# Patient Record
Sex: FEMALE | Race: WHITE | Employment: OTHER | URBAN - METROPOLITAN AREA
[De-identification: names, ages, dates, MRNs, and addresses within clinical notes are randomized per-mention and may not be internally consistent; named-entity substitution may affect disease eponyms.]

---

## 2017-01-18 ENCOUNTER — TELEPHONE (OUTPATIENT)
Dept: INTERNAL MEDICINE | Facility: CLINIC | Age: 82
End: 2017-01-18

## 2017-01-18 ENCOUNTER — OFFICE VISIT (OUTPATIENT)
Dept: INTERNAL MEDICINE | Facility: CLINIC | Age: 82
End: 2017-01-18
Payer: COMMERCIAL

## 2017-01-18 VITALS
RESPIRATION RATE: 16 BRPM | SYSTOLIC BLOOD PRESSURE: 162 MMHG | HEART RATE: 70 BPM | OXYGEN SATURATION: 95 % | TEMPERATURE: 98.8 F | HEIGHT: 63 IN | WEIGHT: 151.8 LBS | BODY MASS INDEX: 26.9 KG/M2 | DIASTOLIC BLOOD PRESSURE: 62 MMHG

## 2017-01-18 DIAGNOSIS — D50.8 OTHER IRON DEFICIENCY ANEMIA: ICD-10-CM

## 2017-01-18 DIAGNOSIS — I10 ESSENTIAL HYPERTENSION WITH GOAL BLOOD PRESSURE LESS THAN 140/90: Primary | ICD-10-CM

## 2017-01-18 DIAGNOSIS — R35.0 URINARY FREQUENCY: Primary | ICD-10-CM

## 2017-01-18 DIAGNOSIS — R53.83 OTHER FATIGUE: ICD-10-CM

## 2017-01-18 DIAGNOSIS — R82.90 NONSPECIFIC FINDING ON EXAMINATION OF URINE: ICD-10-CM

## 2017-01-18 LAB
ALBUMIN UR-MCNC: 30 MG/DL
APPEARANCE UR: ABNORMAL
BACTERIA #/AREA URNS HPF: ABNORMAL /HPF
BILIRUB UR QL STRIP: NEGATIVE
COLOR UR AUTO: YELLOW
ERYTHROCYTE [DISTWIDTH] IN BLOOD BY AUTOMATED COUNT: 15.2 % (ref 10–15)
GLUCOSE UR STRIP-MCNC: NEGATIVE MG/DL
HCT VFR BLD AUTO: 34.7 % (ref 35–47)
HGB BLD-MCNC: 10.7 G/DL (ref 11.7–15.7)
HGB UR QL STRIP: NEGATIVE
KETONES UR STRIP-MCNC: NEGATIVE MG/DL
LEUKOCYTE ESTERASE UR QL STRIP: ABNORMAL
MCH RBC QN AUTO: 29.5 PG (ref 26.5–33)
MCHC RBC AUTO-ENTMCNC: 30.8 G/DL (ref 31.5–36.5)
MCV RBC AUTO: 96 FL (ref 78–100)
NITRATE UR QL: POSITIVE
NON-SQ EPI CELLS #/AREA URNS LPF: ABNORMAL /LPF
PH UR STRIP: 5.5 PH (ref 5–7)
PLATELET # BLD AUTO: 147 10E9/L (ref 150–450)
RBC # BLD AUTO: 3.63 10E12/L (ref 3.8–5.2)
RBC #/AREA URNS AUTO: ABNORMAL /HPF (ref 0–2)
SP GR UR STRIP: 1.02 (ref 1–1.03)
URN SPEC COLLECT METH UR: ABNORMAL
UROBILINOGEN UR STRIP-ACNC: 0.2 EU/DL (ref 0.2–1)
WBC # BLD AUTO: 7.5 10E9/L (ref 4–11)
WBC #/AREA URNS AUTO: >100 /HPF (ref 0–2)

## 2017-01-18 PROCEDURE — 99214 OFFICE O/P EST MOD 30 MIN: CPT | Performed by: NURSE PRACTITIONER

## 2017-01-18 PROCEDURE — 87086 URINE CULTURE/COLONY COUNT: CPT | Performed by: NURSE PRACTITIONER

## 2017-01-18 PROCEDURE — 36415 COLL VENOUS BLD VENIPUNCTURE: CPT | Performed by: NURSE PRACTITIONER

## 2017-01-18 PROCEDURE — 87088 URINE BACTERIA CULTURE: CPT | Performed by: NURSE PRACTITIONER

## 2017-01-18 PROCEDURE — 85027 COMPLETE CBC AUTOMATED: CPT | Performed by: NURSE PRACTITIONER

## 2017-01-18 PROCEDURE — 84443 ASSAY THYROID STIM HORMONE: CPT | Performed by: NURSE PRACTITIONER

## 2017-01-18 PROCEDURE — 80048 BASIC METABOLIC PNL TOTAL CA: CPT | Performed by: NURSE PRACTITIONER

## 2017-01-18 PROCEDURE — 87186 SC STD MICRODIL/AGAR DIL: CPT | Performed by: NURSE PRACTITIONER

## 2017-01-18 PROCEDURE — 81001 URINALYSIS AUTO W/SCOPE: CPT | Performed by: NURSE PRACTITIONER

## 2017-01-18 RX ORDER — NITROFURANTOIN 25; 75 MG/1; MG/1
100 CAPSULE ORAL 2 TIMES DAILY
Qty: 14 CAPSULE | Refills: 0 | Status: ON HOLD | OUTPATIENT
Start: 2017-01-18 | End: 2017-10-18

## 2017-01-18 NOTE — Clinical Note
[facility]  [address], [address]  [place]  [address]      January 19, 2017        Keisha Godinez                                                                                                                               [address] [place] [id number]          Dear Keisha,    Your glucose is elevated at 106 (normal <100) but not high enough to make you a diabetic. But it does indicate you are at high risk for developing diabetes mellitus. Exercise, avoiding simple carbohydrates in your diet and wt loss will all help to lower this risk. Please work on these as you can and I recommend rechecking fasting blood sugar in 3 months.    Enclosed is a copy of the results.  It was a pleasure to see you at your last appointment.    If you have any questions or concerns, please contact our office at [phone].    Sincerely,      Mallika Hammonds C.N.P.

## 2017-01-18 NOTE — TELEPHONE ENCOUNTER
Please advise pt labs show lower hgb and UTI.  Recommend ferrous sulfate BID and recheck in 4 weeks.  eRx sent for macro bid x 7 days.  Other labs pending  Mallika Hammonds CNP

## 2017-01-18 NOTE — NURSING NOTE
"Chief Complaint   Patient presents with     Follow Up For     bp       Initial /62 mmHg  Pulse 70  Temp(Src) 98.8  F (37.1  C) (Oral)  Resp 16  Ht 5' 3\" (1.6 m)  Wt 151 lb 12.8 oz (68.856 kg)  BMI 26.90 kg/m2  SpO2 95% Estimated body mass index is 26.9 kg/(m^2) as calculated from the following:    Height as of this encounter: 5' 3\" (1.6 m).    Weight as of this encounter: 151 lb 12.8 oz (68.856 kg).  BP completed using cuff size: regular    "

## 2017-01-18 NOTE — PROGRESS NOTES
SUBJECTIVE:                                                    Keisha Godinez is a 84 year old female who presents to clinic today for the following health issues:      Hypertension Follow-up      Outpatient blood pressures are being checked at home.  Results are 149/-63.    Low Salt Diet: low salt    C/o increased fatigue, denies SOB/MOTT, pedal edema.  Daughter concerned about UTI       Amount of exercise or physical activity: None    Problems taking medications regularly: No    Medication side effects: none    Diet: regular (no restrictions)      Patient Active Problem List   Diagnosis     Iron deficiency anemia     Neuropathy of both feet     Myocardial infarction-type 2     GIB (gastrointestinal bleeding)     Wound of left leg     Cardiomyopathy (H)     Pulmonary hypertension (H)     Xerosis of skin: shins     Bilateral edema of lower extremity     Gastroesophageal reflux disease without esophagitis     Health Care Home     Chronic systolic congestive heart failure (H)     Major depressive disorder, recurrent episode, in partial remission (H)     Cellulitis of foot     Essential hypertension with goal blood pressure less than 140/90     Post-operative complication     Respiratory failure (H)     Acute respiratory failure with hypoxia (H)     C. difficile colitis     History of hiatal hernia     History of shingles     Chronic foot ulcer (H) (felt secondary to bony protuberance status post excision 7/6-16)     Acute on chronic diastolic congestive heart failure (H)     Pneumonia     Sepsis (H)     Chronic atrial fibrillation (H)     Anemia     Dysphagia     Midline thoracic back pain     Past Surgical History   Procedure Laterality Date     Esophagoscopy, gastroscopy, duodenoscopy (egd), combined N/A 3/22/2015     Upper GI- Normal, nothing significant found.      Colonoscopy  03/24/2015     Diverticulosis, polyps     Enteroscopy small bowel N/A 2/29/2016     Procedure: ENTEROSCOPY SMALL BOWEL;  Surgeon:  Ady Ponce MD;  Location:  GI     Excise mass foot Right 7/6/2016     Procedure: EXCISE MASS FOOT;  Surgeon: Lee Jang DPM;  Location:  OR       Social History   Substance Use Topics     Smoking status: Former Smoker     Quit date: 04/14/1956     Smokeless tobacco: Never Used     Alcohol Use: No     Family History   Problem Relation Age of Onset     Known Genetic Syndrome Father      Known Genetic Syndrome Mother      Other Cancer Daughter      pancreatic     DIABETES No family hx of      Breast Cancer No family hx of      Cancer - colorectal No family hx of      Ovarian Cancer No family hx of      Prostate Cancer No family hx of      Other Cancer Brother      type     Other Cancer Sister 60     lung         Current Outpatient Prescriptions   Medication Sig Dispense Refill     potassium chloride SA (POTASSIUM CHLORIDE) 20 MEQ CR tablet Take 1 tablet (20 mEq) by mouth daily 90 tablet 1     isosorbide mononitrate (IMDUR) 30 MG 24 hr tablet Take 1 tablet (30 mg) by mouth daily 90 tablet 1     gabapentin (NEURONTIN) 100 MG capsule Take 1 capsule (100 mg) by mouth 3 times daily 270 capsule 0     simvastatin (ZOCOR) 10 MG tablet Take 1 tablet (10 mg) by mouth At Bedtime 90 tablet 2     hydrALAZINE (APRESOLINE) 25 MG tablet Take 3 tablets (75 mg) by mouth 3 times daily 810 tablet 2     ferrous sulfate (IRON) 325 (65 FE) MG tablet Take 1 tablet (325 mg) by mouth 3 times daily (with meals) 90 tablet 1     venlafaxine (EFFEXOR-XR) 75 MG 24 hr capsule Take 1 capsule (75 mg) by mouth daily 90 capsule 1     omeprazole (PRILOSEC) 20 MG capsule Take 1 capsule (20 mg) by mouth daily 90 capsule 1     senna-docusate (SENOKOT-S;PERICOLACE) 8.6-50 MG per tablet Take 2 tablets daily as needed for constipation while taking prescription pain medications. 30 tablet 0     furosemide (LASIX) 40 MG tablet Take 1 tablet (40 mg) by mouth 2 times daily 180 tablet 0     order for DME Equipment being ordered: Motorized  scooter x 3 months 1 Device 0     OMEGA-3 FATTY ACIDS PO Take 1,200 mg by mouth daily        order for DME Equipment being ordered: DH2 shoe for offloading R arch ulcer 1 Device 0     VITAMIN D, CHOLECALCIFEROL, PO Take 1,000 Units by mouth daily        ACETAMINOPHEN PO Take 650 mg by mouth At Bedtime       acetaminophen (TYLENOL) 325 MG tablet Take 650 mg by mouth every 6 hours as needed for mild pain At HS and prn as aboe       psyllium (METAMUCIL) 58.6 % POWD Take 1 teaspoonful by mouth daily       Recent Labs   Lab Test  11/30/16   1337  08/01/16   1447   07/12/16   0710   07/09/16   0730   12/31/15   1359   09/14/15   1346   06/07/10   0600   LDL   --    --    --    --    --    --    --    --    --   23   --   102   HDL   --    --    --    --    --    --    --    --    --   46*   --   45*   TRIG   --    --    --    --    --    --    --    --    --   109   --   154*   ALT  27   --    --   15   --   19   < >   --    < >   --    < >   --    CR  0.86  0.82   < >  0.73   < >  0.80   < >  0.74   < >   --    < >  0.62   GFRESTIMATED  63  66   < >  76   < >  68   < >  75   < >   --    < >  >90   GFRESTBLACK  76  80   < >  >90   GFR Calc     < >  83   < >  >90   GFR Calc     < >   --    < >  >90   POTASSIUM  3.8  4.0   < >  3.7   < >  4.1   < >  4.0   < >   --    < >  3.6   TSH  1.56   --    --    --    --    --    --   1.78   --    --    < >  0.80    < > = values in this interval not displayed.      BP Readings from Last 3 Encounters:   01/18/17 162/62   12/08/16 150/70   11/30/16 138/66    Wt Readings from Last 3 Encounters:   01/18/17 151 lb 12.8 oz (68.856 kg)   12/08/16 155 lb (70.308 kg)   11/30/16 152 lb 9.6 oz (69.219 kg)                    ROS:  C: NEGATIVE for fever, chills, change in weight  E/M: NEGATIVE for ear, mouth and throat problems  R: NEGATIVE for significant cough or SOB  CV: NEGATIVE for chest pain, palpitations or peripheral edema    OBJECTIVE:                        "                             /62 mmHg  Pulse 70  Temp(Src) 98.8  F (37.1  C) (Oral)  Resp 16  Ht 5' 3\" (1.6 m)  Wt 151 lb 12.8 oz (68.856 kg)  BMI 26.90 kg/m2  SpO2 95%  Body mass index is 26.9 kg/(m^2).  GENERAL: frail, elderly female in WC  RESP: lungs clear to auscultation - no rales, rhonchi or wheezes  CV: regular rate and rhythm, normal S1 S2, no S3 or S4, no murmur, click or rub, no peripheral edema and peripheral pulses strong         ASSESSMENT/PLAN:                                                              ICD-10-CM    1. Essential hypertension with goal blood pressure less than 140/90 I10 Basic metabolic panel     CBC with platelets   2. Other fatigue R53.83 UA reflex to Microscopic and Culture     TSH with free T4 reflex       See cardiology/PCP if not improving    Mallika Hammonds NP  [facility]    "

## 2017-01-19 LAB
ANION GAP SERPL CALCULATED.3IONS-SCNC: 8 MMOL/L (ref 3–14)
BUN SERPL-MCNC: 17 MG/DL (ref 7–30)
CALCIUM SERPL-MCNC: 9 MG/DL (ref 8.5–10.1)
CHLORIDE SERPL-SCNC: 106 MMOL/L (ref 94–109)
CO2 SERPL-SCNC: 27 MMOL/L (ref 20–32)
CREAT SERPL-MCNC: 0.73 MG/DL (ref 0.52–1.04)
GFR SERPL CREATININE-BSD FRML MDRD: 76 ML/MIN/1.7M2
GLUCOSE SERPL-MCNC: 107 MG/DL (ref 70–99)
POTASSIUM SERPL-SCNC: 4.2 MMOL/L (ref 3.4–5.3)
SODIUM SERPL-SCNC: 141 MMOL/L (ref 133–144)
TSH SERPL DL<=0.005 MIU/L-ACNC: 1.56 MU/L (ref 0.4–4)

## 2017-01-20 ENCOUNTER — TELEPHONE (OUTPATIENT)
Dept: INTERNAL MEDICINE | Facility: CLINIC | Age: 82
End: 2017-01-20

## 2017-01-20 DIAGNOSIS — R30.0 DYSURIA: Primary | ICD-10-CM

## 2017-01-20 LAB
BACTERIA SPEC CULT: ABNORMAL
MICRO REPORT STATUS: ABNORMAL
MICROORGANISM SPEC CULT: ABNORMAL
SPECIMEN SOURCE: ABNORMAL

## 2017-01-20 RX ORDER — CEFUROXIME AXETIL 250 MG/1
250 TABLET ORAL 2 TIMES DAILY
Qty: 20 TABLET | Refills: 0 | Status: ON HOLD | OUTPATIENT
Start: 2017-01-20 | End: 2017-10-18

## 2017-01-20 NOTE — TELEPHONE ENCOUNTER
Pt is resistant to macrobid, which she received 1/18/17.     Please find out what happened with levaquin and bactrim.  Are these true allergies?

## 2017-01-20 NOTE — TELEPHONE ENCOUNTER
Pt  Advised but does not recall what reaction she had with the Bactrim or Levaquin. I did ask about throat closing and she did not think so. Please advise and wants the same pharmacy and will call her back .BRADEN YBARRA LPN

## 2017-02-22 DIAGNOSIS — M54.6 MIDLINE THORACIC BACK PAIN, UNSPECIFIED CHRONICITY: ICD-10-CM

## 2017-02-22 DIAGNOSIS — D64.9 ANEMIA, UNSPECIFIED TYPE: ICD-10-CM

## 2017-02-22 NOTE — TELEPHONE ENCOUNTER
Ferrous sulfate        Last Written Prescription Date: 8/23/16  Last Fill Quantity: 90,    # refills: 1  Last Office Visit with [facility], [facility] or [facility] prescribing provider:  1/18/17      Lab Results   Component Value Date    WBC 7.5 01/18/2017     Lab Results   Component Value Date    RBC 3.63 01/18/2017     Lab Results   Component Value Date    HGB 10.7 01/18/2017     Lab Results   Component Value Date    HCT 34.7 01/18/2017     No components found for: MCT  Lab Results   Component Value Date    MCV 96 01/18/2017     Lab Results   Component Value Date    MCH 29.5 01/18/2017     Lab Results   Component Value Date    MCHC 30.8 01/18/2017     Lab Results   Component Value Date    RDW 15.2 01/18/2017     Lab Results   Component Value Date     01/18/2017     04/02/2015     Lab Results   Component Value Date    AST 24 11/30/2016     Lab Results   Component Value Date    ALT 27 11/30/2016     Creatinine   Date Value Ref Range Status   01/18/2017 0.73 0.52 - 1.04 mg/dL Final       Labs showing if normal/abnormal  Lab Results   Component Value Date    WBC 7.5 01/18/2017    RBC 3.63 (L) 01/18/2017    HGB 10.7 (L) 01/18/2017    HCT 34.7 (L) 01/18/2017    MCV 96 01/18/2017    MCH 29.5 01/18/2017    MCHC 30.8 (L) 01/18/2017    RDW 15.2 (H) 01/18/2017     (L) 01/18/2017    DTYP Automated Method 07/12/2016    NEUTROPHIL 81.1 07/12/2016    LYMPH 8.4 07/12/2016    MONOCYTE 5.9 07/12/2016    EOSINOPHIL 3.0 07/12/2016    BASOPHIL 1.0 07/12/2016    IG 0.6 07/12/2016    ANEU 6.8 07/12/2016    ALYM 0.7 (L) 07/12/2016    JAQUI 0.5 07/12/2016    AEOS 0.3 07/12/2016    ABAS 0.1 07/12/2016    AIG 0.1 07/12/2016      Lab Results   Component Value Date    AST 24 11/30/2016    ALT 27 11/30/2016

## 2017-02-23 NOTE — TELEPHONE ENCOUNTER
gabapentin      Last Written Prescription Date:  11/22/16  Last Fill Quantity: 270,   # refills: 0  Last Office Visit with [facility] prescribing provider: 1/18/17  Future Office visit:       Routing refill request to provider for review/approval because:  Drug not on the [facility] refill protocol or controlled substance

## 2017-02-24 RX ORDER — FERROUS SULFATE 325(65) MG
325 TABLET ORAL
Qty: 90 TABLET | Refills: 2 | Status: ON HOLD | OUTPATIENT
Start: 2017-02-24 | End: 2017-10-18

## 2017-02-24 NOTE — TELEPHONE ENCOUNTER
Ferrous sulfate prescription approved per [facility] Refill Protocol.    Routing Gabapentin refill request to provider for review/approval because:  Drug not on the [facility] refill protocol     Please advise, thanks.

## 2017-02-27 RX ORDER — GABAPENTIN 100 MG/1
100 CAPSULE ORAL 3 TIMES DAILY
Qty: 270 CAPSULE | Refills: 0 | Status: SHIPPED | OUTPATIENT
Start: 2017-02-27 | End: 2017-05-31

## 2017-03-07 DIAGNOSIS — K21.9 GASTROESOPHAGEAL REFLUX DISEASE WITHOUT ESOPHAGITIS: ICD-10-CM

## 2017-03-07 NOTE — TELEPHONE ENCOUNTER
omeprazole      Last Written Prescription Date: 8/3/16  Last Fill Quantity: 90,  # refills: 1   Last Office Visit with [facility] or [facility] prescribing provider: 11/30/16

## 2017-04-03 DIAGNOSIS — F33.41 MAJOR DEPRESSIVE DISORDER, RECURRENT EPISODE, IN PARTIAL REMISSION (H): ICD-10-CM

## 2017-04-03 RX ORDER — VENLAFAXINE HYDROCHLORIDE 75 MG/1
75 CAPSULE, EXTENDED RELEASE ORAL DAILY
Qty: 90 CAPSULE | Refills: 1 | Status: SHIPPED | OUTPATIENT
Start: 2017-04-03 | End: 2017-10-02

## 2017-04-03 NOTE — TELEPHONE ENCOUNTER
venlafaxine    Last Written Prescription Date: 8/16/16  Last Fill Quantity: 90, # refills: 1  Last Office Visit with [facility], [facility] or [facility] prescribing provider: 1/18/17        BP Readings from Last 3 Encounters:   01/18/17 162/62   12/08/16 150/70   11/30/16 138/66     Pulse: (for Fetzima)  Creatinine   Date Value Ref Range Status   01/18/2017 0.73 0.52 - 1.04 mg/dL Final   ]    Last PHQ-9 score on record=   PHQ-9 SCORE 10/21/2015   Total Score -   Total Score 0         Labs showing if normal/abnormal  Data Unavailable  Lab Results   Component Value Date    AST 24 11/30/2016    ALT 27 11/30/2016      Lab Results   Component Value Date    CHOL 91 09/14/2015    TRIG 109 09/14/2015    HDL 46 (L) 09/14/2015    LDL 23 09/14/2015    VLDL 22 09/14/2015    CHOLHDLRATIO 2.0 09/14/2015

## 2017-05-04 DIAGNOSIS — I10 ESSENTIAL HYPERTENSION WITH GOAL BLOOD PRESSURE LESS THAN 140/90: ICD-10-CM

## 2017-05-04 NOTE — TELEPHONE ENCOUNTER
Furosemide      Last Written Prescription Date: 7/5/16  Last Fill Quantity: 180, # refills: 0  Last Office Visit with [facility], [facility] or [facility] prescribing provider: 11/30/16       Potassium   Date Value Ref Range Status   01/18/2017 4.2 3.4 - 5.3 mmol/L Final     Creatinine   Date Value Ref Range Status   01/18/2017 0.73 0.52 - 1.04 mg/dL Final     BP Readings from Last 3 Encounters:   01/18/17 162/62   12/08/16 150/70   11/30/16 138/66

## 2017-05-05 RX ORDER — FUROSEMIDE 40 MG
40 TABLET ORAL 2 TIMES DAILY
Qty: 180 TABLET | Refills: 1 | Status: SHIPPED | OUTPATIENT
Start: 2017-05-05 | End: 2017-11-02

## 2017-05-05 NOTE — TELEPHONE ENCOUNTER
Pt was seen on 1/18/2017 by Mallika Hammonds for her annual appt.    Prescription approved per [facility] Refill Protocol.

## 2017-05-31 ENCOUNTER — TELEPHONE (OUTPATIENT)
Dept: INTERNAL MEDICINE | Facility: CLINIC | Age: 82
End: 2017-05-31

## 2017-05-31 DIAGNOSIS — M54.6 MIDLINE THORACIC BACK PAIN, UNSPECIFIED CHRONICITY: ICD-10-CM

## 2017-05-31 NOTE — TELEPHONE ENCOUNTER
Reason for Call:  Medication or medication refill:    Do you use a [facility]?  Name of the pharmacy and phone number for the current request:  [facility] in [place]    Name of the medication requested: gabapentin    Other request: none    Can we leave a detailed message on this number? YES    Phone number patient can be reached at: Home number on file [phone] (home)    Best Time: any    Call taken on 5/31/2017 at 10:19 AM by Cecy Sandhu

## 2017-05-31 NOTE — TELEPHONE ENCOUNTER
Gabapentin      Last Written Prescription Date:  2/27/17  Last Fill Quantity: 270,   # refills: 0  Last Office Visit with [facility], [facility] or  Health prescribing provider: 1/18/17  Future Office visit:       Routing refill request to provider for review/approval because:  Drug not on the [facility], [facility] or  Health refill protocol or controlled substance.  No signed CSA form in chart.

## 2017-06-01 RX ORDER — GABAPENTIN 100 MG/1
100 CAPSULE ORAL 3 TIMES DAILY
Qty: 270 CAPSULE | Refills: 0 | Status: SHIPPED | OUTPATIENT
Start: 2017-06-01 | End: 2017-08-29

## 2017-06-05 DIAGNOSIS — R60.0 BILATERAL EDEMA OF LOWER EXTREMITY: ICD-10-CM

## 2017-06-05 NOTE — TELEPHONE ENCOUNTER
potassium      Last Written Prescription Date: 12/7/16  Last Fill Quantity: 90, # refills: 1  Last Office Visit with [facility] or [facility] prescribing provider: 1/18/17       Potassium   Date Value Ref Range Status   01/18/2017 4.2 3.4 - 5.3 mmol/L Final     Creatinine   Date Value Ref Range Status   01/18/2017 0.73 0.52 - 1.04 mg/dL Final     BP Readings from Last 3 Encounters:   01/18/17 162/62   12/08/16 150/70   11/30/16 138/66

## 2017-06-06 RX ORDER — POTASSIUM CHLORIDE 1500 MG/1
20 TABLET, EXTENDED RELEASE ORAL DAILY
Qty: 90 TABLET | Refills: 1 | Status: SHIPPED | OUTPATIENT
Start: 2017-06-06 | End: 2017-11-08

## 2017-06-13 DIAGNOSIS — I42.9 CARDIOMYOPATHY (H): ICD-10-CM

## 2017-06-13 DIAGNOSIS — I25.119 CORONARY ARTERY DISEASE INVOLVING NATIVE HEART WITH ANGINA PECTORIS, UNSPECIFIED VESSEL OR LESION TYPE (H): ICD-10-CM

## 2017-06-13 DIAGNOSIS — I10 ESSENTIAL HYPERTENSION WITH GOAL BLOOD PRESSURE LESS THAN 140/90: ICD-10-CM

## 2017-06-13 RX ORDER — ISOSORBIDE MONONITRATE 30 MG/1
30 TABLET, EXTENDED RELEASE ORAL DAILY
Qty: 90 TABLET | Refills: 1 | Status: SHIPPED | OUTPATIENT
Start: 2017-06-13 | End: 2017-12-11

## 2017-06-13 NOTE — TELEPHONE ENCOUNTER
Isosorbide mononitrate      Last Written Prescription Date: 12/7/16  Last Fill Quantity: 90,  # refills: 1   Last Office Visit with [facility], [facility] or [facility] prescribing provider: 1/18/17

## 2017-06-28 ENCOUNTER — TELEPHONE (OUTPATIENT)
Dept: INTERNAL MEDICINE | Facility: CLINIC | Age: 82
End: 2017-06-28

## 2017-06-28 DIAGNOSIS — F33.41 MAJOR DEPRESSIVE DISORDER, RECURRENT EPISODE, IN PARTIAL REMISSION (H): Primary | ICD-10-CM

## 2017-06-28 ASSESSMENT — ANXIETY QUESTIONNAIRES
GAD7 TOTAL SCORE: 0
7. FEELING AFRAID AS IF SOMETHING AWFUL MIGHT HAPPEN: NOT AT ALL
3. WORRYING TOO MUCH ABOUT DIFFERENT THINGS: NOT AT ALL
1. FEELING NERVOUS, ANXIOUS, OR ON EDGE: NOT AT ALL
IF YOU CHECKED OFF ANY PROBLEMS ON THIS QUESTIONNAIRE, HOW DIFFICULT HAVE THESE PROBLEMS MADE IT FOR YOU TO DO YOUR WORK, TAKE CARE OF THINGS AT HOME, OR GET ALONG WITH OTHER PEOPLE: NOT DIFFICULT AT ALL
5. BEING SO RESTLESS THAT IT IS HARD TO SIT STILL: NOT AT ALL
6. BECOMING EASILY ANNOYED OR IRRITABLE: NOT AT ALL
2. NOT BEING ABLE TO STOP OR CONTROL WORRYING: NOT AT ALL

## 2017-06-28 ASSESSMENT — PATIENT HEALTH QUESTIONNAIRE - PHQ9: 5. POOR APPETITE OR OVEREATING: NOT AT ALL

## 2017-06-28 NOTE — LETTER
My Depression Action Plan  Name: Keisha Godinez   Date of Birth 1/25/1932  Date: 6/28/2017    My doctor: Gerri Eubanks   My clinic: [place] [address]  [facility] [id number] [phone]          GREEN    ZONE   Good Control    What it looks like:     Things are going generally well. You have normal up s and down s. You may even feel depressed from time to time, but bad moods usually last less than a day.   What you need to do:  1. Continue to care for yourself (see self care plan)  2. Check your depression survival kit and update it as needed  3. Follow your physician s recommendations including any medication.  4. Do not stop taking medication unless you consult with your physician first.           YELLOW         ZONE Getting Worse    What it looks like:     Depression is starting to interfere with your life.     It may be hard to get out of bed; you may be starting to isolate yourself from others.    Symptoms of depression are starting to last most all day and this has happened for several days.     You may have suicidal thoughts but they are not constant.   What you need to do:     1. Call your care team, your response to treatment will improve if you keep your care team informed of your progress. Yellow periods are signs an adjustment may need to be made.     2. Continue your self-care, even if you have to fake it!    3. Talk to someone in your support network    4. Open up your depression survival kit           RED    ZONE Medical Alert - Get Help    What it looks like:     Depression is seriously interfering with your life.     You may experience these or other symptoms: You can t get out of bed most days, can t work or engage in other necessary activities, you have trouble taking care of basic hygiene, or basic responsibilities, thoughts of suicide or death that will not go away, self-injurious behavior.     What you need to do:  1. Call your care  team and request a same-day appointment. If they are not available (weekends or after hours) call your local crisis line, emergency room or 911.      Electronically signed by: Gerri Eubanks, June 28, 2017    Depression Self Care Plan / Survival Kit    Self-Care for Depression  Here s the deal. Your body and mind are really not as separate as most people think.  What you do and think affects how you feel and how you feel influences what you do and think. This means if you do things that people who feel good do, it will help you feel better.  Sometimes this is all it takes.  There is also a place for medication and therapy depending on how severe your depression is, so be sure to consult with your medical provider and/ or Behavioral Health Consultant if your symptoms are worsening or not improving.     In order to better manage my stress, I will:    Exercise  Get some form of exercise, every day. This will help reduce pain and release endorphins, the  feel good  chemicals in your brain. This is almost as good as taking antidepressants!  This is not the same as joining a gym and then never going! (they count on that by the way ) It can be as simple as just going for a walk or doing some gardening, anything that will get you moving.      Hygiene   Maintain good hygiene (Get out of bed in the morning, Make your bed, Brush your teeth, Take a shower, and Get dressed like you were going to work, even if you are unemployed).  If your clothes don't fit try to get ones that do.    Diet  I will strive to eat foods that are good for me, drink plenty of water, and avoid excessive sugar, caffeine, alcohol, and other mood-altering substances.  Some foods that are helpful in depression are: complex carbohydrates, B vitamins, flaxseed, fish or fish oil, fresh fruits and vegetables.    Psychotherapy  I agree to participate in Individual Therapy (if recommended).    Medication  If prescribed medications, I agree to take them.   Missing doses can result in serious side effects.  I understand that drinking alcohol, or other illicit drug use, may cause potential side effects.  I will not stop my medication abruptly without first discussing it with my provider.    Staying Connected With Others  I will stay in touch with my friends, family members, and my primary care provider/team.    Use your imagination  Be creative.  We all have a creative side; it doesn t matter if it s oil painting, sand castles, or mud pies! This will also kick up the endorphins.    Witness Beauty  (AKA stop and smell the roses) Take a look outside, even in mid-winter. Notice colors, textures. Watch the squirrels and birds.     Service to others  Be of service to others.  There is always someone else in need.  By helping others we can  get out of ourselves  and remember the really important things.  This also provides opportunities for practicing all the other parts of the program.    Humor  Laugh and be silly!  Adjust your TV habits for less news and crime-drama and more comedy.    Control your stress  Try breathing deep, massage therapy, biofeedback, and meditation. Find time to relax each day.     My support system    Clinic Contact:  Phone number:    Contact 1:  Phone number:    Contact 2:  Phone number:    Jewish/:  Phone number:    Therapist:  Phone number:    Local crisis center:    Phone number:    Other community support:  Phone number:

## 2017-06-28 NOTE — TELEPHONE ENCOUNTER
Panel Management Review      Patient has the following on her problem list:     Depression / Dysthymia review  PHQ-9 SCORE 4/14/2015 10/21/2015   Total Score 0 -   Total Score - 0      Patient is due for:  PHQ9 and DAP    Hypertension   Last three blood pressure readings:  BP Readings from Last 3 Encounters:   01/18/17 162/62   12/08/16 150/70   11/30/16 138/66     Blood pressure: Passed    HTN Guidelines:  Age 18-59 BP range:  Less than 140/90  Age 60-85 with Diabetes:  Less than 140/90  Age 60-85 without Diabetes:  less than 150/90      Composite cancer screening  Chart review shows that this patient is due/due soon for the following None  Summary:    Patient is due/failing the following:   BP CHECK, DAP and PHQ9    Action needed:   Patient needs office visit for as above.    Type of outreach:    Phone, spoke to patient.  Feeling well. BP at home 145/50-60. PHQ9/BURKE both = 0 today.  Plans to schedule [facility] with the next 4-6 months.    Questions for provider review:    Needs depression action plan.                                                                                     DENG Arciniega LPN                                                 Chart routed to Provider.

## 2017-06-29 ASSESSMENT — ANXIETY QUESTIONNAIRES: GAD7 TOTAL SCORE: 0

## 2017-06-29 ASSESSMENT — PATIENT HEALTH QUESTIONNAIRE - PHQ9: SUM OF ALL RESPONSES TO PHQ QUESTIONS 1-9: 0

## 2017-07-26 DIAGNOSIS — E78.5 HYPERLIPIDEMIA, UNSPECIFIED HYPERLIPIDEMIA TYPE: ICD-10-CM

## 2017-07-26 DIAGNOSIS — I42.9 CARDIOMYOPATHY, UNSPECIFIED TYPE (H): Primary | ICD-10-CM

## 2017-07-27 PROBLEM — I42.9 CARDIOMYOPATHY, UNSPECIFIED TYPE (H): Status: ACTIVE | Noted: 2017-07-27

## 2017-07-27 RX ORDER — HYDRALAZINE HYDROCHLORIDE 25 MG/1
75 TABLET, FILM COATED ORAL 3 TIMES DAILY
Qty: 810 TABLET | Refills: 2 | Status: SHIPPED | OUTPATIENT
Start: 2017-07-27 | End: 2018-04-30

## 2017-07-27 RX ORDER — SIMVASTATIN 10 MG
10 TABLET ORAL AT BEDTIME
Qty: 90 TABLET | Refills: 0 | Status: ON HOLD | OUTPATIENT
Start: 2017-07-27 | End: 2017-10-23

## 2017-07-27 NOTE — TELEPHONE ENCOUNTER
Simvastatin medication is being filled for 1 time refill only due to:  Patient needs labs lipid panel. Future labs ordered in chart.      Routing refill request to provider for review/approval because:  Most recent bp's outside SO parameters.    Please advise, thanks.

## 2017-08-02 DIAGNOSIS — E78.5 HYPERLIPIDEMIA, UNSPECIFIED HYPERLIPIDEMIA TYPE: ICD-10-CM

## 2017-08-02 LAB
CHOLEST SERPL-MCNC: 108 MG/DL
HDLC SERPL-MCNC: 61 MG/DL
LDLC SERPL CALC-MCNC: 34 MG/DL
NONHDLC SERPL-MCNC: 47 MG/DL
TRIGL SERPL-MCNC: 65 MG/DL

## 2017-08-02 PROCEDURE — 80061 LIPID PANEL: CPT | Performed by: INTERNAL MEDICINE

## 2017-08-02 PROCEDURE — 36415 COLL VENOUS BLD VENIPUNCTURE: CPT | Performed by: INTERNAL MEDICINE

## 2017-08-29 DIAGNOSIS — M54.6 MIDLINE THORACIC BACK PAIN, UNSPECIFIED CHRONICITY: ICD-10-CM

## 2017-08-29 RX ORDER — GABAPENTIN 100 MG/1
100 CAPSULE ORAL 3 TIMES DAILY
Qty: 270 CAPSULE | Refills: 0 | Status: ON HOLD | OUTPATIENT
Start: 2017-08-29 | End: 2017-10-28

## 2017-08-29 NOTE — TELEPHONE ENCOUNTER
gabapentin      Last Written Prescription Date:  6/1/17  Last Fill Quantity: 270,   # refills: 0  Last Office Visit with [facility] prescribing provider: 1/18/17  Future Office visit:       Routing refill request to provider for review/approval because:  Drug not on the [facility] refill protocol or controlled substance

## 2017-10-02 DIAGNOSIS — F33.41 MAJOR DEPRESSIVE DISORDER, RECURRENT EPISODE, IN PARTIAL REMISSION (H): ICD-10-CM

## 2017-10-02 NOTE — TELEPHONE ENCOUNTER
venlafaxine    Last Written Prescription Date: 4/3/17  Last Fill Quantity: 90, # refills: 1  Last Office Visit with [facility], [facility] or [facility] prescribing provider: 1/18/17        BP Readings from Last 3 Encounters:   01/18/17 162/62   12/08/16 150/70   11/30/16 138/66     Pulse: (for Fetzima)  Creatinine   Date Value Ref Range Status   01/18/2017 0.73 0.52 - 1.04 mg/dL Final   ]    Last PHQ-9 score on record=   PHQ-9 SCORE 6/28/2017   Total Score -   Total Score 0         Labs showing if normal/abnormal  Data Unavailable  Lab Results   Component Value Date    AST 24 11/30/2016    ALT 27 11/30/2016      Lab Results   Component Value Date    CHOL 108 08/02/2017    TRIG 65 08/02/2017    HDL 61 08/02/2017    LDL 34 08/02/2017    VLDL 22 09/14/2015    CHOLHDLRATIO 2.0 09/14/2015

## 2017-10-03 RX ORDER — VENLAFAXINE HYDROCHLORIDE 75 MG/1
75 CAPSULE, EXTENDED RELEASE ORAL DAILY
Qty: 90 CAPSULE | Refills: 0 | Status: SHIPPED | OUTPATIENT
Start: 2017-10-03 | End: 2018-01-03

## 2017-10-03 NOTE — TELEPHONE ENCOUNTER
Per 6/28 phone note-Phone, spoke to patient.  Feeling well. BP at home 145/50-60. PHQ9/BURKE both = 0 today.  Plans to schedule [facility] with the next 4-6 months.

## 2017-10-17 ENCOUNTER — HOSPITAL ENCOUNTER (INPATIENT)
Facility: CLINIC | Age: 82
LOS: 8 days | Discharge: NURSING, REHAB OR LONG-TERM CARE | DRG: 562 | End: 2017-10-26
Attending: EMERGENCY MEDICINE | Admitting: INTERNAL MEDICINE
Payer: MEDICARE

## 2017-10-17 ENCOUNTER — APPOINTMENT (OUTPATIENT)
Dept: GENERAL RADIOLOGY | Facility: CLINIC | Age: 82
DRG: 562 | End: 2017-10-17
Attending: EMERGENCY MEDICINE
Payer: MEDICARE

## 2017-10-17 DIAGNOSIS — S42.411A CLOSED SUPRACONDYLAR FRACTURE OF RIGHT HUMERUS, INITIAL ENCOUNTER: ICD-10-CM

## 2017-10-17 DIAGNOSIS — M10.9 ACUTE GOUTY ARTHRITIS: Primary | ICD-10-CM

## 2017-10-17 LAB
ANION GAP SERPL CALCULATED.3IONS-SCNC: 8 MMOL/L (ref 3–14)
BASOPHILS # BLD AUTO: 0.1 10E9/L (ref 0–0.2)
BASOPHILS NFR BLD AUTO: 0.4 %
BUN SERPL-MCNC: 14 MG/DL (ref 7–30)
CALCIUM SERPL-MCNC: 7.7 MG/DL (ref 8.5–10.1)
CHLORIDE SERPL-SCNC: 107 MMOL/L (ref 94–109)
CO2 SERPL-SCNC: 27 MMOL/L (ref 20–32)
CREAT SERPL-MCNC: 0.77 MG/DL (ref 0.52–1.04)
DIFFERENTIAL METHOD BLD: ABNORMAL
EOSINOPHIL # BLD AUTO: 0.1 10E9/L (ref 0–0.7)
EOSINOPHIL NFR BLD AUTO: 0.9 %
ERYTHROCYTE [DISTWIDTH] IN BLOOD BY AUTOMATED COUNT: 13.1 % (ref 10–15)
GFR SERPL CREATININE-BSD FRML MDRD: 71 ML/MIN/1.7M2
GLUCOSE SERPL-MCNC: 137 MG/DL (ref 70–99)
HCT VFR BLD AUTO: 34.4 % (ref 35–47)
HGB BLD-MCNC: 10.8 G/DL (ref 11.7–15.7)
IMM GRANULOCYTES # BLD: 0.1 10E9/L (ref 0–0.4)
IMM GRANULOCYTES NFR BLD: 0.4 %
LYMPHOCYTES # BLD AUTO: 0.5 10E9/L (ref 0.8–5.3)
LYMPHOCYTES NFR BLD AUTO: 4 %
MCH RBC QN AUTO: 30.4 PG (ref 26.5–33)
MCHC RBC AUTO-ENTMCNC: 31.4 G/DL (ref 31.5–36.5)
MCV RBC AUTO: 97 FL (ref 78–100)
MONOCYTES # BLD AUTO: 0.5 10E9/L (ref 0–1.3)
MONOCYTES NFR BLD AUTO: 4.2 %
NEUTROPHILS # BLD AUTO: 10.2 10E9/L (ref 1.6–8.3)
NEUTROPHILS NFR BLD AUTO: 90.1 %
NRBC # BLD AUTO: 0 10*3/UL
NRBC BLD AUTO-RTO: 0 /100
PLATELET # BLD AUTO: 135 10E9/L (ref 150–450)
POTASSIUM SERPL-SCNC: 3.2 MMOL/L (ref 3.4–5.3)
RBC # BLD AUTO: 3.55 10E12/L (ref 3.8–5.2)
SODIUM SERPL-SCNC: 142 MMOL/L (ref 133–144)
TROPONIN I SERPL-MCNC: <0.015 UG/L (ref 0–0.04)
WBC # BLD AUTO: 11.3 10E9/L (ref 4–11)

## 2017-10-17 PROCEDURE — 80048 BASIC METABOLIC PNL TOTAL CA: CPT | Performed by: EMERGENCY MEDICINE

## 2017-10-17 PROCEDURE — 73060 X-RAY EXAM OF HUMERUS: CPT | Mod: RT

## 2017-10-17 PROCEDURE — 99285 EMERGENCY DEPT VISIT HI MDM: CPT | Mod: 25

## 2017-10-17 PROCEDURE — 85025 COMPLETE CBC W/AUTO DIFF WBC: CPT | Performed by: EMERGENCY MEDICINE

## 2017-10-17 PROCEDURE — 96376 TX/PRO/DX INJ SAME DRUG ADON: CPT

## 2017-10-17 PROCEDURE — 84484 ASSAY OF TROPONIN QUANT: CPT | Performed by: EMERGENCY MEDICINE

## 2017-10-17 PROCEDURE — 96374 THER/PROPH/DIAG INJ IV PUSH: CPT

## 2017-10-17 PROCEDURE — 93005 ELECTROCARDIOGRAM TRACING: CPT | Mod: 76

## 2017-10-17 PROCEDURE — 73030 X-RAY EXAM OF SHOULDER: CPT | Mod: RT

## 2017-10-17 PROCEDURE — 83735 ASSAY OF MAGNESIUM: CPT | Performed by: EMERGENCY MEDICINE

## 2017-10-17 PROCEDURE — 25000128 H RX IP 250 OP 636: Performed by: EMERGENCY MEDICINE

## 2017-10-17 PROCEDURE — 93005 ELECTROCARDIOGRAM TRACING: CPT

## 2017-10-17 RX ORDER — HYDROMORPHONE HYDROCHLORIDE 1 MG/ML
0.5 INJECTION, SOLUTION INTRAMUSCULAR; INTRAVENOUS; SUBCUTANEOUS ONCE
Status: COMPLETED | OUTPATIENT
Start: 2017-10-17 | End: 2017-10-17

## 2017-10-17 RX ORDER — HYDROMORPHONE HYDROCHLORIDE 1 MG/ML
0.5 INJECTION, SOLUTION INTRAMUSCULAR; INTRAVENOUS; SUBCUTANEOUS
Status: COMPLETED | OUTPATIENT
Start: 2017-10-17 | End: 2017-10-17

## 2017-10-17 RX ADMIN — HYDROMORPHONE HYDROCHLORIDE 0.5 MG: 1 INJECTION, SOLUTION INTRAMUSCULAR; INTRAVENOUS; SUBCUTANEOUS at 22:18

## 2017-10-17 RX ADMIN — HYDROMORPHONE HYDROCHLORIDE 0.5 MG: 1 INJECTION, SOLUTION INTRAMUSCULAR; INTRAVENOUS; SUBCUTANEOUS at 23:04

## 2017-10-17 ASSESSMENT — ENCOUNTER SYMPTOMS
BACK PAIN: 0
NECK PAIN: 0
ABDOMINAL PAIN: 0

## 2017-10-17 NOTE — IP AVS SNAPSHOT
"    [facility]: [phone]                                              INTERAGENCY TRANSFER FORM - PHYSICIAN ORDERS   10/17/2017                    Hospital Admission Date: 10/17/2017  EALINA MAYFIELD   : 1932  Sex: Female        Attending Provider: Gabe Porter MD     Allergies:  Lisinopril, Calcium, Egg Yolk, Flu Virus Vaccine, Keflex [Cephalexin], Levaquin [Levofloxacin], Sulfa Drugs, Zinc    Infection:  None   Service:  GENERAL MEDI    Ht:  1.6 m (5' 3\")   Wt:  73.3 kg (161 lb 8 oz)   Admission Wt:  70.3 kg (155 lb)    BMI:  28.61 kg/m 2   BSA:  1.81 m 2            Patient PCP Information     Provider PCP Type    Gerri Eubanks MD General      ED Clinical Impression     Diagnosis Description Comment Added By Time Added    Closed supracondylar fracture of right humerus, initial encounter [S42.411A] Closed supracondylar fracture of right humerus, initial encounter [S42.411A]  Sarah Patel MD 10/17/2017 11:32 PM      Hospital Problems as of 10/26/2017              Priority Class Noted POA    Pulmonary hypertension Medium  Unknown Yes    Bilateral edema of lower extremity Medium  2015 Yes    Acute on chronic diastolic congestive heart failure (H) Medium  2016 Yes    Chronic atrial fibrillation (H) Medium  2016 Yes    Anemia Medium  2016 Yes    * (Principal)Fracture of humerus, proximal, right, closed Medium  10/18/2017 Yes    Rheumatic mitral regurgitation Medium  10/26/2017 Unknown      Non-Hospital Problems as of 10/26/2017              Priority Class Noted    Iron deficiency anemia Medium  3/19/2015    Neuropathy of both feet Medium  3/19/2015    Myocardial infarction-type 2 Medium  3/19/2015    GIB (gastrointestinal bleeding) Medium  3/20/2015    Wound of left leg Medium  3/30/2015    Cardiomyopathy (H) Medium  Unknown    Xerosis of skin: shins Medium  2015    Gastroesophageal reflux disease without esophagitis Medium  11/3/2015    Health Care Home " Medium  12/9/2015    Chronic systolic congestive heart failure (H) Medium  Unknown    Major depressive disorder, recurrent episode, in partial remission (H) Medium  2/8/2016    Cellulitis of foot Medium  5/12/2016    Essential hypertension with goal blood pressure less than 140/90 Medium  6/30/2016    Post-operative complication Medium  7/6/2016    Respiratory failure (H) Medium  7/6/2016    Acute respiratory failure with hypoxia (H) Medium  7/15/2016    C. difficile colitis Medium  7/15/2016    History of hiatal hernia Medium  7/15/2016    History of shingles Medium  7/15/2016    Chronic foot ulcer (H) (felt secondary to bony protuberance status post excision 7/6-16) Medium  7/15/2016    Pneumonia Medium  7/18/2016    Sepsis (H) Medium  7/18/2016    Dysphagia Medium  7/25/2016    Midline thoracic back pain Medium  11/22/2016    Cardiomyopathy, unspecified type (H) Medium  7/27/2017      Code Status History     Date Active Date Inactive Code Status Order ID Comments User Context    10/26/2017  1:59 PM  Full Code [id number]  Gabe Porter MD Outpatient    10/18/2017  2:03 AM 10/26/2017  1:59 PM Full Code [id number]  Gabe Porter MD Inpatient    7/14/2016 10:43 AM 10/18/2017  2:03 AM Full Code [id number]  Evette Garg MD Outpatient    7/6/2016  4:19 PM 7/14/2016 10:43 AM Full Code [id number]  Lee Jang DPM Inpatient    6/27/2016 12:32 PM 7/6/2016  4:19 PM Full Code [id number]  Chapin Castro MD Outpatient    6/21/2016  9:23 PM 6/27/2016 12:32 PM Full Code [id number]  Rahul Parsons MD Inpatient    5/15/2016 10:36 AM 6/21/2016  9:23 PM Full Code [id number]  Rahul Parsons MD Outpatient    5/12/2016  9:43 PM 5/15/2016 10:36 AM Full Code [id number]  Gabe Porter MD Inpatient    12/7/2015  7:48 AM 5/12/2016  9:43 PM Full Code [id number]  Rosa Jolley MD Outpatient    11/25/2015  5:23 PM 12/7/2015  7:48 AM Full Code [id number]  Gabe Porter MD Inpatient    10/7/2015   3:39 PM 11/25/2015  5:23 PM Full Code [id number]  Ej Lilly MD Outpatient    10/6/2015 11:47 PM 10/7/2015  3:39 PM Full Code [id number]  Rubén Haddad MD Inpatient    8/14/2015 10:58 AM 10/6/2015 11:47 PM Full Code [id number]  Yanira Frankel PA-C Outpatient    8/12/2015 10:28 PM 8/14/2015 10:58 AM Full Code [id number]  Maddy Bennett PA-C ED    4/4/2015 11:19 PM 4/8/2015  6:04 PM DNR [id number]  Jaz Swartz DO Inpatient    3/25/2015 11:56 AM 4/4/2015 11:19 PM DNR [id number]  Gini Dave MD Outpatient    3/20/2015  7:29 PM 3/25/2015 11:56 AM DNR [id number]  Evette Garg MD Inpatient    3/18/2015 10:09 AM 3/20/2015  7:29 PM DNR [id number]  Evette Garg MD Outpatient    3/10/2015  3:33 PM 3/18/2015 10:09 AM DNR [id number]  Isaac Daniels DO Inpatient         Medication Review      START taking        Dose / Directions Comments    colchicine 0.6 MG tablet   Used for:  Acute gouty arthritis        Dose:  0.6 mg   Take 1 tablet (0.6 mg) by mouth daily   Quantity:  14 tablet   Refills:  0        HYDROmorphone 2 MG tablet   Commonly known as:  DILAUDID   Used for:  Closed supracondylar fracture of right humerus, initial encounter        Dose:  2-4 mg   Take 1-2 tablets (2-4 mg) by mouth every 3 hours as needed for moderate to severe pain   Quantity:  90 tablet   Refills:  0        hydrOXYzine 25 MG tablet   Commonly known as:  ATARAX   Used for:  Closed supracondylar fracture of right humerus, initial encounter        Dose:  25 mg   Take 1 tablet (25 mg) by mouth 3 times daily as needed for other (pain adjunct)   Quantity:  60 tablet   Refills:  0          CONTINUE these medications which have NOT CHANGED        Dose / Directions Comments    acetaminophen 325 MG tablet   Commonly known as:  TYLENOL        Dose:  650 mg   Take 650 mg by mouth every 6 hours as needed for mild pain   Refills:  0        ferrous sulfate 325 (65 FE) MG tablet   Commonly known as:  IRON         Dose:  325 mg   Take 325 mg by mouth 2 times daily   Refills:  0        furosemide 40 MG tablet   Commonly known as:  LASIX   Used for:  Essential hypertension with goal blood pressure less than 140/90        Dose:  40 mg   Take 1 tablet (40 mg) by mouth 2 times daily   Quantity:  180 tablet   Refills:  1        gabapentin 100 MG capsule   Commonly known as:  NEURONTIN   Used for:  Midline thoracic back pain, unspecified chronicity        Dose:  100 mg   Take 1 capsule (100 mg) by mouth 3 times daily   Quantity:  270 capsule   Refills:  0        hydrALAZINE 25 MG tablet   Commonly known as:  APRESOLINE   Used for:  Hyperlipidemia, unspecified hyperlipidemia type, Cardiomyopathy, unspecified type (H)        Dose:  75 mg   Take 3 tablets (75 mg) by mouth 3 times daily   Quantity:  810 tablet   Refills:  2        isosorbide mononitrate 30 MG 24 hr tablet   Commonly known as:  IMDUR   Used for:  Essential hypertension with goal blood pressure less than 140/90, Cardiomyopathy (H), Coronary artery disease involving native heart with angina pectoris, unspecified vessel or lesion type (H)        Dose:  30 mg   Take 1 tablet (30 mg) by mouth daily   Quantity:  90 tablet   Refills:  1        OMEGA-3 FATTY ACIDS PO        Dose:  1200 mg   Take 1,200 mg by mouth daily   Refills:  0        omeprazole 20 MG CR capsule   Commonly known as:  priLOSEC   Used for:  Gastroesophageal reflux disease without esophagitis        Dose:  20 mg   Take 1 capsule (20 mg) by mouth daily   Quantity:  90 capsule   Refills:  2        * order for DME   Used for:  Ulcer of foot, right, with fat layer exposed (H)        Equipment being ordered: DH2 shoe for offloading R arch ulcer   Quantity:  1 Device   Refills:  0        * order for DME   Used for:  Ulcer of foot, right, limited to breakdown of skin (H)        Equipment being ordered: Motorized scooter x 3 months   Quantity:  1 Device   Refills:  0        potassium chloride SA 20 MEQ CR tablet    Commonly known as:  KLOR-CON   Used for:  Bilateral edema of lower extremity        Dose:  20 mEq   Take 1 tablet (20 mEq) by mouth daily   Quantity:  90 tablet   Refills:  1        psyllium 58.6 % Powd   Commonly known as:  METAMUCIL        Dose:  1 teaspoonful   Take 1 teaspoonful by mouth every evening   Refills:  0        senna-docusate 8.6-50 MG per tablet   Commonly known as:  SENOKOT-S;PERICOLACE   Used for:  S/P foot surgery, right        Take 2 tablets daily as needed for constipation while taking prescription pain medications.   Quantity:  30 tablet   Refills:  0        simvastatin 10 MG tablet   Commonly known as:  ZOCOR   Used for:  Hyperlipidemia, unspecified hyperlipidemia type        Dose:  10 mg   Take 1 tablet (10 mg) by mouth At Bedtime   Quantity:  90 tablet   Refills:  0        venlafaxine 75 MG 24 hr capsule   Commonly known as:  EFFEXOR-XR   Used for:  Major depressive disorder, recurrent episode, in partial remission (H)        Dose:  75 mg   Take 1 capsule (75 mg) by mouth daily   Quantity:  90 capsule   Refills:  0    Pt due for appt by Dec. Needs to call our office       VITAMIN D (CHOLECALCIFEROL) PO        Dose:  1000 Units   Take 1,000 Units by mouth daily   Refills:  0        * Notice:  This list has 2 medication(s) that are the same as other medications prescribed for you. Read the directions carefully, and ask your doctor or other care provider to review them with you.            Summary of Visit     Reason for your hospital stay       You had come in to the hospital after a fall. Dr. Urbano (Orthopedics) has indicated that you would not see accellerated improvement with surgery.             After Care     Activity - Up with nursing assistance           Additional Discharge Instructions           Advance Diet as Tolerated       Follow this diet upon discharge: Regular       General info for SNF       Length of Stay Estimate: Short Term Care: Estimated # of Days <30  Condition at  Discharge: Stable  Level of care:skilled   Rehabilitation Potential: Fair  Admission H&P remains valid and up-to-date: Yes  Recent Chemotherapy: N/A  Use Nursing Home Standing Orders: Yes       Mantoux instructions       Give two-step Mantoux (PPD) Per Facility Policy Yes       Weight bearing status       Non-weight-bearing right UE.             Referrals     Med Therapy Manage Referral       Your provider has referred you to: **[facility] Scheduling (numerous locations) [phone]   [email or web address]    Reason for Referral: 10+ prescription medications    The [facility] department will contact you to schedule an appointment.  You may also schedule the appointment by calling [phone].  For [facility] patients, please call [phone] to confirm/schedule your appointment on the next business day.    This service is designed to help you get the most from your medications.  A specially trained Pharmacist will work closely with you and your providers to solve any questions, concerns, issues or problems related to your medications.    Please bring all of your prescription and non-prescription medications (such as vitamins, over-the-counter medications, and herbals) or a detailed medication list to your appointment.    If you have a glucose meter or other home monitoring information, please also bring this to your appointment (i.e. blood glucose log, blood pressure log, pain log, etc.).       Occupational Therapy Adult Consult       Evaluate and treat as clinically indicated.    Reason:  Right humerus fracture       Physical Therapy Adult Consult       Evaluate and treat as clinically indicated.    Reason:  Right humerus fracture             Your next 10 appointments already scheduled     Oct 27, 2017  1:00 PM T   Nursing Home with JODIE Arcos CNP   Geriatrics Transitional  [facility] ([facility])    [address] [place] [address]   [phone]            Nov 02, 2017 11:00 AM CDT   SHORT with Gerri Eubanks MD   [facility] ([facility]    [address]  [facility] [address]   [phone]              Follow-Up Appointment Instructions     Future Labs/Procedures    Follow Up and recommended labs and tests     Comments:    Follow up with Dr. Urbano in about a week.      Follow-Up Appointment Instructions     Follow Up and recommended labs and tests       Follow up with Dr. Urbano in about a week.             Statement of Approval     Ordered          10/26/17 1403  I have reviewed and agree with all the recommendations and orders detailed in this document.  EFFECTIVE NOW     Approved and electronically signed by:  Gabe Porter MD

## 2017-10-17 NOTE — LETTER
Transition Communication Hand-off for Care Transitions to Next Level of Care Provider    Hand-off for Care Transitions to Next Level of Care Provider  Name: Keisha Godinez  : 1932  MRN #: [id number]  Reason for Hospitalization:  Closed supracondylar fracture of right humerus, initial encounter [S42.411A]  Admit Date/Time: 10/17/2017  9:11 PM  Discharge Date: 10/26/2017         Readmitted to Observation 10/27/17 to 10/28/17    Reason for Communication Hand-off Referral: Admission diagnoses: CHF    Discharge Plan:  Discharged to: TCU: [facility]                   Patient agreeable to post-hospital support suggestions:  Yes  Discharge Plan:             Patient is on new medications:   Yes   HYDROmorphone 2 MG tablet. Take 1-2 tablets (2-4 mg) by mouth every 3 hours as  needed for moderate to severe pain   hydrOXYzine 25 MG tablet. Take 1 tablet (25 mg) by mouth 3 times daily as needed  for other (pain adjunct)           MTM follow up recommended: Yes           Tel-Assurance program:  Declined           Patient will receive  TCU  at:  [facility]  Follow-up specialty is recommended: Yes. Follow up with Medication Therapy Management, Occupational & Physical Therapy while at TCU.     Follow-up plan:  Future Appointments  Date Time Provider Department Center   2017 11:00 AM Gerri Eubanks MD [facility]     Any outstanding tests or procedures:  No.       Referrals     Future Labs/Procedures    Occupational Therapy Adult Consult     Comments:    Evaluate and treat as clinically indicated.    Reason:  Right humerus fracture    Physical Therapy Adult Consult     Comments:    Evaluate and treat as clinically indicated.    Reason:  Right humerus fracture          Key Recommendations:  Assess patient & family understanding of Low Sodium diets & products. Also, patient is administering her own medications. Assess her level of understanding on dosages and frequency.     Communicated handoff via EPIC Comm Mgt to Dr. Talley  Raimundo's CC at [facility] Coord.      Ashely Vega, RN, BSN, CTS  [facility]  [phone]      AVS/Discharge Summary is the source of truth; this is a helpful guide for improved communication of patient story

## 2017-10-17 NOTE — IP AVS SNAPSHOT
[facility]    [address]    [facility] [address]    Phone:  [phone]    Fax:  [phone]                                       After Visit Summary   10/17/2017    Keisha Godinez    MRN: [id number]           After Visit Summary Signature Page     I have received my discharge instructions, and my questions have been answered. I have discussed any challenges I see with this plan with the nurse or doctor.    ..........................................................................................................................................  Patient/Patient Representative Signature      ..........................................................................................................................................  Patient Representative Print Name and Relationship to Patient    ..................................................               ................................................  Date                                            Time    ..........................................................................................................................................  Reviewed by Signature/Title    ...................................................              ..............................................  Date                                                            Time

## 2017-10-17 NOTE — IP AVS SNAPSHOT
MRN:[id number]                      After Visit Summary   10/17/2017    Keisha Godinez    MRN: [id number]           Thank you!     Thank you for choosing [facility] for your care. Our goal is always to provide you with excellent care. Hearing back from our patients is one way we can continue to improve our services. Please take a few minutes to complete the written survey that you may receive in the mail after you visit. If you would like to speak to someone directly about your visit please contact Patient Relations at [phone]. Thank you!          Patient Information     Date Of Birth          1/25/1932        Designated Caregiver       Most Recent Value    Caregiver    Will someone help with your care after discharge? no [DC to a TCU]      About your hospital stay     You were admitted on:  October 18, 2017 You last received care in the:  [facility]    You were discharged on:  October 26, 2017        Reason for your hospital stay       You had come in to the hospital after a fall. Dr. Urbano (Orthopedics) has indicated that you would not see accellerated improvement with surgery.                  Who to Call     For medical emergencies, please call 911.  For non-urgent questions about your medical care, please call your primary care provider or clinic, [phone]          Attending Provider     Provider Specialty    Sarah Patel MD Emergency Medicine    [facility] MD Internal Medicine       Primary Care Provider Office Phone # Fax #    Gerri Eubanks [phone]      After Care Instructions     Activity - Up with nursing assistance           Additional Discharge Instructions           Advance Diet as Tolerated       Follow this diet upon discharge: Regular            General info for SNF       Length of Stay Estimate: Short Term Care: Estimated # of Days <30  Condition at Discharge: Stable  Level of care:skilled   Rehabilitation  Potential: Fair  Admission H&P remains valid and up-to-date: Yes  Recent Chemotherapy: N/A  Use Nursing Home Standing Orders: Yes            Mantoux instructions       Give two-step Mantoux (PPD) Per Facility Policy Yes            Weight bearing status       Non-weight-bearing right UE.                  Follow-up Appointments     Follow Up and recommended labs and tests       Follow up with Dr. Urbano in about a week.                  Your next 10 appointments already scheduled     Oct 27, 2017  1:00 PM CDT   Nursing Home with JODIE Arcos CNP   [facility] ([facility])    [address] [id number] [phone]            Nov 02, 2017 11:00 AM CDT   [facility] Gerri Eubanks MD   [facility]    [address]  [facility] [address] [phone]              Additional Services     Med Therapy Manage Referral       Your provider has referred you to: **[facility] Scheduling (numerous locations) [phone]   [email or web address]    Reason for Referral: 10+ prescription medications    The [facility] department will contact you to schedule an appointment.  You may also schedule the appointment by calling [phone].  For [facility] [place] patients, please call [phone] to confirm/schedule your appointment on the next business day.    This service is designed to help you get the most from your medications.  A specially trained Pharmacist will work closely with you and your providers to solve any questions, concerns, issues or problems related to your medications.    Please bring all of your prescription and non-prescription medications (such as vitamins, over-the-counter medications, and herbals) or a detailed medication list to your appointment.    If you have a glucose meter  "or other home monitoring information, please also bring this to your appointment (i.e. blood glucose log, blood pressure log, pain log, etc.).            Occupational Therapy Adult Consult       Evaluate and treat as clinically indicated.    Reason:  Right humerus fracture            Physical Therapy Adult Consult       Evaluate and treat as clinically indicated.    Reason:  Right humerus fracture                  Pending Results     No orders found from 10/15/2017 to 10/18/2017.            Statement of Approval     Ordered          10/26/17 1403  I have reviewed and agree with all the recommendations and orders detailed in this document.  EFFECTIVE NOW     Approved and electronically signed by:  Gabe Porter MD             Admission Information     Date & Time Provider Department Dept. Phone    10/17/2017 Gabe Porter MD [facility] [phone]      Your Vitals Were     Blood Pressure Pulse Temperature Respirations Height Weight    136/42 57 97.5  F (36.4  C) (Oral) 16 1.6 m (5' 3\") 73.3 kg (161 lb 8 oz)    Pulse Oximetry BMI (Body Mass Index)                97% 28.61 kg/m2          [employer] Information     [facility] lets you send messages to your doctor, view your test results, renew your prescriptions, schedule appointments and more. To sign up, go to [email or web address] . Click on \"Log in\" on the left side of the screen, which will take you to the Welcome page. Then click on \"Sign up Now\" on the right side of the page.     You will be asked to enter the access code listed below, as well as some personal information. Please follow the directions to create your username and password.     Your access code is: [email or web address]  Expires: 2018  2:18 PM     Your access code will  in 90 days. If you need help or a new code, please call your [facility] or [phone].        Care EveryWhere ID     This is your Care EveryWhere ID. This could be used by other organizations to access " your [facility] medical records  [id number]        Equal Access to Services     ROBERTO KLEIN : Hadii thais Teague, wasonalida ivonne, qaybta kalashakartik west, sharlene wallacejennyferbalbir vick. So [facility] [phone].    ATENCIÓN: Si habla español, tiene a rosas disposición servicios gratuitos de asistencia lingüística. Llame al [phone].    We comply with applicable federal civil rights laws and [place] laws. We do not discriminate on the basis of race, color, national origin, age, disability, sex, sexual orientation, or gender identity.               Review of your medicines      START taking        Dose / Directions    colchicine 0.6 MG tablet   Used for:  Acute gouty arthritis        Dose:  0.6 mg   Take 1 tablet (0.6 mg) by mouth daily   Quantity:  14 tablet   Refills:  0       HYDROmorphone 2 MG tablet   Commonly known as:  DILAUDID   Used for:  Closed supracondylar fracture of right humerus, initial encounter        Dose:  2-4 mg   Take 1-2 tablets (2-4 mg) by mouth every 3 hours as needed for moderate to severe pain   Quantity:  90 tablet   Refills:  0       hydrOXYzine 25 MG tablet   Commonly known as:  ATARAX   Used for:  Closed supracondylar fracture of right humerus, initial encounter        Dose:  25 mg   Take 1 tablet (25 mg) by mouth 3 times daily as needed for other (pain adjunct)   Quantity:  60 tablet   Refills:  0         CONTINUE these medicines which have NOT CHANGED        Dose / Directions    acetaminophen 325 MG tablet   Commonly known as:  TYLENOL        Dose:  650 mg   Take 650 mg by mouth every 6 hours as needed for mild pain   Refills:  0       ferrous sulfate 325 (65 FE) MG tablet   Commonly known as:  IRON        Dose:  325 mg   Take 325 mg by mouth 2 times daily   Refills:  0       furosemide 40 MG tablet   Commonly known as:  LASIX   Used for:  Essential hypertension with goal blood pressure less than 140/90        Dose:  40 mg   Take 1 tablet (40 mg) by mouth 2  times daily   Quantity:  180 tablet   Refills:  1       gabapentin 100 MG capsule   Commonly known as:  NEURONTIN   Used for:  Midline thoracic back pain, unspecified chronicity        Dose:  100 mg   Take 1 capsule (100 mg) by mouth 3 times daily   Quantity:  270 capsule   Refills:  0       hydrALAZINE 25 MG tablet   Commonly known as:  APRESOLINE   Used for:  Hyperlipidemia, unspecified hyperlipidemia type, Cardiomyopathy, unspecified type (H)        Dose:  75 mg   Take 3 tablets (75 mg) by mouth 3 times daily   Quantity:  810 tablet   Refills:  2       isosorbide mononitrate 30 MG 24 hr tablet   Commonly known as:  IMDUR   Used for:  Essential hypertension with goal blood pressure less than 140/90, Cardiomyopathy (H), Coronary artery disease involving native heart with angina pectoris, unspecified vessel or lesion type (H)        Dose:  30 mg   Take 1 tablet (30 mg) by mouth daily   Quantity:  90 tablet   Refills:  1       OMEGA-3 FATTY ACIDS PO        Dose:  1200 mg   Take 1,200 mg by mouth daily   Refills:  0       omeprazole 20 MG CR capsule   Commonly known as:  priLOSEC   Used for:  Gastroesophageal reflux disease without esophagitis        Dose:  20 mg   Take 1 capsule (20 mg) by mouth daily   Quantity:  90 capsule   Refills:  2       * order for DME   Used for:  Ulcer of foot, right, with fat layer exposed (H)        Equipment being ordered: DH2 shoe for offloading R arch ulcer   Quantity:  1 Device   Refills:  0       * order for DME   Used for:  Ulcer of foot, right, limited to breakdown of skin (H)        Equipment being ordered: Motorized scooter x 3 months   Quantity:  1 Device   Refills:  0       potassium chloride SA 20 MEQ CR tablet   Commonly known as:  KLOR-CON   Used for:  Bilateral edema of lower extremity        Dose:  20 mEq   Take 1 tablet (20 mEq) by mouth daily   Quantity:  90 tablet   Refills:  1       psyllium 58.6 % Powd   Commonly known as:  METAMUCIL        Dose:  1 teaspoonful   Take  1 teaspoonful by mouth every evening   Refills:  0       senna-docusate 8.6-50 MG per tablet   Commonly known as:  SENOKOT-S;PERICOLACE   Used for:  S/P foot surgery, right        Take 2 tablets daily as needed for constipation while taking prescription pain medications.   Quantity:  30 tablet   Refills:  0       simvastatin 10 MG tablet   Commonly known as:  ZOCOR   Used for:  Hyperlipidemia, unspecified hyperlipidemia type        Dose:  10 mg   Take 1 tablet (10 mg) by mouth At Bedtime   Quantity:  90 tablet   Refills:  0       venlafaxine 75 MG 24 hr capsule   Commonly known as:  EFFEXOR-XR   Used for:  Major depressive disorder, recurrent episode, in partial remission (H)        Dose:  75 mg   Take 1 capsule (75 mg) by mouth daily   Quantity:  90 capsule   Refills:  0       VITAMIN D (CHOLECALCIFEROL) PO        Dose:  1000 Units   Take 1,000 Units by mouth daily   Refills:  0       * Notice:  This list has 2 medication(s) that are the same as other medications prescribed for you. Read the directions carefully, and ask your doctor or other care provider to review them with you.         Where to get your medicines      These medications were sent to [facility] [address] [place] [address] [id number] [place] [address] AT [address] [address] [facility] [address]     Phone:  [phone]     simvastatin 10 MG tablet         Some of these will need a paper prescription and others can be bought over the counter. Ask your nurse if you have questions.     You don't need a prescription for these medications     colchicine 0.6 MG tablet    hydrOXYzine 25 MG tablet         Information about where to get these medications is not yet available     ! Ask your nurse or doctor about these medications     HYDROmorphone 2 MG tablet                Protect others around you: Learn how to safely use, store and throw away your medicines at www.disposemymeds.org.             Medication List: This is a list of all  your medications and when to take them. Check marks below indicate your daily home schedule. Keep this list as a reference.      Medications           Morning Afternoon Evening Bedtime As Needed    acetaminophen 325 MG tablet   Commonly known as:  TYLENOL   Take 650 mg by mouth every 6 hours as needed for mild pain   Last time this was given:  650 mg on 10/26/2017  1:09 AM                                   colchicine 0.6 MG tablet   Take 1 tablet (0.6 mg) by mouth daily   Last time this was given:  0.6 mg on 10/26/2017  9:51 AM                                   ferrous sulfate 325 (65 FE) MG tablet   Commonly known as:  IRON   Take 325 mg by mouth 2 times daily   Last time this was given:  325 mg on 10/26/2017  9:52 AM                                      furosemide 40 MG tablet   Commonly known as:  LASIX   Take 1 tablet (40 mg) by mouth 2 times daily   Last time this was given:  80 mg on 10/26/2017  9:51 AM                                      gabapentin 100 MG capsule   Commonly known as:  NEURONTIN   Take 1 capsule (100 mg) by mouth 3 times daily   Last time this was given:  100 mg on 10/26/2017  1:43 PM                                         hydrALAZINE 25 MG tablet   Commonly known as:  APRESOLINE   Take 3 tablets (75 mg) by mouth 3 times daily   Last time this was given:  75 mg on 10/26/2017  1:43 PM                                         HYDROmorphone 2 MG tablet   Commonly known as:  DILAUDID   Take 1-2 tablets (2-4 mg) by mouth every 3 hours as needed for moderate to severe pain   Last time this was given:  2 mg on 10/26/2017  4:40 PM                                   hydrOXYzine 25 MG tablet   Commonly known as:  ATARAX   Take 1 tablet (25 mg) by mouth 3 times daily as needed for other (pain adjunct)   Last time this was given:  25 mg on 10/24/2017  8:37 PM                                   isosorbide mononitrate 30 MG 24 hr tablet   Commonly known as:  IMDUR   Take 1 tablet (30 mg) by mouth daily    Last time this was given:  30 mg on 10/26/2017  9:52 AM                                   OMEGA-3 FATTY ACIDS PO   Take 1,200 mg by mouth daily                                   omeprazole 20 MG CR capsule   Commonly known as:  priLOSEC   Take 1 capsule (20 mg) by mouth daily   Last time this was given:  20 mg on 10/26/2017  9:52 AM                                   * order for DME   Equipment being ordered: DH2 shoe for offloading R arch ulcer                                * order for DME   Equipment being ordered: Motorized scooter x 3 months                                potassium chloride SA 20 MEQ CR tablet   Commonly known as:  KLOR-CON   Take 1 tablet (20 mEq) by mouth daily   Last time this was given:  20 mEq on 10/26/2017  1:43 PM                                   psyllium 58.6 % Powd   Commonly known as:  METAMUCIL   Take 1 teaspoonful by mouth every evening                                   senna-docusate 8.6-50 MG per tablet   Commonly known as:  SENOKOT-S;PERICOLACE   Take 2 tablets daily as needed for constipation while taking prescription pain medications.   Last time this was given:  2 tablets on 10/20/2017  9:59 PM                                   simvastatin 10 MG tablet   Commonly known as:  ZOCOR   Take 1 tablet (10 mg) by mouth At Bedtime   Last time this was given:  10 mg on 10/22/2017  9:46 PM                                   venlafaxine 75 MG 24 hr capsule   Commonly known as:  EFFEXOR-XR   Take 1 capsule (75 mg) by mouth daily                                   VITAMIN D (CHOLECALCIFEROL) PO   Take 1,000 Units by mouth daily   Last time this was given:  1,000 Units on 10/26/2017  9:51 AM                                   * Notice:  This list has 2 medication(s) that are the same as other medications prescribed for you. Read the directions carefully, and ask your doctor or other care provider to review them with you.              More Information        Understanding a Humerus Fracture       When you have a humerus fracture, it means that your upper arm bone is broken.This type of fracture most often occurs along the middle of the bone or at the end of the bone near the shoulder. Less often, it occurs at the end of the bone near the elbow. This mainly happens in kids or young adults.  The bone may be cracked, or it may be broken into 2 or more pieces. The pieces of bone may be lined up or they may have moved out of place. Sometimes, the bone may break through the skin. Nearby nerves, tissues, and joints also may be damaged. Depending on the severity of the fracture, healing may take several months or longer.  What causes a humerus fracture?  A humerus fracture is most often the result of trauma. This may be from a fall, blow, accident, or sports injury.  Symptoms of a humerus fracture  Symptoms can include pain, swelling, and bruising. If the bone breaks through the skin,  bleeding can occur at the site. It may be hard to move and use the shoulder, arm, or elbow as you would normally.  Treating a humerus fracture  Treatment depends on where the bone is broken and how serious the break is. If needed, the bone is put back into place. This may be done with or without surgery. If surgery is needed, the surgeon may use devices such as pins, plates, or screws to hold the bone together. You will then wear a sling, splint, or cast to keep the bone in place and protect it from injury during healing. Other treatments may be also used to help reduce symptoms or regain function. These include:    Cold packs. Putting an ice pack on the injured area may help reduce swelling and pain.    Pain medicines. Taking prescription or over-the-counter pain medicines may help reduce pain and swelling.    Exercises. Doing certain exercises at home or with a physical therapist can help improve strength, flexibility, and range of motion in the shoulder, arm, or elbow.  Possible complications of a humerus fracture  These can  include:    Poor healing of the bone    Weakness, stiffness, or loss of range of motion in the shoulder, arm, or elbow    Osteoarthritis in the shoulder or elbow  When to call your healthcare provider  Call your healthcare provider right away if you have any of these:    Fever of 100.4 F (38 C) or higher, or as directed    Symptoms that don t get better with treatment, or get worse    Numbness, tingling, coldness, or swelling in your arm, hand, or fingers    Fingernails that turn blue or gray in color    A sling, splint, or cast that is damaged or feels too tight or loose    New symptoms   Date Last Reviewed: 3/10/2016    9200-5096 [employer] [address] [place] [address], [place] [address]. All rights reserved. This information is not intended as a substitute for professional medical care. Always follow your healthcare professional's instructions.                Shoulder Fracture  You have a break (fracture) of the shoulder. This may be a small crack in the bone. Or it may be a major break with the broken parts pushed out of position.     If you have only a crack in the bone and no bone fragments are out of place, you will probably be treated with a shoulder immobilizer. This is a special type of sling. Casts are usually not used for this type of fracture. Your bone should heal in 4 to 8 weeks. More serious injuries may need surgery to put the bones back into the correct position for healing.  Home care  Follow these tips to care for yourself at home:    Leave the shoulder immobilizer in place. This will support the injured arm at your side. This is the best position for the bone to heal.    The shoulder immobilizer is adjustable. If it becomes loose, adjust it so that your forearm is level with the ground (horizontal). Your hand should be level with your elbow.    Apply an ice pack to the injured area for 20 minutes every 1 to 2 hours the first day. You can make an ice pack by putting ice cubes in a  plastic bag. A bag of frozen peas or something similar works well too. Wrap the bag in a towel before putting it on your shoulder. Continue with ice packs 3 to 4 times a day for the next 2 to 3 days. Then use the ice as needed to relieve pain and swelling.    You may take acetaminophen or ibuprofen to relieve pain, unless another pain medicine was prescribed. If you have chronic liver or kidney disease or ever had a stomach ulcer or gastrointestinal bleeding, talk with your doctor before using these medicines.    Don t take the sling off before your next exam unless you were told to do so. Ask if you should move your elbow, wrist, and hand.  Follow-up care  Follow up with your healthcare provider, or as advised.  A shoulder joint will become stiff if left in a sling for too long. Ask your doctor when it is safe to begin range-of-motion exercises.  When to seek medical advice  Call your healthcare provider right away if any of these occur:    Your fingers become swollen, cold, blue, numb, or tingly    Your shoulder or upper arm swells a lot or looks very bruised    The pain in your shoulder gets worse    The splint or immobilizer breaks    You have a fever or chills  Date Last Reviewed: 8/1/2016 2000-2017 [employer]. [address] [place] [address], [place] [address]. All rights reserved. This information is not intended as a substitute for professional medical care. Always follow your healthcare professional's instructions.

## 2017-10-17 NOTE — IP AVS SNAPSHOT
` ` Patient Information     Patient Name Sex     Keisha Godinez ([id number]) Female 1932       Room Bed    [address] [place] [address]      Patient Demographics     Address Phone    [address] [facility] [address] [phone] (Home) *Preferred*  none (Work)  [phone] (Mobile)      Patient Ethnicity & Race     Ethnic Group Patient Race    American White      Emergency Contact(s)     Name Relation Home Work Mobile    Rebecca Malhotra Daughter [phone]    Jennifer eedn Daughter [phone] none [phone]    Jere Brandt Brother [phone] none [phone]      Documents on File        Status Date Received Description       Documents for the Patient    Face Sheet Received () 09     Face Sheet Received () 06/14/10     Privacy Notice - [place] Received 12     Insurance Card Received () 12 Medicare Part A and B    External Medication Information Consent Accepted 04/01/15     Patient ID Received () 10/16/12 [place] EX[place] LIFETIME    Consent for Services - Hospital/Clinic  ()      Consent for Services - Hospital/Clinic Received () 12     Insurance Card Received () 12     Insurance Card Received () 10/16/12     Consent for EHR Access Received 13 Copied from existing Consent for services - C/HOD collected on 2012    [facility] Specified Other       Consent for Services - Hospital/Clinic Received () 03/10/15     Advance Directives and Living Will Not Received  VALIDATION OF AD 01-    Advance Directives and Living Will Received 03/19/15 HEALTH CARE DIRECTIVE 01-    Insurance Card Received () 16 Stem    Insurance Card Received 16 Medicare Part A and B    Consent for Services - Geriatrics Received 04/01/15     Advance Directives and Living Will Received 04/09/15 POLST 03/19/15    Consent to Communicate Received  04/14/15     Immunization Record   Immunizations - [facility] YIN Authorization - File Only   Auth. to Obtain records from [facility] 4/14/15    [place] ID Received 16 [place] Expires - LIFETIME    HIM YIN Authorization  11/20/15 [facility]    Consent to Communicate Received 12/31/15     Consent for Services/Privacy Notice - Hospital/Clinic Received () 16     Insurance Card Received 17 BC 2017    Consent for Services/Privacy Notice - Hospital/Clinic Received 17     Care Everywhere Prospective Auth          Documents for the Encounter    CMS IM for Patient Signature Received 10/19/17 Reviewed with family    CMS IM for Patient Signature Received 10/24/17 2nd IMM Given     Discharge Attachment   HUMERUS FRACTURE, UNDERSTANDING (ENGLISH)    Discharge Attachment   FRACTURE, SHOULDER (ENGLISH)      Admission Information     Attending Provider Admitting Provider Admission Type Admission Date/Time    Gabe Porter MD Parens, Karl R, MD Emergency 10/17/17  2111    Discharge Date Hospital Service Auth/Cert Status Service Area     General Medicine [facility] SERVICES    Unit Room/Bed Admission Status        ORTHO SPINE  Admission (Confirmed)       Admission     Complaint    Fracture of humerus, proximal, right, closed      Hospital Account     Name Acct ID Class Status Primary Coverage    Keisha Godinez [id number] Inpatient Open MEDICARE - MEDICARE FOR HB SUPPLEMENT            Guarantor Account (for Hospital Account #[id number])     Name Relation to Pt Service Area Active? Acct Type    Keisha Godinez Self FCS Yes Personal/Family    Address Phone          [address] [place] [address] [phone](H)  none(O)              Coverage Information (for Hospital Account #[id number])     1. MEDICARE/MEDICARE FOR HB SUPPLEMENT     F/O Payor/Plan Precert #    MEDICARE/MEDICARE FOR HB SUPPLEMENT      Subscriber Subscriber #    Keisha Godinez [id number]    Address Phone    ATTN CLAIMS  [address]  [place] [address] [phone]          2. ECTOR/BCBS PLATINUM BLUE     F/O Payor/Plan Precert #    ECTOR/BCBS PLATINUM BLUE     Subscriber Subscriber #    Keisha Godinez [id number]    Address Phone    [address] [address]  [place] [address] [phone]

## 2017-10-17 NOTE — LETTER
"Transition Communication Hand-off for Care Transitions to Next Level of Care Provider    Name: Keisha Godinez  MRN #: [id number]  Primary Care Provider: Gerri Eubanks  Primary Care MD Name: Dr. Gerri Eubanks  [facility] [address]  [facility] [address]  Primary Care Clinic Name: [facility]  Reason for Hospitalization:  Closed supracondylar fracture of right humerus, initial encounter [S42.411A]  Admit Date/Time: 10/17/2017  9:11 PM  Discharge Date: ***  Payor Source: Payor: BCBS / Plan: BCBS PLATINUM BLUE / Product Type: PPO /     Readmission Assessment Measure (DALE) Risk Score/category: ***    Plan of Care Goals/Milestone Events:   Patient Concerns: ***   Patient Goals:   Short-term ***   Long-term ***   Medical Goals   Short-term ***   Long-term ***         Reason for Communication Hand-off Referral: {CCREASONCMCTNHANDOFFREFERRALCTS:31037}    Discharge Plan:  Discharge Plan:       Most Recent Value    Disposition Comments Prefers [facility] where she has been several times before.    Concerns To Be Addressed discharge planning concerns           Concern for non-adherence with plan of care:   Y/N ***  Discharge Needs Assessment:  Needs       Most Recent Value    Anticipated Changes Related to Illness none    Equipment Currently Used at Home walker, rolling, shower chair, grab bar    Transportation Available car, family or friend will provide    # of Referrals Placed by [facility] Care Coordination, Scheduled Follow-up appointments [CHF Action Plan, Low Sodium handouts discussed w/dtr]    PAS Number [id number]          Already enrolled in Tele-monitoring program and name of program:  ***  Follow-up specialty is recommended: {YES / NO:49903::\"Yes\"}    Follow-up plan:  Future Appointments  Date Time Provider Department Center   10/24/2017 9:00 AM Isaac Ruiz OT [facility]   11/2/2017 11:00 AM Gerri Eubanks MD [facility]       Any outstanding tests or procedures:      "   Referrals     Future Labs/Procedures    Med Therapy Manage Referral     Comments:    Your provider has referred you to: **[facility] Scheduling (numerous locations) [phone]   [email or web address]    Reason for Referral: 10+ prescription medications    The [facility] department will contact you to schedule an appointment.  You may also schedule the appointment by calling [phone].  For [place] patients, please call [phone] to confirm/schedule your appointment on the next business day.    This service is designed to help you get the most from your medications.  A specially trained Pharmacist will work closely with you and your providers to solve any questions, concerns, issues or problems related to your medications.    Please bring all of your prescription and non-prescription medications (such as vitamins, over-the-counter medications, and herbals) or a detailed medication list to your appointment.    If you have a glucose meter or other home monitoring information, please also bring this to your appointment (i.e. blood glucose log, blood pressure log, pain log, etc.).            Key Recommendations:      Renee Vega    AVS/Discharge Summary is the source of truth; this is a helpful guide for improved communication of patient story

## 2017-10-17 NOTE — IP AVS SNAPSHOT
"` `     [facility] [place] ORTHO SPINE: [phone]                                              INTERAGENCY TRANSFER FORM - NURSING   10/17/2017                    Hospital Admission Date: 10/17/2017  ELAINA MAYFIELD   : 1932  Sex: Female        Attending Provider: Gabe Porter MD     Allergies:  Lisinopril, Calcium, Egg Yolk, Flu Virus Vaccine, Keflex [Cephalexin], Levaquin [Levofloxacin], Sulfa Drugs, Zinc    Infection:  None   Service:  GENERAL MEDI    Ht:  1.6 m (5' 3\")   Wt:  73.3 kg (161 lb 8 oz)   Admission Wt:  70.3 kg (155 lb)    BMI:  28.61 kg/m 2   BSA:  1.81 m 2            Patient PCP Information     Provider PCP Type    Gerri Eubanks MD General      Current Code Status     Date Active Code Status Order ID Comments User Context       Prior      Code Status History     Date Active Date Inactive Code Status Order ID Comments User Context    10/26/2017  1:59 PM  Full Code [id number]  Gabe Porter MD Outpatient    10/18/2017  2:03 AM 10/26/2017  1:59 PM Full Code [id number]  Gabe Porter MD Inpatient    2016 10:43 AM 10/18/2017  2:03 AM Full Code [id number]  Evette Garg MD Outpatient    2016  4:19 PM 2016 10:43 AM Full Code [id number]  Lee Jang DPM Inpatient    2016 12:32 PM 2016  4:19 PM Full Code [id number]  Chapin Castro MD Outpatient    2016  9:23 PM 2016 12:32 PM Full Code [id number]  Rahul Parsons MD Inpatient    5/15/2016 10:36 AM 2016  9:23 PM Full Code [id number]  Rahul Parsons MD Outpatient    2016  9:43 PM 5/15/2016 10:36 AM Full Code [id number]  Gabe Porter MD Inpatient    2015  7:48 AM 2016  9:43 PM Full Code [id number]  Rosa Jolley MD Outpatient    2015  5:23 PM 2015  7:48 AM Full Code [id number]  Gabe Porter MD Inpatient    10/7/2015  3:39 PM 2015  5:23 PM Full Code [id number]  Ej Lilly MD Outpatient    10/6/2015 11:47 PM 10/7/2015  " 3:39 PM Full Code [id number]  Rubén Haddad MD Inpatient    8/14/2015 10:58 AM 10/6/2015 11:47 PM Full Code [id number]  Yanira Frankel PA-C Outpatient    8/12/2015 10:28 PM 8/14/2015 10:58 AM Full Code [id number]  Maddy Bennett PA-C ED    4/4/2015 11:19 PM 4/8/2015  6:04 PM DNR [id number]  Jaz Swartz DO Inpatient    3/25/2015 11:56 AM 4/4/2015 11:19 PM DNR [id number]  Gini Dave MD Outpatient    3/20/2015  7:29 PM 3/25/2015 11:56 AM DNR [id number]  Evette Garg MD Inpatient    3/18/2015 10:09 AM 3/20/2015  7:29 PM DNR [id number]  Evette Garg MD Outpatient    3/10/2015  3:33 PM 3/18/2015 10:09 AM DNR [id number]  Isaac Daniels,  Inpatient      Advance Directives        Does patient have a scanned Advance Directive/ACP document in EPIC?           No        Hospital Problems as of 10/26/2017              Priority Class Noted POA    Pulmonary hypertension Medium  Unknown Yes    Bilateral edema of lower extremity Medium  5/12/2015 Yes    Acute on chronic diastolic congestive heart failure (H) Medium  7/18/2016 Yes    Chronic atrial fibrillation (H) Medium  7/18/2016 Yes    Anemia Medium  7/24/2016 Yes    * (Principal)Fracture of humerus, proximal, right, closed Medium  10/18/2017 Yes    Rheumatic mitral regurgitation Medium  10/26/2017 Unknown      Non-Hospital Problems as of 10/26/2017              Priority Class Noted    Iron deficiency anemia Medium  3/19/2015    Neuropathy of both feet Medium  3/19/2015    Myocardial infarction-type 2 Medium  3/19/2015    GIB (gastrointestinal bleeding) Medium  3/20/2015    Wound of left leg Medium  3/30/2015    Cardiomyopathy (H) Medium  Unknown    Xerosis of skin: shins Medium  5/1/2015    Gastroesophageal reflux disease without esophagitis Medium  11/3/2015    Health Care Home Medium  12/9/2015    Chronic systolic congestive heart failure (H) Medium  Unknown    Major depressive disorder, recurrent episode, in partial remission (H) Medium   2/8/2016    Cellulitis of foot Medium  5/12/2016    Essential hypertension with goal blood pressure less than 140/90 Medium  6/30/2016    Post-operative complication Medium  7/6/2016    Respiratory failure (H) Medium  7/6/2016    Acute respiratory failure with hypoxia (H) Medium  7/15/2016    C. difficile colitis Medium  7/15/2016    History of hiatal hernia Medium  7/15/2016    History of shingles Medium  7/15/2016    Chronic foot ulcer (H) (felt secondary to bony protuberance status post excision 7/6-16) Medium  7/15/2016    Pneumonia Medium  7/18/2016    Sepsis (H) Medium  7/18/2016    Dysphagia Medium  7/25/2016    Midline thoracic back pain Medium  11/22/2016    Cardiomyopathy, unspecified type (H) Medium  7/27/2017      Immunizations     Name Date      Pneumococcal 23 valent 04/07/15     Tdap (Adacel,Boostrix) 04/08/13          END      ASSESSMENT     Discharge Profile Flowsheet     EXPECTED DISCHARGE     Patient's communication style  spoken language (English or Bilingual) 10/18/17 0115    Expected Discharge Date  10/25/17 (Home, plan for EBC TCU at d/c) 10/23/17 0956   FINAL RESOURCES      DISCHARGE NEEDS ASSESSMENT     Resources List  Skilled Nursing Facility 10/26/17 1409    Concerns To Be Addressed  discharge planning concerns 10/19/17 1453   Skilled Nursing Facility  [facility] [phone], Fax: [phone] 10/26/17 1409    Patient/family verbalizes understanding of discharge plan recommendations?  Yes 10/19/17 1453   PAS Number  [id number] 10/19/17 1531    Medical Team notified of plan?  yes 10/19/17 1453   Senior Linkage Line Referral Placed  07/08/16 08/25/16 1104    Anticipated Changes Related to Illness  none 10/18/17 0115   Existing Resources/Services  None 04/08/15 1402    Equipment Currently Used at Home  walker, rolling;shower chair;grab bar 10/20/17 0910   SKIN      Transportation Available  van, wheelchair accessible ([facility], 10/26 @ 1733) 10/26/17 1409    "Inspection of bony prominences  Full 10/26/17 1118    # of Referrals Placed by CTS  Transportation;Post Acute Facilities 10/26/17 1409   Skin WDL  ex 10/26/17 1118    Does Patient Need a Referral for Clinic CC  Yes 10/23/17 1543   Skin Color/Characteristics  bruised (ecchymotic) 10/26/17 1118    Coordination Referral Criteria  Admission DX CHF 10/23/17 1543   Skin Temperature  warm 10/26/17 1118    Equipment Used at Home  walker, rolling 03/17/16 0920   Skin Moisture  dry 10/26/17 1118    ASSESSMENT OF FUNCTIONAL STATUS     Skin Elasticity  quick return to original state 10/26/17 1118    Assesssment of Functional Status  Not at baseline with ADL Functioning;Not at baseline with mobility 10/19/17 1453   Skin Integrity  bruise(s) 10/26/17 1118    GASTROINTESTINAL (ADULT,PEDIATRIC,OB)     Full except areas not inspected   -- (rt arm; under sling) 10/24/17 2236    GI WDL  WDL 10/26/17 1118   Inspection under devices  Full 10/25/17 1651    Abdominal Appearance  nondistended 10/25/17 1646   SAFETY      All Quadrants Bowel Sounds  audible and active in all quadrants 10/26/17 1118   Safety WDL  WDL 10/26/17 1118    Last Bowel Movement  10/26/17 10/26/17 1118   All Alarms  alarm(s) activated and audible 10/26/17 1118    Passing flatus  yes 10/26/17 1118   Safety Factors  bed in position other than low;call light in reach 10/26/17 1118    COMMUNICATION ASSESSMENT                        Assessment WDL (Within Defined Limits) Definitions           Safety WDL     Effective: 09/28/15    Row Information: <b>WDL Definition:</b> Bed in low position, wheels locked; call light in reach; upper side rails up x 2; ID band on<br> <font color=\"gray\"><i>Item=AS safety wdl>>List=AS safety wdl>>Version=F14</i></font>      Skin WDL     Effective: 09/28/15    Row Information: <b>WDL Definition:</b> Warm; dry; intact; elastic; without discoloration; pressure points without redness<br> <font color=\"gray\"><i>Item=AS skin wdl>>List=AS skin " "wdl>>Version=F14</i></font>      Vitals     Vital Signs Flowsheet     VITAL SIGNS     Side Effects Monitoring: Sedation Level  1 10/26/17 1645    Temp  97.5  F (36.4  C) 10/26/17 0750   HEIGHT AND WEIGHT      Temp src  Oral 10/26/17 0750   Height  1.6 m (5' 3\") 10/17/17 2114    Resp  16 10/26/17 1645   Height Method  Stated 10/17/17 2114    Pulse  57 10/24/17 2011   Weight  73.3 kg (161 lb 8 oz) 10/26/17 0514    Heart Rate  40 10/26/17 0750   Weight Method  Bed scale 10/26/17 0514    Pulse/Heart Rate Source  Monitor 10/26/17 0750   Bed Scale  Standard (fitted sheet, draw sheet/ pad, cover/flat sheet, blanket, two pillows);Pillow (add comment for number);Fitted sheet;Draw sheet/ pad (add comment for number);Cover/flat sheet (add comment for number);[place] (add comment for number);Call light (2 pillows) 10/26/17 0514    BP  136/42 10/26/17 1343   BSA (Calculated - sq m)  1.77 10/17/17 2114    BP Location  Left arm 10/26/17 0750   BMI (Calculated)  27.51 10/17/17 2114    OXYGEN THERAPY     DAVE COMA SCALE      SpO2  97 % 10/26/17 0750   Best Eye Response  4-->(E4) spontaneous 10/20/17 1700    O2 Device  Nasal cannula 10/26/17 0750   Best Motor Response  6-->(M6) obeys commands 10/20/17 1700    FiO2 (%)  96 % 10/21/17 0534   Best Verbal Response  5-->(V5) oriented 10/20/17 1700    Oxygen Delivery  2 LPM 10/26/17 0750   Declo Coma Scale Score  15 10/20/17 1700    PAIN/COMFORT     POSITIONING      Patient Currently in Pain  yes 10/26/17 1645   Body Position  supine;weight shift assistance provided 10/26/17 1118    Preferred Pain Scale  number (Numeric Rating Pain Scale) 10/26/17 1645   Head of Bed (HOB)  HOB at 30-45 degrees 10/26/17 1118    Patient's Stated Pain Goal  5 10/25/17 1642   Chair  Upright in chair 10/26/17 1118    0-10 Pain Scale  10 10/26/17 1640   Positioning/Transfer Devices  pillows;in use 10/26/17 1118    Pain Location  Arm 10/26/17 1645   DAILY CARE      Pain Orientation  Right " 10/26/17 1645   Activity Management  activity encouraged;dorsiflexion, plantar flexion encouraged 10/26/17 1118    Pain Descriptors  Sharp 10/26/17 1645   Activity Assistance Provided  assistance, 2 people (jessenia steady) 10/26/17 1118    Pain Intervention(s)  Medication (See eMAR) 10/26/17 1645   Assistive Device Utilized  gait belt;mechanical lift (jessenia steady) 10/26/17 1118    Response to Interventions  Absence of nonverbal indicators of pain 10/26/17 0538   Symptoms Noted During/After Activity  increased pain 10/26/17 1118    ANALGESIA SIDE EFFECTS MONITORING     EKG MONITORING      Side Effects Monitoring: Respiratory Quality  R 10/26/17 1645   Cardiac Rhythm  Atrial fibrillation (Controlled per tele tech) 10/23/17 2249    Side Effects Monitoring: Respiratory Depth  N 10/26/17 1645                 Patient Lines/Drains/Airways Status    Active LINES/DRAINS/AIRWAYS     Name: Placement date: Placement time: Site: Days: Last dressing change:    External Catheter 10/18/17 1000 10/18/17   1000    8     Peripheral IV 06/23/16 Left Hand 06/23/16   0823   Hand   490     Peripheral IV 06/26/16 Left Lower forearm 06/26/16   1001   Lower forearm   487     Pressure Ulcer 04/05/15 Anterior;Left Leg PTA 04/05/15   0100    935     Wound 10/07/15 Bilateral Toe (Comment  which one) Other (comment) 10/07/15   0050   Toe (Comment  which one)   750     Wound 11/25/15 Inner Plantar Ulceration 11/25/15   1950   Plantar   700     Wound 05/12/16 Inner;Right Foot Other (comment) Foot Ulcer 05/12/16   2222   Foot   531     Incision/Surgical Site 07/06/16 Right Leg 07/06/16   0954    477             Patient Lines/Drains/Airways Status    Active PICC/CVC     None            Intake/Output Detail Report     Date Intake     Output Net    Shift P.O. I.V. IV Piggyback Total Urine Total       Day 10/25/17 0700 - 10/25/17 1459 730 -- -- 730 100 100 630    Princess 10/25/17 1500 - 10/25/17 2259 360 -- -- 360 200 200 160    Noc 10/25/17 2300 - 10/26/17  0659 -- -- -- -- 300 300 -300    Day 10/26/17 0700 - 10/26/17 1459 -- -- -- -- -- -- 0    Princess 10/26/17 1500 - 10/26/17 2259 -- -- -- -- -- -- 0      Last Void/BM       Most Recent Value    Urine Occurrence 1 at 10/25/2017 1848    Stool Occurrence 1 at 10/25/2017 1500      Case Management/Discharge Planning     Case Management/Discharge Planning Flowsheet     REFERRAL INFORMATION     Sources Of Support  sibling(s) 10/19/17 1453    Did the Initial Social Work Assessment result in a Social Work Case?  No 10/23/17 1543   Reaction To Health Status  accepting;adjusting 10/19/17 1453    Admission Type  inpatient 10/19/17 1419   Understanding Of Condition And Treatment  adequate understanding of medical condition;adequate understanding of treatment 10/19/17 1453    Arrived From  home or self-care 07/08/16 1416   Primary Caregiver/Support Person Comments  Rebecca Malhotra 10/19/17 1453    # of Referrals Placed by CTS  Transportation;Post Acute Facilities 10/26/17 1409   EXPECTED DISCHARGE      Reason For Consult  discharge planning 10/19/17 1419   Expected Discharge Date  10/25/17 (Home, plan for EBC TCU at d/c) 10/23/17 0956    Record Reviewed  clinical discipline documentation;history and physical;medical record;patient profile;plan of care 10/19/17 1419   ASSESSMENT/CONCERNS TO BE ADDRESSED      CTS Assigned to Case  Ashely RN/CTS 10/23/17 1543   Concerns To Be Addressed  discharge planning concerns 10/19/17 1453    Primary Care Clinic Name  [facility] 10/23/17 1543   DISCHARGE PLANNING      Primary Care MD Name  Dr. Gerri Eubanks 10/23/17 1543   Patient/family verbalizes understanding of discharge plan recommendations?  Yes 10/19/17 1453    LIVING ENVIRONMENT     Medical Team notified of plan?  yes 10/19/17 1453    Lives With  child(emre), adult 10/20/17 0910   Anticipated Changes Related to Illness  none 10/18/17 0115    Living Arrangements  house 10/20/17 0910   Transportation Available  van, wheelchair accessible  ([facility], 10/26 @ 1730) 10/26/17 1409    Provides Primary Care For  no one 10/18/17 0115   Does Patient Need a Referral for Clinic CC  Yes 10/23/17 1543    Primary Care Provided By  child(emre) (specify) (Rebecca Malhotra) 10/23/17 1543   Coordination Referral Criteria  Admission DX CHF 10/23/17 1543    Quality Of Family Relationships  supportive;helpful;involved 10/19/17 1453   Equipment Used at Home  walker, rolling 03/17/16 0920    Able to Return to Prior Living Arrangements  yes 10/19/17 1453   PATIENT PLACEMENT INFORMATION      HOME SAFETY     Did the patient choose [facility]?  Yes 10/26/17 1409    Patient Feels Safe Living in Home?  yes 10/19/17 1453   Placement Choice Reason  location 10/26/17 1409    ASSESSMENT OF FAMILY/SOCIAL SUPPORT     Did [facility] accept the patient?  Yes 10/26/17 1409    Marital Status   10/19/17 1419   FINAL NOTE      Who is your support system?  Children 10/19/17 1419   Final Note  DC to TCU 10/19/17 1513    Description of Support System  Supportive;Involved 10/19/17 1453   FINAL RESOURCES      Support Assessment  Adequate family and caregiver support;Adequate social supports 10/19/17 1453   Equipment Currently Used at Home  walker, rolling;shower chair;grab bar 10/20/17 0910    Unique family situation  Dtr Rebecca lives with patient 10/19/17 1453   Resources List  Skilled Nursing Facility 10/26/17 1409    Quality of Family Relationships  supportive;involved;evident 10/19/17 1453   Skilled Nursing Facility  [facility] [phone], Fax: [phone] 10/26/17 1409    ASSESSMENT OF FUNCTIONAL STATUS     PAS Number  [id number] 10/19/17 1531    Assesssment of Functional Status  Not at baseline with ADL Functioning;Not at baseline with mobility 10/19/17 1453   Senior Linkage Line Referral Placed  07/08/16 08/25/16 1104    EMPLOYMENT     Existing Resources/Services  None 04/08/15 1402    Do you work full or part-time?  no 10/23/17 1543   ABUSE RISK SCREEN       COPING/STRESS     QUESTION TO PATIENT:  Has a member of your family or a partner(now or in the past) intimidated, hurt, manipulated, or controlled you in any way?  no 10/18/17 0115    Major Change/Loss/Stressor  none 10/18/17 0115   QUESTION TO PATIENT: Do you feel safe going back to the place where you are living?  yes 10/18/17 0115    Patient Personal Strengths  able to adapt 10/18/17 0115   OBSERVATION: Is there reason to believe there has been maltreatment of a vulnerable adult (ie. Physical/Sexual/Emotional abuse, self neglect, lack of adequate food, shelter, medical care, or financial exploitation)?  no 10/18/17 0115    Sources Of Support  adult child(emre) 10/19/17 1453   (R) MENTAL HEALTH SUICIDE RISK      Reaction To Health Status  accepting;adjusting 10/19/17 1453   Are you depressed or being treated for depression?  No 10/18/17 0115    Understanding Of Condition And Treatment  adequate understanding of medical condition;adequate understanding of treatment 10/19/17 1453   HOMICIDE RISK      COPING/STRESS CAREGIVER     Feels Like Hurting Others  no 10/18/17 0115    Primary Caregiver Strengths  able to adapt;assertive;expressive of emotions;expressive of needs 10/19/17 1453

## 2017-10-17 NOTE — IP AVS SNAPSHOT
` `     [facility]: [phone]            Medication Administration Report for Keisha Godinez as of 10/26/17 1707   Legend:    Given Hold Not Given Due Canceled Entry Other Actions    Time Time (Time) Time  Time-Action       Inactive    Active    Linked        Medications 10/20/17 10/21/17 10/22/17 10/23/17 10/24/17 10/25/17 10/26/17    acetaminophen (TYLENOL) tablet 650 mg  Dose: 650 mg Freq: AT BEDTIME Route: PO  Start: 10/18/17 0215   Admin Instructions: Maximum acetaminophen dose from all sources = 75 mg/kg/day not to exceed 4 grams/day.      0154 (650 mg)-Given        0129 (650 mg)-Given        0545 (650 mg)-Given        0141 (650 mg)-Given        0111 (650 mg)-Given        0109 (650 mg)-Given           acetaminophen (TYLENOL) tablet 650 mg  Dose: 650 mg Freq: EVERY 6 HOURS PRN Route: PO  PRN Reason: mild pain  Start: 10/18/17 0202   Admin Instructions: Maximum acetaminophen dose from all sources = 75 mg/kg/day not to exceed 4 grams/day.     1948 (650 mg)-Given         2148 (650 mg)-Given        1239 (650 mg)-Given               1617 (650 mg)-Given             bisacodyl (DULCOLAX) Suppository 10 mg  Dose: 10 mg Freq: DAILY PRN Route: RE  PRN Reason: constipation  Start: 10/19/17 1409   Admin Instructions: Hold for loose stools.  This is the third step of a three step constipation treatment protocol.               calcium carbonate (OS-CASPER 500 mg Koi. Ca) tablet 1,250 mg  Dose: 1,250 mg Freq: 2 TIMES DAILY WITH MEALS Route: PO  Start: 10/22/17 0800   Admin Instructions: OS-CASPER 500 = 1250 mg calcium carbonate       0823 (1,250 mg)-Given       1833 (1,250 mg)-Given        0841 (1,250 mg)-Given       1719 (1,250 mg)-Given        0925 (1,250 mg)-Given       1753 (1,250 mg)-Given        0850 (1,250 mg)-Given       1812 (1,250 mg)-Given        0951 (1,250 mg)-Given       [ ] 1800           cholecalciferol (vitamin D) tablet 1,000 Units  Dose: 1,000 Units Freq: DAILY Route: PO  Start: 10/18/17  0800    0928 (1,000 Units)-Given        0927 (1,000 Units)-Given        0823 (1,000 Units)-Given        0841 (1,000 Units)-Given        0925 (1,000 Units)-Given        0849 (1,000 Units)-Given        0951 (1,000 Units)-Given           colchicine tablet 0.6 mg  Dose: 0.6 mg Freq: DAILY Route: PO  Start: 10/25/17 1600         1643 (0.6 mg)-Given        0951 (0.6 mg)-Given           docusate sodium (COLACE) capsule 100 mg  Dose: 100 mg Freq: 2 TIMES DAILY Route: PO  Start: 10/19/17 2000    0929 (100 mg)-Given       1949 (100 mg)-Given        0927 (100 mg)-Given       1954 (100 mg)-Given        0823 (100 mg)-Given       2003 (100 mg)-Given        0841 (100 mg)-Given       2041 (100 mg)-Given        0924 (100 mg)-Given       2012 (100 mg)-Given        0849 (100 mg)-Given       2053 (100 mg)-Given        0952 (100 mg)-Given       [ ] 2000           ferrous sulfate (IRON) tablet 325 mg  Dose: 325 mg Freq: 2 TIMES DAILY Route: PO  Start: 10/18/17 2015   Admin Instructions: Absorbed best on an empty stomach. If stomach upset occurs, can take with meals.     0929 (325 mg)-Given       1949 (325 mg)-Given        0927 (325 mg)-Given       1954 (325 mg)-Given        0822 (325 mg)-Given       2003 (325 mg)-Given        0841 (325 mg)-Given       2041 (325 mg)-Given        0924 (325 mg)-Given       2012 (325 mg)-Given        0849 (325 mg)-Given       2053 (325 mg)-Given        0952 (325 mg)-Given       [ ] 2000           furosemide (LASIX) tablet 80 mg  Dose: 80 mg Freq: DAILY Route: PO  Start: 10/25/17 0800         0850 (80 mg)-Given        0951 (80 mg)-Given           gabapentin (NEURONTIN) capsule 100 mg  Dose: 100 mg Freq: 3 TIMES DAILY Route: PO  Start: 10/18/17 0845    0928 (100 mg)-Given       1331 (100 mg)-Given       1949 (100 mg)-Given        0927 (100 mg)-Given       1350 (100 mg)-Given       1956 (100 mg)-Given        0822 (100 mg)-Given       1357 (100 mg)-Given       2003 (100 mg)-Given        0841 (100 mg)-Given        1446 (100 mg)-Given       2041 (100 mg)-Given        0923 (100 mg)-Given       1320 (100 mg)-Given       2011 (100 mg)-Given        0848 (100 mg)-Given       1459 (100 mg)-Given       2053 (100 mg)-Given        0951 (100 mg)-Given       1343 (100 mg)-Given       [ ] 2000           guaiFENesin-codeine (ROBITUSSIN AC) 100-10 MG/5ML solution 5 mL  Dose: 5 mL Freq: EVERY 4 HOURS PRN Route: PO  PRN Reason: cough  Start: 10/18/17 2005              hydrALAZINE (APRESOLINE) tablet 75 mg  Dose: 75 mg Freq: 3 TIMES DAILY Route: PO  Start: 10/18/17 0845   Admin Instructions: Hold sbp<120     0927 (75 mg)-Given       1331 (75 mg)-Given       1949 (75 mg)-Given        0927 (75 mg)-Given       1350 (75 mg)-Given       1954 (75 mg)-Given        0822 (75 mg)-Given       1357 (75 mg)-Given       2005 (75 mg)-Given        0840 (75 mg)-Given       1444 (75 mg)-Given       2041 (75 mg)-Given        0922 (75 mg)-Given       1320 (75 mg)-Given       2038 (75 mg)-Given        0850 (75 mg)-Given       1459 (75 mg)-Given       2052 (75 mg)-Given        0952 (75 mg)-Given       1343 (75 mg)-Given       [ ] 2000           HYDROmorphone (DILAUDID) tablet 2-4 mg  Dose: 2-4 mg Freq: EVERY 3 HOURS PRN Route: PO  PRN Reason: moderate to severe pain  PRN Comment: to be used if Morphine does not work.  Start: 10/24/17 1748        1913 (4 mg)-Given       2232 (4 mg)-Given        0556 (4 mg)-Given       1248 (2 mg)-Given       1502 (2 mg)-Given       1809 (4 mg)-Given       2159 (4 mg)-Given        0109 (4 mg)-Given       0453 (4 mg)-Given       1640 (2 mg)-Given           hydrOXYzine (ATARAX) tablet 25 mg  Dose: 25 mg Freq: EVERY 6 HOURS PRN Route: PO  PRN Reason: other  PRN Comment: adjuvant pain  Start: 10/24/17 1750        2037 (25 mg)-Given             isosorbide mononitrate (IMDUR) 24 hr tablet 30 mg  Dose: 30 mg Freq: DAILY Route: PO  Start: 10/18/17 0845   Admin Instructions: DO NOT CRUSH. Can split tablet in half along score jacky.     9505  (30 mg)-Given        0926 (30 mg)-Given        0823 (30 mg)-Given        0846 (30 mg)-Given        0920 (30 mg)-Given        0849 (30 mg)-Given        0952 (30 mg)-Given           lidocaine (LIDODERM) 5 % Patch 2 patch  Dose: 2 patch Freq: EVERY 24 HOURS 2000 Route: TD  Start: 10/19/17 2000   Admin Instructions: Apply patch(s) to shoulder To prevent lidocaine toxicity, patient should be patch free for 12 hrs daily. Patches may be cut to smaller size prior to removing release liner.  NEVER APPLY HEAT OVER PATCH which will increase absorption and may lead to risk of local anesthetic toxicity. Do not apply over area where liposomal bupivacaine was injected for 96 hours post injection.     1949 (2 patch)-Given        1957 (2 patch)-Given        2002 (2 patch)-Given        2041 (2 patch)-Given        2024 (2 patch)-Patch in Place [C]        2053 (2 patch)-Given [C]        [ ] 2000          And  lidocaine (LIDODERM) patch REMOVAL  Freq: EVERY 24 HOURS 0800 Route: TD  Start: 10/20/17 0800   Admin Instructions: Remove lidocaine Patch.     0933 ( )-Given        0928 ( )-Given        0824 ( )-Patch Removed        0846 ( )-Patch Removed        0800 ( )-Patch Removed        0858 ( )-Patch Removed        0830 ( )-Given          And  lidocaine (LIDODERM) patch in PLACE  Freq: EVERY 8 HOURS Route: TD  Start: 10/19/17 2000   Admin Instructions: Chart every shift, confirming that patch is still in place on patient (no barcode scan needed). See patch order for dose information.  NEVER APPLY HEAT OVER PATCH which will increase absorption and may lead to risk of local anesthetic toxicity. Do not apply over area where liposomal bupivacaine injected for 96 hours.     0452 ( )-Patch in Place       (1132)-Not Given [C]       2044 ( )-Patch in Place        0544 ( )-Given       (1146)-Not Given [C]       2000 ( )-Patch in Place        0532 ( )-Given       (1401)-Not Given [C]       2009 ( )-Patch in Place        0551 ( )-Given      "  (7304)-Not Given [C]       2045 ( )-Patch in Place        0446 ( )-Patch in Place              2013 ( )-Given        0406 ( )-Patch in Place              2201 ( )-Patch in Place        0344 ( )-Patch in Place       (1118)-Not Given [C]       [ ] 2000           lidocaine (LMX4) kit 1 g  Dose: 1 g Freq: EVERY 1 HOUR PRN Route: Top  PRN Reason: pain  PRN Comment: with VAD insertion or accessing implanted port.  Start: 10/18/17 0202   Admin Instructions: Do NOT give if patient has a history of allergy to any local anesthetic or any \"xander\" product.   Apply 30 minutes prior to VAD insertion or port access.  MAX Dose:  2.5 g (  of 5 g tube)               lidocaine 1 % 1 mL  Dose: 1 mL Freq: EVERY 1 HOUR PRN Route: OTHER  PRN Comment: mild pain with VAD insertion or accessing implanted port  Start: 10/18/17 0202   Admin Instructions: Do NOT give if patient has a history of allergy to any local anesthetic or any \"xander\" product. MAX dose 1 mL subcutaneous OR intradermal in divided doses.               magnesium hydroxide (MILK OF MAGNESIA) suspension 30 mL  Dose: 30 mL Freq: DAILY PRN Route: PO  PRN Reason: constipation  Start: 10/19/17 1409   Admin Instructions: Hold for loose stools.  This is the second step of a three step constipation treatment protocol.                 magnesium sulfate 4 g in 100 mL sterile water (premade)  Dose: 4 g Freq: EVERY 4 HOURS PRN Route: IV  PRN Reason: magnesium supplementation  Start: 10/20/17 0857   Admin Instructions: For serum Mg++ less than 1.6 mg/dL  Give 4 g and recheck magnesium level 2 hours after dose, and next AM.               naloxone (NARCAN) injection 0.1-0.4 mg  Dose: 0.1-0.4 mg Freq: EVERY 2 MIN PRN Route: IV  PRN Reason: opioid reversal  Start: 10/18/17 0202   Admin Instructions: For respiratory rate LESS than or EQUAL to 8.  Partial reversal dose:  0.1 mg titrated q 2 minutes for Analgesia Side Effects Monitoring Sedation Level of 3 (frequently drowsy, arousable, drifts " to sleep during conversation).Full reversal dose:  0.4 mg bolus for Analgesia Side Effects Monitoring Sedation Level of 4 (somnolent, minimal or no response to stimulation).               omeprazole (priLOSEC) CR capsule 20 mg  Dose: 20 mg Freq: DAILY Route: PO  Start: 10/18/17 0845    0928 (20 mg)-Given        0927 (20 mg)-Given        0823 (20 mg)-Given        0841 (20 mg)-Given        0918 (20 mg)-Given        0848 (20 mg)-Given        0952 (20 mg)-Given           potassium chloride (KLOR-CON) Packet 20-40 mEq  Dose: 20-40 mEq Freq: EVERY 2 HOURS PRN Route: ORAL OR FEED  PRN Reason: potassium supplementation  Start: 10/20/17 0857   Admin Instructions: Use if unable to tolerate tablets.  If Serum K+ 3.0-3.3, dose = 60 mEq po total dose (40 mEq x1 followed in 2 hours by 20 mEq x1). Recheck K+ level 4 hours after dose and the next AM.  If Serum K+ 2.5-2.9, dose = 80 mEq po total dose (40 mEq Q2H x2). Recheck K+ level 4 hours after dose and the next AM.  If Serum K+ less than 2.5, See IV order.  Dissolve packet contents in 4-8 ounces of cold water or juice.               potassium chloride 10 mEq in 100 mL intermittent infusion with 10 mg lidocaine  Dose: 10 mEq Freq: EVERY 1 HOUR PRN Route: IV  PRN Reason: potassium supplementation  Start: 10/20/17 0857   Admin Instructions: Infuse via PERIPHERAL LINE. Use potassium with lidocaine for pain with peripheral administration.  If Serum K+ 3.0-3.3, dose = 10 mEq/hr x4 doses (40 mEq IV total dose). Recheck K+ level 2 hours after dose and the next AM.  If Serum K+ less than 3.0, dose = 10 mEq/hr x6 doses (60 mEq IV total dose). Recheck K+ level 2 hours after dose and the next AM.               potassium chloride 10 mEq in 100 mL sterile water intermittent infusion (premix)  Dose: 10 mEq Freq: EVERY 1 HOUR PRN Route: IV  PRN Reason: potassium supplementation  Start: 10/20/17 0857   Admin Instructions: Infuse via PERIPHERAL LINE or CENTRAL LINE. Use for central line  replacement if patient weight less than 65 kg, if patient is on TPN with high potassium content or if unit does not stock 20 mEq bags.   If Serum K+ 3.0-3.3, dose = 10 mEq/hr x4 doses (40 mEq IV total dose). Recheck K+ level 2 hours after dose and the next AM.   If Serum K+ less than 3.0, dose = 10 mEq/hr x6 doses (60 mEq IV total dose). Recheck K+ level 2 hours after dose and the next AM.               potassium chloride SA (K-DUR/KLOR-CON M) CR tablet 20 mEq  Dose: 20 mEq Freq: 3 TIMES DAILY Route: PO  Start: 10/22/17 1400   Admin Instructions: DO NOT CRUSH       1454 (20 mEq)-Given       2002 (20 mEq)-Given        0842 (20 mEq)-Given       1446 (20 mEq)-Given       2041 (20 mEq)-Given        0927 (20 mEq)-Given       1320 (20 mEq)-Given       2013 (20 mEq)-Given [C]        0850 (20 mEq)-Given       1500 (20 mEq)-Given       2053 (20 mEq)-Given        0951 (20 mEq)-Given       1343 (20 mEq)-Given       [ ] 2000           potassium chloride SA (K-DUR/KLOR-CON M) CR tablet 20-40 mEq  Dose: 20-40 mEq Freq: EVERY 2 HOURS PRN Route: PO  PRN Reason: potassium supplementation  Start: 10/20/17 0857   Admin Instructions: Use if able to take PO.   If Serum K+ 3.0-3.3, dose = 60 mEq po total dose (40 mEq x1 followed in 2 hours by 20 mEq x1). Recheck K+ level 4 hours after dose and the next AM.  If Serum K+ 2.5-2.9, dose = 80 mEq po total dose (40 mEq Q2H x2). Recheck K+ level 4 hours after dose and the next AM.  If Serum K+ less than 2.5, See IV order.  DO NOT CRUSH     1331 (40 mEq)-Given       1504 (20 mEq)-Given                 senna-docusate (SENOKOT-S;PERICOLACE) 8.6-50 MG per tablet 1-2 tablet  Dose: 1-2 tablet Freq: 2 TIMES DAILY PRN Route: PO  PRN Comment: constipation   Start: 10/19/17 1409   Admin Instructions: If no bowel movement in 24 hours, increase to 2 tablets PO BID.  Hold for loose stools.   This is the first step of a three step constipation treatment protocol.               senna-docusate  (SENOKOT-S;PERICOLACE) 8.6-50 MG per tablet 2 tablet  Dose: 2 tablet Freq: AT BEDTIME Route: PO  Start: 10/18/17 0215    2159 (2 tablet)-Given        (2123)-Not Given [C]        (2145)-Not Given        (2153)-Not Given        (2212)-Not Given [C]        (2202)-Not Given        [ ] 2200           sodium chloride (PF) 0.9% PF flush 3 mL  Dose: 3 mL Freq: EVERY 8 HOURS Route: IK  Start: 10/18/17 0215   Admin Instructions: And Q1H PRN, to lock peripheral IV dormant line.     0200 (3 mL)-Given              (0933)-Not Given       1701 (3 mL)-Given        0153 (3 mL)-Given       0928 (3 mL)-Given       1547 (3 mL)-Given        0125 (10 mL)-Given       0424 (3 mL)-Given [C]       0824 (3 mL)-Given       1559 (3 mL)-Given        0553 (3 mL)-Given       0846 (3 mL)-Given       1609 (3 mL)-Given        0254 (3 mL)-Given       0930 (3 mL)-Given       1619 (3 mL)-Given        0117 (3 mL)-Given       0858 (3 mL)-Given       1600 (3 mL)-Given        0206 (3 mL)-Given       0958 (3 mL)-Given       (1646)-Not Given           sodium chloride (PF) 0.9% PF flush 3 mL  Dose: 3 mL Freq: EVERY 1 HOUR PRN Route: IK  PRN Reason: line flush  PRN Comment: for peripheral IV flush post IV meds  Start: 10/18/17 0202              venlafaxine (EFFEXOR-ER) 24 hr tablet 75 mg  Dose: 75 mg Freq: DAILY Route: PO  Start: 10/18/17 0845   Admin Instructions: DO NOT CRUSH.     0928 (75 mg)-Given        0927 (75 mg)-Given        0823 (75 mg)-Given        0841 (75 mg)-Given        0919 (75 mg)-Given        0849 (75 mg)-Given        0952 (75 mg)-Given          Completed Medications  Medications 10/20/17 10/21/17 10/22/17 10/23/17 10/24/17 10/25/17 10/26/17         Dose: 20 mg Freq: ONCE Route: PO  Start: 10/24/17 2230   End: 10/24/17 2250        2250 (20 mg)-Given            Discontinued Medications  Medications 10/20/17 10/21/17 10/22/17 10/23/17 10/24/17 10/25/17 10/26/17         Dose: 0.6 mg Freq: DAILY Route: PO  Start: 10/23/17 1400   End: 10/24/17 1219        1453 (0.6 mg)-Given        0929 (0.6 mg)-Given       1219-Med Discontinued           Dose: 80 mg Freq: 2 TIMES DAILY Route: PO  Start: 10/23/17 0800   End: 10/24/17 1220       0841 (80 mg)-Given       1148 (80 mg)-Given        0921 (80 mg)-Given       1220-Med Discontinued  (1325)-Not Given               Dose: 15 mg Freq: EVERY 4 HOURS PRN Route: PO  PRN Reason: moderate to severe pain  Start: 10/24/17 1218   End: 10/24/17 1826        1659 (15 mg)-Given       1826-Med Discontinued           Dose: 20 mg Freq: EVERY 12 HOURS Route: PO  Start: 10/23/17 1900   End: 10/24/17 1748   Admin Instructions: DO NOT CRUSH.        2041 (20 mg)-Given        0650 (20 mg)-Given       1748-Med Discontinued           Dose: 5-10 mg Freq: EVERY 4 HOURS PRN Route: PO  PRN Reason: moderate to severe pain  Start: 10/18/17 0202   End: 10/24/17 1218    0152 (5 mg)-Given       0937 (5 mg)-Given       1949 (5 mg)-Given        0044 (5 mg)-Given       1350 (5 mg)-Given       1855 (5 mg)-Given       1859 (5 mg)-Given        0116 (10 mg)-Given       0529 (10 mg)-Given       1603 (5 mg)-Given       2002 (10 mg)-Given        0049 (10 mg)-Given       0546 (10 mg)-Given       1148 (5 mg)-Given       1239 (5 mg)-Given       1719 (10 mg)-Given       2248 (10 mg)-Given        0140 (10 mg)-Given       0935 (10 mg)-Given       1218-Med Discontinued      Medications 10/20/17 10/21/17 10/22/17 10/23/17 10/24/17 10/25/17 10/26/17

## 2017-10-18 ENCOUNTER — APPOINTMENT (OUTPATIENT)
Dept: GENERAL RADIOLOGY | Facility: CLINIC | Age: 82
DRG: 562 | End: 2017-10-18
Attending: INTERNAL MEDICINE
Payer: MEDICARE

## 2017-10-18 ENCOUNTER — APPOINTMENT (OUTPATIENT)
Dept: CARDIOLOGY | Facility: CLINIC | Age: 82
DRG: 562 | End: 2017-10-18
Attending: INTERNAL MEDICINE
Payer: MEDICARE

## 2017-10-18 PROBLEM — S42.201A FRACTURE OF HUMERUS, PROXIMAL, RIGHT, CLOSED: Status: ACTIVE | Noted: 2017-10-18

## 2017-10-18 LAB
ANION GAP SERPL CALCULATED.3IONS-SCNC: 6 MMOL/L (ref 3–14)
BUN SERPL-MCNC: 14 MG/DL (ref 7–30)
CA-I SERPL ISE-MCNC: 4.5 MG/DL (ref 4.4–5.2)
CALCIUM SERPL-MCNC: 7.7 MG/DL (ref 8.5–10.1)
CHLORIDE SERPL-SCNC: 108 MMOL/L (ref 94–109)
CO2 SERPL-SCNC: 27 MMOL/L (ref 20–32)
CREAT SERPL-MCNC: 0.8 MG/DL (ref 0.52–1.04)
ERYTHROCYTE [DISTWIDTH] IN BLOOD BY AUTOMATED COUNT: 13.2 % (ref 10–15)
GFR SERPL CREATININE-BSD FRML MDRD: 68 ML/MIN/1.7M2
GLUCOSE SERPL-MCNC: 94 MG/DL (ref 70–99)
HCT VFR BLD AUTO: 32.9 % (ref 35–47)
HGB BLD-MCNC: 10.3 G/DL (ref 11.7–15.7)
INR PPP: 1.04 (ref 0.86–1.14)
INTERPRETATION ECG - MUSE: NORMAL
INTERPRETATION ECG - MUSE: NORMAL
MAGNESIUM SERPL-MCNC: 2 MG/DL (ref 1.6–2.3)
MAGNESIUM SERPL-MCNC: 2.1 MG/DL (ref 1.6–2.3)
MCH RBC QN AUTO: 30.9 PG (ref 26.5–33)
MCHC RBC AUTO-ENTMCNC: 31.3 G/DL (ref 31.5–36.5)
MCV RBC AUTO: 99 FL (ref 78–100)
NT-PROBNP SERPL-MCNC: 1594 PG/ML (ref 0–1800)
PLATELET # BLD AUTO: 134 10E9/L (ref 150–450)
POTASSIUM SERPL-SCNC: 3.8 MMOL/L (ref 3.4–5.3)
RBC # BLD AUTO: 3.33 10E12/L (ref 3.8–5.2)
SODIUM SERPL-SCNC: 141 MMOL/L (ref 133–144)
TROPONIN I SERPL-MCNC: 0.02 UG/L (ref 0–0.04)
TROPONIN I SERPL-MCNC: <0.015 UG/L (ref 0–0.04)
WBC # BLD AUTO: 10.9 10E9/L (ref 4–11)

## 2017-10-18 PROCEDURE — 99233 SBSQ HOSP IP/OBS HIGH 50: CPT | Performed by: INTERNAL MEDICINE

## 2017-10-18 PROCEDURE — 83735 ASSAY OF MAGNESIUM: CPT | Performed by: INTERNAL MEDICINE

## 2017-10-18 PROCEDURE — A9270 NON-COVERED ITEM OR SERVICE: HCPCS | Mod: GY | Performed by: INTERNAL MEDICINE

## 2017-10-18 PROCEDURE — 99221 1ST HOSP IP/OBS SF/LOW 40: CPT | Performed by: INTERNAL MEDICINE

## 2017-10-18 PROCEDURE — 25500064 ZZH RX 255 OP 636: Performed by: INTERNAL MEDICINE

## 2017-10-18 PROCEDURE — 80048 BASIC METABOLIC PNL TOTAL CA: CPT | Performed by: INTERNAL MEDICINE

## 2017-10-18 PROCEDURE — 85027 COMPLETE CBC AUTOMATED: CPT | Performed by: INTERNAL MEDICINE

## 2017-10-18 PROCEDURE — 25000128 H RX IP 250 OP 636: Performed by: INTERNAL MEDICINE

## 2017-10-18 PROCEDURE — 83880 ASSAY OF NATRIURETIC PEPTIDE: CPT | Performed by: INTERNAL MEDICINE

## 2017-10-18 PROCEDURE — 71010 XR CHEST PORT 1 VW: CPT

## 2017-10-18 PROCEDURE — 85610 PROTHROMBIN TIME: CPT | Performed by: INTERNAL MEDICINE

## 2017-10-18 PROCEDURE — 84484 ASSAY OF TROPONIN QUANT: CPT | Performed by: INTERNAL MEDICINE

## 2017-10-18 PROCEDURE — 99207 ZZC CDG-HISTORY COMP: MEETS EXP. PROBLEM FOCUSED-DOWN CODED LACK OF ROS: CPT | Performed by: INTERNAL MEDICINE

## 2017-10-18 PROCEDURE — 82330 ASSAY OF CALCIUM: CPT | Performed by: INTERNAL MEDICINE

## 2017-10-18 PROCEDURE — 93306 TTE W/DOPPLER COMPLETE: CPT | Mod: 26 | Performed by: INTERNAL MEDICINE

## 2017-10-18 PROCEDURE — 25000132 ZZH RX MED GY IP 250 OP 250 PS 637: Mod: GY | Performed by: INTERNAL MEDICINE

## 2017-10-18 PROCEDURE — 36415 COLL VENOUS BLD VENIPUNCTURE: CPT | Performed by: INTERNAL MEDICINE

## 2017-10-18 PROCEDURE — 12000007 ZZH R&B INTERMEDIATE

## 2017-10-18 PROCEDURE — 25800025 ZZH RX 258: Performed by: INTERNAL MEDICINE

## 2017-10-18 PROCEDURE — 40000264 ECHO COMPLETE WITH OPTISON

## 2017-10-18 RX ORDER — CODEINE PHOSPHATE AND GUAIFENESIN 10; 100 MG/5ML; MG/5ML
5 SOLUTION ORAL EVERY 4 HOURS PRN
Status: DISCONTINUED | OUTPATIENT
Start: 2017-10-18 | End: 2017-10-26 | Stop reason: HOSPADM

## 2017-10-18 RX ORDER — VENLAFAXINE HYDROCHLORIDE 75 MG/1
75 TABLET, EXTENDED RELEASE ORAL DAILY
Status: DISCONTINUED | OUTPATIENT
Start: 2017-10-18 | End: 2017-10-26 | Stop reason: HOSPADM

## 2017-10-18 RX ORDER — OXYCODONE HYDROCHLORIDE 5 MG/1
5-10 TABLET ORAL EVERY 4 HOURS PRN
Status: DISCONTINUED | OUTPATIENT
Start: 2017-10-18 | End: 2017-10-24

## 2017-10-18 RX ORDER — GABAPENTIN 100 MG/1
100 CAPSULE ORAL 3 TIMES DAILY
Status: DISCONTINUED | OUTPATIENT
Start: 2017-10-18 | End: 2017-10-26 | Stop reason: HOSPADM

## 2017-10-18 RX ORDER — NALOXONE HYDROCHLORIDE 0.4 MG/ML
.1-.4 INJECTION, SOLUTION INTRAMUSCULAR; INTRAVENOUS; SUBCUTANEOUS
Status: DISCONTINUED | OUTPATIENT
Start: 2017-10-18 | End: 2017-10-26 | Stop reason: HOSPADM

## 2017-10-18 RX ORDER — POTASSIUM CHLORIDE 1.5 G/1.58G
20-40 POWDER, FOR SOLUTION ORAL
Status: DISCONTINUED | OUTPATIENT
Start: 2017-10-18 | End: 2017-10-20

## 2017-10-18 RX ORDER — HYDROMORPHONE HYDROCHLORIDE 1 MG/ML
0.5 INJECTION, SOLUTION INTRAMUSCULAR; INTRAVENOUS; SUBCUTANEOUS ONCE
Status: DISCONTINUED | OUTPATIENT
Start: 2017-10-18 | End: 2017-10-18

## 2017-10-18 RX ORDER — HYDRALAZINE HYDROCHLORIDE 25 MG/1
75 TABLET, FILM COATED ORAL 3 TIMES DAILY
Status: DISCONTINUED | OUTPATIENT
Start: 2017-10-18 | End: 2017-10-26 | Stop reason: HOSPADM

## 2017-10-18 RX ORDER — POTASSIUM CHLORIDE 7.45 MG/ML
10 INJECTION INTRAVENOUS
Status: DISCONTINUED | OUTPATIENT
Start: 2017-10-18 | End: 2017-10-20

## 2017-10-18 RX ORDER — OXYCODONE HYDROCHLORIDE 5 MG/1
5 TABLET ORAL ONCE
Status: COMPLETED | OUTPATIENT
Start: 2017-10-18 | End: 2017-10-18

## 2017-10-18 RX ORDER — HYDROMORPHONE HYDROCHLORIDE 1 MG/ML
.3-.5 INJECTION, SOLUTION INTRAMUSCULAR; INTRAVENOUS; SUBCUTANEOUS
Status: DISCONTINUED | OUTPATIENT
Start: 2017-10-18 | End: 2017-10-23

## 2017-10-18 RX ORDER — MAGNESIUM SULFATE HEPTAHYDRATE 40 MG/ML
4 INJECTION, SOLUTION INTRAVENOUS EVERY 4 HOURS PRN
Status: DISCONTINUED | OUTPATIENT
Start: 2017-10-18 | End: 2017-10-20

## 2017-10-18 RX ORDER — LIDOCAINE 40 MG/G
1 CREAM TOPICAL
Status: DISCONTINUED | OUTPATIENT
Start: 2017-10-18 | End: 2017-10-26 | Stop reason: HOSPADM

## 2017-10-18 RX ORDER — POTASSIUM CL/LIDO/0.9 % NACL 10MEQ/0.1L
10 INTRAVENOUS SOLUTION, PIGGYBACK (ML) INTRAVENOUS
Status: DISCONTINUED | OUTPATIENT
Start: 2017-10-18 | End: 2017-10-20

## 2017-10-18 RX ORDER — ACETAMINOPHEN 325 MG/1
650 TABLET ORAL AT BEDTIME
Status: DISCONTINUED | OUTPATIENT
Start: 2017-10-18 | End: 2017-10-26 | Stop reason: HOSPADM

## 2017-10-18 RX ORDER — ACETAMINOPHEN 325 MG/1
650 TABLET ORAL EVERY 6 HOURS PRN
Status: DISCONTINUED | OUTPATIENT
Start: 2017-10-18 | End: 2017-10-26 | Stop reason: HOSPADM

## 2017-10-18 RX ORDER — DEXTROSE MONOHYDRATE, SODIUM CHLORIDE, AND POTASSIUM CHLORIDE 50; 1.49; 9 G/1000ML; G/1000ML; G/1000ML
INJECTION, SOLUTION INTRAVENOUS CONTINUOUS
Status: DISCONTINUED | OUTPATIENT
Start: 2017-10-18 | End: 2017-10-18 | Stop reason: CLARIF

## 2017-10-18 RX ORDER — POTASSIUM CHLORIDE 29.8 MG/ML
20 INJECTION INTRAVENOUS
Status: DISCONTINUED | OUTPATIENT
Start: 2017-10-18 | End: 2017-10-18 | Stop reason: CLARIF

## 2017-10-18 RX ORDER — AMOXICILLIN 250 MG
2 CAPSULE ORAL AT BEDTIME
Status: DISCONTINUED | OUTPATIENT
Start: 2017-10-18 | End: 2017-10-26 | Stop reason: HOSPADM

## 2017-10-18 RX ORDER — SIMVASTATIN 10 MG
10 TABLET ORAL AT BEDTIME
Status: DISCONTINUED | OUTPATIENT
Start: 2017-10-18 | End: 2017-10-23

## 2017-10-18 RX ORDER — FUROSEMIDE 40 MG
40 TABLET ORAL 2 TIMES DAILY
Status: DISCONTINUED | OUTPATIENT
Start: 2017-10-18 | End: 2017-10-22

## 2017-10-18 RX ORDER — ISOSORBIDE MONONITRATE 30 MG/1
30 TABLET, EXTENDED RELEASE ORAL DAILY
Status: DISCONTINUED | OUTPATIENT
Start: 2017-10-18 | End: 2017-10-26 | Stop reason: HOSPADM

## 2017-10-18 RX ORDER — POTASSIUM CHLORIDE 1500 MG/1
20-40 TABLET, EXTENDED RELEASE ORAL
Status: DISCONTINUED | OUTPATIENT
Start: 2017-10-18 | End: 2017-10-20

## 2017-10-18 RX ORDER — FERROUS SULFATE 325(65) MG
325 TABLET ORAL 2 TIMES DAILY
Status: DISCONTINUED | OUTPATIENT
Start: 2017-10-18 | End: 2017-10-26 | Stop reason: HOSPADM

## 2017-10-18 RX ORDER — FERROUS SULFATE 325(65) MG
325 TABLET ORAL 2 TIMES DAILY
COMMUNITY
End: 2018-01-11

## 2017-10-18 RX ADMIN — ACETAMINOPHEN 650 MG: 325 TABLET, FILM COATED ORAL at 12:01

## 2017-10-18 RX ADMIN — FUROSEMIDE 40 MG: 40 TABLET ORAL at 13:46

## 2017-10-18 RX ADMIN — SIMVASTATIN 10 MG: 10 TABLET, FILM COATED ORAL at 19:52

## 2017-10-18 RX ADMIN — GABAPENTIN 100 MG: 100 CAPSULE ORAL at 19:52

## 2017-10-18 RX ADMIN — HYDROMORPHONE HYDROCHLORIDE 0.5 MG: 1 INJECTION, SOLUTION INTRAMUSCULAR; INTRAVENOUS; SUBCUTANEOUS at 17:42

## 2017-10-18 RX ADMIN — HYDROMORPHONE HYDROCHLORIDE 0.5 MG: 1 INJECTION, SOLUTION INTRAMUSCULAR; INTRAVENOUS; SUBCUTANEOUS at 06:57

## 2017-10-18 RX ADMIN — OXYCODONE HYDROCHLORIDE 5 MG: 5 TABLET ORAL at 08:03

## 2017-10-18 RX ADMIN — POTASSIUM CHLORIDE, DEXTROSE MONOHYDRATE AND SODIUM CHLORIDE: 150; 5; 900 INJECTION, SOLUTION INTRAVENOUS at 02:33

## 2017-10-18 RX ADMIN — GABAPENTIN 100 MG: 100 CAPSULE ORAL at 09:54

## 2017-10-18 RX ADMIN — OXYCODONE HYDROCHLORIDE 5 MG: 5 TABLET ORAL at 02:18

## 2017-10-18 RX ADMIN — HYDRALAZINE HYDROCHLORIDE 75 MG: 25 TABLET ORAL at 13:47

## 2017-10-18 RX ADMIN — HYDROMORPHONE HYDROCHLORIDE 0.5 MG: 1 INJECTION, SOLUTION INTRAMUSCULAR; INTRAVENOUS; SUBCUTANEOUS at 09:51

## 2017-10-18 RX ADMIN — GABAPENTIN 100 MG: 100 CAPSULE ORAL at 13:47

## 2017-10-18 RX ADMIN — OXYCODONE HYDROCHLORIDE 5 MG: 5 TABLET ORAL at 01:19

## 2017-10-18 RX ADMIN — HYDRALAZINE HYDROCHLORIDE 75 MG: 25 TABLET ORAL at 09:55

## 2017-10-18 RX ADMIN — ACETAMINOPHEN 650 MG: 325 TABLET, FILM COATED ORAL at 02:18

## 2017-10-18 RX ADMIN — VITAMIN D, TAB 1000IU (100/BT) 1000 UNITS: 25 TAB at 09:55

## 2017-10-18 RX ADMIN — HUMAN ALBUMIN MICROSPHERES AND PERFLUTREN 3 ML: 10; .22 INJECTION, SOLUTION INTRAVENOUS at 09:30

## 2017-10-18 RX ADMIN — HYDROMORPHONE HYDROCHLORIDE 0.5 MG: 1 INJECTION, SOLUTION INTRAMUSCULAR; INTRAVENOUS; SUBCUTANEOUS at 02:58

## 2017-10-18 RX ADMIN — OMEPRAZOLE 20 MG: 20 CAPSULE, DELAYED RELEASE ORAL at 09:54

## 2017-10-18 RX ADMIN — VENLAFAXINE HYDROCHLORIDE 75 MG: 75 TABLET, EXTENDED RELEASE ORAL at 12:01

## 2017-10-18 RX ADMIN — ACETAMINOPHEN 650 MG: 325 TABLET, FILM COATED ORAL at 19:52

## 2017-10-18 RX ADMIN — SIMVASTATIN 10 MG: 10 TABLET, FILM COATED ORAL at 02:18

## 2017-10-18 RX ADMIN — HYDRALAZINE HYDROCHLORIDE 75 MG: 25 TABLET ORAL at 19:53

## 2017-10-18 RX ADMIN — OXYCODONE HYDROCHLORIDE 10 MG: 5 TABLET ORAL at 12:01

## 2017-10-18 RX ADMIN — FUROSEMIDE 40 MG: 40 TABLET ORAL at 09:55

## 2017-10-18 RX ADMIN — OXYCODONE HYDROCHLORIDE 10 MG: 5 TABLET ORAL at 19:53

## 2017-10-18 RX ADMIN — ISOSORBIDE MONONITRATE 30 MG: 30 TABLET, EXTENDED RELEASE ORAL at 09:54

## 2017-10-18 RX ADMIN — HYDROMORPHONE HYDROCHLORIDE 0.5 MG: 1 INJECTION, SOLUTION INTRAMUSCULAR; INTRAVENOUS; SUBCUTANEOUS at 13:47

## 2017-10-18 RX ADMIN — SENNOSIDES AND DOCUSATE SODIUM 2 TABLET: 8.6; 5 TABLET ORAL at 19:52

## 2017-10-18 NOTE — PLAN OF CARE
"Problem: Fracture Orthopaedic (Adult)  Goal: Signs and Symptoms of Listed Potential Problems Will be Absent, Minimized or Managed (Fracture Orthopaedic)  Signs and symptoms of listed potential problems will be absent, minimized or managed by discharge/transition of care (reference Fracture Orthopaedic (Adult) CPG).   Outcome: No Change  BP elevated while uncomfortable, other VSS on 2 LPM. Pt states pain 10/10 to RUE, shoulder; received oxycodone, ice packs and tylenol without improvement, required dilaudid IV x1 to improve discomfort. Limited ROM to RUE, good  strength and radial pulse. Began to report \"some\" numbness, tingling to extremity. Pt stayed in bed this shift, A2 to turn d/t pain. Pt felt urge to void, placed on bedpan with small dribble of urine. Bladder scanned this AM for 276 mL, no further urge to void, continue to monitor. PIV infusing IVF at 75 ml/hr overnight. K+ 3.2, pt refused replacement overnight. Tele in place, Afib. Tolerating clear liquids, PO meds, no other PO taken. Bed alarms in use, pt not attempting to exit bed. Alert and oriented, able to make needs known. Continue to monitor.      "

## 2017-10-18 NOTE — ED NOTES
"[facility]  ED Nurse Handoff Report    Keisha Godinez is a 85 year old female   ED Chief complaint: Fall and Arm Injury  . ED Diagnosis:   Final diagnoses:   Closed supracondylar fracture of right humerus, initial encounter     Allergies:   Allergies   Allergen Reactions     Lisinopril Other (See Comments)     Tongue swelling     Calcium Nausea and Vomiting     Egg Yolk      Flu Virus Vaccine      Allergic to eggs.     Keflex [Cephalexin] Nausea     Pt states she had upset stomach.  NOT tongue swelling.  She had tongue swelling with a combo bp med that she thinks included lisinopril.    Tolerates IV Cefazolin     Levaquin [Levofloxacin]      Sulfa Drugs      Zinc Nausea and Vomiting       Code Status: Full Code  Activity level - Baseline/Home:  Independent. Activity Level - Current:   Stand with Assist of 2. Lift room needed: No. Bariatric: No   Needed: No   Isolation: No. Infection: Not Applicable.     Vital Signs:   Vitals:    10/17/17 2300 10/17/17 2315 10/17/17 2330 10/17/17 2345   BP: (!) 183/93 169/67 164/63 166/67   Pulse:    56   Resp:    16   Temp:       TempSrc:       SpO2:  96% 96% 92%   Weight:       Height:           Cardiac Rhythm:  ,      Pain level: 0-10 Pain Scale: 10  Patient confused: No. Patient Falls Risk: Yes.   Elimination Status: Pt hasn't had to void yet during stay in ER.  Patient Report - Initial Complaint: Pt brought in by EMS for evaluation of right arm pain. She states she was going to hang up the phone and \"stumbled a bit\". Focused Assessment: Deformity to right arm, pain with any movement, strong radial pulse, good CMS.     Tests Performed: EKG, Labs, XR. Abnormal Results:   Labs Ordered and Resulted from Time of ED Arrival Up to the Time of Departure from the ED   CBC WITH PLATELETS DIFFERENTIAL - Abnormal; Notable for the following:        Result Value    WBC 11.3 (*)     RBC Count 3.55 (*)     Hemoglobin 10.8 (*)     Hematocrit 34.4 (*)     MCHC 31.4 (*)  "    Platelet Count 135 (*)     Absolute Neutrophil 10.2 (*)     Absolute Lymphocytes 0.5 (*)     All other components within normal limits   BASIC METABOLIC PANEL - Abnormal; Notable for the following:     Potassium 3.2 (*)     Glucose 137 (*)     Calcium 7.7 (*)     All other components within normal limits   TROPONIN I   CARDIAC CONTINUOUS MONITORING   Broken Proximal Humeral Head.   Treatments provided: Medications  Family Comments: Two daughters went home  OBS brochure/video discussed/provided to patient:  Yes  ED Medications:   Medications   HYDROmorphone (PF) (DILAUDID) injection 0.5 mg (0.5 mg Intravenous Given 10/17/17 8895)   HYDROmorphone (PF) (DILAUDID) injection 0.5 mg (0.5 mg Intravenous Given 10/17/17 2859)     Drips infusing:  No  For the majority of the shift, the patient's behavior Green. Interventions performed were monitoring and reassurance.     Severe Sepsis OR Septic Shock Diagnosis Present: No      ED Nurse Name/Phone Number: DENG Mahoney RN  11:57 PM    RECEIVING UNIT ED HANDOFF REVIEW    Above ED Nurse Handoff Report was reviewed: Yes  Reviewed by: Ying Diaz on October 18, 2017 at 12:25 AM

## 2017-10-18 NOTE — PLAN OF CARE
Problem: Patient Care Overview  Goal: Plan of Care/Patient Progress Review  Outcome: No Change  A/O. Pain uncontrolled. Unwilling to let staff apply sling. See mar for meds. Using external urinary catheter as pt unable to log roll to bedpan, some retention but likely because she needs to be reminded that its OK to void in the bed. Will continue to monitor output. Lungs sounds course w/ crackles, (baseline per daughter). Lower extremity edema and dry flaky skin. Bruising to Right shoulder, pulses/color/grasp good, c/o mild numbness to RUE. Awaiting ortho consult to develop plan.        2017 web page to dr coughlin:  per pt history can we saline lock? Drinking fine and Ortho will not be here until this evening.

## 2017-10-18 NOTE — ED PROVIDER NOTES
"  History     Chief Complaint:  Fall and Arm Injury    HPI   Keisha Godinez is a 85 year old female who presents following a fall and arm injury. The patient was going into her bedroom with her walker in order to get ready for bed. She went to hang up her phone and fell backwards onto her right shoulder.  She remembers all events leading up to this and denies any preceding symptoms.  She did not hit her head. No LOC. The patient states she felt well prior to falling down. She had no preceding chest pain, lightheadedness, palpitations, or dizziness. Patient is experiencing significant right shoulder pain. She is no longer on Warfarin, and does not take aspirin. Denies neck or back pain, rib pain, hip pain, chest pain, or abdominal pain.     Allergies:  Lisinopril  Flu virus vaccine  Keflex  Levaquin  Sulfa drugs     Medications:    Venlafaxine  Neurontin  Apresoline  Zocor  Imdur  Lasix  Potassium chloride  Senna-docusate  Psyllium     Past Medical History:    Atrial fibrillation  Cardiomyopathy  CHF  CVA  Depression  GERD  HLD  HTN  Pulmonary HTN  Shingles  MI    Past Surgical History:    Colonoscopy  Enteroscopy small bowel  EGD  Foot mass excision    Family History:    Pancreatic cancer  Lung cancer    Social History:  Relationship status:   Tobacco use: Former smoker (Quit 1956)  Alcohol use: Negative  The patient presents with family members.     Review of Systems   Cardiovascular: Negative for chest pain.   Gastrointestinal: Negative for abdominal pain.   Musculoskeletal: Negative for back pain and neck pain.        Positive for shoulder pain.  Negative for rib pain.   All other systems reviewed and are negative.      Physical Exam   First Vitals:  BP: (!) 192/94  Pulse: 79  Heart Rate: 79  Temp: 97.8  F (36.6  C)  Resp: 18  Height: 160 cm (5' 3\")  Weight: 70.3 kg (155 lb)  SpO2: 90 %    Physical Exam  Constitutional:  Frail, elderly female, GCS = 15   Eyes:  PERRL, conjunctiva normal, EOMI  HENT:  No " contusions, abrasions or other gross deformities noted over the scalp or face. Dry mucus membranes  Respiratory:  No respiratory distress, normal breath sounds, no wheezing.   Cardiovascular:  RRR, no murmur.    GI:  Abdomen is nondistended, soft, nontender to palpation  Musculoskeletal: Right shoulder tenderness over proximal humerus, unable to range shoulder due to pain. Right elbow, forearm, wrist, and hand unremarkable. Right radial pulse intact Otherwise, no gross deformities of bilateral UE or LE noted. Otherwise able to range bilateral UE and LE without difficulty. C-spine: Non-tender. No pain with range of motion. T-spine and L-spine are without midline ttp, stepoff, contusion or abrasion.  Integument:  No obvious wounds or contusions.   Neurologic: Alert & oriented x 3, CN 2-12 normal, normal motor function, normal sensory function, no focal deficits noted   Psychiatric:  Normal affect.       Emergency Department Course   ECG (21:18:57):  Indication: Fall.   Rate 59 bpm. ME interval *. QRS duration 110. QT/QTc 464/459. P-R-T axes 38.   Interpretation: Atrial fibrillation with slow ventricular response. Nonspecific ST and T wave abnormality. Poor quality, possible artifact in lateral leads.  Agree with computer interpretation.   Interpreted at 2120 by Dr. Patel.    ECG (0:11:35):  Indication: Fall   Rate 68 bpm. ME interval *. QRS duration 106. QT/QTc 458/487. P-R-T axes 38.   Interpretation: Atrial fibrillation. ST & T wave abnormality, consider anterior ischemia. Similar, but more pronounced than prior. Prolonged QT.   ST changes somewhat more pronounced compared to previous 7/7/16.   Interpreted at 00 11 by Dr. Patel.     Imaging:  Radiographic findings were communicated with the patient who voiced understanding of the findings.    Right Shoulder XR, per radiology:   Fractured proximal humerus.    Right Humerus XR, per radiology:  Fractured proximal humerus.    Laboratory:  CBC: WBC 11.3 (H), HGB 10.8 (L),   (L)  BMP: Potassium 3.2 (L), Glucose 137 (H), Calcium 7.7 (L), o/w WNL (Creatinine 0.77)  2222: Troponin I: <0.015    Interventions:  2218: Dilaudid, 0.5 mg, IV injection  2304: Dilaudid, 0.5 mg, IV injection    Emergency Department Course:  Nursing notes and vitals reviewed.  I performed an exam of the patient as documented above.  The above workup was undertaken.  2335: I rechecked the patient and discussed results.  2352: I discussed the patient with Dr. Porter of the hospitalist service.  0004: I discussed the patient with Dr. Porter of the hospitalist service.      Findings and plan explained to the Patient who consents to admission. Discussed the patient with Dr. Porter, who will admit the patient to a [facility] bed for further monitoring, evaluation, and treatment.      Impression & Plan      Medical Decision Making:  Keisha Godinez is a 85 year old female who presents for evaluation of right arm/shoulder pain after falling while getting ready for bed this evening. CMS is intact distally in the extremity.  Xrays reveal a proximal humerus fracture. No signs of shoulder dislocation, distal nerve compromise, sternal head dislocation or open fracture.They understand that this need for reduction and/or surgery may change with time and orthopedic consultation.  There is no indication for ortho consultation from the ED. this appeared to be a mechanical fall.  She states nonspecific ST changes but no symptoms of chest pain or dyspnea, troponin was normal, will be cardiac monitored in the hospital. I discussed the patient with Dr. Porter of the hospitalist service, who agrees to accept the patient. The patients head to toe trauma exam is otherwise normal at this time and no further trauma workup is needed as I believe there is no signs of serious head, neck, chest, spinal, extremity or abdominal injuries.     Diagnosis:    ICD-10-CM   1. Closed supracondylar fracture of right humerus, initial encounter S42.411A      Disposition:  Admit to a Med bed under the care of Dr. Porter.    I, Guilherme Vivar, am serving as a scribe on 10/17/2017 at 9:44 PM to personally document services performed by Sarah Patel MD, based on my observations and the provider's statements to me.    [facility] EMERGENCY DEPARTMENT       Sarah Patel MD  10/18/17 0145

## 2017-10-18 NOTE — PHARMACY-ADMISSION MEDICATION HISTORY
Admission medication history interview status for this patient is complete. See [facility] admission navigator for allergy information, prior to admission medications and immunization status.     Medication history interview source(s):Patient  Medication history resources (including written lists, pill bottles, clinic record):None    Changes made to PTA medication list:  Added: none  Deleted: cefuroxime, nitrofurantoin  Changed: ferrous sulfate TID --> BID    Actions taken by pharmacist (provider contacted, etc):None     Additional medication history information:None    Medication reconciliation/reorder completed by provider prior to medication history? Yes    Do you take OTC medications (eg tylenol, ibuprofen, fish oil, eye/ear drops, etc)? Y See list    For patients on insulin therapy: n (Y/N)        Prior to Admission medications    Medication Sig Last Dose Taking? Auth Provider   ferrous sulfate (IRON) 325 (65 FE) MG tablet Take 325 mg by mouth 2 times daily 10/17/2017 at Unknown time Yes Unknown, Entered By History   venlafaxine (EFFEXOR-XR) 75 MG 24 hr capsule Take 1 capsule (75 mg) by mouth daily 10/17/2017 at am Yes Gerri Eubanks MD   gabapentin (NEURONTIN) 100 MG capsule Take 1 capsule (100 mg) by mouth 3 times daily 10/17/2017 at pm Yes Gerri Eubanks MD   hydrALAZINE (APRESOLINE) 25 MG tablet Take 3 tablets (75 mg) by mouth 3 times daily 10/17/2017 at pm Yes Gerri Eubanks MD   simvastatin (ZOCOR) 10 MG tablet Take 1 tablet (10 mg) by mouth At Bedtime 10/17/2017 at pm Yes Gerri Eubanks MD   isosorbide mononitrate (IMDUR) 30 MG 24 hr tablet Take 1 tablet (30 mg) by mouth daily 10/17/2017 at am Yes Gerri Eubanks MD   potassium chloride SA (POTASSIUM CHLORIDE) 20 MEQ CR tablet Take 1 tablet (20 mEq) by mouth daily 10/17/2017 at am Yes Gerri Eubanks MD   furosemide (LASIX) 40 MG tablet Take 1 tablet (40 mg) by mouth 2 times daily 10/17/2017 at AM & Noon Yes Gerri Eubanks MD    omeprazole (PRILOSEC) 20 MG CR capsule Take 1 capsule (20 mg) by mouth daily 10/17/2017 at am Yes Gerri Eubanks MD   acetaminophen (TYLENOL) 325 MG tablet Take 650 mg by mouth every 6 hours as needed for mild pain  Past Week at Unknown time Yes Reported, Patient   senna-docusate (SENOKOT-S;PERICOLACE) 8.6-50 MG per tablet Take 2 tablets daily as needed for constipation while taking prescription pain medications. Past Week at Unknown time Yes Lee Jang DPM   OMEGA-3 FATTY ACIDS PO Take 1,200 mg by mouth daily  10/17/2017 at am Yes Reported, Patient   psyllium (METAMUCIL) 58.6 % POWD Take 1 teaspoonful by mouth every evening  10/17/2017 at pm Yes Reported, Patient   VITAMIN D, CHOLECALCIFEROL, PO Take 1,000 Units by mouth daily  10/17/2017 at am Yes Reported, Patient   order for DME Equipment being ordered: Motorized scooter x 3 months Unknown at Unknown time  Lee Jang DPM   order for DME Equipment being ordered: DH2 shoe for offloading R arch ulcer Unknown at Unknown time  Lee Jang DPM

## 2017-10-18 NOTE — H&P
IDENTIFICATION AND CHIEF COMPLAINT:  Ms. Keisha Godinez is an 85-year-old woman who has mild right lower extremity weakness associated with a remote stroke, who came in at this time with pain in her right shoulder.  She was found to have a minimally displaced angulated fracture of the proximal right humerus.      HISTORY OF PRESENT ILLNESS:  Ms. Godinez evidently was hanging up the phone this evening, and misstepped and fell back on her right shoulder.  She apparently had no loss of consciousness or other injury, and does not recall any precipitating or associated symptoms with that fall.  She has a daughter who lives with her.  She was brought to the Emergency Department by EMS.  Notably, she was appreciated to be mildly hypoxic in the rig after she got 200 mcg of fentanyl.      REVIEW OF SYSTEMS:  Ms. Godinez indicates she has not had fevers, sweats or chills, or upper respiratory tract symptoms.  She does endorse a mild cough.  She states that she does not have COPD, and quit smoking more than 60 years ago, and therefore had never smoked very much.  Her cough is apparently nonproductive, and she has not really had significant shortness of breath.  She denies chest pain, heart disease, nausea, vomiting, diarrhea or constipation.  She typically gets around using a walker.      PRIOR MEDICAL HISTORY:   1.  Stroke in 2010, involving the left MCA territory.   2.  Chronic atrial fibrillation.  The patient is not on anticoagulation.   3.  Pulmonary hypertension noted on old echocardiograms.  The patient does have some chronic lower extremity edema.   4.  Suspected chronic diastolic heart failure.   5.  Gastroesophageal reflux disease.   6.  Hypertension.   7.  Dyslipidemia.      PRIOR SURGICAL HISTORY:  Notably, the patient has not had significant complications related to general endotracheal anesthetics.   1.  Reported hysterectomy.   2.  Remote appendectomy.   3.  Cholecystectomy.   4.  Excision of mass from the right  foot in 2016.      FAMILY MEDICAL HISTORY:  Reviewed and considered noncontributory to this hospitalization.      SOCIAL HISTORY:  The patient smoked for a very brief time as a young adult, having quit in 1956.  She does not drink alcohol.  Does not use street drugs.  As noted, she lives independently, and a daughter lives with her.  Apparently, two daughters were present in the Emergency Department; however, they already had departed by the time I arrived.      ALLERGIES:   1.  Lisinopril (caused some tongue swelling).   2.  Egg allergy.   3.  Keflex (caused nausea).   4.  Levofloxacin (reaction not documented).   5.  Sulfa drugs (reaction not documented).      MEDICATIONS PRIOR TO THIS HOSPITALIZATION:   1.  Venlafaxine XR 75 mg p.o. daily.   2.  Gabapentin 100 mg p.o. t.i.d.   3.  Hydralazine 75 mg p.o. t.i.d.   4.  Simvastatin 10 mg p.o. at bedtime.   5.  Isosorbide mononitrate 30 mg p.o. daily.   6.  Potassium chloride supplement 20 mEq p.o. daily.   7.  Furosemide 40 mg p.o. b.i.d.   8.  Omeprazole 20 mg p.o. daily.   9.  Iron sulfate supplement 1 p.o. daily.   10.  Vitamin D 1000 units p.o. daily.      OBJECTIVE:   GENERAL:  Ms. Godinez is a very pleasant, coherent, although fairly uncomfortable and somewhat sleepy, elderly  female, resting on a gurney in the Emergency Department.  She does complain of pain involving her right shoulder that has not been adequately controlled with two injections of Dilaudid 0.5 mg.   VITAL SIGNS:  The patient is afebrile.  Heart rate 60s.  Blood pressure 171/61.  Respirations 15 and unlabored.  In room air, she drifted to the high 80s, but on 2 liters by nasal cannula, oxygen saturation is 93%.   HEENT:  Cranium is normocephalic and atraumatic.  Eyes are without remarkable scleral icterus or conjunctival injection.  Extraocular motions are conjugate.  Pupils are equal, round, and reactive.  Nares and oropharynx are free of obvious lesions.  The patient wears dentures,  upper and lower.   NECK:  Supple without remarkable cervical or supraclavicular lymphadenopathy or thyromegaly.  The patient has no discomfort with range of motion.   CHEST:  Significant for scattered expiratory wheeze bilaterally.   HEART:  Irregularly irregular, but relatively bradycardic, without appreciable rubs, murmurs or gallops.   ABDOMEN:  Soft, nontender, nondistended, without appreciable hepatosplenomegaly.  No bruits are appreciated.   GENITOURINARY:  Examination deferred by me.   BREASTS:  Examination deferred by me.   MUSCULOSKELETAL:  The right shoulder is not mobilized by me.  The patient has discomfort even when she moves her right hand.  Distal perfusion and sensation intact over the right upper extremity.  There is no evident bruising appreciated about the right shoulder.  Left upper extremity range of motion appears to be within normal limits.  Left radial pulse is easily palpated.  Lower extremities bilaterally are significant for rather dense edema.  No evidence for distal cellulitis.  The patient has deformity of the feet bilaterally with fallen arches and some prominent tarsometatarsal joints medially.   NEUROLOGIC:  The patient is fully coherent and able to give a reasonable history.  She is tired and has received some narcotics this evening.  Nevertheless, she is pleasant and appropriate.  Cranial nerves II-XII are grossly intact.  There is no lateralizing weakness appreciated, though the patient does describe to me that she has a right foot drop.   SKIN:  As noted, skin is without evident lesions.  The lower extremities are quite dry with some mild cracking, but no true skin breakdown, erythema, bleeding, etc.      DATA:  Basic metabolic panel:  Creatinine 0.77, potassium 3.2, calcium 7.7, magnesium 2.0.  Troponin less than measureable threshold.  Glucose 137.      CBC:  White cell count 11.3 with a left shift, hemoglobin 10.8, platelet count 135,000.      IMAGING:  X-rays of the shoulder  indicate fracture of proximal humerus with some mild medial displacement of a distal fragment.      Chest x-ray obtained by me shows significant cardiomegaly, but no infiltrates.  No remarkable change from previous x-ray that was obtained preoperatively in 07/2016.      EKG:  Atrial fibrillation with a relatively bradycardic rate.  There were some ST changes that were of concern to the Emergency Room physician.  These appear old to me.  Certainly, they are nonspecific.      ASSESSMENT:  Ms. Godinez is an 85-year-old woman who has atrial fibrillation and had a remote stroke, who comes in at this time following a mechanical fall.  She has a proximal right humerus fracture with mild angulation.  Unfortunately, I think she will need some type of surgical management.  Overall, she is fairly healthy, and I think she would do well with general endotracheal anesthetic, if that is required.  I note that the last echocardiogram was obtained in 2016, and it may be reasonable to repeat that if Orthopedics really wants to take the patient to the operating room.      DIAGNOSES:   1.  Right proximal humerus fracture with mild angulation.  This occurred as a result of a fall that is believed to have been mechanical.   2.  History of atrial fibrillation with controlled ventricular response.  I do not see that the patient has had problematic severe bradycardia.   3.  Remote stroke.  The patient is not anticoagulated for the atrial fibrillation or stroke.  She is, however, dependent on a walker for stability, and the fractured shoulder will cause her problems with being independent.   4.  Mild electrolyte disturbances, including hypokalemia, hypocalcemia.   5.  Mild EKG changes which are nonspecific.   6.  Initial troponin was less than measureable threshold.  The patient is not having any symptoms at this time to suggest myocardial infarction.      PLAN:   1.  Admit to inpatient on telemetry.  I am interested in monitoring her atrial  fibrillation, and would be concerned about bradycardia.   2.  We will recheck the troponin in the morning.   3.  Electrolyte correction per protocol.   4.  Will continue with oxygen as needed to maintain saturation.   5.  The patient should have aggressive pulmonary toilet, and be mobilized as soon as is safe and comfortable.   6.  Orthopedic consult, of course, is obtained.   7.  Consider echocardiogram if the patient is determined to require surgery.         PAULA TIRADO MD             D: 10/18/2017 01:22   T: 10/18/2017 02:24   MT: [id number]      Name:     ELAINA MAYFIELD   MRN:      [id number]        Account:      [id number]   :      1932           Admitted:     [id number]      Document: [id number]       cc: Gerri Eubanks MD

## 2017-10-18 NOTE — PROGRESS NOTES
[facility]    Hospitalist Progress Note  Name: Keisha Godinez    MRN: [id number]  Provider: Adelina Alanis MD  Date of Service: 10/18/2017    Assessment & Plan   Summary of Stay: Keisha Godinez is a 85 year old female who was admitted on 10/17/2017 with acute proximal humerus fracture with angulation s/p mechanical fall. PMH sig for CAD, CVA, Afib, Pulm HTN, ? Diastolic CHF, HTN, dyslipidemia      S/p proximal Rt humerus fracture  -- will likely need ORIF  -- Ortho consulted  -- prn pain meds     h/o A fib  -- stable rate controlled without any meds. She was bradycardic in 2016  -- on tele to watch for arrhythmia  -- not on anticoagulation ? Sec to bleeding per family discontinue by Dr. Pradhan    H/o CAD  ? EKG changes not significantly different from 2016  -- initial trop negative but with minimal leak in am  --recheck trop at 11am  -- cardiac echo to assess WMAs  --continue on nitro and statin.      Hypokalemia and Hypocalcemia  -- replaced per protocol    H/o Anemia  --  On Iron replacement  -- will follow hgb      DVT Prophylaxis: Pneumatic Compression Devices  Code Status: Full Code    Disposition: Expected discharge TBD based on surgical intervention    Interval History   C/o pain in left shoulder    -Data reviewed today: I reviewed all new labs and imaging reports over the last 24 hours. I personally reviewed the EKG tracing showing afib with HR of 68 t inverted avl, v2-v3 and nonspecific STT changes..    Physical Exam   Temp: 97.4  F (36.3  C) Temp src: Oral BP: 160/53 Pulse: 62 Heart Rate: 62 Resp: 14 SpO2: 97 % O2 Device: Nasal cannula Oxygen Delivery: 2 LPM  Vitals:    10/17/17 2113 10/18/17 0721   Weight: 70.3 kg (155 lb) 74.2 kg (163 lb 8 oz)     Vital Signs with Ranges  Temp:  [97.4  F (36.3  C)-98.1  F (36.7  C)] 97.4  F (36.3  C)  Pulse:  [56-79] 62  Heart Rate:  [56-79] 62  Resp:  [12-24] 14  BP: (160-192)/(52-94) 160/53  SpO2:  [90 %-98 %] 97 %  I/O last 3 completed shifts:  In:  237 [I.V.:237]  Out: 30 [Urine:30]      GEN:  Alert, oriented x 3, appears comfortable, NAD.  HEENT:  Normocephalic/atraumatic, no scleral icterus, no nasal discharge, mouth moist.  CV:  Irregularly irregular, no murmur or JVD.  S1 + S2 noted, no S3 or S4.  LUNGS:  Few bibasilar crackles and occasional wheezing.  Symmetric chest rise on inhalation noted.  ABD:  Active bowel sounds, soft, non-tender/non-distended.  No rebound/guarding/rigidity.  EXT:  No edema.  No cyanosis.  No joint synovitis noted.  SKIN:  Dry to touch, no exanthems noted in the visualized areas.    Medications     dextrose 5% and 0.9% NaCl with potassium chloride 20 mEq 75 mL/hr at 10/18/17 0751       acetaminophen (TYLENOL) tablet 650 mg  650 mg Oral At Bedtime     furosemide  40 mg Oral BID     gabapentin  100 mg Oral TID     hydrALAZINE  75 mg Oral TID     isosorbide mononitrate  30 mg Oral Daily     omeprazole  20 mg Oral Daily     senna-docusate  2 tablet Oral At Bedtime     simvastatin  10 mg Oral At Bedtime     venlafaxine  75 mg Oral Daily     VITAMIN D3 (CHOLECALCIFEROL) PO  1,000 Units Oral Daily     sodium chloride (PF)  3 mL Intracatheter Q8H     Data       Recent Labs  Lab 10/18/17  0646 10/17/17  2222   WBC 10.9 11.3*   HGB 10.3* 10.8*   HCT 32.9* 34.4*   MCV 99 97   * 135*       Recent Labs  Lab 10/18/17  0646 10/17/17  2222    142   POTASSIUM 3.8 3.2*   CHLORIDE 108 107   CO2 27 27   ANIONGAP 6 8   GLC 94 137*   BUN 14 14   CR 0.80 0.77   GFRESTIMATED 68 71   GFRESTBLACK 83 86   CASPER 7.7* 7.7*     No results for input(s): CULT in the last 168 hours.  No results for input(s): NTBNPI, NTBNP in the last 168 hours.    Recent Labs  Lab 10/18/17  0646   INR 1.04     No results for input(s): LIPASE in the last 168 hours.  No results for input(s): CHOL, HDL, LDL, TRIG, CHOLHDLRATIO in the last 168 hours.  No results for input(s): TSH in the last 168 hours.  No results for input(s): COLOR, APPEARANCE, URINEGLC, URINEBILI,  URINEKETONE, SG, UBLD, URINEPH, PROTEIN, UROBILINOGEN, NITRITE, LEUKEST, RBCU, WBCU in the last 168 hours.    Recent Results (from the past 24 hour(s))   Shoulder XR, G/E 3 views, right    Narrative    XR SHOULDER RT G/E 3 VW, XR HUMERUS RT G/E 2 VW  10/17/2017 10:47 PM     HISTORY:  Pain    COMPARISON: None.    FINDINGS:  There is a fracture through the neck of the proximal  humerus. There is mild medial displacement of the distal fragment. The  distal humerus appears intact.      Impression    IMPRESSION: Fractured proximal humerus.    MELISSA CASTANEDA MD   XR Humerus Right G/E 2 Views    Narrative    XR SHOULDER RT G/E 3 VW, XR HUMERUS RT G/E 2 VW  10/17/2017 10:47 PM     HISTORY:  Pain    COMPARISON: None.    FINDINGS:  There is a fracture through the neck of the proximal  humerus. There is mild medial displacement of the distal fragment. The  distal humerus appears intact.      Impression    IMPRESSION: Fractured proximal humerus.    MELISSA CASTANEDA MD   XR Chest Port 1 View    Narrative    CHEST ONE VIEW PORTABLE  10/18/2017 12:44 AM     HISTORY: Cough. Preoperative.    COMPARISON: 7/6/2016.      Impression    IMPRESSION: Acute fracture through the neck of the proximal right  humerus was better evaluated on dedicated right shoulder views  performed earlier today. Previously noted opacity in the retrocardiac  region has improved since the previous exam. No other significant  interval change. Stable cardiac enlargement. Mild pulmonary venous  congestion. Interstitial prominence throughout both lungs is  nonspecific but may be related to edema, fibrosis, or infection. No  pneumothorax.    JANA RIVAS MD

## 2017-10-18 NOTE — ED NOTES
Pt brought in by EMS for evaluation of right arm injury, given 200 mcg Fentanyl en route.  O2 sats on arrival 90% and pt is sleepy. CMS intact, increased pain in right humerus area.    ABCs intact. AOx4

## 2017-10-19 LAB
ANION GAP SERPL CALCULATED.3IONS-SCNC: 5 MMOL/L (ref 3–14)
BASOPHILS # BLD AUTO: 0.1 10E9/L (ref 0–0.2)
BASOPHILS NFR BLD AUTO: 0.5 %
BUN SERPL-MCNC: 27 MG/DL (ref 7–30)
CA-I BLD-MCNC: 4.4 MG/DL (ref 4.4–5.2)
CALCIUM SERPL-MCNC: 7.8 MG/DL (ref 8.5–10.1)
CHLORIDE SERPL-SCNC: 106 MMOL/L (ref 94–109)
CO2 SERPL-SCNC: 29 MMOL/L (ref 20–32)
CREAT SERPL-MCNC: 0.9 MG/DL (ref 0.52–1.04)
DIFFERENTIAL METHOD BLD: ABNORMAL
EOSINOPHIL # BLD AUTO: 0.3 10E9/L (ref 0–0.7)
EOSINOPHIL NFR BLD AUTO: 2.6 %
ERYTHROCYTE [DISTWIDTH] IN BLOOD BY AUTOMATED COUNT: 13.2 % (ref 10–15)
GFR SERPL CREATININE-BSD FRML MDRD: 59 ML/MIN/1.7M2
GLUCOSE SERPL-MCNC: 98 MG/DL (ref 70–99)
HCT VFR BLD AUTO: 30.5 % (ref 35–47)
HGB BLD-MCNC: 9.4 G/DL (ref 11.7–15.7)
IMM GRANULOCYTES # BLD: 0 10E9/L (ref 0–0.4)
IMM GRANULOCYTES NFR BLD: 0.4 %
LYMPHOCYTES # BLD AUTO: 0.7 10E9/L (ref 0.8–5.3)
LYMPHOCYTES NFR BLD AUTO: 6.3 %
MAGNESIUM SERPL-MCNC: 2.1 MG/DL (ref 1.6–2.3)
MCH RBC QN AUTO: 30.2 PG (ref 26.5–33)
MCHC RBC AUTO-ENTMCNC: 30.8 G/DL (ref 31.5–36.5)
MCV RBC AUTO: 98 FL (ref 78–100)
MONOCYTES # BLD AUTO: 0.8 10E9/L (ref 0–1.3)
MONOCYTES NFR BLD AUTO: 7.2 %
NEUTROPHILS # BLD AUTO: 9.3 10E9/L (ref 1.6–8.3)
NEUTROPHILS NFR BLD AUTO: 83 %
NRBC # BLD AUTO: 0 10*3/UL
NRBC BLD AUTO-RTO: 0 /100
PLATELET # BLD AUTO: 118 10E9/L (ref 150–450)
POTASSIUM SERPL-SCNC: 3.7 MMOL/L (ref 3.4–5.3)
RBC # BLD AUTO: 3.11 10E12/L (ref 3.8–5.2)
SODIUM SERPL-SCNC: 140 MMOL/L (ref 133–144)
WBC # BLD AUTO: 11.3 10E9/L (ref 4–11)

## 2017-10-19 PROCEDURE — 25000132 ZZH RX MED GY IP 250 OP 250 PS 637: Mod: GY | Performed by: INTERNAL MEDICINE

## 2017-10-19 PROCEDURE — A9270 NON-COVERED ITEM OR SERVICE: HCPCS | Mod: GY | Performed by: INTERNAL MEDICINE

## 2017-10-19 PROCEDURE — 83735 ASSAY OF MAGNESIUM: CPT | Performed by: INTERNAL MEDICINE

## 2017-10-19 PROCEDURE — 25000128 H RX IP 250 OP 636: Performed by: INTERNAL MEDICINE

## 2017-10-19 PROCEDURE — 82330 ASSAY OF CALCIUM: CPT | Performed by: INTERNAL MEDICINE

## 2017-10-19 PROCEDURE — 80048 BASIC METABOLIC PNL TOTAL CA: CPT | Performed by: INTERNAL MEDICINE

## 2017-10-19 PROCEDURE — 36415 COLL VENOUS BLD VENIPUNCTURE: CPT | Performed by: INTERNAL MEDICINE

## 2017-10-19 PROCEDURE — 99207 ZZC CDG-MDM COMPONENT: MEETS LOW - DOWN CODED: CPT | Performed by: INTERNAL MEDICINE

## 2017-10-19 PROCEDURE — 85025 COMPLETE CBC W/AUTO DIFF WBC: CPT | Performed by: INTERNAL MEDICINE

## 2017-10-19 PROCEDURE — 12000007 ZZH R&B INTERMEDIATE

## 2017-10-19 PROCEDURE — 99232 SBSQ HOSP IP/OBS MODERATE 35: CPT | Performed by: INTERNAL MEDICINE

## 2017-10-19 RX ORDER — DOCUSATE SODIUM 100 MG/1
100 CAPSULE, LIQUID FILLED ORAL 2 TIMES DAILY
Status: DISCONTINUED | OUTPATIENT
Start: 2017-10-19 | End: 2017-10-26 | Stop reason: HOSPADM

## 2017-10-19 RX ORDER — FUROSEMIDE 10 MG/ML
20 INJECTION INTRAMUSCULAR; INTRAVENOUS ONCE
Status: COMPLETED | OUTPATIENT
Start: 2017-10-19 | End: 2017-10-19

## 2017-10-19 RX ORDER — LIDOCAINE 50 MG/G
2 PATCH TOPICAL
Status: DISCONTINUED | OUTPATIENT
Start: 2017-10-19 | End: 2017-10-26 | Stop reason: HOSPADM

## 2017-10-19 RX ORDER — BISACODYL 10 MG
10 SUPPOSITORY, RECTAL RECTAL DAILY PRN
Status: DISCONTINUED | OUTPATIENT
Start: 2017-10-19 | End: 2017-10-26 | Stop reason: HOSPADM

## 2017-10-19 RX ORDER — AMOXICILLIN 250 MG
1-2 CAPSULE ORAL 2 TIMES DAILY PRN
Status: DISCONTINUED | OUTPATIENT
Start: 2017-10-19 | End: 2017-10-26 | Stop reason: HOSPADM

## 2017-10-19 RX ADMIN — GABAPENTIN 100 MG: 100 CAPSULE ORAL at 08:43

## 2017-10-19 RX ADMIN — HYDROMORPHONE HYDROCHLORIDE 0.5 MG: 1 INJECTION, SOLUTION INTRAMUSCULAR; INTRAVENOUS; SUBCUTANEOUS at 08:53

## 2017-10-19 RX ADMIN — ACETAMINOPHEN 650 MG: 325 TABLET, FILM COATED ORAL at 08:43

## 2017-10-19 RX ADMIN — OXYCODONE HYDROCHLORIDE 5 MG: 5 TABLET ORAL at 06:24

## 2017-10-19 RX ADMIN — FUROSEMIDE 20 MG: 10 INJECTION, SOLUTION INTRAVENOUS at 14:37

## 2017-10-19 RX ADMIN — OMEPRAZOLE 20 MG: 20 CAPSULE, DELAYED RELEASE ORAL at 08:43

## 2017-10-19 RX ADMIN — DOCUSATE SODIUM 100 MG: 100 CAPSULE, LIQUID FILLED ORAL at 21:29

## 2017-10-19 RX ADMIN — ISOSORBIDE MONONITRATE 30 MG: 30 TABLET, EXTENDED RELEASE ORAL at 08:43

## 2017-10-19 RX ADMIN — GABAPENTIN 100 MG: 100 CAPSULE ORAL at 14:34

## 2017-10-19 RX ADMIN — FERROUS SULFATE TAB 325 MG (65 MG ELEMENTAL FE) 325 MG: 325 (65 FE) TAB at 21:29

## 2017-10-19 RX ADMIN — HYDRALAZINE HYDROCHLORIDE 75 MG: 25 TABLET ORAL at 14:34

## 2017-10-19 RX ADMIN — HYDRALAZINE HYDROCHLORIDE 75 MG: 25 TABLET ORAL at 08:43

## 2017-10-19 RX ADMIN — OXYCODONE HYDROCHLORIDE 5 MG: 5 TABLET ORAL at 02:18

## 2017-10-19 RX ADMIN — VENLAFAXINE HYDROCHLORIDE 75 MG: 75 TABLET, EXTENDED RELEASE ORAL at 08:43

## 2017-10-19 RX ADMIN — HYDRALAZINE HYDROCHLORIDE 75 MG: 25 TABLET ORAL at 21:29

## 2017-10-19 RX ADMIN — ACETAMINOPHEN 650 MG: 325 TABLET, FILM COATED ORAL at 21:29

## 2017-10-19 RX ADMIN — FUROSEMIDE 40 MG: 40 TABLET ORAL at 12:28

## 2017-10-19 RX ADMIN — LIDOCAINE 2 PATCH: 50 PATCH CUTANEOUS at 21:30

## 2017-10-19 RX ADMIN — OXYCODONE HYDROCHLORIDE 5 MG: 5 TABLET ORAL at 21:43

## 2017-10-19 RX ADMIN — FERROUS SULFATE TAB 325 MG (65 MG ELEMENTAL FE) 325 MG: 325 (65 FE) TAB at 08:43

## 2017-10-19 RX ADMIN — OXYCODONE HYDROCHLORIDE 5 MG: 5 TABLET ORAL at 16:45

## 2017-10-19 RX ADMIN — OXYCODONE HYDROCHLORIDE 5 MG: 5 TABLET ORAL at 12:28

## 2017-10-19 RX ADMIN — GABAPENTIN 100 MG: 100 CAPSULE ORAL at 21:29

## 2017-10-19 RX ADMIN — VITAMIN D, TAB 1000IU (100/BT) 1000 UNITS: 25 TAB at 08:43

## 2017-10-19 RX ADMIN — FUROSEMIDE 40 MG: 40 TABLET ORAL at 08:43

## 2017-10-19 RX ADMIN — SENNOSIDES AND DOCUSATE SODIUM 2 TABLET: 8.6; 5 TABLET ORAL at 21:29

## 2017-10-19 RX ADMIN — SIMVASTATIN 10 MG: 10 TABLET, FILM COATED ORAL at 21:30

## 2017-10-19 NOTE — PROGRESS NOTES
Your information has been submitted on October 19th, 2017 at 03:30:36 PM CDT. The confirmation number is [id number]

## 2017-10-19 NOTE — PLAN OF CARE
Problem: Patient Care Overview  Goal: Plan of Care/Patient Progress Review  A/O, pain 10/10 with repositioning. Oxycodone given for pain control.  Pure wick catheter replaced.  Social work consult for discharge to tcu.

## 2017-10-19 NOTE — PLAN OF CARE
Problem: Patient Care Overview  Goal: Plan of Care/Patient Progress Review  Outcome: Improving  A/O. Pain steady but pt appears stronger and more alert today. Arm in sling. Tlerating reg diet. Pure wick for voiding. CMS intact ex generalized edema and R shoulder/elbow edema. Bruising R shoulder, left elbow. Using lift to reposition. Will attempt to sit at edge of bed this afternoon. Flatus +. Abdomen is rounded and distended, stool softners added to MAR. IV lasix today as weight is up. LIdocane patches to help assist with pain control. D/C to TCU tomorrow?     1530:   Pt was able to get to edge of bed with heavy assist of 2, but unable to support herself sitting there, needed staff to help support her torso.

## 2017-10-19 NOTE — PROGRESS NOTES
Care Transition Initial Assessment - YULISA  Reason For Consult: discharge planning  Met with: Patient and Family    Active Problems:    Fracture of humerus, proximal, right, closed         DATA  Lives With: child(emre), adult     Description of Support System: Supportive, Involved  Who is your support system?: Children  Support Assessment: Adequate family and caregiver support, Adequate social supports. Daughter Rebecca lives with patient.  Identified issues/concerns regarding health management: None            Quality Of Family Relationships: supportive, helpful, involved     Dtr Rebecca present during assessment and agreeable with plan.    ASSESSMENT  Cognitive Status:  awake, alert and oriented  Concerns to be addressed: Transportation arrangements via HE once DC time established.  Per MD order, YULISA met with patient and daughter Rebecca to discuss discharge planning needs to a TCU. Patient is an 85 yr old woman who was admitted to the hospital on 10/17/17 with a humerus fracture. Prior to hospitalization, patient was living in her own home with her daughter Rebecca Malhotra ([phone] where they were managing well and patient was independent at home. Patient is aware that she will need to go to a TCU upon discharge. Patient shared that she has had several TCU stays at [facility] and states it would be her preference to return there and her requirement is to have a private room. SW made referral to [facility] via DOD indicating a possible DC on Fri Oct 20. YULISA later spoke with admissions person Kendra ([phone]) who stated that patient is accepted that they they do have a private room for her. Patient states she prefers a medivan ride. She and family aware of and agreeable to private room and transportation costs.     PLAN  Financial costs for the patient includes Discussed coverage for TCU under her insurance.  Patient given options and choices for discharge TCU facility list offered.  Patient/family is agreeable to the plan?   Yes  Patient Goals and Preferences: TCU at discharge.  Patient anticipates discharging to: TCU at discharge.

## 2017-10-19 NOTE — PROGRESS NOTES
xcover  Notified about NSVT   Stable hemodynamics  No ongoing complaints.  Not on BB and Progress note stated earlier with hx of bradycardia.  Will defer that to daily rounder  Check serum potassium and Magnesium

## 2017-10-19 NOTE — PROVIDER NOTIFICATION
Web based paged hospitalist on call  tele tech called to report vpaced at 16 beats. order to  notify if greater the 10

## 2017-10-20 ENCOUNTER — APPOINTMENT (OUTPATIENT)
Dept: OCCUPATIONAL THERAPY | Facility: CLINIC | Age: 82
DRG: 562 | End: 2017-10-20
Attending: INTERNAL MEDICINE
Payer: MEDICARE

## 2017-10-20 LAB
ANION GAP SERPL CALCULATED.3IONS-SCNC: 6 MMOL/L (ref 3–14)
BASOPHILS # BLD AUTO: 0.1 10E9/L (ref 0–0.2)
BASOPHILS NFR BLD AUTO: 0.6 %
BUN SERPL-MCNC: 18 MG/DL (ref 7–30)
CALCIUM SERPL-MCNC: 7.9 MG/DL (ref 8.5–10.1)
CHLORIDE SERPL-SCNC: 104 MMOL/L (ref 94–109)
CO2 SERPL-SCNC: 30 MMOL/L (ref 20–32)
CREAT SERPL-MCNC: 0.74 MG/DL (ref 0.52–1.04)
DIFFERENTIAL METHOD BLD: ABNORMAL
EOSINOPHIL # BLD AUTO: 0.3 10E9/L (ref 0–0.7)
EOSINOPHIL NFR BLD AUTO: 3.8 %
ERYTHROCYTE [DISTWIDTH] IN BLOOD BY AUTOMATED COUNT: 13.1 % (ref 10–15)
GFR SERPL CREATININE-BSD FRML MDRD: 74 ML/MIN/1.7M2
GLUCOSE SERPL-MCNC: 86 MG/DL (ref 70–99)
HCT VFR BLD AUTO: 28.9 % (ref 35–47)
HGB BLD-MCNC: 8.9 G/DL (ref 11.7–15.7)
IMM GRANULOCYTES # BLD: 0 10E9/L (ref 0–0.4)
IMM GRANULOCYTES NFR BLD: 0.4 %
LYMPHOCYTES # BLD AUTO: 0.6 10E9/L (ref 0.8–5.3)
LYMPHOCYTES NFR BLD AUTO: 7.5 %
MAGNESIUM SERPL-MCNC: 2.1 MG/DL (ref 1.6–2.3)
MCH RBC QN AUTO: 29.7 PG (ref 26.5–33)
MCHC RBC AUTO-ENTMCNC: 30.8 G/DL (ref 31.5–36.5)
MCV RBC AUTO: 96 FL (ref 78–100)
MONOCYTES # BLD AUTO: 0.6 10E9/L (ref 0–1.3)
MONOCYTES NFR BLD AUTO: 7.1 %
NEUTROPHILS # BLD AUTO: 6.9 10E9/L (ref 1.6–8.3)
NEUTROPHILS NFR BLD AUTO: 80.6 %
NRBC # BLD AUTO: 0 10*3/UL
NRBC BLD AUTO-RTO: 0 /100
PLATELET # BLD AUTO: 109 10E9/L (ref 150–450)
POTASSIUM SERPL-SCNC: 3 MMOL/L (ref 3.4–5.3)
POTASSIUM SERPL-SCNC: 3.4 MMOL/L (ref 3.4–5.3)
RBC # BLD AUTO: 3 10E12/L (ref 3.8–5.2)
SODIUM SERPL-SCNC: 140 MMOL/L (ref 133–144)
WBC # BLD AUTO: 8.5 10E9/L (ref 4–11)

## 2017-10-20 PROCEDURE — 25000128 H RX IP 250 OP 636: Performed by: INTERNAL MEDICINE

## 2017-10-20 PROCEDURE — 99232 SBSQ HOSP IP/OBS MODERATE 35: CPT | Performed by: INTERNAL MEDICINE

## 2017-10-20 PROCEDURE — 97535 SELF CARE MNGMENT TRAINING: CPT | Mod: GO | Performed by: REHABILITATION PRACTITIONER

## 2017-10-20 PROCEDURE — 80048 BASIC METABOLIC PNL TOTAL CA: CPT | Performed by: INTERNAL MEDICINE

## 2017-10-20 PROCEDURE — 25000132 ZZH RX MED GY IP 250 OP 250 PS 637: Mod: GY | Performed by: INTERNAL MEDICINE

## 2017-10-20 PROCEDURE — 40000133 ZZH STATISTIC OT WARD VISIT: Performed by: REHABILITATION PRACTITIONER

## 2017-10-20 PROCEDURE — 83735 ASSAY OF MAGNESIUM: CPT | Performed by: INTERNAL MEDICINE

## 2017-10-20 PROCEDURE — 99207 ZZC CDG-MDM COMPONENT: MEETS LOW - DOWN CODED: CPT | Performed by: INTERNAL MEDICINE

## 2017-10-20 PROCEDURE — 36415 COLL VENOUS BLD VENIPUNCTURE: CPT | Performed by: INTERNAL MEDICINE

## 2017-10-20 PROCEDURE — A9270 NON-COVERED ITEM OR SERVICE: HCPCS | Mod: GY | Performed by: INTERNAL MEDICINE

## 2017-10-20 PROCEDURE — 84132 ASSAY OF SERUM POTASSIUM: CPT | Performed by: INTERNAL MEDICINE

## 2017-10-20 PROCEDURE — 85025 COMPLETE CBC W/AUTO DIFF WBC: CPT | Performed by: INTERNAL MEDICINE

## 2017-10-20 PROCEDURE — 12000007 ZZH R&B INTERMEDIATE

## 2017-10-20 PROCEDURE — 97165 OT EVAL LOW COMPLEX 30 MIN: CPT | Mod: GO | Performed by: REHABILITATION PRACTITIONER

## 2017-10-20 RX ORDER — POTASSIUM CHLORIDE 7.45 MG/ML
10 INJECTION INTRAVENOUS
Status: DISCONTINUED | OUTPATIENT
Start: 2017-10-20 | End: 2017-10-26 | Stop reason: HOSPADM

## 2017-10-20 RX ORDER — MAGNESIUM SULFATE HEPTAHYDRATE 40 MG/ML
4 INJECTION, SOLUTION INTRAVENOUS EVERY 4 HOURS PRN
Status: DISCONTINUED | OUTPATIENT
Start: 2017-10-20 | End: 2017-10-26 | Stop reason: HOSPADM

## 2017-10-20 RX ORDER — POTASSIUM CL/LIDO/0.9 % NACL 10MEQ/0.1L
10 INTRAVENOUS SOLUTION, PIGGYBACK (ML) INTRAVENOUS
Status: DISCONTINUED | OUTPATIENT
Start: 2017-10-20 | End: 2017-10-26 | Stop reason: HOSPADM

## 2017-10-20 RX ORDER — FUROSEMIDE 10 MG/ML
20 INJECTION INTRAMUSCULAR; INTRAVENOUS ONCE
Status: COMPLETED | OUTPATIENT
Start: 2017-10-20 | End: 2017-10-20

## 2017-10-20 RX ORDER — POTASSIUM CHLORIDE 1500 MG/1
20-40 TABLET, EXTENDED RELEASE ORAL
Status: DISCONTINUED | OUTPATIENT
Start: 2017-10-20 | End: 2017-10-26 | Stop reason: HOSPADM

## 2017-10-20 RX ORDER — POTASSIUM CHLORIDE 1.5 G/1.58G
20-40 POWDER, FOR SOLUTION ORAL
Status: DISCONTINUED | OUTPATIENT
Start: 2017-10-20 | End: 2017-10-26 | Stop reason: HOSPADM

## 2017-10-20 RX ORDER — POTASSIUM CHLORIDE 29.8 MG/ML
20 INJECTION INTRAVENOUS
Status: DISCONTINUED | OUTPATIENT
Start: 2017-10-20 | End: 2017-10-20 | Stop reason: DRUGHIGH

## 2017-10-20 RX ADMIN — DOCUSATE SODIUM 100 MG: 100 CAPSULE, LIQUID FILLED ORAL at 19:49

## 2017-10-20 RX ADMIN — FUROSEMIDE 20 MG: 10 INJECTION, SOLUTION INTRAVENOUS at 12:37

## 2017-10-20 RX ADMIN — ISOSORBIDE MONONITRATE 30 MG: 30 TABLET, EXTENDED RELEASE ORAL at 09:28

## 2017-10-20 RX ADMIN — LIDOCAINE 2 PATCH: 50 PATCH CUTANEOUS at 19:49

## 2017-10-20 RX ADMIN — SIMVASTATIN 10 MG: 10 TABLET, FILM COATED ORAL at 21:59

## 2017-10-20 RX ADMIN — VITAMIN D, TAB 1000IU (100/BT) 1000 UNITS: 25 TAB at 09:28

## 2017-10-20 RX ADMIN — FERROUS SULFATE TAB 325 MG (65 MG ELEMENTAL FE) 325 MG: 325 (65 FE) TAB at 19:49

## 2017-10-20 RX ADMIN — DOCUSATE SODIUM 100 MG: 100 CAPSULE, LIQUID FILLED ORAL at 09:29

## 2017-10-20 RX ADMIN — OXYCODONE HYDROCHLORIDE 5 MG: 5 TABLET ORAL at 09:37

## 2017-10-20 RX ADMIN — FUROSEMIDE 40 MG: 40 TABLET ORAL at 09:28

## 2017-10-20 RX ADMIN — GABAPENTIN 100 MG: 100 CAPSULE ORAL at 19:49

## 2017-10-20 RX ADMIN — POTASSIUM CHLORIDE 20 MEQ: 1500 TABLET, EXTENDED RELEASE ORAL at 15:04

## 2017-10-20 RX ADMIN — OMEPRAZOLE 20 MG: 20 CAPSULE, DELAYED RELEASE ORAL at 09:28

## 2017-10-20 RX ADMIN — HYDRALAZINE HYDROCHLORIDE 75 MG: 25 TABLET ORAL at 09:27

## 2017-10-20 RX ADMIN — OXYCODONE HYDROCHLORIDE 5 MG: 5 TABLET ORAL at 01:52

## 2017-10-20 RX ADMIN — VENLAFAXINE HYDROCHLORIDE 75 MG: 75 TABLET, EXTENDED RELEASE ORAL at 09:28

## 2017-10-20 RX ADMIN — ACETAMINOPHEN 650 MG: 325 TABLET, FILM COATED ORAL at 19:48

## 2017-10-20 RX ADMIN — GABAPENTIN 100 MG: 100 CAPSULE ORAL at 13:31

## 2017-10-20 RX ADMIN — POTASSIUM CHLORIDE 40 MEQ: 1500 TABLET, EXTENDED RELEASE ORAL at 13:31

## 2017-10-20 RX ADMIN — HYDRALAZINE HYDROCHLORIDE 75 MG: 25 TABLET ORAL at 19:49

## 2017-10-20 RX ADMIN — FERROUS SULFATE TAB 325 MG (65 MG ELEMENTAL FE) 325 MG: 325 (65 FE) TAB at 09:29

## 2017-10-20 RX ADMIN — HYDRALAZINE HYDROCHLORIDE 75 MG: 25 TABLET ORAL at 13:31

## 2017-10-20 RX ADMIN — SENNOSIDES AND DOCUSATE SODIUM 2 TABLET: 8.6; 5 TABLET ORAL at 21:59

## 2017-10-20 RX ADMIN — GABAPENTIN 100 MG: 100 CAPSULE ORAL at 09:28

## 2017-10-20 RX ADMIN — OXYCODONE HYDROCHLORIDE 5 MG: 5 TABLET ORAL at 19:49

## 2017-10-20 RX ADMIN — FUROSEMIDE 40 MG: 40 TABLET ORAL at 13:31

## 2017-10-20 NOTE — PLAN OF CARE
Problem: Patient Care Overview  Goal: Plan of Care/Patient Progress Review  Outcome: Improving  VSS: A&O x4. Tele shows Afib, CVR, PvCs.   LS: dim on 1L NC  GI: active, +flatus, last BM 10/17  : purewick  Skin: blanchable redness on back and coccyx, mepiplex in place  Activity: x2, gaitbelt, dangled at bedside this shift   Diet: reg, assistance in feeding   Pain: c/o 9/10 shoulder pain, taking oxycodone, tylenol  Plan: d/c tmrrw to TCU

## 2017-10-20 NOTE — PROGRESS NOTES
CTS following for DC needs.    Per hospitalist, DC will be held for today.  Updated [facility] TCU and spoke to Jaz.  Per Mann they should still have bed for her tomorrow.  SW to update Jaz in the morning.  HE WC transport has been canceled.    Adilene Danielson, RN,BSN, CTS  [facility]  Care Coordination  [phone]

## 2017-10-20 NOTE — PLAN OF CARE
Problem: Patient Care Overview  Goal: Plan of Care/Patient Progress Review  Pt. Alert/orientedx4, pleasant/cooperative.Assistx2 with mobility, O2sats 96% NC per 1L, : Pure wick cath draining, Mapilex DSG on the back, CMS intact,LS: diminished, Pain controlled with PO: Oxy&Tylanol.

## 2017-10-20 NOTE — PROGRESS NOTES
[facility]    Hospitalist Progress Note  Name: Keisha Godinez    MRN: [id number]  Provider: Adelina Alanis MD  Date of Service: 10/20/2017    Assessment & Plan   Summary of Stay: Keisha Godinez is a 85 year old female who was admitted on 10/17/2017 with acute proximal humerus fracture with angulation s/p mechanical fall. PMH sig for CAD, CVA, Afib, Pulm HTN, ? Diastolic CHF, HTN, dyslipidemia. Patient is treated conservatively.      S/p proximal Rt humerus fracture  -- Per ortho supportive care  -- Ortho consult appreciated  -- shoulder sling  -- prn pain meds   --PT/ OT/ SW    Hypoxic requiring supplemental O2 sec to acute on ch chf and poor inspiratory effort  -- pt's initial CXR was congested  -- additional diuresis weight is up  -- daily weight and I/Os  -- wean as able     h/o A fib  -- stable rate controlled without any meds. She was bradycardic in 2016  -- on tele to watch for arrhythmia  -- not on anticoagulation ? Sec to bleeding per family discontinue by Dr. Pradhan    Anemia  -- HGB trending down  -- no obvious bleeding  -- will reassess  -- on iron replacement    H/o CAD  ? EKG changes not significantly different from 2016  -- initial trop negative but with minimal leak in am  --recheck trop stable  -- cardiac echo no new change  --continue on nitro and statin.      Hypokalemia and Hypocalcemia  -- replaced per protocol        DVT Prophylaxis: Pneumatic Compression Devices  Code Status: Full Code    Disposition: Expected discharge 1-2 days to TCU once pain is control. SW consulted    Interval History   C/o pain in left shoulder, is constipated, minimally short of breath. Review of all other systems negative    -Data reviewed today: I reviewed all new labs and imaging reports over the last 24 hours. I personally reviewed the EKG tracing showing afib with HR of 68 t inverted avl, v2-v3 and nonspecific STT changes..    Physical Exam   Temp: 99.2  F (37.3  C) Temp src: Oral BP: 142/50  Pulse: 55 Heart Rate: 60 Resp: 16 SpO2: 93 % O2 Device: Nasal cannula Oxygen Delivery: 1 LPM  Vitals:    10/18/17 0721 10/19/17 0937 10/20/17 0624   Weight: 74.2 kg (163 lb 8 oz) 75.8 kg (167 lb) 77.8 kg (171 lb 8 oz)     Vital Signs with Ranges  Temp:  [98.6  F (37  C)-99.6  F (37.6  C)] 99.2  F (37.3  C)  Pulse:  [55-67] 55  Heart Rate:  [58-71] 60  Resp:  [16-20] 16  BP: (131-164)/(39-57) 142/50  SpO2:  [90 %-98 %] 93 %  I/O last 3 completed shifts:  In: 400 [P.O.:400]  Out: 1550 [Urine:1550]      GEN:  Alert, oriented x 3, appears comfortable,  Short of breath with minimal exertion.  HEENT:  Normocephalic/atraumatic, no scleral icterus, no nasal discharge, mouth moist.  CV:  Irregularly irregular,+murmur.  S1 + S2 noted, no S3 or S4.  LUNGS:  Few bibasilar crackles and occasional wheezing.  Poor inspiratory effort. Symmetric chest rise on inhalation noted.  ABD:  Active bowel sounds, soft, non-tender/non-distended.  No rebound/guarding/rigidity.  EXT:  No edema.  No cyanosis.  Rt shouler in sling  SKIN:  Dry to touch, no exanthems noted in the visualized areas.    Medications        docusate sodium  100 mg Oral BID     lidocaine  2 patch Transdermal Q24h    And     lidocaine   Transdermal Q24H    And     lidocaine   Transdermal Q8H     acetaminophen (TYLENOL) tablet 650 mg  650 mg Oral At Bedtime     furosemide  40 mg Oral BID     gabapentin  100 mg Oral TID     hydrALAZINE  75 mg Oral TID     isosorbide mononitrate  30 mg Oral Daily     omeprazole  20 mg Oral Daily     senna-docusate  2 tablet Oral At Bedtime     simvastatin  10 mg Oral At Bedtime     venlafaxine  75 mg Oral Daily     cholecalciferol  1,000 Units Oral Daily     sodium chloride (PF)  3 mL Intracatheter Q8H     ferrous sulfate  325 mg Oral BID     Data       Recent Labs  Lab 10/20/17  0715 10/19/17  0544 10/18/17  0646   WBC 8.5 11.3* 10.9   HGB 8.9* 9.4* 10.3*   HCT 28.9* 30.5* 32.9*   MCV 96 98 99   * 118* 134*       Recent Labs  Lab  10/20/17  0715 10/19/17  0544 10/18/17  0646    140 141   POTASSIUM 3.0* 3.7 3.8   CHLORIDE 104 106 108   CO2 30 29 27   ANIONGAP 6 5 6   GLC 86 98 94   BUN 18 27 14   CR 0.74 0.90 0.80   GFRESTIMATED 74 59* 68   GFRESTBLACK 90 72 83   CASPER 7.9* 7.8* 7.7*     No results for input(s): CULT in the last 168 hours.    Recent Labs  Lab 10/18/17  0646   NTBNPI 1594       Recent Labs  Lab 10/18/17  0646   INR 1.04     No results for input(s): LIPASE in the last 168 hours.  No results for input(s): CHOL, HDL, LDL, TRIG, CHOLHDLRATIO in the last 168 hours.  No results for input(s): TSH in the last 168 hours.  No results for input(s): COLOR, APPEARANCE, URINEGLC, URINEBILI, URINEKETONE, SG, UBLD, URINEPH, PROTEIN, UROBILINOGEN, NITRITE, LEUKEST, RBCU, WBCU in the last 168 hours.    No results found for this or any previous visit (from the past 24 hour(s)).

## 2017-10-20 NOTE — PLAN OF CARE
Problem: Patient Care Overview  Goal: Plan of Care/Patient Progress Review  PT: Orders received. Per chart review and discussion with OT, pt is planning for TCU at discharge. Will defer PT to TCU as appropriate. Please refer to OT notes for additional recommendations. Will complete orders.

## 2017-10-20 NOTE — PLAN OF CARE
Problem: Patient Care Overview  Goal: Plan of Care/Patient Progress Review  OT:  eval complete and treatment initiated.  Pt is an 85 year old female who was admitted on 10/17/2017 with acute proximal humerus fracture with angulation s/p mechanical fall. PMH sig for CAD, CVA, Afib, Pulm HTN, Diastolic CHF, HTN, dyslipidemia.  She reported to be independent in ADLs and mobility using a rolling walker while living in a single level home with ramp access.  Daughter lives with Pt and does all IADLs, but works and has Meals on Wheels delivered for Pt's lunch.     Discharge Planner OT   Patient plan for discharge: TCU  Current status: Pt alert, oriented to self and able to follow ~ 75% simple one step commands.  Supine>sit completed with max A.  Pt dependent for LE dressing to don slippers.  Max A for UE dressing including donning sling.  Sit<>stand and transfer bed>commode>chair completed with mod A 1-2.  Pt needed constant cueing for safety, hand placement and upright posture.  Pt dependent for toilet hygiene and clothing management.  Pt unable to tolerate ROM to R UE secondary to pain.  OT provided education in need to move R elbow/wrist/fingers to maintain mobility for ADLs.  Ortho MD has ordered Codman's shoulder exercises and shoulder immobilization.  Plan to error on side of caution and hold shoulder exercise until clarification is received with plan to have RN call today.  Pt up to chair and able to feed self with set-up and SBA.  Barriers to return to prior living situation: Pt limited by immobility and pain  Recommendations for discharge: TCU  Rationale for recommendations: Pt would benefit from continued OT to maximize safety and participation in self cares with improved strength and endurance.       Entered by: Mima Ames 10/20/2017 9:16 AM

## 2017-10-20 NOTE — PLAN OF CARE
Problem: Patient Care Overview  Goal: Plan of Care/Patient Progress Review  Outcome: Improving  RN:pt vss, on 1L NC, pt tried to be off o2 but dip to 88% encouragement of IS and TCDB  Lungs: are clear but diminished in bases, encouraged pt to use IS hourly  BS:+ pt is eating and drinking well, adequate output- using the wick cath   CMS:+, numbness in finger tips, and swollen hand also, elevated. Pillow support under arm and Ice  Pain: controlled with pain meds  Activity: Up with A2, doing well in pt, tolerated wheelchair.   Plan is to dc to tcu tomorrow afternoon  K was 3.0 gave oral replacement recheck this evening.  Will cont to monitor.

## 2017-10-20 NOTE — PROGRESS NOTES
10/20/17 0846   Quick Adds   Type of Visit Initial Occupational Therapy Evaluation   Living Environment   Lives With child(emre), adult   Living Arrangements house   Home Accessibility no concerns;ramps present at home   Number of Stairs to Enter Home 0   Number of Stairs Within Home 0   Transportation Available car;family or friend will provide   Living Environment Comment Pt reported to use a walker for mobility. She has Meals on Wheels for lunch while daughter is at work.   Self-Care   Dominant Hand right   Usual Activity Tolerance moderate   Current Activity Tolerance poor   Regular Exercise no   Equipment Currently Used at Home walker, rolling;shower chair;grab bar   Activity/Exercise/Self-Care Comment Pt reported to be independent in ADLs at baseline.  Daughter does cooking, homemaking and laundry.   Functional Level Prior   Ambulation 1-->assistive equipment   Transferring 1-->assistive equipment   Toileting 1-->assistive equipment   Bathing 1-->assistive equipment   Dressing 0-->independent   Eating 0-->independent   Communication 0-->understands/communicates without difficulty   Swallowing 0-->swallows foods/liquids without difficulty   Cognition 0 - no cognition issues reported   Fall history within last six months yes   Number of times patient has fallen within last six months 1   Which of the above functional risks had a recent onset or change? ambulation;transferring;toileting;bathing;fall history       Present no   General Information   Onset of Illness/Injury or Date of Surgery - Date 10/17/17   Referring Physician Dr. Adelina Alanis   Patient/Family Goals Statement Return to home.   Additional Occupational Profile Info/Pertinent History of Current Problem Pt is an 85 year old female who was admitted on 10/17/2017 with acute proximal humerus fracture with angulation s/p mechanical fall. PMH sig for CAD, CVA, Afib, Pulm HTN, Diastolic CHF, HTN, dyslipidemia    Precautions/Limitations fall precautions;oxygen therapy device and L/min   Weight-Bearing Status - RUE nonweight-bearing   General Info Comments activity:  up with assist   Cognitive Status Examination   Orientation person;place   Level of Consciousness alert   Able to Follow Commands mild impairment   Personal Safety (Cognitive) mild impairment   Memory impaired   Attention Distractible during evaluation;Sustained attention impaired   Organization/Problem Solving Sequencing impaired;Problem solving impaired   Executive Function Working memory impaired, decreased storage of information for performing tasks;Cognitive flexibility impaired;Planning ability impaired;Self awareness/monitoring impaired   Visual Perception   Visual Perception Wears glasses   Pain Assessment   Patient Currently in Pain Yes, see Vital Sign flowsheet   Posture   Posture forward head position;kyphosis   Range of Motion (ROM)   ROM Quick Adds Other (describe)   ROM Comment L UE ROM limited.  R UE NT.   Strength   Manual Muscle Testing Quick Adds Other   Strength Comments L UE strength grossly 3/5.  R UE NT.   Hand Strength   Hand Strength Comments L weak, R NT   Muscle Tone Assessment   Muscle Tone Quick Adds No deficits were identified   Coordination   Upper Extremity Coordination Right UE impaired   Mobility   Bed Mobility Comments max A   Transfer Skill: Bed to Chair/Chair to Bed   Level of [place]: Bed to Chair moderate assist (50% patients effort)   Physical Assist/Nonphysical Assist: Bed to Chair verbal cues   Transfer Skill: Sit to Stand   Level of [place]: Sit/Stand moderate assist (50% patients effort)   Physical Assist/Nonphysical Assist: Sit/Stand verbal cues   Transfer Skill: Toilet Transfer   Level of [place]: Toilet moderate assist (50% patients effort)   Physical Assist/Nonphysical Assist: Toilet verbal cues   Assistive Device other (see comments)  (bedside commode)   Upper Body Dressing   Level of [place]:  "Dress Upper Body maximum assist (25% patients effort)   Physical Assist/Nonphysical Assist: Dress Upper Body set-up required;verbal cues   Lower Body Dressing   Level of [place]: Dress Lower Body dependent (less than 25% patients effort)   Toileting   Level of [place]: Toilet dependent (less than 25% patients effort)   Eating/Self Feeding   Level of [place]: Eating stand-by assist   Physical Assist/Nonphysical Assist: Eating set-up required   Activities of Daily Living Analysis   Impairments Contributing to Impaired Activities of Daily Living balance impaired;pain;ROM decreased;strength decreased;cognition impaired;coordination impaired   General Therapy Interventions   Planned Therapy Interventions ADL retraining;transfer training   Clinical Impression   Criteria for Skilled Therapeutic Interventions Met yes, treatment indicated   OT Diagnosis decreased ADL performance   Influenced by the following impairments weakness, pain, cognition   Assessment of Occupational Performance 1-3 Performance Deficits   Identified Performance Deficits Pt with decreased ability to manage dressing, bathing, toileting   Clinical Decision Making (Complexity) Low complexity   Therapy Frequency daily   Predicted Duration of Therapy Intervention (days/wks) 3 days   Anticipated Discharge Disposition Transitional Care Facility   Risks and Benefits of Treatment have been explained. Yes   Patient, Family & other staff in agreement with plan of care Yes   [facility] TM \"6 Clicks\"   2016, Trustees of [facility], under license to [employer].  All rights reserved.   6 Clicks Short Forms Daily Activity Inpatient Short Form   [facility]  \"6 Clicks\" Daily Activity Inpatient Short Form   1. Putting on and taking off regular lower body clothing? 1 - Total   2. Bathing (including washing, rinsing, drying)? 2 - A Lot   3. Toileting, which includes using toilet, bedpan or urinal? 1 - Total   4. Putting " on and taking off regular upper body clothing? 2 - A Lot   5. Taking care of personal grooming such as brushing teeth? 3 - A Little   6. Eating meals? 4 - None   Daily Activity Raw Score (Score out of 24.Lower scores equate to lower levels of function) 13   Total Evaluation Time   Total Evaluation Time (Minutes) 8

## 2017-10-21 LAB
ANION GAP SERPL CALCULATED.3IONS-SCNC: 4 MMOL/L (ref 3–14)
BASOPHILS # BLD AUTO: 0.1 10E9/L (ref 0–0.2)
BASOPHILS NFR BLD AUTO: 0.7 %
BUN SERPL-MCNC: 17 MG/DL (ref 7–30)
CA-I BLD-MCNC: 3.2 MG/DL (ref 4.4–5.2)
CALCIUM SERPL-MCNC: 7.9 MG/DL (ref 8.5–10.1)
CHLORIDE SERPL-SCNC: 103 MMOL/L (ref 94–109)
CO2 SERPL-SCNC: 31 MMOL/L (ref 20–32)
CREAT SERPL-MCNC: 0.72 MG/DL (ref 0.52–1.04)
DIFFERENTIAL METHOD BLD: ABNORMAL
EOSINOPHIL # BLD AUTO: 0.3 10E9/L (ref 0–0.7)
EOSINOPHIL NFR BLD AUTO: 4.8 %
ERYTHROCYTE [DISTWIDTH] IN BLOOD BY AUTOMATED COUNT: 13.2 % (ref 10–15)
GFR SERPL CREATININE-BSD FRML MDRD: 77 ML/MIN/1.7M2
GLUCOSE SERPL-MCNC: 93 MG/DL (ref 70–99)
HCT VFR BLD AUTO: 28.4 % (ref 35–47)
HGB BLD-MCNC: 9 G/DL (ref 11.7–15.7)
IMM GRANULOCYTES # BLD: 0 10E9/L (ref 0–0.4)
IMM GRANULOCYTES NFR BLD: 0.4 %
LYMPHOCYTES # BLD AUTO: 0.6 10E9/L (ref 0.8–5.3)
LYMPHOCYTES NFR BLD AUTO: 8.5 %
MAGNESIUM SERPL-MCNC: 2 MG/DL (ref 1.6–2.3)
MCH RBC QN AUTO: 30.3 PG (ref 26.5–33)
MCHC RBC AUTO-ENTMCNC: 31.7 G/DL (ref 31.5–36.5)
MCV RBC AUTO: 96 FL (ref 78–100)
MONOCYTES # BLD AUTO: 0.5 10E9/L (ref 0–1.3)
MONOCYTES NFR BLD AUTO: 7.3 %
NEUTROPHILS # BLD AUTO: 5.3 10E9/L (ref 1.6–8.3)
NEUTROPHILS NFR BLD AUTO: 78.3 %
NRBC # BLD AUTO: 0 10*3/UL
NRBC BLD AUTO-RTO: 0 /100
PLATELET # BLD AUTO: 113 10E9/L (ref 150–450)
POTASSIUM SERPL-SCNC: 3.4 MMOL/L (ref 3.4–5.3)
RBC # BLD AUTO: 2.97 10E12/L (ref 3.8–5.2)
SODIUM SERPL-SCNC: 138 MMOL/L (ref 133–144)
WBC # BLD AUTO: 6.8 10E9/L (ref 4–11)

## 2017-10-21 PROCEDURE — 85025 COMPLETE CBC W/AUTO DIFF WBC: CPT | Performed by: INTERNAL MEDICINE

## 2017-10-21 PROCEDURE — 83735 ASSAY OF MAGNESIUM: CPT | Performed by: INTERNAL MEDICINE

## 2017-10-21 PROCEDURE — 82330 ASSAY OF CALCIUM: CPT | Performed by: INTERNAL MEDICINE

## 2017-10-21 PROCEDURE — 25000132 ZZH RX MED GY IP 250 OP 250 PS 637: Mod: GY | Performed by: INTERNAL MEDICINE

## 2017-10-21 PROCEDURE — 80048 BASIC METABOLIC PNL TOTAL CA: CPT | Performed by: INTERNAL MEDICINE

## 2017-10-21 PROCEDURE — A9270 NON-COVERED ITEM OR SERVICE: HCPCS | Mod: GY | Performed by: INTERNAL MEDICINE

## 2017-10-21 PROCEDURE — 36415 COLL VENOUS BLD VENIPUNCTURE: CPT | Performed by: INTERNAL MEDICINE

## 2017-10-21 PROCEDURE — 12000007 ZZH R&B INTERMEDIATE

## 2017-10-21 PROCEDURE — 25000128 H RX IP 250 OP 636: Performed by: INTERNAL MEDICINE

## 2017-10-21 RX ORDER — CARVEDILOL 3.12 MG/1
3.12 TABLET ORAL 2 TIMES DAILY WITH MEALS
Status: DISCONTINUED | OUTPATIENT
Start: 2017-10-21 | End: 2017-10-22

## 2017-10-21 RX ADMIN — LIDOCAINE 2 PATCH: 50 PATCH CUTANEOUS at 19:57

## 2017-10-21 RX ADMIN — FUROSEMIDE 40 MG: 40 TABLET ORAL at 12:31

## 2017-10-21 RX ADMIN — VENLAFAXINE HYDROCHLORIDE 75 MG: 75 TABLET, EXTENDED RELEASE ORAL at 09:27

## 2017-10-21 RX ADMIN — HYDRALAZINE HYDROCHLORIDE 75 MG: 25 TABLET ORAL at 13:50

## 2017-10-21 RX ADMIN — OMEPRAZOLE 20 MG: 20 CAPSULE, DELAYED RELEASE ORAL at 09:27

## 2017-10-21 RX ADMIN — DOCUSATE SODIUM 100 MG: 100 CAPSULE, LIQUID FILLED ORAL at 19:54

## 2017-10-21 RX ADMIN — FERROUS SULFATE TAB 325 MG (65 MG ELEMENTAL FE) 325 MG: 325 (65 FE) TAB at 09:27

## 2017-10-21 RX ADMIN — GABAPENTIN 100 MG: 100 CAPSULE ORAL at 09:27

## 2017-10-21 RX ADMIN — SIMVASTATIN 10 MG: 10 TABLET, FILM COATED ORAL at 21:24

## 2017-10-21 RX ADMIN — HYDRALAZINE HYDROCHLORIDE 75 MG: 25 TABLET ORAL at 09:27

## 2017-10-21 RX ADMIN — OXYCODONE HYDROCHLORIDE 5 MG: 5 TABLET ORAL at 18:55

## 2017-10-21 RX ADMIN — ISOSORBIDE MONONITRATE 30 MG: 30 TABLET, EXTENDED RELEASE ORAL at 09:26

## 2017-10-21 RX ADMIN — OXYCODONE HYDROCHLORIDE 5 MG: 5 TABLET ORAL at 13:50

## 2017-10-21 RX ADMIN — CALCIUM GLUCONATE 2 G: 94 INJECTION, SOLUTION INTRAVENOUS at 13:53

## 2017-10-21 RX ADMIN — HYDRALAZINE HYDROCHLORIDE 75 MG: 25 TABLET ORAL at 19:54

## 2017-10-21 RX ADMIN — FERROUS SULFATE TAB 325 MG (65 MG ELEMENTAL FE) 325 MG: 325 (65 FE) TAB at 19:54

## 2017-10-21 RX ADMIN — DOCUSATE SODIUM 100 MG: 100 CAPSULE, LIQUID FILLED ORAL at 09:27

## 2017-10-21 RX ADMIN — VITAMIN D, TAB 1000IU (100/BT) 1000 UNITS: 25 TAB at 09:27

## 2017-10-21 RX ADMIN — GABAPENTIN 100 MG: 100 CAPSULE ORAL at 13:50

## 2017-10-21 RX ADMIN — OXYCODONE HYDROCHLORIDE 5 MG: 5 TABLET ORAL at 00:44

## 2017-10-21 RX ADMIN — CARVEDILOL 3.12 MG: 3.12 TABLET, FILM COATED ORAL at 17:38

## 2017-10-21 RX ADMIN — OXYCODONE HYDROCHLORIDE 5 MG: 5 TABLET ORAL at 18:59

## 2017-10-21 RX ADMIN — FUROSEMIDE 40 MG: 40 TABLET ORAL at 09:27

## 2017-10-21 RX ADMIN — ACETAMINOPHEN 650 MG: 325 TABLET, FILM COATED ORAL at 01:54

## 2017-10-21 RX ADMIN — GABAPENTIN 100 MG: 100 CAPSULE ORAL at 19:56

## 2017-10-21 NOTE — PLAN OF CARE
Problem: Patient Care Overview  Goal: Plan of Care/Patient Progress Review     VSS: A&Ox4. CMS intact,Tele shows Afib  LS: dim, on 1L NC  GI: Hypoactive, +flatus, last BM 10/20/17  : incontinent, on purewick, draining.   Skin: blanchable redness in midback and coccyx, mepiplex intact   Activity: x2, repositioning.   Diet: reg   Pain: c/o 7/10 pain, scheduled  tylenol,PRN oxycodone.  Plan: d/c today to TCU

## 2017-10-21 NOTE — PROGRESS NOTES
Social Work Note:    SW spoke to admissions at [facility] ([phone]) this morning. Unclear as to when pt would be ready to d/c. SW left a message for admissions stating to call SW tomorrow (0/22/17) to follow-up if they would have a bed for pt .    Weekend SW: Masoud Osborne, MARSHA, [facility]

## 2017-10-21 NOTE — PROGRESS NOTES
[facility]    Hospitalist Progress Note  Name: Keisha Godinez    MRN: [id number]  Provider: Adelina Alanis MD  Date of Service: 10/21/2017    Assessment & Plan   Summary of Stay: Kesiha Godinez is a 85 year old female who was admitted on 10/17/2017 with acute proximal humerus fracture with angulation s/p mechanical fall. PMH sig for CAD, CVA, Afib, Pulm HTN, ? Diastolic CHF, HTN, dyslipidemia. Patient is treated conservatively.      S/p proximal Rt humerus fracture  -- Per ortho supportive care  -- Ortho consult appreciated  -- shoulder sling  -- prn pain meds   --PT/ OT/ SW    Hypoxic requiring supplemental O2 sec to acute on ch chf and poor inspiratory effort  -- pt's initial CXR was congested  -- additional diuresis weight is up  -- daily weight and I/Os  -- wean as able     h/o A fib  -- stable rate controlled without any meds. She was bradycardic in 2016  -- on tele to watch for arrhythmia  -- not on anticoagulation ? Sec to bleeding per family discontinue by Dr. Pradhan    Anemia  -- HGB trending down  -- no obvious bleeding  -- will reassess  -- on iron replacement    H/o CAD  ? EKG changes not significantly different from 2016  -- initial trop negative but with minimal leak in am  --recheck trop stable  -- cardiac echo no new change  --continue on nitro and statin.      Hypokalemia and Hypocalcemia  -- replaced per protocol        DVT Prophylaxis: Pneumatic Compression Devices  Code Status: Full Code    Disposition: Expected discharge 1-2 days to TCU once pain is control. SW consulted    Interval History   C/o pain in left shoulder, is constipated, minimally short of breath. Review of all other systems negative    -Data reviewed today: I reviewed all new labs and imaging reports over the last 24 hours. I personally reviewed the EKG tracing showing afib with HR of 68 t inverted avl, v2-v3 and nonspecific STT changes..    Physical Exam   Temp: 98.7  F (37.1  C) Temp src: Oral BP: 135/43    Heart Rate: 65 Resp: 16 SpO2: 95 % O2 Device: Nasal cannula Oxygen Delivery: 1 LPM  Vitals:    10/19/17 0937 10/20/17 0624 10/21/17 0500   Weight: 75.8 kg (167 lb) 77.8 kg (171 lb 8 oz) 75.9 kg (167 lb 6.4 oz)     Vital Signs with Ranges  Temp:  [98.6  F (37  C)-100.7  F (38.2  C)] 98.7  F (37.1  C)  Heart Rate:  [65-72] 65  Resp:  [16-20] 16  BP: (135-155)/(43-49) 135/43  FiO2 (%):  [96 %] 96 %  SpO2:  [91 %-96 %] 95 %  I/O last 3 completed shifts:  In: 483 [P.O.:480; I.V.:3]  Out: 850 [Urine:850]      GEN:  Alert, oriented x 3, appears comfortable,  Short of breath with minimal exertion.  HEENT:  Normocephalic/atraumatic, no scleral icterus, no nasal discharge, mouth moist.  CV:  Irregularly irregular,+murmur.  S1 + S2 noted, no S3 or S4.  LUNGS:  Few bibasilar crackles and occasional wheezing.  Poor inspiratory effort. Symmetric chest rise on inhalation noted.  ABD:  Active bowel sounds, soft, non-tender/non-distended.  No rebound/guarding/rigidity.  EXT:  No edema.  No cyanosis.  Rt shouler in sling  SKIN:  Dry to touch, no exanthems noted in the visualized areas.    Medications        docusate sodium  100 mg Oral BID     lidocaine  2 patch Transdermal Q24h    And     lidocaine   Transdermal Q24H    And     lidocaine   Transdermal Q8H     acetaminophen (TYLENOL) tablet 650 mg  650 mg Oral At Bedtime     furosemide  40 mg Oral BID     gabapentin  100 mg Oral TID     hydrALAZINE  75 mg Oral TID     isosorbide mononitrate  30 mg Oral Daily     omeprazole  20 mg Oral Daily     senna-docusate  2 tablet Oral At Bedtime     simvastatin  10 mg Oral At Bedtime     venlafaxine  75 mg Oral Daily     cholecalciferol  1,000 Units Oral Daily     sodium chloride (PF)  3 mL Intracatheter Q8H     ferrous sulfate  325 mg Oral BID     Data       Recent Labs  Lab 10/21/17  0747 10/20/17  0715 10/19/17  0544   WBC 6.8 8.5 11.3*   HGB 9.0* 8.9* 9.4*   HCT 28.4* 28.9* 30.5*   MCV 96 96 98   * 109* 118*       Recent Labs  Lab  10/21/17  0747 10/20/17  1730 10/20/17  0715 10/19/17  0544     --  140 140   POTASSIUM 3.4 3.4 3.0* 3.7   CHLORIDE 103  --  104 106   CO2 31  --  30 29   ANIONGAP 4  --  6 5   GLC 93  --  86 98   BUN 17  --  18 27   CR 0.72  --  0.74 0.90   GFRESTIMATED 77  --  74 59*   GFRESTBLACK >90  --  90 72   CASPER 7.9*  --  7.9* 7.8*     No results for input(s): CULT in the last 168 hours.    Recent Labs  Lab 10/18/17  0646   NTBNPI 1594       Recent Labs  Lab 10/18/17  0646   INR 1.04     No results for input(s): LIPASE in the last 168 hours.  No results for input(s): CHOL, HDL, LDL, TRIG, CHOLHDLRATIO in the last 168 hours.  No results for input(s): TSH in the last 168 hours.  No results for input(s): COLOR, APPEARANCE, URINEGLC, URINEBILI, URINEKETONE, SG, UBLD, URINEPH, PROTEIN, UROBILINOGEN, NITRITE, LEUKEST, RBCU, WBCU in the last 168 hours.    No results found for this or any previous visit (from the past 24 hour(s)).

## 2017-10-21 NOTE — PROGRESS NOTES
X-cover    Notified by nurse that pt had run of NSVT while positioning this AM.  Will check lytes.  TTE done this admit shows normal EF

## 2017-10-21 NOTE — PROVIDER NOTIFICATION
Writer recieved called from [facility], 21 beat v-tach, paged to admitting. Spoke with daughter and updated condition  Per noc shift. Will continue to monitor.

## 2017-10-21 NOTE — PLAN OF CARE
Problem: Patient Care Overview  Goal: Plan of Care/Patient Progress Review  Outcome: No Change  VSS: A&Ox4. CMS intact. T: 100.7, given tylenol. Tele shows Afib, CVR, ST depression, prolonged QT  LS: dim, on 1L NC  GI: Hypoactive, +flatus, last BM today  : incontinent, on purewick, purewick was replaced this shift at 2145.   Skin: bruised, blanchable redness in midback and coccyx, mepiplex placed.   Activity: x2, pivot. Pt up in chair for dinner.   Diet: reg   Pain: c/o 9/10 pain, taking tylenol, oxycodone.  Plan: d/c tmrrw to [facility]  Lab: K replaced on days, redraw 3.4

## 2017-10-21 NOTE — PLAN OF CARE
Problem: Fracture Orthopaedic (Adult)  Goal: Signs and Symptoms of Listed Potential Problems Will be Absent, Minimized or Managed (Fracture Orthopaedic)  Signs and symptoms of listed potential problems will be absent, minimized or managed by discharge/transition of care (reference Fracture Orthopaedic (Adult) CPG).   Outcome: Improving  RN:pt vss, on 1L NC, able to wear o2 while up in chair, needs o2 at 2L WHILE SLEEPING, encouragement of IS and TCDB  Lungs: are clear but diminished in bases, encouraged pt to use IS hourly  BS:+ pt is eating and drinking well, adequate output- using the wick cath   CMS:+, numbness in finger tips, and swollen hand also, elevated. Pillow support under arm and Ice  Pain: controlled with pain meds  Activity: Up with A2, doing well in pt, tolerated wheelchair.   Plan is to dc to tcu tomorrow afternoon if not changes from her tele overnight   Calcium given today due to overnight tele rhythm.   Will cont to monitor.

## 2017-10-22 ENCOUNTER — APPOINTMENT (OUTPATIENT)
Dept: OCCUPATIONAL THERAPY | Facility: CLINIC | Age: 82
DRG: 562 | End: 2017-10-22
Payer: MEDICARE

## 2017-10-22 PROCEDURE — 97530 THERAPEUTIC ACTIVITIES: CPT | Mod: GO | Performed by: REHABILITATION PRACTITIONER

## 2017-10-22 PROCEDURE — 25000132 ZZH RX MED GY IP 250 OP 250 PS 637: Mod: GY | Performed by: INTERNAL MEDICINE

## 2017-10-22 PROCEDURE — 99207 ZZC CDG-MDM COMPONENT: MEETS LOW - DOWN CODED: CPT | Performed by: INTERNAL MEDICINE

## 2017-10-22 PROCEDURE — A9270 NON-COVERED ITEM OR SERVICE: HCPCS | Mod: GY | Performed by: INTERNAL MEDICINE

## 2017-10-22 PROCEDURE — 99232 SBSQ HOSP IP/OBS MODERATE 35: CPT | Performed by: INTERNAL MEDICINE

## 2017-10-22 PROCEDURE — 97110 THERAPEUTIC EXERCISES: CPT | Mod: GO | Performed by: REHABILITATION PRACTITIONER

## 2017-10-22 PROCEDURE — 40000133 ZZH STATISTIC OT WARD VISIT: Performed by: REHABILITATION PRACTITIONER

## 2017-10-22 PROCEDURE — 12000007 ZZH R&B INTERMEDIATE

## 2017-10-22 RX ORDER — FUROSEMIDE 40 MG
80 TABLET ORAL 2 TIMES DAILY
Status: DISCONTINUED | OUTPATIENT
Start: 2017-10-23 | End: 2017-10-24

## 2017-10-22 RX ORDER — CALCIUM CARBONATE 500(1250)
1250 TABLET ORAL 2 TIMES DAILY WITH MEALS
Status: DISCONTINUED | OUTPATIENT
Start: 2017-10-22 | End: 2017-10-26 | Stop reason: HOSPADM

## 2017-10-22 RX ORDER — POTASSIUM CHLORIDE 1500 MG/1
20 TABLET, EXTENDED RELEASE ORAL 2 TIMES DAILY
Status: DISCONTINUED | OUTPATIENT
Start: 2017-10-22 | End: 2017-10-22

## 2017-10-22 RX ORDER — POTASSIUM CHLORIDE 1500 MG/1
20 TABLET, EXTENDED RELEASE ORAL 3 TIMES DAILY
Status: DISCONTINUED | OUTPATIENT
Start: 2017-10-22 | End: 2017-10-26 | Stop reason: HOSPADM

## 2017-10-22 RX ORDER — FUROSEMIDE 40 MG
80 TABLET ORAL DAILY
Status: COMPLETED | OUTPATIENT
Start: 2017-10-22 | End: 2017-10-22

## 2017-10-22 RX ADMIN — FERROUS SULFATE TAB 325 MG (65 MG ELEMENTAL FE) 325 MG: 325 (65 FE) TAB at 08:22

## 2017-10-22 RX ADMIN — HYDRALAZINE HYDROCHLORIDE 75 MG: 25 TABLET ORAL at 20:05

## 2017-10-22 RX ADMIN — CALCIUM 1250 MG: 500 TABLET ORAL at 18:33

## 2017-10-22 RX ADMIN — LIDOCAINE 2 PATCH: 50 PATCH CUTANEOUS at 20:02

## 2017-10-22 RX ADMIN — POTASSIUM CHLORIDE 20 MEQ: 1500 TABLET, EXTENDED RELEASE ORAL at 20:02

## 2017-10-22 RX ADMIN — HYDRALAZINE HYDROCHLORIDE 75 MG: 25 TABLET ORAL at 13:57

## 2017-10-22 RX ADMIN — CALCIUM 1250 MG: 500 TABLET ORAL at 08:23

## 2017-10-22 RX ADMIN — DOCUSATE SODIUM 100 MG: 100 CAPSULE, LIQUID FILLED ORAL at 20:03

## 2017-10-22 RX ADMIN — OMEPRAZOLE 20 MG: 20 CAPSULE, DELAYED RELEASE ORAL at 08:23

## 2017-10-22 RX ADMIN — POTASSIUM CHLORIDE 20 MEQ: 1500 TABLET, EXTENDED RELEASE ORAL at 08:23

## 2017-10-22 RX ADMIN — FUROSEMIDE 80 MG: 40 TABLET ORAL at 14:54

## 2017-10-22 RX ADMIN — SIMVASTATIN 10 MG: 10 TABLET, FILM COATED ORAL at 21:46

## 2017-10-22 RX ADMIN — OXYCODONE HYDROCHLORIDE 10 MG: 5 TABLET ORAL at 01:16

## 2017-10-22 RX ADMIN — ISOSORBIDE MONONITRATE 30 MG: 30 TABLET, EXTENDED RELEASE ORAL at 08:23

## 2017-10-22 RX ADMIN — OXYCODONE HYDROCHLORIDE 10 MG: 5 TABLET ORAL at 20:02

## 2017-10-22 RX ADMIN — ACETAMINOPHEN 650 MG: 325 TABLET, FILM COATED ORAL at 21:48

## 2017-10-22 RX ADMIN — VENLAFAXINE HYDROCHLORIDE 75 MG: 75 TABLET, EXTENDED RELEASE ORAL at 08:23

## 2017-10-22 RX ADMIN — GABAPENTIN 100 MG: 100 CAPSULE ORAL at 08:22

## 2017-10-22 RX ADMIN — FERROUS SULFATE TAB 325 MG (65 MG ELEMENTAL FE) 325 MG: 325 (65 FE) TAB at 20:03

## 2017-10-22 RX ADMIN — OXYCODONE HYDROCHLORIDE 10 MG: 5 TABLET ORAL at 05:29

## 2017-10-22 RX ADMIN — VITAMIN D, TAB 1000IU (100/BT) 1000 UNITS: 25 TAB at 08:23

## 2017-10-22 RX ADMIN — POTASSIUM CHLORIDE 20 MEQ: 1500 TABLET, EXTENDED RELEASE ORAL at 14:54

## 2017-10-22 RX ADMIN — FUROSEMIDE 40 MG: 40 TABLET ORAL at 08:22

## 2017-10-22 RX ADMIN — CARVEDILOL 3.12 MG: 3.12 TABLET, FILM COATED ORAL at 08:22

## 2017-10-22 RX ADMIN — OXYCODONE HYDROCHLORIDE 5 MG: 5 TABLET ORAL at 16:03

## 2017-10-22 RX ADMIN — HYDRALAZINE HYDROCHLORIDE 75 MG: 25 TABLET ORAL at 08:22

## 2017-10-22 RX ADMIN — GABAPENTIN 100 MG: 100 CAPSULE ORAL at 20:03

## 2017-10-22 RX ADMIN — ACETAMINOPHEN 650 MG: 325 TABLET, FILM COATED ORAL at 01:29

## 2017-10-22 RX ADMIN — DOCUSATE SODIUM 100 MG: 100 CAPSULE, LIQUID FILLED ORAL at 08:23

## 2017-10-22 RX ADMIN — GABAPENTIN 100 MG: 100 CAPSULE ORAL at 13:57

## 2017-10-22 NOTE — PLAN OF CARE
Problem: Patient Care Overview  Goal: Plan of Care/Patient Progress Review  A/Ox4, pleasant&cooperative.On tele,Afib this.O2 sats 92% per 2l nc.On regular diet. Sling to R arm, CDI. Assistx2 and gait belt, Pure Wick draining well, Pain controlled with Oxy/ Lidoderm patches.

## 2017-10-22 NOTE — PROVIDER NOTIFICATION
"Paged Dr Ma with the following message for GER Salgado: \"telemetry just called and stated pt is running loraine, 43-50. already gave Imdur and Coreg. please advise. Thank you!\"  "

## 2017-10-22 NOTE — PROGRESS NOTES
[facility]    Hospitalist Progress Note    Date of Service (when I saw the patient): 10/22/2017    Assessment & Plan   Keisha Godinez is a 85 year old female who was admitted on 10/17/2017. After a mechanical fall and a fractured right humerus, she was also found to be hypoxic and have diastolic heart failure    Summary:Fractured humerus, being treated with a sling  Diastolic heart failure, fluid over loaded today. Treated with Hydralazine and ISMN  Afib, chronic, Warfarin stopped by Dr. Miguel Pradhan after a GI bleed  Acute blood loss anemia due to the humerus fracture (most likely cause)  Hypokalemia due to diuresis  Hypocalcemia, not clear why        What I did today: reviewed the chart, increased her furosemide dose and stopped the Carvedilol as she has diastolic heart failure, bp is ok and she is bradycardic. Ordered a repeat Ca, K and the first time albumin for the AM.  Discussed this with Mrs. Godinez and her family  -    -    -      DVT Prophylaxis: Pneumatic Compression Devices  Code Status: Full Code    Disposition: Expected discharge in 2-3 days once heart failure is better, she is presently up 12 lbs.    Urban Ma MD    Interval History   Mrs. Godinez is having pain with her right arm. This was a mechanical fall. She has chronic edema, no sampson at home but is short of breath here. No chest pain. History of afib for years, used to be on Warfarin but stopped after a GI bleed by her Cardiologist, Dr. Miguel Pradhan. No orthopnea or pnd. No head or abdominal pains, appetite is not good, no nausea or vomiting or dysphagia.  Bowels and bladder ok.     -Data reviewed today: I reviewed all new labs and imaging results over the last 24 hours. I personally reviewed the EKG tracing showing afib with a slow ventricular response.    Physical Exam   Temp: 98  F (36.7  C) Temp src: Oral BP: 140/44 Pulse: 65 Heart Rate: 56 Resp: 18 SpO2: 95 % O2 Device: Nasal cannula Oxygen Delivery: 2 LPM  Vitals:     10/19/17 0937 10/20/17 0624 10/21/17 0500   Weight: 75.8 kg (167 lb) 77.8 kg (171 lb 8 oz) 75.9 kg (167 lb 6.4 oz)     Vital Signs with Ranges  Temp:  [98  F (36.7  C)-99.5  F (37.5  C)] 98  F (36.7  C)  Pulse:  [65] 65  Heart Rate:  [56-67] 56  Resp:  [16-20] 18  BP: (136-172)/(43-58) 140/44  SpO2:  [91 %-97 %] 95 %  I/O last 3 completed shifts:  In: 660 [P.O.:660]  Out: 460 [Urine:460]    Constitutional: Alert, appears to be short of breath  Respiratory: Lungs have rales about 1/4 of the way up  Cardiovascular: Irregularly irregular rhythm, variable S1 and normal S2, no murmurs, soft S3 Chronic, non-pitting edema with stasis dermatitis and tenderness  GI: normal bowel sounds, not tender,   Skin/Integumen: see above.  Other:      Medications        calcium carbonate  1,250 mg Oral BID w/meals     [START ON 10/23/2017] furosemide  80 mg Oral BID     potassium chloride  20 mEq Oral TID     docusate sodium  100 mg Oral BID     lidocaine  2 patch Transdermal Q24h    And     lidocaine   Transdermal Q24H    And     lidocaine   Transdermal Q8H     acetaminophen (TYLENOL) tablet 650 mg  650 mg Oral At Bedtime     gabapentin  100 mg Oral TID     hydrALAZINE  75 mg Oral TID     isosorbide mononitrate  30 mg Oral Daily     omeprazole  20 mg Oral Daily     senna-docusate  2 tablet Oral At Bedtime     simvastatin  10 mg Oral At Bedtime     venlafaxine  75 mg Oral Daily     cholecalciferol  1,000 Units Oral Daily     sodium chloride (PF)  3 mL Intracatheter Q8H     ferrous sulfate  325 mg Oral BID       Data     Recent Labs  Lab 10/21/17  0747 10/20/17  1730 10/20/17  0715 10/19/17  0544 10/18/17  1108 10/18/17  0646 10/17/17  2222   WBC 6.8  --  8.5 11.3*  --  10.9 11.3*   HGB 9.0*  --  8.9* 9.4*  --  10.3* 10.8*   MCV 96  --  96 98  --  99 97   *  --  109* 118*  --  134* 135*   INR  --   --   --   --   --  1.04  --      --  140 140  --  141 142   POTASSIUM 3.4 3.4 3.0* 3.7  --  3.8 3.2*   CHLORIDE 103  --  104 106   --  108 107   CO2 31  --  30 29  --  27 27   BUN 17  --  18 27  --  14 14   CR 0.72  --  0.74 0.90  --  0.80 0.77   ANIONGAP 4  --  6 5  --  6 8   CASPER 7.9*  --  7.9* 7.8*  --  7.7* 7.7*   GLC 93  --  86 98  --  94 137*   TROPI  --   --   --   --  <0.015 0.017 <0.015       Imaging:  No results found for this or any previous visit (from the past 24 hour(s)).

## 2017-10-22 NOTE — PLAN OF CARE
Problem: Patient Care Overview  Goal: Plan of Care/Patient Progress Review  Discharge Planner OT   Patient plan for discharge: TCU  Current status: Mod A of 1 and min A of 1 stand from chair and return to bed with SPT. Patient had increased difficulty advancing feet effectively throughout transfer and required increased time to complete safe transfer. Noted poor balance and patient reports she typically uses a walker at baseline. Once seated, patient stood with CGAx1 to attempt to reposition self further back on bed, however was unable to do so effectively. Max A of 2 to return to supine in bed. Completed gentle ROM/AAROM for hand, wrist, forearm and elbow exercises. Patient able to complete full ROM with wrist deviation exercises only, remaining motions are limited by increased pain. Did not attempt to perform Codman's exercises due to increased pain and unsteadiness with standing. Patient encouraged to complete hand pumps 10x every hour to help reduce edema in R hand. White board updated with instruction and patient verbalized understanding.   Barriers to return to prior living situation:  Pt limited by immobility and pain, decreased ability to manage ADL's I  Recommendations for discharge: TCU  Rationale for recommendations: Pt would benefit from continued OT to maximize safety and participation in self cares with improved strength and endurance       Entered by: Cecy Shaw 10/22/2017 2:27 PM

## 2017-10-22 NOTE — PLAN OF CARE
Problem: Fracture Orthopaedic (Adult)  Goal: Signs and Symptoms of Listed Potential Problems Will be Absent, Minimized or Managed (Fracture Orthopaedic)  Signs and symptoms of listed potential problems will be absent, minimized or managed by discharge/transition of care (reference Fracture Orthopaedic (Adult) CPG).   Outcome: Improving  Alert and oriented. On telemetry monitoring, controlled atrial fibrillation this shift per tele tech. On 2L O2 d/t sats decreasing while patient sleeping, O2 sats 93%. Temperature of 99.5, IS encouraged. Tolerating regular diet. Sling to R arm, ice applied. Moderate bruising R on underarm noted. Mepilex on bottom, CDI. Transfers with Ax2 and gait belt, stood at edge of bed this evening. PureWick external catheter in use, replaced this shift d/t leakage. Redness in genie area. Pain managed with Oxycodone and Lidoderm patches. Plans d/c to TCU tomorrow.

## 2017-10-22 NOTE — PROGRESS NOTES
Social Work Note:    D: SW following for d/c planning needs to [facility].     I/A: SW updated that pt is not ready to d/c today (10/22/17). Unclear when pt will be ready to d/c. Message left for admissions at [facility] that pt would not be ready to d/c today.     P: Will continue to update [facility] admissions on d/c plans. Pt stated that to unit staff that she will need transportation arranged.    Weekend SW: Masoud Osborne, MSW, LICSW

## 2017-10-23 ENCOUNTER — APPOINTMENT (OUTPATIENT)
Dept: OCCUPATIONAL THERAPY | Facility: CLINIC | Age: 82
DRG: 562 | End: 2017-10-23
Payer: MEDICARE

## 2017-10-23 ENCOUNTER — APPOINTMENT (OUTPATIENT)
Dept: CT IMAGING | Facility: CLINIC | Age: 82
DRG: 562 | End: 2017-10-23
Attending: ORTHOPAEDIC SURGERY
Payer: MEDICARE

## 2017-10-23 DIAGNOSIS — E78.5 HYPERLIPIDEMIA, UNSPECIFIED HYPERLIPIDEMIA TYPE: ICD-10-CM

## 2017-10-23 LAB
CA-I BLD-MCNC: 5 MG/DL (ref 4.4–5.2)
CALCIUM SERPL-MCNC: 8.3 MG/DL (ref 8.5–10.1)
POTASSIUM SERPL-SCNC: 3.9 MMOL/L (ref 3.4–5.3)
PROCALCITONIN SERPL-MCNC: 0.14 NG/ML
URATE SERPL-MCNC: 8.4 MG/DL (ref 2.6–6)

## 2017-10-23 PROCEDURE — 25000125 ZZHC RX 250: Performed by: INTERNAL MEDICINE

## 2017-10-23 PROCEDURE — 25000132 ZZH RX MED GY IP 250 OP 250 PS 637: Mod: GY | Performed by: INTERNAL MEDICINE

## 2017-10-23 PROCEDURE — A9270 NON-COVERED ITEM OR SERVICE: HCPCS | Mod: GY | Performed by: INTERNAL MEDICINE

## 2017-10-23 PROCEDURE — 97535 SELF CARE MNGMENT TRAINING: CPT | Mod: GO | Performed by: STUDENT IN AN ORGANIZED HEALTH CARE EDUCATION/TRAINING PROGRAM

## 2017-10-23 PROCEDURE — 36415 COLL VENOUS BLD VENIPUNCTURE: CPT | Performed by: INTERNAL MEDICINE

## 2017-10-23 PROCEDURE — 73200 CT UPPER EXTREMITY W/O DYE: CPT | Mod: RT

## 2017-10-23 PROCEDURE — 82330 ASSAY OF CALCIUM: CPT | Performed by: INTERNAL MEDICINE

## 2017-10-23 PROCEDURE — 99232 SBSQ HOSP IP/OBS MODERATE 35: CPT | Performed by: INTERNAL MEDICINE

## 2017-10-23 PROCEDURE — 84132 ASSAY OF SERUM POTASSIUM: CPT | Performed by: INTERNAL MEDICINE

## 2017-10-23 PROCEDURE — 84145 PROCALCITONIN (PCT): CPT | Performed by: INTERNAL MEDICINE

## 2017-10-23 PROCEDURE — 82310 ASSAY OF CALCIUM: CPT | Performed by: INTERNAL MEDICINE

## 2017-10-23 PROCEDURE — 84550 ASSAY OF BLOOD/URIC ACID: CPT | Performed by: INTERNAL MEDICINE

## 2017-10-23 PROCEDURE — 12000007 ZZH R&B INTERMEDIATE

## 2017-10-23 PROCEDURE — 40000133 ZZH STATISTIC OT WARD VISIT: Performed by: STUDENT IN AN ORGANIZED HEALTH CARE EDUCATION/TRAINING PROGRAM

## 2017-10-23 RX ORDER — OXYCODONE HCL 10 MG/1
20 TABLET, FILM COATED, EXTENDED RELEASE ORAL EVERY 12 HOURS
Status: DISCONTINUED | OUTPATIENT
Start: 2017-10-23 | End: 2017-10-24

## 2017-10-23 RX ORDER — PREDNISONE 20 MG/1
20 TABLET ORAL ONCE
Status: COMPLETED | OUTPATIENT
Start: 2017-10-23 | End: 2017-10-23

## 2017-10-23 RX ORDER — COLCHICINE 0.6 MG/1
0.6 TABLET ORAL DAILY
Status: DISCONTINUED | OUTPATIENT
Start: 2017-10-23 | End: 2017-10-24

## 2017-10-23 RX ADMIN — OXYCODONE HYDROCHLORIDE 5 MG: 5 TABLET ORAL at 12:39

## 2017-10-23 RX ADMIN — FUROSEMIDE 80 MG: 40 TABLET ORAL at 11:48

## 2017-10-23 RX ADMIN — HYDRALAZINE HYDROCHLORIDE 75 MG: 25 TABLET ORAL at 08:40

## 2017-10-23 RX ADMIN — LIDOCAINE 2 PATCH: 50 PATCH CUTANEOUS at 20:41

## 2017-10-23 RX ADMIN — POTASSIUM CHLORIDE 20 MEQ: 1500 TABLET, EXTENDED RELEASE ORAL at 20:41

## 2017-10-23 RX ADMIN — GABAPENTIN 100 MG: 100 CAPSULE ORAL at 20:41

## 2017-10-23 RX ADMIN — GABAPENTIN 100 MG: 100 CAPSULE ORAL at 08:41

## 2017-10-23 RX ADMIN — HYDRALAZINE HYDROCHLORIDE 75 MG: 25 TABLET ORAL at 14:44

## 2017-10-23 RX ADMIN — OXYCODONE HYDROCHLORIDE 10 MG: 5 TABLET ORAL at 22:48

## 2017-10-23 RX ADMIN — ISOSORBIDE MONONITRATE 30 MG: 30 TABLET, EXTENDED RELEASE ORAL at 08:46

## 2017-10-23 RX ADMIN — OXYCODONE HYDROCHLORIDE 20 MG: 10 TABLET, FILM COATED, EXTENDED RELEASE ORAL at 20:41

## 2017-10-23 RX ADMIN — OMEPRAZOLE 20 MG: 20 CAPSULE, DELAYED RELEASE ORAL at 08:41

## 2017-10-23 RX ADMIN — ACETAMINOPHEN 650 MG: 325 TABLET, FILM COATED ORAL at 05:45

## 2017-10-23 RX ADMIN — POTASSIUM CHLORIDE 20 MEQ: 1500 TABLET, EXTENDED RELEASE ORAL at 08:42

## 2017-10-23 RX ADMIN — PREDNISONE 20 MG: 20 TABLET ORAL at 14:46

## 2017-10-23 RX ADMIN — VITAMIN D, TAB 1000IU (100/BT) 1000 UNITS: 25 TAB at 08:41

## 2017-10-23 RX ADMIN — ACETAMINOPHEN 650 MG: 325 TABLET, FILM COATED ORAL at 12:39

## 2017-10-23 RX ADMIN — OXYCODONE HYDROCHLORIDE 10 MG: 5 TABLET ORAL at 05:46

## 2017-10-23 RX ADMIN — POTASSIUM CHLORIDE 20 MEQ: 1500 TABLET, EXTENDED RELEASE ORAL at 14:46

## 2017-10-23 RX ADMIN — DOCUSATE SODIUM 100 MG: 100 CAPSULE, LIQUID FILLED ORAL at 08:41

## 2017-10-23 RX ADMIN — GABAPENTIN 100 MG: 100 CAPSULE ORAL at 14:46

## 2017-10-23 RX ADMIN — OXYCODONE HYDROCHLORIDE 10 MG: 5 TABLET ORAL at 17:19

## 2017-10-23 RX ADMIN — FERROUS SULFATE TAB 325 MG (65 MG ELEMENTAL FE) 325 MG: 325 (65 FE) TAB at 08:41

## 2017-10-23 RX ADMIN — OXYCODONE HYDROCHLORIDE 10 MG: 5 TABLET ORAL at 00:49

## 2017-10-23 RX ADMIN — COLCHICINE 0.6 MG: 0.6 TABLET, FILM COATED ORAL at 14:53

## 2017-10-23 RX ADMIN — OXYCODONE HYDROCHLORIDE 5 MG: 5 TABLET ORAL at 11:48

## 2017-10-23 RX ADMIN — VENLAFAXINE HYDROCHLORIDE 75 MG: 75 TABLET, EXTENDED RELEASE ORAL at 08:41

## 2017-10-23 RX ADMIN — CALCIUM 1250 MG: 500 TABLET ORAL at 08:41

## 2017-10-23 RX ADMIN — FUROSEMIDE 80 MG: 40 TABLET ORAL at 08:41

## 2017-10-23 RX ADMIN — CALCIUM 1250 MG: 500 TABLET ORAL at 17:19

## 2017-10-23 RX ADMIN — HYDRALAZINE HYDROCHLORIDE 75 MG: 25 TABLET ORAL at 20:41

## 2017-10-23 RX ADMIN — FERROUS SULFATE TAB 325 MG (65 MG ELEMENTAL FE) 325 MG: 325 (65 FE) TAB at 20:41

## 2017-10-23 RX ADMIN — DOCUSATE SODIUM 100 MG: 100 CAPSULE, LIQUID FILLED ORAL at 20:41

## 2017-10-23 NOTE — PLAN OF CARE
Problem: Patient Care Overview  Goal: Plan of Care/Patient Progress Review  A&X4, on remote tele monitoring, had 2.05 pause on tele, paged admitting doc.slept between cares.Purewick external catheter in use and draining, Pain managed with Tylenol and Oxycodone.

## 2017-10-23 NOTE — CONSULTS
Care Transition Initial Assessment - RN    Reason For Consult: discharge planning   Met with: Patient, Caregiver - daughter Rebecca Malhotra.    DATA   Active Problems:    Fracture of humerus, proximal, right, closed     CHF Exacerbation - CHF Action Plan and Low Sodium Products List discussed w/dtr via phone. Copies left in room for dtr to .   Cognitive Status: awake, alert and confused.  Primary Care Clinic [facility]: [facility]  Primary Care MD Name: Dr. Gerri Eubanks  Contact information and PCP information verified: Yes    Lives With: child(emre), adult  Living Arrangements: house  Quality Of Family Relationships: supportive, helpful, involved  Description of Support System: Supportive, Involved   Who is your support system?: Children   Support Assessment: Adequate family and caregiver support, Adequate social supports     Insurance concerns: No Insurance issues identified    ASSESSMENT  Patient currently receives the following services:  Daughter provides transportation to/from MD appts; patient & dtr share meal preparations.         Identified issues/concerns regarding health management: Patient is forgetful. Daughter Rebecca is her primary caregiver. Discussed CHF, daily weights & low sodium diet. Pt weighs herself daily with BP & temperature checks. She observes a low sodium diet and does not add salt to her food. She self-administers her medications. She is able to perform her own daily hygiene activities.     PLAN  Financial costs for the patient were not discussed.  Patient given options and choices for discharge.  Patient/family is agreeable to the plan?  Yes  Patient anticipates discharging to TCU.      Patient anticipates needs for home equipment: No  Discharge Planner   Discharge Plans in progress: Yes  Barriers to discharge plan: Ongoing pain management  Plan/Disposition: TCU   Appointments: F/U with PCP on 11/2/17 at 11am. If pt discharges to TCU, may need to reschedule.     CM will continue to  follow patient until discharge for any additional needs.     Ashely Vega RN, BSN, CTS  [facility]  [phone]

## 2017-10-23 NOTE — PROGRESS NOTES
"SPIRITUAL HEALTH SERVICES  SPIRITUAL ASSESSMENT Progress Note  [facility]    PRIMARY FOCUS:     Symptom/pain management    Emotional/spiritual/Lutheran distress    Support for coping    ILLNESS CIRCUMSTANCES:   Reviewed documentation. Reflective conversation shared with Keisha which integrated elements of illness and family narratives.     Context of Serious Illness/Symptom(s) - Keisha shared with me that she had fallen in her bedroom and fractured her arm.  Keisha stated that she is in a lot of pain right now.    Resources for Support - Keisha lives with one of her daughters and the other two live in the area.  Keisha also stated that she has 13 grandchildren and 3 great-grandchildren.    DISTRESS:     Emotional/Existential/Relational Distress - Keisha shared with me that she had a stroke in  and now uses a walker.  Keisha also shared with me that her  had  from a stroke and she also had a daughter who  from pancreatic cancer.  She stated that she was with her when she , but that a child should not go before their parents.  Keisha also stated that she had 4 siblings that  from cancer related illnesses.      Spiritual/Spiritism Distress - None expressed    Social/Cultural/Economic Distress - None expressed    SPIRITUAL/Roman Catholic COPING:     Denominational/Suly - Roman Catholic    Spiritual Practice(s) - Prayer    Emotional/Existential/Relational Connections - Keisha expressed the care that she displayed for her mother when she was in a care facility and that her mother lived to be 102 years old.  Keisha also expressed that her own daughters come to see her, but feels her grandchildren have \"closed the door\" on her.  Keisha stated she understands they are busy but was hoping for a visit from them.    GOALS OF CARE:    Goals of Care - Keisha is planning on being discharged to [facility] in the next few days.    Meaning/Sense-Making - Keisha stated, \"Life is hard.  We have our up times " "and our down times, and this one is a down time. But is we can put our feet on the ground each day, we know we are alive and it's a good day.\"    PLAN: I prayed with Keisha at the end of our visit and will check in on Wednesday night to see if she has been discharged.   [facility] will continue to be available per Pt/Family/Staff request.      Mallika Caruso Intern  [phone]    "

## 2017-10-23 NOTE — PROGRESS NOTES
SWS     D: Discharge planning continuing.. per discussion with MD pt will not be ready for transfer today. SW has spoken today to Nubia, admissions coordinator at [facility] who informs that they will have bed available tomorrow, private room...bed availability beyond then will need to be determined.      I: Met with pt, daughter also present in room, discussed above... daughter notes of her hopefulness of bed availability at [facility] upon discharge for pt, noting that while private room is preferred that semi-private room would be accepted if needed.      A/P: YULISA will continue to follow for support and discharge planning based on MD determination of discharge readiness and facility availability.

## 2017-10-23 NOTE — PLAN OF CARE
Problem: Patient Care Overview  Goal: Plan of Care/Patient Progress Review    Discharge Planner OT   Patient plan for discharge: TCU  Current status: Pt in bed, able to agree to commode transfer, pt required set-up of environment including elevated head of bed prior to transfer to sitting edge of bed; pt able to follow verbal instructions for LE movement, eventually required Max A of 1 to be upright at edge of bed; pt reported feeling dizzy, BP obtained 150/43, after 2 minutes of sitting pt reported dizziness subsided; following set-up of commode next to bed, pt completed sit>stand with Max A of 1 and Mod A of additional 1; pt able to follow instructions for safety, slowly advancing feet for pivot transfer; pt dependent for undergarment management, pt required cues for safety with hand placement on commode; pt attempted scooting self back on commode however unable to complete and pt required sit<>stand Mod A of 2 for repositioning on commode  Barriers to return to prior living situation:  Pt limited by immobility and pain, decreased ability to manage ADL's I  Recommendations for discharge: TCU  Rationale for recommendations: Pt would benefit from continued OT to maximize safety and participation in self cares with improved strength and endurance       Entered by: Denise Warner 10/23/2017 10:45 AM

## 2017-10-23 NOTE — PLAN OF CARE
Problem: Fracture Orthopaedic (Adult)  Goal: Signs and Symptoms of Listed Potential Problems Will be Absent, Minimized or Managed (Fracture Orthopaedic)  Signs and symptoms of listed potential problems will be absent, minimized or managed by discharge/transition of care (reference Fracture Orthopaedic (Adult) CPG).   Outcome: Improving  Alert and oriented. BPs elevated at times, scheduled Hydralazine given. On remote tele monitoring, controlled atrial fibrillation this shift per tele tech. Tolerating regular diet. Pivot transfers up to chair with Ax2 and gait belt, used Vannessa Steady to get back into bed d/t weakness and fatigue. Arm in sling and ice applied, bruising noted in underarm. Mepilex reapplied to blanchable redness on back. Purewick external catheter in use, had one episode of incontinence while up in chair. Pain managed with Tylenol and Oxycodone.

## 2017-10-23 NOTE — PLAN OF CARE
Problem: Patient Care Overview  Goal: Plan of Care/Patient Progress Review  Outcome: No Change  Pt forgetful per shift. SBAx2. Pivots to commode. R humerus fx rated at a 9 for pain. Describes it as a burning pain. Receiving scheduled oxycontin now. Receiving teaching on CHF exacerbation. Urinary incontinence per shift.

## 2017-10-23 NOTE — PROGRESS NOTES
[facility]  Hospitalist Progress Note  Gabe Porter MD 10/23/2017    Reason for Stay (Diagnosis): right humerus fracture         Assessment and Plan:      Summary of Stay: Keisha Godinez is a 85 year old female admitted on 10/17/2017 after afall with complaint of severe right shoulder pain. She was found to have a minimally displaced proximal humerus fracture, and she was admitted for Orthopedic evaluation and disposition.     At this time, the Orthopedist has recommended non-operative management, but pt is in intense pain. Unclear if this can be improved upon with oral medications.     Problem List:   1. Right humerus fracture. Question radicular pain assoc with fracture. Pain is poorly controlled.  2. Diastolic heart failure, appears mildly volume up.  3. Chronic atrial fibrillation. Not on anticoagulation due to hx GI Bleed.  4. Right fifth digit swelling and pain consistent with gouty flair.    PLAN:  1. I called and briefly spoke with Dr. Urbano about this patient due to my concern for her inadequately controlled shoulder pain. He indicated that eh would come and see her later today.   2. Change from Oxycodone (which pt indicates does not help with her pain) to oral morphine to see if that is more effective.   3. Single dose of Prednisone. Start Colchicine also. Check Uric Acid.    DVT Prophylaxis: Pneumatic Compression Devices  Code Status: Full Code (though this should be re-considered)  Discharge Dispo: TCU  Estimated Disch Date / # of Days until Disch: TBD, likely 1-2 more days.        Interval History (Subjective):      Chart reviewed, pt interviewed.    Note that I admitted the patient on 10/17/17.    Pt is in extraordinary pain. Denies SOB and cough. Unable to sleep due to the pain. (Oxycodone does make her sleepy, but she is disrupted often in the night)  No constipation. Appetite poor.                   Physical Exam:      Last Vital Signs:  /40 (BP Location: Left arm)  Pulse 65  " Temp 97.1  F (36.2  C) (Oral)  Resp 16  Ht 1.6 m (5' 3\")  Wt 78.7 kg (173 lb 6.4 oz)  SpO2 94%  BMI 30.72 kg/m2    I/O last 3 completed shifts:  In: 660 [P.O.:660]  Out: 700 [Urine:700]    Constitutional: Awake, alert, cooperative, uncomfortable appearing. Complaining of dizziness after dose of Oxycodone.   Respiratory: Clear to auscultation bilaterally, no crackles or wheezing   Cardiovascular: IRRIR no murmur noted   Abdomen: Normal bowel sounds, soft, non-distended, non-tender   Skin: No rashes, no cyanosis, dry to touch   Neuro: Alert and oriented x3, no weakness, numbness, memory loss   Extremities: Dense bilat ankle edema. Intense pain over right shoulder. Right fifth digit also is hot, red and exquisitely tender to touch.   Other(s):        All other systems: Negative          Medications:      All current medications were reviewed with changes reflected in problem list.         Data:      All new lab and imaging data was reviewed.   Labs/Imaging:  Results for orders placed or performed during the hospital encounter of 10/17/17 (from the past 24 hour(s))   Potassium   Result Value Ref Range    Potassium 3.9 3.4 - 5.3 mmol/L   Calcium   Result Value Ref Range    Calcium 8.3 (L) 8.5 - 10.1 mg/dL   Uric acid   Result Value Ref Range    Uric Acid 8.4 (H) 2.6 - 6.0 mg/dL   Procalcitonin   Result Value Ref Range    Procalcitonin 0.14 ng/ml   Calcium ionized whole blood (Add on or recollect)   Result Value Ref Range    Calcium Ionized Whole Blood 5.0 4.4 - 5.2 mg/dL         "

## 2017-10-24 ENCOUNTER — APPOINTMENT (OUTPATIENT)
Dept: GENERAL RADIOLOGY | Facility: CLINIC | Age: 82
DRG: 562 | End: 2017-10-24
Attending: INTERNAL MEDICINE
Payer: MEDICARE

## 2017-10-24 LAB
ANION GAP SERPL CALCULATED.3IONS-SCNC: 2 MMOL/L (ref 3–14)
BUN SERPL-MCNC: 21 MG/DL (ref 7–30)
CALCIUM SERPL-MCNC: 8.5 MG/DL (ref 8.5–10.1)
CHLORIDE SERPL-SCNC: 100 MMOL/L (ref 94–109)
CO2 SERPL-SCNC: 34 MMOL/L (ref 20–32)
CREAT SERPL-MCNC: 0.77 MG/DL (ref 0.52–1.04)
ERYTHROCYTE [DISTWIDTH] IN BLOOD BY AUTOMATED COUNT: 12.7 % (ref 10–15)
GFR SERPL CREATININE-BSD FRML MDRD: 71 ML/MIN/1.7M2
GLUCOSE SERPL-MCNC: 97 MG/DL (ref 70–99)
HCT VFR BLD AUTO: 27.4 % (ref 35–47)
HGB BLD-MCNC: 8.7 G/DL (ref 11.7–15.7)
MAGNESIUM SERPL-MCNC: 2 MG/DL (ref 1.6–2.3)
MCH RBC QN AUTO: 30 PG (ref 26.5–33)
MCHC RBC AUTO-ENTMCNC: 31.8 G/DL (ref 31.5–36.5)
MCV RBC AUTO: 95 FL (ref 78–100)
PLATELET # BLD AUTO: 159 10E9/L (ref 150–450)
POTASSIUM SERPL-SCNC: 4.6 MMOL/L (ref 3.4–5.3)
RBC # BLD AUTO: 2.9 10E12/L (ref 3.8–5.2)
SODIUM SERPL-SCNC: 136 MMOL/L (ref 133–144)
WBC # BLD AUTO: 5.4 10E9/L (ref 4–11)

## 2017-10-24 PROCEDURE — 25000132 ZZH RX MED GY IP 250 OP 250 PS 637: Mod: GY | Performed by: INTERNAL MEDICINE

## 2017-10-24 PROCEDURE — 25000132 ZZH RX MED GY IP 250 OP 250 PS 637: Mod: GY | Performed by: HOSPITALIST

## 2017-10-24 PROCEDURE — 73140 X-RAY EXAM OF FINGER(S): CPT | Mod: RT

## 2017-10-24 PROCEDURE — A9270 NON-COVERED ITEM OR SERVICE: HCPCS | Mod: GY | Performed by: INTERNAL MEDICINE

## 2017-10-24 PROCEDURE — 12000000 ZZH R&B MED SURG/OB

## 2017-10-24 PROCEDURE — 73090 X-RAY EXAM OF FOREARM: CPT | Mod: RT

## 2017-10-24 PROCEDURE — 85027 COMPLETE CBC AUTOMATED: CPT | Performed by: INTERNAL MEDICINE

## 2017-10-24 PROCEDURE — 99233 SBSQ HOSP IP/OBS HIGH 50: CPT | Performed by: INTERNAL MEDICINE

## 2017-10-24 PROCEDURE — 36415 COLL VENOUS BLD VENIPUNCTURE: CPT | Performed by: INTERNAL MEDICINE

## 2017-10-24 PROCEDURE — 83735 ASSAY OF MAGNESIUM: CPT | Performed by: INTERNAL MEDICINE

## 2017-10-24 PROCEDURE — 80048 BASIC METABOLIC PNL TOTAL CA: CPT | Performed by: INTERNAL MEDICINE

## 2017-10-24 PROCEDURE — 25000125 ZZHC RX 250: Performed by: INTERNAL MEDICINE

## 2017-10-24 PROCEDURE — A9270 NON-COVERED ITEM OR SERVICE: HCPCS | Mod: GY | Performed by: HOSPITALIST

## 2017-10-24 PROCEDURE — 84132 ASSAY OF SERUM POTASSIUM: CPT | Performed by: INTERNAL MEDICINE

## 2017-10-24 RX ORDER — SIMVASTATIN 10 MG
10 TABLET ORAL AT BEDTIME
Qty: 90 TABLET | Refills: 0 | Status: SHIPPED | OUTPATIENT
Start: 2017-10-24 | End: 2018-01-30

## 2017-10-24 RX ORDER — HYDROXYZINE HYDROCHLORIDE 25 MG/1
25 TABLET, FILM COATED ORAL EVERY 6 HOURS PRN
Status: DISCONTINUED | OUTPATIENT
Start: 2017-10-24 | End: 2017-10-26 | Stop reason: HOSPADM

## 2017-10-24 RX ORDER — MORPHINE SULFATE 15 MG/1
15 TABLET ORAL EVERY 4 HOURS PRN
Status: DISCONTINUED | OUTPATIENT
Start: 2017-10-24 | End: 2017-10-24

## 2017-10-24 RX ORDER — HYDROMORPHONE HYDROCHLORIDE 2 MG/1
2-4 TABLET ORAL
Status: DISCONTINUED | OUTPATIENT
Start: 2017-10-24 | End: 2017-10-26 | Stop reason: HOSPADM

## 2017-10-24 RX ORDER — FUROSEMIDE 40 MG
80 TABLET ORAL DAILY
Status: DISCONTINUED | OUTPATIENT
Start: 2017-10-25 | End: 2017-10-26 | Stop reason: HOSPADM

## 2017-10-24 RX ORDER — PREDNISONE 20 MG/1
20 TABLET ORAL ONCE
Status: COMPLETED | OUTPATIENT
Start: 2017-10-24 | End: 2017-10-24

## 2017-10-24 RX ADMIN — CALCIUM 1250 MG: 500 TABLET ORAL at 09:25

## 2017-10-24 RX ADMIN — ACETAMINOPHEN 650 MG: 325 TABLET, FILM COATED ORAL at 16:17

## 2017-10-24 RX ADMIN — ACETAMINOPHEN 650 MG: 325 TABLET, FILM COATED ORAL at 01:41

## 2017-10-24 RX ADMIN — MORPHINE SULFATE 15 MG: 15 TABLET ORAL at 16:59

## 2017-10-24 RX ADMIN — PREDNISONE 20 MG: 20 TABLET ORAL at 22:50

## 2017-10-24 RX ADMIN — DOCUSATE SODIUM 100 MG: 100 CAPSULE, LIQUID FILLED ORAL at 09:24

## 2017-10-24 RX ADMIN — DOCUSATE SODIUM 100 MG: 100 CAPSULE, LIQUID FILLED ORAL at 20:12

## 2017-10-24 RX ADMIN — POTASSIUM CHLORIDE 20 MEQ: 1500 TABLET, EXTENDED RELEASE ORAL at 20:13

## 2017-10-24 RX ADMIN — GABAPENTIN 100 MG: 100 CAPSULE ORAL at 20:11

## 2017-10-24 RX ADMIN — POTASSIUM CHLORIDE 20 MEQ: 1500 TABLET, EXTENDED RELEASE ORAL at 09:27

## 2017-10-24 RX ADMIN — HYDRALAZINE HYDROCHLORIDE 75 MG: 25 TABLET ORAL at 13:20

## 2017-10-24 RX ADMIN — FUROSEMIDE 80 MG: 40 TABLET ORAL at 09:21

## 2017-10-24 RX ADMIN — HYDROMORPHONE HYDROCHLORIDE 4 MG: 2 TABLET ORAL at 19:13

## 2017-10-24 RX ADMIN — HYDROMORPHONE HYDROCHLORIDE 4 MG: 2 TABLET ORAL at 22:32

## 2017-10-24 RX ADMIN — ISOSORBIDE MONONITRATE 30 MG: 30 TABLET, EXTENDED RELEASE ORAL at 09:20

## 2017-10-24 RX ADMIN — HYDRALAZINE HYDROCHLORIDE 75 MG: 25 TABLET ORAL at 20:38

## 2017-10-24 RX ADMIN — VENLAFAXINE HYDROCHLORIDE 75 MG: 75 TABLET, EXTENDED RELEASE ORAL at 09:19

## 2017-10-24 RX ADMIN — HYDROXYZINE HYDROCHLORIDE 25 MG: 25 TABLET ORAL at 20:37

## 2017-10-24 RX ADMIN — GABAPENTIN 100 MG: 100 CAPSULE ORAL at 13:20

## 2017-10-24 RX ADMIN — GABAPENTIN 100 MG: 100 CAPSULE ORAL at 09:23

## 2017-10-24 RX ADMIN — OMEPRAZOLE 20 MG: 20 CAPSULE, DELAYED RELEASE ORAL at 09:18

## 2017-10-24 RX ADMIN — POTASSIUM CHLORIDE 20 MEQ: 1500 TABLET, EXTENDED RELEASE ORAL at 13:20

## 2017-10-24 RX ADMIN — FERROUS SULFATE TAB 325 MG (65 MG ELEMENTAL FE) 325 MG: 325 (65 FE) TAB at 20:12

## 2017-10-24 RX ADMIN — CALCIUM 1250 MG: 500 TABLET ORAL at 17:53

## 2017-10-24 RX ADMIN — OXYCODONE HYDROCHLORIDE 10 MG: 5 TABLET ORAL at 01:40

## 2017-10-24 RX ADMIN — COLCHICINE 0.6 MG: 0.6 TABLET, FILM COATED ORAL at 09:29

## 2017-10-24 RX ADMIN — OXYCODONE HYDROCHLORIDE 20 MG: 10 TABLET, FILM COATED, EXTENDED RELEASE ORAL at 06:50

## 2017-10-24 RX ADMIN — OXYCODONE HYDROCHLORIDE 10 MG: 5 TABLET ORAL at 09:35

## 2017-10-24 RX ADMIN — VITAMIN D, TAB 1000IU (100/BT) 1000 UNITS: 25 TAB at 09:25

## 2017-10-24 RX ADMIN — HYDRALAZINE HYDROCHLORIDE 75 MG: 25 TABLET ORAL at 09:22

## 2017-10-24 RX ADMIN — FERROUS SULFATE TAB 325 MG (65 MG ELEMENTAL FE) 325 MG: 325 (65 FE) TAB at 09:24

## 2017-10-24 ASSESSMENT — PAIN DESCRIPTION - DESCRIPTORS
DESCRIPTORS: BURNING;SHOOTING
DESCRIPTORS: ACHING
DESCRIPTORS: BURNING

## 2017-10-24 NOTE — PLAN OF CARE
Problem: Fracture Orthopaedic (Adult)  Goal: Signs and Symptoms of Listed Potential Problems Will be Absent, Minimized or Managed (Fracture Orthopaedic)  Signs and symptoms of listed potential problems will be absent, minimized or managed by discharge/transition of care (reference Fracture Orthopaedic (Adult) CPG).   Outcome: Improving  Afeb, vss, lots of pain in the arm and in the hand, xray done on the hand to check for fracture, results pending. Rates her pain a 9 all the time, states no help from oxycodone, pain med changed to morphine IR, will give first dose when pharmacy send it up.  Lasix decreased back to once a day after a 10lb wt loss from yesterday after the dose increased. Sats >_ 92% on r/a. Up with jessenia steady to commode and to chair for lunch, tolerated well. Continues to be  Incontinent, using the external catheter while in bed. Plans on going to TCU on discharge.

## 2017-10-24 NOTE — PLAN OF CARE
Problem: Patient Care Overview  Goal: Plan of Care/Patient Progress Review  Outcome: Improving  A&O x4. BP slightly elevated, pulse in the high 50s, otherwise VSS. Using O2 at 1LPM to keep sats >90%. LS diminished. BS hypoactive. Bruising to bilateral arms, lidoderm patches to R arm and sling in place, extremity elevated on pillows at heart level. Mepilex dressing in place to upper back. calvin PO well. up with A2 and sarasteady. PO oxycodone managing pain. voiding in good amts via external catheter to wall suction, catheter changed at 0300. Plans to go to TCU on discharge when stable, prefers [facility].

## 2017-10-24 NOTE — PROGRESS NOTES
SWS    D: Discharge planning.. per discussion with hospitalist pt will not be ready for transfer today to rehab facility. YULISA has spoken to [facility] admissions coordinator, bed availability there for tomorrow is undetermined at this time. YULISA has contacted and left message for daughterRebecca with information noted above, and inquiring if pt/family have discussed other facility considerations should [facility] not have opening tomorrow.

## 2017-10-24 NOTE — TELEPHONE ENCOUNTER
LOV: 1/18/17  Prescription approved per [facility] Refill Protocol.

## 2017-10-24 NOTE — PROGRESS NOTES
Ortho    I had a discussion with the patient and reviewed the CT scan of the right shoulder showing an impacted proximal humerus fracture.  Based on the fracture pattern with minimal bone to work with at the humeral head, I believe that a reverse shoulder arthroplasty would be best from an operative approach.  Given her medical comorbidities and the fact that she may go on to heal this fracture and have minimal pain, albeit with more limited use of this right shoulder, I still feel that an ongoing trial of nonoperative management is most appropriate at this time.  She should remain in the sling and follow-up in 1 week in my clinic.  Delaying the surgery would not have a negative impact on the outcome if she fails nonop management.      Nelson Urbano MD  [phone]

## 2017-10-24 NOTE — PROVIDER NOTIFICATION
Web based paged: noah Roy the Morphine has not resulted in any pain change at this point. Also, there is still an active tele order, should this be dc'd? Thanks!

## 2017-10-24 NOTE — PROGRESS NOTES
"CLINICAL NUTRITION SERVICES  -  ASSESSMENT NOTE    Recommendations Ordered by Registered Dietitian (RD):   Oral nutrition supplements @ 2pm per pt request    Malnutrition: None     REASON FOR ASSESSMENT  Keisha Godinez is a 85 year old female seen by Registered Dietitian for LOS    NUTRITION HISTORY  - Information obtained from Keisha.   - has been eating normally at home \"oh everything in sight\".   - reports no decrease in appetite.   - eats meat, eggs, cottage cheese, yogurt and milk at home.   - she avoids eating eggs here as she only tolerates them cooked a certain way.   - Takes either Ensure or Boost 1x daily      CURRENT NUTRITION ORDERS  Diet Order:     Regular     Current Intake/Tolerance:  Documentation indicates % intake of adequate meals. Pt reports normal appetite, requests Ensure once daily.       PHYSICAL FINDINGS  Observed  No nutrition-related physical findings observed  Obtained from Chart/Interdisciplinary Team  Trace-mild edema    ANTHROPOMETRICS  Height: 5' 3\"  Weight: 70.3 kg  Body mass index is 27.5 kg/(m^2).  Weight Status:  Overweight BMI 25-29.9  IBW: 52.3 kg  % IBW: 134%  Weight History: No weight loss indicated  Wt Readings from Last 10 Encounters:   10/24/17 74.3 kg (163 lb 14.4 oz)   01/18/17 68.9 kg (151 lb 12.8 oz)   12/08/16 70.3 kg (155 lb)   11/30/16 69.2 kg (152 lb 9.6 oz)   09/13/16 68 kg (150 lb)   08/02/16 68.9 kg (152 lb)   08/01/16 69 kg (152 lb 3.2 oz)   07/25/16 69.7 kg (153 lb 9.6 oz)   07/22/16 71.1 kg (156 lb 12.8 oz)   07/19/16 69.4 kg (153 lb)       LABS  Labs reviewed    MEDICATIONS  Medications reviewed  Os-Irwin BID  Cholecalciferol 1000 units daily  Iron 325 mg BID    Dosing Weight 56.8 kg    ASSESSED NUTRITION NEEDS PER APPROVED PRACTICE GUIDELINES:  Estimated Energy Needs: 0875-0851 kcals (25-30 Kcal/Kg)  Justification: maintenance  Estimated Protein Needs: 68-85 grams protein (1.2-1.5 g pro/Kg)  Justification: preservation of lean body mass  Estimated " Fluid Needs: >1 mL/kcal    Justification: maintenance    MALNUTRITION:  % Weight Loss:  None noted  % Intake:  No decreased intake noted  Subcutaneous Fat Loss:  None observed  Muscle Loss:  None observed  Fluid Retention:  Mild hand edema - does not meet criteria     Malnutrition Diagnosis: Patient does not meet two of the above criteria necessary for diagnosing malnutrition    NUTRITION DIAGNOSIS:  No nutrition diagnosis identified at this time    NUTRITION INTERVENTIONS  Recommendations / Nutrition Prescription  Continue diet as ordered    Ensure once daily between meals per pt request.       Implementation  Nutrition education: No education needs assessed at this time  Medical Food Supplement:as above       Nutrition Goals  Pt to tolerate at least 50-75% of meals TID + 1 ensure daily.       MONITORING AND EVALUATION:  Progress towards goals will be monitored and evaluated per protocol and Practice Guidelines      Kayleigh Mitchell RD, LD  3rd floor/ICU: [phone]  All other floors: [phone]  Weekend/holiday: [phone]  Office: [phone]

## 2017-10-24 NOTE — PLAN OF CARE
Problem: Fracture Orthopaedic (Adult)  Goal: Signs and Symptoms of Listed Potential Problems Will be Absent, Minimized or Managed (Fracture Orthopaedic)  Signs and symptoms of listed potential problems will be absent, minimized or managed by discharge/transition of care (reference Fracture Orthopaedic (Adult) CPG).   Outcome: Improving  Alert and oriented. O2 sats 85% on RA, 94% on 1L O2. On remote tele, controlled atrial fibrillation per tele tech, HR in 50s. Tolerating regular diet. Transfers with Ax2, gait belt, and Vannessa Steady. Bruising noted on both arms. Using Purewick external catheter, incontinent of urine when up in chair. Pain managed with scheduled Oxycontin, Tylenol, and prn Oxycodone. Plans d/c to [facility] TCU.

## 2017-10-25 ENCOUNTER — APPOINTMENT (OUTPATIENT)
Dept: GENERAL RADIOLOGY | Facility: CLINIC | Age: 82
DRG: 562 | End: 2017-10-25
Attending: INTERNAL MEDICINE
Payer: MEDICARE

## 2017-10-25 LAB
ANION GAP SERPL CALCULATED.3IONS-SCNC: 3 MMOL/L (ref 3–14)
BUN SERPL-MCNC: 22 MG/DL (ref 7–30)
CALCIUM SERPL-MCNC: 9.1 MG/DL (ref 8.5–10.1)
CHLORIDE SERPL-SCNC: 101 MMOL/L (ref 94–109)
CO2 SERPL-SCNC: 35 MMOL/L (ref 20–32)
CREAT SERPL-MCNC: 0.8 MG/DL (ref 0.52–1.04)
ERYTHROCYTE [DISTWIDTH] IN BLOOD BY AUTOMATED COUNT: 13.1 % (ref 10–15)
GFR SERPL CREATININE-BSD FRML MDRD: 68 ML/MIN/1.7M2
GLUCOSE SERPL-MCNC: 111 MG/DL (ref 70–99)
HCT VFR BLD AUTO: 30.3 % (ref 35–47)
HGB BLD-MCNC: 9.3 G/DL (ref 11.7–15.7)
MAGNESIUM SERPL-MCNC: 2.2 MG/DL (ref 1.6–2.3)
MCH RBC QN AUTO: 29.5 PG (ref 26.5–33)
MCHC RBC AUTO-ENTMCNC: 30.7 G/DL (ref 31.5–36.5)
MCV RBC AUTO: 96 FL (ref 78–100)
PLATELET # BLD AUTO: 187 10E9/L (ref 150–450)
POTASSIUM SERPL-SCNC: 4.5 MMOL/L (ref 3.4–5.3)
RADIOLOGIST FLAGS: ABNORMAL
RBC # BLD AUTO: 3.15 10E12/L (ref 3.8–5.2)
SODIUM SERPL-SCNC: 139 MMOL/L (ref 133–144)
WBC # BLD AUTO: 6.7 10E9/L (ref 4–11)

## 2017-10-25 PROCEDURE — 83735 ASSAY OF MAGNESIUM: CPT | Performed by: INTERNAL MEDICINE

## 2017-10-25 PROCEDURE — 99233 SBSQ HOSP IP/OBS HIGH 50: CPT | Performed by: INTERNAL MEDICINE

## 2017-10-25 PROCEDURE — 25000132 ZZH RX MED GY IP 250 OP 250 PS 637: Mod: GY | Performed by: HOSPITALIST

## 2017-10-25 PROCEDURE — 80048 BASIC METABOLIC PNL TOTAL CA: CPT | Performed by: INTERNAL MEDICINE

## 2017-10-25 PROCEDURE — 36415 COLL VENOUS BLD VENIPUNCTURE: CPT | Performed by: INTERNAL MEDICINE

## 2017-10-25 PROCEDURE — A9270 NON-COVERED ITEM OR SERVICE: HCPCS | Mod: GY | Performed by: HOSPITALIST

## 2017-10-25 PROCEDURE — 25000132 ZZH RX MED GY IP 250 OP 250 PS 637: Mod: GY | Performed by: INTERNAL MEDICINE

## 2017-10-25 PROCEDURE — 84132 ASSAY OF SERUM POTASSIUM: CPT | Performed by: INTERNAL MEDICINE

## 2017-10-25 PROCEDURE — 73080 X-RAY EXAM OF ELBOW: CPT | Mod: RT

## 2017-10-25 PROCEDURE — A9270 NON-COVERED ITEM OR SERVICE: HCPCS | Mod: GY | Performed by: INTERNAL MEDICINE

## 2017-10-25 PROCEDURE — 12000000 ZZH R&B MED SURG/OB

## 2017-10-25 PROCEDURE — 99207 ZZC CDG-MDM COMPONENT: MEETS MODERATE - UP CODED: CPT | Performed by: INTERNAL MEDICINE

## 2017-10-25 PROCEDURE — 85027 COMPLETE CBC AUTOMATED: CPT | Performed by: INTERNAL MEDICINE

## 2017-10-25 RX ORDER — COLCHICINE 0.6 MG/1
0.6 TABLET ORAL DAILY
Status: DISCONTINUED | OUTPATIENT
Start: 2017-10-25 | End: 2017-10-26 | Stop reason: HOSPADM

## 2017-10-25 RX ADMIN — HYDROMORPHONE HYDROCHLORIDE 4 MG: 2 TABLET ORAL at 18:09

## 2017-10-25 RX ADMIN — GABAPENTIN 100 MG: 100 CAPSULE ORAL at 20:53

## 2017-10-25 RX ADMIN — HYDROMORPHONE HYDROCHLORIDE 2 MG: 2 TABLET ORAL at 12:48

## 2017-10-25 RX ADMIN — POTASSIUM CHLORIDE 20 MEQ: 1500 TABLET, EXTENDED RELEASE ORAL at 20:53

## 2017-10-25 RX ADMIN — HYDROMORPHONE HYDROCHLORIDE 4 MG: 2 TABLET ORAL at 21:59

## 2017-10-25 RX ADMIN — GABAPENTIN 100 MG: 100 CAPSULE ORAL at 08:48

## 2017-10-25 RX ADMIN — GABAPENTIN 100 MG: 100 CAPSULE ORAL at 14:59

## 2017-10-25 RX ADMIN — POTASSIUM CHLORIDE 20 MEQ: 1500 TABLET, EXTENDED RELEASE ORAL at 15:00

## 2017-10-25 RX ADMIN — VENLAFAXINE HYDROCHLORIDE 75 MG: 75 TABLET, EXTENDED RELEASE ORAL at 08:49

## 2017-10-25 RX ADMIN — CALCIUM 1250 MG: 500 TABLET ORAL at 08:50

## 2017-10-25 RX ADMIN — COLCHICINE 0.6 MG: 0.6 TABLET, FILM COATED ORAL at 16:43

## 2017-10-25 RX ADMIN — ISOSORBIDE MONONITRATE 30 MG: 30 TABLET, EXTENDED RELEASE ORAL at 08:49

## 2017-10-25 RX ADMIN — HYDRALAZINE HYDROCHLORIDE 75 MG: 25 TABLET ORAL at 20:52

## 2017-10-25 RX ADMIN — FERROUS SULFATE TAB 325 MG (65 MG ELEMENTAL FE) 325 MG: 325 (65 FE) TAB at 20:53

## 2017-10-25 RX ADMIN — LIDOCAINE 2 PATCH: 50 PATCH CUTANEOUS at 20:53

## 2017-10-25 RX ADMIN — DOCUSATE SODIUM 100 MG: 100 CAPSULE, LIQUID FILLED ORAL at 20:53

## 2017-10-25 RX ADMIN — HYDROMORPHONE HYDROCHLORIDE 4 MG: 2 TABLET ORAL at 05:56

## 2017-10-25 RX ADMIN — FUROSEMIDE 80 MG: 40 TABLET ORAL at 08:50

## 2017-10-25 RX ADMIN — FERROUS SULFATE TAB 325 MG (65 MG ELEMENTAL FE) 325 MG: 325 (65 FE) TAB at 08:49

## 2017-10-25 RX ADMIN — VITAMIN D, TAB 1000IU (100/BT) 1000 UNITS: 25 TAB at 08:49

## 2017-10-25 RX ADMIN — ACETAMINOPHEN 650 MG: 325 TABLET, FILM COATED ORAL at 01:11

## 2017-10-25 RX ADMIN — HYDRALAZINE HYDROCHLORIDE 75 MG: 25 TABLET ORAL at 14:59

## 2017-10-25 RX ADMIN — CALCIUM 1250 MG: 500 TABLET ORAL at 18:12

## 2017-10-25 RX ADMIN — OMEPRAZOLE 20 MG: 20 CAPSULE, DELAYED RELEASE ORAL at 08:48

## 2017-10-25 RX ADMIN — HYDROMORPHONE HYDROCHLORIDE 2 MG: 2 TABLET ORAL at 15:02

## 2017-10-25 RX ADMIN — POTASSIUM CHLORIDE 20 MEQ: 1500 TABLET, EXTENDED RELEASE ORAL at 08:50

## 2017-10-25 RX ADMIN — HYDRALAZINE HYDROCHLORIDE 75 MG: 25 TABLET ORAL at 08:50

## 2017-10-25 RX ADMIN — DOCUSATE SODIUM 100 MG: 100 CAPSULE, LIQUID FILLED ORAL at 08:49

## 2017-10-25 NOTE — PLAN OF CARE
Problem: Patient Care Overview  Goal: Plan of Care/Patient Progress Review  OT- Attempted to see patient for OT, however results of x-ray to R hand and foream still pending. Will wait for result before proceeding with further ROM and activity of R UE. Will return today as OT schedule allows.

## 2017-10-25 NOTE — PROGRESS NOTES
[facility]  Hospitalist Progress Note  Gabe Porter MD 10/25/2017    Reason for Stay (Diagnosis): right humerus fracture         Assessment and Plan:      Summary of Stay: Keisha Godinez is a 85 year old female admitted on 10/17/2017 after afall with complaint of severe right shoulder pain. She was found to have a minimally displaced proximal humerus fracture, and she was admitted for Orthopedic evaluation and disposition.     At this time, the Orthopedist has recommended non-operative management, but pt is in intense pain. Unclear if this can be improved upon with oral medications.     Problem List:   1. Right humerus fracture. Question radicular pain assoc with fracture. Pain is poorly controlled. Dr. Urbano further evaluated the patient's fracture at my request and CT did not convince him that alternative management is appropriate. Pt has not appreciated improvement in pain control with Oxycontin scheduled along with Oxycodone. Tried MSO4 also today, but pt did not think it helped. Will again try dilaudid.  2. Diastolic heart failure, appears euvolemic now.  3. Chronic atrial fibrillation. Not on anticoagulation due to hx GI Bleed.  4. Right fifth digit swelling and pain. X-rays do not confirm any additional fractures. A second dose of Prednisone resulted in resolution of the fifth finger swelling and pain, consistent with Gouty flair.    PLAN:  1. I called and briefly spoke with Dr. Urbano about this patient due to my concern for possible proximal radial head fracture.   2. Dilaudid is more effective than Oxycodone and Morphine for pain, but pt does become quite sedated. Added Hydroxyzine.  3. Resume Colchcine.  4. Cont with daily Lasix at 80 mg.     DVT Prophylaxis: Pneumatic Compression Devices  Code Status: Full Code (though this should be re-considered)  Discharge Dispo: TCU  Estimated Disch Date / # of Days until Disch: TBD, possibly tomrrow.        Interval History (Subjective):     "    Continues to complain of pain. Denies SOB and cough. Unable to sleep due to the pain.  No constipation. Appetite poor.     Trial of MSO4 today did not result in improvement of pain.                   Physical Exam:      Last Vital Signs:  /53 (BP Location: Left arm)  Pulse 57  Temp 98.7  F (37.1  C) (Axillary)  Resp 16  Ht 1.6 m (5' 3\")  Wt 74.9 kg (165 lb 3.2 oz)  SpO2 96%  BMI 29.26 kg/m2    I/O last 3 completed shifts:  In: 1093 [P.O.:1090; I.V.:3]  Out: 650 [Urine:650]    Constitutional: Awake, alert, cooperative, uncomfortable appearing. Complaining of dizziness after dose of Oxycodone.   Respiratory: Clear to auscultation bilaterally, no crackles or wheezing   Cardiovascular: IRRIR no murmur noted   Abdomen: Normal bowel sounds, soft, non-distended, non-tender   Skin: No rashes, no cyanosis, dry to touch   Neuro: Alert and oriented x3, no weakness, numbness, memory loss   Extremities: Dense bilat ankle edema. Intense pain over right shoulder. Right fifth digit is now tender to deep palpation, but minimal swelling/erythema.   Other(s):        All other systems: Negative          Medications:      All current medications were reviewed with changes reflected in problem list.         Data:      All new lab and imaging data was reviewed.   Labs/Imaging:  Results for orders placed or performed during the hospital encounter of 10/17/17 (from the past 24 hour(s))   Basic metabolic panel   Result Value Ref Range    Sodium 139 133 - 144 mmol/L    Potassium 4.5 3.4 - 5.3 mmol/L    Chloride 101 94 - 109 mmol/L    Carbon Dioxide 35 (H) 20 - 32 mmol/L    Anion Gap 3 3 - 14 mmol/L    Glucose 111 (H) 70 - 99 mg/dL    Urea Nitrogen 22 7 - 30 mg/dL    Creatinine 0.80 0.52 - 1.04 mg/dL    GFR Estimate 68 >60 mL/min/1.7m2    GFR Estimate If Black 83 >60 mL/min/1.7m2    Calcium 9.1 8.5 - 10.1 mg/dL   CBC with platelets   Result Value Ref Range    WBC 6.7 4.0 - 11.0 10e9/L    RBC Count 3.15 (L) 3.8 - 5.2 10e12/L    " Hemoglobin 9.3 (L) 11.7 - 15.7 g/dL    Hematocrit 30.3 (L) 35.0 - 47.0 %    MCV 96 78 - 100 fl    MCH 29.5 26.5 - 33.0 pg    MCHC 30.7 (L) 31.5 - 36.5 g/dL    RDW 13.1 10.0 - 15.0 %    Platelet Count 187 150 - 450 10e9/L   Magnesium   Result Value Ref Range    Magnesium 2.2 1.6 - 2.3 mg/dL   XR Elbow Port Right G/E 3 Views    Narrative    XR ELBOW PORT RT 3 G/E VW  10/25/2017 10:11 AM    HISTORY:  suspected proximal radial head fracture    COMPARISON:  Forearm series 10/24/2017.      Impression    IMPRESSION:  No evidence for elevated posterior fat pad to suggest  hemarthrosis. Some cortical irregularities of the radial head again  suggest fracture, age indeterminate.    SAQIB CROOKS MD

## 2017-10-25 NOTE — PROGRESS NOTES
MANUEL     D: Discharge planning continuing.. per MD pt will not be ready for discharge today, YULISA has notified [facility], bed availability for tomorrow is undetermined. SW awaiting contact from family regarding other facility considerations in case [facility] does not have opening when determined medically stable for transfer.

## 2017-10-25 NOTE — PROGRESS NOTES
[facility]  Hospitalist Progress Note  Gabe Porter MD 10/24/2017    Reason for Stay (Diagnosis): right humerus fracture         Assessment and Plan:      Summary of Stay: Keisha Godinez is a 85 year old female admitted on 10/17/2017 after afall with complaint of severe right shoulder pain. She was found to have a minimally displaced proximal humerus fracture, and she was admitted for Orthopedic evaluation and disposition.     At this time, the Orthopedist has recommended non-operative management, but pt is in intense pain. Unclear if this can be improved upon with oral medications.     Problem List:   1. Right humerus fracture. Question radicular pain assoc with fracture. Pain is poorly controlled. Dr. Urbano further evaluated the patient's fracture at my request and CT did not convince him that alternative management is appropriate. Pt has not appreciated improvement in pain control with Oxycontin scheduled along with Oxycodone. Tried MSO4 also today, but pt did not think it helped. Will again try dilaudid.  2. Diastolic heart failure, appears euvolemic now.  3. Chronic atrial fibrillation. Not on anticoagulation due to hx GI Bleed.  4. Right fifth digit swelling and pain. Not improved with a single dose of Prednisone. X-rays do not confirm any additional fractures.    PLAN:  1. I called and briefly spoke with Dr. Urbano about this patient due to my concern for her inadequately controlled shoulder pain. He indicated that eh would come and see her later today.   2. Change from MSO4 to oral Dilaudid to see if that is more effective. Add Hydroxyzine.  3. Second dose of Prednisone tonight.   4. Cont with daily Lasix at 80 mg.       DVT Prophylaxis: Pneumatic Compression Devices  Code Status: Full Code (though this should be re-considered)  Discharge Dispo: TCU  Estimated Disch Date / # of Days until Disch: TBD, likely 1-2 more days.        Interval History (Subjective):        Continues to complain of  "extraordinary pain. Denies SOB and cough. Unable to sleep due to the pain. (Oxycodone does make her sleepy, but she is disrupted often in the night)  No constipation. Appetite poor.     Trial of MSO4 today did not result in improvement of pain.                   Physical Exam:      Last Vital Signs:  /44 (BP Location: Left arm)  Pulse 57  Temp 98.1  F (36.7  C) (Oral)  Resp 16  Ht 1.6 m (5' 3\")  Wt 74.3 kg (163 lb 14.4 oz)  SpO2 94%  BMI 29.03 kg/m2    I/O last 3 completed shifts:  In: 480 [P.O.:480]  Out: 700 [Urine:700]    Constitutional: Awake, alert, cooperative, uncomfortable appearing. Complaining of dizziness after dose of Oxycodone.   Respiratory: Clear to auscultation bilaterally, no crackles or wheezing   Cardiovascular: IRRIR no murmur noted   Abdomen: Normal bowel sounds, soft, non-distended, non-tender   Skin: No rashes, no cyanosis, dry to touch   Neuro: Alert and oriented x3, no weakness, numbness, memory loss   Extremities: Dense bilat ankle edema. Intense pain over right shoulder. Right fifth digit also is hot, red and slightly less tender to touch.   Other(s):        All other systems: Negative          Medications:      All current medications were reviewed with changes reflected in problem list.         Data:      All new lab and imaging data was reviewed.   Labs/Imaging:  Results for orders placed or performed during the hospital encounter of 10/17/17 (from the past 24 hour(s))   Basic metabolic panel   Result Value Ref Range    Sodium 136 133 - 144 mmol/L    Potassium 4.6 3.4 - 5.3 mmol/L    Chloride 100 94 - 109 mmol/L    Carbon Dioxide 34 (H) 20 - 32 mmol/L    Anion Gap 2 (L) 3 - 14 mmol/L    Glucose 97 70 - 99 mg/dL    Urea Nitrogen 21 7 - 30 mg/dL    Creatinine 0.77 0.52 - 1.04 mg/dL    GFR Estimate 71 >60 mL/min/1.7m2    GFR Estimate If Black 86 >60 mL/min/1.7m2    Calcium 8.5 8.5 - 10.1 mg/dL   CBC with platelets   Result Value Ref Range    WBC 5.4 4.0 - 11.0 10e9/L    RBC " Count 2.90 (L) 3.8 - 5.2 10e12/L    Hemoglobin 8.7 (L) 11.7 - 15.7 g/dL    Hematocrit 27.4 (L) 35.0 - 47.0 %    MCV 95 78 - 100 fl    MCH 30.0 26.5 - 33.0 pg    MCHC 31.8 31.5 - 36.5 g/dL    RDW 12.7 10.0 - 15.0 %    Platelet Count 159 150 - 450 10e9/L   Magnesium   Result Value Ref Range    Magnesium 2.0 1.6 - 2.3 mg/dL

## 2017-10-25 NOTE — PLAN OF CARE
Problem: Patient Care Overview  Goal: Plan of Care/Patient Progress Review  Outcome: Improving  Pt. Had trouble with pain management during shift despite new orders. Did report 9/10 vs.10/10 towards end of shift with Atarax and IV dilaudid. Pure wick in use and effective. Edema continues in right hand, lasix to help manage. X-ray to right hand in process.

## 2017-10-25 NOTE — PROVIDER NOTIFICATION
Web based paged: Hi, would you like parameters for this patient's hydralazine? BP tonight was 122/44. Thanks!

## 2017-10-25 NOTE — PLAN OF CARE
Problem: Patient Care Overview  Goal: Plan of Care/Patient Progress Review  Outcome: Improving  A&O x4. Bradycardic, otherwise VSS. LS diminished but clear. BS active x4. Mepliex dressing to upper back, dried skin under. R arm in sling, +2 swelling to RUE, +1 pulse to RUE with weak . Baseline neuropathy present. calvin PO well. up with A2 with sarasteady. PO dilaudid and vistaril managing pain, pt does c/o sleepiness related to these. voiding in good amts via external female catheter. Catheter was changed at 0130. plans to go to [facility] on discharge.

## 2017-10-25 NOTE — PLAN OF CARE
Problem: Fracture Orthopaedic (Adult)  Goal: Signs and Symptoms of Listed Potential Problems Will be Absent, Minimized or Managed (Fracture Orthopaedic)  Signs and symptoms of listed potential problems will be absent, minimized or managed by discharge/transition of care (reference Fracture Orthopaedic (Adult) CPG).   Outcome: No Change  Afeb, vss, post cvs nb/t on right side, o/w cms intact. Right shoulder is bruised and swollen and very painful. Pt was able to sleep after 4mg Dilaudid. When 2mg tried pt able to doze but stated pain was not better so second 2mg tablet given. Up with jessenia steady to commode and chair for the afternoon. Did void and had BM on the commode the second trip there. External cath used with partial success. Elbow re-xrayed to visualize the area better after the forearm yesterday. Showed an old fracture, ortho recommended nothing different in her treatment. Awaiting transfer to TCU

## 2017-10-26 ENCOUNTER — APPOINTMENT (OUTPATIENT)
Dept: OCCUPATIONAL THERAPY | Facility: CLINIC | Age: 82
DRG: 562 | End: 2017-10-26
Payer: MEDICARE

## 2017-10-26 VITALS
SYSTOLIC BLOOD PRESSURE: 136 MMHG | WEIGHT: 161.5 LBS | HEART RATE: 57 BPM | DIASTOLIC BLOOD PRESSURE: 42 MMHG | OXYGEN SATURATION: 97 % | BODY MASS INDEX: 28.62 KG/M2 | TEMPERATURE: 97.5 F | RESPIRATION RATE: 16 BRPM | HEIGHT: 63 IN

## 2017-10-26 PROBLEM — I05.1 RHEUMATIC MITRAL REGURGITATION: Status: ACTIVE | Noted: 2017-10-26

## 2017-10-26 PROCEDURE — 25000132 ZZH RX MED GY IP 250 OP 250 PS 637: Mod: GY | Performed by: INTERNAL MEDICINE

## 2017-10-26 PROCEDURE — 99239 HOSP IP/OBS DSCHRG MGMT >30: CPT | Performed by: INTERNAL MEDICINE

## 2017-10-26 PROCEDURE — A9270 NON-COVERED ITEM OR SERVICE: HCPCS | Mod: GY | Performed by: INTERNAL MEDICINE

## 2017-10-26 PROCEDURE — 25000132 ZZH RX MED GY IP 250 OP 250 PS 637: Mod: GY | Performed by: HOSPITALIST

## 2017-10-26 PROCEDURE — A9270 NON-COVERED ITEM OR SERVICE: HCPCS | Mod: GY | Performed by: HOSPITALIST

## 2017-10-26 PROCEDURE — 40000133 ZZH STATISTIC OT WARD VISIT: Performed by: OCCUPATIONAL THERAPIST

## 2017-10-26 PROCEDURE — 97535 SELF CARE MNGMENT TRAINING: CPT | Mod: GO | Performed by: OCCUPATIONAL THERAPIST

## 2017-10-26 RX ORDER — COLCHICINE 0.6 MG/1
0.6 TABLET ORAL DAILY
Qty: 14 TABLET | Refills: 0 | Status: ON HOLD | DISCHARGE
Start: 2017-10-26 | End: 2017-10-28

## 2017-10-26 RX ORDER — HYDROXYZINE HYDROCHLORIDE 25 MG/1
25 TABLET, FILM COATED ORAL 3 TIMES DAILY PRN
Qty: 60 TABLET | Refills: 0 | Status: ON HOLD | DISCHARGE
Start: 2017-10-26 | End: 2018-05-21

## 2017-10-26 RX ORDER — HYDROMORPHONE HYDROCHLORIDE 2 MG/1
2-4 TABLET ORAL
Qty: 90 TABLET | Refills: 0 | Status: SHIPPED | DISCHARGE
Start: 2017-10-26 | End: 2018-05-21

## 2017-10-26 RX ADMIN — VITAMIN D, TAB 1000IU (100/BT) 1000 UNITS: 25 TAB at 09:51

## 2017-10-26 RX ADMIN — GABAPENTIN 100 MG: 100 CAPSULE ORAL at 09:51

## 2017-10-26 RX ADMIN — ISOSORBIDE MONONITRATE 30 MG: 30 TABLET, EXTENDED RELEASE ORAL at 09:52

## 2017-10-26 RX ADMIN — HYDROMORPHONE HYDROCHLORIDE 4 MG: 2 TABLET ORAL at 04:53

## 2017-10-26 RX ADMIN — DOCUSATE SODIUM 100 MG: 100 CAPSULE, LIQUID FILLED ORAL at 09:52

## 2017-10-26 RX ADMIN — ACETAMINOPHEN 650 MG: 325 TABLET, FILM COATED ORAL at 01:09

## 2017-10-26 RX ADMIN — OMEPRAZOLE 20 MG: 20 CAPSULE, DELAYED RELEASE ORAL at 09:52

## 2017-10-26 RX ADMIN — HYDRALAZINE HYDROCHLORIDE 75 MG: 25 TABLET ORAL at 09:52

## 2017-10-26 RX ADMIN — POTASSIUM CHLORIDE 20 MEQ: 1500 TABLET, EXTENDED RELEASE ORAL at 13:43

## 2017-10-26 RX ADMIN — FERROUS SULFATE TAB 325 MG (65 MG ELEMENTAL FE) 325 MG: 325 (65 FE) TAB at 09:52

## 2017-10-26 RX ADMIN — COLCHICINE 0.6 MG: 0.6 TABLET, FILM COATED ORAL at 09:51

## 2017-10-26 RX ADMIN — GABAPENTIN 100 MG: 100 CAPSULE ORAL at 13:43

## 2017-10-26 RX ADMIN — HYDROMORPHONE HYDROCHLORIDE 4 MG: 2 TABLET ORAL at 01:09

## 2017-10-26 RX ADMIN — HYDROMORPHONE HYDROCHLORIDE 2 MG: 2 TABLET ORAL at 16:40

## 2017-10-26 RX ADMIN — HYDRALAZINE HYDROCHLORIDE 75 MG: 25 TABLET ORAL at 13:43

## 2017-10-26 RX ADMIN — CALCIUM 1250 MG: 500 TABLET ORAL at 09:51

## 2017-10-26 RX ADMIN — POTASSIUM CHLORIDE 20 MEQ: 1500 TABLET, EXTENDED RELEASE ORAL at 09:51

## 2017-10-26 RX ADMIN — VENLAFAXINE HYDROCHLORIDE 75 MG: 75 TABLET, EXTENDED RELEASE ORAL at 09:52

## 2017-10-26 RX ADMIN — FUROSEMIDE 80 MG: 40 TABLET ORAL at 09:51

## 2017-10-26 NOTE — PROVIDER NOTIFICATION
FYi- Pt heart rate this morning is 36-43. Pt resting in bed, says she doesn't feel any different beside maybe a little dizzy. BP okay at 145/40

## 2017-10-26 NOTE — PLAN OF CARE
Problem: Patient Care Overview  Goal: Plan of Care/Patient Progress Review  Outcome: Improving  Evening RN  A/O.  VSS, afebrile.  Pain managed with oral dilaudid 2 mg, giving smaller dose d/t sedation.  Numbness and tingling to RUE, bruising and swelling in arm and hand, in sling at all times, ice and elevated on pillows.  Incontinent on urine, external catheter in place with great output.  Tolerating regular diet.  Up with VANNESA and jessenia ding.  Pt given copy of discharge instructions, sent with her in her bag of belongings.  Pt picked up by HE via WC at 1740.

## 2017-10-26 NOTE — PLAN OF CARE
Problem: Fracture Orthopaedic (Adult)  Goal: Signs and Symptoms of Listed Potential Problems Will be Absent, Minimized or Managed (Fracture Orthopaedic)  Signs and symptoms of listed potential problems will be absent, minimized or managed by discharge/transition of care (reference Fracture Orthopaedic (Adult) CPG).   Outcome: No Change  Evening RN  A/O.  VSS, afebrile.  Pt continues to complain of a lot of pain in her R arm/shoulder, she is taking 2-4 mg of oral dilaudid.  R arm remains in sling and elevated on pillows, bruising and swelling noted.  Numbness and tingling to RUE.  Up with A2 and jessenia steady to chair and BSC, voided x 1, otherwise pt has external catheter in place while she is in bed.  Tolerating regular diet.  Plan is to DC to TCU pending pain control, most likely tomorrow. Will continue to monitor.

## 2017-10-26 NOTE — PLAN OF CARE
Problem: Patient Care Overview  Goal: Plan of Care/Patient Progress Review  Outcome: Improving  RN:pt vss, encouragement of IS and TCDB  Lungs: are clear but diminished in bases,   BS: + pt is eating and drinking well, adequate output- using the wick cath   CMS:+, numbness in finger tips, and swollen hand also, elevated. Pillow support under arm and Ice  Pain: controlled with pain meds prn pt pretty sleepy and slightly confused at the start of shift, due to medication held back slightly will medicate before dc tonight.   Activity: Up with A2, jessenia steady, tolerated wheelchair.   Plan is to dc to tcu tonight  Will cont to monitor.   No other issues   BM today.

## 2017-10-26 NOTE — DISCHARGE SUMMARY
[facility] Discharge Summary    Keisha Godinez MRN# [id number]   Age: 85 year old YOB: 1932     Date of Admission:  10/17/2017  Date of Discharge::  10/26/2017  Admitting Physician:  Gabe Porter MD  Discharge Physician:  Gabe Porter MD     Home clinic: [facility]          Admission Diagnoses:   Closed supracondylar fracture of right humerus, initial encounter [S42.690A]          Discharge Diagnosis:   Principal Problem:    Fracture of humerus, proximal, right, closed  Active Problems:    Pulmonary hypertension    Bilateral edema of lower extremity    Acute on chronic diastolic congestive heart failure (H)    Chronic atrial fibrillation (H)    Anemia    Rheumatic mitral regurgitation            Procedures:   Plain x-rays of left shoulder and UE down to hand.  CT shoulder          Medications Prior to Admission:     Prescriptions Prior to Admission   Medication Sig Dispense Refill Last Dose     ferrous sulfate (IRON) 325 (65 FE) MG tablet Take 325 mg by mouth 2 times daily   10/17/2017 at Unknown time     venlafaxine (EFFEXOR-XR) 75 MG 24 hr capsule Take 1 capsule (75 mg) by mouth daily 90 capsule 0 10/17/2017 at am     gabapentin (NEURONTIN) 100 MG capsule Take 1 capsule (100 mg) by mouth 3 times daily 270 capsule 0 10/17/2017 at pm     hydrALAZINE (APRESOLINE) 25 MG tablet Take 3 tablets (75 mg) by mouth 3 times daily 810 tablet 2 10/17/2017 at pm     isosorbide mononitrate (IMDUR) 30 MG 24 hr tablet Take 1 tablet (30 mg) by mouth daily 90 tablet 1 10/17/2017 at am     potassium chloride SA (POTASSIUM CHLORIDE) 20 MEQ CR tablet Take 1 tablet (20 mEq) by mouth daily 90 tablet 1 10/17/2017 at am     furosemide (LASIX) 40 MG tablet Take 1 tablet (40 mg) by mouth 2 times daily 180 tablet 1 10/17/2017 at AM & Noon     omeprazole (PRILOSEC) 20 MG CR capsule Take 1 capsule (20 mg) by mouth daily 90 capsule 2 10/17/2017 at am     acetaminophen (TYLENOL) 325 MG tablet Take  650 mg by mouth every 6 hours as needed for mild pain    Past Week at Unknown time     senna-docusate (SENOKOT-S;PERICOLACE) 8.6-50 MG per tablet Take 2 tablets daily as needed for constipation while taking prescription pain medications. 30 tablet 0 Past Week at Unknown time     OMEGA-3 FATTY ACIDS PO Take 1,200 mg by mouth daily    10/17/2017 at am     psyllium (METAMUCIL) 58.6 % POWD Take 1 teaspoonful by mouth every evening    10/17/2017 at pm     VITAMIN D, CHOLECALCIFEROL, PO Take 1,000 Units by mouth daily    10/17/2017 at am     order for DME Equipment being ordered: Motorized scooter x 3 months 1 Device 0 Unknown at Unknown time     order for DME Equipment being ordered: DH2 shoe for offloading R arch ulcer 1 Device 0 Unknown at Unknown time             Discharge Medications:     Current Discharge Medication List      START taking these medications    Details   colchicine 0.6 MG tablet Take 1 tablet (0.6 mg) by mouth daily  Qty: 14 tablet, Refills: 0    Associated Diagnoses: Acute gouty arthritis      HYDROmorphone (DILAUDID) 2 MG tablet Take 1-2 tablets (2-4 mg) by mouth every 3 hours as needed for moderate to severe pain  Qty: 90 tablet, Refills: 0    Associated Diagnoses: Closed supracondylar fracture of right humerus, initial encounter      hydrOXYzine (ATARAX) 25 MG tablet Take 1 tablet (25 mg) by mouth 3 times daily as needed for other (pain adjunct)  Qty: 60 tablet, Refills: 0    Associated Diagnoses: Closed supracondylar fracture of right humerus, initial encounter         CONTINUE these medications which have NOT CHANGED    Details   ferrous sulfate (IRON) 325 (65 FE) MG tablet Take 325 mg by mouth 2 times daily      venlafaxine (EFFEXOR-XR) 75 MG 24 hr capsule Take 1 capsule (75 mg) by mouth daily  Qty: 90 capsule, Refills: 0    Comments: Pt due for appt by Dec. Needs to call our office  Associated Diagnoses: Major depressive disorder, recurrent episode, in partial remission (H)      gabapentin  (NEURONTIN) 100 MG capsule Take 1 capsule (100 mg) by mouth 3 times daily  Qty: 270 capsule, Refills: 0    Associated Diagnoses: Midline thoracic back pain, unspecified chronicity      hydrALAZINE (APRESOLINE) 25 MG tablet Take 3 tablets (75 mg) by mouth 3 times daily  Qty: 810 tablet, Refills: 2    Associated Diagnoses: Hyperlipidemia, unspecified hyperlipidemia type; Cardiomyopathy, unspecified type (H)      isosorbide mononitrate (IMDUR) 30 MG 24 hr tablet Take 1 tablet (30 mg) by mouth daily  Qty: 90 tablet, Refills: 1    Associated Diagnoses: Essential hypertension with goal blood pressure less than 140/90; Cardiomyopathy (H); Coronary artery disease involving native heart with angina pectoris, unspecified vessel or lesion type (H)      potassium chloride SA (POTASSIUM CHLORIDE) 20 MEQ CR tablet Take 1 tablet (20 mEq) by mouth daily  Qty: 90 tablet, Refills: 1    Associated Diagnoses: Bilateral edema of lower extremity      furosemide (LASIX) 40 MG tablet Take 1 tablet (40 mg) by mouth 2 times daily  Qty: 180 tablet, Refills: 1    Associated Diagnoses: Essential hypertension with goal blood pressure less than 140/90      omeprazole (PRILOSEC) 20 MG CR capsule Take 1 capsule (20 mg) by mouth daily  Qty: 90 capsule, Refills: 2    Associated Diagnoses: Gastroesophageal reflux disease without esophagitis      acetaminophen (TYLENOL) 325 MG tablet Take 650 mg by mouth every 6 hours as needed for mild pain       senna-docusate (SENOKOT-S;PERICOLACE) 8.6-50 MG per tablet Take 2 tablets daily as needed for constipation while taking prescription pain medications.  Qty: 30 tablet, Refills: 0    Associated Diagnoses: S/P foot surgery, right      OMEGA-3 FATTY ACIDS PO Take 1,200 mg by mouth daily       psyllium (METAMUCIL) 58.6 % POWD Take 1 teaspoonful by mouth every evening       VITAMIN D, CHOLECALCIFEROL, PO Take 1,000 Units by mouth daily       simvastatin (ZOCOR) 10 MG tablet Take 1 tablet (10 mg) by mouth At  "Bedtime  Qty: 90 tablet, Refills: 0    Associated Diagnoses: Hyperlipidemia, unspecified hyperlipidemia type      !! order for DME Equipment being ordered: Motorized scooter x 3 months  Qty: 1 Device, Refills: 0    Associated Diagnoses: Ulcer of foot, right, limited to breakdown of skin (H)      !! order for DME Equipment being ordered: DH2 shoe for offloading R arch ulcer  Qty: 1 Device, Refills: 0    Associated Diagnoses: Ulcer of foot, right, with fat layer exposed (H)       !! - Potential duplicate medications found. Please discuss with provider.                Consultations:   Consultation during this admission received from orthopedics           Hospital Course:   Keisha Godinez is a 85 year old female admitted on 10/17/2017 after [place] with complaint of severe right shoulder pain. She was found to have a minimally displaced proximal humerus fracture, and she was admitted for Orthopedic evaluation and disposition.     At this time, the Orthopedist has recommended non-operative management, but pt remained in intense pain. I tried different modalities and note that Oxycodone/Oxycontin and Morphine were less helpful than Dilaudid.     Incidentally, Ms. Godinez did develop intense pain and swelling around the right fifth digit, PIP, that improved markedly with Prednisone. It was felt to be consistent acute gouty arthritis.    Ms. Godinez remained frustrated and rather depressed, but otherwise without demonstrable medical issues.     /42  Pulse 57  Temp 97.5  F (36.4  C) (Oral)  Resp 16  Ht 1.6 m (5' 3\")  Wt 73.3 kg (161 lb 8 oz)  SpO2 97%  BMI 28.61 kg/m2  Upon discharge, Ms. Godinez is alert and coherent. She has a very depressed affect, but is otherwise hemodynamically stable.  Chest: CTA  COR: RRR Without murmur  ABd: Soft, NTND.  Right hand is without acute erythema. The more distal right arm is quite tender diffusely.           Discharge Instructions and Follow-Up:   Discharge diet: Regular "   Discharge activity: Activity as tolerated  Non-weight-bearing right UE   Discharge follow-up: Follow up with Dr. Urbano in 1 week.           Discharge Disposition:   Discharged to short-term care facility      Attestation:  I have reviewed today's vital signs, notes, medications, labs and imaging.  Total time: 35 minutes    Gabe Porter MD

## 2017-10-26 NOTE — PLAN OF CARE
End of Shift Summary.  For vital signs and complete assessments, please see documentation flowsheets.     Pertinent assessments: pt alert and oriented, sleepy overnight. Pt awakens easily. Pt rating her pain 10 out of 10, receiving oral dilaudid every 3 hours prn shoulder pain. Pt does states that she rests better with the oral dilaudid than with the oral oxycodone, however she doesn't feel either do much for her pain.  lidoderm patch intact to right shoulder. Pt with good cms and pulses to right arm. Right hand swollen and red, pt able to move. Pt with external cath intact, good urine output.  Major Shift Events: none, slept well between cares.  Plan (Upcoming Events): continue current cares  Discharge/Transfer Needs: with need TCU on discharge, unsure if discharge today or not.    Bedside Shift Report Completed :   Bedside Safety Check Completed:

## 2017-10-26 NOTE — PLAN OF CARE
Problem: Patient Care Overview  Goal: Plan of Care/Patient Progress Review    Discharge Planner OT   Patient plan for discharge: TCU  Current status:Pt. reports pain=10/10;Pt. w/humeral head fx., possible subtle radial head fx.;repositioned LUE in sling for increased support/comfort, also ed. to keep hand elevated/above elbow to assist w/edema reduction, as well as to complete hand pumps several times throughout the day. Pt. refused any ROM ex. at this time, due to increased pain;pt. completed sit-stand X 2 trials w/use of Vannessa Steady--overall mod.-max. A for sit-stand, tolerated standing w/ min. A, up to 60secs. each trial;completed transfer chair-BSC w/use of Vannessa Steady, min-mod. A X 2 for sit-stand, mod. A stand-sit;pt. Completed 1grooming/hyg.task,seated w/ supplies set-up on tabletop-able to wash face using L hand w/, declined further grooming tasks.   Barriers to return to prior living situation:  Pt limited by immobility and pain, decreased ability to manage ADL's I  Recommendations for discharge: TCU  Rationale for recommendations: Pt would benefit from continued OT to maximize safety and participation in self cares with improved strength and endurance       Entered by: Niru Cook 10/26/2017 2:19 PM           Occupational Therapy Discharge Summary    Reason for therapy discharge:    Discharged to transitional care facility.    Progress towards therapy goal(s). See goals on Care Plan in [facility] electronic health record for goal details.  Goals not met.  Barriers to achieving goals:   limited tolerance for therapy and discharge from facility.    Therapy recommendation(s):    Continued therapy is recommended.  Rationale/Recommendations:  Recontinued OT to assist pt. w/return to PLOF/achieve maximal safety/indep.w/ADl's. [facility]--discharging to TCU  this evening..

## 2017-10-26 NOTE — PHARMACY - DISCHARGE MEDICATION RECONCILIATION
Discharge medication review for this patient is complete.   Patient was not counseled or given any education materials as discharging to [facility].  See [facility] for allergy information, prior to admission medications and immunization status.   Pharmacist assisted with medication reconciliation of discharge medications with PTA medications.    MD was contacted with any questions/concerns:None    Additional medication history information:None    Discharge Medication List     Review of your medicines      START taking       Dose / Directions    colchicine 0.6 MG tablet   Used for:  Acute gouty arthritis        Dose:  0.6 mg   Take 1 tablet (0.6 mg) by mouth daily   Quantity:  14 tablet   Refills:  0       HYDROmorphone 2 MG tablet   Commonly known as:  DILAUDID   Used for:  Closed supracondylar fracture of right humerus, initial encounter        Dose:  2-4 mg   Take 1-2 tablets (2-4 mg) by mouth every 3 hours as needed for moderate to severe pain   Quantity:  90 tablet   Refills:  0       hydrOXYzine 25 MG tablet   Commonly known as:  ATARAX   Used for:  Closed supracondylar fracture of right humerus, initial encounter        Dose:  25 mg   Take 1 tablet (25 mg) by mouth 3 times daily as needed for other (pain adjunct)   Quantity:  60 tablet   Refills:  0         CONTINUE these medicines which have NOT CHANGED       Dose / Directions    acetaminophen 325 MG tablet   Commonly known as:  TYLENOL        Dose:  650 mg   Take 650 mg by mouth every 6 hours as needed for mild pain   Refills:  0       ferrous sulfate 325 (65 FE) MG tablet   Commonly known as:  IRON        Dose:  325 mg   Take 325 mg by mouth 2 times daily   Refills:  0       furosemide 40 MG tablet   Commonly known as:  LASIX   Used for:  Essential hypertension with goal blood pressure less than 140/90        Dose:  40 mg   Take 1 tablet (40 mg) by mouth 2 times daily   Quantity:  180 tablet   Refills:  1       gabapentin 100 MG capsule   Commonly known as:   NEURONTIN   Used for:  Midline thoracic back pain, unspecified chronicity        Dose:  100 mg   Take 1 capsule (100 mg) by mouth 3 times daily   Quantity:  270 capsule   Refills:  0       hydrALAZINE 25 MG tablet   Commonly known as:  APRESOLINE   Used for:  Hyperlipidemia, unspecified hyperlipidemia type, Cardiomyopathy, unspecified type (H)        Dose:  75 mg   Take 3 tablets (75 mg) by mouth 3 times daily   Quantity:  810 tablet   Refills:  2       isosorbide mononitrate 30 MG 24 hr tablet   Commonly known as:  IMDUR   Used for:  Essential hypertension with goal blood pressure less than 140/90, Cardiomyopathy (H), Coronary artery disease involving native heart with angina pectoris, unspecified vessel or lesion type (H)        Dose:  30 mg   Take 1 tablet (30 mg) by mouth daily   Quantity:  90 tablet   Refills:  1       OMEGA-3 FATTY ACIDS PO        Dose:  1200 mg   Take 1,200 mg by mouth daily   Refills:  0       omeprazole 20 MG CR capsule   Commonly known as:  priLOSEC   Used for:  Gastroesophageal reflux disease without esophagitis        Dose:  20 mg   Take 1 capsule (20 mg) by mouth daily   Quantity:  90 capsule   Refills:  2       * order for DME   Used for:  Ulcer of foot, right, with fat layer exposed (H)        Equipment being ordered: DH2 shoe for offloading R arch ulcer   Quantity:  1 Device   Refills:  0       * order for DME   Used for:  Ulcer of foot, right, limited to breakdown of skin (H)        Equipment being ordered: Motorized scooter x 3 months   Quantity:  1 Device   Refills:  0       potassium chloride SA 20 MEQ CR tablet   Commonly known as:  KLOR-CON   Used for:  Bilateral edema of lower extremity        Dose:  20 mEq   Take 1 tablet (20 mEq) by mouth daily   Quantity:  90 tablet   Refills:  1       psyllium 58.6 % Powd   Commonly known as:  METAMUCIL        Dose:  1 teaspoonful   Take 1 teaspoonful by mouth every evening   Refills:  0       senna-docusate 8.6-50 MG per tablet    Commonly known as:  SENOKOT-S;PERICOLACE   Used for:  S/P foot surgery, right        Take 2 tablets daily as needed for constipation while taking prescription pain medications.   Quantity:  30 tablet   Refills:  0       simvastatin 10 MG tablet   Commonly known as:  ZOCOR   Used for:  Hyperlipidemia, unspecified hyperlipidemia type        Dose:  10 mg   Take 1 tablet (10 mg) by mouth At Bedtime   Quantity:  90 tablet   Refills:  0       venlafaxine 75 MG 24 hr capsule   Commonly known as:  EFFEXOR-XR   Used for:  Major depressive disorder, recurrent episode, in partial remission (H)        Dose:  75 mg   Take 1 capsule (75 mg) by mouth daily   Quantity:  90 capsule   Refills:  0       VITAMIN D (CHOLECALCIFEROL) PO        Dose:  1000 Units   Take 1,000 Units by mouth daily   Refills:  0       * Notice:  This list has 2 medication(s) that are the same as other medications prescribed for you. Read the directions carefully, and ask your doctor or other care provider to review them with you.         Where to get your medicines      [email or web address] [address] [place] [id number] [facility] AT [address]  [address] [facility] [address]     Phone:  [phone]      simvastatin 10 MG tablet         Some of these will need a paper prescription and others can be bought over the counter. Ask your nurse if you have questions.     You don't need a prescription for these medications      colchicine 0.6 MG tablet     hydrOXYzine 25 MG tablet         Information about where to get these medications is not yet available     ! Ask your nurse or doctor about these medications      HYDROmorphone 2 MG tablet

## 2017-10-26 NOTE — PROGRESS NOTES
SWS     D: Discharge planning.. per discussion with MD pt will be ready for transfer today to rehab facility, [facility] has private room available, able to accept pt today.      I: Met with pt and spoke by phone to daughterRebecca regarding MD plan for discharge today, they have asked that planning continue for pt's transfer to [facility]. Daughter has requested that medical transport be arranged, discussed with her that w/c transport is not covered by Medicare, discussed cost of $62/base and $4.60/mi which has been accepted. [facility], w/c arranged for 1730, additional questions were addressed.     A/P: Anticipate no problem with arrangements made for transfer today, with discharge no further SWS.

## 2017-10-27 ENCOUNTER — APPOINTMENT (OUTPATIENT)
Dept: CT IMAGING | Facility: CLINIC | Age: 82
End: 2017-10-27
Attending: EMERGENCY MEDICINE
Payer: MEDICARE

## 2017-10-27 ENCOUNTER — TELEPHONE (OUTPATIENT)
Dept: PHARMACY | Facility: OTHER | Age: 82
End: 2017-10-27

## 2017-10-27 ENCOUNTER — APPOINTMENT (OUTPATIENT)
Dept: GENERAL RADIOLOGY | Facility: CLINIC | Age: 82
End: 2017-10-27
Attending: EMERGENCY MEDICINE
Payer: MEDICARE

## 2017-10-27 ENCOUNTER — HOSPITAL ENCOUNTER (OUTPATIENT)
Facility: CLINIC | Age: 82
Setting detail: OBSERVATION
Discharge: NURSING, REHAB OR LONG-TERM CARE | End: 2017-10-28
Attending: EMERGENCY MEDICINE | Admitting: INTERNAL MEDICINE
Payer: MEDICARE

## 2017-10-27 ENCOUNTER — NURSING HOME VISIT (OUTPATIENT)
Dept: GERIATRICS | Facility: CLINIC | Age: 82
End: 2017-10-27
Payer: COMMERCIAL

## 2017-10-27 ENCOUNTER — HOSPITAL LABORATORY (OUTPATIENT)
Dept: OTHER | Facility: CLINIC | Age: 82
End: 2017-10-27

## 2017-10-27 VITALS
DIASTOLIC BLOOD PRESSURE: 49 MMHG | RESPIRATION RATE: 16 BRPM | BODY MASS INDEX: 29.59 KG/M2 | HEIGHT: 62 IN | TEMPERATURE: 98.4 F | HEART RATE: 53 BPM | OXYGEN SATURATION: 90 % | SYSTOLIC BLOOD PRESSURE: 137 MMHG | WEIGHT: 160.8 LBS

## 2017-10-27 DIAGNOSIS — M10.9 ACUTE GOUTY ARTHRITIS: ICD-10-CM

## 2017-10-27 DIAGNOSIS — I48.20 CHRONIC ATRIAL FIBRILLATION (H): ICD-10-CM

## 2017-10-27 DIAGNOSIS — I50.33 ACUTE ON CHRONIC DIASTOLIC CONGESTIVE HEART FAILURE (H): ICD-10-CM

## 2017-10-27 DIAGNOSIS — R53.81 PHYSICAL DECONDITIONING: ICD-10-CM

## 2017-10-27 DIAGNOSIS — R60.0 BILATERAL EDEMA OF LOWER EXTREMITY: ICD-10-CM

## 2017-10-27 DIAGNOSIS — D64.9 ANEMIA, UNSPECIFIED TYPE: ICD-10-CM

## 2017-10-27 DIAGNOSIS — I50.9 ACUTE ON CHRONIC CONGESTIVE HEART FAILURE, UNSPECIFIED CONGESTIVE HEART FAILURE TYPE: ICD-10-CM

## 2017-10-27 DIAGNOSIS — J96.21 ACUTE AND CHRONIC RESPIRATORY FAILURE WITH HYPOXIA (H): ICD-10-CM

## 2017-10-27 DIAGNOSIS — M54.6 MIDLINE THORACIC BACK PAIN, UNSPECIFIED CHRONICITY: Primary | ICD-10-CM

## 2017-10-27 DIAGNOSIS — I05.1 RHEUMATIC MITRAL REGURGITATION: ICD-10-CM

## 2017-10-27 DIAGNOSIS — S42.294S OTHER CLOSED NONDISPLACED FRACTURE OF PROXIMAL END OF RIGHT HUMERUS, SEQUELA: Primary | ICD-10-CM

## 2017-10-27 DIAGNOSIS — I27.20 PULMONARY HYPERTENSION (H): ICD-10-CM

## 2017-10-27 LAB
ANION GAP SERPL CALCULATED.3IONS-SCNC: 3 MMOL/L (ref 3–14)
ANION GAP SERPL CALCULATED.3IONS-SCNC: 6 MMOL/L (ref 3–14)
BASOPHILS # BLD AUTO: 0.1 10E9/L (ref 0–0.2)
BASOPHILS # BLD AUTO: 0.1 10E9/L (ref 0–0.2)
BASOPHILS NFR BLD AUTO: 0.7 %
BASOPHILS NFR BLD AUTO: 1 %
BUN SERPL-MCNC: 29 MG/DL (ref 7–30)
BUN SERPL-MCNC: 30 MG/DL (ref 7–30)
CALCIUM SERPL-MCNC: 8.9 MG/DL (ref 8.5–10.1)
CALCIUM SERPL-MCNC: 9.8 MG/DL (ref 8.5–10.1)
CHLORIDE SERPL-SCNC: 97 MMOL/L (ref 94–109)
CHLORIDE SERPL-SCNC: 97 MMOL/L (ref 94–109)
CO2 SERPL-SCNC: 36 MMOL/L (ref 20–32)
CO2 SERPL-SCNC: 38 MMOL/L (ref 20–32)
CREAT SERPL-MCNC: 1.06 MG/DL (ref 0.52–1.04)
CREAT SERPL-MCNC: 1.17 MG/DL (ref 0.52–1.04)
D DIMER PPP FEU-MCNC: 2.3 UG/ML FEU (ref 0–0.5)
DIFFERENTIAL METHOD BLD: ABNORMAL
DIFFERENTIAL METHOD BLD: ABNORMAL
EOSINOPHIL # BLD AUTO: 0.3 10E9/L (ref 0–0.7)
EOSINOPHIL # BLD AUTO: 0.4 10E9/L (ref 0–0.7)
EOSINOPHIL NFR BLD AUTO: 3.2 %
EOSINOPHIL NFR BLD AUTO: 3.7 %
ERYTHROCYTE [DISTWIDTH] IN BLOOD BY AUTOMATED COUNT: 13.3 % (ref 10–15)
ERYTHROCYTE [DISTWIDTH] IN BLOOD BY AUTOMATED COUNT: 13.9 % (ref 10–15)
FLUAV+FLUBV AG SPEC QL: NEGATIVE
FLUAV+FLUBV AG SPEC QL: NEGATIVE
GFR SERPL CREATININE-BSD FRML MDRD: 44 ML/MIN/1.7M2
GFR SERPL CREATININE-BSD FRML MDRD: 49 ML/MIN/1.7M2
GLUCOSE SERPL-MCNC: 104 MG/DL (ref 70–99)
GLUCOSE SERPL-MCNC: 99 MG/DL (ref 70–99)
HCT VFR BLD AUTO: 30.9 % (ref 35–47)
HCT VFR BLD AUTO: 36 % (ref 35–47)
HGB BLD-MCNC: 11 G/DL (ref 11.7–15.7)
HGB BLD-MCNC: 9.6 G/DL (ref 11.7–15.7)
IMM GRANULOCYTES # BLD: 0.1 10E9/L (ref 0–0.4)
IMM GRANULOCYTES # BLD: 0.1 10E9/L (ref 0–0.4)
IMM GRANULOCYTES NFR BLD: 0.7 %
IMM GRANULOCYTES NFR BLD: 0.9 %
LACTATE SERPL-SCNC: 0.9 MMOL/L (ref 0.4–2)
LYMPHOCYTES # BLD AUTO: 1 10E9/L (ref 0.8–5.3)
LYMPHOCYTES # BLD AUTO: 1.5 10E9/L (ref 0.8–5.3)
LYMPHOCYTES NFR BLD AUTO: 15.7 %
LYMPHOCYTES NFR BLD AUTO: 9.7 %
MCH RBC QN AUTO: 29.8 PG (ref 26.5–33)
MCH RBC QN AUTO: 29.8 PG (ref 26.5–33)
MCHC RBC AUTO-ENTMCNC: 30.6 G/DL (ref 31.5–36.5)
MCHC RBC AUTO-ENTMCNC: 31.1 G/DL (ref 31.5–36.5)
MCV RBC AUTO: 96 FL (ref 78–100)
MCV RBC AUTO: 98 FL (ref 78–100)
MONOCYTES # BLD AUTO: 0.7 10E9/L (ref 0–1.3)
MONOCYTES # BLD AUTO: 0.8 10E9/L (ref 0–1.3)
MONOCYTES NFR BLD AUTO: 7.1 %
MONOCYTES NFR BLD AUTO: 7.7 %
NEUTROPHILS # BLD AUTO: 6.8 10E9/L (ref 1.6–8.3)
NEUTROPHILS # BLD AUTO: 7.6 10E9/L (ref 1.6–8.3)
NEUTROPHILS NFR BLD AUTO: 71.8 %
NEUTROPHILS NFR BLD AUTO: 77.8 %
NRBC # BLD AUTO: 0 10*3/UL
NRBC # BLD AUTO: 0 10*3/UL
NRBC BLD AUTO-RTO: 0 /100
NRBC BLD AUTO-RTO: 0 /100
NT-PROBNP SERPL-MCNC: 4521 PG/ML (ref 0–1800)
PLATELET # BLD AUTO: 226 10E9/L (ref 150–450)
PLATELET # BLD AUTO: 317 10E9/L (ref 150–450)
POTASSIUM SERPL-SCNC: 3.7 MMOL/L (ref 3.4–5.3)
POTASSIUM SERPL-SCNC: 4.2 MMOL/L (ref 3.4–5.3)
PROCALCITONIN SERPL-MCNC: 0.18 NG/ML
RBC # BLD AUTO: 3.22 10E12/L (ref 3.8–5.2)
RBC # BLD AUTO: 3.69 10E12/L (ref 3.8–5.2)
SODIUM SERPL-SCNC: 138 MMOL/L (ref 133–144)
SODIUM SERPL-SCNC: 139 MMOL/L (ref 133–144)
SPECIMEN SOURCE: NORMAL
TROPONIN I SERPL-MCNC: <0.015 UG/L (ref 0–0.04)
WBC # BLD AUTO: 9.5 10E9/L (ref 4–11)
WBC # BLD AUTO: 9.8 10E9/L (ref 4–11)

## 2017-10-27 PROCEDURE — 83880 ASSAY OF NATRIURETIC PEPTIDE: CPT | Performed by: EMERGENCY MEDICINE

## 2017-10-27 PROCEDURE — 99285 EMERGENCY DEPT VISIT HI MDM: CPT | Mod: 25

## 2017-10-27 PROCEDURE — 71260 CT THORAX DX C+: CPT

## 2017-10-27 PROCEDURE — 84145 PROCALCITONIN (PCT): CPT | Performed by: EMERGENCY MEDICINE

## 2017-10-27 PROCEDURE — 93005 ELECTROCARDIOGRAM TRACING: CPT

## 2017-10-27 PROCEDURE — 87040 BLOOD CULTURE FOR BACTERIA: CPT | Performed by: EMERGENCY MEDICINE

## 2017-10-27 PROCEDURE — 25000128 H RX IP 250 OP 636: Performed by: EMERGENCY MEDICINE

## 2017-10-27 PROCEDURE — 25000125 ZZHC RX 250: Performed by: EMERGENCY MEDICINE

## 2017-10-27 PROCEDURE — 99309 SBSQ NF CARE MODERATE MDM 30: CPT | Performed by: NURSE PRACTITIONER

## 2017-10-27 PROCEDURE — 85025 COMPLETE CBC W/AUTO DIFF WBC: CPT | Performed by: EMERGENCY MEDICINE

## 2017-10-27 PROCEDURE — 36415 COLL VENOUS BLD VENIPUNCTURE: CPT

## 2017-10-27 PROCEDURE — 96374 THER/PROPH/DIAG INJ IV PUSH: CPT

## 2017-10-27 PROCEDURE — 84484 ASSAY OF TROPONIN QUANT: CPT | Performed by: EMERGENCY MEDICINE

## 2017-10-27 PROCEDURE — 99220 ZZC INITIAL OBSERVATION CARE,LEVL III: CPT | Performed by: INTERNAL MEDICINE

## 2017-10-27 PROCEDURE — 71020 XR CHEST 2 VW: CPT

## 2017-10-27 PROCEDURE — 85379 FIBRIN DEGRADATION QUANT: CPT | Performed by: EMERGENCY MEDICINE

## 2017-10-27 PROCEDURE — 87804 INFLUENZA ASSAY W/OPTIC: CPT | Performed by: EMERGENCY MEDICINE

## 2017-10-27 PROCEDURE — 80048 BASIC METABOLIC PNL TOTAL CA: CPT | Performed by: EMERGENCY MEDICINE

## 2017-10-27 PROCEDURE — 83605 ASSAY OF LACTIC ACID: CPT | Performed by: EMERGENCY MEDICINE

## 2017-10-27 PROCEDURE — 94640 AIRWAY INHALATION TREATMENT: CPT

## 2017-10-27 RX ORDER — ALBUTEROL SULFATE 1.25 MG/3ML
1 SOLUTION RESPIRATORY (INHALATION) 3 TIMES DAILY
COMMUNITY
End: 2020-01-01

## 2017-10-27 RX ORDER — HYDROMORPHONE HCL/0.9% NACL/PF 0.2MG/0.2
0.2 SYRINGE (ML) INTRAVENOUS
Status: DISCONTINUED | OUTPATIENT
Start: 2017-10-27 | End: 2017-10-28

## 2017-10-27 RX ORDER — IOPAMIDOL 755 MG/ML
500 INJECTION, SOLUTION INTRAVASCULAR ONCE
Status: COMPLETED | OUTPATIENT
Start: 2017-10-27 | End: 2017-10-27

## 2017-10-27 RX ORDER — FUROSEMIDE 10 MG/ML
40 INJECTION INTRAMUSCULAR; INTRAVENOUS ONCE
Status: DISCONTINUED | OUTPATIENT
Start: 2017-10-27 | End: 2017-10-27

## 2017-10-27 RX ORDER — IPRATROPIUM BROMIDE AND ALBUTEROL SULFATE 2.5; .5 MG/3ML; MG/3ML
3 SOLUTION RESPIRATORY (INHALATION)
Status: DISCONTINUED | OUTPATIENT
Start: 2017-10-27 | End: 2017-10-28

## 2017-10-27 RX ADMIN — SODIUM CHLORIDE 78 ML: 9 INJECTION, SOLUTION INTRAVENOUS at 21:20

## 2017-10-27 RX ADMIN — IPRATROPIUM BROMIDE AND ALBUTEROL SULFATE 3 ML: .5; 3 SOLUTION RESPIRATORY (INHALATION) at 22:24

## 2017-10-27 RX ADMIN — Medication 0.2 MG: at 20:40

## 2017-10-27 RX ADMIN — IOPAMIDOL 63 ML: 755 INJECTION, SOLUTION INTRAVENOUS at 21:20

## 2017-10-27 ASSESSMENT — ENCOUNTER SYMPTOMS
NAUSEA: 0
ABDOMINAL PAIN: 0
FEVER: 0
DIARRHEA: 0
VOMITING: 0
SHORTNESS OF BREATH: 1
CHILLS: 1

## 2017-10-27 NOTE — TELEPHONE ENCOUNTER
MTM referral from: Transitions of Care (recent hospital discharge or ED visit)    MTM referral outreach attempt #1 on October 27, 2017 at 1:33 PM      Outcome: Patient is not interested at this time because discharged to SNF, will route to MTM Pharmacist/Provider as an FYI. Thank you for the referral.     Aggie Montoya, MTM Coordinator Intern

## 2017-10-27 NOTE — IP AVS SNAPSHOT
MRN:[id number]                      After Visit Summary   10/27/2017    Keisha Godinez    MRN: [id number]           Thank you!     Thank you for choosing [facility] for your care. Our goal is always to provide you with excellent care. Hearing back from our patients is one way we can continue to improve our services. Please take a few minutes to complete the written survey that you may receive in the mail after you visit. If you would like to speak to someone directly about your visit please contact Patient Relations at [phone]. Thank you!          Patient Information     Date Of Birth          1/25/1932        About your hospital stay     You were admitted on:  October 27, 2017 You last received care in the:  [facility] Observation Department    You were discharged on:  October 28, 2017        Reason for your hospital stay       You were admitted for concerns of hypoxia. We checked your CXR and CT of the chest and it was negative for infection, or blood clot. Your O2 levels improved after getting supplemental O2. Your kidney function was slightly elevated on arrival but that improved with IV fluids and holding colchicine. Trial of prednisone for a few days to see if that improves your gout flare. Otherwise, we adjusted your pain meds and you are safe to return back to rehab.                  Who to Call     For medical emergencies, please call 911.  For non-urgent questions about your medical care, please call your primary care provider or clinic, [phone]          Attending Provider     Provider Specialty    Linnea Prather MD Emergency Medicine    Patricia, Isaac Archuleta MD Internal Medicine       Primary Care Provider Office Phone # Fax #    Gerri Eubanks [phone]      After Care Instructions     Activity - Up with assistive device           Fall precautions           General info for SNF       Length of Stay Estimate: Short Term  Care: Estimated # of Days <30  Condition at Discharge: Stable  Level of care:skilled   Rehabilitation Potential: Good  Admission H&P remains valid and up-to-date: Yes  Recent Chemotherapy: N/A  Use Nursing Home Standing Orders: N/A            Mantoux instructions       Give two-step Mantoux (PPD) Per Facility Policy Yes            Oxygen - Nasal cannula       2 Lpm by nasal cannula to keep O2 sats 92% or greater. O2 at rest and with activity.            Regular Diet Adult                 Follow-up Appointments     Follow Up and recommended labs and tests       Follow up with Nursing home physician in 1 week.                  Your next 10 appointments already scheduled     Nov 02, 2017 11:00 AM CDT   [facility] with [facility]ubanks [facility]   [facility]    [address]  [facility] [address] [phone]              Additional Services     Occupational Therapy Adult Consult       Evaluate and treat as clinically indicated.    Reason: assess adls            Physical Therapy Adult Consult       Evaluate and treat as clinically indicated.    Reason:  Right humerus fracture                             Pending Results     Date and Time Order Name Status Description    10/27/2017 1837 Blood culture ONE site Preliminary     10/27/2017 1824 EKG 12 lead Preliminary             Statement of Approval     Ordered          10/28/17 9927  I have reviewed and agree with all the recommendations and orders detailed in this document.  EFFECTIVE NOW     Approved and electronically signed by:  Yanira Frankel PA-C           10/28/17 7082  I have reviewed and agree with all the recommendations and orders detailed in this document.  EFFECTIVE NOW     Approved and electronically signed by:  Yanira Frankel PA-C             Admission Information     Date & Time Provider Department Dept. Phone    10/27/2017 Isaac Gomez MD [facility] Observation Department  "[phone]      Your Vitals Were     Blood Pressure Pulse Temperature Respirations Weight Pulse Oximetry    145/63 (BP Location: Left arm) 67 98  F (36.7  C) (Oral) 16 71.7 kg (158 lb) 96%    BMI (Body Mass Index)                   28.9 kg/m2           [email or web address] Information     "[employer] lets you send messages to your doctor, view your test results, renew your prescriptions, schedule appointments and more. To sign up, go to [email or web address] . Click on \"Log in\" on the left side of the screen, which will take you to the Welcome page. Then click on \"Sign up Now\" on the right side of the page.     You will be asked to enter the access code listed below, as well as some personal information. Please follow the directions to create your username and password.     Your access code is: [email or web address]  Expires: 2018  2:18 PM     Your access code will  in 90 days. If you need help or a new code, please call your [facility] clinic or [phone].        Care EveryWhere ID     This is your Care EveryWhere ID. This could be used by other organizations to access your [facility] medical records  [id number]        Equal Access to Services     ROBERTO KLEIN AH: Chapito damono Soinocente, waaxda luqadaha, qaybta kaalmada adeegyada, sharlene vick. So [facility] [phone].    ATENCIÓN: Si habla español, tiene a rosas disposición servicios gratuitos de asistencia lingüística. Llame al [phone].    We comply with applicable federal civil rights laws and [place] laws. We do not discriminate on the basis of race, color, national origin, age, disability, sex, sexual orientation, or gender identity.               Review of your medicines      START taking        Dose / Directions    predniSONE 20 MG tablet   Commonly known as:  DELTASONE   Used for:  Acute gouty arthritis        Dose:  20 mg   Start taking on:  10/29/2017   Take 1 tablet (20 mg) by mouth daily for 3 days   Quantity:  3 tablet "   Refills:  0         CONTINUE these medicines which may have CHANGED, or have new prescriptions. If we are uncertain of the size of tablets/capsules you have at home, strength may be listed as something that might have changed.        Dose / Directions    acetaminophen 325 MG tablet   Commonly known as:  TYLENOL   This may have changed:    - how much to take  - when to take this  - reasons to take this   Used for:  Midline thoracic back pain, unspecified chronicity        Dose:  975 mg   Take 3 tablets (975 mg) by mouth 3 times daily   Quantity:  100 tablet   Refills:  0       * gabapentin 100 MG capsule   Commonly known as:  NEURONTIN   This may have changed:  when to take this   Used for:  Midline thoracic back pain, unspecified chronicity        Dose:  100 mg   Take 1 capsule (100 mg) by mouth 2 times daily   Quantity:  270 capsule   Refills:  0       * gabapentin 100 MG capsule   Commonly known as:  NEURONTIN   This may have changed:  You were already taking a medication with the same name, and this prescription was added. Make sure you understand how and when to take each.   Used for:  Midline thoracic back pain, unspecified chronicity        Dose:  100 mg   Take 1 capsule (100 mg) by mouth At Bedtime   Quantity:  90 capsule   Refills:  0       * Notice:  This list has 2 medication(s) that are the same as other medications prescribed for you. Read the directions carefully, and ask your doctor or other care provider to review them with you.      CONTINUE these medicines which have NOT CHANGED        Dose / Directions    albuterol 1.25 MG/3ML nebulizer solution   Commonly known as:  ACCUNEB        Dose:  1 vial   Take 1 vial by nebulization 4 times daily   Refills:  0       ferrous sulfate 325 (65 FE) MG tablet   Commonly known as:  IRON        Dose:  325 mg   Take 325 mg by mouth 2 times daily   Refills:  0       furosemide 40 MG tablet   Commonly known as:  LASIX   Used for:  Essential hypertension with goal  blood pressure less than 140/90        Dose:  40 mg   Take 1 tablet (40 mg) by mouth 2 times daily   Quantity:  180 tablet   Refills:  1       hydrALAZINE 25 MG tablet   Commonly known as:  APRESOLINE   Used for:  Hyperlipidemia, unspecified hyperlipidemia type, Cardiomyopathy, unspecified type (H)        Dose:  75 mg   Take 3 tablets (75 mg) by mouth 3 times daily   Quantity:  810 tablet   Refills:  2       HYDROmorphone 2 MG tablet   Commonly known as:  DILAUDID   Used for:  Closed supracondylar fracture of right humerus, initial encounter        Dose:  2-4 mg   Take 1-2 tablets (2-4 mg) by mouth every 3 hours as needed for moderate to severe pain   Quantity:  90 tablet   Refills:  0       hydrOXYzine 25 MG tablet   Commonly known as:  ATARAX   Used for:  Closed supracondylar fracture of right humerus, initial encounter        Dose:  25 mg   Take 1 tablet (25 mg) by mouth 3 times daily as needed for other (pain adjunct)   Quantity:  60 tablet   Refills:  0       isosorbide mononitrate 30 MG 24 hr tablet   Commonly known as:  IMDUR   Used for:  Essential hypertension with goal blood pressure less than 140/90, Cardiomyopathy (H), Coronary artery disease involving native heart with angina pectoris, unspecified vessel or lesion type (H)        Dose:  30 mg   Take 1 tablet (30 mg) by mouth daily   Quantity:  90 tablet   Refills:  1       OMEGA-3 FATTY ACIDS PO        Dose:  1200 mg   Take 1,200 mg by mouth daily   Refills:  0       omeprazole 20 MG CR capsule   Commonly known as:  priLOSEC   Used for:  Gastroesophageal reflux disease without esophagitis        Dose:  20 mg   Take 1 capsule (20 mg) by mouth daily   Quantity:  90 capsule   Refills:  2       potassium chloride SA 20 MEQ CR tablet   Commonly known as:  KLOR-CON   Used for:  Bilateral edema of lower extremity        Dose:  20 mEq   Take 1 tablet (20 mEq) by mouth daily   Quantity:  90 tablet   Refills:  1       psyllium 58.6 % Powd   Commonly known as:   METAMUCIL        Dose:  1 teaspoonful   Take 1 teaspoonful by mouth every evening   Refills:  0       senna-docusate 8.6-50 MG per tablet   Commonly known as:  SENOKOT-S;PERICOLACE   Used for:  S/P foot surgery, right        Take 2 tablets daily as needed for constipation while taking prescription pain medications.   Quantity:  30 tablet   Refills:  0       simvastatin 10 MG tablet   Commonly known as:  ZOCOR   Used for:  Hyperlipidemia, unspecified hyperlipidemia type        Dose:  10 mg   Take 1 tablet (10 mg) by mouth At Bedtime   Quantity:  90 tablet   Refills:  0       venlafaxine 75 MG 24 hr capsule   Commonly known as:  EFFEXOR-XR   Used for:  Major depressive disorder, recurrent episode, in partial remission (H)        Dose:  75 mg   Take 1 capsule (75 mg) by mouth daily   Quantity:  90 capsule   Refills:  0       VITAMIN D (CHOLECALCIFEROL) PO        Dose:  1000 Units   Take 1,000 Units by mouth daily   Refills:  0         STOP taking     colchicine 0.6 MG tablet                Where to get your medicines      Some of these will need a paper prescription and others can be bought over the counter. Ask your nurse if you have questions.     You don't need a prescription for these medications     acetaminophen 325 MG tablet    gabapentin 100 MG capsule    gabapentin 100 MG capsule    predniSONE 20 MG tablet                Protect others around you: Learn how to safely use, store and throw away your medicines at www.disposemymeds.org.             Medication List: This is a list of all your medications and when to take them. Check marks below indicate your daily home schedule. Keep this list as a reference.      Medications           Morning Afternoon Evening Bedtime As Needed    acetaminophen 325 MG tablet   Commonly known as:  TYLENOL   Take 3 tablets (975 mg) by mouth 3 times daily   Last time this was given:  650 mg on 10/28/2017  3:53 PM                                albuterol 1.25 MG/3ML nebulizer solution    Commonly known as:  ACCUNEB   Take 1 vial by nebulization 4 times daily                                ferrous sulfate 325 (65 FE) MG tablet   Commonly known as:  IRON   Take 325 mg by mouth 2 times daily                                furosemide 40 MG tablet   Commonly known as:  LASIX   Take 1 tablet (40 mg) by mouth 2 times daily                                * gabapentin 100 MG capsule   Commonly known as:  NEURONTIN   Take 1 capsule (100 mg) by mouth 2 times daily   Last time this was given:  100 mg on 10/28/2017  3:53 PM                                * gabapentin 100 MG capsule   Commonly known as:  NEURONTIN   Take 1 capsule (100 mg) by mouth At Bedtime   Last time this was given:  100 mg on 10/28/2017  3:53 PM                                hydrALAZINE 25 MG tablet   Commonly known as:  APRESOLINE   Take 3 tablets (75 mg) by mouth 3 times daily   Last time this was given:  75 mg on 10/28/2017  3:53 PM                                HYDROmorphone 2 MG tablet   Commonly known as:  DILAUDID   Take 1-2 tablets (2-4 mg) by mouth every 3 hours as needed for moderate to severe pain   Last time this was given:  2 mg on 10/28/2017 12:16 PM                                hydrOXYzine 25 MG tablet   Commonly known as:  ATARAX   Take 1 tablet (25 mg) by mouth 3 times daily as needed for other (pain adjunct)                                isosorbide mononitrate 30 MG 24 hr tablet   Commonly known as:  IMDUR   Take 1 tablet (30 mg) by mouth daily   Last time this was given:  30 mg on 10/28/2017 10:20 AM                                OMEGA-3 FATTY ACIDS PO   Take 1,200 mg by mouth daily                                omeprazole 20 MG CR capsule   Commonly known as:  priLOSEC   Take 1 capsule (20 mg) by mouth daily                                potassium chloride SA 20 MEQ CR tablet   Commonly known as:  KLOR-CON   Take 1 tablet (20 mEq) by mouth daily                                predniSONE 20 MG tablet   Commonly  known as:  DELTASONE   Take 1 tablet (20 mg) by mouth daily for 3 days   Start taking on:  10/29/2017                                psyllium 58.6 % Powd   Commonly known as:  METAMUCIL   Take 1 teaspoonful by mouth every evening                                senna-docusate 8.6-50 MG per tablet   Commonly known as:  SENOKOT-S;PERICOLACE   Take 2 tablets daily as needed for constipation while taking prescription pain medications.                                simvastatin 10 MG tablet   Commonly known as:  ZOCOR   Take 1 tablet (10 mg) by mouth At Bedtime                                venlafaxine 75 MG 24 hr capsule   Commonly known as:  EFFEXOR-XR   Take 1 capsule (75 mg) by mouth daily                                VITAMIN D (CHOLECALCIFEROL) PO   Take 1,000 Units by mouth daily                                * Notice:  This list has 2 medication(s) that are the same as other medications prescribed for you. Read the directions carefully, and ask your doctor or other care provider to review them with you.

## 2017-10-27 NOTE — IP AVS SNAPSHOT
[facility] Observation Department    [address]    [facility] [address]    Phone:  [phone]                                       After Visit Summary   10/27/2017    Keisha Godinez    MRN: [id number]           After Visit Summary Signature Page     I have received my discharge instructions, and my questions have been answered. I have discussed any challenges I see with this plan with the nurse or doctor.    ..........................................................................................................................................  Patient/Patient Representative Signature      ..........................................................................................................................................  Patient Representative Print Name and Relationship to Patient    ..................................................               ................................................  Date                                            Time    ..........................................................................................................................................  Reviewed by Signature/Title    ...................................................              ..............................................  Date                                                            Time

## 2017-10-27 NOTE — IP AVS SNAPSHOT
Keisha Godinez #[id number] (CSN: [id number])  (85 year old F)  (Adm: 10/27/17)     [id number]               [facility] OBSERVATION DEPARTMENT: [phone]            Patient Demographics     Patient Name Sex          Age SSN Address Phone    Keisha Godinez Female 1932 (85 year old) [id number] [facility] [address] [phone] (Home) *Preferred*  none (Work)  none (Mobile)      Emergency Contact(s)     Name Relation Home Work Mobile    Rebecca Malhotra Daughter [phone]    Jennifer eden Daughter [phone] none [phone]    Jere Brandt Brother [phone] none [phone]      Admission Information     Attending Provider Admitting Provider Admission Type Admission Date/Time    Isaac Gomez MD Eklund, Derek William, MD Emergency 10/27/17  1807    Discharge Date Hospital Service Auth/Cert Status Service Area     Observation Incomplete [facility]    Unit Room/Bed Admission Status        OBSERVATION DEPT  Admission (Confirmed)       Admission     Complaint    Hypoxia      Hospital Account     Name Acct ID Class Status Primary Coverage    Keisha Godinez [id number] Observation Open MEDICARE - MEDICARE FOR HB SUPPLEMENT            Guarantor Account (for Hospital Account #[id number])     Name Relation to Pt Service Area Active? Acct Type    GretchenKeisha BECKY Self FCS Yes Personal/Family    Address Phone          [address] [place] [address] [phone](H)  none(O)              Coverage Information (for Hospital Account #[id number])     1. MEDICARE/MEDICARE FOR HB SUPPLEMENT     F/O Payor/Plan Precert #    MEDICARE/MEDICARE FOR HB SUPPLEMENT     Subscriber Subscriber #    NyKeisha banerjee BECKY [id number]    Address Phone    [employer] CLAIMS  [address]  [place] [address] [phone]          2. BCBS/BCBS PLATINUM BLUE     F/O Payor/Plan Precert #     "BCBS/BCBS PLATINUM BLUE     Subscriber Subscriber #    Keisha Godinez [id number]    Address Phone    [address] [address]  [place], [place] [id number] [phone]                                                      INTERAGENCY TRANSFER FORM - PHYSICIAN ORDERS   10/27/2017                       [facility] OBSERVATION DEPARTMENT: [phone]            Attending Provider: Isaac Gomez MD     Allergies:  Lisinopril, Calcium, Egg Yolk, Flu Virus Vaccine, Keflex [Cephalexin], Levaquin [Levofloxacin], Sulfa Drugs, Zinc    Infection:  None   Service:  Observation    Ht:  1.575 m (5' 2\")   Wt:  71.7 kg (158 lb)   Admission Wt:  71.7 kg (158 lb)    BMI:  28.9 kg/m 2   BSA:  1.77 m 2            ED Clinical Impression     Diagnosis Description Comment Added By Time Added    Acute and chronic respiratory failure with hypoxia (H) [J96.21] Acute and chronic respiratory failure with hypoxia (H) [J96.21]  Linnea Prather MD 10/27/2017 10:21 PM    Acute on chronic congestive heart failure, unspecified congestive heart failure type (H) [I50.9] Acute on chronic congestive heart failure, unspecified congestive heart failure type (H) [I50.9]  Linnea Prather MD 10/27/2017 10:21 PM      Hospital Problems as of 10/28/2017     None      Non-Hospital Problems as of 10/28/2017              Priority Class Noted    Iron deficiency anemia Medium  3/19/2015    Neuropathy of both feet Medium  3/19/2015    Myocardial infarction-type 2 Medium  3/19/2015    GIB (gastrointestinal bleeding) Medium  3/20/2015    Wound of left leg Medium  3/30/2015    Cardiomyopathy (H) Medium  Unknown    Pulmonary hypertension Medium  Unknown    Xerosis of skin: shins Medium  5/1/2015    Bilateral edema of lower extremity Medium  5/12/2015    Gastroesophageal reflux disease without esophagitis Medium  11/3/2015    Health Care Home Medium  12/9/2015    Chronic systolic congestive heart failure (H) Medium  Unknown    Major depressive disorder, " recurrent episode, in partial remission (H) Medium  2/8/2016    Cellulitis of foot Medium  5/12/2016    Essential hypertension with goal blood pressure less than 140/90 Medium  6/30/2016    Post-operative complication Medium  7/6/2016    Respiratory failure (H) Medium  7/6/2016    Acute respiratory failure with hypoxia (H) Medium  7/15/2016    C. difficile colitis Medium  7/15/2016    History of hiatal hernia Medium  7/15/2016    History of shingles Medium  7/15/2016    Chronic foot ulcer (H) (felt secondary to bony protuberance status post excision 7/6-16) Medium  7/15/2016    Acute on chronic diastolic congestive heart failure (H) Medium  7/18/2016    Pneumonia Medium  7/18/2016    Sepsis (H) Medium  7/18/2016    Chronic atrial fibrillation (H) Medium  7/18/2016    Anemia Medium  7/24/2016    Dysphagia Medium  7/25/2016    Midline thoracic back pain Medium  11/22/2016    Cardiomyopathy, unspecified type (H) Medium  7/27/2017    Fracture of humerus, proximal, right, closed Medium  10/18/2017    Rheumatic mitral regurgitation Medium  10/26/2017      Code Status History     Date Active Date Inactive Code Status Order ID Comments User Context    10/26/2017  1:59 PM 10/28/2017 12:26 AM Full Code [id number]  Gabe Porter MD Outpatient    10/18/2017  2:03 AM 10/26/2017  1:59 PM Full Code [id number]  Gabe Porter MD Inpatient    7/14/2016 10:43 AM 10/18/2017  2:03 AM Full Code [id number]  Evette Garg MD Outpatient    7/6/2016  4:19 PM 7/14/2016 10:43 AM Full Code [id number]  Lee Jang DPM Inpatient    6/27/2016 12:32 PM 7/6/2016  4:19 PM Full Code [id number]  Chapin Castro MD Outpatient    6/21/2016  9:23 PM 6/27/2016 12:32 PM Full Code [id number]  Rahul Parsons MD Inpatient    5/15/2016 10:36 AM 6/21/2016  9:23 PM Full Code [id number]  Rahul Parsons MD Outpatient    5/12/2016  9:43 PM 5/15/2016 10:36 AM Full Code [id number]  Gabe Porter MD Inpatient    12/7/2015  7:48  AM 5/12/2016  9:43 PM Full Code [id number]  Rosa Jolley MD Outpatient    11/25/2015  5:23 PM 12/7/2015  7:48 AM Full Code [id number]  Gabe Porter MD Inpatient    10/7/2015  3:39 PM 11/25/2015  5:23 PM Full Code [id number]  Ej Lilly MD Outpatient    10/6/2015 11:47 PM 10/7/2015  3:39 PM Full Code [id number]  Rubén Haddad MD Inpatient    8/14/2015 10:58 AM 10/6/2015 11:47 PM Full Code [id number]  Yanira Frankel PA-C Outpatient    8/12/2015 10:28 PM 8/14/2015 10:58 AM Full Code [id number]  Maddy Bennett PA-C ED    4/4/2015 11:19 PM 4/8/2015  6:04 PM DNR [id number]  Jaz Swartz DO Inpatient    3/25/2015 11:56 AM 4/4/2015 11:19 PM DNR [id number]  Gini Dave MD Outpatient    3/20/2015  7:29 PM 3/25/2015 11:56 AM DNR [id number]  Evette Garg MD Inpatient    3/18/2015 10:09 AM 3/20/2015  7:29 PM DNR [id number]  Evette Garg MD Outpatient    3/10/2015  3:33 PM 3/18/2015 10:09 AM DNR [id number]  Isaac Daniels DO Inpatient      Current Code Status     Date Active Code Status Order ID Comments User Context       10/28/2017 12:26 AM Full Code [id number]  Isaac Gomez MD Inpatient       Summary of Visit     Reason for your hospital stay       You were admitted for concerns of hypoxia. We checked your CXR and CT of the chest and it was negative for infection, or blood clot. Your O2 levels improved after getting supplemental O2. Your kidney function was slightly elevated on arrival but that improved with IV fluids and holding colchicine. Trial of prednisone for a few days to see if that improves your gout flare. Otherwise, we adjusted your pain meds and you are safe to return back to rehab.                Medication Review      START taking        Dose / Directions Comments    predniSONE 20 MG tablet   Commonly known as:  DELTASONE   Used for:  Acute gouty arthritis        Dose:  20 mg   Start taking on:  10/29/2017   Take 1 tablet (20 mg) by mouth daily for  3 days   Quantity:  3 tablet   Refills:  0          CONTINUE these medications which may have CHANGED, or have new prescriptions. If we are uncertain of the size of tablets/capsules you have at home, strength may be listed as something that might have changed.        Dose / Directions Comments    acetaminophen 325 MG tablet   Commonly known as:  TYLENOL   This may have changed:    - how much to take  - when to take this  - reasons to take this   Used for:  Midline thoracic back pain, unspecified chronicity        Dose:  975 mg   Take 3 tablets (975 mg) by mouth 3 times daily   Quantity:  100 tablet   Refills:  0        * gabapentin 100 MG capsule   Commonly known as:  NEURONTIN   This may have changed:  when to take this   Used for:  Midline thoracic back pain, unspecified chronicity        Dose:  100 mg   Take 1 capsule (100 mg) by mouth 2 times daily   Quantity:  270 capsule   Refills:  0        * gabapentin 100 MG capsule   Commonly known as:  NEURONTIN   This may have changed:  You were already taking a medication with the same name, and this prescription was added. Make sure you understand how and when to take each.   Used for:  Midline thoracic back pain, unspecified chronicity        Dose:  100 mg   Take 1 capsule (100 mg) by mouth At Bedtime   Quantity:  90 capsule   Refills:  0        * Notice:  This list has 2 medication(s) that are the same as other medications prescribed for you. Read the directions carefully, and ask your doctor or other care provider to review them with you.      CONTINUE these medications which have NOT CHANGED        Dose / Directions Comments    albuterol 1.25 MG/3ML nebulizer solution   Commonly known as:  ACCUNEB        Dose:  1 vial   Take 1 vial by nebulization 4 times daily   Refills:  0        ferrous sulfate 325 (65 FE) MG tablet   Commonly known as:  IRON        Dose:  325 mg   Take 325 mg by mouth 2 times daily   Refills:  0        furosemide 40 MG tablet   Commonly known  as:  LASIX   Used for:  Essential hypertension with goal blood pressure less than 140/90        Dose:  40 mg   Take 1 tablet (40 mg) by mouth 2 times daily   Quantity:  180 tablet   Refills:  1        hydrALAZINE 25 MG tablet   Commonly known as:  APRESOLINE   Used for:  Hyperlipidemia, unspecified hyperlipidemia type, Cardiomyopathy, unspecified type (H)        Dose:  75 mg   Take 3 tablets (75 mg) by mouth 3 times daily   Quantity:  810 tablet   Refills:  2        HYDROmorphone 2 MG tablet   Commonly known as:  DILAUDID   Used for:  Closed supracondylar fracture of right humerus, initial encounter        Dose:  2-4 mg   Take 1-2 tablets (2-4 mg) by mouth every 3 hours as needed for moderate to severe pain   Quantity:  90 tablet   Refills:  0        hydrOXYzine 25 MG tablet   Commonly known as:  ATARAX   Used for:  Closed supracondylar fracture of right humerus, initial encounter        Dose:  25 mg   Take 1 tablet (25 mg) by mouth 3 times daily as needed for other (pain adjunct)   Quantity:  60 tablet   Refills:  0        isosorbide mononitrate 30 MG 24 hr tablet   Commonly known as:  IMDUR   Used for:  Essential hypertension with goal blood pressure less than 140/90, Cardiomyopathy (H), Coronary artery disease involving native heart with angina pectoris, unspecified vessel or lesion type (H)        Dose:  30 mg   Take 1 tablet (30 mg) by mouth daily   Quantity:  90 tablet   Refills:  1        OMEGA-3 FATTY ACIDS PO        Dose:  1200 mg   Take 1,200 mg by mouth daily   Refills:  0        omeprazole 20 MG CR capsule   Commonly known as:  priLOSEC   Used for:  Gastroesophageal reflux disease without esophagitis        Dose:  20 mg   Take 1 capsule (20 mg) by mouth daily   Quantity:  90 capsule   Refills:  2        potassium chloride SA 20 MEQ CR tablet   Commonly known as:  KLOR-CON   Used for:  Bilateral edema of lower extremity        Dose:  20 mEq   Take 1 tablet (20 mEq) by mouth daily   Quantity:  90 tablet    Refills:  1        psyllium 58.6 % Powd   Commonly known as:  METAMUCIL        Dose:  1 teaspoonful   Take 1 teaspoonful by mouth every evening   Refills:  0        senna-docusate 8.6-50 MG per tablet   Commonly known as:  SENOKOT-S;PERICOLACE   Used for:  S/P foot surgery, right        Take 2 tablets daily as needed for constipation while taking prescription pain medications.   Quantity:  30 tablet   Refills:  0        simvastatin 10 MG tablet   Commonly known as:  ZOCOR   Used for:  Hyperlipidemia, unspecified hyperlipidemia type        Dose:  10 mg   Take 1 tablet (10 mg) by mouth At Bedtime   Quantity:  90 tablet   Refills:  0        venlafaxine 75 MG 24 hr capsule   Commonly known as:  EFFEXOR-XR   Used for:  Major depressive disorder, recurrent episode, in partial remission (H)        Dose:  75 mg   Take 1 capsule (75 mg) by mouth daily   Quantity:  90 capsule   Refills:  0    Pt due for appt by Dec. Needs to call our office       VITAMIN D (CHOLECALCIFEROL) PO        Dose:  1000 Units   Take 1,000 Units by mouth daily   Refills:  0          STOP taking     colchicine 0.6 MG tablet                   After Care     Activity - Up with assistive device           Fall precautions           General info for SNF       Length of Stay Estimate: Short Term Care: Estimated # of Days <30  Condition at Discharge: Stable  Level of care:skilled   Rehabilitation Potential: Good  Admission H&P remains valid and up-to-date: Yes  Recent Chemotherapy: N/A  Use Nursing Home Standing Orders: N/A       Mantoux instructions       Give two-step Mantoux (PPD) Per Facility Policy Yes       Regular Diet Adult                 Procedures     Oxygen - Nasal cannula       2 Lpm by nasal cannula to keep O2 sats 92% or greater. O2 at rest and with activity.             Referrals     Occupational Therapy Adult Consult       Evaluate and treat as clinically indicated.    Reason: assess adls       Physical Therapy Adult Consult       Evaluate  "and treat as clinically indicated.    Reason:  Right humerus fracture             Follow-Up Appointment Instructions     Follow Up and recommended labs and tests       Follow up with Nursing home physician in 1 week.             Your next 10 appointments already scheduled     Nov 02, 2017 11:00 AM CDT   SHORT with Gerri Eubanks MD   [facility] ([facility])    [address]  [facility] [address]   [phone]              Statement of Approval     Ordered          10/28/17 6292  I have reviewed and agree with all the recommendations and orders detailed in this document.  EFFECTIVE NOW     Approved and electronically signed by:  Yanira Frankel PA-C           10/28/17 4017  I have reviewed and agree with all the recommendations and orders detailed in this document.  EFFECTIVE NOW     Approved and electronically signed by:  Yanira Frankel PA-C                                                 INTERAGENCY TRANSFER FORM - NURSING   10/27/2017                       [facility] OBSERVATION DEPARTMENT: [phone]            Attending Provider: Isaac Gomez MD     Allergies:  Lisinopril, Calcium, Egg Yolk, Flu Virus Vaccine, Keflex [Cephalexin], Levaquin [Levofloxacin], Sulfa Drugs, Zinc    Infection:  None   Service:  Observation    Ht:  1.575 m (5' 2\")   Wt:  71.7 kg (158 lb)   Admission Wt:  71.7 kg (158 lb)    BMI:  28.9 kg/m 2   BSA:  1.77 m 2            Advance Directives        Does patient have a scanned Advance Directive/ACP document in EPIC?           Yes        Immunizations     Name Date      Pneumococcal 23 valent 04/07/15     Tdap (Adacel,Boostrix) 04/08/13       ASSESSMENT     Discharge Profile Flowsheet     EXPECTED DISCHARGE     Senior Linkage Line Referral Placed  07/08/16 08/25/16 1104    Expected Discharge Date  10/28/17 (1.0 > Obs> single/home) 10/28/17 0813   Existing Resources/Services  None 04/08/15 1402    DISCHARGE " "NEEDS ASSESSMENT     SKIN      Concerns To Be Addressed  discharge planning concerns 10/19/17 1453   Inspection of bony prominences  Full 10/28/17 1114    # of Referrals Placed by CTS  Transportation;Post Acute Facilities 10/26/17 1409   Inspection under devices  -- 10/28/17 1114    Does Patient Need a Referral for Clinic CC  Yes 10/23/17 1543   Skin WDL  ex;all 10/28/17 1114    Equipment Used at Home  walker, rolling 03/17/16 0920   Skin Color/Characteristics  bruised (ecchymotic) 10/28/17 1114    GASTROINTESTINAL (ADULT,PEDIATRIC,OB)     Skin Temperature  warm 10/28/17 1114    GI WDL  WDL 10/28/17 1603   Skin Moisture  dry 10/28/17 1114    COMMUNICATION ASSESSMENT     Skin Elasticity  quick return to original state 10/28/17 0413    Patient's communication style  spoken language (English or Bilingual) 10/27/17 1808   Skin Integrity  bruise(s) (Right humerous fx) 10/28/17 1114    FINAL RESOURCES     SAFETY      Resources List  Skilled Nursing Facility 10/26/17 1409   Safety WDL  WDL 10/28/17 1603    PAS Number  [id number] 10/19/17 1531   All Alarms  alarm(s) activated and audible 10/28/17 1603                 Assessment WDL (Within Defined Limits) Definitions           Safety WDL     Effective: 09/28/15    Row Information: <b>WDL Definition:</b> Bed in low position, wheels locked; call light in reach; upper side rails up x 2; ID band on<br> <font color=\"gray\"><i>Item=AS safety wdl>>List=AS safety wdl>>Version=F14</i></font>      Skin WDL     Effective: 09/28/15    Row Information: <b>WDL Definition:</b> Warm; dry; intact; elastic; without discoloration; pressure points without redness<br> <font color=\"gray\"><i>Item=AS skin wdl>>List=AS skin wdl>>Version=F14</i></font>      Vitals     Vital Signs Flowsheet     VITAL SIGNS     Pain Location  Shoulder 10/28/17 1552    Temp  98  F (36.7  C) 10/28/17 1600   Pain Orientation  Right 10/28/17 1552    Temp src  Oral 10/28/17 1600   Pain Descriptors  Sore 10/28/17 1552    " Resp  16 10/28/17 1552   Pain Management Interventions  analgesia administered 10/28/17 1552    Pulse  67 10/28/17 1552   Pain Intervention(s)  Medication (See eMAR) 10/28/17 1552    Heart Rate  52 10/28/17 1142   ANALGESIA SIDE EFFECTS MONITORING      Pulse/Heart Rate Source  Monitor 10/28/17 1142   Side Effects Monitoring: Respiratory Quality  R 10/28/17 1552    BP  145/63 10/28/17 1552   Side Effects Monitoring: Respiratory Depth  N 10/28/17 1552    BP Location  Left arm 10/28/17 1552   Side Effects Monitoring: Sedation Level  1 10/28/17 1552    OXYGEN THERAPY     HEIGHT AND WEIGHT      SpO2  96 % 10/28/17 1552   Weight  71.7 kg (158 lb) 10/28/17 0038    O2 Device  Nasal cannula 10/28/17 1552   POSITIONING      Oxygen Delivery  2 LPM 10/28/17 1552   Body Position  turned 10/28/17 0453    PAIN/COMFORT     Head of Bed (HOB)  HOB at 20 degrees 10/28/17 0453    Patient Currently in Pain  yes 10/28/17 1552   DAILY CARE      0-10 Pain Scale  10 10/28/17 1552   Activity Management  activity adjusted per tolerance 10/28/17 1603    FACES Pain Rating  0-->no hurt 10/27/17 1814   Activity Assistance Provided  assistance, 2 people 10/28/17 1603            Patient Lines/Drains/Airways Status    Active LINES/DRAINS/AIRWAYS     Name: Placement date: Placement time: Site: Days: Last dressing change:    External Catheter 10/18/17 1000 10/18/17   1000    10     Peripheral IV 06/23/16 Left Hand 06/23/16   0823   Hand   492     Peripheral IV 06/26/16 Left Lower forearm 06/26/16   1001   Lower forearm   489     Pressure Ulcer 04/05/15 Anterior;Left Leg PTA 04/05/15   0100    937     Wound 10/07/15 Bilateral Toe (Comment  which one) Other (comment) 10/07/15   0050   Toe (Comment  which one)   752     Wound 11/25/15 Inner Plantar Ulceration 11/25/15   1950   Plantar   702     Wound 05/12/16 Inner;Right Foot Other (comment) Foot Ulcer 05/12/16   2222   Foot   533     Incision/Surgical Site 07/06/16 Right Leg 07/06/16   0954    479              Patient Lines/Drains/Airways Status    Active PICC/CVC     None            Intake/Output Detail Report     Date Intake   Output    Shift P.O. IV Piggyback Total Total       Day 10/27/17 0700 - 10/27/17 1459 -- -- -- -- 0    Princess 10/27/17 1500 - 10/27/17 2259 -- -- -- -- 0    Noc 10/27/17 2300 - 10/28/17 0659 -- -- -- -- 0    Day 10/28/17 0700 - 10/28/17 1459 -- -- -- -- 0    Princess 10/28/17 1500 - 10/28/17 2259 120 -- 120 -- 120      Last Void/BM       Most Recent Value    Urine Occurrence 1 at 10/28/2017 1130    Stool Occurrence       Case Management/Discharge Planning     Case Management/Discharge Planning Flowsheet     REFERRAL INFORMATION     DISCHARGE PLANNING      Arrived From  home or self-care 07/08/16 1416   Does Patient Need a Referral for Clinic CC  Yes 10/23/17 1543    # of Referrals Placed by CTS  Transportation;Post Acute Facilities 10/26/17 1409   Equipment Used at Home  walker, rolling 03/17/16 0920    Primary Care Clinic Name  [facility] 10/23/17 1543   FINAL RESOURCES      Primary Care MD Name  Dr. Gerri Eubanks 10/23/17 1543   Resources List  Skilled Nursing Facility 10/26/17 1409    LIVING ENVIRONMENT     PAS Number  [id number] 10/19/17 1531    Lives With  child(emre), adult 10/20/17 0910   Senior Linkage Line Referral Placed  07/08/16 08/25/16 1104    Living Arrangements  house 10/20/17 0910   Existing Resources/Services  None 04/08/15 1402    COPING/STRESS     ABUSE RISK SCREEN      Major Change/Loss/Stressor  none 10/18/17 0115   QUESTION TO PATIENT:  Has a member of your family or a partner(now or in the past) intimidated, hurt, manipulated, or controlled you in any way?  no 10/28/17 0056    Patient Personal Strengths  able to adapt 10/18/17 0115   QUESTION TO PATIENT: Do you feel safe going back to the place where you are living?  yes 10/28/17 0056    EXPECTED DISCHARGE     OBSERVATION: Is there reason to believe there has been maltreatment of a vulnerable adult (ie.  Physical/Sexual/Emotional abuse, self neglect, lack of adequate food, shelter, medical care, or financial exploitation)?  no 10/28/17 0056    Expected Discharge Date  10/28/17 (1.0 > Obs> single/home) 10/28/17 0813   (R) MENTAL HEALTH SUICIDE RISK      ASSESSMENT/CONCERNS TO BE ADDRESSED     Are you depressed or being treated for depression?  Yes 10/28/17 0056    Concerns To Be Addressed  discharge planning concerns 10/19/17 6275                       [facility] OBSERVATION DEPARTMENT: [phone]            Medication Administration Report for Keisha Godinez as of 10/28/17 1702   Legend:    Given Hold Not Given Due Canceled Entry Other Actions    Time Time (Time) Time  Time-Action       Inactive    Active    Linked        Medications 10/22/17 10/23/17 10/24/17 10/25/17 10/26/17 10/27/17 10/28/17    acetaminophen (TYLENOL) tablet 650 mg  Dose: 650 mg Freq: EVERY 4 HOURS PRN Route: PO  PRN Reason: mild pain  Start: 10/28/17 0025   Admin Instructions: Alternate ibuprofen (if ordered) with acetaminophen.  Maximum acetaminophen dose from all sources = 75 mg/kg/day not to exceed 4 grams/day.           1024 (650 mg)-Given       1553 (650 mg)-Given           gabapentin (NEURONTIN) capsule 100 mg  Dose: 100 mg Freq: 3 TIMES DAILY Route: PO  Start: 10/28/17 0026          0048 (100 mg)-Given       1020 (100 mg)-Given       1553 (100 mg)-Given       [ ] 2000           hydrALAZINE (APRESOLINE) tablet 75 mg  Dose: 75 mg Freq: 3 TIMES DAILY Route: PO  Start: 10/28/17 0026          0047 (75 mg)-Given       1020 (75 mg)-Given       1553 (75 mg)-Given       [ ] 2000           HYDROmorphone (DILAUDID) tablet 2-4 mg  Dose: 2-4 mg Freq: EVERY 3 HOURS PRN Route: PO  PRN Reason: moderate to severe pain  Start: 10/28/17 0025          0048 (2 mg)-Given       1216 (2 mg)-Given           hydrOXYzine (ATARAX) tablet 25 mg  Dose: 25 mg Freq: 3 TIMES DAILY PRN Route: PO  PRN Reason: other  PRN Comment: pain adjunct  Start:  10/28/17 0025              ipratropium - albuterol 0.5 mg/2.5 mg/3 mL (DUONEB) neb solution 3 mL  Dose: 3 mL Freq: EVERY 4 HOURS PRN Route: NEBULIZATION  PRN Reason: wheezing  Start: 10/28/17 0025              isosorbide mononitrate (IMDUR) 24 hr tablet 30 mg  Dose: 30 mg Freq: DAILY Route: PO  Start: 10/28/17 0800   Admin Instructions: DO NOT CRUSH. Can split tablet in half along score jacky.           1020 (30 mg)-Given           melatonin tablet 1 mg  Dose: 1 mg Freq: AT BEDTIME PRN Route: PO  PRN Reason: sleep  Start: 10/28/17 0025   Admin Instructions: Do not give unless at least 6 hours of uninterrupted sleep is expected.               naloxone (NARCAN) injection 0.1-0.4 mg  Dose: 0.1-0.4 mg Freq: EVERY 2 MIN PRN Route: IV  PRN Reason: opioid reversal  Start: 10/28/17 0025   Admin Instructions: For respiratory rate LESS than or EQUAL to 8.  Partial reversal dose:  0.1 mg titrated q 2 minutes for Analgesia Side Effects Monitoring Sedation Level of 3 (frequently drowsy, arousable, drifts to sleep during conversation).Full reversal dose:  0.4 mg bolus for Analgesia Side Effects Monitoring Sedation Level of 4 (somnolent, minimal or no response to stimulation).  Give IV Push undiluted up to 2mg. Give each 0.4mg over 15 seconds in emergency situations. For non-emergent situations further dilute in 9mL of NS to facilitate titration of response.               ondansetron (ZOFRAN-ODT) ODT tab 4 mg  Dose: 4 mg Freq: EVERY 6 HOURS PRN Route: PO  PRN Reasons: nausea,vomiting  Start: 10/28/17 0025   Admin Instructions: This is Step 1 of nausea and vomiting management.  If nausea not resolved in 15 minutes, go to Step 2 prochlorperazine (COMPAZINE). Do not push through foil backing. Peel back foil and gently remove. Place on tongue immediately. Administration with liquid unnecessary              Or  ondansetron (ZOFRAN) injection 4 mg  Dose: 4 mg Freq: EVERY 6 HOURS PRN Route: IV  PRN Reasons: nausea,vomiting  Start:  10/28/17 0025   Admin Instructions: This is Step 1 of nausea and vomiting management.  If nausea not resolved in 15 minutes, go to Step 2 prochlorperazine (COMPAZINE).  Irritant. Give IV Push undiluted over 2-5 minutes up to 4 mg.               simvastatin (ZOCOR) tablet 10 mg  Dose: 10 mg Freq: AT BEDTIME Route: PO  Start: 10/28/17 0025          (0045)-Not Given       [ ] 2200           venlafaxine (EFFEXOR-ER) 24 hr tablet 75 mg  Dose: 75 mg Freq: DAILY Route: PO  Start: 10/28/17 0800   Admin Instructions: DO NOT CRUSH.           1020 (75 mg)-Given          Future Medications  Medications 10/22/17 10/23/17 10/24/17 10/25/17 10/26/17 10/27/17 10/28/17       predniSONE (DELTASONE) tablet 20 mg  Dose: 20 mg Freq: DAILY Route: PO  Start: 10/29/17 0800   End: 11/01/17 0759             Completed Medications  Medications 10/22/17 10/23/17 10/24/17 10/25/17 10/26/17 10/27/17 10/28/17         Dose: 1,000 mL Freq: ONCE Route: IV  Last Dose: Stopped (10/27/17 2122)  Start: 10/27/17 2119   End: 10/27/17 2122         2120 (78 mL)-New Bag [C]       2122-Stopped              Dose: 500 mL Freq: ONCE Route: IV  Start: 10/27/17 2119   End: 10/27/17 2120         2120 (63 mL)-Given           Discontinued Medications  Medications 10/22/17 10/23/17 10/24/17 10/25/17 10/26/17 10/27/17 10/28/17         Dose: 40 mg Freq: ONCE Route: IV  Start: 10/27/17 2225   End: 10/27/17 2237   Admin Instructions: Give IV Push undiluted over 1-2 minutes up to 40 mg.                 2237-Med Discontinued          Dose: 0.2 mg Freq: EVERY 15 MIN PRN Route: IV  PRN Reasons: moderate to severe pain,severe pain  Start: 10/27/17 2035   End: 10/28/17 0025         2040 (0.2 mg)-Given        0025-Med Discontinued         Dose: 3 mL Freq: EVERY 15 MIN PRN Route: NEBULIZATION  PRN Reasons: wheezing,shortness of breath / dyspnea  Start: 10/27/17 2039   End: 10/28/17 0025         2224 (3 mL)-Given        0025-Med Discontinued                INTERAGENCY TRANSFER FORM  - NOTES (H&P, Discharge Summary, Consults, Procedures, Therapies)   10/27/2017                       [facility] OBSERVATION DEPARTMENT: [phone]               History & Physicals      H&P by Isaac Gomez MD at 10/27/2017 11:31 PM     Author:  Isaac Gomez MD Service:  Hospitalist Author Type:  Physician    Filed:  10/28/2017 12:38 AM Date of Service:  10/27/2017 11:31 PM Creation Time:  10/27/2017 11:31 PM    Status:  Signed :  Isaac Gomez MD (Physician)         [facility]  Hospitalist Admission Note  Name: Keisha Godinez    MRN: [id number]  YOB: 1932    Age: 85 year old  Date of admission: 10/27/2017  Primary care provider: Gerri Eubanks    Chief Complaint:[DE1.1]  Shortness of breath[DE1.2]    Assessment and Plan:   1.[DE1.1]   Dyspnea[DE1.2]:[DE1.1] patient sent in for what appeared to be increased work of breathign at TCU.  The pateint was 97% on RA there.  She also has wheezing, but no COPD or asthma hx.  She actually has been sob since discharge two days ago and on review she was on 1-2L oxygen throughout that whole stay with one nursing note indicating O2 sats down to 85% on RA.  I suspect she just need a little oxygen and there is no acute change in her condition.  Her weight is actually down and has john so I do not think diuresis is the answer.  Her CTPA is negative for PE and no sign of pneumonia.  Her procal is 0.18 and no leukocytosis.  Her cough is chronic and not productive.    -suspect will need small amount of O2 on discharge to TCU until she is more ambulatory there  -nebs prn    2[DE1.2].[DE1.1]   Chronic diastolic CHF and pulmonary HTN[DE1.2]:[DE1.1] RV failure and pulmonary HTN with diastolic CHF.  On 40mg bid lasix.  Weight is down 1-2 pounds since discharge and no significant peripheral edema.  BNP is elevated at 4000, but she very dilated chambers so this is not surprising.  -if creatinine better  tomorrow resume lasix    3.   LILIA[DE1.2]:[DE1.1] creatinine 1.17 on admit when recently it was 0.7-0.8.  Her weight is actually down a pound since leaving here.  I think this is a combination of her lasix and being actually a little hypovolemic and also the cholchicine she started for a gout flare.  -stop colchicine  -hold lasix tonight  -bmp in am[DE1.2]    4.[DE1.1]   Humerus fracture[DE1.2]:[DE1.1] R humerus fx found last admission.  Non-op management.  -tylenol and dilaudid po prn for pain control  -d/c back to TCU[DE1.2]    5.[DE1.1]   Possible gout flare[DE1.2]:[DE1.1] inflamed 5th digit PIP R hand and tender.  Suspected gout last admit with uric acid over 8.  Was started on colchicine.  Still having pain.  Now with lilia.  -stop colchicine  -prednisone 20mg po daily for a few days and see if improved then likely can stop    5.   Chronic anemia[DE1.2]:[DE1.1] Hg 9.6 at baseline.    6[DE1.2].[DE1.1]   Afib and CVA[DE1.2]:[DE1.1] 2010 L MCA CVA.   Not on anticoagulation due to hx of GI bleed    7.   HTN[DE1.2]:[DE1.1] continue pta hydrlazine 75mg tid and imdur.[DE1.2]    Code Status:[DE1.1] Full Code[DE1.2]  FEN:[DE1.1] 3g na restriction[DE1.2]  Discharge Dispo:[DE1.1] back to TCU[DE1.2]  Estimated Disch Date / # of Days until Disch:[DE1.1] admit to obs.  Possible d/c tomorrow if creatinine better.  Suspect will need some supplemental O2 for a while.[DE1.2]      History of Present Illness:  Keisha Godinez is a 85 year old female with PMH including[DE1.1] CVA, affib, diastolic CHF, pulmonary HTN, and HTN[DE1.2] who presents with[DE1.1] shortness of breath from her TCU.  She was just hospitazlied for a R humerus fracture that was managed non-op.  Of note she was on 1-2 L O2 throughout that admission, but has not been on supplemental oxygen at the TCU.  She was noted to have more labored breathing and she says she has been more sob since being there.  Her O2 sats were 97%, but she was wheezing so she was sent in  here.  She has a chronic non-productive cough that is unchanged.  Denies fevers or chills to me.  No chest pain.  She still has pain in her R arm and R hand.  The pain is moderately severe and better with po dilaudid.  No diarrhea, vomiting, or dysuria[DE1.2].      History obtained from patient,[DE1.1] patient's daughter,[DE1.2] medical record, and from [DE1.1] Yamilka[DE1.2] in the emergency department.[DE1.1]  Patient was hypoxic to high 80s on RA and was put on 6L via facemask.  Vitals stable.  EKG showing afib with TWI in V3-V4 that is unchanged from previous.  Troponin negative.  BNP above 4000.  Creatinine 1.17 up from 0.8.  D dimer elevated, but CTPA negative for PE or infiltrate.  No leukocytosis.  She did received 1 L NS for her CTPA protocol.  Advised to hold lasix for now given john and not looking volume overloaded.  O2 turned down to 3 L and O2 sats 97%.  Not sure there is an acute process here and suspect she be on a little O2 for now until recovered from her fracture and is more mobile.  Admit to obs.[DE1.2]     Past Medical History reviewed:  Past Medical History:   Diagnosis Date     Anemia      Atrial fibrillation (H)      B12 deficiency      Cardiomyopathy (H)      CHF (congestive heart failure) (H)      Chronic edema     Lower extremities     Chronic systolic congestive heart failure (H)      CVA (cerebral vascular accident) (H) 2010    Acute Left MCA hemispheric CVA ( on coumadin)     Depression      Gastro-oesophageal reflux disease      GI bleed 03-20-15     Hyperlipidemia      Hypertension      Iron deficiency      MSSA (methicillin susceptible Staphylococcus aureus) 03-10-15     Pulmonary hypertension      Shingles      Past Surgical History reviewed:  Past Surgical History:   Procedure Laterality Date     COLONOSCOPY  03/24/2015    Diverticulosis, polyps     ENTEROSCOPY SMALL BOWEL N/A 2/29/2016    Procedure: ENTEROSCOPY SMALL BOWEL;  Surgeon: Ady Ponce MD;  Location:  GI      ESOPHAGOSCOPY, GASTROSCOPY, DUODENOSCOPY (EGD), COMBINED N/A 3/22/2015    Upper GI- Normal, nothing significant found.      EXCISE MASS FOOT Right 7/6/2016    Procedure: EXCISE MASS FOOT;  Surgeon: Lee Jang DPM;  Location: [facility] OR     Social History reviewed:  Social History   Substance Use Topics     Smoking status: Former Smoker     Quit date: 4/14/1956     Smokeless tobacco: Never Used     Alcohol use No     Social History     Social History Narrative     Family History reviewed:  Family History   Problem Relation Age of Onset     Known Genetic Syndrome Father      Known Genetic Syndrome Mother      Other Cancer Daughter      pancreatic     Other Cancer Brother      type     Other Cancer Sister 60     lung     DIABETES No family hx of      Breast Cancer No family hx of      Cancer - colorectal No family hx of      Ovarian Cancer No family hx of      Prostate Cancer No family hx of      Allergies:  Allergies   Allergen Reactions     Lisinopril Other (See Comments)     Tongue swelling     Calcium Nausea and Vomiting     Egg Yolk      Flu Virus Vaccine      Allergic to eggs.     Keflex [Cephalexin] Nausea     Pt states she had upset stomach.  NOT tongue swelling.  She had tongue swelling with a combo bp med that she thinks included lisinopril.    Tolerates IV Cefazolin     Levaquin [Levofloxacin]      Sulfa Drugs      Zinc Nausea and Vomiting     Medications:  Prior to Admission medications    Medication Sig Last Dose Taking? Auth Provider   albuterol (ACCUNEB) 1.25 MG/3ML nebulizer solution Take 1 vial by nebulization 4 times daily 10/27/2017 at 1600 Yes Unknown, Entered By History   colchicine 0.6 MG tablet Take 1 tablet (0.6 mg) by mouth daily 10/27/2017 at am Yes Gabe Porter MD   HYDROmorphone (DILAUDID) 2 MG tablet Take 1-2 tablets (2-4 mg) by mouth every 3 hours as needed for moderate to severe pain 10/27/2017 at 1111 Yes Gabe Porter MD   hydrOXYzine (ATARAX) 25 MG tablet Take 1 tablet  (25 mg) by mouth 3 times daily as needed for other (pain adjunct) prn Yes Gabe Porter MD   simvastatin (ZOCOR) 10 MG tablet Take 1 tablet (10 mg) by mouth At Bedtime 10/26/2017 at Unknown time Yes Gerri Eubanks MD   ferrous sulfate (IRON) 325 (65 FE) MG tablet Take 325 mg by mouth 2 times daily 10/27/2017 at am Yes Unknown, Entered By History   venlafaxine (EFFEXOR-XR) 75 MG 24 hr capsule Take 1 capsule (75 mg) by mouth daily 10/27/2017 at am Yes Gerri Eubanks MD   gabapentin (NEURONTIN) 100 MG capsule Take 1 capsule (100 mg) by mouth 3 times daily 10/27/2017 at 3rd-1600 Yes Gerri Eubanks MD   hydrALAZINE (APRESOLINE) 25 MG tablet Take 3 tablets (75 mg) by mouth 3 times daily 10/27/2017 at am Yes Gerri Eubanks MD   isosorbide mononitrate (IMDUR) 30 MG 24 hr tablet Take 1 tablet (30 mg) by mouth daily 10/27/2017 at am Yes Gerri Eubanks MD   potassium chloride SA (POTASSIUM CHLORIDE) 20 MEQ CR tablet Take 1 tablet (20 mEq) by mouth daily 10/27/2017 at am Yes Gerri Eubanks MD   furosemide (LASIX) 40 MG tablet Take 1 tablet (40 mg) by mouth 2 times daily 10/27/2017 at 2nd-1600 Yes Gerri Eubanks MD   omeprazole (PRILOSEC) 20 MG CR capsule Take 1 capsule (20 mg) by mouth daily 10/27/2017 at am Yes Gerri Eubanks MD   acetaminophen (TYLENOL) 325 MG tablet Take 650 mg by mouth every 6 hours as needed for mild pain  10/26/2017 at Unknown time Yes Reported, Patient   senna-docusate (SENOKOT-S;PERICOLACE) 8.6-50 MG per tablet Take 2 tablets daily as needed for constipation while taking prescription pain medications. prn Yes Lee Jang DPM   OMEGA-3 FATTY ACIDS PO Take 1,200 mg by mouth daily  10/27/2017 at am Yes Reported, Patient   psyllium (METAMUCIL) 58.6 % POWD Take 1 teaspoonful by mouth every evening  10/26/2017 at Unknown time Yes Reported, Patient   VITAMIN D, CHOLECALCIFEROL, PO Take 1,000 Units by mouth daily  10/27/2017 at am Yes Reported, Patient      Review of Systems:  A Comprehensive greater than 10 system review of systems was carried out.  Pertinent positives and negatives are noted above.  Otherwise negative.     Physical Exam:  Blood pressure 147/75, temperature 99  F (37.2  C), temperature source Oral, resp. rate 18, SpO2 96 %, not currently breastfeeding.  Wt Readings from Last 1 Encounters:   10/27/17 72.9 kg (160 lb 12.8 oz)     Exam:  Constitutional: Awake, NAD[DE1.1], appears tired[DE1.2]  Eyes: sclera white   HEENT: atraumatic,[DE1.1] dry mucous membranes[DE1.2]  Respiratory:[DE1.1]  Does not appear in respiratory distress. Is on facemask at 3L with O2 sat 97%.[DE1.2]  lungs cta bilaterally, no crackles or wheeze  Cardiovascular:[DE1.1] irregularly irregular rhythm, regular rate[DE1.2].  No murmur   GI: non-tender, not distended, bowel sounds present  Skin: no rash or lesions, acyanotic  Musculoskeletal/extremities:[DE1.1] R arm in sling.  Few enlarged PIPs on R hand including a red swollen 5th PIP that is tender[DE1.2]  Neurologic: A[DE1.1]lert, speech clear, moves all extremities equally[DE1.2]  Psychiatric: calm     Lab and imaging data personally reviewed:  Labs:[DE1.1]    Recent Labs  Lab 10/27/17  1913 10/27/17  1450 10/25/17  0630   WBC 9.8 9.5 6.7   HGB 9.6* 11.0* 9.3*   HCT 30.9* 36.0 30.3*   MCV 96 98 96    317 187       Recent Labs  Lab 10/27/17  1913 10/27/17  1450 10/25/17  0630    139 139   POTASSIUM 4.2 3.7 4.5   CHLORIDE 97 97 101   CO2 38* 36* 35*   ANIONGAP 3 6 3   * 99 111*   BUN 30 29 22   CR 1.17* 1.06* 0.80   GFRESTIMATED 44* 49* 68   GFRESTBLACK 53* 60* 83   CASPER 8.9 9.8 9.1       Recent Labs  Lab 10/27/17  1913   NTBNPI 4521*       Recent Labs  Lab 10/27/17  1913   TROPI <0.015[DE1.3]     Procal 0.18  D dimer 2.3  Influenza antigen negative[DE1.2]    EKG:[DE1.1]  NSR, TWI in V2 and V3[DE1.2]    Imaging:[DE1.1]  Results for orders placed or performed during the hospital encounter of 10/27/17   XR Chest 2  Views    Narrative    CHEST TWO VIEWS  10/27/2017 7:47 PM     COMPARISON: Frontal chest x-ray 10/18/2017    HISTORY: Chest pain, shortness of breath      Impression    IMPRESSION: Severe cardiomegaly again noted. There is no evidence for  congestive failure or pulmonary edema. There are tiny bilateral  pleural effusions. There are no airspace opacities to suggest  pneumonia. There is no pneumothorax.    TOYA MARRERO MD   Chest CT, IV contrast only - PE protocol    Narrative    CT CHEST PULMONARY EMBOLISM WITH CONTRAST  10/27/2017  9:36 PM     HISTORY: Shortness of breath and hypoxia.    TECHNIQUE: Helical axial scans from lung apices through lung bases  with 63mL Isovue-370 IV contrast. Radiation dose for this scan was  reduced using automated exposure control, adjustment of the mA and/or  kV according to patient size, or iterative reconstruction technique.    COMPARISON: 7/7/2016.    FINDINGS: There is no CT evidence for pulmonary embolism or other  acute vascular abnormality of the chest. Thyroid gland heterogeneity  may be related to multiple small nodules. Vascular calcifications,  including coronary artery calcifications. Cardiomegaly and moderate  size hiatal hernia without change. Small bilateral pleural effusions,  decreased since the prior exam. The previously seen left basilar  infiltrate has resolved. Small amount of platelike atelectasis in the  lung bases bilaterally. Previously seen left upper lobe infiltrate is  significantly smaller and residual pleural-parenchymal opacity in the  posterior left lung apex may represent residual scarring although a  small amount of recurrent or residual infiltrate is not excluded.  There are multiple very tiny scattered nodules in the lungs  bilaterally, many of which appear calcified and this is likely all  related to old granulomatous disease. The remainder of the lungs are  clear. Visualized upper abdomen shows no additional abnormalities.  There is a right  "humeral neck fracture which is known and new since  the prior CT chest.      Impression    IMPRESSION:  1. No evidence for pulmonary embolism.  2. Vascular calcifications, including coronary artery calcifications.  3. Cardiomegaly and moderate size hiatal hernia without change.  4. Small bilateral pleural effusions, decreased since the prior exam.  5. Significant decrease in previously seen left lung infiltrates.    LOY SONG MD[DE1.4]       Isaac Gomez MD  Hospitalist  [facility][DE1.1]       Revision History        User Key Date/Time User Provider Type Action    > DE1.4 10/28/2017 12:38 AM Isaac Gomez MD Physician Sign     DE1.3 10/28/2017 12:19 AM Isaac Gomez MD Physician      DE1.2 10/28/2017 12:14 AM Isaac Gomez MD Physician      DE1.1 10/27/2017 11:31 PM Isaac Gomez MD Physician                   Discharge Summaries     No notes of this type exist for this encounter.      Consult Notes     No notes of this type exist for this encounter.         Progress Notes - Physician (Notes for yesterday and today)      ED Provider Notes by Linnea Prather MD at 10/27/2017  6:07 PM     Author:  Linnea Prather MD Service:  Emergency Medicine Author Type:  Physician    Filed:  10/27/2017 11:24 PM Date of Service:  10/27/2017  6:07 PM Creation Time:  10/27/2017  6:07 PM    Status:  Signed :  Linnea Prather MD (Physician)           History     Chief Complaint:  Shortness of breath    HPI   Keisha Godinez is a 85 year old female who presents to the ED for the evaluation of shortness of breath.[JB1.1] The patient has been staying at a nursing home since Thursday night, as she broke her right arm recently. The staff there noticed that she was short of breath today and called EMS. The patient herself reports that her breathing has felt more labored over the past 2 days. She reports also feeling \"like a truck hit her\" due to the pain from her " right arm and having some chills. She has a slight cough as well, but she notes this is per her baseline. The patient reports having chest pain with breathing. The patient denies fever, diarrhea, nausea, abdominal pain, or vomiting.[JB1.2]  She is not on oxygen at baseline.  Room air sat is 88%.[TP1.1]    Allergies:  Lisinopril  Keflex  Levaquin  Sulfa drugs     Medications:    Colchicine  Dilaudid  Atarax  Zocor  Effexor  Neurontin  Apresoline  Imdur  Potassium chloride  Lasix  Prilosec  Tylenol  Senokot    Past Medical History:    Anemia  Atrial fibrillation  Cardiomyopathy  CHF  Chronic edema  Chronic systolic CHF  CVA  Depression  GERD  Hyperlipidemia  Hypertension  MSSA  Pulmonary hypertension  Shingles  Sepsis  Pneumonia  Dysphagia  Rheumatic mitral regurgitation    Past Surgical History:    History reviewed. No pertinent surgical history.    Family History:    Pancreatic cancer  Lung cancer    Social History:  Smoking status: Former, quit 1956  Alcohol use: No  Marital Status:   [5]     Review of Systems   Constitutional: Positive for[JB1.1] chills[JB1.2]. Negative for[JB1.1] fever[JB1.2].   Respiratory: Positive for[JB1.1] shortness of breath[JB1.2].    Cardiovascular: Positive for[JB1.1] chest pain[JB1.2].   Gastrointestinal: Negative for[JB1.1] abdominal pain[JB1.2],[JB1.1] diarrhea[JB1.2],[JB1.1] nausea[JB1.2] and[JB1.1] vomiting[JB1.2].[JB1.1]   All other systems reviewed and are negative[JB1.2].        Physical Exam[JB1.1]     Patient Vitals for the past 24 hrs:   BP Temp Temp src Heart Rate Resp SpO2   10/27/17 2150 - - - 53 18 96 %   10/27/17 2145 - - - - 19 91 %   10/27/17 2100 147/75 - - 50 19 95 %   10/27/17 2056 - - - 47 21 (!) 89 %   10/27/17 2030 - - - 52 19 98 %   10/27/17 1950 - - - 53 25 99 %   10/27/17 1915 - - - 55 - 99 %   10/27/17 1815 186/75 - - - - 98 %   10/27/17 1812 181/79 99  F (37.2  C) Oral 55 18 96 %[JB1.3]         Physical Exam  Gen: Pleasant,[JB1.1] chronically ill  appearing.[TP1.2]    Eye:   Pupils are equal, round, and reactive.     Sclera non-injected.    ENT:   Moist mucus membranes.     Normal tongue.    Oropharynx without lesions.    Cardiac:     Normal rate and regular rhythm.    No murmurs, gallops, or rubs.    Pulmonary:     Clear to auscultation bilaterally.    No rales, or rhonchi.[JB1.1] Audible wheezing. Symmetric in all 4 lung fields.[JB1.2] Increased work of breathing.[TP1.2]  On 6 L with[JB1.2] oximask[TP1.2].[JB1.2]    Abdomen:     Normal active bowel sounds.     Abdomen is soft, without focal tenderness.[JB1.1] Abdominal distension but no pain.[JB1.2]    Musculoskeletal:[JB1.1]     Right upper extremity in a sling. Painful range of motion.[TP1.2]    Extremities:    No edema.    Skin:   Warm and dry.[JB1.1] Bruising in left upper extremity.[JB1.2]    Neurologic:    Non-focal exam without asymmetric weakness or numbness.    Normal tone    Psychiatric:     Normal affect with appropriate interaction with examiner.    Emergency Department Course   ECG ([JB1.1]18[JB1.4]:[JB1.1]16[JB1.4]:[JB1.1]40[JB1.4]):  Rate[JB1.1] 58[JB1.4] bpm. [place] interval[JB1.1] *[JB1.4]. QRS duration[JB1.1] 98[JB1.4]. QT/QTc[JB1.1] 452/443[JB1.4]. P-R-T axes[JB1.1] * 58 84[JB1.4].[JB1.1] Undetermined rhythm. ST and T wave abnormality, consider anterior ischemia. Abnormal ECG.[JB1.4] Interpreted at[JB1.1] 1819[JB1.4] by Linnea Prather MD.    Imaging:[JB1.1]  Radiographic findings were communicated with the patient who voiced understanding of the findings.    X-ray Chest, 2 views:[JB1.5]  Severe cardiomegaly again noted. There is no evidence for  congestive failure or pulmonary edema. There are tiny bilateral  pleural effusions. There are no airspace opacities to suggest  pneumonia. There is no pneumothorax.[JB1.6]  As read by Radiology.[JB1.5]    CT: Chest, w/ Contrast:[JB1.7]  1. No evidence for pulmonary embolism.  2. Vascular calcifications, including coronary artery  calcifications.  3. Cardiomegaly and moderate size hiatal hernia without change.  4. Small bilateral pleural effusions, decreased since the prior exam.  5. Significant decrease in previously seen left lung infiltrates.[JB1.8]  As read by Radiology.[JB1.7]    Laboratory:[JB1.1]  Troponin:[JB1.5] <0.015  D dimer:[JB1.6] 2.3(H)[JB1.9]  CBC:[JB1.5]HGB 9.6(L), o/w[JB1.6] WNL (WBC[JB1.5] 9.8[JB1.6], PLT[JB1.5] 226[JB1.6])   BMP:[JB1.5] Carbon Dioxide 38(H), Glucose 104(H), Creatinine 1.17(H), GFR 44(L), o/w[JB1.6] WNL  NT probnp:[JB1.5] 4521(H)[JB1.6]  Lactic Acid:[JB1.5] 0.9  Procalcitonin:[JB1.6] 0.18[JB1.10]  Influenza A/B antigen:[JB1.6] Negative[JB1.9]    Blood Cultures: Pending[JB1.5]    Interventions:[JB1.1]  2040: Dilaudid 0.2 mg, IV[JB1.9]  2120: NS 1L IV Bolus[JB1.7]  2224: Duoneb 3 mL, Nebulization[JB1.10]    Emergency Department Course:  The patient arrived in the emergency department via EMS.  Past medical records, nursing notes, and vitals reviewed.[JB1.1]  7:20pm[JB1.2]: I performed an exam of the patient and obtained history, as documented above.[JB1.1]   IV inserted and blood drawn.  The patient was sent for a xray[JB1.5] and CT[JB1.7] while in the emergency department, findings above.[JB1.5]    8:50pm: I rechecked the patient. Explained findings to the patient.[JB1.9]  10:34pm[JB1.11]: I spoke to [JB1.8] Patricia[JB1.11] of the hospitalist service who accepts the patient for admission.[JB1.8]   10:40pm: I rechecked the patient. Explained findings to the patient and[JB1.12] daughter[TP1.2].[JB1.12]    Findings and plan explained to the Patient who consents to admission. Discussed the patient with [JB1.8] Patricia[JB1.11], who will admit the patient to a[JB1.8]n obs[JB1.13] bed for further monitoring, evaluation, and treatment.[JB1.8]     Impression & Plan    Medical Decision Making:[JB1.1]  This is a 85 year old female with history of pulmonary hypertension, chronic atrial fibrillation, recent proximal  humerus fracture who presents with increased work of breathing and hypoxia. On exam, the patient has diffuse wheezing. Her work of breathing has improved with supplemental oxygen. In the ED, she was 88% on room air does not have a history of oxygen requirement. Workup has been notable for CT that is negative for PE. BN peptide is elevated. Otherwise there is no evidence for acute pneumonia at this time. The patient has normal white blood cell count and chest imaging that is negative for focal infiltrate. She has no history of COPD or asthma.[JB1.10] I discussed giving a dose of Lasix with Dr. Gomez. He notes that given her renal insufficiency of the fact that she does not appear volume overloaded and would be better to hold the dose at this time. He also notes that it appears she likely needed to be discharged from the hospital with oxygen following her previous stay.  She has remained stable and oxygen requirements have decreased to 6 L oxygen mask. At this point, we will continue supportive care with supplemental oxygen and nebs as needed[TP1.2]. She will be admitted to a medical bed. Family was updated and agrees with this plan.[JB1.10]    Diagnosis:[JB1.1]    ICD-10-CM   1. Acute and chronic respiratory failure with hypoxia (H) J96.21   2. Acute on chronic congestive heart failure, unspecified congestive heart failure type (H) I50.9[JB1.14]       Disposition:[JB1.1]  Admitted to[JB1.10] obs[JB1.13]        Justa Womack  10/27/2017   [facility] EMERGENCY DEPARTMENT[JB1.1]  I, Justa Womack, am serving as a scribe at 7:20 PM on 10/27/2017 to document services personally performed by Linnea Prather MD based on my observations and the provider's statements to me.[JB1.2]        Linnea Prather MD  10/27/17 2324  [TP1.3]     Revision History        User Key Date/Time User Provider Type Action    > TP1.3 10/27/2017 11:24 PM Linnea Prather MD Physician Sign     TP1.2 10/27/2017 11:20 PM  Linnea Prather MD Physician      JB1.12 10/27/2017 10:41 PM Vu, Justa Scribe Share     JB1.13 10/27/2017 10:38 PM Vu, Justa Scribe Share     JB1.3 10/27/2017 10:35 PM Vu, Justa Scribe Share     JB1.11 10/27/2017 10:34 PM [place], Justa Scribe Share     JB1.14 10/27/2017 10:29 PM [place], Justa Scribe      JB1.10 10/27/2017 10:27 PM Vu, Justa Scribe      [N/A] 10/27/2017 10:26 PM Linnea Prather MD Physician Share     JB1.8 10/27/2017 10:23 PM Vu, Justa Scribe Share     JB1.7 10/27/2017 10:10 PM Vu, Justa Scribe Share     TP1.1 10/27/2017  8:58 PM Linnea Prather MD Physician Share     JB1.9 10/27/2017  8:54 PM Uv, Justa Scribe Share     JB1.6 10/27/2017  8:44 PM Vu, Justa Scribe Share     JB1.2 10/27/2017  7:30 PM [place], Justa Scribe Share     JB1.4 10/27/2017  7:15 PM Vu, Justa Scribe Share     JB1.5 10/27/2017  6:54 PM [place], Justa Scribe Share     JB1.1 10/27/2017  6:16 PM [place], Justa Scribe Share                  Procedure Notes     No notes of this type exist for this encounter.      Progress Notes - Therapies (Notes from 10/25/17 through 10/28/17)     No notes of this type exist for this encounter.                                          INTERAGENCY TRANSFER FORM - LAB / IMAGING / EKG / EMG RESULTS   10/27/2017                       [facility] OBSERVATION DEPARTMENT: [phone]            Unresulted Labs     None         Lab Results - 3 Days      Blood culture ONE site [[id number]]  Resulted: 10/28/17 1511, Result status: Preliminary result    Ordering provider: Linnea Prather MD  10/27/17 1837 Resulting lab: [facility]    Specimen Information    Type Source Collected On   Blood Arm, Left 10/27/17 1913   Comment:  Left Arm          Components       Value Reference Range Flag Lab   Specimen Description Blood Left Arm      Special Requests Aerobic and anaerobic bottles received   75   Culture  Micro No growth after 15 hours   75            Basic metabolic panel [[id number]] (Abnormal)  Resulted: 10/28/17 0750, Result status: Final result    Ordering provider: Isaac Gomez MD  10/28/17 0026 Resulting lab: [facility]    Specimen Information    Type Source Collected On   Blood  10/28/17 0703          Components       Value Reference Range Flag Lab   Sodium 138 133 - 144 mmol/L  FrRdHs   Potassium 3.9 3.4 - 5.3 mmol/L  FrRdHs   Chloride 97 94 - 109 mmol/L  FrRdHs   Carbon Dioxide 37 20 - 32 mmol/L H FrRdHs   Anion Gap 4 3 - 14 mmol/L  FrRdHs   Glucose 95 70 - 99 mg/dL  FrRdHs   Urea Nitrogen 26 7 - 30 mg/dL  FrRdHs   Creatinine 0.95 0.52 - 1.04 mg/dL  FrRdHs   GFR Estimate 56 >60 mL/min/1.7m2 L FrRd   Comment:  Non  GFR Calc   GFR Estimate If Black 68 >60 mL/min/1.7m2  FrRd   Comment:  African American GFR Calc   Calcium 8.5 8.5 - 10.1 mg/dL  Rd            Procalcitonin [[id number]]  Resulted: 10/27/17 2230, Result status: Final result    Ordering provider: Linnea Prather MD  10/27/17 1913 Resulting lab: [facility]    Specimen Information    Type Source Collected On     10/27/17 1913          Components       Value Reference Range Flag Lab   Procal[facility].18 ng[facility]ml  [facility]   Comment:         0.05-0.24 ng/ml Low risk of systemic bacterial infection. Local bacterial   infection possible.  Recommendation: Assess other clinical features of   infection. Discourage antibiotics unless strong clinical suspicion for serious   infection.              Influenza A/B antigen [[id number]]  Resulted: 10/27/17 2050, Result status: Final result    Ordering provider: Linnea Prather MD  10/1935 Resulting lab: [facility]    Specimen Information    Type Source Collected On   Nose Nose 10/27/17 2025          Components       Value Reference Range Flag Lab   Influenza A/B Agn Specimen Nose   FrRdHs   Influenza A Negative NEG^Negative   [facility]   Influenza B Negative NEG^Negative  [facility]   Comment:         Test results must be correlated with clinical data. If necessary, results   should be confirmed by a molecular assay or viral culture.              D dimer quantitative [[id number]] (Abnormal)  Resulted: 10/27/17 2044, Result status: Final result    Ordering provider: Linnea Prather MD  10/27/17 1913 Resulting lab: [facility]    Specimen Information    Type Source Collected On     10/27/17 1913          Components       Value Reference Range Flag Lab   D Dimer 2.3 0.0 - 0.50 ug/ml FEU [facility]   Comment:         This D-dimer assay is intended for use in conjunction with a clinical pretest   probability assessment model to exclude pulmonary embolism (PE) and deep   venous thrombosis (DVT) in outpatients suspected of PE or DVT. The cut-off   value is 0.5 ug/mL FEU.              Troponin I [[id number]]  Resulted: 10/27/17 1955, Result status: Final result    Ordering provider: Linnea Prather MD  10/27/17 1837 Resulting lab: [facility]    Specimen Information    Type Source Collected On   Blood  10/27/17 1913          Components       Value Reference Range Flag Lab   Troponin I ES <0.015 0.000 - 0.045 ug/L  [facility]   Comment:         The 99th percentile for upper reference range is 0.045 ug/L.  Troponin values   in the range of 0.045 - 0.120 ug/L may be associated with risks of adverse   clinical events.              Nt probnp inpatient (BNP) [[id number]] (Abnormal)  Resulted: 10/27/17 1955, Result status: Final result    Ordering provider: Linnea Prather MD  10/27/17 1837 Resulting lab: [facility]    Specimen Information    Type Source Collected On   Blood  10/27/17 1913          Components       Value Reference Range Flag Lab   N-Terminal Pro BNP Inpatient 4521 0 - 1800 pg/mL H [facility]   Comment:            Reference range shown and results flagged as abnormal are suggested inpatient   cut points  for confirming diagnosis if CHF in an acute setting. Establishing a   baseline value for each individual patient is useful for follow-up. An   inpatient or emergency department NT-proPBNP <300 pg/mL effectively rules out   acute CHF, with 99% negative predictive value.  The outpatient non-acute reference range for ruling out CHF is:   0-125 pg/mL (age 18 to less than 75)   0-450 pg/mL (age 75 yrs and older)              Basic metabolic panel [[id number]] (Abnormal)  Resulted: 10/27/17 1954, Result status: Final result    Ordering provider: Linnea Prather MD  10/27/17 1837 Resulting lab: [facility]    Specimen Information    Type Source Collected On   Blood  10/27/17 1913          Components       Value Reference Range Flag Lab   Sodium 138 133 - 144 mmol/L  FrRdHs   Potassium 4.2 3.4 - 5.3 mmol/L  FrRdHs   Chloride 97 94 - 109 mmol/L  FrRdHs   Carbon Dioxide 38 20 - 32 mmol/L H FrRdHs   Anion Gap 3 3 - 14 mmol/L  FrRdHs   Glucose 104 70 - 99 mg/dL H FrRdHs   Urea Nitrogen 30 7 - 30 mg/dL  FrRdHs   Creatinine 1.17 0.52 - 1.04 mg/dL H FrRdHs   GFR Estimate 44 >60 mL/min/1.7m2 L FrRdHs   Comment:  Non  GFR Calc   GFR Estimate If Black 53 >60 mL/min/1.7m2 L FrRdHs   Comment:  African American GFR Calc   Calcium 8.9 8.5 - 10.1 mg/dL  FrRdHs            CBC with platelets differential [[id number]] (Abnormal)  Resulted: 10/27/17 1933, Result status: Final result    Ordering provider: Linnea Prather MD  10/27/17 1837 Resulting lab: [facility]    Specimen Information    Type Source Collected On   Blood  10/27/17 1913          Components       Value Reference Range Flag Lab   WBC 9.8 4.0 - 11.0 10e9/L  FrRdHs   RBC Count 3.22 3.8 - 5.2 10e12/L L FrRdHs   Hemoglobin 9.6 11.7 - 15.7 g/dL L FrRdHs   Hematocrit 30.9 35.0 - 47.0 % L FrRdHs   MCV 96 78 - 100 fl  FrRdHs   MCH 29.8 26.5 - 33.0 pg  FrRdHs   MCHC 31.1 31.5 - 36.5 g/dL L FrRd   RDW 13.3 10.0 - 15.0 %  FrHs    Platelet Count 226 150 - 450 10e9/L  FrRdHs   Diff Method Automated Method   FrRdHs   % Neutrophils 77.8 %  FrRdHs   % Lymphocytes 9.7 %  FrRdHs   % Monocytes 7.7 %  FrRdHs   % Eosinophils 3.2 %  FrRdHs   % Basophils 0.7 %  FrRdHs   % Immature Granulocytes 0.9 %  FrRdHs   Nucleated RBCs 0 0 /100  FrRdHs   Absolute Neutrophil 7.6 1.6 - 8.3 10e9/L  FrRdHs   Absolute Lymphocytes 1.0 0.8 - 5.3 10e9/L  FrRdHs   Absolute Monocytes 0.8 0.0 - 1.3 10e9/L  FrRdHs   Absolute Eosinophils 0.3 0.0 - 0.7 10e9/L  FrRdHs   Absolute Basophils 0.1 0.0 - 0.2 10e9/L  FrRdHs   Abs Immature Granulocytes 0.1 0 - 0.4 10e9/L  FrRdHs   Absolute Nucleated RBC 0.0   FrRdHs            Lactic acid [[id number]]  Resulted: 10/27/17 1931, Result status: Final result    Ordering provider: Linnea Prather MD  10/27/17 1837 Resulting lab: [facility]    Specimen Information    Type Source Collected On   Blood  10/27/17 1913          Components       Value Reference Range Flag Lab   Lactic Acid 0.9 0.4 - 2.0 mmol/L  FrRdHs            Testing Performed By     Lab - Abbreviation Name Director Address Valid Date Range    12 - FrRdHs [facility] Unknown [address]  [facility] [address] 05/08/15 1057 - Present    14 - FrStHsLb [facility] Unknown [address] [place] [address] 05/08/15 1057 - Present    75 - Unknown [facility] Unknown [address] [facility] [address] 01/15/15 1019 - Present               Imaging Results - 3 Days      Chest CT, IV contrast only - PE protocol [[id number]]  Resulted: 10/27/17 2301, Result status: Final result    Ordering provider: Linnea Prather MD  10/27/17 2059 Resulted by: Kwame Gann MD    Performed: 10/27/17 2118 - 10/27/17 2136 Resulting lab: RADIOLOGY RESULTS    Narrative:       CT CHEST PULMONARY EMBOLISM WITH CONTRAST  10/27/2017  9:36 PM     HISTORY: Shortness of breath and hypoxia.    TECHNIQUE: Helical axial  scans from lung apices through lung bases  with 63mL Isovue-370 IV contrast. Radiation dose for this scan was  reduced using automated exposure control, adjustment of the mA and/or  kV according to patient size, or iterative reconstruction technique.    COMPARISON: 7/7/2016.    FINDINGS: There is no CT evidence for pulmonary embolism or other  acute vascular abnormality of the chest. Thyroid gland heterogeneity  may be related to multiple small nodules. Vascular calcifications,  including coronary artery calcifications. Cardiomegaly and moderate  size hiatal hernia without change. Small bilateral pleural effusions,  decreased since the prior exam. The previously seen left basilar  infiltrate has resolved. Small amount of platelike atelectasis in the  lung bases bilaterally. Previously seen left upper lobe infiltrate is  significantly smaller and residual pleural-parenchymal opacity in the  posterior left lung apex may represent residual scarring although a  small amount of recurrent or residual infiltrate is not excluded.  There are multiple very tiny scattered nodules in the lungs  bilaterally, many of which appear calcified and this is likely all  related to old granulomatous disease. The remainder of the lungs are  clear. Visualized upper abdomen shows no additional abnormalities.  There is a right humeral neck fracture which is known and new since  the prior CT chest.      Impression:       IMPRESSION:  1. No evidence for pulmonary embolism.  2. Vascular calcifications, including coronary artery calcifications.  3. Cardiomegaly and moderate size hiatal hernia without change.  4. Small bilateral pleural effusions, decreased since the prior exam.  5. Significant decrease in previously seen left lung infiltrates.    LOY SONG MD      XR Chest 2 Views [[id number]]  Resulted: 10/27/17 2020, Result status: Final result    Ordering provider: Linnea Prather MD  10/27/17 2474 Resulted by: Db Edwards MD     Performed: 10/27/17 1931 - 10/27/17 1947 Resulting lab: RADIOLOGY RESULTS    Narrative:       CHEST TWO VIEWS  10/27/2017 7:47 PM     COMPARISON: Frontal chest x-ray 10/18/2017    HISTORY: Chest pain, shortness of breath      Impression:       IMPRESSION: Severe cardiomegaly again noted. There is no evidence for  congestive failure or pulmonary edema. There are tiny bilateral  pleural effusions. There are no airspace opacities to suggest  pneumonia. There is no pneumothorax.    TOYA MARRERO MD      Testing Performed By     Lab - Abbreviation Name Director Address Valid Date Range    104 - Rad Rslts RADIOLOGY RESULTS Unknown Unknown 02/16/05 1553 - Present            Encounter-Level Documents:     There are no encounter-level documents.      Order-Level Documents:     There are no order-level documents.

## 2017-10-27 NOTE — ED PROVIDER NOTES
"  History     Chief Complaint:  Shortness of breath    HPI   Keisha Godinez is a 85 year old female who presents to the ED for the evaluation of shortness of breath. The patient has been staying at a nursing home since Thursday night, as she broke her right arm recently. The staff there noticed that she was short of breath today and called EMS. The patient herself reports that her breathing has felt more labored over the past 2 days. She reports also feeling \"like a truck hit her\" due to the pain from her right arm and having some chills. She has a slight cough as well, but she notes this is per her baseline. The patient reports having chest pain with breathing. The patient denies fever, diarrhea, nausea, abdominal pain, or vomiting.  She is not on oxygen at baseline.  Room air sat is 88%.    Allergies:  Lisinopril  Keflex  Levaquin  Sulfa drugs     Medications:    Colchicine  Dilaudid  Atarax  Zocor  Effexor  Neurontin  Apresoline  Imdur  Potassium chloride  Lasix  Prilosec  Tylenol  Senokot    Past Medical History:    Anemia  Atrial fibrillation  Cardiomyopathy  CHF  Chronic edema  Chronic systolic CHF  CVA  Depression  GERD  Hyperlipidemia  Hypertension  MSSA  Pulmonary hypertension  Shingles  Sepsis  Pneumonia  Dysphagia  Rheumatic mitral regurgitation    Past Surgical History:    History reviewed. No pertinent surgical history.    Family History:    Pancreatic cancer  Lung cancer    Social History:  Smoking status: Former, quit 1956  Alcohol use: No  Marital Status:   [5]     Review of Systems   Constitutional: Positive for chills. Negative for fever.   Respiratory: Positive for shortness of breath.    Cardiovascular: Positive for chest pain.   Gastrointestinal: Negative for abdominal pain, diarrhea, nausea and vomiting.   All other systems reviewed and are negative.        Physical Exam     Patient Vitals for the past 24 hrs:   BP Temp Temp src Heart Rate Resp SpO2   10/27/17 2150 - - - 53 18 96 % "   10/27/17 2145 - - - - 19 91 %   10/27/17 2100 147/75 - - 50 19 95 %   10/27/17 2056 - - - 47 21 (!) 89 %   10/27/17 2030 - - - 52 19 98 %   10/27/17 1950 - - - 53 25 99 %   10/27/17 1915 - - - 55 - 99 %   10/27/17 1815 186/75 - - - - 98 %   10/27/17 1812 181/79 99  F (37.2  C) Oral 55 18 96 %         Physical Exam  Gen: Pleasant, chronically ill appearing.    Eye:   Pupils are equal, round, and reactive.     Sclera non-injected.    ENT:   Moist mucus membranes.     Normal tongue.    Oropharynx without lesions.    Cardiac:     Normal rate and regular rhythm.    No murmurs, gallops, or rubs.    Pulmonary:     Clear to auscultation bilaterally.    No rales, or rhonchi. Audible wheezing. Symmetric in all 4 lung fields. Increased work of breathing.  On 6 L with oximask.    Abdomen:     Normal active bowel sounds.     Abdomen is soft, without focal tenderness. Abdominal distension but no pain.    Musculoskeletal:     Right upper extremity in a sling. Painful range of motion.    Extremities:    No edema.    Skin:   Warm and dry. Bruising in left upper extremity.    Neurologic:    Non-focal exam without asymmetric weakness or numbness.    Normal tone    Psychiatric:     Normal affect with appropriate interaction with examiner.    Emergency Department Course   ECG (18:16:40):  Rate 58 bpm. MA interval *. QRS duration 98. QT/QTc 452/443. P-R-T axes * 58 84. Undetermined rhythm. ST and T wave abnormality, consider anterior ischemia. Abnormal ECG. Interpreted at 1819 by Linnea Prather MD.    Imaging:  Radiographic findings were communicated with the patient who voiced understanding of the findings.    X-ray Chest, 2 views:  Severe cardiomegaly again noted. There is no evidence for  congestive failure or pulmonary edema. There are tiny bilateral  pleural effusions. There are no airspace opacities to suggest  pneumonia. There is no pneumothorax.  As read by Radiology.    CT: Chest, w/ Contrast:  1. No evidence for  pulmonary embolism.  2. Vascular calcifications, including coronary artery calcifications.  3. Cardiomegaly and moderate size hiatal hernia without change.  4. Small bilateral pleural effusions, decreased since the prior exam.  5. Significant decrease in previously seen left lung infiltrates.  As read by Radiology.    Laboratory:  Troponin: <0.015  D dimer: 2.3(H)  CBC:HGB 9.6(L), o/w WNL (WBC 9.8, )   BMP: Carbon Dioxide 38(H), Glucose 104(H), Creatinine 1.17(H), GFR 44(L), o/w WNL  NT probnp: 4521(H)  Lactic Acid: 0.9  Procalcitonin: 0.18  Influenza A/B antigen: Negative    Blood Cultures: Pending    Interventions:  2040: Dilaudid 0.2 mg, IV  2120: NS 1L IV Bolus  2224: Duoneb 3 mL, Nebulization    Emergency Department Course:  The patient arrived in the emergency department via EMS.  Past medical records, nursing notes, and vitals reviewed.  7:20pm: I performed an exam of the patient and obtained history, as documented above.   IV inserted and blood drawn.  The patient was sent for a xray and CT while in the emergency department, findings above.    8:50pm: I rechecked the patient. Explained findings to the patient.  10:34pm: I spoke to Dr. Gomez of the hospitalist service who accepts the patient for admission.   10:40pm: I rechecked the patient. Explained findings to the patient and daughter.    Findings and plan explained to the Patient who consents to admission. Discussed the patient with Dr. Gomez, who will admit the patient to an obs bed for further monitoring, evaluation, and treatment.     Impression & Plan    Medical Decision Making:  This is a 85 year old female with history of pulmonary hypertension, chronic atrial fibrillation, recent proximal humerus fracture who presents with increased work of breathing and hypoxia. On exam, the patient has diffuse wheezing. Her work of breathing has improved with supplemental oxygen. In the ED, she was 88% on room air does not have a history of oxygen  requirement. Workup has been notable for CT that is negative for PE. BN peptide is elevated. Otherwise there is no evidence for acute pneumonia at this time. The patient has normal white blood cell count and chest imaging that is negative for focal infiltrate. She has no history of COPD or asthma. I discussed giving a dose of Lasix with Dr. Gomez. He notes that given her renal insufficiency of the fact that she does not appear volume overloaded and would be better to hold the dose at this time. He also notes that it appears she likely needed to be discharged from the hospital with oxygen following her previous stay.  She has remained stable and oxygen requirements have decreased to 6 L oxygen mask. At this point, we will continue supportive care with supplemental oxygen and nebs as needed. She will be admitted to a medical bed. Family was updated and agrees with this plan.    Diagnosis:    ICD-10-CM   1. Acute and chronic respiratory failure with hypoxia (H) J96.21   2. Acute on chronic congestive heart failure, unspecified congestive heart failure type (H) I50.9       Disposition:  Admitted to jax Womack  10/27/2017   [facility] EMERGENCY DEPARTMENT  Justa ROCHA, am serving as a scribe at 7:20 PM on 10/27/2017 to document services personally performed by Linnea Prather MD based on my observations and the provider's statements to me.        Linnea Prather MD  10/27/17 2531

## 2017-10-27 NOTE — PROGRESS NOTES
[facility]  PRIMARY CARE PROVIDER AND CLINIC:  Gerri Eubanks [address] [facility] [address] / [facility] [address]  Chief Complaint   Patient presents with     Hospital F/U       HPI:    Keisha Godinez is a 85 year old  (1/25/1932),admitted to the [facility] from [facility].  Hospital stay 10/17/2017 through 10/26/2017.  Admitted to this facility for  rehab, medical management and nursing care.  HPI information obtained from: facility chart records.  Current issues are:         Fracture of humerus, proximal, right, closed  Pulmonary hypertension  Bilateral edema of lower extremity  Acute on chronic diastolic congestive heart failure (H)  Chronic atrial fibrillation (H)  Anemia, unspecified type  Rheumatic mitral regurgitation  Physical deconditioning     Hospital Course:      CODE STATUS/ADVANCE DIRECTIVES DISCUSSION:   CPR/Full code   Patient's living condition: lives with family, daughter     ALLERGIES:Lisinopril; Calcium; Egg yolk; Flu virus vaccine; Keflex [cephalexin]; Levaquin [levofloxacin]; Sulfa drugs; and Zinc  PAST MEDICAL HISTORY:  has a past medical history of Anemia; Atrial fibrillation (H); B12 deficiency; Cardiomyopathy (H); CHF (congestive heart failure) (H); Chronic edema; Chronic systolic congestive heart failure (H); CVA (cerebral vascular accident) (H) (2010); Depression; Gastro-oesophageal reflux disease; GI bleed (03-20-15); Hyperlipidemia; Hypertension; Iron deficiency; MSSA (methicillin susceptible Staphylococcus aureus) (03-10-15); Pulmonary hypertension; and Shingles. She also has no past medical history of Malignant hyperthermia or PONV (postoperative nausea and vomiting).  PAST SURGICAL HISTORY:  has a past surgical history that includes Esophagoscopy, gastroscopy, duodenoscopy (EGD), combined (N/A, 3/22/2015); colonoscopy (03/24/2015); Enteroscopy small bowel (N/A, 2/29/2016); and Excise mass foot (Right,  7/6/2016).  FAMILY HISTORY: family history includes Known Genetic Syndrome in her father and mother; Other Cancer in her brother and daughter; Other Cancer (age of onset: 60) in her sister. There is no history of DIABETES, Breast Cancer, Cancer - colorectal, Ovarian Cancer, or Prostate Cancer.  SOCIAL HISTORY:  reports that she quit smoking about 61 years ago. She has never used smokeless tobacco. She reports that she does not drink alcohol or use illicit drugs.    Post Discharge Medication Reconciliation Status: discharge medications reconciled and changed, per note/orders (see AVS).  Current Outpatient Prescriptions   Medication Sig Dispense Refill     colchicine 0.6 MG tablet Take 1 tablet (0.6 mg) by mouth daily 14 tablet 0     HYDROmorphone (DILAUDID) 2 MG tablet Take 1-2 tablets (2-4 mg) by mouth every 3 hours as needed for moderate to severe pain 90 tablet 0     hydrOXYzine (ATARAX) 25 MG tablet Take 1 tablet (25 mg) by mouth 3 times daily as needed for other (pain adjunct) 60 tablet 0     simvastatin (ZOCOR) 10 MG tablet Take 1 tablet (10 mg) by mouth At Bedtime 90 tablet 0     ferrous sulfate (IRON) 325 (65 FE) MG tablet Take 325 mg by mouth 2 times daily       venlafaxine (EFFEXOR-XR) 75 MG 24 hr capsule Take 1 capsule (75 mg) by mouth daily 90 capsule 0     gabapentin (NEURONTIN) 100 MG capsule Take 1 capsule (100 mg) by mouth 3 times daily 270 capsule 0     hydrALAZINE (APRESOLINE) 25 MG tablet Take 3 tablets (75 mg) by mouth 3 times daily 810 tablet 2     isosorbide mononitrate (IMDUR) 30 MG 24 hr tablet Take 1 tablet (30 mg) by mouth daily 90 tablet 1     potassium chloride SA (POTASSIUM CHLORIDE) 20 MEQ CR tablet Take 1 tablet (20 mEq) by mouth daily 90 tablet 1     furosemide (LASIX) 40 MG tablet Take 1 tablet (40 mg) by mouth 2 times daily 180 tablet 1     omeprazole (PRILOSEC) 20 MG CR capsule Take 1 capsule (20 mg) by mouth daily 90 capsule 2     acetaminophen (TYLENOL) 325 MG tablet Take 650 mg  "by mouth every 6 hours as needed for mild pain        senna-docusate (SENOKOT-S;PERICOLACE) 8.6-50 MG per tablet Take 2 tablets daily as needed for constipation while taking prescription pain medications. 30 tablet 0     order for DME Equipment being ordered: Motorized scooter x 3 months 1 Device 0     OMEGA-3 FATTY ACIDS PO Take 1,200 mg by mouth daily        psyllium (METAMUCIL) 58.6 % POWD Take 1 teaspoonful by mouth every evening        order for DME Equipment being ordered: DH2 shoe for offloading R arch ulcer 1 Device 0     VITAMIN D, CHOLECALCIFEROL, PO Take 1,000 Units by mouth daily          ROS:  4 point ROS including Respiratory, CV, GI and , other than that noted in the HPI,  is negative    Exam:  /49  Pulse 53  Temp 98.4  F (36.9  C)  Resp 16  Ht 5' 2\" (1.575 m)  Wt 160 lb 12.8 oz (72.9 kg)  SpO2 90%  BMI 29.41 kg/m2  GENERAL APPEARANCE:  Alert, oriented, cooperative, appears to be in pain and sob. sitting in w/c  ENT:  Mouth and posterior oropharynx normal, moist mucous membranes, normal hearing acuity  EYES:  EOM, conjunctivae, lids, pupils and irises normal, EOM normal, conjunctiva and lids normal  RESP:  respiratory effort and palpation of chest normal, diminished breath sounds LLL, crackles right base, dyspneic, O2 sats 84 on R/A-> upto 93% on 2 liters/nc  CV:  Palpation and auscultation of heart done , regular rate and rhythm, no murmur, rub, or gallop, no edema  ABDOMEN:  normal bowel sounds, soft, nontender, no hepatosplenomegaly or other masses, no guarding or rebound  M/S:   Gait and station abnormal unsteady  Digits and nails normal  PSYCH:  oriented X 3, affect and mood normal     BP 10/26-10/27: 137-154/49-71 mmHg    Lab/Diagnostic data:    CBC RESULTS:   Recent Labs   Lab Test  10/25/17   0630  10/24/17   0600   WBC  6.7  5.4   RBC  3.15*  2.90*   HGB  9.3*  8.7*   HCT  30.3*  27.4*   MCV  96  95   MCH  29.5  30.0   MCHC  30.7*  31.8   RDW  13.1  12.7   PLT  187  159 "       Last Basic Metabolic Panel:  Recent Labs   Lab Test  10/25/17   0630  10/24/17   0600   NA  139  136   POTASSIUM  4.5  4.6   CHLORIDE  101  100   CASPER  9.1  8.5   CO2  35*  34*   BUN  22  21   CR  0.80  0.77   GLC  111*  97     TSH   Date Value Ref Range Status   01/18/2017 1.56 0.40 - 4.00 mU/L Final   11/30/2016 1.56 0.40 - 4.00 mU/L Final       ASSESSMENT/PLAN:  (S42.201A) Fracture of humerus, proximal, right, closed  (primary encounter diagnosis)  Comment: stable. S/s fall. Minimal displaced proximal humerus fx  -per orthopedic nonoperative management at this time.  -remain in sling   -follow up in 1 week with Dr. Urbano  -pain management: hydromorphone  -colchicine daily acute gouty arthritis  -hydroxyzine for pain      (I27.20) Pulmonary hypertension  (I50.33) Acute on chronic diastolic congestive heart failure (H)  Comment: stable  Plan:  Cont furosemide  -daily weights  -VS per unit protocol      (I48.2) Chronic atrial fibrillation (H)  Comment: rate controlled. Not on anticoagulation s/s bleeding per family discontinued by Dr. Pradhan  -VS per unit protocol    (D64.9) Anemia, unspecified type  Comment: stable. hbg- 9.3 10/25  Plan: cont iron replacement  -follow hemoglobin      (R53.81) Physical deconditioning  Comment: Lives in a house with her daughter  Plan: PT/OT  -SW assist with discharge planning and care conference per unit protocol    Orders:  -stat CXR  -Stat bmp, cbc with differential  -Daily weights        Electronically signed by:  JODIE Jaramillo CNP

## 2017-10-27 NOTE — ED NOTES
Fall on Tuesday resulting in a right humeral fracture. Patient in a TCU. Staff noted she seemed short of breath. Pt has a history of CHF and A Fib. Medics report wheezes on the left side. CXR preformed today and patient's primary physician did recommend she be see in the ER based on that xray.

## 2017-10-27 NOTE — ED NOTES
Pt resting Comfortably in bed.  Position of comfort is sitting up.  Alert & Oriented  Pt C/O SOB & dyspnea  She denies any pain or other problems.  She is able speak in a conversational manner, with shortened sentences.  Resp rate increased.  Abdominals in use, no other acc muscles.  No retraction noted.  Pt states O2 with mask makes her feel better.    See follow assessment.

## 2017-10-28 VITALS
BODY MASS INDEX: 28.9 KG/M2 | OXYGEN SATURATION: 97 % | WEIGHT: 158 LBS | HEART RATE: 67 BPM | RESPIRATION RATE: 18 BRPM | SYSTOLIC BLOOD PRESSURE: 130 MMHG | DIASTOLIC BLOOD PRESSURE: 40 MMHG | TEMPERATURE: 96.1 F

## 2017-10-28 PROBLEM — R09.02 HYPOXIA: Status: ACTIVE | Noted: 2017-10-28

## 2017-10-28 PROBLEM — R09.02 HYPOXIA: Status: RESOLVED | Noted: 2017-10-28 | Resolved: 2017-10-28

## 2017-10-28 LAB
ANION GAP SERPL CALCULATED.3IONS-SCNC: 4 MMOL/L (ref 3–14)
BUN SERPL-MCNC: 26 MG/DL (ref 7–30)
CALCIUM SERPL-MCNC: 8.5 MG/DL (ref 8.5–10.1)
CHLORIDE SERPL-SCNC: 97 MMOL/L (ref 94–109)
CO2 SERPL-SCNC: 37 MMOL/L (ref 20–32)
CREAT SERPL-MCNC: 0.95 MG/DL (ref 0.52–1.04)
GFR SERPL CREATININE-BSD FRML MDRD: 56 ML/MIN/1.7M2
GLUCOSE SERPL-MCNC: 95 MG/DL (ref 70–99)
INTERPRETATION ECG - MUSE: NORMAL
POTASSIUM SERPL-SCNC: 3.9 MMOL/L (ref 3.4–5.3)
SODIUM SERPL-SCNC: 138 MMOL/L (ref 133–144)

## 2017-10-28 PROCEDURE — 99217 ZZC OBSERVATION CARE DISCHARGE: CPT | Performed by: PHYSICIAN ASSISTANT

## 2017-10-28 PROCEDURE — 80048 BASIC METABOLIC PNL TOTAL CA: CPT | Performed by: INTERNAL MEDICINE

## 2017-10-28 PROCEDURE — 36415 COLL VENOUS BLD VENIPUNCTURE: CPT | Performed by: INTERNAL MEDICINE

## 2017-10-28 PROCEDURE — A9270 NON-COVERED ITEM OR SERVICE: HCPCS | Mod: GY | Performed by: INTERNAL MEDICINE

## 2017-10-28 PROCEDURE — G0378 HOSPITAL OBSERVATION PER HR: HCPCS

## 2017-10-28 PROCEDURE — 25000132 ZZH RX MED GY IP 250 OP 250 PS 637: Mod: GY | Performed by: INTERNAL MEDICINE

## 2017-10-28 RX ORDER — GABAPENTIN 100 MG/1
100 CAPSULE ORAL 2 TIMES DAILY
Qty: 270 CAPSULE | Refills: 0 | DISCHARGE
Start: 2017-10-28 | End: 2017-11-28

## 2017-10-28 RX ORDER — HYDROXYZINE HYDROCHLORIDE 25 MG/1
25 TABLET, FILM COATED ORAL 3 TIMES DAILY PRN
Status: DISCONTINUED | OUTPATIENT
Start: 2017-10-28 | End: 2017-10-28 | Stop reason: HOSPADM

## 2017-10-28 RX ORDER — ACETAMINOPHEN 325 MG/1
975 TABLET ORAL 3 TIMES DAILY
Qty: 100 TABLET | Refills: 0 | Status: ON HOLD | DISCHARGE
Start: 2017-10-28 | End: 2018-05-21

## 2017-10-28 RX ORDER — GABAPENTIN 100 MG/1
100 CAPSULE ORAL AT BEDTIME
Qty: 90 CAPSULE | Refills: 0 | DISCHARGE
Start: 2017-10-28 | End: 2017-11-28

## 2017-10-28 RX ORDER — ONDANSETRON 2 MG/ML
4 INJECTION INTRAMUSCULAR; INTRAVENOUS EVERY 6 HOURS PRN
Status: DISCONTINUED | OUTPATIENT
Start: 2017-10-28 | End: 2017-10-28 | Stop reason: HOSPADM

## 2017-10-28 RX ORDER — ISOSORBIDE MONONITRATE 30 MG/1
30 TABLET, EXTENDED RELEASE ORAL DAILY
Status: DISCONTINUED | OUTPATIENT
Start: 2017-10-28 | End: 2017-10-28 | Stop reason: HOSPADM

## 2017-10-28 RX ORDER — ACETAMINOPHEN 325 MG/1
650 TABLET ORAL EVERY 4 HOURS PRN
Status: DISCONTINUED | OUTPATIENT
Start: 2017-10-28 | End: 2017-10-28 | Stop reason: HOSPADM

## 2017-10-28 RX ORDER — GABAPENTIN 100 MG/1
100 CAPSULE ORAL 3 TIMES DAILY
Status: DISCONTINUED | OUTPATIENT
Start: 2017-10-28 | End: 2017-10-28 | Stop reason: HOSPADM

## 2017-10-28 RX ORDER — HYDROMORPHONE HYDROCHLORIDE 2 MG/1
2-4 TABLET ORAL
Status: DISCONTINUED | OUTPATIENT
Start: 2017-10-28 | End: 2017-10-28 | Stop reason: HOSPADM

## 2017-10-28 RX ORDER — SIMVASTATIN 10 MG
10 TABLET ORAL AT BEDTIME
Status: DISCONTINUED | OUTPATIENT
Start: 2017-10-28 | End: 2017-10-28 | Stop reason: HOSPADM

## 2017-10-28 RX ORDER — ONDANSETRON 4 MG/1
4 TABLET, ORALLY DISINTEGRATING ORAL EVERY 6 HOURS PRN
Status: DISCONTINUED | OUTPATIENT
Start: 2017-10-28 | End: 2017-10-28 | Stop reason: HOSPADM

## 2017-10-28 RX ORDER — IPRATROPIUM BROMIDE AND ALBUTEROL SULFATE 2.5; .5 MG/3ML; MG/3ML
3 SOLUTION RESPIRATORY (INHALATION) EVERY 4 HOURS PRN
Status: DISCONTINUED | OUTPATIENT
Start: 2017-10-28 | End: 2017-10-28 | Stop reason: HOSPADM

## 2017-10-28 RX ORDER — PREDNISONE 20 MG/1
20 TABLET ORAL DAILY
Qty: 3 TABLET | Refills: 0 | DISCHARGE
Start: 2017-10-29 | End: 2017-11-01

## 2017-10-28 RX ORDER — VENLAFAXINE HYDROCHLORIDE 75 MG/1
75 TABLET, EXTENDED RELEASE ORAL DAILY
Status: DISCONTINUED | OUTPATIENT
Start: 2017-10-28 | End: 2017-10-28 | Stop reason: HOSPADM

## 2017-10-28 RX ORDER — NALOXONE HYDROCHLORIDE 0.4 MG/ML
.1-.4 INJECTION, SOLUTION INTRAMUSCULAR; INTRAVENOUS; SUBCUTANEOUS
Status: DISCONTINUED | OUTPATIENT
Start: 2017-10-28 | End: 2017-10-28 | Stop reason: HOSPADM

## 2017-10-28 RX ORDER — PREDNISONE 20 MG/1
20 TABLET ORAL DAILY
Status: DISCONTINUED | OUTPATIENT
Start: 2017-10-29 | End: 2017-10-28 | Stop reason: HOSPADM

## 2017-10-28 RX ADMIN — HYDROMORPHONE HYDROCHLORIDE 2 MG: 2 TABLET ORAL at 12:16

## 2017-10-28 RX ADMIN — HYDRALAZINE HYDROCHLORIDE 75 MG: 50 TABLET ORAL at 00:47

## 2017-10-28 RX ADMIN — ACETAMINOPHEN 650 MG: 325 TABLET, FILM COATED ORAL at 15:53

## 2017-10-28 RX ADMIN — HYDROMORPHONE HYDROCHLORIDE 2 MG: 2 TABLET ORAL at 00:48

## 2017-10-28 RX ADMIN — ISOSORBIDE MONONITRATE 30 MG: 30 TABLET, EXTENDED RELEASE ORAL at 10:20

## 2017-10-28 RX ADMIN — HYDRALAZINE HYDROCHLORIDE 75 MG: 50 TABLET ORAL at 15:53

## 2017-10-28 RX ADMIN — GABAPENTIN 100 MG: 100 CAPSULE ORAL at 15:53

## 2017-10-28 RX ADMIN — HYDROMORPHONE HYDROCHLORIDE 2 MG: 2 TABLET ORAL at 17:30

## 2017-10-28 RX ADMIN — GABAPENTIN 100 MG: 100 CAPSULE ORAL at 00:48

## 2017-10-28 RX ADMIN — ACETAMINOPHEN 650 MG: 325 TABLET, FILM COATED ORAL at 10:24

## 2017-10-28 RX ADMIN — VENLAFAXINE HYDROCHLORIDE 75 MG: 75 TABLET, EXTENDED RELEASE ORAL at 10:20

## 2017-10-28 RX ADMIN — GABAPENTIN 100 MG: 100 CAPSULE ORAL at 10:20

## 2017-10-28 RX ADMIN — HYDRALAZINE HYDROCHLORIDE 75 MG: 50 TABLET ORAL at 10:20

## 2017-10-28 ASSESSMENT — PAIN DESCRIPTION - DESCRIPTORS: DESCRIPTORS: SORE

## 2017-10-28 NOTE — PLAN OF CARE
Problem: Patient Care Overview  Goal: Discharge Needs Assessment  PRIMARY DIAGNOSIS: Hypoxia  OUTPATIENT/OBSERVATION GOALS TO BE MET BEFORE DISCHARGE:  1. Vital signs stable: Yes     2. Improvement of peak flow to greater than 70% sustained off nebulizer for 4 hours: N/A     3. Dyspnea improved and O2 sats >88% at RA or at prior home O2 therapy level: currently 2 L O2      SpO2: 92 %, O2 Device: Oxymask     4. Short term supplemental O2 needed for use with activity at home: possibly     5. Tolerating adequate PO diet and medications: Yes     6. Return to near baseline physical activity: No, recent right humorous fx. Baseline Ind/walker, now assist of 2     Pt A&Ox4, VSS. Pt denies SOB while on 2 L 02 w/ oxymask, sats 92-93%. Will attempt to wean pt off 02 in the am. Pt received 2mg oral dilaudid for 10/10 pain w/ good relief. Pt came from TCU, has been staying there recently d/t fx of the right humerous. Pt's daughter Rebecca would like to be informed if plan is to discharge pt, pt's daughter works nights and will be taking a nap this morning but left her number to be called at anytime [phone]. Will continue to monitor.      Discharge Planner Nurse   Safe discharge environment identified: Yes  Barriers to discharge: No, not once medically cleared       Entered by: Nelson Mcdonough 10/28/2017 5:32 AM     Please review provider order for any additional goals.   Nurse to notify provider when observation goals have been met and patient is ready for discharge.

## 2017-10-28 NOTE — ED NOTES
[facility]  ED Nurse Handoff Report    Keisha Godinez is a 85 year old female with a complaint of shortness of breath, recent hospital stay due to fx of rt arm.  Pt slightly hypoxic on room air (88%), maintaining 95% with mask at 3L.  Labs show elevated BNP.   Pt normally uses walker at home, 2x assist now with arm in sling.  Pt is alert and oriented.    ED Chief complaint: Shortness of Breath  . ED Diagnosis:   Final diagnoses:   Acute and chronic respiratory failure with hypoxia (H)   Acute on chronic congestive heart failure, unspecified congestive heart failure type (H)     Allergies:   Allergies   Allergen Reactions     Lisinopril Other (See Comments)     Tongue swelling     Calcium Nausea and Vomiting     Egg Yolk      Flu Virus Vaccine      Allergic to eggs.     Keflex [Cephalexin] Nausea     Pt states she had upset stomach.  NOT tongue swelling.  She had tongue swelling with a combo bp med that she thinks included lisinopril.    Tolerates IV Cefazolin     Levaquin [Levofloxacin]      Sulfa Drugs      Zinc Nausea and Vomiting       Code Status: Full Code  Activity level - Baseline/Home:  Stand with Assist. Activity Level - Current:   Stand with Assist of 2. Lift room needed: No. Bariatric: No   Needed: No   Isolation: No. Infection: Not Applicable.     Vital Signs:   Vitals:    10/27/17 2056 10/27/17 2100 10/27/17 2145 10/27/17 2150   BP:  147/75     Resp: 21 19 19 18   Temp:       TempSrc:       SpO2: (!) 89% 95% 91% 96%       Cardiac Rhythm:  ,      Pain level:    Patient confused: No. Patient Falls Risk: Yes.   Elimination Status: Has voided   Patient Report - Initial Complaint: shortness of breath. Focused Assessment: hypoxic, mild wheezing on exhale  Tests Performed: xray, CT, blood. Abnormal Results: elevated dimer, elevated BNP  Treatments provided: neb, pain meds  Family Comments: daughter at bedside  OBS brochure/video discussed/provided to patient:  Yes  ED Medications:    Medications   HYDROmorphone (DILAUDID) injection 0.2 mg (0.2 mg Intravenous Given 10/27/17 2040)   ipratropium - albuterol 0.5 mg/2.5 mg/3 mL (DUONEB) neb solution 3 mL (3 mLs Nebulization Given 10/27/17 2224)   iopamidol (ISOVUE-370) solution 500 mL (63 mLs Intravenous Given 10/27/17 2120)   0.9% sodium chloride BOLUS (0 mLs Intravenous Stopped 10/27/17 2122)     Drips infusing:  No  For the majority of the shift, the patient's behavior Green. Interventions performed were n/a.     Severe Sepsis OR Septic Shock Diagnosis Present: No      ED Nurse Name/Phone Number: Da Seaver,   10:58 PM  RECEIVING UNIT ED HANDOFF REVIEW    Above ED Nurse Handoff Report was reviewed: Yes  Reviewed by: Nelson Mcdonough on October 27, 2017 at 11:48 PM

## 2017-10-28 NOTE — PHARMACY-ADMISSION MEDICATION HISTORY
Admission medication history interview status for this patient is complete. See [facility] admission navigator for allergy information, prior to admission medications and immunization status.     Medication history interview source(s):none  Medication history resources (including written lists, pill bottles, clinic record):JANET [facility]    Changes made to PTA medication list:  Added: albuterol Neb  Deleted: none  Changed: none    Actions taken by pharmacist (provider contacted, etc):None     Additional medication history information:None    Medication reconciliation/reorder completed by provider prior to medication history? No    For patients on insulin therapy: no (Yes/No)   Lantus/levemir/NPH/Mix 70/30 dose: ___ in AM/PM or twice daily   Sliding scale Novolog Y/N   If Yes, do you have a baseline novolog pre-meal dose: ______units with meals   Patients eat three meals a day: Y/N ---  How many episodes of hypoglycemia (low blood glucose) do you have weekly: ---   How many missed doses do you have a week: ---  How many times do you check your blood glucose per day: ---  Any Barriers to therapy: cost of medications/comfortable with giving injections (if applicable)/ comfortable and confident with current diabetes regimen ---      Prior to Admission medications    Medication Sig Last Dose Taking? Auth Provider   albuterol (ACCUNEB) 1.25 MG/3ML nebulizer solution Take 1 vial by nebulization 4 times daily 10/27/2017 at 1600 Yes Unknown, Entered By History   colchicine 0.6 MG tablet Take 1 tablet (0.6 mg) by mouth daily 10/27/2017 at am Yes Gabe Porter MD   HYDROmorphone (DILAUDID) 2 MG tablet Take 1-2 tablets (2-4 mg) by mouth every 3 hours as needed for moderate to severe pain 10/27/2017 at 1111 Yes Gabe Porter MD   hydrOXYzine (ATARAX) 25 MG tablet Take 1 tablet (25 mg) by mouth 3 times daily as needed for other (pain adjunct) prn Yes Gabe Porter MD   simvastatin (ZOCOR) 10 MG tablet Take 1  tablet (10 mg) by mouth At Bedtime 10/26/2017 at Unknown time Yes Gerri Eubnaks MD   ferrous sulfate (IRON) 325 (65 FE) MG tablet Take 325 mg by mouth 2 times daily 10/27/2017 at am Yes Unknown, Entered By History   venlafaxine (EFFEXOR-XR) 75 MG 24 hr capsule Take 1 capsule (75 mg) by mouth daily 10/27/2017 at am Yes Gerri Eubanks MD   gabapentin (NEURONTIN) 100 MG capsule Take 1 capsule (100 mg) by mouth 3 times daily 10/27/2017 at 3rd-1600 Yes Gerri Eubanks MD   hydrALAZINE (APRESOLINE) 25 MG tablet Take 3 tablets (75 mg) by mouth 3 times daily 10/27/2017 at am Yes Gerri Eubanks MD   isosorbide mononitrate (IMDUR) 30 MG 24 hr tablet Take 1 tablet (30 mg) by mouth daily 10/27/2017 at am Yes Gerri Eubanks MD   potassium chloride SA (POTASSIUM CHLORIDE) 20 MEQ CR tablet Take 1 tablet (20 mEq) by mouth daily 10/27/2017 at am Yes Gerri Eubanks MD   furosemide (LASIX) 40 MG tablet Take 1 tablet (40 mg) by mouth 2 times daily 10/27/2017 at 2nd-1600 Yes Gerri Euabnks MD   omeprazole (PRILOSEC) 20 MG CR capsule Take 1 capsule (20 mg) by mouth daily 10/27/2017 at am Yes Gerri Eubanks MD   acetaminophen (TYLENOL) 325 MG tablet Take 650 mg by mouth every 6 hours as needed for mild pain  10/26/2017 at Unknown time Yes Reported, Patient   senna-docusate (SENOKOT-S;PERICOLACE) 8.6-50 MG per tablet Take 2 tablets daily as needed for constipation while taking prescription pain medications. prn Yes Lee Jang DPM   OMEGA-3 FATTY ACIDS PO Take 1,200 mg by mouth daily  10/27/2017 at am Yes Reported, Patient   psyllium (METAMUCIL) 58.6 % POWD Take 1 teaspoonful by mouth every evening  10/26/2017 at Unknown time Yes Reported, Patient   VITAMIN D, CHOLECALCIFEROL, PO Take 1,000 Units by mouth daily  10/27/2017 at am Yes Reported, Patient

## 2017-10-28 NOTE — PLAN OF CARE
Problem: Patient Care Overview  Goal: Discharge Needs Assessment  PRIMARY DIAGNOSIS: Hypoxia  OUTPATIENT/OBSERVATION GOALS TO BE MET BEFORE DISCHARGE:  1. Vital signs stable: Yes     2. Improvement of peak flow to greater than 70% sustained off nebulizer for 4 hours: N/A     3. Dyspnea improved and O2 sats >88% at RA or at prior home O2 therapy level: currently 2 L O2      SpO2: 92 %, O2 Device: Oxymask     4. Short term supplemental O2 needed for use with activity at home: possibly     5. Tolerating adequate PO diet and medications: Yes     6. Return to near baseline physical activity: No, recent right humorous fx. Baseline Ind/walker, now assist of 2     Pt A&Ox4, VSS. Pt denies SOB while on 2 L 02 w/ oxymask, sats 92-93%. Will attempt to wean pt off 02 in the am. Pt received 2mg oral dilaudid for 10/10 pain w/ good relief. Pt came from TCU, has been staying there recently d/t fx of the right humerous. Pt's daughter Rebecca would like to be informed if plan is to discharge pt, pt's daughter works nights and will be taking a nap this morning but left her number to be called at anytime [phone]. Will continue to monitor.      Discharge Planner Nurse   Safe discharge environment identified: Yes  Barriers to discharge: No, not once medically cleared       Entered by: Nelson Mcdonough 10/28/2017 5:59 AM     Please review provider order for any additional goals.   Nurse to notify provider when observation goals have been met and patient is ready for discharge.

## 2017-10-28 NOTE — DISCHARGE SUMMARY
[facility]  Discharge Summary        Keisha Godinez MRN# [id number]   YOB: 1932 Age: 85 year old     Date of Admission:  10/27/2017  Date of Discharge:  10/28/2017  Admitting Physician:  Isaac Gomez MD  Discharge Physician: Yanira Frankel PA-C  Discharging Service: Hospitalist     Primary Provider: Gerri Eubanks  Primary Care Physician Phone Number: [phone]         Discharge Diagnoses/Problem Oriented Hospital Course (Providers):    Keisha Godinez was admitted on 10/27/2017 by Isaac Gomez MD and I would refer you to their history and physical.  The following problems were addressed during her hospitalization:  1. Hypoxia: multifactorial related to poor inspiratory effort and medications  2. Recent discharge to TCU for Rt Humerus fx         Code Status:      Full Code        Brief Hospital Stay Summary Sent Home With Patient in AVS:        Reason for your hospital stay       You were admitted for concerns of hypoxia. We checked your CXR and CT of   the chest and it was negative for infection, or blood clot. Your O2 levels   improved after getting O2. We adjusted your pain meds and you are safe to   return back to rehab.       Keisha Godinez is a 85 year old female with PMH including CVA, affib, diastolic CHF, pulmonary HTN, and HTN who presents with shortness of breath from her TCU.  She was just hospitazlied for a R humerus fracture that was managed non-op.  Of note she was on 1-2 L O2 throughout that admission, but has not been on supplemental oxygen at the TCU.  She was noted to have more labored breathing and she says she has been more sob since being there.  Her O2 sats were 97%, but she was wheezing so she was sent in here.  She has a chronic non-productive cough that is unchanged.  Denies fevers or chills to me.  No chest pain.  She still has pain in her R arm and R hand.  The pain is moderately severe and better with po dilaudid.  No  diarrhea, vomiting, or dysuria.       Pt was admitted to the OBS unit. She was placed on supplemental O2 at 2L and her sats wer 97%.    1.   Dyspnea:suspect chronic.  She had required O2 during her hospitalization but was not sent on any O2 at TCU. Her CXR, pro calcitonin and CT chest were all negative for PNA or PE. She was placed back on 2L of O2 with sats of mid 90's. Will send back to TCU with supplemental O2.     2.   Chronic diastolic CHF and pulmonary HTN: Stable. Cr improved so Lasix resumed at discharge. She appears euvolemic.      3.   LILIA: lasix held, colchicine held. Gentle fluids, Cr improved to baseline. Lasix resumed at discharge.      4.   Humerus fracture: R humerus fx found last admission.  Non-op management.  -tylenol and dilaudid po prn for pain control  -d/c back to TCU     5.   Possible gout flare: inflamed 5th digit PIP R hand and tender.  Suspected gout last admit with uric acid over 8.  Was started on colchicine.  Pain better today after prednisone.  Now with lilia.  -stopped colchicine  -prednisone 20mg po daily for a few days and see if improved then likely can stop     5.   Chronic anemia: Hg 9.6 at baseline.     6.   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   Not on anticoagulation due to hx of GI bleed     7.   HTN: continue pta hydrlazine 75mg tid and imdur.            Important Results:        Recent Labs  Lab 10/27/17  1913 10/27/17  1450 10/25/17  0630   WBC 9.8 9.5 6.7   HGB 9.6* 11.0* 9.3*   HCT 30.9* 36.0 30.3*   MCV 96 98 96    317 187       Recent Labs  Lab 10/28/17  0703 10/27/17  1913 10/27/17  1450    138 139   POTASSIUM 3.9 4.2 3.7   CHLORIDE 97 97 97   CO2 37* 38* 36*   ANIONGAP 4 3 6   GLC 95 104* 99   BUN 26 30 29   CR 0.95 1.17* 1.06*   GFRESTIMATED 56* 44* 49*   GFRESTBLACK 68 53* 60*   CASPER 8.5 8.9 9.8       Recent Labs  Lab 10/27/17  1913   CULT No growth after 15 hours              Pending Results:        Unresulted Labs Ordered in the Past 30 Days of this Admission      Date and Time Order Name Status Description    10/27/2017 1837 Blood culture ONE site Preliminary             Discharge Instructions and Follow-Up:      Follow-up Appointments     Follow Up and recommended labs and tests       Follow up with Nursing home physician in 1 week.             Discharge Disposition:      Discharged to home         Discharge Medications:        Current Discharge Medication List      CONTINUE these medications which have CHANGED    Details   acetaminophen (TYLENOL) 325 MG tablet Take 3 tablets (975 mg) by mouth 3 times daily  Qty: 100 tablet, Refills: 0    Associated Diagnoses: Midline thoracic back pain, unspecified chronicity      !! gabapentin (NEURONTIN) 100 MG capsule Take 1 capsule (100 mg) by mouth 2 times daily  Qty: 270 capsule, Refills: 0    Associated Diagnoses: Midline thoracic back pain, unspecified chronicity      !! gabapentin (NEURONTIN) 100 MG capsule Take 1 capsule (100 mg) by mouth At Bedtime  Qty: 90 capsule, Refills: 0    Associated Diagnoses: Midline thoracic back pain, unspecified chronicity       !! - Potential duplicate medications found. Please discuss with provider.      CONTINUE these medications which have NOT CHANGED    Details   albuterol (ACCUNEB) 1.25 MG/3ML nebulizer solution Take 1 vial by nebulization 4 times daily      colchicine 0.6 MG tablet Take 1 tablet (0.6 mg) by mouth daily  Qty: 14 tablet, Refills: 0    Associated Diagnoses: Acute gouty arthritis      HYDROmorphone (DILAUDID) 2 MG tablet Take 1-2 tablets (2-4 mg) by mouth every 3 hours as needed for moderate to severe pain  Qty: 90 tablet, Refills: 0    Associated Diagnoses: Closed supracondylar fracture of right humerus, initial encounter      hydrOXYzine (ATARAX) 25 MG tablet Take 1 tablet (25 mg) by mouth 3 times daily as needed for other (pain adjunct)  Qty: 60 tablet, Refills: 0    Associated Diagnoses: Closed supracondylar fracture of right humerus, initial encounter      simvastatin  (ZOCOR) 10 MG tablet Take 1 tablet (10 mg) by mouth At Bedtime  Qty: 90 tablet, Refills: 0    Associated Diagnoses: Hyperlipidemia, unspecified hyperlipidemia type      ferrous sulfate (IRON) 325 (65 FE) MG tablet Take 325 mg by mouth 2 times daily      venlafaxine (EFFEXOR-XR) 75 MG 24 hr capsule Take 1 capsule (75 mg) by mouth daily  Qty: 90 capsule, Refills: 0    Comments: Pt due for appt by Dec. Needs to call our office  Associated Diagnoses: Major depressive disorder, recurrent episode, in partial remission (H)      hydrALAZINE (APRESOLINE) 25 MG tablet Take 3 tablets (75 mg) by mouth 3 times daily  Qty: 810 tablet, Refills: 2    Associated Diagnoses: Hyperlipidemia, unspecified hyperlipidemia type; Cardiomyopathy, unspecified type (H)      isosorbide mononitrate (IMDUR) 30 MG 24 hr tablet Take 1 tablet (30 mg) by mouth daily  Qty: 90 tablet, Refills: 1    Associated Diagnoses: Essential hypertension with goal blood pressure less than 140/90; Cardiomyopathy (H); Coronary artery disease involving native heart with angina pectoris, unspecified vessel or lesion type (H)      potassium chloride SA (POTASSIUM CHLORIDE) 20 MEQ CR tablet Take 1 tablet (20 mEq) by mouth daily  Qty: 90 tablet, Refills: 1    Associated Diagnoses: Bilateral edema of lower extremity      furosemide (LASIX) 40 MG tablet Take 1 tablet (40 mg) by mouth 2 times daily  Qty: 180 tablet, Refills: 1    Associated Diagnoses: Essential hypertension with goal blood pressure less than 140/90      omeprazole (PRILOSEC) 20 MG CR capsule Take 1 capsule (20 mg) by mouth daily  Qty: 90 capsule, Refills: 2    Associated Diagnoses: Gastroesophageal reflux disease without esophagitis      senna-docusate (SENOKOT-S;PERICOLACE) 8.6-50 MG per tablet Take 2 tablets daily as needed for constipation while taking prescription pain medications.  Qty: 30 tablet, Refills: 0    Associated Diagnoses: S/P foot surgery, right      OMEGA-3 FATTY ACIDS PO Take 1,200 mg by  mouth daily       psyllium (METAMUCIL) 58.6 % POWD Take 1 teaspoonful by mouth every evening       VITAMIN D, CHOLECALCIFEROL, PO Take 1,000 Units by mouth daily                Allergies:         Allergies   Allergen Reactions     Lisinopril Other (See Comments)     Tongue swelling     Calcium Nausea and Vomiting     Egg Yolk      Flu Virus Vaccine      Allergic to eggs.     Keflex [Cephalexin] Nausea     Pt states she had upset stomach.  NOT tongue swelling.  She had tongue swelling with a combo bp med that she thinks included lisinopril.    Tolerates IV Cefazolin     Levaquin [Levofloxacin]      Sulfa Drugs      Zinc Nausea and Vomiting           Consultations This Hospital Stay:      No consultations were requested during this admission         Condition and Physical on Discharge:      Discharge condition: Stable   Vitals: Blood pressure 118/41, temperature 98.4  F (36.9  C), temperature source Oral, resp. rate 20, weight 71.7 kg (158 lb), SpO2 96 %, not currently breastfeeding.  158 lbs 0 oz      GENERAL:  Comfortable. Stoic appearing  PSYCH: pleasant, oriented, No acute distress.  HEENT:  PERRLA. Normal conjunctiva, normal hearing, nasal mucosa and Oropharynx are normal.  NECK:  Supple, no neck vein distention, adenopathy or bruits, normal thyroid.  HEART:  Normal S1, S2 with no murmur, no pericardial rub, gallops or S3 or S4.  LUNGS:  Dec bs at bases but o/w Clear to auscultation, normal Respiratory effort. No wheezing, rales or ronchi.  ABDOMEN:  Soft, no hepatosplenomegaly, normal bowel sounds. Non-tender, non distended.   EXTREMITIES:  No pedal edema, +2 pulses bilateral and equal. Rt hand CMS intact, Few enlarged PIPs on R hand including a red swollen 5th PIP that is tender  SKIN:  Dry to touch, No rash, wound or ulcerations.  NEUROLOGIC:  CN 2-12 intact, BL 5/5 symmetric upper and lower extremity strength, sensation is intact with no focal deficits.         Discharge Time:      >30 mins        Image  Results From This Hospital Stay (For Non-EPIC Providers):        Results for orders placed or performed during the hospital encounter of 10/27/17   XR Chest 2 Views    Narrative    CHEST TWO VIEWS  10/27/2017 7:47 PM     COMPARISON: Frontal chest x-ray 10/18/2017    HISTORY: Chest pain, shortness of breath      Impression    IMPRESSION: Severe cardiomegaly again noted. There is no evidence for  congestive failure or pulmonary edema. There are tiny bilateral  pleural effusions. There are no airspace opacities to suggest  pneumonia. There is no pneumothorax.    TOYA MARRERO MD   Chest CT, IV contrast only - PE protocol    Narrative    CT CHEST PULMONARY EMBOLISM WITH CONTRAST  10/27/2017  9:36 PM     HISTORY: Shortness of breath and hypoxia.    TECHNIQUE: Helical axial scans from lung apices through lung bases  with 63mL Isovue-370 IV contrast. Radiation dose for this scan was  reduced using automated exposure control, adjustment of the mA and/or  kV according to patient size, or iterative reconstruction technique.    COMPARISON: 7/7/2016.    FINDINGS: There is no CT evidence for pulmonary embolism or other  acute vascular abnormality of the chest. Thyroid gland heterogeneity  may be related to multiple small nodules. Vascular calcifications,  including coronary artery calcifications. Cardiomegaly and moderate  size hiatal hernia without change. Small bilateral pleural effusions,  decreased since the prior exam. The previously seen left basilar  infiltrate has resolved. Small amount of platelike atelectasis in the  lung bases bilaterally. Previously seen left upper lobe infiltrate is  significantly smaller and residual pleural-parenchymal opacity in the  posterior left lung apex may represent residual scarring although a  small amount of recurrent or residual infiltrate is not excluded.  There are multiple very tiny scattered nodules in the lungs  bilaterally, many of which appear calcified and this is likely  all  related to old granulomatous disease. The remainder of the lungs are  clear. Visualized upper abdomen shows no additional abnormalities.  There is a right humeral neck fracture which is known and new since  the prior CT chest.      Impression    IMPRESSION:  1. No evidence for pulmonary embolism.  2. Vascular calcifications, including coronary artery calcifications.  3. Cardiomegaly and moderate size hiatal hernia without change.  4. Small bilateral pleural effusions, decreased since the prior exam.  5. Significant decrease in previously seen left lung infiltrates.    LOY SONG MD           Most Recent Lab Results In EPIC (For Non-EPIC Providers):      Lab Results   Component Value Date    WBC 9.8 10/27/2017    HGB 9.6 (L) 10/27/2017    HCT 30.9 (L) 10/27/2017    MCV 96 10/27/2017     10/27/2017     10/28/2017    CO2 37 (H) 10/28/2017    BUN 26 10/28/2017    TSH 1.56 01/18/2017    TROPONIN 0.02 05/12/2016    AST 24 11/30/2016    ALT 27 11/30/2016    ALKPHOS 89 11/30/2016

## 2017-10-28 NOTE — H&P
[facility]  Hospitalist Admission Note  Name: Keisha Godinez    MRN: [id number]  YOB: 1932    Age: 85 year old  Date of admission: 10/27/2017  Primary care provider: Gerri Eubanks    Chief Complaint:  Shortness of breath    Assessment and Plan:   1.   Dyspnea: patient sent in for what appeared to be increased work of breathign at TCU.  The pateint was 97% on RA there.  She also has wheezing, but no COPD or asthma hx.  She actually has been sob since discharge two days ago and on review she was on 1-2L oxygen throughout that whole stay with one nursing note indicating O2 sats down to 85% on RA.  I suspect she just need a little oxygen and there is no acute change in her condition.  Her weight is actually down and has john so I do not think diuresis is the answer.  Her CTPA is negative for PE and no sign of pneumonia.  Her procal is 0.18 and no leukocytosis.  Her cough is chronic and not productive.    -suspect will need small amount of O2 on discharge to TCU until she is more ambulatory there  -nebs prn    2.   Chronic diastolic CHF and pulmonary HTN: RV failure and pulmonary HTN with diastolic CHF.  On 40mg bid lasix.  Weight is down 1-2 pounds since discharge and no significant peripheral edema.  BNP is elevated at 4000, but she very dilated chambers so this is not surprising.  -if creatinine better tomorrow resume lasix    3.   JOHN: creatinine 1.17 on admit when recently it was 0.7-0.8.  Her weight is actually down a pound since leaving here.  I think this is a combination of her lasix and being actually a little hypovolemic and also the cholchicine she started for a gout flare.  -stop colchicine  -hold lasix tonight  -bmp in am    4.   Humerus fracture: R humerus fx found last admission.  Non-op management.  -tylenol and dilaudid po prn for pain control  -d/c back to TCU    5.   Possible gout flare: inflamed 5th digit PIP R hand and tender.  Suspected gout last admit with uric  acid over 8.  Was started on colchicine.  Still having pain.  Now with john.  -stop colchicine  -prednisone 20mg po daily for a few days and see if improved then likely can stop    5.   Chronic anemia: Hg 9.6 at baseline.    6.   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   Not on anticoagulation due to hx of GI bleed    7.   HTN: continue pta hydrlazine 75mg tid and imdur.    Code Status: Full Code  FEN: 3g na restriction  Discharge Dispo: back to TCU  Estimated Disch Date / # of Days until Disch: admit to obs.  Possible d/c tomorrow if creatinine better.  Suspect will need some supplemental O2 for a while.      History of Present Illness:  Keisha Godinez is a 85 year old female with PMH including CVA, affib, diastolic CHF, pulmonary HTN, and HTN who presents with shortness of breath from her TCU.  She was just hospitazlied for a R humerus fracture that was managed non-op.  Of note she was on 1-2 L O2 throughout that admission, but has not been on supplemental oxygen at the TCU.  She was noted to have more labored breathing and she says she has been more sob since being there.  Her O2 sats were 97%, but she was wheezing so she was sent in here.  She has a chronic non-productive cough that is unchanged.  Denies fevers or chills to me.  No chest pain.  She still has pain in her R arm and R hand.  The pain is moderately severe and better with po dilaudid.  No diarrhea, vomiting, or dysuria.      History obtained from patient, patient's daughter, medical record, and from Dr. Prather in the emergency department.  Patient was hypoxic to high 80s on RA and was put on 6L via facemask.  Vitals stable.  EKG showing afib with TWI in V3-V4 that is unchanged from previous.  Troponin negative.  BNP above 4000.  Creatinine 1.17 up from 0.8.  D dimer elevated, but CTPA negative for PE or infiltrate.  No leukocytosis.  She did received 1 L NS for her CTPA protocol.  Advised to hold lasix for now given john and not looking volume overloaded.  O2  turned down to 3 L and O2 sats 97%.  Not sure there is an acute process here and suspect she be on a little O2 for now until recovered from her fracture and is more mobile.  Admit to obs.     Past Medical History reviewed:  Past Medical History:   Diagnosis Date     Anemia      Atrial fibrillation (H)      B12 deficiency      Cardiomyopathy (H)      CHF (congestive heart failure) (H)      Chronic edema     Lower extremities     Chronic systolic congestive heart failure (H)      CVA (cerebral vascular accident) (H) 2010    Acute Left MCA hemispheric CVA ( on coumadin)     Depression      Gastro-oesophageal reflux disease      GI bleed 03-20-15     Hyperlipidemia      Hypertension      Iron deficiency      MSSA (methicillin susceptible Staphylococcus aureus) 03-10-15     Pulmonary hypertension      Shingles      Past Surgical History reviewed:  Past Surgical History:   Procedure Laterality Date     COLONOSCOPY  03/24/2015    Diverticulosis, polyps     ENTEROSCOPY SMALL BOWEL N/A 2/29/2016    Procedure: ENTEROSCOPY SMALL BOWEL;  Surgeon: Ady Ponce MD;  Location:  GI     ESOPHAGOSCOPY, GASTROSCOPY, DUODENOSCOPY (EGD), COMBINED N/A 3/22/2015    Upper GI- Normal, nothing significant found.      EXCISE MASS FOOT Right 7/6/2016    Procedure: EXCISE MASS FOOT;  Surgeon: Lee Jang DPM;  Location: [facility] OR     Social History reviewed:  Social History   Substance Use Topics     Smoking status: Former Smoker     Quit date: 4/14/1956     Smokeless tobacco: Never Used     Alcohol use No     Social History     Social History Narrative     Family History reviewed:  Family History   Problem Relation Age of Onset     Known Genetic Syndrome Father      Known Genetic Syndrome Mother      Other Cancer Daughter      pancreatic     Other Cancer Brother      type     Other Cancer Sister 60     lung     DIABETES No family hx of      Breast Cancer No family hx of      Cancer - colorectal No family hx of      Ovarian  Cancer No family hx of      Prostate Cancer No family hx of      Allergies:  Allergies   Allergen Reactions     Lisinopril Other (See Comments)     Tongue swelling     Calcium Nausea and Vomiting     Egg Yolk      Flu Virus Vaccine      Allergic to eggs.     Keflex [Cephalexin] Nausea     Pt states she had upset stomach.  NOT tongue swelling.  She had tongue swelling with a combo bp med that she thinks included lisinopril.    Tolerates IV Cefazolin     Levaquin [Levofloxacin]      Sulfa Drugs      Zinc Nausea and Vomiting     Medications:  Prior to Admission medications    Medication Sig Last Dose Taking? Auth Provider   albuterol (ACCUNEB) 1.25 MG/3ML nebulizer solution Take 1 vial by nebulization 4 times daily 10/27/2017 at 1600 Yes Unknown, Entered By History   colchicine 0.6 MG tablet Take 1 tablet (0.6 mg) by mouth daily 10/27/2017 at am Yes Gabe Porter MD   HYDROmorphone (DILAUDID) 2 MG tablet Take 1-2 tablets (2-4 mg) by mouth every 3 hours as needed for moderate to severe pain 10/27/2017 at 1111 Yes Gabe Porter MD   hydrOXYzine (ATARAX) 25 MG tablet Take 1 tablet (25 mg) by mouth 3 times daily as needed for other (pain adjunct) prn Yes Gabe Porter MD   simvastatin (ZOCOR) 10 MG tablet Take 1 tablet (10 mg) by mouth At Bedtime 10/26/2017 at Unknown time Yes Gerri Eubanks MD   ferrous sulfate (IRON) 325 (65 FE) MG tablet Take 325 mg by mouth 2 times daily 10/27/2017 at am Yes Unknown, Entered By History   venlafaxine (EFFEXOR-XR) 75 MG 24 hr capsule Take 1 capsule (75 mg) by mouth daily 10/27/2017 at am Yes Gerri Eubanks MD   gabapentin (NEURONTIN) 100 MG capsule Take 1 capsule (100 mg) by mouth 3 times daily 10/27/2017 at 3rd-1600 Yes Gerri Eubanks MD   hydrALAZINE (APRESOLINE) 25 MG tablet Take 3 tablets (75 mg) by mouth 3 times daily 10/27/2017 at am Yes Gerri Eubanks MD   isosorbide mononitrate (IMDUR) 30 MG 24 hr tablet Take 1 tablet (30 mg) by mouth daily 10/27/2017 at  am Yes Gerri Eubanks MD   potassium chloride SA (POTASSIUM CHLORIDE) 20 MEQ CR tablet Take 1 tablet (20 mEq) by mouth daily 10/27/2017 at am Yes Gerri Eubanks MD   furosemide (LASIX) 40 MG tablet Take 1 tablet (40 mg) by mouth 2 times daily 10/27/2017 at 2nd-1600 Yes Gerri Eubanks MD   omeprazole (PRILOSEC) 20 MG CR capsule Take 1 capsule (20 mg) by mouth daily 10/27/2017 at am Yes Gerri Eubanks MD   acetaminophen (TYLENOL) 325 MG tablet Take 650 mg by mouth every 6 hours as needed for mild pain  10/26/2017 at Unknown time Yes Reported, Patient   senna-docusate (SENOKOT-S;PERICOLACE) 8.6-50 MG per tablet Take 2 tablets daily as needed for constipation while taking prescription pain medications. prn Yes Lee Jang DPM   OMEGA-3 FATTY ACIDS PO Take 1,200 mg by mouth daily  10/27/2017 at am Yes Reported, Patient   psyllium (METAMUCIL) 58.6 % POWD Take 1 teaspoonful by mouth every evening  10/26/2017 at Unknown time Yes Reported, Patient   VITAMIN D, CHOLECALCIFEROL, PO Take 1,000 Units by mouth daily  10/27/2017 at am Yes Reported, Patient     Review of Systems:  A Comprehensive greater than 10 system review of systems was carried out.  Pertinent positives and negatives are noted above.  Otherwise negative.     Physical Exam:  Blood pressure 147/75, temperature 99  F (37.2  C), temperature source Oral, resp. rate 18, SpO2 96 %, not currently breastfeeding.  Wt Readings from Last 1 Encounters:   10/27/17 72.9 kg (160 lb 12.8 oz)     Exam:  Constitutional: Awake, NAD, appears tired  Eyes: sclera white   HEENT: atraumatic, dry mucous membranes  Respiratory:  Does not appear in respiratory distress. Is on facemask at 3L with O2 sat 97%.  lungs cta bilaterally, no crackles or wheeze  Cardiovascular: irregularly irregular rhythm, regular rate.  No murmur   GI: non-tender, not distended, bowel sounds present  Skin: no rash or lesions, acyanotic  Musculoskeletal/extremities: R arm in sling.   Few enlarged PIPs on R hand including a red swollen 5th PIP that is tender  Neurologic: Alert, speech clear, moves all extremities equally  Psychiatric: calm     Lab and imaging data personally reviewed:  Labs:    Recent Labs  Lab 10/27/17  1913 10/27/17  1450 10/25/17  0630   WBC 9.8 9.5 6.7   HGB 9.6* 11.0* 9.3*   HCT 30.9* 36.0 30.3*   MCV 96 98 96    317 187       Recent Labs  Lab 10/27/17  1913 10/27/17  1450 10/25/17  0630    139 139   POTASSIUM 4.2 3.7 4.5   CHLORIDE 97 97 101   CO2 38* 36* 35*   ANIONGAP 3 6 3   * 99 111*   BUN 30 29 22   CR 1.17* 1.06* 0.80   GFRESTIMATED 44* 49* 68   GFRESTBLACK 53* 60* 83   CASPER 8.9 9.8 9.1       Recent Labs  Lab 10/27/17  1913   NTBNPI 4521*       Recent Labs  Lab 10/27/17  1913   TROPI <0.015     Procal 0.18  D dimer 2.3  Influenza antigen negative    EKG:  NSR, TWI in V2 and V3    Imaging:  Results for orders placed or performed during the hospital encounter of 10/27/17   XR Chest 2 Views    Narrative    CHEST TWO VIEWS  10/27/2017 7:47 PM     COMPARISON: Frontal chest x-ray 10/18/2017    HISTORY: Chest pain, shortness of breath      Impression    IMPRESSION: Severe cardiomegaly again noted. There is no evidence for  congestive failure or pulmonary edema. There are tiny bilateral  pleural effusions. There are no airspace opacities to suggest  pneumonia. There is no pneumothorax.    TOYA MARRERO MD   Chest CT, IV contrast only - PE protocol    Narrative    CT CHEST PULMONARY EMBOLISM WITH CONTRAST  10/27/2017  9:36 PM     HISTORY: Shortness of breath and hypoxia.    TECHNIQUE: Helical axial scans from lung apices through lung bases  with 63mL Isovue-370 IV contrast. Radiation dose for this scan was  reduced using automated exposure control, adjustment of the mA and/or  kV according to patient size, or iterative reconstruction technique.    COMPARISON: 7/7/2016.    FINDINGS: There is no CT evidence for pulmonary embolism or other  acute vascular  abnormality of the chest. Thyroid gland heterogeneity  may be related to multiple small nodules. Vascular calcifications,  including coronary artery calcifications. Cardiomegaly and moderate  size hiatal hernia without change. Small bilateral pleural effusions,  decreased since the prior exam. The previously seen left basilar  infiltrate has resolved. Small amount of platelike atelectasis in the  lung bases bilaterally. Previously seen left upper lobe infiltrate is  significantly smaller and residual pleural-parenchymal opacity in the  posterior left lung apex may represent residual scarring although a  small amount of recurrent or residual infiltrate is not excluded.  There are multiple very tiny scattered nodules in the lungs  bilaterally, many of which appear calcified and this is likely all  related to old granulomatous disease. The remainder of the lungs are  clear. Visualized upper abdomen shows no additional abnormalities.  There is a right humeral neck fracture which is known and new since  the prior CT chest.      Impression    IMPRESSION:  1. No evidence for pulmonary embolism.  2. Vascular calcifications, including coronary artery calcifications.  3. Cardiomegaly and moderate size hiatal hernia without change.  4. Small bilateral pleural effusions, decreased since the prior exam.  5. Significant decrease in previously seen left lung infiltrates.    MD Isaac JACOME MD  Hospitalist  [facility]

## 2017-10-28 NOTE — PLAN OF CARE
Problem: Patient Care Overview  Goal: Discharge Needs Assessment  Outcome: Improving  Problem: Patient Care Overview  Goal: Plan of Care/Patient Progress Review  Outcome: Improving  PRIMARY DIAGNOSIS: Hypoxia  OUTPATIENT/OBSERVATION GOALS TO BE MET BEFORE DISCHARGE:  1. Vital signs stable: Yes      2. Improvement of peak flow to greater than 70% sustained off nebulizer for 4 hours: N/A      3. Dyspnea improved and O2 sats >88% at RA or at prior home O2 therapy level: currently 2 L O2      SpO2: 95 %, O2 Device: Nasal Cannula      4. Short term supplemental O2 needed for use with activity at home: Yes      5. Tolerating adequate PO diet and medications: Yes      6. Return to near baseline physical activity: No, recent right humorous fx. Baseline Ind/walker, now assist of 2 with jessenia gusman      Pt will dc to [facility] this evening at 1815 with 02. Pt is up in chair; moved with max assist of 2. PO dilaudid and tylenol for pain. Patient rates pain a 10, will give Tylenol, reassess, and if not improved, will give PO Dilaudid. Will continue to monitor.

## 2017-10-28 NOTE — PLAN OF CARE
ROOM # 227    Living Situation (if not independent, order SW consult): Currently at TCU after R humorous injury  Facility name: [facility]  :  Rebecca     Activity level at baseline: Ind w/ walker  Activity level on admit: Assist of 2      Patient registered to observation; given Patient Bill of Rights; given the opportunity to ask questions about observation status and their plan of care.  Patient has been oriented to the observation room, bathroom and call light is in place.    Discussed discharge goals and expectations with patient/family.

## 2017-10-28 NOTE — PLAN OF CARE
"Problem: Patient Care Overview  Goal: Plan of Care/Patient Progress Review  Outcome: Improving  PRIMARY DIAGNOSIS: Hypoxia  OUTPATIENT/OBSERVATION GOALS TO BE MET BEFORE DISCHARGE:  1. Vital signs stable: Yes      2. Improvement of peak flow to greater than 70% sustained off nebulizer for 4 hours: N/A      3. Dyspnea improved and O2 sats >88% at RA or at prior home O2 therapy level: currently 2 L O2      SpO2: 94 %, O2 Device: Nasal Cannula      4. Short term supplemental O2 needed for use with activity at home: Yes      5. Tolerating adequate PO diet and medications: Yes      6. Return to near baseline physical activity: No, recent right humorous fx. Baseline Ind/walker, now assist of 2     Pt will dc to [facility] this evening at 1815 with 02. Pt is up in chair; moved with max assist of 2. PO dilaudid and tylenol for pain. Rates pain 10, but declines pain medication and states her pain is \"ok:\" Daughter updated with plan of care.       "

## 2017-10-28 NOTE — PLAN OF CARE
Problem: Patient Care Overview  Goal: Plan of Care/Patient Progress Review  Outcome: Improving  PRIMARY DIAGNOSIS: Hypoxia  OUTPATIENT/OBSERVATION GOALS TO BE MET BEFORE DISCHARGE:  1. Vital signs stable: Yes      2. Improvement of peak flow to greater than 70% sustained off nebulizer for 4 hours: N/A      3. Dyspnea improved and O2 sats >88% at RA or at prior home O2 therapy level: currently 2 L O2      SpO2: 92 %, O2 Device: Oxymask       4. Short term supplemental O2 needed for use with activity at home: Yes      5. Tolerating adequate PO diet and medications: Yes      6. Return to near baseline physical activity: No, recent right humorous fx. Baseline Ind/walker, now assist of 2     Anticipate DC to TCU ([facility]) with supplemental 02.

## 2017-10-28 NOTE — PROGRESS NOTES
SWS  At pt request, set-up medivan for pt to return to [facility].  HE can come at 1815.  Pt aware she will receive a bill for this service and has used it before.  P:  NO further SW needs.

## 2017-10-29 NOTE — PROGRESS NOTES
Hand-off for Care Transitions to Next Level of Care Provider  Name: Keisha Godinez  : 1932  MRN #: [id number]  Reason for Hospitalization:  Closed supracondylar fracture of right humerus, initial encounter [S42.411A]  Admit Date/Time: 10/17/2017  9:11 PM  Discharge Date: 10/26/2017         Readmitted to Observation 10/27/17 to 10/28/17    Reason for Communication Hand-off Referral: Admission diagnoses: CHF    Discharge Plan:  Discharged to: TCU: [facility]                   Patient agreeable to post-hospital support suggestions:  Yes  Discharge Plan:             Patient is on new medications:   Yes   HYDROmorphone 2 MG tablet. Take 1-2 tablets (2-4 mg) by mouth every 3 hours as  needed for moderate to severe pain   hydrOXYzine 25 MG tablet. Take 1 tablet (25 mg) by mouth 3 times daily as needed  for other (pain adjunct)           MTM follow up recommended: Yes           Tel-Assurance program:  Declined           Patient will receive  TCU  at:  [facility]  Follow-up specialty is recommended: Yes. Follow up with Medication Therapy Management, Occupational & Physical Therapy while at TCU.     Follow-up plan:  Future Appointments  Date Time Provider Department Center   2017 11:00 AM Gerri Eubanks MD [facility]     Any outstanding tests or procedures:  No.       Referrals     Future Labs/Procedures    Occupational Therapy Adult Consult     Comments:    Evaluate and treat as clinically indicated.    Reason:  Right humerus fracture    Physical Therapy Adult Consult     Comments:    Evaluate and treat as clinically indicated.    Reason:  Right humerus fracture          Key Recommendations:  Assess patient & family understanding of Low Sodium diets & products. Also, patient is administering her own medications. Assess her level of understanding on dosages and frequency.     Communicated handoff via EPIC Comm Mgt to Dr. Gerri Eubanks's CC at [place] Care Coord.      Ashely Vega, RN, BSN, CTS  [facility]  [facility]  [phone]

## 2017-10-30 ENCOUNTER — HOSPITAL LABORATORY (OUTPATIENT)
Dept: OTHER | Facility: CLINIC | Age: 82
End: 2017-10-30

## 2017-10-30 ENCOUNTER — CARE COORDINATION (OUTPATIENT)
Dept: CARE COORDINATION | Facility: CLINIC | Age: 82
End: 2017-10-30

## 2017-10-30 LAB
ANION GAP SERPL CALCULATED.3IONS-SCNC: 7 MMOL/L (ref 3–14)
BUN SERPL-MCNC: 21 MG/DL (ref 7–30)
CALCIUM SERPL-MCNC: 8.5 MG/DL (ref 8.5–10.1)
CHLORIDE SERPL-SCNC: 98 MMOL/L (ref 94–109)
CO2 SERPL-SCNC: 34 MMOL/L (ref 20–32)
CREAT SERPL-MCNC: 0.91 MG/DL (ref 0.52–1.04)
GFR SERPL CREATININE-BSD FRML MDRD: 59 ML/MIN/1.7M2
GLUCOSE SERPL-MCNC: 84 MG/DL (ref 70–99)
POTASSIUM SERPL-SCNC: 3.9 MMOL/L (ref 3.4–5.3)
SODIUM SERPL-SCNC: 139 MMOL/L (ref 133–144)

## 2017-10-30 NOTE — PROGRESS NOTES
Clinic Care Coordination Contact  Care Coordination Transition Communication    Referral Source: CTS    Clinical Data: Patient was hospitalized at [facility] from 10/27/17 to 10/28/17 with diagnosis of hypoxia. Patient was inpatient from 10/17/17 to 10/26/17 due to fractured right arm following a fall in her home.      Transition to Facility:              Facility Name: [facility]               Contact name and phone number/fax: Shauna Cortez   [phone]    Plan: RN Care Coordinator will await notification from facility staff informing RNCare Coordinator of patient's discharge plans/needs. RNCare Coordinator will review chart and outreach to facility staff every 4 weeks and as needed.     Maryam Go RN, CCM - Care Coordinator     10/30/2017    8:24 AM  [phone]

## 2017-10-31 ENCOUNTER — TRANSFERRED RECORDS (OUTPATIENT)
Dept: HEALTH INFORMATION MANAGEMENT | Facility: CLINIC | Age: 82
End: 2017-10-31

## 2017-10-31 ENCOUNTER — NURSING HOME VISIT (OUTPATIENT)
Dept: GERIATRICS | Facility: CLINIC | Age: 82
End: 2017-10-31
Payer: COMMERCIAL

## 2017-10-31 VITALS
TEMPERATURE: 97.9 F | RESPIRATION RATE: 18 BRPM | OXYGEN SATURATION: 95 % | SYSTOLIC BLOOD PRESSURE: 153 MMHG | HEART RATE: 56 BPM | DIASTOLIC BLOOD PRESSURE: 61 MMHG | BODY MASS INDEX: 28.71 KG/M2 | WEIGHT: 156 LBS | HEIGHT: 62 IN

## 2017-10-31 DIAGNOSIS — S42.201D CLOSED FRACTURE OF PROXIMAL END OF RIGHT HUMERUS WITH ROUTINE HEALING, UNSPECIFIED FRACTURE MORPHOLOGY, SUBSEQUENT ENCOUNTER: ICD-10-CM

## 2017-10-31 DIAGNOSIS — I50.32 CHRONIC DIASTOLIC HEART FAILURE (H): ICD-10-CM

## 2017-10-31 DIAGNOSIS — R53.81 PHYSICAL DECONDITIONING: ICD-10-CM

## 2017-10-31 DIAGNOSIS — J96.01 ACUTE RESPIRATORY FAILURE WITH HYPOXIA (H): Primary | ICD-10-CM

## 2017-10-31 PROCEDURE — 99306 1ST NF CARE HIGH MDM 50: CPT | Performed by: INTERNAL MEDICINE

## 2017-10-31 PROCEDURE — 99207 ZZC CDG-CORRECTLY CODED, REVIEWED AND AGREE: CPT | Performed by: INTERNAL MEDICINE

## 2017-10-31 NOTE — PROGRESS NOTES
PRIMARY CARE PROVIDER AND CLINIC RESPONSIBLE:  Gerri Eubanks, [address] [facility] [address] / [facility] [address]        ADMISSION HISTORY AND PHYSICAL EXAMINATION     Chief Complaint   Patient presents with     Hospital F/U         HISTORY OF PRESENT ILLNESS:  85 year old female, (1/25/1932), [facility] medical care and rehab.    Pt admitted [facility] 10/27 to 10/28 for SOB likely multifactorial. Prior to that admitted [facility] 10/17 to 10/26 for fx of R humerus which was managed conservatively.    Please see Michelle Bell's CNP admit noted dated 10/27 for details of admission, past medical history, family history, allergies, medication list, social history and other details pertinent with this admission. Hospital admission and dc summary reviewed.      Past Medical History:   Diagnosis Date     Anemia      Atrial fibrillation (H)      B12 deficiency      Cardiomyopathy (H)      CHF (congestive heart failure) (H)      Chronic edema     Lower extremities     Chronic systolic congestive heart failure (H)      CVA (cerebral vascular accident) (H) 2010    Acute Left MCA hemispheric CVA ( on coumadin)     Depression      Gastro-oesophageal reflux disease      GI bleed 03-20-15     Hyperlipidemia      Hypertension      Iron deficiency      MSSA (methicillin susceptible Staphylococcus aureus) 03-10-15     Pulmonary hypertension      Shingles        Past Surgical History:   Procedure Laterality Date     COLONOSCOPY  03/24/2015    Diverticulosis, polyps     ENTEROSCOPY SMALL BOWEL N/A 2/29/2016    Procedure: ENTEROSCOPY SMALL BOWEL;  Surgeon: Ady Ponce MD;  Location:  GI     ESOPHAGOSCOPY, GASTROSCOPY, DUODENOSCOPY (EGD), COMBINED N/A 3/22/2015    Upper GI- Normal, nothing significant found.      EXCISE MASS FOOT Right 7/6/2016    Procedure: EXCISE MASS FOOT;  Surgeon: Lee Jang DPM;  Location:  OR       Current Outpatient Prescriptions   Medication Sig      acetaminophen (TYLENOL) 325 MG tablet Take 3 tablets (975 mg) by mouth 3 times daily     gabapentin (NEURONTIN) 100 MG capsule Take 1 capsule (100 mg) by mouth 2 times daily     gabapentin (NEURONTIN) 100 MG capsule Take 1 capsule (100 mg) by mouth At Bedtime     predniSONE (DELTASONE) 20 MG tablet Take 1 tablet (20 mg) by mouth daily for 3 days     albuterol (ACCUNEB) 1.25 MG/3ML nebulizer solution Take 1 vial by nebulization 4 times daily     HYDROmorphone (DILAUDID) 2 MG tablet Take 1-2 tablets (2-4 mg) by mouth every 3 hours as needed for moderate to severe pain     hydrOXYzine (ATARAX) 25 MG tablet Take 1 tablet (25 mg) by mouth 3 times daily as needed for other (pain adjunct)     simvastatin (ZOCOR) 10 MG tablet Take 1 tablet (10 mg) by mouth At Bedtime     ferrous sulfate (IRON) 325 (65 FE) MG tablet Take 325 mg by mouth 2 times daily     venlafaxine (EFFEXOR-XR) 75 MG 24 hr capsule Take 1 capsule (75 mg) by mouth daily     hydrALAZINE (APRESOLINE) 25 MG tablet Take 3 tablets (75 mg) by mouth 3 times daily     isosorbide mononitrate (IMDUR) 30 MG 24 hr tablet Take 1 tablet (30 mg) by mouth daily     potassium chloride SA (POTASSIUM CHLORIDE) 20 MEQ CR tablet Take 1 tablet (20 mEq) by mouth daily     furosemide (LASIX) 40 MG tablet Take 1 tablet (40 mg) by mouth 2 times daily     omeprazole (PRILOSEC) 20 MG CR capsule Take 1 capsule (20 mg) by mouth daily     senna-docusate (SENOKOT-S;PERICOLACE) 8.6-50 MG per tablet Take 2 tablets daily as needed for constipation while taking prescription pain medications.     OMEGA-3 FATTY ACIDS PO Take 1,200 mg by mouth daily      psyllium (METAMUCIL) 58.6 % POWD Take 1 teaspoonful by mouth every evening      VITAMIN D, CHOLECALCIFEROL, PO Take 1,000 Units by mouth daily      No current facility-administered medications for this visit.        Allergies   Allergen Reactions     Lisinopril Other (See Comments)     Tongue swelling     Calcium Nausea and Vomiting     Egg Yolk   "    Flu Virus Vaccine      Allergic to eggs.     Keflex [Cephalexin] Nausea     Pt states she had upset stomach.  NOT tongue swelling.  She had tongue swelling with a combo bp med that she thinks included lisinopril.    Tolerates IV Cefazolin     Levaquin [Levofloxacin]      Sulfa Drugs      Zinc Nausea and Vomiting       Social History     Social History     Marital status:      Spouse name: N/A     Number of children: N/A     Years of education: N/A     Occupational History     Not on file.     Social History Main Topics     Smoking status: Former Smoker     Quit date: 4/14/1956     Smokeless tobacco: Never Used     Alcohol use No     Drug use: No     Sexual activity: No     Other Topics Concern     Caffeine Concern No     Hot tea 1 cup daily     Special Diet Yes     low sodium     Exercise No     limited right now     Social History Narrative          Information reviewed:  Medications, vital signs, orders, nursing notes, problem list, hospital information.     ROS: All 10 point review of system completed, those pertinent positive, please see H&P, the remaining ROS is negative.    /61  Pulse 56  Temp 97.9  F (36.6  C)  Resp 18  Ht 5' 2\" (1.575 m)  Wt 156 lb (70.8 kg)  SpO2 95%  BMI 28.53 kg/m2    PHYSICAL EXAMINATION:   GENERAL:  No acute distress. Seen in PT dept. On NC.  SKIN:  Dry and warm.  There is no rash, lesions, ulcers or juandice at area of skin examined.  HEENT:  Head without trauma.  Pupils round, reactive. Exam of conjunctiva and lids are normal. Sclera without icterus. There is no oral thrush.  NECK:  Supple.  There is no cervical adenopathy, no thyromegaly. No jugular venous distension.  CHEST: No reproducible chest tenderness.   LUNGS:  Normal respiratory effort. Bibasilar crackles.  HEART:  Regular rate and rhythm.  No murmur, gallops or rubs auscultated.  ABDOMEN:  Soft, bowel sounds positive.  There is no tenderness or guarding.   EXTREMITIES: No edema. RUE in " sling.  NEUROLOGIC:  Alert and oriented x3.      Lab/Diagnostic data:  Reviewed    Lab Results   Component Value Date    WBC 9.8 10/27/2017     Lab Results   Component Value Date    RBC 3.22 10/27/2017     Lab Results   Component Value Date    HGB 9.6 10/27/2017     Lab Results   Component Value Date    HCT 30.9 10/27/2017     Lab Results   Component Value Date    MCV 96 10/27/2017     Lab Results   Component Value Date    MCH 29.8 10/27/2017     Lab Results   Component Value Date    MCHC 31.1 10/27/2017     Lab Results   Component Value Date    RDW 13.3 10/27/2017     Lab Results   Component Value Date     10/27/2017       Last Basic Metabolic Panel:  Lab Results   Component Value Date     10/30/2017      Lab Results   Component Value Date    POTASSIUM 3.9 10/30/2017     Lab Results   Component Value Date    CHLORIDE 98 10/30/2017     Lab Results   Component Value Date    CASPER 8.5 10/30/2017     Lab Results   Component Value Date    CO2 34 10/30/2017     Lab Results   Component Value Date    BUN 21 10/30/2017     Lab Results   Component Value Date    CR 0.91 10/30/2017     Lab Results   Component Value Date    GLC 84 10/30/2017         ASSESSMENT / PLAN:     Acute respiratory failure with hypoxia (H)  - CT chest 10/2017 showed no PE.  - Likely atelectasis.  - On oxygen, wean as able.    Closed fracture of proximal end of right humerus with routine healing, unspecified fracture morphology, subsequent encounter  - Non-op management.  - pain control.  - f/u with ortho.    Chronic diastolic heart failure (H)  - On hydralazine, imdur and lasix.  - daily weights.  - Low salt diet.  - TTE 10/2017 showed nml EF.    Depression,unspecified.  - On effexor.    Hyperlipidemia,unspecified.  - On zocor.    Physical deconditioning  - Plan: PT/OT, fall precautions. Care conference with patient and family for the progress of rehab and disposition issues will be discussed as planned. Rehab evaluation and other evaluations  including CPT are at rehab logs, to be reviewed separately.  Fall risk assessment as well as cognitive evaluation will be formed during rehab stay if indicated.      Other problems with same care. Primary care doctor and other specialists to address those chronic problems in next clinic appointment to be scheduled upon discharge from the TCU.    Total time spent with patient visit was 32 min including patient visit, review of past records, 1/2 time on patients counseling and coordinating care.        Janneth Salas MD

## 2017-10-31 NOTE — MR AVS SNAPSHOT
"              After Visit Summary   10/31/2017    Keisha Godinez    MRN: [id number]           Patient Information     Date Of Birth          1/25/1932        Visit Information        Provider Department      10/31/2017 7:00 AM Janneth Salas MD [facility]        Today's Diagnoses     Acute respiratory failure with hypoxia (H)    -  1    Closed fracture of proximal end of right humerus with routine healing, unspecified fracture morphology, subsequent encounter        Chronic diastolic heart failure (H)        Physical deconditioning           Follow-ups after your visit        Your next 10 appointments already scheduled     Nov 02, 2017 11:00 AM CDT   SHORT with Gerri Eubanks MD   [facility]    [address]  [facility] [address] [phone]              Who to contact     If you have questions or need follow up information about today's clinic visit or your schedule please contact [facility] directly at [phone].  Normal or non-critical lab and imaging results will be communicated to you by MyChart, letter or phone within 4 business days after the clinic has received the results. If you do not hear from us within 7 days, please contact the clinic through [employer] or phone. If you have a critical or abnormal lab result, we will notify you by phone as soon as possible.  Submit refill requests through [employer] or call your pharmacy and they will forward the refill request to us. Please allow 3 business days for your refill to be completed.          Additional Information About Your Visit        [employer] Information     [employer] lets you send messages to your doctor, view your test results, renew your prescriptions, schedule appointments and more. To sign up, go to [email or web address] . Click on \"Log in\" on the left side of the screen, which will take you to the Welcome page. Then click on \"Sign up Now\" on " "the right side of the page.     You will be asked to enter the access code listed below, as well as some personal information. Please follow the directions to create your username and password.     Your access code is: [email or web address]  Expires: 2018  2:18 PM     Your access code will  in 90 days. If you need help or a new code, please call your [place] clinic or [phone].        Care EveryWhere ID     This is your Care EveryWhere ID. This could be used by other organizations to access your [place] medical records  [id number]        Your Vitals Were     Pulse Temperature Respirations Height Pulse Oximetry BMI (Body Mass Index)    56 97.9  F (36.6  C) 18 5' 2\" (1.575 m) 95% 28.53 kg/m2       Blood Pressure from Last 3 Encounters:   10/31/17 153/61   10/28/17 130/40   10/27/17 137/49    Weight from Last 3 Encounters:   10/31/17 156 lb (70.8 kg)   10/28/17 158 lb (71.7 kg)   10/27/17 160 lb 12.8 oz (72.9 kg)              Today, you had the following     No orders found for display       Primary Care Provider Office Phone # Fax #    Gerri Eubanks [phone]       [address] [address]        Goals        General    Functional (pt-stated)     Notes - Note created  2015  3:21 PM by Clara Dorado RN    I will pace out my activities like bathing, cooking & cleaning and rest between tasks for 10-15'.  I will elevate my feet and legs on a chair or ottoman when I sit        Medical (pt-stated)     Notes - Note created  2015  3:20 PM by Clara Dorado RN    I will put my action plan in an easy-to-see area and review routinuely to make sure I am staying in the \"green zone\".  I will weigh myself every morning and report an increase of 2 or more lbs. to my clinic  I will call my clinic if I begin having trouble lying down to sleep due to breathing problems.  I will read nutrition labels when buying prepared foods of spices  I will keep a log of " my daily sodium intake (salt) and not go over a total of 2 gm.              Medication 1 (pt-stated)     Notes - Note created  12/9/2015  3:21 PM by Clara Dorado RN    I will take my water pill every morning and talk to my doctor about an alternate plan if I need to adjust the time of day due to schedule.          Equal Access to Services     [facility] : Hadii thais ku hadasho Soannali, waaxda luqadaha, qaybta kaalmada jenna, sharlene wallacejennyferbalbir torre . So [facility] [phone].    ATENCIÓN: Si habla español, tiene a rosas disposición servicios gratuitos de asistencia lingüística. [facility] [phone].    We comply with applicable federal civil rights laws and [place] laws. We do not discriminate on the basis of race, color, national origin, age, disability, sex, sexual orientation, or gender identity.            Thank you!     Thank you for choosing [facility]  for your care. Our goal is always to provide you with excellent care. Hearing back from our patients is one way we can continue to improve our services. Please take a few minutes to complete the written survey that you may receive in the mail after your visit with us. Thank you!             Your Updated Medication List - Protect others around you: Learn how to safely use, store and throw away your medicines at www.disposemymeds.org.          This list is accurate as of: 10/31/17 11:59 PM.  Always use your most recent med list.                   Brand Name Dispense Instructions for use Diagnosis    acetaminophen 325 MG tablet    TYLENOL    100 tablet    Take 3 tablets (975 mg) by mouth 3 times daily    Midline thoracic back pain, unspecified chronicity       albuterol 1.25 MG/3ML nebulizer solution    ACCUNEB     Take 1 vial by nebulization 4 times daily        ferrous sulfate 325 (65 FE) MG tablet    IRON     Take 325 mg by mouth 2 times daily        furosemide 40 MG tablet    LASIX    180 tablet    Take 1 tablet (40  mg) by mouth 2 times daily    Essential hypertension with goal blood pressure less than 140/90       * gabapentin 100 MG capsule    NEURONTIN    270 capsule    Take 1 capsule (100 mg) by mouth 2 times daily    Midline thoracic back pain, unspecified chronicity       * gabapentin 100 MG capsule    NEURONTIN    90 capsule    Take 1 capsule (100 mg) by mouth At Bedtime    Midline thoracic back pain, unspecified chronicity       hydrALAZINE 25 MG tablet    APRESOLINE    810 tablet    Take 3 tablets (75 mg) by mouth 3 times daily    Hyperlipidemia, unspecified hyperlipidemia type, Cardiomyopathy, unspecified type (H)       HYDROmorphone 2 MG tablet    DILAUDID    90 tablet    Take 1-2 tablets (2-4 mg) by mouth every 3 hours as needed for moderate to severe pain    Closed supracondylar fracture of right humerus, initial encounter       hydrOXYzine 25 MG tablet    ATARAX    60 tablet    Take 1 tablet (25 mg) by mouth 3 times daily as needed for other (pain adjunct)    Closed supracondylar fracture of right humerus, initial encounter       isosorbide mononitrate 30 MG 24 hr tablet    IMDUR    90 tablet    Take 1 tablet (30 mg) by mouth daily    Essential hypertension with goal blood pressure less than 140/90, Cardiomyopathy (H), Coronary artery disease involving native heart with angina pectoris, unspecified vessel or lesion type (H)       OMEGA-3 FATTY ACIDS PO      Take 1,200 mg by mouth daily        omeprazole 20 MG CR capsule    priLOSEC    90 capsule    Take 1 capsule (20 mg) by mouth daily    Gastroesophageal reflux disease without esophagitis       potassium chloride SA 20 MEQ CR tablet    KLOR-CON    90 tablet    Take 1 tablet (20 mEq) by mouth daily    Bilateral edema of lower extremity       predniSONE 20 MG tablet    DELTASONE    3 tablet    Take 1 tablet (20 mg) by mouth daily for 3 days    Acute gouty arthritis       psyllium 58.6 % Powd    METAMUCIL     Take 1 teaspoonful by mouth every evening         senna-docusate 8.6-50 MG per tablet    SENOKOT-S;PERICOLACE    30 tablet    Take 2 tablets daily as needed for constipation while taking prescription pain medications.    S/P foot surgery, right       simvastatin 10 MG tablet    ZOCOR    90 tablet    Take 1 tablet (10 mg) by mouth At Bedtime    Hyperlipidemia, unspecified hyperlipidemia type       venlafaxine 75 MG 24 hr capsule    EFFEXOR-XR    90 capsule    Take 1 capsule (75 mg) by mouth daily    Major depressive disorder, recurrent episode, in partial remission (H)       VITAMIN D (CHOLECALCIFEROL) PO      Take 1,000 Units by mouth daily        * Notice:  This list has 2 medication(s) that are the same as other medications prescribed for you. Read the directions carefully, and ask your doctor or other care provider to review them with you.

## 2017-11-02 ENCOUNTER — NURSING HOME VISIT (OUTPATIENT)
Dept: GERIATRICS | Facility: CLINIC | Age: 82
End: 2017-11-02
Payer: COMMERCIAL

## 2017-11-02 VITALS
HEART RATE: 48 BPM | TEMPERATURE: 98.3 F | OXYGEN SATURATION: 92 % | BODY MASS INDEX: 28.17 KG/M2 | RESPIRATION RATE: 18 BRPM | DIASTOLIC BLOOD PRESSURE: 77 MMHG | WEIGHT: 159 LBS | SYSTOLIC BLOOD PRESSURE: 118 MMHG | HEIGHT: 63 IN

## 2017-11-02 DIAGNOSIS — I50.32 CHRONIC DIASTOLIC HEART FAILURE (H): Primary | ICD-10-CM

## 2017-11-02 DIAGNOSIS — I10 ESSENTIAL HYPERTENSION WITH GOAL BLOOD PRESSURE LESS THAN 140/90: ICD-10-CM

## 2017-11-02 DIAGNOSIS — R53.81 PHYSICAL DECONDITIONING: ICD-10-CM

## 2017-11-02 DIAGNOSIS — J44.9 CHRONIC OBSTRUCTIVE PULMONARY DISEASE, UNSPECIFIED COPD TYPE (H): ICD-10-CM

## 2017-11-02 LAB
BACTERIA SPEC CULT: NO GROWTH
Lab: NORMAL
SPECIMEN SOURCE: NORMAL

## 2017-11-02 PROCEDURE — 99309 SBSQ NF CARE MODERATE MDM 30: CPT | Performed by: NURSE PRACTITIONER

## 2017-11-02 NOTE — PROGRESS NOTES
[facility]    Chief Complaint   Patient presents with     Heart Failure     COPD       HPI:    Keisha Godinez is a 85 year old  (1/25/1932), who is being seen today for an episodic care visit at [facility].  HPI information obtained from: facility chart records.Today's concern is:     Chronic diastolic heart failure (H)  COPD (chronic obstructive pulmonary disease) (H)     Alert, calm, NAD. Denies SOB, chest pain or dizziness. States worked with today and went well. Wearing O2 2LNC- is not on at home. Per nursing desats into 80s without it when weaning. Weight up 3 lb today with woody LE edema noted, patient states is chronic.     ALLERGIES: Lisinopril; Calcium; Egg yolk; Flu virus vaccine; Keflex [cephalexin]; Levaquin [levofloxacin]; Sulfa drugs; and Zinc  Past Medical, Surgical, Family and Social History reviewed and updated in [facility].    Current Outpatient Prescriptions   Medication Sig Dispense Refill     acetaminophen (TYLENOL) 325 MG tablet Take 3 tablets (975 mg) by mouth 3 times daily 100 tablet 0     gabapentin (NEURONTIN) 100 MG capsule Take 1 capsule (100 mg) by mouth 2 times daily 270 capsule 0     gabapentin (NEURONTIN) 100 MG capsule Take 1 capsule (100 mg) by mouth At Bedtime 90 capsule 0     albuterol (ACCUNEB) 1.25 MG/3ML nebulizer solution Take 1 vial by nebulization 4 times daily       HYDROmorphone (DILAUDID) 2 MG tablet Take 1-2 tablets (2-4 mg) by mouth every 3 hours as needed for moderate to severe pain 90 tablet 0     hydrOXYzine (ATARAX) 25 MG tablet Take 1 tablet (25 mg) by mouth 3 times daily as needed for other (pain adjunct) 60 tablet 0     simvastatin (ZOCOR) 10 MG tablet Take 1 tablet (10 mg) by mouth At Bedtime 90 tablet 0     ferrous sulfate (IRON) 325 (65 FE) MG tablet Take 325 mg by mouth 2 times daily       venlafaxine (EFFEXOR-XR) 75 MG 24 hr capsule Take 1 capsule (75 mg) by mouth daily 90 capsule 0     hydrALAZINE (APRESOLINE) 25 MG  "tablet Take 3 tablets (75 mg) by mouth 3 times daily 810 tablet 2     isosorbide mononitrate (IMDUR) 30 MG 24 hr tablet Take 1 tablet (30 mg) by mouth daily 90 tablet 1     potassium chloride SA (POTASSIUM CHLORIDE) 20 MEQ CR tablet Take 1 tablet (20 mEq) by mouth daily 90 tablet 1     furosemide (LASIX) 40 MG tablet Take 1 tablet (40 mg) by mouth 2 times daily 180 tablet 1     omeprazole (PRILOSEC) 20 MG CR capsule Take 1 capsule (20 mg) by mouth daily 90 capsule 2     senna-docusate (SENOKOT-S;PERICOLACE) 8.6-50 MG per tablet Take 2 tablets daily as needed for constipation while taking prescription pain medications. 30 tablet 0     OMEGA-3 FATTY ACIDS PO Take 1,200 mg by mouth daily        psyllium (METAMUCIL) 58.6 % POWD Take 1 teaspoonful by mouth every evening        VITAMIN D, CHOLECALCIFEROL, PO Take 1,000 Units by mouth daily        Medications reviewed:  Medications reconciled to facility chart and changes were made to reflect current medications as identified as above med list. Below are the changes that were made:   Medications stopped since last EPIC medication reconciliation:   There are no discontinued medications.    Medications started since last [place] [facility] medication reconciliation:  No orders of the defined types were placed in this encounter.        REVIEW OF SYSTEMS:  4 point ROS including Respiratory, CV, GI and , other than that noted in the HPI,  is negative    Physical Exam:  /77  Pulse (!) 48  Temp 98.3  F (36.8  C)  Resp 18  Ht 5' 3\" (1.6 m)  Wt 159 lb (72.1 kg)  SpO2 92%  BMI 28.17 kg/m2    Resp: Effort WNL, LSCTA - LS diminished in bases  CV: S1-2, no S3-4, no murmurs noted- 2+ woody edema to bilateral LE  Abd- soft, nontender, BS +  Musc- CASEY- right arm sling  Psych- alert, calm, pleasant       BP 10/26-11/2: 118-174/53-77 mmHg    Recent Labs:    CBC RESULTS:   Recent Labs   Lab Test  10/27/17   1913  10/27/17   1450   WBC  9.8  9.5   RBC  3.22*  3.69*   HGB  9.6*  11.0*   HCT  " 30.9*  36.0   MCV  96  98   MCH  29.8  29.8   MCHC  31.1*  30.6*   RDW  13.3  13.9   PLT  226  317       Last Basic Metabolic Panel:  Recent Labs   Lab Test  10/30/17   0700  10/28/17   0703   NA  139  138   POTASSIUM  3.9  3.9   CHLORIDE  98  97   CASPER  8.5  8.5   CO2  34*  37*   BUN  21  26   CR  0.91  0.95   GLC  84  95       TSH   Date Value Ref Range Status   01/18/2017 1.56 0.40 - 4.00 mU/L Final   11/30/2016 1.56 0.40 - 4.00 mU/L Final       Assessment/Plan:  Chronic diastolic heart failure (H)  - weight up 3 lb  - give one time order for zaroxolyn 2.5 mg 30 minutes prior to lasix in a.m.  - give one time additional dose of KCL  20 meq tomorrow-  - continue current meds/tx/weights  - [facility] 11/6    COPD (chronic obstructive pulmonary disease) (H)  - no wheezing or SOB noted  - completed prednisone taper  - continue on current meds/tx  - follow clinically            Electronically signed by  JODIE De La Paz CNP

## 2017-11-06 ENCOUNTER — NURSING HOME VISIT (OUTPATIENT)
Dept: GERIATRICS | Facility: CLINIC | Age: 82
End: 2017-11-06
Payer: COMMERCIAL

## 2017-11-06 ENCOUNTER — HOSPITAL LABORATORY (OUTPATIENT)
Dept: OTHER | Facility: CLINIC | Age: 82
End: 2017-11-06

## 2017-11-06 VITALS
DIASTOLIC BLOOD PRESSURE: 59 MMHG | OXYGEN SATURATION: 92 % | WEIGHT: 151.8 LBS | SYSTOLIC BLOOD PRESSURE: 133 MMHG | RESPIRATION RATE: 16 BRPM | BODY MASS INDEX: 26.9 KG/M2 | HEIGHT: 63 IN | HEART RATE: 60 BPM | TEMPERATURE: 98.4 F

## 2017-11-06 DIAGNOSIS — I27.20 PULMONARY HYPERTENSION (H): ICD-10-CM

## 2017-11-06 DIAGNOSIS — S42.294S OTHER CLOSED NONDISPLACED FRACTURE OF PROXIMAL END OF RIGHT HUMERUS, SEQUELA: Primary | ICD-10-CM

## 2017-11-06 DIAGNOSIS — R53.81 PHYSICAL DECONDITIONING: ICD-10-CM

## 2017-11-06 DIAGNOSIS — E87.6 HYPOKALEMIA: ICD-10-CM

## 2017-11-06 DIAGNOSIS — I50.33 ACUTE ON CHRONIC DIASTOLIC CONGESTIVE HEART FAILURE (H): ICD-10-CM

## 2017-11-06 DIAGNOSIS — I48.20 CHRONIC ATRIAL FIBRILLATION (H): ICD-10-CM

## 2017-11-06 DIAGNOSIS — D64.9 ANEMIA, UNSPECIFIED TYPE: ICD-10-CM

## 2017-11-06 LAB
ANION GAP SERPL CALCULATED.3IONS-SCNC: 7 MMOL/L (ref 3–14)
BUN SERPL-MCNC: 24 MG/DL (ref 7–30)
CALCIUM SERPL-MCNC: 8.6 MG/DL (ref 8.5–10.1)
CHLORIDE SERPL-SCNC: 100 MMOL/L (ref 94–109)
CO2 SERPL-SCNC: 32 MMOL/L (ref 20–32)
CREAT SERPL-MCNC: 0.86 MG/DL (ref 0.52–1.04)
GFR SERPL CREATININE-BSD FRML MDRD: 63 ML/MIN/1.7M2
GLUCOSE SERPL-MCNC: 73 MG/DL (ref 70–99)
POTASSIUM SERPL-SCNC: 3.1 MMOL/L (ref 3.4–5.3)
SODIUM SERPL-SCNC: 139 MMOL/L (ref 133–144)

## 2017-11-06 PROCEDURE — 99309 SBSQ NF CARE MODERATE MDM 30: CPT | Performed by: NURSE PRACTITIONER

## 2017-11-06 RX ORDER — POTASSIUM CHLORIDE 1.5 G/1.58G
20 POWDER, FOR SOLUTION ORAL 2 TIMES DAILY
COMMUNITY
End: 2017-12-19 | Stop reason: ALTCHOICE

## 2017-11-06 RX ORDER — FUROSEMIDE 40 MG
40 TABLET ORAL 2 TIMES DAILY
Qty: 60 TABLET | Refills: 0 | Status: SHIPPED | OUTPATIENT
Start: 2017-11-06 | End: 2018-02-26

## 2017-11-06 NOTE — PROGRESS NOTES
[facility]    Chief Complaint   Patient presents with     RECHECK       HPI:    Keisha Godinez is a 85 year old  (1/25/1932), who is being seen today for an episodic care visit at [facility].  HPI information obtained from: facility chart records.Today's concern is:     Closed fracture of right proximal humerus  Pulmonary hypertension  Acute on chronic diastolic congestive heart failure (H)  Chronic atrial fibrillation (H)  Anemia, unspecified type  Physical deconditioning   she denies any sob, or chest pain. Reports R shoulder pain at 4/10.     ALLERGIES: Lisinopril; Calcium; Egg yolk; Flu virus vaccine; Keflex [cephalexin]; Levaquin [levofloxacin]; Sulfa drugs; and Zinc  Past Medical, Surgical, Family and Social History reviewed and updated in [facility].    Current Outpatient Prescriptions   Medication Sig Dispense Refill     furosemide (LASIX) 40 MG tablet Take 1 tablet (40 mg) by mouth 2 times daily 60 tablet 0     potassium chloride (KLOR-CON) 20 MEQ Packet Take 20 mEq by mouth 2 times daily       acetaminophen (TYLENOL) 325 MG tablet Take 3 tablets (975 mg) by mouth 3 times daily 100 tablet 0     gabapentin (NEURONTIN) 100 MG capsule Take 1 capsule (100 mg) by mouth 2 times daily 270 capsule 0     gabapentin (NEURONTIN) 100 MG capsule Take 1 capsule (100 mg) by mouth At Bedtime 90 capsule 0     albuterol (ACCUNEB) 1.25 MG/3ML nebulizer solution Take 1 vial by nebulization 3 times daily        HYDROmorphone (DILAUDID) 2 MG tablet Take 1-2 tablets (2-4 mg) by mouth every 3 hours as needed for moderate to severe pain 90 tablet 0     hydrOXYzine (ATARAX) 25 MG tablet Take 1 tablet (25 mg) by mouth 3 times daily as needed for other (pain adjunct) 60 tablet 0     simvastatin (ZOCOR) 10 MG tablet Take 1 tablet (10 mg) by mouth At Bedtime 90 tablet 0     ferrous sulfate (IRON) 325 (65 FE) MG tablet Take 325 mg by mouth 2 times daily       venlafaxine (EFFEXOR-XR) 75 MG 24 hr  "capsule Take 1 capsule (75 mg) by mouth daily 90 capsule 0     hydrALAZINE (APRESOLINE) 25 MG tablet Take 3 tablets (75 mg) by mouth 3 times daily 810 tablet 2     isosorbide mononitrate (IMDUR) 30 MG 24 hr tablet Take 1 tablet (30 mg) by mouth daily 90 tablet 1     potassium chloride SA (POTASSIUM CHLORIDE) 20 MEQ CR tablet Take 1 tablet (20 mEq) by mouth daily 90 tablet 1     omeprazole (PRILOSEC) 20 MG CR capsule Take 1 capsule (20 mg) by mouth daily 90 capsule 2     senna-docusate (SENOKOT-S;PERICOLACE) 8.6-50 MG per tablet Take 2 tablets daily as needed for constipation while taking prescription pain medications. 30 tablet 0     OMEGA-3 FATTY ACIDS PO Take 1,200 mg by mouth daily        VITAMIN D, CHOLECALCIFEROL, PO Take 1,000 Units by mouth daily        Medications reviewed:  Medications reconciled to facility chart and changes were made to reflect current medications as identified as above med list. Below are the changes that were made:   Medications stopped since last EPIC medication reconciliation:   Medications Discontinued During This Encounter   Medication Reason     psyllium (METAMUCIL) 58.6 % POWD Medication Reconciliation Clean Up       Medications started since last [facility] medication reconciliation:  Orders Placed This Encounter   Medications     potassium chloride (KLOR-CON) 20 MEQ Packet     Sig: Take 20 mEq by mouth 2 times daily       REVIEW OF SYSTEMS:  4 point ROS including Respiratory, CV, GI and , other than that noted in the HPI,  is negative    Physical Exam:  /59  Pulse 60  Temp 98.4  F (36.9  C)  Resp 16  Ht 5' 3\" (1.6 m)  Wt 151 lb 12.8 oz (68.9 kg)  SpO2 92%  BMI 26.89 kg/m2  GENERAL APPEARANCE:  Alert, in no distress, oriented, cooperative, sitting in recliner  ENT:  Mouth and posterior oropharynx normal, moist mucous membranes, [place]  EYES:  EOM, conjunctivae, lids, pupils and irises normal, EOM normal, conjunctiva and lids normal  RESP:  respiratory effort and palpation " of chest normal, expiratory wheezes  CV:  Palpation and auscultation of heart done , regular rate and rhythm, no murmur, rub, or gallop, no edema  ABDOMEN:  normal bowel sounds, soft, nontender, no hepatosplenomegaly or other masses, no guarding or rebound  M/S:   Gait and station abnormal unsteady with walker; right shoulder sling  SKIN:  Inspection of skin and subcutaneous tissue baseline, Palpation of skin and subcutaneous tissue baseline  PSYCH:  oriented X 3, normal insight, judgement and memory, affect and mood normal    BP 10/31-11/7: 117-167/48-82 mmHg    Recent Labs:      CBC RESULTS:   Recent Labs   Lab Test  10/27/17   1913  10/27/17   1450   WBC  9.8  9.5   RBC  3.22*  3.69*   HGB  9.6*  11.0*   HCT  30.9*  36.0   MCV  96  98   MCH  29.8  29.8   MCHC  31.1*  30.6*   RDW  13.3  13.9   PLT  226  317       Last Basic Metabolic Panel:  Recent Labs   Lab Test  11/06/17   0635  10/30/17   0700   NA  139  139   POTASSIUM  3.1*  3.9   CHLORIDE  100  98   CASPER  8.6  8.5   CO2  32  34*   BUN  24  21   CR  0.86  0.91   GLC  73  84       TSH   Date Value Ref Range Status   01/18/2017 1.56 0.40 - 4.00 mU/L Final   11/30/2016 1.56 0.40 - 4.00 mU/L Final     Assessment/Plan:  (S42.201A) Fracture of humerus, proximal, right, closed  (primary encounter diagnosis)  Comment: stable. S/s fall. Minimal displaced proximal humerus fx  -per orthopedic nonoperative management at this time.  -remain in sling   -follow up in 1 week with Dr. Urbano  -pain management: hydromorphone, tylenol tid  -hydroxyzine for pain        (I27.20) Pulmonary hypertension  (I50.33) Acute on chronic diastolic congestive heart failure (H)  Hypokalemia  Comment: stable. She received zaroxolyn x 1 dose. Has lost 9 lbs since admission.  Plan:  Cont furosemide 40 mg bid  -increase KCL supplementation  -daily weights  -VS per unit protocol        (I48.2) Chronic atrial fibrillation (H)  Comment: rate controlled. Not on anticoagulation s/s bleeding per family  discontinued by Dr. Pradhan  -VS per unit protocol     (D64.9) Anemia, unspecified type  Comment: stable. hbg- 9.3 10/25  Plan: cont iron replacement  -follow hemoglobin        (R53.81) Physical deconditioning  Comment: Lives in a house with her daughter  Plan: PT/OT  -SW assist with discharge planning and care conference per unit protocol    Orders:  -albuterol nebs tid  -bmp 11/9  -KCL 20 meq x1 dose now  -increase KCL 20 meq bid    Electronically signed by  JODIE Jaramillo CNP

## 2017-11-08 ENCOUNTER — HOSPITAL LABORATORY (OUTPATIENT)
Dept: OTHER | Facility: CLINIC | Age: 82
End: 2017-11-08

## 2017-11-08 ENCOUNTER — NURSING HOME VISIT (OUTPATIENT)
Dept: GERIATRICS | Facility: CLINIC | Age: 82
End: 2017-11-08
Payer: COMMERCIAL

## 2017-11-08 VITALS
SYSTOLIC BLOOD PRESSURE: 147 MMHG | WEIGHT: 154.6 LBS | TEMPERATURE: 98 F | HEART RATE: 60 BPM | RESPIRATION RATE: 17 BRPM | DIASTOLIC BLOOD PRESSURE: 60 MMHG | HEIGHT: 63 IN | BODY MASS INDEX: 27.39 KG/M2 | OXYGEN SATURATION: 96 %

## 2017-11-08 DIAGNOSIS — S42.294S OTHER CLOSED NONDISPLACED FRACTURE OF PROXIMAL END OF RIGHT HUMERUS, SEQUELA: ICD-10-CM

## 2017-11-08 DIAGNOSIS — I05.1 RHEUMATIC MITRAL REGURGITATION: ICD-10-CM

## 2017-11-08 DIAGNOSIS — I27.20 PULMONARY HYPERTENSION (H): ICD-10-CM

## 2017-11-08 DIAGNOSIS — N18.30 CHRONIC KIDNEY DISEASE, STAGE 3 (MODERATE): ICD-10-CM

## 2017-11-08 DIAGNOSIS — R60.0 BILATERAL EDEMA OF LOWER EXTREMITY: ICD-10-CM

## 2017-11-08 DIAGNOSIS — R53.81 PHYSICAL DECONDITIONING: ICD-10-CM

## 2017-11-08 DIAGNOSIS — I48.20 CHRONIC ATRIAL FIBRILLATION (H): ICD-10-CM

## 2017-11-08 DIAGNOSIS — D64.9 ANEMIA, UNSPECIFIED TYPE: ICD-10-CM

## 2017-11-08 DIAGNOSIS — I50.33 ACUTE ON CHRONIC DIASTOLIC CONGESTIVE HEART FAILURE (H): Primary | ICD-10-CM

## 2017-11-08 LAB
ANION GAP SERPL CALCULATED.3IONS-SCNC: 6 MMOL/L (ref 3–14)
BUN SERPL-MCNC: 24 MG/DL (ref 7–30)
CALCIUM SERPL-MCNC: 8.7 MG/DL (ref 8.5–10.1)
CHLORIDE SERPL-SCNC: 102 MMOL/L (ref 94–109)
CO2 SERPL-SCNC: 30 MMOL/L (ref 20–32)
CREAT SERPL-MCNC: 0.86 MG/DL (ref 0.52–1.04)
GFR SERPL CREATININE-BSD FRML MDRD: 63 ML/MIN/1.7M2
GLUCOSE SERPL-MCNC: 78 MG/DL (ref 70–99)
POTASSIUM SERPL-SCNC: 3.9 MMOL/L (ref 3.4–5.3)
SODIUM SERPL-SCNC: 138 MMOL/L (ref 133–144)

## 2017-11-08 PROCEDURE — 99309 SBSQ NF CARE MODERATE MDM 30: CPT | Performed by: NURSE PRACTITIONER

## 2017-11-08 NOTE — PROGRESS NOTES
[place]    Chief Complaint   Patient presents with     RECHECK       HPI:    Keisha Godinez is a 85 year old  (1/25/1932), who is being seen today for an episodic care visit at [facility].  HPI information obtained from: facility chart records, facility staff, patient report and [facility] chart review.Today's concern is:    Acute on chronic diastolic congestive heart failure (H)  Pulmonary hypertension  Rheumatic mitral regurgitation  Bilateral edema of lower extremity  Chronic kidney disease, stage 3 (moderate) (GFR 59 10/30/17, GFR 58 7/7/16)  Patient with chronic cardiac issues as noted above. Had weight gain and increased edema. She is overall down 5 lbs since admission. But several days ago went from wt 152 lbs to 155 lbs. Given one day of Zaroxolyn and decreased weight to 154 lbs. Feels edema improved and no respiratory symptoms. Renal function rechecked today and is stable. K back to normal range with increased KCL dose. Ranges 10/26-11/08: BP: 117-174/49-74 mmHg, HR: 46-70 bpm and Weight: 151.6-159.6 lbs. Patient takes KCL 20 meq PO BID, Imdur 30 mg daily, hydralazine, Lasix 40 mg PO BID.   Results for KEISHA GODINEZ (MRN [id number]) as of 11/8/2017 13:27   Ref. Range 10/30/2017 07:00 11/6/2017 06:35 11/8/2017 05:57   Sodium Latest Ref Range: 133 - 144 mmol/L 139 139 138   Potassium Latest Ref Range: 3.4 - 5.3 mmol/L 3.9 3.1 (L) 3.9   Chloride Latest Ref Range: 94 - 109 mmol/L 98 100 102   Carbon Dioxide Latest Ref Range: 20 - 32 mmol/L 34 (H) 32 30   Urea Nitrogen Latest Ref Range: 7 - 30 mg/dL 21 24 24   Creatinine Latest Ref Range: 0.52 - 1.04 mg/dL 0.91 0.86 0.86   GFR Estimate Latest Ref Range: >60 mL/min/1.7m2 59 (L) 63 63   GFR Estimate If Black Latest Ref Range: >60 mL/min/1.7m2 71 76 76   Calcium Latest Ref Range: 8.5 - 10.1 mg/dL 8.5 8.6 8.7         Other closed nondisplaced fracture of proximal end of right humerus, sequela  Physical  deconditioning  Continues in sling and pain is well controlled. She takes PRN Tylenol, Hydromorphone and Atarax. No changes needed. Doing well in therapies. Saw ortho for f/u and next visit 3 weeks from 10/31, healing well per ortho note.     Chronic atrial fibrillation (H)  Not anticoagulated due to hx of bleeding. Rate is controlled.     Anemia, unspecified type  No bleeding or bruising but Hgb down last check, will f/u Hgb 11/10        ALLERGIES: Lisinopril; Calcium; Egg yolk; Flu virus vaccine; Keflex [cephalexin]; Levaquin [levofloxacin]; Sulfa drugs; and Zinc  Past Medical, Surgical, Family and Social History reviewed and updated in [facility].    Current Outpatient Prescriptions   Medication Sig Dispense Refill     furosemide (LASIX) 40 MG tablet Take 1 tablet (40 mg) by mouth 2 times daily 60 tablet 0     potassium chloride (KLOR-CON) 20 MEQ Packet Take 20 mEq by mouth 2 times daily       acetaminophen (TYLENOL) 325 MG tablet Take 3 tablets (975 mg) by mouth 3 times daily 100 tablet 0     gabapentin (NEURONTIN) 100 MG capsule Take 1 capsule (100 mg) by mouth 2 times daily 270 capsule 0     gabapentin (NEURONTIN) 100 MG capsule Take 1 capsule (100 mg) by mouth At Bedtime 90 capsule 0     albuterol (ACCUNEB) 1.25 MG/3ML nebulizer solution Take 1 vial by nebulization 3 times daily        HYDROmorphone (DILAUDID) 2 MG tablet Take 1-2 tablets (2-4 mg) by mouth every 3 hours as needed for moderate to severe pain 90 tablet 0     hydrOXYzine (ATARAX) 25 MG tablet Take 1 tablet (25 mg) by mouth 3 times daily as needed for other (pain adjunct) 60 tablet 0     simvastatin (ZOCOR) 10 MG tablet Take 1 tablet (10 mg) by mouth At Bedtime 90 tablet 0     ferrous sulfate (IRON) 325 (65 FE) MG tablet Take 325 mg by mouth 2 times daily       venlafaxine (EFFEXOR-XR) 75 MG 24 hr capsule Take 1 capsule (75 mg) by mouth daily 90 capsule 0     hydrALAZINE (APRESOLINE) 25 MG tablet Take 3 tablets (75 mg) by mouth 3 times daily 810 tablet  "2     isosorbide mononitrate (IMDUR) 30 MG 24 hr tablet Take 1 tablet (30 mg) by mouth daily 90 tablet 1     omeprazole (PRILOSEC) 20 MG CR capsule Take 1 capsule (20 mg) by mouth daily 90 capsule 2     senna-docusate (SENOKOT-S;PERICOLACE) 8.6-50 MG per tablet Take 2 tablets daily as needed for constipation while taking prescription pain medications. 30 tablet 0     OMEGA-3 FATTY ACIDS PO Take 1,200 mg by mouth daily        VITAMIN D, CHOLECALCIFEROL, PO Take 1,000 Units by mouth daily        Medications reviewed:  Medications reconciled to facility chart and changes were made to reflect current medications as identified as above med list. Below are the changes that were made:   Medications stopped since last EPIC medication reconciliation:   Medications Discontinued During This Encounter   Medication Reason     potassium chloride SA (POTASSIUM CHLORIDE) 20 MEQ CR tablet Medication Reconciliation Clean Up       Medications started since last [facility] medication reconciliation:  No orders of the defined types were placed in this encounter.      REVIEW OF SYSTEMS:  10 point ROS of systems including Constitutional, Eyes, Respiratory, Cardiovascular, Gastroenterology, Genitourinary, Integumentary, Musculoskeletal, Psychiatric were all negative except for pertinent positives noted in my HPI.    Physical Exam:  /60  Pulse 60  Temp 98  F (36.7  C)  Resp 17  Ht 5' 3\" (1.6 m)  Wt 154 lb 9.6 oz (70.1 kg)  SpO2 96%  BMI 27.39 kg/m2  GENERAL APPEARANCE:  Alert, in no distress, pleasant, cooperative, oriented x 4  EYES:  EOM, lids, pupils and irises normal, sclera clear and conjunctiva normal, no discharge or mattering on lids or lashes noted  ENT:  Mouth normal, moist mucous membranes, nose normal without drainage or crusting, external ears without lesions, hearing acuity intact  NECK:  Nontender, supple, symmetrical; no adenopathy, masses or thyromegaly, trachea midline  RESP:  respiratory effort and palpation of " chest normal, no chest wall tenderness, no respiratory distress, Lung sounds diminished right base otherwise clear, patient is on room air and no cough  CV:  Palpation and auscultation of heart done, rate and rhythm controlled and appears regular, no murmur, no rub or gallop. Edema +1 pitting bilateral lower extremities.   ABDOMEN:  normal bowel sounds, soft, nontender, no grimacing or guarding with palpation, no hepatosplenomegaly or other masses  M/S:   Gait and station walks with assist , Digits and nails normal, right arm in sling  NEURO: cranial nerves 2-12 grossly intact, no facial asymmetry, no speech deficits and able to follow directions, moves all extremities symmetrically  PSYCH:  insight and judgement intact, memory intact, affect and mood normal     Recent Labs:    CBC RESULTS:   Recent Labs   Lab Test  10/27/17   1913  10/27/17   1450   WBC  9.8  9.5   RBC  3.22*  3.69*   HGB  9.6*  11.0*   HCT  30.9*  36.0   MCV  96  98   MCH  29.8  29.8   MCHC  31.1*  30.6*   RDW  13.3  13.9   PLT  226  317       Last Basic Metabolic Panel:  Recent Labs   Lab Test  11/08/17   0557  11/06/17   0635   NA  138  139   POTASSIUM  3.9  3.1*   CHLORIDE  102  100   CASPER  8.7  8.6   CO2  30  32   BUN  24  24   CR  0.86  0.86   GLC  78  73       TSH   Date Value Ref Range Status   01/18/2017 1.56 0.40 - 4.00 mU/L Final   11/30/2016 1.56 0.40 - 4.00 mU/L Final       Assessment/Plan:  Acute on chronic diastolic congestive heart failure (H)  Pulmonary hypertension  Rheumatic mitral regurgitation  Bilateral edema of lower extremity  Chronic issues, weight decreased, edema improved per patient and K level normal. Continue current meds, wt and VS daily and f/u with any further increase in wt or edema. Repeat BMP PRN.     Chronic kidney disease, stage 3 (moderate) (GFR 59 10/30/17, GFR 58 7/7/16)  Stable renal function with normal K levels. Monitor PRN.     Other closed nondisplaced fracture of proximal end of right humerus,  sequela  Physical deconditioning  Acute issue, healing. F/U with ortho as ordered. Therapies as ordered. Meds as above. Staff to update provider if not effective.     Chronic atrial fibrillation (H)  Chronic and not anticoagulated. Rate controlled. Monitor.     Anemia, unspecified type  Decreased Hgb last check - repeat on 11/10.    Orders:  Hgb on 11/10 diagnosis anemia    Electronically signed by  JODIE Rosenbaum CNP

## 2017-11-10 ENCOUNTER — HOSPITAL LABORATORY (OUTPATIENT)
Dept: OTHER | Facility: CLINIC | Age: 82
End: 2017-11-10

## 2017-11-10 LAB — HGB BLD-MCNC: 9.2 G/DL (ref 11.7–15.7)

## 2017-11-14 ENCOUNTER — NURSING HOME VISIT (OUTPATIENT)
Dept: GERIATRICS | Facility: CLINIC | Age: 82
End: 2017-11-14
Payer: COMMERCIAL

## 2017-11-14 VITALS
WEIGHT: 154.6 LBS | HEIGHT: 63 IN | DIASTOLIC BLOOD PRESSURE: 60 MMHG | BODY MASS INDEX: 27.39 KG/M2 | HEART RATE: 57 BPM | OXYGEN SATURATION: 94 % | SYSTOLIC BLOOD PRESSURE: 165 MMHG | RESPIRATION RATE: 20 BRPM | TEMPERATURE: 97.7 F

## 2017-11-14 DIAGNOSIS — M19.041 OSTEOARTHRITIS OF RIGHT HAND, UNSPECIFIED OSTEOARTHRITIS TYPE: ICD-10-CM

## 2017-11-14 DIAGNOSIS — M79.644 PAIN OF FINGER OF RIGHT HAND: Primary | ICD-10-CM

## 2017-11-14 PROCEDURE — 99308 SBSQ NF CARE LOW MDM 20: CPT | Performed by: NURSE PRACTITIONER

## 2017-11-14 NOTE — PROGRESS NOTES
[place] [facility]    Chief Complaint   Patient presents with     RECHECK       HPI:    Keisha Godinez is a 85 year old  (1/25/1932), who is being seen today for an episodic care visit at [facility].  HPI information obtained from: facility chart records.Today's concern is:     Finger pain  Osteoarthritis of right hand, unspecified osteoarthritis type   Reports right 5 th finger is swollen and painful, although has improved over the past week. 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    ALLERGIES: Lisinopril; Calcium; Egg yolk; Flu virus vaccine; Keflex [cephalexin]; Levaquin [levofloxacin]; Sulfa drugs; and Zinc  Past Medical, Surgical, Family and Social History reviewed and updated in [facility].    Current Outpatient Prescriptions   Medication Sig Dispense Refill     furosemide (LASIX) 40 MG tablet Take 1 tablet (40 mg) by mouth 2 times daily 60 tablet 0     potassium chloride (KLOR-CON) 20 MEQ Packet Take 20 mEq by mouth 2 times daily       acetaminophen (TYLENOL) 325 MG tablet Take 3 tablets (975 mg) by mouth 3 times daily 100 tablet 0     gabapentin (NEURONTIN) 100 MG capsule Take 1 capsule (100 mg) by mouth 2 times daily 270 capsule 0     gabapentin (NEURONTIN) 100 MG capsule Take 1 capsule (100 mg) by mouth At Bedtime 90 capsule 0     albuterol (ACCUNEB) 1.25 MG/3ML nebulizer solution Take 1 vial by nebulization 3 times daily        HYDROmorphone (DILAUDID) 2 MG tablet Take 1-2 tablets (2-4 mg) by mouth every 3 hours as needed for moderate to severe pain 90 tablet 0     hydrOXYzine (ATARAX) 25 MG tablet Take 1 tablet (25 mg) by mouth 3 times daily as needed for other (pain adjunct) 60 tablet 0     simvastatin (ZOCOR) 10 MG tablet Take 1 tablet (10 mg) by mouth At Bedtime 90 tablet 0     ferrous sulfate (IRON) 325 (65 FE) MG tablet Take 325 mg by mouth 2 times daily       venlafaxine (EFFEXOR-XR) 75 MG 24 hr capsule Take 1 capsule (75 mg) by mouth daily 90 capsule 0      "hydrALAZINE (APRESOLINE) 25 MG tablet Take 3 tablets (75 mg) by mouth 3 times daily 810 tablet 2     isosorbide mononitrate (IMDUR) 30 MG 24 hr tablet Take 1 tablet (30 mg) by mouth daily 90 tablet 1     omeprazole (PRILOSEC) 20 MG CR capsule Take 1 capsule (20 mg) by mouth daily 90 capsule 2     senna-docusate (SENOKOT-S;PERICOLACE) 8.6-50 MG per tablet Take 2 tablets daily as needed for constipation while taking prescription pain medications. 30 tablet 0     OMEGA-3 FATTY ACIDS PO Take 1,200 mg by mouth daily        VITAMIN D, CHOLECALCIFEROL, PO Take 1,000 Units by mouth daily        Medications reviewed:  Medications reconciled to facility chart and changes were made to reflect current medications as identified as above med list. Below are the changes that were made:   Medications stopped since last EPIC medication reconciliation:   There are no discontinued medications.    Medications started since last [facility] medication reconciliation:  No orders of the defined types were placed in this encounter.      REVIEW OF SYSTEMS:  4 point ROS including Respiratory, CV, GI and , other than that noted in the HPI,  is negative    Physical Exam:  /60  Pulse 57  Temp 97.7  F (36.5  C)  Resp 20  Ht 5' 3\" (1.6 m)  Wt 154 lb 9.6 oz (70.1 kg)  SpO2 94%  BMI 27.39 kg/m2  GENERAL APPEARANCE:  Alert, in no distress, oriented, cooperative  ENT:  Mouth and posterior oropharynx normal, moist mucous membranes, normal hearing acuity  EYES:  EOM, conjunctivae, lids, pupils and irises normal, EOM normal, conjunctiva and lids normal  RESP:  respiratory effort and palpation of chest normal, lungs clear to auscultation , no respiratory distress  CV:  Palpation and auscultation of heart done , regular rate and rhythm, no murmur, rub, or gallop, no edema  ABDOMEN:  normal bowel sounds, soft, nontender, no hepatosplenomegaly or other masses, no guarding or rebound  M/S:   Gait and station abnormal unsteady gait with " walker  bilateral UE digits with sahil's nodes; right 5 th digit more edematous slightly erythematous.  SKIN:  Inspection of skin and subcutaneous tissue baseline, Palpation of skin and subcutaneous tissue baseline      BP 11/07-11/14: 112-179/52-76 mmHg    Recent Labs:    CBC RESULTS:   Recent Labs   Lab Test  11/10/17   0550  10/27/17   1913  10/27/17   1450   WBC   --   9.8  9.5   RBC   --   3.22*  3.69*   HGB  9.2*  9.6*  11.0*   HCT   --   30.9*  36.0   MCV   --   96  98   MCH   --   29.8  29.8   MCHC   --   31.1*  30.6*   RDW   --   13.3  13.9   PLT   --   226  317       Last Basic Metabolic Panel:  Recent Labs   Lab Test  11/08/17   0557  11/06/17   0635   NA  138  139   POTASSIUM  3.9  3.1*   CHLORIDE  102  100   CASPER  8.7  8.6   CO2  30  32   BUN  24  24   CR  0.86  0.86   GLC  78  73     TSH   Date Value Ref Range Status   01/18/2017 1.56 0.40 - 4.00 mU/L Final   11/30/2016 1.56 0.40 - 4.00 mU/L Final         Assessment/Plan:  (M79.646) Finger pain  (primary encounter diagnosis)  Osteoarthritis right  hand  Comment: improving  Plan: cont acetaminophen tid  -add voltaren gel qid to 5th digit  -warm packs as needed    Orders:  -voltaren gel qid to 5th digit  -warm packs as needed        Electronically signed by  JODIE Jaramillo CNP

## 2017-11-20 ENCOUNTER — CARE COORDINATION (OUTPATIENT)
Dept: CARE COORDINATION | Facility: CLINIC | Age: 82
End: 2017-11-20

## 2017-11-20 NOTE — PROGRESS NOTES
Clinic Care Coordination Contact  Care Team Conversations      Per call to [facility]. Patient remains inpatient and continues with therapies.     Will follow up in 3-4 weeks. Will continue to follow in clinic care coordination.    Maryam Go RN, CCM - Care Coordinator     11/20/2017    1:17 PM  [phone]

## 2017-11-21 ENCOUNTER — TRANSFERRED RECORDS (OUTPATIENT)
Dept: HEALTH INFORMATION MANAGEMENT | Facility: CLINIC | Age: 82
End: 2017-11-21

## 2017-11-27 DIAGNOSIS — M54.6 MIDLINE THORACIC BACK PAIN, UNSPECIFIED CHRONICITY: ICD-10-CM

## 2017-11-27 NOTE — TELEPHONE ENCOUNTER
Do not see dosaging that reflects pharmacy request for Gabapentin.     Dosage from pharmacy states: 100mg capsults: take 1 capsule by mouth three times daily.

## 2017-11-28 VITALS
BODY MASS INDEX: 27.11 KG/M2 | DIASTOLIC BLOOD PRESSURE: 93 MMHG | SYSTOLIC BLOOD PRESSURE: 146 MMHG | OXYGEN SATURATION: 96 % | WEIGHT: 153 LBS | TEMPERATURE: 98.5 F | RESPIRATION RATE: 18 BRPM | HEART RATE: 48 BPM | HEIGHT: 63 IN

## 2017-11-28 RX ORDER — GABAPENTIN 100 MG/1
100 CAPSULE ORAL 3 TIMES DAILY
Qty: 270 CAPSULE | Refills: 0 | Status: SHIPPED | OUTPATIENT
Start: 2017-11-28 | End: 2018-02-26

## 2017-11-28 NOTE — TELEPHONE ENCOUNTER
Routing refill request to provider for review/approval because:  Drug not on the [facility] refill protocol   Clarify directions.  Chart states BID.    Please advise, thanks.

## 2017-11-29 ENCOUNTER — CARE COORDINATION (OUTPATIENT)
Dept: CARE COORDINATION | Facility: CLINIC | Age: 82
End: 2017-11-29

## 2017-11-29 ENCOUNTER — DISCHARGE SUMMARY NURSING HOME (OUTPATIENT)
Dept: GERIATRICS | Facility: CLINIC | Age: 82
End: 2017-11-29
Payer: COMMERCIAL

## 2017-11-29 DIAGNOSIS — N18.30 CHRONIC KIDNEY DISEASE, STAGE 3 (MODERATE): ICD-10-CM

## 2017-11-29 DIAGNOSIS — M19.041 OSTEOARTHRITIS OF FINGER OF RIGHT HAND: ICD-10-CM

## 2017-11-29 DIAGNOSIS — I05.1 RHEUMATIC MITRAL REGURGITATION: ICD-10-CM

## 2017-11-29 DIAGNOSIS — I27.20 PULMONARY HYPERTENSION (H): ICD-10-CM

## 2017-11-29 DIAGNOSIS — I50.32 CHRONIC DIASTOLIC HEART FAILURE (H): Primary | ICD-10-CM

## 2017-11-29 DIAGNOSIS — R60.0 BILATERAL LEG EDEMA: ICD-10-CM

## 2017-11-29 DIAGNOSIS — S42.294S OTHER CLOSED NONDISPLACED FRACTURE OF PROXIMAL END OF RIGHT HUMERUS, SEQUELA: ICD-10-CM

## 2017-11-29 DIAGNOSIS — D64.9 ANEMIA, UNSPECIFIED TYPE: ICD-10-CM

## 2017-11-29 DIAGNOSIS — I48.20 CHRONIC ATRIAL FIBRILLATION (H): ICD-10-CM

## 2017-11-29 DIAGNOSIS — R53.81 PHYSICAL DECONDITIONING: ICD-10-CM

## 2017-11-29 DIAGNOSIS — J44.9 CHRONIC OBSTRUCTIVE PULMONARY DISEASE, UNSPECIFIED COPD TYPE (H): ICD-10-CM

## 2017-11-29 PROCEDURE — 99316 NF DSCHRG MGMT 30 MIN+: CPT | Performed by: NURSE PRACTITIONER

## 2017-11-29 NOTE — PROGRESS NOTES
[facility] DISCHARGE SUMMARY    PATIENT'S NAME: Keisha Godinez  YOB: 1932  MEDICAL RECORD NUMBER:  [id number]    PRIMARY CARE PROVIDER AND CLINIC RESPONSIBLE AFTER TRANSFER: Gerri Eubanks [address] [facility] [address] / [facility] [address]     CODE STATUS/ADVANCE DIRECTIVES DISCUSSION:   DNR only       Allergies   Allergen Reactions     Lisinopril Other (See Comments)     Tongue swelling     Calcium Nausea and Vomiting     Egg Yolk      Flu Virus Vaccine      Allergic to eggs.     Keflex [Cephalexin] Nausea     Pt states she had upset stomach.  NOT tongue swelling.  She had tongue swelling with a combo bp med that she thinks included lisinopril.    Tolerates IV Cefazolin     Levaquin [Levofloxacin]      Sulfa Drugs      Zinc Nausea and Vomiting       TRANSFERRING PROVIDERS: JODIE Rosenbaum CNP, Dr. Maritza MD  DATE OF SNF ADMISSION:  October / 26 / 2017  DATE OF SNF (anticipated) DISCHARGE: December / 06 / 2017 or sooner if patient so desires  DISCHARGE DISPOSITION: [facility] Provider   Nursing Facility: [facility] stay 10/27/2017 to 10/28/2017.     Condition on Discharge:  Improving.  Function: Ambulation w/4WW approx 310' x1 SBA/S and Transfers Mod  Cognitive Scores: SLUMS 22/30    Equipment: walker    DISCHARGE DIAGNOSIS:   1. Chronic diastolic heart failure (H)    2. Pulmonary hypertension    3. Rheumatic mitral regurgitation    4. Bilateral leg edema    5. Chronic kidney disease, stage 3 (moderate) (GFR 59 10/30/17, GFR 58 7/7/16)    6. Other closed nondisplaced fracture of proximal end of right humerus, sequela    7. Physical deconditioning    8. Chronic atrial fibrillation (H)    9. Anemia, unspecified type    10. Chronic obstructive pulmonary disease, unspecified COPD type (H)        HPI Nursing Facility Course:  HPI information obtained from: facility chart records, facility staff, patient report and  [facility] chart review.    Acute on chronic diastolic congestive heart failure (H)  Pulmonary hypertension  Rheumatic mitral regurgitation  Bilateral edema of lower extremity  Chronic kidney disease, stage 3 (moderate) (GFR 59 10/30/17, GFR 58 7/7/16)  Patient with chronic cardiac issues. Had weight gain and increased edema several weeks ago - received extra lasix and wt now down 7 lbs since admission. Patient takes KCL 20 meq PO BID, Imdur 30 mg daily, hydralazine 75 mg TID, Lasix 40 mg PO BID. Edema improved, remains on room air but continues with bilateral crackles at bases. Feels respiratory status at baseline. Last 3 BPs: 120-172/48-76 mmHg and HR Ranges: 42-79 bpm. Admission Weight: 160.8 lbs and Current Weight: (11/28) 153.0, (11/27) 151.4, (11/26) 150.8lbs  Lab Results   Component Value Date    CR 0.86 11/08/2017    CR 0.86 11/06/2017     Lab Results   Component Value Date    POTASSIUM 3.9 11/08/2017    POTASSIUM 3.1 11/06/2017       Other closed nondisplaced fracture of proximal end of right humerus, sequela  Physical deconditioning  Denies pain, now done with sling. She takes PRN Tylenol. We discussed stopping PRN Dilaudid at [facility] home. Doing well in therapies will need HC for PT, OT, HHA and RN at discharge. To see Dr Urbano for follow up on 12/19 as scheduled at 2:10 PM    Chronic atrial fibrillation (H)  Not anticoagulated due to hx of bleeding. Rate is controlled.     Anemia, unspecified type  No bleeding or bruising. Stable Hgb.   Lab Results   Component Value Date    HGB 9.2 11/10/2017    HGB 9.6 10/27/2017        COPD  Patient on room air and sats 93%. At times wheezing and has persistent lower lobe crackles. No fevers. Uses scheduled Albuterol nebs - requires nebulizer and tubing for TID neb use at [facility] home.     Arthritis of finger  Using Voltaren gel with good relief. May switch to IcyHot or similar if Voltaren gel not covered by insurance after discharge.       PAST MEDICAL HISTORY:  has a past  medical history of Anemia; Atrial fibrillation (H); B12 deficiency; Cardiomyopathy (H); CHF (congestive heart failure) (H); Chronic edema; Chronic systolic congestive heart failure (H); CVA (cerebral vascular accident) (H) (2010); Depression; Gastro-oesophageal reflux disease; GI bleed (03-20-15); Hyperlipidemia; Hypertension; Iron deficiency; MSSA (methicillin susceptible Staphylococcus aureus) (03-10-15); Pulmonary hypertension; and Shingles. She also has no past medical history of Malignant hyperthermia or PONV (postoperative nausea and vomiting).    DISCHARGE MEDICATIONS:  Current Outpatient Prescriptions   Medication Sig Dispense Refill     gabapentin (NEURONTIN) 100 MG capsule Take 1 capsule (100 mg) by mouth 3 times daily 270 capsule 0     Diclofenac Sodium 1 % CREA Place onto the skin every 6 hours as needed Apply to 5th right finger       furosemide (LASIX) 40 MG tablet Take 1 tablet (40 mg) by mouth 2 times daily 60 tablet 0     potassium chloride (KLOR-CON) 20 MEQ Packet Take 20 mEq by mouth 2 times daily       acetaminophen (TYLENOL) 325 MG tablet Take 3 tablets (975 mg) by mouth 3 times daily 100 tablet 0     albuterol (ACCUNEB) 1.25 MG/3ML nebulizer solution Take 1 vial by nebulization 3 times daily        HYDROmorphone (DILAUDID) 2 MG tablet Take 1-2 tablets (2-4 mg) by mouth every 3 hours as needed for moderate to severe pain (Patient taking differently: Take 2-4 mg by mouth every 3 hours as needed for moderate to severe pain DC medication at [place] home on 12/6 or sooner if discharged sooner) 90 tablet 0     hydrOXYzine (ATARAX) 25 MG tablet Take 1 tablet (25 mg) by mouth 3 times daily as needed for other (pain adjunct) 60 tablet 0     simvastatin (ZOCOR) 10 MG tablet Take 1 tablet (10 mg) by mouth At Bedtime 90 tablet 0     ferrous sulfate (IRON) 325 (65 FE) MG tablet Take 325 mg by mouth 2 times daily       venlafaxine (EFFEXOR-XR) 75 MG 24 hr capsule Take 1 capsule (75 mg) by mouth daily 90 capsule 0  "    hydrALAZINE (APRESOLINE) 25 MG tablet Take 3 tablets (75 mg) by mouth 3 times daily 810 tablet 2     isosorbide mononitrate (IMDUR) 30 MG 24 hr tablet Take 1 tablet (30 mg) by mouth daily 90 tablet 1     omeprazole (PRILOSEC) 20 MG CR capsule Take 1 capsule (20 mg) by mouth daily 90 capsule 2     senna-docusate (SENOKOT-S;PERICOLACE) 8.6-50 MG per tablet Take 2 tablets daily as needed for constipation while taking prescription pain medications. 30 tablet 0     OMEGA-3 FATTY ACIDS PO Take 1,200 mg by mouth daily        VITAMIN D, CHOLECALCIFEROL, PO Take 1,000 Units by mouth daily          MEDICATION CHANGES/RATIONALE:   1. Voltaren gel QID to right fifth finger diagnosis arthritis  2. Increased KCL to 20 meq PO BID diagnosis hypokalemia  3. Started Albuterol 1 neb TID diagnosis COPD  4. DC'ed Miralax   5. DC Dilaudid PRN at [facility] home    Controlled medications sent with patient: not applicable/none     ROS:    10 point ROS of systems including Constitutional, Eyes, Respiratory, Cardiovascular, Gastroenterology, Genitourinary, Integumentary, Musculoskeletal, Psychiatric were all negative except for pertinent positives noted in my HPI.    Physical Exam:   Vitals: BP (!) 146/93  Pulse (!) 48  Temp 98.5  F (36.9  C)  Resp 18  Ht 5' 3\" (1.6 m)  Wt 153 lb (69.4 kg)  SpO2 96%  BMI 27.1 kg/m2  BMI= Body mass index is 27.1 kg/(m^2).  GENERAL APPEARANCE:  Alert, in no distress, oriented, cooperative. Pleasant.   ENT:  Mouth and posterior oropharynx normal, moist mucous membranes, normal hearing acuity  EYES:  EOM, conjunctivae, lids, pupils and irises normal, EOM normal, conjunctiva and lids normal  RESP:  respiratory effort and palpation of chest normal, no respiratory distress. Few crackles both bases no cough.   CV:  Palpation and auscultation of heart done, regular rate and rhythm, no murmur, rub, or gallop, trace ankle edema per baseline  ABDOMEN:  normal bowel sounds, soft, nontender, no hepatosplenomegaly or " other masses, no guarding or rebound  M/S:   Gait and station up with walker. Bilateral UE digits with sahil's nodes; able to move all extremities  SKIN:  Inspection of skin and subcutaneous tissue baseline, Palpation of skin and subcutaneous tissue baseline   NEURO: no facial asymmetry, CN 2-12 intact, speech clear  PSYCH: pleasant, calm, affect and mood and mentation normal and at baseline    DISCHARGE PLAN:  Occupational Therapy, Physical Therapy, Registered Nurse, Home Health Aide and From:  [facility]  Patient instructed to follow-up with:  PCP in 7 days      Current [place] scheduled appointments:  Future Appointments: None  MTM referral needed and placed by this provider: No    Pending labs: none    SNF labs  CBC RESULTS:   Recent Labs   Lab Test  11/10/17   0550  10/27/17   1913  10/27/17   1450   WBC   --   9.8  9.5   RBC   --   3.22*  3.69*   HGB  9.2*  9.6*  11.0*   HCT   --   30.9*  36.0   MCV   --   96  98   MCH   --   29.8  29.8   MCHC   --   31.1*  30.6*   RDW   --   13.3  13.9   PLT   --   226  317       Last Basic Metabolic Panel:  Recent Labs   Lab Test  17   0557  17   0635   NA  138  139   POTASSIUM  3.9  3.1*   CHLORIDE  102  100   CASPER  8.7  8.6   CO2  30  32   BUN  24  24   CR  0.86  0.86   GLC  78  73       Liver Function Studies -   Recent Labs   Lab Test  16   1337  16   0710   PROTTOTAL  7.1  6.6*   ALBUMIN  3.5  2.7*   BILITOTAL  0.4  0.5   ALKPHOS  89  103   AST  24  15   ALT  27  15       TSH   Date Value Ref Range Status   2017 1.56 0.40 - 4.00 mU/L Final   2016 1.56 0.40 - 4.00 mU/L Final     Discharge Treatments:  [facility] for PT, OR, RN and HHA.       MEDICAL NECESSITY STATEMENT FOR DME    Demographic Information on Keisha Godinez:    Keisha oGdinez  Gender: female  : 1932  [address] [place] [address] [phone] (home) none (work)    Medical Record: [id number]  Social Security Number:  [id number]  Primary Care Provider: Gerri Eubanks  Insurance: Payor: [employer] / Plan: BCBS PLATINUM BLUE / Product Type: PPO /       Chronic diastolic heart failure (H) [I50.32]  COPD    DME:  NEBULIZER: to be used with Albuterol neb TID diagnosis COPD    VITAL SIGNS:  Vitals: BP (!) 146/93  Pulse (!) 48  Temp 98.5  F (36.9  C)  Resp 18  Ht 5' 3\" (1.6 m)  Wt 153 lb (69.4 kg)  SpO2 96%  BMI 27.1 kg/m2  BMI= Body mass index is 27.1 kg/(m^2).  ---see exam above in discharge summary---    MEDICAL NECESSITY STATEMENT      Chronic diastolic heart failure (H)  Pulmonary hypertension  Rheumatic mitral regurgitation  Bilateral leg edema  Chronic kidney disease, stage 3 (moderate)  Other closed nondisplaced fracture of proximal end of right humerus, sequela  Physical deconditioning  Chronic atrial fibrillation (H)  Anemia, unspecified type  Chronic obstructive pulmonary disease, unspecified COPD type (H)  Osteoarthritis of finger of right hand   Patient with chronic COPD, requires Albuterol nebs scheduled TID and appropriate neb machine and tubing for administration to control symptoms for 99 months length of need.     ELECTRONICALLY SIGNED BY [place] CERTIFIED PROVIDER:  JODIE Rosenbaum CNP   NPI [id number]   [facility]  [address] [place] [address], [address]  [place] [address]    TOTAL DISCHARGE TIME:   Greater than 30 minutes  Electronically signed by:  JODIE Rosenbaum CNP  "

## 2017-11-29 NOTE — PROGRESS NOTES
Clinic Care Coordination Contact  Care Team Conversations    Clinic care coordinator received call from Shauna  at [facility].    Shauna states that patient will discharge on December 6th.  She will return to home with home care services with [facility].     Clinic care coordinator will follow up with patient and or family following   TCU discharge.     Maryam Go RN, CCM - Care Coordinator     11/29/2017    8:03 AM  [phone]

## 2017-11-30 NOTE — PROGRESS NOTES
Clinic Care Coordination Contact  Care Team Conversations    Clinic care coordinator received a call from Shauna at [facility]. Patient is now going to discharge to home on 12/5/17 versus 12/6/17. Clinic care coordinator will follow up at time of discharge.     Maryam Go RN, CCM - Care Coordinator     11/30/2017    8:35 AM  [phone]

## 2017-12-06 ENCOUNTER — CARE COORDINATION (OUTPATIENT)
Dept: CARE COORDINATION | Facility: CLINIC | Age: 82
End: 2017-12-06

## 2017-12-06 NOTE — PROGRESS NOTES
Clinic Care Coordination Contact  OUTREACH    Referral Information:  Referral Source: CTS  Reason for Contact: Discharge from TCU - follow up call  Care Conference: No     Universal Utilization:   ED Visits in last year: 2  Hospital visits in last year: 2  Last PCP appointment: 01/18/17  Missed Appointments: 0  Concerns: Frail  Multiple Providers or Specialists: Yes    Clinical Concerns:    Current Medical Concerns: Patient was inpatient at [facility] from 10/17/17 to  10/26/17 with a fractured right humerus. [facility]. She was again inpatient from 10/27/17 to 10/28/17 for CHF with medication adjustments.     Spoke with patient's daughter, Rebecca.  Rebecca states they are doing well. Patient tires easily, but is sleeping well.  She states that she gets patient all set up at the kitchen table in the morning. She is then able to get to the bathroom and her recliner as she wants to.      Current Behavioral Concerns: Denies concerns      Education Provided to patient: Rebecca states that patient has no access to stairs.       Clinical Pathway Name: None      Medication Management:  Daughter sets up medications     Functional Status:  Mobility Status: Independent w/Device  Equipment Currently Used at Home: walker, rolling  Transportation: Daughter provides            Psychosocial:  Current living arrangement:: I live in a private home with family    Rebecca states she works from 3 am to 11 am part time. Her mother is alone during that time, but is able to manage.       Resources and Interventions:  Current Resources:  ; Home Care [place].  Rebecca has not heard from home care at this time. Instructed Rebecca to call clinic care coordinator if she does not hear from home care by tomorrow.      Advanced Care Plans/Directives on file:: Yes        Goals:   Goal 1 Statement: I want to get stronger, I get tired  Goal 1 Supportive Steps: Patient will rest as needed. Patient will participate  in home therapies.   Goal 1 Progression Percent: 20%  Goal 1 Progression Date: 12/06/17      Barriers: Age  Strengths: Supportive daughter    Patient/Caregiver understanding: Daughter expresses understanding    Frequency of Care Coordination: Monthly  Upcoming appointment: 12/19/17     Plan: Clinic care coordinator will continue to follow.    Maryam Go RN, CCM - Care Coordinator     12/6/2017    11:53 AM  [phone]

## 2017-12-06 NOTE — LETTER
[facility]  Complex Care Plan  About Me  Patient Name:  Keisha Godinez    YOB: 1932  Age:   85 year old   [place] MRN: [id number] Telephone Information:     Home Phone [phone]   Mobile none       Address:    [address] [facility] [address] Email address:  No e-mail address on record      Emergency Contact(s)  Name Relationship Lgl Grd Work Phone Home Phone Mobile Phone   1. TEQUILA MARTINEZ Daughter  [phone]   2. AISSATOU COLEMAN Daughter  none [phone]   3. JIMENA KNOX Brother  none [phone]           Primary language:  English     needed? No   [place] [employer] [facility]:  [phone] op. 1  Other communication barriers: Yes, as documented (memory loss)  Preferred Method of Communication:  Phone (with daughter)  Current living arrangement: I live in a private home with family  Mobility Status/ Medical Equipment: Independent w/Device  Other information to know about me:    Health Maintenance  Health Maintenance Reviewed: Not assessed    My Access Plan  Medical Emergency 911   Primary Clinic Line [facility]- [phone]   24 Hour Appointment Line [phone] or  [phone]) (toll-free)   24 Hour Nurse Line [phone] (toll-free)   Preferred Urgent Care     Preferred Hospital [facility]  [phone]   Preferred Pharmacy [facility] [address] [facility] [address] [facility] AT [address]     Behavioral Health Crisis Line The National Suicide Prevention Lifeline at 1-411.700.9243 or 911     My Care Team Members  Patient Care Team       Relationship Specialty Notifications Start End    Gerri Eubanks MD PCP - General Internal Medicine  5/7/15     Phone: [phone] Fax: [phone]         [address] [facility] [address]         My Care Plans  Self Management and Treatment Plan  Goals  and (Comments)  Goal #1: I want to get stronger, I get tired      20% of goal reached    Action Plans on File: Depression  Advance Care Plans/Directives Type:   Type Advanced Care Plans/Directives: Advanced Directive - On File, POLST    My Medical and Care Information  Problem List   Patient Active Problem List   Diagnosis     Iron deficiency anemia     Neuropathy of both feet     Myocardial infarction-type 2     GIB (gastrointestinal bleeding)     Wound of left leg     Cardiomyopathy (H)     Pulmonary hypertension     Xerosis of skin: shins     Bilateral edema of lower extremity     Gastroesophageal reflux disease without esophagitis     Health Care Home     Chronic systolic congestive heart failure (H)     Major depressive disorder, recurrent episode, in partial remission (H)     Cellulitis of foot     Essential hypertension with goal blood pressure less than 140/90     Post-operative complication     Respiratory failure (H)     Acute respiratory failure with hypoxia (H)     C. difficile colitis     History of hiatal hernia     History of shingles     Chronic foot ulcer (H) (felt secondary to bony protuberance status post excision 7/6-16)     Acute on chronic diastolic congestive heart failure (H)     Pneumonia     Sepsis (H)     Chronic atrial fibrillation (H)     Anemia     Dysphagia     Midline thoracic back pain     Cardiomyopathy, unspecified type (H)     Fracture of humerus, proximal, right, closed     Rheumatic mitral regurgitation     Chronic kidney disease, stage 3 (moderate) (GFR 59 10/30/17, GFR 58 7/7/16)      Current Medications and Allergies:  See printed Medication Report.    Care Coordination Start Date:     Frequency of Care Coordination: Monthly   Form Last Updated: 12/06/2017

## 2017-12-07 ENCOUNTER — TELEPHONE (OUTPATIENT)
Dept: INTERNAL MEDICINE | Facility: CLINIC | Age: 82
End: 2017-12-07

## 2017-12-07 DIAGNOSIS — R60.0 BILATERAL EDEMA OF LOWER EXTREMITY: ICD-10-CM

## 2017-12-07 DIAGNOSIS — K21.9 GASTROESOPHAGEAL REFLUX DISEASE WITHOUT ESOPHAGITIS: ICD-10-CM

## 2017-12-07 NOTE — TELEPHONE ENCOUNTER
"Received refill request from [facility] requesting refill of potassium cl 20 mEq Potassium was listed as \"pt not taking\" and marked as discontinued.  Call placed to patient and she verified the med and strength and is taking 1 per day.    Potassium Cloride ER 20 Meq        Last Written Prescription Date: 6/6/17  Last Fill Quantity: 90,    # refills: 1  Last Office Visit with [facility] or [facility] prescribing provider:  1/18/17   Next 5 appointments (look out 90 days)     Dec 19, 2017 10:00 AM CST   SHORT with Gerri Eubanks MD   [facility]    [address]  [facility] [id number] [phone]                   Lab Results   Component Value Date    WBC 9.8 10/27/2017     Lab Results   Component Value Date    RBC 3.22 10/27/2017     Lab Results   Component Value Date    HGB 9.2 11/10/2017     Lab Results   Component Value Date    HCT 30.9 10/27/2017     No components found for: MCT  Lab Results   Component Value Date    MCV 96 10/27/2017     Lab Results   Component Value Date    MCH 29.8 10/27/2017     Lab Results   Component Value Date    MCHC 31.1 10/27/2017     Lab Results   Component Value Date    RDW 13.3 10/27/2017     Lab Results   Component Value Date     10/27/2017     Lab Results   Component Value Date    AST 24 11/30/2016     Lab Results   Component Value Date    ALT 27 11/30/2016     Creatinine   Date Value Ref Range Status   11/08/2017 0.86 0.52 - 1.04 mg/dL Final                 BP Readings from Last 3 Encounters:   11/28/17 (!) 146/93   11/14/17 165/60   11/08/17 147/60     Routing refill request to provider for review/approval because:  Drug not active on patient's medication list  BP readings outside of refill protocol parameters         "

## 2017-12-07 NOTE — TELEPHONE ENCOUNTER
Miriam RN with [facility] ([phone]) calling to request home care orders.  Patient was just discharged from [facility] 12/6/17.  1.  SN visit once a week x3 wks.    2.  PT and OT to dylan Law R.N.

## 2017-12-11 ENCOUNTER — CARE COORDINATION (OUTPATIENT)
Dept: CARE COORDINATION | Facility: CLINIC | Age: 82
End: 2017-12-11

## 2017-12-11 DIAGNOSIS — I10 ESSENTIAL HYPERTENSION WITH GOAL BLOOD PRESSURE LESS THAN 140/90: ICD-10-CM

## 2017-12-11 DIAGNOSIS — I42.9 CARDIOMYOPATHY (H): ICD-10-CM

## 2017-12-11 DIAGNOSIS — I25.119 CORONARY ARTERY DISEASE INVOLVING NATIVE HEART WITH ANGINA PECTORIS, UNSPECIFIED VESSEL OR LESION TYPE (H): ICD-10-CM

## 2017-12-11 RX ORDER — POTASSIUM CHLORIDE 1500 MG/1
20 TABLET, EXTENDED RELEASE ORAL DAILY
Qty: 90 TABLET | Refills: 1 | Status: ON HOLD | OUTPATIENT
Start: 2017-12-11 | End: 2018-06-19

## 2017-12-11 NOTE — PROGRESS NOTES
Clinic Care Coordination Contact  Care Team Conversations    Clinic care coordinator received call from patient's daughter and caregiver, Rebecca.    Rebecca states when [facility] set up patient's nebulizer the rep told her he would order patient's albuterol solution and have it delvired to the home.  Rebecca states she has not received anything at this time. She has enough left for today.  She states she tried calling the 800 number they left with her over the weekend but there was no answer and no voicemail.  She is getting concerned that she will run out.     Rebecca states that patient is doing very well. She is not short of breath or coughing. She is not complaining of pain.       Clinic care coordinator provided Rebecca with the local [facility] number. She will call the local office and determine when she can expect the albuterol solution. She will call clinic care coordinator back if she can not get answers.     Continue to follow in clinic care coordination.     Maryam Go RN, CCM - Care Coordinator     12/11/2017    10:56 AM  [phone]

## 2017-12-12 ENCOUNTER — DOCUMENTATION ONLY (OUTPATIENT)
Dept: CARE COORDINATION | Facility: CLINIC | Age: 82
End: 2017-12-12

## 2017-12-12 RX ORDER — ISOSORBIDE MONONITRATE 30 MG/1
30 TABLET, EXTENDED RELEASE ORAL DAILY
Qty: 90 TABLET | Refills: 0 | Status: SHIPPED | OUTPATIENT
Start: 2017-12-12 | End: 2018-03-16

## 2017-12-12 NOTE — PROGRESS NOTES
[facility] now requests orders and shares plan of care/discharge summaries for some patients through [email or web address].  Please REPLY TO THIS MESSAGE in order to give authorization for orders when needed.  This is considered a verbal order, you will still receive a faxed copy of orders for signature.  Thank you for your assistance in improving collaboration for our patients.    ORDER Requesting PT home care visits for 1 x week for 1 week then 2 x week for 2 weeks for strengthening, safety in the home, falls prevention.    MD SUMMARY/PLAN OF CARE Pt discharged this week to home after 1.5 weeks in hospital and 6 weeks in TCU. PT plan of care for strengthening, transfer and gait training in the home

## 2017-12-14 ENCOUNTER — TELEPHONE (OUTPATIENT)
Dept: INTERNAL MEDICINE | Facility: CLINIC | Age: 82
End: 2017-12-14

## 2017-12-15 ENCOUNTER — TELEPHONE (OUTPATIENT)
Dept: INTERNAL MEDICINE | Facility: CLINIC | Age: 82
End: 2017-12-15

## 2017-12-15 NOTE — TELEPHONE ENCOUNTER
CHRIS received from [facility] OT requesting additional OT orders for 1x/week for 2 weeks and 2x/week for 1 week. Call back. Verbal OK given.

## 2017-12-18 ENCOUNTER — TELEPHONE (OUTPATIENT)
Dept: INTERNAL MEDICINE | Facility: CLINIC | Age: 82
End: 2017-12-18

## 2017-12-19 ENCOUNTER — OFFICE VISIT (OUTPATIENT)
Dept: INTERNAL MEDICINE | Facility: CLINIC | Age: 82
End: 2017-12-19
Payer: COMMERCIAL

## 2017-12-19 ENCOUNTER — TRANSFERRED RECORDS (OUTPATIENT)
Dept: HEALTH INFORMATION MANAGEMENT | Facility: CLINIC | Age: 82
End: 2017-12-19

## 2017-12-19 VITALS
BODY MASS INDEX: 28.53 KG/M2 | DIASTOLIC BLOOD PRESSURE: 50 MMHG | WEIGHT: 161 LBS | OXYGEN SATURATION: 94 % | TEMPERATURE: 98.6 F | SYSTOLIC BLOOD PRESSURE: 130 MMHG | HEART RATE: 54 BPM | HEIGHT: 63 IN

## 2017-12-19 DIAGNOSIS — D50.8 OTHER IRON DEFICIENCY ANEMIA: ICD-10-CM

## 2017-12-19 DIAGNOSIS — I48.20 CHRONIC ATRIAL FIBRILLATION (H): ICD-10-CM

## 2017-12-19 DIAGNOSIS — I10 ESSENTIAL HYPERTENSION WITH GOAL BLOOD PRESSURE LESS THAN 140/90: ICD-10-CM

## 2017-12-19 DIAGNOSIS — I50.22 CHRONIC SYSTOLIC CONGESTIVE HEART FAILURE (H): ICD-10-CM

## 2017-12-19 DIAGNOSIS — F33.41 MAJOR DEPRESSIVE DISORDER, RECURRENT EPISODE, IN PARTIAL REMISSION (H): ICD-10-CM

## 2017-12-19 DIAGNOSIS — I27.20 PULMONARY HYPERTENSION (H): ICD-10-CM

## 2017-12-19 DIAGNOSIS — S42.294S OTHER CLOSED NONDISPLACED FRACTURE OF PROXIMAL END OF RIGHT HUMERUS, SEQUELA: Primary | ICD-10-CM

## 2017-12-19 LAB
ERYTHROCYTE [DISTWIDTH] IN BLOOD BY AUTOMATED COUNT: 14.9 % (ref 10–15)
HCT VFR BLD AUTO: 32.7 % (ref 35–47)
HGB BLD-MCNC: 9.8 G/DL (ref 11.7–15.7)
MCH RBC QN AUTO: 29.3 PG (ref 26.5–33)
MCHC RBC AUTO-ENTMCNC: 30 G/DL (ref 31.5–36.5)
MCV RBC AUTO: 98 FL (ref 78–100)
PLATELET # BLD AUTO: 146 10E9/L (ref 150–450)
RBC # BLD AUTO: 3.35 10E12/L (ref 3.8–5.2)
WBC # BLD AUTO: 7.4 10E9/L (ref 4–11)

## 2017-12-19 PROCEDURE — 85027 COMPLETE CBC AUTOMATED: CPT | Performed by: INTERNAL MEDICINE

## 2017-12-19 PROCEDURE — 80053 COMPREHEN METABOLIC PANEL: CPT | Performed by: INTERNAL MEDICINE

## 2017-12-19 PROCEDURE — 99495 TRANSJ CARE MGMT MOD F2F 14D: CPT | Performed by: INTERNAL MEDICINE

## 2017-12-19 PROCEDURE — 36415 COLL VENOUS BLD VENIPUNCTURE: CPT | Performed by: INTERNAL MEDICINE

## 2017-12-19 RX ORDER — ACETAMINOPHEN 160 MG/5ML
500 SUSPENSION, ORAL (FINAL DOSE FORM) ORAL DAILY
COMMUNITY
Start: 2017-12-19

## 2017-12-19 ASSESSMENT — PATIENT HEALTH QUESTIONNAIRE - PHQ9: SUM OF ALL RESPONSES TO PHQ QUESTIONS 1-9: 0

## 2017-12-19 NOTE — LETTER
[facility] [address], [address]  [place] [address]  Appointments: [phone]  Nurse:[phone]    December 20, 2017    Keisha Godinez  [address] [place] [phone]          Dear Keisha,      The results of your recent blood tests were within acceptable limits.  If you have any further questions or problems, please contact our office.    Sincerely,      Gerri Eubanks M.D.

## 2017-12-19 NOTE — MR AVS SNAPSHOT
"              After Visit Summary   12/19/2017    Keisha Godinez    MRN: [id number]           Patient Information     Date Of Birth          1/25/1932        Visit Information        Provider Department      12/19/2017 10:00 AM Gerri Eubanks MD [facility]        Today's Diagnoses     Other closed nondisplaced fracture of proximal end of right humerus, sequela    -  1    Chronic systolic congestive heart failure (H)        Pulmonary hypertension        Chronic atrial fibrillation (H)        Major depressive disorder, recurrent episode, in partial remission (H)        Other iron deficiency anemia        Essential hypertension with goal blood pressure less than 140/90           Follow-ups after your visit        Who to contact     If you have questions or need follow up information about today's clinic visit or your schedule please contact [facility] directly at [phone].  Normal or non-critical lab and imaging results will be communicated to you by MyChart, letter or phone within 4 business days after the clinic has received the results. If you do not hear from us within 7 days, please contact the clinic through MyChart or phone. If you have a critical or abnormal lab result, we will notify you by phone as soon as possible.  Submit refill requests through [employer] or call your pharmacy and they will forward the refill request to us. Please allow 3 business days for your refill to be completed.          Additional Information About Your Visit        MyChart Information     [employer] lets you send messages to your doctor, view your test results, renew your prescriptions, schedule appointments and more. To sign up, go to [email or web address] . Click on \"Log in\" on the left side of the screen, which will take you to the Welcome page. Then click on \"Sign up Now\" on the right side of the page.     You will be asked to enter the access code listed below, as well as some personal " "information. Please follow the directions to create your username and password.     Your access code is: [email or web address]  Expires: 2018  1:18 PM     Your access code will  in 90 days. If you need help or a new code, please call your [facility] clinic or [phone].        Care EveryWhere ID     This is your Care EveryWhere ID. This could be used by other organizations to access your [facility] medical records  [id number]        Your Vitals Were     Pulse Temperature Height Pulse Oximetry Breastfeeding? BMI (Body Mass Index)    54 98.6  F (37  C) (Oral) 5' 3\" (1.6 m) 94% No 28.52 kg/m2       Blood Pressure from Last 3 Encounters:   17 130/50   17 (!) 146/93   17 165/60    Weight from Last 3 Encounters:   17 161 lb (73 kg)   17 153 lb (69.4 kg)   17 154 lb 9.6 oz (70.1 kg)              We Performed the Following     CBC with platelets     Comprehensive metabolic panel (BMP + Alb, Alk Phos, ALT, AST, Total. Bili, TP)          Today's Medication Changes          These changes are accurate as of: 17 10:59 AM.  If you have any questions, ask your nurse or doctor.               These medicines have changed or have updated prescriptions.        Dose/Directions    HYDROmorphone 2 MG tablet   Commonly known as:  DILAUDID   This may have changed:  additional instructions   Used for:  Closed supracondylar fracture of right humerus, initial encounter        Dose:  2-4 mg   Take 1-2 tablets (2-4 mg) by mouth every 3 hours as needed for moderate to severe pain   Quantity:  90 tablet   Refills:  0         Stop taking these medicines if you haven't already. Please contact your care team if you have questions.     potassium chloride 20 MEQ Packet   Commonly known as:  KLOR-CON   Stopped by:  Gerri Eubanks MD                    Primary Care Provider Office Phone # Fax #    Gerri Eubanks [phone]       [address]   [facility] [address]      " "  Goals        General    Functional (pt-stated)     Notes - Note created  12/9/2015  3:21 PM by Clara Dorado RN    I will pace out my activities like bathing, cooking & cleaning and rest between tasks for 10-15'.  I will elevate my feet and legs on a chair or ottoman when I sit        Medical (pt-stated)     Notes - Note created  12/9/2015  3:20 PM by Clara Dorado RN    I will put my action plan in an easy-to-see area and review routinuely to make sure I am staying in the \"green zone\".  I will weigh myself every morning and report an increase of 2 or more lbs. to my clinic  I will call my clinic if I begin having trouble lying down to sleep due to breathing problems.  I will read nutrition labels when buying prepared foods of spices  I will keep a log of my daily sodium intake (salt) and not go over a total of 2 gm.              Medication 1 (pt-stated)     Notes - Note created  12/9/2015  3:21 PM by Clara Dorado RN    I will take my water pill every morning and talk to my doctor about an alternate plan if I need to adjust the time of day due to schedule.          Equal Access to Services     TRACEY KLEIN AH: Hadkristan Teague, wasonalida ivonne, qaybta kaalmada jenna, sharlene vick. So [facility] [phone].    ATENCIÓN: Si habla español, tiene a rosas disposición servicios gratuitos de asistencia lingüística. [facility] [phone].    We comply with applicable federal civil rights laws and [place] laws. We do not discriminate on the basis of race, color, national origin, age, disability, sex, sexual orientation, or gender identity.            Thank you!     Thank you for choosing [facility]  for your care. Our goal is always to provide you with excellent care. Hearing back from our patients is one way we can continue to improve our services. Please take a few minutes to complete the written survey that you may receive in the mail after " your visit with us. Thank you!             Your Updated Medication List - Protect others around you: Learn how to safely use, store and throw away your medicines at www.disposemymeds.org.          This list is accurate as of: 12/19/17 10:59 AM.  Always use your most recent med list.                   Brand Name Dispense Instructions for use Diagnosis    acetaminophen 325 MG tablet    TYLENOL    100 tablet    Take 3 tablets (975 mg) by mouth 3 times daily    Midline thoracic back pain, unspecified chronicity       albuterol 1.25 MG/3ML nebulizer solution    ACCUNEB     Take 1 vial by nebulization 3 times daily        Cranberry Extract 250 MG Tabs      Take 250 mg by mouth daily        Diclofenac Sodium 1 % Crea      Place onto the skin every 6 hours as needed Apply to 5th right finger        ferrous sulfate 325 (65 FE) MG tablet    IRON     Take 325 mg by mouth 2 times daily        furosemide 40 MG tablet    LASIX    60 tablet    Take 1 tablet (40 mg) by mouth 2 times daily    Essential hypertension with goal blood pressure less than 140/90       gabapentin 100 MG capsule    NEURONTIN    270 capsule    Take 1 capsule (100 mg) by mouth 3 times daily    Midline thoracic back pain, unspecified chronicity       hydrALAZINE 25 MG tablet    APRESOLINE    810 tablet    Take 3 tablets (75 mg) by mouth 3 times daily    Hyperlipidemia, unspecified hyperlipidemia type, Cardiomyopathy, unspecified type (H)       HYDROmorphone 2 MG tablet    DILAUDID    90 tablet    Take 1-2 tablets (2-4 mg) by mouth every 3 hours as needed for moderate to severe pain    Closed supracondylar fracture of right humerus, initial encounter       hydrOXYzine 25 MG tablet    ATARAX    60 tablet    Take 1 tablet (25 mg) by mouth 3 times daily as needed for other (pain adjunct)    Closed supracondylar fracture of right humerus, initial encounter       isosorbide mononitrate 30 MG 24 hr tablet    IMDUR    90 tablet    Take 1 tablet (30 mg) by mouth daily     Essential hypertension with goal blood pressure less than 140/90, Cardiomyopathy (H), Coronary artery disease involving native heart with angina pectoris, unspecified vessel or lesion type (H)       OMEGA-3 FATTY ACIDS PO      Take 1,200 mg by mouth daily        omeprazole 20 MG CR capsule    priLOSEC    90 capsule    Take 1 capsule (20 mg) by mouth daily    Gastroesophageal reflux disease without esophagitis       potassium chloride SA 20 MEQ CR tablet    KLOR-CON    90 tablet    Take 1 tablet (20 mEq) by mouth daily    Bilateral edema of lower extremity       senna-docusate 8.6-50 MG per tablet    SENOKOT-S;PERICOLACE    30 tablet    Take 2 tablets daily as needed for constipation while taking prescription pain medications.    S/P foot surgery, right       simvastatin 10 MG tablet    ZOCOR    90 tablet    Take 1 tablet (10 mg) by mouth At Bedtime    Hyperlipidemia, unspecified hyperlipidemia type       venlafaxine 75 MG 24 hr capsule    EFFEXOR-XR    90 capsule    Take 1 capsule (75 mg) by mouth daily    Major depressive disorder, recurrent episode, in partial remission (H)       VITAMIN D (CHOLECALCIFEROL) PO      Take 1,000 Units by mouth daily

## 2017-12-19 NOTE — NURSING NOTE
"Chief Complaint   Patient presents with     Hospital F/U       Initial /50 (BP Location: Left arm, Patient Position: Chair, Cuff Size: Adult Large)  Pulse 54  Temp 98.6  F (37  C) (Oral)  Ht 5' 3\" (1.6 m)  Wt 161 lb (73 kg)  SpO2 94%  Breastfeeding? No  BMI 28.52 kg/m2 Estimated body mass index is 28.52 kg/(m^2) as calculated from the following:    Height as of this encounter: 5' 3\" (1.6 m).    Weight as of this encounter: 161 lb (73 kg).  Medication Reconciliation: complete   Geovanna Bhakta CMA      "

## 2017-12-19 NOTE — PROGRESS NOTES
SUBJECTIVE:   Keisha Godinez is a 85 year old female who presents to clinic today for the following health issues:    She has still had a lot of help with nurses and therapists that have come to her house, has been very helpful. No concerns today.  She also reports she thinks they gave her a Prevnar 13 shortly before she was discharged.       Hospital Follow-up Visit:    Hospital/Nursing Home/ Rehab Facility: [facility] and [facility]  Date of Admission: [facility] from 10/27/17-10/28/17, [facility] from 10/26/17-12/6/17    Reason(s) for Admission:   1. Chronic diastolic heart failure (H)    2. Pulmonary hypertension    3. Rheumatic mitral regurgitation    4. Bilateral leg edema    5. Chronic kidney disease, stage 3 (moderate) (GFR 59 10/30/17, GFR 58 7/7/16)    6. Other closed nondisplaced fracture of proximal end of right humerus, sequela    7. Physical deconditioning    8. Chronic atrial fibrillation (H)    9. Anemia, unspecified type    10. Chronic obstructive pulmonary disease, unspecified COPD type (H)                  Problems taking medications regularly:  None       Medication changes since discharge: Medication list reconciled.        Problems adhering to non-medication therapy:  None    Summary of hospitalization:  [facility] discharge summary reviewed  Diagnostic Tests/Treatments reviewed.  Follow up needed: with ortho and ongoing PT both for balance and to rehab arm.  Other Healthcare Providers Involved in Patient s Care:         Homecare  Update since discharge:  improved. She has little to no pain in arm. range of motion and strength is coming back nicely. Pt is working with her in the home to help avoid further falls.    Breathing is good. She is using the nebulizer 3 times a day with good result. Appetite is good. She says being back home is going well. Denies bowel or bladder changes. No lightheadedness     Post Discharge Medication Reconciliation:  discharge medications reconciled, continue medications without change.  Plan of care communicated with patient and daughter     Coding guidelines for this visit:  Type of Medical   Decision Making Face-to-Face Visit       within 7 Days of discharge Face-to-Face Visit        within 14 days of discharge   Moderate Complexity [id number]   High Complexity [id number]            Problem list and histories reviewed & adjusted, as indicated.  Additional history: as documented    Patient Active Problem List   Diagnosis     Iron deficiency anemia     Neuropathy of both feet     Myocardial infarction-type 2     GIB (gastrointestinal bleeding)     Wound of left leg     Cardiomyopathy (H)     Pulmonary hypertension     Xerosis of skin: shins     Bilateral edema of lower extremity     Gastroesophageal reflux disease without esophagitis     Health Care Home     Chronic systolic congestive heart failure (H)     Major depressive disorder, recurrent episode, in partial remission (H)     Cellulitis of foot     Essential hypertension with goal blood pressure less than 140/90     Post-operative complication     Respiratory failure (H)     Acute respiratory failure with hypoxia (H)     C. difficile colitis     History of hiatal hernia     History of shingles     Chronic foot ulcer (H) (felt secondary to bony protuberance status post excision 7/6-16)     Acute on chronic diastolic congestive heart failure (H)     Pneumonia     Sepsis (H)     Chronic atrial fibrillation (H)     Anemia     Dysphagia     Midline thoracic back pain     Cardiomyopathy, unspecified type (H)     Fracture of humerus, proximal, right, closed     Rheumatic mitral regurgitation     Chronic kidney disease, stage 3 (moderate) (GFR 59 10/30/17, GFR 58 7/7/16)     Past Surgical History:   Procedure Laterality Date     COLONOSCOPY  03/24/2015    Diverticulosis, polyps     ENTEROSCOPY SMALL BOWEL N/A 2/29/2016    Procedure: ENTEROSCOPY SMALL BOWEL;  Surgeon: Rosario  "Ady Duggan MD;  Location:  GI     ESOPHAGOSCOPY, GASTROSCOPY, DUODENOSCOPY (EGD), COMBINED N/A 3/22/2015    Upper GI- Normal, nothing significant found.      EXCISE MASS FOOT Right 7/6/2016    Procedure: EXCISE MASS FOOT;  Surgeon: Lee Jang DPM;  Location:  OR       Social History   Substance Use Topics     Smoking status: Former Smoker     Quit date: 4/14/1956     Smokeless tobacco: Never Used     Alcohol use No     Family History   Problem Relation Age of Onset     Known Genetic Syndrome Father      Known Genetic Syndrome Mother      Other Cancer Daughter      pancreatic     Other Cancer Brother      type     Other Cancer Sister 60     lung     DIABETES No family hx of      Breast Cancer No family hx of      Cancer - colorectal No family hx of      Ovarian Cancer No family hx of      Prostate Cancer No family hx of              Reviewed and updated as needed this visit by clinical staffTobacco  Med Hx  Surg Hx  Fam Hx  Soc Hx      Reviewed and updated as needed this visit by Provider         ROS:  Constitutional, HEENT, cardiovascular, pulmonary, GI, , musculoskeletal, neuro, skin, endocrine and psych systems are negative, except as otherwise noted.      OBJECTIVE:   /50 (BP Location: Left arm, Patient Position: Chair, Cuff Size: Adult Large)  Pulse 54  Temp 98.6  F (37  C) (Oral)  Ht 5' 3\" (1.6 m)  Wt 161 lb (73 kg)  SpO2 94%  Breastfeeding? No  BMI 28.52 kg/m2  Body mass index is 28.52 kg/(m^2).  GENERAL: healthy, alert and no distress  NECK: no adenopathy, no asymmetry, masses, or scars and thyroid normal to palpation  RESP: lungs clear to auscultation - no rales, rhonchi or wheezes  CV: regular rate and rhythm, normal S1 S2, no S3 or S4, no murmur, click or rub, no peripheral edema and peripheral pulses strong  ABDOMEN: soft, nontender, no hepatosplenomegaly, no masses and bowel sounds normal  MS: no gross musculoskeletal defects noted, no edema  NEURO: Normal strength and " tone, mentation intact and speech normal  PSYCH: mentation appears normal, affect normal/bright      ASSESSMENT/PLAN:       1. Other closed nondisplaced fracture of proximal end of right humerus, sequela  Continue PT and Follow up with ortho    2. Chronic systolic congestive heart failure (H)  stable    3. Pulmonary hypertension  stable  4. Chronic atrial fibrillation (H)  Rate controlled    5. Major depressive disorder, recurrent episode, in partial remission (H)  under good control     6. Other iron deficiency anemia  Due for cbc  - CBC with platelets    7. Essential hypertension with goal blood pressure less than 140/90  under good control Continue current medications.   - CBC with platelets  - Comprehensive metabolic panel (BMP + Alb, Alk Phos, ALT, AST, Total. Bili, TP)    Follow up in 1 month     Gerri Eubanks MD  [facility]

## 2017-12-20 LAB
ALBUMIN SERPL-MCNC: 3.5 G/DL (ref 3.4–5)
ALP SERPL-CCNC: 98 U/L (ref 40–150)
ALT SERPL W P-5'-P-CCNC: 23 U/L (ref 0–50)
ANION GAP SERPL CALCULATED.3IONS-SCNC: 6 MMOL/L (ref 3–14)
AST SERPL W P-5'-P-CCNC: 17 U/L (ref 0–45)
BILIRUB SERPL-MCNC: 0.4 MG/DL (ref 0.2–1.3)
BUN SERPL-MCNC: 16 MG/DL (ref 7–30)
CALCIUM SERPL-MCNC: 8.8 MG/DL (ref 8.5–10.1)
CHLORIDE SERPL-SCNC: 106 MMOL/L (ref 94–109)
CO2 SERPL-SCNC: 28 MMOL/L (ref 20–32)
CREAT SERPL-MCNC: 0.62 MG/DL (ref 0.52–1.04)
GFR SERPL CREATININE-BSD FRML MDRD: >90 ML/MIN/1.7M2
GLUCOSE SERPL-MCNC: 93 MG/DL (ref 70–99)
POTASSIUM SERPL-SCNC: 3.7 MMOL/L (ref 3.4–5.3)
PROT SERPL-MCNC: 6.7 G/DL (ref 6.8–8.8)
SODIUM SERPL-SCNC: 140 MMOL/L (ref 133–144)

## 2017-12-28 ENCOUNTER — TELEPHONE (OUTPATIENT)
Dept: INTERNAL MEDICINE | Facility: CLINIC | Age: 82
End: 2017-12-28

## 2017-12-28 NOTE — TELEPHONE ENCOUNTER
Form received from: [facility]    Form requesting following info/need: OT orders    YIN needed?: No    Location of form: Dr. Eubanks's in basket    When completed the route for return: Fax

## 2018-01-03 DIAGNOSIS — F33.41 MAJOR DEPRESSIVE DISORDER, RECURRENT EPISODE, IN PARTIAL REMISSION (H): ICD-10-CM

## 2018-01-04 ENCOUNTER — TELEPHONE (OUTPATIENT)
Dept: INTERNAL MEDICINE | Facility: CLINIC | Age: 83
End: 2018-01-04

## 2018-01-04 DIAGNOSIS — Z53.9 DIAGNOSIS NOT YET DEFINED: Primary | ICD-10-CM

## 2018-01-04 PROCEDURE — G0180 MD CERTIFICATION HHA PATIENT: HCPCS | Performed by: INTERNAL MEDICINE

## 2018-01-04 NOTE — TELEPHONE ENCOUNTER
[facility] rec'd via fax. Form in your mailbox to be signed.

## 2018-01-08 RX ORDER — VENLAFAXINE HYDROCHLORIDE 75 MG/1
75 CAPSULE, EXTENDED RELEASE ORAL DAILY
Qty: 90 CAPSULE | Refills: 0 | Status: SHIPPED | OUTPATIENT
Start: 2018-01-08 | End: 2018-04-06

## 2018-01-08 NOTE — TELEPHONE ENCOUNTER
"Requested Prescriptions   Pending Prescriptions Disp Refills     venlafaxine (EFFEXOR-XR) 75 MG 24 hr capsule 90 capsule 0     Sig: Take 1 capsule (75 mg) by mouth daily    Serotonin-Norepinephrine Reuptake Inhibitors  Passed    1/3/2018 10:53 AM       Passed - Blood pressure under 140/90    BP Readings from Last 3 Encounters:   12/19/17 130/50   11/28/17 (!) 146/93   11/14/17 165/60          Passed - PHQ-9 score of less than 5 in past 6 months    The PHQ-9 criteria is meant to fail. It requires a PHQ-9 score review  PHQ-9 score:    PHQ-9 SCORE 12/19/2017   Total Score -   Total Score 0          Passed - Patient is age 18 or older       Passed - No active pregnancy on record       Passed - Normal serum creatinine on file in past 12 months    Recent Labs   Lab Test  12/19/17   1038   CR  0.62          Passed - No positive pregnancy test in past 12 months       Passed - Recent (6 mo) or future visit with authorizing provider's specialty    Patient had office visit in the last 6 months or has a visit in the next 30 days with authorizing provider.  See \"Choose Columns\" in Meds & Orders section of the refill encounter.   Last OV: 12/19/17, per OV note: f/u in 1 month.  Next 5 appointments (look out 90 days)     Jan 16, 2018 10:20 AM CST   SHORT with Gerri Eubanks MD   [facility]    [address]  [facility] [address]   [phone]                      Medication is being filled for 1 time refill only due to:  upcoming appt    "

## 2018-01-11 ENCOUNTER — CARE COORDINATION (OUTPATIENT)
Dept: CARE COORDINATION | Facility: CLINIC | Age: 83
End: 2018-01-11

## 2018-01-11 DIAGNOSIS — E61.1 IRON DEFICIENCY: Primary | ICD-10-CM

## 2018-01-11 RX ORDER — FERROUS SULFATE 325(65) MG
325 TABLET ORAL 2 TIMES DAILY
Qty: 100 TABLET | Refills: 11 | Status: SHIPPED | OUTPATIENT
Start: 2018-01-11 | End: 2019-01-02

## 2018-01-11 NOTE — PROGRESS NOTES
Clinic Care Coordination Contact    Situation: Patient chart reviewed by care coordinator.    Background: 85 year old female with fracture of right humerus. Patient lives with her daughter.     Assessment: [facility] home care continues to see patient.  is Miriam Mai ([phone]). Home care certified to 2/4/18.     Patient has had follow up visit with PCP  And is scheduled to see PCP again on 1/16/18.    Plan/Recommendations: Clinic care coordinator will continue to follow.     Maryam Go RN, CCM - Care Coordinator     1/11/2018    1:53 PM  [phone]

## 2018-01-11 NOTE — TELEPHONE ENCOUNTER
Received fax from [employer] requesting new rx for Ferrous Sulfate 325 mg taking three times daily with meals.  Listed as historical and med not on refill protocol any longer.  NIMO Law R.N.

## 2018-01-16 ENCOUNTER — OFFICE VISIT (OUTPATIENT)
Dept: INTERNAL MEDICINE | Facility: CLINIC | Age: 83
End: 2018-01-16
Payer: COMMERCIAL

## 2018-01-16 ENCOUNTER — TRANSFERRED RECORDS (OUTPATIENT)
Dept: HEALTH INFORMATION MANAGEMENT | Facility: CLINIC | Age: 83
End: 2018-01-16

## 2018-01-16 VITALS
HEIGHT: 63 IN | TEMPERATURE: 98 F | WEIGHT: 161 LBS | DIASTOLIC BLOOD PRESSURE: 50 MMHG | HEART RATE: 60 BPM | OXYGEN SATURATION: 92 % | BODY MASS INDEX: 28.53 KG/M2 | SYSTOLIC BLOOD PRESSURE: 138 MMHG

## 2018-01-16 DIAGNOSIS — I42.9 CARDIOMYOPATHY, UNSPECIFIED TYPE (H): ICD-10-CM

## 2018-01-16 DIAGNOSIS — F33.41 MAJOR DEPRESSIVE DISORDER, RECURRENT EPISODE, IN PARTIAL REMISSION (H): ICD-10-CM

## 2018-01-16 DIAGNOSIS — I27.20 PULMONARY HYPERTENSION (H): Primary | ICD-10-CM

## 2018-01-16 PROCEDURE — 99214 OFFICE O/P EST MOD 30 MIN: CPT | Performed by: INTERNAL MEDICINE

## 2018-01-16 NOTE — PROGRESS NOTES
SUBJECTIVE:   Keisha Godinez is a 85 year old female who presents to clinic today for the following health issues:    Follow up breathing problems.    HPI:   Since she got started on her home nebulizer her breathing and cough have improved 90%. Her mood is good. No orthopnea or PND.     Problem list and histories reviewed & adjusted, as indicated.  Additional history: as documented    Patient Active Problem List   Diagnosis     Iron deficiency anemia     Neuropathy of both feet     Myocardial infarction-type 2     GIB (gastrointestinal bleeding)     Wound of left leg     Cardiomyopathy (H)     Pulmonary hypertension     Xerosis of skin: shins     Bilateral edema of lower extremity     Gastroesophageal reflux disease without esophagitis     Health Care Home     Chronic systolic congestive heart failure (H)     Major depressive disorder, recurrent episode, in partial remission (H)     Cellulitis of foot     Essential hypertension with goal blood pressure less than 140/90     Post-operative complication     Respiratory failure (H)     Acute respiratory failure with hypoxia (H)     C. difficile colitis     History of hiatal hernia     History of shingles     Chronic foot ulcer (H) (felt secondary to bony protuberance status post excision 7/6-16)     Acute on chronic diastolic congestive heart failure (H)     Pneumonia     Sepsis (H)     Chronic atrial fibrillation (H)     Anemia     Dysphagia     Midline thoracic back pain     Cardiomyopathy, unspecified type (H)     Fracture of humerus, proximal, right, closed     Rheumatic mitral regurgitation     Chronic kidney disease, stage 3 (moderate) (GFR 59 10/30/17, GFR 58 7/7/16)     Past Surgical History:   Procedure Laterality Date     COLONOSCOPY  03/24/2015    Diverticulosis, polyps     ENTEROSCOPY SMALL BOWEL N/A 2/29/2016    Procedure: ENTEROSCOPY SMALL BOWEL;  Surgeon: Ady Ponce MD;  Location:  GI     ESOPHAGOSCOPY, GASTROSCOPY, DUODENOSCOPY (EGD),  "COMBINED N/A 3/22/2015    Upper GI- Normal, nothing significant found.      EXCISE MASS FOOT Right 7/6/2016    Procedure: EXCISE MASS FOOT;  Surgeon: Lee Jnag DPM;  Location:  OR       Social History   Substance Use Topics     Smoking status: Former Smoker     Quit date: 4/14/1956     Smokeless tobacco: Never Used     Alcohol use No     Family History   Problem Relation Age of Onset     Known Genetic Syndrome Father      Known Genetic Syndrome Mother      Other Cancer Daughter      pancreatic     Other Cancer Brother      type     Other Cancer Sister 60     lung     DIABETES No family hx of      Breast Cancer No family hx of      Cancer - colorectal No family hx of      Ovarian Cancer No family hx of      Prostate Cancer No family hx of              Reviewed and updated as needed this visit by clinical staffTobacco  Allergies  Meds  Med Hx  Surg Hx  Fam Hx  Soc Hx      Reviewed and updated as needed this visit by Provider         ROS:  Constitutional, HEENT, cardiovascular, pulmonary, gi and gu systems are negative, except as otherwise noted.      OBJECTIVE:   /50  Pulse 60  Temp 98  F (36.7  C) (Oral)  Ht 5' 3\" (1.6 m)  Wt 161 lb (73 kg)  SpO2 92%  Breastfeeding? No  BMI 28.52 kg/m2  Body mass index is 28.52 kg/(m^2).  GENERAL: healthy, alert and no distress  NECK: no adenopathy, no asymmetry, masses, or scars and thyroid normal to palpation  RESP: lungs clear to auscultation - no rales, rhonchi or wheezes  CV: regular rate and rhythm, normal S1 S2, no S3 or S4, no murmur, click or rub, no peripheral edema and peripheral pulses strong  ABDOMEN: soft, nontender, no hepatosplenomegaly, no masses and bowel sounds normal  MS: no gross musculoskeletal defects noted, no edema  PSYCH: mentation appears normal, affect normal/bright    ASSESSMENT/PLAN:       1. Pulmonary hypertension  Doing well at this point continue nebs 3-4 times a day for now.    2. Cardiomyopathy, unspecified type " (H)  under good control Continue current medications.     3. Major depressive disorder, recurrent episode, in partial remission (H)  under good control I think mainly because her health is doing better. Will follow clinically for now.       Gerri Eubanks MD  [facility]

## 2018-01-16 NOTE — NURSING NOTE
".  Chief Complaint   Patient presents with     RECHECK     Breathing Problem       Initial /56 (BP Location: Left arm, Patient Position: Chair)  Pulse 60  Temp 98  F (36.7  C) (Oral)  Ht 5' 3\" (1.6 m)  Wt 161 lb (73 kg)  SpO2 92%  BMI 28.52 kg/m2 Estimated body mass index is 28.52 kg/(m^2) as calculated from the following:    Height as of this encounter: 5' 3\" (1.6 m).    Weight as of this encounter: 161 lb (73 kg).  Medication Reconciliation: complete    "

## 2018-01-16 NOTE — MR AVS SNAPSHOT
"              After Visit Summary   2018    Keisha Godinez    MRN: [id number]           Patient Information     Date Of Birth          1932        Visit Information        Provider Department      2018 10:20 AM Gerri Eubanks MD [facility]        Today's Diagnoses     Pulmonary hypertension    -  1    Cardiomyopathy, unspecified type (H)        Major depressive disorder, recurrent episode, in partial remission (H)           Follow-ups after your visit        Who to contact     If you have questions or need follow up information about today's clinic visit or your schedule please contact [facility] directly at [phone].  Normal or non-critical lab and imaging results will be communicated to you by MyChart, letter or phone within 4 business days after the clinic has received the results. If you do not hear from us within 7 days, please contact the clinic through [email or web address] or phone. If you have a critical or abnormal lab result, we will notify you by phone as soon as possible.  Submit refill requests through [employer] or call your pharmacy and they will forward the refill request to us. Please allow 3 business days for your refill to be completed.          Additional Information About Your Visit        MyChart Information     [employer] lets you send messages to your doctor, view your test results, renew your prescriptions, schedule appointments and more. To sign up, go to [email or web address] . Click on \"Log in\" on the left side of the screen, which will take you to the Welcome page. Then click on \"Sign up Now\" on the right side of the page.     You will be asked to enter the access code listed below, as well as some personal information. Please follow the directions to create your username and password.     Your access code is: [email or web address]  Expires: 2018  1:18 PM     Your access code will  in 90 days. If you need help or a new code, please call your " "[facility] or [phone].        Care EveryWhere ID     This is your Care EveryWhere ID. This could be used by other organizations to access your [facility] medical records  [id number]        Your Vitals Were     Pulse Temperature Height Pulse Oximetry Breastfeeding? BMI (Body Mass Index)    60 98  F (36.7  C) (Oral) 5' 3\" (1.6 m) 92% No 28.52 kg/m2       Blood Pressure from Last 3 Encounters:   01/16/18 138/50   12/19/17 130/50   11/28/17 (!) 146/93    Weight from Last 3 Encounters:   01/16/18 161 lb (73 kg)   12/19/17 161 lb (73 kg)   11/28/17 153 lb (69.4 kg)              Today, you had the following     No orders found for display         Today's Medication Changes          These changes are accurate as of: 1/16/18 11:59 PM.  If you have any questions, ask your nurse or doctor.               These medicines have changed or have updated prescriptions.        Dose/Directions    HYDROmorphone 2 MG tablet   Commonly known as:  DILAUDID   This may have changed:  additional instructions   Used for:  Closed supracondylar fracture of right humerus, initial encounter        Dose:  2-4 mg   Take 1-2 tablets (2-4 mg) by mouth every 3 hours as needed for moderate to severe pain   Quantity:  90 tablet   Refills:  0                Primary Care Provider Office Phone # Fax #    Gerri Eubanks [phone]       [address]   [facility] [address]        Goals        General    Functional (pt-stated)     Notes - Note created  12/9/2015  3:21 PM by Clara Dorado RN    I will pace out my activities like bathing, cooking & cleaning and rest between tasks for 10-15'.  I will elevate my feet and legs on a chair or ottoman when I sit        Medical (pt-stated)     Notes - Note created  12/9/2015  3:20 PM by Clara Dorado RN    I will put my action plan in an easy-to-see area and review routinuely to make sure I am staying in the \"green zone\".  I will weigh myself every morning and " report an increase of 2 or more lbs. to my clinic  I will call my clinic if I begin having trouble lying down to sleep due to breathing problems.  I will read nutrition labels when buying prepared foods of spices  I will keep a log of my daily sodium intake (salt) and not go over a total of 2 gm.              Medication 1 (pt-stated)     Notes - Note created  12/9/2015  3:21 PM by Clara Dorado, RN    I will take my water pill every morning and talk to my doctor about an alternate plan if I need to adjust the time of day due to schedule.          Equal Access to Services     [facility]: Hadkristan Teague, shahid coronado, natalee west, sharlene torre . So [facility] [phone].    ATENCIÓN: Si habla español, tiene a rosas disposición servicios gratuitos de asistencia lingüística. [facility] [phone].    We comply with applicable federal civil rights laws and [place] laws. We do not discriminate on the basis of race, color, national origin, age, disability, sex, sexual orientation, or gender identity.            Thank you!     Thank you for choosing [facility]  for your care. Our goal is always to provide you with excellent care. Hearing back from our patients is one way we can continue to improve our services. Please take a few minutes to complete the written survey that you may receive in the mail after your visit with us. Thank you!             Your Updated Medication List - Protect others around you: Learn how to safely use, store and throw away your medicines at www.disposemymeds.org.          This list is accurate as of: 1/16/18 11:59 PM.  Always use your most recent med list.                   Brand Name Dispense Instructions for use Diagnosis    acetaminophen 325 MG tablet    TYLENOL    100 tablet    Take 3 tablets (975 mg) by mouth 3 times daily    Midline thoracic back pain, unspecified chronicity       albuterol 1.25 MG/3ML nebulizer  solution    ACCUNEB     Take 1 vial by nebulization 3 times daily        Cranberry Extract 250 MG Tabs      Take 250 mg by mouth daily        Diclofenac Sodium 1 % Crea      Place onto the skin every 6 hours as needed Apply to 5th right finger        ferrous sulfate 325 (65 FE) MG tablet    IRON    100 tablet    Take 1 tablet (325 mg) by mouth 2 times daily    Iron deficiency       furosemide 40 MG tablet    LASIX    60 tablet    Take 1 tablet (40 mg) by mouth 2 times daily    Essential hypertension with goal blood pressure less than 140/90       gabapentin 100 MG capsule    NEURONTIN    270 capsule    Take 1 capsule (100 mg) by mouth 3 times daily    Midline thoracic back pain, unspecified chronicity       hydrALAZINE 25 MG tablet    APRESOLINE    810 tablet    Take 3 tablets (75 mg) by mouth 3 times daily    Hyperlipidemia, unspecified hyperlipidemia type, Cardiomyopathy, unspecified type (H)       HYDROmorphone 2 MG tablet    DILAUDID    90 tablet    Take 1-2 tablets (2-4 mg) by mouth every 3 hours as needed for moderate to severe pain    Closed supracondylar fracture of right humerus, initial encounter       hydrOXYzine 25 MG tablet    ATARAX    60 tablet    Take 1 tablet (25 mg) by mouth 3 times daily as needed for other (pain adjunct)    Closed supracondylar fracture of right humerus, initial encounter       isosorbide mononitrate 30 MG 24 hr tablet    IMDUR    90 tablet    Take 1 tablet (30 mg) by mouth daily    Essential hypertension with goal blood pressure less than 140/90, Cardiomyopathy (H), Coronary artery disease involving native heart with angina pectoris, unspecified vessel or lesion type (H)       OMEGA-3 FATTY ACIDS PO      Take 1,200 mg by mouth daily        omeprazole 20 MG CR capsule    priLOSEC    90 capsule    Take 1 capsule (20 mg) by mouth daily    Gastroesophageal reflux disease without esophagitis       potassium chloride SA 20 MEQ CR tablet    KLOR-CON    90 tablet    Take 1 tablet (20  mEq) by mouth daily    Bilateral edema of lower extremity       senna-docusate 8.6-50 MG per tablet    SENOKOT-S;PERICOLACE    30 tablet    Take 2 tablets daily as needed for constipation while taking prescription pain medications.    S/P foot surgery, right       simvastatin 10 MG tablet    ZOCOR    90 tablet    Take 1 tablet (10 mg) by mouth At Bedtime    Hyperlipidemia, unspecified hyperlipidemia type       venlafaxine 75 MG 24 hr capsule    EFFEXOR-XR    90 capsule    Take 1 capsule (75 mg) by mouth daily    Major depressive disorder, recurrent episode, in partial remission (H)       VITAMIN D (CHOLECALCIFEROL) PO      Take 1,000 Units by mouth daily

## 2018-01-30 DIAGNOSIS — E78.5 HYPERLIPIDEMIA, UNSPECIFIED HYPERLIPIDEMIA TYPE: ICD-10-CM

## 2018-02-02 RX ORDER — SIMVASTATIN 10 MG
10 TABLET ORAL AT BEDTIME
Qty: 90 TABLET | Refills: 1 | Status: SHIPPED | OUTPATIENT
Start: 2018-02-02 | End: 2018-08-20

## 2018-02-02 NOTE — TELEPHONE ENCOUNTER
"Requested Prescriptions   Pending Prescriptions Disp Refills     simvastatin (ZOCOR) 10 MG tablet 90 tablet 0     Sig: Take 1 tablet (10 mg) by mouth At Bedtime    Statins Protocol Passed    1/30/2018  1:15 PM       Passed - LDL on file in past 12 months    Recent Labs   Lab Test  08/02/17   1041   LDL  34            Passed - No abnormal creatine kinase in past 12 months    No lab results found.         Passed - Recent or future visit with authorizing provider    Patient had office visit in the last year or has a visit in the next 30 days with authorizing provider.  See \"Patient Info\" tab in inbasket, or \"Choose Columns\" in Meds & Orders section of the refill encounter.            Passed - Patient is age 18 or older       Passed - No active pregnancy on record       Passed - No positive pregnancy test in past 12 months        Prescription approved per [facility] Refill Protocol.    "

## 2018-02-14 ENCOUNTER — CARE COORDINATION (OUTPATIENT)
Dept: CARE COORDINATION | Facility: CLINIC | Age: 83
End: 2018-02-14

## 2018-02-14 NOTE — PROGRESS NOTES
Clinic Care Coordination Contact  OUTREACH    Referral Information:  Referral Source: CTS  Reason for Contact: Routine follow up  Care Conference: No     Universal Utilization:   ED Visits in last year: 2  Hospital visits in last year: 2  Last PCP appointment: 01/18/17  Missed Appointments: 0  Concerns: Frail  Multiple Providers or Specialists: Yes    Clinical Concerns:    Current Medical Concerns: Spoke with patient. She states she is doing well. She is getting around the house without a problem. Denies any pain or falls.       Current Behavioral Concerns: Denies concerns      Education Provided to patient:  Use the walker     Clinical Pathway Name: None      Medication Management:  Daughter helps     Functional Status:  Mobility Status: Independent w/Device  Equipment Currently Used at Home: walker, rolling  Transportation: Daughter drives      Psychosocial:  Current living arrangement:: I live in a private home with family    Daughter Rebecca lives with her     Resources and Interventions:  Current Resources: DME; Home Care is complete       Advanced Care Plans/Directives on file:: Yes        Goals:   Goal 1 Statement: I want to get stronger, I get tired  Goal 1 Supportive Steps: Patient will rest as needed. Patient will participate in home therapies.   Goal 1 Progression Percent: 100%  Goal 1 Progression Date: 02/14/18      Barriers: Age  Strengths: Finished therapies    Patient/Caregiver understanding: Expresses understanding     Upcoming appointment: 12/19/17     Plan: Will close to clinic care coordination at this time.    Maryam Go RN, CCM - Care Coordinator     2/14/2018    11:39 AM  [phone]

## 2018-02-19 ENCOUNTER — TELEPHONE (OUTPATIENT)
Dept: INTERNAL MEDICINE | Facility: CLINIC | Age: 83
End: 2018-02-19

## 2018-02-19 NOTE — TELEPHONE ENCOUNTER
Pt calls, states her pharm is saying her insurance won't cover another fill of her Hydralazine until 03/21/18. She has 60 tablets left and uses 9 tabs a day. She'll run out before 03/21.    Called pharm, states they are able to fill the medication today as it went t/ insurance at time of call.    Called pt, informed rx being filled.

## 2018-02-26 DIAGNOSIS — I10 ESSENTIAL HYPERTENSION WITH GOAL BLOOD PRESSURE LESS THAN 140/90: ICD-10-CM

## 2018-02-26 DIAGNOSIS — M54.6 MIDLINE THORACIC BACK PAIN, UNSPECIFIED CHRONICITY: ICD-10-CM

## 2018-02-26 NOTE — TELEPHONE ENCOUNTER
gabapentin      Last Written Prescription Date:  11/28/17  Last Fill Quantity: 270,   # refills: 0  Last Office Visit: 1/16/18  Future Office visit:       Routing refill request to provider for review/approval because:  Drug not on the [facility] or [facility] refill protocol or controlled substance

## 2018-02-27 RX ORDER — FUROSEMIDE 40 MG
40 TABLET ORAL 2 TIMES DAILY
Qty: 180 TABLET | Refills: 2 | Status: SHIPPED | OUTPATIENT
Start: 2018-02-27 | End: 2018-07-13

## 2018-02-27 RX ORDER — GABAPENTIN 100 MG/1
100 CAPSULE ORAL 3 TIMES DAILY
Qty: 270 CAPSULE | Refills: 0 | Status: SHIPPED | OUTPATIENT
Start: 2018-02-27 | End: 2018-05-26

## 2018-02-27 NOTE — TELEPHONE ENCOUNTER
"Requested Prescriptions   Pending Prescriptions Disp Refills     furosemide (LASIX) 40 MG tablet 60 tablet 0     Sig: Take 1 tablet (40 mg) by mouth 2 times daily    Diuretics (Including Combos) Protocol Failed    2/26/2018 10:05 AM       Failed - No positive pregnancy test in past 12 months       Passed - Blood pressure under 140/90 in past 12 months    BP Readings from Last 3 Encounters:   01/16/18 138/50   12/19/17 130/50   11/28/17 (!) 146/93                Passed - Recent or future visit with authorizing provider's specialty    Patient had office visit in the last year or has a visit in the next 30 days with authorizing provider.  See \"Patient Info\" tab in inbasket, or \"Choose Columns\" in Meds & Orders section of the refill encounter.            Passed - Patient is age 18 or older       Passed - No active pregancy on record       Passed - Normal serum creatinine on file in past 12 months    Recent Labs   Lab Test  12/19/17   1038   CR  0.62             Passed - Normal serum potassium on file in past 12 months    Recent Labs   Lab Test  12/19/17   1038   POTASSIUM  3.7                   Passed - Normal serum sodium on file in past 12 months    Recent Labs   Lab Test  12/19/17   1038   NA  140                Prescription approved per [facility] Refill Protocol.    "

## 2018-03-16 DIAGNOSIS — I10 ESSENTIAL HYPERTENSION WITH GOAL BLOOD PRESSURE LESS THAN 140/90: ICD-10-CM

## 2018-03-16 DIAGNOSIS — I25.119 CORONARY ARTERY DISEASE INVOLVING NATIVE HEART WITH ANGINA PECTORIS, UNSPECIFIED VESSEL OR LESION TYPE (H): ICD-10-CM

## 2018-03-16 DIAGNOSIS — I42.9 CARDIOMYOPATHY (H): ICD-10-CM

## 2018-03-20 RX ORDER — ISOSORBIDE MONONITRATE 30 MG/1
30 TABLET, EXTENDED RELEASE ORAL DAILY
Qty: 90 TABLET | Refills: 2 | Status: ON HOLD | OUTPATIENT
Start: 2018-03-20 | End: 2018-06-28

## 2018-03-20 NOTE — TELEPHONE ENCOUNTER
"Requested Prescriptions   Pending Prescriptions Disp Refills     isosorbide mononitrate (IMDUR) 30 MG 24 hr tablet 90 tablet 0     Sig: Take 1 tablet (30 mg) by mouth daily    Nitrates Passed    3/16/2018 11:21 AM       Passed - Blood pressure under 140/90 in past 12 months    BP Readings from Last 3 Encounters:   01/16/18 138/50   12/19/17 130/50   11/28/17 (!) 146/93          Passed - Pt is not on erectile dysfunction medications       Passed - Recent (12 mo) or future (30 days) visit within the authorizing provider's specialty    Patient had office visit in the last 12 months or has a visit in the next 30 days with authorizing provider or within the authorizing provider's specialty.  See \"Patient Info\" tab in inbasket, or \"Choose Columns\" in Meds & Orders section of the refill encounter.    Last OV: 01/16/18       Passed - Patient is age 18 or older        Prescription approved per [facility] Refill Protocol.    "

## 2018-04-06 DIAGNOSIS — F33.41 MAJOR DEPRESSIVE DISORDER, RECURRENT EPISODE, IN PARTIAL REMISSION (H): ICD-10-CM

## 2018-04-09 RX ORDER — VENLAFAXINE HYDROCHLORIDE 75 MG/1
CAPSULE, EXTENDED RELEASE ORAL
Qty: 90 CAPSULE | Refills: 0 | Status: SHIPPED | OUTPATIENT
Start: 2018-04-09 | End: 2018-07-07

## 2018-04-09 NOTE — TELEPHONE ENCOUNTER
"Requested Prescriptions   Pending Prescriptions Disp Refills     venlafaxine (EFFEXOR-XR) 75 MG 24 hr capsule [Pharmacy Med Name: VENLAFAXINE ER 75MG CAPSULES] 90 capsule 0     Sig: TAKE ONE CAPSULE BY MOUTH DAILY    Serotonin-Norepinephrine Reuptake Inhibitors  Passed    4/6/2018  3:46 AM       Passed - Blood pressure under 140/90 in past 12 months    BP Readings from Last 3 Encounters:   01/16/18 138/50   12/19/17 130/50   11/28/17 (!) 146/93                Passed - PHQ-9 score of less than 5 in past 6 months    Please review last PHQ-9 score.          Passed - Patient is age 18 or older       Passed - No active pregnancy on record       Passed - Normal serum creatinine on file in past 12 months    Recent Labs   Lab Test  12/19/17   1038   CR  0.62            Passed - No positive pregnancy test in past 12 months       Passed - Recent (6 mo) or future (30 days) visit within the authorizing provider's specialty    Patient had office visit in the last 6 months or has a visit in the next 30 days with authorizing provider or within the authorizing provider's specialty.  See \"Patient Info\" tab in inbasket, or \"Choose Columns\" in Meds & Orders section of the refill encounter.            PHQ-9 score:    PHQ-9 SCORE 12/19/2017   Total Score -   Total Score 0       Prescription approved per [facility] Refill Protocol.    "

## 2018-04-30 DIAGNOSIS — E78.5 HYPERLIPIDEMIA, UNSPECIFIED HYPERLIPIDEMIA TYPE: ICD-10-CM

## 2018-04-30 DIAGNOSIS — I42.9 CARDIOMYOPATHY, UNSPECIFIED TYPE (H): ICD-10-CM

## 2018-04-30 NOTE — TELEPHONE ENCOUNTER
Requested Prescriptions   Pending Prescriptions Disp Refills     hydrALAZINE (APRESOLINE) 25 MG tablet 810 tablet 2     Sig: Take 3 tablets (75 mg) by mouth 3 times daily    There is no refill protocol information for this order        Has 1 refill remaining before July but wants a 3 month supply so that means rx would  and they cannot fill for a full 3 months.  Please send new rx as this med is not on our refill protocol.      Patient will run out today.  There had been a problem with the pharmacy and that has now been straightened out, requests refill ASAP.  NIMO Law R.N.

## 2018-05-21 ENCOUNTER — HOSPITAL ENCOUNTER (INPATIENT)
Facility: CLINIC | Age: 83
LOS: 3 days | Discharge: HOME HEALTH | DRG: 291 | End: 2018-05-24
Attending: INTERNAL MEDICINE | Admitting: INTERNAL MEDICINE
Payer: MEDICARE

## 2018-05-21 ENCOUNTER — OFFICE VISIT (OUTPATIENT)
Dept: INTERNAL MEDICINE | Facility: CLINIC | Age: 83
End: 2018-05-21
Payer: COMMERCIAL

## 2018-05-21 ENCOUNTER — APPOINTMENT (OUTPATIENT)
Dept: GENERAL RADIOLOGY | Facility: CLINIC | Age: 83
DRG: 291 | End: 2018-05-21
Attending: INTERNAL MEDICINE
Payer: MEDICARE

## 2018-05-21 VITALS
HEART RATE: 76 BPM | WEIGHT: 161 LBS | RESPIRATION RATE: 16 BRPM | OXYGEN SATURATION: 94 % | HEIGHT: 63 IN | SYSTOLIC BLOOD PRESSURE: 118 MMHG | BODY MASS INDEX: 28.53 KG/M2 | DIASTOLIC BLOOD PRESSURE: 50 MMHG | TEMPERATURE: 98.9 F

## 2018-05-21 DIAGNOSIS — R60.0 BILATERAL LEG EDEMA: ICD-10-CM

## 2018-05-21 DIAGNOSIS — I50.33 ACUTE ON CHRONIC DIASTOLIC CONGESTIVE HEART FAILURE (H): ICD-10-CM

## 2018-05-21 DIAGNOSIS — I27.20 PULMONARY HYPERTENSION (H): ICD-10-CM

## 2018-05-21 DIAGNOSIS — L03.116 CELLULITIS OF LEFT LOWER EXTREMITY: Primary | ICD-10-CM

## 2018-05-21 DIAGNOSIS — D50.8 OTHER IRON DEFICIENCY ANEMIA: ICD-10-CM

## 2018-05-21 LAB
ANION GAP SERPL CALCULATED.3IONS-SCNC: 6 MMOL/L (ref 3–14)
BASOPHILS # BLD AUTO: 0 10E9/L (ref 0–0.2)
BASOPHILS NFR BLD AUTO: 0.5 %
BUN SERPL-MCNC: 17 MG/DL (ref 7–30)
CALCIUM SERPL-MCNC: 8.6 MG/DL (ref 8.5–10.1)
CHLORIDE SERPL-SCNC: 103 MMOL/L (ref 94–109)
CO2 SERPL-SCNC: 29 MMOL/L (ref 20–32)
CREAT SERPL-MCNC: 0.8 MG/DL (ref 0.52–1.04)
DIFFERENTIAL METHOD BLD: ABNORMAL
EOSINOPHIL # BLD AUTO: 0.2 10E9/L (ref 0–0.7)
EOSINOPHIL NFR BLD AUTO: 2.1 %
ERYTHROCYTE [DISTWIDTH] IN BLOOD BY AUTOMATED COUNT: 15.7 % (ref 10–15)
GFR SERPL CREATININE-BSD FRML MDRD: 68 ML/MIN/1.7M2
GLUCOSE SERPL-MCNC: 99 MG/DL (ref 70–99)
HCT VFR BLD AUTO: 25.9 % (ref 35–47)
HGB BLD-MCNC: 7.5 G/DL (ref 11.7–15.7)
IMM GRANULOCYTES # BLD: 0 10E9/L (ref 0–0.4)
IMM GRANULOCYTES NFR BLD: 0.5 %
LACTATE BLD-SCNC: 0.7 MMOL/L (ref 0.7–2)
LYMPHOCYTES # BLD AUTO: 0.7 10E9/L (ref 0.8–5.3)
LYMPHOCYTES NFR BLD AUTO: 7.5 %
MAGNESIUM SERPL-MCNC: 2.2 MG/DL (ref 1.6–2.3)
MCH RBC QN AUTO: 25 PG (ref 26.5–33)
MCHC RBC AUTO-ENTMCNC: 29 G/DL (ref 31.5–36.5)
MCV RBC AUTO: 86 FL (ref 78–100)
MONOCYTES # BLD AUTO: 0.9 10E9/L (ref 0–1.3)
MONOCYTES NFR BLD AUTO: 10.1 %
NEUTROPHILS # BLD AUTO: 6.9 10E9/L (ref 1.6–8.3)
NEUTROPHILS NFR BLD AUTO: 79.3 %
NRBC # BLD AUTO: 0 10*3/UL
NRBC BLD AUTO-RTO: 0 /100
NT-PROBNP SERPL-MCNC: 8516 PG/ML (ref 0–1800)
PLATELET # BLD AUTO: 248 10E9/L (ref 150–450)
POTASSIUM SERPL-SCNC: 3.7 MMOL/L (ref 3.4–5.3)
RBC # BLD AUTO: 3 10E12/L (ref 3.8–5.2)
SODIUM SERPL-SCNC: 138 MMOL/L (ref 133–144)
WBC # BLD AUTO: 8.7 10E9/L (ref 4–11)

## 2018-05-21 PROCEDURE — 12000007 ZZH R&B INTERMEDIATE

## 2018-05-21 PROCEDURE — 93010 ELECTROCARDIOGRAM REPORT: CPT | Performed by: INTERNAL MEDICINE

## 2018-05-21 PROCEDURE — 80048 BASIC METABOLIC PNL TOTAL CA: CPT | Performed by: INTERNAL MEDICINE

## 2018-05-21 PROCEDURE — 85025 COMPLETE CBC W/AUTO DIFF WBC: CPT | Performed by: INTERNAL MEDICINE

## 2018-05-21 PROCEDURE — 94640 AIRWAY INHALATION TREATMENT: CPT

## 2018-05-21 PROCEDURE — 36415 COLL VENOUS BLD VENIPUNCTURE: CPT | Performed by: INTERNAL MEDICINE

## 2018-05-21 PROCEDURE — 40000275 ZZH STATISTIC RCP TIME EA 10 MIN

## 2018-05-21 PROCEDURE — A9270 NON-COVERED ITEM OR SERVICE: HCPCS | Mod: GY | Performed by: INTERNAL MEDICINE

## 2018-05-21 PROCEDURE — 25000132 ZZH RX MED GY IP 250 OP 250 PS 637: Mod: GY | Performed by: INTERNAL MEDICINE

## 2018-05-21 PROCEDURE — 83735 ASSAY OF MAGNESIUM: CPT | Performed by: INTERNAL MEDICINE

## 2018-05-21 PROCEDURE — 25000128 H RX IP 250 OP 636: Performed by: INTERNAL MEDICINE

## 2018-05-21 PROCEDURE — 25000125 ZZHC RX 250: Performed by: INTERNAL MEDICINE

## 2018-05-21 PROCEDURE — 83880 ASSAY OF NATRIURETIC PEPTIDE: CPT | Performed by: INTERNAL MEDICINE

## 2018-05-21 PROCEDURE — 71046 X-RAY EXAM CHEST 2 VIEWS: CPT

## 2018-05-21 PROCEDURE — 99223 1ST HOSP IP/OBS HIGH 75: CPT | Mod: AI | Performed by: INTERNAL MEDICINE

## 2018-05-21 PROCEDURE — 83605 ASSAY OF LACTIC ACID: CPT | Performed by: INTERNAL MEDICINE

## 2018-05-21 PROCEDURE — 40000274 ZZH STATISTIC RCP CONSULT EA 30 MIN

## 2018-05-21 PROCEDURE — 87040 BLOOD CULTURE FOR BACTERIA: CPT | Performed by: INTERNAL MEDICINE

## 2018-05-21 PROCEDURE — 93005 ELECTROCARDIOGRAM TRACING: CPT

## 2018-05-21 PROCEDURE — 99214 OFFICE O/P EST MOD 30 MIN: CPT | Performed by: INTERNAL MEDICINE

## 2018-05-21 RX ORDER — POTASSIUM CHLORIDE 1.5 G/1.58G
20-40 POWDER, FOR SOLUTION ORAL
Status: DISCONTINUED | OUTPATIENT
Start: 2018-05-21 | End: 2018-05-24 | Stop reason: HOSPADM

## 2018-05-21 RX ORDER — POTASSIUM CHLORIDE 29.8 MG/ML
20 INJECTION INTRAVENOUS
Status: DISCONTINUED | OUTPATIENT
Start: 2018-05-21 | End: 2018-05-24 | Stop reason: HOSPADM

## 2018-05-21 RX ORDER — AMOXICILLIN 250 MG
2 CAPSULE ORAL 2 TIMES DAILY PRN
Status: DISCONTINUED | OUTPATIENT
Start: 2018-05-21 | End: 2018-05-24 | Stop reason: HOSPADM

## 2018-05-21 RX ORDER — HYDROXYZINE HYDROCHLORIDE 25 MG/1
25 TABLET, FILM COATED ORAL 3 TIMES DAILY PRN
Status: DISCONTINUED | OUTPATIENT
Start: 2018-05-21 | End: 2018-05-21

## 2018-05-21 RX ORDER — GABAPENTIN 100 MG/1
100 CAPSULE ORAL 3 TIMES DAILY
Status: DISCONTINUED | OUTPATIENT
Start: 2018-05-21 | End: 2018-05-24 | Stop reason: HOSPADM

## 2018-05-21 RX ORDER — POTASSIUM CL/LIDO/0.9 % NACL 10MEQ/0.1L
10 INTRAVENOUS SOLUTION, PIGGYBACK (ML) INTRAVENOUS
Status: DISCONTINUED | OUTPATIENT
Start: 2018-05-21 | End: 2018-05-24 | Stop reason: HOSPADM

## 2018-05-21 RX ORDER — AMOXICILLIN 250 MG
1 CAPSULE ORAL 2 TIMES DAILY PRN
Status: DISCONTINUED | OUTPATIENT
Start: 2018-05-21 | End: 2018-05-24 | Stop reason: HOSPADM

## 2018-05-21 RX ORDER — ACETAMINOPHEN 160 MG/5ML
250 SUSPENSION, ORAL (FINAL DOSE FORM) ORAL DAILY
Status: DISCONTINUED | OUTPATIENT
Start: 2018-05-21 | End: 2018-05-21

## 2018-05-21 RX ORDER — MAGNESIUM SULFATE HEPTAHYDRATE 40 MG/ML
4 INJECTION, SOLUTION INTRAVENOUS EVERY 4 HOURS PRN
Status: DISCONTINUED | OUTPATIENT
Start: 2018-05-21 | End: 2018-05-24 | Stop reason: HOSPADM

## 2018-05-21 RX ORDER — ACETAMINOPHEN 325 MG/1
650 TABLET ORAL 3 TIMES DAILY PRN
Status: ON HOLD | COMMUNITY
End: 2021-01-01

## 2018-05-21 RX ORDER — VENLAFAXINE HYDROCHLORIDE 75 MG/1
75 TABLET, EXTENDED RELEASE ORAL DAILY
Status: DISCONTINUED | OUTPATIENT
Start: 2018-05-22 | End: 2018-05-24 | Stop reason: HOSPADM

## 2018-05-21 RX ORDER — ONDANSETRON 2 MG/ML
4 INJECTION INTRAMUSCULAR; INTRAVENOUS EVERY 6 HOURS PRN
Status: DISCONTINUED | OUTPATIENT
Start: 2018-05-21 | End: 2018-05-24 | Stop reason: HOSPADM

## 2018-05-21 RX ORDER — IPRATROPIUM BROMIDE AND ALBUTEROL SULFATE 2.5; .5 MG/3ML; MG/3ML
3 SOLUTION RESPIRATORY (INHALATION)
Status: DISCONTINUED | OUTPATIENT
Start: 2018-05-21 | End: 2018-05-24 | Stop reason: HOSPADM

## 2018-05-21 RX ORDER — ISOSORBIDE MONONITRATE 30 MG/1
30 TABLET, EXTENDED RELEASE ORAL DAILY
Status: DISCONTINUED | OUTPATIENT
Start: 2018-05-22 | End: 2018-05-24 | Stop reason: HOSPADM

## 2018-05-21 RX ORDER — NALOXONE HYDROCHLORIDE 0.4 MG/ML
.1-.4 INJECTION, SOLUTION INTRAMUSCULAR; INTRAVENOUS; SUBCUTANEOUS
Status: DISCONTINUED | OUTPATIENT
Start: 2018-05-21 | End: 2018-05-24 | Stop reason: HOSPADM

## 2018-05-21 RX ORDER — FUROSEMIDE 10 MG/ML
40 INJECTION INTRAMUSCULAR; INTRAVENOUS 2 TIMES DAILY
Status: DISCONTINUED | OUTPATIENT
Start: 2018-05-21 | End: 2018-05-22

## 2018-05-21 RX ORDER — ACETAMINOPHEN 325 MG/1
650 TABLET ORAL EVERY 4 HOURS PRN
Status: DISCONTINUED | OUTPATIENT
Start: 2018-05-21 | End: 2018-05-22

## 2018-05-21 RX ORDER — SIMVASTATIN 10 MG
10 TABLET ORAL AT BEDTIME
Status: DISCONTINUED | OUTPATIENT
Start: 2018-05-21 | End: 2018-05-24 | Stop reason: HOSPADM

## 2018-05-21 RX ORDER — ALBUTEROL SULFATE 5 MG/ML
2.5 SOLUTION RESPIRATORY (INHALATION) EVERY 4 HOURS PRN
Status: DISCONTINUED | OUTPATIENT
Start: 2018-05-21 | End: 2018-05-24 | Stop reason: HOSPADM

## 2018-05-21 RX ORDER — POLYETHYLENE GLYCOL 3350 17 G/17G
17 POWDER, FOR SOLUTION ORAL DAILY PRN
Status: DISCONTINUED | OUTPATIENT
Start: 2018-05-21 | End: 2018-05-24 | Stop reason: HOSPADM

## 2018-05-21 RX ORDER — POTASSIUM CHLORIDE 7.45 MG/ML
10 INJECTION INTRAVENOUS
Status: DISCONTINUED | OUTPATIENT
Start: 2018-05-21 | End: 2018-05-24 | Stop reason: HOSPADM

## 2018-05-21 RX ORDER — CEFAZOLIN SODIUM 1 G/50ML
1 INJECTION, SOLUTION INTRAVENOUS EVERY 8 HOURS
Status: DISCONTINUED | OUTPATIENT
Start: 2018-05-21 | End: 2018-05-24 | Stop reason: HOSPADM

## 2018-05-21 RX ORDER — ONDANSETRON 4 MG/1
4 TABLET, ORALLY DISINTEGRATING ORAL EVERY 6 HOURS PRN
Status: DISCONTINUED | OUTPATIENT
Start: 2018-05-21 | End: 2018-05-24 | Stop reason: HOSPADM

## 2018-05-21 RX ORDER — POTASSIUM CHLORIDE 1500 MG/1
20-40 TABLET, EXTENDED RELEASE ORAL
Status: DISCONTINUED | OUTPATIENT
Start: 2018-05-21 | End: 2018-05-24 | Stop reason: HOSPADM

## 2018-05-21 RX ORDER — FERROUS SULFATE 325(65) MG
325 TABLET ORAL 2 TIMES DAILY
Status: DISCONTINUED | OUTPATIENT
Start: 2018-05-21 | End: 2018-05-24 | Stop reason: HOSPADM

## 2018-05-21 RX ADMIN — FERROUS SULFATE TAB 325 MG (65 MG ELEMENTAL FE) 325 MG: 325 (65 FE) TAB at 21:07

## 2018-05-21 RX ADMIN — FUROSEMIDE 40 MG: 10 INJECTION, SOLUTION INTRAVENOUS at 21:02

## 2018-05-21 RX ADMIN — SIMVASTATIN 10 MG: 10 TABLET, FILM COATED ORAL at 21:07

## 2018-05-21 RX ADMIN — GABAPENTIN 100 MG: 100 CAPSULE ORAL at 21:07

## 2018-05-21 RX ADMIN — HYDRALAZINE HYDROCHLORIDE 75 MG: 25 TABLET ORAL at 21:06

## 2018-05-21 RX ADMIN — IPRATROPIUM BROMIDE AND ALBUTEROL SULFATE 3 ML: .5; 3 SOLUTION RESPIRATORY (INHALATION) at 19:27

## 2018-05-21 RX ADMIN — ACETAMINOPHEN 650 MG: 325 TABLET ORAL at 23:08

## 2018-05-21 RX ADMIN — CEFAZOLIN SODIUM 1 G: 1 INJECTION, SOLUTION INTRAVENOUS at 20:04

## 2018-05-21 ASSESSMENT — ACTIVITIES OF DAILY LIVING (ADL): ADLS_ACUITY_SCORE: 21

## 2018-05-21 NOTE — LETTER
Transition Communication Hand-off for Care Transitions to Next Level of Care Provider    Name: Keisha Godinez  : 1932  MRN #: [id number]  Primary Care Provider: Gerri Eubanks  Primary Care MD Name: Raimundo  Primary Clinic: [address] [facility] [address]  [facility] [address]  Primary Care Clinic Name: [facility]  Reason for Hospitalization:  Cellulitis and CHF  Cellulitis of left lower extremity  Admit Date/Time: 2018  4:45 PM  Discharge Date: 18  Payor Source: Payor: BCBS / Plan: BCBS PLATINUM BLUE / Product Type: PPO /     Readmission Assessment Measure (DALE) Risk Score/category: Average           Reason for Communication Hand-off Referral: Other Cellulitis and elevated BNP upon admission    Discharge Plan: home with [facility]       Concern for non-adherence with plan of care:   Y/N y  Discharge Needs Assessment:  Needs       Most Recent Value    Equipment Currently Used at Home walker, rolling, shower chair, raised toilet, wheelchair, manual    Transportation Available family or friend will provide [daughter]    # of Referrals Placed by CTS Scheduled Follow-up appointments, Homecare          Follow-up specialty is recommended: Yes    Follow-up plan:  Future Appointments  Date Time Provider Department Center   2018 3:00 PM Cecy Abdullahi PTA [facility]   2018 1:00 PM Mallika Hammonds, NP [facility]       Any outstanding tests or procedures:        Referrals     Future Labs/Procedures    Home care nursing referral     Comments:    RN skilled nursing visit. RN to assess vital signs and weight, home safety and Cellulitis .    Your provider has ordered home care nursing services. If you have not been contacted within 2 days of your discharge please call the inpatient department phone number at [phone] .    Home Care OT Referral for Hospital Discharge     Comments:    OT to eval and treat    Your provider has ordered home care - occupational therapy. If you have not been contacted within  2 days of your discharge please call the department phone number listed on the top of this document.    Home Care PT Referral for Hospital Discharge     Comments:    PT to eval and treat    Your provider has ordered home care - physical therapy. If you have not been contacted within 2 days of your discharge please call the department phone number listed on the top of this document.            Key Recommendations:  Pt admitted with cellulites. Home care was recommended at discharge, pt was reluctant to have home care come out. Pt needs daily dressing changes and the daughter was taught how to do these until home care can come out. Please follow up with her to make sure dressing changes are going well. Hospital follow up appointment was scheduled for her with Mallika Hammonds as Dr. Eubanks is out.     Daphne Ibarra    AVS/Discharge Summary is the source of truth; this is a helpful guide for improved communication of patient story

## 2018-05-21 NOTE — IP AVS SNAPSHOT
MRN:[id number]                      After Visit Summary   5/21/2018    Keisha Godinez    MRN: [id number]           Thank you!     Thank you for choosing [facility] for your care. Our goal is always to provide you with excellent care. Hearing back from our patients is one way we can continue to improve our services. Please take a few minutes to complete the written survey that you may receive in the mail after you visit. If you would like to speak to someone directly about your visit please contact Patient Relations at [phone]. Thank you!          Patient Information     Date Of Birth          1/25/1932        Designated Caregiver       Most Recent Value    Caregiver    Will someone help with your care after discharge? yes    Name of designated caregiver Rebecca Malhotra    Phone number of caregiver [phone]    Caregiver address unknown at time      About your hospital stay     You were admitted on:  May 21, 2018 You last received care in the:  [place] [address] [facility]    You were discharged on:  May 24, 2018        Reason for your hospital stay       Cellulitis                  Who to Call     For medical emergencies, please call 911.  For non-urgent questions about your medical care, please call your primary care provider or clinic, [phone]          Attending Provider     Provider Specialty    Isaac Gomez MD Internal Medicine       Primary Care Provider Office Phone # Fax #    Gerri Eubanks [phone]       When to contact your care team       Call your primary doctor if you have any of the following:  increased shortness of breath, increased swelling or increased pain.                  After Care Instructions     Activity       Your activity upon discharge: activity as tolerated            Diet       Follow this diet upon discharge: Orders Placed This Encounter      Snacks/Supplements Adult: Boost Plus; With Meals      2 Gram  Sodium Diet            Wound care and dressings       Instructions to care for your wound at home: as directed.                  Follow-up Appointments     Follow-up and recommended labs and tests        Follow up with primary care provider, Gerri Eubanks, within 7-14  days for hospital follow- up.  No follow up labs or test are needed.     Follow up with podiatry as instructed                  Your next 10 appointments already scheduled     Jun 04, 2018  1:00 PM CDT   [facility] Visit [facility] Mallika Hammonds NP   [facility]    [address]  [facility] [id number] [phone]           Bring a current list of meds and any records pertaining to this visit. For Physicals, please bring immunization records and any forms needing to be filled out. Please arrive 10 minutes early to complete paperwork.              Additional Services     Home Care OT Referral for Hospital Discharge       OT to eval and treat    Your provider has ordered home care - occupational therapy. If you have not been contacted within 2 days of your discharge please call the department phone number listed on the top of this document.            Home Care PT Referral for Hospital Discharge       PT to eval and treat    Your provider has ordered home care - physical therapy. If you have not been contacted within 2 days of your discharge please call the department phone number listed on the top of this document.            Home care nursing referral       RN skilled nursing visit. RN to assess vital signs and weight, home safety and Cellulitis .    Your provider has ordered home care nursing services. If you have not been contacted within 2 days of your discharge please call the inpatient department phone number at [phone] .                  Further instructions from your care team       Your home care referral was sent to [facility].  If you haven't heard from  them within the next 24-48 hours,  Please call them at [phone]      Your hospital follow up appointment has been schedule for you with Mallika Hammonds NP at [facility] for Monday 6/4/18 at 1:00pm. Please bring your hospital discharge instructions and any new medications with you to your appointment. Please call the clinic at [phone] if you need to reschedule.      Plan for skin/wound care to LLE: Daily and prn:   1.  Remove old dressings, moistening if needed to help release.  Cleanse leg very gently with Scrub care soap and water, do not scrub the leg but may gently wipe away any loose tissue; carefully pat dry.     2.  Apply strips of Xeroform gauze to cover all weepy/raw tissue.  (Xeroform is a yellow antibacterial vaseline gauze, comes on a spool in a foil packet from [facility], #2641, Nursing to order more as needed, and keep unused portion sealed up in a ziploc baggy).   (Please use several strips of the Xeroform instead of a continuous wrap around the leg, in case of additional swelling).     3.  Cover with 1/4 Covidien chux pad, wrapped around leg and secured with Cherelle or Kerlix.  May add ABDs underneath if needed.  Can change this outer absorptive layer prn, but try to only change Xeroform once daily.   4.  May apply light ACE wrap.  Once pt has been on antibiotics for about 48 hrs, can consider more aggressive compression and/or a Lymphedema consult.     5.  Label dressing/wrap with time/date/initials.   6.  Elevate legs as much as possible, floating heels at all times.   7.  Notify WOC if leg worsens or if plan of care needs re-eval.    Pending Results     Date and Time Order Name Status Description    5/21/2018 1806 Blood culture Preliminary     5/21/2018 1806 Blood culture Preliminary             Statement of Approval     Ordered          05/24/18 0859  I have reviewed and agree with all the recommendations and orders detailed in this document.  EFFECTIVE NOW     Approved and  "electronically signed by:  Richardson Celaya MD             Admission Information     Date & Time Provider Department Dept. Phone    2018 Isaac Gomez MD [place] [address] [facility] [phone]      Your Vitals Were     Blood Pressure Pulse Temperature Respirations Height Weight    158/59 (BP Location: Left arm) 67 96.8  F (36  C) (Axillary) 20 1.6 m (5' 3\") 73.3 kg (161 lb 8 oz)    Pulse Oximetry BMI (Body Mass Index)                95% 28.61 kg/m2          [employer] Information     [employer] lets you send messages to your doctor, view your test results, renew your prescriptions, schedule appointments and more. To sign up, go to [email or web address] . Click on \"Log in\" on the left side of the screen, which will take you to the Welcome page. Then click on \"Sign up Now\" on the right side of the page.     You will be asked to enter the access code listed below, as well as some personal information. Please follow the directions to create your username and password.     Your access code is: [email or web address]  Expires: 2018  3:15 PM     Your access code will  in 90 days. If you need help or a new code, please call your [place] clinic or [phone].        Care EveryWhere ID     This is your Care EveryWhere ID. This could be used by other organizations to access your [place] medical records  [id number]        Equal Access to Services     [facility] Center[facility] : Hadii thais damono Soinocente, waaxda luqadaha, qaybta kaalmada jenna, sharlene torre . So [facility] [phone].    ATENCIÓN: Si habla español, tiene a rosas disposición servicios gratuitos de asistencia lingüística. Llame al [phone].    We comply with applicable federal civil rights laws and [place] laws. We do not discriminate on the basis of race, color, national origin, age, disability, sex, sexual orientation, or gender identity.               Review of your medicines      START taking        Dose / " Directions    cephALEXin 500 MG capsule   Commonly known as:  KEFLEX        Dose:  500 mg   Take 1 capsule (500 mg) by mouth 3 times daily for 10 days   Quantity:  30 capsule   Refills:  0         CONTINUE these medicines which have NOT CHANGED        Dose / Directions    albuterol 1.25 MG/3ML nebulizer solution   Commonly known as:  ACCUNEB        Dose:  1 vial   Take 1 vial by nebulization 3 times daily   Refills:  0       Cranberry Extract 250 MG Tabs        Dose:  250 mg   Take 250 mg by mouth daily   Refills:  0       ferrous sulfate 325 (65 Fe) MG tablet   Commonly known as:  IRON   Used for:  Iron deficiency        Dose:  325 mg   Take 1 tablet (325 mg) by mouth 2 times daily   Quantity:  100 tablet   Refills:  11       furosemide 40 MG tablet   Commonly known as:  LASIX   Used for:  Essential hypertension with goal blood pressure less than 140/90        Dose:  40 mg   Take 1 tablet (40 mg) by mouth 2 times daily   Quantity:  180 tablet   Refills:  2       gabapentin 100 MG capsule   Commonly known as:  NEURONTIN   Used for:  Midline thoracic back pain, unspecified chronicity        Dose:  100 mg   Take 1 capsule (100 mg) by mouth 3 times daily   Quantity:  270 capsule   Refills:  0       hydrALAZINE 25 MG tablet   Commonly known as:  APRESOLINE   Used for:  Hyperlipidemia, unspecified hyperlipidemia type, Cardiomyopathy, unspecified type (H)        Dose:  75 mg   Take 3 tablets (75 mg) by mouth 3 times daily   Quantity:  810 tablet   Refills:  2       isosorbide mononitrate 30 MG 24 hr tablet   Commonly known as:  IMDUR   Used for:  Essential hypertension with goal blood pressure less than 140/90, Cardiomyopathy (H), Coronary artery disease involving native heart with angina pectoris, unspecified vessel or lesion type (H)        Dose:  30 mg   Take 1 tablet (30 mg) by mouth daily   Quantity:  90 tablet   Refills:  2       OMEGA-3 FATTY ACIDS PO        Dose:  1200 mg   Take 1,200 mg by mouth daily   Refills:   0       omeprazole 20 MG CR capsule   Commonly known as:  priLOSEC   Used for:  Gastroesophageal reflux disease without esophagitis        Dose:  20 mg   Take 1 capsule (20 mg) by mouth daily   Quantity:  90 capsule   Refills:  2       potassium chloride SA 20 MEQ CR tablet   Commonly known as:  KLOR-CON   Used for:  Bilateral edema of lower extremity        Dose:  20 mEq   Take 1 tablet (20 mEq) by mouth daily   Quantity:  90 tablet   Refills:  1       senna-docusate 8.6-50 MG per tablet   Commonly known as:  SENOKOT-S;PERICOLACE   Used for:  S/P foot surgery, right        Take 2 tablets daily as needed for constipation while taking prescription pain medications.   Quantity:  30 tablet   Refills:  0       simvastatin 10 MG tablet   Commonly known as:  ZOCOR   Used for:  Hyperlipidemia, unspecified hyperlipidemia type        Dose:  10 mg   Take 1 tablet (10 mg) by mouth At Bedtime   Quantity:  90 tablet   Refills:  1       TYLENOL 325 MG tablet   Generic drug:  acetaminophen        Dose:  650 mg   Take 650 mg by mouth 3 times daily as needed for mild pain   Refills:  0       venlafaxine 75 MG 24 hr capsule   Commonly known as:  EFFEXOR-XR   Used for:  Major depressive disorder, recurrent episode, in partial remission (H)        TAKE ONE CAPSULE BY MOUTH DAILY   Quantity:  90 capsule   Refills:  0       VITAMIN D (CHOLECALCIFEROL) PO        Dose:  1000 Units   Take 1,000 Units by mouth daily   Refills:  0            Where to get your medicines      These medications were sent to [facility] [id number] [place] [address] [facility] AT [address]  [address] [facility] [address]     Phone:  [phone]     cephALEXin 500 MG capsule                Protect others around you: Learn how to safely use, store and throw away your medicines at www.disposemymeds.org.        ANTIBIOTIC INSTRUCTION     You've Been Prescribed an Antibiotic - Now What?  Your healthcare team thinks that you or your  loved one might have an infection. Some infections can be treated with antibiotics, which are powerful, life-saving drugs. Like all medications, antibiotics have side effects and should only be used when necessary. There are some important things you should know about your antibiotic treatment.      Your healthcare team may run tests before you start taking an antibiotic.    Your team may take samples (e.g., from your blood, urine or other areas) to run tests to look for bacteria. These test can be important to determine if you need an antibiotic at all and, if you do, which antibiotic will work best.      Within a few days, your healthcare team might change or even stop your antibiotic.    Your team may start you on an antibiotic while they are working to find out what is making you sick.    Your team might change your antibiotic because test results show that a different antibiotic would be better to treat your infection.    In some cases, once your team has more information, they learn that you do not need an antibiotic at all. They may find out that you don't have an infection, or that the antibiotic you're taking won't work against your infection. For example, an infection caused by a virus can't be treated with antibiotics. Staying on an antibiotic when you don't need it is more likely to be harmful than helpful.      You may experience side effects from your antibiotic.    Like all medications, antibiotics have side effects. Some of these can be serious.    Let you healthcare team know if you have any known allergies when you are admitted to the hospital.    One significant side effect of nearly all antibiotics is the risk of severe and sometimes deadly diarrhea caused by Clostridium difficile (C. Difficile). This occurs when a person takes antibiotics because some good germs are destroyed. Antibiotic use allows C. diificile to take over, putting patients at high risk for this serious infection.    As a  patient or caregiver, it is important to understand your or your loved one's antibiotic treatment. It is especially important for caregivers to speak up when patients can't speak for themselves. Here are some important questions to ask your healthcare team.    What infection is this antibiotic treating and how do you know I have that infection?    What side effects might occur from this antibiotic?    How long will I need to take this antibiotic?    Is it safe to take this antibiotic with other medications or supplements (e.g., vitamins) that I am taking?     Are there any special directions I need to know about taking this antibiotic? For example, should I take it with food?    How will I be monitored to know whether my infection is responding to the antibiotic?    What tests may help to make sure the right antibiotic is prescribed for me?      Information provided by:  www.cdc.gov/getsmart  U.S. Department of Health and Human Services  Centers for disease Control and Prevention  National Center for Emerging and Zoonotic Infectious Diseases  Division of Healthcare Quality Promotion             Medication List: This is a list of all your medications and when to take them. Check marks below indicate your daily home schedule. Keep this list as a reference.      Medications           Morning Afternoon Evening Bedtime As Needed    albuterol 1.25 MG/3ML nebulizer solution   Commonly known as:  ACCUNEB   Take 1 vial by nebulization 3 times daily                                         cephALEXin 500 MG capsule   Commonly known as:  KEFLEX   Take 1 capsule (500 mg) by mouth 3 times daily for 10 days                                         Cranberry Extract 250 MG Tabs   Take 250 mg by mouth daily                                   ferrous sulfate 325 (65 Fe) MG tablet   Commonly known as:  IRON   Take 1 tablet (325 mg) by mouth 2 times daily   Last time this was given:  325 mg on 5/24/2018  8:15 AM                                       furosemide 40 MG tablet   Commonly known as:  LASIX   Take 1 tablet (40 mg) by mouth 2 times daily                                      gabapentin 100 MG capsule   Commonly known as:  NEURONTIN   Take 1 capsule (100 mg) by mouth 3 times daily   Last time this was given:  100 mg on 5/24/2018  8:15 AM                                         hydrALAZINE 25 MG tablet   Commonly known as:  APRESOLINE   Take 3 tablets (75 mg) by mouth 3 times daily   Last time this was given:  75 mg on 5/24/2018  8:15 AM                                      isosorbide mononitrate 30 MG 24 hr tablet   Commonly known as:  IMDUR   Take 1 tablet (30 mg) by mouth daily   Last time this was given:  30 mg on 5/24/2018  8:15 AM                                   OMEGA-3 FATTY ACIDS PO   Take 1,200 mg by mouth daily                                   omeprazole 20 MG CR capsule   Commonly known as:  priLOSEC   Take 1 capsule (20 mg) by mouth daily   Last time this was given:  20 mg on 5/24/2018  8:16 AM                                   potassium chloride SA 20 MEQ CR tablet   Commonly known as:  KLOR-CON   Take 1 tablet (20 mEq) by mouth daily   Last time this was given:  20 mEq on 5/23/2018 10:56 AM                                   senna-docusate 8.6-50 MG per tablet   Commonly known as:  SENOKOT-S;PERICOLACE   Take 2 tablets daily as needed for constipation while taking prescription pain medications.                                   simvastatin 10 MG tablet   Commonly known as:  ZOCOR   Take 1 tablet (10 mg) by mouth At Bedtime   Last time this was given:  10 mg on 5/23/2018  9:43 PM                                   TYLENOL 325 MG tablet   Take 650 mg by mouth 3 times daily as needed for mild pain   Last time this was given:  650 mg on 5/24/2018  8:14 AM   Generic drug:  acetaminophen                                   venlafaxine 75 MG 24 hr capsule   Commonly known as:  EFFEXOR-XR   TAKE ONE CAPSULE BY MOUTH DAILY                                 VITAMIN D (CHOLECALCIFEROL) PO   Take 1,000 Units by mouth daily   Last time this was given:  1,000 Units on 5/24/2018  8:15 AM                                             More Information        Discharge Instructions for Cellulitis  You have been diagnosed with cellulitis. This is an infection in the deepest layer of the skin. In some cases, the infection also affects the muscle. Cellulitis is caused by bacteria. The bacteria can enter the body through broken skin. This can happen with a cut, scratch, animal bite, or an insect bite that has been scratched. You may have been treated in the hospital with antibiotics and fluids. You will likely be given a prescription for antibiotics to take at home. This sheet will help you take care of yourself at home.  Home care  When you are home:    Take the prescribed antibiotic medicine you are given as directed until it is gone. Take it even if you feel better. It treats the infection and stops it from returning. Not taking all the medicine can make future infections hard to treat.    Keep the infected area clean.    When possible, raise the infected area above the level of your heart. This helps keep swelling down.    Talk with your healthcare provider if you are in pain. Ask what kind of over-the-counter medicine you can take for pain.    Apply clean bandages as advised.    Take your temperature once a day for a week.    Wash your hands often to prevent spreading the infection.  In the future, wash your hands before and after you touch cuts, scratches, or bandages. This will help prevent infection.   When to call your healthcare provider  Call your healthcare provider immediately if you have any of the following:    Difficulty or pain when moving the joints above or below the infected area    Discharge or pus draining from the area    Fever of 100.4 F (38 C) or higher, or as directed by your healthcare provider    Pain that gets worse in or around the  infected     Redness that gets worse in or around the infected area, particularly if the area of redness expands to a wider area    Shaking chills    Swelling of the infected area    Vomiting   Date Last Reviewed: 8/1/2016 2000-2017 [employer]. [address] [place] [address]. All rights reserved. This information is not intended as a substitute for professional medical care. Always follow your healthcare professional's instructions.                Discharge Instructions for Atrial Fibrillation  You have been diagnosed with an abnormal heart rhythm called atrial fibrillation. With this condition, your heart s 2 upper chambers quiver rather than squeeze the blood out in a normal pattern. This leads to an irregular and sometimes rapid heartbeat. Some people will develop associated symptoms such as a flip-flopping heartbeat, chest pain, lightheadedness, or shortness of breath. Other people may have no symptoms at all. Atrial fibrillation is serious because it affects the heart s ability to fill with blood as it should. Blood clots may form. This increases the risk for stroke. Untreated atrial fibrillation can also lead to heart failure. Atrial fibrillation can be controlled. With treatment, most people with atrial fibrillation lead normal lives.  Treatment options  Recommended treatment for atrial fibrillation depends on your age, symptoms, how long you have had atrial fibrillation, and other factors. You will have a complete evaluation to find out if you have any abnormalities that caused your heart to go into atrial fibrillation. This might be blocked heart arteries or a thyroid problem. Your doctor will assess your particular case and discuss choices with you.  Treatment choices may include:    Treating an underlying disorder that puts you at risk for atrial fibrillation. For example, correcting an abnormal thyroid or electrolyte problem, or treating a blocked heart artery.    Restoring a  normal heart rhythm with an electrical shock (cardioversion) or with an antiarrhythmic medicine (chemical cardioversion)    Using medicine to control your heart rate in atrial fibrillation.    Preventing the risk for blood clot and stroke using blood-thinning medicines. Your doctor will tell you what he or she recommends. Choices may include aspirin, clopidogrel, warfarin, dabigatran, rivaroxaban, apixaban, and edoxaban.    Doing catheter ablation or a surgical maze procedure. These use different methods to destroy certain areas of heart tissue. This interrupts the electrical signals causing atrial fibrillation. One of these procedures may be a choice when medicines do not work, or as an alternative to long-term medicine.    Other treatment choices may be recommended for you by your doctor.  Managing risk factors for stroke and preventing heart failure are important parts of any treatment plan for atrial fibrillation.  Home care    Take your medicines exactly as directed. Don t skip doses.    Work with your doctor to find the right medicines and doses for you.    Learn to take your own pulse. Keep a record of your results. Ask your doctor which pulse rates mean that you need medical attention. Slowing your pulse is often the goal of treatment. Ask your doctor if it s OK for you to use an automatic machine to check your pulse at home. Sometimes these machines don t count the pulse correctly when you have atrial fibrillation.    Limit your intake of coffee, tea, cola, and other beverages with caffeine. Talk with your doctor about whether you should eliminate caffeine.    Avoid over-the-counter medicines that have caffeine in them.    Let your doctor know what medicines you take, including prescription and over-the-counter medicines, as well as any supplements. They interfere with some medicines given for atrial fibrillation.    Ask your doctor about whether you can drink alcohol. Some people need to avoid alcohol to  better treat atrial fibrillation. If you are taking blood-thinner medicines, alcohol may interfere with them by increasing their effect.    Never take stimulants such as amphetamines or cocaine. These drugs can speed up your heart rate and trigger atrial fibrillation.  Follow-up care  Follow up with your doctor, or as advised.     When should I call my healthcare provider  Call your healthcare provider right away if you have any of the following:    Weakness    Dizziness    Fainting    Fatigue    Shortness of breath    Chest pain with increased activity    A change in the usual regularity of your heartbeat, or an unusually fast heartbeat   Date Last Reviewed: 4/23/2016 2000-2017 [employer]. [address] [place] [address], [place] [address]. All rights reserved. This information is not intended as a substitute for professional medical care. Always follow your healthcare professional's instructions.

## 2018-05-21 NOTE — IP AVS SNAPSHOT
[facility] [facility]    [address]    [facility] [address]    Phone:  [phone]    Fax:  [phone]                                       After Visit Summary   5/21/2018    Keisha Godinez    MRN: [id number]           After Visit Summary Signature Page     I have received my discharge instructions, and my questions have been answered. I have discussed any challenges I see with this plan with the nurse or doctor.    ..........................................................................................................................................  Patient/Patient Representative Signature      ..........................................................................................................................................  Patient Representative Print Name and Relationship to Patient    ..................................................               ................................................  Date                                            Time    ..........................................................................................................................................  Reviewed by Signature/Title    ...................................................              ..............................................  Date                                                            Time

## 2018-05-21 NOTE — H&P
[facility]  Hospitalist Admission Note  Name: Keisha Godinez    MRN: [id number]  YOB: 1932    Age: 86 year old  Date of admission: 5/21/2018  Primary care provider: Gerri Eubanks    Chief Complaint:  Left leg redness    Assessment and Plan:   1.   LLE cellulitis: 5 days of weeping skin of the left leg leading to erythema spreading up the left lateral leg up into the thigh.  Suspect this is cellulitis.  No MRSA history.  Has tolerated Ancef in the past despite a Keflex allergy.  Does have what looks to be a bunion on the medial plantar foot and she says part of this was excised 1 month ago and podiatry clinic.  There is no fluctuance for me to suggest a deep infection here and her cellulitis certainly could have spread from her weeping left leg from severe lymphedema.  She is afebrile on presentation.   No leukocytosis and lactic acid not elevated.  -Start Ancef IV 1 g every 8 hours  -Elevate leg and diuresis  -Blood cultures obtained  -Podiatry consult if left leg cellulitis not improving or any drainage from the left foot what appears to be a bunion    2.   Acute on chronic diastolic CHF, RV systolic, and pulmonary hypertension: Patient looks grossly volume overloaded on exam with 3+ bilateral tense edema going to above the knee.  She has bilateral crackles that I am suspicious for pulmonary edema.  She look short of breath, although she denies this.  She says she is not on oxygen at home, but needing 2 L via nasal cannula right now.  She does have nebs at home.  Former smoker but quit in 1956.  She says her weight is stable at 157 pounds.  Her diuretic dose of 40 mg twice daily p.o. Lasix has not changed recently.  Doubtful of sodium restriction compliance.  Sometimes does not take her diuretics if she is out.  TTE from 10/2017 shows an EF of 60%, 1+ MR, 2+ TR, dilated IVC, a dilated RV with reduction in systolic function.  BNP elevated to 8,500.  Chest x-ray without any  significant infiltrate suggest infection.  -Obtain chest x-ray given crackles on exam  -Start IV Lasix 40 mg twice daily  -Strict intake and output and daily weights  -Wean oxygen as able  -duonebs qid and prn albuterol nebs    3.   A. 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  Not on anticoagulation due to history of GI bleed.  This is a regular.  Will check EKG. Not planning to start any anticoagulation now.  Is not BB or CCB.  -Telemetry    4.   HTN: continue pta hydralazine 75mg tid and imdur.    5.   Chronic anemia: Hg baseline of 9 previously.  Hemoglobin down to 7.5.  Suspect might be hemodiluted as she is significant hypervolemic on exam.    6.   Gout: History of presumed gout flare in the past.  Not on any medicine for this.    7.   MDD: Continue venlafaxine.    8.   HLD: Continue simvastatin 10 makes at bedtime.    9.   GERD: Continue Prilosec 20 g daily.    # Pain Assessment:  Current Pain Score 10/28/2017   Patient currently in pain? -   Pain score (0-10) 10   Pain location -   Pain descriptors -   - Keisha is experiencing pain due to left leg cellulitis. Pain management was discussed and the plan was created in a collaborative fashion.  Keisha's response to the current recommendations: engaged  -Tylenol as needed        DVT Prophylaxis: Pneumatic Compression Devices, held off on heparin products due to history of GI bleed  Code Status: Full Code.  Discussed with patient on admission  FEN: 2 g sodium restriction.  No IV fluids and she is grossly volume overloaded  Discharge Dispo: PT consult  Estimated Disch Date / # of Days until Disch: Admit inpatient for IV antibiotics and likely IV diuresis.  Anticipate multiple day hospitalization, minimal 3.      History of Present Illness:  Keisha Godinez is a 86 year old female with PMH including pulmonary hypertension, chronic diastolic CHF, lymphedema, A. fib and remote CVA not on AC due to GI bleed history, anemia, gout, HTN, MDD, and GERD who presents as  a direct admit from clinic after being seen for left leg redness and weeping of the skin.  Concern for left leg cellulitis and sent for direct admission.  For the past 4-5 days she has noticed weeping coming from the left lower leg.  She then developed raw irritated skin in this area that was painful, moderate in intensity.   Pain did not radiate.  Takes Tylenol for pain at home.  She then had some pink and red skin changes tracking up the left lateral leg to the thigh.  No fevers or chills at home.  Is not prone to getting skin infections, however has chronic bilateral lymphedema.  She has a bunion on the left plantar medial foot that she sees podiatry for and had some of this excised in clinic about 1 month ago she thinks.  She is due for follow-up in clinic soon.  Denies any MRSA history.  No recent antibiotics.  She is grossly volume overload on exam and she appears short of breath and when asked about this she said her breathing is at baseline.  She always is a little short of breath but does not use oxygen at home.  She says her weight has been stable recently at 157 pounds.  No change in her diuretic dosing she does admit to missing some doses if she is going to be out.  Her legs are chronically swollen and she has not noticed much difference in this.  On review of systems she denies any chest pain, palpitations, abdominal pain, diarrhea, dysuria.    History obtained from patient, daughter, medical record, and from Dr. Bellamy from the clinic.  Plan for IV Ancef for left leg cellulitis, basic labs, IV diuresis, chest x-ray due to crackles and wheeze on exam.  As inpatient.     Past Medical History reviewed:  Past Medical History:   Diagnosis Date     Anemia      Atrial fibrillation (H)      B12 deficiency      Cardiomyopathy (H)      CHF (congestive heart failure) (H)      Chronic edema     Lower extremities     Chronic systolic congestive heart failure (H)      CVA (cerebral vascular accident) (H) 2010     Acute Left MCA hemispheric CVA ( on coumadin)     Depression      Gastro-oesophageal reflux disease      GI bleed 03-20-15     Hyperlipidemia      Hypertension      Iron deficiency      MSSA (methicillin susceptible Staphylococcus aureus) 03-10-15     Pulmonary hypertension      Shingles      Past Surgical History reviewed:  Past Surgical History:   Procedure Laterality Date     COLONOSCOPY  03/24/2015    Diverticulosis, polyps     ENTEROSCOPY SMALL BOWEL N/A 2/29/2016    Procedure: ENTEROSCOPY SMALL BOWEL;  Surgeon: Ady Ponce MD;  Location:  GI     ESOPHAGOSCOPY, GASTROSCOPY, DUODENOSCOPY (EGD), COMBINED N/A 3/22/2015    Upper GI- Normal, nothing significant found.      EXCISE MASS FOOT Right 7/6/2016    Procedure: EXCISE MASS FOOT;  Surgeon: Lee Jang DPM;  Location:  OR     Social History reviewed:  Social History   Substance Use Topics     Smoking status: Former Smoker     Quit date: 4/14/1956     Smokeless tobacco: Never Used     Alcohol use No     Social History     Social History Narrative     Family History reviewed:  Family History   Problem Relation Age of Onset     Known Genetic Syndrome Father      Known Genetic Syndrome Mother      Other Cancer Daughter      pancreatic     Other Cancer Brother      type     Other Cancer Sister 60     lung     DIABETES No family hx of      Breast Cancer No family hx of      Cancer - colorectal No family hx of      Ovarian Cancer No family hx of      Prostate Cancer No family hx of      Allergies:  Allergies   Allergen Reactions     Lisinopril Other (See Comments)     Tongue swelling     Calcium Nausea and Vomiting     Egg Yolk      Flu Virus Vaccine      Allergic to eggs.     Keflex [Cephalexin] Nausea     Pt states she had upset stomach.  NOT tongue swelling.  She had tongue swelling with a combo bp med that she thinks included lisinopril.    Tolerates IV Cefazolin     Levaquin [Levofloxacin]      Sulfa Drugs      Zinc Nausea and Vomiting      Medications:  Prior to Admission medications    Medication Sig Last Dose Taking? Auth Provider   acetaminophen (TYLENOL) 325 MG tablet Take 3 tablets (975 mg) by mouth 3 times daily   Yanira Frankel PA-C   albuterol (ACCUNEB) 1.25 MG/3ML nebulizer solution Take 1 vial by nebulization 3 times daily    Unknown, Entered By History   Cranberry Extract 250 MG TABS Take 250 mg by mouth daily   DoctorSharonda MD   ferrous sulfate (IRON) 325 (65 FE) MG tablet Take 1 tablet (325 mg) by mouth 2 times daily   Gerri Eubanks MD   furosemide (LASIX) 40 MG tablet Take 1 tablet (40 mg) by mouth 2 times daily   Gerri Eubanks MD   gabapentin (NEURONTIN) 100 MG capsule Take 1 capsule (100 mg) by mouth 3 times daily   Darryl Reed MD   hydrALAZINE (APRESOLINE) 25 MG tablet Take 3 tablets (75 mg) by mouth 3 times daily   Gerri Eubanks MD   hydrOXYzine (ATARAX) 25 MG tablet Take 1 tablet (25 mg) by mouth 3 times daily as needed for other (pain adjunct)   Gabe Porter MD   isosorbide mononitrate (IMDUR) 30 MG 24 hr tablet Take 1 tablet (30 mg) by mouth daily   Gerri Eubanks MD   OMEGA-3 FATTY ACIDS PO Take 1,200 mg by mouth daily    Reported, Patient   omeprazole (PRILOSEC) 20 MG CR capsule Take 1 capsule (20 mg) by mouth daily   Gerri Eubanks MD   potassium chloride SA (KLOR-CON) 20 MEQ CR tablet Take 1 tablet (20 mEq) by mouth daily   Gerri Eubanks MD   senna-docusate (SENOKOT-S;PERICOLACE) 8.6-50 MG per tablet Take 2 tablets daily as needed for constipation while taking prescription pain medications.   Lee Jang DPM   simvastatin (ZOCOR) 10 MG tablet Take 1 tablet (10 mg) by mouth At Bedtime   Gerri Eubanks MD   venlafaxine (EFFEXOR-XR) 75 MG 24 hr capsule TAKE ONE CAPSULE BY MOUTH DAILY   Gerri Eubanks MD   VITAMIN D, CHOLECALCIFEROL, PO Take 1,000 Units by mouth daily    Reported, Patient     Review of Systems:  A Comprehensive greater than 10 system review  "of systems was carried out.  Pertinent positives and negatives are noted above.  Otherwise negative.     Physical Exam:  Blood pressure 150/42, pulse 65, temperature 99.9  F (37.7  C), temperature source Oral, resp. rate 18, height 1.6 m (5' 3\"), weight 73.5 kg (162 lb 1.6 oz), SpO2 93 %, not currently breastfeeding.  Wt Readings from Last 1 Encounters:   05/21/18 73.5 kg (162 lb 1.6 oz)     Exam:  Constitutional: Awake, NAD  Eyes: sclera white   HEENT:  MMM  Respiratory: Bilateral crackles and wheeze.  Does not look in distress but is on oxygen via nasal cannula  Cardiovascular: Irregularly irregular rhythm, not tachycardic.  No murmur   GI: non-tender, not distended, bowel sounds present  Skin and musculoskeletal/extremities: Tense bilateral 2-3+ edema up past knees.  Left lower leg has weeping skin with a red raw irritated layer.  There is likely a bunion on the left medial plantar foot that does not have any fluctuance or pus expression.  There is erythema tracking up the left leg and the left lateral posterior upper leg  Neurologic: A&O, speech clear, strength and light touch sensation grossly normal  Psychiatric: calm, cooperative, normal affect    Lab and imaging data personally reviewed:  Labs:    Recent Labs  Lab 05/21/18 1829   WBC 8.7   HGB 7.5*   HCT 25.9*   MCV 86          Recent Labs  Lab 05/21/18  1829      POTASSIUM 3.7   CHLORIDE 103   CO2 29   ANIONGAP 6   GLC 99   BUN 17   CR 0.80   GFRESTIMATED 68   GFRESTBLACK 82   CASPER 8.6       Recent Labs  Lab 05/21/18  1829   NTBNPI 8516*       Recent Labs  Lab 05/21/18  1829   LACT 0.7       Imaging:  Chest x-ray: Preliminary does not appear to have any large infiltrate suggestive of infection.    Isaac Gomez MD  Hospitalist  [facility]    "

## 2018-05-21 NOTE — NURSING NOTE
"Vital signs:  Temp: 98.9  F (37.2  C) Temp src: Oral BP: 118/50 Pulse: 76   Resp: 16 SpO2: 94 %     Height: 5' 3\" (160 cm) Weight: 161 lb (73 kg) (unable)  Estimated body mass index is 28.52 kg/(m^2) as calculated from the following:    Height as of this encounter: 5' 3\" (1.6 m).    Weight as of this encounter: 161 lb (73 kg).          "

## 2018-05-21 NOTE — MR AVS SNAPSHOT
"              After Visit Summary   2018    Keisha Godinez    MRN: [id number]           Patient Information     Date Of Birth          1932        Visit Information        Provider Department      2018 2:00 PM Cherry Burns MD [facility]        Today's Diagnoses     Cellulitis of left lower extremity    -  1    Bilateral leg edema        Acute on chronic diastolic congestive heart failure (H)        Other iron deficiency anemia        Pulmonary hypertension           Follow-ups after your visit        Who to contact     If you have questions or need follow up information about today's clinic visit or your schedule please contact [facility] directly at [phone].  Normal or non-critical lab and imaging results will be communicated to you by MyChart, letter or phone within 4 business days after the clinic has received the results. If you do not hear from us within 7 days, please contact the clinic through MyChart or phone. If you have a critical or abnormal lab result, we will notify you by phone as soon as possible.  Submit refill requests through [employer] or call your pharmacy and they will forward the refill request to us. Please allow 3 business days for your refill to be completed.          Additional Information About Your Visit        MyChart Information     [employer] lets you send messages to your doctor, view your test results, renew your prescriptions, schedule appointments and more. To sign up, go to [email or web address] . Click on \"Log in\" on the left side of the screen, which will take you to the Welcome page. Then click on \"Sign up Now\" on the right side of the page.     You will be asked to enter the access code listed below, as well as some personal information. Please follow the directions to create your username and password.     Your access code is: [email or web address]  Expires: 2018  3:15 PM     Your access code will  in 90 " "days. If you need help or a new code, please call your [place] clinic or [phone].        Care EveryWhere ID     This is your Care EveryWhere ID. This could be used by other organizations to access your [place] medical records  [id number]        Your Vitals Were     Pulse Temperature Respirations Height Pulse Oximetry BMI (Body Mass Index)    76 98.9  F (37.2  C) (Oral) 16 5' 3\" (1.6 m) 94% 28.52 kg/m2       Blood Pressure from Last 3 Encounters:   05/21/18 118/50   01/16/18 138/50   12/19/17 130/50    Weight from Last 3 Encounters:   05/21/18 161 lb (73 kg)   01/16/18 161 lb (73 kg)   12/19/17 161 lb (73 kg)              Today, you had the following     No orders found for display       Primary Care Provider Office Phone # Fax #    Gerri Eubanks [phone]       [address] [facility] [address]  [facility] [address]        Goals        General    Functional (pt-stated)     Notes - Note created  12/9/2015  3:21 PM by Clara Dorado RN    I will pace out my activities like bathing, cooking & cleaning and rest between tasks for 10-15'.  I will elevate my feet and legs on a chair or ottoman when I sit        Medical (pt-stated)     Notes - Note created  12/9/2015  3:20 PM by Clara Dorado RN    I will put my action plan in an easy-to-see area and review routinuely to make sure I am staying in the \"green zone\".  I will weigh myself every morning and report an increase of 2 or more lbs. to my clinic  I will call my clinic if I begin having trouble lying down to sleep due to breathing problems.  I will read nutrition labels when buying prepared foods of spices  I will keep a log of my daily sodium intake (salt) and not go over a total of 2 gm.              Medication 1 (pt-stated)     Notes - Note created  12/9/2015  3:21 PM by Clara Dorado, RN    I will take my water pill every morning and talk to my doctor about an alternate plan if I need to adjust the time of day due to " schedule.          Equal Access to Services     [facility] : Hadii aad ku hadnishprabha Zeenatinocente, wasonalida luqadaha, qaybta christinlashakartik west, sharlene vick. So [facility] [phone].    ATENCIÓN: Si habla español, tiene a rosas disposición servicios gratuitos de asistencia lingüística. Llame al [phone].    We comply with applicable federal civil rights laws and [place] laws. We do not discriminate on the basis of race, color, national origin, age, disability, sex, sexual orientation, or gender identity.            Thank you!     Thank you for choosing [facility]  for your care. Our goal is always to provide you with excellent care. Hearing back from our patients is one way we can continue to improve our services. Please take a few minutes to complete the written survey that you may receive in the mail after your visit with us. Thank you!             Your Updated Medication List - Protect others around you: Learn how to safely use, store and throw away your medicines at www.disposemymeds.org.          This list is accurate as of 5/21/18  3:15 PM.  Always use your most recent med list.                   Brand Name Dispense Instructions for use Diagnosis    acetaminophen 325 MG tablet    TYLENOL    100 tablet    Take 3 tablets (975 mg) by mouth 3 times daily    Midline thoracic back pain, unspecified chronicity       albuterol 1.25 MG/3ML nebulizer solution    ACCUNEB     Take 1 vial by nebulization 3 times daily        Cranberry Extract 250 MG Tabs      Take 250 mg by mouth daily        ferrous sulfate 325 (65 Fe) MG tablet    IRON    100 tablet    Take 1 tablet (325 mg) by mouth 2 times daily    Iron deficiency       furosemide 40 MG tablet    LASIX    180 tablet    Take 1 tablet (40 mg) by mouth 2 times daily    Essential hypertension with goal blood pressure less than 140/90       gabapentin 100 MG capsule    NEURONTIN    270 capsule    Take 1 capsule (100 mg) by mouth 3  times daily    Midline thoracic back pain, unspecified chronicity       hydrALAZINE 25 MG tablet    APRESOLINE    810 tablet    Take 3 tablets (75 mg) by mouth 3 times daily    Hyperlipidemia, unspecified hyperlipidemia type, Cardiomyopathy, unspecified type (H)       hydrOXYzine 25 MG tablet    ATARAX    60 tablet    Take 1 tablet (25 mg) by mouth 3 times daily as needed for other (pain adjunct)    Closed supracondylar fracture of right humerus, initial encounter       isosorbide mononitrate 30 MG 24 hr tablet    IMDUR    90 tablet    Take 1 tablet (30 mg) by mouth daily    Essential hypertension with goal blood pressure less than 140/90, Cardiomyopathy (H), Coronary artery disease involving native heart with angina pectoris, unspecified vessel or lesion type (H)       OMEGA-3 FATTY ACIDS PO      Take 1,200 mg by mouth daily        omeprazole 20 MG CR capsule    priLOSEC    90 capsule    Take 1 capsule (20 mg) by mouth daily    Gastroesophageal reflux disease without esophagitis       potassium chloride SA 20 MEQ CR tablet    KLOR-CON    90 tablet    Take 1 tablet (20 mEq) by mouth daily    Bilateral edema of lower extremity       senna-docusate 8.6-50 MG per tablet    SENOKOT-S;PERICOLACE    30 tablet    Take 2 tablets daily as needed for constipation while taking prescription pain medications.    S/P foot surgery, right       simvastatin 10 MG tablet    ZOCOR    90 tablet    Take 1 tablet (10 mg) by mouth At Bedtime    Hyperlipidemia, unspecified hyperlipidemia type       venlafaxine 75 MG 24 hr capsule    EFFEXOR-XR    90 capsule    TAKE ONE CAPSULE BY MOUTH DAILY    Major depressive disorder, recurrent episode, in partial remission (H)       VITAMIN D (CHOLECALCIFEROL) PO      Take 1,000 Units by mouth daily

## 2018-05-21 NOTE — PROGRESS NOTES
Dr Castillo's note      Patient's instructions / PLAN:                                                        Plan:  1. I called the admission team at the hospital. I am waiting for the call back and see if they are willing to accept her as direct admission. Other wise she might need to go through the ER. I do think she will benefit from Iv diuretics and abx for few days with careful electrolytes monitoring.     I discussed the case with admission team. They accepted as direct admission        ASSESSMENT & PLAN:                                                      Very fragile 86-year-old woman with past medical history significant for severe pulmonary hypertension, diastolic CHF, chronic leg edema, chronic  iron deficiency anemia, chronic atrial fibrillation with no anticoagulation therapy, due to history of GI bleeding.  She developed cellulitis in top of very swollen leg.    I recommended admission for IV antibiotics, IV diuretics with careful electrolytes monitoring and intensive wound care.  She is in no acute respiratory distress, so direct admission is appropriate.    (L03.116) Cellulitis of left lower extremity  (primary encounter diagnosis)  Comment:   Plan:     (R60.0) Bilateral leg edema  Comment:   Plan:     (I50.33) Acute on chronic diastolic congestive heart failure (H)  Comment:   Plan:     (D50.8) Other iron deficiency anemia  Comment:   Plan:     (I27.20) Pulmonary hypertension  Comment:   Plan:        Chief complaint:                                                      L leg oozing   Daughter is present      SUBJECTIVE:   Keisha Godinez is a 86 year old female who presents to clinic today for the following health issues:      Mrs Valdes is a new patient to me. She has chronic leg edema. Daughter states that it has been difficult to control the edema with the present meds. 3-4 days ago she noticed oozing fluid from one part of the L leg and progressively got worse. Now is oozing from all over the  "ant part and became red. The daughter brought her today with the hope she will be admitted.   Echo 2017: LVEF 60-65 % with severe Pulm HTN         Review of Systems:                                                      ROS: negative for fever, chills, cough, wheezes, chest pain, shortness of breath, vomiting, abdominal pain, pos leg swelling and SOB        OBJECTIVE:             Physical exam:  Blood pressure 118/50, pulse 76, temperature 98.9  F (37.2  C), temperature source Oral, resp. rate 16, height 5' 3\" (1.6 m), weight 161 lb (73 kg), SpO2 94 %, not currently breastfeeding.   NAD, appears comfortable  Neck: supple, no JVD, . No thyroidmegaly. Lymph nodes nonpalpable cervical and supraclavicular.  Chest: few bibasilar crackles good respiratory effort  Heart: S1 S2, RRR, no mgr appreciated  Abdomen: soft, not tender,  Extremities: + 3 edema on both legs knee high. The R leg is covered with dry thick crusts. The leg leg is erythematous, the skin is wet with multiple breaks and clear fluid is oozing constantly in big amount and drips on the floor.   Neurologic: A, Ox3, no focal signs appreciated    PMHx: reviewed  Past Medical History:   Diagnosis Date     Anemia      Atrial fibrillation (H)      B12 deficiency      Cardiomyopathy (H)      CHF (congestive heart failure) (H)      Chronic edema     Lower extremities     Chronic systolic congestive heart failure (H)      CVA (cerebral vascular accident) (H) 2010    Acute Left MCA hemispheric CVA ( on coumadin)     Depression      Gastro-oesophageal reflux disease      GI bleed 03-20-15     Hyperlipidemia      Hypertension      Iron deficiency      MSSA (methicillin susceptible Staphylococcus aureus) 03-10-15     Pulmonary hypertension      Shingles       PSHx: reviewed  Past Surgical History:   Procedure Laterality Date     COLONOSCOPY  03/24/2015    Diverticulosis, polyps     ENTEROSCOPY SMALL BOWEL N/A 2/29/2016    Procedure: ENTEROSCOPY SMALL BOWEL;  Surgeon: " Ady Ponce MD;  Location:  GI     ESOPHAGOSCOPY, GASTROSCOPY, DUODENOSCOPY (EGD), COMBINED N/A 3/22/2015    Upper GI- Normal, nothing significant found.      EXCISE MASS FOOT Right 7/6/2016    Procedure: EXCISE MASS FOOT;  Surgeon: Lee Jang DPM;  Location:  OR        Meds: reviewed  Current Outpatient Prescriptions   Medication Sig Dispense Refill     albuterol (ACCUNEB) 1.25 MG/3ML nebulizer solution Take 1 vial by nebulization 3 times daily        Cranberry Extract 250 MG TABS Take 250 mg by mouth daily       ferrous sulfate (IRON) 325 (65 FE) MG tablet Take 1 tablet (325 mg) by mouth 2 times daily 100 tablet 11     furosemide (LASIX) 40 MG tablet Take 1 tablet (40 mg) by mouth 2 times daily 180 tablet 2     gabapentin (NEURONTIN) 100 MG capsule Take 1 capsule (100 mg) by mouth 3 times daily 270 capsule 0     hydrALAZINE (APRESOLINE) 25 MG tablet Take 3 tablets (75 mg) by mouth 3 times daily 810 tablet 2     isosorbide mononitrate (IMDUR) 30 MG 24 hr tablet Take 1 tablet (30 mg) by mouth daily 90 tablet 2     OMEGA-3 FATTY ACIDS PO Take 1,200 mg by mouth daily        omeprazole (PRILOSEC) 20 MG CR capsule Take 1 capsule (20 mg) by mouth daily 90 capsule 2     senna-docusate (SENOKOT-S;PERICOLACE) 8.6-50 MG per tablet Take 2 tablets daily as needed for constipation while taking prescription pain medications. 30 tablet 0     simvastatin (ZOCOR) 10 MG tablet Take 1 tablet (10 mg) by mouth At Bedtime 90 tablet 1     venlafaxine (EFFEXOR-XR) 75 MG 24 hr capsule TAKE ONE CAPSULE BY MOUTH DAILY 90 capsule 0     VITAMIN D, CHOLECALCIFEROL, PO Take 1,000 Units by mouth daily        acetaminophen (TYLENOL) 325 MG tablet Take 3 tablets (975 mg) by mouth 3 times daily 100 tablet 0     hydrOXYzine (ATARAX) 25 MG tablet Take 1 tablet (25 mg) by mouth 3 times daily as needed for other (pain adjunct) 60 tablet 0     potassium chloride SA (KLOR-CON) 20 MEQ CR tablet Take 1 tablet (20 mEq) by mouth  daily 90 tablet 1       Soc Hx: reviewed  Fam Hx: reviewed          Cherry Castillo MD  Internal Medicine

## 2018-05-22 ENCOUNTER — APPOINTMENT (OUTPATIENT)
Dept: PHYSICAL THERAPY | Facility: CLINIC | Age: 83
DRG: 291 | End: 2018-05-22
Attending: INTERNAL MEDICINE
Payer: MEDICARE

## 2018-05-22 LAB
ABO + RH BLD: NORMAL
ABO + RH BLD: NORMAL
ANION GAP SERPL CALCULATED.3IONS-SCNC: 8 MMOL/L (ref 3–14)
BLD GP AB SCN SERPL QL: NORMAL
BLD PROD TYP BPU: NORMAL
BLD PROD TYP BPU: NORMAL
BLD UNIT ID BPU: 0
BLOOD BANK CMNT PATIENT-IMP: NORMAL
BLOOD PRODUCT CODE: NORMAL
BPU ID: NORMAL
BUN SERPL-MCNC: 19 MG/DL (ref 7–30)
CALCIUM SERPL-MCNC: 8.3 MG/DL (ref 8.5–10.1)
CHLORIDE SERPL-SCNC: 104 MMOL/L (ref 94–109)
CO2 SERPL-SCNC: 28 MMOL/L (ref 20–32)
CREAT SERPL-MCNC: 0.78 MG/DL (ref 0.52–1.04)
ERYTHROCYTE [DISTWIDTH] IN BLOOD BY AUTOMATED COUNT: 15.7 % (ref 10–15)
GFR SERPL CREATININE-BSD FRML MDRD: 70 ML/MIN/1.7M2
GLUCOSE SERPL-MCNC: 93 MG/DL (ref 70–99)
HCT VFR BLD AUTO: 23.9 % (ref 35–47)
HGB BLD-MCNC: 6.9 G/DL (ref 11.7–15.7)
MCH RBC QN AUTO: 25 PG (ref 26.5–33)
MCHC RBC AUTO-ENTMCNC: 28.9 G/DL (ref 31.5–36.5)
MCV RBC AUTO: 87 FL (ref 78–100)
NUM BPU REQUESTED: 1
PLATELET # BLD AUTO: 212 10E9/L (ref 150–450)
POTASSIUM SERPL-SCNC: 3.7 MMOL/L (ref 3.4–5.3)
RBC # BLD AUTO: 2.76 10E12/L (ref 3.8–5.2)
SODIUM SERPL-SCNC: 140 MMOL/L (ref 133–144)
SPECIMEN EXP DATE BLD: NORMAL
TRANSFUSION STATUS PATIENT QL: NORMAL
TRANSFUSION STATUS PATIENT QL: NORMAL
WBC # BLD AUTO: 7.9 10E9/L (ref 4–11)

## 2018-05-22 PROCEDURE — A9270 NON-COVERED ITEM OR SERVICE: HCPCS | Mod: GY | Performed by: INTERNAL MEDICINE

## 2018-05-22 PROCEDURE — 86923 COMPATIBILITY TEST ELECTRIC: CPT | Performed by: INTERNAL MEDICINE

## 2018-05-22 PROCEDURE — 86850 RBC ANTIBODY SCREEN: CPT | Performed by: INTERNAL MEDICINE

## 2018-05-22 PROCEDURE — 86900 BLOOD TYPING SEROLOGIC ABO: CPT | Performed by: INTERNAL MEDICINE

## 2018-05-22 PROCEDURE — 97597 DBRDMT OPN WND 1ST 20 CM/<: CPT | Performed by: PODIATRIST

## 2018-05-22 PROCEDURE — 94640 AIRWAY INHALATION TREATMENT: CPT

## 2018-05-22 PROCEDURE — 36415 COLL VENOUS BLD VENIPUNCTURE: CPT | Performed by: INTERNAL MEDICINE

## 2018-05-22 PROCEDURE — 97530 THERAPEUTIC ACTIVITIES: CPT | Mod: GP

## 2018-05-22 PROCEDURE — 25000128 H RX IP 250 OP 636: Performed by: INTERNAL MEDICINE

## 2018-05-22 PROCEDURE — 85027 COMPLETE CBC AUTOMATED: CPT | Performed by: INTERNAL MEDICINE

## 2018-05-22 PROCEDURE — 86901 BLOOD TYPING SEROLOGIC RH(D): CPT | Performed by: INTERNAL MEDICINE

## 2018-05-22 PROCEDURE — G0463 HOSPITAL OUTPT CLINIC VISIT: HCPCS

## 2018-05-22 PROCEDURE — 97116 GAIT TRAINING THERAPY: CPT | Mod: GP

## 2018-05-22 PROCEDURE — 40000275 ZZH STATISTIC RCP TIME EA 10 MIN

## 2018-05-22 PROCEDURE — 40000193 ZZH STATISTIC PT WARD VISIT

## 2018-05-22 PROCEDURE — P9016 RBC LEUKOCYTES REDUCED: HCPCS | Performed by: INTERNAL MEDICINE

## 2018-05-22 PROCEDURE — 0HBNXZZ EXCISION OF LEFT FOOT SKIN, EXTERNAL APPROACH: ICD-10-PCS | Performed by: PODIATRIST

## 2018-05-22 PROCEDURE — 99233 SBSQ HOSP IP/OBS HIGH 50: CPT | Performed by: INTERNAL MEDICINE

## 2018-05-22 PROCEDURE — 12000007 ZZH R&B INTERMEDIATE

## 2018-05-22 PROCEDURE — 80048 BASIC METABOLIC PNL TOTAL CA: CPT | Performed by: INTERNAL MEDICINE

## 2018-05-22 PROCEDURE — 94640 AIRWAY INHALATION TREATMENT: CPT | Mod: 76

## 2018-05-22 PROCEDURE — 97161 PT EVAL LOW COMPLEX 20 MIN: CPT | Mod: GP

## 2018-05-22 PROCEDURE — 25000132 ZZH RX MED GY IP 250 OP 250 PS 637: Mod: GY | Performed by: INTERNAL MEDICINE

## 2018-05-22 PROCEDURE — 25000125 ZZHC RX 250: Performed by: INTERNAL MEDICINE

## 2018-05-22 PROCEDURE — 99222 1ST HOSP IP/OBS MODERATE 55: CPT | Mod: 25 | Performed by: PODIATRIST

## 2018-05-22 RX ORDER — ACETAMINOPHEN 325 MG/1
650 TABLET ORAL 3 TIMES DAILY PRN
Status: DISCONTINUED | OUTPATIENT
Start: 2018-05-22 | End: 2018-05-24 | Stop reason: HOSPADM

## 2018-05-22 RX ORDER — FUROSEMIDE 10 MG/ML
20 INJECTION INTRAMUSCULAR; INTRAVENOUS EVERY 8 HOURS
Status: COMPLETED | OUTPATIENT
Start: 2018-05-22 | End: 2018-05-23

## 2018-05-22 RX ADMIN — GABAPENTIN 100 MG: 100 CAPSULE ORAL at 09:02

## 2018-05-22 RX ADMIN — SIMVASTATIN 10 MG: 10 TABLET, FILM COATED ORAL at 21:30

## 2018-05-22 RX ADMIN — FERROUS SULFATE TAB 325 MG (65 MG ELEMENTAL FE) 325 MG: 325 (65 FE) TAB at 21:29

## 2018-05-22 RX ADMIN — IPRATROPIUM BROMIDE AND ALBUTEROL SULFATE 3 ML: .5; 3 SOLUTION RESPIRATORY (INHALATION) at 19:38

## 2018-05-22 RX ADMIN — HYDRALAZINE HYDROCHLORIDE 75 MG: 25 TABLET ORAL at 16:38

## 2018-05-22 RX ADMIN — VITAMIN D, TAB 1000IU (100/BT) 1000 UNITS: 25 TAB at 09:02

## 2018-05-22 RX ADMIN — FERROUS SULFATE TAB 325 MG (65 MG ELEMENTAL FE) 325 MG: 325 (65 FE) TAB at 09:01

## 2018-05-22 RX ADMIN — GABAPENTIN 100 MG: 100 CAPSULE ORAL at 21:30

## 2018-05-22 RX ADMIN — IPRATROPIUM BROMIDE AND ALBUTEROL SULFATE 3 ML: .5; 3 SOLUTION RESPIRATORY (INHALATION) at 11:53

## 2018-05-22 RX ADMIN — CEFAZOLIN SODIUM 1 G: 1 INJECTION, SOLUTION INTRAVENOUS at 01:35

## 2018-05-22 RX ADMIN — CEFAZOLIN SODIUM 1 G: 1 INJECTION, SOLUTION INTRAVENOUS at 16:38

## 2018-05-22 RX ADMIN — CEFAZOLIN SODIUM 1 G: 1 INJECTION, SOLUTION INTRAVENOUS at 09:09

## 2018-05-22 RX ADMIN — IPRATROPIUM BROMIDE AND ALBUTEROL SULFATE 3 ML: .5; 3 SOLUTION RESPIRATORY (INHALATION) at 07:17

## 2018-05-22 RX ADMIN — HYDRALAZINE HYDROCHLORIDE 75 MG: 25 TABLET ORAL at 21:29

## 2018-05-22 RX ADMIN — VENLAFAXINE HYDROCHLORIDE 75 MG: 75 TABLET, EXTENDED RELEASE ORAL at 09:01

## 2018-05-22 RX ADMIN — GABAPENTIN 100 MG: 100 CAPSULE ORAL at 16:38

## 2018-05-22 RX ADMIN — ISOSORBIDE MONONITRATE 30 MG: 30 TABLET, EXTENDED RELEASE ORAL at 09:02

## 2018-05-22 RX ADMIN — IPRATROPIUM BROMIDE AND ALBUTEROL SULFATE 3 ML: .5; 3 SOLUTION RESPIRATORY (INHALATION) at 15:23

## 2018-05-22 RX ADMIN — HYDRALAZINE HYDROCHLORIDE 75 MG: 25 TABLET ORAL at 09:01

## 2018-05-22 RX ADMIN — FUROSEMIDE 20 MG: 10 INJECTION, SOLUTION INTRAVENOUS at 14:22

## 2018-05-22 RX ADMIN — FUROSEMIDE 20 MG: 10 INJECTION, SOLUTION INTRAVENOUS at 21:34

## 2018-05-22 RX ADMIN — OMEPRAZOLE 20 MG: 20 CAPSULE, DELAYED RELEASE ORAL at 09:02

## 2018-05-22 ASSESSMENT — ACTIVITIES OF DAILY LIVING (ADL)
ADLS_ACUITY_SCORE: 18
ADLS_ACUITY_SCORE: 20

## 2018-05-22 ASSESSMENT — PATIENT HEALTH QUESTIONNAIRE - PHQ9: SUM OF ALL RESPONSES TO PHQ QUESTIONS 1-9: 0

## 2018-05-22 NOTE — PLAN OF CARE
Problem: Patient Care Overview  Goal: Plan of Care/Patient Progress Review  PT: Orders received, evaluation completed and treatment initiated. Pt admitted with L LE cellulitis. PMH significant for edema; pt manages with bilateral LE compression socks. Pt mod I with household mobility at baseline with use of a 4WW. Lives in a home with her daughter with no stairs to manage. Pt receives meals on wheels for lunch when daughter is at work.     Discharge Planner PT   Patient plan for discharge: Hopeful for home  Current status: Supine to sit with min A and increased time. Sit to/from stand from bed with min A, SBA from toilet with commode overlay. Ambulates 75' with FWW and CGA/SBA. Decreased foot clearance bilaterally. Able to maintain SpO2 >90% on RA at rest. Will desat to the the low 80's on RA with mobility. Requires 3L via NC to consistently keep SpO2 >90%.  Sit to supine with mod A for LEs.   Barriers to return to prior living situation: Decreased activity tolerance, level of A with transfers and bed mobility, O2 needs  Recommendations for discharge: TCU  Rationale for recommendations: Patient would benefit from continued PT services to address strength, balance and activity tolerance deficits prior to returning home. Pending decreased assist with bed mobility and transfers, returning home may be a possibility. Recommend pt to ambulate 3x/day outside of therapy to keep up strength for duration of stay.        Entered by: Keira Curran 05/22/2018 5:16 PM

## 2018-05-22 NOTE — CONSULTS
"Foot & Ankle Surgery  May 22, 2018    CC: left foot ulcer    I was asked to see Keisha Godinez regarding the chief complaint by:  Dr. DEBORAH Celaya    HPI:  Pt is a 86 year old female who presents with above complaint.  Admission for cellulitis left lower extremity, patient states she started developing redness, swelling, pain and drainage from the left lower leg.  Being treated with IV abx and compression wraps.  Foot & Ankle Surgery consult for \"skin lesion\" plantar-medial left arch.  Lesion has been present for a long time. 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  The bone spur was resected by myself 7/6/16 and the chronic wound healed uneventfully.  Her daughter has been putting Neosporin on the wound     ROS:   Pos for CC.  The patient denies current nausea, vomiting, chills, fevers, belly pain, calf pain, chest pain or SOB.  Complete remainder of ROS is otherwise neg.    VITALS:    Vitals:    05/22/18 1300 05/22/18 1353 05/22/18 1523 05/22/18 1620   BP: 133/40 (!) 149/29  (!) 144/38   BP Location:       Pulse: 64   67   Resp: 19 17 18   Temp: 99.1  F (37.3  C) 99.3  F (37.4  C)  99.6  F (37.6  C)   TempSrc: Oral Oral  Oral   SpO2: 94% 96% 96% 90%   Weight:       Height:           PMH:    Past Medical History:   Diagnosis Date     Anemia      Atrial fibrillation (H)      B12 deficiency      Cardiomyopathy (H)      CHF (congestive heart failure) (H)      Chronic edema     Lower extremities     Chronic systolic congestive heart failure (H)      CVA (cerebral vascular accident) (H) 2010    Acute Left MCA hemispheric CVA ( on coumadin)     Depression      Gastro-oesophageal reflux disease      GI bleed 03-20-15     Hyperlipidemia      Hypertension      Iron deficiency      MSSA (methicillin susceptible Staphylococcus aureus) 03-10-15     Pulmonary hypertension      Shingles        SXHX:    Past Surgical " History:   Procedure Laterality Date     COLONOSCOPY  03/24/2015    Diverticulosis, polyps     ENTEROSCOPY SMALL BOWEL N/A 2/29/2016    Procedure: ENTEROSCOPY SMALL BOWEL;  Surgeon: Ady Ponce MD;  Location:  GI     ESOPHAGOSCOPY, GASTROSCOPY, DUODENOSCOPY (EGD), COMBINED N/A 3/22/2015    Upper GI- Normal, nothing significant found.      EXCISE MASS FOOT Right 7/6/2016    Procedure: EXCISE MASS FOOT;  Surgeon: Lee Jang DPM;  Location:  OR        MEDS:    Current Facility-Administered Medications   Medication     acetaminophen (TYLENOL) tablet 650 mg     albuterol (PROVENTIL) neb solution 2.5 mg     ceFAZolin (ANCEF) intermittent infusion 1 g     cholecalciferol (vitamin D3) tablet 1,000 Units     ferrous sulfate (IRON) tablet 325 mg     furosemide (LASIX) injection 20 mg     gabapentin (NEURONTIN) capsule 100 mg     hydrALAZINE (APRESOLINE) tablet 75 mg     ipratropium - albuterol 0.5 mg/2.5 mg/3 mL (DUONEB) neb solution 3 mL     isosorbide mononitrate (IMDUR) 24 hr tablet 30 mg     magnesium sulfate 4 g in 100 mL sterile water (premade)     melatonin tablet 1 mg     naloxone (NARCAN) injection 0.1-0.4 mg     omeprazole (priLOSEC) CR capsule 20 mg     ondansetron (ZOFRAN-ODT) ODT tab 4 mg    Or     ondansetron (ZOFRAN) injection 4 mg     polyethylene glycol (MIRALAX/GLYCOLAX) Packet 17 g     potassium chloride (KLOR-CON) Packet 20-40 mEq     potassium chloride 10 mEq in 100 mL intermittent infusion with 10 mg lidocaine     potassium chloride 10 mEq in 100 mL sterile water intermittent infusion (premix)     potassium chloride 20 mEq in 50 mL intermittent infusion     potassium chloride SA (K-DUR/KLOR-CON M) CR tablet 20-40 mEq     senna-docusate (SENOKOT-S;PERICOLACE) 8.6-50 MG per tablet 1 tablet    Or     senna-docusate (SENOKOT-S;PERICOLACE) 8.6-50 MG per tablet 2 tablet     simvastatin (ZOCOR) tablet 10 mg     venlafaxine (EFFEXOR-ER) 24 hr tablet 75 mg       ALL:     Allergies    Allergen Reactions     Lisinopril Other (See Comments)     Tongue swelling     Calcium Nausea and Vomiting     Egg Yolk      Flu Virus Vaccine      Allergic to eggs.     Keflex [Cephalexin] Nausea     Pt states she had upset stomach.  NOT tongue swelling.  She had tongue swelling with a combo bp med that she thinks included lisinopril.    Tolerates IV Cefazolin     Levaquin [Levofloxacin]      Sulfa Drugs      Zinc Nausea and Vomiting       FMH:    Family History   Problem Relation Age of Onset     Known Genetic Syndrome Father      Known Genetic Syndrome Mother      Other Cancer Daughter      pancreatic     Other Cancer Brother      type     Other Cancer Sister 60     lung     DIABETES No family hx of      Breast Cancer No family hx of      Cancer - colorectal No family hx of      Ovarian Cancer No family hx of      Prostate Cancer No family hx of        SocHx:    Social History     Social History     Marital status:      Spouse name: N/A     Number of children: N/A     Years of education: N/A     Occupational History     Not on file.     Social History Main Topics     Smoking status: Former Smoker     Quit date: 4/14/1956     Smokeless tobacco: Never Used     Alcohol use No     Drug use: No     Sexual activity: No     Other Topics Concern     Caffeine Concern No     Hot tea 1 cup daily     Special Diet Yes     low sodium     Exercise No     limited right now     Social History Narrative           EXAMINATION:  Gen:   No apparent distress  Neuro:   A&Ox3, no deficits  Psych:    Answering questions appropriately for age and situation with normal affect  Head:    NCAT  Eye:    Visual scanning without deficit  Ear:    Response to auditory stimuli wnl  Lung:    Non-labored breathing on RA noted  Abd:    NTND per patient report  Lymph:    Neg for pitting/non-pitting edema BLE  Vasc:    Pulses palpable, CFT minimally delayed  Neuro:    Light touch sensation intact to all sensory nerve distributions without  paresthesias  Derm:    Left lower leg wrap in place, left intact.  Skin lesion is a dried blood blister with underlying 7 x 7mm partial thickness wound.  Mild malodor from macerated skin but no cellulitis, drainage, erythema or deep probing.  Unremarkable partial thickness wound  MSK:    Left lower extremity - pes planus, bony prominence at plantar medial 1st TMT.    Calf:    Neg for redness, swelling or tenderness      Assessment:  86 year old female with clinically stable partial thickness wound plantar-medial L 1st TMT 2/2 to underlying bony prominence      Plan:  Discussed etiologies, anatomy and options  1.  Clinically stable partial thickness ulcer plantar-medial L 1st TMT 2/2 to underlying bony prominence  -no clinical evidence of underlying deep infection  -Excisional debridement was performed, partial-thickness(limited to skin breakdown, no exposed subcutaneous fat), sharply debriding the wound, excising nonviable tissue to the above dimensions with a 10 blade  -daily dressing order for bacitracin and Mepilex island bandage  -surgical shoe ordered, WBAT  -patient will continue with Neosporin bandage at home with daughter's assistance  -patient advised to follow up in clinic in 10-14 days; will sign off, please call with questions or acute changes to patient/wound status.  Anticipate 7 day course of PO abx after 72 hours IV abx in-house would be sufficient for lower extremity cellulitis.    -discussed possible orthotics, although she's not diabetic and orthotic coverage can be limited  -briefly discussed surgical options.  Hopefully offloading and daily wound cares will allow healing of the wound better than was noted on right foot, where surgery was required      Lee Jang, SHARIF   Podiatric Foot & Ankle Surgeon  [facility]  [phone]

## 2018-05-22 NOTE — PROGRESS NOTES
Hospitalist Progress Note  [facility]      Richardson Celaya MD 05/22/2018      Reason for hospitalization: Left lower extremity cellulitis         Assessment and Plan:        Brief patient summary of Stay:    Keisha Godinez is a 86 year old female with PMH including pulmonary hypertension, chronic diastolic CHF, lymphedema, A. fib and remote CVA not on AC due to GI bleed history, anemia, gout, HTN, MDD, and GERD who presents as a direct admit from clinic after being seen for left leg redness and weeping of the skin.  Concern for left leg cellulitis and sent for direct admission           Hospital course,Consults and procedures:  Patient was admitted and was started on IV antibiotic and cultures were obtained and patient was closely monitored.  Patient has also bilateral lower extremity edema concerning for congestive heart failure.  Patient was treated with IV Lasix as well.    Patient hemoglobin dropped to 6.9 down from 7.5 on admission.  No evidence of bleeding.  Patient was transfused 1 unit of blood.        Hospital Active Problem List,Assessment and Plan:      1. Left lower extremity cellulitis: Suspect chronic dermatosis due to venous stasis dermatitis complicated by acute cellulitis.  Appears to be nonpurulent  --Patient is on Ancef, continue Ancef for now, follow culture and progress    2. Bilateral lower extremity edema suspected to be due to acute on chronic diastolic heart failure as well as right ventricular systolic dysfunction from pulmonary hypertension  --Patient is started on Lasix, no shortness of breath or chest pain complaint.  --Continue Lasix at 20 mg IV every 8 hours for the next 24 hours and reassess for need for diuresis.   --Continue to check weights daily, intake and output measurements.    3. Severe anemia: Acute on chronic likely secondary to iron deficiency.  No evidence of acute bleeding   --We will transfuse 1 unit of blood, administer Lasix 20 mg IV after  transfusion to prevent volume overload    4. Atrial fibrillation and history of CVA: Not anticoagulated due to history of GI bleed.  Currently controlled ventricular response with atrial fibrillation.  Not on beta-blocker or calcium channel blocker  5. Hypoxia: Oxygen saturation decreased to 87% with exertion likely due to congestive heart failure-continue Lasix, oxygen supplement  6. Left lower extremity skin lesion suspected to be bunion on the left foot mass plantar foot: Podiatry consulted to assist with further evaluation recommendations      #Pain management: # Pain Assessment:  Current Pain Score 5/22/2018   Patient currently in pain? denies   Pain score (0-10) -   Pain location -   Pain descriptors -   Keisha duffy pain level was assessed and she currently denies pain.        #DVT Prophylaxis: Pneumatic Compression Devices            Code Status and Goal of care: Full Code      Discharge Plan: Continue current management, monitor patient and may need to be in the hospital for the next 2 days until her symptoms are controlled and patient is stable        Interval History (Subjective):             Patient was seen and examined by me today and medical record reviewed.Overnight events noted and care discussed with nursing staff and treatment team.  Patient has some weakness but denies any chest pain, lightheadedness or dizziness.  Hemoglobin was 6.9 today.  Patient denies any bleeding from GI.  No nausea or vomiting.                   Physical Exam:      Last Vital Signs:  Temp:  [96.3  F (35.7  C)-100.2  F (37.9  C)] 98.1  F (36.7  C)  Pulse:  [55-76] 61  Resp:  [16-20] 18  BP: (118-150)/(36-50) 138/44  SpO2:  [87 %-98 %] 97 %       Wt Readings from Last 5 Encounters:   05/22/18 74.1 kg (163 lb 4.8 oz)   05/21/18 73 kg (161 lb)   01/16/18 73 kg (161 lb)   12/19/17 73 kg (161 lb)   11/28/17 69.4 kg (153 lb)       GEN:  Alert, oriented x 3, appears comfortable, NAD.  NECK:Supple ,no mass or thyromegaly   HEENT:   Normocephalic/atraumatic, no scleral icterus, no nasal discharge, mouth moist.  CV: Irregularly irregular heart, controlled rate.  LUNGS: Decreased air entry with few crackles posteriorly basally..  ABD:  Active bowel sounds, soft, non-tender/non-distended.  No rebound/guarding/rigidity.  EXT: Bilateral lower extremity edema noted with 2-3+ edema and left lower extremity erythematous weeping skin lesions on the left medial aspect of plantar foot which appears to be bunion.  SKIN: Chronic dermatosis involving bilateral lower extremity more on the left  Neurologic:Grossly intact,non focal                    Medications:      All current medications were reviewed with changes reflected in problem list.    Medications       ceFAZolin  1 g Intravenous Q8H     cholecalciferol  1,000 Units Oral Daily     ferrous sulfate  325 mg Oral BID     furosemide  20 mg Intravenous Q8H     gabapentin  100 mg Oral TID     hydrALAZINE  75 mg Oral TID     ipratropium - albuterol 0.5 mg/2.5 mg/3 mL  3 mL Nebulization 4x daily     isosorbide mononitrate  30 mg Oral Daily     omeprazole  20 mg Oral Daily     simvastatin  10 mg Oral At Bedtime     venlafaxine  75 mg Oral Daily                Data:          Data reviewed today: I reviewed all new labs and imaging results over the last 24 hours. I personally reviewed no images or EKG's today        Recent Labs  Lab 05/22/18 0627 05/21/18  1829   WBC 7.9 8.7   HGB 6.9* 7.5*   HCT 23.9* 25.9*   MCV 87 86    248       Recent Labs  Lab 05/21/18  1833 05/21/18  1828   CULT No growth after 7 hours No growth after 7 hours       Recent Labs  Lab 05/22/18 0627 05/21/18  1829    138   POTASSIUM 3.7 3.7   CHLORIDE 104 103   CO2 28 29   ANIONGAP 8 6   GLC 93 99   BUN 19 17   CR 0.78 0.80   GFRESTIMATED 70 68   GFRESTBLACK 85 82   CASPER 8.3* 8.6   MAG  --  2.2         Recent Labs  Lab 05/22/18 0627 05/21/18  1829   GLC 93 99           No results for input(s): INR in the last 168  hours.      No results for input(s): TROPONIN, TROPI, TROPR in the last 168 hours.    Invalid input(s): TROP, TROPONINIES    Recent Results (from the past 48 hour(s))   XR Chest 2 Views    Narrative    XR CHEST 2 VW 5/21/2018 7:14 PM    COMPARISON: 10/27/2017    HISTORY: Bilateral crackles and shortness of breath.      Impression    IMPRESSION: Enlarged cardiac silhouette is again seen and unchanged.  Mild bibasilar atelectasis. No significant pleural effusion. No  pneumothorax.    PAULINE MCFARLAND MD               Disclaimer: This note consists of symbols derived from keyboarding, dictation and/or voice recognition software. As a result, there may be errors in the script that have gone undetected. Please consider this when interpreting information found in this chart

## 2018-05-22 NOTE — PROGRESS NOTES
[facility] Nurse Inpatient Wound Assessment     Assessment of wound(s) on pt's:   LLE        Data:   Patient History:      per MD note(s): Keisha Godinez is a 86 year old female with PMH including pulmonary hypertension, chronic diastolic CHF, lymphedema, A. fib and remote CVA not on AC due to GI bleed history, anemia, gout, HTN, MDD, and GERD who presents as a direct admit from clinic after being seen for left leg redness and weeping of the skin.  Concern for left leg cellulitis and sent for direct admission       Moisture Management:  Toilets with assist x 1      Current Diet / Nutrition:           Active Diet Order      2 Gram Sodium Diet             Hossein Assessment and sub scores:   Hossein Score  Av  Min: 17  Max: 20       Labs:    Recent Labs   Lab Test  18   0627   17   1038   10/18/17   0646   16   0617   ALBUMIN   --    --   3.5   --    --    < >   --    HGB  6.9*   < >  9.8*   < >  10.3*   < >   --    RBC  2.76*   < >  3.35*   < >  3.33*   < >   --    WBC  7.9   < >  7.4   < >  10.9   < >   --    PLT  212   < >  146*   < >  134*   < >   --    INR   --    --    --    --   1.04   --    --    CRP   --    --    --    --    --    --   41.5*    < > = values in this interval not displayed.          Wound Assessment (location #1):   LLE  Wound History:  Pt states weeping started about a week ago and has been getting worse.  Normally wears compression socks at home.       Wound Base: Nearly entire lower leg is affected, from ankle to a few inches below knee.  Skin is red and raw and very weepy.  No real defined or deep wounds, just general sloughing of epidermis.  Anterior shin with saturated/macerated whitish scaly tissue that look like will be sloughing off in time.      Palpation of the wound bed:  normal    Slough appearance:  none, just macerated epidermis        Eschar appearance:  none    Periwound Skin: edema and erythema.  Medial mid foot with large bunion bump  with darkened tissue; Podiatry has been consulted to ydlan; pt reports she has seen Dr. Jang in the past.      Color: red    Temperature  warm    Drainage: large gold/serous weepy    Odor: very mild    Pain:  moderate and severe with even very light palpation, especially to posterior lower left leg      Right lower leg is very cold, firm, scaly and mildly edematous.   No wounds.  Medial bunion area with some discoloration but is blanchable and non-tender.              Intervention:     Patient's chart evaluated.      Wound(s) was assessed    Wound Care: was done:  Cleansed LLE with scrub care soap, dressed with Xeroform gauze, Covidien chux and Cherelle and light ACE wrap    Orders  Written    Supplies  ordered and gathered    Discussed plan of care with Patient and Nurse          Assessment:       LLE with cellulitis and copious weepy drainage.  No deep or defined wounds, but very large areas of sloughing epidermis and raw tissue, and leg is extremely painful with touch.   Will try dressing with Xeroform gauze to help dry out weepy tissue and provide non-adherent antimicrobial protective layer.  If ends up needing more debridement, may consider switching to Silvadene cream.  Will likely need compression once medically appropriate.         Plan:     Nursing to notify the Provider(s) and re-consult the [facility] Nurse if wound(s) deteriorate(s) or if the wound care plan needs reevaluation.      Plan for skin/wound care to LLE: Daily and prn:  1.  Remove old dressings, moistening if needed to help release.  Cleanse leg very gently with Scrub care soap and water, do not scrub the leg but may gently wipe away any loose tissue; carefully pat dry.    2.  Apply strips of Xeroform gauze to cover all weepy/raw tissue.  (Xeroform is a yellow antibacterial vaseline gauze, comes on a spool in a foil packet from [facility] [place], #8619, Nursing to order more as needed, and keep unused portion sealed up in a ziploc baggy).   (Please use several  strips of the Xeroform instead of a continuous wrap around the leg, in case of additional swelling).    3.  Cover with 1/4 Covidien chux pad, wrapped around leg and secured with Cherelle or Kerlix.  May add ABDs underneath if needed.  Can change this outer absorptive layer prn, but try to only change Xeroform once daily.   4.  May apply light ACE wrap.  Once pt has been on antibiotics for about 48 hrs, can consider more aggressive compression and/or a Lymphedema consult.    5.  Label dressing/wrap with time/date/initials.  6.  Elevate legs as much as possible, floating heels at all times.   7.  Notify WOC if leg worsens or if plan of care needs re-eval.      WOC Nurse will return: weekly and prn

## 2018-05-22 NOTE — PROGRESS NOTES
05/22/18 1500   Quick Adds   Type of Visit Initial PT Evaluation   Living Environment   Lives With child(emre), adult   Living Arrangements house   Home Accessibility no concerns;ramps present at home   Number of Stairs to Enter Home 0   Transportation Available family or friend will provide  (daughter)   Living Environment Comment Daughter works during the day. Pt gets meals on wheels for lunch. Pt manages her own medications.   Self-Care   Usual Activity Tolerance fair  (Difficutly with community distances; uses a wheelchair)   Current Activity Tolerance fair   Equipment Currently Used at Home walker, rolling;shower chair;raised toilet;wheelchair, manual   Functional Level Prior   Ambulation 1-->assistive equipment   Transferring 1-->assistive equipment   Toileting 1-->assistive equipment   Bathing 1-->assistive equipment   Dressing 2-->assistive person  (Daughter helps with compression socks daily)   Fall history within last six months no   General Information   Onset of Illness/Injury or Date of Surgery - Date 05/21/18   Referring Physician MD Patricia   Patient/Family Goals Statement To go home   Pertinent History of Current Problem (include personal factors and/or comorbidities that impact the POC) Pt admitted with L LE cellulitis. PMH significant for edema; pt manages withbilateral LE compression wraps.    Cognitive Status Examination   Orientation orientation to person, place and time   Level of Consciousness alert   Follows Commands and Answers Questions 100% of the time;able to follow multistep instructions   Personal Safety and Judgment intact   Pain Assessment   Patient Currently in Pain Yes, see Vital Sign flowsheet   Integumentary/Edema   Integumentary/Edema Comments Dressing intact to the L LE   Range of Motion (ROM)   ROM Comment UE/LE AROM WFLs   Strength   Strength Comments Decreased generally; needs physical assist with mobility as noted below.    Bed Mobility   Bed Mobility Comments Supine to  "sit with min A   Transfer Skills   Transfer Comments Sit to/from stand from bed with min A   Gait   Gait Comments Ambulates 10' with FWW and CGA   Balance   Balance Comments Requires bilateral UE support on FWW for safe mobility   Coordination   Coordination no deficits were identified   General Therapy Interventions   Planned Therapy Interventions bed mobility training;gait training;strengthening;ROM;transfer training;progressive activity/exercise   Clinical Impression   Criteria for Skilled Therapeutic Intervention yes, treatment indicated   PT Diagnosis Generalized weakness   Influenced by the following impairments Pain, decreased activity tolerance, MOTT/SOB, impaired balance   Functional limitations due to impairments Decreased IND with bed mobility, transfers and ambulation   Clinical Presentation Stable/Uncomplicated   Clinical Presentation Rationale Stable.    Clinical Decision Making (Complexity) Low complexity   Therapy Frequency` daily   Predicted Duration of Therapy Intervention (days/wks) One week   Anticipated Discharge Disposition Transitional Care Facility   Risk & Benefits of therapy have been explained Yes   Patient, Family & other staff in agreement with plan of care Yes   [facility] TM \"6 Clicks\"   2016, Trustees of [facility], under license to [employer].  All rights reserved.   6 Clicks Short Forms Basic Mobility Inpatient Short Form   [facility]  \"6 Clicks\" V.2 Basic Mobility Inpatient Short Form   1. Turning from your back to your side while in a flat bed without using bedrails? 3 - A Little   2. Moving from lying on your back to sitting on the side of a flat bed without using bedrails? 3 - A Little   3. Moving to and from a bed to a chair (including a wheelchair)? 3 - A Little   4. Standing up from a chair using your arms (e.g., wheelchair, or bedside chair)? 3 - A Little   5. To walk in hospital room? 3 - A Little   6. Climbing 3-5 steps with a railing? 2 " - A Lot   Basic Mobility Raw Score (Score out of 24.Lower scores equate to lower levels of function) 17   Total Evaluation Time   Total Evaluation Time (Minutes) 10

## 2018-05-22 NOTE — PROGRESS NOTES
Up with sba x1 and fww. Hgb 6.9 received 1 unit prbc following 20mg IV lasix. Recheck hgb in am. Up to chair with LLE elevated. WOCN consulted. Weaning o2, currently 1L nc and desats to 87% on RA. Enc C&DB. SOB with exertion or longer periods of speech. Scheduled nebs per RT. Daily weight & strict I&O. Continuous pulse ox in place. Mag and K replacement protocol prn. IV abx.

## 2018-05-22 NOTE — PHARMACY-ADMISSION MEDICATION HISTORY
Admission medication history interview status for this patient is complete. See [facility] admission navigator for allergy information, prior to admission medications and immunization status.     Medication history interview source(s):Patient  Medication history resources (including written lists, pill bottles, clinic record):Epic    Changes made to PTA medication list:  Added: none  Deleted: Hydroxyzine tid prn  Changed: none    Medication reconciliation/reorder completed by provider prior to medication history? No    Do you take OTC medications (eg tylenol, ibuprofen, fish oil, eye/ear drops, etc)? Y(Y/N)    For patients on insulin therapy: N (Y/N)  Lantus/levemir/NPH/Mix 70/30 dose:   (Y/N) (see Med list for doses)   Sliding scale Novolog Y/N  If Yes, do you have a baseline novolog pre-meal dose:  units with meals  Patients eat three meals a day:   Y/N    How many episodes of hypoglycemia do you have per week: _______  How many missed doses do you have per week: ______  How many times do you check your blood glucose per day: _______   Any Barriers to therapy - Be specific :  cost of medications, comfortable with giving injections (if applicable), comfortable and confident with current diabetes regimen: Y/N ______________      Prior to Admission medications    Medication Sig Last Dose Taking? Auth Provider   acetaminophen (TYLENOL) 325 MG tablet Take 650 mg by mouth 3 times daily as needed for mild pain  Yes Unknown, Entered By History   albuterol (ACCUNEB) 1.25 MG/3ML nebulizer solution Take 1 vial by nebulization 3 times daily  5/21/2018 at 2 doses Yes Unknown, Entered By History   Cranberry Extract 250 MG TABS Take 250 mg by mouth daily 5/21/2018 at Unknown time Yes Sharonda Cano MD   ferrous sulfate (IRON) 325 (65 FE) MG tablet Take 1 tablet (325 mg) by mouth 2 times daily 5/21/2018 at am Yes Gerri Eubanks MD   furosemide (LASIX) 40 MG tablet Take 1 tablet (40 mg) by mouth 2 times daily 5/21/2018 at am Yes  Gerri Eubanks MD   gabapentin (NEURONTIN) 100 MG capsule Take 1 capsule (100 mg) by mouth 3 times daily 5/21/2018 at am Yes Darryl Reed MD   hydrALAZINE (APRESOLINE) 25 MG tablet Take 3 tablets (75 mg) by mouth 3 times daily 5/21/2018 at 2 doses Yes Gerri Eubanks MD   isosorbide mononitrate (IMDUR) 30 MG 24 hr tablet Take 1 tablet (30 mg) by mouth daily 5/21/2018 at am Yes Gerri Eubanks MD   OMEGA-3 FATTY ACIDS PO Take 1,200 mg by mouth daily  5/21/2018 at Unknown time Yes Reported, Patient   omeprazole (PRILOSEC) 20 MG CR capsule Take 1 capsule (20 mg) by mouth daily 5/21/2018 at Unknown time Yes Gerri Eubanks MD   potassium chloride SA (KLOR-CON) 20 MEQ CR tablet Take 1 tablet (20 mEq) by mouth daily 5/21/2018 at Unknown time Yes Gerri Eubanks MD   senna-docusate (SENOKOT-S;PERICOLACE) 8.6-50 MG per tablet Take 2 tablets daily as needed for constipation while taking prescription pain medications.  Yes Lee Jang DPM   simvastatin (ZOCOR) 10 MG tablet Take 1 tablet (10 mg) by mouth At Bedtime 5/20/2018 at Unknown time Yes Gerri Eubanks MD   venlafaxine (EFFEXOR-XR) 75 MG 24 hr capsule TAKE ONE CAPSULE BY MOUTH DAILY 5/21/2018 at Unknown time Yes Gerri Eubanks MD   VITAMIN D, CHOLECALCIFEROL, PO Take 1,000 Units by mouth daily  5/21/2018 at Unknown time Yes Reported, Patient

## 2018-05-22 NOTE — PROVIDER NOTIFICATION
05/22/18 0650   Significant Event   Significant Event Other (see comments)  (critical lab: Hgb 6.9)   RN notified

## 2018-05-22 NOTE — PLAN OF CARE
Problem: Patient Care Overview  Goal: Plan of Care/Patient Progress Review  Outcome: No Change  VS stable, denies pain.L leg -blisters moderate drainage, dressing applied. Patient on IV Lasix.  Called appropriately.

## 2018-05-22 NOTE — PLAN OF CARE
Problem: Patient Care Overview  Goal: Plan of Care/Patient Progress Review  Outcome: No Change  A&O. /36, 138/42, 97% on 2L NC, other VSS. Tele Afib CVR. LS dim, crackles to bases, MOTT. 2+/3+ edema to flank, hip, and BLE. BLE elevated. Up with assist of 1 with walker. PT, WOC nurse following. Continue with POC.     Heart Failure Care Pathway  GOALS TO BE MET BEFORE DISCHARGE:    1. Decrease congestion and/or edema with diuretic therapy to achieve near      optimal volume status.            Dyspnea improved:  No, please explain: MOTT            Edema improved:     No, please explain: 2+/3+ edema to hip, flank, and BLE        Net I/O and Weights since admission:                       Vitals:    05/21/18 1650   Weight: 73.5 kg (162 lb 1.6 oz)       2.  O2 sats > 92% on RA or at prior home O2 therapy level.          Current oxygenation status:       SpO2: 97 %         O2 Device: Nasal cannula,  Oxygen Delivery: 2 LPM         Able to wean O2 this shift to keep sats > 92%:  No, please explain: 88% on RA, MOTT       Does patient use Home O2? No    3.  Tolerates ambulation and mobility near baseline: No, please explain: MOTT        How many times did the patient ambulate with nursing staff this shift? 1, up to bathroom    Please review the Heart Failure Care Pathway for additional HF goal outcomes.    Lula Ferguson RN  5/22/2018       0150: /36, MD notified. No new orders placed.

## 2018-05-22 NOTE — PROGRESS NOTES
Patient was seen and examined by me this morning.  She has no complaint of chest pain or shortness of breath.  She is saturating well.  No fever chills.  Lower extremity edema with extensive dermatosis on the left lower extremity.  Hemoglobin is 6.9 this morning.  Chest x-ray reviewed and no evidence of pulmonary edema or significant CHF.  Plan is to give her 1 unit of blood, continue Lasix at 20 mg IV 3 times a day for the next 3 doses, daily weight check and intake and output monitoring.  Podiatry is consulted for further assistance.

## 2018-05-22 NOTE — PROGRESS NOTES
"Counts include 234 beds at the [facility]     Date: 5/21/18  Admission Dx: Cellulitis  Pulmonary History: CHF  Home Nebulizer/MDI Use: Alb TID  Home Oxygen: none  Acuity Level ([facility] flow sheet): 3  Aerosol Therapy initiated: Duoneb QID, alb Q4 prn      Pulmonary Hygiene initiated: NA      Volume Expansion initiated: IS      Current Oxygen Requirements: 2lpm  Current SpO2: 96%    Re-evaluation date: 5/24/18    Patient Education: Patient aware of medication benefits and interactions.      See \"RT Assessments\" flow sheet for patient assessment scoring and Acuity Level Details.             "

## 2018-05-23 ENCOUNTER — APPOINTMENT (OUTPATIENT)
Dept: PHYSICAL THERAPY | Facility: CLINIC | Age: 83
DRG: 291 | End: 2018-05-23
Attending: INTERNAL MEDICINE
Payer: MEDICARE

## 2018-05-23 LAB
ANION GAP SERPL CALCULATED.3IONS-SCNC: 8 MMOL/L (ref 3–14)
BASOPHILS # BLD AUTO: 0.1 10E9/L (ref 0–0.2)
BASOPHILS NFR BLD AUTO: 0.7 %
BUN SERPL-MCNC: 19 MG/DL (ref 7–30)
CALCIUM SERPL-MCNC: 8.4 MG/DL (ref 8.5–10.1)
CHLORIDE SERPL-SCNC: 104 MMOL/L (ref 94–109)
CO2 SERPL-SCNC: 28 MMOL/L (ref 20–32)
CREAT SERPL-MCNC: 0.75 MG/DL (ref 0.52–1.04)
DIFFERENTIAL METHOD BLD: ABNORMAL
EOSINOPHIL # BLD AUTO: 0.3 10E9/L (ref 0–0.7)
EOSINOPHIL NFR BLD AUTO: 2.9 %
ERYTHROCYTE [DISTWIDTH] IN BLOOD BY AUTOMATED COUNT: 15.3 % (ref 10–15)
GFR SERPL CREATININE-BSD FRML MDRD: 74 ML/MIN/1.7M2
GLUCOSE SERPL-MCNC: 98 MG/DL (ref 70–99)
HCT VFR BLD AUTO: 28.5 % (ref 35–47)
HGB BLD-MCNC: 8.5 G/DL (ref 11.7–15.7)
IMM GRANULOCYTES # BLD: 0 10E9/L (ref 0–0.4)
IMM GRANULOCYTES NFR BLD: 0.3 %
INTERPRETATION ECG - MUSE: NORMAL
LYMPHOCYTES # BLD AUTO: 0.6 10E9/L (ref 0.8–5.3)
LYMPHOCYTES NFR BLD AUTO: 6.9 %
MAGNESIUM SERPL-MCNC: 2.2 MG/DL (ref 1.6–2.3)
MCH RBC QN AUTO: 25.6 PG (ref 26.5–33)
MCHC RBC AUTO-ENTMCNC: 29.8 G/DL (ref 31.5–36.5)
MCV RBC AUTO: 86 FL (ref 78–100)
MONOCYTES # BLD AUTO: 0.8 10E9/L (ref 0–1.3)
MONOCYTES NFR BLD AUTO: 9.1 %
NEUTROPHILS # BLD AUTO: 7.1 10E9/L (ref 1.6–8.3)
NEUTROPHILS NFR BLD AUTO: 80.1 %
NRBC # BLD AUTO: 0 10*3/UL
NRBC BLD AUTO-RTO: 0 /100
PLATELET # BLD AUTO: 224 10E9/L (ref 150–450)
POTASSIUM SERPL-SCNC: 3.3 MMOL/L (ref 3.4–5.3)
POTASSIUM SERPL-SCNC: 4 MMOL/L (ref 3.4–5.3)
RBC # BLD AUTO: 3.32 10E12/L (ref 3.8–5.2)
SODIUM SERPL-SCNC: 140 MMOL/L (ref 133–144)
WBC # BLD AUTO: 8.9 10E9/L (ref 4–11)

## 2018-05-23 PROCEDURE — 80048 BASIC METABOLIC PNL TOTAL CA: CPT | Performed by: INTERNAL MEDICINE

## 2018-05-23 PROCEDURE — 25000125 ZZHC RX 250: Performed by: INTERNAL MEDICINE

## 2018-05-23 PROCEDURE — 12000007 ZZH R&B INTERMEDIATE

## 2018-05-23 PROCEDURE — A9270 NON-COVERED ITEM OR SERVICE: HCPCS | Mod: GY | Performed by: INTERNAL MEDICINE

## 2018-05-23 PROCEDURE — 97530 THERAPEUTIC ACTIVITIES: CPT | Mod: GP | Performed by: PHYSICAL THERAPIST

## 2018-05-23 PROCEDURE — 25000132 ZZH RX MED GY IP 250 OP 250 PS 637: Mod: GY | Performed by: INTERNAL MEDICINE

## 2018-05-23 PROCEDURE — 94640 AIRWAY INHALATION TREATMENT: CPT | Mod: 76

## 2018-05-23 PROCEDURE — 40000275 ZZH STATISTIC RCP TIME EA 10 MIN

## 2018-05-23 PROCEDURE — 40000193 ZZH STATISTIC PT WARD VISIT: Performed by: PHYSICAL THERAPIST

## 2018-05-23 PROCEDURE — 99233 SBSQ HOSP IP/OBS HIGH 50: CPT | Performed by: INTERNAL MEDICINE

## 2018-05-23 PROCEDURE — 25000128 H RX IP 250 OP 636: Performed by: INTERNAL MEDICINE

## 2018-05-23 PROCEDURE — 85025 COMPLETE CBC W/AUTO DIFF WBC: CPT | Performed by: INTERNAL MEDICINE

## 2018-05-23 PROCEDURE — 84132 ASSAY OF SERUM POTASSIUM: CPT | Performed by: INTERNAL MEDICINE

## 2018-05-23 PROCEDURE — 97116 GAIT TRAINING THERAPY: CPT | Mod: GP | Performed by: PHYSICAL THERAPIST

## 2018-05-23 PROCEDURE — 83735 ASSAY OF MAGNESIUM: CPT | Performed by: INTERNAL MEDICINE

## 2018-05-23 PROCEDURE — 97110 THERAPEUTIC EXERCISES: CPT | Mod: GP | Performed by: PHYSICAL THERAPIST

## 2018-05-23 PROCEDURE — 36415 COLL VENOUS BLD VENIPUNCTURE: CPT | Performed by: INTERNAL MEDICINE

## 2018-05-23 PROCEDURE — 94640 AIRWAY INHALATION TREATMENT: CPT

## 2018-05-23 RX ORDER — PREDNISONE 20 MG/1
20 TABLET ORAL DAILY
Status: DISCONTINUED | OUTPATIENT
Start: 2018-05-23 | End: 2018-05-24 | Stop reason: HOSPADM

## 2018-05-23 RX ORDER — FUROSEMIDE 10 MG/ML
20 INJECTION INTRAMUSCULAR; INTRAVENOUS EVERY 12 HOURS
Status: DISCONTINUED | OUTPATIENT
Start: 2018-05-23 | End: 2018-05-24 | Stop reason: HOSPADM

## 2018-05-23 RX ORDER — CODEINE PHOSPHATE AND GUAIFENESIN 10; 100 MG/5ML; MG/5ML
5 SOLUTION ORAL EVERY 4 HOURS PRN
Status: DISCONTINUED | OUTPATIENT
Start: 2018-05-23 | End: 2018-05-24 | Stop reason: HOSPADM

## 2018-05-23 RX ADMIN — CEFAZOLIN SODIUM 1 G: 1 INJECTION, SOLUTION INTRAVENOUS at 00:19

## 2018-05-23 RX ADMIN — FERROUS SULFATE TAB 325 MG (65 MG ELEMENTAL FE) 325 MG: 325 (65 FE) TAB at 20:57

## 2018-05-23 RX ADMIN — GABAPENTIN 100 MG: 100 CAPSULE ORAL at 08:48

## 2018-05-23 RX ADMIN — IPRATROPIUM BROMIDE AND ALBUTEROL SULFATE 3 ML: .5; 3 SOLUTION RESPIRATORY (INHALATION) at 20:45

## 2018-05-23 RX ADMIN — ACETAMINOPHEN 650 MG: 325 TABLET ORAL at 21:51

## 2018-05-23 RX ADMIN — IPRATROPIUM BROMIDE AND ALBUTEROL SULFATE 3 ML: .5; 3 SOLUTION RESPIRATORY (INHALATION) at 15:07

## 2018-05-23 RX ADMIN — POTASSIUM CHLORIDE 40 MEQ: 1.5 POWDER, FOR SOLUTION ORAL at 08:48

## 2018-05-23 RX ADMIN — FERROUS SULFATE TAB 325 MG (65 MG ELEMENTAL FE) 325 MG: 325 (65 FE) TAB at 08:48

## 2018-05-23 RX ADMIN — HYDRALAZINE HYDROCHLORIDE 75 MG: 25 TABLET ORAL at 16:39

## 2018-05-23 RX ADMIN — PREDNISONE 20 MG: 20 TABLET ORAL at 12:11

## 2018-05-23 RX ADMIN — GABAPENTIN 100 MG: 100 CAPSULE ORAL at 21:43

## 2018-05-23 RX ADMIN — FUROSEMIDE 20 MG: 10 INJECTION, SOLUTION INTRAVENOUS at 13:55

## 2018-05-23 RX ADMIN — GABAPENTIN 100 MG: 100 CAPSULE ORAL at 16:39

## 2018-05-23 RX ADMIN — CEFAZOLIN SODIUM 1 G: 1 INJECTION, SOLUTION INTRAVENOUS at 08:52

## 2018-05-23 RX ADMIN — OMEPRAZOLE 20 MG: 20 CAPSULE, DELAYED RELEASE ORAL at 08:48

## 2018-05-23 RX ADMIN — IPRATROPIUM BROMIDE AND ALBUTEROL SULFATE 3 ML: .5; 3 SOLUTION RESPIRATORY (INHALATION) at 11:54

## 2018-05-23 RX ADMIN — SIMVASTATIN 10 MG: 10 TABLET, FILM COATED ORAL at 21:43

## 2018-05-23 RX ADMIN — POTASSIUM CHLORIDE 20 MEQ: 1500 TABLET, EXTENDED RELEASE ORAL at 10:56

## 2018-05-23 RX ADMIN — ISOSORBIDE MONONITRATE 30 MG: 30 TABLET, EXTENDED RELEASE ORAL at 08:48

## 2018-05-23 RX ADMIN — FUROSEMIDE 20 MG: 10 INJECTION, SOLUTION INTRAVENOUS at 06:37

## 2018-05-23 RX ADMIN — CEFAZOLIN SODIUM 1 G: 1 INJECTION, SOLUTION INTRAVENOUS at 16:49

## 2018-05-23 RX ADMIN — HYDRALAZINE HYDROCHLORIDE 75 MG: 25 TABLET ORAL at 08:48

## 2018-05-23 RX ADMIN — HYDRALAZINE HYDROCHLORIDE 75 MG: 25 TABLET ORAL at 21:43

## 2018-05-23 RX ADMIN — GUAIFENESIN AND CODEINE PHOSPHATE 5 ML: 10; 100 LIQUID ORAL at 14:06

## 2018-05-23 RX ADMIN — VENLAFAXINE HYDROCHLORIDE 75 MG: 75 TABLET, EXTENDED RELEASE ORAL at 08:48

## 2018-05-23 RX ADMIN — IPRATROPIUM BROMIDE AND ALBUTEROL SULFATE 3 ML: .5; 3 SOLUTION RESPIRATORY (INHALATION) at 07:25

## 2018-05-23 RX ADMIN — VITAMIN D, TAB 1000IU (100/BT) 1000 UNITS: 25 TAB at 08:48

## 2018-05-23 ASSESSMENT — ACTIVITIES OF DAILY LIVING (ADL)
ADLS_ACUITY_SCORE: 20
ADLS_ACUITY_SCORE: 22
ADLS_ACUITY_SCORE: 20
ADLS_ACUITY_SCORE: 22
ADLS_ACUITY_SCORE: 20
ADLS_ACUITY_SCORE: 22

## 2018-05-23 NOTE — PLAN OF CARE
End of Shift Summary.  For vital signs and complete assessments, please see documentation flowsheets.     Pertinent assessments: pt alert and oriented, up with standby assist and the walker. Pt voiding without difficulty. Pt on IV lasix every 8 hours. Dressing to left leg dry and intact, have not needed to reinforce overnight. Pt on IV ancef for the left leg cellulitis. Per podiatry, to follow up with them in 10-14 days.  Major Shift Events: none, slept between cares  Plan (Upcoming Events): continue current cares  Discharge/Transfer Needs: none    Bedside Shift Report Completed :   Bedside Safety Check Completed:

## 2018-05-23 NOTE — PROGRESS NOTES
Patient was seen and examined by me this morning.  She is doing well no complaint of chest pain or shortness of breath.  Patient was seen podiatry service and recommendation noted.  She has diffuse breathing otherwise not hypoxic.  She has a negative fluid balance and she is losing weight.  Afebrile, no evidence of sepsis.  Plan is to continue IV antibiotic and on the day, continue nebs, add some steroid and continue to monitor another day and possibly discharge the next 1 day.

## 2018-05-23 NOTE — PLAN OF CARE
Problem: Patient Care Overview  Goal: Plan of Care/Patient Progress Review      PT: Pt attempted for session this PM. Pt currently visiting with family, and requests that this writer returns once they leave (which should be shortly). Updated RN, will check back as time/schedule allows.

## 2018-05-23 NOTE — PROGRESS NOTES
"BRIEF NUTRITION ASSESSMENT    REASON FOR ASSESSMENT:  Positive nutrition risk screen - Nonhealing wound, burns or pressure ulcer  CURRENT DIET AND NOURISHMENT ORDER:  - Information obtained from patient and chart review.  - Patient with a h/o CHF, CVA.    - Patient reported low sodium diet at home.  Verbalizes as \"2400 a day\".    - Receives meals on wheels.  - Typical intake is meals TID.    - Takes 1 Ensure daily.  - Denies a decrease in appetite or changes to oral intakes PTA.    - Food allergies/intolerances: egg yolk, zinc    Diet: 2 gram sodium  Current Intake/Tolerance: Per flow sheet review,  75% intake for majority of documented meals.     ANTHROPOMETRICS  Height: 5' 3\"  Weight: 73 kg   Body mass index is 28.64 kg/(m^2).  Weight Status:  Overweight BMI 25-29.9  Ideal body weight: 56.8 kg +/- 10%, 113% of IBW   Weight History:  Weight appears relatively stable, as noted below.  Likely slight variations 2/2 fluid shifts and CHF history.    Wt Readings from Last 10 Encounters:   05/23/18 73.3 kg (161 lb 11.2 oz)   05/21/18 73 kg (161 lb)   01/16/18 73 kg (161 lb)   12/19/17 73 kg (161 lb)   11/28/17 69.4 kg (153 lb)   11/14/17 70.1 kg (154 lb 9.6 oz)   11/08/17 70.1 kg (154 lb 9.6 oz)   11/06/17 68.9 kg (151 lb 12.8 oz)   11/02/17 72.1 kg (159 lb)   10/31/17 70.8 kg (156 lb)       ASSESSED NUTRITION NEEDS (PER APPROVED PRACTICE GUIDELINES, Dosing weight: 73.3 kg)  Estimated Energy Needs: 1237-6812 kcals (25-30 Kcal/Kg)  Justification: maintenance  Estimated Protein Needs: 73-88+ grams protein (1-1.2+ g pro/Kg)  Justification: wound healing and preservation of lean body mass  Estimated Fluid Needs: >1 mL/Kcal  Justification: maintenance    LABS/MEDS/PHYSICAL FINDINGS:  Wounds related to cellulitis - not pressure related per WOCN assessment  Meds reviewed  No obvious signs of fat or muscle wasting  Labs reviewed    Malnutrition:  Patient does not meet two of the following criteria necessary for diagnosing " malnutrition: significant weight loss, reduced intake, subcutaneous fat loss, muscle loss or fluid retention    INTERVENTION:  Nutrition Diagnosis:  No nutrition diagnosis at this time.    Implementation:  Nutrition Education:  Per provider if indicated.  Patient denied questions relating to 2 gram sodium diet.    Medical food supplement: Ordered Boost prn.    Collaboration and Referral of care: RD discussed patient during interdisciplinary care rounds this morning.    Follow Up/Monitoring:   Progress towards goals will be monitored and evaluated per protocol and Practice Guidelines         Zully Vásquez RDN, LD, [facility]  Pager - 3rd floor/ICU: [phone]  Pager - All other floors: [phone]  Pager - Weekend/holiday: [phone]  Office: [phone]

## 2018-05-23 NOTE — PLAN OF CARE
Problem: Patient Care Overview  Goal: Plan of Care/Patient Progress Review      Discharge Planner PT   Patient plan for discharge: Hopeful for home  Current status: Sit<>stand initially with maxAx2 from bedside chair. Pt able to complete additional sit<>stand transfers during session with minAx1. Pt completes sit>supine with modA at B LE and cues for technique. Pt ambulates 80' with use of FWW and CGA with cues for upright posture and step length. Pt fatigues with activity, but vitals remain stable.   Barriers to return to prior living situation: Decreased activity tolerance, level of A with transfers and bed mobility,   Recommendations for discharge: TCU  Rationale for recommendations: Patient would benefit from continued PT services to address strength, balance and activity tolerance deficits prior to returning home. Pending decreased assist with bed mobility and transfers, returning home may be a possibility. Recommend pt to ambulate 3x/day outside of therapy to keep up strength for duration of stay.        Entered by: Zakiya Reilly 05/23/2018 3:10 PM

## 2018-05-23 NOTE — PLAN OF CARE
"Problem: Patient Care Overview  Goal: Plan of Care/Patient Progress Review  VS /56  Pulse 67  Temp 97.6  F (36.4  C) (Oral)  Resp 18  Ht 1.6 m (5' 3\")  Wt 73.3 kg (161 lb 11.2 oz)  SpO2 92%  BMI 28.64 kg/m2  Lung sounds Coarse, diminished, expiratory wheezes, on Nebs and PO Prednisone   O2 Room Air  Tele A-Fib CVR w/ prolonged QT  Bowel sounds Active, passing gas, last BM 5/22  Tolerating 2g NA diet  IVF Saline locked between ABX, IV Ancef  Dressings Changed, moderate amount of clear drainage to L leg, CDI, mepilex changed on L foot, CDI   Tests K 3.3 replaced, recheck ordered, hgb 8.5  CMS Intact, +2 Edema to bilateral LE, IV lasix  Activity up with SBA assist & walker  Pain Denies, some discomfort during dressing change but denied wanting anything for it   Patient/family centered care POC discussed with pt, questions answered  Discharge plan Likely DC in 1 day        "

## 2018-05-23 NOTE — PLAN OF CARE
Problem: Cardiac: Heart Failure (Adult)  Goal: Signs and Symptoms of Listed Potential Problems Will be Absent, Minimized or Managed (Cardiac: Heart Failure)  Signs and symptoms of listed potential problems will be absent, minimized or managed by discharge/transition of care (reference Cardiac: Heart Failure (Adult) CPG).   PRIMARY DIAGNOSIS: CONGESTIVE HEART FAILURE    1. Dyspnea improved and O2 sats >88% at RA or at prior home O2 therapy level: Yes        SpO2: 93 %, O2 Device: None (Room air)  Vitals:    05/21/18 1650 05/22/18 0613   Weight: 73.5 kg (162 lb 1.6 oz) 74.1 kg (163 lb 4.8 oz)        2. ECHO and other diagnostic testing complete (if applicable): Yes  Heart Failure Care Pathway  GOALS TO BE MET BEFORE DISCHARGE:    1. Decrease congestion and/or edema with diuretic therapy to achieve near      optimal volume status.            Dyspnea improved: yes            Edema improved:    No, B LE +3 L, +2R.        Net I/O and Weights since admission:          04/22 2300 - 05/22 2259  In: 512.5 [P.O.:180; I.V.:50]  Out: 220 [Urine:220]  Net: 292.5            Vitals:    05/21/18 1650 05/22/18 0613   Weight: 73.5 kg (162 lb 1.6 oz) 74.1 kg (163 lb 4.8 oz)       2.  O2 sats > 92% on RA or at prior home O2 therapy level.          Current oxygenation status:       SpO2: 93 %         O2 Device: None (Room air),  Oxygen Delivery: 1 LPM         Able to wean O2 this shift to keep sats > 92%:  yes       Does patient use Home O2? No    3.  Tolerates ambulation and mobility near baseline:No        How many times did the patient ambulate with nursing staff this shift? x3 to BR    Please review the Heart Failure Care Pathway for additional HF goal outcomes    Akilah Golden RN  5/22/2018

## 2018-05-24 VITALS
OXYGEN SATURATION: 95 % | WEIGHT: 161.5 LBS | SYSTOLIC BLOOD PRESSURE: 158 MMHG | HEIGHT: 63 IN | RESPIRATION RATE: 20 BRPM | TEMPERATURE: 96.8 F | DIASTOLIC BLOOD PRESSURE: 59 MMHG | BODY MASS INDEX: 28.62 KG/M2 | HEART RATE: 67 BPM

## 2018-05-24 PROCEDURE — 25000128 H RX IP 250 OP 636: Performed by: INTERNAL MEDICINE

## 2018-05-24 PROCEDURE — 99239 HOSP IP/OBS DSCHRG MGMT >30: CPT | Performed by: INTERNAL MEDICINE

## 2018-05-24 PROCEDURE — A9270 NON-COVERED ITEM OR SERVICE: HCPCS | Mod: GY | Performed by: INTERNAL MEDICINE

## 2018-05-24 PROCEDURE — 25000125 ZZHC RX 250: Performed by: INTERNAL MEDICINE

## 2018-05-24 PROCEDURE — 25000132 ZZH RX MED GY IP 250 OP 250 PS 637: Mod: GY | Performed by: INTERNAL MEDICINE

## 2018-05-24 PROCEDURE — 94640 AIRWAY INHALATION TREATMENT: CPT

## 2018-05-24 PROCEDURE — 94640 AIRWAY INHALATION TREATMENT: CPT | Mod: 76

## 2018-05-24 PROCEDURE — 40000275 ZZH STATISTIC RCP TIME EA 10 MIN

## 2018-05-24 RX ORDER — GINSENG 100 MG
CAPSULE ORAL
Status: DISCONTINUED
Start: 2018-05-24 | End: 2018-05-24 | Stop reason: HOSPADM

## 2018-05-24 RX ORDER — CEPHALEXIN 500 MG/1
500 CAPSULE ORAL 3 TIMES DAILY
Qty: 30 CAPSULE | Refills: 0 | Status: SHIPPED | OUTPATIENT
Start: 2018-05-24 | End: 2020-01-01

## 2018-05-24 RX ADMIN — ISOSORBIDE MONONITRATE 30 MG: 30 TABLET, EXTENDED RELEASE ORAL at 08:15

## 2018-05-24 RX ADMIN — IPRATROPIUM BROMIDE AND ALBUTEROL SULFATE 3 ML: .5; 3 SOLUTION RESPIRATORY (INHALATION) at 07:26

## 2018-05-24 RX ADMIN — GABAPENTIN 100 MG: 100 CAPSULE ORAL at 08:15

## 2018-05-24 RX ADMIN — FERROUS SULFATE TAB 325 MG (65 MG ELEMENTAL FE) 325 MG: 325 (65 FE) TAB at 08:15

## 2018-05-24 RX ADMIN — HYDRALAZINE HYDROCHLORIDE 75 MG: 25 TABLET ORAL at 08:15

## 2018-05-24 RX ADMIN — VITAMIN D, TAB 1000IU (100/BT) 1000 UNITS: 25 TAB at 08:15

## 2018-05-24 RX ADMIN — CEFAZOLIN SODIUM 1 G: 1 INJECTION, SOLUTION INTRAVENOUS at 00:25

## 2018-05-24 RX ADMIN — IPRATROPIUM BROMIDE AND ALBUTEROL SULFATE 3 ML: .5; 3 SOLUTION RESPIRATORY (INHALATION) at 11:45

## 2018-05-24 RX ADMIN — ACETAMINOPHEN 650 MG: 325 TABLET ORAL at 08:14

## 2018-05-24 RX ADMIN — CEFAZOLIN SODIUM 1 G: 1 INJECTION, SOLUTION INTRAVENOUS at 08:16

## 2018-05-24 RX ADMIN — OMEPRAZOLE 20 MG: 20 CAPSULE, DELAYED RELEASE ORAL at 08:16

## 2018-05-24 RX ADMIN — FUROSEMIDE 20 MG: 10 INJECTION, SOLUTION INTRAVENOUS at 05:21

## 2018-05-24 RX ADMIN — PREDNISONE 20 MG: 20 TABLET ORAL at 08:16

## 2018-05-24 RX ADMIN — VENLAFAXINE HYDROCHLORIDE 75 MG: 75 TABLET, EXTENDED RELEASE ORAL at 08:16

## 2018-05-24 ASSESSMENT — ACTIVITIES OF DAILY LIVING (ADL)
ADLS_ACUITY_SCORE: 21
ADLS_ACUITY_SCORE: 21
ADLS_ACUITY_SCORE: 22
ADLS_ACUITY_SCORE: 21

## 2018-05-24 NOTE — PLAN OF CARE
"Problem: Patient Care Overview  Goal: Plan of Care/Patient Progress Review  VS /54 (BP Location: Left arm)  Pulse 67  Temp 99.1  F (37.3  C) (Oral)  Resp 18  Ht 1.6 m (5' 3\")  Wt 73.3 kg (161 lb 11.2 oz)  SpO2 94%  BMI 28.64 kg/m2   AO, up Ax1-2 with walker. VSS, ex. O2 88-90% on RA. Intermittenly on 1L to maintain above 90%. Educated on IS. Tmax 99.7, pt sweaty. Gave tylenol x1.  LS dim with ex. Wheeze. Occasional dry cough. Tele- Afib, CVR. Denies pain. +2 BLE edema. LLE dressing CDI. Tolerating 2gm NA diet. IV SL. Hgb 8.5, K+ 4.0. Plan- Continue on IV Ancef. Possible d/c tomorrow. If d/c'ing, daughter would like to be here to see dressing change. She works and is planning to come at 11:30. WOC, Podiatry, and PT following.           "

## 2018-05-24 NOTE — CONSULTS
Care Transition Initial Assessment - RN    Reason For Consult: care coordination/care conference, discharge planning   Met with: Patient.  DATA   Active Problems:    Cellulitis of left lower extremity       Cognitive Status: awake, alert and oriented.  [facility]  Primary Care MD Name: Raimundo  Contact information and PCP information verified: Yes  Lives With: child(emre), dependent  Living Arrangements: house  Quality Of Family Relationships: supportive, involved  Description of Support System: Supportive, Involved   Who is your support system?: Children   Support Assessment: Adequate family and caregiver support   Insurance concerns: No Insurance issues identified  ASSESSMENT  Patient currently receives the following services:  none had [facility] in the past       Identified issues/concerns regarding health management: CM met with patient. Pt planning to discharge home today with daughter. Pt on the HF list. Pt follows a low sodium diet, weighs herself daily and manages her own medications using a 7 day pill box. Pt states she uses a 4ww at all times with mobility. CM mentioned she has a referral for home care at discharge but pt states she is thinking isn't going to need it. CM will speak with daughter when she gets here to verify they are both comfortable with that plan. Daughter is planning to do the dressing changes at home.    PLAN  Patient/family is agreeable to the plan?  No, pt not wanting home care at this time.  Patient anticipates discharging to home with daughter .        Patient anticipates needs for home equipment: Yes, dressing supplies  Plan/Disposition: Home   Appointments: Hospital follow up with Mallika Hammonds NP at [facility] Monday 6/4/18 at 1:00pm.      Care  (CTS) will continue to follow as needed.    Daphne Ibarra RN, BSN CTS  Care Coordinator  [phone]

## 2018-05-24 NOTE — PLAN OF CARE
Problem: Patient Care Overview  Goal: Plan of Care/Patient Progress Review  Outcome: Adequate for Discharge Date Met: 05/24/18  IV and tele removed prior to discharge.  LLE dressings changed in front of daughter.  Daughter able to repeat information back to writer after explaination of wound care.  Both pt and daughter verbalized understanding of discharge instructions prior to leaving facility (CHF booklet, afib handout, cellulitis, abx handouts).  F/u appt reviewed and the need to call for podiatry f/u.  [facility] to call pt/family within 48 hours.  Drsg supplies sent with pt/daughter per MD/care coordinators instructions.  Pt wheeled out by staff and assisted into vehicle.  Pt left in stable condition with LLE dressings CDI.

## 2018-05-24 NOTE — PLAN OF CARE
Problem: Patient Care Overview  Goal: Plan of Care/Patient Progress Review  Physical Therapy Discharge Summary    Reason for therapy discharge:    Discharged to home with home therapy.    Progress towards therapy goal(s). See goals on Care Plan in [facility] electronic health record for goal details.  Goals partially met.  Barriers to achieving goals:   max A for transfers, mod A for bed mob, .80 feet of ambulation.    Therapy recommendation(s):    Continued therapy is recommended.  Rationale/Recommendations:  .. Pt is below baseline for functional mobility and strength. Pt would benefit from continued skilled therapy to address these deficits.

## 2018-05-24 NOTE — PROGRESS NOTES
Transition Communication Hand-off for Care Transitions to Next Level of Care Provider    Name: Keisha Godinez  : 1932  MRN #: [id number]  Primary Care Provider: Gerri Eubanks  Primary Care MD Name: Raimundo  Primary Clinic: [address] [facility] [address]  [facility] [address]  Primary Care Clinic Name: [facility]  Reason for Hospitalization:  Cellulitis and CHF  Cellulitis of left lower extremity  Admit Date/Time: 2018  4:45 PM  Discharge Date: 18  Payor Source: Payor: BCBS / Plan: BCBS PLATINUM BLUE / Product Type: PPO /     Readmission Assessment Measure (DALE) Risk Score/category: Average           Reason for Communication Hand-off Referral: Other Cellulitis and elevated BNP upon admission    Discharge Plan: home with [facility]       Concern for non-adherence with plan of care:   Y/N y  Discharge Needs Assessment:  Needs       Most Recent Value    Equipment Currently Used at Home walker, rolling, shower chair, raised toilet, wheelchair, manual    Transportation Available family or friend will provide [daughter]    # of Referrals Placed by CTS Scheduled Follow-up appointments, Homecare          Follow-up specialty is recommended: Yes    Follow-up plan:  Future Appointments  Date Time Provider Department Center   2018 3:00 PM Cecy Abdullahi PTA [facility]   2018 1:00 PM Mallika Hammonds, NP [facility]       Any outstanding tests or procedures:        Referrals     Future Labs/Procedures    Home care nursing referral     Comments:    RN skilled nursing visit. RN to assess vital signs and weight, home safety and Cellulitis .    Your provider has ordered home care nursing services. If you have not been contacted within 2 days of your discharge please call the inpatient department phone number at [phone] .    Home Care OT Referral for Hospital Discharge     Comments:    OT to eval and treat    Your provider has ordered home care - occupational therapy. If you have not been contacted within  2 days of your discharge please call the department phone number listed on the top of this document.    Home Care PT Referral for Hospital Discharge     Comments:    PT to eval and treat    Your provider has ordered home care - physical therapy. If you have not been contacted within 2 days of your discharge please call the department phone number listed on the top of this document.            Key Recommendations:  Pt admitted with cellulites. Home care was recommended at discharge, pt was reluctant to have home care come out. Pt needs daily dressing changes and the daughter was taught how to do these until home care can come out. Please follow up with her to make sure dressing changes are going well. Hospital follow up appointment was scheduled for her with Mallika Hammonds as Dr. Eubanks is out.     Daphne Ibarra    AVS/Discharge Summary is the source of truth; this is a helpful guide for improved communication of patient story

## 2018-05-24 NOTE — PLAN OF CARE
End of Shift Summary.  For vital signs and complete assessments, please see documentation flowsheets.     Pertinent assessments: pt alert and oriented, up with standby assist in the room with the walker. Pt on IV lasix every 12 hours. Pt continues with +1 edema to lower legs. Pt on daily dressing changes, daughter would like to see drressing change today however cannot come to the hospital until around 1130.   Major Shift Events: none  Plan (Upcoming Events): continue current cares  Discharge/Transfer Needs: none at this time    Bedside Shift Report Completed :   Bedside Safety Check Completed:

## 2018-05-24 NOTE — PROGRESS NOTES
Discharge Planner   Discharge Plans in progress: home with daughter and [facility] RN/PT/OT, daily dressing changes.  Barriers to discharge plan: none  Follow up plan: PCP hospital follow up Monday 6/4/18 at 1:00 pm scheduled with Mallika Hammonds NP at [facility].       Entered by: Gerladine Gambucci 05/24/2018 11:43 AM

## 2018-05-24 NOTE — PLAN OF CARE
Problem: Patient Care Overview  Goal: Plan of Care/Patient Progress Review  Outcome: Adequate for Discharge Date Met: 05/24/18  Heart Failure Care Pathway  GOALS TO BE MET BEFORE DISCHARGE:    1. Decrease congestion and/or edema with diuretic therapy to achieve near      optimal volume status.            Dyspnea improved:  Yes            Edema improved:     Yes        Net I/O and Weights since admission:          04/24 1500 - 05/24 1459  In: 1772.5 [P.O.:1440; I.V.:50]  Out: 2820 [Urine:2820]  Net: -1047.5            Vitals:    05/21/18 1650 05/22/18 0613 05/23/18 0624 05/24/18 0530   Weight: 73.5 kg (162 lb 1.6 oz) 74.1 kg (163 lb 4.8 oz) 73.3 kg (161 lb 11.2 oz) 73.3 kg (161 lb 8 oz)       2.  O2 sats > 92% on RA or at prior home O2 therapy level.          Current oxygenation status:       SpO2: 94 %         O2 Device: None (Room air),  Oxygen Delivery: 1 LPM         Able to wean O2 this shift to keep sats > 92%:  Yes       Does patient use Home O2? No    3.  Tolerates ambulation and mobility near baseline: Yes        How many times did the patient ambulate with nursing staff this shift? BRP; up in chair.    Please review the Heart Failure Care Pathway for additional HF goal outcomes.    Cyn Paul RN  5/24/2018     Afib on monitor.  VSS.  RA SPO2.  Pain controlled with Tylenol.  Up with 1/FWW.  In chair for meals.  LLE wrapped; will do dressing change when daughter present.

## 2018-05-24 NOTE — DISCHARGE INSTRUCTIONS
Your home care referral was sent to [facility].  If you haven't heard from them within the next 24-48 hours,  Please call them at [phone]      Your hospital follow up appointment has been schedule for you with Mallika Hammonds NP at [facility] for Monday 6/4/18 at 1:00pm. Please bring your hospital discharge instructions and any new medications with you to your appointment. Please call the clinic at [phone] if you need to reschedule.      Plan for skin/wound care to LLE: Daily and prn:   1.  Remove old dressings, moistening if needed to help release.  Cleanse leg very gently with Scrub care soap and water, do not scrub the leg but may gently wipe away any loose tissue; carefully pat dry.     2.  Apply strips of Xeroform gauze to cover all weepy/raw tissue.  (Xeroform is a yellow antibacterial vaseline gauze, comes on a spool in a foil packet from [facility] [place], [facility]0219, Nursing to order more as needed, and keep unused portion sealed up in a ziploc baggy).   (Please use several strips of the Xeroform instead of a continuous wrap around the leg, in case of additional swelling).     3.  Cover with 1/4 Covidien chux pad, wrapped around leg and secured with Cherelle or Kerlix.  May add ABDs underneath if needed.  Can change this outer absorptive layer prn, but try to only change Xeroform once daily.   4.  May apply light ACE wrap.  Once pt has been on antibiotics for about 48 hrs, can consider more aggressive compression and/or a Lymphedema consult.     5.  Label dressing/wrap with time/date/initials.   6.  Elevate legs as much as possible, floating heels at all times.   7.  Notify WOC if leg worsens or if plan of care needs re-eval.

## 2018-05-24 NOTE — PLAN OF CARE
Problem: Patient Care Overview  Goal: Plan of Care/Patient Progress Review  PT- discharged to home prior to PM session. Goals were mostly met per chart review. Returned home with home services including Home PT.

## 2018-05-25 ENCOUNTER — PATIENT OUTREACH (OUTPATIENT)
Dept: CARE COORDINATION | Facility: CLINIC | Age: 83
End: 2018-05-25

## 2018-05-25 ENCOUNTER — TELEPHONE (OUTPATIENT)
Dept: INTERNAL MEDICINE | Facility: CLINIC | Age: 83
End: 2018-05-25

## 2018-05-25 NOTE — PROGRESS NOTES
Clinic Care Coordination Contact  Care Coordination Communication    Referral Source: IP Handoff    Clinical Data: Patient was hospitalized at [facility] from 5/21/18 to 5/24/18 with diagnosis of Cellulitis and CHF.     Home Care Contact:              Home Care Agency: [facility] - RN/PT/OT              Contact name () and phone number: Elle Newberry [phone]              Care Coordination contacted home care: Yes              Anticipated start of care date: 5/26/18    Patient Contact:               Introduced self and role of care coordination.               Discharge instructions were reviewed with patient/caregiver.               Do you have any questions about your medications? NO              Follow up appointment is scheduled for 6/4/18 @1300.              Provided 24 Hour Nurse Line and/or 24 Hour Appointment Scheduling: Yes              Home care has contacted patient: No              Patient questions/concerns: no    Plan: RN/SW Care Coordinator will await notification from home care staff informing RN/SW Care Coordinator of patients discharge plans/needs. RN/SW Care Coordinator will review chart and outreach to home care every 3 weeks and as needed.      Adilene Danielson RN-BSN   Care Coordination  Phone:  [phone]  Email: [email or web address]

## 2018-05-25 NOTE — TELEPHONE ENCOUNTER
IP F/U    Date: 5/24/18  Diagnosis: Cellulitis Of Left Lower Extremity  Is patient active in care coordination? No  Was patient in TCU? No    Next 5 appointments (look out 90 days)     Jun 04, 2018  1:00 PM CDT   Office Visit with Mallika Hammonds NP   [facility]    [address]  [facility] [address]   [phone]

## 2018-05-26 DIAGNOSIS — M54.6 MIDLINE THORACIC BACK PAIN, UNSPECIFIED CHRONICITY: ICD-10-CM

## 2018-05-26 NOTE — DISCHARGE SUMMARY
Physician Discharge Summary     Name: Keisha Godinez    MRN: [id number]     YOB: 1932    Age: 86 year old                                                 Primary care provider: Gerri Eubanks      Admit date:  5/21/2018      Discharge date and time: 5/24/2018 12:55 PM       Discharge Physician:  Richardson Celaya          Primary Discharge Diagnosis        #1.Left lower extremity cellulitis    #2.Bilateral lower extremity edema suspected to be due to acute on chronic diastolic heart failure as well as right ventricular systolic dysfunction from pulmonary hypertension  #3.Severe anemia: Acute on chronic likely secondary to iron deficiency    #4.Acute hypoxic respiratory failure due to Acute CHF,diastolic Hypoxia    Secondary Diagnosis /chronic medical conditions:    Past Medical History:   Diagnosis Date     Anemia      Atrial fibrillation (H)      B12 deficiency      Cardiomyopathy (H)      CHF (congestive heart failure) (H)      Chronic edema     Lower extremities     Chronic systolic congestive heart failure (H)      CVA (cerebral vascular accident) (H) 2010    Acute Left MCA hemispheric CVA ( on coumadin)     Depression      Gastro-oesophageal reflux disease      GI bleed 03-20-15     Hyperlipidemia      Hypertension      Iron deficiency      MSSA (methicillin susceptible Staphylococcus aureus) 03-10-15     Pulmonary hypertension      Shingles        Past Surgical History:    Past Surgical History:   Procedure Laterality Date     COLONOSCOPY  03/24/2015    Diverticulosis, polyps     ENTEROSCOPY SMALL BOWEL N/A 2/29/2016    Procedure: ENTEROSCOPY SMALL BOWEL;  Surgeon: Ady Ponce MD;  Location:  GI     ESOPHAGOSCOPY, GASTROSCOPY, DUODENOSCOPY (EGD), COMBINED N/A 3/22/2015    Upper GI- Normal, nothing significant found.      EXCISE MASS FOOT Right 7/6/2016    Procedure: EXCISE MASS FOOT;  Surgeon: Lee Jang DPM;  Location:  OR                   Brief Summary of  Hospital stay :       Please refer to  Admission H&P note for full details of patient presentation.          Reason for Hospitalization(C/C,HPI and brief patient summary):    Keisha Godinez is a 86 year old female with PMH including pulmonary hypertension, chronic diastolic CHF, lymphedema, A. fib and remote CVA not on AC due to GI bleed history, anemia, gout, HTN, MDD, and GERD who presents as a direct admit from clinic after being seen for left leg redness and weeping of the skin.  Concern for left leg cellulitis and sent for direct admission      Significant findings(Primary diagnosis )Procedures and treatment provided(Hospital course ,consults procedures):Please see bellow for details  Patient was admitted and was started on IV antibiotic and cultures were obtained and patient was closely monitored.  Patient has also bilateral lower extremity edema concerning for congestive heart failure.  Patient was treated with IV Lasix as well.     Patient hemoglobin dropped to 6.9 down from 7.5 on admission.  No evidence of bleeding.  Patient was transfused 1 unit of blood.     1. Left lower extremity cellulitis: Suspect chronic dermatosis due to venous stasis dermatitis complicated by acute cellulitis.  Appears to be nonpurulent  --Patient was  on Ancef,  -- patient was seen by podiatry Excisional debridement was performed, partial-thickness(limited to skin breakdown, no exposed subcutaneous fat), sharply debriding the wound, excising nonviable tissue to the above dimensions with a 10 blade  -daily dressing order for bacitracin and Mepilex island bandage  -surgical shoe ordered, WBAT  -patient will continue with Neosporin bandage at home with daughter's assistance  -patient advised to follow up in clinic in 10-14 days  --please call with questions or acute changes to patient/wound status.  Anticipate 7 day course of PO abx after 72 hours IV abx in-house would be sufficient for lower extremity cellulitis.    -discussed  "possible orthotics, although she's not diabetic and orthotic coverage can be limited  -briefly discussed surgical options.  Hopefully offloading and daily wound cares will allow healing of the wound better than was noted on right foot, where surgery was required        2. Bilateral lower extremity edema suspected to be due to acute on chronic diastolic heart failure as well as right ventricular systolic dysfunction from pulmonary hypertension  --Patient is started on Lasix, no shortness of breath or chest pain complaint.  --Continued Lasix at 20 mg IV every 12 hours and changed to oral lasix        3. Severe anemia: Acute on chronic likely secondary to iron deficiency.  No evidence of acute bleeding   --transfused one unit of blood         Hemoglobin   Date Value Ref Range Status   05/23/2018 8.5 (L) 11.7 - 15.7 g/dL Final   ]     4. Atrial fibrillation and history of CVA: Not anticoagulated due to history of GI bleed.  Currently controlled ventricular response with atrial fibrillation.  Not on beta-blocker or calcium channel blocker  5. Acute hypoxic respiratory failure due to Acute CHF,diastolic Hypoxia: Oxygen saturation decreased to 87% with exertion likely due to congestive heart failure-continue Lasix, oxygen supplement     6. Left lower extremity skin lesion suspected to be bunion on the left foot mass plantar foot: Podiatry input appreciated     Consultations during hospital stay:    WOUND OSTOMY CONTINENCE NURSE  IP CONSULT  PHYSICAL THERAPY ADULT IP CONSULT  PODIATRY IP CONSULT  CARE COORDINATOR IP CONSULT      Patient discharge Condition:     stable    /59 (BP Location: Left arm)  Pulse 67  Temp 96.8  F (36  C) (Axillary)  Resp 20  Ht 1.6 m (5' 3\")  Wt 73.3 kg (161 lb 8 oz)  SpO2 95%  BMI 28.61 kg/m2     # Discharge Pain Plan:   - Patient currently has NO PAIN and is not being prescribed pain medications on discharge.         Discharge Instructions:       Patient/family " instructions:    Written discharge instruction given to patient/family.       Review of your medicines      START taking       Dose / Directions    cephALEXin 500 MG capsule   Commonly known as:  KEFLEX   Used for:  Cellulitis of left lower extremity        Dose:  500 mg   Take 1 capsule (500 mg) by mouth 3 times daily for 10 days   Quantity:  30 capsule   Refills:  0         CONTINUE these medicines which have NOT CHANGED       Dose / Directions    albuterol 1.25 MG/3ML nebulizer solution   Commonly known as:  ACCUNEB        Dose:  1 vial   Take 1 vial by nebulization 3 times daily   Refills:  0       Cranberry Extract 250 MG Tabs        Dose:  250 mg   Take 250 mg by mouth daily   Refills:  0       ferrous sulfate 325 (65 Fe) MG tablet   Commonly known as:  IRON   Used for:  Iron deficiency        Dose:  325 mg   Take 1 tablet (325 mg) by mouth 2 times daily   Quantity:  100 tablet   Refills:  11       furosemide 40 MG tablet   Commonly known as:  LASIX   Used for:  Essential hypertension with goal blood pressure less than 140/90        Dose:  40 mg   Take 1 tablet (40 mg) by mouth 2 times daily   Quantity:  180 tablet   Refills:  2       gabapentin 100 MG capsule   Commonly known as:  NEURONTIN   Used for:  Midline thoracic back pain, unspecified chronicity        Dose:  100 mg   Take 1 capsule (100 mg) by mouth 3 times daily   Quantity:  270 capsule   Refills:  0       hydrALAZINE 25 MG tablet   Commonly known as:  APRESOLINE   Used for:  Hyperlipidemia, unspecified hyperlipidemia type, Cardiomyopathy, unspecified type (H)        Dose:  75 mg   Take 3 tablets (75 mg) by mouth 3 times daily   Quantity:  810 tablet   Refills:  2       isosorbide mononitrate 30 MG 24 hr tablet   Commonly known as:  IMDUR   Used for:  Essential hypertension with goal blood pressure less than 140/90, Cardiomyopathy (H), Coronary artery disease involving native heart with angina pectoris, unspecified vessel or lesion type (H)         Dose:  30 mg   Take 1 tablet (30 mg) by mouth daily   Quantity:  90 tablet   Refills:  2       OMEGA-3 FATTY ACIDS PO        Dose:  1200 mg   Take 1,200 mg by mouth daily   Refills:  0       omeprazole 20 MG CR capsule   Commonly known as:  priLOSEC   Used for:  Gastroesophageal reflux disease without esophagitis        Dose:  20 mg   Take 1 capsule (20 mg) by mouth daily   Quantity:  90 capsule   Refills:  2       potassium chloride SA 20 MEQ CR tablet   Commonly known as:  KLOR-CON   Used for:  Bilateral edema of lower extremity        Dose:  20 mEq   Take 1 tablet (20 mEq) by mouth daily   Quantity:  90 tablet   Refills:  1       senna-docusate 8.6-50 MG per tablet   Commonly known as:  SENOKOT-S;PERICOLACE   Used for:  S/P foot surgery, right        Take 2 tablets daily as needed for constipation while taking prescription pain medications.   Quantity:  30 tablet   Refills:  0       simvastatin 10 MG tablet   Commonly known as:  ZOCOR   Used for:  Hyperlipidemia, unspecified hyperlipidemia type        Dose:  10 mg   Take 1 tablet (10 mg) by mouth At Bedtime   Quantity:  90 tablet   Refills:  1       TYLENOL 325 MG tablet   Generic drug:  acetaminophen        Dose:  650 mg   Take 650 mg by mouth 3 times daily as needed for mild pain   Refills:  0       venlafaxine 75 MG 24 hr capsule   Commonly known as:  EFFEXOR-XR   Used for:  Major depressive disorder, recurrent episode, in partial remission (H)        TAKE ONE CAPSULE BY MOUTH DAILY   Quantity:  90 capsule   Refills:  0       VITAMIN D (CHOLECALCIFEROL) PO        Dose:  1000 Units   Take 1,000 Units by mouth daily   Refills:  0            Where to get your medicines      These medications were sent to [facility] [address] [place] [address] [facility] [address] [address] [facility] [address]     Phone:  [phone]      cephALEXin 500 MG capsule              Discharge diet:  Active Diet Order      Diet  cardiac  diet      Discharge activity:Activity as tolerated      Discharge follow-up:    Follow up with primary care provider in 7 days or earlier if symptoms return or gets worse.    Follow up with consultant as instructed       Other instructions:    We discussed with Patient/family about detail discharge instructions as well as discharge medications above including potential risks,side effects and benefits.Patient/family understood benefits and potential serious side effects of taking these medications and need to follow up with PCP if the patient develops complications.  Patient is also advised to see a doctor immediately for severe symptoms.          Major procedure performed/  Significant Diagnostic Studies:        Results for orders placed or performed during the hospital encounter of 05/21/18   XR Chest 2 Views    Narrative    XR CHEST 2 VW 5/21/2018 7:14 PM    COMPARISON: 10/27/2017    HISTORY: Bilateral crackles and shortness of breath.      Impression    IMPRESSION: Enlarged cardiac silhouette is again seen and unchanged.  Mild bibasilar atelectasis. No significant pleural effusion. No  pneumothorax.    PAULINE MCFARLAND MD         Recent Labs  Lab 05/23/18  0718 05/22/18  0627 05/21/18  1829   WBC 8.9 7.9 8.7   HGB 8.5* 6.9* 7.5*   HCT 28.5* 23.9* 25.9*   MCV 86 87 86    212 248       Recent Labs  Lab 05/21/18  1833 05/21/18  1828   CULT No growth after 3 days No growth after 3 days       Recent Labs  Lab 05/23/18  1453 05/23/18  0718 05/22/18  0627 05/21/18  1829   NA  --  140 140 138   POTASSIUM 4.0 3.3* 3.7 3.7   CHLORIDE  --  104 104 103   CO2  --  28 28 29   ANIONGAP  --  8 8 6   GLC  --  98 93 99   BUN  --  19 19 17   CR  --  0.75 0.78 0.80   GFRESTIMATED  --  74 70 68   GFRESTBLACK  --  89 85 82   CASPER  --  8.4* 8.3* 8.6   MAG  --  2.2  --  2.2         Recent Labs  Lab 05/23/18  0718 05/22/18  0627 05/21/18  1829   GLC 98 93 99           No results for input(s): INR in the last 168 hours.        Pending  Results:       Unresulted Labs Ordered in the Past 30 Days of this Admission     Date and Time Order Name Status Description    5/21/2018 1806 Blood culture Preliminary     5/21/2018 1806 Blood culture Preliminary              Patient Allergies:       Allergies   Allergen Reactions     Lisinopril Other (See Comments)     Tongue swelling     Calcium Nausea and Vomiting     Egg Yolk      Flu Virus Vaccine      Allergic to eggs.     Keflex [Cephalexin] Nausea     Pt states she had upset stomach.  NOT tongue swelling.  She had tongue swelling with a combo bp med that she thinks included lisinopril.    Tolerates IV Cefazolin     Levaquin [Levofloxacin]      Sulfa Drugs      Zinc Nausea and Vomiting         Disposition:   home       I saw and evaluated the patient on day of discharge and  discharge instructions reviewed  and  all the patient's questions and concerns addressed.Over 30 minutes spent on discharge and coordination of discharge process for this patient.           Disclaimer: This note consists of symbols derived from keyboarding, dictation and/or voice recognition software. As a result, there may be errors in the script that have gone undetected. Please consider this when interpreting information found in this chart

## 2018-05-28 LAB
BACTERIA SPEC CULT: NO GROWTH
BACTERIA SPEC CULT: NO GROWTH
Lab: NORMAL
Lab: NORMAL
SPECIMEN SOURCE: NORMAL
SPECIMEN SOURCE: NORMAL

## 2018-05-29 RX ORDER — GABAPENTIN 100 MG/1
CAPSULE ORAL
Qty: 270 CAPSULE | Refills: 0 | Status: SHIPPED | OUTPATIENT
Start: 2018-05-29 | End: 2018-08-26

## 2018-05-30 RX ORDER — HYDRALAZINE HYDROCHLORIDE 25 MG/1
75 TABLET, FILM COATED ORAL 3 TIMES DAILY
Qty: 810 TABLET | Refills: 3 | Status: SHIPPED | OUTPATIENT
Start: 2018-05-30 | End: 2019-07-11

## 2018-05-31 ENCOUNTER — TELEPHONE (OUTPATIENT)
Dept: INTERNAL MEDICINE | Facility: CLINIC | Age: 83
End: 2018-05-31

## 2018-05-31 NOTE — TELEPHONE ENCOUNTER
Cristal RN [facility] nurse with [facility] ([phone]) calling for wound care orders.  Patient has a lot of blisters and small open areas L lower leg    She is having copious amounts of clear drainage from leg.  Recently hospitalized for venous stasis ulcer L lower leg with cellulitis    1.  Would like approval to use Aquacell AG on wound bed instead of Xeroform.  Would use barrier ointment or skin prep to surrounding intact skin. Non adherent foam and wrap with Kerlix and sponge and change daily  2.  Is it OK to remove dressing to shower?  NIMO Law R.N.

## 2018-06-01 NOTE — TELEPHONE ENCOUNTER
OK  to use Aquacell AG on wound bed instead of Xeroform.Use barrier ointment or skin prep to surrounding intact skin. Non adherent foam and wrap with Kerlix and sponge and change daily.   OK to remove dressing to shower.

## 2018-06-04 ENCOUNTER — OFFICE VISIT (OUTPATIENT)
Dept: INTERNAL MEDICINE | Facility: CLINIC | Age: 83
End: 2018-06-04
Payer: COMMERCIAL

## 2018-06-04 VITALS
RESPIRATION RATE: 16 BRPM | HEIGHT: 63 IN | TEMPERATURE: 98.5 F | HEART RATE: 56 BPM | WEIGHT: 162 LBS | BODY MASS INDEX: 28.7 KG/M2 | OXYGEN SATURATION: 91 % | SYSTOLIC BLOOD PRESSURE: 124 MMHG | DIASTOLIC BLOOD PRESSURE: 50 MMHG

## 2018-06-04 DIAGNOSIS — L03.116 CELLULITIS OF LEFT LOWER EXTREMITY: Primary | ICD-10-CM

## 2018-06-04 DIAGNOSIS — D64.9 ANEMIA, UNSPECIFIED TYPE: ICD-10-CM

## 2018-06-04 LAB
ERYTHROCYTE [DISTWIDTH] IN BLOOD BY AUTOMATED COUNT: 17.9 % (ref 10–15)
HCT VFR BLD AUTO: 24.8 % (ref 35–47)
HGB BLD-MCNC: 7.1 G/DL (ref 11.7–15.7)
MCH RBC QN AUTO: 25.3 PG (ref 26.5–33)
MCHC RBC AUTO-ENTMCNC: 28.6 G/DL (ref 31.5–36.5)
MCV RBC AUTO: 88 FL (ref 78–100)
PLATELET # BLD AUTO: 209 10E9/L (ref 150–450)
RBC # BLD AUTO: 2.81 10E12/L (ref 3.8–5.2)
WBC # BLD AUTO: 9.7 10E9/L (ref 4–11)

## 2018-06-04 PROCEDURE — 99214 OFFICE O/P EST MOD 30 MIN: CPT | Performed by: NURSE PRACTITIONER

## 2018-06-04 PROCEDURE — 80048 BASIC METABOLIC PNL TOTAL CA: CPT | Performed by: NURSE PRACTITIONER

## 2018-06-04 PROCEDURE — 85027 COMPLETE CBC AUTOMATED: CPT | Performed by: NURSE PRACTITIONER

## 2018-06-04 PROCEDURE — 36415 COLL VENOUS BLD VENIPUNCTURE: CPT | Performed by: NURSE PRACTITIONER

## 2018-06-04 NOTE — MR AVS SNAPSHOT
"              After Visit Summary   6/4/2018    Keisha Godinez    MRN: [id number]           Patient Information     Date Of Birth          1/25/1932        Visit Information        Provider Department      6/4/2018 1:00 PM Mallika Hammonds NP [facility]        Today's Diagnoses     Cellulitis of left lower extremity    -  1    Anemia, unspecified type           Follow-ups after your visit        Your next 10 appointments already scheduled     Jun 08, 2018 11:15 AM CDT   [facility]    [address] [facility] [address] [phone]              Who to contact     If you have questions or need follow up information about today's clinic visit or your schedule please contact [facility] directly at [phone].  Normal or non-critical lab and imaging results will be communicated to you by Digital Assenthart, letter or phone within 4 business days after the clinic has received the results. If you do not hear from us within 7 days, please contact the clinic through MyChart or phone. If you have a critical or abnormal lab result, we will notify you by phone as soon as possible.  Submit refill requests through Etherpad or call your pharmacy and they will forward the refill request to us. Please allow 3 business days for your refill to be completed.          Additional Information About Your Visit        MyChart Information     Etherpad lets you send messages to your doctor, view your test results, renew your prescriptions, schedule appointments and more. To sign up, go to [email or web address] . Click on \"Log in\" on the left side of the screen, which will take you to the Welcome page. Then click on \"Sign up Now\" on the right side of the page.     You will be asked to enter the access code listed below, as well as some personal information. Please follow the directions to create your username and " "password.     Your access code is: [email or web address]  Expires: 2018  3:15 PM     Your access code will  in 90 days. If you need help or a new code, please call your [facility] or [phone].        Care EveryWhere ID     This is your Care EveryWhere ID. This could be used by other organizations to access your [facility] medical records  [id number]        Your Vitals Were     Pulse Temperature Respirations Height Pulse Oximetry BMI (Body Mass Index)    56 98.5  F (36.9  C) (Oral) 16 5' 3\" (1.6 m) 91% 28.7 kg/m2       Blood Pressure from Last 3 Encounters:   18 124/50   18 158/59   18 118/50    Weight from Last 3 Encounters:   18 162 lb (73.5 kg)   18 161 lb 8 oz (73.3 kg)   18 161 lb (73 kg)              We Performed the Following     Basic metabolic panel     CBC with platelets        Primary Care Provider Office Phone # Fax #    Gerri Eubanks [phone]       [address] [facility] [address]  [facility] [address]        Equal Access to Services     TRACEY KLEIN : Hadii thais ku hadasho Soomaali, waaxda luqadaha, qaybta kaalmada adeegyada, sharlene torre . So [facility] [phone].    ATENCIÓN: Si habla español, tiene a rosas disposición servicios gratuitos de asistencia lingüística. Llame al [phone].    We comply with applicable federal civil rights laws and [place] laws. We do not discriminate on the basis of race, color, national origin, age, disability, sex, sexual orientation, or gender identity.            Thank you!     Thank you for choosing [facility]  for your care. Our goal is always to provide you with excellent care. Hearing back from our patients is one way we can continue to improve our services. Please take a few minutes to complete the written survey that you may receive in the mail after your visit with us. Thank you!             Your Updated Medication List - Protect others around you: Learn " how to safely use, store and throw away your medicines at www.disposemymeds.org.          This list is accurate as of 6/4/18  1:37 PM.  Always use your most recent med list.                   Brand Name Dispense Instructions for use Diagnosis    albuterol 1.25 MG/3ML nebulizer solution    ACCUNEB     Take 1 vial by nebulization 3 times daily        Cranberry Extract 250 MG Tabs      Take 250 mg by mouth daily        ferrous sulfate 325 (65 Fe) MG tablet    IRON    100 tablet    Take 1 tablet (325 mg) by mouth 2 times daily    Iron deficiency       furosemide 40 MG tablet    LASIX    180 tablet    Take 1 tablet (40 mg) by mouth 2 times daily    Essential hypertension with goal blood pressure less than 140/90       gabapentin 100 MG capsule    NEURONTIN    270 capsule    TAKE 1 CAPSULE(100 MG) BY MOUTH THREE TIMES DAILY    Midline thoracic back pain, unspecified chronicity       hydrALAZINE 25 MG tablet    APRESOLINE    810 tablet    Take 3 tablets (75 mg) by mouth 3 times daily    Hyperlipidemia, unspecified hyperlipidemia type, Cardiomyopathy, unspecified type (H)       isosorbide mononitrate 30 MG 24 hr tablet    IMDUR    90 tablet    Take 1 tablet (30 mg) by mouth daily    Essential hypertension with goal blood pressure less than 140/90, Cardiomyopathy (H), Coronary artery disease involving native heart with angina pectoris, unspecified vessel or lesion type (H)       OMEGA-3 FATTY ACIDS PO      Take 1,200 mg by mouth daily        omeprazole 20 MG CR capsule    priLOSEC    90 capsule    Take 1 capsule (20 mg) by mouth daily    Gastroesophageal reflux disease without esophagitis       potassium chloride SA 20 MEQ CR tablet    KLOR-CON    90 tablet    Take 1 tablet (20 mEq) by mouth daily    Bilateral edema of lower extremity       senna-docusate 8.6-50 MG per tablet    SENOKOT-S;PERICOLACE    30 tablet    Take 2 tablets daily as needed for constipation while taking prescription pain medications.    S/P foot  surgery, right       simvastatin 10 MG tablet    ZOCOR    90 tablet    Take 1 tablet (10 mg) by mouth At Bedtime    Hyperlipidemia, unspecified hyperlipidemia type       TYLENOL 325 MG tablet   Generic drug:  acetaminophen      Take 650 mg by mouth 3 times daily as needed for mild pain        venlafaxine 75 MG 24 hr capsule    EFFEXOR-XR    90 capsule    TAKE ONE CAPSULE BY MOUTH DAILY    Major depressive disorder, recurrent episode, in partial remission (H)       VITAMIN D (CHOLECALCIFEROL) PO      Take 1,000 Units by mouth daily

## 2018-06-04 NOTE — NURSING NOTE
"Vital signs:  Temp: 98.5  F (36.9  C) Temp src: Oral BP: 124/50 Pulse: 56   Resp: 16 SpO2: 91 %     Height: 5' 3\" (160 cm) Weight: 162 lb (73.5 kg) (unable)  Estimated body mass index is 28.7 kg/(m^2) as calculated from the following:    Height as of this encounter: 5' 3\" (1.6 m).    Weight as of this encounter: 162 lb (73.5 kg).          "

## 2018-06-04 NOTE — PROGRESS NOTES
SUBJECTIVE:   Keisha Godinez is a 86 year old female who presents to clinic today for the following health issues:          Hospital Follow-up Visit:    Hospital/Nursing Home/IP Rehab Facility: [facility]  Date of Admission: 05/21/18  Date of Discharge: 05/24/18  Reason(s) for Admission: Left lower extremity cellulitis, anemia             Problems taking medications regularly:  None       Medication changes since discharge: None       Problems adhering to non-medication therapy:  None    Summary of hospitalization:  [facility] discharge summary reviewed  Diagnostic Tests/Treatments reviewed.  Follow up needed: labs  Other Healthcare Providers Involved in Patient s Care:         Specialist appointment - podiatry 6/8/18  Update since discharge: improved. HHC and daughter doing dressing changes, report decreased redness and edema    Post Discharge Medication Reconciliation: discharge medications reconciled, continue medications without change.  Plan of care communicated with patient and family     Coding guidelines for this visit:  Type of Medical   Decision Making Face-to-Face Visit       within 7 Days of discharge Face-to-Face Visit        within 14 days of discharge   Moderate Complexity [id number]   High Complexity [id number]              Patient Active Problem List   Diagnosis     Iron deficiency anemia     Neuropathy of both feet     Myocardial infarction-type 2     GIB (gastrointestinal bleeding)     Wound of left leg     Cardiomyopathy (H)     Pulmonary hypertension     Xerosis of skin: shins     Bilateral edema of lower extremity     Gastroesophageal reflux disease without esophagitis     Health Care Home     Chronic systolic congestive heart failure (H)     Major depressive disorder, recurrent episode, in partial remission (H)     Cellulitis of foot     Essential hypertension with goal blood pressure less than 140/90     Post-operative complication     Respiratory failure (H)      Acute respiratory failure with hypoxia (H)     C. difficile colitis     History of hiatal hernia     History of shingles     Chronic foot ulcer (H) (felt secondary to bony protuberance status post excision 7/6-16)     Acute on chronic diastolic congestive heart failure (H)     Pneumonia     Sepsis (H)     Chronic atrial fibrillation (H)     Anemia     Dysphagia     Midline thoracic back pain     Cardiomyopathy, unspecified type (H)     Fracture of humerus, proximal, right, closed     Rheumatic mitral regurgitation     Chronic kidney disease, stage 3 (moderate) (GFR 59 10/30/17, GFR 58 7/7/16)     Cellulitis of left lower extremity     Past Surgical History:   Procedure Laterality Date     COLONOSCOPY  03/24/2015    Diverticulosis, polyps     ENTEROSCOPY SMALL BOWEL N/A 2/29/2016    Procedure: ENTEROSCOPY SMALL BOWEL;  Surgeon: Ady Ponce MD;  Location:  GI     ESOPHAGOSCOPY, GASTROSCOPY, DUODENOSCOPY (EGD), COMBINED N/A 3/22/2015    Upper GI- Normal, nothing significant found.      EXCISE MASS FOOT Right 7/6/2016    Procedure: EXCISE MASS FOOT;  Surgeon: Lee Jang DPM;  Location:  OR       Social History   Substance Use Topics     Smoking status: Former Smoker     Quit date: 4/14/1956     Smokeless tobacco: Never Used     Alcohol use No     Family History   Problem Relation Age of Onset     Known Genetic Syndrome Father      Known Genetic Syndrome Mother      Other Cancer Daughter      pancreatic     Other Cancer Brother      type     Other Cancer Sister 60     lung     DIABETES No family hx of      Breast Cancer No family hx of      Cancer - colorectal No family hx of      Ovarian Cancer No family hx of      Prostate Cancer No family hx of          Current Outpatient Prescriptions   Medication Sig Dispense Refill     Cranberry Extract 250 MG TABS Take 250 mg by mouth daily       ferrous sulfate (IRON) 325 (65 FE) MG tablet Take 1 tablet (325 mg) by mouth 2 times daily 100 tablet 11      furosemide (LASIX) 40 MG tablet Take 1 tablet (40 mg) by mouth 2 times daily 180 tablet 2     gabapentin (NEURONTIN) 100 MG capsule TAKE 1 CAPSULE(100 MG) BY MOUTH THREE TIMES DAILY 270 capsule 0     hydrALAZINE (APRESOLINE) 25 MG tablet Take 3 tablets (75 mg) by mouth 3 times daily 810 tablet 3     isosorbide mononitrate (IMDUR) 30 MG 24 hr tablet Take 1 tablet (30 mg) by mouth daily 90 tablet 2     OMEGA-3 FATTY ACIDS PO Take 1,200 mg by mouth daily        omeprazole (PRILOSEC) 20 MG CR capsule Take 1 capsule (20 mg) by mouth daily 90 capsule 2     potassium chloride SA (KLOR-CON) 20 MEQ CR tablet Take 1 tablet (20 mEq) by mouth daily 90 tablet 1     simvastatin (ZOCOR) 10 MG tablet Take 1 tablet (10 mg) by mouth At Bedtime 90 tablet 1     venlafaxine (EFFEXOR-XR) 75 MG 24 hr capsule TAKE ONE CAPSULE BY MOUTH DAILY 90 capsule 0     VITAMIN D, CHOLECALCIFEROL, PO Take 1,000 Units by mouth daily        acetaminophen (TYLENOL) 325 MG tablet Take 650 mg by mouth 3 times daily as needed for mild pain       albuterol (ACCUNEB) 1.25 MG/3ML nebulizer solution Take 1 vial by nebulization 3 times daily        senna-docusate (SENOKOT-S;PERICOLACE) 8.6-50 MG per tablet Take 2 tablets daily as needed for constipation while taking prescription pain medications. 30 tablet 0     BP Readings from Last 3 Encounters:   06/04/18 124/50   05/24/18 158/59   05/21/18 118/50    Wt Readings from Last 3 Encounters:   06/04/18 162 lb (73.5 kg)   05/24/18 161 lb 8 oz (73.3 kg)   05/21/18 161 lb (73 kg)                    Reviewed and updated as needed this visit by clinical staff  Tobacco  Allergies  Problems  Med Hx  Surg Hx  Fam Hx  Soc Hx      Reviewed and updated as needed this visit by Provider         ROS:  CONSTITUTIONAL: NEGATIVE for fever, chills, change in weight  RESP: NEGATIVE for significant cough or SOB  CV: NEGATIVE for chest pain/chest pressure, diaphoresis and dyspnea on exertion    OBJECTIVE:     /50  Pulse 56  " Temp 98.5  F (36.9  C) (Oral)  Resp 16  Ht 5' 3\" (1.6 m)  Wt 162 lb (73.5 kg)  SpO2 91%  BMI 28.7 kg/m2  Body mass index is 28.7 kg/(m^2).  GENERAL: alert, no distress and frail elderly female in WC  RESP: lungs clear to auscultation - no rales, rhonchi or wheezes  CV: regular rates and rhythm,  and 2+ bilateral lower extremity pitting edema to mid calf.  Dressings and Ace wraps in place and not removed per family request.          ASSESSMENT/PLAN:               ICD-10-CM    1. Cellulitis of left lower extremity L03.116 CBC with platelets   2. Anemia, unspecified type D64.9 CBC with platelets     Basic metabolic panel       Continue current meds, HHC and dressing changes  Labs pending.    Mallika Hammonds NP  [facility]    "

## 2018-06-05 ENCOUNTER — TELEPHONE (OUTPATIENT)
Dept: INTERNAL MEDICINE | Facility: CLINIC | Age: 83
End: 2018-06-05

## 2018-06-05 LAB
ANION GAP SERPL CALCULATED.3IONS-SCNC: 6 MMOL/L (ref 3–14)
BUN SERPL-MCNC: 21 MG/DL (ref 7–30)
CALCIUM SERPL-MCNC: 8.7 MG/DL (ref 8.5–10.1)
CHLORIDE SERPL-SCNC: 105 MMOL/L (ref 94–109)
CO2 SERPL-SCNC: 28 MMOL/L (ref 20–32)
CREAT SERPL-MCNC: 0.74 MG/DL (ref 0.52–1.04)
GFR SERPL CREATININE-BSD FRML MDRD: 75 ML/MIN/1.7M2
GLUCOSE SERPL-MCNC: 91 MG/DL (ref 70–99)
POTASSIUM SERPL-SCNC: 4.2 MMOL/L (ref 3.4–5.3)
SODIUM SERPL-SCNC: 139 MMOL/L (ref 133–144)

## 2018-06-05 NOTE — TELEPHONE ENCOUNTER
Fax received from [facility] for review and signature.  Put in Dr. Reed's in basket.

## 2018-06-08 ENCOUNTER — TELEPHONE (OUTPATIENT)
Dept: INTERNAL MEDICINE | Facility: CLINIC | Age: 83
End: 2018-06-08

## 2018-06-08 ENCOUNTER — OFFICE VISIT (OUTPATIENT)
Dept: PODIATRY | Facility: CLINIC | Age: 83
End: 2018-06-08
Payer: COMMERCIAL

## 2018-06-08 VITALS
BODY MASS INDEX: 28.7 KG/M2 | HEIGHT: 63 IN | WEIGHT: 162 LBS | DIASTOLIC BLOOD PRESSURE: 60 MMHG | SYSTOLIC BLOOD PRESSURE: 122 MMHG

## 2018-06-08 DIAGNOSIS — L97.522 SKIN ULCER OF LEFT FOOT WITH FAT LAYER EXPOSED (H): Primary | ICD-10-CM

## 2018-06-08 PROCEDURE — 99213 OFFICE O/P EST LOW 20 MIN: CPT | Mod: 25 | Performed by: PODIATRIST

## 2018-06-08 PROCEDURE — 11042 DBRDMT SUBQ TIS 1ST 20SQCM/<: CPT | Performed by: PODIATRIST

## 2018-06-08 NOTE — PROGRESS NOTES
"Foot & Ankle Surgery   June 8, 2018    S:  Pt is seen today for evaluation of wound plantar medial L arch.  I saw her in the hospital for this 5/22/18.  She presents today with her surgical shoe.  She is doing a daily mepilex bandage with bacitracin.  She had a similar issue on the R and underwent surgery to remove the underlying bone spur.  She has lymphedema/venous insufficiency and pigmentation changes left lower extremity, wearing her wrap today.  Non-diabetic.    Vitals:    06/08/18 1054   BP: 122/60   Weight: 162 lb (73.5 kg)   Height: 5' 3\" (1.6 m)   '      ROS - Pos for CC.  Patient denies current nausea, vomiting, chills, fevers, belly pain, calf pain, chest pain or SOB.  Complete remainder of ROS it otherwise neg.      PE:  Gen:   No apparent distress  Eye:    Visual scanning without deficit  Ear:    Response to auditory stimuli wnl  Lung:    Non-labored breathing on RA noted  Abd:    NTND per patient report  Lymph:    Neg for pitting/non-pitting edema BLE  Vasc:    Pulses palpable, CFT minimally delayed  Neuro:    Light touch sensation intact to all sensory nerve distributions without paresthesias  Derm:    [place] shaped full-thickness ulcer plantar medial L arch.  No exposed bone, no SOI.    MSK:    L foot - bony prominence at plantar-medial arch, near 1st TMT joint.  Calf:    Neg for redness, swelling or tenderness    Assessment:  86 year old female with full-thickness ulcer plantar medial L arch from underlying bony prominence      Plan:  Discussed etiologies, anatomy and options  1.  Full-thickness ulcer, with exposed fat, 2/2 to underlying bony prominence  -Excisional debridement was performed, full-thickness, sharply debriding the wound down to and including exposed sub-cutaneous tissue/fat, excising nonviable tissue to the above dimensions with a tissue nipper  -daily cares - wash/dry, abx ointment/island bandage  -advised using surgical shoe vs the walking boot she has, whichever offloads " the area best  -discussed surgical excision of bone spur, as this will be difficult to alleviate the pressure on. However, if we can alleviate the pressure sufficiently, will continue with current offloading.       Follow up:  2 weeks or sooner with acute issues      Body mass index is 28.7 kg/(m^2).  Weight management plan: Patient was referred to their PCP to discuss a diet and exercise plan.         Lee Jang DPM   Podiatric Foot & Ankle Surgeon  [facility]  [phone]

## 2018-06-08 NOTE — TELEPHONE ENCOUNTER
Fax received from [facility] for review and signature.  Put in Dr. Reed's in basket.

## 2018-06-08 NOTE — Clinical Note
Kirill Eubanks  I saw Keisha today for follow up on her L foot ulcer.  The wound was debrided and we reviewed the wound care/offloading plan. She'll follow up in 2 weeks for a recheck.  Thanks  Lee

## 2018-06-08 NOTE — MR AVS SNAPSHOT
"              After Visit Summary   6/8/2018    Keisha Godinez    MRN: [id number]           Patient Information     Date Of Birth          1/25/1932        Visit Information        Provider Department      6/8/2018 11:15 AM Lee Jang DPM [facility]        Today's Diagnoses     Skin ulcer of left foot with fat layer exposed (H)    -  1       Follow-ups after your visit        Follow-up notes from your care team     Return in about 2 weeks (around 6/22/2018).      Your next 10 appointments already scheduled     Jun 22, 2018 10:45 AM CDT   Return Visit with Lee Jang DPM   [facility]    [id number] [address] [facility] [id number] [phone]              Who to contact     If you have questions or need follow up information about today's clinic visit or your schedule please contact [facility] directly at [phone].  Normal or non-critical lab and imaging results will be communicated to you by [employer], letter or phone within 4 business days after the clinic has received the results. If you do not hear from us within 7 days, please contact the clinic through [employer] or phone. If you have a critical or abnormal lab result, we will notify you by phone as soon as possible.  Submit refill requests through [employer] or call your pharmacy and they will forward the refill request to us. Please allow 3 business days for your refill to be completed.          Additional Information About Your Visit        [employer] Information     [employer] lets you send messages to your doctor, view your test results, renew your prescriptions, schedule appointments and more. To sign up, go to [email or web address] . Click on \"Log in\" on the left side of the screen, which will take you to the Welcome page. Then click on \"Sign up Now\" on the right side of the page.     You will be asked to enter the access code listed below, as well as some " "personal information. Please follow the directions to create your username and password.     Your access code is: [email or web address]  Expires: 2018  3:15 PM     Your access code will  in 90 days. If you need help or a new code, please call your [facility] clinic or [phone].        Care EveryWhere ID     This is your Care EveryWhere ID. This could be used by other organizations to access your [facility] medical records  [id number]        Your Vitals Were     Height BMI (Body Mass Index)                5' 3\" (1.6 m) 28.7 kg/m2           Blood Pressure from Last 3 Encounters:   18 122/60   18 124/50   18 158/59    Weight from Last 3 Encounters:   18 162 lb (73.5 kg)   18 162 lb (73.5 kg)   18 161 lb 8 oz (73.3 kg)              We Performed the Following     DEBRIDE SKIN/SUBQ TISSUE        Primary Care Provider Office Phone # Fax #    Gerri Eubanks [phone]       [address] [facility] [address]  [facility] [address]        Equal Access to Services     [facility]: Hadii aad ku hadasho Soannali, waaxda luqadaha, qaybta kaalmada adeegyada, waxay torsten bethean milad torre . So [facility] [phone].    ATENCIÓN: Si habla español, tiene a rosas disposición servicios gratuitos de asistencia lingüística. [facility] [phone].    We comply with applicable federal civil rights laws and [place] laws. We do not discriminate on the basis of race, color, national origin, age, disability, sex, sexual orientation, or gender identity.            Thank you!     Thank you for choosing [facility]  for your care. Our goal is always to provide you with excellent care. Hearing back from our patients is one way we can continue to improve our services. Please take a few minutes to complete the written survey that you may receive in the mail after your visit with us. Thank you!             Your Updated Medication List - Protect others around you: Learn how to " safely use, store and throw away your medicines at www.disposemymeds.org.          This list is accurate as of 6/8/18 11:33 AM.  Always use your most recent med list.                   Brand Name Dispense Instructions for use Diagnosis    albuterol 1.25 MG/3ML nebulizer solution    ACCUNEB     Take 1 vial by nebulization 3 times daily        Cranberry Extract 250 MG Tabs      Take 250 mg by mouth daily        ferrous sulfate 325 (65 Fe) MG tablet    IRON    100 tablet    Take 1 tablet (325 mg) by mouth 2 times daily    Iron deficiency       furosemide 40 MG tablet    LASIX    180 tablet    Take 1 tablet (40 mg) by mouth 2 times daily    Essential hypertension with goal blood pressure less than 140/90       gabapentin 100 MG capsule    NEURONTIN    270 capsule    TAKE 1 CAPSULE(100 MG) BY MOUTH THREE TIMES DAILY    Midline thoracic back pain, unspecified chronicity       hydrALAZINE 25 MG tablet    APRESOLINE    810 tablet    Take 3 tablets (75 mg) by mouth 3 times daily    Hyperlipidemia, unspecified hyperlipidemia type, Cardiomyopathy, unspecified type (H)       isosorbide mononitrate 30 MG 24 hr tablet    IMDUR    90 tablet    Take 1 tablet (30 mg) by mouth daily    Essential hypertension with goal blood pressure less than 140/90, Cardiomyopathy (H), Coronary artery disease involving native heart with angina pectoris, unspecified vessel or lesion type (H)       OMEGA-3 FATTY ACIDS PO      Take 1,200 mg by mouth daily        omeprazole 20 MG CR capsule    priLOSEC    90 capsule    Take 1 capsule (20 mg) by mouth daily    Gastroesophageal reflux disease without esophagitis       potassium chloride SA 20 MEQ CR tablet    KLOR-CON    90 tablet    Take 1 tablet (20 mEq) by mouth daily    Bilateral edema of lower extremity       senna-docusate 8.6-50 MG per tablet    SENOKOT-S;PERICOLACE    30 tablet    Take 2 tablets daily as needed for constipation while taking prescription pain medications.    S/P foot surgery, right        simvastatin 10 MG tablet    ZOCOR    90 tablet    Take 1 tablet (10 mg) by mouth At Bedtime    Hyperlipidemia, unspecified hyperlipidemia type       TYLENOL 325 MG tablet   Generic drug:  acetaminophen      Take 650 mg by mouth 3 times daily as needed for mild pain        venlafaxine 75 MG 24 hr capsule    EFFEXOR-XR    90 capsule    TAKE ONE CAPSULE BY MOUTH DAILY    Major depressive disorder, recurrent episode, in partial remission (H)       VITAMIN D (CHOLECALCIFEROL) PO      Take 1,000 Units by mouth daily

## 2018-06-14 ENCOUNTER — TELEPHONE (OUTPATIENT)
Dept: INTERNAL MEDICINE | Facility: CLINIC | Age: 83
End: 2018-06-14

## 2018-06-14 NOTE — TELEPHONE ENCOUNTER
Kerri CYR with [facility] ([phone]) calling.  They see patient for edema and weeping of lower legs--those are stable.      New symptom of pt being very wheezy the past 2 days with frequent cough that is only occasionally productive of white phlegm.  She has expiratory wheezes otherwise lungs sound clear.  O2 sat 93%, afebrile.      She has been using albuterol nebs three times daily as ordered (Oct. 2017) but Kerri is wondering if maybe a little bit of steroid would be helpful like Duoneb.    No respiratory diagnosis on problem list, only cardiac diagnoses.  Patient reports that she had been given albuterol neb rx during hospitalization Oct. 2017.  There is mention of acute and chronic respiratory failure with hypoxia. NIMO Law R.N.

## 2018-06-15 ENCOUNTER — TELEPHONE (OUTPATIENT)
Dept: INTERNAL MEDICINE | Facility: CLINIC | Age: 83
End: 2018-06-15

## 2018-06-15 NOTE — TELEPHONE ENCOUNTER
Call to daughter. Left message for daughter to call back. Call to cell phone. States it seem like patient was able to loosen some phlegm with all of the coughing and SOB is improved. States the coughing has lessened also. Feels patient still needs to be seen but does no longer feel it is urgent that she be seen sooner than Monday at this time. Apt scheduled.

## 2018-06-15 NOTE — TELEPHONE ENCOUNTER
Called before lunch time, busy line. Called at 1:00PM and spoke to daughter, She stated today is very difficulty to get Pt out of the house because of her work schedule. She will be able to bring Her Monday to Clinic ( Wheezing has improved since earlier). Please, advise for Monday or ER.  Isatu Coronel MA

## 2018-06-15 NOTE — TELEPHONE ENCOUNTER
Cristal from home care calling--pt has been having a lot of problems with lymphedema.  OT has been ordered to help with the lymphedema, but they have not been able to see her at this time.  Cristal is requesting orders to do ace compression wraps until OT can evaluate.  Gave verbal order to approve this per protocol.  Niru Smart RN

## 2018-06-18 ENCOUNTER — RADIANT APPOINTMENT (OUTPATIENT)
Dept: GENERAL RADIOLOGY | Facility: CLINIC | Age: 83
End: 2018-06-18
Attending: INTERNAL MEDICINE
Payer: COMMERCIAL

## 2018-06-18 ENCOUNTER — HOSPITAL ENCOUNTER (INPATIENT)
Facility: CLINIC | Age: 83
LOS: 10 days | Discharge: HOME HEALTH | DRG: 291 | End: 2018-06-28
Attending: EMERGENCY MEDICINE | Admitting: INTERNAL MEDICINE
Payer: MEDICARE

## 2018-06-18 ENCOUNTER — APPOINTMENT (OUTPATIENT)
Dept: ULTRASOUND IMAGING | Facility: CLINIC | Age: 83
DRG: 291 | End: 2018-06-18
Attending: INTERNAL MEDICINE
Payer: MEDICARE

## 2018-06-18 ENCOUNTER — TELEPHONE (OUTPATIENT)
Dept: INTERNAL MEDICINE | Facility: CLINIC | Age: 83
End: 2018-06-18

## 2018-06-18 ENCOUNTER — APPOINTMENT (OUTPATIENT)
Dept: GENERAL RADIOLOGY | Facility: CLINIC | Age: 83
DRG: 291 | End: 2018-06-18
Attending: EMERGENCY MEDICINE
Payer: MEDICARE

## 2018-06-18 ENCOUNTER — OFFICE VISIT (OUTPATIENT)
Dept: INTERNAL MEDICINE | Facility: CLINIC | Age: 83
End: 2018-06-18
Payer: COMMERCIAL

## 2018-06-18 VITALS
TEMPERATURE: 98.5 F | SYSTOLIC BLOOD PRESSURE: 128 MMHG | WEIGHT: 160 LBS | BODY MASS INDEX: 28.35 KG/M2 | DIASTOLIC BLOOD PRESSURE: 36 MMHG | HEART RATE: 64 BPM | OXYGEN SATURATION: 92 % | HEIGHT: 63 IN

## 2018-06-18 DIAGNOSIS — D64.9 ANEMIA, UNSPECIFIED TYPE: ICD-10-CM

## 2018-06-18 DIAGNOSIS — R60.0 BILATERAL EDEMA OF LOWER EXTREMITY: Primary | ICD-10-CM

## 2018-06-18 DIAGNOSIS — I25.119 CORONARY ARTERY DISEASE INVOLVING NATIVE HEART WITH ANGINA PECTORIS, UNSPECIFIED VESSEL OR LESION TYPE (H): ICD-10-CM

## 2018-06-18 DIAGNOSIS — R06.02 SOB (SHORTNESS OF BREATH): Primary | ICD-10-CM

## 2018-06-18 DIAGNOSIS — R53.81 PHYSICAL DECONDITIONING: ICD-10-CM

## 2018-06-18 DIAGNOSIS — I10 ESSENTIAL HYPERTENSION WITH GOAL BLOOD PRESSURE LESS THAN 140/90: ICD-10-CM

## 2018-06-18 DIAGNOSIS — I50.31 ACUTE DIASTOLIC CONGESTIVE HEART FAILURE (H): ICD-10-CM

## 2018-06-18 DIAGNOSIS — I50.33 ACUTE ON CHRONIC DIASTOLIC CONGESTIVE HEART FAILURE (H): ICD-10-CM

## 2018-06-18 DIAGNOSIS — R06.02 SOB (SHORTNESS OF BREATH): ICD-10-CM

## 2018-06-18 DIAGNOSIS — I42.9 CARDIOMYOPATHY (H): ICD-10-CM

## 2018-06-18 PROBLEM — I50.9 ACUTE EXACERBATION OF CHF (CONGESTIVE HEART FAILURE) (H): Status: ACTIVE | Noted: 2018-06-18

## 2018-06-18 LAB
ABO + RH BLD: ABNORMAL
ABO + RH BLD: ABNORMAL
ABO + RH BLD: NORMAL
ABO + RH BLD: NORMAL
ALBUMIN SERPL-MCNC: 2.9 G/DL (ref 3.4–5)
ALP SERPL-CCNC: 110 U/L (ref 40–150)
ALT SERPL W P-5'-P-CCNC: 16 U/L (ref 0–50)
ANION GAP SERPL CALCULATED.3IONS-SCNC: 5 MMOL/L (ref 3–14)
ANION GAP SERPL CALCULATED.3IONS-SCNC: 6 MMOL/L (ref 3–14)
AST SERPL W P-5'-P-CCNC: 20 U/L (ref 0–45)
BASE EXCESS BLDV CALC-SCNC: 3.8 MMOL/L
BASOPHILS # BLD AUTO: 0 10E9/L (ref 0–0.2)
BASOPHILS NFR BLD AUTO: 0.6 %
BILIRUB SERPL-MCNC: 0.2 MG/DL (ref 0.2–1.3)
BLD GP AB INVEST PLASRBC-IMP: NORMAL
BLD GP AB SCN SERPL QL: ABNORMAL
BLD PROD TYP BPU: ABNORMAL
BLOOD BANK CMNT PATIENT-IMP: ABNORMAL
BLOOD BANK CMNT PATIENT-IMP: ABNORMAL
BLOOD BANK CMNT PATIENT-IMP: NORMAL
BUN SERPL-MCNC: 18 MG/DL (ref 7–30)
BUN SERPL-MCNC: 18 MG/DL (ref 7–30)
CALCIUM SERPL-MCNC: 8.4 MG/DL (ref 8.5–10.1)
CALCIUM SERPL-MCNC: 8.5 MG/DL (ref 8.5–10.1)
CHLORIDE SERPL-SCNC: 104 MMOL/L (ref 94–109)
CHLORIDE SERPL-SCNC: 106 MMOL/L (ref 94–109)
CO2 SERPL-SCNC: 30 MMOL/L (ref 20–32)
CO2 SERPL-SCNC: 30 MMOL/L (ref 20–32)
CREAT SERPL-MCNC: 0.76 MG/DL (ref 0.52–1.04)
CREAT SERPL-MCNC: 0.78 MG/DL (ref 0.52–1.04)
CRP SERPL-MCNC: 8.7 MG/L (ref 0–8)
D DIMER PPP FEU-MCNC: 1.5 UG/ML FEU (ref 0–0.5)
DIFFERENTIAL METHOD BLD: ABNORMAL
EOSINOPHIL # BLD AUTO: 0.2 10E9/L (ref 0–0.7)
EOSINOPHIL NFR BLD AUTO: 2.7 %
ERYTHROCYTE [DISTWIDTH] IN BLOOD BY AUTOMATED COUNT: 18 % (ref 10–15)
ERYTHROCYTE [DISTWIDTH] IN BLOOD BY AUTOMATED COUNT: 18.3 % (ref 10–15)
ERYTHROCYTE [SEDIMENTATION RATE] IN BLOOD BY WESTERGREN METHOD: 42 MM/H (ref 0–30)
GFR SERPL CREATININE-BSD FRML MDRD: 70 ML/MIN/1.7M2
GFR SERPL CREATININE-BSD FRML MDRD: 72 ML/MIN/1.7M2
GLUCOSE SERPL-MCNC: 100 MG/DL (ref 70–99)
GLUCOSE SERPL-MCNC: 120 MG/DL (ref 70–99)
HCO3 BLDV-SCNC: 29 MMOL/L (ref 21–28)
HCT VFR BLD AUTO: 24.4 % (ref 35–47)
HCT VFR BLD AUTO: 25.4 % (ref 35–47)
HGB BLD-MCNC: 6.7 G/DL (ref 11.7–15.7)
HGB BLD-MCNC: 7.2 G/DL (ref 11.7–15.7)
IMM GRANULOCYTES # BLD: 0 10E9/L (ref 0–0.4)
IMM GRANULOCYTES NFR BLD: 0.3 %
LYMPHOCYTES # BLD AUTO: 0.8 10E9/L (ref 0.8–5.3)
LYMPHOCYTES NFR BLD AUTO: 11.2 %
MCH RBC QN AUTO: 24.5 PG (ref 26.5–33)
MCH RBC QN AUTO: 25.5 PG (ref 26.5–33)
MCHC RBC AUTO-ENTMCNC: 27.5 G/DL (ref 31.5–36.5)
MCHC RBC AUTO-ENTMCNC: 28.3 G/DL (ref 31.5–36.5)
MCV RBC AUTO: 89 FL (ref 78–100)
MCV RBC AUTO: 90 FL (ref 78–100)
MONOCYTES # BLD AUTO: 0.5 10E9/L (ref 0–1.3)
MONOCYTES NFR BLD AUTO: 7.7 %
NEUTROPHILS # BLD AUTO: 5.2 10E9/L (ref 1.6–8.3)
NEUTROPHILS NFR BLD AUTO: 77.5 %
NRBC # BLD AUTO: 0 10*3/UL
NRBC BLD AUTO-RTO: 0 /100
NT-PROBNP SERPL-MCNC: 8389 PG/ML (ref 0–450)
NUM BPU REQUESTED: 2
O2/TOTAL GAS SETTING VFR VENT: ABNORMAL %
OXYHGB MFR BLDV: 42 %
PCO2 BLDV: 44 MM HG (ref 40–50)
PH BLDV: 7.42 PH (ref 7.32–7.43)
PLATELET # BLD AUTO: 173 10E9/L (ref 150–450)
PLATELET # BLD AUTO: 183 10E9/L (ref 150–450)
PO2 BLDV: 27 MM HG (ref 25–47)
POTASSIUM SERPL-SCNC: 3.5 MMOL/L (ref 3.4–5.3)
POTASSIUM SERPL-SCNC: 4 MMOL/L (ref 3.4–5.3)
PROCALCITONIN SERPL-MCNC: 0.15 NG/ML
PROT SERPL-MCNC: 6.6 G/DL (ref 6.8–8.8)
RBC # BLD AUTO: 2.73 10E12/L (ref 3.8–5.2)
RBC # BLD AUTO: 2.82 10E12/L (ref 3.8–5.2)
SODIUM SERPL-SCNC: 140 MMOL/L (ref 133–144)
SODIUM SERPL-SCNC: 141 MMOL/L (ref 133–144)
SPECIMEN EXP DATE BLD: ABNORMAL
TROPONIN I SERPL-MCNC: <0.015 UG/L (ref 0–0.04)
WBC # BLD AUTO: 6.3 10E9/L (ref 4–11)
WBC # BLD AUTO: 6.7 10E9/L (ref 4–11)

## 2018-06-18 PROCEDURE — 94640 AIRWAY INHALATION TREATMENT: CPT

## 2018-06-18 PROCEDURE — 84484 ASSAY OF TROPONIN QUANT: CPT | Performed by: EMERGENCY MEDICINE

## 2018-06-18 PROCEDURE — 25000125 ZZHC RX 250: Performed by: INTERNAL MEDICINE

## 2018-06-18 PROCEDURE — 84145 PROCALCITONIN (PCT): CPT | Performed by: INTERNAL MEDICINE

## 2018-06-18 PROCEDURE — A9270 NON-COVERED ITEM OR SERVICE: HCPCS | Mod: GY | Performed by: INTERNAL MEDICINE

## 2018-06-18 PROCEDURE — 93000 ELECTROCARDIOGRAM COMPLETE: CPT | Performed by: INTERNAL MEDICINE

## 2018-06-18 PROCEDURE — 99207 ZZC OFFICE-HOSPITAL ADMIT: CPT | Performed by: INTERNAL MEDICINE

## 2018-06-18 PROCEDURE — 80048 BASIC METABOLIC PNL TOTAL CA: CPT | Performed by: EMERGENCY MEDICINE

## 2018-06-18 PROCEDURE — 85652 RBC SED RATE AUTOMATED: CPT | Performed by: EMERGENCY MEDICINE

## 2018-06-18 PROCEDURE — 85025 COMPLETE CBC W/AUTO DIFF WBC: CPT | Performed by: EMERGENCY MEDICINE

## 2018-06-18 PROCEDURE — 99223 1ST HOSP IP/OBS HIGH 75: CPT | Mod: AI | Performed by: INTERNAL MEDICINE

## 2018-06-18 PROCEDURE — 93005 ELECTROCARDIOGRAM TRACING: CPT

## 2018-06-18 PROCEDURE — 94640 AIRWAY INHALATION TREATMENT: CPT | Mod: 76

## 2018-06-18 PROCEDURE — 85379 FIBRIN DEGRADATION QUANT: CPT | Performed by: INTERNAL MEDICINE

## 2018-06-18 PROCEDURE — 40000275 ZZH STATISTIC RCP TIME EA 10 MIN

## 2018-06-18 PROCEDURE — 86870 RBC ANTIBODY IDENTIFICATION: CPT | Performed by: EMERGENCY MEDICINE

## 2018-06-18 PROCEDURE — 71046 X-RAY EXAM CHEST 2 VIEWS: CPT

## 2018-06-18 PROCEDURE — 83880 ASSAY OF NATRIURETIC PEPTIDE: CPT | Performed by: INTERNAL MEDICINE

## 2018-06-18 PROCEDURE — 25000128 H RX IP 250 OP 636: Performed by: EMERGENCY MEDICINE

## 2018-06-18 PROCEDURE — 96374 THER/PROPH/DIAG INJ IV PUSH: CPT

## 2018-06-18 PROCEDURE — 86922 COMPATIBILITY TEST ANTIGLOB: CPT | Performed by: EMERGENCY MEDICINE

## 2018-06-18 PROCEDURE — 40000274 ZZH STATISTIC RCP CONSULT EA 30 MIN

## 2018-06-18 PROCEDURE — 93970 EXTREMITY STUDY: CPT

## 2018-06-18 PROCEDURE — 82805 BLOOD GASES W/O2 SATURATION: CPT | Performed by: EMERGENCY MEDICINE

## 2018-06-18 PROCEDURE — 86901 BLOOD TYPING SEROLOGIC RH(D): CPT | Performed by: EMERGENCY MEDICINE

## 2018-06-18 PROCEDURE — 71045 X-RAY EXAM CHEST 1 VIEW: CPT

## 2018-06-18 PROCEDURE — 25000128 H RX IP 250 OP 636: Performed by: INTERNAL MEDICINE

## 2018-06-18 PROCEDURE — 80053 COMPREHEN METABOLIC PANEL: CPT | Performed by: INTERNAL MEDICINE

## 2018-06-18 PROCEDURE — 36415 COLL VENOUS BLD VENIPUNCTURE: CPT | Performed by: EMERGENCY MEDICINE

## 2018-06-18 PROCEDURE — 12000000 ZZH R&B MED SURG/OB

## 2018-06-18 PROCEDURE — 86140 C-REACTIVE PROTEIN: CPT | Performed by: EMERGENCY MEDICINE

## 2018-06-18 PROCEDURE — 86850 RBC ANTIBODY SCREEN: CPT | Performed by: EMERGENCY MEDICINE

## 2018-06-18 PROCEDURE — 86900 BLOOD TYPING SEROLOGIC ABO: CPT | Performed by: EMERGENCY MEDICINE

## 2018-06-18 PROCEDURE — 99285 EMERGENCY DEPT VISIT HI MDM: CPT | Mod: 25

## 2018-06-18 PROCEDURE — 25000132 ZZH RX MED GY IP 250 OP 250 PS 637: Mod: GY | Performed by: INTERNAL MEDICINE

## 2018-06-18 PROCEDURE — 25000125 ZZHC RX 250

## 2018-06-18 PROCEDURE — 85027 COMPLETE CBC AUTOMATED: CPT | Performed by: INTERNAL MEDICINE

## 2018-06-18 RX ORDER — POTASSIUM CHLORIDE 7.45 MG/ML
10 INJECTION INTRAVENOUS
Status: DISCONTINUED | OUTPATIENT
Start: 2018-06-18 | End: 2018-06-28 | Stop reason: HOSPADM

## 2018-06-18 RX ORDER — IPRATROPIUM BROMIDE AND ALBUTEROL SULFATE 2.5; .5 MG/3ML; MG/3ML
SOLUTION RESPIRATORY (INHALATION)
Status: COMPLETED
Start: 2018-06-18 | End: 2018-06-18

## 2018-06-18 RX ORDER — ACETAMINOPHEN 325 MG/1
650 TABLET ORAL EVERY 4 HOURS PRN
Status: DISCONTINUED | OUTPATIENT
Start: 2018-06-18 | End: 2018-06-28 | Stop reason: HOSPADM

## 2018-06-18 RX ORDER — GABAPENTIN 100 MG/1
100 CAPSULE ORAL 3 TIMES DAILY
Status: DISCONTINUED | OUTPATIENT
Start: 2018-06-18 | End: 2018-06-28 | Stop reason: HOSPADM

## 2018-06-18 RX ORDER — POTASSIUM CHLORIDE 29.8 MG/ML
20 INJECTION INTRAVENOUS
Status: DISCONTINUED | OUTPATIENT
Start: 2018-06-18 | End: 2018-06-28 | Stop reason: HOSPADM

## 2018-06-18 RX ORDER — VENLAFAXINE HYDROCHLORIDE 75 MG/1
75 TABLET, EXTENDED RELEASE ORAL DAILY
Status: DISCONTINUED | OUTPATIENT
Start: 2018-06-19 | End: 2018-06-25 | Stop reason: CLARIF

## 2018-06-18 RX ORDER — NALOXONE HYDROCHLORIDE 0.4 MG/ML
.1-.4 INJECTION, SOLUTION INTRAMUSCULAR; INTRAVENOUS; SUBCUTANEOUS
Status: DISCONTINUED | OUTPATIENT
Start: 2018-06-18 | End: 2018-06-28 | Stop reason: HOSPADM

## 2018-06-18 RX ORDER — ONDANSETRON 4 MG/1
4 TABLET, ORALLY DISINTEGRATING ORAL EVERY 6 HOURS PRN
Status: DISCONTINUED | OUTPATIENT
Start: 2018-06-18 | End: 2018-06-28 | Stop reason: HOSPADM

## 2018-06-18 RX ORDER — AMOXICILLIN 250 MG
1 CAPSULE ORAL 2 TIMES DAILY PRN
Status: DISCONTINUED | OUTPATIENT
Start: 2018-06-18 | End: 2018-06-28 | Stop reason: HOSPADM

## 2018-06-18 RX ORDER — SIMVASTATIN 10 MG
10 TABLET ORAL AT BEDTIME
Status: DISCONTINUED | OUTPATIENT
Start: 2018-06-18 | End: 2018-06-28 | Stop reason: HOSPADM

## 2018-06-18 RX ORDER — AMOXICILLIN 250 MG
2 CAPSULE ORAL 2 TIMES DAILY PRN
Status: DISCONTINUED | OUTPATIENT
Start: 2018-06-18 | End: 2018-06-28 | Stop reason: HOSPADM

## 2018-06-18 RX ORDER — FUROSEMIDE 10 MG/ML
40 INJECTION INTRAMUSCULAR; INTRAVENOUS 2 TIMES DAILY
Status: DISCONTINUED | OUTPATIENT
Start: 2018-06-18 | End: 2018-06-20

## 2018-06-18 RX ORDER — MAGNESIUM SULFATE HEPTAHYDRATE 40 MG/ML
4 INJECTION, SOLUTION INTRAVENOUS EVERY 4 HOURS PRN
Status: DISCONTINUED | OUTPATIENT
Start: 2018-06-18 | End: 2018-06-28 | Stop reason: HOSPADM

## 2018-06-18 RX ORDER — POLYETHYLENE GLYCOL 3350 17 G/17G
17 POWDER, FOR SOLUTION ORAL DAILY PRN
Status: DISCONTINUED | OUTPATIENT
Start: 2018-06-18 | End: 2018-06-28 | Stop reason: HOSPADM

## 2018-06-18 RX ORDER — ALBUTEROL SULFATE 0.83 MG/ML
1.25 SOLUTION RESPIRATORY (INHALATION) 3 TIMES DAILY
Status: DISCONTINUED | OUTPATIENT
Start: 2018-06-18 | End: 2018-06-18

## 2018-06-18 RX ORDER — ALBUTEROL SULFATE 0.83 MG/ML
1.25 SOLUTION RESPIRATORY (INHALATION)
Status: DISCONTINUED | OUTPATIENT
Start: 2018-06-19 | End: 2018-06-19

## 2018-06-18 RX ORDER — FERROUS SULFATE 325(65) MG
325 TABLET ORAL 2 TIMES DAILY
Status: DISCONTINUED | OUTPATIENT
Start: 2018-06-18 | End: 2018-06-28 | Stop reason: HOSPADM

## 2018-06-18 RX ORDER — ONDANSETRON 2 MG/ML
4 INJECTION INTRAMUSCULAR; INTRAVENOUS EVERY 6 HOURS PRN
Status: DISCONTINUED | OUTPATIENT
Start: 2018-06-18 | End: 2018-06-28 | Stop reason: HOSPADM

## 2018-06-18 RX ORDER — POTASSIUM CL/LIDO/0.9 % NACL 10MEQ/0.1L
10 INTRAVENOUS SOLUTION, PIGGYBACK (ML) INTRAVENOUS
Status: DISCONTINUED | OUTPATIENT
Start: 2018-06-18 | End: 2018-06-28 | Stop reason: HOSPADM

## 2018-06-18 RX ORDER — ISOSORBIDE MONONITRATE 30 MG/1
30 TABLET, EXTENDED RELEASE ORAL DAILY
Status: DISCONTINUED | OUTPATIENT
Start: 2018-06-19 | End: 2018-06-27

## 2018-06-18 RX ORDER — POTASSIUM CHLORIDE 1500 MG/1
20-40 TABLET, EXTENDED RELEASE ORAL
Status: DISCONTINUED | OUTPATIENT
Start: 2018-06-18 | End: 2018-06-28 | Stop reason: HOSPADM

## 2018-06-18 RX ORDER — POTASSIUM CHLORIDE 1.5 G/1.58G
20-40 POWDER, FOR SOLUTION ORAL
Status: DISCONTINUED | OUTPATIENT
Start: 2018-06-18 | End: 2018-06-28 | Stop reason: HOSPADM

## 2018-06-18 RX ORDER — ALBUTEROL SULFATE 0.83 MG/ML
2.5 SOLUTION RESPIRATORY (INHALATION)
Status: DISCONTINUED | OUTPATIENT
Start: 2018-06-18 | End: 2018-06-28 | Stop reason: HOSPADM

## 2018-06-18 RX ORDER — FUROSEMIDE 10 MG/ML
20 INJECTION INTRAMUSCULAR; INTRAVENOUS ONCE
Status: COMPLETED | OUTPATIENT
Start: 2018-06-18 | End: 2018-06-18

## 2018-06-18 RX ADMIN — GABAPENTIN 100 MG: 100 CAPSULE ORAL at 23:09

## 2018-06-18 RX ADMIN — HYDRALAZINE HYDROCHLORIDE 75 MG: 25 TABLET ORAL at 23:08

## 2018-06-18 RX ADMIN — ALBUTEROL SULFATE 1.25 MG: 2.5 SOLUTION RESPIRATORY (INHALATION) at 23:15

## 2018-06-18 RX ADMIN — IPRATROPIUM BROMIDE AND ALBUTEROL SULFATE 3 ML: .5; 3 SOLUTION RESPIRATORY (INHALATION) at 17:05

## 2018-06-18 RX ADMIN — FUROSEMIDE 20 MG: 10 INJECTION, SOLUTION INTRAVENOUS at 17:33

## 2018-06-18 RX ADMIN — SIMVASTATIN 10 MG: 10 TABLET, FILM COATED ORAL at 23:09

## 2018-06-18 RX ADMIN — FUROSEMIDE 40 MG: 10 INJECTION, SOLUTION INTRAVENOUS at 22:11

## 2018-06-18 ASSESSMENT — ENCOUNTER SYMPTOMS
FEVER: 0
SHORTNESS OF BREATH: 1
BLOOD IN STOOL: 0
WHEEZING: 1

## 2018-06-18 NOTE — ED TRIAGE NOTES
Patient presents to the ED after being seen at clinic with a HGB of 6.7. Patient states has been feeling SOB x 1 month.

## 2018-06-18 NOTE — MR AVS SNAPSHOT
"              After Visit Summary   6/18/2018    Keisha Godinez    MRN: [id number]           Patient Information     Date Of Birth          1/25/1932        Visit Information        Provider Department      6/18/2018 3:00 PM Darryl Reed MD [facility]        Today's Diagnoses     SOB (shortness of breath)    -  1    Acute diastolic congestive heart failure (H)        Anemia, unspecified type           Follow-ups after your visit        Your next 10 appointments already scheduled     Jun 22, 2018 10:45 AM CDT   [facility]    [address] [facility] [address] [phone]              Who to contact     If you have questions or need follow up information about today's clinic visit or your schedule please contact [facility] directly at [phone].  Normal or non-critical lab and imaging results will be communicated to you by MyChart, letter or phone within 4 business days after the clinic has received the results. If you do not hear from us within 7 days, please contact the clinic through MyChart or phone. If you have a critical or abnormal lab result, we will notify you by phone as soon as possible.  Submit refill requests through [employer] or call your pharmacy and they will forward the refill request to us. Please allow 3 business days for your refill to be completed.          Additional Information About Your Visit        MyChart Information     [employer] lets you send messages to your doctor, view your test results, renew your prescriptions, schedule appointments and more. To sign up, go to [email or web address] . Click on \"Log in\" on the left side of the screen, which will take you to the Welcome page. Then click on \"Sign up Now\" on the right side of the page.     You will be asked to enter the access code listed below, as well as some personal information. Please " "follow the directions to create your username and password.     Your access code is: [email or web address]  Expires: 2018  3:15 PM     Your access code will  in 90 days. If you need help or a new code, please call your [facility] clinic or [phone].        Care EveryWhere ID     This is your Care EveryWhere ID. This could be used by other organizations to access your [facility] medical records  [id number]        Your Vitals Were     Pulse Temperature Height Pulse Oximetry BMI (Body Mass Index)       64 98.5  F (36.9  C) (Oral) 5' 3\" (1.6 m) 92% 28.34 kg/m2        Blood Pressure from Last 3 Encounters:   18 169/68   18 (!) 128/36   18 122/60    Weight from Last 3 Encounters:   18 160 lb (72.6 kg)   18 162 lb (73.5 kg)   18 162 lb (73.5 kg)              We Performed the Following     BNP-N terminal pro     CBC with platelets     Comprehensive metabolic panel     D dimer, quantitative     EKG 12-lead complete w/read - Clinics        Primary Care Provider Office Phone # Fax #    Gerri Eubanks [phone]       [address]  [facility] [address]        Equal Access to Services     ROBERTO KLEIN : Hadii aad ku hadasho Soomaali, waaxda luqadaha, qaybta kaalmada adeegyada, sharlene vick. So [facility] [phone].    ATENCIÓN: Si habla español, tiene a rosas disposición servicios gratuitos de asistencia lingüística. Llame al [phone].    We comply with applicable federal civil rights laws and [place] laws. We do not discriminate on the basis of race, color, national origin, age, disability, sex, sexual orientation, or gender identity.            Thank you!     Thank you for choosing [facility]  for your care. Our goal is always to provide you with excellent care. Hearing back from our patients is one way we can continue to improve our services. Please take a few minutes to complete the written survey that you " may receive in the mail after your visit with us. Thank you!             Your Updated Medication List - Protect others around you: Learn how to safely use, store and throw away your medicines at www.disposemymeds.org.          This list is accurate as of 6/18/18  5:21 PM.  Always use your most recent med list.                   Brand Name Dispense Instructions for use Diagnosis    albuterol 1.25 MG/3ML nebulizer solution    ACCUNEB     Take 1 vial by nebulization 3 times daily        Cranberry Extract 250 MG Tabs      Take 250 mg by mouth daily        ferrous sulfate 325 (65 Fe) MG tablet    IRON    100 tablet    Take 1 tablet (325 mg) by mouth 2 times daily    Iron deficiency       furosemide 40 MG tablet    LASIX    180 tablet    Take 1 tablet (40 mg) by mouth 2 times daily    Essential hypertension with goal blood pressure less than 140/90       gabapentin 100 MG capsule    NEURONTIN    270 capsule    TAKE 1 CAPSULE(100 MG) BY MOUTH THREE TIMES DAILY    Midline thoracic back pain, unspecified chronicity       hydrALAZINE 25 MG tablet    APRESOLINE    810 tablet    Take 3 tablets (75 mg) by mouth 3 times daily    Hyperlipidemia, unspecified hyperlipidemia type, Cardiomyopathy, unspecified type (H)       isosorbide mononitrate 30 MG 24 hr tablet    IMDUR    90 tablet    Take 1 tablet (30 mg) by mouth daily    Essential hypertension with goal blood pressure less than 140/90, Cardiomyopathy (H), Coronary artery disease involving native heart with angina pectoris, unspecified vessel or lesion type (H)       OMEGA-3 FATTY ACIDS PO      Take 1,200 mg by mouth daily        omeprazole 20 MG CR capsule    priLOSEC    90 capsule    Take 1 capsule (20 mg) by mouth daily    Gastroesophageal reflux disease without esophagitis       potassium chloride SA 20 MEQ CR tablet    KLOR-CON    90 tablet    Take 1 tablet (20 mEq) by mouth daily    Bilateral edema of lower extremity       senna-docusate 8.6-50 MG per tablet     SENOKOT-S;PERICOLACE    30 tablet    Take 2 tablets daily as needed for constipation while taking prescription pain medications.    S/P foot surgery, right       simvastatin 10 MG tablet    ZOCOR    90 tablet    Take 1 tablet (10 mg) by mouth At Bedtime    Hyperlipidemia, unspecified hyperlipidemia type       TYLENOL 325 MG tablet   Generic drug:  acetaminophen      Take 650 mg by mouth 3 times daily as needed for mild pain        venlafaxine 75 MG 24 hr capsule    EFFEXOR-XR    90 capsule    TAKE ONE CAPSULE BY MOUTH DAILY    Major depressive disorder, recurrent episode, in partial remission (H)       VITAMIN D (CHOLECALCIFEROL) PO      Take 1,000 Units by mouth daily

## 2018-06-18 NOTE — IP AVS SNAPSHOT
MRN:[id number]                      After Visit Summary   6/18/2018    Keisha Godinez    MRN: [id number]           Thank you!     Thank you for choosing [facility] for your care. Our goal is always to provide you with excellent care. Hearing back from our patients is one way we can continue to improve our services. Please take a few minutes to complete the written survey that you may receive in the mail after you visit. If you would like to speak to someone directly about your visit please contact Patient Relations at [phone]. Thank you!          Patient Information     Date Of Birth          1/25/1932        Designated Caregiver       Most Recent Value    Caregiver    Will someone help with your care after discharge? yes    Name of designated caregiver Rebecca Malhotra    Phone number of caregiver C: [phone] [H: [phone]]    Caregiver address [address] [address] [facility]      About your hospital stay     You were admitted on:  June 18, 2018 You last received care in the:  [address] [facility]    You were discharged on:  June 28, 2018        Reason for your hospital stay       You were admitted for acute on chronic heart failure.  Your treated with IV diuretics and we have made some adjustments to her home heart medication regimen.  Specifically we have doubled the dose of your Imdur now to 60 mg daily and have also added a second diuretic called spironolactone.    Spironolactone can increase her potassium so we have stopped your potassium supplement at least until you can recheck your kidney function and electrolytes in clinic.    Finally, we did place lymphedema wraps and he should continue to get lymphedema cares at home with home services.                  Who to Call     For medical emergencies, please call 251.  For non-urgent questions about your medical care, please call your primary care provider or clinic, [phone]          Attending  Provider     Provider Specialty    Loli Oreilly MD Emergency Medicine    PatriciaIsaac menon MD Internal Medicine       Primary Care Provider Office Phone # Fax #    Gerri Mis Eubanks [phone]      After Care Instructions     Activity       Your activity upon discharge: Gradually resume light activity as tolerated            Diet       Follow this diet upon discharge: Orders Placed This Encounter      2 Gram Sodium Diet            Monitor and record       weight every day.  Please call your cardiology clinic if your weight increases by more than 4 pounds total or 2 pounds in a day.            Oxygen Adult       [place] Oxygen Order 1 liter(s) by nasal cannula continuously with use of portable tank. Expected treatment length is indefinite (99 months).. Test on conserving device as applicable.    Patients who qualify for home O2 coverage under the CMS guidelines require ABG tests or O2 sat readings obtained closest to, but no earlier than 2 days prior to the discharge, as evidence of the need for home oxygen therapy. Testing must be performed while patient is in the chronic stable state. See notes for O2 sats.    I certify that this patient, Keisha Godinez has been under my care and that I, or a nurse practitioner or physician's assistant working with me, had a face-to-face encounter that meets the face-to-face encounter requirements with this patient on 6/28/2018. The patient, Keisha Godinez was evaluated or treated in whole, or in part, for the following medical condition, which necessitates the use of the ordered oxygen. Treatment Diagnosis: Congestive heart failure    Attending Provider: Rahul Parsons  Physician signature: See electronic signature associated with these discharge orders  Date of Order: June 28, 2018                  Follow-up Appointments     Follow-up and recommended labs and tests        Follow up with the core cardiology clinic in the next 3 or 4 days  to recheck blood pressure, oxygen level and kidney function and electrolytes as well as your weight.  It is possible he will be able to wean off of the supplemental oxygen completely as you are on a very low level now.                  Additional Services     Home Care OT Referral for Hospital Discharge       OT to eval and treat    Your provider has ordered home care - occupational therapy. If you have not been contacted within 2 days of your discharge please call the department phone number listed on the top of this document.            Home Care PT Referral for Hospital Discharge       PT to eval and treat    Your provider has ordered home care - physical therapy. If you have not been contacted within 2 days of your discharge please call the department phone number listed on the top of this document.            Home care nursing referral       RN skilled nursing visit. RN to assess vital signs and weight, respiratory and cardiac status, patients ability to take and record daily blood pressure, temp and weight, pain level and activity tolerance, hydration, nutrition and bowel status and home safety.    Your provider has ordered home care nursing services. If you have not been contacted within 2 days of your discharge please call the inpatient department phone number at [phone] .                  Further instructions from your care team       CM: ROB RN  [phone]    General Recommendations To Control Heart Failure When You Get Home       Heart Failure Instructions for Patients and Families: Please read and check off each of these important instructions as you do them when you get home.          Weight and Symptoms       ___ Put a scale in your bathroom.    ___ Post a weight chart or calendar next to your scale.    ___ Weigh yourself everyday as soon as you get up in the morning (before breakfast).  You should only be wearing your pajamas.  Write your weight on the chart/calendar.  ___ Bring your weight  chart/calendar with you to all appointments.  ___ Call your doctor or nurse practitioner if you gain 2 pounds (in 1 day) or 5 pounds in (1 week) from your goal  good  weight.  Your good weight is also called your  dry  weight.  Your doctor or nurse will tell you what your good weight should be.    ___ Call your doctor or nurse practitioner if you have shortness of breath that gets worse over time, leg swelling or fatigue.       Medications and Diet       ___ Make sure to take your medication as prescribed.    ___ Bring a current list of your medication and all of your medicine bottles with you to all appointments.    ___ Limit fluids if you still have swelling or shortness of breath, or if your doctor tells you to do so.    ___ Eat less than 2000 mg of sodium (salt) every day. Read food labels, and do not add salt to meals. Remember, if you eat less salt you retain less fluid.  ___ Follow a heart healthy diet that is low in saturated fat.        Activity and Suggested Lifestyle Changes      ___ Stay active. Talk to your doctor about an exercise program that is safe for your heart.  ___ Stop smoking. Reduce alcohol use.      ___ Lose weight if you are overweight. Extra weight puts a lot of stress on the heart.                 Control for Leg Swelling     ___ Keep your legs elevated (raised) as needed for swelling. If swelling is uncomfortable or elevation doesn t help, ask your doctor about using ACE wraps or support stockings.        What is the [facility]?  Cardiomyopathy  Optimization  Rehabilitation  Education    The [facility] Clinic is a heart failure specialty clinic within [facility].  It is an outpatient disease management program that is based on a phase-by-phase approach, which is tailored to each patient s individual needs.  The cardiologist, nurse practitioner, physician assistant and nurses provide an ongoing outpatient care and treatment plan that guides heart failure and cardiomyopathy  patients from evaluation and education to stabilization. This team works with your current primary care doctor and cardiologist to help you:    - Avoid hospitalizations  - Slow the progression of the disease  - Improve length and quality of life  - Know who and when to call if heart failure symptoms appear  - Receive easy access to quality health care and advice  - Better understand your condition and treatment  - Decrease the tremendous cost burden of heart failure care  - Detect future heart problems before they become life threatening                                 Follow-up Appointments     ___ An appointment with the [facility] may already have been made for you (see section   Your next appointments already scheduled ).  If you have a question about your appointment, would like to speak with a C.O.R.E. nurse, or would like to become a [facility] Clinic patient, please call one of the following locations:     - [facility]):                                             [phone]  - [facility]):                                            [phone]  - [facility]):                 [phone]      ___ Your C.O.R.E. Clinic Team will continue to educate you on your heart failure and may adjust medications based on your vital signs, lab work, and how you are feeling.  Therefore, it is very important to bring the following to all C.O.R.E. appointments:    - An accurate list of your medications  - Your medicine bottles  - Your weight chart/calendar                   Pending Results     No orders found from 6/16/2018 to 6/19/2018.            Statement of Approval     Ordered          06/28/18 5911  I have reviewed and agree with all the recommendations and orders detailed in this document.  EFFECTIVE NOW     Approved and electronically signed by:  Rahul Parsons MD           06/28/18 5702  I have reviewed  "and agree with all the recommendations and orders detailed in this document.  EFFECTIVE NOW     Approved and electronically signed by:  Rahul Parsons MD             Admission Information     Date & Time Provider Department Dept. Phone    2018 Isaac Gomez MD [place] [address] Medical Surgical [phone]      Your Vitals Were     Blood Pressure Pulse Temperature Respirations Height Weight    135/54 (BP Location: Right arm) 59 97.5  F (36.4  C) (Oral) 20 1.6 m (5' 3\") 71.2 kg (157 lb)    Pulse Oximetry BMI (Body Mass Index)                95% 27.81 kg/m2          MyChart Information     [facility] lets you send messages to your doctor, view your test results, renew your prescriptions, schedule appointments and more. To sign up, go to [email or web address] . Click on \"Log in\" on the left side of the screen, which will take you to the Welcome page. Then click on \"Sign up Now\" on the right side of the page.     You will be asked to enter the access code listed below, as well as some personal information. Please follow the directions to create your username and password.     Your access code is: [email or web address]  Expires: 2018  3:15 PM     Your access code will  in 90 days. If you need help or a new code, please call your [facility] clinic or [phone].        Care EveryWhere ID     This is your Care EveryWhere ID. This could be used by other organizations to access your [facility] medical records  [id number]        Equal Access to Services     TRACEY KLEIN : Hadii thais Teague, waaxda luqadaha, qaybta kaalsharlene jama . So [facility] [phone].    ATENCIÓN: Si habla español, tiene a rosas disposición servicios gratuitos de asistencia lingüística. Llame al [phone].    We comply with applicable federal civil rights laws and [place] laws. We do not discriminate on the basis of race, color, national origin, age, disability, sex, " sexual orientation, or gender identity.               Review of your medicines      START taking        Dose / Directions    spironolactone 50 MG tablet   Commonly known as:  ALDACTONE   Used for:  Acute on chronic diastolic congestive heart failure (H)        Dose:  50 mg   Start taking on:  6/29/2018   Take 1 tablet (50 mg) by mouth daily   Quantity:  30 tablet   Refills:  0         CONTINUE these medicines which may have CHANGED, or have new prescriptions. If we are uncertain of the size of tablets/capsules you have at home, strength may be listed as something that might have changed.        Dose / Directions    isosorbide mononitrate 30 MG 24 hr tablet   Commonly known as:  IMDUR   This may have changed:  how much to take   Used for:  Essential hypertension with goal blood pressure less than 140/90, Coronary artery disease involving native heart with angina pectoris, unspecified vessel or lesion type (H)        Dose:  60 mg   Take 2 tablets (60 mg) by mouth daily   Quantity:  90 tablet   Refills:  2         CONTINUE these medicines which have NOT CHANGED        Dose / Directions    albuterol 1.25 MG/3ML nebulizer solution   Commonly known as:  ACCUNEB        Dose:  1 vial   Take 1 vial by nebulization 3 times daily   Refills:  0       Cranberry Extract 250 MG Tabs        Dose:  250 mg   Take 250 mg by mouth daily   Refills:  0       ferrous sulfate 325 (65 Fe) MG tablet   Commonly known as:  IRON   Used for:  Iron deficiency        Dose:  325 mg   Take 1 tablet (325 mg) by mouth 2 times daily   Quantity:  100 tablet   Refills:  11       furosemide 40 MG tablet   Commonly known as:  LASIX   Used for:  Essential hypertension with goal blood pressure less than 140/90        Dose:  40 mg   Take 1 tablet (40 mg) by mouth 2 times daily   Quantity:  180 tablet   Refills:  2       gabapentin 100 MG capsule   Commonly known as:  NEURONTIN   Used for:  Midline thoracic back pain, unspecified chronicity        TAKE 1  CAPSULE(100 MG) BY MOUTH THREE TIMES DAILY   Quantity:  270 capsule   Refills:  0       hydrALAZINE 25 MG tablet   Commonly known as:  APRESOLINE   Used for:  Hyperlipidemia, unspecified hyperlipidemia type, Cardiomyopathy, unspecified type (H)        Dose:  75 mg   Take 3 tablets (75 mg) by mouth 3 times daily   Quantity:  810 tablet   Refills:  3       OMEGA-3 FATTY ACIDS PO        Dose:  1200 mg   Take 1,200 mg by mouth daily   Refills:  0       omeprazole 20 MG CR capsule   Commonly known as:  priLOSEC   Used for:  Gastroesophageal reflux disease without esophagitis        Dose:  20 mg   Take 1 capsule (20 mg) by mouth daily   Quantity:  90 capsule   Refills:  2       senna-docusate 8.6-50 MG per tablet   Commonly known as:  SENOKOT-S;PERICOLACE   Used for:  S/P foot surgery, right        Take 2 tablets daily as needed for constipation while taking prescription pain medications.   Quantity:  30 tablet   Refills:  0       simvastatin 10 MG tablet   Commonly known as:  ZOCOR   Used for:  Hyperlipidemia, unspecified hyperlipidemia type        Dose:  10 mg   Take 1 tablet (10 mg) by mouth At Bedtime   Quantity:  90 tablet   Refills:  1       TYLENOL 325 MG tablet   Generic drug:  acetaminophen        Dose:  650 mg   Take 650 mg by mouth 3 times daily as needed for mild pain   Refills:  0       venlafaxine 75 MG 24 hr capsule   Commonly known as:  EFFEXOR-XR   Used for:  Major depressive disorder, recurrent episode, in partial remission (H)        TAKE ONE CAPSULE BY MOUTH DAILY   Quantity:  90 capsule   Refills:  0       VITAMIN D (CHOLECALCIFEROL) PO        Dose:  1000 Units   Take 1,000 Units by mouth daily   Refills:  0         STOP taking     potassium chloride SA 20 MEQ CR tablet   Commonly known as:  KLOR-CON                Where to get your medicines      These medications were sent to [facility] [place] - [address] [address]  [address] [address] [address]      Phone:  [phone]     isosorbide mononitrate 30 MG 24 hr tablet    spironolactone 50 MG tablet                Protect others around you: Learn how to safely use, store and throw away your medicines at www.disposemymeds.org.             Medication List: This is a list of all your medications and when to take them. Check marks below indicate your daily home schedule. Keep this list as a reference.      Medications           Morning Afternoon Evening Bedtime As Needed    albuterol 1.25 MG/3ML nebulizer solution   Commonly known as:  ACCUNEB   Take 1 vial by nebulization 3 times daily   Last time this was given:  6/28/2018 @ 1100AM   Next Dose Due:  Evening                                         Cranberry Extract 250 MG Tabs   Take 250 mg by mouth daily                                   ferrous sulfate 325 (65 Fe) MG tablet   Commonly known as:  IRON   Take 1 tablet (325 mg) by mouth 2 times daily   Last time this was given:  325 mg on 6/28/2018  8:27 AM   Next Dose Due:  Evening                                      furosemide 40 MG tablet   Commonly known as:  LASIX   Take 1 tablet (40 mg) by mouth 2 times daily   Last time this was given:  40 mg on 6/28/2018  8:23 AM   Next Dose Due:  4PM                   4PM                   gabapentin 100 MG capsule   Commonly known as:  NEURONTIN   TAKE 1 CAPSULE(100 MG) BY MOUTH THREE TIMES DAILY   Last time this was given:  100 mg on 6/28/2018  8:22 AM   Next Dose Due:  4PM                   4PM                      hydrALAZINE 25 MG tablet   Commonly known as:  APRESOLINE   Take 3 tablets (75 mg) by mouth 3 times daily   Last time this was given:  75 mg on 6/28/2018  8:22 AM   Next Dose Due:  4PM                   4PM                      isosorbide mononitrate 30 MG 24 hr tablet   Commonly known as:  IMDUR   Take 2 tablets (60 mg) by mouth daily   Last time this was given:  60 mg on 6/28/2018  8:25 AM   Next Dose Due:  6/29/2018                                   OMEGA-3  FATTY ACIDS PO   Take 1,200 mg by mouth daily                                   omeprazole 20 MG CR capsule   Commonly known as:  priLOSEC   Take 1 capsule (20 mg) by mouth daily   Last time this was given:  20 mg on 6/28/2018  8:25 AM   Next Dose Due:  06/29/2018              Before Breakfast                       senna-docusate 8.6-50 MG per tablet   Commonly known as:  SENOKOT-S;PERICOLACE   Take 2 tablets daily as needed for constipation while taking prescription pain medications.                                   simvastatin 10 MG tablet   Commonly known as:  ZOCOR   Take 1 tablet (10 mg) by mouth At Bedtime   Last time this was given:  10 mg on 6/27/2018  9:25 PM   Next Dose Due:  6/29/2018                                     spironolactone 50 MG tablet   Commonly known as:  ALDACTONE   Take 1 tablet (50 mg) by mouth daily   Start taking on:  6/29/2018   Last time this was given:  50 mg on 6/28/2018  8:23 AM   Next Dose Due:  6/29/2018                                   TYLENOL 325 MG tablet   Take 650 mg by mouth 3 times daily as needed for mild pain   Generic drug:  acetaminophen                                   venlafaxine 75 MG 24 hr capsule   Commonly known as:  EFFEXOR-XR   TAKE ONE CAPSULE BY MOUTH DAILY   Last time this was given:  75 mg on 6/28/2018  8:21 AM   Next Dose Due:  06/29/2018                                   VITAMIN D (CHOLECALCIFEROL) PO   Take 1,000 Units by mouth daily

## 2018-06-18 NOTE — ED NOTES
[facility]  ED Nurse Handoff Report    Keisha Godinez is a 86 year old female   ED Chief complaint: Anemia  . ED Diagnosis:   Final diagnoses:   Acute on chronic diastolic congestive heart failure (H)     Allergies:   Allergies   Allergen Reactions     Lisinopril Other (See Comments)     Tongue swelling     Calcium Nausea and Vomiting     Egg Yolk      Flu Virus Vaccine      Allergic to eggs.     Keflex [Cephalexin] Nausea     Pt states she had upset stomach.  NOT tongue swelling.  She had tongue swelling with a combo bp med that she thinks included lisinopril.    Tolerates IV Cefazolin     Levaquin [Levofloxacin]      Sulfa Drugs      Zinc Nausea and Vomiting       Code Status: Full Code  Activity level - Baseline/Home:  Stand with Assist. Activity Level - Current:   Stand with Assist of 2. Lift room needed: No. Bariatric: No   Needed: No   Isolation: Yes. Infection: Not Applicable.     Vital Signs:   Vitals:    06/18/18 1711 06/18/18 1730 06/18/18 1745 06/18/18 1746   BP:   153/68    Pulse:       Resp:       Temp:       TempSrc:       SpO2: 100% 100%  100%       Cardiac Rhythm:  ,      Pain level:    Patient confused: No. Patient Falls Risk: Yes.   Elimination Status: Has voided   Patient Report - Initial Complaint: Anemia, SOB. Focused Assessment:  Respiratory - Respiratory WDL:  WDL except; all Rhythm/Pattern, Respiratory: tachypnea; shortness of breath reported Expansion/Accessory Muscles/Retractions: abdominal muscle use Lung Fields: All Fields Throughout All Lung Fields: Posterior:; Anterior:; wheezes expiratory  Tests Performed: Labs, XR. Abnormal Results:   Labs Ordered and Resulted from Time of ED Arrival Up to the Time of Departure from the ED   CBC WITH PLATELETS DIFFERENTIAL - Abnormal; Notable for the following:        Result Value    RBC Count 2.82 (*)     Hemoglobin 7.2 (*)     Hematocrit 25.4 (*)     MCH 25.5 (*)     MCHC 28.3 (*)     RDW 18.0 (*)     All other components  within normal limits   BASIC METABOLIC PANEL - Abnormal; Notable for the following:     Glucose 100 (*)     All other components within normal limits   BLOOD GAS VENOUS AND OXYHGB - Abnormal; Notable for the following:     Bicarbonate Venous 29 (*)     All other components within normal limits   TROPONIN I   STRICT INTAKE AND OUTPUT   ABO/RH TYPE AND SCREEN        Treatments provided: See MAR  Family Comments: Daughter went home. Took pt wheelchair and shoes.  OBS brochure/video discussed/provided to patient:  N/A  ED Medications:   Medications   ipratropium - albuterol 0.5 mg/2.5 mg/3 mL (DUONEB) 0.5-2.5 (3) MG/3ML neb solution (3 mLs  Given 6/18/18 0488)   furosemide (LASIX) injection 20 mg (20 mg Intravenous Given 6/18/18 1853)     Drips infusing:  No  For the majority of the shift, the patient's behavior Green. Interventions performed were Call light within reach.     Severe Sepsis OR Septic Shock Diagnosis Present: No      ED Nurse Name/Phone Number: Melisa Corin,   6:18 PM    RECEIVING UNIT ED HANDOFF REVIEW    Above ED Nurse Handoff Report was reviewed: Yes  Reviewed by: Jovanny Beltre on June 18, 2018 at 7:28 PM

## 2018-06-18 NOTE — ED PROVIDER NOTES
History     Chief Complaint:  Shortness of Breath    HPI   Keisha Godinez is a 86 year old female with a history of atrial fibrillation, blood transfusion, CHF, respiratory failure, iron deficiency, hypertension, and hyperlipidemia who presents with shortness of breath. The patient reports that for the past month, she has had some shortness of breath, but has not used oxygen for this. She also has leg swelling, but states that it is not different from baseline. She additionally mentions she has been wheezing recently. She was in the hospital one month ago, and was released while she was feeling similar symptoms. She was sent to the ED from the clinic, who noted that her hemoglobin was 6.7. She reports using two duonebs today. She denies asthma, COPD, fever, black or bloody stools, and chest pain. Of note, the patient is on iron.    CARDIAC RISK FACTORS:  Sex:    Female  Tobacco:   No  Hypertension:   Yes  Hyperlipidemia:  Yes  Diabetes:   No  Family History:  No    PE/DVT RISK FACTORS:  Sex:    Female  Hormones:   No  Tobacco:   No  Cancer:   No  Travel:   No  Surgery:   No  Other immobilization: No  Personal history:  No  Family history:  No    Allergies:  Lisinopril  Calcium  Flu virus vaccine  Cephalexin  Levaquin  Sulfa drugs  Zinc    Medications:    Accuneb  Iron  Lasix  Neurontin  Apresoline  Imdur  Prilosec  Senna-docusate  Zocor  Effexor  Multivitamins    Past Medical History:    Anemia  Atrial fibrillation  B12 Deficiency  Cardiomyopathy  CHF  Chronic edema  CVA  Depression  GERD  GI bleed  Hyperlipidemia  Hypertension  Iron deficiency  MSSA  Pulmonary hypertension  Shingles  MI  Neuropathy of both feet   Respiratory failure  Chronic foot ulcer  Hiatal hernia  C. difficile colitis  Sepsis  CKD    Past Surgical History:    Excise mass, foot    Family History:    Genetic syndrome  Cancer    Social History:  Smoking status: Former smoker  Alcohol use: No  Marital Status:   [5]    Review of Systems    Constitutional: Negative for fever.   Respiratory: Positive for shortness of breath and wheezing.    Cardiovascular: Positive for leg swelling. Negative for chest pain.   Gastrointestinal: Negative for blood in stool.   All other systems reviewed and are negative.    Physical Exam   \Patient Vitals for the past 24 hrs:   BP Temp Temp src Pulse Heart Rate Resp SpO2   06/18/18 1831 - - - - 60 - 100 %   06/18/18 1830 153/63 - - - 63 - -   06/18/18 1815 155/67 - - - 62 - 97 %   06/18/18 1746 - - - - - - 100 %   06/18/18 1745 153/68 - - - - - -   06/18/18 1730 - - - - - - 100 %   06/18/18 1711 - - - - - - 100 %   06/18/18 1658 169/68 98.8  F (37.1  C) Oral 71 - (!) 42 90 %     Physical Exam   General: Patient is alert and interactive when I enter the room, sitting up   Head:  The scalp, face, and head appear normal  Eyes:  Conjunctivae are normal  ENT:    The nose is normal    Pinnae are normal    External acoustic canals are normal  Neck:  Trachea midline  CV:  Pulses are normal, RRR  Resp:  No respiratory distress, crackles bilaterally, tachypnea present, no rhonchi    Abdomen:      Soft, non-tender, non-distended  Musc:  Normal muscular tone    No major joint effusions    Bilateral pitting leg edema, 3+ up to hip    Bilateral ace wraps on legs   Skin:  No rash or lesions noted  Neuro:  Speech is normal and fluent. Face is symmetric.     Moving all extremities well.   Psych: Awake. Alert.  Normal affect.  Appropriate interactions.    Emergency Department Course   ECG (17:07:13):  Rate 62 bpm. AZ interval *. QRS duration 92. QT/QTc 482/489. P-R-T axes * 69 -10. Atrial fibrillation. Nonspecific ST and T wave abnormality. Abnormal ECG. Interpreted at 1709 by Loli Oreilly MD.    Imaging:  Radiographic findings were communicated with the patient who voiced understanding of the findings.    Chest XR, 1 View Portable:  Mild diffuse interstitial prominence with blunting of the  bilateral costophrenic angles, suggestive  of venous congestion with  small bilateral effusions. No pneumothorax appreciated. Stable  cardiomegaly.  As read by Radiology.    Laboratory:  CBC: HGB 7.2 (L) o/w WNL (WBC 6.7, )   BMP: Glucose 100 (H) o/w WNL (Creatinine 0.78)  Troponin: <0.015  Blood Gas Venous and OxyHGB: pH 7.42, PCO2 44, PO2 27, Bicarbonate 29 (H), FlO2  3 L, Oxyhemoglobin 42, Base Excess 3.8  ABO/Rh Type: O positive, Antibody Screen positive    Antibody ID [facility] BB: In process    Interventions:  1705: Duoneb, 3 mL, nebulization   1733: Lasix 20 mg IV    Emergency Department Course:  Past medical records, nursing notes, and vitals reviewed.  1701: I performed an exam of the patient and obtained history, as documented above.  EKG obtained, results above.  IV inserted and blood drawn.  The patient was sent for a chest x-ray while in the emergency department, findings above.    1854: I spoke to Dr. Gomez of the hospitalist service who accepts the patient for admission.     Findings and plan explained to the patient who consents to admission.     Discussed the patient with Dr. Gomez, who will admit the patient to a telemetry bed for further monitoring, evaluation, and treatment.     Impression & Plan    Medical Decision Making:  Keisha Godinez is a 86 year old female with a history of diastolic heart failure, right-side heart failure, atrial fibrillation, chronic anemia, and hypertension who presents with shortness of breath. Shortness of breath has been going on for about a month, however she is clearly tachypneic here with diffuse expiatory wheezing. She was mildly hypoxic requiring one to two liters to keep her stats above 90. She was given a duoneb shortly after arrival and clearly has signs of fluid overload on exam. She was given 20 mg of IV Lasix with the duoneb; with Lasix she greatly improved. She greatly improved in terms of respiratory status, chest x-ray revealed mild base bilateral pleural effusions and pulmonary edema, her  BNP in clinic was 8000. Troponin here was negative, EKG revealed atrial fibrillation but showed no signs of ischemia. She is grossly fluid overload, so I think this is likely the cause of most of her symptoms. Her hemoglobin is low but has always been low, in clinic was 6.7 and here is 7.2. There might be a dilutional component given her great fluid overload state. Her PCO2 was 44, and with the Lasix, I do not think she needs further respiratory interventions such as BiPAP or intubation at this point. Patient will be admitted to cardiac telemetry for continued diaeresis and continued monitoring of respiratory status. Patient admitted.    Diagnosis:    ICD-10-CM   1. Acute on chronic diastolic congestive heart failure (H) I50.33             Disposition:  Admitted to telemetry in the care of Dr. Gomez.    Isaac Loco  6/18/2018   [facility] EMERGENCY DEPARTMENT  IIsaac, am serving as a scribe at 5:01 PM on 6/18/2018 to document services personally performed by Loli Oreilly MD based on my observations and the provider's statements to me.        Loli Oreilly MD  06/19/18 0049

## 2018-06-18 NOTE — PROGRESS NOTES
SUBJECTIVE:   Keisha Godinez is a 86 year old female who presents to clinic today for the following health issues:      Shortness of breath.      Duration: 1 months    Description (location/character/radiation): Breathing problem    Intensity:  moderate    Accompanying signs and symptoms:Tiredness, Cough and wheezing sound    History (similar episodes/previous evaluation): None    Precipitating or alleviating factors: None    Therapies tried and outcome Yes. Used a neb inhaler,  but is not helping.       Patient is here with her daughter.   Presents with symptoms of SOB, wheezing for one month. Has cough with clear mucus production.   No fever, chills. No orthopnea.   Has history of LE edema, cellulitis. Has home care for her LE edema treatments.   Feels weak, ambulates on short distances with a walker.   Denies chest pain. Has history of chronic a fib. Not on AC because of history of GI bleed. Has chronic anemia, required blood transfusion in the past. No noted GI bleeding , no black stools, no hematuria.   On treatment with Albuterol nebulizer and Lasix 80 mg daily / split doses.         Problem list and histories reviewed & adjusted, as indicated.  Additional history: as documented    Patient Active Problem List   Diagnosis     Iron deficiency anemia     Neuropathy of both feet     Myocardial infarction-type 2     GIB (gastrointestinal bleeding)     Wound of left leg     Cardiomyopathy (H)     Pulmonary hypertension     Xerosis of skin: shins     Bilateral edema of lower extremity     Gastroesophageal reflux disease without esophagitis     Health Care Home     Chronic systolic congestive heart failure (H)     Major depressive disorder, recurrent episode, in partial remission (H)     Cellulitis of foot     Essential hypertension with goal blood pressure less than 140/90     Post-operative complication     Respiratory failure (H)     Acute respiratory failure with hypoxia (H)     C. difficile colitis      History of hiatal hernia     History of shingles     Chronic foot ulcer (H) (felt secondary to bony protuberance status post excision 7/6-16)     Acute on chronic diastolic congestive heart failure (H)     Pneumonia     Sepsis (H)     Chronic atrial fibrillation (H)     Anemia     Dysphagia     Midline thoracic back pain     Cardiomyopathy, unspecified type (H)     Fracture of humerus, proximal, right, closed     Rheumatic mitral regurgitation     Chronic kidney disease, stage 3 (moderate) (GFR 59 10/30/17, GFR 58 7/7/16)     Cellulitis of left lower extremity     Past Surgical History:   Procedure Laterality Date     COLONOSCOPY  03/24/2015    Diverticulosis, polyps     ENTEROSCOPY SMALL BOWEL N/A 2/29/2016    Procedure: ENTEROSCOPY SMALL BOWEL;  Surgeon: Ady Ponce MD;  Location:  GI     ESOPHAGOSCOPY, GASTROSCOPY, DUODENOSCOPY (EGD), COMBINED N/A 3/22/2015    Upper GI- Normal, nothing significant found.      EXCISE MASS FOOT Right 7/6/2016    Procedure: EXCISE MASS FOOT;  Surgeon: Lee Jang DPM;  Location:  OR       Social History   Substance Use Topics     Smoking status: Former Smoker     Quit date: 4/14/1956     Smokeless tobacco: Never Used     Alcohol use No     Family History   Problem Relation Age of Onset     Known Genetic Syndrome Father      Known Genetic Syndrome Mother      Other Cancer Daughter      pancreatic     Other Cancer Brother      type     Other Cancer Sister 60     lung     DIABETES No family hx of      Breast Cancer No family hx of      Cancer - colorectal No family hx of      Ovarian Cancer No family hx of      Prostate Cancer No family hx of          Current Outpatient Prescriptions   Medication Sig Dispense Refill     acetaminophen (TYLENOL) 325 MG tablet Take 650 mg by mouth 3 times daily as needed for mild pain       albuterol (ACCUNEB) 1.25 MG/3ML nebulizer solution Take 1 vial by nebulization 3 times daily        Cranberry Extract 250 MG TABS Take 250 mg  "by mouth daily       ferrous sulfate (IRON) 325 (65 FE) MG tablet Take 1 tablet (325 mg) by mouth 2 times daily 100 tablet 11     furosemide (LASIX) 40 MG tablet Take 1 tablet (40 mg) by mouth 2 times daily 180 tablet 2     gabapentin (NEURONTIN) 100 MG capsule TAKE 1 CAPSULE(100 MG) BY MOUTH THREE TIMES DAILY 270 capsule 0     hydrALAZINE (APRESOLINE) 25 MG tablet Take 3 tablets (75 mg) by mouth 3 times daily 810 tablet 3     isosorbide mononitrate (IMDUR) 30 MG 24 hr tablet Take 1 tablet (30 mg) by mouth daily 90 tablet 2     OMEGA-3 FATTY ACIDS PO Take 1,200 mg by mouth daily        omeprazole (PRILOSEC) 20 MG CR capsule Take 1 capsule (20 mg) by mouth daily 90 capsule 2     potassium chloride SA (KLOR-CON) 20 MEQ CR tablet Take 1 tablet (20 mEq) by mouth daily 90 tablet 1     senna-docusate (SENOKOT-S;PERICOLACE) 8.6-50 MG per tablet Take 2 tablets daily as needed for constipation while taking prescription pain medications. 30 tablet 0     simvastatin (ZOCOR) 10 MG tablet Take 1 tablet (10 mg) by mouth At Bedtime 90 tablet 1     venlafaxine (EFFEXOR-XR) 75 MG 24 hr capsule TAKE ONE CAPSULE BY MOUTH DAILY 90 capsule 0     VITAMIN D, CHOLECALCIFEROL, PO Take 1,000 Units by mouth daily          Reviewed and updated as needed this visit by clinical staff  Tobacco  Allergies  Meds  Med Hx  Surg Hx  Fam Hx  Soc Hx      Reviewed and updated as needed this visit by Provider         ROS:  Constitutional, HEENT, cardiovascular, pulmonary, GI, , musculoskeletal, neuro, skin, endocrine and psych systems are negative, except as otherwise noted.    OBJECTIVE:     BP (!) 128/36 (BP Location: Left arm, Patient Position: Sitting, Cuff Size: Adult Regular)  Pulse 64  Temp 98.5  F (36.9  C) (Oral)  Ht 5' 3\" (1.6 m)  Wt 160 lb (72.6 kg)  SpO2 92%  BMI 28.34 kg/m2  Body mass index is 28.34 kg/(m^2).   GENERAL: elderly, weak, in a wheel chair, alert and mild restpiratory distress- wheezing   EYES: Eyes grossly normal to " inspection, PERRL and conjunctivae and sclerae normal  HENT: ear canals and TM's normal, nose and mouth without ulcers or lesions  NECK: no adenopathy, no asymmetry, masses, or scars and thyroid normal to palpation  RESP: lungs  - no rales, + bilateral scattered rhonchi and expiratory wheezes, diminished breaths sounds in bases   CV: regular rate and rhythm, normal S1 S2, no S3 or S4, no murmur, click or rub, 2-3+  peripheral edema   ABDOMEN: soft, nontender, no hepatosplenomegaly, no masses and bowel sounds normal  MS: no gross musculoskeletal defects noted  SKIN: no suspicious lesions or rashes  NEURO: generalized , non focal weakness, mentation intact and speech normal    Diagnostic Test Results:  Results for orders placed or performed in visit on 06/18/18 (from the past 24 hour(s))   CBC with platelets   Result Value Ref Range    WBC 6.3 4.0 - 11.0 10e9/L    RBC Count 2.73 (L) 3.8 - 5.2 10e12/L    Hemoglobin 6.7 (LL) 11.7 - 15.7 g/dL    Hematocrit 24.4 (L) 35.0 - 47.0 %    MCV 89 78 - 100 fl    MCH 24.5 (L) 26.5 - 33.0 pg    MCHC 27.5 (L) 31.5 - 36.5 g/dL    RDW 18.3 (H) 10.0 - 15.0 %    Platelet Count 173 150 - 450 10e9/L   Comprehensive metabolic panel   Result Value Ref Range    Sodium 141 133 - 144 mmol/L    Potassium 4.0 3.4 - 5.3 mmol/L    Chloride 106 94 - 109 mmol/L    Carbon Dioxide 30 20 - 32 mmol/L    Anion Gap 5 3 - 14 mmol/L    Glucose 120 (H) 70 - 99 mg/dL    Urea Nitrogen 18 7 - 30 mg/dL    Creatinine 0.76 0.52 - 1.04 mg/dL    GFR Estimate 72 >60 mL/min/1.7m2    GFR Estimate If Black 87 >60 mL/min/1.7m2    Calcium 8.4 (L) 8.5 - 10.1 mg/dL    Bilirubin Total 0.2 0.2 - 1.3 mg/dL    Albumin 2.9 (L) 3.4 - 5.0 g/dL    Protein Total 6.6 (L) 6.8 - 8.8 g/dL    Alkaline Phosphatase 110 40 - 150 U/L    ALT 16 0 - 50 U/L    AST 20 0 - 45 U/L   BNP-N terminal pro   Result Value Ref Range    N-Terminal Pro Bnp 8389 (H) 0 - 450 pg/mL   D dimer, quantitative   Result Value Ref Range    D Dimer 1.5 (H) 0.0 - 0.50  ug/ml FEU       ASSESSMENT/PLAN:     Problem List Items Addressed This Visit     Anemia      Other Visit Diagnoses     SOB (shortness of breath)    -  Primary    Relevant Orders    XR Chest 2 Views    CBC with platelets (Completed)    Comprehensive metabolic panel (Completed)    BNP-N terminal pro (Completed)    D dimer, quantitative (Completed)    EKG 12-lead complete w/read - Clinics (Completed)    Acute diastolic congestive heart failure (H)               Assess lab work - significant anemia, elevated BNP  CXR- cardiomegaly and right pleural effusion   EKG- a fib with controlled rate     Patient will be transported to the ER for hospital assessment and treatment for CHF, anemia         Darryl Reed MD  [facility]

## 2018-06-18 NOTE — IP AVS SNAPSHOT
[place] [id number] [facility]    [address]    [facility] [address]    Phone:  [phone]    Fax:  [phone]                                       After Visit Summary   6/18/2018    Keisha Godinez    MRN: [id number]           After Visit Summary Signature Page     I have received my discharge instructions, and my questions have been answered. I have discussed any challenges I see with this plan with the nurse or doctor.    ..........................................................................................................................................  Patient/Patient Representative Signature      ..........................................................................................................................................  Patient Representative Print Name and Relationship to Patient    ..................................................               ................................................  Date                                            Time    ..........................................................................................................................................  Reviewed by Signature/Title    ...................................................              ..............................................  Date                                                            Time

## 2018-06-18 NOTE — LETTER
Transition Communication Hand-off for Care Transitions to Next Level of Care Provider    Hand-off for Care Transitions to Next Level of Care Provider  Name: Keisha Godinez  : 1932  MRN #: [id number]  Reason for Hospitalization:  Acute on chronic diastolic congestive heart failure (H) [I50.33]  Admit Date/Time: 2018  4:51 PM  Discharge Date: 2018    Reason for Communication Hand-off Referral: Admission diagnoses: CHF    Discharge Plan:  Discharged to: Home with homecare                   Patient agreeable to post-hospital support suggestions:  Yes    Patient is on new medications:   Yes    MTM follow up recommended: No    Tel-Ass[employer] program:  Already enrolled in a TA program    Patient will receive  HOME CARE  at:  Discharge through [facility] for OT, PT & RN.     Follow-up specialty is recommended: Yes. Follow up with [facility] for enrollment.     Follow-up plan:  Future Appointments  Date Time Provider Department Center   2018 3:00 PM [place] [facility]     Any outstanding tests or procedures:  No. Recommend check BP, oxygen level, kidney function, weight and electrolytes in the next 3-4 days.      Procedures     Future Labs/Procedures    Oxygen Adult     Comments:    New Home Oxygen Order 1 liter(s) by nasal cannula continuously with use of portable tank. Expected treatment length is indefinite (99 months).. Test on conserving device as applicable.    Patients who qualify for home O2 coverage under the CMS guidelines require ABG tests or O2 sat readings obtained closest to, but no earlier than 2 days prior to the discharge, as evidence of the need for home oxygen therapy. Testing must be performed while patient is in the chronic stable state. See notes for O2 sats.    I certify that this patient, Keisha Godinez has been under my care and that I, or a nurse practitioner or physician's assistant working with me, had a face-to-face encounter that meets the  face-to-face encounter requirements with this patient on 6/28/2018. The patient, Keisha Godinez was evaluated or treated in whole, or in part, for the following medical condition, which necessitates the use of the ordered oxygen. Treatment Diagnosis: Congestive heart failure    Attending Provider: Rahul Parsons  Physician signature: See electronic signature associated with these discharge orders  Date of Order: June 28, 2018          Referrals     Future Labs/Procedures    Home care nursing referral     Comments:    RN skilled nursing visit. RN to assess vital signs and weight, respiratory and cardiac status, patients ability to take and record daily blood pressure, temp and weight, pain level and activity tolerance, hydration, nutrition and bowel status and home safety.    Your provider has ordered home care nursing services. If you have not been contacted within 2 days of your discharge please call the inpatient department phone number at [phone] .    Home Care OT Referral for Hospital Discharge     Comments:    OT to eval and treat    Your provider has ordered home care - occupational therapy. If you have not been contacted within 2 days of your discharge please call the department phone number listed on the top of this document.    Home Care PT Referral for Hospital Discharge     Comments:    PT to eval and treat    Your provider has ordered home care - physical therapy. If you have not been contacted within 2 days of your discharge please call the department phone number listed on the top of this document.          Key Recommendations:  Patient stated she observes a low sodium diet and weighs herself daily. Her scale is not accurate so CM provided her with a new scale with large readout and low profile. She was scheduled to enroll with the [facility] on 6/27/18. This will need to be rescheduled.     Communicated handoff via EPIC Comm Mgt to Dr. Gerri Eubanks's CC at [facility].      Ashely  Gary RN, BSN, CTS  [facility]  [phone]    AVS/Discharge Summary is the source of truth; this is a helpful guide for improved communication of patient story

## 2018-06-19 ENCOUNTER — APPOINTMENT (OUTPATIENT)
Dept: OCCUPATIONAL THERAPY | Facility: CLINIC | Age: 83
DRG: 291 | End: 2018-06-19
Attending: INTERNAL MEDICINE
Payer: MEDICARE

## 2018-06-19 DIAGNOSIS — R60.0 BILATERAL EDEMA OF LOWER EXTREMITY: ICD-10-CM

## 2018-06-19 LAB
ANION GAP SERPL CALCULATED.3IONS-SCNC: 4 MMOL/L (ref 3–14)
BASOPHILS # BLD AUTO: 0.1 10E9/L (ref 0–0.2)
BASOPHILS NFR BLD AUTO: 0.8 %
BLD PROD TYP BPU: NORMAL
BLD UNIT ID BPU: 0
BLOOD PRODUCT CODE: NORMAL
BPU ID: NORMAL
BUN SERPL-MCNC: 15 MG/DL (ref 7–30)
CALCIUM SERPL-MCNC: 8.1 MG/DL (ref 8.5–10.1)
CHLORIDE SERPL-SCNC: 106 MMOL/L (ref 94–109)
CO2 SERPL-SCNC: 32 MMOL/L (ref 20–32)
CREAT SERPL-MCNC: 0.71 MG/DL (ref 0.52–1.04)
DIFFERENTIAL METHOD BLD: ABNORMAL
EOSINOPHIL # BLD AUTO: 0.2 10E9/L (ref 0–0.7)
EOSINOPHIL NFR BLD AUTO: 2.9 %
ERYTHROCYTE [DISTWIDTH] IN BLOOD BY AUTOMATED COUNT: 17.8 % (ref 10–15)
ERYTHROCYTE [DISTWIDTH] IN BLOOD BY AUTOMATED COUNT: 17.8 % (ref 10–15)
FERRITIN SERPL-MCNC: 11 NG/ML (ref 8–252)
FOLATE SERPL-MCNC: 43.7 NG/ML
GFR SERPL CREATININE-BSD FRML MDRD: 78 ML/MIN/1.7M2
GLUCOSE SERPL-MCNC: 90 MG/DL (ref 70–99)
HCT VFR BLD AUTO: 23.5 % (ref 35–47)
HCT VFR BLD AUTO: 24.9 % (ref 35–47)
HGB BLD-MCNC: 6.6 G/DL (ref 11.7–15.7)
HGB BLD-MCNC: 7 G/DL (ref 11.7–15.7)
IMM GRANULOCYTES # BLD: 0 10E9/L (ref 0–0.4)
IMM GRANULOCYTES NFR BLD: 0.3 %
INTERPRETATION ECG - MUSE: NORMAL
IRON SATN MFR SERPL: 17 % (ref 15–46)
IRON SERPL-MCNC: 63 UG/DL (ref 35–180)
LYMPHOCYTES # BLD AUTO: 0.6 10E9/L (ref 0.8–5.3)
LYMPHOCYTES NFR BLD AUTO: 9.9 %
MAGNESIUM SERPL-MCNC: 2.1 MG/DL (ref 1.6–2.3)
MCH RBC QN AUTO: 24.7 PG (ref 26.5–33)
MCH RBC QN AUTO: 25 PG (ref 26.5–33)
MCHC RBC AUTO-ENTMCNC: 28.1 G/DL (ref 31.5–36.5)
MCHC RBC AUTO-ENTMCNC: 28.1 G/DL (ref 31.5–36.5)
MCV RBC AUTO: 88 FL (ref 78–100)
MCV RBC AUTO: 89 FL (ref 78–100)
MONOCYTES # BLD AUTO: 0.6 10E9/L (ref 0–1.3)
MONOCYTES NFR BLD AUTO: 9.4 %
NEUTROPHILS # BLD AUTO: 4.5 10E9/L (ref 1.6–8.3)
NEUTROPHILS NFR BLD AUTO: 76.7 %
NRBC # BLD AUTO: 0 10*3/UL
NRBC BLD AUTO-RTO: 0 /100
PLATELET # BLD AUTO: 147 10E9/L (ref 150–450)
PLATELET # BLD AUTO: 172 10E9/L (ref 150–450)
POTASSIUM SERPL-SCNC: 3.4 MMOL/L (ref 3.4–5.3)
RBC # BLD AUTO: 2.64 10E12/L (ref 3.8–5.2)
RBC # BLD AUTO: 2.83 10E12/L (ref 3.8–5.2)
RETICS # AUTO: 101.3 10E9/L (ref 25–95)
RETICS/RBC NFR AUTO: 3.6 % (ref 0.5–2)
SODIUM SERPL-SCNC: 142 MMOL/L (ref 133–144)
TIBC SERPL-MCNC: 379 UG/DL (ref 240–430)
TRANSFUSION STATUS PATIENT QL: NORMAL
TRANSFUSION STATUS PATIENT QL: NORMAL
VIT B12 SERPL-MCNC: 518 PG/ML (ref 193–986)
WBC # BLD AUTO: 5.1 10E9/L (ref 4–11)
WBC # BLD AUTO: 5.9 10E9/L (ref 4–11)

## 2018-06-19 PROCEDURE — 99207 ZZC CDG-MDM COMPONENT: MEETS MODERATE - UP CODED: CPT | Performed by: INTERNAL MEDICINE

## 2018-06-19 PROCEDURE — 40000275 ZZH STATISTIC RCP TIME EA 10 MIN

## 2018-06-19 PROCEDURE — 36415 COLL VENOUS BLD VENIPUNCTURE: CPT | Performed by: INTERNAL MEDICINE

## 2018-06-19 PROCEDURE — 82746 ASSAY OF FOLIC ACID SERUM: CPT | Performed by: INTERNAL MEDICINE

## 2018-06-19 PROCEDURE — 94640 AIRWAY INHALATION TREATMENT: CPT | Mod: 76

## 2018-06-19 PROCEDURE — 83540 ASSAY OF IRON: CPT | Performed by: INTERNAL MEDICINE

## 2018-06-19 PROCEDURE — 97165 OT EVAL LOW COMPLEX 30 MIN: CPT | Mod: GO | Performed by: REHABILITATION PRACTITIONER

## 2018-06-19 PROCEDURE — 83550 IRON BINDING TEST: CPT | Performed by: INTERNAL MEDICINE

## 2018-06-19 PROCEDURE — 83735 ASSAY OF MAGNESIUM: CPT | Performed by: INTERNAL MEDICINE

## 2018-06-19 PROCEDURE — 80048 BASIC METABOLIC PNL TOTAL CA: CPT | Performed by: INTERNAL MEDICINE

## 2018-06-19 PROCEDURE — 25000128 H RX IP 250 OP 636: Performed by: INTERNAL MEDICINE

## 2018-06-19 PROCEDURE — 85045 AUTOMATED RETICULOCYTE COUNT: CPT | Performed by: INTERNAL MEDICINE

## 2018-06-19 PROCEDURE — 40000133 ZZH STATISTIC OT WARD VISIT: Performed by: REHABILITATION PRACTITIONER

## 2018-06-19 PROCEDURE — 85027 COMPLETE CBC AUTOMATED: CPT | Performed by: INTERNAL MEDICINE

## 2018-06-19 PROCEDURE — P9016 RBC LEUKOCYTES REDUCED: HCPCS | Performed by: EMERGENCY MEDICINE

## 2018-06-19 PROCEDURE — G0463 HOSPITAL OUTPT CLINIC VISIT: HCPCS

## 2018-06-19 PROCEDURE — 82728 ASSAY OF FERRITIN: CPT | Performed by: INTERNAL MEDICINE

## 2018-06-19 PROCEDURE — 40000847 ZZHCL STATISTIC MORPHOLOGY W/INTERP HISTOLOGY TC 85060: Performed by: INTERNAL MEDICINE

## 2018-06-19 PROCEDURE — 12000000 ZZH R&B MED SURG/OB

## 2018-06-19 PROCEDURE — 85025 COMPLETE CBC W/AUTO DIFF WBC: CPT | Performed by: INTERNAL MEDICINE

## 2018-06-19 PROCEDURE — 97110 THERAPEUTIC EXERCISES: CPT | Mod: GO | Performed by: REHABILITATION PRACTITIONER

## 2018-06-19 PROCEDURE — 85060 BLOOD SMEAR INTERPRETATION: CPT | Performed by: INTERNAL MEDICINE

## 2018-06-19 PROCEDURE — 94640 AIRWAY INHALATION TREATMENT: CPT

## 2018-06-19 PROCEDURE — 82607 VITAMIN B-12: CPT | Performed by: INTERNAL MEDICINE

## 2018-06-19 PROCEDURE — 25000125 ZZHC RX 250: Performed by: INTERNAL MEDICINE

## 2018-06-19 PROCEDURE — A9270 NON-COVERED ITEM OR SERVICE: HCPCS | Mod: GY | Performed by: INTERNAL MEDICINE

## 2018-06-19 PROCEDURE — 99233 SBSQ HOSP IP/OBS HIGH 50: CPT | Performed by: INTERNAL MEDICINE

## 2018-06-19 PROCEDURE — 25000132 ZZH RX MED GY IP 250 OP 250 PS 637: Mod: GY | Performed by: INTERNAL MEDICINE

## 2018-06-19 RX ORDER — POTASSIUM CHLORIDE 1500 MG/1
20 TABLET, EXTENDED RELEASE ORAL DAILY
Qty: 90 TABLET | Refills: 1 | Status: SHIPPED | OUTPATIENT
Start: 2018-06-19 | End: 2018-06-28

## 2018-06-19 RX ORDER — ALBUTEROL SULFATE 0.83 MG/ML
2.5 SOLUTION RESPIRATORY (INHALATION)
Status: DISCONTINUED | OUTPATIENT
Start: 2018-06-19 | End: 2018-06-28 | Stop reason: HOSPADM

## 2018-06-19 RX ADMIN — ALBUTEROL SULFATE 2.5 MG: 2.5 SOLUTION RESPIRATORY (INHALATION) at 11:44

## 2018-06-19 RX ADMIN — GABAPENTIN 100 MG: 100 CAPSULE ORAL at 09:18

## 2018-06-19 RX ADMIN — FUROSEMIDE 40 MG: 10 INJECTION, SOLUTION INTRAVENOUS at 20:51

## 2018-06-19 RX ADMIN — FERROUS SULFATE TAB 325 MG (65 MG ELEMENTAL FE) 325 MG: 325 (65 FE) TAB at 09:18

## 2018-06-19 RX ADMIN — HYDRALAZINE HYDROCHLORIDE 75 MG: 25 TABLET ORAL at 15:56

## 2018-06-19 RX ADMIN — ALBUTEROL SULFATE 2.5 MG: 2.5 SOLUTION RESPIRATORY (INHALATION) at 03:22

## 2018-06-19 RX ADMIN — ISOSORBIDE MONONITRATE 30 MG: 30 TABLET, EXTENDED RELEASE ORAL at 09:18

## 2018-06-19 RX ADMIN — ALBUTEROL SULFATE 2.5 MG: 2.5 SOLUTION RESPIRATORY (INHALATION) at 15:30

## 2018-06-19 RX ADMIN — GABAPENTIN 100 MG: 100 CAPSULE ORAL at 20:52

## 2018-06-19 RX ADMIN — FERROUS SULFATE TAB 325 MG (65 MG ELEMENTAL FE) 325 MG: 325 (65 FE) TAB at 20:53

## 2018-06-19 RX ADMIN — OMEPRAZOLE 20 MG: 20 CAPSULE, DELAYED RELEASE ORAL at 09:18

## 2018-06-19 RX ADMIN — ALBUTEROL SULFATE 2.5 MG: 2.5 SOLUTION RESPIRATORY (INHALATION) at 19:14

## 2018-06-19 RX ADMIN — FUROSEMIDE 40 MG: 10 INJECTION, SOLUTION INTRAVENOUS at 08:09

## 2018-06-19 RX ADMIN — ALBUTEROL SULFATE 2.5 MG: 2.5 SOLUTION RESPIRATORY (INHALATION) at 22:55

## 2018-06-19 RX ADMIN — HYDRALAZINE HYDROCHLORIDE 75 MG: 25 TABLET ORAL at 20:53

## 2018-06-19 RX ADMIN — SIMVASTATIN 10 MG: 10 TABLET, FILM COATED ORAL at 20:52

## 2018-06-19 RX ADMIN — GABAPENTIN 100 MG: 100 CAPSULE ORAL at 15:56

## 2018-06-19 RX ADMIN — VENLAFAXINE HYDROCHLORIDE 75 MG: 75 TABLET, EXTENDED RELEASE ORAL at 09:19

## 2018-06-19 RX ADMIN — ALBUTEROL SULFATE 2.5 MG: 2.5 SOLUTION RESPIRATORY (INHALATION) at 07:55

## 2018-06-19 RX ADMIN — HYDRALAZINE HYDROCHLORIDE 75 MG: 25 TABLET ORAL at 09:18

## 2018-06-19 ASSESSMENT — ACTIVITIES OF DAILY LIVING (ADL)
ADLS_ACUITY_SCORE: 19
ADLS_ACUITY_SCORE: 20
ADLS_ACUITY_SCORE: 19

## 2018-06-19 NOTE — PLAN OF CARE
Problem: Patient Care Overview  Goal: Plan of Care/Patient Progress Review  Outcome: No Change  A&O. Denies pain. Lung sounds coarse with exp. Wheezes. Dyspneic. O2 at 97% on 2 L.  1+ edema to knees, very tight skin, 2+ edema to bilateral hips. Dressing to left leg,intact.   Right buttock raised red/purple non blanchable area, tender to touch. WOC consult. Tele:Manju CVR

## 2018-06-19 NOTE — PROGRESS NOTES
"[facility] RCAT     Date:6/18/2018  Admission Dx:CHF  Pulmonary History:No  Home Nebulizer/MDI Use:Alb TID  Home Oxygen:No  Acuity Level (RCAT flow sheet):2  Aerosol Therapy initiated:Alb Q4 and Q2 prn      Pulmonary Hygiene initiated:Good cough      Volume Expansion initiated:IS      Current Oxygen Requirements:2L  Current SpO2:99%    Re-evaluation date:6/21/2018    Patient Education:Education performed with patient in regards to indications/benefits of bronchodilators. Will continue to educate with patient as needed.       See \"RT Assessments\" flow sheet for patient assessment scoring and Acuity Level Details.             "

## 2018-06-19 NOTE — TELEPHONE ENCOUNTER
"Requested Prescriptions   Pending Prescriptions Disp Refills     potassium chloride SA (KLOR-CON) 20 MEQ CR tablet 90 tablet 1     Sig: Take 1 tablet (20 mEq) by mouth daily    Potassium Supplements Protocol Passed    6/19/2018  1:19 PM       Passed - Recent (12 mo) or future (30 days) visit within the authorizing provider's specialty    Patient had office visit in the last 12 months or has a visit in the next 30 days with authorizing provider or within the authorizing provider's specialty.  See \"Patient Info\" tab in inbasket, or \"Choose Columns\" in Meds & Orders section of the refill encounter.           Passed - Patient is age 18 or older       Passed - Normal serum potassium in past 12 months    Recent Labs   Lab Test  06/19/18   0802   POTASSIUM  3.4                      "

## 2018-06-19 NOTE — PLAN OF CARE
Problem: Patient Care Overview  Goal: Plan of Care/Patient Progress Review  Patient alert, A1 with walker. Assist with bath. Up in chair most of shift. Weaned to RA 90-92%. Course breath sounds with occ wheezing, scheduled +prn nebs. Some crackles at base. IV lasix. Hgb 6.6,seen by Dr Jha, order for transfusion. Consent signed. PRBC started, infusing slowly due to CHF, no additional Lasix needed per Dr Jha. Pt aware of s/sx to report to nurse. Seen by wound nurse. Order for sween cream to LE-applied. No lymphedema wraps per PT, see note. POC reviewed with pt and daughter.

## 2018-06-19 NOTE — PROGRESS NOTES
[facility]  Patient is currently open to home care services with [place].  The patient is currently receiving RN and OT for lymphedema therapy.  [facility]  and team have been notified of patient admission.  [facility] liaison will continue to follow patient during stay.  If appropriate provide orders to resume home care at time of discharge.

## 2018-06-19 NOTE — PROGRESS NOTES
[facility]    Hospitalist Progress Note      Assessment & Plan   Keisha Godinez is an 86 year old female with history of chronic diastolic CHF, RV systolic CHF, pulmonary hypertension, lymphedema, chronic left foot wound, A. 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  She was recently admitted here from 5/21/18 to 5/2/18 with CHF.  She was diuresed and discharge to TCU. She presented to the ED on 6/18 for evaluation of shortness of breath and lower extremity swelling.  She reported weight gain in spite of use of oral Lasix and ACE wraps of lower extremities.  ED work up showed hypoxia with oxygen saturations in the mid 80%s (improved on 2 L nasal cannula), tachypnea, and audible wheezing.  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  BNP was elevated to 8389.  Troponin was not elevated.  Renal function was normal.  D-dimer was elevated at 1.5, but no further imaging was done as CHF exacerbation was thought to be the most likely etiology of her symptoms.  She was admitted to telemetry unit as an inpatient for IV diuresis.    1.   Acute hypoxemic respiratory failure:  Due to acute on chronic diastolic CHF, RV systooloic CHF, and pulmonary hypertension.  Acute on chronic anemia may also be playing a role.  Treat as discussed below.     2.   Acute on chronic diastolic CHF, RV systolic CHF, and pulmonary hypertension: Continue Lasix 40 mg IV BID.  Monitor renal function closely.        3.   Lymphedema and chronic left foot wound: Patient has a chronic left plantar bunion that she follows in podiatry clinic.  She last saw provider 2 weeks ago had local debridement.  There is no sign of acute infection around this wound currently on exam.  Lymphedema and wound consults requested.      4.  A. fib and history of CVA: CVA in 2010 to the left MCA.  Not on anticoagulation due to history of GI bleed.         5.   HTN: continue pta hydralazine 75mg tid and imdur.      6.   Acute on chronic anemia: Hemoglobin down to 6.6 today.  Iron studies, B12, folic acid, and peripheral smear ordered.  Transfuse 1 unit of RBC's today.  AM CBC.      7.   Gout:  History of presumed gout flare in the past.  Not on any medicine for this.      8.   MDD: Continue PTA venlafaxine.      9.   HLD: Continue PTA Simvastatin 10 mg at bedtime.      10.   GERD: Continue PTA Prilosec 20 mg daily.    # Pain Assessment:  Current Pain Score 6/19/2018   Patient currently in pain? denies   Pain score (0-10) -   Pain location -   Pain descriptors -   Keisha duffy pain level was assessed and she currently denies pain.      DVT Prophylaxis: Pneumatic Compression Devices  Code Status: Full Code    Disposition: Expected discharge in 2-3 days once edema, SOB, and anemia have improved.    Leon Jha    Interval History   Less SOB. Still quite a bit of LE edema.     -Data reviewed today: I reviewed all new labs and imaging results over the last 24 hours. I personally reviewed no images or EKG's today.    Physical Exam   Temp: 98.2  F (36.8  C) Temp src: Oral BP: 128/55 Pulse: 71 Heart Rate: 65 Resp: 20 SpO2: 92 % O2 Device: None (Room air) Oxygen Delivery: 2 LPM  Vitals:    06/18/18 1947 06/19/18 0426   Weight: 75.7 kg (166 lb 14.4 oz) 74.7 kg (164 lb 11.2 oz)     Vital Signs with Ranges  Temp:  [96.3  F (35.7  C)-98.8  F (37.1  C)] 98.2  F (36.8  C)  Pulse:  [71] 71  Heart Rate:  [60-67] 65  Resp:  [18-42] 20  BP: (115-169)/(36-68) 128/55  SpO2:  [90 %-100 %] 92 %  I/O last 3 completed shifts:  In: 250 [P.O.:250]  Out: 1125 [Urine:1125]    GENERAL:  Comfortable. Cooperative.  PSYCH: pleasant, oriented, No acute distress.  EYES: PERRLA, Normal conjunctiva.  HEART:  Regular rate and rhythm. No JVD. Pulses normal. 2-3/4 symmetric lower extremity edema.  LUNGS:  Clear to auscultation, normal Respiratory effort.  ABDOMEN:  Soft, no hepatosplenomegaly,  normal bowel sounds.  EXTREMETIES: No clubbing, cyanosis or ischemia  SKIN:  Dry to touch, No rash.       Medications       albuterol  2.5 mg Nebulization Q4H     ferrous sulfate  325 mg Oral BID     furosemide  40 mg Intravenous BID     gabapentin  100 mg Oral TID     hydrALAZINE  75 mg Oral TID     isosorbide mononitrate  30 mg Oral Daily     omeprazole  20 mg Oral Daily     simvastatin  10 mg Oral At Bedtime     venlafaxine  75 mg Oral Daily       Data     Recent Labs  Lab 06/19/18  1354 06/19/18  0802 06/18/18  1734 06/18/18  1524   WBC 5.9 5.1 6.7 6.3   HGB 7.0* 6.6* 7.2* 6.7*   MCV 88 89 90 89    147* 183 173   NA  --  142 140 141   POTASSIUM  --  3.4 3.5 4.0   CHLORIDE  --  106 104 106   CO2  --  32 30 30   BUN  --  15 18 18   CR  --  0.71 0.78 0.76   ANIONGAP  --  4 6 5   CASPER  --  8.1* 8.5 8.4*   GLC  --  90 100* 120*   ALBUMIN  --   --   --  2.9*   PROTTOTAL  --   --   --  6.6*   BILITOTAL  --   --   --  0.2   ALKPHOS  --   --   --  110   ALT  --   --   --  16   AST  --   --   --  20   TROPI  --   --  <0.015  --        Recent Results (from the past 24 hour(s))   XR Chest 2 Views    Narrative    XR CHEST 2 VW 6/18/2018 3:50 PM    HISTORY: Shortness of breath.    COMPARISON: May 21, 2018.      Impression    IMPRESSION: There is diffuse bilateral interstitial prominence with  blunting of the bilateral costophrenic angles, suggestive of edema and  small bilateral effusions. Stable cardiomegaly. No pneumothorax.    MCKINLEY SUNG MD   Chest  XR, 1 view PORTABLE    Narrative    XR CHEST PORT 1 VW 6/18/2018 5:26 PM    HISTORY: Anemia.    COMPARISON: June 18, 2018.      Impression    IMPRESSION: Mild diffuse interstitial prominence with blunting of the  bilateral costophrenic angles, suggestive of venous congestion with  small bilateral effusions. No pneumothorax appreciated. Stable  cardiomegaly.    MCKINLEY SUNG MD   US Lower Extremity Venous Duplex Bilateral    Narrative    US LOWER EXTREMITY VENOUS DUPLEX  BILATERAL 6/18/2018 8:56 PM     HISTORY: Rule out DVT.      TECHNIQUE: Venous Doppler US of bilateral lower extremities.? Color  flow and spectral Doppler with waveform analysis performed.    COMPARISON: None.    FINDINGS: Ultrasound of both legs demonstrates no deep vein thrombus  from the common femoral through popliteal veins or in the visualized  segments of posterior tibial or peroneal veins in the calves.  Subcutaneous edema in both lower extremities.      Impression    IMPRESSION: No DVT identified in either leg.    ANGELI BEAUCHAMP MD

## 2018-06-19 NOTE — PROGRESS NOTES
06/19/18 1000   Rehab Discipline   Discipline OT   Type of Visit   Type of visit Initial Edema Evaluation   General Information   Start of care 06/19/18   Referring physician Dr. Meek   Orders Evaluate and treat as indicated   Order date 06/19/18   Medical diagnosis Keisha Godinez is a 86 year old female with a history of diastolic heart failure, right-side heart failure, atrial fibrillation, chronic anemia, and hypertension who presents with shortness of breath. Shortness of breath has been going on for about a month, however she is clearly tachypneic here with diffuse expiatory wheezing. She was mildly hypoxic requiring one to two liters to keep her stats above 90. She was given a duoneb shortly after arrival and clearly has signs of fluid overload on exam. She was given 20 mg of IV Lasix with the duoneb; with Lasix she greatly improved. She greatly improved in terms of respiratory status, chest x-ray revealed mild base bilateral pleural effusions and pulmonary edema, her BNP in clinic was 8000. Troponin here was negative, EKG revealed atrial fibrillation but showed no signs of ischemia. She is grossly fluid overload, so I think this is likely the cause of most of her symptoms. Her hemoglobin is low but has always been low, in clinic was 6.7 and here is 7.2. There might be a dilutional component given her great fluid overload state. MD notes report patient will require multiple days of diuresis. Currently hgb is 6.6. Summary of dx includes acute resp failure, pulmonary edema, B small pleural effusions, CHF, gross volume overload.   Onset of illness / date of surgery 06/19/18   Edema onset (Mid May 2018)   Affected body parts LLE;RLE   Edema etiology Unknown   Surgical / medical history reviewed Yes  (L TKA in 2004, hysterectomy in 1975)   Edema special tests Ultrasound  (neg for B LE's DVTS)   Prior level of functional mobility Pror to admission, patient ambulated with 4ww, in the summer likes to walk half  a [place]. Has had limited community mobility since last hospitalization. Patient reports, her daughter keeps a manual wc in the car for community mobility for patient.    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 Daughter completes all IADL's. Patient reports she does not need to manage steps, has ramp to enter home. Mobility not assessed due to increased SOB at rest, RN reports patient is ambulating to and from bathroom to toilet   Subjective Report   Patient report of symptoms Patient reports burning sensation in B LE's, does have neuropathyin B feet, however reports it is not from that. Also reports weeping on dorsal surface of calf on L UE prior to admission, No weeping noted on day of eval, however skin is red and scaly   Precautions   Precautions Cardiac Edema/CHF   Pain   Patient currently in pain No   Vitals Signs   SpO2 99  (on 2L O2, noted increased labored breathing, RN assessed)   Vital Signs Comments Increased labored breathing noted, RN came to assess and notified RT neb is needed   Cognitive Status   Orientation Orientation to person, place and time   Level of consciousness Alert   Follows commands and answers questions 100% of the time   Personal safety and judgement Intact    Memory Intact   Edema Exam / Assessment   Skin condition Pitting;Dryness;Hemosiderin deposits;Lipodermatosclerosis   Skin condition comments dry and scaly on B calfs and feet   Pitting 2+   Pitting location B hips. Skin taunt from B knees to feet   Scar Yes   Location L knee from previous TKA   Stemmer sign Positive   Girth Measurements   Girth Measurements Refer to separate girth measurement flowsheet   Range of Motion   ROM comments limited ROM in B toes and ankle ROM, has deformity in B ankles   Strength   Strength comments not formally assessed appears weak   Transfers   Transfers not assesed, patient seated in chair, will further assess mobility in upcoming treatment sessions   Sensory   Sensory perception comments patient reports numbness in B feet from neuropathy   Muscle Tone   Muscle tone No deficits were identified   Planned Edema Interventions   Planned edema interventions Gradient compression bandaging;Exercises;Education;ADL training;Manual therapy   Clinical Impression   Criteria for skilled therapeutic intervention met Yes   Therapy diagnosis decreased ADL's   Influenced by the following impairments / conditions Stage 2   Assessment of Occupational Performance 3-5 Performance Deficits   Identified Performance Deficits decreased functional mobility, decreased dressing, toileting,    Clinical Decision Making (Complexity) Low complexity   Treatment frequency Daily   Treatment duration 5 days   Patient / family and/or staff in agreement with plan of care Yes   Risks and benefits of therapy have been explained Yes   Goals   Edema Eval Goals (IP) See plan of care for patient goals   Total Evaluation Time   Total evaluation time 25

## 2018-06-19 NOTE — PROGRESS NOTES
[facility] Nurse Inpatient Wound Assessment           Data:   Patient History:      per MD note(s): Keisha Godinez is a 86 year old female with PMH including chronic diastolic CHF, RV systolic CHF, pulmonary hypertension, lymphedema, chronic left foot wound, A. fib, CVA, HTN, chronic anemia, gout, MDD, HLD, GERD who presents to the ED after evaluation in clinic for shortness of breath and lower extremity swelling.  The patient is known to me from admission for cellulitis and CHF last month where she was here from -.     Moisture Management:  Diaper, states is not incontinent of stool    Catheter secured? Not applicable    Current Diet / Nutrition:           Active Diet Order      2 Gram Sodium Diet           Hossein Assessment and sub scores:   Hossein Score  Av.2  Min: 17  Max: 20     Mattress:  Standard , Atmos Air mattress    Labs:         Recent Labs   Lab Test  18   0802  18   1734  18   1524   10/18/17   0646   ALBUMIN   --    --   2.9*   < >   --    HGB  6.6*  7.2*  6.7*   < >  10.3*   RBC  2.64*  2.82*  2.73*   < >  3.33*   WBC  5.1  6.7  6.3   < >  10.9   PLT  147*  183  173   < >  134*   INR   --    --    --    --   1.04   CRP   --   8.7*   --    --    --     < > = values in this interval not displayed.          Wound Assessment (location #1):   LLE  Wound History:  Pt. Has over all dry scaling/flaking skin to LE's.  Cellulitis to gator area with no open wounds, skin is bright red in appearance.    Wound Assessment (location #2):   R Buttocks    Specific Dimensions (length x width x depth, in cm):   0.5 x 0.5 cm., no open skin at this time, color is purple     Wound Base: dry non-blanchable erythema,     Palpation of the wound bed:  normal    Slough appearance:  none         Eschar appearance:  none    Periwound Skin: intact,      Drainage:  none      Pain:  absent        Wound Assessment (location #3):  Left Plantar foot    Specific Dimensions (length x  width x depth, in cm):   2 x 2 cm.raised dry callus, center has dark area, does not appear open     Wound Base: dry blackened      Palpation of the wound bed:  boggy        Periwound Skin: intact,      Drainage:  none      Pain:  absent        Intervention:     Patient's chart evaluated.      Wound(s) was assessed    Orders  Written    Supplies  discussed with RN    Discussed plan of care with Patient and Nurse          Assessment:       POA, this area has an appearence of possible scratch wound of which the pt. Is not certain when she got it. Could also be a pressure injury  No open wounds at this time to LLE, skin changes are from cellulitis  Bone changes to Erasmo Feet, callus appears intact for now        Plan:     Nursing to notify the Provider(s) and re-consult the WOC Nurse if wound(s) deteriorate(s) or if the wound care plan needs reevaluation.    Plan of care for wound located on chart:     mepilex border to callus for protection    PIP measures, sacral dressing for prevention Right buttocks wound in a difficult area to cover with dressing    Moisturize LE's with Sween cream    Left plantar foot callus is being monitored per her DPM    WOC Nurse will return: prn

## 2018-06-19 NOTE — PLAN OF CARE
Problem: Patient Care Overview  Goal: Plan of Care/Patient Progress Review  OT/Lymphedema- eval completed and treatment initiated. Per chart- patient is a 86 year old female with a history of diastolic heart failure, right-side heart failure, atrial fibrillation, chronic anemia, and hypertension who presents with shortness of breath. Shortness of breath has been going on for about a month, however she is clearly tachypneic here with diffuse expiatory wheezing. She was mildly hypoxic requiring one to two liters to keep her stats above 90. She was given a duoneb shortly after arrival in ED and clearly has signs of fluid overload on exam. She was given 20 mg of IV Lasix with the duoneb; with Lasix she greatly improved. She greatly improved in terms of respiratory status, chest x-ray revealed mild base bilateral pleural effusions and pulmonary edema, her BNP in clinic was 8000. Troponin here was negative, EKG revealed atrial fibrillation but showed no signs of ischemia. She is grossly fluid overload, which is likely the cause of most of her symptoms. Her hemoglobin is low but has always been low, in clinic was 6.7 and here is 7.2. There might be a dilutional component given her great fluid overload state. At time of lymphedema eval, hgb was 6.6. Summary of dx includes acute resp failure, pulmonary edema, B small pleural effusions, CHF, gross volume overload, MD notes report patient will require multiple days of diuresis.    Discharge Planner OT   Patient plan for discharge: home with continued home care services  Current status: Patient was admitted overnight, at time of eval, was noted to have labored breathing at rest, O2 sats at 99% while on 2L of O2, however patient reporting increased difficulty with breathing, therefore RN came to assess and planned to page RT for neb treatment. Prior to this admission, patient has had several weeks of lymphedema wraps by home RN. Per patient, she was hospitalized in May and has  been undergoing GCB since then through home care. Noted in increased edema in B LE's; skin taunt, scaly and dry from knees to feet unable to achieve pitting edema, pitting edema present in B thighs into abdomen. Difficulty note with LE ROM, especially in B ankles. Due to increased SOB and labored breathing at rest and need for further diuresis, will hold on GCB at this time, will monitor progress and initiate GCB when appropriate. Educated patient in B LE lymphedema exercise and abdominal breathing, patient able to complete, although had greater difficulty with ankle ROM, further practice needed in upcoming sessions. Educated and instructed in benefits of and need to walk 3x/week in [place]. Whiteboard and RN updated. Due to current SOB at rest, walking was not initiated with therapist.   Barriers to return to prior living situation: current medical condition, poor activity tolerance  Recommendations for discharge: home with continued home care, may benefit from outpatient lymphedema rehab services   Rationale for recommendations: will continue with OT services for lymphedema care and increase activity       Entered by: Cecy Shaw 06/19/2018 1:24 PM

## 2018-06-19 NOTE — PLAN OF CARE
Problem: Patient Care Overview  Goal: Plan of Care/Patient Progress Review  Outcome: No Change  /63  Pulse 71  Temp 97.9  F (36.6  C) (Oral)  Resp 18  SpO2 100%    Neuro: WDL  Pain: 0/10  Resp: Dyspnea on exertion; wheezes expiratory; 02 @ 3L 96%  Cardiac: WDL  GI/: WDL  Diet: 2gm NA  Skin/Mobility: 2 assist - BLE wounds/redness  Plan: VSS. US of BLE showed no signs of DVTs. WOC consult in place. Pt and family would like to update a health care directive - one is in front of chart. Continue to monitor. Continue with POC.

## 2018-06-19 NOTE — TELEPHONE ENCOUNTER
Last Written Prescription Date:  12/11/17  Last Fill Quantity: 90,  # refills: 1   Last office visit: 6/18/2018 with prescribing provider:  06/08/18 Derek   Future Office Visit:   Next 5 appointments (look out 90 days)     Jun 22, 2018 10:45 AM CDT   [facility] Lee Jang DPM   [facility]    [id number] [id number] [phone]                 Patient also got some potassium in the hospital yesterday

## 2018-06-19 NOTE — PHARMACY-ADMISSION MEDICATION HISTORY
Admission medication history interview status for this patient is complete. See [facility] admission navigator for allergy information, prior to admission medications and immunization status.     Medication history interview source(s):Patient  Medication history resources (including written lists, pill bottles, clinic record):Epic    Changes made to PTA medication list:  Added: none  Deleted: none  Changed: none    Medication reconciliation/reorder completed by provider prior to medication history? No    Do you take OTC medications (eg tylenol, ibuprofen, fish oil, eye/ear drops, etc)? Y(Y/N)    For patients on insulin therapy: N (Y/N)  Lantus/levemir/NPH/Mix 70/30 dose:   (Y/N) (see Med list for doses)   Sliding scale Novolog Y/N  If Yes, do you have a baseline novolog pre-meal dose:  units with meals  Patients eat three meals a day:   Y/N    How many episodes of hypoglycemia do you have per week: _______  How many missed doses do you have per week: ______  How many times do you check your blood glucose per day: _______   Any Barriers to therapy - Be specific :  cost of medications, comfortable with giving injections (if applicable), comfortable and confident with current diabetes regimen: Y/N ______________      Prior to Admission medications    Medication Sig Last Dose Taking? Auth Provider   acetaminophen (TYLENOL) 325 MG tablet Take 650 mg by mouth 3 times daily as needed for mild pain  Yes Unknown, Entered By History   albuterol (ACCUNEB) 1.25 MG/3ML nebulizer solution Take 1 vial by nebulization 3 times daily  6/18/2018 at 13:30 Yes Unknown, Entered By History   Cranberry Extract 250 MG TABS Take 250 mg by mouth daily 6/18/2018 at Unknown time Yes Doctor, None, MD   ferrous sulfate (IRON) 325 (65 FE) MG tablet Take 1 tablet (325 mg) by mouth 2 times daily 6/18/2018 at 2 doses Yes Gerri Eubanks MD   furosemide (LASIX) 40 MG tablet Take 1 tablet (40 mg) by mouth 2 times daily 6/18/2018 at 2 doses Yes Gerri Eubanks  MD Mis   gabapentin (NEURONTIN) 100 MG capsule TAKE 1 CAPSULE(100 MG) BY MOUTH THREE TIMES DAILY 6/18/2018 at 3 x Yes Darryl Reed MD   hydrALAZINE (APRESOLINE) 25 MG tablet Take 3 tablets (75 mg) by mouth 3 times daily 6/18/2018 at 3 doses Yes Darryl Reed MD   isosorbide mononitrate (IMDUR) 30 MG 24 hr tablet Take 1 tablet (30 mg) by mouth daily 6/18/2018 at Unknown time Yes Gerri Eubanks MD   OMEGA-3 FATTY ACIDS PO Take 1,200 mg by mouth daily  6/18/2018 at Unknown time Yes Reported, Patient   omeprazole (PRILOSEC) 20 MG CR capsule Take 1 capsule (20 mg) by mouth daily 6/18/2018 at Unknown time Yes Gerri Eubanks MD   potassium chloride SA (KLOR-CON) 20 MEQ CR tablet Take 1 tablet (20 mEq) by mouth daily 6/18/2018 at Unknown time Yes Gerri Eubanks MD   senna-docusate (SENOKOT-S;PERICOLACE) 8.6-50 MG per tablet Take 2 tablets daily as needed for constipation while taking prescription pain medications.  Yes Lee Jang DPM   simvastatin (ZOCOR) 10 MG tablet Take 1 tablet (10 mg) by mouth At Bedtime 6/17/2018 at Unknown time Yes Gerri Eubanks MD   venlafaxine (EFFEXOR-XR) 75 MG 24 hr capsule TAKE ONE CAPSULE BY MOUTH DAILY 6/18/2018 at Unknown time Yes Gerri Eubanks MD   VITAMIN D, CHOLECALCIFEROL, PO Take 1,000 Units by mouth daily  6/18/2018 at Unknown time Yes Reported, Patient

## 2018-06-19 NOTE — H&P
[facility]  Hospitalist Admission Note  Name: Keisha Godinez    MRN: [id number]  YOB: 1932    Age: 86 year old  Date of admission: 6/18/2018  Primary care provider: Gerri Eubanks    Chief Complaint: Shortness of breath    Assessment and Plan:   1.   Acute hypoxemic respiratory failure: Shortness of breath for the last month.  Cough productive of clear sputum.  Hypoxic to the mid 80s on room air and tachypnea with short distance of ambulation.  Has bilateral weakness that I believe is cardiac due to pulmonary edema on chest x-ray.  Small bilateral pleural effusions, unlikely to improve significantly with thoracentesis.  Plan for diuresis as below.  Can continue her albuterol nebs 3 times daily that she takes although doubt need for steroid here.  Was hypoxic last admission that improved with diuresis did not discharge on any oxygen.  No sign of infection at this time.  Dimer was checked and elevated 1.5.  Will check lower extremity ultrasound to make sure no DVT.  All symptoms can be explained by CHF exacerbation so hold off on CTPA at this time.  -Continuous pulse ox and supplemental O2 as needed    2.   Acute on chronic diastolic CHF, RV systolic CHF, and pulmonary hypertension: Grossly volume overloaded on exam with tense 3+ bilateral tense edema all the way up to her hips.  Chest x-ray pulmonary edema.  BNP elevated. TTE from 10/2017 shows an EF of 60%, 1+ MR, 2+ TR, dilated IVC, a dilated RV with reduction in systolic function.  Previously followed with Dr. Pradhan from cardiology, but has not seen him in a while.  Was taking 40 mg twice daily p.o. Lasix at home.  Weight around 167 per patient and she wishes up daily at home.  Says she has been restricting her sodium.   The patient will benefit from multiple days of IV diuresis down to dry weight and then oral diuretic.  I also think she would benefit from a [facility] clinic evaluation.  Intolerant to lisinopril in the past due to  tongue swelling.  Is not on beta-blocker.  -Start with Lasix 40 mg IV twice daily  -Strict intake and output and daily  standing weights  -Potassium replacement protocol  -[facility] clinic referral  -Follow-up with Dr. Pradahn in cardiology after admission or consult here if having issues with diuresis      3.   Lymphedema and chronic left foot wound: Patient has a chronic left plantar bunion that she follows in podiatry clinic.  She last saw provider 2 weeks ago had local debridement.  There is no sign of acute infection around this wound currently on exam.  She has a pink color to her skin to below her left knee and she received an Rx last month when here for this for cellulitis.  She says the color is the same as it has been the whole time.  She has no leukocytosis or fevers and I think it best to hold off on any antibiotics at this point as there is been no change in the appearance of the past month despite antibiotics at that time.  No systemic sign of infection currently.    -We will check lower extremity ultrasound to rule out DVT  -Hold on empiric antibiotic  -Lymphedema and wound consult    4.  A. 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  Not on anticoagulation due to history of GI bleed.  This is a regular.  Will check EKG. Not planning to start any anticoagulation now.  Is not BB or CCB.  -Telemetry     5.   HTN: continue pta hydralazine 75mg tid and imdur.     6.   Chronic anemia: Hemoglobin 7.2 in ED, was 6.7 earlier today.  Will type and screen.  May need transfusion while here, however would like to do some diuresis to improve her respiratory status first.  -CBC tomorrow     7.   Gout: History of presumed gout flare in the past.  Not on any medicine for this.     8.   MDD: Continue venlafaxine.     9.   HLD: Continue simvastatin 10 makes at bedtime.     10.   GERD: Continue Prilosec 20 mg daily.    # Pain Assessment:  Current Pain Score 5/24/2018   Patient currently in pain? yes   Pain score (0-10) 8    Pain location Leg   Pain descriptors Burning   - Keisha is experiencing pain due to lymphedema. Pain management was discussed and the plan was created in a collaborative fashion.  Keisha's response to the current recommendations: engaged  -Acetaminophen as needed        DVT Prophylaxis: Pneumatic Compression Devices, hold heparin due to chronic anemia nearing transfusion along with history of GI bleed  Code Status: Full Code  FEN: 2 g sodium restriction  Discharge Dispo: Home with daughter likely  Estimated Disch Date / # of Days until Disch: Admit to inpatient for IV diuresis.  Anticipate multiple night hospitalization.      History of Present Illness:  Keisha Godinez is a 86 year old female with PMH including chronic diastolic CHF, RV systolic CHF, pulmonary hypertension, lymphedema, chronic left foot wound, A. fib, CVA, HTN, chronic anemia, gout, MDD, HLD, GERD who presents to the ED after evaluation in clinic for shortness of breath and lower extremity swelling.  The patient is known to me from admission for cellulitis and CHF last month where she was here from 5/21-5/24.  She received diuresis and antibiotics and discharged to TCU.  Since that time she has had increased swelling of her lower extremities and her weight has gone up.  She says she is around 167 pounds and she weighs herself every day.  She says she has been avoiding sodium and taking her Lasix 40 mg twice daily p.o.  She has a wound nurse coming out and doing Ace wraps.  Her left leg has remained pink from the foot to the knee despite the antibiotics last month.  She saw podiatry clinic 2 weeks ago and they do local debridement of the bunion on the left plantar foot.  She has not had any fevers or chills.  She has had progressive shortness of breath this month and wheezing.  She is an albuterol however 3 times per day.  She has not been following cardiology clinic has not seen Dr. Pradhan for some time.  She was referred to the ED by her  clinic physician for diuresis for presumed CHF exacerbation.  On review of systems she denies chest pain, palpitations, nausea, vomiting, melena.    History obtained from patient, patient's daughter, medical record, and from Dr. Oreilly in the emergency department.  Patient hypoxic and there is a part of the mid 80s improved on 2 L nasal cannula.  Tachypnea noted with short distance ambulation.  Has an audible wheeze that did not improve after neb.  She received 20 mg IV Lasix for CHF exacerbation and plan for admission.  Hemoglobin at baseline level of 7.1.  BNP elevated 8389.  Troponin not elevated.  Normal renal function.  D-dimer elevated at 1.5, but no further imaging done as CHF exacerbation is likely etiology.  Will admit to telemetry unit as inpatient for IV diuresis.     Past Medical History reviewed:  Past Medical History:   Diagnosis Date     Anemia      Atrial fibrillation (H)      B12 deficiency      Cardiomyopathy (H)      CHF (congestive heart failure) (H)      Chronic edema     Lower extremities     Chronic systolic congestive heart failure (H)      CVA (cerebral vascular accident) (H) 2010    Acute Left MCA hemispheric CVA ( on coumadin)     Depression      Gastro-oesophageal reflux disease      GI bleed 03-20-15     Hyperlipidemia      Hypertension      Iron deficiency      MSSA (methicillin susceptible Staphylococcus aureus) 03-10-15     Pulmonary hypertension      Shingles      Past Surgical History reviewed:  Past Surgical History:   Procedure Laterality Date     COLONOSCOPY  03/24/2015    Diverticulosis, polyps     ENTEROSCOPY SMALL BOWEL N/A 2/29/2016    Procedure: ENTEROSCOPY SMALL BOWEL;  Surgeon: Ady Ponce MD;  Location:  GI     ESOPHAGOSCOPY, GASTROSCOPY, DUODENOSCOPY (EGD), COMBINED N/A 3/22/2015    Upper GI- Normal, nothing significant found.      EXCISE MASS FOOT Right 7/6/2016    Procedure: EXCISE MASS FOOT;  Surgeon: Lee Jang DPM;  Location:  OR      Social History reviewed:  Social History   Substance Use Topics     Smoking status: Former Smoker     Quit date: 4/14/1956     Smokeless tobacco: Never Used     Alcohol use No     Social History     Social History Narrative     Family History reviewed:  Family History   Problem Relation Age of Onset     Known Genetic Syndrome Father      Known Genetic Syndrome Mother      Other Cancer Daughter      pancreatic     Other Cancer Brother      type     Other Cancer Sister 60     lung     DIABETES No family hx of      Breast Cancer No family hx of      Cancer - colorectal No family hx of      Ovarian Cancer No family hx of      Prostate Cancer No family hx of      Allergies:  Allergies   Allergen Reactions     Lisinopril Other (See Comments)     Tongue swelling     Calcium Nausea and Vomiting     Egg Yolk      Flu Virus Vaccine      Allergic to eggs.     Keflex [Cephalexin] Nausea     Pt states she had upset stomach.  NOT tongue swelling.  She had tongue swelling with a combo bp med that she thinks included lisinopril.    Tolerates IV Cefazolin     Levaquin [Levofloxacin]      Sulfa Drugs      Zinc Nausea and Vomiting     Medications:  Prior to Admission medications    Medication Sig Last Dose Taking? Auth Provider   acetaminophen (TYLENOL) 325 MG tablet Take 650 mg by mouth 3 times daily as needed for mild pain   Unknown, Entered By History   albuterol (ACCUNEB) 1.25 MG/3ML nebulizer solution Take 1 vial by nebulization 3 times daily    Unknown, Entered By History   Cranberry Extract 250 MG TABS Take 250 mg by mouth daily   Doctor, None, MD   ferrous sulfate (IRON) 325 (65 FE) MG tablet Take 1 tablet (325 mg) by mouth 2 times daily   Gerri Eubakns MD   furosemide (LASIX) 40 MG tablet Take 1 tablet (40 mg) by mouth 2 times daily   Gerri Eubanks MD   gabapentin (NEURONTIN) 100 MG capsule TAKE 1 CAPSULE(100 MG) BY MOUTH THREE TIMES DAILY   Darryl Reed MD   hydrALAZINE (APRESOLINE) 25 MG tablet Take 3  tablets (75 mg) by mouth 3 times daily   Darryl Reed MD   isosorbide mononitrate (IMDUR) 30 MG 24 hr tablet Take 1 tablet (30 mg) by mouth daily   Gerri Eubanks MD   OMEGA-3 FATTY ACIDS PO Take 1,200 mg by mouth daily    Reported, Patient   omeprazole (PRILOSEC) 20 MG CR capsule Take 1 capsule (20 mg) by mouth daily   Gerri Eubanks MD   potassium chloride SA (KLOR-CON) 20 MEQ CR tablet Take 1 tablet (20 mEq) by mouth daily   Gerri Eubanks MD   senna-docusate (SENOKOT-S;PERICOLACE) 8.6-50 MG per tablet Take 2 tablets daily as needed for constipation while taking prescription pain medications.   Lee Jang DPM   simvastatin (ZOCOR) 10 MG tablet Take 1 tablet (10 mg) by mouth At Bedtime   Gerri Eubanks MD   venlafaxine (EFFEXOR-XR) 75 MG 24 hr capsule TAKE ONE CAPSULE BY MOUTH DAILY   Gerri Eubanks MD   VITAMIN D, CHOLECALCIFEROL, PO Take 1,000 Units by mouth daily    Reported, Patient     Review of Systems:  A Comprehensive greater than 10 system review of systems was carried out.  Pertinent positives and negatives are noted above.  Otherwise negative.     Physical Exam:  Blood pressure 153/63, pulse 71, temperature 98.8  F (37.1  C), temperature source Oral, resp. rate (!) 42, SpO2 100 %, not currently breastfeeding.  Wt Readings from Last 1 Encounters:   06/18/18 72.6 kg (160 lb)     Exam:  Constitutional: Awake, NAD  Eyes: sclera white   HEENT:  MMM  Respiratory: Mild bilateral expiratory wheeze with scattered crackles.  Cardiovascular: Irregularly, irregular rhythm.  Regular rate.  No murmur  GI: non-tender, not distended, bowel sounds present  Skin: Left lower leg is pink up to below the knee, some weeping of the skin.  Musculoskeletal/extremities: Tense 2-3+ bilateral lower extremity to the hips.  Left plantar foot bunion without surrounding erythema or fluctuance  Neurologic: A&O, speech clear  Psychiatric: calm, cooperative, normal affect    Lab and imaging data  personally reviewed:  Labs:    Recent Labs  Lab 06/18/18  1734   PHV 7.42   PO2V 27   PCO2V 44   HCO3V 29*       Recent Labs  Lab 06/18/18  1734 06/18/18  1524   WBC 6.7 6.3   HGB 7.2* 6.7*   HCT 25.4* 24.4*   MCV 90 89    173       Recent Labs  Lab 06/18/18  1734 06/18/18  1524    141   POTASSIUM 3.5 4.0   CHLORIDE 104 106   CO2 30 30   ANIONGAP 6 5   * 120*   BUN 18 18   CR 0.78 0.76   GFRESTIMATED 70 72   GFRESTBLACK 85 87   CASPER 8.5 8.4*       Recent Labs  Lab 06/18/18  1524   NTBNP 8389*       Recent Labs  Lab 06/18/18  1734   TROPI <0.015     D-dimer 1.5    EKG: A. fib with regular rate    Imaging:  Recent Results (from the past 24 hour(s))   XR Chest 2 Views    Narrative    XR CHEST 2 VW 6/18/2018 3:50 PM    HISTORY: Shortness of breath.    COMPARISON: May 21, 2018.      Impression    IMPRESSION: There is diffuse bilateral interstitial prominence with  blunting of the bilateral costophrenic angles, suggestive of edema and  small bilateral effusions. Stable cardiomegaly. No pneumothorax.    MCKINLEY SUNG MD   Chest  XR, 1 view PORTABLE    Narrative    XR CHEST PORT 1 VW 6/18/2018 5:26 PM    HISTORY: Anemia.    COMPARISON: June 18, 2018.      Impression    IMPRESSION: Mild diffuse interstitial prominence with blunting of the  bilateral costophrenic angles, suggestive of venous congestion with  small bilateral effusions. No pneumothorax appreciated. Stable  cardiomegaly.    MD Isaac DIAMOND MD  Hospitalist  [facility]

## 2018-06-20 ENCOUNTER — PATIENT OUTREACH (OUTPATIENT)
Dept: CARE COORDINATION | Facility: CLINIC | Age: 83
End: 2018-06-20

## 2018-06-20 ENCOUNTER — APPOINTMENT (OUTPATIENT)
Dept: OCCUPATIONAL THERAPY | Facility: CLINIC | Age: 83
DRG: 291 | End: 2018-06-20
Payer: MEDICARE

## 2018-06-20 DIAGNOSIS — I50.32 CHRONIC DIASTOLIC CONGESTIVE HEART FAILURE (H): Primary | ICD-10-CM

## 2018-06-20 DIAGNOSIS — R06.02 SOB (SHORTNESS OF BREATH): ICD-10-CM

## 2018-06-20 DIAGNOSIS — D64.9 ANEMIA: ICD-10-CM

## 2018-06-20 LAB
ANION GAP SERPL CALCULATED.3IONS-SCNC: 5 MMOL/L (ref 3–14)
BUN SERPL-MCNC: 17 MG/DL (ref 7–30)
CALCIUM SERPL-MCNC: 8 MG/DL (ref 8.5–10.1)
CHLORIDE SERPL-SCNC: 103 MMOL/L (ref 94–109)
CO2 SERPL-SCNC: 31 MMOL/L (ref 20–32)
COPATH REPORT: NORMAL
CREAT SERPL-MCNC: 0.78 MG/DL (ref 0.52–1.04)
ERYTHROCYTE [DISTWIDTH] IN BLOOD BY AUTOMATED COUNT: 17.4 % (ref 10–15)
GFR SERPL CREATININE-BSD FRML MDRD: 70 ML/MIN/1.7M2
GLUCOSE SERPL-MCNC: 96 MG/DL (ref 70–99)
HCT VFR BLD AUTO: 25.9 % (ref 35–47)
HGB BLD-MCNC: 7.5 G/DL (ref 11.7–15.7)
MCH RBC QN AUTO: 25.3 PG (ref 26.5–33)
MCHC RBC AUTO-ENTMCNC: 29 G/DL (ref 31.5–36.5)
MCV RBC AUTO: 88 FL (ref 78–100)
PLATELET # BLD AUTO: 139 10E9/L (ref 150–450)
POTASSIUM SERPL-SCNC: 3.4 MMOL/L (ref 3.4–5.3)
RBC # BLD AUTO: 2.96 10E12/L (ref 3.8–5.2)
SODIUM SERPL-SCNC: 139 MMOL/L (ref 133–144)
WBC # BLD AUTO: 5.6 10E9/L (ref 4–11)

## 2018-06-20 PROCEDURE — 85027 COMPLETE CBC AUTOMATED: CPT | Performed by: INTERNAL MEDICINE

## 2018-06-20 PROCEDURE — 12000007 ZZH R&B INTERMEDIATE

## 2018-06-20 PROCEDURE — 25000125 ZZHC RX 250: Performed by: INTERNAL MEDICINE

## 2018-06-20 PROCEDURE — 94640 AIRWAY INHALATION TREATMENT: CPT

## 2018-06-20 PROCEDURE — 99233 SBSQ HOSP IP/OBS HIGH 50: CPT | Performed by: INTERNAL MEDICINE

## 2018-06-20 PROCEDURE — 97110 THERAPEUTIC EXERCISES: CPT | Mod: GO | Performed by: REHABILITATION PRACTITIONER

## 2018-06-20 PROCEDURE — 40000275 ZZH STATISTIC RCP TIME EA 10 MIN

## 2018-06-20 PROCEDURE — A9270 NON-COVERED ITEM OR SERVICE: HCPCS | Mod: GY | Performed by: INTERNAL MEDICINE

## 2018-06-20 PROCEDURE — 97535 SELF CARE MNGMENT TRAINING: CPT | Mod: GO | Performed by: REHABILITATION PRACTITIONER

## 2018-06-20 PROCEDURE — 99207 ZZC CDG-MDM COMPONENT: MEETS MODERATE - UP CODED: CPT | Performed by: INTERNAL MEDICINE

## 2018-06-20 PROCEDURE — 25000132 ZZH RX MED GY IP 250 OP 250 PS 637: Mod: GY | Performed by: INTERNAL MEDICINE

## 2018-06-20 PROCEDURE — 97530 THERAPEUTIC ACTIVITIES: CPT | Mod: GO | Performed by: REHABILITATION PRACTITIONER

## 2018-06-20 PROCEDURE — 36415 COLL VENOUS BLD VENIPUNCTURE: CPT | Performed by: INTERNAL MEDICINE

## 2018-06-20 PROCEDURE — 80048 BASIC METABOLIC PNL TOTAL CA: CPT | Performed by: INTERNAL MEDICINE

## 2018-06-20 PROCEDURE — 94640 AIRWAY INHALATION TREATMENT: CPT | Mod: 76

## 2018-06-20 PROCEDURE — 40000901 ZZH STATISTIC WOC PT EDUCATION, 0-15 MIN

## 2018-06-20 PROCEDURE — 25000128 H RX IP 250 OP 636: Performed by: INTERNAL MEDICINE

## 2018-06-20 PROCEDURE — 40000133 ZZH STATISTIC OT WARD VISIT: Performed by: REHABILITATION PRACTITIONER

## 2018-06-20 RX ORDER — FUROSEMIDE 10 MG/ML
40 INJECTION INTRAMUSCULAR; INTRAVENOUS 3 TIMES DAILY
Status: DISCONTINUED | OUTPATIENT
Start: 2018-06-20 | End: 2018-06-25

## 2018-06-20 RX ADMIN — FERROUS SULFATE TAB 325 MG (65 MG ELEMENTAL FE) 325 MG: 325 (65 FE) TAB at 08:55

## 2018-06-20 RX ADMIN — HYDRALAZINE HYDROCHLORIDE 75 MG: 25 TABLET ORAL at 16:32

## 2018-06-20 RX ADMIN — ALBUTEROL SULFATE 2.5 MG: 2.5 SOLUTION RESPIRATORY (INHALATION) at 19:20

## 2018-06-20 RX ADMIN — ISOSORBIDE MONONITRATE 30 MG: 30 TABLET, EXTENDED RELEASE ORAL at 08:55

## 2018-06-20 RX ADMIN — FUROSEMIDE 40 MG: 10 INJECTION, SOLUTION INTRAVENOUS at 16:32

## 2018-06-20 RX ADMIN — FERROUS SULFATE TAB 325 MG (65 MG ELEMENTAL FE) 325 MG: 325 (65 FE) TAB at 21:24

## 2018-06-20 RX ADMIN — SIMVASTATIN 10 MG: 10 TABLET, FILM COATED ORAL at 21:24

## 2018-06-20 RX ADMIN — ALBUTEROL SULFATE 2.5 MG: 2.5 SOLUTION RESPIRATORY (INHALATION) at 03:51

## 2018-06-20 RX ADMIN — FUROSEMIDE 40 MG: 10 INJECTION, SOLUTION INTRAVENOUS at 08:55

## 2018-06-20 RX ADMIN — HYDRALAZINE HYDROCHLORIDE 75 MG: 25 TABLET ORAL at 08:54

## 2018-06-20 RX ADMIN — GABAPENTIN 100 MG: 100 CAPSULE ORAL at 08:54

## 2018-06-20 RX ADMIN — ALBUTEROL SULFATE 2.5 MG: 2.5 SOLUTION RESPIRATORY (INHALATION) at 15:29

## 2018-06-20 RX ADMIN — VENLAFAXINE HYDROCHLORIDE 75 MG: 75 TABLET, EXTENDED RELEASE ORAL at 08:54

## 2018-06-20 RX ADMIN — HYDRALAZINE HYDROCHLORIDE 75 MG: 25 TABLET ORAL at 21:24

## 2018-06-20 RX ADMIN — ALBUTEROL SULFATE 2.5 MG: 2.5 SOLUTION RESPIRATORY (INHALATION) at 07:48

## 2018-06-20 RX ADMIN — OMEPRAZOLE 20 MG: 20 CAPSULE, DELAYED RELEASE ORAL at 08:55

## 2018-06-20 RX ADMIN — FUROSEMIDE 40 MG: 10 INJECTION, SOLUTION INTRAVENOUS at 21:24

## 2018-06-20 RX ADMIN — GABAPENTIN 100 MG: 100 CAPSULE ORAL at 16:32

## 2018-06-20 RX ADMIN — ALBUTEROL SULFATE 2.5 MG: 2.5 SOLUTION RESPIRATORY (INHALATION) at 11:30

## 2018-06-20 RX ADMIN — GABAPENTIN 100 MG: 100 CAPSULE ORAL at 21:24

## 2018-06-20 ASSESSMENT — ACTIVITIES OF DAILY LIVING (ADL)
ADLS_ACUITY_SCORE: 19

## 2018-06-20 NOTE — CONSULTS
Care Transition Initial Assessment - RN    Reason For Consult: care coordination/care conference, discharge planning   Met with: Patient.  DATA   Active Problems:    Acute exacerbation of CHF (congestive heart failure) (H)     CHF Action Plan discussed with patient at bedside.   Cognitive Status: awake, alert and oriented.  Primary Care Clinic Name: [facility]  Primary Care MD Name: Dr. Gerri Eubanks  Contact information and PCP information verified: Yes  Lives With: child(emre), adult  Living Arrangements: house     Description of Support System: Supportive, Involved   Who is your support system?: Children       Insurance concerns: No Insurance issues identified  ASSESSMENT  Patient currently receives the following services:  Meals on wheels. Her daughter, Rebecca, lives with her to assist as needed.         Identified issues/concerns regarding health management: Patient stated she observes a low sodium diet and weighs herself daily. Her scale is not accurate so CM provided her with a new scale with large readout and low profile. She is scheduled to enroll with the [facility] on 6/27/18.     PLAN  Financial costs for the patient were not discussed.  Patient given options and choices for discharge.  Patient/family is agreeable to the plan?  Yes  Patient anticipates discharging back to home.     Other Resources: Meals on Wheels, Other (see comment) (CHF Action Plan)  Patient anticipates needs for home equipment: No  Plan/Disposition: Home   Appointments: [facility] on 6/27/18 at 1:50pm. Patient declined a PCP f/u appt.     CM will continue to follow patient until discharge for any additional needs.     Ashely Vega RN, BSN, CTS  [facility]  [phone]

## 2018-06-20 NOTE — PROGRESS NOTES
Discharge Planner   Discharge Plans in progress: Yes  Barriers to discharge plan: IV diuretic  Follow up plan: [facility] enrollment on 6/27/18 at 1:50pm.        Entered by: Renee Vega 06/20/2018 2:15 PM

## 2018-06-20 NOTE — PROGRESS NOTES
Clinic Care Coordination Contact    Situation: Patient chart reviewed by care coordinator.    Background: Patient followed by RN CC due to recent hospitalizations due to CHF.  Patient is currently open to [facility].  CM is Elle Newberry [phone]    Assessment: Patient is currently admitted to [facility] for CHF exacerbation.    Plan/Recommendations:  RN CC will await notification patient was discharged from hospital.    Adilene Danielson RN-BSN   Care Coordination  Phone:  [phone]  Email: [email or web address]

## 2018-06-20 NOTE — PLAN OF CARE
Problem: Patient Care Overview  Goal: Plan of Care/Patient Progress Review  Outcome: No Change  A&O. Denies any pain. SOB with exertion. 91% on RA. Lungs sounds exp. Wheezing throughout, crackles Bilateral bases. Bilateral LE edema to Hips. Tele:A. Fib CVR

## 2018-06-20 NOTE — CONSULTS
[facility] referral received from Dr Gomez.     Patient is currently established in the [facility].   Pt has seen Dr Pradhan (last saw 12/8/16) and Calli Austin CNP (last saw 2/2016)    Hospital nurse, please give pt [facility]/HF education.       [facility] appointment made for:    6/27/18 with Marek Brower PA-C at 12:45 pm for labs and 1:50 pm office visit.  Dr Florian and Calli Austin CNP do not have openings in the appropriate amount of time.       Per chart review, patient's criteria for CardioMEMS is as follows:  -Class III heart failure symptoms: Yes  -Hospitalization for heart failure in the past 12 months: Yes  -Followed by [facility] providers: Yes  -Compliant with TELEMANAGEMENT: Declined 10/26/2017  -GFR>25: 70  -Chest circumference <65 inches: BMI 28.4  -Able to take dual antiplatelets [Plavix for 30 days in addition to aspirin 81mg for life (or already on Coumadin)]: No H/o GI Bleed 3/20/15  -Active infection: No    -History of >1 pulmonary embolism or DVT: No  -Congenital heart disease: No  -Mechanical right heart valves:  No   -CRT in last 3 months: No  -RVSP noted on Echo done on : 10/17/17 RVSP elevated, suggests severe pulmonary hypertension      We look forward to seeing Keisha in the clinic.   Please call with questions.     Charity Garcia RN  [facility] Care Coordinator  [facility]  [phone]      [facility]: Cardiomyopathy, Optimization, Rehabilitation, Education   The [facility] is a heart failure specialty clinic within the [facility] where you will work with your cardiologist, nurse practitioners, physician assistants and registered nurses who specialize in heart failure care. They are dedicated to helping patients with heart failure to carefully adjust medications, receive education, and learn who and when to call if symptoms develop. They specialize in helping you better  understand your condition, slow the progression of your disease, improve the length and quality of your life, help you detect future heart problems before they become life threatening, and avoid hospitalizations.

## 2018-06-20 NOTE — PLAN OF CARE
Problem: Anemia (Adult)  Goal: Identify Related Risk Factors and Signs and Symptoms  Related risk factors and signs and symptoms are identified upon initiation of Human Response Clinical Practice Guideline (CPG).   Outcome: No Change  A/O  Assist of 1-2 to get up from chair. Transfers with assisx1/walker.  VSS afebrile.  LS diminished, course with exp wheezes upon exertion  BLE edema 3-4+, carey, red, warm. Edema from wait on down to BLE.  LLE weeping, elevated.   1 unit PRBC transfused today, recheck Hgb in AM.  Tele A-Fib, CVR.

## 2018-06-20 NOTE — PLAN OF CARE
Problem: Anemia (Adult)  Goal: Identify Related Risk Factors and Signs and Symptoms  Related risk factors and signs and symptoms are identified upon initiation of Human Response Clinical Practice Guideline (CPG).   Outcome: Therapy, unable to show any progress toward functional goals  IV lasix increased to tid. Lung sounds with expiratory wheezes throughout and crackles/course to bases. O2 on at 2 lpm/nc. Increased SOB with activity. O2 increased to 3 lpm/nc with ambulation. Mepiplex applied to sacrum and to left foot. Skin care completed to bilateral legs. Hgb 7.5 today.

## 2018-06-20 NOTE — PLAN OF CARE
Problem: Patient Care Overview  Goal: Plan of Care/Patient Progress Review  Discharge Planner OT   Patient plan for discharge: not stated  Current status: Ambulated in velasco with CGA, fww, 3L of O2. Ambulated approx 90 feet at slow and steady pace. Patient reports she ambulated yesterday 2x with RN. Was unable to ambulate further then her door due to increased SOB. RT arrived prior to walk and patient received neb treatment which she reported worked well prior to functional mobility. O2 sats after neb and prior to walk was 96%, after walk it was 94%, O2 increased to 3L for walk. Patient returned to 2L of O2 after walk, O2 sats in mid 90's. Patient reported completing LE and abdominal precautions yesterday. Reviewed exercises and completed with patient to ensure proper technqiues and carry over. Further instruction needed for proper technique for abdominal breathing. Min cues needed to remaining exercises. Patient able to complete all exercise, except with toes clench and abduction. Completed 5 set of LE exercises and 3 sets of abdominal breathing. Mod A with bed mobility from reclined position. SBA, fww for sit<>stand, ambulate to bathroom and complete toilet transfer to commode over toilet and toileting skills, including pericare, clothing management and sinkside hand hygiene.     Patient reports she has not had significant weight loss from diuretics, RN confirmed. Noted edema does not have appear to have changed from previous day in B LE's; skin taunt, scaly and dry from knees to feet unable to achieve pitting edema, pitting edema present in B thighs into abdomen, patient reported some drainage in L ankle last night, noted skin to be wet, but with no observable weeping area. Patient reports breathing is better, however continues to have noticeable labored breathing at rest and continues to need further diuresis, will hold on GCB at this time, will monitor progress and initiate GCB when appropriate.   Barriers to  return to prior living situation: current medical condition, poor activity tolerance  Recommendations for discharge: home with continued home care, may benefit from outpatient lymphedema rehab services   Rationale for recommendations: will continue with OT services for lymphedema care and increase activity       Entered by: Cecy Shaw 06/20/2018 11:13 AM

## 2018-06-20 NOTE — PROGRESS NOTES
[facility]    Hospitalist Progress Note      Assessment & Plan   Keisha Godinez is an 86 year old female with history of chronic diastolic CHF, RV systolic CHF, pulmonary hypertension, lymphedema, chronic left foot wound, A. 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  She was recently admitted here from 5/21/18 to 5/2/18 with CHF.  She was diuresed and discharge to TCU. She presented to the ED on 6/18 for evaluation of shortness of breath and lower extremity swelling.  She reported weight gain in spite of use of oral Lasix and ACE wraps of lower extremities.  ED work up showed hypoxia with oxygen saturations in the mid 80%s (improved on 2 L nasal cannula), tachypnea, and audible wheezing.  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  BNP was elevated to 8389.  Troponin was not elevated.  Renal function was normal.  D-dimer was elevated at 1.5, but no further imaging was done as CHF exacerbation was thought to be the most likely etiology of her symptoms.  She was admitted to telemetry unit as an inpatient for IV diuresis. Hospital course was complicated by progression of chronic anemia for which patient was transfused one unit of RBC's on 6/19/18.       1.   Acute hypoxemic respiratory failure:  Due to acute on chronic diastolic CHF, RV systooloic CHF, and pulmonary hypertension.  Acute on chronic anemia may also be playing a role.  Treat as discussed below.      2.   Acute on chronic diastolic CHF, RV systolic CHF, and pulmonary hypertension: Change Lasix 40 mg IV BID to TID.  Monitor renal function, ins/outs, and daily weights closely.  AM BMP       3.   Lymphedema and chronic left foot wound: Patient has a chronic left plantar bunion that she follows in podiatry clinic.  She last saw provider 2 weeks ago had local debridement.  There is no sign of acute infection around this wound  currently on exam.  Lymphedema and wound consults requested.       4.  A. fib and history of CVA: CVA in 2010 to the left MCA.  Not on anticoagulation due to history of GI bleed.        5.   HTN: continue pta hydralazine 75mg tid and imdur.      6.   Acute on chronic anemia: Hemoglobin down to 6.6 today.  Iron studies, B12, folic acid, and peripheral smear ordered.  Transfuse 1 unit of RBC's today.  AM CBC.      7.   Gout:  History of presumed gout flare in the past.  Not on any medicine for this.      8.   MDD: Continue PTA venlafaxine.      9.   HLD: Continue PTA Simvastatin 10 mg at bedtime.      10.   GERD: Continue PTA Prilosec 20 mg daily.     # Pain Assessment:  Current Pain Score 6/20/2018   Patient currently in pain? denies   Pain score (0-10) -   Pain location -   Pain descriptors -   Keisha duffy pain level was assessed and she currently denies pain.      DVT Prophylaxis: Pneumatic Compression Devices  Code Status: Full Code    Disposition: Expected discharge in 2-3 days once diuresed a bit more.    Leon Jha    Interval History   Not as much reduction in edema in last day. No chest pain or SOB    -Data reviewed today: I reviewed all new labs and imaging results over the last 24 hours. I personally reviewed no images or EKG's today.    Physical Exam   Temp: 97.1  F (36.2  C) Temp src: Oral BP: 156/55   Heart Rate: 58 Resp: 18 SpO2: 98 % O2 Device: Nasal cannula Oxygen Delivery: 2 LPM  Vitals:    06/18/18 1947 06/19/18 0426 06/20/18 0504   Weight: 75.7 kg (166 lb 14.4 oz) 74.7 kg (164 lb 11.2 oz) 74.5 kg (164 lb 3.2 oz)     Vital Signs with Ranges  Temp:  [96.3  F (35.7  C)-98.2  F (36.8  C)] 97.1  F (36.2  C)  Heart Rate:  [58-66] 58  Resp:  [18-20] 18  BP: (115-156)/(48-56) 156/55  SpO2:  [86 %-99 %] 98 %  I/O last 3 completed shifts:  In: 750 [P.O.:750]  Out: 1100 [Urine:1100]    GENERAL:  Comfortable. Cooperative.  PSYCH: pleasant, oriented, No acute distress.  EYES: PERRLA, Normal  conjunctiva.  HEART:  Regular rate and rhythm. No JVD. Pulses normal. 2-3/4 LE edema persists without change.  LUNGS:  Clear to auscultation, normal Respiratory effort.  ABDOMEN:  Soft, no hepatosplenomegaly, normal bowel sounds.  EXTREMETIES: No clubbing, cyanosis or ischemia  SKIN:  Dry to touch, No rash.      Medications       albuterol  2.5 mg Nebulization Q4H     ferrous sulfate  325 mg Oral BID     furosemide  40 mg Intravenous TID     gabapentin  100 mg Oral TID     hydrALAZINE  75 mg Oral TID     isosorbide mononitrate  30 mg Oral Daily     omeprazole  20 mg Oral Daily     simvastatin  10 mg Oral At Bedtime     venlafaxine  75 mg Oral Daily       Data     Recent Labs  Lab 06/20/18  0657 06/19/18  1354 06/19/18  0802 06/18/18  1734 06/18/18  1524   WBC 5.6 5.9 5.1 6.7 6.3   HGB 7.5* 7.0* 6.6* 7.2* 6.7*   MCV 88 88 89 90 89   * 172 147* 183 173     --  142 140 141   POTASSIUM 3.4  --  3.4 3.5 4.0   CHLORIDE 103  --  106 104 106   CO2 31  --  32 30 30   BUN 17  --  15 18 18   CR 0.78  --  0.71 0.78 0.76   ANIONGAP 5  --  4 6 5   CASPER 8.0*  --  8.1* 8.5 8.4*   GLC 96  --  90 100* 120*   ALBUMIN  --   --   --   --  2.9*   PROTTOTAL  --   --   --   --  6.6*   BILITOTAL  --   --   --   --  0.2   ALKPHOS  --   --   --   --  110   ALT  --   --   --   --  16   AST  --   --   --   --  20   TROPI  --   --   --  <0.015  --        No results found for this or any previous visit (from the past 24 hour(s)).

## 2018-06-21 LAB
ANION GAP SERPL CALCULATED.3IONS-SCNC: 4 MMOL/L (ref 3–14)
BUN SERPL-MCNC: 14 MG/DL (ref 7–30)
CALCIUM SERPL-MCNC: 8.2 MG/DL (ref 8.5–10.1)
CHLORIDE SERPL-SCNC: 103 MMOL/L (ref 94–109)
CO2 SERPL-SCNC: 32 MMOL/L (ref 20–32)
CREAT SERPL-MCNC: 0.74 MG/DL (ref 0.52–1.04)
GFR SERPL CREATININE-BSD FRML MDRD: 74 ML/MIN/1.7M2
GLUCOSE SERPL-MCNC: 91 MG/DL (ref 70–99)
POTASSIUM SERPL-SCNC: 3.3 MMOL/L (ref 3.4–5.3)
POTASSIUM SERPL-SCNC: 3.9 MMOL/L (ref 3.4–5.3)
SODIUM SERPL-SCNC: 139 MMOL/L (ref 133–144)

## 2018-06-21 PROCEDURE — 84132 ASSAY OF SERUM POTASSIUM: CPT | Performed by: INTERNAL MEDICINE

## 2018-06-21 PROCEDURE — A9270 NON-COVERED ITEM OR SERVICE: HCPCS | Mod: GY | Performed by: INTERNAL MEDICINE

## 2018-06-21 PROCEDURE — 25000132 ZZH RX MED GY IP 250 OP 250 PS 637: Mod: GY | Performed by: INTERNAL MEDICINE

## 2018-06-21 PROCEDURE — 99233 SBSQ HOSP IP/OBS HIGH 50: CPT | Performed by: INTERNAL MEDICINE

## 2018-06-21 PROCEDURE — 94640 AIRWAY INHALATION TREATMENT: CPT | Mod: 76

## 2018-06-21 PROCEDURE — 40000274 ZZH STATISTIC RCP CONSULT EA 30 MIN

## 2018-06-21 PROCEDURE — 25000128 H RX IP 250 OP 636: Performed by: INTERNAL MEDICINE

## 2018-06-21 PROCEDURE — 80048 BASIC METABOLIC PNL TOTAL CA: CPT | Performed by: INTERNAL MEDICINE

## 2018-06-21 PROCEDURE — 40000275 ZZH STATISTIC RCP TIME EA 10 MIN

## 2018-06-21 PROCEDURE — 25000125 ZZHC RX 250: Performed by: INTERNAL MEDICINE

## 2018-06-21 PROCEDURE — 12000007 ZZH R&B INTERMEDIATE

## 2018-06-21 PROCEDURE — 36415 COLL VENOUS BLD VENIPUNCTURE: CPT | Performed by: INTERNAL MEDICINE

## 2018-06-21 PROCEDURE — 94640 AIRWAY INHALATION TREATMENT: CPT

## 2018-06-21 PROCEDURE — 99207 ZZC CDG-MDM COMPONENT: MEETS MODERATE - UP CODED: CPT | Performed by: INTERNAL MEDICINE

## 2018-06-21 RX ADMIN — GABAPENTIN 100 MG: 100 CAPSULE ORAL at 21:48

## 2018-06-21 RX ADMIN — FUROSEMIDE 40 MG: 10 INJECTION, SOLUTION INTRAVENOUS at 21:48

## 2018-06-21 RX ADMIN — ALBUTEROL SULFATE 2.5 MG: 2.5 SOLUTION RESPIRATORY (INHALATION) at 19:19

## 2018-06-21 RX ADMIN — VENLAFAXINE HYDROCHLORIDE 75 MG: 75 TABLET, EXTENDED RELEASE ORAL at 09:17

## 2018-06-21 RX ADMIN — HYDRALAZINE HYDROCHLORIDE 75 MG: 25 TABLET ORAL at 21:49

## 2018-06-21 RX ADMIN — POTASSIUM CHLORIDE 20 MEQ: 1500 TABLET, EXTENDED RELEASE ORAL at 12:47

## 2018-06-21 RX ADMIN — GABAPENTIN 100 MG: 100 CAPSULE ORAL at 09:17

## 2018-06-21 RX ADMIN — FUROSEMIDE 40 MG: 10 INJECTION, SOLUTION INTRAVENOUS at 09:17

## 2018-06-21 RX ADMIN — FERROUS SULFATE TAB 325 MG (65 MG ELEMENTAL FE) 325 MG: 325 (65 FE) TAB at 21:48

## 2018-06-21 RX ADMIN — ALBUTEROL SULFATE 2.5 MG: 2.5 SOLUTION RESPIRATORY (INHALATION) at 07:18

## 2018-06-21 RX ADMIN — HYDRALAZINE HYDROCHLORIDE 75 MG: 25 TABLET ORAL at 09:15

## 2018-06-21 RX ADMIN — ISOSORBIDE MONONITRATE 30 MG: 30 TABLET, EXTENDED RELEASE ORAL at 09:15

## 2018-06-21 RX ADMIN — OMEPRAZOLE 20 MG: 20 CAPSULE, DELAYED RELEASE ORAL at 09:17

## 2018-06-21 RX ADMIN — SIMVASTATIN 10 MG: 10 TABLET, FILM COATED ORAL at 21:48

## 2018-06-21 RX ADMIN — POTASSIUM CHLORIDE 40 MEQ: 1500 TABLET, EXTENDED RELEASE ORAL at 10:16

## 2018-06-21 RX ADMIN — FUROSEMIDE 40 MG: 10 INJECTION, SOLUTION INTRAVENOUS at 16:11

## 2018-06-21 RX ADMIN — HYDRALAZINE HYDROCHLORIDE 75 MG: 25 TABLET ORAL at 16:11

## 2018-06-21 RX ADMIN — ALBUTEROL SULFATE 2.5 MG: 2.5 SOLUTION RESPIRATORY (INHALATION) at 15:12

## 2018-06-21 RX ADMIN — ALBUTEROL SULFATE 2.5 MG: 2.5 SOLUTION RESPIRATORY (INHALATION) at 11:17

## 2018-06-21 RX ADMIN — GABAPENTIN 100 MG: 100 CAPSULE ORAL at 16:11

## 2018-06-21 RX ADMIN — FERROUS SULFATE TAB 325 MG (65 MG ELEMENTAL FE) 325 MG: 325 (65 FE) TAB at 09:17

## 2018-06-21 ASSESSMENT — ACTIVITIES OF DAILY LIVING (ADL)
ADLS_ACUITY_SCORE: 19
ADLS_ACUITY_SCORE: 20
ADLS_ACUITY_SCORE: 20
ADLS_ACUITY_SCORE: 19
ADLS_ACUITY_SCORE: 20
ADLS_ACUITY_SCORE: 19

## 2018-06-21 NOTE — PLAN OF CARE
Problem: Patient Care Overview  Goal: Plan of Care/Patient Progress Review  Heart Failure Care Pathway  GOALS TO BE MET BEFORE DISCHARGE:    1. Decrease congestion and/or edema with diuretic therapy to achieve near      optimal volume status.            Dyspnea improved:  No, please explain: not improved              Edema improved:     No, please explain: lasix increased 06/20, no improvement        Net I/O and Weights since admission:          05/22 1500 - 06/21 1459  In: 1760 [P.O.:1760]  Out: 4775 [Urine:4775]  Net: -3015            Vitals:    06/18/18 1947 06/19/18 0426 06/20/18 0504 06/21/18 0558   Weight: 75.7 kg (166 lb 14.4 oz) 74.7 kg (164 lb 11.2 oz) 74.5 kg (164 lb 3.2 oz) 74.9 kg (165 lb 2 oz)       2.  O2 sats > 92% on RA or at prior home O2 therapy level.          Current oxygenation status:       SpO2: 97 %         O2 Device: Nasal cannula,  Oxygen Delivery: 2 LPM         Able to wean O2 this shift to keep sats > 92%:  No, please explain: requires 2 lpm/nc       Does patient use Home O2? No    3.  Tolerates ambulation and mobility near baseline: No, please explain: MOTT on exertion. Ambulated x 2        How many times did the patient ambulate with nursing staff this shift? 2    Please review the Heart Failure Care Pathway for additional HF goal outcomes.    Catalina Lozano RN  6/21/2018

## 2018-06-21 NOTE — PROGRESS NOTES
"[facility] RCAT     Date: 6/21/18  Admission Dx: CHF  Pulmonary History: None  Home Nebulizer/MDI Use: Alb TID  Home Oxygen: none  Acuity Level (RCAT flow sheet): 2  Aerosol Therapy initiated: Alb Q4 and Q2 prn      Pulmonary Hygiene initiated:      Volume Expansion initiated: IS      Current Oxygen Requirements: 2lpm  Current SpO2: 97%    Re-evaluation date: 6/24/18    Patient Education: Patient aware of medication benefits and interactions.      See \"RT Assessments\" flow sheet for patient assessment scoring and Acuity Level Details.             "

## 2018-06-21 NOTE — PROGRESS NOTES
[facility]    Hospitalist Progress Note      Assessment & Plan   Keisha Godinez is an 86 year old female with history of chronic diastolic CHF, RV systolic CHF, pulmonary hypertension, lymphedema, chronic left foot wound, A. 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  She was recently admitted here from 5/21/18 to 5/2/18 with CHF.  She was diuresed and discharge to TCU. She presented to the ED on 6/18 for evaluation of shortness of breath and lower extremity swelling.  She reported weight gain in spite of use of oral Lasix and ACE wraps of lower extremities.  ED work up showed hypoxia with oxygen saturations in the mid 80%s (improved on 2 L nasal cannula), tachypnea, and audible wheezing.  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  BNP was elevated to 8389.  Troponin was not elevated.  Renal function was normal.  D-dimer was elevated at 1.5, but no further imaging was done as CHF exacerbation was thought to be the most likely etiology of her symptoms.  She was admitted to telemetry unit as an inpatient for IV diuresis. Hospital course was complicated by progression of chronic anemia for which patient was transfused one unit of RBC's on 6/19/18.        1.   Acute hypoxemic respiratory failure:  Due to acute on chronic diastolic CHF, RV systooloic CHF, and pulmonary hypertension.  Acute on chronic anemia may also be playing a role.  Treat as discussed below.      2.   Acute on chronic diastolic CHF, RV systolic CHF, and pulmonary hypertension:  Diuresis has been better on Lasix 40 mg IV TID- will continue.  Continue to monitor renal function, ins/outs, and daily weights closely.  AM BMP       3.   Lymphedema and chronic left foot wound: Patient has a chronic left plantar bunion that she follows in podiatry clinic.  She last saw provider 2 weeks ago had local debridement.  There is no sign  of acute infection around this wound currently on exam.  Lymphedema and wound consults requested.       4.  A. fib and history of CVA: CVA in 2010 to the left MCA.  Not on anticoagulation due to history of GI bleed.        5.   HTN: continue pta hydralazine 75mg tid and imdur.      6.   Acute on chronic anemia: Hemoglobin down to 6.6 today.  Iron studies, B12, folic acid, and peripheral smear ordered.  Transfused 1 unit of RBC's on 6/19.        7.   Gout:  History of presumed gout flare in the past.  Not on any medicine for this.      8.   MDD: Continue PTA venlafaxine.      9.   HLD: Continue PTA Simvastatin 10 mg at bedtime.      10.   GERD: Continue PTA Prilosec 20 mg daily.    # Pain Assessment:  Current Pain Score 6/21/2018   Patient currently in pain? denies   Pain score (0-10) -   Pain location -   Pain descriptors -   Keisha duffy pain level was assessed and she currently denies pain.      DVT Prophylaxis: Pneumatic Compression Devices  Code Status: Full Code    Disposition: Expected discharge in 2-3 days once diuresed more.    Leon Jha    Interval History   No shortness of breath. Edema improving.     -Data reviewed today: I reviewed all new labs and imaging results over the last 24 hours. I personally reviewed no images or EKG's today.    Physical Exam   Temp: 98  F (36.7  C) Temp src: Oral BP: 132/50 Pulse: 64 Heart Rate: 59 Resp: 18 SpO2: 97 % O2 Device: Nasal cannula Oxygen Delivery: 2 LPM  Vitals:    06/19/18 0426 06/20/18 0504 06/21/18 0558   Weight: 74.7 kg (164 lb 11.2 oz) 74.5 kg (164 lb 3.2 oz) 74.9 kg (165 lb 2 oz)     Vital Signs with Ranges  Temp:  [97  F (36.1  C)-98  F (36.7  C)] 98  F (36.7  C)  Pulse:  [64] 64  Heart Rate:  [59-75] 59  Resp:  [18] 18  BP: (131-152)/(40-60) 132/50  SpO2:  [96 %-98 %] 97 %  I/O last 3 completed shifts:  In: 560 [P.O.:560]  Out: 1950 [Urine:1950]    GENERAL:  Comfortable. Cooperative.  PSYCH: pleasant, oriented, No acute distress.  EYES: PERRLA, Normal  conjunctiva.  HEART:  Regular rate and rhythm. No JVD. Pulses normal. 2/4 LE edema- improved.  LUNGS:  Clear to auscultation, normal Respiratory effort.  ABDOMEN:  Soft, no hepatosplenomegaly, normal bowel sounds.  EXTREMETIES: No clubbing, cyanosis or ischemia  SKIN:  Dry to touch, No rash.        Medications       albuterol  2.5 mg Nebulization Q4H     ferrous sulfate  325 mg Oral BID     furosemide  40 mg Intravenous TID     gabapentin  100 mg Oral TID     hydrALAZINE  75 mg Oral TID     isosorbide mononitrate  30 mg Oral Daily     omeprazole  20 mg Oral Daily     simvastatin  10 mg Oral At Bedtime     venlafaxine  75 mg Oral Daily       Data     Recent Labs  Lab 06/21/18  0840 06/20/18  0657 06/19/18  1354 06/19/18  0802 06/18/18  1734 06/18/18  1524   WBC  --  5.6 5.9 5.1 6.7 6.3   HGB  --  7.5* 7.0* 6.6* 7.2* 6.7*   MCV  --  88 88 89 90 89   PLT  --  139* 172 147* 183 173    139  --  142 140 141   POTASSIUM 3.3* 3.4  --  3.4 3.5 4.0   CHLORIDE 103 103  --  106 104 106   CO2 32 31  --  32 30 30   BUN 14 17  --  15 18 18   CR 0.74 0.78  --  0.71 0.78 0.76   ANIONGAP 4 5  --  4 6 5   CASPER 8.2* 8.0*  --  8.1* 8.5 8.4*   GLC 91 96  --  90 100* 120*   ALBUMIN  --   --   --   --   --  2.9*   PROTTOTAL  --   --   --   --   --  6.6*   BILITOTAL  --   --   --   --   --  0.2   ALKPHOS  --   --   --   --   --  110   ALT  --   --   --   --   --  16   AST  --   --   --   --   --  20   TROPI  --   --   --   --  <0.015  --        No results found for this or any previous visit (from the past 24 hour(s)).

## 2018-06-21 NOTE — PLAN OF CARE
Problem: Anemia (Adult)  Goal: Identify Related Risk Factors and Signs and Symptoms  Related risk factors and signs and symptoms are identified upon initiation of Human Response Clinical Practice Guideline (CPG).   Outcome: No Change  VSS on 2 LPM NC O2; increase supplemental O2 to 3 LPM for out of room ambulation. Denies pain, CP, dizziness; endorses SOB/MOTT. LS coarse, diminished with scattered exp. Wheezes, receiving scheduled nebs. Bilateral lower extremity edema. PIV SL, IV lasix as ordered. Mepilex to sacrum, pt refused visualization. Tele in place, Afib CVR. Bed alarms in use, pt not attempting to exit bed. Alert and oriented, able to make needs known. Continue to monitor.

## 2018-06-21 NOTE — PLAN OF CARE
Problem: Patient Care Overview  Goal: Plan of Care/Patient Progress Review  OT/Lymph: Attempted session, pt seated in chair, just receiving breakfast, requested assist for set-up of tray; pt reported being able to increase mobility and exercises yesterday including walking 4x with nursing staff; pt remains on 2 Lpm O2, reports improved feeling of breathing, of note weight has increased, will check back later as schedule allows

## 2018-06-22 ENCOUNTER — APPOINTMENT (OUTPATIENT)
Dept: OCCUPATIONAL THERAPY | Facility: CLINIC | Age: 83
DRG: 291 | End: 2018-06-22
Payer: MEDICARE

## 2018-06-22 ENCOUNTER — APPOINTMENT (OUTPATIENT)
Dept: CT IMAGING | Facility: CLINIC | Age: 83
DRG: 291 | End: 2018-06-22
Attending: INTERNAL MEDICINE
Payer: MEDICARE

## 2018-06-22 LAB
ANION GAP SERPL CALCULATED.3IONS-SCNC: 5 MMOL/L (ref 3–14)
BLD PROD TYP BPU: NORMAL
BLD UNIT ID BPU: 0
BLOOD PRODUCT CODE: NORMAL
BPU ID: NORMAL
BUN SERPL-MCNC: 13 MG/DL (ref 7–30)
CALCIUM SERPL-MCNC: 8.1 MG/DL (ref 8.5–10.1)
CHLORIDE SERPL-SCNC: 101 MMOL/L (ref 94–109)
CO2 SERPL-SCNC: 33 MMOL/L (ref 20–32)
CREAT SERPL-MCNC: 0.77 MG/DL (ref 0.52–1.04)
GFR SERPL CREATININE-BSD FRML MDRD: 71 ML/MIN/1.7M2
GLUCOSE SERPL-MCNC: 87 MG/DL (ref 70–99)
POTASSIUM SERPL-SCNC: 3.4 MMOL/L (ref 3.4–5.3)
SODIUM SERPL-SCNC: 139 MMOL/L (ref 133–144)
TRANSFUSION STATUS PATIENT QL: NORMAL
TRANSFUSION STATUS PATIENT QL: NORMAL

## 2018-06-22 PROCEDURE — 99233 SBSQ HOSP IP/OBS HIGH 50: CPT | Performed by: INTERNAL MEDICINE

## 2018-06-22 PROCEDURE — 12000007 ZZH R&B INTERMEDIATE

## 2018-06-22 PROCEDURE — 94640 AIRWAY INHALATION TREATMENT: CPT | Mod: 76

## 2018-06-22 PROCEDURE — 25000128 H RX IP 250 OP 636: Performed by: INTERNAL MEDICINE

## 2018-06-22 PROCEDURE — 94640 AIRWAY INHALATION TREATMENT: CPT

## 2018-06-22 PROCEDURE — A9270 NON-COVERED ITEM OR SERVICE: HCPCS | Mod: GY | Performed by: INTERNAL MEDICINE

## 2018-06-22 PROCEDURE — 80048 BASIC METABOLIC PNL TOTAL CA: CPT | Performed by: INTERNAL MEDICINE

## 2018-06-22 PROCEDURE — 71260 CT THORAX DX C+: CPT

## 2018-06-22 PROCEDURE — 25000125 ZZHC RX 250: Performed by: INTERNAL MEDICINE

## 2018-06-22 PROCEDURE — 40000133 ZZH STATISTIC OT WARD VISIT: Performed by: REHABILITATION PRACTITIONER

## 2018-06-22 PROCEDURE — 40000275 ZZH STATISTIC RCP TIME EA 10 MIN

## 2018-06-22 PROCEDURE — 25000132 ZZH RX MED GY IP 250 OP 250 PS 637: Mod: GY | Performed by: INTERNAL MEDICINE

## 2018-06-22 PROCEDURE — 97110 THERAPEUTIC EXERCISES: CPT | Mod: GO | Performed by: REHABILITATION PRACTITIONER

## 2018-06-22 PROCEDURE — 36415 COLL VENOUS BLD VENIPUNCTURE: CPT | Performed by: INTERNAL MEDICINE

## 2018-06-22 RX ORDER — SPIRONOLACTONE 25 MG/1
25 TABLET ORAL DAILY
Status: DISCONTINUED | OUTPATIENT
Start: 2018-06-22 | End: 2018-06-28

## 2018-06-22 RX ORDER — POTASSIUM CHLORIDE 1500 MG/1
20 TABLET, EXTENDED RELEASE ORAL DAILY
Status: DISCONTINUED | OUTPATIENT
Start: 2018-06-22 | End: 2018-06-28

## 2018-06-22 RX ORDER — IOPAMIDOL 755 MG/ML
500 INJECTION, SOLUTION INTRAVASCULAR ONCE
Status: COMPLETED | OUTPATIENT
Start: 2018-06-22 | End: 2018-06-22

## 2018-06-22 RX ADMIN — HYDRALAZINE HYDROCHLORIDE 75 MG: 25 TABLET ORAL at 16:24

## 2018-06-22 RX ADMIN — SIMVASTATIN 10 MG: 10 TABLET, FILM COATED ORAL at 21:11

## 2018-06-22 RX ADMIN — HYDRALAZINE HYDROCHLORIDE 75 MG: 25 TABLET ORAL at 21:10

## 2018-06-22 RX ADMIN — SODIUM CHLORIDE 79 ML: 9 INJECTION, SOLUTION INTRAVENOUS at 15:45

## 2018-06-22 RX ADMIN — ALBUTEROL SULFATE 2.5 MG: 2.5 SOLUTION RESPIRATORY (INHALATION) at 15:12

## 2018-06-22 RX ADMIN — FUROSEMIDE 40 MG: 10 INJECTION, SOLUTION INTRAVENOUS at 16:24

## 2018-06-22 RX ADMIN — ALBUTEROL SULFATE 2.5 MG: 2.5 SOLUTION RESPIRATORY (INHALATION) at 07:36

## 2018-06-22 RX ADMIN — GABAPENTIN 100 MG: 100 CAPSULE ORAL at 21:10

## 2018-06-22 RX ADMIN — FERROUS SULFATE TAB 325 MG (65 MG ELEMENTAL FE) 325 MG: 325 (65 FE) TAB at 08:30

## 2018-06-22 RX ADMIN — GABAPENTIN 100 MG: 100 CAPSULE ORAL at 16:25

## 2018-06-22 RX ADMIN — FUROSEMIDE 40 MG: 10 INJECTION, SOLUTION INTRAVENOUS at 21:32

## 2018-06-22 RX ADMIN — FERROUS SULFATE TAB 325 MG (65 MG ELEMENTAL FE) 325 MG: 325 (65 FE) TAB at 21:10

## 2018-06-22 RX ADMIN — SPIRONOLACTONE 25 MG: 25 TABLET, FILM COATED ORAL at 16:25

## 2018-06-22 RX ADMIN — ISOSORBIDE MONONITRATE 30 MG: 30 TABLET, EXTENDED RELEASE ORAL at 08:30

## 2018-06-22 RX ADMIN — HYDRALAZINE HYDROCHLORIDE 75 MG: 25 TABLET ORAL at 08:30

## 2018-06-22 RX ADMIN — VENLAFAXINE HYDROCHLORIDE 75 MG: 75 TABLET, EXTENDED RELEASE ORAL at 08:30

## 2018-06-22 RX ADMIN — ALBUTEROL SULFATE 2.5 MG: 2.5 SOLUTION RESPIRATORY (INHALATION) at 11:18

## 2018-06-22 RX ADMIN — IOPAMIDOL 64 ML: 755 INJECTION, SOLUTION INTRAVENOUS at 15:40

## 2018-06-22 RX ADMIN — OMEPRAZOLE 20 MG: 20 CAPSULE, DELAYED RELEASE ORAL at 08:30

## 2018-06-22 RX ADMIN — ALBUTEROL SULFATE 2.5 MG: 2.5 SOLUTION RESPIRATORY (INHALATION) at 19:30

## 2018-06-22 RX ADMIN — GABAPENTIN 100 MG: 100 CAPSULE ORAL at 08:30

## 2018-06-22 RX ADMIN — POTASSIUM CHLORIDE 20 MEQ: 1500 TABLET, EXTENDED RELEASE ORAL at 16:25

## 2018-06-22 RX ADMIN — FUROSEMIDE 40 MG: 10 INJECTION, SOLUTION INTRAVENOUS at 09:55

## 2018-06-22 ASSESSMENT — ACTIVITIES OF DAILY LIVING (ADL)
ADLS_ACUITY_SCORE: 19

## 2018-06-22 NOTE — PLAN OF CARE
Problem: Patient Care Overview  Goal: Plan of Care/Patient Progress Review  Discharge Planner OT   Patient plan for discharge: home, but patient is unsure she will be able to manage at home  Current status: patient was transferring into chair with RN JACQUELYN upon OT arrival. Patient reported completing LE and abdominal precautions yesterday and is walking in halls with RN staff.     Patient reports she has had a 4 lb weight loss from diuretics, RN confirmed. Noted edema does not have appear to have changed from previous day in B LE's; skin taunt, scaly and dry from knees to feet unable to achieve pitting edema, pitting edema present in B thighs into abdomen, patient reported some drainage in L ankle last night, noted skin to be wet, but with no observable weeping area. Patient has pain in B calf area and tolerates very little pressure. Suspect patient would have difficulty tolerating GCB. Patient reports breathing is better, however continues to have noticeable labored breathing at rest and continues to need further diuresis, will hold on GCB at this time.    Patient has been able to complete LE exercises and mobilize functionally with RN staff. At this time, would recommend continued plan and avoid GCB d/t the risk of mobilizing fluid to already edematous thighs and abdomen. Patient is below baseline for ADL's and overall functional mobility and activity tolerance, OT orders have been requested. RN was updated with recommendations and plan to support patient was 3-4x/daily walking in halls.   Barriers to return to prior living situation: current medical condition, poor activity tolerance, below baseline for ADL's   Recommendations for discharge: home with continued home care, may benefit from outpatient lymphedema rehab services   Rationale for recommendations: will continue with OT services for lymphedema care and increase activity     Entered by: Cecy Shaw 06/22/2018 2:50 PM

## 2018-06-22 NOTE — PLAN OF CARE
Problem: Patient Care Overview  Goal: Plan of Care/Patient Progress Review  Pt VSS, afebrile. On 3L O2 NC . BLE 2+ edema. meplex to cocyx and L foot. SBA with a walker. On lasix. watching potassium and hemoglobin levels for need for replacement.   Daughter called this AM snd update with plan. Daughter will be stopping at 1100.

## 2018-06-22 NOTE — PROGRESS NOTES
[facility]  Hospitalist Progress Note  Geovani Whiting, DO 06/22/2018    Reason for Stay (Diagnosis): CHF         Assessment and Plan:      Keisha Godinez is an 86 year old female with history of chronic diastolic CHF, RV systolic CHF, pulmonary hypertension, lymphedema, chronic left foot wound, A. 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  She was recently admitted here from 5/21/18 to 5/2/18 with CHF.  She was diuresed and discharge to TCU. She presented to the ED on 6/18 for evaluation of shortness of breath and lower extremity swelling.  She reported weight gain in spite of use of oral Lasix and ACE wraps of lower extremities.  ED work up showed hypoxia with oxygen saturations in the mid 80%s (improved on 2 L nasal cannula), tachypnea, and audible wheezing.  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  BNP was elevated to 8389.  Troponin was not elevated.  Renal function was normal.  D-dimer was elevated at 1.5, but no further imaging was done as CHF exacerbation was thought to be the most likely etiology of her symptoms.  She was admitted to telemetry unit as an inpatient for IV diuresis. Hospital course was complicated by progression of chronic anemia for which patient was transfused one unit of RBC's on 6/19/18.        Problem List:   1. Acute on chronic diastolic congestive heart failure exacerbation.  Continue IV furosemide.  Add spironolactone 25 mg a day.  2. Acute hypoxic respiratory failure.  Likely due to CHF exacerbation.  Continue supplemental oxygen as needed.  Also did have elevated d-dimer.  Check CT scan of the chest.  3. Hypertension.  Add spironolactone 25 mg a day.  Continue hydralazine, isosorbide mononitrate, furosemide.  4. GERD.  Continue omeprazole.  5. Hyperlipidemia.  Continue simvastatin.  6. Depression.  Continue venlafaxine.  7. Hypokalemia.  Continue  daily potassium supplement.  Start spironolactone.  Potassium replacement protocol as needed.  8. Weakness and deconditioning.  Physical and Occupational Therapy consults.  9. Acute on chronic Anemia.  Did require 1 unit of packed red blood cells on 6/19/18.  Recheck hemoglobin tomorrow.  10. Chronic left foot wound.  Follow-up with podiatry as outpatient.  11. Atrial fibrillation.  Not on anticoagulation due to previous GI bleed.  DVT Prophylaxis: Pneumatic Compression Devices  Code Status: Full Code  Discharge Dispo: TBD  Estimated Disch Date / # of Days until Disch: 3-4        Interval History (Subjective):      Short of breath.  Feels weak.  Denies chest pain, fevers, chills, nausea, vomiting, or diarrhea.                  Physical Exam:      Last Vital Signs:  /62 (BP Location: Right arm)  Pulse 64  Temp 97.8  F (36.6  C) (Oral)  Resp 18  Wt 74.9 kg (165 lb 2 oz)  SpO2 (P) 99%  BMI 29.25 kg/m2    Gen:  NAD, A&Ox3.  Eyes:  PERRL, sclera anicteric.  OP:  MMM, no lesions.  Neck:  Supple.  CV:  Irregular, no murmurs.  Lung:  Crackles in lower fields b/l, normal effort.  Ab:  +BS, soft.  Skin:  Warm, dry to touch.  No rash.  Ext:  2+ pitting edema LE b/l.           Medications:      All current medications were reviewed with changes reflected in problem list.         Data:      All new lab and imaging data was reviewed.   Labs:    Recent Labs  Lab 06/22/18  0713      POTASSIUM 3.4   CHLORIDE 101   CO2 33*   ANIONGAP 5   GLC 87   BUN 13   CR 0.77   GFRESTIMATED 71   GFRESTBLACK 86   CASPER 8.1*       Recent Labs  Lab 06/20/18  0657   WBC 5.6   HGB 7.5*   HCT 25.9*   MCV 88   *      Imaging:   No results found for this or any previous visit (from the past 24 hour(s)).

## 2018-06-23 ENCOUNTER — APPOINTMENT (OUTPATIENT)
Dept: OCCUPATIONAL THERAPY | Facility: CLINIC | Age: 83
DRG: 291 | End: 2018-06-23
Payer: MEDICARE

## 2018-06-23 LAB
ANION GAP SERPL CALCULATED.3IONS-SCNC: 4 MMOL/L (ref 3–14)
BUN SERPL-MCNC: 13 MG/DL (ref 7–30)
CALCIUM SERPL-MCNC: 8.4 MG/DL (ref 8.5–10.1)
CHLORIDE SERPL-SCNC: 100 MMOL/L (ref 94–109)
CO2 SERPL-SCNC: 35 MMOL/L (ref 20–32)
CREAT SERPL-MCNC: 0.67 MG/DL (ref 0.52–1.04)
ERYTHROCYTE [DISTWIDTH] IN BLOOD BY AUTOMATED COUNT: 17.4 % (ref 10–15)
GFR SERPL CREATININE-BSD FRML MDRD: 84 ML/MIN/1.7M2
GLUCOSE SERPL-MCNC: 89 MG/DL (ref 70–99)
HCT VFR BLD AUTO: 26.5 % (ref 35–47)
HGB BLD-MCNC: 7.5 G/DL (ref 11.7–15.7)
MCH RBC QN AUTO: 25.4 PG (ref 26.5–33)
MCHC RBC AUTO-ENTMCNC: 28.3 G/DL (ref 31.5–36.5)
MCV RBC AUTO: 90 FL (ref 78–100)
PLATELET # BLD AUTO: 106 10E9/L (ref 150–450)
POTASSIUM SERPL-SCNC: 3.5 MMOL/L (ref 3.4–5.3)
RBC # BLD AUTO: 2.95 10E12/L (ref 3.8–5.2)
SODIUM SERPL-SCNC: 139 MMOL/L (ref 133–144)
WBC # BLD AUTO: 4.6 10E9/L (ref 4–11)

## 2018-06-23 PROCEDURE — A9270 NON-COVERED ITEM OR SERVICE: HCPCS | Mod: GY | Performed by: INTERNAL MEDICINE

## 2018-06-23 PROCEDURE — 94640 AIRWAY INHALATION TREATMENT: CPT | Mod: 76

## 2018-06-23 PROCEDURE — 97535 SELF CARE MNGMENT TRAINING: CPT | Mod: GO | Performed by: REHABILITATION PRACTITIONER

## 2018-06-23 PROCEDURE — 99233 SBSQ HOSP IP/OBS HIGH 50: CPT | Performed by: INTERNAL MEDICINE

## 2018-06-23 PROCEDURE — 36415 COLL VENOUS BLD VENIPUNCTURE: CPT | Performed by: INTERNAL MEDICINE

## 2018-06-23 PROCEDURE — 99207 ZZC CDG-MDM COMPONENT: MEETS MODERATE - UP CODED: CPT | Performed by: INTERNAL MEDICINE

## 2018-06-23 PROCEDURE — 40000274 ZZH STATISTIC RCP CONSULT EA 30 MIN

## 2018-06-23 PROCEDURE — 25000132 ZZH RX MED GY IP 250 OP 250 PS 637: Mod: GY | Performed by: INTERNAL MEDICINE

## 2018-06-23 PROCEDURE — 25000128 H RX IP 250 OP 636: Performed by: INTERNAL MEDICINE

## 2018-06-23 PROCEDURE — 85027 COMPLETE CBC AUTOMATED: CPT | Performed by: INTERNAL MEDICINE

## 2018-06-23 PROCEDURE — 94640 AIRWAY INHALATION TREATMENT: CPT

## 2018-06-23 PROCEDURE — 25000125 ZZHC RX 250: Performed by: INTERNAL MEDICINE

## 2018-06-23 PROCEDURE — 40000133 ZZH STATISTIC OT WARD VISIT: Performed by: REHABILITATION PRACTITIONER

## 2018-06-23 PROCEDURE — 12000007 ZZH R&B INTERMEDIATE

## 2018-06-23 PROCEDURE — 40000275 ZZH STATISTIC RCP TIME EA 10 MIN

## 2018-06-23 PROCEDURE — 80048 BASIC METABOLIC PNL TOTAL CA: CPT | Performed by: INTERNAL MEDICINE

## 2018-06-23 RX ADMIN — ALBUTEROL SULFATE 2.5 MG: 2.5 SOLUTION RESPIRATORY (INHALATION) at 11:16

## 2018-06-23 RX ADMIN — FUROSEMIDE 40 MG: 10 INJECTION, SOLUTION INTRAVENOUS at 16:46

## 2018-06-23 RX ADMIN — FERROUS SULFATE TAB 325 MG (65 MG ELEMENTAL FE) 325 MG: 325 (65 FE) TAB at 20:00

## 2018-06-23 RX ADMIN — VENLAFAXINE HYDROCHLORIDE 75 MG: 75 TABLET, EXTENDED RELEASE ORAL at 09:46

## 2018-06-23 RX ADMIN — ALBUTEROL SULFATE 2.5 MG: 2.5 SOLUTION RESPIRATORY (INHALATION) at 15:28

## 2018-06-23 RX ADMIN — FERROUS SULFATE TAB 325 MG (65 MG ELEMENTAL FE) 325 MG: 325 (65 FE) TAB at 09:44

## 2018-06-23 RX ADMIN — SPIRONOLACTONE 25 MG: 25 TABLET, FILM COATED ORAL at 09:44

## 2018-06-23 RX ADMIN — GABAPENTIN 100 MG: 100 CAPSULE ORAL at 09:44

## 2018-06-23 RX ADMIN — HYDRALAZINE HYDROCHLORIDE 75 MG: 25 TABLET ORAL at 09:44

## 2018-06-23 RX ADMIN — SIMVASTATIN 10 MG: 10 TABLET, FILM COATED ORAL at 21:12

## 2018-06-23 RX ADMIN — GABAPENTIN 100 MG: 100 CAPSULE ORAL at 21:12

## 2018-06-23 RX ADMIN — OMEPRAZOLE 20 MG: 20 CAPSULE, DELAYED RELEASE ORAL at 09:44

## 2018-06-23 RX ADMIN — POTASSIUM CHLORIDE 20 MEQ: 1500 TABLET, EXTENDED RELEASE ORAL at 09:43

## 2018-06-23 RX ADMIN — HYDRALAZINE HYDROCHLORIDE 75 MG: 25 TABLET ORAL at 15:19

## 2018-06-23 RX ADMIN — HYDRALAZINE HYDROCHLORIDE 75 MG: 25 TABLET ORAL at 21:11

## 2018-06-23 RX ADMIN — ISOSORBIDE MONONITRATE 30 MG: 30 TABLET, EXTENDED RELEASE ORAL at 09:44

## 2018-06-23 RX ADMIN — FUROSEMIDE 40 MG: 10 INJECTION, SOLUTION INTRAVENOUS at 09:43

## 2018-06-23 RX ADMIN — ALBUTEROL SULFATE 2.5 MG: 2.5 SOLUTION RESPIRATORY (INHALATION) at 07:15

## 2018-06-23 RX ADMIN — FUROSEMIDE 40 MG: 10 INJECTION, SOLUTION INTRAVENOUS at 21:15

## 2018-06-23 RX ADMIN — ALBUTEROL SULFATE 2.5 MG: 2.5 SOLUTION RESPIRATORY (INHALATION) at 19:23

## 2018-06-23 RX ADMIN — GABAPENTIN 100 MG: 100 CAPSULE ORAL at 15:20

## 2018-06-23 ASSESSMENT — ACTIVITIES OF DAILY LIVING (ADL)
ADLS_ACUITY_SCORE: 19

## 2018-06-23 NOTE — PLAN OF CARE
Problem: Patient Care Overview  Goal: Plan of Care/Patient Progress Review  Outcome: Improving  A/Ox4, SBA with walker and gait belt, Tele A-fib SVR, LS - expiratory wheeze and bilateral fine crackles in bases, productive cough with white sputum per patient report, lower extremity dressing and coccyx dressing intact, 2 gram sodium diet, 1L NC, discharge plan in 3-4 days, continue with plan of care.

## 2018-06-23 NOTE — PLAN OF CARE
Problem: Patient Care Overview  Goal: Plan of Care/Patient Progress Review  Outcome: No Change  Pt continues to require 02, 2L.  Will attempt to wean.  Able to ambulate in hallway, about half way and back.  No real desaturation with this type of activity.  Audible wheezing at times.  Bilateral crackles in bases.  CT of chest done this afternoon.  Coughing up sputum, white/green per pt report. Good cough with pulmonary toilet performed.  Tele: A fib with CVR, loraine or slow rate at times.  LEs were cared for with mild soap/water wash/rinse and application of sween.  Both foam to coccyx and left foot changed.  /66 (BP Location: Right arm)  Pulse 64  Temp 97.6  F (36.4  C) (Oral)  Resp 18  Wt 73.4 kg (161 lb 12.8 oz)  SpO2 94%  BMI 28.66 kg/m2

## 2018-06-23 NOTE — PLAN OF CARE
Problem: Patient Care Overview  Goal: Plan of Care/Patient Progress Review  Outcome: No Change  Vitals: VSS /54 (BP Location: Right arm)  Pulse 64  Temp 97.9  F (36.6  C) (Oral)  Resp 18  Wt 72.4 kg (159 lb 9.6 oz)  SpO2 96%  BMI 28.27 kg/m2    O2: remains on 1L o2.   LOC: alert and oriented forgetful at times  Pain: denies pain  Ambulation: A1 withwalker  Tele: afib CVR rate in the 60's  Cardiac: WDL  Lungs: Explitory wheezes with bilateral crackles  GI: large soft to loose stool , black, taking iron supplement   : void with no difficulty, IV lasix given.  Skin: BLE edema, right foot wound. Pt states its related to her flat feet and is her bone protruding ou,new  mepilex applied. Sween cream applied. Right buttocks wound, blanchable intact and open to air.  Plan: SW consult for dc planning to TCU, dc 2-3 days.

## 2018-06-23 NOTE — PLAN OF CARE
Problem: Patient Care Overview  Goal: Plan of Care/Patient Progress Review  OT- OT eval order received. Patient to be followed for both lymphedema and OT ADl skills.     Discharge Planner OT   Patient plan for discharge: home, would prefer home with increased home care services. Patient is open to hearing about TCU benefits  Current status:   Lymphedema: Patient reports she continues to ambulate with RN staff in halls and complete LE exercises.   ADL's; Patient on commode upon OT arrival, RN donned/doffed socks and depends. CGA, fww, grab bars and commode over toilet to stand from toilet and complete pericares with CGA, fww for support. Ambulated to sinkside to complete hand hygiene, patient too fatigue to participate in further activity. Ambulated to chair and opted to remain in chair after session. O2 sats in mid 90's on 1L of O2. Increased time addressing discharge recommendations, at end of session, patient was open to having SW look into options for TCU, however would prefer to return home with increased home care services.   Barriers to return to prior living situation: current medical condition, poor activity tolerance, below baseline for ADL's   Recommendations for discharge: would benefit from TCU stay to increase overall activity tolerance and ADL and mobility as patient is below baseline. If patient declines, would recommend home with continued and increased home care (RN, OT, PT, HHA for bathing) may benefit from outpatient lymphedema rehab services   Rationale for recommendations: will continue with OT services for lymphedema care, ADL's and increase activity            Entered by: Cecy Shaw 06/23/2018 1:17 PM

## 2018-06-23 NOTE — PROGRESS NOTES
"Date:6/23/2018  Admission Dx: CHF  Pulmonary History: None  Home Nebulizer/MDI Use: Albuterol TID  Home Oxygen: none    Acuity Level (RCAT flow sheet): 2    Aerosol Therapy initiated: Albuterol Q4 and Q2 prn    Pulmonary Hygiene initiated:     Volume Expansion initiated: IS    Current Oxygen Requirements: 1LPM Nc    Current SpO2: 97%    Re-evaluation date: 6/30/2018    Patient Education: Patient informed of medication benefits and possible side effects    See \"RT Assessments\" flow sheet for patient assessment scoring and Acuity Level Details.           "

## 2018-06-23 NOTE — PLAN OF CARE
Problem: Patient Care Overview  Goal: Plan of Care/Patient Progress Review    PT: Received orders for evaluation and treatment.  Attempted to work with patient x2, however patient had just received breakfast at first attempt and was with another provider at second attempt.  Will reschedule.

## 2018-06-23 NOTE — CONSULTS
Care Transition Initial Assessment -   Reason For Consult: discharge planning  Met with: PATIENT    Active Problems:    Acute exacerbation of CHF (congestive heart failure) (H)       DATA  Lives With: dtr  Living Arrangements: house  Description of Support System: Supportive, Involved  Who is your support system?: Children  Other Resources: Meals on Wheels  Quality Of Family Relationships: supportive, helpful, involved  Transportation Available: family or friend will provide    ASSESSMENT  Cognitive Status:  Alert & Orientated  Met with pt who reports that she lives with her dtr in a house.  She reports she has been independent prior to hospitalization with ADL's.  She states she uses a walker and denies any falls in the last 6 months. She gets Meals on Wheels and is open to [facility] for skilled nursing.   She reports that she has been to [facility] several times and she does not want to go back to TCU because she gets a bill each time of $4000.  However when I had TCU check on her benefits I was told that she is covered for a TCU stay.  When I went back to update pt with her coverage, and asked if she was previously paying for a private room which she reported yes, I let her know that a private room is an additional cost and asked her if she would be agreeable to a shared room which then her TCU would be covered however she is refusing to go to TCU at all. She is agreeable to .  She reports that her dtr works 11pm-10am at [employer].        PLAN  Financial costs for the patient includes: unknown   Patient given options and choices for discharge: yes  Patient/family is agreeable to the plan? YES  Patient Goals and Preferences: Refusing TCU but is agreeable to [facility]  Patient anticipates discharging to:  Home with HC RN/PT/OT     PLEASE HAVE MD WRITE ORDERS FOR HC RN/PT/OT at time of d/c

## 2018-06-23 NOTE — PROGRESS NOTES
[facility]  Hospitalist Progress Note  Geovani Whiting, DO 06/23/2018    Reason for Stay (Diagnosis): CHF         Assessment and Plan:      Keisha Godinez is an 86 year old female with history of chronic diastolic CHF, RV systolic CHF, pulmonary hypertension, lymphedema, chronic left foot wound, A. 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  She was recently admitted here from 5/21/18 to 5/2/18 with CHF.  She was diuresed and discharge to TCU. She presented to the ED on 6/18 for evaluation of shortness of breath and lower extremity swelling.  She reported weight gain in spite of use of oral Lasix and ACE wraps of lower extremities.  ED work up showed hypoxia with oxygen saturations in the mid 80%s (improved on 2 L nasal cannula), tachypnea, and audible wheezing.  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  BNP was elevated to 8389.  Troponin was not elevated.  Renal function was normal.  D-dimer was elevated at 1.5, but no further imaging was done as CHF exacerbation was thought to be the most likely etiology of her symptoms.  She was admitted to telemetry unit as an inpatient for IV diuresis. Hospital course was complicated by progression of chronic anemia for which patient was transfused one unit of RBC's on 6/19/18.        Problem List:   1. Acute on chronic diastolic congestive heart failure exacerbation.  Slowly improving.  Continue IV furosemide, spironolactone 25 mg a day.  2. Acute hypoxic respiratory failure.  Likely due to CHF exacerbation.  Continue supplemental oxygen as needed.    3. Hypertension.  Continue hydralazine, isosorbide mononitrate, furosemide, and spironolactone.  4. GERD.  Continue omeprazole.  5. Hyperlipidemia.  Continue simvastatin.  6. Depression.  Continue venlafaxine.  7. Hypokalemia.  Continue daily potassium supplement, spironolactone.  Potassium  replacement protocol as needed.  8. Weakness and deconditioning.  Physical and Occupational Therapy consults.  9. Acute on chronic Anemia.  Did require 1 unit of packed red blood cells on 6/19/18.  Hemoglobin stable.  10. Chronic left foot wound.  Follow-up with podiatry as outpatient.  11. Atrial fibrillation.  Not on anticoagulation due to previous GI bleed.  DVT Prophylaxis: Pneumatic Compression Devices  Code Status: Full Code  Discharge Dispo: TBD  Estimated Disch Date / # of Days until Disch: 2-4        Interval History (Subjective):      Short of breath at times.  Feels weak.  Denies chest pain, fever, chills, nausea, vomiting, or diarrhea.                  Physical Exam:      Last Vital Signs:  /54 (BP Location: Right arm)  Pulse 64  Temp 97.9  F (36.6  C) (Oral)  Resp 18  Wt 72.4 kg (159 lb 9.6 oz)  SpO2 96%  BMI 28.27 kg/m2    Gen:  NAD, A&Ox3.  Eyes:  PERRL, sclera anicteric.  OP:  MMM, no lesions.  Neck:  Supple.  CV:  Fairly regular, no murmurs.  Lung:  CTA b/l, normal effort.  Ab:  +BS, soft.  Skin:  Warm, dry to touch.  No rash.  Ext:  2+ pitting edema LE b/l.           Medications:      All current medications were reviewed with changes reflected in problem list.         Data:      All new lab and imaging data was reviewed.   Labs:    Recent Labs  Lab 06/23/18  0632      POTASSIUM 3.5   CHLORIDE 100   CO2 35*   ANIONGAP 4   GLC 89   BUN 13   CR 0.67   GFRESTIMATED 84   GFRESTBLACK >90   CASPER 8.4*       Recent Labs  Lab 06/23/18  0632   WBC 4.6   HGB 7.5*   HCT 26.5*   MCV 90   *      Imaging:   No results found for this or any previous visit (from the past 24 hour(s)).

## 2018-06-24 ENCOUNTER — APPOINTMENT (OUTPATIENT)
Dept: OCCUPATIONAL THERAPY | Facility: CLINIC | Age: 83
DRG: 291 | End: 2018-06-24
Payer: MEDICARE

## 2018-06-24 LAB
ANION GAP SERPL CALCULATED.3IONS-SCNC: 3 MMOL/L (ref 3–14)
BUN SERPL-MCNC: 15 MG/DL (ref 7–30)
CALCIUM SERPL-MCNC: 8.5 MG/DL (ref 8.5–10.1)
CHLORIDE SERPL-SCNC: 100 MMOL/L (ref 94–109)
CO2 SERPL-SCNC: 37 MMOL/L (ref 20–32)
CREAT SERPL-MCNC: 0.82 MG/DL (ref 0.52–1.04)
GFR SERPL CREATININE-BSD FRML MDRD: 66 ML/MIN/1.7M2
GLUCOSE SERPL-MCNC: 86 MG/DL (ref 70–99)
POTASSIUM SERPL-SCNC: 3.7 MMOL/L (ref 3.4–5.3)
SODIUM SERPL-SCNC: 140 MMOL/L (ref 133–144)

## 2018-06-24 PROCEDURE — 80048 BASIC METABOLIC PNL TOTAL CA: CPT | Performed by: INTERNAL MEDICINE

## 2018-06-24 PROCEDURE — 97535 SELF CARE MNGMENT TRAINING: CPT | Mod: GO | Performed by: OCCUPATIONAL THERAPIST

## 2018-06-24 PROCEDURE — 94640 AIRWAY INHALATION TREATMENT: CPT

## 2018-06-24 PROCEDURE — A9270 NON-COVERED ITEM OR SERVICE: HCPCS | Mod: GY | Performed by: INTERNAL MEDICINE

## 2018-06-24 PROCEDURE — 94640 AIRWAY INHALATION TREATMENT: CPT | Mod: 76

## 2018-06-24 PROCEDURE — 40000275 ZZH STATISTIC RCP TIME EA 10 MIN

## 2018-06-24 PROCEDURE — 40000133 ZZH STATISTIC OT WARD VISIT: Performed by: OCCUPATIONAL THERAPIST

## 2018-06-24 PROCEDURE — 99232 SBSQ HOSP IP/OBS MODERATE 35: CPT | Performed by: INTERNAL MEDICINE

## 2018-06-24 PROCEDURE — 12000007 ZZH R&B INTERMEDIATE

## 2018-06-24 PROCEDURE — 25000128 H RX IP 250 OP 636: Performed by: INTERNAL MEDICINE

## 2018-06-24 PROCEDURE — 25000125 ZZHC RX 250: Performed by: INTERNAL MEDICINE

## 2018-06-24 PROCEDURE — 25000132 ZZH RX MED GY IP 250 OP 250 PS 637: Mod: GY | Performed by: INTERNAL MEDICINE

## 2018-06-24 PROCEDURE — 36415 COLL VENOUS BLD VENIPUNCTURE: CPT | Performed by: INTERNAL MEDICINE

## 2018-06-24 RX ADMIN — ALBUTEROL SULFATE 2.5 MG: 2.5 SOLUTION RESPIRATORY (INHALATION) at 11:12

## 2018-06-24 RX ADMIN — GABAPENTIN 100 MG: 100 CAPSULE ORAL at 09:05

## 2018-06-24 RX ADMIN — VENLAFAXINE HYDROCHLORIDE 75 MG: 75 TABLET, EXTENDED RELEASE ORAL at 12:36

## 2018-06-24 RX ADMIN — GABAPENTIN 100 MG: 100 CAPSULE ORAL at 21:45

## 2018-06-24 RX ADMIN — ISOSORBIDE MONONITRATE 30 MG: 30 TABLET, EXTENDED RELEASE ORAL at 09:04

## 2018-06-24 RX ADMIN — HYDRALAZINE HYDROCHLORIDE 75 MG: 25 TABLET ORAL at 21:45

## 2018-06-24 RX ADMIN — ALBUTEROL SULFATE 2.5 MG: 2.5 SOLUTION RESPIRATORY (INHALATION) at 15:01

## 2018-06-24 RX ADMIN — ALBUTEROL SULFATE 2.5 MG: 2.5 SOLUTION RESPIRATORY (INHALATION) at 19:34

## 2018-06-24 RX ADMIN — FERROUS SULFATE TAB 325 MG (65 MG ELEMENTAL FE) 325 MG: 325 (65 FE) TAB at 09:06

## 2018-06-24 RX ADMIN — FUROSEMIDE 40 MG: 10 INJECTION, SOLUTION INTRAVENOUS at 21:57

## 2018-06-24 RX ADMIN — FERROUS SULFATE TAB 325 MG (65 MG ELEMENTAL FE) 325 MG: 325 (65 FE) TAB at 20:25

## 2018-06-24 RX ADMIN — FUROSEMIDE 40 MG: 10 INJECTION, SOLUTION INTRAVENOUS at 16:53

## 2018-06-24 RX ADMIN — POTASSIUM CHLORIDE 20 MEQ: 1500 TABLET, EXTENDED RELEASE ORAL at 09:06

## 2018-06-24 RX ADMIN — HYDRALAZINE HYDROCHLORIDE 75 MG: 25 TABLET ORAL at 15:41

## 2018-06-24 RX ADMIN — GABAPENTIN 100 MG: 100 CAPSULE ORAL at 15:41

## 2018-06-24 RX ADMIN — SPIRONOLACTONE 25 MG: 25 TABLET, FILM COATED ORAL at 09:06

## 2018-06-24 RX ADMIN — SIMVASTATIN 10 MG: 10 TABLET, FILM COATED ORAL at 21:45

## 2018-06-24 RX ADMIN — OMEPRAZOLE 20 MG: 20 CAPSULE, DELAYED RELEASE ORAL at 09:05

## 2018-06-24 RX ADMIN — ALBUTEROL SULFATE 2.5 MG: 2.5 SOLUTION RESPIRATORY (INHALATION) at 07:26

## 2018-06-24 RX ADMIN — HYDRALAZINE HYDROCHLORIDE 75 MG: 25 TABLET ORAL at 09:04

## 2018-06-24 RX ADMIN — FUROSEMIDE 40 MG: 10 INJECTION, SOLUTION INTRAVENOUS at 09:06

## 2018-06-24 ASSESSMENT — ACTIVITIES OF DAILY LIVING (ADL)
ADLS_ACUITY_SCORE: 19

## 2018-06-24 NOTE — PLAN OF CARE
Problem: Patient Care Overview  Goal: Plan of Care/Patient Progress Review    PT: Attempted x2 to see patient; patient just ambulated in hallway with nursing staff and requested that therapist return later in day, at second attempt, patient sleeping heavily.  Discussed with RN, recommended amb in hallway this evening with RN.  Will reschedule for tomorrow.

## 2018-06-24 NOTE — PROGRESS NOTES
[facility]  Hospitalist Progress Note  Geovani Whiting, DO 06/24/2018    Reason for Stay (Diagnosis): CHF         Assessment and Plan:      Keisha Godinez is an 86 year old female with history of chronic diastolic CHF, RV systolic CHF, pulmonary hypertension, lymphedema, chronic left foot wound, A. 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  She was recently admitted here from 5/21/18 to 5/2/18 with CHF.  She was diuresed and discharge to TCU. She presented to the ED on 6/18 for evaluation of shortness of breath and lower extremity swelling.  She reported weight gain in spite of use of oral Lasix and ACE wraps of lower extremities.  ED work up showed hypoxia with oxygen saturations in the mid 80%s (improved on 2 L nasal cannula), tachypnea, and audible wheezing.  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  BNP was elevated to 8389.  Troponin was not elevated.  Renal function was normal.  D-dimer was elevated at 1.5, but no further imaging was done as CHF exacerbation was thought to be the most likely etiology of her symptoms.  She was admitted to telemetry unit as an inpatient for IV diuresis. Hospital course was complicated by progression of chronic anemia for which patient was transfused one unit of RBC's on 6/19/18.       Problem List:   1. Acute on chronic diastolic congestive heart failure exacerbation.  Continues to slowly improve.  Continue IV furosemide, spironolactone 25 mg a day.  2. Acute hypoxic respiratory failure.  Likely due to CHF exacerbation.  Continue supplemental oxygen as needed.    3. Hypertension.  Continue hydralazine, isosorbide mononitrate, furosemide, and spironolactone.  4. GERD.  Continue omeprazole.  5. Hyperlipidemia.  Continue simvastatin.  6. Depression.  Continue venlafaxine.  7. Hypokalemia.  Continue daily potassium supplement, spironolactone.   Potassium replacement protocol as needed.  8. Weakness and deconditioning.  Physical and Occupational Therapy consults.  9. Acute on chronic Anemia.  Did require 1 unit of packed red blood cells on 6/19/18.  Hemoglobin has been stable.  10. Chronic left foot wound.  Follow-up with podiatry as outpatient.  11. Atrial fibrillation.  Not on anticoagulation due to previous GI bleed.  DVT Prophylaxis: Pneumatic Compression Devices  Code Status: Full Code  Discharge Dispo: TBD  Estimated Disch Date / # of Days until Disch: 1-2        Interval History (Subjective):      Short of breath at times.  Denies chest pain, fevers, chills, nausea, vomiting, or diarrhea.                  Physical Exam:      Last Vital Signs:  /48 (BP Location: Right arm)  Pulse 64  Temp 97.9  F (36.6  C) (Oral)  Resp 20  Wt 70.6 kg (155 lb 9.6 oz)  SpO2 98%  BMI 27.56 kg/m2    Gen:  NAD, A&Ox3.  Eyes:  PERRL, sclera anicteric.  OP:  MMM, no lesions.  Neck:  Supple.  CV:  Irregular, no murmurs.  Lung:  CTA b/l, normal effort.  Ab:  +BS, soft.  Skin:  Warm, dry to touch.  No rash.  Ext:  2+ pitting edema LE b/l.           Medications:      All current medications were reviewed with changes reflected in problem list.         Data:      All new lab and imaging data was reviewed.   Labs:    Recent Labs  Lab 06/24/18  0619      POTASSIUM 3.7   CHLORIDE 100   CO2 37*   ANIONGAP 3   GLC 86   BUN 15   CR 0.82   GFRESTIMATED 66   GFRESTBLACK 80   CASPER 8.5       Recent Labs  Lab 06/23/18  0632   WBC 4.6   HGB 7.5*   HCT 26.5*   MCV 90   *      Imaging:   No results found for this or any previous visit (from the past 24 hour(s)).

## 2018-06-24 NOTE — PLAN OF CARE
Problem: Cardiac: Heart Failure (Adult)  Goal: Signs and Symptoms of Listed Potential Problems Will be Absent, Minimized or Managed (Cardiac: Heart Failure)  Signs and symptoms of listed potential problems will be absent, minimized or managed by discharge/transition of care (reference Cardiac: Heart Failure (Adult) CPG).   Outcome: Improving  Pt alert and oriented X 4. Apical pulse irregular, tele A-fib CVR. Assist of 1 and a walker. Lung sounds diminished with fine crackles. +2-3 BLE edema noted. Blanchable reddness on buttocks/coccyx weight shift assistance provided. Plan: IV lasix, nebulizers, possible D/C to TCU Monday.

## 2018-06-24 NOTE — PLAN OF CARE
Problem: Patient Care Overview  Goal: Plan of Care/Patient Progress Review  Discharge Planner OT   Patient plan for discharge: Home with increased home cares   Current status:   Pt noted to be on 3L of oxygen; no SOB observed during OT session. Pt demonstrated CGA transferring sit<>stand and walked to the sink with CGA/SBA and FWW; demonstrated SBA walking back to chair with FWW. Pt tolerated standing at the sink with FWW and SBA to complete g/h cares; washing face, washing under arms, and putting on deodorant.  Therapist cued pt to change briefs; demonstrated I doffing briefs and modified I in donning briefs by using reacher. Pt refused completing don/doff of socks and therapist educated on alternative sock aid options.   Barriers to return to prior living situation: Below baseline for ADLs and decreased activity tolerance.   Recommendations for discharge: Pt would benefit from TCU to increase ADL and mobility activity to baseline. If pt returns home, would recommend increased home care (RN, OT, PT, HHA) and may benefit from outpatient lymphedema rehab services.   Rationale for recommendations: OT services will continue to monitor and increase activity tolerance for ADLs.         Entered by: Gris Martinez 06/24/2018 8:52 AM

## 2018-06-24 NOTE — PLAN OF CARE
Problem: Patient Care Overview  Goal: Plan of Care/Patient Progress Review  Outcome: Improving  A/Ox4, Asstx1 with walker and gait belt, Tele A-fib SVR, LS - expiratory wheeze and bilateral fine crackles in bases, non productive cough, mepilex to L foot CDI,and coccyx dressing intact, 2 gram sodium diet, 1L NC, no stools this shift, discharge plan in 2-3 days, continue with plan of care.

## 2018-06-25 ENCOUNTER — APPOINTMENT (OUTPATIENT)
Dept: OCCUPATIONAL THERAPY | Facility: CLINIC | Age: 83
DRG: 291 | End: 2018-06-25
Payer: MEDICARE

## 2018-06-25 LAB
ANION GAP SERPL CALCULATED.3IONS-SCNC: 6 MMOL/L (ref 3–14)
BUN SERPL-MCNC: 18 MG/DL (ref 7–30)
CALCIUM SERPL-MCNC: 8.7 MG/DL (ref 8.5–10.1)
CHLORIDE SERPL-SCNC: 100 MMOL/L (ref 94–109)
CO2 SERPL-SCNC: 32 MMOL/L (ref 20–32)
CREAT SERPL-MCNC: 0.79 MG/DL (ref 0.52–1.04)
GFR SERPL CREATININE-BSD FRML MDRD: 69 ML/MIN/1.7M2
GLUCOSE SERPL-MCNC: 88 MG/DL (ref 70–99)
POTASSIUM SERPL-SCNC: 3.6 MMOL/L (ref 3.4–5.3)
SODIUM SERPL-SCNC: 138 MMOL/L (ref 133–144)

## 2018-06-25 PROCEDURE — 40000133 ZZH STATISTIC OT WARD VISIT

## 2018-06-25 PROCEDURE — 40000893 ZZH STATISTIC PT IP EVAL DEFER

## 2018-06-25 PROCEDURE — A9270 NON-COVERED ITEM OR SERVICE: HCPCS | Mod: GY | Performed by: INTERNAL MEDICINE

## 2018-06-25 PROCEDURE — 12000007 ZZH R&B INTERMEDIATE

## 2018-06-25 PROCEDURE — 97535 SELF CARE MNGMENT TRAINING: CPT | Mod: GO

## 2018-06-25 PROCEDURE — 94640 AIRWAY INHALATION TREATMENT: CPT

## 2018-06-25 PROCEDURE — 40000275 ZZH STATISTIC RCP TIME EA 10 MIN

## 2018-06-25 PROCEDURE — 25000128 H RX IP 250 OP 636: Performed by: INTERNAL MEDICINE

## 2018-06-25 PROCEDURE — 80048 BASIC METABOLIC PNL TOTAL CA: CPT | Performed by: INTERNAL MEDICINE

## 2018-06-25 PROCEDURE — 25000132 ZZH RX MED GY IP 250 OP 250 PS 637: Mod: GY | Performed by: INTERNAL MEDICINE

## 2018-06-25 PROCEDURE — 36415 COLL VENOUS BLD VENIPUNCTURE: CPT | Performed by: INTERNAL MEDICINE

## 2018-06-25 PROCEDURE — 99232 SBSQ HOSP IP/OBS MODERATE 35: CPT | Performed by: INTERNAL MEDICINE

## 2018-06-25 PROCEDURE — 94640 AIRWAY INHALATION TREATMENT: CPT | Mod: 76

## 2018-06-25 PROCEDURE — 25000125 ZZHC RX 250: Performed by: INTERNAL MEDICINE

## 2018-06-25 RX ORDER — VENLAFAXINE HYDROCHLORIDE 75 MG/1
75 CAPSULE, EXTENDED RELEASE ORAL DAILY
Status: DISCONTINUED | OUTPATIENT
Start: 2018-06-26 | End: 2018-06-28 | Stop reason: HOSPADM

## 2018-06-25 RX ORDER — FUROSEMIDE 40 MG
40 TABLET ORAL
Status: DISCONTINUED | OUTPATIENT
Start: 2018-06-25 | End: 2018-06-28 | Stop reason: HOSPADM

## 2018-06-25 RX ADMIN — FERROUS SULFATE TAB 325 MG (65 MG ELEMENTAL FE) 325 MG: 325 (65 FE) TAB at 21:03

## 2018-06-25 RX ADMIN — OMEPRAZOLE 20 MG: 20 CAPSULE, DELAYED RELEASE ORAL at 10:02

## 2018-06-25 RX ADMIN — ALBUTEROL SULFATE 2.5 MG: 2.5 SOLUTION RESPIRATORY (INHALATION) at 15:08

## 2018-06-25 RX ADMIN — VENLAFAXINE HYDROCHLORIDE 75 MG: 75 TABLET, EXTENDED RELEASE ORAL at 10:02

## 2018-06-25 RX ADMIN — ALBUTEROL SULFATE 2.5 MG: 2.5 SOLUTION RESPIRATORY (INHALATION) at 19:27

## 2018-06-25 RX ADMIN — SIMVASTATIN 10 MG: 10 TABLET, FILM COATED ORAL at 21:03

## 2018-06-25 RX ADMIN — ALBUTEROL SULFATE 2.5 MG: 2.5 SOLUTION RESPIRATORY (INHALATION) at 23:04

## 2018-06-25 RX ADMIN — SPIRONOLACTONE 25 MG: 25 TABLET, FILM COATED ORAL at 10:02

## 2018-06-25 RX ADMIN — POTASSIUM CHLORIDE 20 MEQ: 1500 TABLET, EXTENDED RELEASE ORAL at 10:02

## 2018-06-25 RX ADMIN — GABAPENTIN 100 MG: 100 CAPSULE ORAL at 21:03

## 2018-06-25 RX ADMIN — GABAPENTIN 100 MG: 100 CAPSULE ORAL at 10:02

## 2018-06-25 RX ADMIN — HYDRALAZINE HYDROCHLORIDE 75 MG: 25 TABLET ORAL at 10:02

## 2018-06-25 RX ADMIN — GABAPENTIN 100 MG: 100 CAPSULE ORAL at 16:14

## 2018-06-25 RX ADMIN — HYDRALAZINE HYDROCHLORIDE 75 MG: 25 TABLET ORAL at 21:03

## 2018-06-25 RX ADMIN — ALBUTEROL SULFATE 2.5 MG: 2.5 SOLUTION RESPIRATORY (INHALATION) at 07:24

## 2018-06-25 RX ADMIN — FUROSEMIDE 40 MG: 40 TABLET ORAL at 16:14

## 2018-06-25 RX ADMIN — FERROUS SULFATE TAB 325 MG (65 MG ELEMENTAL FE) 325 MG: 325 (65 FE) TAB at 10:02

## 2018-06-25 RX ADMIN — FUROSEMIDE 40 MG: 10 INJECTION, SOLUTION INTRAVENOUS at 10:02

## 2018-06-25 RX ADMIN — HYDRALAZINE HYDROCHLORIDE 75 MG: 25 TABLET ORAL at 16:14

## 2018-06-25 RX ADMIN — ISOSORBIDE MONONITRATE 30 MG: 30 TABLET, EXTENDED RELEASE ORAL at 10:02

## 2018-06-25 RX ADMIN — ALBUTEROL SULFATE 2.5 MG: 2.5 SOLUTION RESPIRATORY (INHALATION) at 11:00

## 2018-06-25 ASSESSMENT — ACTIVITIES OF DAILY LIVING (ADL)
ADLS_ACUITY_SCORE: 19
ADLS_ACUITY_SCORE: 20

## 2018-06-25 NOTE — PROGRESS NOTES
[facility]  Hospitalist Progress Note  Geovani Whiting, DO 06/25/2018    Reason for Stay (Diagnosis): CHF.         Assessment and Plan:      Keisha Godinez is an 86 year old female with history of chronic diastolic CHF, RV systolic CHF, pulmonary hypertension, lymphedema, chronic left foot wound, A. 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  She was recently admitted here from 5/21/18 to 5/2/18 with CHF.  She was diuresed and discharge to TCU. She presented to the ED on 6/18 for evaluation of shortness of breath and lower extremity swelling.  She reported weight gain in spite of use of oral Lasix and ACE wraps of lower extremities.  ED work up showed hypoxia with oxygen saturations in the mid 80%s (improved on 2 L nasal cannula), tachypnea, and audible wheezing.  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  BNP was elevated to 8389.  Troponin was not elevated.  Renal function was normal.  D-dimer was elevated at 1.5, but no further imaging was done as CHF exacerbation was thought to be the most likely etiology of her symptoms.  She was admitted to telemetry unit as an inpatient for IV diuresis. Hospital course was complicated by progression of chronic anemia for which patient was transfused one unit of RBC's on 6/19/18.        Problem List:   1. Acute on chronic diastolic congestive heart failure exacerbation.  Continues to slowly improve.  Change IV furosemide to oral furosemide 40 mg twice a day.  Continue spironolactone 25 mg a day.  2. Acute hypoxic respiratory failure.  Likely due to CHF exacerbation.  Continue supplemental oxygen as needed.    3. Hypertension.  Continue hydralazine, isosorbide mononitrate, furosemide, and spironolactone.  4. GERD.  Continue omeprazole.  5. Hyperlipidemia.  Continue simvastatin.  6. Depression.  Continue venlafaxine.  7. Hypokalemia.  Continue  daily potassium supplement, spironolactone.  Potassium replacement protocol as needed.  8. Weakness and deconditioning.  Physical and Occupational Therapy consults.  9. Acute on chronic Anemia.  Did require 1 unit of packed red blood cells on 6/19/18.  Hemoglobin has been stable.  Recheck CBC tomorrow.  10. Chronic left foot wound.  Follow-up with podiatry as outpatient.  11. Atrial fibrillation.  Not on anticoagulation due to previous GI bleed.  DVT Prophylaxis: Pneumatic Compression Devices  Code Status: Full Code  Discharge Dispo: Home with home care.  Estimated Disch Date / # of Days until Disch: 1-2        Interval History (Subjective):      Short of breath at times.  Feels a leg swelling is mildly improved.  Denies chest pain, fevers, chills, nausea, vomiting, or diarrhea.                  Physical Exam:      Last Vital Signs:  /66 (BP Location: Right arm)  Pulse 64  Temp 97.9  F (36.6  C) (Oral)  Resp 18  Wt 70.2 kg (154 lb 12.8 oz)  SpO2 94%  BMI 27.42 kg/m2    Gen:  NAD, A&Ox3.  Eyes:  PERRL, sclera anicteric.  OP:  MMM, no lesions.  Neck:  Supple.  CV:  Irregular, no murmurs.  Lung:  CTA b/l, normal effort.  Ab:  +BS, soft.  Skin:  Warm, dry to touch.  No rash.  Ext:  1+ pitting edema LE b/l.           Medications:      All current medications were reviewed with changes reflected in problem list.         Data:      All new lab and imaging data was reviewed.   Labs:    Recent Labs  Lab 06/25/18  0626      POTASSIUM 3.6   CHLORIDE 100   CO2 32   ANIONGAP 6   GLC 88   BUN 18   CR 0.79   GFRESTIMATED 69   GFRESTBLACK 84   CASPER 8.7       Recent Labs  Lab 06/23/18  0632   WBC 4.6   HGB 7.5*   HCT 26.5*   MCV 90   *      Imaging:   No results found for this or any previous visit (from the past 24 hour(s)).

## 2018-06-25 NOTE — PLAN OF CARE
Problem: Patient Care Overview  Goal: Plan of Care/Patient Progress Review  PT: Evaluation attempted. Patient declines and reports she is planning to discharge home with  services today or tomorrow. Pt agreeable for completion of PT order and continuing to walk with RN staff. Defer PT to next level of care.

## 2018-06-25 NOTE — PLAN OF CARE
Problem: Patient Care Overview  Goal: Plan of Care/Patient Progress Review  Discharge Planner OT   Patient plan for discharge: Home with A  Current status: Trialed ambulation off O2, initally 95% on RA with .5L. During ambulation chair>BR with FWW CGA, cues for breathing. Sats to 85% and HR to 75 upon arrival to BR. Sats rebounded to 93% after about 30 secs with .5 L O2 donned. Completed toilet transfer with SBA, including pericares and RTS, completed 3 g/h tasks while standing at sink SBA.  Mod I with LE dressing using reacher to doff/don brief. Pt eager to return home. O2 sats remaining stable throughout remainder of session. Pt has me all OT goals.  Barriers to return to prior living situation: will need increased level of A  Recommendations for discharge: Home with home health RN home health aide, home OT,  A from daughter for ADL's/IADL's and may benefit from OP lymphedema management  Rationale for recommendations: Pt would benefit from increased level of supervision and A.       Entered by: Sonam Wick 06/25/2018 12:09 PM     .Occupational Therapy Discharge Summary    Reason for therapy discharge:    Discharged to home with home therapy.    Progress towards therapy goal(s). See goals on Care Plan in [facility] electronic health record for goal details.  Goals met    Therapy recommendation(s):    Continued therapy is recommended.  Rationale/Recommendations:  Home health OT to incrase safety and ind in the home.

## 2018-06-25 NOTE — PROGRESS NOTES
SPIRITUAL HEALTH SERVICES Progress Note  [facility] Med/Surg 3    LOS visit made to ptKeisha. Introduction/Orientation to SH/chaplains. Held brief conversation and prayed. Informed pt of how to request SH, should a desire arise.    No further SH services are anticipated at this time; SH remains available upon request.    Deonte Roberson   Intern  Pager [phone]

## 2018-06-25 NOTE — PLAN OF CARE
Problem: Patient Care Overview  Goal: Plan of Care/Patient Progress Review  Outcome: Improving  Pt A&Ox4 up w/SBA and walker, tele afib cvr, lasix changed from IV to oral, still requiring 1/2 lpm nasal cannula sating at 90-92%, continue to wean.  IS encouraged and demonstrated, ambulated 2x, dressing changed on left foot.  Pt makes needs known, likely d/c tomorrow, will continue POC.

## 2018-06-25 NOTE — PROGRESS NOTES
"CLINICAL NUTRITION SERVICES  -  ASSESSMENT NOTE      Malnutrition:   Unable to determine due to incomplete nutrition focused physical exam but currently does not meet two of the criteria necessary for diagnosing malnutrition      REASON FOR ASSESSMENT  Keisha Godinez is a 86 year old female seen by the dietitian for LOS    NUTRITION HISTORY  - Patient with provider and later in bathroom during two attempted visits  - Per [facility] RD note 5/23/18:  - Information obtained from patient and chart review.  - Patient with a h/o CHF, CVA.    - Patient reported low sodium diet at home.  Verbalizes as \"2400 a day\".    - Receives meals on wheels.  - Typical intake is meals TID.    - Takes 1 Ensure daily.  - Denies a decrease in appetite or changes to oral intakes PTA.    - Food allergies/intolerances: egg yolk, zinc    CURRENT NUTRITION ORDERS  - Diet: 2 gram sodium  - Supplement: none  Current Intake/Tolerance:  - Per flow sheet review, % intake for documented meals. Good appetite noted.    PHYSICAL FINDINGS  Observed  Unable to assess  Obtained from Chart/Interdisciplinary Team  Hossein nutrition score: 3; total score: 17  Fluid status: +2 BLE edema  Skin per WOCN: R buttocks could be a scratch or pressure injury    ANTHROPOMETRICS  Height: 5' 3\"  Weight: 70 kg   Body mass index is 27.42 kg/(m^2).  Weight Status:  Overweight BMI 25-29.9  Ideal body weight: 56.8 kg +/- 10%, 123% of IBW   Weight History:  Weight appears relatively stable, as noted below.  Likely slight variations 2/2 fluid shifts and CHF history.    Wt Readings from Last 10 Encounters:   06/25/18 70.2 kg (154 lb 12.8 oz)   06/18/18 72.6 kg (160 lb)   06/08/18 73.5 kg (162 lb)   06/04/18 73.5 kg (162 lb)   05/24/18 73.3 kg (161 lb 8 oz)   05/21/18 73 kg (161 lb)   01/16/18 73 kg (161 lb)   12/19/17 73 kg (161 lb)   11/28/17 69.4 kg (153 lb)   11/14/17 70.1 kg (154 lb 9.6 oz)       ASSESSED NUTRITION NEEDS  (PER APPROVED PRACTICE GUIDELINES, Dosing weight: 70 " kg)  Estimated Energy Needs: 4246-5332 kcals (25-30 Kcal/Kg)  Justification: maintenance  Estimated Protein Needs: 70-84+ grams protein (1-1.2+ g pro/Kg)  Justification: wound healing and preservation of lean body mass  Estimated Fluid Needs: >1 mL/Kcal  Justification: maintenance    LABS  Labs reviewed    Recent Labs   Lab Test  06/25/18   0626  06/24/18   0619  06/23/18   0632  06/22/18   0713  06/21/18   1502   POTASSIUM  3.6  3.7  3.5  3.4  3.9     Recent Labs   Lab Test  07/07/16   0545   PHOS  3.8     Recent Labs   Lab Test  06/19/18   0802  05/23/18   0718  05/21/18   1829  10/25/17   0630  10/24/17   0600   MAG  2.1  2.2  2.2  2.2  2.0     Recent Labs   Lab Test  06/25/18   0626  06/24/18   0619  06/23/18   0632  06/22/18   0713  06/21/18   0840   NA  138  140  139  139  139     Recent Labs   Lab Test  06/25/18   0626  06/24/18   0619  06/23/18   0632  06/22/18   0713  06/21/18   0840   CR  0.79  0.82  0.67  0.77  0.74       Recent Labs  Lab 06/25/18  0626 06/24/18  0619 06/23/18  0632 06/22/18  0713 06/21/18  0840 06/20/18  0657 06/19/18  0802 06/18/18  1734 06/18/18  1524   GLC 88 86 89 87 91 96 90 100* 120*       MEDICATIONS  Medications reviewed  Ferrous sulfate 325 mg BID; 40 mg lasix BID    MALNUTRITION:  % Weight Loss:None noted  % Intake:No decreased intake noted  Subcutaneous Fat Loss: Unable to determine  Muscle Loss: Unable to determine  Fluid Retention: Trace    Malnutrition Diagnosis: Unable to determine due to incomplete nutrition focused physical exam but currently does not meet two of the above criteria necessary for diagnosing malnutrition     NUTRITION DIAGNOSIS:  Predicted inadequate nutrient intake (protein energy) related to hospitalization, possible pressure related wound    INTERVENTIONS  Recommendations / Nutrition Prescription  Continue diet as ordered. Consider oral nutrition supplements and/or multivitamin if oral intake declines    Implementation  Nutrition education: Per MD  order  Collaboration and Referral of care: Discussed patient during interdisciplinary care rounds this morning    Goals  Patient to consume >/= 75% of meals TID      MONITORING AND EVALUATION:  Progress towards goals will be monitored and evaluated per protocol and Practice Guidelines      Zully Vásquez RDN, LD, CNSC  Pager - 3rd floor/ICU: [phone]  Pager - All other floors: [phone]  Pager - Weekend/holiday: [phone]  Office: [phone]

## 2018-06-25 NOTE — PLAN OF CARE
Problem: Patient Care Overview  Goal: Plan of Care/Patient Progress Review  Outcome: No Change  Night Shift:    Neuro: A&O, calls appropriately, VSS  Lungs: LS diminished, on 1.5L O2. Dyspneic on exertion  Cardiac: Tele: A-fib CVR slow ventricular rate  IV: Saline Locked  Labs:am labs pending  GI: BS active, passing flatus, denies nausea, no bm overnight  : voiding adequately, about 200-300cc per time  Skin: wound on left inner foot covered with mepilex, pink/dusky, no drainage. Coccyx has old wound, applying barrier cream. Lymphedema bilat lower extremities +1  Diet: 2gm Na, tolerating well  Pain: denies  Activity: assist 1 w/walker  Plan: 1-2 more days and d/c home with daughter. Wean O2 as able. IV lasix, daily weights.

## 2018-06-26 LAB
ANION GAP SERPL CALCULATED.3IONS-SCNC: 5 MMOL/L (ref 3–14)
BUN SERPL-MCNC: 18 MG/DL (ref 7–30)
CALCIUM SERPL-MCNC: 8.8 MG/DL (ref 8.5–10.1)
CHLORIDE SERPL-SCNC: 100 MMOL/L (ref 94–109)
CO2 SERPL-SCNC: 33 MMOL/L (ref 20–32)
CREAT SERPL-MCNC: 0.88 MG/DL (ref 0.52–1.04)
ERYTHROCYTE [DISTWIDTH] IN BLOOD BY AUTOMATED COUNT: 17.2 % (ref 10–15)
GFR SERPL CREATININE-BSD FRML MDRD: 61 ML/MIN/1.7M2
GLUCOSE SERPL-MCNC: 91 MG/DL (ref 70–99)
HCT VFR BLD AUTO: 28.8 % (ref 35–47)
HGB BLD-MCNC: 8.3 G/DL (ref 11.7–15.7)
MCH RBC QN AUTO: 25.8 PG (ref 26.5–33)
MCHC RBC AUTO-ENTMCNC: 28.8 G/DL (ref 31.5–36.5)
MCV RBC AUTO: 89 FL (ref 78–100)
PLATELET # BLD AUTO: 96 10E9/L (ref 150–450)
POTASSIUM SERPL-SCNC: 3.6 MMOL/L (ref 3.4–5.3)
RBC # BLD AUTO: 3.22 10E12/L (ref 3.8–5.2)
SODIUM SERPL-SCNC: 138 MMOL/L (ref 133–144)
WBC # BLD AUTO: 5.2 10E9/L (ref 4–11)

## 2018-06-26 PROCEDURE — 36415 COLL VENOUS BLD VENIPUNCTURE: CPT | Performed by: INTERNAL MEDICINE

## 2018-06-26 PROCEDURE — 94640 AIRWAY INHALATION TREATMENT: CPT | Mod: 76

## 2018-06-26 PROCEDURE — A9270 NON-COVERED ITEM OR SERVICE: HCPCS | Mod: GY | Performed by: INTERNAL MEDICINE

## 2018-06-26 PROCEDURE — 12000007 ZZH R&B INTERMEDIATE

## 2018-06-26 PROCEDURE — A9270 NON-COVERED ITEM OR SERVICE: HCPCS | Mod: GY

## 2018-06-26 PROCEDURE — 25000132 ZZH RX MED GY IP 250 OP 250 PS 637: Mod: GY | Performed by: INTERNAL MEDICINE

## 2018-06-26 PROCEDURE — 94640 AIRWAY INHALATION TREATMENT: CPT

## 2018-06-26 PROCEDURE — 40000275 ZZH STATISTIC RCP TIME EA 10 MIN

## 2018-06-26 PROCEDURE — 25000132 ZZH RX MED GY IP 250 OP 250 PS 637: Mod: GY

## 2018-06-26 PROCEDURE — 85027 COMPLETE CBC AUTOMATED: CPT | Performed by: INTERNAL MEDICINE

## 2018-06-26 PROCEDURE — 99233 SBSQ HOSP IP/OBS HIGH 50: CPT | Performed by: INTERNAL MEDICINE

## 2018-06-26 PROCEDURE — 25000125 ZZHC RX 250: Performed by: INTERNAL MEDICINE

## 2018-06-26 PROCEDURE — 80048 BASIC METABOLIC PNL TOTAL CA: CPT | Performed by: INTERNAL MEDICINE

## 2018-06-26 RX ADMIN — SIMVASTATIN 10 MG: 10 TABLET, FILM COATED ORAL at 21:24

## 2018-06-26 RX ADMIN — OMEPRAZOLE 20 MG: 20 CAPSULE, DELAYED RELEASE ORAL at 08:04

## 2018-06-26 RX ADMIN — GABAPENTIN 100 MG: 100 CAPSULE ORAL at 21:24

## 2018-06-26 RX ADMIN — GABAPENTIN 100 MG: 100 CAPSULE ORAL at 08:04

## 2018-06-26 RX ADMIN — HYDRALAZINE HYDROCHLORIDE 75 MG: 25 TABLET ORAL at 17:28

## 2018-06-26 RX ADMIN — POTASSIUM CHLORIDE 20 MEQ: 1500 TABLET, EXTENDED RELEASE ORAL at 08:04

## 2018-06-26 RX ADMIN — HYDRALAZINE HYDROCHLORIDE 75 MG: 25 TABLET ORAL at 21:24

## 2018-06-26 RX ADMIN — VENLAFAXINE HYDROCHLORIDE 75 MG: 75 CAPSULE, EXTENDED RELEASE ORAL at 08:04

## 2018-06-26 RX ADMIN — FUROSEMIDE 40 MG: 40 TABLET ORAL at 17:28

## 2018-06-26 RX ADMIN — ALBUTEROL SULFATE 2.5 MG: 2.5 SOLUTION RESPIRATORY (INHALATION) at 20:09

## 2018-06-26 RX ADMIN — SPIRONOLACTONE 25 MG: 25 TABLET, FILM COATED ORAL at 08:03

## 2018-06-26 RX ADMIN — ALBUTEROL SULFATE 2.5 MG: 2.5 SOLUTION RESPIRATORY (INHALATION) at 15:46

## 2018-06-26 RX ADMIN — ISOSORBIDE MONONITRATE 30 MG: 30 TABLET, EXTENDED RELEASE ORAL at 08:04

## 2018-06-26 RX ADMIN — HYDRALAZINE HYDROCHLORIDE 75 MG: 25 TABLET ORAL at 08:04

## 2018-06-26 RX ADMIN — FERROUS SULFATE TAB 325 MG (65 MG ELEMENTAL FE) 325 MG: 325 (65 FE) TAB at 21:24

## 2018-06-26 RX ADMIN — FERROUS SULFATE TAB 325 MG (65 MG ELEMENTAL FE) 325 MG: 325 (65 FE) TAB at 08:04

## 2018-06-26 RX ADMIN — FUROSEMIDE 40 MG: 40 TABLET ORAL at 08:04

## 2018-06-26 RX ADMIN — GABAPENTIN 100 MG: 100 CAPSULE ORAL at 17:28

## 2018-06-26 RX ADMIN — ALBUTEROL SULFATE 2.5 MG: 2.5 SOLUTION RESPIRATORY (INHALATION) at 07:14

## 2018-06-26 RX ADMIN — ALBUTEROL SULFATE 2.5 MG: 2.5 SOLUTION RESPIRATORY (INHALATION) at 11:48

## 2018-06-26 ASSESSMENT — ACTIVITIES OF DAILY LIVING (ADL)
ADLS_ACUITY_SCORE: 19
ADLS_ACUITY_SCORE: 20
ADLS_ACUITY_SCORE: 19
ADLS_ACUITY_SCORE: 19

## 2018-06-26 NOTE — PROGRESS NOTES
[facility]  Hospitalist Progress Note  Rahul Parsons MD 06/26/2018    Reason for Stay (Diagnosis): CHF.         Assessment and Plan:      Keisha Godinez is an 86 year old female with history of chronic diastolic CHF, RV systolic CHF, pulmonary hypertension, lymphedema, chronic left foot wound, A. 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  She was recently admitted here from 5/21/18 to 5/2/18 with CHF.  She was diuresed and discharge to TCU. She presented to the ED on 6/18 for evaluation of shortness of breath and lower extremity swelling.  She reported weight gain in spite of use of oral Lasix and ACE wraps of lower extremities.  ED work up showed hypoxia with oxygen saturations in the mid 80%s (improved on 2 L nasal cannula), tachypnea, and audible wheezing.  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  BNP was elevated to 8389.  Troponin was not elevated.  Renal function was normal.  D-dimer was elevated at 1.5, but no further imaging was done as CHF exacerbation was thought to be the most likely etiology of her symptoms.  She was admitted to telemetry unit as an inpatient for IV diuresis. Hospital course was complicated by progression of chronic anemia for which patient was transfused one unit of RBC's on 6/19/18.  At this point she is being transitioned back to oral diuretics with 40 mg Lasix twice daily and also had the addition of spironolactone 25 mg a day here in the hospital.  She will need to follow-up with the core clinic regarding her congestive heart failure for ongoing close monitoring.  She lives at home with her daughter and is slightly below baseline in terms of conditioning.  She likely is nearing discharge home within the next day or 2 and we will continue to wean supplemental oxygen.      Problem List:   1. Acute on chronic diastolic congestive heart  failure exacerbation.  Continues to slowly improve.   --Changed IV furosemide to oral furosemide 40 mg twice a day.    --Continue spironolactone 25 mg a day as well as other home medications including Imdur/hydralazine.  --not on a BB, appears due to AV conduction disease.    2. Acute hypoxic respiratory failure.  Likely due to CHF exacerbation.  Continue supplemental oxygen as needed.  Weaning.  Continue incentive spirometry.    3. Hypertension.  Continue hydralazine, isosorbide mononitrate, furosemide, and spironolactone.      4. GERD.  Continue omeprazole.    5. Hyperlipidemia.  Continue simvastatin.    6. Depression.  Continue venlafaxine.    7. Hypokalemia.  Continue daily potassium supplement, spironolactone.  Potassium replacement protocol as needed.    8. Weakness and deconditioning.  Physical and Occupational Therapy consults.    9. Acute on chronic Anemia.  Did require 1 unit of packed red blood cells on 6/19/18.  Hemoglobin has been stable.      10. Chronic left foot wound.  Follow-up with podiatry as outpatient.    11. Atrial fibrillation.  Not on anticoagulation due to previous GI bleed.    DVT Prophylaxis: Pneumatic Compression Devices  Code Status: Full Code  Discharge Dispo: Home with home care.  Estimated Disch Date / # of Days until Disch: 1-2        Interval History (Subjective):      Short of breath at times.  Slowly weaning oxygen, currently down to 1 L.  Still assist of 1 with ambulation, discussed with nursing.  Increasing frequency of walks.                    Physical Exam:      Last Vital Signs:  /58 (BP Location: Right arm)  Pulse 64  Temp 97.8  F (36.6  C) (Oral)  Resp 18  Wt 70.2 kg (154 lb 12.8 oz)  SpO2 95%  BMI 27.42 kg/m2    Gen:  NAD, A&Ox3.  Eyes:  PERRL, sclera anicteric.  OP:  MMM, no lesions.  Neck:  Supple.  CV:  Irregular, no murmurs.  Lung:  CTA b/l, normal effort.  Ab:  +BS, soft.  Skin:  Warm, dry to touch.  No rash.  Ext:  1+ pitting edema LE b/l.            Medications:      All current medications were reviewed with changes reflected in problem list.         Data:      All new lab and imaging data was reviewed.   Labs:    Recent Labs  Lab 06/26/18  0809      POTASSIUM 3.6   CHLORIDE 100   CO2 33*   ANIONGAP 5   GLC 91   BUN 18   CR 0.88   GFRESTIMATED 61   GFRESTBLACK 74   CASPER 8.8       Recent Labs  Lab 06/26/18  0809   WBC 5.2   HGB 8.3*   HCT 28.8*   MCV 89   PLT 96*      Imaging:   No results found for this or any previous visit (from the past 24 hour(s)).

## 2018-06-26 NOTE — PROGRESS NOTES
Patient has been assessed for Home Oxygen needs.  Oxygen readings:   *   RA - at rest  Pulse oximetry SPO2 88%  *   RA - during activity/with exercise SPO2 85 %  *   O2 at  1 liters/minute (at rest) ...SPO2 92 %  *   O2 at  1  liters/minute (during activity/with exercise) ...SPO2 92 %

## 2018-06-26 NOTE — PLAN OF CARE
Problem: Patient Care Overview  Goal: Plan of Care/Patient Progress Review  Outcome: Improving  VSS. Afib with SVR on tele. Up with 1 assist. Bilateral lower extremity edema decreased to +1 overnight. Plan to d/c today or tomorrow with home health.

## 2018-06-26 NOTE — PLAN OF CARE
Problem: Cardiac: Heart Failure (Adult)  Goal: Signs and Symptoms of Listed Potential Problems Will be Absent, Minimized or Managed (Cardiac: Heart Failure)  Signs and symptoms of listed potential problems will be absent, minimized or managed by discharge/transition of care (reference Cardiac: Heart Failure (Adult) CPG).   Outcome: Improving    VSS ex HR in 40's at times   Cardio: Afib (40-80), R PVC   Neuro: A&O x4, forgetful, CMS intact ex numbness/tingling present (baseline)   Resp: Dyspneic on exertion, requiring 1L O2 via NC  GI/: Voiding w/o difficulty, BS +, LBM 6/25/18, passing flatus   Skin: R buttocks erythema, blanchable, barrier cream applied; BLE edema 1+/2+, carey; L foot scabbed, mepalex CDI   Activity: A1 w/ gait belt and walker   Diet: 2 gram NA, strict I/O      IVs/lines: SL  Plan: DC to home with home care in 1-2 days

## 2018-06-27 ENCOUNTER — TELEPHONE (OUTPATIENT)
Dept: INTERNAL MEDICINE | Facility: CLINIC | Age: 83
End: 2018-06-27

## 2018-06-27 ENCOUNTER — APPOINTMENT (OUTPATIENT)
Dept: OCCUPATIONAL THERAPY | Facility: CLINIC | Age: 83
DRG: 291 | End: 2018-06-27
Attending: INTERNAL MEDICINE
Payer: MEDICARE

## 2018-06-27 DIAGNOSIS — Z53.9 DIAGNOSIS NOT YET DEFINED: Primary | ICD-10-CM

## 2018-06-27 LAB
MAGNESIUM SERPL-MCNC: 2.3 MG/DL (ref 1.6–2.3)
POTASSIUM SERPL-SCNC: 3.7 MMOL/L (ref 3.4–5.3)

## 2018-06-27 PROCEDURE — 94640 AIRWAY INHALATION TREATMENT: CPT

## 2018-06-27 PROCEDURE — A9270 NON-COVERED ITEM OR SERVICE: HCPCS | Mod: GY | Performed by: INTERNAL MEDICINE

## 2018-06-27 PROCEDURE — A9270 NON-COVERED ITEM OR SERVICE: HCPCS | Mod: GY

## 2018-06-27 PROCEDURE — 40000275 ZZH STATISTIC RCP TIME EA 10 MIN

## 2018-06-27 PROCEDURE — 94640 AIRWAY INHALATION TREATMENT: CPT | Mod: 76

## 2018-06-27 PROCEDURE — 25000132 ZZH RX MED GY IP 250 OP 250 PS 637: Mod: GY | Performed by: INTERNAL MEDICINE

## 2018-06-27 PROCEDURE — 12000007 ZZH R&B INTERMEDIATE

## 2018-06-27 PROCEDURE — 99207 ZZC CDG-MDM COMPONENT: MEETS LOW - DOWN CODED: CPT | Performed by: INTERNAL MEDICINE

## 2018-06-27 PROCEDURE — 97168 OT RE-EVAL EST PLAN CARE: CPT | Mod: GO | Performed by: REHABILITATION PRACTITIONER

## 2018-06-27 PROCEDURE — 97535 SELF CARE MNGMENT TRAINING: CPT | Mod: GO | Performed by: REHABILITATION PRACTITIONER

## 2018-06-27 PROCEDURE — 25000132 ZZH RX MED GY IP 250 OP 250 PS 637: Mod: GY

## 2018-06-27 PROCEDURE — G0180 MD CERTIFICATION HHA PATIENT: HCPCS | Performed by: INTERNAL MEDICINE

## 2018-06-27 PROCEDURE — 40000133 ZZH STATISTIC OT WARD VISIT: Performed by: REHABILITATION PRACTITIONER

## 2018-06-27 PROCEDURE — 99232 SBSQ HOSP IP/OBS MODERATE 35: CPT | Performed by: INTERNAL MEDICINE

## 2018-06-27 PROCEDURE — 83735 ASSAY OF MAGNESIUM: CPT | Performed by: INTERNAL MEDICINE

## 2018-06-27 PROCEDURE — 84132 ASSAY OF SERUM POTASSIUM: CPT | Performed by: INTERNAL MEDICINE

## 2018-06-27 PROCEDURE — 25000125 ZZHC RX 250: Performed by: INTERNAL MEDICINE

## 2018-06-27 PROCEDURE — 36415 COLL VENOUS BLD VENIPUNCTURE: CPT | Performed by: INTERNAL MEDICINE

## 2018-06-27 RX ORDER — ISOSORBIDE MONONITRATE 30 MG/1
60 TABLET, EXTENDED RELEASE ORAL DAILY
Status: DISCONTINUED | OUTPATIENT
Start: 2018-06-28 | End: 2018-06-28 | Stop reason: HOSPADM

## 2018-06-27 RX ORDER — ISOSORBIDE DINITRATE 10 MG/1
20 TABLET ORAL ONCE
Status: COMPLETED | OUTPATIENT
Start: 2018-06-27 | End: 2018-06-27

## 2018-06-27 RX ADMIN — SPIRONOLACTONE 25 MG: 25 TABLET, FILM COATED ORAL at 09:10

## 2018-06-27 RX ADMIN — ALBUTEROL SULFATE 2.5 MG: 2.5 SOLUTION RESPIRATORY (INHALATION) at 19:09

## 2018-06-27 RX ADMIN — HYDRALAZINE HYDROCHLORIDE 75 MG: 25 TABLET ORAL at 09:11

## 2018-06-27 RX ADMIN — ALBUTEROL SULFATE 2.5 MG: 2.5 SOLUTION RESPIRATORY (INHALATION) at 15:04

## 2018-06-27 RX ADMIN — ISOSORBIDE MONONITRATE 30 MG: 30 TABLET, EXTENDED RELEASE ORAL at 09:10

## 2018-06-27 RX ADMIN — FERROUS SULFATE TAB 325 MG (65 MG ELEMENTAL FE) 325 MG: 325 (65 FE) TAB at 09:11

## 2018-06-27 RX ADMIN — SIMVASTATIN 10 MG: 10 TABLET, FILM COATED ORAL at 21:25

## 2018-06-27 RX ADMIN — GABAPENTIN 100 MG: 100 CAPSULE ORAL at 15:50

## 2018-06-27 RX ADMIN — ISOSORBIDE DINITRATE 20 MG: 10 TABLET ORAL at 15:53

## 2018-06-27 RX ADMIN — ALBUTEROL SULFATE 2.5 MG: 2.5 SOLUTION RESPIRATORY (INHALATION) at 07:16

## 2018-06-27 RX ADMIN — GABAPENTIN 100 MG: 100 CAPSULE ORAL at 21:25

## 2018-06-27 RX ADMIN — HYDRALAZINE HYDROCHLORIDE 75 MG: 25 TABLET ORAL at 15:50

## 2018-06-27 RX ADMIN — GABAPENTIN 100 MG: 100 CAPSULE ORAL at 09:07

## 2018-06-27 RX ADMIN — ALBUTEROL SULFATE 2.5 MG: 2.5 SOLUTION RESPIRATORY (INHALATION) at 11:34

## 2018-06-27 RX ADMIN — OMEPRAZOLE 20 MG: 20 CAPSULE, DELAYED RELEASE ORAL at 09:07

## 2018-06-27 RX ADMIN — FERROUS SULFATE TAB 325 MG (65 MG ELEMENTAL FE) 325 MG: 325 (65 FE) TAB at 21:25

## 2018-06-27 RX ADMIN — FUROSEMIDE 40 MG: 40 TABLET ORAL at 09:05

## 2018-06-27 RX ADMIN — FUROSEMIDE 40 MG: 40 TABLET ORAL at 15:50

## 2018-06-27 RX ADMIN — POTASSIUM CHLORIDE 20 MEQ: 1500 TABLET, EXTENDED RELEASE ORAL at 09:08

## 2018-06-27 RX ADMIN — VENLAFAXINE HYDROCHLORIDE 75 MG: 75 CAPSULE, EXTENDED RELEASE ORAL at 09:10

## 2018-06-27 RX ADMIN — HYDRALAZINE HYDROCHLORIDE 75 MG: 25 TABLET ORAL at 21:25

## 2018-06-27 ASSESSMENT — ACTIVITIES OF DAILY LIVING (ADL)
ADLS_ACUITY_SCORE: 16
ADLS_ACUITY_SCORE: 14
ADLS_ACUITY_SCORE: 14
ADLS_ACUITY_SCORE: 19
ADLS_ACUITY_SCORE: 19
ADLS_ACUITY_SCORE: 14

## 2018-06-27 NOTE — PLAN OF CARE
Problem: Patient Care Overview  Goal: Plan of Care/Patient Progress Review  OT/Lymphedema-  Re-eval completed and treatment initiated. OT was asked to come and reassess patient for lymphedema wraps. Patient noted to have improved respiratory function, note less difficulty, laborious  breathing, minimal wheezing noted as well. Patient continued to have severe edema in B LE's, including feet ankles, calves and thighs. Patient reported decreased pain and felt she could tolerate GCB.    Discharge Planner OT   Patient plan for discharge: home with continued home care and A from daughter for ADL's and IADL's as needed  Current status: Lymphedema evaluation complete and treatment initiated.  Pt is appropriate for LE quick wrap, using short stretch (SS) bandages, not ACE wraps. Pt educated in rationale for compression with wound healing and importance of using SS bandages which have low resting pressure and high working pressure, vs ACE wraps which have high resting pressure and low working pressure. LEs washed, lotion applied, and quick wraps completed to B LE, with appropriate stretch and spacing to allow gradient compression.  Completed quick wrap using TG Soft tube bandage layer from base of toes to just below the knee, then an 8 cm SS bandage from base of toes to ankle, and a 10 cm SS bandage from ankle to just below the knee. Bandages applied with appropriate stretch and spacing to allow gradient compression. Wraps applied bilaterally. Coordinated with nursing regarding wearing schedule, and request to keep wraps on until OT visit next day. If pt does not tolerate wraps well, please remove wraps but keep LEs elevated.    Note-Please remove if patient demonstrates cold hands or feet, blue toes or fingers, numbness or tingling, shortness of breath, skin irritation or inability to tolerate wraps, or if they become soiled or wet. Keep legs elevated if wraps need to be removed.    Barriers to return to prior living  situation: will need increased level of A, current medical needs  Recommendations for discharge: Home with home health RN home health aide, home OT,  A from daughter for ADL's/IADL's and may benefit from OP lymphedema management  Rationale for recommendations: Pt would benefit from continued lymphedema care       Entered by: Cecy Shaw 06/27/2018 3:43 PM

## 2018-06-27 NOTE — PLAN OF CARE
Problem: Patient Care Overview  Goal: Plan of Care/Patient Progress Review  Outcome: No Change   06/27/18 0645   OTHER   Plan Of Care Reviewed With patient   Plan of Care Review   Progress no change     Tele: A-fib, SVR, HR: 49. Physical assessment WDL w/exceptions as noted on Adult PCS flowsheet. +3 BLE (hard, edema), carey BLE, skin flaky, meplix peeled back to reveal intact scab, BLE N/T @ baseline. Refer to VS, I/O, Adult PCS for full assessment & shift details. Disposition anticipated for later today home w/daughter & HHC.  JOAQUÍN FreemanN, RN  Medical/Telemetry - 3       Problem: Cardiac: Heart Failure (Adult)  Goal: Signs and Symptoms of Listed Potential Problems Will be Absent, Minimized or Managed (Cardiac: Heart Failure)  Signs and symptoms of listed potential problems will be absent, minimized or managed by discharge/transition of care (reference Cardiac: Heart Failure (Adult) CPG).   Outcome: No Change   06/27/18 0030   Cardiac: Heart Failure   Problems Assessed (Heart Failure) all   Problems Present (Heart Failure) dysrhythmia/arrhythmia;fluid/electrolyte imbalance;respiratory compromise

## 2018-06-27 NOTE — PLAN OF CARE
Problem: Patient Care Overview  Goal: Plan of Care/Patient Progress Review    VSS ex requiring 1L O2. PO lasix given. Patient did not want wound care done per orders as she was eating dinner, will need to be done this evening. Tele - afib bradycardia. Will continue to monitor.

## 2018-06-27 NOTE — TELEPHONE ENCOUNTER
Fax received from [facility] for review and signature.  Put in Dr. Henderson's in basket.

## 2018-06-27 NOTE — PROGRESS NOTES
06/27/18 1535   Rehab Discipline   Discipline OT   Type of Visit   Type of visit Edema Re-evaluation   General Information   Start of care 06/19/18   Referring physician Dr. Meek   Orders Evaluate and treat as indicated   Order date 06/19/18   Medical diagnosis Keisha Godinez is a 86 year old female with a history of diastolic heart failure, right-side heart failure, atrial fibrillation, chronic anemia, and hypertension who presents with shortness of breath. Shortness of breath has been going on for about a month, however she is clearly tachypneic here with diffuse expiatory wheezing. She was mildly hypoxic requiring one to two liters to keep her stats above 90. She was given a duoneb shortly after arrival and clearly has signs of fluid overload on exam. She was given 20 mg of IV Lasix with the duoneb; with Lasix she greatly improved. She greatly improved in terms of respiratory status, chest x-ray revealed mild base bilateral pleural effusions and pulmonary edema, her BNP in clinic was 8000. Troponin here was negative, EKG revealed atrial fibrillation but showed no signs of ischemia. She is grossly fluid overload, so I think this is likely the cause of most of her symptoms. Her hemoglobin is low but has always been low, in clinic was 6.7 and here is 7.2. There might be a dilutional component given her great fluid overload state. MD notes report patient will require multiple days of diuresis. Currently hgb is 6.6. Summary of dx includes acute resp failure, pulmonary edema, B small pleural effusions, CHF, gross volume overload.   Onset of illness / date of surgery 06/19/18   Edema onset (Mid May 2018)   Affected body parts LLE;RLE   Edema etiology Unknown   Surgical / medical history reviewed Yes  (L TKA in 2004, hysterectomy in 1975)   Edema special tests Ultrasound  (neg for B LE's DVTS)   Prior level of functional mobility Pror to admission, patient ambulated with 4ww, in the summer likes to walk half a  mindisac. Has had limited community mobility since last hospitalization. Patient reports, her daughter keeps a manual wc in the car for community mobility for patient.    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 Daughter completes all IADL's. Patient reports she does not need to manage steps, has ramp to enter home. Mobility not assessed due to increased SOB at rest, RN reports patient is ambulating to and from bathroom to toilet   Subjective Report   Patient report of symptoms Patient reports burning sensation in B LE's, does have neuropathyin B feet, however reports it is not from that. Also reports weeping on dorsal surface of calf on L UE prior to admission, No weeping noted on day of re-eval, however skin is red and scaly   Precautions   Precautions Cardiac Edema/CHF   Pain   Patient currently in pain No   Vitals Signs   Vital Signs Comments Noted breathing much improved from previous eval date. Minimal wheezing noted, not consistent or frequently observed   Cognitive Status   Orientation Orientation to person, place and time   Level of consciousness Alert   Follows commands and answers questions 100% of the time   Personal safety and judgement Intact   Memory Intact   Edema Exam /  Assessment   Skin condition Pitting;Dryness;Hemosiderin deposits;Lipodermatosclerosis   Skin condition comments dry and scaly on B calfs and feet   Pitting 2+   Pitting location B hips. Skin taunt from B knees to feet   Scar Yes   Location L knee from previous TKA   Stemmer sign Positive   Girth Measurements   Girth Measurements Refer to separate girth measurement flowsheet   Range of Motion   ROM comments limited ROM in B toes and ankle ROM, has deformity in B ankles   Strength   Strength comments not formally assessed appears weak   Transfers   Transfers patient has been mobilizing in halls and in room to access batthroom and complete bed to recliner transfers with SBA to [facility] , fww   Sensory   Sensory perception comments patient reports numbness in B feet from neuropathy   Muscle Tone   Muscle tone No deficits were identified   Planned Edema Interventions   Planned edema interventions Gradient compression bandaging;Exercises;Education;ADL training;Manual therapy   Clinical Impression   Criteria for skilled therapeutic intervention met Yes   Therapy diagnosis decreased ADL's   Influenced by the following impairments / conditions Stage 2   Assessment of Occupational Performance 3-5 Performance Deficits   Identified Performance Deficits decreased functional mobility, decreased dressing, toileting,    Clinical Decision Making (Complexity) Low complexity   Treatment frequency Daily   Treatment duration 3 days   Patient / family and/or staff in agreement with plan of care Yes   Risks and benefits of therapy have been explained Yes   Goals   Edema Eval Goals (IP) See plan of care for patient goals   Total Evaluation Time   Total evaluation time 15

## 2018-06-27 NOTE — PLAN OF CARE
Problem: Patient Care Overview  Goal: Plan of Care/Patient Progress Review  Outcome: Improving  Vitals: VSS, afebrile  Oxygen: 1L NC  LOC: AAOx4  Pain: denies pain  Ambulation: SBA w/walker  Tele: AFib SVR-CVR  Cardiac: irregular rhythm   Lungs:LS- exp wheezes, ronchi. MOTT, LS much better after neb  GI: WNL  : WNL  Skin: wound to L foot, mepilex changed and moisturizer applied  Plan: Continue scheduled nebs and PO lasix, possible D/C tomorrow

## 2018-06-27 NOTE — PROGRESS NOTES
[facility]  Hospitalist Progress Note  Rahul Parsons MD 06/27/2018    Reason for Stay (Diagnosis): CHF.         Assessment and Plan:      Keisha Godinez is an 86 year old female with history of chronic diastolic CHF, RV systolic CHF, pulmonary hypertension, lymphedema, chronic left foot wound, A. 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  She was recently admitted here from 5/21/18 to 5/2/18 with CHF.  She was diuresed and discharge to TCU. She presented to the ED on 6/18 for evaluation of shortness of breath and lower extremity swelling.  She reported weight gain in spite of use of oral Lasix and ACE wraps of lower extremities.  ED work up showed hypoxia with oxygen saturations in the mid 80%s (improved on 2 L nasal cannula), tachypnea, and audible wheezing.  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  BNP was elevated to 8389.  Troponin was not elevated.  Renal function was normal.  D-dimer was elevated at 1.5, but no further imaging was done as CHF exacerbation was thought to be the most likely etiology of her symptoms.  She was admitted to telemetry unit as an inpatient for IV diuresis. Hospital course was complicated by progression of chronic anemia for which patient was transfused one unit of RBC's on 6/19/18.  At this point she is being transitioned back to oral diuretics with 40 mg Lasix twice daily and also had the addition of spironolactone 25 mg a day here in the hospital and up titration of her Imdur.  She will need to follow-up with the core clinic regarding her congestive heart failure for ongoing close monitoring.  She lives at home with her daughter and is slightly below baseline in terms of conditioning.  She likely is nearing discharge home within the next day or 2 and we will continue to wean supplemental oxygen.      Problem List:   1. Acute on chronic  diastolic congestive heart failure exacerbation.  Continues to slowly improve.   --oral furosemide 40 mg twice a day.    --Continue spironolactone 25 mg a day as well as other home medications including Imdur/hydralazine.  Also increasing Imdur from 30 mg to 60 mg daily given some hypertension.  --not on a BB, appears due to AV conduction disease.  --Okay for lymphedema wraps today.    2. Acute hypoxic respiratory failure.  Likely due to CHF exacerbation.  Continue supplemental oxygen as needed.  Weaning.  Continue incentive spirometry.  Previously was not on home oxygen but so far has not been able to wean off of 1 L/min.    3. Hypertension.  Continue hydralazine, isosorbide mononitrate, furosemide, and spironolactone.  Spironolactone is a new medication this admission and her Imdur has been increased from 30 mg to 60 mg.    4. GERD.  Continue omeprazole.    5. Hyperlipidemia.  Continue simvastatin.    6. Depression.  Continue venlafaxine.    7. Hypokalemia.  Continue daily potassium supplement, spironolactone.  Potassium replacement protocol as needed.    8. Weakness and deconditioning.  Physical and Occupational Therapy consults.    9. Acute on chronic Anemia.  Did require 1 unit of packed red blood cells on 6/19/18.  Hemoglobin has been stable.      10. Chronic left foot wound.  Follow-up with podiatry as outpatient.    11. Atrial fibrillation.  Not on anticoagulation due to previous GI bleed.    DVT Prophylaxis: Pneumatic Compression Devices  Code Status: Full Code  Discharge Dispo: Home with home care.  Estimated Disch Date / # of Days until Disch: 1-2        Interval History (Subjective):        increasing Imdur from 30 mg to 60 mg daily and giving an extra dose of Isordil this afternoon given some hypertension.  Remains on 1 L submental oxygen, unable to wean so far  Weight is down about 12 pounds from admit but stable compared to yesterday  Trial of edema wraps today                  Physical Exam:     "  Last Vital Signs:  /74 (BP Location: Right arm)  Pulse 60  Temp 98  F (36.7  C) (Oral)  Resp 20  Ht 1.6 m (5' 3\")  Wt 70.4 kg (155 lb 3.2 oz)  SpO2 96%  BMI 27.49 kg/m2    Gen:  NAD, A&Ox3.  Eyes:  PERRL, sclera anicteric.  OP:  MMM, no lesions.  Neck:  Supple.  CV:  Irregular, no murmurs.  Lung:  CTA b/l, normal effort.  Ab:  +BS, soft.  Skin:  Warm, dry to touch.  No rash.  Ext:  1+ pitting edema LE b/l.           Medications:      All current medications were reviewed with changes reflected in problem list.         Data:      All new lab and imaging data was reviewed.   Labs:    Recent Labs  Lab 06/27/18  0952 06/26/18  0809   NA  --  138   POTASSIUM 3.7 3.6   CHLORIDE  --  100   CO2  --  33*   ANIONGAP  --  5   GLC  --  91   BUN  --  18   CR  --  0.88   GFRESTIMATED  --  61   GFRESTBLACK  --  74   CASPER  --  8.8       Recent Labs  Lab 06/26/18  0809   WBC 5.2   HGB 8.3*   HCT 28.8*   MCV 89   PLT 96*      Imaging:   No results found for this or any previous visit (from the past 24 hour(s)).    "

## 2018-06-28 ENCOUNTER — DOCUMENTATION ONLY (OUTPATIENT)
Dept: MEDSURG UNIT | Facility: CLINIC | Age: 83
End: 2018-06-28

## 2018-06-28 ENCOUNTER — APPOINTMENT (OUTPATIENT)
Dept: OCCUPATIONAL THERAPY | Facility: CLINIC | Age: 83
DRG: 291 | End: 2018-06-28
Attending: INTERNAL MEDICINE
Payer: MEDICARE

## 2018-06-28 VITALS
HEART RATE: 59 BPM | SYSTOLIC BLOOD PRESSURE: 135 MMHG | BODY MASS INDEX: 27.82 KG/M2 | RESPIRATION RATE: 20 BRPM | TEMPERATURE: 97.5 F | WEIGHT: 157 LBS | OXYGEN SATURATION: 97 % | HEIGHT: 63 IN | DIASTOLIC BLOOD PRESSURE: 54 MMHG

## 2018-06-28 LAB
ANION GAP SERPL CALCULATED.3IONS-SCNC: 5 MMOL/L (ref 3–14)
BUN SERPL-MCNC: 19 MG/DL (ref 7–30)
CALCIUM SERPL-MCNC: 8.5 MG/DL (ref 8.5–10.1)
CHLORIDE SERPL-SCNC: 100 MMOL/L (ref 94–109)
CO2 SERPL-SCNC: 33 MMOL/L (ref 20–32)
CREAT SERPL-MCNC: 0.84 MG/DL (ref 0.52–1.04)
GFR SERPL CREATININE-BSD FRML MDRD: 64 ML/MIN/1.7M2
GLUCOSE SERPL-MCNC: 84 MG/DL (ref 70–99)
POTASSIUM SERPL-SCNC: 3.9 MMOL/L (ref 3.4–5.3)
SODIUM SERPL-SCNC: 138 MMOL/L (ref 133–144)

## 2018-06-28 PROCEDURE — 97140 MANUAL THERAPY 1/> REGIONS: CPT | Mod: GO

## 2018-06-28 PROCEDURE — A9270 NON-COVERED ITEM OR SERVICE: HCPCS | Mod: GY | Performed by: INTERNAL MEDICINE

## 2018-06-28 PROCEDURE — 25000125 ZZHC RX 250: Performed by: INTERNAL MEDICINE

## 2018-06-28 PROCEDURE — 25000132 ZZH RX MED GY IP 250 OP 250 PS 637: Mod: GY | Performed by: INTERNAL MEDICINE

## 2018-06-28 PROCEDURE — 25000132 ZZH RX MED GY IP 250 OP 250 PS 637: Mod: GY

## 2018-06-28 PROCEDURE — 40000275 ZZH STATISTIC RCP TIME EA 10 MIN

## 2018-06-28 PROCEDURE — 80048 BASIC METABOLIC PNL TOTAL CA: CPT | Performed by: INTERNAL MEDICINE

## 2018-06-28 PROCEDURE — 99239 HOSP IP/OBS DSCHRG MGMT >30: CPT | Performed by: INTERNAL MEDICINE

## 2018-06-28 PROCEDURE — 40000133 ZZH STATISTIC OT WARD VISIT

## 2018-06-28 PROCEDURE — A9270 NON-COVERED ITEM OR SERVICE: HCPCS | Mod: GY

## 2018-06-28 PROCEDURE — 94640 AIRWAY INHALATION TREATMENT: CPT

## 2018-06-28 PROCEDURE — 36415 COLL VENOUS BLD VENIPUNCTURE: CPT | Performed by: INTERNAL MEDICINE

## 2018-06-28 PROCEDURE — 94640 AIRWAY INHALATION TREATMENT: CPT | Mod: 76

## 2018-06-28 RX ORDER — ISOSORBIDE MONONITRATE 30 MG/1
60 TABLET, EXTENDED RELEASE ORAL DAILY
Qty: 90 TABLET | Refills: 2 | Status: SHIPPED | OUTPATIENT
Start: 2018-06-28 | End: 2018-10-15

## 2018-06-28 RX ORDER — SPIRONOLACTONE 25 MG/1
50 TABLET ORAL DAILY
Status: DISCONTINUED | OUTPATIENT
Start: 2018-06-28 | End: 2018-06-28 | Stop reason: HOSPADM

## 2018-06-28 RX ORDER — SPIRONOLACTONE 50 MG/1
50 TABLET, FILM COATED ORAL DAILY
Qty: 30 TABLET | Refills: 0 | Status: SHIPPED | OUTPATIENT
Start: 2018-06-29 | End: 2018-07-10

## 2018-06-28 RX ADMIN — GABAPENTIN 100 MG: 100 CAPSULE ORAL at 08:22

## 2018-06-28 RX ADMIN — ALBUTEROL SULFATE 2.5 MG: 2.5 SOLUTION RESPIRATORY (INHALATION) at 15:21

## 2018-06-28 RX ADMIN — FERROUS SULFATE TAB 325 MG (65 MG ELEMENTAL FE) 325 MG: 325 (65 FE) TAB at 08:27

## 2018-06-28 RX ADMIN — SPIRONOLACTONE 50 MG: 25 TABLET, FILM COATED ORAL at 08:23

## 2018-06-28 RX ADMIN — FUROSEMIDE 40 MG: 40 TABLET ORAL at 08:23

## 2018-06-28 RX ADMIN — ISOSORBIDE MONONITRATE 60 MG: 30 TABLET, EXTENDED RELEASE ORAL at 08:25

## 2018-06-28 RX ADMIN — ALBUTEROL SULFATE 2.5 MG: 2.5 SOLUTION RESPIRATORY (INHALATION) at 11:10

## 2018-06-28 RX ADMIN — OMEPRAZOLE 20 MG: 20 CAPSULE, DELAYED RELEASE ORAL at 08:25

## 2018-06-28 RX ADMIN — HYDRALAZINE HYDROCHLORIDE 75 MG: 25 TABLET ORAL at 08:22

## 2018-06-28 RX ADMIN — ALBUTEROL SULFATE 2.5 MG: 2.5 SOLUTION RESPIRATORY (INHALATION) at 07:22

## 2018-06-28 RX ADMIN — VENLAFAXINE HYDROCHLORIDE 75 MG: 75 CAPSULE, EXTENDED RELEASE ORAL at 08:21

## 2018-06-28 ASSESSMENT — ACTIVITIES OF DAILY LIVING (ADL)
ADLS_ACUITY_SCORE: 15
ADLS_ACUITY_SCORE: 16

## 2018-06-28 NOTE — DISCHARGE SUMMARY
[facility]  Discharge Summary  Name: Keisha Godinez    MRN: [id number]  YOB: 1932    Age: 86 year old  Date of Discharge:  6/28/2018  Date of Admission: 6/18/2018  Primary Care Provider: Gerri Eubanks  Discharge Physician:  Aguilar Parsons MD  Discharging Service:  Hospitalist      Hospital Course/Discharge Diagnoses:  Keisha Godinez is an 86 year old female with history of chronic diastolic CHF, RV systolic CHF, pulmonary hypertension, lymphedema, chronic left foot wound, A. 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  She was recently admitted here from 5/21/18 to 5/2/18 with CHF.  She was diuresed and discharge to TCU. She presented to the ED on 6/18 for evaluation of shortness of breath and lower extremity swelling.  She reported weight gain in spite of use of oral Lasix and ACE wraps of lower extremities.  ED work up showed hypoxia with oxygen saturations in the mid 80%s (improved on 2 L nasal cannula), tachypnea, and audible wheezing.  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  BNP was elevated to 8389.  Troponin was not elevated.  Renal function was normal.  D-dimer was elevated at 1.5, but no further imaging was done as CHF exacerbation was thought to be the most likely etiology of her symptoms.  She was admitted to telemetry unit as an inpatient for IV diuresis.     Hospital course was complicated by progression of chronic anemia for which patient was transfused one unit of RBC's on 6/19/18.  At this point she is being transitioned back to oral diuretics with 40 mg Lasix twice daily and also had the addition of spironolactone 50 mg a day here in the hospital and up titration of her Imdur to 60 mg a day.  She will need to follow-up with the core clinic regarding her congestive heart failure for ongoing close monitoring.  She lives at home with her  daughter and is slightly below baseline in terms of conditioning.  She will discharge home on 1 L of supplemental oxygen and I have ordered home services including PT, OT and nursing to help her be successful there.    She has been diuresed approximately 12 pounds negative here in the hospital and the weight gain noted between yesterday and today of just over a pound is likely due to the addition of lymphedema wraps placed yesterday.  Weight today is 155 pounds, was 167 on the day of admit.      Problem List:   1. Acute on chronic diastolic congestive heart failure exacerbation.  Continues to slowly improve.   --oral furosemide 40 mg twice a day.    --Continue spironolactone which was started this admission, increased from 25 mg a day to 50 mg a day.  Given initiation of spironolactone will hold her potassium supplement and have her recheck kidney function and electrolytes in clinic a few days. .  --Continued other home medications including Imdur/hydralazine.    --Also increased Imdur from 30 mg to 60 mg daily given some hypertension.  --not on a BB, appears due to AV conduction disease.  Not on ace-I due to tongue swelling.  --lymphedema wraps placed yesterday which I believe accounts for the approximately 1 pound increase in weight noted.     2. Acute hypoxic respiratory failure.  Likely due to CHF exacerbation.  Continue supplemental oxygen as needed.  Weaning.  Continue incentive spirometry.  Previously was not on home oxygen but so far has not been able to wean off of 1 L/min. would recheck this a close follow-up in clinic to see if she can be weaned off completely.     3. Hypertension.  Continue hydralazine, isosorbide mononitrate, furosemide, and spironolactone.  Spironolactone is a new medication this admission and her Imdur has been increased from 30 mg to 60 mg.     4. GERD.  Continue omeprazole.     5. Hyperlipidemia.  Continue simvastatin.     6. Depression.  Continue venlafaxine.     7. Hypokalemia.   Hold daily potassium supplement given the initiation of spironolactone.  Would recheck in clinic.  It appears she was only on 20 mEq daily.     8. Weakness and deconditioning.  Physical and Occupational Therapy consults here at referral for home services made.     9. Acute on chronic Anemia.  Did require 1 unit of packed red blood cells on 6/19/18.  Hemoglobin has been stable since that time.       10. Chronic left foot wound.  Follow-up with podiatry as outpatient.     11. Atrial fibrillation.  Not on anticoagulation due to previous GI bleed.    # Discharge Pain Plan:   No pain complaints at this time.        Discharge Disposition:  Discharged to home     Allergies:  Allergies   Allergen Reactions     Blood Transfusion Related (Informational Only) Other (See Comments)     Patient has a history of a clinically significant antibody against RBC antigens.  A delay in compatible RBCs may occur.     Lisinopril Other (See Comments)     Tongue swelling     Calcium Nausea and Vomiting     Egg Yolk      Flu Virus Vaccine      Allergic to eggs.     Keflex [Cephalexin] Nausea     Pt states she had upset stomach.  NOT tongue swelling.  She had tongue swelling with a combo bp med that she thinks included lisinopril.    Tolerates IV Cefazolin     Levaquin [Levofloxacin]      Sulfa Drugs      Zinc Nausea and Vomiting        Discharge Medications:        Review of your medicines      START taking       Dose / Directions    spironolactone 50 MG tablet   Commonly known as:  ALDACTONE   Used for:  Acute on chronic diastolic congestive heart failure (H)        Dose:  50 mg   Start taking on:  6/29/2018   Take 1 tablet (50 mg) by mouth daily   Quantity:  30 tablet   Refills:  0         CONTINUE these medicines which may have CHANGED, or have new prescriptions. If we are uncertain of the size of tablets/capsules you have at home, strength may be listed as something that might have changed.       Dose / Directions    isosorbide  mononitrate 30 MG 24 hr tablet   Commonly known as:  IMDUR   This may have changed:  how much to take   Used for:  Essential hypertension with goal blood pressure less than 140/90, Coronary artery disease involving native heart with angina pectoris, unspecified vessel or lesion type (H)        Dose:  60 mg   Take 2 tablets (60 mg) by mouth daily   Quantity:  90 tablet   Refills:  2         CONTINUE these medicines which have NOT CHANGED       Dose / Directions    albuterol 1.25 MG/3ML nebulizer solution   Commonly known as:  ACCUNEB        Dose:  1 vial   Take 1 vial by nebulization 3 times daily   Refills:  0       Cranberry Extract 250 MG Tabs        Dose:  250 mg   Take 250 mg by mouth daily   Refills:  0       ferrous sulfate 325 (65 Fe) MG tablet   Commonly known as:  IRON   Used for:  Iron deficiency        Dose:  325 mg   Take 1 tablet (325 mg) by mouth 2 times daily   Quantity:  100 tablet   Refills:  11       furosemide 40 MG tablet   Commonly known as:  LASIX   Used for:  Essential hypertension with goal blood pressure less than 140/90        Dose:  40 mg   Take 1 tablet (40 mg) by mouth 2 times daily   Quantity:  180 tablet   Refills:  2       gabapentin 100 MG capsule   Commonly known as:  NEURONTIN   Used for:  Midline thoracic back pain, unspecified chronicity        TAKE 1 CAPSULE(100 MG) BY MOUTH THREE TIMES DAILY   Quantity:  270 capsule   Refills:  0       hydrALAZINE 25 MG tablet   Commonly known as:  APRESOLINE   Used for:  Hyperlipidemia, unspecified hyperlipidemia type, Cardiomyopathy, unspecified type (H)        Dose:  75 mg   Take 3 tablets (75 mg) by mouth 3 times daily   Quantity:  810 tablet   Refills:  3       OMEGA-3 FATTY ACIDS PO        Dose:  1200 mg   Take 1,200 mg by mouth daily   Refills:  0       omeprazole 20 MG CR capsule   Commonly known as:  priLOSEC   Used for:  Gastroesophageal reflux disease without esophagitis        Dose:  20 mg   Take 1 capsule (20 mg) by mouth daily  "  Quantity:  90 capsule   Refills:  2       senna-docusate 8.6-50 MG per tablet   Commonly known as:  SENOKOT-S;PERICOLACE   Used for:  S/P foot surgery, right        Take 2 tablets daily as needed for constipation while taking prescription pain medications.   Quantity:  30 tablet   Refills:  0       simvastatin 10 MG tablet   Commonly known as:  ZOCOR   Used for:  Hyperlipidemia, unspecified hyperlipidemia type        Dose:  10 mg   Take 1 tablet (10 mg) by mouth At Bedtime   Quantity:  90 tablet   Refills:  1       TYLENOL 325 MG tablet   Generic drug:  acetaminophen        Dose:  650 mg   Take 650 mg by mouth 3 times daily as needed for mild pain   Refills:  0       venlafaxine 75 MG 24 hr capsule   Commonly known as:  EFFEXOR-XR   Used for:  Major depressive disorder, recurrent episode, in partial remission (H)        TAKE ONE CAPSULE BY MOUTH DAILY   Quantity:  90 capsule   Refills:  0       VITAMIN D (CHOLECALCIFEROL) PO        Dose:  1000 Units   Take 1,000 Units by mouth daily   Refills:  0         STOP taking          potassium chloride SA 20 MEQ CR tablet   Commonly known as:  KLOR-CON                Where to get your medicines      These medications were sent to [facility] [place] [address]     Phone:  [phone]      isosorbide mononitrate 30 MG 24 hr tablet     spironolactone 50 MG tablet             Condition on Discharge:  Discharge condition: Stable       Code status on discharge: Full Code     History of Illness:  See detailed admission note for full details.    Physical Exam:  Vital signs:  Temp: 97.5  F (36.4  C) Temp src: Oral BP: 135/54 Pulse: 59 Heart Rate: 55 Resp: 20 SpO2: 91 % O2 Device: None (Room air) Oxygen Delivery: 1 LPM Height: 160 cm (5' 3\") Weight: 71.2 kg (157 lb)  Estimated body mass index is 27.81 kg/(m^2) as calculated from the following:    Height as of this encounter: 1.6 m (5' 3\").    " Weight as of this encounter: 71.2 kg (157 lb).    Wt Readings from Last 1 Encounters:   06/28/18 71.2 kg (157 lb)     General: Alert, awake, no acute distress.  Obese.  HEENT: NC/AT, eyes anicteric, external occular movements intact, face symmetric.  Dentition WNL, MM moist.  Cardiac: RRR, S1, S2.  No murmurs appreciated.  Pulmonary: Normal chest rise, normal work of breathing.  Lungs CTA BL  Abdomen: soft, non-tender, non-distended.  Bowel Sounds Present.  No guarding.  Extremities: no deformities.  Warm, well perfused.  Skin: Has lymphedema wraps in place today.  No rashes or lesions noted.  Warm and Dry.  Neuro: No focal deficits noted.  Speech clear.  Coordination and strength grossly normal.  Psych: Appropriate affect.    Procedures other than Imaging:  None     Imaging:  Results for orders placed or performed during the hospital encounter of 06/18/18   Chest  XR, 1 view PORTABLE    Narrative    XR CHEST PORT 1 VW 6/18/2018 5:26 PM    HISTORY: Anemia.    COMPARISON: June 18, 2018.      Impression    IMPRESSION: Mild diffuse interstitial prominence with blunting of the  bilateral costophrenic angles, suggestive of venous congestion with  small bilateral effusions. No pneumothorax appreciated. Stable  cardiomegaly.    MCKINLEY SUNG MD   US Lower Extremity Venous Duplex Bilateral    Narrative    US LOWER EXTREMITY VENOUS DUPLEX BILATERAL 6/18/2018 8:56 PM     HISTORY: Rule out DVT.      TECHNIQUE: Venous Doppler US of bilateral lower extremities.? Color  flow and spectral Doppler with waveform analysis performed.    COMPARISON: None.    FINDINGS: Ultrasound of both legs demonstrates no deep vein thrombus  from the common femoral through popliteal veins or in the visualized  segments of posterior tibial or peroneal veins in the calves.  Subcutaneous edema in both lower extremities.      Impression    IMPRESSION: No DVT identified in either leg.    ANGELI BEAUCHAMP MD   CT Chest w Contrast    Narrative    CT CHEST  WITH CONTRAST 6/22/2018 3:49 PM     HISTORY: Hypoxia, dyspnea, elevated D-dimer.      COMPARISON: CT Chest 10/27/2017.    TECHNIQUE: Thin section axial images are performed from the thoracic  inlet to the lung bases utilizing 64 mL of Isovue 370 IV contrast  without adverse event. Coronal reformatted images are also generated.  Radiation dose for this scan was reduced using automated exposure  control, adjustment of the mA and/or kV according to patient size, or  iterative reconstruction technique.    FINDINGS:     Chest: A few calcified granulomas are noted in each lung. Scattered  areas of interlobular septal thickening are noted in the mid and lower  lungs along with a small right and trace left pleural effusion. Heart  is enlarged. Findings suggest early changes of congestive heart  failure. Hiatal hernia is present. Esophagus is otherwise  unremarkable. Thyroid gland is normal in size. No enlarged axillary or  intrathoracic lymph nodes. A few small mediastinal lymph nodes are  likely reactive in nature. There is no evidence of pulmonary embolism.  Thoracic aorta demonstrates scattered calcified plaque without  aneurysm. Limited images upper abdomen demonstrate a probable complex  cyst upper pole left kidney of doubtful clinical significance. This  area was not included on prior chest CTs. Degenerative spine changes  are noted. Old impacted proximal right humeral fracture is again  noted.      Impression    IMPRESSION: No evidence of pulmonary embolism. Probable early changes  of congestive heart failure with cardiomegaly, pleural effusions and  mild interlobular septal thickening.    RJ CHAPPELL MD        Consultations:  Core clinic.       Recent Lab Results:    Recent Labs  Lab 06/26/18  0809 06/23/18  0632   WBC 5.2 4.6   HGB 8.3* 7.5*   HCT 28.8* 26.5*   MCV 89 90   PLT 96* 106*          Lab Results   Component Value Date     06/28/2018     06/26/2018     06/25/2018    Lab Results    Component Value Date    CHLORIDE 100 06/28/2018    CHLORIDE 100 06/26/2018    CHLORIDE 100 06/25/2018    Lab Results   Component Value Date    BUN 19 06/28/2018    BUN 18 06/26/2018    BUN 18 06/25/2018      Lab Results   Component Value Date    POTASSIUM 3.9 06/28/2018    POTASSIUM 3.7 06/27/2018    POTASSIUM 3.6 06/26/2018    Lab Results   Component Value Date    CO2 33 06/28/2018    CO2 33 06/26/2018    CO2 32 06/25/2018    Lab Results   Component Value Date    CR 0.84 06/28/2018    CR 0.88 06/26/2018    CR 0.79 06/25/2018             Pending Results:    Unresulted Labs Ordered in the Past 30 Days of this Admission     No orders found from 4/19/2018 to 6/19/2018.           Discharge Instructions and Follow-Up:     Discharge Procedure Orders  Home care nursing referral   Referral Type: Home Health Therapies & Aides     Home Care PT Referral for Hospital Discharge   Referral Type: Home Health Therapies & Aides     Home Care OT Referral for Hospital Discharge     Reason for your hospital stay   Order Comments: You were admitted for acute on chronic heart failure.  Your treated with IV diuretics and we have made some adjustments to her home heart medication regimen.  Specifically we have doubled the dose of your Imdur now to 60 mg daily and have also added a second diuretic called spironolactone.    Spironolactone can increase her potassium so we have stopped your potassium supplement at least until you can recheck your kidney function and electrolytes in clinic.    Finally, we did place lymphedema wraps and he should continue to get lymphedema cares at home with home services.     Follow-up and recommended labs and tests    Order Comments: Follow up with the core cardiology clinic in the next 3 or 4 days to recheck blood pressure, oxygen level and kidney function and electrolytes as well as your weight.  It is possible he will be able to wean off of the supplemental oxygen completely as you are on a very low level  now.     Activity   Order Comments: Your activity upon discharge: Gradually resume light activity as tolerated   Order Specific Question Answer Comments   Is discharge order? Yes      Monitor and record   Order Comments: weight every day.  Please call your cardiology clinic if your weight increases by more than 4 pounds total or 2 pounds in a day.     MD face to face encounter   Order Comments: Documentation of Face to Face and Certification for Home Health Services    I certify that patient: Keisha Godinez is under my care and that I, or a nurse practitioner or physician's assistant working with me, had a face-to-face encounter that meets the physician face-to-face encounter requirements with this patient on: 6/28/2018.    This encounter with the patient was in whole, or in part, for the following medical condition, which is the primary reason for home health care: Physical deconditioning and acute on chronic congestive heart failure.    I certify that, based on my findings, the following services are medically necessary home health services: Nursing, Occupational Therapy and Physical Therapy.    My clinical findings support the need for the above services because: Nurse is needed: To assess cardiopulmonary status, weight and blood pressure after changes in medications or other medical regimen.., Occupational Therapy Services are needed to assess and treat cognitive ability and address ADL safety due to impairment in mobility/strength and coordination with fall risk as well as need for ongoing lymphedema cares. and Physical Therapy Services are needed to assess and treat the following functional impairments: Mobility with issues with balance and coordination as well as need for ongoing lymphedema cares.    Further, I certify that my clinical findings support that this patient is homebound (i.e. absences from home require considerable and taxing effort and are for medical reasons or [employer] services or  infrequently or of short duration when for other reasons) because: Leaving home is medically contraindicated for the following reason(s): Dyspnea on exertion that makes it so they cannot leave their home for needed services without clinical deterioration...    Based on the above findings. I certify that this patient is confined to the home and needs intermittent skilled nursing care, physical therapy and/or speech therapy.  The patient is under my care, and I have initiated the establishment of the plan of care.  This patient will be followed by a physician who will periodically review the plan of care.  Physician/Provider to provide follow up care: Gerri Eubanks    Attending [facility] physician (the Medicare certified [facility] provider): Rahul Parsons  Physician Signature: See electronic signature associated with these discharge orders.  Date: 6/28/2018     Full Code     Oxygen Adult   Order Comments: New Home Oxygen Order 1 liter(s) by nasal cannula continuously with use of portable tank. Expected treatment length is indefinite (99 months).. Test on conserving device as applicable.    Patients who qualify for home O2 coverage under the CMS guidelines require ABG tests or O2 sat readings obtained closest to, but no earlier than 2 days prior to the discharge, as evidence of the need for home oxygen therapy. Testing must be performed while patient is in the chronic stable state. See notes for O2 sats.    I certify that this patient, Keisha Godinez has been under my care and that I, or a nurse practitioner or physician's assistant working with me, had a face-to-face encounter that meets the face-to-face encounter requirements with this patient on 6/28/2018. The patient, Keisha Godinez was evaluated or treated in whole, or in part, for the following medical condition, which necessitates the use of the ordered oxygen. Treatment Diagnosis: Congestive heart failure    Attending Provider: Rahul MINAYA  Issa  Physician signature: See electronic signature associated with these discharge orders  Date of Order: June 28, 2018     Diet   Order Comments: Follow this diet upon discharge: Orders Placed This Encounter     2 Gram Sodium Diet   Order Specific Question Answer Comments   Is discharge order? Yes          Total time spent in face to face contact with the patient and coordinating discharge was:  60 Minutes.

## 2018-06-28 NOTE — PROGRESS NOTES
Patient discharged home with daughter on 1lpm nasal cannula, has [facility] and now [facility], [facility] instructed daughter on home use, medications and follow-up care reviewed with patient and daughter, Gave sodium controlled diet pamphlet to daughter. Both patient and daughter verbalized understanding of discharge instructions.

## 2018-06-28 NOTE — PROGRESS NOTES
Call placed@ 1300  to  home oxygen @ [phone] . All Info given re pt's need for home 02 at 1L. Per [place]. Pt lives with daughter Rebecca [place]   [employer] & daughter will be transport.  home 02 plans to call daughter to arrange set-up, etc. Cristin Cheek

## 2018-06-28 NOTE — PLAN OF CARE
Problem: Cardiac: Heart Failure (Adult)  Goal: Signs and Symptoms of Listed Potential Problems Will be Absent, Minimized or Managed (Cardiac: Heart Failure)  Signs and symptoms of listed potential problems will be absent, minimized or managed by discharge/transition of care (reference Cardiac: Heart Failure (Adult) CPG).   Outcome: Improving    VS: Temp: 97.4  F (36.3  C) Temp src: Oral BP: 169/69 Pulse: 89 Heart Rate: 62 Resp: 20 SpO2: 96 % O2 Device: Nasal cannula Oxygen Delivery: 1/2 LPM; scheduled BP meds given  Cardio: Afib (40-80)   Neuro: A&O x4, forgetful, CMS intact ex BLE numbness/tingling (baseline)   Resp: Dyspneic on exertion, 0.5 L via NC (O2 stats greater than 90%), ambulated in hallway on room air O2 (88-92%), expiratory wheezes, crackles in bilateral lower bases   GI/: Voiding w/o difficulty, dribbling; BS +, LBM 6/27/18, passing flatus   Skin: BLE lymphedema wraps, carey; L foot scabbed, CDI; coccyx red blanchable, barrier cream applied  Activity: A1 w/ gait belt and walker, ambulated in hallway x1   Diet: 2 gram Na, no reports of n/v   IVs/lines: SL   Misc: Lymphedema consult/WOC  Plan: DC in 1-2 days to home with home care, continue plan of care

## 2018-06-28 NOTE — PLAN OF CARE
Problem: Patient Care Overview  Goal: Plan of Care/Patient Progress Review  Discharge Planner OT   Patient plan for discharge: Home   Current status: OT/Lymph:    Patient reported decreased pain and reported that she tolerated wraps. Observed dryness on Bilateral lower extremities and scaly on posterior of bilateral calves. Pt has a wound (size of approximately a dime) on LLE foot, (on lateral side of first metatarsal).   RN aware and pt is following wound care, RN placed bandages over this area prior to therapist performing wrapping.  Skin care completed including washing and lotion. Completed quick wrap on bilateral lower extremities. Donned on patient, medium TG Soft tube bandage layer from base of toes to just below the knee, then an 8 cm SS bandage from base of toes to ankle, and a 10 cm SS bandage from ankle to just below the knee. Bandages applied with appropriate stretch and spacing to allow gradient compression. Wraps applied bilaterally and patient reported that wraps are tolerable and comfortable. Coordinated with patient and daughter regarding wearing schedule and when to take wraps off. Instructed patient and daughter to remove wraps but keep LEs elevated if pt does not tolerate wraps well, pt and daughter reported understanding.  Pt and daughter reported that home care will follow up tomorrow with wraps. Educated on when to remove wraps if symptoms appear (including: cold hands or feet, blue toes or fingers, numbness or tingling, shortness of breath, skin irritation or inability to tolerate wraps, or if they become soiled or wet), pt and daughter reported understanding. Pt and daughter reported understanding of wear time of 23 out of 24 hours unless discomfort or unable to tolerate wraps.      Barriers to return to prior living situation: Current level of assist     Recommendations for discharge: Home with home health RN home health aide, home OT  A from daughter for ADL's/IADL's and may benefit from  home or OP lymphedema management    Rationale for recommendations: Pt would benefit from continued lymphedema care    Occupational Therapy Discharge Summary    Reason for therapy discharge:    Discharged to home with home therapy.    Progress towards therapy goal(s). See goals on Care Plan in [facility] electronic health record for goal details.  Goals partially met.  Barriers to achieving goals:   discharge from facility.    Therapy recommendation(s):     Home with home health RN home health aide, home OT,  A from daughter for ADL's/IADL's and may benefit from home or OP lymphedema management             Entered by: Blank Deal 06/28/2018 4:23 PM

## 2018-06-28 NOTE — PROGRESS NOTES
Received intake call for home oxygen at 1:01PM. Reviewed patient's chart; Patient qualifies under Medicare guidelines and all documentation is in the chart including a good order.   1:25PM- Called to offer choice and patient is okay with [facility] setting them up. Discussed equipment with patient and informed them that we would be to bedside with oxygen in the next 2 hours.   1:30PM- Spoke with patients nurse, confirmed we received the order, and provided them with ETA of oxygen.

## 2018-06-28 NOTE — PROGRESS NOTES
Hand-off for Care Transitions to Next Level of Care Provider  Name: Keisha Godinez  : 1932  MRN #: [id number]  Reason for Hospitalization:  Acute on chronic diastolic congestive heart failure (H) [I50.33]  Admit Date/Time: 2018  4:51 PM  Discharge Date: 2018    Reason for Communication Hand-off Referral: Admission diagnoses: CHF    Discharge Plan:  Discharged to: Home with homecare                   Patient agreeable to post-hospital support suggestions:  Yes    Patient is on new medications:   Yes    MTM follow up recommended: No    Tel-Assurance program:  Already enrolled in a TA program    Patient will receive  HOME CARE  at:  Discharge through [facility] for OT, PT & RN.     Follow-up specialty is recommended: Yes. Follow up with [facility] for enrollment.     Follow-up plan:  Future Appointments  Date Time Provider Department Center   2018 3:00 PM Blank Deal OT [facility]     Any outstanding tests or procedures:  No. Recommend check BP, oxygen level, kidney function, weight and electrolytes in the next 3-4 days.      Procedures     Future Labs/Procedures    Oxygen Adult     Comments:    New Home Oxygen Order 1 liter(s) by nasal cannula continuously with use of portable tank. Expected treatment length is indefinite (99 months).. Test on conserving device as applicable.    Patients who qualify for home O2 coverage under the CMS guidelines require ABG tests or O2 sat readings obtained closest to, but no earlier than 2 days prior to the discharge, as evidence of the need for home oxygen therapy. Testing must be performed while patient is in the chronic stable state. See notes for O2 sats.    I certify that this patient, Keisha Godinez has been under my care and that I, or a nurse practitioner or physician's assistant working with me, had a face-to-face encounter that meets the face-to-face encounter requirements with this patient on 2018. The patient, Keisha PENA  Gretchen was evaluated or treated in whole, or in part, for the following medical condition, which necessitates the use of the ordered oxygen. Treatment Diagnosis: Congestive heart failure    Attending Provider: Rahul Parsons  Physician signature: See electronic signature associated with these discharge orders  Date of Order: June 28, 2018          Referrals     Future Labs/Procedures    Home care nursing referral     Comments:    RN skilled nursing visit. RN to assess vital signs and weight, respiratory and cardiac status, patients ability to take and record daily blood pressure, temp and weight, pain level and activity tolerance, hydration, nutrition and bowel status and home safety.    Your provider has ordered home care nursing services. If you have not been contacted within 2 days of your discharge please call the inpatient department phone number at [phone] .    Home Care OT Referral for Hospital Discharge     Comments:    OT to eval and treat    Your provider has ordered home care - occupational therapy. If you have not been contacted within 2 days of your discharge please call the department phone number listed on the top of this document.    Home Care PT Referral for Hospital Discharge     Comments:    PT to eval and treat    Your provider has ordered home care - physical therapy. If you have not been contacted within 2 days of your discharge please call the department phone number listed on the top of this document.          Key Recommendations:  Patient stated she observes a low sodium diet and weighs herself daily. Her scale is not accurate so CM provided her with a new scale with large readout and low profile. She was scheduled to enroll with the [facility] on 6/27/18. This will need to be rescheduled.     Communicated handoff via EPIC Comm Mgt to Dr. Gerri Eubanks's CC at [facility].      Ashely Vega RN, BSN, CTS  [facility]  [phone]

## 2018-06-28 NOTE — PLAN OF CARE
Problem: Patient Care Overview  Goal: Plan of Care/Patient Progress Review  Outcome: No Change  VSS. Afib with CVR on tele. Pt lower extremity edema increased this HS. Bilateral feet 3+ edema. Pt weight also up this am. Pt has now has bilateral le lymphodema wraps. Pt is currently on 0.5 liters o2 via nasal cannula. Attempts to wean to room air unsuccessful. Pt Spo2 87-90% Lymphodema, PT/OT,WOC following. Up with 1 assist and walker to br. Plan to d/c 1-2 days if she can be successfully weaned off O2.

## 2018-06-28 NOTE — PROGRESS NOTES
Patient has been assessed for Home Oxygen needs. Oxygen readings:    *Pulse oximetry (SpO2) = 89% on room air at rest while awake.    *SpO2 improved to 92% on 1liters/minute at rest.    *SpO2 = 86% on room air during activity/with exercise.    *SpO2 improved to 91% on 1liters/minute during activity/with exercise.

## 2018-06-29 ENCOUNTER — TELEPHONE (OUTPATIENT)
Dept: CARDIOLOGY | Facility: CLINIC | Age: 83
End: 2018-06-29

## 2018-06-29 ENCOUNTER — TELEPHONE (OUTPATIENT)
Dept: INTERNAL MEDICINE | Facility: CLINIC | Age: 83
End: 2018-06-29

## 2018-06-29 ENCOUNTER — PATIENT OUTREACH (OUTPATIENT)
Dept: CARE COORDINATION | Facility: CLINIC | Age: 83
End: 2018-06-29

## 2018-06-29 NOTE — TELEPHONE ENCOUNTER
Joana RN with [facility] calls ([phone]). Patient discharged home from hospital with home care referral. She completed home care eval, requesting additional orders.    Skilled nursinw1, 3w1, 2w2  PT: eval and treat  OT Lymphedema: eval and treat    Orders placed in hospital, provider approval needed.  This request is forwarded to Dr Henderson who covers Dr Eubanks and Dr Reed patients with the letters M,N

## 2018-06-29 NOTE — TELEPHONE ENCOUNTER
Pt admitted for shortness of breaths and BLE edema. CHF exacerbation and anemia. Called patient to discuss any post hospital d/c questions she may have, review medication changes, and confirm f/u appts. Patient denied any questions regarding new or changes to PTA medications. Patient denied any SOB, chest pain, edema, or light headedness. States that her daughter lives with her and is going to call and  schedule f/u OV with [facility] and [facility] today. Phone numbers provided.  Instructed patient to take daily weights and call clinic for a weight gain of 2 lbs overnight or 5 lbs in a week. States is using 1 L 02 via N/C as ordered and is having home care RN/PT/OT. Patient advised to call clinic with any cardiac related questions or concerns prior to his appt. Patient verbalized understanding and agreed with plan. DENIZ Morales RN.

## 2018-06-29 NOTE — PROGRESS NOTES
Clinic Care Coordination Contact  Care Coordination Communication    Clinical Data: Patient was hospitalized at [facility] from 6/18/2018 to 6/28/2018 with diagnosis of Acute on Chronic Diastolic Congestive Heart Failure Exacerbation.     Home Care Contact:              Home Care Agency: [facility]               Contact name () and phone number: Elle Newberry -[phone]                 Care Coordination contacted home care: Yes              Anticipated start of care date: Resumption of care on 6/29/18 or 6/30/18.     Patient Contact:               Home care has contacted patient: Yes              Patient questions/concerns: No     Plan: RN Care Coordinator will await notification from home care staff informing RN Care Coordinator of patients discharge plans/needs. RN  Care Coordinator will review chart and outreach to home care every 4 weeks and as needed.     Valentina Rosenthal RN  Clinic Care Coordinator  [phone]  [email or web address]

## 2018-07-06 ENCOUNTER — TELEPHONE (OUTPATIENT)
Dept: INTERNAL MEDICINE | Facility: CLINIC | Age: 83
End: 2018-07-06

## 2018-07-06 NOTE — TELEPHONE ENCOUNTER
Dora, Lymphedema OT calls. Requesting additional orders:   OT Lymphedema - 2w2 (pt then due for re-certification)  Traditional OT - eval and treat for wheelchair recommendations and home equipment needs.     Verbal authorization given for home care orders per [facility] protocol.

## 2018-07-07 DIAGNOSIS — F33.41 MAJOR DEPRESSIVE DISORDER, RECURRENT EPISODE, IN PARTIAL REMISSION (H): ICD-10-CM

## 2018-07-09 RX ORDER — VENLAFAXINE HYDROCHLORIDE 75 MG/1
CAPSULE, EXTENDED RELEASE ORAL
Qty: 90 CAPSULE | Refills: 0 | Status: SHIPPED | OUTPATIENT
Start: 2018-07-09 | End: 2018-10-05

## 2018-07-09 NOTE — TELEPHONE ENCOUNTER
"Requested Prescriptions   Pending Prescriptions Disp Refills     venlafaxine (EFFEXOR-XR) 75 MG 24 hr capsule [Pharmacy Med Name: VENLAFAXINE ER 75MG CAPSULES] 90 capsule 0    Last Written Prescription Date:  04/09/2018  Last Fill Quantity: 90,  # refills: 0   Last office visit: 6/18/2018 with prescribing provider:     Future Office Visit:   Next 5 appointments (look out 90 days)     Jul 10, 2018  3:00 PM CDT   [facility] Hammonds NP   [facility]    [address]  [facility] [address] [phone]                Sig: TAKE ONE CAPSULE BY MOUTH DAILY    Serotonin-Norepinephrine Reuptake Inhibitors  Passed    7/7/2018  3:46 AM       Passed - Blood pressure under 140/90 in past 12 months    BP Readings from Last 3 Encounters:   06/28/18 135/54   06/18/18 (!) 128/36   06/08/18 122/60                Passed - PHQ-9 score of less than 5 in past 6 months    Please review last PHQ-9 score.          Passed - Patient is age 18 or older       Passed - No active pregnancy on record       Passed - Normal serum creatinine on file in past 12 months    Recent Labs   Lab Test  06/28/18   0607   CR  0.84            Passed - No positive pregnancy test in past 12 months       Passed - Recent (6 mo) or future (30 days) visit within the authorizing provider's specialty    Patient had office visit in the last 6 months or has a visit in the next 30 days with authorizing provider or within the authorizing provider's specialty.  See \"Patient Info\" tab in inbasket, or \"Choose Columns\" in Meds & Orders section of the refill encounter.            "

## 2018-07-09 NOTE — TELEPHONE ENCOUNTER
"Requested Prescriptions   Pending Prescriptions Disp Refills     venlafaxine (EFFEXOR-XR) 75 MG 24 hr capsule [Pharmacy Med Name: VENLAFAXINE ER 75MG CAPSULES] 90 capsule 0    Last Written Prescription Date:  04/09/2018  Last Fill Quantity: 90,  # refills: 0   Last office visit: 6/18/2018 with prescribing provider:     Future Office Visit:   Next 5 appointments (look out 90 days)     Jul 10, 2018  3:00 PM CDT   SHORT with Mallika Hammonds NP   [facility]    [address]  [facility] [address] [phone]                Sig: TAKE ONE CAPSULE BY MOUTH DAILY    Serotonin-Norepinephrine Reuptake Inhibitors  Passed    7/9/2018  1:05 PM       Passed - Blood pressure under 140/90 in past 12 months    BP Readings from Last 3 Encounters:   06/28/18 135/54   06/18/18 (!) 128/36   06/08/18 122/60                Passed - PHQ-9 score of less than 5 in past 6 months    Please review last PHQ-9 score.          Passed - Patient is age 18 or older       Passed - No active pregnancy on record       Passed - Normal serum creatinine on file in past 12 months    Recent Labs   Lab Test  06/28/18   0607   CR  0.84            Passed - No positive pregnancy test in past 12 months       Passed - Recent (6 mo) or future (30 days) visit within the authorizing provider's specialty    Patient had office visit in the last 6 months or has a visit in the next 30 days with authorizing provider or within the authorizing provider's specialty.  See \"Patient Info\" tab in inbasket, or \"Choose Columns\" in Meds & Orders section of the refill encounter.            "

## 2018-07-10 ENCOUNTER — OFFICE VISIT (OUTPATIENT)
Dept: INTERNAL MEDICINE | Facility: CLINIC | Age: 83
End: 2018-07-10
Payer: COMMERCIAL

## 2018-07-10 ENCOUNTER — TELEPHONE (OUTPATIENT)
Dept: INTERNAL MEDICINE | Facility: CLINIC | Age: 83
End: 2018-07-10

## 2018-07-10 VITALS
BODY MASS INDEX: 26.57 KG/M2 | TEMPERATURE: 98.3 F | OXYGEN SATURATION: 94 % | SYSTOLIC BLOOD PRESSURE: 126 MMHG | WEIGHT: 150 LBS | HEART RATE: 56 BPM | RESPIRATION RATE: 12 BRPM | DIASTOLIC BLOOD PRESSURE: 74 MMHG

## 2018-07-10 DIAGNOSIS — I50.33 ACUTE ON CHRONIC DIASTOLIC CONGESTIVE HEART FAILURE (H): ICD-10-CM

## 2018-07-10 LAB
ERYTHROCYTE [DISTWIDTH] IN BLOOD BY AUTOMATED COUNT: 18.2 % (ref 10–15)
HCT VFR BLD AUTO: 27.6 % (ref 35–47)
HGB BLD-MCNC: 8 G/DL (ref 11.7–15.7)
MCH RBC QN AUTO: 26.4 PG (ref 26.5–33)
MCHC RBC AUTO-ENTMCNC: 29 G/DL (ref 31.5–36.5)
MCV RBC AUTO: 91 FL (ref 78–100)
PLATELET # BLD AUTO: 59 10E9/L (ref 150–450)
RBC # BLD AUTO: 3.03 10E12/L (ref 3.8–5.2)
WBC # BLD AUTO: 5.5 10E9/L (ref 4–11)

## 2018-07-10 PROCEDURE — 36415 COLL VENOUS BLD VENIPUNCTURE: CPT | Performed by: NURSE PRACTITIONER

## 2018-07-10 PROCEDURE — 80048 BASIC METABOLIC PNL TOTAL CA: CPT | Performed by: NURSE PRACTITIONER

## 2018-07-10 PROCEDURE — 85027 COMPLETE CBC AUTOMATED: CPT | Performed by: NURSE PRACTITIONER

## 2018-07-10 PROCEDURE — 99495 TRANSJ CARE MGMT MOD F2F 14D: CPT | Performed by: NURSE PRACTITIONER

## 2018-07-10 RX ORDER — SPIRONOLACTONE 50 MG/1
50 TABLET, FILM COATED ORAL DAILY
Qty: 30 TABLET | Refills: 3 | Status: SHIPPED | OUTPATIENT
Start: 2018-07-10 | End: 2018-11-26

## 2018-07-10 NOTE — NURSING NOTE
/74  Pulse 56  Temp 98.3  F (36.8  C) (Oral)  Resp 12  Wt 150 lb (68 kg)  SpO2 94%  BMI 26.57 kg/m2  Patient in for hospital follow up.  Rubia Owens CMA

## 2018-07-10 NOTE — MR AVS SNAPSHOT
After Visit Summary   7/10/2018    Keisha Godinez    MRN: [id number]           Patient Information     Date Of Birth          1/25/1932        Visit Information        Provider Department      7/10/2018 3:00 PM Mallika Hammonds NP [facility]        Today's Diagnoses     Acute on chronic diastolic congestive heart failure (H)           Follow-ups after your visit        Your next 10 appointments already scheduled     Jul 13, 2018 10:00 AM CDT   LAB with RU LAB   [facility] ([facility]    [address]  [facility] [address]   [phone]           Please do not eat 10-12 hours before your appointment if you are coming in fasting for labs on lipids, cholesterol, or glucose (sugar). This does not apply to pregnant women. Water, hot tea and black coffee (with nothing added) are okay. Do not drink other fluids, diet soda or chew gum.            Jul 13, 2018 10:50 AM CDT   CORE Enrollment with NATHAN Valentin   [facility] ([facility]    [address]  [place] [address]   [phone]              Who to contact     If you have questions or need follow up information about today's clinic visit or your schedule please contact [facility] directly at [phone].  Normal or non-critical lab and imaging results will be communicated to you by MyChart, letter or phone within 4 business days after the clinic has received the results. If you do not hear from us within 7 days, please contact the clinic through MyChart or phone. If you have a critical or abnormal lab result, we will notify you by phone as soon as possible.  Submit refill requests through [employer] or call your pharmacy and they will forward the refill request to us. Please allow 3 business days for your refill to be completed.          Additional Information About Your  Visit        Care EveryWhere ID     This is your Care EveryWhere ID. This could be used by other organizations to access your [place] medical records  [id number]        Your Vitals Were     Pulse Temperature Respirations Pulse Oximetry BMI (Body Mass Index)       56 98.3  F (36.8  C) (Oral) 12 94% 26.57 kg/m2        Blood Pressure from Last 3 Encounters:   07/10/18 126/74   06/28/18 135/54   06/18/18 (!) 128/36    Weight from Last 3 Encounters:   07/10/18 150 lb (68 kg)   06/28/18 157 lb (71.2 kg)   06/18/18 160 lb (72.6 kg)              We Performed the Following     Basic metabolic panel     CBC with platelets          Where to get your medicines      These medications were sent to [facility] [address] [address] [employer]  [address] [address] [facility] [address]     Phone:  [phone]     spironolactone 50 MG tablet          Primary Care Provider Office Phone # Fax #    Gerri Eubanks [phone]       [address]  [facility] [address]        Equal Access to Services     ROBERTO KLEIN : Hadii aad ku hadasho Soomaali, waaxda luqadaha, qaybta kaalmada adeegyada, sharlene sarmiento haypily vick. So [facility] [phone].    ATENCIÓN: Si habla español, tiene a rosas disposición servicios gratuitos de asistencia lingüística. Llame al [phone].    We comply with applicable federal civil rights laws and [place] laws. We do not discriminate on the basis of race, color, national origin, age, disability, sex, sexual orientation, or gender identity.            Thank you!     Thank you for choosing [facility]  for your care. Our goal is always to provide you with excellent care. Hearing back from our patients is one way we can continue to improve our services. Please take a few minutes to complete the written survey that you may receive in the mail after your visit with us. Thank you!             Your Updated  Medication List - Protect others around you: Learn how to safely use, store and throw away your medicines at www.disposemymeds.org.          This list is accurate as of 7/10/18  3:31 PM.  Always use your most recent med list.                   Brand Name Dispense Instructions for use Diagnosis    albuterol 1.25 MG/3ML nebulizer solution    ACCUNEB     Take 1 vial by nebulization 3 times daily        Cranberry Extract 250 MG Tabs      Take 250 mg by mouth daily        ferrous sulfate 325 (65 Fe) MG tablet    IRON    100 tablet    Take 1 tablet (325 mg) by mouth 2 times daily    Iron deficiency       furosemide 40 MG tablet    LASIX    180 tablet    Take 1 tablet (40 mg) by mouth 2 times daily    Essential hypertension with goal blood pressure less than 140/90       gabapentin 100 MG capsule    NEURONTIN    270 capsule    TAKE 1 CAPSULE(100 MG) BY MOUTH THREE TIMES DAILY    Midline thoracic back pain, unspecified chronicity       hydrALAZINE 25 MG tablet    APRESOLINE    810 tablet    Take 3 tablets (75 mg) by mouth 3 times daily    Hyperlipidemia, unspecified hyperlipidemia type, Cardiomyopathy, unspecified type (H)       isosorbide mononitrate 30 MG 24 hr tablet    IMDUR    90 tablet    Take 2 tablets (60 mg) by mouth daily    Essential hypertension with goal blood pressure less than 140/90, Coronary artery disease involving native heart with angina pectoris, unspecified vessel or lesion type (H)       OMEGA-3 FATTY ACIDS PO      Take 1,200 mg by mouth daily        omeprazole 20 MG CR capsule    priLOSEC    90 capsule    Take 1 capsule (20 mg) by mouth daily    Gastroesophageal reflux disease without esophagitis       senna-docusate 8.6-50 MG per tablet    SENOKOT-S;PERICOLACE    30 tablet    Take 2 tablets daily as needed for constipation while taking prescription pain medications.    S/P foot surgery, right       simvastatin 10 MG tablet    ZOCOR    90 tablet    Take 1 tablet (10 mg) by mouth At Bedtime     Hyperlipidemia, unspecified hyperlipidemia type       spironolactone 50 MG tablet    ALDACTONE    30 tablet    Take 1 tablet (50 mg) by mouth daily    Acute on chronic diastolic congestive heart failure (H)       TYLENOL 325 MG tablet   Generic drug:  acetaminophen      Take 650 mg by mouth 3 times daily as needed for mild pain        venlafaxine 75 MG 24 hr capsule    EFFEXOR-XR    90 capsule    TAKE ONE CAPSULE BY MOUTH DAILY    Major depressive disorder, recurrent episode, in partial remission (H)       VITAMIN D (CHOLECALCIFEROL) PO      Take 1,000 Units by mouth daily

## 2018-07-10 NOTE — TELEPHONE ENCOUNTER
Fax received from [facility] for review and signature.  Put in Dr. Gale's in basket.

## 2018-07-10 NOTE — PROGRESS NOTES
SUBJECTIVE:   Keisha Godinez is a 86 year old female who presents to clinic today for the following health issues:          Hospital Follow-up Visit:    Hospital/Nursing Home/IP Rehab Facility: [facility]  Date of Admission: 6/18/18  Date of Discharge: 6/28/18  Reason(s) for Admission: CHF, renal failure, chronic anemia            Problems taking medications regularly:  None       Medication changes since discharge: spironolactone       Problems adhering to non-medication therapy:  None    Summary of hospitalization:  [facility] discharge summary reviewed  Diagnostic Tests/Treatments reviewed.  Follow up needed: labs  Other Healthcare Providers Involved in Patient s Care:         Homecare and Specialist appointment - [facility] 7/13/18  Update since discharge: improved.     Post Discharge Medication Reconciliation: discharge medications reconciled, continue medications without change.  Plan of care communicated with patient and family     Coding guidelines for this visit:  Type of Medical   Decision Making Face-to-Face Visit       within 7 Days of discharge Face-to-Face Visit        within 14 days of discharge   Moderate Complexity [id number]   High Complexity [id number]              Patient Active Problem List   Diagnosis     Iron deficiency anemia     Neuropathy of both feet     Myocardial infarction-type 2     GIB (gastrointestinal bleeding)     Wound of left leg     Cardiomyopathy (H)     Pulmonary hypertension     Xerosis of skin: shins     Bilateral edema of lower extremity     Gastroesophageal reflux disease without esophagitis     Health Care Home     Chronic systolic congestive heart failure (H)     Major depressive disorder, recurrent episode, in partial remission (H)     Cellulitis of foot     Essential hypertension with goal blood pressure less than 140/90     Post-operative complication     Respiratory failure (H)     Acute respiratory failure with hypoxia (H)     C. difficile  colitis     History of hiatal hernia     History of shingles     Chronic foot ulcer (H) (felt secondary to bony protuberance status post excision 7/6-16)     Acute on chronic diastolic congestive heart failure (H)     Pneumonia     Sepsis (H)     Chronic atrial fibrillation (H)     Anemia     Dysphagia     Midline thoracic back pain     Cardiomyopathy, unspecified type (H)     Fracture of humerus, proximal, right, closed     Rheumatic mitral regurgitation     Chronic kidney disease, stage 3 (moderate) (GFR 59 10/30/17, GFR 58 7/7/16)     Cellulitis of left lower extremity     Acute exacerbation of CHF (congestive heart failure) (H)     Past Surgical History:   Procedure Laterality Date     COLONOSCOPY  03/24/2015    Diverticulosis, polyps     ENTEROSCOPY SMALL BOWEL N/A 2/29/2016    Procedure: ENTEROSCOPY SMALL BOWEL;  Surgeon: Ady Ponce MD;  Location:  GI     ESOPHAGOSCOPY, GASTROSCOPY, DUODENOSCOPY (EGD), COMBINED N/A 3/22/2015    Upper GI- Normal, nothing significant found.      EXCISE MASS FOOT Right 7/6/2016    Procedure: EXCISE MASS FOOT;  Surgeon: Lee Jang DPM;  Location:  OR       Social History   Substance Use Topics     Smoking status: Former Smoker     Quit date: 4/14/1956     Smokeless tobacco: Never Used     Alcohol use No     Family History   Problem Relation Age of Onset     Known Genetic Syndrome Father      Known Genetic Syndrome Mother      Other Cancer Daughter      pancreatic     Other Cancer Brother      type     Other Cancer Sister 60     lung     Diabetes No family hx of      Breast Cancer No family hx of      Cancer - colorectal No family hx of      Ovarian Cancer No family hx of      Prostate Cancer No family hx of          Current Outpatient Prescriptions   Medication Sig Dispense Refill     acetaminophen (TYLENOL) 325 MG tablet Take 650 mg by mouth 3 times daily as needed for mild pain       albuterol (ACCUNEB) 1.25 MG/3ML nebulizer solution Take 1 vial by  nebulization 3 times daily        Cranberry Extract 250 MG TABS Take 250 mg by mouth daily       ferrous sulfate (IRON) 325 (65 FE) MG tablet Take 1 tablet (325 mg) by mouth 2 times daily 100 tablet 11     furosemide (LASIX) 40 MG tablet Take 1 tablet (40 mg) by mouth 2 times daily 180 tablet 2     gabapentin (NEURONTIN) 100 MG capsule TAKE 1 CAPSULE(100 MG) BY MOUTH THREE TIMES DAILY 270 capsule 0     hydrALAZINE (APRESOLINE) 25 MG tablet Take 3 tablets (75 mg) by mouth 3 times daily 810 tablet 3     isosorbide mononitrate (IMDUR) 30 MG 24 hr tablet Take 2 tablets (60 mg) by mouth daily 90 tablet 2     OMEGA-3 FATTY ACIDS PO Take 1,200 mg by mouth daily        omeprazole (PRILOSEC) 20 MG CR capsule Take 1 capsule (20 mg) by mouth daily 90 capsule 2     senna-docusate (SENOKOT-S;PERICOLACE) 8.6-50 MG per tablet Take 2 tablets daily as needed for constipation while taking prescription pain medications. 30 tablet 0     simvastatin (ZOCOR) 10 MG tablet Take 1 tablet (10 mg) by mouth At Bedtime 90 tablet 1     spironolactone (ALDACTONE) 50 MG tablet Take 1 tablet (50 mg) by mouth daily 30 tablet 3     venlafaxine (EFFEXOR-XR) 75 MG 24 hr capsule TAKE ONE CAPSULE BY MOUTH DAILY 90 capsule 0     VITAMIN D, CHOLECALCIFEROL, PO Take 1,000 Units by mouth daily        [DISCONTINUED] spironolactone (ALDACTONE) 50 MG tablet Take 1 tablet (50 mg) by mouth daily 30 tablet 0     BP Readings from Last 3 Encounters:   07/10/18 126/74   06/28/18 135/54   06/18/18 (!) 128/36    Wt Readings from Last 3 Encounters:   07/10/18 150 lb (68 kg)   06/28/18 157 lb (71.2 kg)   06/18/18 160 lb (72.6 kg)                    Reviewed and updated as needed this visit by clinical staff  Tobacco  Allergies  Meds  Med Hx  Surg Hx  Fam Hx  Soc Hx      Reviewed and updated as needed this visit by Provider         ROS:  CONSTITUTIONAL:POSITIVE  for weight loss 7#  ENT/MOUTH: NEGATIVE for ear, mouth and throat problems  RESP: NEGATIVE for  significant cough or SOB  CV: POSITIVE for lower extremity edema and NEGATIVE for chest pain/chest pressure, diaphoresis and palpitations  ROS otherwise negative    OBJECTIVE:     /74  Pulse 56  Temp 98.3  F (36.8  C) (Oral)  Resp 12  Wt 150 lb (68 kg)  SpO2 94%  BMI 26.57 kg/m2  Body mass index is 26.57 kg/(m^2).  GENERAL: alert, no distress, frail and elderly  RESP: lungs clear to auscultation - no rales, rhonchi or wheezes  CV: regular rates and rhythm, no murmur, click or rub and lower extremity pitting edema, wearing compression ace wraps          ASSESSMENT/PLAN:               ICD-10-CM    1. Acute on chronic diastolic congestive heart failure (H) I50.33 spironolactone (ALDACTONE) 50 MG tablet     CBC with platelets     Basic metabolic panel       Continue daily weights and current meds  See [facility] clinic as planned.    Mallika Hammonds NP  [facility]

## 2018-07-10 NOTE — TELEPHONE ENCOUNTER
Cecy OT with [facility] ([phone]) calling for treatment orders to see pt twice a month for 2 months.  Approved per RN protocol.  NIMO Law R.N.

## 2018-07-11 ENCOUNTER — TELEPHONE (OUTPATIENT)
Dept: INTERNAL MEDICINE | Facility: CLINIC | Age: 83
End: 2018-07-11

## 2018-07-11 LAB
ANION GAP SERPL CALCULATED.3IONS-SCNC: 9 MMOL/L (ref 3–14)
BUN SERPL-MCNC: 18 MG/DL (ref 7–30)
CALCIUM SERPL-MCNC: 8.5 MG/DL (ref 8.5–10.1)
CHLORIDE SERPL-SCNC: 104 MMOL/L (ref 94–109)
CO2 SERPL-SCNC: 25 MMOL/L (ref 20–32)
CREAT SERPL-MCNC: 0.94 MG/DL (ref 0.52–1.04)
GFR SERPL CREATININE-BSD FRML MDRD: 56 ML/MIN/1.7M2
GLUCOSE SERPL-MCNC: 96 MG/DL (ref 70–99)
POTASSIUM SERPL-SCNC: 4.2 MMOL/L (ref 3.4–5.3)
SODIUM SERPL-SCNC: 138 MMOL/L (ref 133–144)

## 2018-07-13 ENCOUNTER — CARE COORDINATION (OUTPATIENT)
Dept: CARDIOLOGY | Facility: CLINIC | Age: 83
End: 2018-07-13

## 2018-07-13 ENCOUNTER — OFFICE VISIT (OUTPATIENT)
Dept: CARDIOLOGY | Facility: CLINIC | Age: 83
End: 2018-07-13
Attending: PHYSICIAN ASSISTANT
Payer: COMMERCIAL

## 2018-07-13 VITALS
WEIGHT: 150.3 LBS | DIASTOLIC BLOOD PRESSURE: 44 MMHG | SYSTOLIC BLOOD PRESSURE: 132 MMHG | HEART RATE: 50 BPM | HEIGHT: 63 IN | OXYGEN SATURATION: 95 % | BODY MASS INDEX: 26.63 KG/M2

## 2018-07-13 DIAGNOSIS — R06.02 SOB (SHORTNESS OF BREATH): ICD-10-CM

## 2018-07-13 DIAGNOSIS — I50.32 CHRONIC DIASTOLIC CONGESTIVE HEART FAILURE (H): ICD-10-CM

## 2018-07-13 DIAGNOSIS — D64.9 ANEMIA: ICD-10-CM

## 2018-07-13 LAB
ANION GAP SERPL CALCULATED.3IONS-SCNC: 6 MMOL/L (ref 3–14)
BUN SERPL-MCNC: 17 MG/DL (ref 7–30)
CALCIUM SERPL-MCNC: 8.7 MG/DL (ref 8.5–10.1)
CHLORIDE SERPL-SCNC: 105 MMOL/L (ref 94–109)
CO2 SERPL-SCNC: 28 MMOL/L (ref 20–32)
CREAT SERPL-MCNC: 0.94 MG/DL (ref 0.52–1.04)
GFR SERPL CREATININE-BSD FRML MDRD: 57 ML/MIN/1.7M2
GLUCOSE SERPL-MCNC: 91 MG/DL (ref 70–99)
HGB BLD-MCNC: 8.2 G/DL (ref 11.7–15.7)
NT-PROBNP SERPL-MCNC: 3606 PG/ML (ref 0–450)
POTASSIUM SERPL-SCNC: 3.9 MMOL/L (ref 3.4–5.3)
SODIUM SERPL-SCNC: 139 MMOL/L (ref 133–144)
TSH SERPL DL<=0.005 MIU/L-ACNC: 3.63 MU/L (ref 0.4–4)

## 2018-07-13 PROCEDURE — 85018 HEMOGLOBIN: CPT | Performed by: PHYSICIAN ASSISTANT

## 2018-07-13 PROCEDURE — 83880 ASSAY OF NATRIURETIC PEPTIDE: CPT | Performed by: PHYSICIAN ASSISTANT

## 2018-07-13 PROCEDURE — 36415 COLL VENOUS BLD VENIPUNCTURE: CPT | Performed by: PHYSICIAN ASSISTANT

## 2018-07-13 PROCEDURE — 80048 BASIC METABOLIC PNL TOTAL CA: CPT | Performed by: PHYSICIAN ASSISTANT

## 2018-07-13 PROCEDURE — 99215 OFFICE O/P EST HI 40 MIN: CPT | Performed by: PHYSICIAN ASSISTANT

## 2018-07-13 PROCEDURE — 84443 ASSAY THYROID STIM HORMONE: CPT | Performed by: PHYSICIAN ASSISTANT

## 2018-07-13 RX ORDER — TORSEMIDE 20 MG/1
20 TABLET ORAL 2 TIMES DAILY
Qty: 180 TABLET | Refills: 3 | Status: SHIPPED | OUTPATIENT
Start: 2018-07-13 | End: 2019-06-05

## 2018-07-13 NOTE — LETTER
"7/13/2018    Gerri Eubanks MD  [address]  [facility] [address]    RE: Keisha Godinez       Dear Colleague,    I had the pleasure of seeing Keisha Godinez in the [facility].      Cardiology Progress Note    Date of Service: 07/13/2018      Reason for visit: [facility] clinic enrollment, acute on chronic diastolic heart failure, pulmonary hypertension.    Primary cardiologist: Dr. Miguel Pradhan      HPI:  Ms. Godinez is a pleasant 86 year old female with a PMHx including hypertension, hyperlipidemia, atrial fibrillation (not on AC due to prior GI bleed), chronic anemia (colonic AVM, iron deficiency), CVA, chronic lower extremity edema, and diastolic heart failure with pulmonary hypertension. She has been followed by Dr. Pradhan in the past, but hasn't been seen since 2016. She tells me she did not return because she had been doing so well. Unfortunately, more recently she has had issues with increased leg swelling. She was admitted at [facility] from 5/21/18 to 5/24/18 with cellulitis complicated by heart failure, was diuresed and discharged to a TCU. She then returned from 6/18/18 to 6/28/18 with leg edema and weight gain once again, along with dyspnea. She again required IV diuresis, and pRBC transfusion for worsening of her anemia. She was reportedly 167 lbs at admit, and discharged at 155 lbs. She was placed on 40mg of lasix twice daily, and still required supplemental oxygen at 1.5L at discharged. She is here today for [facility] clinic enrollment.    Ms. Godinez tells me that she's been doing quite good since discharge. Her daughter accompanies her today (her main caregiver) and says they have made significant lifestyle changes, as this hospital stay \"scared them both\" and they realize they needed to cut down on salt. They have now been quite strict with this, and her weight is down further, to 150 lbs. She still has significant edema, and is followed at the lymphedema clinic " with leg wraps. She thinks her breathing is better overall, she did not wear her oxygen today, and her sats were 95% on room air. She is weighing herself daily and keeping a log. BP is up slightly at 132/44, and HR is 50. She sleeps in a recliner, partially from what sounds like orthopnea, but also comfort. She denies any exertional chest, arm, or jaw discomfort and has not had any significant dizziness or presyncope. Labs today show preserved renal function and electrolytes are acceptable. He NT-proBNP is elevated, but has trended down nicely from hospital admission. Her hemoglobin is stable from discharge, though chronically appears to run in the 7-8 range. TSH is normal.     ASSESSMENT/PLAN:    1. Acute on chronic diastolic heart failure.   --Last echo was October 2017 at which time her EF was preserved at 60-65%. Her RV at that time was moderately dilated, with borderline reduced function and moderate TR.     --Making significant improvements with strict sodium restriction in her diet which I encouraged her to continue. However, we still have some room to diurese further to approach euvolemia. Will change her lasix to torsemide 20mg BID. Recheck labs in 1 week. Enroll in tele-assurance. For now, will make her goal weight range 147-155. Given her history of GIB and chronic anemia, not a cardiomems candidate.   --Continue spironolactone 50mg daily for now; she was taken off her KDur at discharge, continue to monitor her electrolytes closely with diuretic adjustments.    --Continue lymphedema wraps.    --Continue to monitor her anemia.    --After we near euvolemia will repeat echocardiogram. If she continues to have RV dysfunction, may consider a RHC.    --Once more mobile as her lymphedema improves, will consider cardiac rehab. She remains deconditioned.   --She can likely be taken off home oxygen, but will defer to primary provider.       2. Hypertension.   --BP relatively well controlled currently, will  hopefully normalize with further diuresis. She has a true allergy to ACE-inhibitors (tongue swelling), and is not on a beta blocker as she has had issues with bradycardia in the past.   --She has chronically been on hydralazine and isosorbide which we will continue.     3. Atrial fibrillation, chronic.   --Rate controlled. No BB due to bradycardia.    --She is not on anticoagulation due to GIB/anemia.     4. Hyperlipidemia.   --Last LDL Aug 2017 was excellent at 34. Continue simvastatin 10mg daily.       Follow up plan: Labs in 1 week, and return to see Suad Murrell PA-C in 2 weeks.   Will arrange for return visit with Dr. Pradhan to re-establish care in 2 months, and check an echo prior.       Orders this Visit:  Orders Placed This Encounter   Procedures     Basic metabolic panel     N terminal pro BNP outpatient     Follow-Up with [facility] - [place] visit     Enrollment in Tel-Assurance     Follow-Up with Cardiologist     Echocardiogram     Orders Placed This Encounter   Medications     torsemide (DEMADEX) 20 MG tablet     Sig: Take 1 tablet (20 mg) by mouth 2 times daily     Dispense:  180 tablet     Refill:  3     Medications Discontinued During This Encounter   Medication Reason     furosemide (LASIX) 40 MG tablet Stop at Discharge           CURRENT MEDICATIONS:  Current Outpatient Prescriptions   Medication Sig Dispense Refill     acetaminophen (TYLENOL) 325 MG tablet Take 650 mg by mouth 3 times daily as needed for mild pain       albuterol (ACCUNEB) 1.25 MG/3ML nebulizer solution Take 1 vial by nebulization 3 times daily        Cranberry Extract 250 MG TABS Take 250 mg by mouth daily       ferrous sulfate (IRON) 325 (65 FE) MG tablet Take 1 tablet (325 mg) by mouth 2 times daily 100 tablet 11     gabapentin (NEURONTIN) 100 MG capsule TAKE 1 CAPSULE(100 MG) BY MOUTH THREE TIMES DAILY 270 capsule 0     hydrALAZINE (APRESOLINE) 25 MG tablet Take 3 tablets (75 mg) by mouth 3 times daily 810 tablet 3      isosorbide mononitrate (IMDUR) 30 MG 24 hr tablet Take 2 tablets (60 mg) by mouth daily 90 tablet 2     OMEGA-3 FATTY ACIDS PO Take 1,200 mg by mouth daily        omeprazole (PRILOSEC) 20 MG CR capsule Take 1 capsule (20 mg) by mouth daily 90 capsule 2     senna-docusate (SENOKOT-S;PERICOLACE) 8.6-50 MG per tablet Take 2 tablets daily as needed for constipation while taking prescription pain medications. 30 tablet 0     simvastatin (ZOCOR) 10 MG tablet Take 1 tablet (10 mg) by mouth At Bedtime 90 tablet 1     spironolactone (ALDACTONE) 50 MG tablet Take 1 tablet (50 mg) by mouth daily 30 tablet 3     torsemide (DEMADEX) 20 MG tablet Take 1 tablet (20 mg) by mouth 2 times daily 180 tablet 3     venlafaxine (EFFEXOR-XR) 75 MG 24 hr capsule TAKE ONE CAPSULE BY MOUTH DAILY 90 capsule 0     VITAMIN D, CHOLECALCIFEROL, PO Take 1,000 Units by mouth daily          ALLERGIES     Allergies   Allergen Reactions     Blood Transfusion Related (Informational Only) Other (See Comments)     Patient has a history of a clinically significant antibody against RBC antigens.  A delay in compatible RBCs may occur.     Lisinopril Other (See Comments)     Tongue swelling     Calcium Nausea and Vomiting     Egg Yolk      Flu Virus Vaccine      Allergic to eggs.     Keflex [Cephalexin] Nausea     Pt states she had upset stomach.  NOT tongue swelling.  She had tongue swelling with a combo bp med that she thinks included lisinopril.    Tolerates IV Cefazolin     Levaquin [Levofloxacin]      Sulfa Drugs      Zinc Nausea and Vomiting       PAST MEDICAL HISTORY:  Past Medical History:   Diagnosis Date     Anemia      Atrial fibrillation (H)      B12 deficiency      Cardiomyopathy (H)      CHF (congestive heart failure) (H)      Chronic edema     Lower extremities     Chronic systolic congestive heart failure (H)      CVA (cerebral vascular accident) (H) 2010    Acute Left MCA hemispheric CVA ( on coumadin)     Depression      Gastro-oesophageal  reflux disease      GI bleed 03-20-15     Hyperlipidemia      Hypertension      Iron deficiency      MSSA (methicillin susceptible Staphylococcus aureus) 03-10-15     Pulmonary hypertension      Shingles        PAST SURGICAL HISTORY:  Past Surgical History:   Procedure Laterality Date     COLONOSCOPY  03/24/2015    Diverticulosis, polyps     ENTEROSCOPY SMALL BOWEL N/A 2/29/2016    Procedure: ENTEROSCOPY SMALL BOWEL;  Surgeon: Ady Ponce MD;  Location:  GI     ESOPHAGOSCOPY, GASTROSCOPY, DUODENOSCOPY (EGD), COMBINED N/A 3/22/2015    Upper GI- Normal, nothing significant found.      EXCISE MASS FOOT Right 7/6/2016    Procedure: EXCISE MASS FOOT;  Surgeon: Lee Jang DPM;  Location: [facility] OR       FAMILY HISTORY:  Family History   Problem Relation Age of Onset     Known Genetic Syndrome Father      Known Genetic Syndrome Mother      Other Cancer Daughter      pancreatic     Other Cancer Brother      type     Other Cancer Sister 60     lung     Diabetes No family hx of      Breast Cancer No family hx of      Cancer - colorectal No family hx of      Ovarian Cancer No family hx of      Prostate Cancer No family hx of        SOCIAL HISTORY:  Social History     Social History     Marital status:      Spouse name: N/A     Number of children: N/A     Years of education: N/A     Social History Main Topics     Smoking status: Former Smoker     Quit date: 4/14/1956     Smokeless tobacco: Never Used     Alcohol use No     Drug use: No     Sexual activity: No     Other Topics Concern     Caffeine Concern No     Hot tea 1 cup daily     Special Diet Yes     low sodium     Exercise No     limited right now     Social History Narrative       Review of Systems:  Cardiovascular: negative for chest pain, palpitations, orthopnea, pos LE edema  Constitutional: negative for chills, sweats, fevers   Resp: Negative for dyspnea at rest, pos dyspnea on exertion, neg cough, known chronic lung disease  HEENT:  "Negative for new visual changes, frequent headaches  Gastrointestinal: negative for abdominal pain, diarrhea, blood in stool, nausea, vomiting  Hematologic/lymphatic: negative for current systemic anticoagulation, hx of blood clots. Pos hx GIB.  Musculoskeletal: negative for new back pain, joint pain  Neurological: negative for focal weakness, LOC, seizures, syncope. Neg dizziness.      Physical Exam:  Vitals: /44 (BP Location: Right arm, Patient Position: Chair, Cuff Size: Adult Regular)  Pulse 50  Ht 1.6 m (5' 3\")  Wt 68.2 kg (150 lb 4.8 oz)  SpO2 95%  BMI 26.62 kg/m2   Wt Readings from Last 4 Encounters:   07/13/18 68.2 kg (150 lb 4.8 oz)   07/10/18 68 kg (150 lb)   06/28/18 71.2 kg (157 lb)   06/18/18 72.6 kg (160 lb)       GEN:  In general, this is a well nourished elderly female in no acute distress on room air. She arrived in a wheelchair today, as her legs are wrapped heavily and finds it difficult to ambulate. Accompanied by her daughter.   HEENT:  Pupils equal, round. Sclerae nonicteric.   NECK: Supple, no masses appreciated. Trachea midline. Mild JVD.   C/V:  Irregular rhythm, no murmur, rub or gallop. Mildly accentuated P2. No S3 or RV heave.   RESP: Respirations are unlabored. No use of accessory muscles. Noted to have a few crackles in bases posteriorly. No wheezing.   GI: Abdomen soft, nontender, nondistended.   EXTREM: + edema, though difficult to assess as legs wrapped. No cyanosis or clubbing.  NEURO: Alert and oriented, cooperative. Gait not assesed. No obvious focal deficits.   PSYCH: Normal affect.  SKIN: Warm and dry. No rashes or petechiae appreciated.       Recent Lab Results:  LIPID RESULTS:  Lab Results   Component Value Date    CHOL 108 08/02/2017    HDL 61 08/02/2017    LDL 34 08/02/2017    TRIG 65 08/02/2017             CBC RESULTS:  Lab Results   Component Value Date    WBC 5.5 07/10/2018    RBC 3.03 (L) 07/10/2018    HGB 8.2 (L) 07/13/2018    HCT 27.6 (L) 07/10/2018    MCV " 91 07/10/2018    MCH 26.4 (L) 07/10/2018    MCHC 29.0 (L) 07/10/2018    RDW 18.2 (H) 07/10/2018    PLT 59 (L) 07/10/2018       BMP RESULTS:  Lab Results   Component Value Date     07/13/2018    POTASSIUM 3.9 07/13/2018    CHLORIDE 105 07/13/2018    CO2 28 07/13/2018    ANIONGAP 6 07/13/2018    GLC 91 07/13/2018    BUN 17 07/13/2018    CR 0.94 07/13/2018    GFRESTIMATED 57 (L) 07/13/2018    GFRESTBLACK 68 07/13/2018    CASPER 8.7 07/13/2018          New/Pertinent imaging results since last visit:  Echo 10/18/17  Interpretation Summary     The visual ejection fraction is estimated at 60-65%.  The right ventricle is moderately dilated.  The right ventricular systolic function is borderline reduced.  There is mod-severe biatrial enlargement.  There is mild (1+) mitral regurgitation.  There is moderate (2+) tricuspid regurgitation.  Dilated inferior vena cava  Compared to prior study, there is no significant change.    Greater than 40 minutes was spent with this patient, >50% of which was spent in counseling and coordination of care.       Suad Murrell PA-C  [facility]  Pager [phone]      Thank you for allowing me to participate in the care of your patient.      Sincerely,     NATHAN Valentin     [facility]    cc:   Marek Cerda PA-C  [address] [place] [address]

## 2018-07-13 NOTE — PATIENT INSTRUCTIONS
Call [facility] nurse for any questions or concerns:  [phone]    1. Weigh yourself daily and write it down. We will enroll you in the teleassurance program.     2. Call [facility] nurse if your weight is up more than 2 pounds in one day or 5 pounds in one week.    3. Call [facility] nurse if you feel more short of breath, have more abdominal bloating, or leg swelling.    4. Continue low sodium diet (less than 2000 mg daily). If you eat less salt, you will retain less fluid.    5. Medication changes:   STOP the Lasix  START torsemide 20mg twice a day.       6. Lab results:  Component      Latest Ref Rng & Units 7/13/2018   Sodium      133 - 144 mmol/L 139   Potassium      3.4 - 5.3 mmol/L 3.9   Chloride      94 - 109 mmol/L 105   Carbon Dioxide      20 - 32 mmol/L 28   Anion Gap      3 - 14 mmol/L 6   Glucose      70 - 99 mg/dL 91   Urea Nitrogen      7 - 30 mg/dL 17   Creatinine      0.52 - 1.04 mg/dL 0.94   GFR Estimate      >60 mL/min/1.7m2 57 (L)   GFR Estimate If Black      >60 mL/min/1.7m2 68   Calcium      8.5 - 10.1 mg/dL 8.7   TSH      0.40 - 4.00 mU/L 3.63   Hemoglobin      11.7 - 15.7 g/dL 8.2 (L)   N-Terminal Pro Bnp      0 - 450 pg/mL 3606 (H)       FOLLOW UP:  Repeat labs in 1 week, return to see Suad in 2 weeks.

## 2018-07-13 NOTE — LETTER
"7/13/2018    Gerri Eubanks MD  [address]  [facility] [address]    RE: Keisha Godinez       Dear Colleague,    I had the pleasure of seeing Keisha Godinez in the [facility].      Cardiology Progress Note    Date of Service: 07/13/2018      Reason for visit: CORE clinic enrollment, acute on chronic diastolic heart failure, pulmonary hypertension.    Primary cardiologist: Dr. Miguel Pradhan      HPI:  Ms. Godinez is a pleasant 86 year old female with a PMHx including hypertension, hyperlipidemia, atrial fibrillation (not on AC due to prior GI bleed), chronic anemia (colonic AVM, iron deficiency), CVA, chronic lower extremity edema, and diastolic heart failure with pulmonary hypertension. She has been followed by Dr. Pradhan in the past, but hasn't been seen since 2016. She tells me she did not return because she had been doing so well. Unfortunately, more recently she has had issues with increased leg swelling. She was admitted at [facility] from 5/21/18 to 5/24/18 with cellulitis complicated by heart failure, was diuresed and discharged to a TCU. She then returned from 6/18/18 to 6/28/18 with leg edema and weight gain once again, along with dyspnea. She again required IV diuresis, and pRBC transfusion for worsening of her anemia. She was reportedly 167 lbs at admit, and discharged at 155 lbs. She was placed on 40mg of lasix twice daily, and still required supplemental oxygen at 1.5L at discharged. She is here today for CORE clinic enrollment.    Ms. Godinez tells me that she's been doing quite good since discharge. Her daughter accompanies her today (her main caregiver) and says they have made significant lifestyle changes, as this hospital stay \"scared them both\" and they realize they needed to cut down on salt. They have now been quite strict with this, and her weight is down further, to 150 lbs. She still has significant edema, and is followed at the lymphedema clinic " with leg wraps. She thinks her breathing is better overall, she did not wear her oxygen today, and her sats were 95% on room air. She is weighing herself daily and keeping a log. BP is up slightly at 132/44, and HR is 50. She sleeps in a recliner, partially from what sounds like orthopnea, but also comfort. She denies any exertional chest, arm, or jaw discomfort and has not had any significant dizziness or presyncope. Labs today show preserved renal function and electrolytes are acceptable. He NT-proBNP is elevated, but has trended down nicely from hospital admission. Her hemoglobin is stable from discharge, though chronically appears to run in the 7-8 range. TSH is normal.     ASSESSMENT/PLAN:    1. Acute on chronic diastolic heart failure.   --Last echo was October 2017 at which time her EF was preserved at 60-65%. Her RV at that time was moderately dilated, with borderline reduced function and moderate TR.     --Making significant improvements with strict sodium restriction in her diet which I encouraged her to continue. However, we still have some room to diurese further to approach euvolemia. Will change her lasix to torsemide 20mg BID. Recheck labs in 1 week. Enroll in tele-assurance. For now, will make her goal weight range 147-155. Given her history of GIB and chronic anemia, not a cardiomems candidate.   --Continue spironolactone 50mg daily for now; she was taken off her KDur at discharge, continue to monitor her electrolytes closely with diuretic adjustments.    --Continue lymphedema wraps.    --Continue to monitor her anemia.    --After we near euvolemia will repeat echocardiogram. If she continues to have RV dysfunction, may consider a RHC.    --Once more mobile as her lymphedema improves, will consider cardiac rehab. She remains deconditioned.   --She can likely be taken off home oxygen, but will defer to primary provider.       2. Hypertension.   --BP relatively well controlled currently, will  hopefully normalize with further diuresis. She has a true allergy to ACE-inhibitors (tongue swelling), and is not on a beta blocker as she has had issues with bradycardia in the past.   --She has chronically been on hydralazine and isosorbide which we will continue.     3. Atrial fibrillation, chronic.   --Rate controlled. No BB due to bradycardia.    --She is not on anticoagulation due to GIB/anemia.     4. Hyperlipidemia.   --Last LDL Aug 2017 was excellent at 34. Continue simvastatin 10mg daily.       Follow up plan: Labs in 1 week, and return to see Suad Murrell PA-C in 2 weeks.   Will arrange for return visit with Dr. Pradhan to re-establish care in 2 months, and check an echo prior.       Orders this Visit:  Orders Placed This Encounter   Procedures     Basic metabolic panel     N terminal pro BNP outpatient     Follow-Up with [facility] - [place] visit     Enrollment in Tel-Assurance     Follow-Up with Cardiologist     Echocardiogram     Orders Placed This Encounter   Medications     torsemide (DEMADEX) 20 MG tablet     Sig: Take 1 tablet (20 mg) by mouth 2 times daily     Dispense:  180 tablet     Refill:  3     Medications Discontinued During This Encounter   Medication Reason     furosemide (LASIX) 40 MG tablet Stop at Discharge           CURRENT MEDICATIONS:  Current Outpatient Prescriptions   Medication Sig Dispense Refill     acetaminophen (TYLENOL) 325 MG tablet Take 650 mg by mouth 3 times daily as needed for mild pain       albuterol (ACCUNEB) 1.25 MG/3ML nebulizer solution Take 1 vial by nebulization 3 times daily        Cranberry Extract 250 MG TABS Take 250 mg by mouth daily       ferrous sulfate (IRON) 325 (65 FE) MG tablet Take 1 tablet (325 mg) by mouth 2 times daily 100 tablet 11     gabapentin (NEURONTIN) 100 MG capsule TAKE 1 CAPSULE(100 MG) BY MOUTH THREE TIMES DAILY 270 capsule 0     hydrALAZINE (APRESOLINE) 25 MG tablet Take 3 tablets (75 mg) by mouth 3 times daily 810 tablet 3      isosorbide mononitrate (IMDUR) 30 MG 24 hr tablet Take 2 tablets (60 mg) by mouth daily 90 tablet 2     OMEGA-3 FATTY ACIDS PO Take 1,200 mg by mouth daily        omeprazole (PRILOSEC) 20 MG CR capsule Take 1 capsule (20 mg) by mouth daily 90 capsule 2     senna-docusate (SENOKOT-S;PERICOLACE) 8.6-50 MG per tablet Take 2 tablets daily as needed for constipation while taking prescription pain medications. 30 tablet 0     simvastatin (ZOCOR) 10 MG tablet Take 1 tablet (10 mg) by mouth At Bedtime 90 tablet 1     spironolactone (ALDACTONE) 50 MG tablet Take 1 tablet (50 mg) by mouth daily 30 tablet 3     torsemide (DEMADEX) 20 MG tablet Take 1 tablet (20 mg) by mouth 2 times daily 180 tablet 3     venlafaxine (EFFEXOR-XR) 75 MG 24 hr capsule TAKE ONE CAPSULE BY MOUTH DAILY 90 capsule 0     VITAMIN D, CHOLECALCIFEROL, PO Take 1,000 Units by mouth daily          ALLERGIES     Allergies   Allergen Reactions     Blood Transfusion Related (Informational Only) Other (See Comments)     Patient has a history of a clinically significant antibody against RBC antigens.  A delay in compatible RBCs may occur.     Lisinopril Other (See Comments)     Tongue swelling     Calcium Nausea and Vomiting     Egg Yolk      Flu Virus Vaccine      Allergic to eggs.     Keflex [Cephalexin] Nausea     Pt states she had upset stomach.  NOT tongue swelling.  She had tongue swelling with a combo bp med that she thinks included lisinopril.    Tolerates IV Cefazolin     Levaquin [Levofloxacin]      Sulfa Drugs      Zinc Nausea and Vomiting       PAST MEDICAL HISTORY:  Past Medical History:   Diagnosis Date     Anemia      Atrial fibrillation (H)      B12 deficiency      Cardiomyopathy (H)      CHF (congestive heart failure) (H)      Chronic edema     Lower extremities     Chronic systolic congestive heart failure (H)      CVA (cerebral vascular accident) (H) 2010    Acute Left MCA hemispheric CVA ( on coumadin)     Depression      Gastro-oesophageal  reflux disease      GI bleed 03-20-15     Hyperlipidemia      Hypertension      Iron deficiency      MSSA (methicillin susceptible Staphylococcus aureus) 03-10-15     Pulmonary hypertension      Shingles        PAST SURGICAL HISTORY:  Past Surgical History:   Procedure Laterality Date     COLONOSCOPY  03/24/2015    Diverticulosis, polyps     ENTEROSCOPY SMALL BOWEL N/A 2/29/2016    Procedure: ENTEROSCOPY SMALL BOWEL;  Surgeon: Ady Ponce MD;  Location:  GI     ESOPHAGOSCOPY, GASTROSCOPY, DUODENOSCOPY (EGD), COMBINED N/A 3/22/2015    Upper GI- Normal, nothing significant found.      EXCISE MASS FOOT Right 7/6/2016    Procedure: EXCISE MASS FOOT;  Surgeon: Lee Jang DPM;  Location: [facility] OR       FAMILY HISTORY:  Family History   Problem Relation Age of Onset     Known Genetic Syndrome Father      Known Genetic Syndrome Mother      Other Cancer Daughter      pancreatic     Other Cancer Brother      type     Other Cancer Sister 60     lung     Diabetes No family hx of      Breast Cancer No family hx of      Cancer - colorectal No family hx of      Ovarian Cancer No family hx of      Prostate Cancer No family hx of        SOCIAL HISTORY:  Social History     Social History     Marital status:      Spouse name: N/A     Number of children: N/A     Years of education: N/A     Social History Main Topics     Smoking status: Former Smoker     Quit date: 4/14/1956     Smokeless tobacco: Never Used     Alcohol use No     Drug use: No     Sexual activity: No     Other Topics Concern     Caffeine Concern No     Hot tea 1 cup daily     Special Diet Yes     low sodium     Exercise No     limited right now     Social History Narrative       Review of Systems:  Cardiovascular: negative for chest pain, palpitations, orthopnea, pos LE edema  Constitutional: negative for chills, sweats, fevers   Resp: Negative for dyspnea at rest, pos dyspnea on exertion, neg cough, known chronic lung disease  HEENT:  "Negative for new visual changes, frequent headaches  Gastrointestinal: negative for abdominal pain, diarrhea, blood in stool, nausea, vomiting  Hematologic/lymphatic: negative for current systemic anticoagulation, hx of blood clots. Pos hx GIB.  Musculoskeletal: negative for new back pain, joint pain  Neurological: negative for focal weakness, LOC, seizures, syncope. Neg dizziness.      Physical Exam:  Vitals: /44 (BP Location: Right arm, Patient Position: Chair, Cuff Size: Adult Regular)  Pulse 50  Ht 1.6 m (5' 3\")  Wt 68.2 kg (150 lb 4.8 oz)  SpO2 95%  BMI 26.62 kg/m2   Wt Readings from Last 4 Encounters:   07/13/18 68.2 kg (150 lb 4.8 oz)   07/10/18 68 kg (150 lb)   06/28/18 71.2 kg (157 lb)   06/18/18 72.6 kg (160 lb)       GEN:  In general, this is a well nourished elderly female in no acute distress on room air. She arrived in a wheelchair today, as her legs are wrapped heavily and finds it difficult to ambulate. Accompanied by her daughter.   HEENT:  Pupils equal, round. Sclerae nonicteric.   NECK: Supple, no masses appreciated. Trachea midline. Mild JVD.   C/V:  Irregular rhythm, no murmur, rub or gallop. Mildly accentuated P2. No S3 or RV heave.   RESP: Respirations are unlabored. No use of accessory muscles. Noted to have a few crackles in bases posteriorly. No wheezing.   GI: Abdomen soft, nontender, nondistended.   EXTREM: + edema, though difficult to assess as legs wrapped. No cyanosis or clubbing.  NEURO: Alert and oriented, cooperative. Gait not assesed. No obvious focal deficits.   PSYCH: Normal affect.  SKIN: Warm and dry. No rashes or petechiae appreciated.       Recent Lab Results:  LIPID RESULTS:  Lab Results   Component Value Date    CHOL 108 08/02/2017    HDL 61 08/02/2017    LDL 34 08/02/2017    TRIG 65 08/02/2017             CBC RESULTS:  Lab Results   Component Value Date    WBC 5.5 07/10/2018    RBC 3.03 (L) 07/10/2018    HGB 8.2 (L) 07/13/2018    HCT 27.6 (L) 07/10/2018    MCV " 91 07/10/2018    MCH 26.4 (L) 07/10/2018    MCHC 29.0 (L) 07/10/2018    RDW 18.2 (H) 07/10/2018    PLT 59 (L) 07/10/2018       BMP RESULTS:  Lab Results   Component Value Date     07/13/2018    POTASSIUM 3.9 07/13/2018    CHLORIDE 105 07/13/2018    CO2 28 07/13/2018    ANIONGAP 6 07/13/2018    GLC 91 07/13/2018    BUN 17 07/13/2018    CR 0.94 07/13/2018    GFRESTIMATED 57 (L) 07/13/2018    GFRESTBLACK 68 07/13/2018    CASPER 8.7 07/13/2018          New/Pertinent imaging results since last visit:  Echo 10/18/17  Interpretation Summary     The visual ejection fraction is estimated at 60-65%.  The right ventricle is moderately dilated.  The right ventricular systolic function is borderline reduced.  There is mod-severe biatrial enlargement.  There is mild (1+) mitral regurgitation.  There is moderate (2+) tricuspid regurgitation.  Dilated inferior vena cava  Compared to prior study, there is no significant change.    Greater than 40 minutes was spent with this patient, >50% of which was spent in counseling and coordination of care.       Suad Murrell PA-C  [facility]  Pager [phone]      Thank you for allowing me to participate in the care of your patient.    Sincerely,     NATHAN Valentin     [facility]

## 2018-07-13 NOTE — MR AVS SNAPSHOT
After Visit Summary   7/13/2018    Keisha Godinez    MRN: [id number]           Patient Information     Date Of Birth          1/25/1932        Visit Information        Provider Department      7/13/2018 10:50 AM Suad Murrell PA [facility]        Today's Diagnoses     Chronic diastolic congestive heart failure (H)          Care Instructions    Call CORE nurse for any questions or concerns:  [phone]    1. Weigh yourself daily and write it down. We will enroll you in the teleassurance program.     2. Call CORE nurse if your weight is up more than 2 pounds in one day or 5 pounds in one week.    3. Call CORE nurse if you feel more short of breath, have more abdominal bloating, or leg swelling.    4. Continue low sodium diet (less than 2000 mg daily). If you eat less salt, you will retain less fluid.    5. Medication changes:   STOP the Lasix  START torsemide 20mg twice a day.       6. Lab results:  Component      Latest Ref Rng & Units 7/13/2018   Sodium      133 - 144 mmol/L 139   Potassium      3.4 - 5.3 mmol/L 3.9   Chloride      94 - 109 mmol/L 105   Carbon Dioxide      20 - 32 mmol/L 28   Anion Gap      3 - 14 mmol/L 6   Glucose      70 - 99 mg/dL 91   Urea Nitrogen      7 - 30 mg/dL 17   Creatinine      0.52 - 1.04 mg/dL 0.94   GFR Estimate      >60 mL/min/1.7m2 57 (L)   GFR Estimate If Black      >60 mL/min/1.7m2 68   Calcium      8.5 - 10.1 mg/dL 8.7   TSH      0.40 - 4.00 mU/L 3.63   Hemoglobin      11.7 - 15.7 g/dL 8.2 (L)   N-Terminal Pro Bnp      0 - 450 pg/mL 3606 (H)       FOLLOW UP:  Repeat labs in 1 week, return to see Suad in 2 weeks.           Follow-ups after your visit        Additional Services     Enrollment in Tel-Assurance           Follow-Up with [facility] - [place] visit                 Your next 10 appointments already scheduled     Jul 20, 2018  1:00 PM CDT   LAB with RU LAB   [facility]  ([facility])    [address]  [facility] [address]   [phone]           Please do not eat 10-12 hours before your appointment if you are coming in fasting for labs on lipids, cholesterol, or glucose (sugar). This does not apply to pregnant women. Water, hot tea and black coffee (with nothing added) are okay. Do not drink other fluids, diet soda or chew gum.            Aug 03, 2018  1:10 PM CDT   Core Return with NATHAN Valentin   [facility] ([facility])    [address]  [place] [address]   [phone]              Future tests that were ordered for you today     Open Future Orders        Priority Expected Expires Ordered    Follow-Up with [facility] Clinic - [place] visit Routine 7/27/2018 7/13/2019 7/13/2018    Basic metabolic panel Routine 7/20/2018 7/13/2019 7/13/2018    N terminal pro BNP outpatient Routine 7/20/2018 7/13/2019 7/13/2018            Who to contact     If you have questions or need follow up information about today's clinic visit or your schedule please contact [facility] directly at [phone].  Normal or non-critical lab and imaging results will be communicated to you by MyChart, letter or phone within 4 business days after the clinic has received the results. If you do not hear from us within 7 days, please contact the clinic through MyChart or phone. If you have a critical or abnormal lab result, we will notify you by phone as soon as possible.  Submit refill requests through [email or web address] or call your pharmacy and they will forward the refill request to us. Please allow 3 business days for your refill to be completed.          Additional Information About Your Visit        Care EveryWhere ID     This is your Care EveryWhere ID. This could be used by other organizations to access your [place] medical records  [id number]        Your Vitals Were     Pulse  "Height Pulse Oximetry BMI (Body Mass Index)          50 1.6 m (5' 3\") 95% 26.62 kg/m2         Blood Pressure from Last 3 Encounters:   07/13/18 132/44   07/10/18 126/74   06/28/18 135/54    Weight from Last 3 Encounters:   07/13/18 68.2 kg (150 lb 4.8 oz)   07/10/18 68 kg (150 lb)   06/28/18 71.2 kg (157 lb)              We Performed the Following     Enrollment in Tel-Assurance     Follow-Up with CORE Clinic          Today's Medication Changes          These changes are accurate as of 7/13/18 12:11 PM.  If you have any questions, ask your nurse or doctor.               Start taking these medicines.        Dose/Directions    torsemide 20 MG tablet   Commonly known as:  DEMADEX   Used for:  Chronic diastolic congestive heart failure (H)   Started by:  Suad Murrell PA        Dose:  20 mg   Take 1 tablet (20 mg) by mouth 2 times daily   Quantity:  180 tablet   Refills:  3         Stop taking these medicines if you haven't already. Please contact your care team if you have questions.     furosemide 40 MG tablet   Commonly known as:  LASIX   Stopped by:  Suad Murrell PA                Where to get your medicines      These medications were sent to [facility] [address] [place] [address] [id number] [place] [address] AT [address] [address] [facility] [address]     Phone:  [phone]     torsemide 20 MG tablet                Primary Care Provider Office Phone # Fax #    Gerri Eubanks [phone]       [address] [facility] [address]  [facility] [address]        Equal Access to Services     [facility] AH: Hadii aad ku hadasho Soomaali, waaxda luqadaha, qaybta kaalmada adeegmary lou, sharlene vick. So [facility] [phone].    ATENCIÓN: Si habla español, tiene a rosas disposición servicios gratuitos de asistencia lingüística. Janay tovar [phone].    We comply with applicable federal civil rights laws and [place] laws. We do not discriminate on the basis of race, " color, national origin, age, disability, sex, sexual orientation, or gender identity.            Thank you!     Thank you for choosing [facility]  for your care. Our goal is always to provide you with excellent care. Hearing back from our patients is one way we can continue to improve our services. Please take a few minutes to complete the written survey that you may receive in the mail after your visit with us. Thank you!             Your Updated Medication List - Protect others around you: Learn how to safely use, store and throw away your medicines at www.disposemymeds.org.          This list is accurate as of 7/13/18 12:11 PM.  Always use your most recent med list.                   Brand Name Dispense Instructions for use Diagnosis    albuterol 1.25 MG/3ML nebulizer solution    ACCUNEB     Take 1 vial by nebulization 3 times daily        Cranberry Extract 250 MG Tabs      Take 250 mg by mouth daily        ferrous sulfate 325 (65 Fe) MG tablet    IRON    100 tablet    Take 1 tablet (325 mg) by mouth 2 times daily    Iron deficiency       gabapentin 100 MG capsule    NEURONTIN    270 capsule    TAKE 1 CAPSULE(100 MG) BY MOUTH THREE TIMES DAILY    Midline thoracic back pain, unspecified chronicity       hydrALAZINE 25 MG tablet    APRESOLINE    810 tablet    Take 3 tablets (75 mg) by mouth 3 times daily    Hyperlipidemia, unspecified hyperlipidemia type, Cardiomyopathy, unspecified type (H)       isosorbide mononitrate 30 MG 24 hr tablet    IMDUR    90 tablet    Take 2 tablets (60 mg) by mouth daily    Essential hypertension with goal blood pressure less than 140/90, Coronary artery disease involving native heart with angina pectoris, unspecified vessel or lesion type (H)       OMEGA-3 FATTY ACIDS PO      Take 1,200 mg by mouth daily        omeprazole 20 MG CR capsule    priLOSEC    90 capsule    Take 1 capsule (20 mg) by mouth daily    Gastroesophageal reflux disease  without esophagitis       senna-docusate 8.6-50 MG per tablet    SENOKOT-S;PERICOLACE    30 tablet    Take 2 tablets daily as needed for constipation while taking prescription pain medications.    S/P foot surgery, right       simvastatin 10 MG tablet    ZOCOR    90 tablet    Take 1 tablet (10 mg) by mouth At Bedtime    Hyperlipidemia, unspecified hyperlipidemia type       spironolactone 50 MG tablet    ALDACTONE    30 tablet    Take 1 tablet (50 mg) by mouth daily    Acute on chronic diastolic congestive heart failure (H)       torsemide 20 MG tablet    DEMADEX    180 tablet    Take 1 tablet (20 mg) by mouth 2 times daily    Chronic diastolic congestive heart failure (H)       TYLENOL 325 MG tablet   Generic drug:  acetaminophen      Take 650 mg by mouth 3 times daily as needed for mild pain        venlafaxine 75 MG 24 hr capsule    EFFEXOR-XR    90 capsule    TAKE ONE CAPSULE BY MOUTH DAILY    Major depressive disorder, recurrent episode, in partial remission (H)       VITAMIN D (CHOLECALCIFEROL) PO      Take 1,000 Units by mouth daily

## 2018-07-13 NOTE — PROGRESS NOTES
Enrolled pt into tel assurance per Suad Murrell PA-C. Instructions reviewed with pt and daughter.   Charity Garcia RN 12:07 PM 07/13/18

## 2018-07-16 ENCOUNTER — TELEPHONE (OUTPATIENT)
Dept: PODIATRY | Facility: CLINIC | Age: 83
End: 2018-07-16

## 2018-07-16 NOTE — TELEPHONE ENCOUNTER
Kerri CYR with [facility]  [phone]      Recommended she be seen by Podiatry either today d/t concerns of left foot ulcer.     Patient has Temp of  99.1, left foot is more swollen, with drainage, and odorous.    OV scheduled for tomorrow.     Gabbie CIFUENTES RN, BSN, PHN  Fortunajose Dooley RN

## 2018-07-17 ENCOUNTER — OFFICE VISIT (OUTPATIENT)
Dept: PODIATRY | Facility: CLINIC | Age: 83
End: 2018-07-17
Payer: COMMERCIAL

## 2018-07-17 VITALS
BODY MASS INDEX: 26.58 KG/M2 | SYSTOLIC BLOOD PRESSURE: 106 MMHG | WEIGHT: 150 LBS | HEIGHT: 63 IN | DIASTOLIC BLOOD PRESSURE: 58 MMHG

## 2018-07-17 DIAGNOSIS — L97.521 SKIN ULCER OF LEFT FOOT, LIMITED TO BREAKDOWN OF SKIN (H): Primary | ICD-10-CM

## 2018-07-17 PROCEDURE — 97597 DBRDMT OPN WND 1ST 20 CM/<: CPT | Performed by: PODIATRIST

## 2018-07-17 PROCEDURE — 99213 OFFICE O/P EST LOW 20 MIN: CPT | Mod: 25 | Performed by: PODIATRIST

## 2018-07-17 NOTE — PATIENT INSTRUCTIONS
Thank you for choosing [facility] / [facility]le Surgery!    DR. ALEXANDER'S CLINIC LOCATIONS:   MONDAY - [place] TUESDAY - [place]   [address] [facility]  [address]   [place] [address] [place] [address]   [phone]       THURSDAY AM - [place] THURSDAY PM - [place]   [address] [facility] [address]   [place] [address] [place], [place] [address] [phone]       FRIDAY AM - [place] SET UP SURGERY: [phone] [address] APPOINTMENTS: [phone]   [place] [address] BILLING QUESTIONS: [phone] FAX NUMBER: [phone]     Follow Up: As needed      Body Mass Index (BMI)  Many things can cause foot and ankle problems. Foot structure, activity level, foot mechanics and injuries are common causes of pain. One very important issue that often goes unmentioned, is body weight. Extra weight can cause increased stress on muscles, ligaments, bones and tendons. Sometimes just a few extra pounds is all it takes to put one over her/his threshold. Without reducing that stress, it can be difficult to alleviate pain. Some people are uncomfortable addressing this issue, but we feel it is important for you to think about it. As Foot &  Ankle specialists, our job is addressing the lower extremity problem and possible causes. Regarding extra body weight, we encourage patients to discuss diet and weight management plans with their primary care doctors. It is this team approach that gives you the best opportunity for pain relief and getting you back on your feet.

## 2018-07-17 NOTE — PROGRESS NOTES
"Foot & Ankle Surgery   July 17, 2018    S:  Pt is seen today for evaluation of painful plantar L arch.  It's been more painful than normal.  Home RN told her that she's running a temperature although she's not feeling ill.      Vitals:    07/17/18 1135   BP: 106/58   Weight: 150 lb (68 kg)   Height: 5' 3\" (1.6 m)   '      ROS - Pos for CC.  Patient denies current nausea, vomiting, chills, fevers, belly pain, calf pain, chest pain or SOB.  Complete remainder of ROS it otherwise neg.      PE:  Gen:   No apparent distress  Eye:    Visual scanning without deficit  Ear:    Response to auditory stimuli wnl  Lung:    Non-labored breathing on RA noted  Abd:    NTND per patient report  Lymph:    Neg for pitting/non-pitting edema BLE  Vasc:    Pulses palpable, CFT minimally delayed  Neuro:    Light touch sensation diminished  Derm:    Large callus/eschar with partial thickness wound 11 x 6mm without SOI  MSK:    Bony prominence plantar medial L arch  Calf:    Neg for redness, swelling or tenderness    Assessment:  86 year old female with painful partial thickness wound L arch      Plan:  Discussed etiologies, anatomy and options  1.  Painful partial thickness ulcer plantar L arch 2/2 to bony prominence  -daily cares - wash/dry, abx ointment/island bandage  -Excisional debridement was performed, partial-thickness(limited to skin breakdown, no exposed subcutaneous fat), sharply debriding the wound, excising nonviable tissue to the above dimensions with a 15 blade  -Orthotic Lab referral for DH2 shoe  -we briefly discussed surgery to excise the bony prominence.  However, if we can control pain and prevent tissue breakdown with an orthotic, this would be ideal    Follow up:  2 weeks or sooner with acute issues      Body mass index is 26.57 kg/(m^2).           Lee Jang DPM FACFAS FACFAOM  Podiatric Foot & Ankle Surgeon  [facility]  [phone]    "

## 2018-07-17 NOTE — MR AVS SNAPSHOT
After Visit Summary   7/17/2018    Keisha Godinez    MRN: [id number]           Patient Information     Date Of Birth          1/25/1932        Visit Information        Provider Department      7/17/2018 11:30 AM Lee Alexander DPM [facility]        Today's Diagnoses     Skin ulcer of left foot, limited to breakdown of skin (H)    -  1      Care Instructions    Thank you for choosing [facility] / [facility]!    DR. ALEXANDER'S CLINIC LOCATIONS:   MONDAY - [place] TUESDAY - [place]   [address] [facility]  [address]   [place] [address] [place] [address]   [phone]       THURSDAY AM - [place] THURSDAY PM - [place]   [address]   [place] [address] [place] [address]   [phone]       FRIDAY AM - [place] SET UP SURGERY: [phone] [address] APPOINTMENTS: [phone]   [place] [address] BILLING QUESTIONS: [phone] FAX NUMBER: [phone]     Follow Up: As needed      Body Mass Index (BMI)  Many things can cause foot and ankle problems. Foot structure, activity level, foot mechanics and injuries are common causes of pain. One very important issue that often goes unmentioned, is body weight. Extra weight can cause increased stress on muscles, ligaments, bones and tendons. Sometimes just a few extra pounds is all it takes to put one over her/his threshold. Without reducing that stress, it can be difficult to alleviate pain. Some people are uncomfortable addressing this issue, but we feel it is important for you to think about it. As Foot &  Ankle specialists, our job is addressing the lower extremity problem and possible causes. Regarding extra body weight, we encourage patients to discuss diet and weight management plans with their primary care doctors. It is this team approach that gives you the best opportunity for pain relief and getting  you back on your feet.            Follow-ups after your visit        Additional Services     ORTHOTICS REFERRAL       Please be aware that coverage of these services is subject to the terms and limitations of your health insurance plan.  Call member services at your health plan with any benefit or coverage questions.      Please bring the following to your appointment:    >>   Any x-rays, CTs or MRIs which have been performed.  Contact the facility where they were done to arrange for  prior to your scheduled appointment.  Any new CT, MRI or other procedures ordered by your specialist must be performed at a [facility] facility or coordinated by your clinic's referral office.    >>   List of current medications   >>   This referral request   >>   Any documents/labs given to you for this referral    ==This Referral PRINTS in the [facility] (ORTHOTICS & PROSTHETICS) Central scheduling office ==     The [facility] Scheduling staff will contact patient to arrange appointments. Central Scheduling Phone #:  HAIM Henderson  [phone]     DH2 shoe for L arch ulcer                  Your next 10 appointments already scheduled     Jul 20, 2018  1:00 PM CDT   LAB with RU LAB   [facility] ([facility])    [address] [place] [id number] [phone]           Please do not eat 10-12 hours before your appointment if you are coming in fasting for labs on lipids, cholesterol, or glucose (sugar). This does not apply to pregnant women. Water, hot tea and black coffee (with nothing added) are okay. Do not drink other fluids, diet soda or chew gum.            Aug 03, 2018  1:10 PM CDT   Core Return with NATHAN Valentin   [facility] ([facility])    [address]  [facility] [id number] [phone]              Who to contact     If you have questions or need  "follow up information about today's clinic visit or your schedule please contact [facility] directly at [phone].  Normal or non-critical lab and imaging results will be communicated to you by MyChart, letter or phone within 4 business days after the clinic has received the results. If you do not hear from us within 7 days, please contact the clinic through MyChart or phone. If you have a critical or abnormal lab result, we will notify you by phone as soon as possible.  Submit refill requests through [employer] or call your pharmacy and they will forward the refill request to us. Please allow 3 business days for your refill to be completed.          Additional Information About Your Visit        Care EveryWhere ID     This is your Care EveryWhere ID. This could be used by other organizations to access your [facility] medical records  [id number]        Your Vitals Were     Height BMI (Body Mass Index)                5' 3\" (1.6 m) 26.57 kg/m2           Blood Pressure from Last 3 Encounters:   07/17/18 106/58   07/13/18 132/44   07/10/18 126/74    Weight from Last 3 Encounters:   07/17/18 150 lb (68 kg)   07/13/18 150 lb 4.8 oz (68.2 kg)   07/10/18 150 lb (68 kg)              We Performed the Following     ORTHOTICS REFERRAL        Primary Care Provider Office Phone # Fax #    Gerri Eubanks [phone]       [address] [facility] [address]  [facility] [address]        Equal Access to Services     [facility] Center[facility]ECKY AH: Hadii thais ku hadasho Soomaali, waaxda luqadaha, qaybta kaalmada miladyakartik, sharlene spain [facility] Cent[facility]riselda torre . So [facility] [phone].    ATENCIÓN: Si habla ellenañol, tiene a rosas disposición servicios gratuitos de asistencia lingüística. Janay al [phone].    We comply with applicable federal civil rights laws and [place] laws. We do not discriminate on the basis of race, color, national origin, age, disability, sex, sexual orientation, or gender identity.          "   Thank you!     Thank you for choosing [facility]  for your care. Our goal is always to provide you with excellent care. Hearing back from our patients is one way we can continue to improve our services. Please take a few minutes to complete the written survey that you may receive in the mail after your visit with us. Thank you!             Your Updated Medication List - Protect others around you: Learn how to safely use, store and throw away your medicines at www.disposemymeds.org.          This list is accurate as of 7/17/18 11:57 AM.  Always use your most recent med list.                   Brand Name Dispense Instructions for use Diagnosis    albuterol 1.25 MG/3ML nebulizer solution    ACCUNEB     Take 1 vial by nebulization 3 times daily        Cranberry Extract 250 MG Tabs      Take 250 mg by mouth daily        ferrous sulfate 325 (65 Fe) MG tablet    IRON    100 tablet    Take 1 tablet (325 mg) by mouth 2 times daily    Iron deficiency       gabapentin 100 MG capsule    NEURONTIN    270 capsule    TAKE 1 CAPSULE(100 MG) BY MOUTH THREE TIMES DAILY    Midline thoracic back pain, unspecified chronicity       hydrALAZINE 25 MG tablet    APRESOLINE    810 tablet    Take 3 tablets (75 mg) by mouth 3 times daily    Hyperlipidemia, unspecified hyperlipidemia type, Cardiomyopathy, unspecified type (H)       isosorbide mononitrate 30 MG 24 hr tablet    IMDUR    90 tablet    Take 2 tablets (60 mg) by mouth daily    Essential hypertension with goal blood pressure less than 140/90, Coronary artery disease involving native heart with angina pectoris, unspecified vessel or lesion type (H)       OMEGA-3 FATTY ACIDS PO      Take 1,200 mg by mouth daily        omeprazole 20 MG CR capsule    priLOSEC    90 capsule    Take 1 capsule (20 mg) by mouth daily    Gastroesophageal reflux disease without esophagitis       senna-docusate 8.6-50 MG per tablet    SENOKOT-S;PERICOLACE    30 tablet    Take 2 tablets daily as  needed for constipation while taking prescription pain medications.    S/P foot surgery, right       simvastatin 10 MG tablet    ZOCOR    90 tablet    Take 1 tablet (10 mg) by mouth At Bedtime    Hyperlipidemia, unspecified hyperlipidemia type       spironolactone 50 MG tablet    ALDACTONE    30 tablet    Take 1 tablet (50 mg) by mouth daily    Acute on chronic diastolic congestive heart failure (H)       torsemide 20 MG tablet    DEMADEX    180 tablet    Take 1 tablet (20 mg) by mouth 2 times daily    Chronic diastolic congestive heart failure (H)       TYLENOL 325 MG tablet   Generic drug:  acetaminophen      Take 650 mg by mouth 3 times daily as needed for mild pain        venlafaxine 75 MG 24 hr capsule    EFFEXOR-XR    90 capsule    TAKE ONE CAPSULE BY MOUTH DAILY    Major depressive disorder, recurrent episode, in partial remission (H)       VITAMIN D (CHOLECALCIFEROL) PO      Take 1,000 Units by mouth daily

## 2018-07-20 ENCOUNTER — TELEPHONE (OUTPATIENT)
Dept: CARDIOLOGY | Facility: CLINIC | Age: 83
End: 2018-07-20

## 2018-07-20 DIAGNOSIS — I50.32 CHRONIC DIASTOLIC CONGESTIVE HEART FAILURE (H): ICD-10-CM

## 2018-07-20 LAB
ANION GAP SERPL CALCULATED.3IONS-SCNC: 6 MMOL/L (ref 3–14)
BUN SERPL-MCNC: 27 MG/DL (ref 7–30)
CALCIUM SERPL-MCNC: 8.5 MG/DL (ref 8.5–10.1)
CHLORIDE SERPL-SCNC: 99 MMOL/L (ref 94–109)
CO2 SERPL-SCNC: 30 MMOL/L (ref 20–32)
CREAT SERPL-MCNC: 1.04 MG/DL (ref 0.52–1.04)
GFR SERPL CREATININE-BSD FRML MDRD: 50 ML/MIN/1.7M2
GLUCOSE SERPL-MCNC: 111 MG/DL (ref 70–99)
NT-PROBNP SERPL-MCNC: 3530 PG/ML (ref 0–450)
POTASSIUM SERPL-SCNC: 4.2 MMOL/L (ref 3.4–5.3)
SODIUM SERPL-SCNC: 135 MMOL/L (ref 133–144)

## 2018-07-20 PROCEDURE — 36415 COLL VENOUS BLD VENIPUNCTURE: CPT | Performed by: INTERNAL MEDICINE

## 2018-07-20 PROCEDURE — 83880 ASSAY OF NATRIURETIC PEPTIDE: CPT | Performed by: INTERNAL MEDICINE

## 2018-07-20 PROCEDURE — 80048 BASIC METABOLIC PNL TOTAL CA: CPT | Performed by: INTERNAL MEDICINE

## 2018-07-20 NOTE — TELEPHONE ENCOUNTER
----- Message from NATHAN Valentin sent at 7/20/2018  3:22 PM CDT -----  Regarding: labs/weight  Can you get a weight update on her for me?  Labs are stable, look about the same on the torsemide as they did before. If her weight is still up, I have to go up on the torsemide if needed.    Thanks  Suad

## 2018-07-20 NOTE — TELEPHONE ENCOUNTER
Wts have been stable at 146# x3 days, today wt is 147#. Attempted to call pt with no answer.

## 2018-07-23 ENCOUNTER — CARE COORDINATION (OUTPATIENT)
Dept: CARDIOLOGY | Facility: CLINIC | Age: 83
End: 2018-07-23

## 2018-07-23 ENCOUNTER — DOCUMENTATION ONLY (OUTPATIENT)
Dept: CARE COORDINATION | Facility: CLINIC | Age: 83
End: 2018-07-23

## 2018-07-23 NOTE — PROGRESS NOTES
[facility] Heart- CORE Clinic Telemanagment     Alert for: Wt below parameters  Heart Failure sx:   Current daily diuretic: Torsemide 20 mg BID, Spirolactone 50 mg QD  Current PRN diuretic plan:   Next follow up: 8/3 w/Suad Murrell PA-C     VM left for patient            Charity Garcia, RN 1:06 PM 07/23/18

## 2018-07-23 NOTE — PROGRESS NOTES
[facility] now requests orders and shares plan of care/discharge summaries for some patients through [employer].  Please REPLY TO THIS MESSAGE OR ROUTE BACK TO THE AUTHOR in order to give authorization for orders when needed.  This is considered a verbal order, you will still receive a faxed copy of orders for signature.  Thank you for your assistance in improving collaboration for our patients.    ORDER   snv 1 x week x 4 weeks, 2 prn, SW eval and treat, OT for continued lymphedema therapy      SUMMARY TO MD HERCULES...Client is an 86 year old female who remains on homecare for lymphedema therapy, CHF teaching and assessment and wound care to left plantar foot. She was hospitalized on 6/18 for acute hypoxemic respiratory failure, acute on chronic diastolic CHF, pulmonary hypertension and lymphedema with a chronic left foot wound from a plantar bunyon. She has not been hospitalized since. She is working with OT for lymphedema therapy on bilateral lower legs with improvements being made. Her weight is down to 143.4 today from 150.4 when she returned from the hospital. Currently she is on 1lpm of oxygen continuous and hopes to wean off and only use it at night. Her torsemide was increased to 20mg twice daily. She is compliant with medications.   VS... /50, P 50, r 16, SPO2 94%, lungs clear  WOUND DESCRIPTION AND MEASUREMENTS  0.8cm by 0.8cm area on a left plantar bunyon, small amount of bloody drainage on old bandage. Being treated with an allevyn dressing prior to compression wraps being applied. Area is tender to touch with no signs of infection. She is going in to get an offloading shoe this afternoon. Skin on legs dry and scaly. Lotion applied before wraps.   PHYSICAL PSYCHOSOCIAL IMPAIRMENT OR LIMITATIONS  Client alert and oriented. Cooperative.   NUTRITION CONCERNS... Reports adequate appetite. Adhering to a low salt diet. She does get meals on wheels.   HOME ENVIRONMENT AFFECTING CARE...  Lives in own home, all needs on one level. Cluttered.  CAREGIVER SUPPORT... Lives with daughter who is primary caregiver. She works overnight and is home most of when client is awake. She provides transportation, med set up, ADLs and IADL assist.   BACKGROUND... Medical history includes lymphedema, CHF, pulmonary HTN, atrial fib, hx of gi bleed, gout, anemia.  ANALYSIS.. Client at risk due to multiple chronic illnesses.  RECOMMENDATION Recommendation is skilled nursing for assessment and teaching of CHF management, lymphedema and wound assessment. OT for lymphedema therapy. THank you for this referral.   For RCT only, Estimated Length of Home Care Need ... 4 weeks

## 2018-07-24 ENCOUNTER — TELEPHONE (OUTPATIENT)
Dept: INTERNAL MEDICINE | Facility: CLINIC | Age: 83
End: 2018-07-24

## 2018-07-24 NOTE — TELEPHONE ENCOUNTER
Dora, occupational therapy lymphedema therapist calling. Recert period, asking for renewal of occupational therapy lymphedema orders starting 7/25/18 to include 2 visits this week, one visit next wk and 2 visits the following week.  Order approved per RN protocol.  NIMO Law R.N.

## 2018-07-25 ENCOUNTER — CARE COORDINATION (OUTPATIENT)
Dept: CARDIOLOGY | Facility: CLINIC | Age: 83
End: 2018-07-25

## 2018-07-25 ENCOUNTER — TELEPHONE (OUTPATIENT)
Dept: INTERNAL MEDICINE | Facility: CLINIC | Age: 83
End: 2018-07-25

## 2018-07-25 DIAGNOSIS — I50.42 CHRONIC COMBINED SYSTOLIC AND DIASTOLIC CONGESTIVE HEART FAILURE (H): Primary | ICD-10-CM

## 2018-07-25 DIAGNOSIS — R06.02 SOB (SHORTNESS OF BREATH): ICD-10-CM

## 2018-07-25 NOTE — PROGRESS NOTES
[facility] Heart- CORE Clinic Telemanagment     Alert for: Wt below parameters  Heart Failure sx: None   Current daily diuretic: Torsemide 20 mg BID, Spirolactone 50 mg QD  Next follow up: 8/3 w/PEARL Valentin left for pt

## 2018-07-25 NOTE — TELEPHONE ENCOUNTER
Fax received from [facility] for review and signature.  Put in Dr. Reed's in basket.

## 2018-07-31 ENCOUNTER — NURSE TRIAGE (OUTPATIENT)
Dept: NURSING | Facility: CLINIC | Age: 83
End: 2018-07-31

## 2018-07-31 ENCOUNTER — TELEPHONE (OUTPATIENT)
Dept: ORTHOPEDICS | Facility: CLINIC | Age: 83
End: 2018-07-31

## 2018-07-31 ENCOUNTER — OFFICE VISIT (OUTPATIENT)
Dept: PODIATRY | Facility: CLINIC | Age: 83
End: 2018-07-31
Payer: COMMERCIAL

## 2018-07-31 VITALS
BODY MASS INDEX: 26.58 KG/M2 | WEIGHT: 150 LBS | SYSTOLIC BLOOD PRESSURE: 110 MMHG | HEIGHT: 63 IN | DIASTOLIC BLOOD PRESSURE: 60 MMHG

## 2018-07-31 DIAGNOSIS — L03.119 CELLULITIS AND ABSCESS OF FOOT, EXCEPT TOES: Primary | ICD-10-CM

## 2018-07-31 DIAGNOSIS — L97.521 SKIN ULCER OF LEFT FOOT, LIMITED TO BREAKDOWN OF SKIN (H): ICD-10-CM

## 2018-07-31 DIAGNOSIS — L02.619 CELLULITIS AND ABSCESS OF FOOT, EXCEPT TOES: Primary | ICD-10-CM

## 2018-07-31 PROCEDURE — 97597 DBRDMT OPN WND 1ST 20 CM/<: CPT | Performed by: PODIATRIST

## 2018-07-31 PROCEDURE — 99213 OFFICE O/P EST LOW 20 MIN: CPT | Mod: 25 | Performed by: PODIATRIST

## 2018-07-31 RX ORDER — CLINDAMYCIN HCL 150 MG
150 CAPSULE ORAL 3 TIMES DAILY
Qty: 30 CAPSULE | Refills: 0 | Status: ON HOLD | OUTPATIENT
Start: 2018-07-31 | End: 2019-08-08

## 2018-07-31 NOTE — LETTER
"    7/31/2018         RE: Keisha Godinez  [address] [place] [address]        Dear Colleague,    Thank you for referring your patient, Keisha Godinez, to the [facility]. Please see a copy of my visit note below.    Foot & Ankle Surgery   July 31, 2018    S:  Pt is seen today for evaluation of L midfoot ulcer 2/2 to bony prominence.  Last visit, they were given an order for a DH2 shoe via Orthotic lab.  It sounds like she hasn't been using it much.  The area is more red and swollen than on the last visit.    Vitals:    07/31/18 1116   BP: 110/60   Weight: 150 lb (68 kg)   Height: 5' 3\" (1.6 m)   '      ROS - Pos for CC.  Patient denies current nausea, vomiting, chills, fevers, belly pain, calf pain, chest pain or SOB.  Complete remainder of ROS it otherwise neg.      PE:  Gen:   No apparent distress  Eye:    Visual scanning without deficit  Ear:    Response to auditory stimuli wnl  Lung:    Non-labored breathing on RA noted  Abd:    NTND per patient report  Lymph:    Neg for pitting/non-pitting edema BLE  Vasc:    Pulses palpable, CFT minimally delayed  Neuro:    Light touch sensation diminished  Derm:   Large callus/macerated skin mass.  The wound is crescent moon shaped, 11 x 7mm in greatest dimenstions, partial thickness. Localized inflammation and redness, tender to palpation  MSK:    L foot bony prominence over the arch  Calf:    Neg for redness, swelling or tenderness      Assessment:  86 year old female with partial thickness ulcer iwht cellulitis 2/2 to bony prominence      Plan:  Discussed etiologies, anatomy and options  1.  Partial thickness ulcer with cellulitis L foot 2/2 to bony prominence  -Excisional debridement was performed, partial-thickness(limited to skin breakdown, no exposed subcutaneous fat), sharply debriding the wound, excising nonviable tissue to the above dimensions with a tissue nipper  -daily cares - wash/dry, abx ointment/bandaid  -continue DH2 " shoe; advised she not stand/bear weight on the foot without the boot  -Rx for Clindamycin; no previous cultures, MRSA testing neg  -follow up 1 week  -discussed surgery to remove bony prominence.  Ideally we wait until the wound has healed.       Follow up:  1 week or sooner with acute issues      Body mass index is 26.57 kg/(m^2).           Lee Jang DPM [facility] FACFAOM  Podiatric Foot & Ankle Surgeon  [facility]  [phone]      Again, thank you for allowing me to participate in the care of your patient.        Sincerely,        Lee Jang DPM, DPMARVEL

## 2018-07-31 NOTE — TELEPHONE ENCOUNTER
Dora from [facility] care called and said that Patient was seen in our office today and now being treated for Left foot cellulitis. Dora is wondering if she should HOLD on her treatment of massage and wrapping with the new diagnosis or continue with PT treatments?   At the time of call, Patient had had one dose of anti-biotics.    Please call Dora to advise on treatment.  [phone]  Brit Nichols ATC

## 2018-07-31 NOTE — PROGRESS NOTES
"Foot & Ankle Surgery   July 31, 2018    S:  Pt is seen today for evaluation of L midfoot ulcer 2/2 to bony prominence.  Last visit, they were given an order for a DH2 shoe via Orthotic lab.  It sounds like she hasn't been using it much.  The area is more red and swollen than on the last visit.    Vitals:    07/31/18 1116   BP: 110/60   Weight: 150 lb (68 kg)   Height: 5' 3\" (1.6 m)   '      ROS - Pos for CC.  Patient denies current nausea, vomiting, chills, fevers, belly pain, calf pain, chest pain or SOB.  Complete remainder of ROS it otherwise neg.      PE:  Gen:   No apparent distress  Eye:    Visual scanning without deficit  Ear:    Response to auditory stimuli wnl  Lung:    Non-labored breathing on RA noted  Abd:    NTND per patient report  Lymph:    Neg for pitting/non-pitting edema BLE  Vasc:    Pulses palpable, CFT minimally delayed  Neuro:    Light touch sensation diminished  Derm:   Large callus/macerated skin mass.  The wound is crescent moon shaped, 11 x 7mm in greatest dimenstions, partial thickness. Localized inflammation and redness, tender to palpation  MSK:    L foot bony prominence over the arch  Calf:    Neg for redness, swelling or tenderness      Assessment:  86 year old female with partial thickness ulcer iwht cellulitis 2/2 to bony prominence      Plan:  Discussed etiologies, anatomy and options  1.  Partial thickness ulcer with cellulitis L foot 2/2 to bony prominence  -Excisional debridement was performed, partial-thickness(limited to skin breakdown, no exposed subcutaneous fat), sharply debriding the wound, excising nonviable tissue to the above dimensions with a tissue nipper  -daily cares - wash/dry, abx ointment/bandaid  -continue DH2 shoe; advised she not stand/bear weight on the foot without the boot  -Rx for Clindamycin; no previous cultures, MRSA testing neg  -follow up 1 week  -discussed surgery to remove bony prominence.  Ideally we wait until the wound has healed.       Follow up: "  1 week or sooner with acute issues      Body mass index is 26.57 kg/(m^2).           Lee Jang DPM FACFAS FACFAOM  Podiatric Foot & Ankle Surgeon  [facility]  [phone]

## 2018-07-31 NOTE — TELEPHONE ENCOUNTER
Pt's home care nurse Dora (ph # [phone]) calling to clarify directions from Dr. Jang from pt's visit today. The 's note in EPIC is incomplete, so provided her with the ph # for the [facility] that is located out of the ortho dept ph # [phone].     Protocol and care advice reviewed.  Dora will call the Podiatry ph # to speak with Dr. Jang's staff.  Caller states understanding of the recommended disposition.   Advised to call back if further questions or concerns.      Reason for Disposition    [1] Follow-up call from patient regarding patient's clinical status AND [2] information NON-URGENT    Additional Information    Medication questions    Protocols used: PCP CALL - NO TRIAGE-ADULT-

## 2018-07-31 NOTE — MR AVS SNAPSHOT
After Visit Summary   7/31/2018    Keisha Godinez    MRN: [id number]           Patient Information     Date Of Birth          1/25/1932        Visit Information        Provider Department      7/31/2018 10:45 AM Lee Jang DPM [facility] PODIATRY        Today's Diagnoses     Cellulitis and abscess of foot, except toes    -  1       Follow-ups after your visit        Your next 10 appointments already scheduled     Aug 03, 2018 12:15 PM CDT   LAB with RU LAB   [facility] ([facility])    [address] [facility] [address]  [facility] [address]   [phone]           Please do not eat 10-12 hours before your appointment if you are coming in fasting for labs on lipids, cholesterol, or glucose (sugar). This does not apply to pregnant women. Water, hot tea and black coffee (with nothing added) are okay. Do not drink other fluids, diet soda or chew gum.            Aug 03, 2018  1:10 PM CDT   Core Return with NATHAN Valentin   [facility] ([facility])    [address] [place] [address]  [facility] [address] [phone]            Aug 07, 2018  2:00 PM CDT   Return Visit with Lee Jang DPM   [facility] P[facility]IAT[facility] ([facility])    [address] [place] [address]   [phone]              Who to contact     If you have questions or need follow up information about today's clinic visit or your schedule please contact [facility] POD[facility] directly at [phone].  Normal or non-critical lab and imaging results will be communicated to you by MyChart, letter or phone within 4 business days after the clinic has received the results. If you do not hear from us within 7 days, please contact the clinic through MyChart or phone. If you have a critical or abnormal lab result, we will notify you by phone as soon as possible.  Submit refill  "requests through [employer] or call your pharmacy and they will forward the refill request to us. Please allow 3 business days for your refill to be completed.          Additional Information About Your Visit        Care EveryWhere ID     This is your Care EveryWhere ID. This could be used by other organizations to access your [facility] medical records  [id number]        Your Vitals Were     Height BMI (Body Mass Index)                5' 3\" (1.6 m) 26.57 kg/m2           Blood Pressure from Last 3 Encounters:   07/31/18 110/60   07/17/18 106/58   07/13/18 132/44    Weight from Last 3 Encounters:   07/31/18 150 lb (68 kg)   07/17/18 150 lb (68 kg)   07/13/18 150 lb 4.8 oz (68.2 kg)              Today, you had the following     No orders found for display         Today's Medication Changes          These changes are accurate as of 7/31/18 11:40 AM.  If you have any questions, ask your nurse or doctor.               Start taking these medicines.        Dose/Directions    clindamycin 150 MG capsule   Commonly known as:  CLEOCIN   Used for:  Cellulitis and abscess of foot, except toes   Started by:  Lee Jang DPM        Dose:  150 mg   Take 1 capsule (150 mg) by mouth 3 times daily   Quantity:  30 capsule   Refills:  0            Where to get your medicines      These medications were sent to [facility] [place] [id number]     Phone:  [phone]     clindamycin 150 MG capsule                Primary Care Provider Office Phone # Fax #    Gerri Eubanks [phone]       [address] [facility] [address]  [facility] [address]        Equal Access to Services     [facility] : Hadii thais linares Soinocente, waaxda luqadaha, qaybta kaalmasharlene coe. So [facility] [phone].    ATENCIÓN: Si habla español, tiene a rosas disposición servicios gratuitos de asistencia lingüística. " Janay tovar [phone].    We comply with applicable federal civil rights laws and [place] laws. We do not discriminate on the basis of race, color, national origin, age, disability, sex, sexual orientation, or gender identity.            Thank you!     Thank you for choosing [facility] PODIATRY  for your care. Our goal is always to provide you with excellent care. Hearing back from our patients is one way we can continue to improve our services. Please take a few minutes to complete the written survey that you may receive in the mail after your visit with us. Thank you!             Your Updated Medication List - Protect others around you: Learn how to safely use, store and throw away your medicines at www.disposemymeds.org.          This list is accurate as of 7/31/18 11:40 AM.  Always use your most recent med list.                   Brand Name Dispense Instructions for use Diagnosis    albuterol 1.25 MG/3ML nebulizer solution    ACCUNEB     Take 1 vial by nebulization 3 times daily        clindamycin 150 MG capsule    CLEOCIN    30 capsule    Take 1 capsule (150 mg) by mouth 3 times daily    Cellulitis and abscess of foot, except toes       Cranberry Extract 250 MG Tabs      Take 250 mg by mouth daily        ferrous sulfate 325 (65 Fe) MG tablet    IRON    100 tablet    Take 1 tablet (325 mg) by mouth 2 times daily    Iron deficiency       gabapentin 100 MG capsule    NEURONTIN    270 capsule    TAKE 1 CAPSULE(100 MG) BY MOUTH THREE TIMES DAILY    Midline thoracic back pain, unspecified chronicity       hydrALAZINE 25 MG tablet    APRESOLINE    810 tablet    Take 3 tablets (75 mg) by mouth 3 times daily    Hyperlipidemia, unspecified hyperlipidemia type, Cardiomyopathy, unspecified type (H)       isosorbide mononitrate 30 MG 24 hr tablet    IMDUR    90 tablet    Take 2 tablets (60 mg) by mouth daily    Essential hypertension with goal blood pressure less than 140/90, Coronary artery disease involving native  heart with angina pectoris, unspecified vessel or lesion type (H)       OMEGA-3 FATTY ACIDS PO      Take 1,200 mg by mouth daily        omeprazole 20 MG CR capsule    priLOSEC    90 capsule    Take 1 capsule (20 mg) by mouth daily    Gastroesophageal reflux disease without esophagitis       senna-docusate 8.6-50 MG per tablet    SENOKOT-S;PERICOLACE    30 tablet    Take 2 tablets daily as needed for constipation while taking prescription pain medications.    S/P foot surgery, right       simvastatin 10 MG tablet    ZOCOR    90 tablet    Take 1 tablet (10 mg) by mouth At Bedtime    Hyperlipidemia, unspecified hyperlipidemia type       spironolactone 50 MG tablet    ALDACTONE    30 tablet    Take 1 tablet (50 mg) by mouth daily    Acute on chronic diastolic congestive heart failure (H)       torsemide 20 MG tablet    DEMADEX    180 tablet    Take 1 tablet (20 mg) by mouth 2 times daily    Chronic diastolic congestive heart failure (H)       TYLENOL 325 MG tablet   Generic drug:  acetaminophen      Take 650 mg by mouth 3 times daily as needed for mild pain        venlafaxine 75 MG 24 hr capsule    EFFEXOR-XR    90 capsule    TAKE ONE CAPSULE BY MOUTH DAILY    Major depressive disorder, recurrent episode, in partial remission (H)       VITAMIN D (CHOLECALCIFEROL) PO      Take 1,000 Units by mouth daily

## 2018-07-31 NOTE — PROGRESS NOTES
"Foot & Ankle Surgery   July 31, 2018    S:  Pt is seen today for evaluation of ***.    Vitals:    07/31/18 1116   BP: 110/60   Weight: 150 lb (68 kg)   Height: 5' 3\" (1.6 m)   '      ROS - Pos for CC.  Patient denies current nausea, vomiting, chills, fevers, belly pain, calf pain, chest pain or SOB.  Complete remainder of ROS it otherwise neg.  ***    PE:  Gen:   No apparent distress  Eye:    Visual scanning without deficit  Ear:    Response to auditory stimuli wnl  Lung:    Non-labored breathing on RA noted  Abd:    NTND per patient report  Lymph:    Neg for pitting/non-pitting edema BLE  Vasc:    Pulses palpable, CFT minimally delayed  Neuro:    Light touch sensation intact to all sensory nerve distributions without paresthesias  Derm:    Neg for nodules, lesions or ulcerations  MSK:    ROM, strength wnl without limitation, no pain on palpation noted.  Calf:    Neg for redness, swelling or tenderness  ***    Imaging:  ***    Labs:  ***    Cultures:  ***      Assessment:  86 year old female with ***      Plan:  Discussed etiologies, anatomy and options  1.  ***  -***    Follow up:  *** or sooner with acute issues      Body mass index is 26.57 kg/(m^2).  Weight management plan: Patient was referred to their PCP to discuss a diet and exercise plan.         Lee Jang DPM FACFAS FACFAOM  Podiatric Foot & Ankle Surgeon  [facility]  [phone]  "

## 2018-08-01 ENCOUNTER — CARE COORDINATION (OUTPATIENT)
Dept: CARDIOLOGY | Facility: CLINIC | Age: 83
End: 2018-08-01

## 2018-08-01 NOTE — PROGRESS NOTES
[facility]--C.O.R.E. Clinic Telemanagement    Alert for: weight below parameter  Previous voicemails were left on 7/20, 7/24, and 7/25 by Charity Garcia.  Called patient's daughter's cell phone number. Rebecca said that if her mom doesn't call back, RN should leave a message on her cell phone. She said patient is feeling really well and she denies sx of dehydration. Next office visit 8/3/18 with Suad Murrell PA-C 8/3/18. Will update Suad Murrell PA-C to determine if goal parameter can be lowered.        Darlyn SIMPSON, RN, CHFN  [phone]  C.O.R.E. Clinic  [facility]      Addendum:  8:32 AM    Yes, thanks. Please go ahead and decrease her parameters to 142-150 lbs until I see her back.   Suad Murrell PA-C

## 2018-08-01 NOTE — TELEPHONE ENCOUNTER
I called and spoke with Dora.  Advised holding off on tissue massage but ok to continue with wraps.    Lee Jang DPM FACFAS FACFAOM  Podiatric Foot & Ankle Surgeon  [facility]  [phone]

## 2018-08-02 ENCOUNTER — TELEPHONE (OUTPATIENT)
Dept: CARDIOLOGY | Facility: CLINIC | Age: 83
End: 2018-08-02

## 2018-08-02 NOTE — PROGRESS NOTES
[facility] HF tele-management parameters adjusted to 142-150# goal range per Suad Stephens RN

## 2018-08-02 NOTE — PROGRESS NOTES
Yes, thanks. Please go ahead and decrease her parameters to 142-150 lbs until I see her back.    Suad Murrell PA-C

## 2018-08-02 NOTE — TELEPHONE ENCOUNTER
[facility]-C.O.R.E. Clinic Tele-management    Daily weight graph copied to patient's chart for Suad Murrell PA-C review office visit 8/3/18.          Tony Marti LPN  [facility] C.O.R.E. [facility]  [phone]

## 2018-08-03 ENCOUNTER — OFFICE VISIT (OUTPATIENT)
Dept: CARDIOLOGY | Facility: CLINIC | Age: 83
End: 2018-08-03
Attending: PHYSICIAN ASSISTANT
Payer: COMMERCIAL

## 2018-08-03 ENCOUNTER — CARE COORDINATION (OUTPATIENT)
Dept: CARDIOLOGY | Facility: CLINIC | Age: 83
End: 2018-08-03

## 2018-08-03 VITALS
OXYGEN SATURATION: 93 % | HEART RATE: 52 BPM | HEIGHT: 63 IN | DIASTOLIC BLOOD PRESSURE: 58 MMHG | SYSTOLIC BLOOD PRESSURE: 130 MMHG | BODY MASS INDEX: 26.17 KG/M2 | WEIGHT: 147.7 LBS

## 2018-08-03 DIAGNOSIS — R06.02 SOB (SHORTNESS OF BREATH): ICD-10-CM

## 2018-08-03 DIAGNOSIS — I10 BENIGN ESSENTIAL HYPERTENSION: ICD-10-CM

## 2018-08-03 DIAGNOSIS — I50.42 CHRONIC COMBINED SYSTOLIC AND DIASTOLIC CONGESTIVE HEART FAILURE (H): ICD-10-CM

## 2018-08-03 DIAGNOSIS — I50.32 CHRONIC DIASTOLIC CONGESTIVE HEART FAILURE (H): Primary | ICD-10-CM

## 2018-08-03 LAB
ANION GAP SERPL CALCULATED.3IONS-SCNC: 6 MMOL/L (ref 3–14)
BUN SERPL-MCNC: 29 MG/DL (ref 7–30)
CALCIUM SERPL-MCNC: 8.5 MG/DL (ref 8.5–10.1)
CHLORIDE SERPL-SCNC: 100 MMOL/L (ref 94–109)
CO2 SERPL-SCNC: 31 MMOL/L (ref 20–32)
CREAT SERPL-MCNC: 1.03 MG/DL (ref 0.52–1.04)
GFR SERPL CREATININE-BSD FRML MDRD: 51 ML/MIN/1.7M2
GLUCOSE SERPL-MCNC: 115 MG/DL (ref 70–99)
NT-PROBNP SERPL-MCNC: 2855 PG/ML (ref 0–450)
POTASSIUM SERPL-SCNC: 3.8 MMOL/L (ref 3.4–5.3)
SODIUM SERPL-SCNC: 137 MMOL/L (ref 133–144)

## 2018-08-03 PROCEDURE — 80048 BASIC METABOLIC PNL TOTAL CA: CPT | Performed by: INTERNAL MEDICINE

## 2018-08-03 PROCEDURE — 83880 ASSAY OF NATRIURETIC PEPTIDE: CPT | Performed by: INTERNAL MEDICINE

## 2018-08-03 PROCEDURE — 99214 OFFICE O/P EST MOD 30 MIN: CPT | Performed by: PHYSICIAN ASSISTANT

## 2018-08-03 PROCEDURE — 36415 COLL VENOUS BLD VENIPUNCTURE: CPT | Performed by: INTERNAL MEDICINE

## 2018-08-03 NOTE — MR AVS SNAPSHOT
After Visit Summary   8/3/2018    Keisha Godinez    MRN: [id number]           Patient Information     Date Of Birth          1/25/1932        Visit Information        Provider Department      8/3/2018 1:10 PM Suad Murrell PA [facility]        Today's Diagnoses     Chronic diastolic congestive heart failure (H)        Chronic combined systolic and diastolic congestive heart failure (H)          Care Instructions    Call the C.O.R.E. nurse for any questions or concerns:  [phone]    1.  Medication changes from today:  NONE    2. Follow up plan:  With Suda in 1 month in the CORE clinic, Then to see Dr. Pradhan in 2 months with an echocardiogram.     3.  Weigh yourself daily and write it down.    4.  Call CORE nurse if your weight is up more than 2 pounds in one day or 5 pounds in one week.    5.  Call CORE nurse if you feel more short of breath, have more abdominal bloating, or leg swelling.    6.  Continue low sodium diet (less than 2000 mg daily). If you eat less salt, you will retain less fluid.    7.  Alcohol can weaken your heart further. You should avoid alcohol or limit its use to special times, such as a holiday or birthday.     8.  Do NOT take Aleve (naproxen) or Advil (ibuprofen) without talking to your doctor first.     9.  Lab Results:    Component      Latest Ref Rng & Units 8/3/2018   Sodium      133 - 144 mmol/L 137   Potassium      3.4 - 5.3 mmol/L 3.8   Chloride      94 - 109 mmol/L 100   Carbon Dioxide      20 - 32 mmol/L 31   Anion Gap      3 - 14 mmol/L 6   Glucose      70 - 99 mg/dL 115 (H)   Urea Nitrogen      7 - 30 mg/dL 29   Creatinine      0.52 - 1.04 mg/dL 1.03   GFR Estimate      >60 mL/min/1.7m2 51    GFR Estimate If Black      >60 mL/min/1.7m2 61   Calcium      8.5 - 10.1 mg/dL 8.5   N-Terminal Pro Bnp      0 - 450 pg/mL 2855 (H)     CORE Clinic: Cardiomyopathy, Optimization, Rehabilitation, Education  The CORE Clinic is a  heart failure specialty clinic within the [facility] where you will work with your cardiologist, nurse practitioners, physician assistants and registered nurses who specialize in heart failure care. They are dedicated to helping patients with heart failure to carefully adjust medications, receive education, and learn who and when to call if symptoms develop. They specialize in helping you better understand your condition, slow the progression of your disease, improve the length and quality of your life, help you detect future heart problems before they become life threatening, and avoid hospitalizations.            Follow-ups after your visit        Additional Services     Follow-Up with [facility] - [place] visit                 Your next 10 appointments already scheduled     Aug 07, 2018  2:00 PM CDT   Return Visit with Lee Jang DPM   [facility] ([facility])    [address] [place] [address]  [facility] [address]   [phone]            Aug 31, 2018 11:30 AM CDT   LAB with RU LAB   [facility] ([facility])    [address] [place] [address]  [facility] [id number] [phone]           Please do not eat 10-12 hours before your appointment if you are coming in fasting for labs on lipids, cholesterol, or glucose (sugar). This does not apply to pregnant women. Water, hot tea and black coffee (with nothing added) are okay. Do not drink other fluids, diet soda or chew gum.            Aug 31, 2018 12:30 PM CDT   Core Return with NATHAN Valentin   [facility] ([facility])    [address] [place] [address] [address]  [facility] [address] [phone]            Oct 03, 2018 12:30 PM CDT   Ech Complete with [id number] [facility] ([facility])    [address] [place] [address]  [facility] [address]    [phone]           1.  Please bring or wear a comfortable two-piece outfit. 2.  You may eat, drink and take your normal medicines. 3.  For any questions that cannot be answered, please contact the ordering physician 4.  Please do not wear perfumes or scented lotions on the day of your exam. ***Please check-in at the [facility] Office located in [address] in the [facility] building. When you are finished registering, please go to [address] and have a seat. The technician will call your name for the test.            Oct 05, 2018 10:45 AM CDT   Return Visit with Miguel Pradhan MD   [facility] ([facility])    [address]  [facility] [address] [phone]              Future tests that were ordered for you today     Open Future Orders        Priority Expected Expires Ordered    Follow-Up with [facility] [place] visit Routine 8/31/2018 8/3/2019 8/3/2018    Basic metabolic panel Routine 8/31/2018 8/3/2019 8/3/2018    N terminal pro BNP outpatient Routine 8/31/2018 8/3/2019 8/3/2018            Who to contact     If you have questions or need follow up information about today's clinic visit or your schedule please contact [facility] directly at [phone].  Normal or non-critical lab and imaging results will be communicated to you by MyChart, letter or phone within 4 business days after the clinic has received the results. If you do not hear from us within 7 days, please contact the clinic through MyChart or phone. If you have a critical or abnormal lab result, we will notify you by phone as soon as possible.  Submit refill requests through [employer] or call your pharmacy and they will forward the refill request to us. Please allow 3 business days for your refill to be completed.          Additional Information About Your Visit        Care EveryWhere ID     This is your Care  "EveryWhere ID. This could be used by other organizations to access your [facility] medical records  [id number]        Your Vitals Were     Pulse Height Pulse Oximetry BMI (Body Mass Index)          52 1.6 m (5' 3\") 93% 26.16 kg/m2         Blood Pressure from Last 3 Encounters:   08/03/18 130/58   07/31/18 110/60   07/17/18 106/58    Weight from Last 3 Encounters:   08/03/18 67 kg (147 lb 11.2 oz)   07/31/18 68 kg (150 lb)   07/17/18 68 kg (150 lb)              We Performed the Following     Follow-Up with [facility] visit     Follow-Up with [facility] Clinic        Primary Care Provider Office Phone # Fax #    Gerri Eubanks [phone]       [address] [facility] [address]  [facility] [address]        Equal Access to Services     TRACEY KLEIN : Hadii thais warren hadasho Soomaali, waaxda luqadaha, qaybta kaalmada adeegyada, sharlene sarmiento haypily torre . So [facility] [phone].    ATENCIÓN: Si habla español, tiene a rosas disposición servicios gratuitos de asistencia lingüística. Llame al [phone].    We comply with applicable federal civil rights laws and [place] laws. We do not discriminate on the basis of race, color, national origin, age, disability, sex, sexual orientation, or gender identity.            Thank you!     Thank you for choosing [facility]  for your care. Our goal is always to provide you with excellent care. Hearing back from our patients is one way we can continue to improve our services. Please take a few minutes to complete the written survey that you may receive in the mail after your visit with us. Thank you!             Your Updated Medication List - Protect others around you: Learn how to safely use, store and throw away your medicines at www.disposemymeds.org.          This list is accurate as of 8/3/18  2:04 PM.  Always use your most recent med list.                   Brand Name Dispense Instructions for use Diagnosis    " albuterol 1.25 MG/3ML nebulizer solution    ACCUNEB     Take 1 vial by nebulization 3 times daily        clindamycin 150 MG capsule    CLEOCIN    30 capsule    Take 1 capsule (150 mg) by mouth 3 times daily    Cellulitis and abscess of foot, except toes       Cranberry Extract 250 MG Tabs      Take 250 mg by mouth daily        ferrous sulfate 325 (65 Fe) MG tablet    IRON    100 tablet    Take 1 tablet (325 mg) by mouth 2 times daily    Iron deficiency       gabapentin 100 MG capsule    NEURONTIN    270 capsule    TAKE 1 CAPSULE(100 MG) BY MOUTH THREE TIMES DAILY    Midline thoracic back pain, unspecified chronicity       hydrALAZINE 25 MG tablet    APRESOLINE    810 tablet    Take 3 tablets (75 mg) by mouth 3 times daily    Hyperlipidemia, unspecified hyperlipidemia type, Cardiomyopathy, unspecified type (H)       isosorbide mononitrate 30 MG 24 hr tablet    IMDUR    90 tablet    Take 2 tablets (60 mg) by mouth daily    Essential hypertension with goal blood pressure less than 140/90, Coronary artery disease involving native heart with angina pectoris, unspecified vessel or lesion type (H)       OMEGA-3 FATTY ACIDS PO      Take 1,200 mg by mouth daily        omeprazole 20 MG CR capsule    priLOSEC    90 capsule    Take 1 capsule (20 mg) by mouth daily    Gastroesophageal reflux disease without esophagitis       senna-docusate 8.6-50 MG per tablet    SENOKOT-S;PERICOLACE    30 tablet    Take 2 tablets daily as needed for constipation while taking prescription pain medications.    S/P foot surgery, right       simvastatin 10 MG tablet    ZOCOR    90 tablet    Take 1 tablet (10 mg) by mouth At Bedtime    Hyperlipidemia, unspecified hyperlipidemia type       spironolactone 50 MG tablet    ALDACTONE    30 tablet    Take 1 tablet (50 mg) by mouth daily    Acute on chronic diastolic congestive heart failure (H)       torsemide 20 MG tablet    DEMADEX    180 tablet    Take 1 tablet (20 mg) by mouth 2 times daily    Chronic  diastolic congestive heart failure (H)       TYLENOL 325 MG tablet   Generic drug:  acetaminophen      Take 650 mg by mouth 3 times daily as needed for mild pain        venlafaxine 75 MG 24 hr capsule    EFFEXOR-XR    90 capsule    TAKE ONE CAPSULE BY MOUTH DAILY    Major depressive disorder, recurrent episode, in partial remission (H)       VITAMIN D (CHOLECALCIFEROL) PO      Take 1,000 Units by mouth daily

## 2018-08-03 NOTE — LETTER
8/3/2018    Gerri Eubanks MD  [address] [facility] [address]  [facility] [address]    RE: Keisha Godinez       Dear Colleague,    I had the pleasure of seeing Keisha Godinez in the [facility].      Cardiology Progress Note    Date of Service: 08/03/2018      Reason for visit: CORE clinic enrollment follow up, diastolic heart failure, pulmonary hypertension.    Primary cardiologist: Dr. Miguel Pradhan      HPI:  Ms. Godinez is a pleasant 86 year old female with a PMHx including hypertension, hyperlipidemia, atrial fibrillation (not on AC due to prior GI bleed), chronic anemia (colonic AVM, iron deficiency), CVA, chronic lower extremity edema, and diastolic heart failure with pulmonary hypertension. She has been followed by Dr. Pradhan in the past, but hasn't been seen since 2016. She tells me she did not return because she had been doing so well.  A few months ago she started having issues with increased leg swelling. She was admitted at [facility] from 5/21/18 to 5/24/18 with cellulitis complicated by heart failure, was diuresed and discharged to a TCU. She then returned to the hospital from 6/18/18 to 6/28/18 with leg edema and weight gain once again, along with dyspnea. She required IV diuresis, and pRBC transfusion for worsening of her anemia. She was reportedly 167 lbs at admit, and discharged at 155 lbs. She was placed on 40mg of lasix twice daily, and still required supplemental oxygen at 1.5L at discharge.    She returned to see me for CORE clinic enrollment on 7/13/18. At that time she was doing better overall, and her daughter  (her main caregiver) told me they had started to make significant lifestyle changes, included reducing sodium in her diet. Her weight was down to 150 lbs at that visit. She continued with leg edema, and followed at the lymphedema clinic with leg wraps.   I transitioned her lasix to torsemide 20mg BID and enrolled her in telemanagement. She is back today  for short term follow up.    Since last visit, she tells me she continues to improve. Her leg edema has improved tremendously. Her clinic weight does not reflect additional weight loss due to having large lymphedema wraps on today, though again the patient and family note significant improvement with this and she is pleased at how well she feels. She is no longer requiring supplemental oxygen during the day, however continues to wear at night. Unfortunately, she is now on abx for left foot cellulitis. Despite this, she denies any significant MOTT, chest discomfort, palpitations, dizziness, or presyncope. She still sleeps in a recliner but sounds as is this is more for comfort for her legs. Despite overall trend down, her weight did go up 2 lbs yesterday which the daughter is concerned about. They have been extremely strict about the salt yesterday and today. Labs today show preserved renal function, stable electrolytes, and favorable trend of her NT-proBNP.       ASSESSMENT/PLAN:    1. Acute on chronic diastolic heart failure.   --Last echo was October 2017 at which time her EF was preserved at 60-65%. Her RV at that time was moderately dilated, with borderline reduced function and moderate TR.     --Making significant improvements with swelling and volume status. I cannot fully assess her edema due to lymphedema wraps but patient states it is much improved. Will continue torsemide 20mg BID for now, though told he she may change to 40mg once daily for ease of use if she wishes. In addition, if her weight continues to trend up over the weekend, she may take an additional 20mg x 1 and call our office Monday for further instruction. Otherwise diuretics will continue without change.   --I congratulated her on sodium restriction in her diet which has been helpful. We will change her telemanagment goal weight range to 143-149. Given her history of GIB and chronic anemia, not a good cardiomems candidate.   --Continue  spironolactone 50mg daily for now; she was taken off her KDur at discharge, and K is acceptable today. Continue to monitor her electrolytes closely with diuretic adjustments.    --Monitor anemia, she remains on ferrous sulfate.   --Now that we are near euvolemia, will repeat echocardiogram prior to her upcoming visit with Dr. Pradhan. If she continues to have RV dysfunction, may consider a RHC.    --Once more mobile as her lymphedema/cellulitis improves, will consider cardiac rehab. She remains deconditioned.    2. Hypertension.   --BP relatively well controlled currently, will hopefully normalize with further diuresis. She has a true allergy to ACE-inhibitors (tongue swelling), and is not on a beta blocker as she has had issues with bradycardia in the past.   --She has chronically been on hydralazine and isosorbide which we will continue.     3. Atrial fibrillation, chronic.   --Rate well controlled. Not on BB due to bradycardia.    --She is not on anticoagulation due to GIB/anemia.     4. Hyperlipidemia.   --Last LDL Aug 2017 was excellent at 34. Continue simvastatin 10mg daily.       Follow up plan: Return to see Suad in 1 month in the CORE clinic with labs, and then with Dr. Pradhan to re-establish care in 2 months, with an echocardiogram.        Orders this Visit:  Orders Placed This Encounter   Procedures     Basic metabolic panel     N terminal pro BNP outpatient     Follow-Up with [facility] - [place] visit     No orders of the defined types were placed in this encounter.    There are no discontinued medications.        CURRENT MEDICATIONS:  Current Outpatient Prescriptions   Medication Sig Dispense Refill     albuterol (ACCUNEB) 1.25 MG/3ML nebulizer solution Take 1 vial by nebulization 3 times daily        clindamycin (CLEOCIN) 150 MG capsule Take 1 capsule (150 mg) by mouth 3 times daily 30 capsule 0     Cranberry Extract 250 MG TABS Take 250 mg by mouth daily       ferrous sulfate (IRON) 325 (65 FE) MG  tablet Take 1 tablet (325 mg) by mouth 2 times daily 100 tablet 11     gabapentin (NEURONTIN) 100 MG capsule TAKE 1 CAPSULE(100 MG) BY MOUTH THREE TIMES DAILY 270 capsule 0     hydrALAZINE (APRESOLINE) 25 MG tablet Take 3 tablets (75 mg) by mouth 3 times daily 810 tablet 3     isosorbide mononitrate (IMDUR) 30 MG 24 hr tablet Take 2 tablets (60 mg) by mouth daily 90 tablet 2     OMEGA-3 FATTY ACIDS PO Take 1,200 mg by mouth daily        omeprazole (PRILOSEC) 20 MG CR capsule Take 1 capsule (20 mg) by mouth daily 90 capsule 2     simvastatin (ZOCOR) 10 MG tablet Take 1 tablet (10 mg) by mouth At Bedtime 90 tablet 1     spironolactone (ALDACTONE) 50 MG tablet Take 1 tablet (50 mg) by mouth daily 30 tablet 3     torsemide (DEMADEX) 20 MG tablet Take 1 tablet (20 mg) by mouth 2 times daily 180 tablet 3     venlafaxine (EFFEXOR-XR) 75 MG 24 hr capsule TAKE ONE CAPSULE BY MOUTH DAILY 90 capsule 0     VITAMIN D, CHOLECALCIFEROL, PO Take 1,000 Units by mouth daily        acetaminophen (TYLENOL) 325 MG tablet Take 650 mg by mouth 3 times daily as needed for mild pain       senna-docusate (SENOKOT-S;PERICOLACE) 8.6-50 MG per tablet Take 2 tablets daily as needed for constipation while taking prescription pain medications. (Patient not taking: Reported on 8/3/2018) 30 tablet 0       ALLERGIES     Allergies   Allergen Reactions     Blood Transfusion Related (Informational Only) Other (See Comments)     Patient has a history of a clinically significant antibody against RBC antigens.  A delay in compatible RBCs may occur.     Lisinopril Other (See Comments)     Tongue swelling     Calcium Nausea and Vomiting     Egg Yolk      Flu Virus Vaccine      Allergic to eggs.     Keflex [Cephalexin] Nausea     Pt states she had upset stomach.  NOT tongue swelling.  She had tongue swelling with a combo bp med that she thinks included lisinopril.    Tolerates IV Cefazolin     Levaquin [Levofloxacin]      Sulfa Drugs      Zinc Nausea and  Vomiting       PAST MEDICAL HISTORY:  Past Medical History:   Diagnosis Date     Anemia      Atrial fibrillation (H)      B12 deficiency      Cardiomyopathy (H)      CHF (congestive heart failure) (H)      Chronic edema     Lower extremities     Chronic systolic congestive heart failure (H)      CVA (cerebral vascular accident) (H) 2010    Acute Left MCA hemispheric CVA ( on coumadin)     Depression      Gastro-oesophageal reflux disease      GI bleed 03-20-15     Hyperlipidemia      Hypertension      Iron deficiency      MSSA (methicillin susceptible Staphylococcus aureus) 03-10-15     Pulmonary hypertension      Shingles        PAST SURGICAL HISTORY:  Past Surgical History:   Procedure Laterality Date     COLONOSCOPY  03/24/2015    Diverticulosis, polyps     ENTEROSCOPY SMALL BOWEL N/A 2/29/2016    Procedure: ENTEROSCOPY SMALL BOWEL;  Surgeon: Ady Ponce MD;  Location:  GI     ESOPHAGOSCOPY, GASTROSCOPY, DUODENOSCOPY (EGD), COMBINED N/A 3/22/2015    Upper GI- Normal, nothing significant found.      EXCISE MASS FOOT Right 7/6/2016    Procedure: EXCISE MASS FOOT;  Surgeon: Lee Jang DPM;  Location: [facility] OR       FAMILY HISTORY:  Family History   Problem Relation Age of Onset     Known Genetic Syndrome Father      Known Genetic Syndrome Mother      Other Cancer Daughter      pancreatic     Other Cancer Brother      type     Other Cancer Sister 60     lung     Diabetes No family hx of      Breast Cancer No family hx of      Cancer - colorectal No family hx of      Ovarian Cancer No family hx of      Prostate Cancer No family hx of        SOCIAL HISTORY:  Social History     Social History     Marital status:      Spouse name: N/A     Number of children: N/A     Years of education: N/A     Social History Main Topics     Smoking status: Former Smoker     Quit date: 4/14/1956     Smokeless tobacco: Never Used     Alcohol use No     Drug use: No     Sexual activity: No     Other Topics  "Concern     Caffeine Concern No     Hot tea 1 cup daily     Special Diet Yes     low sodium     Exercise No     limited right now     Social History Narrative       Review of Systems:  Cardiovascular: negative for chest pain, palpitations, orthopnea, pos LE edema, much improved.  Constitutional: negative for chills, sweats, fevers   Resp: Negative for dyspnea at rest, pos dyspnea on exertion, much improved, neg cough, known chronic lung disease  HEENT: Negative for new visual changes, frequent headaches  Gastrointestinal: negative for abdominal pain, diarrhea, blood in stool, nausea, vomiting  Hematologic/lymphatic: negative for current systemic anticoagulation, hx of blood clots. Pos hx GIB.  Musculoskeletal: negative for new back pain, joint pain  Neurological: negative for focal weakness, LOC, seizures, syncope. Neg dizziness.      Physical Exam:  Vitals: /58 (BP Location: Right arm, Patient Position: Chair, Cuff Size: Adult Regular)  Pulse 52  Ht 1.6 m (5' 3\")  Wt 67 kg (147 lb 11.2 oz)  SpO2 93%  BMI 26.16 kg/m2   Wt Readings from Last 4 Encounters:   08/03/18 67 kg (147 lb 11.2 oz)   07/31/18 68 kg (150 lb)   07/17/18 68 kg (150 lb)   07/13/18 68.2 kg (150 lb 4.8 oz)       GEN:  In general, this is a well nourished elderly female in no acute distress on room air. She arrived in a wheelchair today, as her legs are wrapped heavily and finds it difficult to ambulate. Accompanied by her daughter again today.   HEENT:  Pupils equal, round. Sclerae nonicteric.   NECK: Supple, no masses appreciated. Trachea midline. Mild JVD.   C/V:  Irregular rhythm, no murmur, rub or gallop. Mildly accentuated P2. No S3 or RV heave.   RESP: Respirations are unlabored. No use of accessory muscles. Noted to have a few crackles in bases posteriorly. No wheezing.   GI: Abdomen soft, nontender, nondistended.   EXTREM: Difficult to assess as legs wrapped. No cyanosis or clubbing.  NEURO: Alert and oriented, cooperative. Gait " not assesed. No obvious focal deficits.   PSYCH: Normal affect.  SKIN: Warm and dry. No rashes or petechiae appreciated.       Recent Lab Results:  LIPID RESULTS:  Lab Results   Component Value Date    CHOL 108 08/02/2017    HDL 61 08/02/2017    LDL 34 08/02/2017    TRIG 65 08/02/2017             BMP RESULTS:  Lab Results   Component Value Date     08/03/2018    POTASSIUM 3.8 08/03/2018    CHLORIDE 100 08/03/2018    CO2 31 08/03/2018    ANIONGAP 6 08/03/2018     (H) 08/03/2018    BUN 29 08/03/2018    CR 1.03 08/03/2018    GFRESTIMATED 51 (L) 08/03/2018    GFRESTBLACK 61 08/03/2018    CASPER 8.5 08/03/2018          New/Pertinent imaging results since last visit:  Echo 10/18/17  Interpretation Summary     The visual ejection fraction is estimated at 60-65%.  The right ventricle is moderately dilated.  The right ventricular systolic function is borderline reduced.  There is mod-severe biatrial enlargement.  There is mild (1+) mitral regurgitation.  There is moderate (2+) tricuspid regurgitation.  Dilated inferior vena cava  Compared to prior study, there is no significant change.        Suad Murrell PA-C  [facility]  Pager [phone]      Thank you for allowing me to participate in the care of your patient.      Sincerely,     NATHAN Valentin     [facility]    cc:   NATHAN Valentin  [facility]  [address] [place] [id number]

## 2018-08-03 NOTE — LETTER
8/3/2018    Gerri Eubanks MD  [address] [facility] [address]  [facility] [address]    RE: Keisha Godinez       Dear Colleague,    I had the pleasure of seeing Keisha Godinez in the [facility].      Cardiology Progress Note    Date of Service: 08/03/2018      Reason for visit: CORE clinic enrollment follow up, diastolic heart failure, pulmonary hypertension.    Primary cardiologist: Dr. Miguel Pradhan      HPI:  Ms. Godinez is a pleasant 86 year old female with a PMHx including hypertension, hyperlipidemia, atrial fibrillation (not on AC due to prior GI bleed), chronic anemia (colonic AVM, iron deficiency), CVA, chronic lower extremity edema, and diastolic heart failure with pulmonary hypertension. She has been followed by Dr. Pradhan in the past, but hasn't been seen since 2016. She tells me she did not return because she had been doing so well.  A few months ago she started having issues with increased leg swelling. She was admitted at [facility] from 5/21/18 to 5/24/18 with cellulitis complicated by heart failure, was diuresed and discharged to a TCU. She then returned to the hospital from 6/18/18 to 6/28/18 with leg edema and weight gain once again, along with dyspnea. She required IV diuresis, and pRBC transfusion for worsening of her anemia. She was reportedly 167 lbs at admit, and discharged at 155 lbs. She was placed on 40mg of lasix twice daily, and still required supplemental oxygen at 1.5L at discharge.    She returned to see me for CORE clinic enrollment on 7/13/18. At that time she was doing better overall, and her daughter  (her main caregiver) told me they had started to make significant lifestyle changes, included reducing sodium in her diet. Her weight was down to 150 lbs at that visit. She continued with leg edema, and followed at the lymphedema clinic with leg wraps.   I transitioned her lasix to torsemide 20mg BID and enrolled her in telemanagement. She is back today  for short term follow up.    Since last visit, she tells me she continues to improve. Her leg edema has improved tremendously. Her clinic weight does not reflect additional weight loss due to having large lymphedema wraps on today, though again the patient and family note significant improvement with this and she is pleased at how well she feels. She is no longer requiring supplemental oxygen during the day, however continues to wear at night. Unfortunately, she is now on abx for left foot cellulitis. Despite this, she denies any significant MOTT, chest discomfort, palpitations, dizziness, or presyncope. She still sleeps in a recliner but sounds as is this is more for comfort for her legs. Despite overall trend down, her weight did go up 2 lbs yesterday which the daughter is concerned about. They have been extremely strict about the salt yesterday and today. Labs today show preserved renal function, stable electrolytes, and favorable trend of her NT-proBNP.       ASSESSMENT/PLAN:    1. Acute on chronic diastolic heart failure.   --Last echo was October 2017 at which time her EF was preserved at 60-65%. Her RV at that time was moderately dilated, with borderline reduced function and moderate TR.     --Making significant improvements with swelling and volume status. I cannot fully assess her edema due to lymphedema wraps but patient states it is much improved. Will continue torsemide 20mg BID for now, though told he she may change to 40mg once daily for ease of use if she wishes. In addition, if her weight continues to trend up over the weekend, she may take an additional 20mg x 1 and call our office Monday for further instruction. Otherwise diuretics will continue without change.   --I congratulated her on sodium restriction in her diet which has been helpful. We will change her telemanagment goal weight range to 143-149. Given her history of GIB and chronic anemia, not a good cardiomems candidate.   --Continue  spironolactone 50mg daily for now; she was taken off her KDur at discharge, and K is acceptable today. Continue to monitor her electrolytes closely with diuretic adjustments.    --Monitor anemia, she remains on ferrous sulfate.   --Now that we are near euvolemia, will repeat echocardiogram prior to her upcoming visit with Dr. Pradhan. If she continues to have RV dysfunction, may consider a RHC.    --Once more mobile as her lymphedema/cellulitis improves, will consider cardiac rehab. She remains deconditioned.    2. Hypertension.   --BP relatively well controlled currently, will hopefully normalize with further diuresis. She has a true allergy to ACE-inhibitors (tongue swelling), and is not on a beta blocker as she has had issues with bradycardia in the past.   --She has chronically been on hydralazine and isosorbide which we will continue.     3. Atrial fibrillation, chronic.   --Rate well controlled. Not on BB due to bradycardia.    --She is not on anticoagulation due to GIB/anemia.     4. Hyperlipidemia.   --Last LDL Aug 2017 was excellent at 34. Continue simvastatin 10mg daily.       Follow up plan: Return to see Suad in 1 month in the CORE clinic with labs, and then with Dr. Pradhan to re-establish care in 2 months, with an echocardiogram.        Orders this Visit:  Orders Placed This Encounter   Procedures     Basic metabolic panel     N terminal pro BNP outpatient     Follow-Up with [facility] - [place] visit     No orders of the defined types were placed in this encounter.    There are no discontinued medications.        CURRENT MEDICATIONS:  Current Outpatient Prescriptions   Medication Sig Dispense Refill     albuterol (ACCUNEB) 1.25 MG/3ML nebulizer solution Take 1 vial by nebulization 3 times daily        clindamycin (CLEOCIN) 150 MG capsule Take 1 capsule (150 mg) by mouth 3 times daily 30 capsule 0     Cranberry Extract 250 MG TABS Take 250 mg by mouth daily       ferrous sulfate (IRON) 325 (65 FE) MG  tablet Take 1 tablet (325 mg) by mouth 2 times daily 100 tablet 11     gabapentin (NEURONTIN) 100 MG capsule TAKE 1 CAPSULE(100 MG) BY MOUTH THREE TIMES DAILY 270 capsule 0     hydrALAZINE (APRESOLINE) 25 MG tablet Take 3 tablets (75 mg) by mouth 3 times daily 810 tablet 3     isosorbide mononitrate (IMDUR) 30 MG 24 hr tablet Take 2 tablets (60 mg) by mouth daily 90 tablet 2     OMEGA-3 FATTY ACIDS PO Take 1,200 mg by mouth daily        omeprazole (PRILOSEC) 20 MG CR capsule Take 1 capsule (20 mg) by mouth daily 90 capsule 2     simvastatin (ZOCOR) 10 MG tablet Take 1 tablet (10 mg) by mouth At Bedtime 90 tablet 1     spironolactone (ALDACTONE) 50 MG tablet Take 1 tablet (50 mg) by mouth daily 30 tablet 3     torsemide (DEMADEX) 20 MG tablet Take 1 tablet (20 mg) by mouth 2 times daily 180 tablet 3     venlafaxine (EFFEXOR-XR) 75 MG 24 hr capsule TAKE ONE CAPSULE BY MOUTH DAILY 90 capsule 0     VITAMIN D, CHOLECALCIFEROL, PO Take 1,000 Units by mouth daily        acetaminophen (TYLENOL) 325 MG tablet Take 650 mg by mouth 3 times daily as needed for mild pain       senna-docusate (SENOKOT-S;PERICOLACE) 8.6-50 MG per tablet Take 2 tablets daily as needed for constipation while taking prescription pain medications. (Patient not taking: Reported on 8/3/2018) 30 tablet 0       ALLERGIES     Allergies   Allergen Reactions     Blood Transfusion Related (Informational Only) Other (See Comments)     Patient has a history of a clinically significant antibody against RBC antigens.  A delay in compatible RBCs may occur.     Lisinopril Other (See Comments)     Tongue swelling     Calcium Nausea and Vomiting     Egg Yolk      Flu Virus Vaccine      Allergic to eggs.     Keflex [Cephalexin] Nausea     Pt states she had upset stomach.  NOT tongue swelling.  She had tongue swelling with a combo bp med that she thinks included lisinopril.    Tolerates IV Cefazolin     Levaquin [Levofloxacin]      Sulfa Drugs      Zinc Nausea and  Vomiting       PAST MEDICAL HISTORY:  Past Medical History:   Diagnosis Date     Anemia      Atrial fibrillation (H)      B12 deficiency      Cardiomyopathy (H)      CHF (congestive heart failure) (H)      Chronic edema     Lower extremities     Chronic systolic congestive heart failure (H)      CVA (cerebral vascular accident) (H) 2010    Acute Left MCA hemispheric CVA ( on coumadin)     Depression      Gastro-oesophageal reflux disease      GI bleed 03-20-15     Hyperlipidemia      Hypertension      Iron deficiency      MSSA (methicillin susceptible Staphylococcus aureus) 03-10-15     Pulmonary hypertension      Shingles        PAST SURGICAL HISTORY:  Past Surgical History:   Procedure Laterality Date     COLONOSCOPY  03/24/2015    Diverticulosis, polyps     ENTEROSCOPY SMALL BOWEL N/A 2/29/2016    Procedure: ENTEROSCOPY SMALL BOWEL;  Surgeon: Ady Ponce MD;  Location:  GI     ESOPHAGOSCOPY, GASTROSCOPY, DUODENOSCOPY (EGD), COMBINED N/A 3/22/2015    Upper GI- Normal, nothing significant found.      EXCISE MASS FOOT Right 7/6/2016    Procedure: EXCISE MASS FOOT;  Surgeon: Lee Jang DPM;  Location: [facility] OR       FAMILY HISTORY:  Family History   Problem Relation Age of Onset     Known Genetic Syndrome Father      Known Genetic Syndrome Mother      Other Cancer Daughter      pancreatic     Other Cancer Brother      type     Other Cancer Sister 60     lung     Diabetes No family hx of      Breast Cancer No family hx of      Cancer - colorectal No family hx of      Ovarian Cancer No family hx of      Prostate Cancer No family hx of        SOCIAL HISTORY:  Social History     Social History     Marital status:      Spouse name: N/A     Number of children: N/A     Years of education: N/A     Social History Main Topics     Smoking status: Former Smoker     Quit date: 4/14/1956     Smokeless tobacco: Never Used     Alcohol use No     Drug use: No     Sexual activity: No     Other Topics  "Concern     Caffeine Concern No     Hot tea 1 cup daily     Special Diet Yes     low sodium     Exercise No     limited right now     Social History Narrative       Review of Systems:  Cardiovascular: negative for chest pain, palpitations, orthopnea, pos LE edema, much improved.  Constitutional: negative for chills, sweats, fevers   Resp: Negative for dyspnea at rest, pos dyspnea on exertion, much improved, neg cough, known chronic lung disease  HEENT: Negative for new visual changes, frequent headaches  Gastrointestinal: negative for abdominal pain, diarrhea, blood in stool, nausea, vomiting  Hematologic/lymphatic: negative for current systemic anticoagulation, hx of blood clots. Pos hx GIB.  Musculoskeletal: negative for new back pain, joint pain  Neurological: negative for focal weakness, LOC, seizures, syncope. Neg dizziness.      Physical Exam:  Vitals: /58 (BP Location: Right arm, Patient Position: Chair, Cuff Size: Adult Regular)  Pulse 52  Ht 1.6 m (5' 3\")  Wt 67 kg (147 lb 11.2 oz)  SpO2 93%  BMI 26.16 kg/m2   Wt Readings from Last 4 Encounters:   08/03/18 67 kg (147 lb 11.2 oz)   07/31/18 68 kg (150 lb)   07/17/18 68 kg (150 lb)   07/13/18 68.2 kg (150 lb 4.8 oz)       GEN:  In general, this is a well nourished elderly female in no acute distress on room air. She arrived in a wheelchair today, as her legs are wrapped heavily and finds it difficult to ambulate. Accompanied by her daughter again today.   HEENT:  Pupils equal, round. Sclerae nonicteric.   NECK: Supple, no masses appreciated. Trachea midline. Mild JVD.   C/V:  Irregular rhythm, no murmur, rub or gallop. Mildly accentuated P2. No S3 or RV heave.   RESP: Respirations are unlabored. No use of accessory muscles. Noted to have a few crackles in bases posteriorly. No wheezing.   GI: Abdomen soft, nontender, nondistended.   EXTREM: Difficult to assess as legs wrapped. No cyanosis or clubbing.  NEURO: Alert and oriented, cooperative. Gait " not assesed. No obvious focal deficits.   PSYCH: Normal affect.  SKIN: Warm and dry. No rashes or petechiae appreciated.       Recent Lab Results:  LIPID RESULTS:  Lab Results   Component Value Date    CHOL 108 08/02/2017    HDL 61 08/02/2017    LDL 34 08/02/2017    TRIG 65 08/02/2017             BMP RESULTS:  Lab Results   Component Value Date     08/03/2018    POTASSIUM 3.8 08/03/2018    CHLORIDE 100 08/03/2018    CO2 31 08/03/2018    ANIONGAP 6 08/03/2018     (H) 08/03/2018    BUN 29 08/03/2018    CR 1.03 08/03/2018    GFRESTIMATED 51 (L) 08/03/2018    GFRESTBLACK 61 08/03/2018    CASPER 8.5 08/03/2018          New/Pertinent imaging results since last visit:  Echo 10/18/17  Interpretation Summary     The visual ejection fraction is estimated at 60-65%.  The right ventricle is moderately dilated.  The right ventricular systolic function is borderline reduced.  There is mod-severe biatrial enlargement.  There is mild (1+) mitral regurgitation.  There is moderate (2+) tricuspid regurgitation.  Dilated inferior vena cava  Compared to prior study, there is no significant change.        Suad Murrell PA-C  [facility]  Pager [phone]      Thank you for allowing me to participate in the care of your patient.    Sincerely,     NATHAN Valentin     [facility]

## 2018-08-03 NOTE — PROGRESS NOTES
Cardiology Progress Note    Date of Service: 08/03/2018      Reason for visit: C[facility] clinic enrollment follow up, diastolic heart failure, pulmonary hypertension.    Primary cardiologist: Dr. Miguel Pradhan      HPI:  Ms. Godinez is a pleasant 86 year old female with a PMHx including hypertension, hyperlipidemia, atrial fibrillation (not on AC due to prior GI bleed), chronic anemia (colonic AVM, iron deficiency), CVA, chronic lower extremity edema, and diastolic heart failure with pulmonary hypertension. She has been followed by Dr. Pradhan in the past, but hasn't been seen since 2016. She tells me she did not return because she had been doing so well.  A few months ago she started having issues with increased leg swelling. She was admitted at [facility] from 5/21/18 to 5/24/18 with cellulitis complicated by heart failure, was diuresed and discharged to a TCU. She then returned to the hospital from 6/18/18 to 6/28/18 with leg edema and weight gain once again, along with dyspnea. She required IV diuresis, and pRBC transfusion for worsening of her anemia. She was reportedly 167 lbs at admit, and discharged at 155 lbs. She was placed on 40mg of lasix twice daily, and still required supplemental oxygen at 1.5L at discharge.    She returned to see me for C[facility] clinic enrollment on 7/13/18. At that time she was doing better overall, and her daughter  (her main caregiver) told me they had started to make significant lifestyle changes, included reducing sodium in her diet. Her weight was down to 150 lbs at that visit. She continued with leg edema, and followed at the lymphedema clinic with leg wraps.   I transitioned her lasix to torsemide 20mg BID and enrolled her in telemanagement. She is back today for short term follow up.    Since last visit, she tells me she continues to improve. Her leg edema has improved tremendously. Her clinic weight does not reflect additional weight loss due to having large lymphedema wraps on today,  though again the patient and family note significant improvement with this and she is pleased at how well she feels. She is no longer requiring supplemental oxygen during the day, however continues to wear at night. Unfortunately, she is now on abx for left foot cellulitis. Despite this, she denies any significant MOTT, chest discomfort, palpitations, dizziness, or presyncope. She still sleeps in a recliner but sounds as is this is more for comfort for her legs. Despite overall trend down, her weight did go up 2 lbs yesterday which the daughter is concerned about. They have been extremely strict about the salt yesterday and today. Labs today show preserved renal function, stable electrolytes, and favorable trend of her NT-proBNP.       ASSESSMENT/PLAN:    1. Acute on chronic diastolic heart failure.   --Last echo was October 2017 at which time her EF was preserved at 60-65%. Her RV at that time was moderately dilated, with borderline reduced function and moderate TR.     --Making significant improvements with swelling and volume status. I cannot fully assess her edema due to lymphedema wraps but patient states it is much improved. Will continue torsemide 20mg BID for now, though told he she may change to 40mg once daily for ease of use if she wishes. In addition, if her weight continues to trend up over the weekend, she may take an additional 20mg x 1 and call our office Monday for further instruction. Otherwise diuretics will continue without change.   --I congratulated her on sodium restriction in her diet which has been helpful. We will change her telemanagment goal weight range to 143-149. Given her history of GIB and chronic anemia, not a good cardiomems candidate.   --Continue spironolactone 50mg daily for now; she was taken off her KDur at discharge, and K is acceptable today. Continue to monitor her electrolytes closely with diuretic adjustments.    --Monitor anemia, she remains on ferrous sulfate.   --Now  that we are near euvolemia, will repeat echocardiogram prior to her upcoming visit with Dr. Pradhan. If she continues to have RV dysfunction, may consider a RHC.    --Once more mobile as her lymphedema/cellulitis improves, will consider cardiac rehab. She remains deconditioned.    2. Hypertension.   --BP relatively well controlled currently, will hopefully normalize with further diuresis. She has a true allergy to ACE-inhibitors (tongue swelling), and is not on a beta blocker as she has had issues with bradycardia in the past.   --She has chronically been on hydralazine and isosorbide which we will continue.     3. Atrial fibrillation, chronic.   --Rate well controlled. Not on BB due to bradycardia.    --She is not on anticoagulation due to GIB/anemia.     4. Hyperlipidemia.   --Last LDL Aug 2017 was excellent at 34. Continue simvastatin 10mg daily.       Follow up plan: Return to see Suad in 1 month in the CORE clinic with labs, and then with Dr. Pradhan to re-establish care in 2 months, with an echocardiogram.        Orders this Visit:  Orders Placed This Encounter   Procedures     Basic metabolic panel     N terminal pro BNP outpatient     Follow-Up with [facility] - [place] visit     No orders of the defined types were placed in this encounter.    There are no discontinued medications.        CURRENT MEDICATIONS:  Current Outpatient Prescriptions   Medication Sig Dispense Refill     albuterol (ACCUNEB) 1.25 MG/3ML nebulizer solution Take 1 vial by nebulization 3 times daily        clindamycin (CLEOCIN) 150 MG capsule Take 1 capsule (150 mg) by mouth 3 times daily 30 capsule 0     Cranberry Extract 250 MG TABS Take 250 mg by mouth daily       ferrous sulfate (IRON) 325 (65 FE) MG tablet Take 1 tablet (325 mg) by mouth 2 times daily 100 tablet 11     gabapentin (NEURONTIN) 100 MG capsule TAKE 1 CAPSULE(100 MG) BY MOUTH THREE TIMES DAILY 270 capsule 0     hydrALAZINE (APRESOLINE) 25 MG tablet Take 3 tablets (75 mg) by  mouth 3 times daily 810 tablet 3     isosorbide mononitrate (IMDUR) 30 MG 24 hr tablet Take 2 tablets (60 mg) by mouth daily 90 tablet 2     OMEGA-3 FATTY ACIDS PO Take 1,200 mg by mouth daily        omeprazole (PRILOSEC) 20 MG CR capsule Take 1 capsule (20 mg) by mouth daily 90 capsule 2     simvastatin (ZOCOR) 10 MG tablet Take 1 tablet (10 mg) by mouth At Bedtime 90 tablet 1     spironolactone (ALDACTONE) 50 MG tablet Take 1 tablet (50 mg) by mouth daily 30 tablet 3     torsemide (DEMADEX) 20 MG tablet Take 1 tablet (20 mg) by mouth 2 times daily 180 tablet 3     venlafaxine (EFFEXOR-XR) 75 MG 24 hr capsule TAKE ONE CAPSULE BY MOUTH DAILY 90 capsule 0     VITAMIN D, CHOLECALCIFEROL, PO Take 1,000 Units by mouth daily        acetaminophen (TYLENOL) 325 MG tablet Take 650 mg by mouth 3 times daily as needed for mild pain       senna-docusate (SENOKOT-S;PERICOLACE) 8.6-50 MG per tablet Take 2 tablets daily as needed for constipation while taking prescription pain medications. (Patient not taking: Reported on 8/3/2018) 30 tablet 0       ALLERGIES     Allergies   Allergen Reactions     Blood Transfusion Related (Informational Only) Other (See Comments)     Patient has a history of a clinically significant antibody against RBC antigens.  A delay in compatible RBCs may occur.     Lisinopril Other (See Comments)     Tongue swelling     Calcium Nausea and Vomiting     Egg Yolk      Flu Virus Vaccine      Allergic to eggs.     Keflex [Cephalexin] Nausea     Pt states she had upset stomach.  NOT tongue swelling.  She had tongue swelling with a combo bp med that she thinks included lisinopril.    Tolerates IV Cefazolin     Levaquin [Levofloxacin]      Sulfa Drugs      Zinc Nausea and Vomiting       PAST MEDICAL HISTORY:  Past Medical History:   Diagnosis Date     Anemia      Atrial fibrillation (H)      B12 deficiency      Cardiomyopathy (H)      CHF (congestive heart failure) (H)      Chronic edema     Lower extremities      Chronic systolic congestive heart failure (H)      CVA (cerebral vascular accident) (H) 2010    Acute Left MCA hemispheric CVA ( on coumadin)     Depression      Gastro-oesophageal reflux disease      GI bleed 03-20-15     Hyperlipidemia      Hypertension      Iron deficiency      MSSA (methicillin susceptible Staphylococcus aureus) 03-10-15     Pulmonary hypertension      Shingles        PAST SURGICAL HISTORY:  Past Surgical History:   Procedure Laterality Date     COLONOSCOPY  03/24/2015    Diverticulosis, polyps     ENTEROSCOPY SMALL BOWEL N/A 2/29/2016    Procedure: ENTEROSCOPY SMALL BOWEL;  Surgeon: Ady Ponce MD;  Location:  GI     ESOPHAGOSCOPY, GASTROSCOPY, DUODENOSCOPY (EGD), COMBINED N/A 3/22/2015    Upper GI- Normal, nothing significant found.      EXCISE MASS FOOT Right 7/6/2016    Procedure: EXCISE MASS FOOT;  Surgeon: Lee Jang DPM;  Location:  OR       FAMILY HISTORY:  Family History   Problem Relation Age of Onset     Known Genetic Syndrome Father      Known Genetic Syndrome Mother      Other Cancer Daughter      pancreatic     Other Cancer Brother      type     Other Cancer Sister 60     lung     Diabetes No family hx of      Breast Cancer No family hx of      Cancer - colorectal No family hx of      Ovarian Cancer No family hx of      Prostate Cancer No family hx of        SOCIAL HISTORY:  Social History     Social History     Marital status:      Spouse name: N/A     Number of children: N/A     Years of education: N/A     Social History Main Topics     Smoking status: Former Smoker     Quit date: 4/14/1956     Smokeless tobacco: Never Used     Alcohol use No     Drug use: No     Sexual activity: No     Other Topics Concern     Caffeine Concern No     Hot tea 1 cup daily     Special Diet Yes     low sodium     Exercise No     limited right now     Social History Narrative       Review of Systems:  Cardiovascular: negative for chest pain, palpitations, orthopnea,  "pos LE edema, much improved.  Constitutional: negative for chills, sweats, fevers   Resp: Negative for dyspnea at rest, pos dyspnea on exertion, much improved, neg cough, known chronic lung disease  HEENT: Negative for new visual changes, frequent headaches  Gastrointestinal: negative for abdominal pain, diarrhea, blood in stool, nausea, vomiting  Hematologic/lymphatic: negative for current systemic anticoagulation, hx of blood clots. Pos hx GIB.  Musculoskeletal: negative for new back pain, joint pain  Neurological: negative for focal weakness, LOC, seizures, syncope. Neg dizziness.      Physical Exam:  Vitals: /58 (BP Location: Right arm, Patient Position: Chair, Cuff Size: Adult Regular)  Pulse 52  Ht 1.6 m (5' 3\")  Wt 67 kg (147 lb 11.2 oz)  SpO2 93%  BMI 26.16 kg/m2   Wt Readings from Last 4 Encounters:   08/03/18 67 kg (147 lb 11.2 oz)   07/31/18 68 kg (150 lb)   07/17/18 68 kg (150 lb)   07/13/18 68.2 kg (150 lb 4.8 oz)       GEN:  In general, this is a well nourished elderly female in no acute distress on room air. She arrived in a wheelchair today, as her legs are wrapped heavily and finds it difficult to ambulate. Accompanied by her daughter again today.   HEENT:  Pupils equal, round. Sclerae nonicteric.   NECK: Supple, no masses appreciated. Trachea midline. Mild JVD.   C/V:  Irregular rhythm, no murmur, rub or gallop. Mildly accentuated P2. No S3 or RV heave.   RESP: Respirations are unlabored. No use of accessory muscles. Noted to have a few crackles in bases posteriorly. No wheezing.   GI: Abdomen soft, nontender, nondistended.   EXTREM: Difficult to assess as legs wrapped. No cyanosis or clubbing.  NEURO: Alert and oriented, cooperative. Gait not assesed. No obvious focal deficits.   PSYCH: Normal affect.  SKIN: Warm and dry. No rashes or petechiae appreciated.       Recent Lab Results:  LIPID RESULTS:  Lab Results   Component Value Date    CHOL 108 08/02/2017    HDL 61 08/02/2017    LDL 34 " 08/02/2017    TRIG 65 08/02/2017             BMP RESULTS:  Lab Results   Component Value Date     08/03/2018    POTASSIUM 3.8 08/03/2018    CHLORIDE 100 08/03/2018    CO2 31 08/03/2018    ANIONGAP 6 08/03/2018     (H) 08/03/2018    BUN 29 08/03/2018    CR 1.03 08/03/2018    GFRESTIMATED 51 (L) 08/03/2018    GFRESTBLACK 61 08/03/2018    CASPER 8.5 08/03/2018          New/Pertinent imaging results since last visit:  Echo 10/18/17  Interpretation Summary     The visual ejection fraction is estimated at 60-65%.  The right ventricle is moderately dilated.  The right ventricular systolic function is borderline reduced.  There is mod-severe biatrial enlargement.  There is mild (1+) mitral regurgitation.  There is moderate (2+) tricuspid regurgitation.  Dilated inferior vena cava  Compared to prior study, there is no significant change.        Suad Murrell PA-C  [facility]  Pager [phone]

## 2018-08-03 NOTE — PATIENT INSTRUCTIONS
Call the C.O.R.E. nurse for any questions or concerns:  [phone]    1.  Medication changes from today:  NONE    2. Follow up plan:  With Suad in 1 month in the [facility] clinic, Then to see Dr. Pradhan in 2 months with an echocardiogram.     3.  Weigh yourself daily and write it down.    4.  Call CORE nurse if your weight is up more than 2 pounds in one day or 5 pounds in one week.    5.  Call C[facility] nurse if you feel more short of breath, have more abdominal bloating, or leg swelling.    6.  Continue low sodium diet (less than 2000 mg daily). If you eat less salt, you will retain less fluid.    7.  Alcohol can weaken your heart further. You should avoid alcohol or limit its use to special times, such as a holiday or birthday.     8.  Do NOT take Aleve (naproxen) or Advil (ibuprofen) without talking to your doctor first.     9.  Lab Results:    Component      Latest Ref Rng & Units 8/3/2018   Sodium      133 - 144 mmol/L 137   Potassium      3.4 - 5.3 mmol/L 3.8   Chloride      94 - 109 mmol/L 100   Carbon Dioxide      20 - 32 mmol/L 31   Anion Gap      3 - 14 mmol/L 6   Glucose      70 - 99 mg/dL 115 (H)   Urea Nitrogen      7 - 30 mg/dL 29   Creatinine      0.52 - 1.04 mg/dL 1.03   GFR Estimate      >60 mL/min/1.7m2 51    GFR Estimate If Black      >60 mL/min/1.7m2 61   Calcium      8.5 - 10.1 mg/dL 8.5   N-Terminal Pro Bnp      0 - 450 pg/mL 2855 (H)     [facility]: Cardiomyopathy, Optimization, Rehabilitation, Education  The [facility] is a heart failure specialty clinic within the [facility] where you will work with your cardiologist, nurse practitioners, physician assistants and registered nurses who specialize in heart failure care. They are dedicated to helping patients with heart failure to carefully adjust medications, receive education, and learn who and when to call if symptoms develop. They specialize in helping you better understand your condition, slow the progression  of your disease, improve the length and quality of your life, help you detect future heart problems before they become life threatening, and avoid hospitalizations.

## 2018-08-07 ENCOUNTER — OFFICE VISIT (OUTPATIENT)
Dept: PODIATRY | Facility: CLINIC | Age: 83
End: 2018-08-07
Payer: COMMERCIAL

## 2018-08-07 VITALS
DIASTOLIC BLOOD PRESSURE: 66 MMHG | SYSTOLIC BLOOD PRESSURE: 116 MMHG | WEIGHT: 147 LBS | HEIGHT: 63 IN | BODY MASS INDEX: 26.05 KG/M2

## 2018-08-07 DIAGNOSIS — S91.302D OPEN WOUND OF LEFT FOOT, SUBSEQUENT ENCOUNTER: Primary | ICD-10-CM

## 2018-08-07 DIAGNOSIS — E78.5 HYPERLIPIDEMIA, UNSPECIFIED HYPERLIPIDEMIA TYPE: ICD-10-CM

## 2018-08-07 DIAGNOSIS — I42.9 CARDIOMYOPATHY, UNSPECIFIED TYPE (H): ICD-10-CM

## 2018-08-07 PROCEDURE — 99213 OFFICE O/P EST LOW 20 MIN: CPT | Mod: 25 | Performed by: PODIATRIST

## 2018-08-07 PROCEDURE — 97597 DBRDMT OPN WND 1ST 20 CM/<: CPT | Performed by: PODIATRIST

## 2018-08-07 RX ORDER — HYDRALAZINE HYDROCHLORIDE 25 MG/1
75 TABLET, FILM COATED ORAL 3 TIMES DAILY
Qty: 810 TABLET | Refills: 3 | OUTPATIENT
Start: 2018-08-07

## 2018-08-07 NOTE — TELEPHONE ENCOUNTER
"Requested Prescriptions   Pending Prescriptions Disp Refills     hydrALAZINE (APRESOLINE) 25 MG tablet  Last Written Prescription Date:  5/30/18  Last Fill Quantity: 810,  # refills: 3   Last office visit: 7/10/2018 with prescribing provider:  1/16/18   Future Office Visit:   Next 5 appointments (look out 90 days)     Aug 07, 2018  2:00 PM CDT   Return Visit with Lee Jang DPM   [facility] ([facility]    [address]  [address]  [place] [address]   [phone]            Oct 05, 2018 10:45 AM CDT   Return Visit with Miguel Pradhan MD   [facility] ([facility])    [address]  [place] [id number] [phone]                  810 tablet 3     Sig: Take 3 tablets (75 mg) by mouth 3 times daily    Vasodilators Passed    8/7/2018 11:39 AM       Passed - Most recent BP less than 140/90 on record    BP Readings from Last 3 Encounters:   08/03/18 130/58   07/31/18 110/60   07/17/18 106/58                Passed - Most recent encounter is not a hospital encounter. Patient has recent (12 mos) or future (1 mos) visit with authorizing provider's specialty    Patient's most recent encounter is NOT a hospital encounter and has had an office visit in the last 12 months or has a visit in the next 30 days with authorizing provider or within the authorizing provider's specialty.      See \"Patient Info\" tab in inbasket, or \"Choose Columns\" in Meds & Orders section of the refill encounter.      If most recent encounter is a hospital encounter AND the patient does NOT have an appointment scheduled with the authorizing provider or authorizing provider's specialty within the next 30 days, forward refill to authorizing provider for medication review.         Passed - Patient is of age 18 years or older       Passed - Patient is not pregnant       Passed - Patient has not had a positive pregnancy test within " the past 12 months

## 2018-08-07 NOTE — MR AVS SNAPSHOT
After Visit Summary   8/7/2018    Keisha Godinez    MRN: [id number]           Patient Information     Date Of Birth          1/25/1932        Visit Information        Provider Department      8/7/2018 2:00 PM Lee Jang DPM [facility]        Today's Diagnoses     Open wound of left foot, subsequent encounter    -  1       Follow-ups after your visit        Follow-up notes from your care team     Return in about 2 weeks (around 8/21/2018).      Your next 10 appointments already scheduled     Aug 21, 2018  1:00 PM CDT   Return Visit with SH[facility]IF [facility] ([facility])    [id number] [place] [address]  [facility] [address]   [phone]            Aug 31, 2018 11:30 AM CDT   LAB with RU LAB   [facility] ([facility])    [address] [place] [address]  [facility] [id number] [phone]           Please do not eat 10-12 hours before your appointment if you are coming in fasting for labs on lipids, cholesterol, or glucose (sugar). This does not apply to pregnant women. Water, hot tea and black coffee (with nothing added) are okay. Do not drink other fluids, diet soda or chew gum.            Aug 31, 2018 12:30 PM CDT   Core Return with NATHAN Valentin   [facility] ([facility])    [address] [place] [address]  [facility] [id number] [phone]            Oct 03, 2018 12:30 PM CDT   Ech Complete with [id number] [facility] ([facility])    [address] [place] [address]  [facility] [id number] [phone]           1.  Please bring or wear a comfortable two-piece outfit. 2.  You may eat, drink and take your normal medicines. 3.  For any questions that cannot be answered, please contact the ordering physician 4.  Please do not wear perfumes or scented lotions on the day of  "your exam. ***Please check-in at the [place] Registration Office located in [address] in the [facility] building. When you are finished registering, please go to [address] and have a seat. The technician will call your name for the test.            Oct 05, 2018 10:45 AM CDT   Return Visit with Miguel Pradhan MD   [facility] ([facility])    [address] [place] [address]  [facility] [address] [phone]              Who to contact     If you have questions or need follow up information about today's clinic visit or your schedule please contact [facility] directly at [phone].  Normal or non-critical lab and imaging results will be communicated to you by MyChart, letter or phone within 4 business days after the clinic has received the results. If you do not hear from us within 7 days, please contact the clinic through MyChart or phone. If you have a critical or abnormal lab result, we will notify you by phone as soon as possible.  Submit refill requests through [employer] or call your pharmacy and they will forward the refill request to us. Please allow 3 business days for your refill to be completed.          Additional Information About Your Visit        Care EveryWhere ID     This is your Care EveryWhere ID. This could be used by other organizations to access your [place] medical records  [id number]        Your Vitals Were     Height BMI (Body Mass Index)                5' 3\" (1.6 m) 26.04 kg/m2           Blood Pressure from Last 3 Encounters:   08/07/18 116/66   08/03/18 130/58   07/31/18 110/60    Weight from Last 3 Encounters:   08/07/18 147 lb (66.7 kg)   08/03/18 147 lb 11.2 oz (67 kg)   07/31/18 150 lb (68 kg)              We Performed the Following     DEBRIDEMENT WOUND UP TO 20 SQ CM        Primary Care Provider Office Phone # Fax #    Gerri Eubanks [phone]       [address]  [facility] [address]        Equal Access to Services     [facility] HospitalBEC[facility] : Hadii thais warren milagros Soinocente, waaxda luqadaha, qaybta kaalmasharlene coejennyferbalbir vick. So [facility] [phone].    ATENCIÓN: Si habla español, tiene a rosas disposición servicios gratuitos de asistencia lingüística. Zahraame al [phone].    We comply with applicable federal civil rights laws and [place] laws. We do not discriminate on the basis of race, color, national origin, age, disability, sex, sexual orientation, or gender identity.            Thank you!     Thank you for choosing [facility]  for your care. Our goal is always to provide you with excellent care. Hearing back from our patients is one way we can continue to improve our services. Please take a few minutes to complete the written survey that you may receive in the mail after your visit with us. Thank you!             Your Updated Medication List - Protect others around you: Learn how to safely use, store and throw away your medicines at www.disposemymeds.org.          This list is accurate as of 8/7/18  3:48 PM.  Always use your most recent med list.                   Brand Name Dispense Instructions for use Diagnosis    albuterol 1.25 MG/3ML nebulizer solution    ACCUNEB     Take 1 vial by nebulization 3 times daily        clindamycin 150 MG capsule    CLEOCIN    30 capsule    Take 1 capsule (150 mg) by mouth 3 times daily    Cellulitis and abscess of foot, except toes       Cranberry Extract 250 MG Tabs      Take 250 mg by mouth daily        ferrous sulfate 325 (65 Fe) MG tablet    IRON    100 tablet    Take 1 tablet (325 mg) by mouth 2 times daily    Iron deficiency       gabapentin 100 MG capsule    NEURONTIN    270 capsule    TAKE 1 CAPSULE(100 MG) BY MOUTH THREE TIMES DAILY    Midline thoracic back pain, unspecified chronicity       hydrALAZINE 25 MG tablet    APRESOLINE    810 tablet    Take 3 tablets (75 mg) by mouth 3 times  daily    Hyperlipidemia, unspecified hyperlipidemia type, Cardiomyopathy, unspecified type (H)       isosorbide mononitrate 30 MG 24 hr tablet    IMDUR    90 tablet    Take 2 tablets (60 mg) by mouth daily    Essential hypertension with goal blood pressure less than 140/90, Coronary artery disease involving native heart with angina pectoris, unspecified vessel or lesion type (H)       OMEGA-3 FATTY ACIDS PO      Take 1,200 mg by mouth daily        omeprazole 20 MG CR capsule    priLOSEC    90 capsule    Take 1 capsule (20 mg) by mouth daily    Gastroesophageal reflux disease without esophagitis       senna-docusate 8.6-50 MG per tablet    SENOKOT-S;PERICOLACE    30 tablet    Take 2 tablets daily as needed for constipation while taking prescription pain medications.    S/P foot surgery, right       simvastatin 10 MG tablet    ZOCOR    90 tablet    Take 1 tablet (10 mg) by mouth At Bedtime    Hyperlipidemia, unspecified hyperlipidemia type       spironolactone 50 MG tablet    ALDACTONE    30 tablet    Take 1 tablet (50 mg) by mouth daily    Acute on chronic diastolic congestive heart failure (H)       torsemide 20 MG tablet    DEMADEX    180 tablet    Take 1 tablet (20 mg) by mouth 2 times daily    Chronic diastolic congestive heart failure (H)       TYLENOL 325 MG tablet   Generic drug:  acetaminophen      Take 650 mg by mouth 3 times daily as needed for mild pain        venlafaxine 75 MG 24 hr capsule    EFFEXOR-XR    90 capsule    TAKE ONE CAPSULE BY MOUTH DAILY    Major depressive disorder, recurrent episode, in partial remission (H)       VITAMIN D (CHOLECALCIFEROL) PO      Take 1,000 Units by mouth daily

## 2018-08-07 NOTE — PROGRESS NOTES
"Foot & Ankle Surgery   August 7, 2018    S:  Pt is seen today for evaluation of wound medial L arch 2/2 to pes planus and bony prominence.  We've tried the DH2 shoe, but she feels a little unsteady in it, and her daughter is concerned her foot is sliding in it.  She's used a walking cast boot in the past, for offloading the R bony prominence, with good results.    Vitals:    08/07/18 1409   BP: 116/66   Weight: 147 lb (66.7 kg)   Height: 5' 3\" (1.6 m)   '      ROS - Pos for CC.  Patient denies current nausea, vomiting, chills, fevers, belly pain, calf pain, chest pain or SOB.  Complete remainder of ROS it otherwise neg.      PE:  Gen:   No apparent distress  Eye:    Visual scanning without deficit  Ear:    Response to auditory stimuli wnl  Lung:    Non-labored breathing on RA noted  Abd:    NTND per patient report  Lymph:    Neg for pitting/non-pitting edema BLE  Vasc:    Pulses palpable, CFT minimally delayed  Neuro:    Light touch sensation diminished distally  Derm:    L foot arch 15 x 8mm partial thickness wound.  Erythema improved, but mild inflammation present.  MSK:   Pes planus with bony prominence L arch  Calf:    Neg for redness, swelling or tenderness      Assessment:  86 year old female with partial thickness wound overlying bony prominence L foot      Plan:  Discussed etiologies, anatomy and options  1.  Partial thickness wound overlying bony prominence L foot  -Excisional debridement was performed, partial-thickness(limited to skin breakdown, no exposed subcutaneous fat), sharply debriding the wound, excising nonviable tissue to the above dimensions with a tissue nipper  -daily cares - wash/dry, ointment/bandaid  -we discussed NWB to get this wound to heal.  They do not think this is a viable solution  -she will start using her walking boot, as this helped with the wound previously and she feels more stable  -I anticipate surgery will be needed, once this is healed, to prevent recurrence.  We briefly " discussed the procedure and post-op course  -routine wound debridement/follow-up until healed, then likely to OR to remove bump     Follow up:  2 weeks or sooner with acute issues      Body mass index is 26.04 kg/(m^2).  Weight management plan: Patient was referred to their PCP to discuss a diet and exercise plan.         Lee Jang DPM [facility] FACFA[facility]  Podiatric Foot & Ankle Surgeon  [facility]  [phone]

## 2018-08-10 ENCOUNTER — TELEPHONE (OUTPATIENT)
Dept: INTERNAL MEDICINE | Facility: CLINIC | Age: 83
End: 2018-08-10

## 2018-08-10 NOTE — TELEPHONE ENCOUNTER
Call received from Dora RAMOS with [facility] requesting continued orders for lymphedema therapy. Requesting one visit next week. States patient should be getting her garments next week and they would like to assist patient with them and then will discharge her from care. Verbal order given.

## 2018-08-13 ENCOUNTER — TELEPHONE (OUTPATIENT)
Dept: INTERNAL MEDICINE | Facility: CLINIC | Age: 83
End: 2018-08-13

## 2018-08-15 ENCOUNTER — PATIENT OUTREACH (OUTPATIENT)
Dept: CARE COORDINATION | Facility: CLINIC | Age: 83
End: 2018-08-15

## 2018-08-15 NOTE — PROGRESS NOTES
Clinic Care Coordination Contact  Care Coordination Communication         Clinical Data: Chart's reviewed -[employer] and Epic.  Patient continues to receive home care services.  Patient is followed closely by cardiologist and [facility] clinic care coordinator- [facility].    Home Care Contact:              Home Care Agency: [facility] Rn/OT/SW              Contact name () and phone number: Elle Newberry -[phone]     Plan: No clinic care coordination needs identified.  Patient is followed closely by CORE clinic.  No further outreaches will be made at this time unless a new referral is made or a change in the pt's status occurs.     Adilene Danielson RN-BSN   Care Coordination  Phone:  [phone]  Email: [email or web address]

## 2018-08-20 DIAGNOSIS — E78.5 HYPERLIPIDEMIA, UNSPECIFIED HYPERLIPIDEMIA TYPE: ICD-10-CM

## 2018-08-20 NOTE — TELEPHONE ENCOUNTER
"Requested Prescriptions   Pending Prescriptions Disp Refills     simvastatin (ZOCOR) 10 MG tablet  Last Written Prescription Date:  02/02/18  Last Fill Quantity: 90,  # refills: 1   Last office visit: 7/10/2018 with prescribing provider:  Nasra   Future Office Visit:   Next 5 appointments (look out 90 days)     Aug 28, 2018  1:00 PM CDT   [facility] Visit [facility] Lee Jang DPM   [facility] ([facility])    [address]  [address]  [facility] [address]   [phone]            Oct 05, 2018 10:45 AM CDT   Return Visit with Miguel Pradhan MD   [facility] ([facility])    [address]  [facility] [id number] [phone]                  90 tablet 1     Sig: Take 1 tablet (10 mg) by mouth At Bedtime    Statins Protocol Failed    8/20/2018 10:33 AM       Failed - LDL on file in past 12 months    Recent Labs   Lab Test  08/02/17   1041   LDL  34            Passed - No abnormal creatine kinase in past 12 months    Recent Labs   Lab Test  11/28/15   0613   CKT  27*               Passed - Recent (12 mo) or future (30 days) visit within the authorizing provider's specialty    Patient had office visit in the last 12 months or has a visit in the next 30 days with authorizing provider or within the authorizing provider's specialty.  See \"Patient Info\" tab in inbasket, or \"Choose Columns\" in Meds & Orders section of the refill encounter.           Passed - Patient is age 18 or older       Passed - No active pregnancy on record       Passed - No positive pregnancy test in past 12 months          "

## 2018-08-22 DIAGNOSIS — Z53.9 DIAGNOSIS NOT YET DEFINED: Primary | ICD-10-CM

## 2018-08-22 PROCEDURE — G0179 MD RECERTIFICATION HHA PT: HCPCS | Performed by: INTERNAL MEDICINE

## 2018-08-23 ENCOUNTER — TELEPHONE (OUTPATIENT)
Dept: INTERNAL MEDICINE | Facility: CLINIC | Age: 83
End: 2018-08-23

## 2018-08-23 RX ORDER — SIMVASTATIN 10 MG
10 TABLET ORAL AT BEDTIME
Qty: 30 TABLET | Refills: 0 | Status: SHIPPED | OUTPATIENT
Start: 2018-08-23 | End: 2018-09-27

## 2018-08-26 DIAGNOSIS — M54.6 MIDLINE THORACIC BACK PAIN, UNSPECIFIED CHRONICITY: ICD-10-CM

## 2018-08-27 NOTE — TELEPHONE ENCOUNTER
Requested Prescriptions   Pending Prescriptions Disp Refills     gabapentin (NEURONTIN) 100 MG capsule [Pharmacy Med Name: GABAPENTIN 100MG CAPSULES] 270 capsule 0     Sig: TAKE 1 CAPSULE(100 MG) BY MOUTH THREE TIMES DAILY    There is no refill protocol information for this order      Last Written Prescription Date:  05/29/2018  Last Fill Quantity: 270,  # refills: 0   Last office visit: 7/10/2018 with prescribing provider:     Future Office Visit:   Next 5 appointments (look out 90 days)     Aug 28, 2018  1:00 PM CDT   Return Visit with Lee Jang DPM   [facility] ([facility] [place])    [address]  [address]  [facility] [address]   [phone]            Oct 05, 2018 10:45 AM CDT   Return Visit with Miguel Pradhan MD   [facility] ([facility])    [address]  [facility] [address] [phone]

## 2018-08-28 ENCOUNTER — TELEPHONE (OUTPATIENT)
Dept: INTERNAL MEDICINE | Facility: CLINIC | Age: 83
End: 2018-08-28

## 2018-08-28 ENCOUNTER — OFFICE VISIT (OUTPATIENT)
Dept: PODIATRY | Facility: CLINIC | Age: 83
End: 2018-08-28
Payer: COMMERCIAL

## 2018-08-28 VITALS — BODY MASS INDEX: 25.69 KG/M2 | DIASTOLIC BLOOD PRESSURE: 58 MMHG | SYSTOLIC BLOOD PRESSURE: 116 MMHG | WEIGHT: 145 LBS

## 2018-08-28 DIAGNOSIS — L89.892 DECUBITUS ULCER OF LEFT FOOT, STAGE 2 (H): Primary | ICD-10-CM

## 2018-08-28 PROCEDURE — 97597 DBRDMT OPN WND 1ST 20 CM/<: CPT | Performed by: PODIATRIST

## 2018-08-28 RX ORDER — GABAPENTIN 100 MG/1
CAPSULE ORAL
Qty: 270 CAPSULE | Refills: 0 | Status: SHIPPED | OUTPATIENT
Start: 2018-08-28 | End: 2018-11-26

## 2018-08-28 ASSESSMENT — PAIN SCALES - GENERAL: PAINLEVEL: MILD PAIN (3)

## 2018-08-28 NOTE — PROGRESS NOTES
Foot & Ankle Surgery   August 28, 2018    S:  Pt is seen today for evaluation of medial L arch ulcer.  It had been very painful.  We switched her to a walking boot last visit, but she presents wearing her DH2 shoe.  They modified the dressing and put gauze on either side of the wound.  This has improved pain and the wound looks smaller, and the shoe seems more stable to ambulate in.    Vitals:    08/28/18 1306   BP: 116/58   BP Location: Right arm   Patient Position: Chair   Cuff Size: Adult Regular   Weight: 145 lb (65.8 kg)   '      ROS - Pos for CC.  Patient denies current nausea, vomiting, chills, fevers, belly pain, calf pain, chest pain or SOB.  Complete remainder of ROS it otherwise neg.      PE:  Gen:   No apparent distress  Eye:    Visual scanning without deficit  Ear:    Response to auditory stimuli wnl  Lung:    Non-labored breathing on RA noted  Abd:    NTND per patient report  Lymph:    Neg for pitting/non-pitting edema BLE  Vasc:    Pulses palpable, CFT minimally delayed  Neuro:    Light touch sensation diminished distally  Derm:    [place] moon-shaped wound medial L arch, partial thickness, 9 x 2mm in greatest dimensions, no SOI  MSK:    Pes planus with bony prominence L arch  Calf:    Neg for redness, swelling or tenderness    Assessment:  86 year old female with partial thickness wound medial L arch      Plan:  Discussed etiologies, anatomy and options  1.  Partial thickness wound, no exposed fat, medial L arch  -Excisional debridement was performed, partial-thickness(limited to skin breakdown, no exposed subcutaneous fat), sharply debriding the wound, excising nonviable tissue to the above dimensions with a tissue nipper  -daily cares - wash/dry, abx ointment/bandaid  -discussed options: offload and conitnue with surgical shoes, even if/when wound heals; or heal wound and then proceed with surgery     Follow up:  2 weeks or sooner with acute issues      Body mass index is 25.69 kg/(m^2).            Lee Jang, SHARIF FACFAS FACFAOM  Podiatric Foot & Ankle Surgeon  [facility]  [phone]

## 2018-08-28 NOTE — MR AVS SNAPSHOT
After Visit Summary   8/28/2018    Keisha Godinez    MRN: [id number]           Patient Information     Date Of Birth          1/25/1932        Visit Information        Provider Department      8/28/2018 1:00 PM Lee Jang DPM [facility]        Care Instructions    Follow up in 2 weeks.           Follow-ups after your visit        Your next 10 appointments already scheduled     Aug 31, 2018 11:30 AM CDT   LAB with RU LAB   [facility] ([facility])    [address] [place] [address] [address]  [facility] [address]   [phone]           Please do not eat 10-12 hours before your appointment if you are coming in fasting for labs on lipids, cholesterol, or glucose (sugar). This does not apply to pregnant women. Water, hot tea and black coffee (with nothing added) are okay. Do not drink other fluids, diet soda or chew gum.            Aug 31, 2018 12:30 PM CDT   Core Return with NATHAN Valentin   [facility] ([facility])    [address] [place] [address] [address]  [facility] [address]   [phone]            Oct 03, 2018 12:30 PM CDT   Ech Complete with [address] ([facility])    [address] [place] [address]   [phone]           1.  Please bring or wear a comfortable two-piece outfit. 2.  You may eat, drink and take your normal medicines. 3.  For any questions that cannot be answered, please contact the ordering physician 4.  Please do not wear perfumes or scented lotions on the day of your exam. ***Please check-in at the [place] Registration Office located in [address] in the [facility] building. When you are finished registering, please go to [address] and have a seat. The technician will call your name for the test.            Oct 05, 2018 10:45 AM CDT   Return Visit with Miguel Pradhan MD    [facility] ([facility])    [address]  [facility] [address] [phone]              Who to contact     If you have questions or need follow up information about today's clinic visit or your schedule please contact [facility] directly at [phone].  Normal or non-critical lab and imaging results will be communicated to you by MyChart, letter or phone within 4 business days after the clinic has received the results. If you do not hear from us within 7 days, please contact the clinic through MyChart or phone. If you have a critical or abnormal lab result, we will notify you by phone as soon as possible.  Submit refill requests through [employer] or call your pharmacy and they will forward the refill request to us. Please allow 3 business days for your refill to be completed.          Additional Information About Your Visit        Care EveryWhere ID     This is your Care EveryWhere ID. This could be used by other organizations to access your [place] medical records  [id number]        Your Vitals Were     BMI (Body Mass Index)                   25.69 kg/m2            Blood Pressure from Last 3 Encounters:   08/28/18 116/58   08/07/18 116/66   08/03/18 130/58    Weight from Last 3 Encounters:   08/28/18 145 lb (65.8 kg)   08/07/18 147 lb (66.7 kg)   08/03/18 147 lb 11.2 oz (67 kg)              Today, you had the following     No orders found for display       Primary Care Provider Office Phone # Fax #    Gerri Eubanks [phone]       [address] [facility] [address]  [facility] [address]        Equal Access to Services     ROBERTO KLEIN AH: Hadii aad ku hadasho Soinocente, waaxda luqadaha, qaybta kaalmada jenna, sharlene vick. So [facility] [phone].    ATENCIÓN: Si habla español, tiene a rosas disposición servicios gratuitos de asistencia lingüística. Llame al [phone].    We comply  with applicable federal civil rights laws and [place] laws. We do not discriminate on the basis of race, color, national origin, age, disability, sex, sexual orientation, or gender identity.            Thank you!     Thank you for choosing [facility]  for your care. Our goal is always to provide you with excellent care. Hearing back from our patients is one way we can continue to improve our services. Please take a few minutes to complete the written survey that you may receive in the mail after your visit with us. Thank you!             Your Updated Medication List - Protect others around you: Learn how to safely use, store and throw away your medicines at www.disposemymeds.org.          This list is accurate as of 8/28/18  1:17 PM.  Always use your most recent med list.                   Brand Name Dispense Instructions for use Diagnosis    albuterol 1.25 MG/3ML nebulizer solution    ACCUNEB     Take 1 vial by nebulization 3 times daily        clindamycin 150 MG capsule    CLEOCIN    30 capsule    Take 1 capsule (150 mg) by mouth 3 times daily    Cellulitis and abscess of foot, except toes       Cranberry Extract 250 MG Tabs      Take 250 mg by mouth daily        ferrous sulfate 325 (65 Fe) MG tablet    IRON    100 tablet    Take 1 tablet (325 mg) by mouth 2 times daily    Iron deficiency       gabapentin 100 MG capsule    NEURONTIN    270 capsule    TAKE 1 CAPSULE(100 MG) BY MOUTH THREE TIMES DAILY    Midline thoracic back pain, unspecified chronicity       hydrALAZINE 25 MG tablet    APRESOLINE    810 tablet    Take 3 tablets (75 mg) by mouth 3 times daily    Hyperlipidemia, unspecified hyperlipidemia type, Cardiomyopathy, unspecified type (H)       isosorbide mononitrate 30 MG 24 hr tablet    IMDUR    90 tablet    Take 2 tablets (60 mg) by mouth daily    Essential hypertension with goal blood pressure less than 140/90, Coronary artery disease involving native heart with angina pectoris,  unspecified vessel or lesion type (H)       OMEGA-3 FATTY ACIDS PO      Take 1,200 mg by mouth daily        omeprazole 20 MG CR capsule    priLOSEC    90 capsule    Take 1 capsule (20 mg) by mouth daily    Gastroesophageal reflux disease without esophagitis       senna-docusate 8.6-50 MG per tablet    SENOKOT-S;PERICOLACE    30 tablet    Take 2 tablets daily as needed for constipation while taking prescription pain medications.    S/P foot surgery, right       simvastatin 10 MG tablet    ZOCOR    30 tablet    Take 1 tablet (10 mg) by mouth At Bedtime    Hyperlipidemia, unspecified hyperlipidemia type       spironolactone 50 MG tablet    ALDACTONE    30 tablet    Take 1 tablet (50 mg) by mouth daily    Acute on chronic diastolic congestive heart failure (H)       torsemide 20 MG tablet    DEMADEX    180 tablet    Take 1 tablet (20 mg) by mouth 2 times daily    Chronic diastolic congestive heart failure (H)       TYLENOL 325 MG tablet   Generic drug:  acetaminophen      Take 650 mg by mouth 3 times daily as needed for mild pain        venlafaxine 75 MG 24 hr capsule    EFFEXOR-XR    90 capsule    TAKE ONE CAPSULE BY MOUTH DAILY    Major depressive disorder, recurrent episode, in partial remission (H)       VITAMIN D (CHOLECALCIFEROL) PO      Take 1,000 Units by mouth daily

## 2018-08-30 ENCOUNTER — TELEPHONE (OUTPATIENT)
Dept: CARDIOLOGY | Facility: CLINIC | Age: 83
End: 2018-08-30

## 2018-08-30 NOTE — TELEPHONE ENCOUNTER
[facility] Tele-management    Daily weight graph copied to patient's chart for Suad Murrell PA-C review office visit 8/31/18.          Tony Marti LPN  [facility]  [phone]

## 2018-08-31 ENCOUNTER — OFFICE VISIT (OUTPATIENT)
Dept: CARDIOLOGY | Facility: CLINIC | Age: 83
End: 2018-08-31
Attending: PHYSICIAN ASSISTANT
Payer: COMMERCIAL

## 2018-08-31 ENCOUNTER — MEDICAL CORRESPONDENCE (OUTPATIENT)
Dept: HEALTH INFORMATION MANAGEMENT | Facility: CLINIC | Age: 83
End: 2018-08-31

## 2018-08-31 ENCOUNTER — CARE COORDINATION (OUTPATIENT)
Dept: CARDIOLOGY | Facility: CLINIC | Age: 83
End: 2018-08-31

## 2018-08-31 VITALS
BODY MASS INDEX: 26.21 KG/M2 | DIASTOLIC BLOOD PRESSURE: 56 MMHG | HEIGHT: 63 IN | WEIGHT: 147.9 LBS | SYSTOLIC BLOOD PRESSURE: 124 MMHG | OXYGEN SATURATION: 95 % | HEART RATE: 52 BPM

## 2018-08-31 DIAGNOSIS — I50.32 CHRONIC DIASTOLIC CONGESTIVE HEART FAILURE (H): Primary | ICD-10-CM

## 2018-08-31 DIAGNOSIS — I50.32 CHRONIC DIASTOLIC CONGESTIVE HEART FAILURE (H): ICD-10-CM

## 2018-08-31 DIAGNOSIS — I48.20 CHRONIC A-FIB (H): ICD-10-CM

## 2018-08-31 LAB
ANION GAP SERPL CALCULATED.3IONS-SCNC: 5 MMOL/L (ref 3–14)
BUN SERPL-MCNC: 32 MG/DL (ref 7–30)
CALCIUM SERPL-MCNC: 7.9 MG/DL (ref 8.5–10.1)
CHLORIDE SERPL-SCNC: 103 MMOL/L (ref 94–109)
CO2 SERPL-SCNC: 31 MMOL/L (ref 20–32)
CREAT SERPL-MCNC: 1.01 MG/DL (ref 0.52–1.04)
GFR SERPL CREATININE-BSD FRML MDRD: 52 ML/MIN/1.7M2
GLUCOSE SERPL-MCNC: 108 MG/DL (ref 70–99)
NT-PROBNP SERPL-MCNC: 2491 PG/ML (ref 0–450)
POTASSIUM SERPL-SCNC: 3.7 MMOL/L (ref 3.4–5.3)
SODIUM SERPL-SCNC: 139 MMOL/L (ref 133–144)

## 2018-08-31 PROCEDURE — 99214 OFFICE O/P EST MOD 30 MIN: CPT | Performed by: PHYSICIAN ASSISTANT

## 2018-08-31 PROCEDURE — 80048 BASIC METABOLIC PNL TOTAL CA: CPT | Performed by: INTERNAL MEDICINE

## 2018-08-31 PROCEDURE — 83880 ASSAY OF NATRIURETIC PEPTIDE: CPT | Performed by: INTERNAL MEDICINE

## 2018-08-31 PROCEDURE — 36415 COLL VENOUS BLD VENIPUNCTURE: CPT | Performed by: INTERNAL MEDICINE

## 2018-08-31 NOTE — MR AVS SNAPSHOT
After Visit Summary   8/31/2018    Keisha Godinez    MRN: [id number]           Patient Information     Date Of Birth          1/25/1932        Visit Information        Provider Department      8/31/2018 12:30 PM Suad Murrell PA [facility]        Today's Diagnoses     Chronic diastolic congestive heart failure (H)    -  1    Chronic a-fib (H)          Care Instructions    Call the C.O.R.E. nurse for any questions or concerns:  [phone]    1.  Medication changes from today:  NONE    2. Follow up plan:  With Dr. Pradhan in 1 month with an echocardiogram.     3.  Weigh yourself daily and write it down.     4.  Call CORE nurse if your weight is up more than 2 pounds in one day or 5 pounds in one week.    5.  Call CORE nurse if you feel more short of breath, have more abdominal bloating, or leg swelling.    6.  Continue low sodium diet (less than 2000 mg daily). If you eat less salt, you will retain less fluid.    7.  Alcohol can weaken your heart further. You should avoid alcohol or limit its use to special times, such as a holiday or birthday.     8.  Do NOT take Aleve (naproxen) or Advil (ibuprofen) without talking to your doctor first.     9.  Lab Results:     Component      Latest Ref Rng & Units 8/31/2018   Sodium      133 - 144 mmol/L 139   Potassium      3.4 - 5.3 mmol/L 3.7   Chloride      94 - 109 mmol/L 103   Carbon Dioxide      20 - 32 mmol/L 31   Anion Gap      3 - 14 mmol/L 5   Glucose      70 - 99 mg/dL 108 (H)   Urea Nitrogen      7 - 30 mg/dL 32 (H)   Creatinine      0.52 - 1.04 mg/dL 1.01   GFR Estimate      >60 mL/min/1.7m2 52 (L)   GFR Estimate If Black      >60 mL/min/1.7m2 63   Calcium      8.5 - 10.1 mg/dL 7.9 (L)   N-Terminal Pro Bnp      0 - 450 pg/mL 2491 (H)     [facility]: Cardiomyopathy, Optimization, Rehabilitation, Education  The [facility] is a heart failure specialty clinic within the [facility]  Clinic where you will work with your cardiologist, nurse practitioners, physician assistants and registered nurses who specialize in heart failure care. They are dedicated to helping patients with heart failure to carefully adjust medications, receive education, and learn who and when to call if symptoms develop. They specialize in helping you better understand your condition, slow the progression of your disease, improve the length and quality of your life, help you detect future heart problems before they become life threatening, and avoid hospitalizations.            Follow-ups after your visit        Your next 10 appointments already scheduled     Sep 11, 2018  1:30 PM CDT   Return Visit with Lee Jang DPM   [facility] ([facility])    [address]  [address]  [facility] [address] [phone]            Oct 03, 2018 12:30 PM CDT   Ech Complete with [id number] ([facility])    [address]  [facility] [address] [phone]           1.  Please bring or wear a comfortable two-piece outfit. 2.  You may eat, drink and take your normal medicines. 3.  For any questions that cannot be answered, please contact the ordering physician 4.  Please do not wear perfumes or scented lotions on the day of your exam. ***Please check-in at the [facility] located in [address] in the [facility] building. When you are finished registering, please go to [address] and have a seat. The technician will call your name for the test.            Oct 05, 2018 10:45 AM CDT   Return Visit with Miguel Pradhan MD   [facility] ([facility])    [address]  [facility] [id number] [phone]              Who to contact     If you have questions or need follow up information about today's clinic visit or your  "schedule please contact [facility] directly at [phone].  Normal or non-critical lab and imaging results will be communicated to you by MyChart, letter or phone within 4 business days after the clinic has received the results. If you do not hear from us within 7 days, please contact the clinic through MyChart or phone. If you have a critical or abnormal lab result, we will notify you by phone as soon as possible.  Submit refill requests through [employer] or call your pharmacy and they will forward the refill request to us. Please allow 3 business days for your refill to be completed.          Additional Information About Your Visit        Care EveryWhere ID     This is your Care EveryWhere ID. This could be used by other organizations to access your [facility] medical records  [id number]        Your Vitals Were     Pulse Height Pulse Oximetry BMI (Body Mass Index)          52 1.6 m (5' 3\") 95% 26.2 kg/m2         Blood Pressure from Last 3 Encounters:   08/31/18 124/56   08/28/18 116/58   08/07/18 116/66    Weight from Last 3 Encounters:   08/31/18 67.1 kg (147 lb 14.4 oz)   08/28/18 65.8 kg (145 lb)   08/07/18 66.7 kg (147 lb)              We Performed the Following     Follow-Up with [facility] - [place] visit        Primary Care Provider Office Phone # Fax #    Gerri Eubanks [phone]       [address] [facility] [address]  [facility] [address]        Equal Access to Services     ROBERTO KLEIN AH: Hadii aad ku hadasho Soomaali, waaxda luqadaha, qaybta kaalmada adeegyada, sharlene torre . So [facility] [phone].    ATENCIÓN: Si habla español, tiene a rosas disposición servicios gratuitos de asistencia lingüística. Llame al [phone].    We comply with applicable federal civil rights laws and [place] laws. We do not discriminate on the basis of race, color, national origin, age, disability, sex, sexual orientation, or gender " identity.            Thank you!     Thank you for choosing [facility]  for your care. Our goal is always to provide you with excellent care. Hearing back from our patients is one way we can continue to improve our services. Please take a few minutes to complete the written survey that you may receive in the mail after your visit with us. Thank you!             Your Updated Medication List - Protect others around you: Learn how to safely use, store and throw away your medicines at www.disposemymeds.org.          This list is accurate as of 8/31/18  1:14 PM.  Always use your most recent med list.                   Brand Name Dispense Instructions for use Diagnosis    albuterol 1.25 MG/3ML nebulizer solution    ACCUNEB     Take 1 vial by nebulization 3 times daily        clindamycin 150 MG capsule    CLEOCIN    30 capsule    Take 1 capsule (150 mg) by mouth 3 times daily    Cellulitis and abscess of foot, except toes       Cranberry Extract 250 MG Tabs      Take 250 mg by mouth daily        ferrous sulfate 325 (65 Fe) MG tablet    IRON    100 tablet    Take 1 tablet (325 mg) by mouth 2 times daily    Iron deficiency       gabapentin 100 MG capsule    NEURONTIN    270 capsule    TAKE 1 CAPSULE(100 MG) BY MOUTH THREE TIMES DAILY    Midline thoracic back pain, unspecified chronicity       hydrALAZINE 25 MG tablet    APRESOLINE    810 tablet    Take 3 tablets (75 mg) by mouth 3 times daily    Hyperlipidemia, unspecified hyperlipidemia type, Cardiomyopathy, unspecified type (H)       isosorbide mononitrate 30 MG 24 hr tablet    IMDUR    90 tablet    Take 2 tablets (60 mg) by mouth daily    Essential hypertension with goal blood pressure less than 140/90, Coronary artery disease involving native heart with angina pectoris, unspecified vessel or lesion type (H)       OMEGA-3 FATTY ACIDS PO      Take 1,200 mg by mouth daily        omeprazole 20 MG CR capsule    priLOSEC    90 capsule     Take 1 capsule (20 mg) by mouth daily    Gastroesophageal reflux disease without esophagitis       senna-docusate 8.6-50 MG per tablet    SENOKOT-S;PERICOLACE    30 tablet    Take 2 tablets daily as needed for constipation while taking prescription pain medications.    S/P foot surgery, right       simvastatin 10 MG tablet    ZOCOR    30 tablet    Take 1 tablet (10 mg) by mouth At Bedtime    Hyperlipidemia, unspecified hyperlipidemia type       spironolactone 50 MG tablet    ALDACTONE    30 tablet    Take 1 tablet (50 mg) by mouth daily    Acute on chronic diastolic congestive heart failure (H)       torsemide 20 MG tablet    DEMADEX    180 tablet    Take 1 tablet (20 mg) by mouth 2 times daily    Chronic diastolic congestive heart failure (H)       TYLENOL 325 MG tablet   Generic drug:  acetaminophen      Take 650 mg by mouth 3 times daily as needed for mild pain        venlafaxine 75 MG 24 hr capsule    EFFEXOR-XR    90 capsule    TAKE ONE CAPSULE BY MOUTH DAILY    Major depressive disorder, recurrent episode, in partial remission (H)       VITAMIN D (CHOLECALCIFEROL) PO      Take 1,000 Units by mouth daily

## 2018-08-31 NOTE — LETTER
8/31/2018    Gerri Eubanks MD  [address] [facility] [address]  [facility] [address]    RE: Keisha Bullchriss       Dear Colleague,    I had the pleasure of seeing Keisha Godinez in the [facility].      Cardiology Progress Note    Date of Service: 08/31/2018      Reason for visit: CORE clinic follow up, diastolic heart failure.     Primary cardiologist: Dr. Miguel Pradhan      HPI:  Ms. Godinez is a pleasant 86 year old female with a PMHx including hypertension, hyperlipidemia, atrial fibrillation (not on AC due to prior GI bleed), chronic anemia (colonic AVM, iron deficiency), CVA, chronic lower extremity edema, and diastolic heart failure with pulmonary hypertension. She has been followed by Dr. Pradhan in the past, but hasn't been seen since 2016. She tells me she did not return because she had been doing so well.  Earlier this summer, she started having issues with increased leg swelling. She was admitted at [facility] in May 2018 with cellulitis complicated by heart failure, was diuresed and discharged to a TCU. She then returned to the hospital in June with leg edema and weight gain once again, along with dyspnea. She required IV diuresis, and pRBC transfusion for worsening of her anemia. She was reportedly 167 lbs at admit, and discharged at 155 lbs. She was placed on 40mg of lasix twice daily, and still required supplemental oxygen at 1.5L at discharge.    She returned to see me for [facility] clinic enrollment on 7/13/18. At that time she was doing better overall, and her daughter (her main caregiver) told me they had started to make significant lifestyle changes, included reducing sodium in her diet. Her weight was down to 150 lbs at that visit. She continued with leg edema, and followed at the lymphedema clinic with leg wraps. I transitioned her lasix to torsemide 20mg BID and enrolled her in telemanagement. When I saw her back shortly after, she had continued to make significant  improvement in regard to her leg edema. Her weight was down further to 147 lbs. No changes were made at that time, and she's back again today for routine follow up.    Since last visit, she tells me she continues to improve. She no longer requires lymphedema wraps and wears compression socks only. She is ambulating to her mailbox and back with the use of a rolling walker. She is no longer requiring supplemental oxygen during the day, and only uses as night. She denies any significant MOTT, new chest discomfort, palpitations, dizziness, or presyncope. She still sleeps in a recliner but sounds as is this is more for comfort for her legs. She continues to do an excellent job with sodium restriction. She and her daughter are both very pleased with how much progress she has made.      ASSESSMENT/PLAN:    1. Acute on chronic diastolic heart failure.   --Last echo was October 2017 at which time her EF was preserved at 60-65%. Her RV at that time was moderately dilated, with borderline reduced function and moderate TR, in the setting of volume overload.   --She continues to make significant improvement with swelling. Her renal function remains acceptable and NT-proBNP has been trending down nicely. Will continue torsemide 20mg BID.   --I congratulated her on sodium restriction in her diet which has been helpful. Continue daily weights at home. Given her history of GIB and chronic anemia, not a good cardiomems candidate.   --Continue spironolactone 50mg daily for now.    --Monitor anemia, she remains on ferrous sulfate. Would like to see her hemoglobin a bit higher which in the long term will make her heart failure easier to manage.    --Now that we are near euvolemia, will repeat echocardiogram prior to her upcoming visit with Dr. Pradhan. If she continues to have RV dysfunction, may consider a RHC.    --We discussed cardiac rehab today, but she declines this mostly due to transportation issues. She feels she is starting to  make progress on her own at home.     2. Hypertension.   --BP recently has been well controlled. She has a true allergy to ACE-inhibitors (tongue swelling), and is not on a beta blocker as she has had issues with bradycardia in the past.   --She has chronically been on hydralazine and isosorbide which we will continue.     3. Atrial fibrillation, chronic.   --Rate well controlled. Not on BB due to bradycardia as above.   --She is not on anticoagulation due to GIB/anemia.     4. Hyperlipidemia.   --Last LDL Aug 2017 was excellent at 34. Continue simvastatin 10mg daily.       Follow up plan: With Dr. Miguel Pradhan in 1 month with echocardiogram prior to appt.         Orders this Visit:  No orders of the defined types were placed in this encounter.    No orders of the defined types were placed in this encounter.    There are no discontinued medications.        CURRENT MEDICATIONS:  Current Outpatient Prescriptions   Medication Sig Dispense Refill     albuterol (ACCUNEB) 1.25 MG/3ML nebulizer solution Take 1 vial by nebulization 3 times daily        Cranberry Extract 250 MG TABS Take 250 mg by mouth daily       ferrous sulfate (IRON) 325 (65 FE) MG tablet Take 1 tablet (325 mg) by mouth 2 times daily 100 tablet 11     gabapentin (NEURONTIN) 100 MG capsule TAKE 1 CAPSULE(100 MG) BY MOUTH THREE TIMES DAILY 270 capsule 0     hydrALAZINE (APRESOLINE) 25 MG tablet Take 3 tablets (75 mg) by mouth 3 times daily 810 tablet 3     isosorbide mononitrate (IMDUR) 30 MG 24 hr tablet Take 2 tablets (60 mg) by mouth daily 90 tablet 2     OMEGA-3 FATTY ACIDS PO Take 1,200 mg by mouth daily        omeprazole (PRILOSEC) 20 MG CR capsule Take 1 capsule (20 mg) by mouth daily 90 capsule 2     senna-docusate (SENOKOT-S;PERICOLACE) 8.6-50 MG per tablet Take 2 tablets daily as needed for constipation while taking prescription pain medications. 30 tablet 0     simvastatin (ZOCOR) 10 MG tablet Take 1 tablet (10 mg) by mouth At Bedtime 30 tablet  0     spironolactone (ALDACTONE) 50 MG tablet Take 1 tablet (50 mg) by mouth daily 30 tablet 3     torsemide (DEMADEX) 20 MG tablet Take 1 tablet (20 mg) by mouth 2 times daily 180 tablet 3     venlafaxine (EFFEXOR-XR) 75 MG 24 hr capsule TAKE ONE CAPSULE BY MOUTH DAILY 90 capsule 0     VITAMIN D, CHOLECALCIFEROL, PO Take 1,000 Units by mouth daily        acetaminophen (TYLENOL) 325 MG tablet Take 650 mg by mouth 3 times daily as needed for mild pain       clindamycin (CLEOCIN) 150 MG capsule Take 1 capsule (150 mg) by mouth 3 times daily (Patient not taking: Reported on 8/31/2018) 30 capsule 0       ALLERGIES     Allergies   Allergen Reactions     Blood Transfusion Related (Informational Only) Other (See Comments)     Patient has a history of a clinically significant antibody against RBC antigens.  A delay in compatible RBCs may occur.     Lisinopril Other (See Comments)     Tongue swelling     Calcium Nausea and Vomiting     Egg Yolk      Flu Virus Vaccine      Allergic to eggs.     Keflex [Cephalexin] Nausea     Pt states she had upset stomach.  NOT tongue swelling.  She had tongue swelling with a combo bp med that she thinks included lisinopril.    Tolerates IV Cefazolin     Levaquin [Levofloxacin]      Sulfa Drugs      Zinc Nausea and Vomiting       PAST MEDICAL HISTORY:  Past Medical History:   Diagnosis Date     Anemia      Atrial fibrillation (H)      B12 deficiency      Cardiomyopathy (H)      CHF (congestive heart failure) (H)      Chronic edema     Lower extremities     Chronic systolic congestive heart failure (H)      CVA (cerebral vascular accident) (H) 2010    Acute Left MCA hemispheric CVA ( on coumadin)     Depression      Gastro-oesophageal reflux disease      GI bleed 03-20-15     Hyperlipidemia      Hypertension      Iron deficiency      MSSA (methicillin susceptible Staphylococcus aureus) 03-10-15     Pulmonary hypertension      Shingles        PAST SURGICAL HISTORY:  Past Surgical History:    Procedure Laterality Date     COLONOSCOPY  03/24/2015    Diverticulosis, polyps     ENTEROSCOPY SMALL BOWEL N/A 2/29/2016    Procedure: ENTEROSCOPY SMALL BOWEL;  Surgeon: Ady Ponce MD;  Location:  GI     ESOPHAGOSCOPY, GASTROSCOPY, DUODENOSCOPY (EGD), COMBINED N/A 3/22/2015    Upper GI- Normal, nothing significant found.      EXCISE MASS FOOT Right 7/6/2016    Procedure: EXCISE MASS FOOT;  Surgeon: Lee Jang DPM;  Location: [facility] OR       FAMILY HISTORY:  Family History   Problem Relation Age of Onset     Known Genetic Syndrome Father      Known Genetic Syndrome Mother      Other Cancer Daughter      pancreatic     Other Cancer Brother      type     Other Cancer Sister 60     lung     Diabetes No family hx of      Breast Cancer No family hx of      Cancer - colorectal No family hx of      Ovarian Cancer No family hx of      Prostate Cancer No family hx of        SOCIAL HISTORY:  Social History     Social History     Marital status:      Spouse name: N/A     Number of children: N/A     Years of education: N/A     Social History Main Topics     Smoking status: Former Smoker     Quit date: 4/14/1956     Smokeless tobacco: Never Used     Alcohol use No     Drug use: No     Sexual activity: No     Other Topics Concern     Caffeine Concern No     Hot tea 1 cup daily     Special Diet Yes     low sodium     Exercise No     limited right now     Social History Narrative       Review of Systems:  Cardiovascular: negative for chest pain, palpitations, orthopnea, pos LE edema, much improved.  Constitutional: negative for chills, sweats, fevers   Resp: Negative for dyspnea at rest, neg MOTT. Neg cough, known chronic lung disease  HEENT: Negative for new visual changes, frequent headaches  Gastrointestinal: negative for abdominal pain, diarrhea, blood in stool, nausea, vomiting  Hematologic/lymphatic: negative for current systemic anticoagulation, hx of blood clots. Pos hx GIB.  Musculoskeletal:  "negative for new back pain, joint pain  Neurological: negative for focal weakness, LOC, seizures, syncope. Neg dizziness.      Physical Exam:  Vitals: /56 (BP Location: Right arm, Patient Position: Chair, Cuff Size: Adult Regular)  Pulse 52  Ht 1.6 m (5' 3\")  Wt 67.1 kg (147 lb 14.4 oz)  SpO2 95%  BMI 26.2 kg/m2   Wt Readings from Last 4 Encounters:   08/31/18 67.1 kg (147 lb 14.4 oz)   08/28/18 65.8 kg (145 lb)   08/07/18 66.7 kg (147 lb)   08/03/18 67 kg (147 lb 11.2 oz)       GEN:  In general, this is a well nourished elderly female in no acute distress on room air. She arrived in a wheelchair again today, accompanied by her daughter.    HEENT:  Pupils equal, round. Sclerae nonicteric.   NECK: Supple, no masses appreciated. Trachea midline. No JVD while upright.   C/V:  Irregular rhythm, no murmur, rub or gallop.  No S3 or RV heave.   RESP: Respirations are unlabored. No use of accessory muscles. Noted to have a few crackles in bases posteriorly. No wheezing.   GI: Abdomen soft, nontender, nondistended.   EXTREM: 1+ bilateral edema, compression socks in place. No cyanosis or clubbing.  NEURO: Alert and oriented, cooperative. Gait not assesed. No obvious focal deficits.   PSYCH: Normal affect.  SKIN: Warm and dry. No rashes or petechiae appreciated.       Recent Lab Results:  LIPID RESULTS:  Lab Results   Component Value Date    CHOL 108 08/02/2017    HDL 61 08/02/2017    LDL 34 08/02/2017    TRIG 65 08/02/2017             BMP RESULTS:  Lab Results   Component Value Date     08/31/2018    POTASSIUM 3.7 08/31/2018    CHLORIDE 103 08/31/2018    CO2 31 08/31/2018    ANIONGAP 5 08/31/2018     (H) 08/31/2018    BUN 32 (H) 08/31/2018    CR 1.01 08/31/2018    GFRESTIMATED 52 (L) 08/31/2018    GFRESTBLACK 63 08/31/2018    CASPER 7.9 (L) 08/31/2018      Results for ELAINA MAYFIELD (MRN [id number]) as of 8/31/2018 13:10   Ref. Range 6/18/2018 15:24 7/13/2018 09:51 7/20/2018 12:50 8/3/2018 12:02 " 8/31/2018 11:10   N-Terminal Pro Bnp Latest Ref Range: 0 - 450 pg/mL 8389 (H) 3606 (H) 3530 (H) 2855 (H) 2491 (H)           New/Pertinent imaging results since last visit:  Echo 10/18/17  Interpretation Summary     The visual ejection fraction is estimated at 60-65%.  The right ventricle is moderately dilated.  The right ventricular systolic function is borderline reduced.  There is mod-severe biatrial enlargement.  There is mild (1+) mitral regurgitation.  There is moderate (2+) tricuspid regurgitation.  Dilated inferior vena cava  Compared to prior study, there is no significant change.        NATHAN Valentin-C  [facility]  Pager [phone]      Thank you for allowing me to participate in the care of your patient.      Sincerely,     NATHAN Valentin     [facility]    cc:   NATHAN Valentin  [facility]  [address] [place] [id number]

## 2018-08-31 NOTE — PROGRESS NOTES
Cardiology Progress Note    Date of Service: 08/31/2018      Reason for visit: CORE clinic follow up, diastolic heart failure.     Primary cardiologist: Dr. Miguel Pradhan      HPI:  Ms. Godinez is a pleasant 86 year old female with a PMHx including hypertension, hyperlipidemia, atrial fibrillation (not on AC due to prior GI bleed), chronic anemia (colonic AVM, iron deficiency), CVA, chronic lower extremity edema, and diastolic heart failure with pulmonary hypertension. She has been followed by Dr. Pradhan in the past, but hasn't been seen since 2016. She tells me she did not return because she had been doing so well.  Earlier this summer, she started having issues with increased leg swelling. She was admitted at [facility] in May 2018 with cellulitis complicated by heart failure, was diuresed and discharged to a TCU. She then returned to the hospital in June with leg edema and weight gain once again, along with dyspnea. She required IV diuresis, and pRBC transfusion for worsening of her anemia. She was reportedly 167 lbs at admit, and discharged at 155 lbs. She was placed on 40mg of lasix twice daily, and still required supplemental oxygen at 1.5L at discharge.    She returned to see me for CORE clinic enrollment on 7/13/18. At that time she was doing better overall, and her daughter (her main caregiver) told me they had started to make significant lifestyle changes, included reducing sodium in her diet. Her weight was down to 150 lbs at that visit. She continued with leg edema, and followed at the lymphedema clinic with leg wraps. I transitioned her lasix to torsemide 20mg BID and enrolled her in telemanagement. When I saw her back shortly after, she had continued to make significant improvement in regard to her leg edema. Her weight was down further to 147 lbs. No changes were made at that time, and she's back again today for routine follow up.    Since last visit, she tells me she continues to improve. She no longer  requires lymphedema wraps and wears compression socks only. She is ambulating to her mailbox and back with the use of a rolling walker. She is no longer requiring supplemental oxygen during the day, and only uses as night. She denies any significant MOTT, new chest discomfort, palpitations, dizziness, or presyncope. She still sleeps in a recliner but sounds as is this is more for comfort for her legs. She continues to do an excellent job with sodium restriction. She and her daughter are both very pleased with how much progress she has made.      ASSESSMENT/PLAN:    1. Acute on chronic diastolic heart failure.   --Last echo was October 2017 at which time her EF was preserved at 60-65%. Her RV at that time was moderately dilated, with borderline reduced function and moderate TR, in the setting of volume overload.   --She continues to make significant improvement with swelling. Her renal function remains acceptable and NT-proBNP has been trending down nicely. Will continue torsemide 20mg BID.   --I congratulated her on sodium restriction in her diet which has been helpful. Continue daily weights at home. Given her history of GIB and chronic anemia, not a good cardiomems candidate.   --Continue spironolactone 50mg daily for now.    --Monitor anemia, she remains on ferrous sulfate. Would like to see her hemoglobin a bit higher which in the long term will make her heart failure easier to manage.    --Now that we are near euvolemia, will repeat echocardiogram prior to her upcoming visit with Dr. Pradhan. If she continues to have RV dysfunction, may consider a RHC.    --We discussed cardiac rehab today, but she declines this mostly due to transportation issues. She feels she is starting to make progress on her own at home.     2. Hypertension.   --BP recently has been well controlled. She has a true allergy to ACE-inhibitors (tongue swelling), and is not on a beta blocker as she has had issues with bradycardia in the  past.   --She has chronically been on hydralazine and isosorbide which we will continue.     3. Atrial fibrillation, chronic.   --Rate well controlled. Not on BB due to bradycardia as above.   --She is not on anticoagulation due to GIB/anemia.     4. Hyperlipidemia.   --Last LDL Aug 2017 was excellent at 34. Continue simvastatin 10mg daily.       Follow up plan: With Dr. Miguel Pradhan in 1 month with echocardiogram prior to appt.         Orders this Visit:  No orders of the defined types were placed in this encounter.    No orders of the defined types were placed in this encounter.    There are no discontinued medications.        CURRENT MEDICATIONS:  Current Outpatient Prescriptions   Medication Sig Dispense Refill     albuterol (ACCUNEB) 1.25 MG/3ML nebulizer solution Take 1 vial by nebulization 3 times daily        Cranberry Extract 250 MG TABS Take 250 mg by mouth daily       ferrous sulfate (IRON) 325 (65 FE) MG tablet Take 1 tablet (325 mg) by mouth 2 times daily 100 tablet 11     gabapentin (NEURONTIN) 100 MG capsule TAKE 1 CAPSULE(100 MG) BY MOUTH THREE TIMES DAILY 270 capsule 0     hydrALAZINE (APRESOLINE) 25 MG tablet Take 3 tablets (75 mg) by mouth 3 times daily 810 tablet 3     isosorbide mononitrate (IMDUR) 30 MG 24 hr tablet Take 2 tablets (60 mg) by mouth daily 90 tablet 2     OMEGA-3 FATTY ACIDS PO Take 1,200 mg by mouth daily        omeprazole (PRILOSEC) 20 MG CR capsule Take 1 capsule (20 mg) by mouth daily 90 capsule 2     senna-docusate (SENOKOT-S;PERICOLACE) 8.6-50 MG per tablet Take 2 tablets daily as needed for constipation while taking prescription pain medications. 30 tablet 0     simvastatin (ZOCOR) 10 MG tablet Take 1 tablet (10 mg) by mouth At Bedtime 30 tablet 0     spironolactone (ALDACTONE) 50 MG tablet Take 1 tablet (50 mg) by mouth daily 30 tablet 3     torsemide (DEMADEX) 20 MG tablet Take 1 tablet (20 mg) by mouth 2 times daily 180 tablet 3     venlafaxine (EFFEXOR-XR) 75 MG 24 hr  capsule TAKE ONE CAPSULE BY MOUTH DAILY 90 capsule 0     VITAMIN D, CHOLECALCIFEROL, PO Take 1,000 Units by mouth daily        acetaminophen (TYLENOL) 325 MG tablet Take 650 mg by mouth 3 times daily as needed for mild pain       clindamycin (CLEOCIN) 150 MG capsule Take 1 capsule (150 mg) by mouth 3 times daily (Patient not taking: Reported on 8/31/2018) 30 capsule 0       ALLERGIES     Allergies   Allergen Reactions     Blood Transfusion Related (Informational Only) Other (See Comments)     Patient has a history of a clinically significant antibody against RBC antigens.  A delay in compatible RBCs may occur.     Lisinopril Other (See Comments)     Tongue swelling     Calcium Nausea and Vomiting     Egg Yolk      Flu Virus Vaccine      Allergic to eggs.     Keflex [Cephalexin] Nausea     Pt states she had upset stomach.  NOT tongue swelling.  She had tongue swelling with a combo bp med that she thinks included lisinopril.    Tolerates IV Cefazolin     Levaquin [Levofloxacin]      Sulfa Drugs      Zinc Nausea and Vomiting       PAST MEDICAL HISTORY:  Past Medical History:   Diagnosis Date     Anemia      Atrial fibrillation (H)      B12 deficiency      Cardiomyopathy (H)      CHF (congestive heart failure) (H)      Chronic edema     Lower extremities     Chronic systolic congestive heart failure (H)      CVA (cerebral vascular accident) (H) 2010    Acute Left MCA hemispheric CVA ( on coumadin)     Depression      Gastro-oesophageal reflux disease      GI bleed 03-20-15     Hyperlipidemia      Hypertension      Iron deficiency      MSSA (methicillin susceptible Staphylococcus aureus) 03-10-15     Pulmonary hypertension      Shingles        PAST SURGICAL HISTORY:  Past Surgical History:   Procedure Laterality Date     COLONOSCOPY  03/24/2015    Diverticulosis, polyps     ENTEROSCOPY SMALL BOWEL N/A 2/29/2016    Procedure: ENTEROSCOPY SMALL BOWEL;  Surgeon: Ady Ponce MD;  Location:  GI      ESOPHAGOSCOPY, GASTROSCOPY, DUODENOSCOPY (EGD), COMBINED N/A 3/22/2015    Upper GI- Normal, nothing significant found.      EXCISE MASS FOOT Right 7/6/2016    Procedure: EXCISE MASS FOOT;  Surgeon: Lee Jang DPM;  Location: [facility] OR       FAMILY HISTORY:  Family History   Problem Relation Age of Onset     Known Genetic Syndrome Father      Known Genetic Syndrome Mother      Other Cancer Daughter      pancreatic     Other Cancer Brother      type     Other Cancer Sister 60     lung     Diabetes No family hx of      Breast Cancer No family hx of      Cancer - colorectal No family hx of      Ovarian Cancer No family hx of      Prostate Cancer No family hx of        SOCIAL HISTORY:  Social History     Social History     Marital status:      Spouse name: N/A     Number of children: N/A     Years of education: N/A     Social History Main Topics     Smoking status: Former Smoker     Quit date: 4/14/1956     Smokeless tobacco: Never Used     Alcohol use No     Drug use: No     Sexual activity: No     Other Topics Concern     Caffeine Concern No     Hot tea 1 cup daily     Special Diet Yes     low sodium     Exercise No     limited right now     Social History Narrative       Review of Systems:  Cardiovascular: negative for chest pain, palpitations, orthopnea, pos LE edema, much improved.  Constitutional: negative for chills, sweats, fevers   Resp: Negative for dyspnea at rest, neg MOTT. Neg cough, known chronic lung disease  HEENT: Negative for new visual changes, frequent headaches  Gastrointestinal: negative for abdominal pain, diarrhea, blood in stool, nausea, vomiting  Hematologic/lymphatic: negative for current systemic anticoagulation, hx of blood clots. Pos hx GIB.  Musculoskeletal: negative for new back pain, joint pain  Neurological: negative for focal weakness, LOC, seizures, syncope. Neg dizziness.      Physical Exam:  Vitals: /56 (BP Location: Right arm, Patient Position: Chair, Cuff Size:  "Adult Regular)  Pulse 52  Ht 1.6 m (5' 3\")  Wt 67.1 kg (147 lb 14.4 oz)  SpO2 95%  BMI 26.2 kg/m2   Wt Readings from Last 4 Encounters:   08/31/18 67.1 kg (147 lb 14.4 oz)   08/28/18 65.8 kg (145 lb)   08/07/18 66.7 kg (147 lb)   08/03/18 67 kg (147 lb 11.2 oz)       GEN:  In general, this is a well nourished elderly female in no acute distress on room air. She arrived in a wheelchair again today, accompanied by her daughter.    HEENT:  Pupils equal, round. Sclerae nonicteric.   NECK: Supple, no masses appreciated. Trachea midline. No JVD while upright.   C/V:  Irregular rhythm, no murmur, rub or gallop.  No S3 or RV heave.   RESP: Respirations are unlabored. No use of accessory muscles. Noted to have a few crackles in bases posteriorly. No wheezing.   GI: Abdomen soft, nontender, nondistended.   EXTREM: 1+ bilateral edema, compression socks in place. No cyanosis or clubbing.  NEURO: Alert and oriented, cooperative. Gait not assesed. No obvious focal deficits.   PSYCH: Normal affect.  SKIN: Warm and dry. No rashes or petechiae appreciated.       Recent Lab Results:  LIPID RESULTS:  Lab Results   Component Value Date    CHOL 108 08/02/2017    HDL 61 08/02/2017    LDL 34 08/02/2017    TRIG 65 08/02/2017             BMP RESULTS:  Lab Results   Component Value Date     08/31/2018    POTASSIUM 3.7 08/31/2018    CHLORIDE 103 08/31/2018    CO2 31 08/31/2018    ANIONGAP 5 08/31/2018     (H) 08/31/2018    BUN 32 (H) 08/31/2018    CR 1.01 08/31/2018    GFRESTIMATED 52 (L) 08/31/2018    GFRESTBLACK 63 08/31/2018    CASPER 7.9 (L) 08/31/2018      Results for ELAINA MAYFIELD (MRN [id number]) as of 8/31/2018 13:10   Ref. Range 6/18/2018 15:24 7/13/2018 09:51 7/20/2018 12:50 8/3/2018 12:02 8/31/2018 11:10   N-Terminal Pro Bnp Latest Ref Range: 0 - 450 pg/mL 8389 (H) 3606 (H) 3530 (H) 2855 (H) 2491 (H)           New/Pertinent imaging results since last visit:  Echo 10/18/17  Interpretation Summary     The visual " ejection fraction is estimated at 60-65%.  The right ventricle is moderately dilated.  The right ventricular systolic function is borderline reduced.  There is mod-severe biatrial enlargement.  There is mild (1+) mitral regurgitation.  There is moderate (2+) tricuspid regurgitation.  Dilated inferior vena cava  Compared to prior study, there is no significant change.        Suad Murrell PA-C  [facility]  Pager [phone]

## 2018-08-31 NOTE — LETTER
8/31/2018    Gerri Eubanks MD  [address] [facility] [address]  [facility] [address]    RE: Keisha Bullchriss       Dear Colleague,    I had the pleasure of seeing Keisha Godinez in the [facility].      Cardiology Progress Note    Date of Service: 08/31/2018      Reason for visit: CORE clinic follow up, diastolic heart failure.     Primary cardiologist: Dr. Miguel Pradhan      HPI:  Ms. Godinez is a pleasant 86 year old female with a PMHx including hypertension, hyperlipidemia, atrial fibrillation (not on AC due to prior GI bleed), chronic anemia (colonic AVM, iron deficiency), CVA, chronic lower extremity edema, and diastolic heart failure with pulmonary hypertension. She has been followed by Dr. Pradhan in the past, but hasn't been seen since 2016. She tells me she did not return because she had been doing so well.  Earlier this summer, she started having issues with increased leg swelling. She was admitted at [facility] in May 2018 with cellulitis complicated by heart failure, was diuresed and discharged to a TCU. She then returned to the hospital in June with leg edema and weight gain once again, along with dyspnea. She required IV diuresis, and pRBC transfusion for worsening of her anemia. She was reportedly 167 lbs at admit, and discharged at 155 lbs. She was placed on 40mg of lasix twice daily, and still required supplemental oxygen at 1.5L at discharge.    She returned to see me for CORE clinic enrollment on 7/13/18. At that time she was doing better overall, and her daughter (her main caregiver) told me they had started to make significant lifestyle changes, included reducing sodium in her diet. Her weight was down to 150 lbs at that visit. She continued with leg edema, and followed at the lymphedema clinic with leg wraps. I transitioned her lasix to torsemide 20mg BID and enrolled her in telemanagement. When I saw her back shortly after, she had continued to make significant  improvement in regard to her leg edema. Her weight was down further to 147 lbs. No changes were made at that time, and she's back again today for routine follow up.    Since last visit, she tells me she continues to improve. She no longer requires lymphedema wraps and wears compression socks only. She is ambulating to her mailbox and back with the use of a rolling walker. She is no longer requiring supplemental oxygen during the day, and only uses as night. She denies any significant MOTT, new chest discomfort, palpitations, dizziness, or presyncope. She still sleeps in a recliner but sounds as is this is more for comfort for her legs. She continues to do an excellent job with sodium restriction. She and her daughter are both very pleased with how much progress she has made.      ASSESSMENT/PLAN:    1. Acute on chronic diastolic heart failure.   --Last echo was October 2017 at which time her EF was preserved at 60-65%. Her RV at that time was moderately dilated, with borderline reduced function and moderate TR, in the setting of volume overload.   --She continues to make significant improvement with swelling. Her renal function remains acceptable and NT-proBNP has been trending down nicely. Will continue torsemide 20mg BID.   --I congratulated her on sodium restriction in her diet which has been helpful. Continue daily weights at home. Given her history of GIB and chronic anemia, not a good cardiomems candidate.   --Continue spironolactone 50mg daily for now.    --Monitor anemia, she remains on ferrous sulfate. Would like to see her hemoglobin a bit higher which in the long term will make her heart failure easier to manage.    --Now that we are near euvolemia, will repeat echocardiogram prior to her upcoming visit with Dr. Pradhan. If she continues to have RV dysfunction, may consider a RHC.    --We discussed cardiac rehab today, but she declines this mostly due to transportation issues. She feels she is starting to  make progress on her own at home.     2. Hypertension.   --BP recently has been well controlled. She has a true allergy to ACE-inhibitors (tongue swelling), and is not on a beta blocker as she has had issues with bradycardia in the past.   --She has chronically been on hydralazine and isosorbide which we will continue.     3. Atrial fibrillation, chronic.   --Rate well controlled. Not on BB due to bradycardia as above.   --She is not on anticoagulation due to GIB/anemia.     4. Hyperlipidemia.   --Last LDL Aug 2017 was excellent at 34. Continue simvastatin 10mg daily.       Follow up plan: With Dr. Miguel Pradhan in 1 month with echocardiogram prior to appt.         Orders this Visit:  No orders of the defined types were placed in this encounter.    No orders of the defined types were placed in this encounter.    There are no discontinued medications.        CURRENT MEDICATIONS:  Current Outpatient Prescriptions   Medication Sig Dispense Refill     albuterol (ACCUNEB) 1.25 MG/3ML nebulizer solution Take 1 vial by nebulization 3 times daily        Cranberry Extract 250 MG TABS Take 250 mg by mouth daily       ferrous sulfate (IRON) 325 (65 FE) MG tablet Take 1 tablet (325 mg) by mouth 2 times daily 100 tablet 11     gabapentin (NEURONTIN) 100 MG capsule TAKE 1 CAPSULE(100 MG) BY MOUTH THREE TIMES DAILY 270 capsule 0     hydrALAZINE (APRESOLINE) 25 MG tablet Take 3 tablets (75 mg) by mouth 3 times daily 810 tablet 3     isosorbide mononitrate (IMDUR) 30 MG 24 hr tablet Take 2 tablets (60 mg) by mouth daily 90 tablet 2     OMEGA-3 FATTY ACIDS PO Take 1,200 mg by mouth daily        omeprazole (PRILOSEC) 20 MG CR capsule Take 1 capsule (20 mg) by mouth daily 90 capsule 2     senna-docusate (SENOKOT-S;PERICOLACE) 8.6-50 MG per tablet Take 2 tablets daily as needed for constipation while taking prescription pain medications. 30 tablet 0     simvastatin (ZOCOR) 10 MG tablet Take 1 tablet (10 mg) by mouth At Bedtime 30 tablet  0     spironolactone (ALDACTONE) 50 MG tablet Take 1 tablet (50 mg) by mouth daily 30 tablet 3     torsemide (DEMADEX) 20 MG tablet Take 1 tablet (20 mg) by mouth 2 times daily 180 tablet 3     venlafaxine (EFFEXOR-XR) 75 MG 24 hr capsule TAKE ONE CAPSULE BY MOUTH DAILY 90 capsule 0     VITAMIN D, CHOLECALCIFEROL, PO Take 1,000 Units by mouth daily        acetaminophen (TYLENOL) 325 MG tablet Take 650 mg by mouth 3 times daily as needed for mild pain       clindamycin (CLEOCIN) 150 MG capsule Take 1 capsule (150 mg) by mouth 3 times daily (Patient not taking: Reported on 8/31/2018) 30 capsule 0       ALLERGIES     Allergies   Allergen Reactions     Blood Transfusion Related (Informational Only) Other (See Comments)     Patient has a history of a clinically significant antibody against RBC antigens.  A delay in compatible RBCs may occur.     Lisinopril Other (See Comments)     Tongue swelling     Calcium Nausea and Vomiting     Egg Yolk      Flu Virus Vaccine      Allergic to eggs.     Keflex [Cephalexin] Nausea     Pt states she had upset stomach.  NOT tongue swelling.  She had tongue swelling with a combo bp med that she thinks included lisinopril.    Tolerates IV Cefazolin     Levaquin [Levofloxacin]      Sulfa Drugs      Zinc Nausea and Vomiting       PAST MEDICAL HISTORY:  Past Medical History:   Diagnosis Date     Anemia      Atrial fibrillation (H)      B12 deficiency      Cardiomyopathy (H)      CHF (congestive heart failure) (H)      Chronic edema     Lower extremities     Chronic systolic congestive heart failure (H)      CVA (cerebral vascular accident) (H) 2010    Acute Left MCA hemispheric CVA ( on coumadin)     Depression      Gastro-oesophageal reflux disease      GI bleed 03-20-15     Hyperlipidemia      Hypertension      Iron deficiency      MSSA (methicillin susceptible Staphylococcus aureus) 03-10-15     Pulmonary hypertension      Shingles        PAST SURGICAL HISTORY:  Past Surgical History:    Procedure Laterality Date     COLONOSCOPY  03/24/2015    Diverticulosis, polyps     ENTEROSCOPY SMALL BOWEL N/A 2/29/2016    Procedure: ENTEROSCOPY SMALL BOWEL;  Surgeon: Ady Ponce MD;  Location:  GI     ESOPHAGOSCOPY, GASTROSCOPY, DUODENOSCOPY (EGD), COMBINED N/A 3/22/2015    Upper GI- Normal, nothing significant found.      EXCISE MASS FOOT Right 7/6/2016    Procedure: EXCISE MASS FOOT;  Surgeon: Lee Jang DPM;  Location: [facility] OR       FAMILY HISTORY:  Family History   Problem Relation Age of Onset     Known Genetic Syndrome Father      Known Genetic Syndrome Mother      Other Cancer Daughter      pancreatic     Other Cancer Brother      type     Other Cancer Sister 60     lung     Diabetes No family hx of      Breast Cancer No family hx of      Cancer - colorectal No family hx of      Ovarian Cancer No family hx of      Prostate Cancer No family hx of        SOCIAL HISTORY:  Social History     Social History     Marital status:      Spouse name: N/A     Number of children: N/A     Years of education: N/A     Social History Main Topics     Smoking status: Former Smoker     Quit date: 4/14/1956     Smokeless tobacco: Never Used     Alcohol use No     Drug use: No     Sexual activity: No     Other Topics Concern     Caffeine Concern No     Hot tea 1 cup daily     Special Diet Yes     low sodium     Exercise No     limited right now     Social History Narrative       Review of Systems:  Cardiovascular: negative for chest pain, palpitations, orthopnea, pos LE edema, much improved.  Constitutional: negative for chills, sweats, fevers   Resp: Negative for dyspnea at rest, neg MOTT. Neg cough, known chronic lung disease  HEENT: Negative for new visual changes, frequent headaches  Gastrointestinal: negative for abdominal pain, diarrhea, blood in stool, nausea, vomiting  Hematologic/lymphatic: negative for current systemic anticoagulation, hx of blood clots. Pos hx GIB.  Musculoskeletal:  "negative for new back pain, joint pain  Neurological: negative for focal weakness, LOC, seizures, syncope. Neg dizziness.      Physical Exam:  Vitals: /56 (BP Location: Right arm, Patient Position: Chair, Cuff Size: Adult Regular)  Pulse 52  Ht 1.6 m (5' 3\")  Wt 67.1 kg (147 lb 14.4 oz)  SpO2 95%  BMI 26.2 kg/m2   Wt Readings from Last 4 Encounters:   08/31/18 67.1 kg (147 lb 14.4 oz)   08/28/18 65.8 kg (145 lb)   08/07/18 66.7 kg (147 lb)   08/03/18 67 kg (147 lb 11.2 oz)       GEN:  In general, this is a well nourished elderly female in no acute distress on room air. She arrived in a wheelchair again today, accompanied by her daughter.    HEENT:  Pupils equal, round. Sclerae nonicteric.   NECK: Supple, no masses appreciated. Trachea midline. No JVD while upright.   C/V:  Irregular rhythm, no murmur, rub or gallop.  No S3 or RV heave.   RESP: Respirations are unlabored. No use of accessory muscles. Noted to have a few crackles in bases posteriorly. No wheezing.   GI: Abdomen soft, nontender, nondistended.   EXTREM: 1+ bilateral edema, compression socks in place. No cyanosis or clubbing.  NEURO: Alert and oriented, cooperative. Gait not assesed. No obvious focal deficits.   PSYCH: Normal affect.  SKIN: Warm and dry. No rashes or petechiae appreciated.       Recent Lab Results:  LIPID RESULTS:  Lab Results   Component Value Date    CHOL 108 08/02/2017    HDL 61 08/02/2017    LDL 34 08/02/2017    TRIG 65 08/02/2017             BMP RESULTS:  Lab Results   Component Value Date     08/31/2018    POTASSIUM 3.7 08/31/2018    CHLORIDE 103 08/31/2018    CO2 31 08/31/2018    ANIONGAP 5 08/31/2018     (H) 08/31/2018    BUN 32 (H) 08/31/2018    CR 1.01 08/31/2018    GFRESTIMATED 52 (L) 08/31/2018    GFRESTBLACK 63 08/31/2018    CASPER 7.9 (L) 08/31/2018      Results for ELAINA MAYFIELD (MRN [id number]) as of 8/31/2018 13:10   Ref. Range 6/18/2018 15:24 7/13/2018 09:51 7/20/2018 12:50 8/3/2018 12:02 " 8/31/2018 11:10   N-Terminal Pro Bnp Latest Ref Range: 0 - 450 pg/mL 8389 (H) 3606 (H) 3530 (H) 2855 (H) 2491 (H)       New/Pertinent imaging results since last visit:  Echo 10/18/17  Interpretation Summary     The visual ejection fraction is estimated at 60-65%.  The right ventricle is moderately dilated.  The right ventricular systolic function is borderline reduced.  There is mod-severe biatrial enlargement.  There is mild (1+) mitral regurgitation.  There is moderate (2+) tricuspid regurgitation.  Dilated inferior vena cava  Compared to prior study, there is no significant change.    Suad Murrell PA-C  [facility]  Pager [phone]    Thank you for allowing me to participate in the care of your patient.    Sincerely,     NATHAN Valentin     [facility]

## 2018-08-31 NOTE — PATIENT INSTRUCTIONS
Call the C.O.R.E. nurse for any questions or concerns:  [phone]    1.  Medication changes from today:  NONE    2. Follow up plan:  With Dr. Pradahn in 1 month with an echocardiogram.     3.  Weigh yourself daily and write it down.     4.  Call CORE nurse if your weight is up more than 2 pounds in one day or 5 pounds in one week.    5.  Call CORE nurse if you feel more short of breath, have more abdominal bloating, or leg swelling.    6.  Continue low sodium diet (less than 2000 mg daily). If you eat less salt, you will retain less fluid.    7.  Alcohol can weaken your heart further. You should avoid alcohol or limit its use to special times, such as a holiday or birthday.     8.  Do NOT take Aleve (naproxen) or Advil (ibuprofen) without talking to your doctor first.     9.  Lab Results:     Component      Latest Ref Rng & Units 8/31/2018   Sodium      133 - 144 mmol/L 139   Potassium      3.4 - 5.3 mmol/L 3.7   Chloride      94 - 109 mmol/L 103   Carbon Dioxide      20 - 32 mmol/L 31   Anion Gap      3 - 14 mmol/L 5   Glucose      70 - 99 mg/dL 108 (H)   Urea Nitrogen      7 - 30 mg/dL 32 (H)   Creatinine      0.52 - 1.04 mg/dL 1.01   GFR Estimate      >60 mL/min/1.7m2 52 (L)   GFR Estimate If Black      >60 mL/min/1.7m2 63   Calcium      8.5 - 10.1 mg/dL 7.9 (L)   N-Terminal Pro Bnp      0 - 450 pg/mL 2491 (H)     CORE Clinic: Cardiomyopathy, Optimization, Rehabilitation, Education  The CORE Clinic is a heart failure specialty clinic within the [facility] where you will work with your cardiologist, nurse practitioners, physician assistants and registered nurses who specialize in heart failure care. They are dedicated to helping patients with heart failure to carefully adjust medications, receive education, and learn who and when to call if symptoms develop. They specialize in helping you better understand your condition, slow the progression of your disease, improve the length  and quality of your life, help you detect future heart problems before they become life threatening, and avoid hospitalizations.

## 2018-09-11 ENCOUNTER — OFFICE VISIT (OUTPATIENT)
Dept: PODIATRY | Facility: CLINIC | Age: 83
End: 2018-09-11
Payer: COMMERCIAL

## 2018-09-11 VITALS
BODY MASS INDEX: 25.69 KG/M2 | SYSTOLIC BLOOD PRESSURE: 130 MMHG | HEIGHT: 63 IN | DIASTOLIC BLOOD PRESSURE: 50 MMHG | WEIGHT: 145 LBS

## 2018-09-11 DIAGNOSIS — L97.521 SKIN ULCER OF LEFT FOOT, LIMITED TO BREAKDOWN OF SKIN (H): Primary | ICD-10-CM

## 2018-09-11 PROCEDURE — 97597 DBRDMT OPN WND 1ST 20 CM/<: CPT | Performed by: PODIATRIST

## 2018-09-11 ASSESSMENT — PAIN SCALES - GENERAL: PAINLEVEL: NO PAIN (0)

## 2018-09-11 NOTE — PROGRESS NOTES
"Foot & Ankle Surgery   September 11, 2018    S:  Pt is seen today for evaluation of L arch wound 2/2 to bony prominence.  Using DH2 shoe, has a Mepilex-type bandage in place, using abx ointment.  Pain levels improved.    Vitals:    09/11/18 1309   BP: 130/50   Weight: 145 lb (65.8 kg)   Height: 5' 3\" (1.6 m)   '      ROS - Pos for CC.  Patient denies current nausea, vomiting, chills, fevers, belly pain, calf pain, chest pain or SOB.  Complete remainder of ROS it otherwise neg.      PE:  Gen:   No apparent distress  Eye:    Visual scanning without deficit  Ear:    Response to auditory stimuli wnl  Lung:    Non-labored breathing on RA noted  Abd:    NTND per patient report  Lymph:    Neg for pitting/non-pitting edema BLE  Vasc:    Pulses palpable, CFT minimally delayed  Neuro:    Light touch sensation diminished distally  Derm:    Moderate callus formation.  The wound is difficult to measure today, but visible margins are 4 x 3mm, partial thickness, no SOI  MSK:    Pes planus with bony prominence.  Calf:    Neg for redness, swelling or tenderness    Assessment:  86 year old female with partial thickness wound 2/2 to bony prominence L foot      Plan:  Discussed etiologies, anatomy and options  1.  Partial thickness wound L arch 2/2 to pes planus and bony prominence  -Excisional debridement was performed, partial-thickness(limited to skin breakdown, no exposed subcutaneous fat), sharply debriding the wound, excising nonviable tissue to the above dimensions with a tissue nipper  -continue offloading and daily island bandage with abx ointment  -she is interested in proceeding with surgery once the wound is healed.     Follow up:  2 weeks or sooner with acute issues      Body mass index is 25.69 kg/(m^2).           Lee Jang DPM FACFAS FACFAOM  Podiatric Foot & Ankle Surgeon  [facility]  [phone]    "

## 2018-09-11 NOTE — MR AVS SNAPSHOT
After Visit Summary   9/11/2018    Keisha Godinez    MRN: [id number]           Patient Information     Date Of Birth          1/25/1932        Visit Information        Provider Department      9/11/2018 1:30 PM Lee Alexander DPM [facility] [place] [facility]        Today's Diagnoses     Skin ulcer of left foot, limited to breakdown of skin (H)    -  1      Care Instructions    Thank you for choosing [facility] / [facility]le [facility]!    DR. ALEXANDER'S CLINIC LOCATIONS:   MONDAY - [place] TUESDAY - [place]   [address] [facility]  [address]   [place] [address] [place] [address]   [phone]       THURSDAY AM - [place] THURSDAY PM - [facility]   [address]   [place] [address] [place] [address]   [phone]       FRIDAY AM - [place] SET UP SURGERY: [phone] [address] APPOINTMENTS: [phone]   [place] [address] BILLING QUESTIONS: [phone] FAX NUMBER: [phone]     Follow Up: 2 week    Body Mass Index (BMI)  Many things can cause foot and ankle problems. Foot structure, activity level, foot mechanics and injuries are common causes of pain. One very important issue that often goes unmentioned, is body weight. Extra weight can cause increased stress on muscles, ligaments, bones and tendons. Sometimes just a few extra pounds is all it takes to put one over her/his threshold. Without reducing that stress, it can be difficult to alleviate pain. Some people are uncomfortable addressing this issue, but we feel it is important for you to think about it. As Foot &  Ankle specialists, our job is addressing the lower extremity problem and possible causes. Regarding extra body weight, we encourage patients to discuss diet and weight management plans with their primary care doctors. It is this team approach that gives you the best opportunity for pain relief and getting you back  on your feet.    Patient to follow up with Primary Care provider regarding elevated blood pressure.                        Follow-ups after your visit        Follow-up notes from your care team     Return in about 2 weeks (around 9/25/2018).      Your next 10 appointments already scheduled     Oct 03, 2018 12:30 PM CDT   Ech Complete with [address] ([facility])    [address]  [facility] [address] [phone]           1.  Please bring or wear a comfortable two-piece outfit. 2.  You may eat, drink and take your normal medicines. 3.  For any questions that cannot be answered, please contact the ordering physician 4.  Please do not wear perfumes or scented lotions on the day of your exam. ***Please check-in at the [place] Registration Office located in [address] in the [facility] building. When you are finished registering, please go to [address] and have a seat. The technician will call your name for the test.            Oct 05, 2018 10:45 AM CDT   Return Visit with Miguel Pradhan MD   [facility] ([facility])    [address]  [facility] [address] [phone]              Who to contact     If you have questions or need follow up information about today's clinic visit or your schedule please contact [facility] directly at [phone].  Normal or non-critical lab and imaging results will be communicated to you by MyChart, letter or phone within 4 business days after the clinic has received the results. If you do not hear from us within 7 days, please contact the clinic through MyChart or phone. If you have a critical or abnormal lab result, we will notify you by phone as soon as possible.  Submit refill requests through [employer] or call your pharmacy and they will forward the refill request to us. Please allow 3 business days for your  "refill to be completed.          Additional Information About Your Visit        Care EveryWhere ID     This is your Care EveryWhere ID. This could be used by other organizations to access your [facility] medical records  [id number]        Your Vitals Were     Height BMI (Body Mass Index)                5' 3\" (1.6 m) 25.69 kg/m2           Blood Pressure from Last 3 Encounters:   09/11/18 130/50   08/31/18 124/56   08/28/18 116/58    Weight from Last 3 Encounters:   09/11/18 145 lb (65.8 kg)   08/31/18 147 lb 14.4 oz (67.1 kg)   08/28/18 145 lb (65.8 kg)              We Performed the Following     DEBRIDEMENT WOUND UP TO 20 SQ CM        Primary Care Provider Office Phone # Fax #    Gerri Eubanks [phone]       [address] [facility] [address]  [facility] [address]        Equal Access to Services     [facility]: Hadii aad ku hadasho Soomaali, waaxda luqadaha, qaybta kaalmada adeegyada, waxay idiin hayaan adeeg kharash la'aan . So [facility] [phone].    ATENCIÓN: Si habla español, tiene a rosas disposición servicios gratuitos de asistencia lingüística. Janay al [phone].    We comply with applicable federal civil rights laws and [place] laws. We do not discriminate on the basis of race, color, national origin, age, disability, sex, sexual orientation, or gender identity.            Thank you!     Thank you for choosing [facility]  for your care. Our goal is always to provide you with excellent care. Hearing back from our patients is one way we can continue to improve our services. Please take a few minutes to complete the written survey that you may receive in the mail after your visit with us. Thank you!             Your Updated Medication List - Protect others around you: Learn how to safely use, store and throw away your medicines at www.disposemymeds.org.          This list is accurate as of 9/11/18  1:31 PM.  Always use your most recent med list.                   Brand Name " Dispense Instructions for use Diagnosis    albuterol 1.25 MG/3ML nebulizer solution    ACCUNEB     Take 1 vial by nebulization 3 times daily        clindamycin 150 MG capsule    CLEOCIN    30 capsule    Take 1 capsule (150 mg) by mouth 3 times daily    Cellulitis and abscess of foot, except toes       Cranberry Extract 250 MG Tabs      Take 250 mg by mouth daily        ferrous sulfate 325 (65 Fe) MG tablet    IRON    100 tablet    Take 1 tablet (325 mg) by mouth 2 times daily    Iron deficiency       gabapentin 100 MG capsule    NEURONTIN    270 capsule    TAKE 1 CAPSULE(100 MG) BY MOUTH THREE TIMES DAILY    Midline thoracic back pain, unspecified chronicity       hydrALAZINE 25 MG tablet    APRESOLINE    810 tablet    Take 3 tablets (75 mg) by mouth 3 times daily    Hyperlipidemia, unspecified hyperlipidemia type, Cardiomyopathy, unspecified type (H)       isosorbide mononitrate 30 MG 24 hr tablet    IMDUR    90 tablet    Take 2 tablets (60 mg) by mouth daily    Essential hypertension with goal blood pressure less than 140/90, Coronary artery disease involving native heart with angina pectoris, unspecified vessel or lesion type (H)       OMEGA-3 FATTY ACIDS PO      Take 1,200 mg by mouth daily        omeprazole 20 MG CR capsule    priLOSEC    90 capsule    Take 1 capsule (20 mg) by mouth daily    Gastroesophageal reflux disease without esophagitis       senna-docusate 8.6-50 MG per tablet    SENOKOT-S;PERICOLACE    30 tablet    Take 2 tablets daily as needed for constipation while taking prescription pain medications.    S/P foot surgery, right       simvastatin 10 MG tablet    ZOCOR    30 tablet    Take 1 tablet (10 mg) by mouth At Bedtime    Hyperlipidemia, unspecified hyperlipidemia type       spironolactone 50 MG tablet    ALDACTONE    30 tablet    Take 1 tablet (50 mg) by mouth daily    Acute on chronic diastolic congestive heart failure (H)       torsemide 20 MG tablet    DEMADEX    180 tablet    Take 1  tablet (20 mg) by mouth 2 times daily    Chronic diastolic congestive heart failure (H)       TYLENOL 325 MG tablet   Generic drug:  acetaminophen      Take 650 mg by mouth 3 times daily as needed for mild pain        venlafaxine 75 MG 24 hr capsule    EFFEXOR-XR    90 capsule    TAKE ONE CAPSULE BY MOUTH DAILY    Major depressive disorder, recurrent episode, in partial remission (H)       VITAMIN D (CHOLECALCIFEROL) PO      Take 1,000 Units by mouth daily

## 2018-09-23 DIAGNOSIS — K21.9 GASTROESOPHAGEAL REFLUX DISEASE WITHOUT ESOPHAGITIS: ICD-10-CM

## 2018-09-23 NOTE — TELEPHONE ENCOUNTER
"Requested Prescriptions   Pending Prescriptions Disp Refills     omeprazole (PRILOSEC) 20 MG CR capsule  Last Written Prescription Date:  12/11/17  Last Fill Quantity: 90,  # refills: 2   Last office visit: 7/10/2018 with prescribing provider:  Nasra   Future Office Visit:   Next 5 appointments (look out 90 days)     Oct 02, 2018  1:30 PM CDT   Return Visit with Lee Jang DPM   [facility] [place] [facility] ([facility])    [address]  [address]  [facility] [address]   [phone]            Oct 05, 2018 10:45 AM CDT   Return Visit with Miguel Pradhan MD   [facility] ([facility])    [address]  [facility] [address] [phone]                  90 capsule 2     Sig: Take 1 capsule (20 mg) by mouth daily    PPI Protocol Passed    9/23/2018  5:12 PM       Passed - Not on Clopidogrel (unless Pantoprazole ordered)       Passed - No diagnosis of osteoporosis on record       Passed - Recent (12 mo) or future (30 days) visit within the authorizing provider's specialty    Patient had office visit in the last 12 months or has a visit in the next 30 days with authorizing provider or within the authorizing provider's specialty.  See \"Patient Info\" tab in inbasket, or \"Choose Columns\" in Meds & Orders section of the refill encounter.           Passed - Patient is age 18 or older       Passed - No active pregnacy on record       Passed - No positive pregnancy test in past 12 months          "

## 2018-09-25 NOTE — TELEPHONE ENCOUNTER
Prescription approved per [facility] Refill Protocol.  Sierra SINGER RN

## 2018-09-27 DIAGNOSIS — E78.5 HYPERLIPIDEMIA, UNSPECIFIED HYPERLIPIDEMIA TYPE: ICD-10-CM

## 2018-09-27 NOTE — TELEPHONE ENCOUNTER
"Requested Prescriptions   Pending Prescriptions Disp Refills     simvastatin (ZOCOR) 10 MG tablet  Last Written Prescription Date:  08/23/18  Last Fill Quantity: 30,  # refills: 0   Last office visit: 7/10/2018 with prescribing provider:  07/10/18   Future Office Visit:   Next 5 appointments (look out 90 days)     Oct 02, 2018  1:30 PM CDT   Return Visit with Lee Jang DPM   [facility] [place] [facility] ([facility] [place])    [address]  [facility] [address]   [phone]            Oct 05, 2018 10:45 AM CDT   Return Visit with Miguel Pradhan MD   [facility] ([facility])    [address]  [facility] [address] [phone]                  30 tablet 0     Sig: Take 1 tablet (10 mg) by mouth At Bedtime    Statins Protocol Failed    9/27/2018  9:54 AM       Failed - LDL on file in past 12 months    Recent Labs   Lab Test  08/02/17   1041   LDL  34            Passed - No abnormal creatine kinase in past 12 months    Recent Labs   Lab Test  11/28/15   0613   CKT  27*               Passed - Recent (12 mo) or future (30 days) visit within the authorizing provider's specialty    Patient had office visit in the last 12 months or has a visit in the next 30 days with authorizing provider or within the authorizing provider's specialty.  See \"Patient Info\" tab in inbasket, or \"Choose Columns\" in Meds & Orders section of the refill encounter.           Passed - Patient is age 18 or older       Passed - No active pregnancy on record       Passed - No positive pregnancy test in past 12 months            "

## 2018-09-28 NOTE — TELEPHONE ENCOUNTER
Patient given bree refill 8/23/18 for 30 tablets.  Needs FLP. Labs already exist.  Please help patient scheduled a fasting lab only appointment.   Attempted to contact patient, no answer, left voice message to call back.    [facility] [place] okay to deliver message and help schedule.

## 2018-10-02 ENCOUNTER — OFFICE VISIT (OUTPATIENT)
Dept: PODIATRY | Facility: CLINIC | Age: 83
End: 2018-10-02
Payer: COMMERCIAL

## 2018-10-02 VITALS
DIASTOLIC BLOOD PRESSURE: 60 MMHG | BODY MASS INDEX: 25.69 KG/M2 | SYSTOLIC BLOOD PRESSURE: 110 MMHG | WEIGHT: 145 LBS | HEIGHT: 63 IN

## 2018-10-02 DIAGNOSIS — L97.521 SKIN ULCER OF LEFT FOOT, LIMITED TO BREAKDOWN OF SKIN (H): Primary | ICD-10-CM

## 2018-10-02 DIAGNOSIS — E78.5 HYPERLIPIDEMIA, UNSPECIFIED HYPERLIPIDEMIA TYPE: ICD-10-CM

## 2018-10-02 PROCEDURE — 97597 DBRDMT OPN WND 1ST 20 CM/<: CPT | Performed by: PODIATRIST

## 2018-10-02 PROCEDURE — 80061 LIPID PANEL: CPT | Performed by: INTERNAL MEDICINE

## 2018-10-02 PROCEDURE — 36415 COLL VENOUS BLD VENIPUNCTURE: CPT | Performed by: INTERNAL MEDICINE

## 2018-10-02 PROCEDURE — 99213 OFFICE O/P EST LOW 20 MIN: CPT | Mod: 25 | Performed by: PODIATRIST

## 2018-10-02 NOTE — MR AVS SNAPSHOT
After Visit Summary   10/2/2018    Keisha Godinez    MRN: [id number]           Patient Information     Date Of Birth          1/25/1932        Visit Information        Provider Department      10/2/2018 1:30 PM Lee Jang DPM [facility]         Follow-ups after your visit        Your next 10 appointments already scheduled     Oct 02, 2018  1:30 PM CDT   Return Visit with Lee Jang DPM   [facility] ([facility])    [address]  [address]  [facility] [address]   [phone]            Oct 03, 2018 12:30 PM CDT   Ech Complete with [address] ([facility])    [address]  [facility] [address] [phone]           1.  Please bring or wear a comfortable two-piece outfit. 2.  You may eat, drink and take your normal medicines. 3.  For any questions that cannot be answered, please contact the ordering physician 4.  Please do not wear perfumes or scented lotions on the day of your exam. ***Please check-in at the [facility] Registration Office located in [address] in the [facility] building. When you are finished registering, please go to [address] and have a seat. The technician will call your name for the test.            Oct 05, 2018 10:45 AM CDT   Return Visit with Miguel Pradhan MD   [facility] ([facility])    [address]  [facility] [address] [phone]              Who to contact     If you have questions or need follow up information about today's clinic visit or your schedule please contact [facility] directly at [phone].  Normal or non-critical lab and imaging results will be communicated to you by MyChart, letter or phone within 4 business days after the clinic has received the results. If you do not hear from us within 7 days, please  "contact the clinic through [employer] or phone. If you have a critical or abnormal lab result, we will notify you by phone as soon as possible.  Submit refill requests through [employer] or call your pharmacy and they will forward the refill request to us. Please allow 3 business days for your refill to be completed.          Additional Information About Your Visit        Care EveryWhere ID     This is your Care EveryWhere ID. This could be used by other organizations to access your [place] medical records  [id number]        Your Vitals Were     Height BMI (Body Mass Index)                5' 3\" (1.6 m) 25.69 kg/m2           Blood Pressure from Last 3 Encounters:   10/02/18 110/60   09/11/18 130/50   08/31/18 124/56    Weight from Last 3 Encounters:   10/02/18 145 lb (65.8 kg)   09/11/18 145 lb (65.8 kg)   08/31/18 147 lb 14.4 oz (67.1 kg)              Today, you had the following     No orders found for display       Primary Care Provider Office Phone # Fax #    Gerri Eubanks [phone]       [address] [facility] [address]  [facility] [address]        Equal Access to Services     [facility] : Hadii aad ku hadasho Soomaali, waaxda luqadaha, qaybta kaalmada adeegyada, sharlene bethean milad torre ah. So [facility] [phone].    ATENCIÓN: Si habla español, tiene a rosas disposición servicios gratuitos de asistencia lingüística. [facility] [phone].    We comply with applicable federal civil rights laws and [place] laws. We do not discriminate on the basis of race, color, national origin, age, disability, sex, sexual orientation, or gender identity.            Thank you!     Thank you for choosing [facility]  for your care. Our goal is always to provide you with excellent care. Hearing back from our patients is one way we can continue to improve our services. Please take a few minutes to complete the written survey that you may receive in the mail after your visit with us. Thank " you!             Your Updated Medication List - Protect others around you: Learn how to safely use, store and throw away your medicines at www.disposemymeds.org.          This list is accurate as of 10/2/18  1:28 PM.  Always use your most recent med list.                   Brand Name Dispense Instructions for use Diagnosis    albuterol 1.25 MG/3ML nebulizer solution    ACCUNEB     Take 1 vial by nebulization 3 times daily        clindamycin 150 MG capsule    CLEOCIN    30 capsule    Take 1 capsule (150 mg) by mouth 3 times daily    Cellulitis and abscess of foot, except toes       Cranberry Extract 250 MG Tabs      Take 250 mg by mouth daily        ferrous sulfate 325 (65 Fe) MG tablet    IRON    100 tablet    Take 1 tablet (325 mg) by mouth 2 times daily    Iron deficiency       gabapentin 100 MG capsule    NEURONTIN    270 capsule    TAKE 1 CAPSULE(100 MG) BY MOUTH THREE TIMES DAILY    Midline thoracic back pain, unspecified chronicity       hydrALAZINE 25 MG tablet    APRESOLINE    810 tablet    Take 3 tablets (75 mg) by mouth 3 times daily    Hyperlipidemia, unspecified hyperlipidemia type, Cardiomyopathy, unspecified type (H)       isosorbide mononitrate 30 MG 24 hr tablet    IMDUR    90 tablet    Take 2 tablets (60 mg) by mouth daily    Essential hypertension with goal blood pressure less than 140/90, Coronary artery disease involving native heart with angina pectoris, unspecified vessel or lesion type (H)       OMEGA-3 FATTY ACIDS PO      Take 1,200 mg by mouth daily        omeprazole 20 MG CR capsule    priLOSEC    90 capsule    Take 1 capsule (20 mg) by mouth daily    Gastroesophageal reflux disease without esophagitis       senna-docusate 8.6-50 MG per tablet    SENOKOT-S;PERICOLACE    30 tablet    Take 2 tablets daily as needed for constipation while taking prescription pain medications.    S/P foot surgery, right       simvastatin 10 MG tablet    ZOCOR    30 tablet    Take 1 tablet (10 mg) by mouth At  Bedtime    Hyperlipidemia, unspecified hyperlipidemia type       spironolactone 50 MG tablet    ALDACTONE    30 tablet    Take 1 tablet (50 mg) by mouth daily    Acute on chronic diastolic congestive heart failure (H)       torsemide 20 MG tablet    DEMADEX    180 tablet    Take 1 tablet (20 mg) by mouth 2 times daily    Chronic diastolic congestive heart failure (H)       TYLENOL 325 MG tablet   Generic drug:  acetaminophen      Take 650 mg by mouth 3 times daily as needed for mild pain        venlafaxine 75 MG 24 hr capsule    EFFEXOR-XR    90 capsule    TAKE ONE CAPSULE BY MOUTH DAILY    Major depressive disorder, recurrent episode, in partial remission (H)       VITAMIN D (CHOLECALCIFEROL) PO      Take 1,000 Units by mouth daily

## 2018-10-02 NOTE — PROGRESS NOTES
"Foot & Ankle Surgery   October 2, 2018    S:  Pt is seen today for evaluation of ulcer L arch.  Her daughter is offloading the area with padding, and they're doing daily abx ointment bandage changes.  She's trying to keep the weight off the foot as much as possible..    Vitals:    10/02/18 1310   BP: 110/60   Weight: 145 lb (65.8 kg)   Height: 5' 3\" (1.6 m)   '      ROS - Pos for CC.  Patient denies current nausea, vomiting, chills, fevers, belly pain, calf pain, chest pain or SOB.  Complete remainder of ROS it otherwise neg.      PE:  Gen:   No apparent distress  Eye:    Visual scanning without deficit  Ear:    Response to auditory stimuli wnl  Lung:    Non-labored breathing on RA noted  Abd:    NTND per patient report  Lymph:    Neg for pitting/non-pitting edema BLE  Vasc:    Pulses palpable, CFT minimally delayed  Neuro:    Light touch sensation diminished distally  Derm:    Margins of area are 12 x 11, but most of the central area is actually epithelialized.  The open area is 5 x 4mm, partial thickness. No drainage, minimial erythema  MSK:    L arch shoes bony prominence medial arch.  Calf:    Neg for redness, swelling or tenderness    Assessment:  86 year old female with partial thickness wound L arch 2/2 to bony prominence      Plan:  Discussed etiologies, anatomy and options  1.  Partial thickness wound L arch 2/2 to bony prominence  -Excisional debridement was performed, partial-thickness(limited to skin breakdown, no exposed subcutaneous fat), sharply debriding the wound, excising nonviable tissue to the above dimensions with a tissue nipper  -continue abx ointment/bandage  -we do not have any better viable offloading options.  I advised we think about surgical intervention.  Her daughter would like to try BID dressing changes.  I don't know how beneficial that will be, but it's certainly not detrimental     Follow up:  2 weeks or sooner with acute issues      Body mass index is 25.69 kg/(m^2).  Weight " management plan: Patient was referred to their PCP to discuss a diet and exercise plan.         Lee Jang DPM FACFAS FACFAOM  Podiatric Foot & Ankle Surgeon  [facility]  [phone]

## 2018-10-03 ENCOUNTER — HOSPITAL ENCOUNTER (OUTPATIENT)
Dept: CARDIOLOGY | Facility: CLINIC | Age: 83
Discharge: HOME | End: 2018-10-03
Attending: PHYSICIAN ASSISTANT | Admitting: PHYSICIAN ASSISTANT
Payer: MEDICARE

## 2018-10-03 DIAGNOSIS — I50.32 CHRONIC DIASTOLIC CONGESTIVE HEART FAILURE (H): ICD-10-CM

## 2018-10-03 LAB
CHOLEST SERPL-MCNC: 114 MG/DL
HDLC SERPL-MCNC: 61 MG/DL
LDLC SERPL CALC-MCNC: 41 MG/DL
NONHDLC SERPL-MCNC: 53 MG/DL
TRIGL SERPL-MCNC: 61 MG/DL

## 2018-10-03 PROCEDURE — 25500064 ZZH RX 255 OP 636: Performed by: PHYSICIAN ASSISTANT

## 2018-10-03 PROCEDURE — 93306 TTE W/DOPPLER COMPLETE: CPT | Mod: 26 | Performed by: INTERNAL MEDICINE

## 2018-10-03 RX ADMIN — HUMAN ALBUMIN MICROSPHERES AND PERFLUTREN 3 ML: 10; .22 INJECTION, SOLUTION INTRAVENOUS at 12:51

## 2018-10-05 ENCOUNTER — OFFICE VISIT (OUTPATIENT)
Dept: CARDIOLOGY | Facility: CLINIC | Age: 83
End: 2018-10-05
Attending: PHYSICIAN ASSISTANT
Payer: COMMERCIAL

## 2018-10-05 ENCOUNTER — TELEPHONE (OUTPATIENT)
Dept: CARDIOLOGY | Facility: CLINIC | Age: 83
End: 2018-10-05

## 2018-10-05 VITALS
SYSTOLIC BLOOD PRESSURE: 132 MMHG | WEIGHT: 149 LBS | DIASTOLIC BLOOD PRESSURE: 57 MMHG | HEIGHT: 65 IN | BODY MASS INDEX: 24.83 KG/M2 | HEART RATE: 70 BPM

## 2018-10-05 DIAGNOSIS — K92.2 GASTROINTESTINAL HEMORRHAGE, UNSPECIFIED GASTROINTESTINAL HEMORRHAGE TYPE: ICD-10-CM

## 2018-10-05 DIAGNOSIS — I27.20 PULMONARY HYPERTENSION (H): ICD-10-CM

## 2018-10-05 DIAGNOSIS — I50.32 CHRONIC DIASTOLIC CONGESTIVE HEART FAILURE (H): ICD-10-CM

## 2018-10-05 DIAGNOSIS — N18.30 CHRONIC KIDNEY DISEASE, STAGE 3 (MODERATE): ICD-10-CM

## 2018-10-05 DIAGNOSIS — D50.0 IRON DEFICIENCY ANEMIA DUE TO CHRONIC BLOOD LOSS: ICD-10-CM

## 2018-10-05 DIAGNOSIS — I10 ESSENTIAL HYPERTENSION WITH GOAL BLOOD PRESSURE LESS THAN 140/90: ICD-10-CM

## 2018-10-05 DIAGNOSIS — I48.20 CHRONIC ATRIAL FIBRILLATION (H): ICD-10-CM

## 2018-10-05 DIAGNOSIS — F33.41 MAJOR DEPRESSIVE DISORDER, RECURRENT EPISODE, IN PARTIAL REMISSION (H): ICD-10-CM

## 2018-10-05 DIAGNOSIS — I27.20 PULMONARY HYPERTENSION (H): Primary | ICD-10-CM

## 2018-10-05 LAB
ALBUMIN SERPL-MCNC: 3.5 G/DL (ref 3.4–5)
ALP SERPL-CCNC: 90 U/L (ref 40–150)
ALT SERPL W P-5'-P-CCNC: 27 U/L (ref 0–50)
ANION GAP SERPL CALCULATED.3IONS-SCNC: 3 MMOL/L (ref 3–14)
AST SERPL W P-5'-P-CCNC: 33 U/L (ref 0–45)
BILIRUB SERPL-MCNC: 0.3 MG/DL (ref 0.2–1.3)
BUN SERPL-MCNC: 33 MG/DL (ref 7–30)
CALCIUM SERPL-MCNC: 8.5 MG/DL (ref 8.5–10.1)
CHLORIDE SERPL-SCNC: 102 MMOL/L (ref 94–109)
CO2 SERPL-SCNC: 32 MMOL/L (ref 20–32)
CREAT SERPL-MCNC: 0.93 MG/DL (ref 0.52–1.04)
GFR SERPL CREATININE-BSD FRML MDRD: 57 ML/MIN/1.7M2
GLUCOSE SERPL-MCNC: 106 MG/DL (ref 70–99)
POTASSIUM SERPL-SCNC: 3.9 MMOL/L (ref 3.4–5.3)
PROT SERPL-MCNC: 7.1 G/DL (ref 6.8–8.8)
SODIUM SERPL-SCNC: 137 MMOL/L (ref 133–144)

## 2018-10-05 PROCEDURE — 99214 OFFICE O/P EST MOD 30 MIN: CPT | Performed by: INTERNAL MEDICINE

## 2018-10-05 PROCEDURE — 36415 COLL VENOUS BLD VENIPUNCTURE: CPT | Performed by: INTERNAL MEDICINE

## 2018-10-05 PROCEDURE — 80053 COMPREHEN METABOLIC PANEL: CPT | Performed by: INTERNAL MEDICINE

## 2018-10-05 NOTE — PROGRESS NOTES
Service Date: 10/05/2018      PRIMARY CARE PHYSICIAN:  Gerri Eubanks MD      REFERRING PHYSICIAN:  Suad Murrell PA-C      HISTORY OF PRESENT ILLNESS:  I saw Keisha Godinez today who is 86.  She is limited by her advanced age, a certain amount of muscular frailty and several health issues which are rather extensive and outlined in Epic.  From a cardiac standpoint, she has had decent left ventricular cardiac function, but she has had severe pulmonary hypertension with varying components of right heart failure.  Lower leg edema has been a problem, and it can wax and wane depending on her nutritional status.  When her serum albumin is low, she retains more fluid and when she is better nourished and her serum proteins are more normal, she is less dominated by it.  Still in all, it requires Demadex 20 mg twice a day to maintain tolerable fluid balance and even at that she still has some residual edema.      Her RVSP is 70 mmHg plus right atrial pressure.  Left heart is relatively normal with no major valvular issues.      She wears oxygen at night.  She is up and around slowly.  She is limited by arthritis, swollen legs, muscular frailty, some components of deconditioning.  She has had a history of coronary disease and previous myocardial infarction, but most recently she has had nothing to suggest classic angina.  She has had a history of gastrointestinal bleeding.  Accordingly, she is not on anticoagulant therapy.      She has had chronic atrial fibrillation but her heart rates have been relatively controlled.  She has not had dizziness or syncope.      PHYSICAL EXAMINATION:   GENERAL:  Shows an asymptomatic female, sitting in a wheelchair.  She is bright, alert, oriented and cooperative.  She is not dyspneic.   VITAL SIGNS:  Blood pressure is 130/60, heart rate is 70 and irregular.  She weighs 149 pounds.   NECK:  No neck vein distention at 45 degrees, no bruits heard.   HEART:  Mildly irregular without gallop or murmur.    LUNGS:  Show diminished breath sounds but clear.   ABDOMEN:  Mildly rotund, but soft without organomegaly.   EXTREMITIES:  Both legs were in support stockings was some persistent lower leg edema.      Keisha is on a rather complex and significant program of Demadex 20 mg twice a day for edema control.  She really has done better recently between better nutrition at home and seeing [facility] - every couple months.  I believe it has helped her maintain improved stability.  She has a daughter who is very helpful and supports her mother a lot and does bring her to multiple doctor visits because that is the time in life Keisha is in these days.      Her program from a medicine standpoint includes:   1.  Imdur 30 mg a day.   2.  Hydralazine 25 mg t.i.d.   3.  Neurontin daily.   4.  Oral iron daily.   5.  Simvastatin 10 mg at bedtime.   6.  Spironolactone 50 mg a day.   7.  Albuterol p.r.n.   8.  Vitamin D.   9.  Calcium.   10.  Omega-3 fatty acids.   11.  Cranberry extract.   12.  Effexor.   13.  Senna daily.   14.  Prilosec.      Keisha is a bright woman.  She gets it.  She is 86 and has significant pulmonary hypertension.  Currently, she seems to be nicely compensated.  She has a daughter that helps significantly.  We talked about maintaining good nutrition.  She has had low serum albumins in the past which is the enemy at this point.  We do not have much control over her pulmonary hypertension, but currently she is not being dominated by right heart failure.  She has no great evidence for left heart failure with an ejection fraction of 55%-60% and no major valvular issues.      IMPRESSION:   1.  Pulmonary hypertension with compensated right heart failure.   2.  Chronic lower leg edema due to the above and venous insufficiency, elderly delicate female tissues, a low serum albumin was diminished oncotic pressure.   3.  Underlying emphysema/chronic obstructive pulmonary disease.   4.  Chronic  atrial fibrillation - controlled.   5.  History of respiratory failure.   6.  History of GI bleeding.   7.  No significant left heart failure, but right heart failure is a part of her problem and this is associated with shortness of breath on exertion.      PLAN:  Continue the program noted above.  She will follow up with [facility] and Suad Murrell regularly.      Over 45 minutes was spent doing the consult.      cc:   Gerri Eubanks MD   [facility] [address]   [place] [address]       Suad Murrell PA-C   [facility]   [address] [place] [id number]         SHANEL CRAMER MD, [facility]             D: 10/05/2018   T: 10/05/2018   MT: al      Name:     ELAINA MAYFIELD   MRN:      [id number]        Account:      [id number]   :      1932           Service Date: 10/05/2018      Document: [id number]

## 2018-10-05 NOTE — TELEPHONE ENCOUNTER
Reinforce continued good nutrition   je                Albumin much better => god for her!   JE                  Patient notified of lab results are much better.  Patient does have supplemental snacks with low sodium and will increase foods high in protein.  Patient verbalizes understanding and plan of care.

## 2018-10-05 NOTE — PROGRESS NOTES
HPI and Plan:   See dictation      Ms. Godinez is a pleasant 86 year old female with a PMHx including hypertension, hyperlipidemia, atrial fibrillation (not on AC due to prior GI bleed), chronic anemia (colonic AVM, iron deficiency), CVA, chronic lower extremity edema, and diastolic heart failure with pulmonary hypertension. She has been followed by Dr. Pradhan in the past, but hasn't been seen since 2016. She tells me she did not return because she had been doing so well.  Earlier this summer, she started having issues with increased leg swelling. She was admitted at [facility] in May 2018 with cellulitis complicated by heart failure, was diuresed and discharged to a TCU. She then returned to the hospital in June with leg edema and weight gain once again, along with dyspnea. She required IV diuresis, and pRBC transfusion for worsening of her anemia. She was reportedly 167 lbs at admit, and discharged at 155 lbs. She was placed on 40mg of lasix twice daily, and still required supplemental oxygen at 1.5L at discharge.      Orders Placed This Encounter   Procedures     Comprehensive metabolic panel     Follow-Up with Cardiac Advanced Practice Provider     Follow-Up with Cardiac Advanced Practice Provider     Follow-Up with Cardiologist     Orders Placed This Encounter   Medications     DISCONTD: FUROSEMIDE PO     Sig: Take 40 mg by mouth 2 times daily     Medications Discontinued During This Encounter   Medication Reason     FUROSEMIDE PO          Encounter Diagnoses   Name Primary?     Chronic diastolic congestive heart failure (H)      Pulmonary hypertension (H) Yes     Chronic atrial fibrillation (H)      Essential hypertension with goal blood pressure less than 140/90      Chronic kidney disease, stage 3 (moderate) (GFR 59 10/30/17, GFR 58 7/7/16)      Iron deficiency anemia due to chronic blood loss      Gastrointestinal hemorrhage, unspecified gastrointestinal hemorrhage type        CURRENT MEDICATIONS:  Current  Outpatient Prescriptions   Medication Sig Dispense Refill     acetaminophen (TYLENOL) 325 MG tablet Take 650 mg by mouth 3 times daily as needed for mild pain       albuterol (ACCUNEB) 1.25 MG/3ML nebulizer solution Take 1 vial by nebulization 3 times daily        Cranberry Extract 250 MG TABS Take 250 mg by mouth daily       ferrous sulfate (IRON) 325 (65 FE) MG tablet Take 1 tablet (325 mg) by mouth 2 times daily 100 tablet 11     gabapentin (NEURONTIN) 100 MG capsule TAKE 1 CAPSULE(100 MG) BY MOUTH THREE TIMES DAILY 270 capsule 0     hydrALAZINE (APRESOLINE) 25 MG tablet Take 3 tablets (75 mg) by mouth 3 times daily 810 tablet 3     isosorbide mononitrate (IMDUR) 30 MG 24 hr tablet Take 2 tablets (60 mg) by mouth daily 90 tablet 2     OMEGA-3 FATTY ACIDS PO Take 1,200 mg by mouth daily        omeprazole (PRILOSEC) 20 MG CR capsule Take 1 capsule (20 mg) by mouth daily 90 capsule 1     senna-docusate (SENOKOT-S;PERICOLACE) 8.6-50 MG per tablet Take 2 tablets daily as needed for constipation while taking prescription pain medications. 30 tablet 0     simvastatin (ZOCOR) 10 MG tablet Take 1 tablet (10 mg) by mouth At Bedtime 30 tablet 0     spironolactone (ALDACTONE) 50 MG tablet Take 1 tablet (50 mg) by mouth daily 30 tablet 3     torsemide (DEMADEX) 20 MG tablet Take 1 tablet (20 mg) by mouth 2 times daily 180 tablet 3     venlafaxine (EFFEXOR-XR) 75 MG 24 hr capsule TAKE ONE CAPSULE BY MOUTH DAILY 90 capsule 0     VITAMIN D, CHOLECALCIFEROL, PO Take 1,000 Units by mouth daily        clindamycin (CLEOCIN) 150 MG capsule Take 1 capsule (150 mg) by mouth 3 times daily (Patient not taking: Reported on 10/5/2018) 30 capsule 0       ALLERGIES     Allergies   Allergen Reactions     Blood Transfusion Related (Informational Only) Other (See Comments)     Patient has a history of a clinically significant antibody against RBC antigens.  A delay in compatible RBCs may occur.     Lisinopril Other (See Comments)     Tongue  swelling     Calcium Nausea and Vomiting     Egg Yolk      Flu Virus Vaccine      Allergic to eggs.     Keflex [Cephalexin] Nausea     Pt states she had upset stomach.  NOT tongue swelling.  She had tongue swelling with a combo bp med that she thinks included lisinopril.    Tolerates IV Cefazolin     Levaquin [Levofloxacin]      Sulfa Drugs      Zinc Nausea and Vomiting       PAST MEDICAL HISTORY:  Past Medical History:   Diagnosis Date     Anemia      Atrial fibrillation (H)      B12 deficiency      Cardiomyopathy (H)      CHF (congestive heart failure) (H)      Chronic edema     Lower extremities     Chronic systolic congestive heart failure (H)      CVA (cerebral vascular accident) (H) 2010    Acute Left MCA hemispheric CVA ( on coumadin)     Depression      Gastro-oesophageal reflux disease      GI bleed 03-20-15     Hyperlipidemia      Hypertension      Iron deficiency      MSSA (methicillin susceptible Staphylococcus aureus) 03-10-15     Pulmonary hypertension (H)      Shingles        PAST SURGICAL HISTORY:  Past Surgical History:   Procedure Laterality Date     COLONOSCOPY  03/24/2015    Diverticulosis, polyps     ENTEROSCOPY SMALL BOWEL N/A 2/29/2016    Procedure: ENTEROSCOPY SMALL BOWEL;  Surgeon: Ady Ponce MD;  Location:  GI     ESOPHAGOSCOPY, GASTROSCOPY, DUODENOSCOPY (EGD), COMBINED N/A 3/22/2015    Upper GI- Normal, nothing significant found.      EXCISE MASS FOOT Right 7/6/2016    Procedure: EXCISE MASS FOOT;  Surgeon: Lee Jang DPM;  Location:  OR       FAMILY HISTORY:  Family History   Problem Relation Age of Onset     Known Genetic Syndrome Father      Known Genetic Syndrome Mother      Other Cancer Daughter      pancreatic     Other Cancer Brother      type     Other Cancer Sister 60     lung     Diabetes No family hx of      Breast Cancer No family hx of      Cancer - colorectal No family hx of      Ovarian Cancer No family hx of      Prostate Cancer No family hx of   "      SOCIAL HISTORY:  Social History     Social History     Marital status:      Spouse name: N/A     Number of children: N/A     Years of education: N/A     Social History Main Topics     Smoking status: Former Smoker     Quit date: 4/14/1956     Smokeless tobacco: Never Used     Alcohol use No     Drug use: No     Sexual activity: No     Other Topics Concern     Caffeine Concern No     Hot tea 1 cup daily     Special Diet Yes     low sodium     Exercise No     limited right now     Social History Narrative         Review of Systems:  Skin:  Negative for       Eyes:  Positive for glasses    ENT:  Negative for      Respiratory:  Negative for       Cardiovascular:  Negative for      Gastroenterology: Positive for constipation    Genitourinary:  Positive for urinary frequency    Musculoskeletal:         Neurologic:  Positive for numbness or tingling of feet neuropathy  Psychiatric:  Negative for      Heme/Lymph/Imm:  Positive for allergies    Endocrine:  Negative        Physical Exam:  Vitals: /57  Pulse 70  Ht 1.651 m (5' 5\")  Wt 67.6 kg (149 lb)  BMI 24.79 kg/m2    Constitutional:    cachectic;frail      Skin:  warm and dry to the touch          Head:  normocephalic        Eyes:  pupils equal and round;sclera white        Lymph:      ENT:  no pallor or cyanosis        Neck:  JVP normal        Respiratory:  clear to auscultation basal rales       Cardiac: no murmurs, gallops or rubs detected irregularly irregular rhythm;bradycardic              not assessed this visit                                        GI:  abdomen soft;no masses;non-tender        Extremities and Muscular Skeletal:      bilateral LE edema;1+     with skin changes noted  left shin wound with dressing c&d    Neurological:  no gross motor deficits;affect appropriate   in wheel chair    Psych:  oriented to time, person and place        Recent Lab Results:  LIPID RESULTS:  Lab Results   Component Value Date    CHOL 114 10/02/2018    " HDL 61 10/02/2018    LDL 41 10/02/2018    TRIG 61 10/02/2018    CHOLHDLRATIO 2.0 09/14/2015       LIVER ENZYME RESULTS:  Lab Results   Component Value Date    AST 20 06/18/2018    ALT 16 06/18/2018       CBC RESULTS:  Lab Results   Component Value Date    WBC 5.5 07/10/2018    RBC 3.03 (L) 07/10/2018    HGB 8.2 (L) 07/13/2018    HCT 27.6 (L) 07/10/2018    MCV 91 07/10/2018    MCH 26.4 (L) 07/10/2018    MCHC 29.0 (L) 07/10/2018    RDW 18.2 (H) 07/10/2018    PLT 59 (L) 07/10/2018       BMP RESULTS:  Lab Results   Component Value Date     08/31/2018    POTASSIUM 3.7 08/31/2018    CHLORIDE 103 08/31/2018    CO2 31 08/31/2018    ANIONGAP 5 08/31/2018     (H) 08/31/2018    BUN 32 (H) 08/31/2018    CR 1.01 08/31/2018    GFRESTIMATED 52 (L) 08/31/2018    GFRESTBLACK 63 08/31/2018    CASPER 7.9 (L) 08/31/2018        A1C RESULTS:  No results found for: A1C    INR RESULTS:  Lab Results   Component Value Date    INR 1.04 10/18/2017    INR 1.00 05/12/2016           CC  NATHAN Valentin  [facility]  [address] [place] [address]

## 2018-10-05 NOTE — LETTER
10/5/2018      Gerri Eubanks MD  [address]  [facility] [address]      RE: Keisha Godinez       Dear Colleague,    I had the pleasure of seeing Keisha Godinez in the [facility].    Service Date: 10/05/2018      PRIMARY CARE PHYSICIAN:  Gerri Eubanks MD      REFERRING PHYSICIAN:  Suad Murrell PA-C      HISTORY OF PRESENT ILLNESS:  I saw Keisha Godinez today who is 86.  She is limited by her advanced age, a certain amount of muscular frailty and several health issues which are rather extensive and outlined in Epic.  From a cardiac standpoint, she has had decent left ventricular cardiac function, but she has had severe pulmonary hypertension with varying components of right heart failure.  Lower leg edema has been a problem, and it can wax and wane depending on her nutritional status.  When her serum albumin is low, she retains more fluid and when she is better nourished and her serum proteins are more normal, she is less dominated by it.  Still in all, it requires Demadex 20 mg twice a day to maintain tolerable fluid balance and even at that she still has some residual edema.      Her RVSP is 70 mmHg plus right atrial pressure.  Left heart is relatively normal with no major valvular issues.      She wears oxygen at night.  She is up and around slowly.  She is limited by arthritis, swollen legs, muscular frailty, some components of deconditioning.  She has had a history of coronary disease and previous myocardial infarction, but most recently she has had nothing to suggest classic angina.  She has had a history of gastrointestinal bleeding.  Accordingly, she is not on anticoagulant therapy.      She has had chronic atrial fibrillation but her heart rates have been relatively controlled.  She has not had dizziness or syncope.      PHYSICAL EXAMINATION:   GENERAL:  Shows an asymptomatic female, sitting in a wheelchair.  She is bright, alert, oriented and cooperative.   She is not dyspneic.   VITAL SIGNS:  Blood pressure is 130/60, heart rate is 70 and irregular.  She weighs 149 pounds.   NECK:  No neck vein distention at 45 degrees, no bruits heard.   HEART:  Mildly irregular without gallop or murmur.   LUNGS:  Show diminished breath sounds but clear.   ABDOMEN:  Mildly rotund, but soft without organomegaly.   EXTREMITIES:  Both legs were in support stockings was some persistent lower leg edema.      Keisha is on a rather complex and significant program of Demadex 20 mg twice a day for edema control.  She really has done better recently between better nutrition at home and seeing the [facility] regularly - every couple months.  I believe it has helped her maintain improved stability.  She has a daughter who is very helpful and supports her mother a lot and does bring her to multiple doctor visits because that is the time in life Keisha is in these days.      Her program from a medicine standpoint includes:   1.  Imdur 30 mg a day.   2.  Hydralazine 25 mg t.i.d.   3.  Neurontin daily.   4.  Oral iron daily.   5.  Simvastatin 10 mg at bedtime.   6.  Spironolactone 50 mg a day.   7.  Albuterol p.r.n.   8.  Vitamin D.   9.  Calcium.   10.  Omega-3 fatty acids.   11.  Cranberry extract.   12.  Effexor.   13.  Senna daily.   14.  Prilosec.      Keisha is a bright woman.  She gets it.  She is 86 and has significant pulmonary hypertension.  Currently, she seems to be nicely compensated.  She has a daughter that helps significantly.  We talked about maintaining good nutrition.  She has had low serum albumins in the past which is the enemy at this point.  We do not have much control over her pulmonary hypertension, but currently she is not being dominated by right heart failure.  She has no great evidence for left heart failure with an ejection fraction of 55%-60% and no major valvular issues.      IMPRESSION:   1.  Pulmonary hypertension with compensated right heart failure.   2.   Chronic lower leg edema due to the above and venous insufficiency, elderly delicate female tissues, a low serum albumin was diminished oncotic pressure.   3.  Underlying emphysema/chronic obstructive pulmonary disease.   4.  Chronic atrial fibrillation - controlled.   5.  History of respiratory failure.   6.  History of GI bleeding.   7.  No significant left heart failure, but right heart failure is a part of her problem and this is associated with shortness of breath on exertion.      PLAN:  Continue the program noted above.  She will follow up with [facility] and Suad Murrell regularly.      Over 45 minutes was spent doing the consult.      cc:   Gerri Eubanks MD   [facility]   [address], [facility] [address]   [place] [address]       Suad Murrell PA-C   [facility]   [address] [place] [address]         SHANEL CRAMER MD, [facility]             D: 10/05/2018   T: 10/05/2018   MT: al      Name:     ELAINA MAYFIELD   MRN:      [id number]        Account:      [id number]   :      1932           Service Date: 10/05/2018      Document: [id number]           Outpatient Encounter Prescriptions as of 10/5/2018   Medication Sig Dispense Refill     acetaminophen (TYLENOL) 325 MG tablet Take 650 mg by mouth 3 times daily as needed for mild pain       albuterol (ACCUNEB) 1.25 MG/3ML nebulizer solution Take 1 vial by nebulization 3 times daily        Cranberry Extract 250 MG TABS Take 250 mg by mouth daily       ferrous sulfate (IRON) 325 (65 FE) MG tablet Take 1 tablet (325 mg) by mouth 2 times daily 100 tablet 11     gabapentin (NEURONTIN) 100 MG capsule TAKE 1 CAPSULE(100 MG) BY MOUTH THREE TIMES DAILY 270 capsule 0     hydrALAZINE (APRESOLINE) 25 MG tablet Take 3 tablets (75 mg) by mouth 3 times daily 810 tablet 3     isosorbide mononitrate (IMDUR) 30 MG 24 hr tablet Take 2 tablets (60 mg) by mouth daily 90 tablet 2     OMEGA-3 FATTY ACIDS PO Take 1,200 mg by mouth daily         omeprazole (PRILOSEC) 20 MG CR capsule Take 1 capsule (20 mg) by mouth daily 90 capsule 1     senna-docusate (SENOKOT-S;PERICOLACE) 8.6-50 MG per tablet Take 2 tablets daily as needed for constipation while taking prescription pain medications. 30 tablet 0     simvastatin (ZOCOR) 10 MG tablet Take 1 tablet (10 mg) by mouth At Bedtime 30 tablet 0     spironolactone (ALDACTONE) 50 MG tablet Take 1 tablet (50 mg) by mouth daily 30 tablet 3     torsemide (DEMADEX) 20 MG tablet Take 1 tablet (20 mg) by mouth 2 times daily 180 tablet 3     VITAMIN D, CHOLECALCIFEROL, PO Take 1,000 Units by mouth daily        [DISCONTINUED] venlafaxine (EFFEXOR-XR) 75 MG 24 hr capsule TAKE ONE CAPSULE BY MOUTH DAILY 90 capsule 0     clindamycin (CLEOCIN) 150 MG capsule Take 1 capsule (150 mg) by mouth 3 times daily (Patient not taking: Reported on 10/5/2018) 30 capsule 0     [DISCONTINUED] FUROSEMIDE PO Take 40 mg by mouth 2 times daily       No facility-administered encounter medications on file as of 10/5/2018.        Again, thank you for allowing me to participate in the care of your patient.      Sincerely,    SHANEL CRAMER MD     [facility]

## 2018-10-05 NOTE — MR AVS SNAPSHOT
After Visit Summary   10/5/2018    Keisha Godinez    MRN: [id number]           Patient Information     Date Of Birth          1/25/1932        Visit Information        Provider Department      10/5/2018 10:45 AM Miguel Pradhan MD [facility]        Today's Diagnoses     Pulmonary hypertension (H)    -  1    Chronic diastolic congestive heart failure (H)        Chronic atrial fibrillation (H)        Essential hypertension with goal blood pressure less than 140/90        Chronic kidney disease, stage 3 (moderate) (GFR 59 10/30/17, GFR 58 7/7/16)        Iron deficiency anemia due to chronic blood loss        Gastrointestinal hemorrhage, unspecified gastrointestinal hemorrhage type           Follow-ups after your visit        Additional Services     Follow-Up with Cardiac Advanced Practice Provider           Follow-Up with Cardiac Advanced Practice Provider           Follow-Up with Cardiologist                 Your next 10 appointments already scheduled     Oct 19, 2018 10:45 AM CDT   [facility] Visit with Lee Jang DPM   [facility]    [id number] [place] [address] [phone]            Dec 13, 2018 10:50 AM CST   Core Return with NATHAN Valentin   [facility] ([facility])    [address]  [facility] [address] [phone]              Future tests that were ordered for you today     Open Future Orders        Priority Expected Expires Ordered    Follow-Up with Cardiologist Routine 7/2/2019 10/5/2019 10/5/2018    Follow-Up with Cardiac Advanced Practice Provider Routine 2/3/2019 10/5/2019 10/5/2018    Follow-Up with Cardiac Advanced Practice Provider Routine 12/4/2018 10/5/2019 10/5/2018    Comprehensive metabolic panel Routine 10/5/2018 10/5/2019 10/5/2018            Who to contact     If you have questions or need  "follow up information about today's clinic visit or your schedule please contact [facility] directly at [phone].  Normal or non-critical lab and imaging results will be communicated to you by MyChart, letter or phone within 4 business days after the clinic has received the results. If you do not hear from us within 7 days, please contact the clinic through MyChart or phone. If you have a critical or abnormal lab result, we will notify you by phone as soon as possible.  Submit refill requests through [employer] or call your pharmacy and they will forward the refill request to us. Please allow 3 business days for your refill to be completed.          Additional Information About Your Visit        Care EveryWhere ID     This is your Care EveryWhere ID. This could be used by other organizations to access your [place] medical records  [id number]        Your Vitals Were     Pulse Height BMI (Body Mass Index)             70 1.651 m (5' 5\") 24.79 kg/m2          Blood Pressure from Last 3 Encounters:   10/05/18 132/57   10/02/18 110/60   09/11/18 130/50    Weight from Last 3 Encounters:   10/05/18 67.6 kg (149 lb)   10/02/18 65.8 kg (145 lb)   09/11/18 65.8 kg (145 lb)              We Performed the Following     Follow-Up with Cardiologist          Today's Medication Changes          These changes are accurate as of 10/5/18 11:31 AM.  If you have any questions, ask your nurse or doctor.               Stop taking these medicines if you haven't already. Please contact your care team if you have questions.     FUROSEMIDE PO   Stopped by:  Miguel Pradhan MD                    Primary Care Provider Office Phone # Fax #    Gerri Eubanks [phone]       [address] [facility] [address]  [facility] [address]        Equal Access to Services     ROBERTO KLEIN AH: Hadii thais damono Ho, waaxda luqadaha, qaybta kaalmakartik west, sharlene hernández " lastewart vick. So [facility] [phone].    ATENCIÓN: Si mariamla fara, tiene a rosas disposición servicios gratuitos de asistencia lingüística. Janay tovar [phone].    We comply with applicable federal civil rights laws and [place] laws. We do not discriminate on the basis of race, color, national origin, age, disability, sex, sexual orientation, or gender identity.            Thank you!     Thank you for choosing [facility]  for your care. Our goal is always to provide you with excellent care. Hearing back from our patients is one way we can continue to improve our services. Please take a few minutes to complete the written survey that you may receive in the mail after your visit with us. Thank you!             Your Updated Medication List - Protect others around you: Learn how to safely use, store and throw away your medicines at www.disposemymeds.org.          This list is accurate as of 10/5/18 11:31 AM.  Always use your most recent med list.                   Brand Name Dispense Instructions for use Diagnosis    albuterol 1.25 MG/3ML nebulizer solution    ACCUNEB     Take 1 vial by nebulization 3 times daily        clindamycin 150 MG capsule    CLEOCIN    30 capsule    Take 1 capsule (150 mg) by mouth 3 times daily    Cellulitis and abscess of foot, except toes       Cranberry Extract 250 MG Tabs      Take 250 mg by mouth daily        ferrous sulfate 325 (65 Fe) MG tablet    IRON    100 tablet    Take 1 tablet (325 mg) by mouth 2 times daily    Iron deficiency       gabapentin 100 MG capsule    NEURONTIN    270 capsule    TAKE 1 CAPSULE(100 MG) BY MOUTH THREE TIMES DAILY    Midline thoracic back pain, unspecified chronicity       hydrALAZINE 25 MG tablet    APRESOLINE    810 tablet    Take 3 tablets (75 mg) by mouth 3 times daily    Hyperlipidemia, unspecified hyperlipidemia type, Cardiomyopathy, unspecified type (H)       isosorbide mononitrate 30 MG 24 hr tablet    IMDUR     90 tablet    Take 2 tablets (60 mg) by mouth daily    Essential hypertension with goal blood pressure less than 140/90, Coronary artery disease involving native heart with angina pectoris, unspecified vessel or lesion type (H)       OMEGA-3 FATTY ACIDS PO      Take 1,200 mg by mouth daily        omeprazole 20 MG CR capsule    priLOSEC    90 capsule    Take 1 capsule (20 mg) by mouth daily    Gastroesophageal reflux disease without esophagitis       senna-docusate 8.6-50 MG per tablet    SENOKOT-S;PERICOLACE    30 tablet    Take 2 tablets daily as needed for constipation while taking prescription pain medications.    S/P foot surgery, right       simvastatin 10 MG tablet    ZOCOR    30 tablet    Take 1 tablet (10 mg) by mouth At Bedtime    Hyperlipidemia, unspecified hyperlipidemia type       spironolactone 50 MG tablet    ALDACTONE    30 tablet    Take 1 tablet (50 mg) by mouth daily    Acute on chronic diastolic congestive heart failure (H)       torsemide 20 MG tablet    DEMADEX    180 tablet    Take 1 tablet (20 mg) by mouth 2 times daily    Chronic diastolic congestive heart failure (H)       TYLENOL 325 MG tablet   Generic drug:  acetaminophen      Take 650 mg by mouth 3 times daily as needed for mild pain        venlafaxine 75 MG 24 hr capsule    EFFEXOR-XR    90 capsule    TAKE ONE CAPSULE BY MOUTH DAILY    Major depressive disorder, recurrent episode, in partial remission (H)       VITAMIN D (CHOLECALCIFEROL) PO      Take 1,000 Units by mouth daily

## 2018-10-05 NOTE — TELEPHONE ENCOUNTER
"Requested Prescriptions   Pending Prescriptions Disp Refills     venlafaxine (EFFEXOR-XR) 75 MG 24 hr capsule [Pharmacy Med Name: VENLAFAXINE ER 75MG CAPSULES] 90 capsule 0    Last Written Prescription Date:  07/09/2018  Last Fill Quantity: 90,  # refills: 0   Last office visit: 7/10/2018 with prescribing provider:     Future Office Visit:   Next 5 appointments (look out 90 days)     Oct 19, 2018 10:45 AM CDT   [facility] with Lee [facility]ang DPM   [facility]    [address] [address] [address] [phone]                Sig: TAKE ONE CAPSULE BY MOUTH DAILY    Serotonin-Norepinephrine Reuptake Inhibitors  Passed    10/5/2018  3:46 AM       Passed - Blood pressure under 140/90 in past 12 months    BP Readings from Last 3 Encounters:   10/05/18 132/57   10/02/18 110/60   09/11/18 130/50                Passed - PHQ-9 score of less than 5 in past 6 months    Please review last PHQ-9 score.          Passed - Patient is age 18 or older       Passed - No active pregnancy on record       Passed - Normal serum creatinine on file in past 12 months    Recent Labs   Lab Test  10/05/18   1133   03/10/15   1257   CR  0.93   < >   --    CREAT   --    --   0.7    < > = values in this interval not displayed.            Passed - No positive pregnancy test in past 12 months       Passed - Recent (6 mo) or future (30 days) visit within the authorizing provider's specialty    Patient had office visit in the last 6 months or has a visit in the next 30 days with authorizing provider or within the authorizing provider's specialty.  See \"Patient Info\" tab in inbasket, or \"Choose Columns\" in Meds & Orders section of the refill encounter.            "

## 2018-10-05 NOTE — LETTER
10/5/2018    Gerri Eubanks MD  [address] [facility] [address]  [facility] [address]    RE: Keisha Bullchriss       Dear Colleague,    I had the pleasure of seeing Keisha Godinez in the [facility].    HPI and Plan:   See dictation      Ms. Godinez is a pleasant 86 year old female with a PMHx including hypertension, hyperlipidemia, atrial fibrillation (not on AC due to prior GI bleed), chronic anemia (colonic AVM, iron deficiency), CVA, chronic lower extremity edema, and diastolic heart failure with pulmonary hypertension. She has been followed by Dr. Pradhan in the past, but hasn't been seen since 2016. She tells me she did not return because she had been doing so well.  Earlier this summer, she started having issues with increased leg swelling. She was admitted at [facility] in May 2018 with cellulitis complicated by heart failure, was diuresed and discharged to a TCU. She then returned to the hospital in June with leg edema and weight gain once again, along with dyspnea. She required IV diuresis, and pRBC transfusion for worsening of her anemia. She was reportedly 167 lbs at admit, and discharged at 155 lbs. She was placed on 40mg of lasix twice daily, and still required supplemental oxygen at 1.5L at discharge.      Orders Placed This Encounter   Procedures     Comprehensive metabolic panel     Follow-Up with Cardiac Advanced Practice Provider     Follow-Up with Cardiac Advanced Practice Provider     Follow-Up with Cardiologist     Orders Placed This Encounter   Medications     DISCONTD: FUROSEMIDE PO     Sig: Take 40 mg by mouth 2 times daily     Medications Discontinued During This Encounter   Medication Reason     FUROSEMIDE PO          Encounter Diagnoses   Name Primary?     Chronic diastolic congestive heart failure (H)      Pulmonary hypertension (H) Yes     Chronic atrial fibrillation (H)      Essential hypertension with goal blood pressure less than 140/90      Chronic kidney  disease, stage 3 (moderate) (GFR 59 10/30/17, GFR 58 7/7/16)      Iron deficiency anemia due to chronic blood loss      Gastrointestinal hemorrhage, unspecified gastrointestinal hemorrhage type        CURRENT MEDICATIONS:  Current Outpatient Prescriptions   Medication Sig Dispense Refill     acetaminophen (TYLENOL) 325 MG tablet Take 650 mg by mouth 3 times daily as needed for mild pain       albuterol (ACCUNEB) 1.25 MG/3ML nebulizer solution Take 1 vial by nebulization 3 times daily        Cranberry Extract 250 MG TABS Take 250 mg by mouth daily       ferrous sulfate (IRON) 325 (65 FE) MG tablet Take 1 tablet (325 mg) by mouth 2 times daily 100 tablet 11     gabapentin (NEURONTIN) 100 MG capsule TAKE 1 CAPSULE(100 MG) BY MOUTH THREE TIMES DAILY 270 capsule 0     hydrALAZINE (APRESOLINE) 25 MG tablet Take 3 tablets (75 mg) by mouth 3 times daily 810 tablet 3     isosorbide mononitrate (IMDUR) 30 MG 24 hr tablet Take 2 tablets (60 mg) by mouth daily 90 tablet 2     OMEGA-3 FATTY ACIDS PO Take 1,200 mg by mouth daily        omeprazole (PRILOSEC) 20 MG CR capsule Take 1 capsule (20 mg) by mouth daily 90 capsule 1     senna-docusate (SENOKOT-S;PERICOLACE) 8.6-50 MG per tablet Take 2 tablets daily as needed for constipation while taking prescription pain medications. 30 tablet 0     simvastatin (ZOCOR) 10 MG tablet Take 1 tablet (10 mg) by mouth At Bedtime 30 tablet 0     spironolactone (ALDACTONE) 50 MG tablet Take 1 tablet (50 mg) by mouth daily 30 tablet 3     torsemide (DEMADEX) 20 MG tablet Take 1 tablet (20 mg) by mouth 2 times daily 180 tablet 3     venlafaxine (EFFEXOR-XR) 75 MG 24 hr capsule TAKE ONE CAPSULE BY MOUTH DAILY 90 capsule 0     VITAMIN D, CHOLECALCIFEROL, PO Take 1,000 Units by mouth daily        clindamycin (CLEOCIN) 150 MG capsule Take 1 capsule (150 mg) by mouth 3 times daily (Patient not taking: Reported on 10/5/2018) 30 capsule 0       ALLERGIES     Allergies   Allergen Reactions     Blood  Transfusion Related (Informational Only) Other (See Comments)     Patient has a history of a clinically significant antibody against RBC antigens.  A delay in compatible RBCs may occur.     Lisinopril Other (See Comments)     Tongue swelling     Calcium Nausea and Vomiting     Egg Yolk      Flu Virus Vaccine      Allergic to eggs.     Keflex [Cephalexin] Nausea     Pt states she had upset stomach.  NOT tongue swelling.  She had tongue swelling with a combo bp med that she thinks included lisinopril.    Tolerates IV Cefazolin     Levaquin [Levofloxacin]      Sulfa Drugs      Zinc Nausea and Vomiting       PAST MEDICAL HISTORY:  Past Medical History:   Diagnosis Date     Anemia      Atrial fibrillation (H)      B12 deficiency      Cardiomyopathy (H)      CHF (congestive heart failure) (H)      Chronic edema     Lower extremities     Chronic systolic congestive heart failure (H)      CVA (cerebral vascular accident) (H) 2010    Acute Left MCA hemispheric CVA ( on coumadin)     Depression      Gastro-oesophageal reflux disease      GI bleed 03-20-15     Hyperlipidemia      Hypertension      Iron deficiency      MSSA (methicillin susceptible Staphylococcus aureus) 03-10-15     Pulmonary hypertension (H)      Shingles        PAST SURGICAL HISTORY:  Past Surgical History:   Procedure Laterality Date     COLONOSCOPY  03/24/2015    Diverticulosis, polyps     ENTEROSCOPY SMALL BOWEL N/A 2/29/2016    Procedure: ENTEROSCOPY SMALL BOWEL;  Surgeon: Ady Ponce MD;  Location:  GI     ESOPHAGOSCOPY, GASTROSCOPY, DUODENOSCOPY (EGD), COMBINED N/A 3/22/2015    Upper GI- Normal, nothing significant found.      EXCISE MASS FOOT Right 7/6/2016    Procedure: EXCISE MASS FOOT;  Surgeon: Lee Jang DPM;  Location:  OR       FAMILY HISTORY:  Family History   Problem Relation Age of Onset     Known Genetic Syndrome Father      Known Genetic Syndrome Mother      Other Cancer Daughter      pancreatic     Other Cancer  "Brother      type     Other Cancer Sister 60     lung     Diabetes No family hx of      Breast Cancer No family hx of      Cancer - colorectal No family hx of      Ovarian Cancer No family hx of      Prostate Cancer No family hx of        SOCIAL HISTORY:  Social History     Social History     Marital status:      Spouse name: N/A     Number of children: N/A     Years of education: N/A     Social History Main Topics     Smoking status: Former Smoker     Quit date: 4/14/1956     Smokeless tobacco: Never Used     Alcohol use No     Drug use: No     Sexual activity: No     Other Topics Concern     Caffeine Concern No     Hot tea 1 cup daily     Special Diet Yes     low sodium     Exercise No     limited right now     Social History Narrative         Review of Systems:  Skin:  Negative for       Eyes:  Positive for glasses    ENT:  Negative for      Respiratory:  Negative for       Cardiovascular:  Negative for      Gastroenterology: Positive for constipation    Genitourinary:  Positive for urinary frequency    Musculoskeletal:         Neurologic:  Positive for numbness or tingling of feet neuropathy  Psychiatric:  Negative for      Heme/Lymph/Imm:  Positive for allergies    Endocrine:  Negative        Physical Exam:  Vitals: /57  Pulse 70  Ht 1.651 m (5' 5\")  Wt 67.6 kg (149 lb)  BMI 24.79 kg/m2    Constitutional:    cachectic;frail      Skin:  warm and dry to the touch          Head:  normocephalic        Eyes:  pupils equal and round;sclera white        Lymph:      ENT:  no pallor or cyanosis        Neck:  JVP normal        Respiratory:  clear to auscultation basal rales       Cardiac: no murmurs, gallops or rubs detected irregularly irregular rhythm;bradycardic              not assessed this visit                                        GI:  abdomen soft;no masses;non-tender        Extremities and Muscular Skeletal:      bilateral LE edema;1+     with skin changes noted  left shin wound with dressing " c&d    Neurological:  no gross motor deficits;affect appropriate   in wheel chair    Psych:  oriented to time, person and place        Recent Lab Results:  LIPID RESULTS:  Lab Results   Component Value Date    CHOL 114 10/02/2018    HDL 61 10/02/2018    LDL 41 10/02/2018    TRIG 61 10/02/2018    CHOLHDLRATIO 2.0 09/14/2015       LIVER ENZYME RESULTS:  Lab Results   Component Value Date    AST 20 06/18/2018    ALT 16 06/18/2018       CBC RESULTS:  Lab Results   Component Value Date    WBC 5.5 07/10/2018    RBC 3.03 (L) 07/10/2018    HGB 8.2 (L) 07/13/2018    HCT 27.6 (L) 07/10/2018    MCV 91 07/10/2018    MCH 26.4 (L) 07/10/2018    MCHC 29.0 (L) 07/10/2018    RDW 18.2 (H) 07/10/2018    PLT 59 (L) 07/10/2018       BMP RESULTS:  Lab Results   Component Value Date     08/31/2018    POTASSIUM 3.7 08/31/2018    CHLORIDE 103 08/31/2018    CO2 31 08/31/2018    ANIONGAP 5 08/31/2018     (H) 08/31/2018    BUN 32 (H) 08/31/2018    CR 1.01 08/31/2018    GFRESTIMATED 52 (L) 08/31/2018    GFRESTBLACK 63 08/31/2018    CASPER 7.9 (L) 08/31/2018        A1C RESULTS:  No results found for: A1C    INR RESULTS:  Lab Results   Component Value Date    INR 1.04 10/18/2017    INR 1.00 05/12/2016           CC  NATHAN Valentin  [place] [phone]                    Thank you for allowing me to participate in the care of your patient.      Sincerely,     SHANEL CRAMER MD     [facility]    cc:   NATHAN Valnetin  [address] [address]

## 2018-10-08 RX ORDER — VENLAFAXINE HYDROCHLORIDE 75 MG/1
CAPSULE, EXTENDED RELEASE ORAL
Qty: 90 CAPSULE | Refills: 0 | Status: SHIPPED | OUTPATIENT
Start: 2018-10-08 | End: 2018-12-31

## 2018-10-08 NOTE — TELEPHONE ENCOUNTER
PHQ-9 SCORE 6/28/2017 12/19/2017 5/21/2018   Total Score - - -   Total Score 0 0 0     Prescription approved per [facility] Refill Protocol.

## 2018-10-15 DIAGNOSIS — I25.119 CORONARY ARTERY DISEASE INVOLVING NATIVE HEART WITH ANGINA PECTORIS, UNSPECIFIED VESSEL OR LESION TYPE (H): ICD-10-CM

## 2018-10-15 DIAGNOSIS — I10 ESSENTIAL HYPERTENSION WITH GOAL BLOOD PRESSURE LESS THAN 140/90: ICD-10-CM

## 2018-10-15 NOTE — TELEPHONE ENCOUNTER
"Requested Prescriptions   Pending Prescriptions Disp Refills     isosorbide mononitrate (IMDUR) 30 MG 24 hr tablet  Last Written Prescription Date:  06/28/18  Last Fill Quantity: 90,  # refills: 2   Last office visit: 7/10/2018 with prescribing provider:  07/10/18   Future Office Visit:   Next 5 appointments (look out 90 days)     Oct 19, 2018 10:45 AM CDT   [facility] DPM   [facility]    [address] [address] [phone]                  90 tablet 2     Sig: Take 2 tablets (60 mg) by mouth daily    Nitrates Passed    10/15/2018  4:54 PM       Passed - Blood pressure under 140/90 in past 12 months    BP Readings from Last 3 Encounters:   10/05/18 132/57   10/02/18 110/60   09/11/18 130/50                Passed - Pt is not on erectile dysfunction medications       Passed - Recent (12 mo) or future (30 days) visit within the authorizing provider's specialty    Patient had office visit in the last 12 months or has a visit in the next 30 days with authorizing provider or within the authorizing provider's specialty.  See \"Patient Info\" tab in inbasket, or \"Choose Columns\" in Meds & Orders section of the refill encounter.           Passed - Patient is age 18 or older          "

## 2018-10-16 RX ORDER — SIMVASTATIN 10 MG
10 TABLET ORAL AT BEDTIME
Qty: 30 TABLET | Refills: 0 | Status: SHIPPED | OUTPATIENT
Start: 2018-10-16 | End: 2018-11-19

## 2018-10-16 NOTE — TELEPHONE ENCOUNTER
"Requested Prescriptions   Pending Prescriptions Disp Refills     simvastatin (ZOCOR) 10 MG tablet  Last Written Prescription Date:  8/23/18  Last Fill Quantity: 30,  # refills: 0   Last office visit: 7/10/2018 with prescribing provider:  No   Future Office Visit:   Next 5 appointments (look out 90 days)     Oct 19, 2018 10:45 AM CDT   Return Visit with Lee Jang DPM   [facility]    [address] [place] [address] [phone]                30 tablet 0     Sig: Take 1 tablet (10 mg) by mouth At Bedtime    Statins Protocol Passed    10/3/2018  1:45 PM       Passed - LDL on file in past 12 months    Recent Labs   Lab Test  10/02/18   1032   LDL  41          Passed - No abnormal creatine kinase in past 12 months    Recent Labs   Lab Test  11/28/15   0613   CKT  27*           Passed - Recent (12 mo) or future (30 days) visit within the authorizing provider's specialty    Patient had office visit in the last 12 months or has a visit in the next 30 days with authorizing provider or within the authorizing provider's specialty.  See \"Patient Info\" tab in inbasket, or \"Choose Columns\" in Meds & Orders section of the refill encounter.         Passed - Patient is age 18 or older       Passed - No active pregnancy on record       Passed - No positive pregnancy test in past 12 months        Routing refill request to provider for review/approval because:  Labs out of range:  CKT   Labs not current:  CKT                "

## 2018-10-17 RX ORDER — ISOSORBIDE MONONITRATE 30 MG/1
60 TABLET, EXTENDED RELEASE ORAL DAILY
Qty: 180 TABLET | Refills: 1 | Status: SHIPPED | OUTPATIENT
Start: 2018-10-17 | End: 2019-03-19

## 2018-10-19 ENCOUNTER — OFFICE VISIT (OUTPATIENT)
Dept: PODIATRY | Facility: CLINIC | Age: 83
End: 2018-10-19
Payer: COMMERCIAL

## 2018-10-19 VITALS
DIASTOLIC BLOOD PRESSURE: 62 MMHG | BODY MASS INDEX: 24.83 KG/M2 | HEIGHT: 65 IN | SYSTOLIC BLOOD PRESSURE: 122 MMHG | WEIGHT: 149 LBS

## 2018-10-19 DIAGNOSIS — L89.899 PRESSURE INJURY OF SKIN OF LEFT FOOT, UNSPECIFIED INJURY STAGE: Primary | ICD-10-CM

## 2018-10-19 PROCEDURE — 99213 OFFICE O/P EST LOW 20 MIN: CPT | Mod: 25 | Performed by: PODIATRIST

## 2018-10-19 PROCEDURE — 11055 PARING/CUTG B9 HYPRKER LES 1: CPT | Performed by: PODIATRIST

## 2018-10-19 NOTE — PROGRESS NOTES
"Foot & Ankle Surgery   October 19, 2018    S:  Pt is seen today for evaluation of chronic L arch ulcer.  They have been doing twice daily dressings and using the DH2 shoe.  Her daughter states it looks better and Keisha states it feels better.    Vitals:    10/19/18 1040   BP: 122/62   Weight: 149 lb (67.6 kg)   Height: 5' 5\" (1.651 m)   '      ROS - Pos for CC.  Patient denies current nausea, vomiting, chills, fevers, belly pain, calf pain, chest pain or SOB.  Complete remainder of ROS it otherwise neg.      PE:  Gen:   No apparent distress  Eye:    Visual scanning without deficit  Ear:    Response to auditory stimuli wnl  Lung:    Non-labored breathing on RA noted  Abd:    NTND per patient report  Lymph:    Neg for pitting/non-pitting edema BLE  Vasc:    Pulses palpable, CFT minimally delayed  Neuro:    Light touch sensation diminished distally  Derm:    Callusing with intra-lesion hemorrhage.  After debriding to assess for a wound, all callus tissue was removed and the previous wound has fully epithelialized/healed.  MSK:    Baseline bony prominence noted L arch.  Calf:    Neg for redness, swelling or tenderness    Assessment:  86 year old female with healed ulcer L arch 2/2 to bony prominence      Plan:  Discussed etiologies, anatomy and options  1.  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  Some calluses, however, can have a deep core, and while home treatments are helpful, occasional clinical debridement may be necessary.  In certain cases, surgical excision may be required if no other treatments have proven sufficient.  -Our home callus instruction handout was dispensed, detailing home therapies to minimize regrowth of the calluses. "    -we discussed proceeding with surgery or continuing/attempting conservative therapies.  They would like to try conservative therapies, which I think is reasonable.  Follow up prn      Follow up:  prn or sooner with acute issues      Body mass index is 24.79 kg/(m^2).           Lee Jang DPM FACFAS FACFAOM  Podiatric Foot & Ankle Surgeon  [facility]  [phone]

## 2018-10-19 NOTE — PATIENT INSTRUCTIONS
Thank you for choosing [facility]!    DR. ALEXANDER'S CLINIC LOCATIONS:   MONDAY - [place] TUESDAY - [place]   [address] [facility]  [address]   [place] [address] [place] [address]   [phone]       THURSDAY AM - [place] THURSDAY PM - [place]   [address]   [place] [address] [place] [address]   [phone]       FRIDAY AM - [place] SET UP SURGERY: [phone] [address] APPOINTMENTS: [phone]   [place] [address] BILLING QUESTIONS: [phone] FAX NUMBER: [phone]     Follow Up: CALLUS / CORNS / IPKs  When there is excessive friction or pressure on the skin, the body responds by making the skin thicker to protect the deeper structures from becoming exposed. While this works well to protect the deeper structures, the thickened skin can increase pressure and pain.    CALLUS: Flat, diffuse thickening are simple calluses and they are usually caused by friction. Often these are the result of rubbing on a shoe or going barefoot.    CORNS: Calluses with a central core between the toes are called corns. These result from prominent joints on adjacent toes rubbing together. Theses are a symptom of bone malalignment and will always recur unless the underlying bones are addressed surgically.    IPKs: Calluses with a central core on the ball of the foot are usually IPKs (intractable plantar keratosis). These are caused by excessive pressure from the metatarsals, the bones that make up the ball of the foot. Often one of these bones is too long or too prominent.  Again, these will always recur unless the underlying bone issue is addressed. There is no cure for these. They will either go away by themselves, recur, or more could develop.    ROUTINE MAINTENANCE  1. File them down with a pumice stone or callus file a couple times a week.   2. An electric callus removing device.  Amope Pedi Perfect Electronic Pedicure Foot File and Callus Remover can be a good option.   3. Lotion can be applied to soften the callus. A urea based cream such as Kersal or Vanicream or thicker cream with shea butter are good options.  4. Toe spacers or toe covers can be used for corns, gel pads can be used for other lesions on the bottom of the foot.   If there is a surgical pathology noted, such as a prominent bone, often this needs to be addressed surgically to minimize recurrence. However, sometimes the lesion simply migrates to another spot after surgery, so it is not a guaranteed cure.             Body Mass Index (BMI)  Many things can cause foot and ankle problems. Foot structure, activity level, foot mechanics and injuries are common causes of pain. One very important issue that often goes unmentioned, is body weight. Extra weight can cause increased stress on muscles, ligaments, bones and tendons. Sometimes just a few extra pounds is all it takes to put one over her/his threshold. Without reducing that stress, it can be difficult to alleviate pain. Some people are uncomfortable addressing this issue, but we feel it is important for you to think about it. As Foot &  Ankle specialists, our job is addressing the lower extremity problem and possible causes. Regarding extra body weight, we encourage patients to discuss diet and weight management plans with their primary care doctors. It is this team approach that gives you the best opportunity for pain relief and getting you back on your feet.

## 2018-10-19 NOTE — MR AVS SNAPSHOT
After Visit Summary   10/19/2018    Keisha Godinez    MRN: [id number]           Patient Information     Date Of Birth          1/25/1932        Visit Information        Provider Department      10/19/2018 10:45 AM Lee Alexander DPM [facility]        Care Instructions    Thank you for choosing [facility]!    DR. ALEXANDER'S CLINIC LOCATIONS:   MONDAY - [place] TUESDAY - [place]   [address] [place] [address]  [address]   [place] [address] [place] [address]   [phone]       THURSDAY AM - [place] THURSDAY PM - [place]   [address]   [place] [address] [place] [address]   [phone]       FRIDAY AM - [place] SET UP SURGERY: [phone] [address] APPOINTMENTS: [phone]   [place] [address] BILLING QUESTIONS: [phone] FAX NUMBER: [phone]     Follow Up: CALLUS / CORNS / IPKs  When there is excessive friction or pressure on the skin, the body responds by making the skin thicker to protect the deeper structures from becoming exposed. While this works well to protect the deeper structures, the thickened skin can increase pressure and pain.    CALLUS: Flat, diffuse thickening are simple calluses and they are usually caused by friction. Often these are the result of rubbing on a shoe or going barefoot.    CORNS: Calluses with a central core between the toes are called corns. These result from prominent joints on adjacent toes rubbing together. Theses are a symptom of bone malalignment and will always recur unless the underlying bones are addressed surgically.    IPKs: Calluses with a central core on the ball of the foot are usually IPKs (intractable plantar keratosis). These are caused by excessive pressure from the metatarsals, the bones that make up the ball of the foot. Often one of these bones is too long or too prominent.  Again,  these will always recur unless the underlying bone issue is addressed. There is no cure for these. They will either go away by themselves, recur, or more could develop.    ROUTINE MAINTENANCE  1. File them down with a pumice stone or callus file a couple times a week.   2. An electric callus removing device. Amope Pedi Perfect Electronic Pedicure Foot File and Callus Remover can be a good option.   3. Lotion can be applied to soften the callus. A urea based cream such as Kersal or Vanicream or thicker cream with shea butter are good options.  4. Toe spacers or toe covers can be used for corns, gel pads can be used for other lesions on the bottom of the foot.   If there is a surgical pathology noted, such as a prominent bone, often this needs to be addressed surgically to minimize recurrence. However, sometimes the lesion simply migrates to another spot after surgery, so it is not a guaranteed cure.             Body Mass Index (BMI)  Many things can cause foot and ankle problems. Foot structure, activity level, foot mechanics and injuries are common causes of pain. One very important issue that often goes unmentioned, is body weight. Extra weight can cause increased stress on muscles, ligaments, bones and tendons. Sometimes just a few extra pounds is all it takes to put one over her/his threshold. Without reducing that stress, it can be difficult to alleviate pain. Some people are uncomfortable addressing this issue, but we feel it is important for you to think about it. As Foot &  Ankle specialists, our job is addressing the lower extremity problem and possible causes. Regarding extra body weight, we encourage patients to discuss diet and weight management plans with their primary care doctors. It is this team approach that gives you the best opportunity for pain relief and getting you back on your feet.              Follow-ups after your visit        Your next 10 appointments already scheduled     Dec 13, 2018  "10:50 AM CST   Core Return with NATHAN Valentin   [facility] ([facility])    [address]  [facility] [address] [phone]              Who to contact     If you have questions or need follow up information about today's clinic visit or your schedule please contact [facility] directly at [phone].  Normal or non-critical lab and imaging results will be communicated to you by MyChart, letter or phone within 4 business days after the clinic has received the results. If you do not hear from us within 7 days, please contact the clinic through MyChart or phone. If you have a critical or abnormal lab result, we will notify you by phone as soon as possible.  Submit refill requests through "[employer] or call your pharmacy and they will forward the refill request to us. Please allow 3 business days for your refill to be completed.          Additional Information About Your Visit        Care EveryWhere ID     This is your Care EveryWhere ID. This could be used by other organizations to access your [facility] medical records  [id number]        Your Vitals Were     Height BMI (Body Mass Index)                5' 5\" (1.651 m) 24.79 kg/m2           Blood Pressure from Last 3 Encounters:   10/19/18 122/62   10/05/18 132/57   10/02/18 110/60    Weight from Last 3 Encounters:   10/19/18 149 lb (67.6 kg)   10/05/18 149 lb (67.6 kg)   10/02/18 145 lb (65.8 kg)              Today, you had the following     No orders found for display       Primary Care Provider Office Phone # Fax #    Gerri Eubanks [phone]       [address] [facility] [address]  [facility] [address]        Equal Access to Services     TRACEY KLEIN : Hadii thais Teague, waaxda luqadaha, qaybta kaalmada jenna, sharlene vick. So [facility] [phone].    ATENCIÓN: Si habla español, tiene a rosas disposición servicios gratuitos " de asistencia lingüística. Janay tovar [phone].    We comply with applicable federal civil rights laws and [place] laws. We do not discriminate on the basis of race, color, national origin, age, disability, sex, sexual orientation, or gender identity.            Thank you!     Thank you for choosing [facility]  for your care. Our goal is always to provide you with excellent care. Hearing back from our patients is one way we can continue to improve our services. Please take a few minutes to complete the written survey that you may receive in the mail after your visit with us. Thank you!             Your Updated Medication List - Protect others around you: Learn how to safely use, store and throw away your medicines at www.disposemymeds.org.          This list is accurate as of 10/19/18 10:59 AM.  Always use your most recent med list.                   Brand Name Dispense Instructions for use Diagnosis    albuterol 1.25 MG/3ML nebulizer solution    ACCUNEB     Take 1 vial by nebulization 3 times daily        clindamycin 150 MG capsule    CLEOCIN    30 capsule    Take 1 capsule (150 mg) by mouth 3 times daily    Cellulitis and abscess of foot, except toes       Cranberry Extract 250 MG Tabs      Take 250 mg by mouth daily        ferrous sulfate 325 (65 Fe) MG tablet    IRON    100 tablet    Take 1 tablet (325 mg) by mouth 2 times daily    Iron deficiency       gabapentin 100 MG capsule    NEURONTIN    270 capsule    TAKE 1 CAPSULE(100 MG) BY MOUTH THREE TIMES DAILY    Midline thoracic back pain, unspecified chronicity       hydrALAZINE 25 MG tablet    APRESOLINE    810 tablet    Take 3 tablets (75 mg) by mouth 3 times daily    Hyperlipidemia, unspecified hyperlipidemia type, Cardiomyopathy, unspecified type (H)       isosorbide mononitrate 30 MG 24 hr tablet    IMDUR    180 tablet    Take 2 tablets (60 mg) by mouth daily    Essential hypertension with goal blood pressure less than 140/90, Coronary  artery disease involving native heart with angina pectoris, unspecified vessel or lesion type (H)       OMEGA-3 FATTY ACIDS PO      Take 1,200 mg by mouth daily        omeprazole 20 MG CR capsule    priLOSEC    90 capsule    Take 1 capsule (20 mg) by mouth daily    Gastroesophageal reflux disease without esophagitis       senna-docusate 8.6-50 MG per tablet    SENOKOT-S;PERICOLACE    30 tablet    Take 2 tablets daily as needed for constipation while taking prescription pain medications.    S/P foot surgery, right       simvastatin 10 MG tablet    ZOCOR    30 tablet    Take 1 tablet (10 mg) by mouth At Bedtime    Hyperlipidemia, unspecified hyperlipidemia type       spironolactone 50 MG tablet    ALDACTONE    30 tablet    Take 1 tablet (50 mg) by mouth daily    Acute on chronic diastolic congestive heart failure (H)       torsemide 20 MG tablet    DEMADEX    180 tablet    Take 1 tablet (20 mg) by mouth 2 times daily    Chronic diastolic congestive heart failure (H)       TYLENOL 325 MG tablet   Generic drug:  acetaminophen      Take 650 mg by mouth 3 times daily as needed for mild pain        venlafaxine 75 MG 24 hr capsule    EFFEXOR-XR    90 capsule    TAKE ONE CAPSULE BY MOUTH DAILY    Major depressive disorder, recurrent episode, in partial remission (H)       VITAMIN D (CHOLECALCIFEROL) PO      Take 1,000 Units by mouth daily

## 2018-11-19 DIAGNOSIS — E78.5 HYPERLIPIDEMIA, UNSPECIFIED HYPERLIPIDEMIA TYPE: ICD-10-CM

## 2018-11-19 NOTE — TELEPHONE ENCOUNTER
"Requested Prescriptions   Pending Prescriptions Disp Refills     simvastatin (ZOCOR) 10 MG tablet  Requested Prescriptions   Pending Prescriptions Disp Refills     simvastatin (ZOCOR) 10 MG tablet  Last Written Prescription Date:  10/16/18  Last Fill Quantity: 30,  # refills: 0   Last office visit: 7/10/2018 with prescribing provider:  Nasra   Future Office Visit:     30 tablet 0     Sig: Take 1 tablet (10 mg) by mouth At Bedtime    Statins Protocol Passed    11/19/2018  7:47 AM       Passed - LDL on file in past 12 months    Recent Labs   Lab Test  10/02/18   1032   LDL  41            Passed - No abnormal creatine kinase in past 12 months    Recent Labs   Lab Test  11/28/15   0613   CKT  27*               Passed - Recent (12 mo) or future (30 days) visit within the authorizing provider's specialty    Patient had office visit in the last 12 months or has a visit in the next 30 days with authorizing provider or within the authorizing provider's specialty.  See \"Patient Info\" tab in inbasket, or \"Choose Columns\" in Meds & Orders section of the refill encounter.             Passed - Patient is age 18 or older       Passed - No active pregnancy on record       Passed - No positive pregnancy test in past 12 months         30 tablet 0     Sig: Take 1 tablet (10 mg) by mouth At Bedtime    Statins Protocol Passed    11/19/2018  7:47 AM       Passed - LDL on file in past 12 months    Recent Labs   Lab Test  10/02/18   1032   LDL  41            Passed - No abnormal creatine kinase in past 12 months    Recent Labs   Lab Test  11/28/15   0613   CKT  27*               Passed - Recent (12 mo) or future (30 days) visit within the authorizing provider's specialty    Patient had office visit in the last 12 months or has a visit in the next 30 days with authorizing provider or within the authorizing provider's specialty.  See \"Patient Info\" tab in inbasket, or \"Choose Columns\" in Meds & Orders section of the refill encounter.       "       Passed - Patient is age 18 or older       Passed - No active pregnancy on record       Passed - No positive pregnancy test in past 12 months

## 2018-11-21 RX ORDER — SIMVASTATIN 10 MG
10 TABLET ORAL AT BEDTIME
Qty: 90 TABLET | Refills: 2 | Status: SHIPPED | OUTPATIENT
Start: 2018-11-21 | End: 2019-08-05

## 2018-11-26 DIAGNOSIS — M54.6 MIDLINE THORACIC BACK PAIN, UNSPECIFIED CHRONICITY: ICD-10-CM

## 2018-11-26 DIAGNOSIS — I42.9 CARDIOMYOPATHY, UNSPECIFIED TYPE (H): Primary | ICD-10-CM

## 2018-11-26 DIAGNOSIS — I50.33 ACUTE ON CHRONIC DIASTOLIC CONGESTIVE HEART FAILURE (H): ICD-10-CM

## 2018-11-26 NOTE — TELEPHONE ENCOUNTER
Requested Prescriptions   Pending Prescriptions Disp Refills     furosemide (LASIX) 40 MG tablet 60 tablet 1     Sig: Take 1 tablet (40 mg) by mouth 2 times daily    Last Written Prescription Date:  02/27/2018  Last Fill Quantity: 180,  # refills: 2   Last office visit: 7/10/2018 with prescribing provider:  Mallika Hammonds    Future Office Visit:        There is no refill protocol information for this order

## 2018-11-28 RX ORDER — GABAPENTIN 100 MG/1
CAPSULE ORAL
Qty: 270 CAPSULE | Refills: 0 | Status: SHIPPED | OUTPATIENT
Start: 2018-11-28 | End: 2019-03-19

## 2018-11-28 NOTE — TELEPHONE ENCOUNTER
Requested Prescriptions   Pending Prescriptions Disp Refills     gabapentin (NEURONTIN) 100 MG capsule [Pharmacy Med Name: GABAPENTIN 100MG CAPSULES]  Last Written Prescription Date:  8/28/18  Last Fill Quantity: 270,  # refills: 0   Last office visit: 7/10/2018 with prescribing provider:  Mallika Hammonds   Future Office Visit:    270 capsule 0     Sig: TAKE 1 CAPSULE(100 MG) BY MOUTH THREE TIMES DAILY    There is no refill protocol information for this order      Routing refill request to provider for review/approval because:  Drug not on the [facility] refill protocol

## 2018-11-29 RX ORDER — SPIRONOLACTONE 50 MG/1
TABLET, FILM COATED ORAL
Qty: 30 TABLET | Refills: 7 | Status: SHIPPED | OUTPATIENT
Start: 2018-11-29 | End: 2019-09-23

## 2018-11-29 RX ORDER — FUROSEMIDE 40 MG
40 TABLET ORAL 2 TIMES DAILY
Qty: 60 TABLET | Refills: 7 | Status: SHIPPED | OUTPATIENT
Start: 2018-11-29 | End: 2018-12-13

## 2018-11-29 NOTE — TELEPHONE ENCOUNTER
"Requested Prescriptions   Pending Prescriptions Disp Refills     spironolactone (ALDACTONE) 50 MG tablet [Pharmacy Med Name: Spironolactone Oral Tablet 50 MG] 30 tablet 2     Sig: TAKE 1 TABLET BY MOUTH DAILY    Diuretics (Including Combos) Protocol Passed    11/26/2018  7:00 AM       Passed - Blood pressure under 140/90 in past 12 months    BP Readings from Last 3 Encounters:   10/19/18 122/62   10/05/18 132/57   10/02/18 110/60                Passed - Recent (12 mo) or future (30 days) visit within the authorizing provider's specialty    Patient had office visit in the last 12 months or has a visit in the next 30 days with authorizing provider or within the authorizing provider's specialty.  See \"Patient Info\" tab in inbasket, or \"Choose Columns\" in Meds & Orders section of the refill encounter.             Passed - Patient is age 18 or older       Passed - No active pregancy on record       Passed - Normal serum creatinine on file in past 12 months    Recent Labs   Lab Test  10/05/18   1133   CR  0.93             Passed - Normal serum potassium on file in past 12 months    Recent Labs   Lab Test  10/05/18   1133   POTASSIUM  3.9                   Passed - Normal serum sodium on file in past 12 months    Recent Labs   Lab Test  10/05/18   1133   NA  137             Passed - No positive pregnancy test in past 12 months        Last office visit 7/10/18.  Prescription approved per [facility] Refill Protocol.  Niru Smart RN    "

## 2018-11-29 NOTE — TELEPHONE ENCOUNTER
"Requested Prescriptions   Pending Prescriptions Disp Refills     furosemide (LASIX) 40 MG tablet 60 tablet 1     Sig: Take 1 tablet (40 mg) by mouth 2 times daily    Diuretics (Including Combos) Protocol Passed    11/26/2018 11:51 AM       Passed - Blood pressure under 140/90 in past 12 months    BP Readings from Last 3 Encounters:   10/19/18 122/62   10/05/18 132/57   10/02/18 110/60                Passed - Recent (12 mo) or future (30 days) visit within the authorizing provider's specialty    Patient had office visit in the last 12 months or has a visit in the next 30 days with authorizing provider or within the authorizing provider's specialty.  See \"Patient Info\" tab in inbasket, or \"Choose Columns\" in Meds & Orders section of the refill encounter.             Passed - Patient is age 18 or older       Passed - No active pregancy on record       Passed - Normal serum creatinine on file in past 12 months    Recent Labs   Lab Test  10/05/18   1133   CR  0.93             Passed - Normal serum potassium on file in past 12 months    Recent Labs   Lab Test  10/05/18   1133   POTASSIUM  3.9                   Passed - Normal serum sodium on file in past 12 months    Recent Labs   Lab Test  10/05/18   1133   NA  137             Passed - No positive pregnancy test in past 12 months        Routing refill request to provider for review/approval because:  Drug not active on patient's medication list        "

## 2018-12-11 ENCOUNTER — TELEPHONE (OUTPATIENT)
Dept: LAB | Facility: CLINIC | Age: 83
End: 2018-12-11

## 2018-12-11 DIAGNOSIS — I50.22 CHRONIC SYSTOLIC CONGESTIVE HEART FAILURE (H): Primary | ICD-10-CM

## 2018-12-11 NOTE — TELEPHONE ENCOUNTER
Writer spoke with patient to schedule labs prior to office visit w/PEARL Nieto. Labs scheduled for 10:00 am on 12/13/18 prior to OV. Orders placed for BMP, Hgb. Provider to review & sign.     Tony Marti LPN  [facility]  [phone]

## 2018-12-12 ENCOUNTER — CARE COORDINATION (OUTPATIENT)
Dept: CARDIOLOGY | Facility: CLINIC | Age: 83
End: 2018-12-12

## 2018-12-12 NOTE — PROGRESS NOTES
[facility] Tele-management    Daily weight graph copied to patient's chart for Suad Murrell PA-C to review prior to office visit 12/13/18.        Tony Marti LPN  [facility]  [phone]

## 2018-12-13 ENCOUNTER — OFFICE VISIT (OUTPATIENT)
Dept: CARDIOLOGY | Facility: CLINIC | Age: 83
End: 2018-12-13
Attending: INTERNAL MEDICINE
Payer: COMMERCIAL

## 2018-12-13 VITALS
HEART RATE: 50 BPM | OXYGEN SATURATION: 96 % | SYSTOLIC BLOOD PRESSURE: 138 MMHG | BODY MASS INDEX: 24.51 KG/M2 | WEIGHT: 147.1 LBS | HEIGHT: 65 IN | DIASTOLIC BLOOD PRESSURE: 58 MMHG

## 2018-12-13 DIAGNOSIS — I50.22 CHRONIC SYSTOLIC CONGESTIVE HEART FAILURE (H): ICD-10-CM

## 2018-12-13 DIAGNOSIS — I50.32 CHRONIC DIASTOLIC CONGESTIVE HEART FAILURE (H): ICD-10-CM

## 2018-12-13 DIAGNOSIS — I10 ESSENTIAL HYPERTENSION WITH GOAL BLOOD PRESSURE LESS THAN 140/90: ICD-10-CM

## 2018-12-13 DIAGNOSIS — N18.30 CHRONIC KIDNEY DISEASE, STAGE 3 (MODERATE): ICD-10-CM

## 2018-12-13 DIAGNOSIS — D50.0 IRON DEFICIENCY ANEMIA DUE TO CHRONIC BLOOD LOSS: ICD-10-CM

## 2018-12-13 DIAGNOSIS — I48.20 CHRONIC ATRIAL FIBRILLATION (H): ICD-10-CM

## 2018-12-13 DIAGNOSIS — I27.20 PULMONARY HYPERTENSION (H): ICD-10-CM

## 2018-12-13 LAB
ANION GAP SERPL CALCULATED.3IONS-SCNC: 5 MMOL/L (ref 3–14)
BUN SERPL-MCNC: 36 MG/DL (ref 7–30)
CALCIUM SERPL-MCNC: 8.8 MG/DL (ref 8.5–10.1)
CHLORIDE SERPL-SCNC: 103 MMOL/L (ref 94–109)
CO2 SERPL-SCNC: 29 MMOL/L (ref 20–32)
CREAT SERPL-MCNC: 1.01 MG/DL (ref 0.52–1.04)
GFR SERPL CREATININE-BSD FRML MDRD: 52 ML/MIN/1.7M2
GLUCOSE SERPL-MCNC: 100 MG/DL (ref 70–99)
HGB BLD-MCNC: 10.2 G/DL (ref 11.7–15.7)
POTASSIUM SERPL-SCNC: 3.7 MMOL/L (ref 3.4–5.3)
SODIUM SERPL-SCNC: 137 MMOL/L (ref 133–144)

## 2018-12-13 PROCEDURE — 80048 BASIC METABOLIC PNL TOTAL CA: CPT | Performed by: INTERNAL MEDICINE

## 2018-12-13 PROCEDURE — 36415 COLL VENOUS BLD VENIPUNCTURE: CPT | Performed by: INTERNAL MEDICINE

## 2018-12-13 PROCEDURE — 99214 OFFICE O/P EST MOD 30 MIN: CPT | Performed by: PHYSICIAN ASSISTANT

## 2018-12-13 PROCEDURE — 85018 HEMOGLOBIN: CPT | Performed by: INTERNAL MEDICINE

## 2018-12-13 ASSESSMENT — MIFFLIN-ST. JEOR: SCORE: 1108.12

## 2018-12-13 NOTE — LETTER
12/13/2018    Gerri Eubanks MD  [address] [facility] [address]  [facility] [id number]    RE: Keisha Godinez       Dear Colleague,    I had the pleasure of seeing Keisha Godinez in the [facility].      Cardiology Progress Note    Date of Service: 12/13/2018      Reason for visit: CORE clinic follow up, diastolic and right heart failure.     Primary cardiologist: Dr. Miguel Pradhan      HPI:  Ms. Godinez is a pleasant 86 year old female with a PMHx including hypertension, hyperlipidemia, atrial fibrillation (not on AC due to prior GI bleed), chronic anemia (colonic AVM, iron deficiency), CVA, chronic lower extremity edema, and diastolic heart failure with pulmonary hypertension. She was lost to follow up from 2016 until recently; she had opted not to return as she was doing so well. This was until this summer, when she developed worsening leg edema. She was admitted at [facility] in May 2018 with cellulitis complicated by heart failure, was diuresed and discharged to a TCU. She then returned to the hospital in June with leg edema and weight gain once again, along with dyspnea. She required IV diuresis, and pRBC transfusion for worsening of her anemia. At that point, she was 167 lbs at admit, and discharged at 155 lbs. She was placed on 40mg of lasix twice daily, and still required supplemental oxygen at discharge.    I then saw her for CORE clinic enrollment in July 2018. She was starting to make positive changes her diet, including sodium restriction. Her weight was continuing to trend down but still required lymphedema wraps. At a follow up visit, I transitioned her lasix to torsemide 20mg BID which she has done well with since that time. Her weight has now leveled off in the 145-148 lb range. She has become more active, now able to ambulate with the use of a rolling walker, and she and her daughter are both very pleased with her progress and improvement in quality of life. She followed  "up with Dr. Pradhan in October 2018 at which time she continued to do well. Weight was stable. A planned follow up echo showed EF 55-60%, though her RV remained dilated with mildly decreased function and 2+TR. No changes were made, and she's back today for routine CORE follow up.    Today she and her daughter both think she is doing \"great.\" Her swelling is minimal and weight is stable. It fluctuates a pound or two on days she eats something salty and she is very aware of this. When this happens she is more strict with her diet the following few days. Weight again is stable, actually down a few lbs since her last visit. She denies any new exertional chest discomfort, palpitations, worsening MOTT, or orthopnea. Overall she is feeling well.       ASSESSMENT/PLAN:    1. Chronic diastolic and right heart failure.   --Most recent echo Oct 2018 with ongoing preserved EF 55-60%. Her RV has remained dilated, with mildly reduced function with 2+ TR, even now that she is likely euvolemic. Given her advanced age, and that symptomatically she is doing well, no further advanced PH workup planned.    --She is doing well with dietary sodium restriction which has made a considerable difference in her edema and symptoms. Her renal function remains preserved. Will continue torsemide 20mg BID. She will continue with telemanagement through our clinic. Given her history of GIB and chronic anemia, not a good cardiomems candidate.   --Continue spironolactone 50mg daily without change.   --Continue to monitor her anemia periodically, Hgb today is improved to 10.2. She remains on ferrous sulfate.    --I encouraged her to remain active around her home.      2. Hypertension.   --BP recently has been under reasonable control. he has a true allergy to ACE-inhibitors (tongue swelling), and is not on a beta blocker as she has had issues with bradycardia in the past.   --She has chronically been on hydralazine and isosorbide which we will continue. " Also on spironolactone and torsemide as above.     3. Atrial fibrillation, chronic.   --Rate well controlled. Not on BB due to bradycardia as above.   --She is not on anticoagulation due to GIB/anemia.     4. Hyperlipidemia.   --Last LDL Oct 2018 was excellent at 41. Continue simvastatin 10mg daily.       Follow up plan: With myself in CORE in 3 months, or sooner if needed.         Orders this Visit:  Orders Placed This Encounter   Procedures     Basic metabolic panel     Follow-Up with CORE Clinic - DARLNIG visit     No orders of the defined types were placed in this encounter.    Medications Discontinued During This Encounter   Medication Reason     furosemide (LASIX) 40 MG tablet Erroneous Entry           CURRENT MEDICATIONS:  Current Outpatient Medications   Medication Sig Dispense Refill     albuterol (ACCUNEB) 1.25 MG/3ML nebulizer solution Take 1 vial by nebulization 3 times daily        Cranberry Extract 250 MG TABS Take 250 mg by mouth daily       ferrous sulfate (IRON) 325 (65 FE) MG tablet Take 1 tablet (325 mg) by mouth 2 times daily 100 tablet 11     gabapentin (NEURONTIN) 100 MG capsule TAKE 1 CAPSULE(100 MG) BY MOUTH THREE TIMES DAILY 270 capsule 0     hydrALAZINE (APRESOLINE) 25 MG tablet Take 3 tablets (75 mg) by mouth 3 times daily 810 tablet 3     isosorbide mononitrate (IMDUR) 30 MG 24 hr tablet Take 2 tablets (60 mg) by mouth daily 180 tablet 1     OMEGA-3 FATTY ACIDS PO Take 1,200 mg by mouth daily        omeprazole (PRILOSEC) 20 MG CR capsule Take 1 capsule (20 mg) by mouth daily 90 capsule 1     senna-docusate (SENOKOT-S;PERICOLACE) 8.6-50 MG per tablet Take 2 tablets daily as needed for constipation while taking prescription pain medications. 30 tablet 0     simvastatin (ZOCOR) 10 MG tablet Take 1 tablet (10 mg) by mouth At Bedtime 90 tablet 2     spironolactone (ALDACTONE) 50 MG tablet TAKE 1 TABLET BY MOUTH DAILY 30 tablet 7     torsemide (DEMADEX) 20 MG tablet Take 1 tablet (20 mg) by mouth 2  times daily 180 tablet 3     venlafaxine (EFFEXOR-XR) 75 MG 24 hr capsule TAKE ONE CAPSULE BY MOUTH DAILY 90 capsule 0     VITAMIN D, CHOLECALCIFEROL, PO Take 1,000 Units by mouth daily        acetaminophen (TYLENOL) 325 MG tablet Take 650 mg by mouth 3 times daily as needed for mild pain       clindamycin (CLEOCIN) 150 MG capsule Take 1 capsule (150 mg) by mouth 3 times daily (Patient not taking: Reported on 10/5/2018) 30 capsule 0       ALLERGIES     Allergies   Allergen Reactions     Blood Transfusion Related (Informational Only) Other (See Comments)     Patient has a history of a clinically significant antibody against RBC antigens.  A delay in compatible RBCs may occur.     Lisinopril Other (See Comments)     Tongue swelling     Calcium Nausea and Vomiting     Egg Yolk      Flu Virus Vaccine      Allergic to eggs.     Keflex [Cephalexin] Nausea     Pt states she had upset stomach.  NOT tongue swelling.  She had tongue swelling with a combo bp med that she thinks included lisinopril.    Tolerates IV Cefazolin     Levaquin [Levofloxacin]      Sulfa Drugs      Zinc Nausea and Vomiting       PAST MEDICAL HISTORY:  Past Medical History:   Diagnosis Date     Anemia      Atrial fibrillation (H)      B12 deficiency      Cardiomyopathy (H)      CHF (congestive heart failure) (H)      Chronic edema     Lower extremities     Chronic systolic congestive heart failure (H)      CVA (cerebral vascular accident) (H) 2010    Acute Left MCA hemispheric CVA ( on coumadin)     Depression      Gastro-oesophageal reflux disease      GI bleed 03-20-15     Hyperlipidemia      Hypertension      Iron deficiency      MSSA (methicillin susceptible Staphylococcus aureus) 03-10-15     Pulmonary hypertension (H)      Shingles        PAST SURGICAL HISTORY:  Past Surgical History:   Procedure Laterality Date     COLONOSCOPY  03/24/2015    Diverticulosis, polyps     ENTEROSCOPY SMALL BOWEL N/A 2/29/2016    Procedure: ENTEROSCOPY SMALL BOWEL;   Surgeon: Ady Ponce MD;  Location:  GI     ESOPHAGOSCOPY, GASTROSCOPY, DUODENOSCOPY (EGD), COMBINED N/A 3/22/2015    Upper GI- Normal, nothing significant found.      EXCISE MASS FOOT Right 2016    Procedure: EXCISE MASS FOOT;  Surgeon: Lee Jang DPM;  Location:  OR       FAMILY HISTORY:  Family History   Problem Relation Age of Onset     Known Genetic Syndrome Father      Known Genetic Syndrome Mother      Other Cancer Daughter         pancreatic     Other Cancer Brother         type     Other Cancer Sister 60        lung     Diabetes No family hx of      Breast Cancer No family hx of      Cancer - colorectal No family hx of      Ovarian Cancer No family hx of      Prostate Cancer No family hx of        SOCIAL HISTORY:  Social History     Socioeconomic History     Marital status:      Spouse name: None     Number of children: None     Years of education: None     Highest education level: None   Social Needs     Financial resource strain: None     Food insecurity - worry: None     Food insecurity - inability: None     Transportation needs - medical: None     Transportation needs - non-medical: None   Occupational History     None   Tobacco Use     Smoking status: Former Smoker     Last attempt to quit: 1956     Years since quittin.7     Smokeless tobacco: Never Used   Substance and Sexual Activity     Alcohol use: No     Alcohol/week: 0.0 oz     Drug use: No     Sexual activity: No     Partners: Male   Other Topics Concern      Service Not Asked     Blood Transfusions Not Asked     Caffeine Concern No     Comment: Hot tea 1 cup daily     Occupational Exposure Not Asked     Hobby Hazards Not Asked     Sleep Concern Not Asked     Stress Concern Not Asked     Weight Concern Not Asked     Special Diet Yes     Comment: low sodium     Back Care Not Asked     Exercise No     Comment: limited right now     Bike Helmet Not Asked     Seat Belt Not Asked     Self-Exams  "Not Asked     Parent/sibling w/ CABG, MI or angioplasty before 65F 55M? Not Asked   Social History Narrative     None       Review of Systems:  Cardiovascular: negative for chest pain, palpitations, orthopnea, pos LE edema, much improved.  Constitutional: negative for chills, sweats, fevers   Resp: Negative for dyspnea at rest, neg MOTT. Neg cough, known chronic lung disease  HEENT: Negative for new visual changes, frequent headaches  Gastrointestinal: negative for abdominal pain, diarrhea, blood in stool, nausea, vomiting  Hematologic/lymphatic: negative for current systemic anticoagulation, hx of blood clots. Pos hx GIB.  Musculoskeletal: negative for new back pain, joint pain  Neurological: negative for focal weakness, LOC, seizures, syncope. Neg dizziness.      Physical Exam:  Vitals: /58 (BP Location: Right arm, Patient Position: Chair, Cuff Size: Adult Regular)   Pulse 50   Ht 1.651 m (5' 5\")   Wt 66.7 kg (147 lb 1.6 oz)   SpO2 96%   BMI 24.48 kg/m      Wt Readings from Last 4 Encounters:   12/13/18 66.7 kg (147 lb 1.6 oz)   10/19/18 67.6 kg (149 lb)   10/05/18 67.6 kg (149 lb)   10/02/18 65.8 kg (145 lb)       GEN:  In general, this is a well nourished elderly female in no acute distress on room air. She arrived in a wheelchair again today, accompanied by her daughter.    HEENT:  Pupils equal, round. Sclerae nonicteric.   NECK: Supple, no masses appreciated. Trachea midline. No JVD while upright.   C/V:  Irregular rhythm, no murmur, rub or gallop.  No S3 or RV heave.   RESP: Respirations are unlabored. No use of accessory muscles. Clear to auscultation today with no wheezing, rales, or rhonchi.    GI: Abdomen soft, nontender, nondistended.   EXTREM: Trace edema, though somewhat limited assessment as she has wraps in place. No cyanosis or clubbing.  NEURO: Alert and oriented, cooperative. Gait not assesed. No obvious focal deficits.   PSYCH: Normal affect.  SKIN: Warm and dry. No rashes or petechiae " appreciated.       Recent Lab Results:    BMP RESULTS:  Lab Results   Component Value Date     12/13/2018    POTASSIUM 3.7 12/13/2018    CHLORIDE 103 12/13/2018    CO2 29 12/13/2018    ANIONGAP 5 12/13/2018     (H) 12/13/2018    BUN 36 (H) 12/13/2018    CR 1.01 12/13/2018    GFRESTIMATED 52 (L) 12/13/2018    GFRESTBLACK 63 12/13/2018    CASPER 8.8 12/13/2018          New/Pertinent imaging results since last visit:  Echo 10/3/18    Interpretation Summary     Left ventricular systolic function is normal.The visual ejection fraction is  estimated at 55-60%.  The right ventricle is severely dilated.Mildly decreased right ventricular  systolic function  Bi-atrial severe enlargement  There is moderate (2+) tricuspid regurgitation.  Severe pulmonary hypertension- RVSP 71 mm hg +RA.  The IVC is dilated and fails to change with respiration, suggesting elevated  central venous pressure.     Compared to prior study dated 10/18/2017 RVSP was 53 mm hg +RA in prior study.    Suad Murrell PA-C  [facility]  Pager [phone]      Thank you for allowing me to participate in the care of your patient.    Sincerely,     NATHAN Valentin     [facility]

## 2018-12-13 NOTE — PROGRESS NOTES
Cardiology Progress Note    Date of Service: 12/13/2018      Reason for visit: CORE clinic follow up, diastolic and right heart failure.     Primary cardiologist: Dr. Miguel Pradhan      HPI:  Ms. Godinez is a pleasant 86 year old female with a PMHx including hypertension, hyperlipidemia, atrial fibrillation (not on AC due to prior GI bleed), chronic anemia (colonic AVM, iron deficiency), CVA, chronic lower extremity edema, and diastolic heart failure with pulmonary hypertension. She was lost to follow up from 2016 until recently; she had opted not to return as she was doing so well. This was until this summer, when she developed worsening leg edema. She was admitted at [facility] in May 2018 with cellulitis complicated by heart failure, was diuresed and discharged to a TCU. She then returned to the hospital in June with leg edema and weight gain once again, along with dyspnea. She required IV diuresis, and pRBC transfusion for worsening of her anemia. At that point, she was 167 lbs at admit, and discharged at 155 lbs. She was placed on 40mg of lasix twice daily, and still required supplemental oxygen at discharge.    I then saw her for CORE clinic enrollment in July 2018. She was starting to make positive changes her diet, including sodium restriction. Her weight was continuing to trend down but still required lymphedema wraps. At a follow up visit, I transitioned her lasix to torsemide 20mg BID which she has done well with since that time. Her weight has now leveled off in the 145-148 lb range. She has become more active, now able to ambulate with the use of a rolling walker, and she and her daughter are both very pleased with her progress and improvement in quality of life. She followed up with Dr. Pradhan in October 2018 at which time she continued to do well. Weight was stable. A planned follow up echo showed EF 55-60%, though her RV remained dilated with mildly decreased function and 2+TR. No changes were made, and  "she's back today for routine CORE follow up.    Today she and her daughter both think she is doing \"great.\" Her swelling is minimal and weight is stable. It fluctuates a pound or two on days she eats something salty and she is very aware of this. When this happens she is more strict with her diet the following few days. Weight again is stable, actually down a few lbs since her last visit. She denies any new exertional chest discomfort, palpitations, worsening MOTT, or orthopnea. Overall she is feeling well.       ASSESSMENT/PLAN:    1. Chronic diastolic and right heart failure.   --Most recent echo Oct 2018 with ongoing preserved EF 55-60%. Her RV has remained dilated, with mildly reduced function with 2+ TR, even now that she is likely euvolemic. Given her advanced age, and that symptomatically she is doing well, no further advanced PH workup planned.    --She is doing well with dietary sodium restriction which has made a considerable difference in her edema and symptoms. Her renal function remains preserved. Will continue torsemide 20mg BID. She will continue with telemanagement through our clinic. Given her history of GIB and chronic anemia, not a good cardiomems candidate.   --Continue spironolactone 50mg daily without change.   --Continue to monitor her anemia periodically, Hgb today is improved to 10.2. She remains on ferrous sulfate.    --I encouraged her to remain active around her home.      2. Hypertension.   --BP recently has been under reasonable control. he has a true allergy to ACE-inhibitors (tongue swelling), and is not on a beta blocker as she has had issues with bradycardia in the past.   --She has chronically been on hydralazine and isosorbide which we will continue. Also on spironolactone and torsemide as above.     3. Atrial fibrillation, chronic.   --Rate well controlled. Not on BB due to bradycardia as above.   --She is not on anticoagulation due to GIB/anemia.     4. Hyperlipidemia.   --Last " LDL Oct 2018 was excellent at 41. Continue simvastatin 10mg daily.       Follow up plan: With myself in CORE in 3 months, or sooner if needed.         Orders this Visit:  Orders Placed This Encounter   Procedures     Basic metabolic panel     Follow-Up with [facility] Clinic - [place] visit     No orders of the defined types were placed in this encounter.    Medications Discontinued During This Encounter   Medication Reason     furosemide (LASIX) 40 MG tablet Erroneous Entry           CURRENT MEDICATIONS:  Current Outpatient Medications   Medication Sig Dispense Refill     albuterol (ACCUNEB) 1.25 MG/3ML nebulizer solution Take 1 vial by nebulization 3 times daily        Cranberry Extract 250 MG TABS Take 250 mg by mouth daily       ferrous sulfate (IRON) 325 (65 FE) MG tablet Take 1 tablet (325 mg) by mouth 2 times daily 100 tablet 11     gabapentin (NEURONTIN) 100 MG capsule TAKE 1 CAPSULE(100 MG) BY MOUTH THREE TIMES DAILY 270 capsule 0     hydrALAZINE (APRESOLINE) 25 MG tablet Take 3 tablets (75 mg) by mouth 3 times daily 810 tablet 3     isosorbide mononitrate (IMDUR) 30 MG 24 hr tablet Take 2 tablets (60 mg) by mouth daily 180 tablet 1     OMEGA-3 FATTY ACIDS PO Take 1,200 mg by mouth daily        omeprazole (PRILOSEC) 20 MG CR capsule Take 1 capsule (20 mg) by mouth daily 90 capsule 1     senna-docusate (SENOKOT-S;PERICOLACE) 8.6-50 MG per tablet Take 2 tablets daily as needed for constipation while taking prescription pain medications. 30 tablet 0     simvastatin (ZOCOR) 10 MG tablet Take 1 tablet (10 mg) by mouth At Bedtime 90 tablet 2     spironolactone (ALDACTONE) 50 MG tablet TAKE 1 TABLET BY MOUTH DAILY 30 tablet 7     torsemide (DEMADEX) 20 MG tablet Take 1 tablet (20 mg) by mouth 2 times daily 180 tablet 3     venlafaxine (EFFEXOR-XR) 75 MG 24 hr capsule TAKE ONE CAPSULE BY MOUTH DAILY 90 capsule 0     VITAMIN D, CHOLECALCIFEROL, PO Take 1,000 Units by mouth daily        acetaminophen (TYLENOL) 325 MG tablet  Take 650 mg by mouth 3 times daily as needed for mild pain       clindamycin (CLEOCIN) 150 MG capsule Take 1 capsule (150 mg) by mouth 3 times daily (Patient not taking: Reported on 10/5/2018) 30 capsule 0       ALLERGIES     Allergies   Allergen Reactions     Blood Transfusion Related (Informational Only) Other (See Comments)     Patient has a history of a clinically significant antibody against RBC antigens.  A delay in compatible RBCs may occur.     Lisinopril Other (See Comments)     Tongue swelling     Calcium Nausea and Vomiting     Egg Yolk      Flu Virus Vaccine      Allergic to eggs.     Keflex [Cephalexin] Nausea     Pt states she had upset stomach.  NOT tongue swelling.  She had tongue swelling with a combo bp med that she thinks included lisinopril.    Tolerates IV Cefazolin     Levaquin [Levofloxacin]      Sulfa Drugs      Zinc Nausea and Vomiting       PAST MEDICAL HISTORY:  Past Medical History:   Diagnosis Date     Anemia      Atrial fibrillation (H)      B12 deficiency      Cardiomyopathy (H)      CHF (congestive heart failure) (H)      Chronic edema     Lower extremities     Chronic systolic congestive heart failure (H)      CVA (cerebral vascular accident) (H) 2010    Acute Left MCA hemispheric CVA ( on coumadin)     Depression      Gastro-oesophageal reflux disease      GI bleed 03-20-15     Hyperlipidemia      Hypertension      Iron deficiency      MSSA (methicillin susceptible Staphylococcus aureus) 03-10-15     Pulmonary hypertension (H)      Shingles        PAST SURGICAL HISTORY:  Past Surgical History:   Procedure Laterality Date     COLONOSCOPY  03/24/2015    Diverticulosis, polyps     ENTEROSCOPY SMALL BOWEL N/A 2/29/2016    Procedure: ENTEROSCOPY SMALL BOWEL;  Surgeon: Ady Ponce MD;  Location:  GI     ESOPHAGOSCOPY, GASTROSCOPY, DUODENOSCOPY (EGD), COMBINED N/A 3/22/2015    Upper GI- Normal, nothing significant found.      EXCISE MASS FOOT Right 7/6/2016    Procedure:  EXCISE MASS FOOT;  Surgeon: Lee Jang DPM;  Location: [facility] OR       FAMILY HISTORY:  Family History   Problem Relation Age of Onset     Known Genetic Syndrome Father      Known Genetic Syndrome Mother      Other Cancer Daughter         pancreatic     Other Cancer Brother         type     Other Cancer Sister 60        lung     Diabetes No family hx of      Breast Cancer No family hx of      Cancer - colorectal No family hx of      Ovarian Cancer No family hx of      Prostate Cancer No family hx of        SOCIAL HISTORY:  Social History     Socioeconomic History     Marital status:      Spouse name: None     Number of children: None     Years of education: None     Highest education level: None   Social Needs     Financial resource strain: None     Food insecurity - worry: None     Food insecurity - inability: None     Transportation needs - medical: None     Transportation needs - non-medical: None   Occupational History     None   Tobacco Use     Smoking status: Former Smoker     Last attempt to quit: 1956     Years since quittin.7     Smokeless tobacco: Never Used   Substance and Sexual Activity     Alcohol use: No     Alcohol/week: 0.0 oz     Drug use: No     Sexual activity: No     Partners: Male   Other Topics Concern      Service Not Asked     Blood Transfusions Not Asked     Caffeine Concern No     Comment: Hot tea 1 cup daily     Occupational Exposure Not Asked     Hobby Hazards Not Asked     Sleep Concern Not Asked     Stress Concern Not Asked     Weight Concern Not Asked     Special Diet Yes     Comment: low sodium     Back Care Not Asked     Exercise No     Comment: limited right now     Bike Helmet Not Asked     Seat Belt Not Asked     Self-Exams Not Asked     Parent/sibling w/ CABG, MI or angioplasty before 65F 55M? Not Asked   Social History Narrative     None       Review of Systems:  Cardiovascular: negative for chest pain, palpitations, orthopnea, pos LE edema, much  "improved.  Constitutional: negative for chills, sweats, fevers   Resp: Negative for dyspnea at rest, neg MOTT. Neg cough, known chronic lung disease  HEENT: Negative for new visual changes, frequent headaches  Gastrointestinal: negative for abdominal pain, diarrhea, blood in stool, nausea, vomiting  Hematologic/lymphatic: negative for current systemic anticoagulation, hx of blood clots. Pos hx GIB.  Musculoskeletal: negative for new back pain, joint pain  Neurological: negative for focal weakness, LOC, seizures, syncope. Neg dizziness.      Physical Exam:  Vitals: /58 (BP Location: Right arm, Patient Position: Chair, Cuff Size: Adult Regular)   Pulse 50   Ht 1.651 m (5' 5\")   Wt 66.7 kg (147 lb 1.6 oz)   SpO2 96%   BMI 24.48 kg/m     Wt Readings from Last 4 Encounters:   12/13/18 66.7 kg (147 lb 1.6 oz)   10/19/18 67.6 kg (149 lb)   10/05/18 67.6 kg (149 lb)   10/02/18 65.8 kg (145 lb)       GEN:  In general, this is a well nourished elderly female in no acute distress on room air. She arrived in a wheelchair again today, accompanied by her daughter.    HEENT:  Pupils equal, round. Sclerae nonicteric.   NECK: Supple, no masses appreciated. Trachea midline. No JVD while upright.   C/V:  Irregular rhythm, no murmur, rub or gallop.  No S3 or RV heave.   RESP: Respirations are unlabored. No use of accessory muscles. Clear to auscultation today with no wheezing, rales, or rhonchi.    GI: Abdomen soft, nontender, nondistended.   EXTREM: Trace edema, though somewhat limited assessment as she has wraps in place. No cyanosis or clubbing.  NEURO: Alert and oriented, cooperative. Gait not assesed. No obvious focal deficits.   PSYCH: Normal affect.  SKIN: Warm and dry. No rashes or petechiae appreciated.       Recent Lab Results:    BMP RESULTS:  Lab Results   Component Value Date     12/13/2018    POTASSIUM 3.7 12/13/2018    CHLORIDE 103 12/13/2018    CO2 29 12/13/2018    ANIONGAP 5 12/13/2018     (H) " 12/13/2018    BUN 36 (H) 12/13/2018    CR 1.01 12/13/2018    GFRESTIMATED 52 (L) 12/13/2018    GFRESTBLACK 63 12/13/2018    CASPER 8.8 12/13/2018          New/Pertinent imaging results since last visit:  Echo 10/3/18    Interpretation Summary     Left ventricular systolic function is normal.The visual ejection fraction is  estimated at 55-60%.  The right ventricle is severely dilated.Mildly decreased right ventricular  systolic function  Bi-atrial severe enlargement  There is moderate (2+) tricuspid regurgitation.  Severe pulmonary hypertension- RVSP 71 mm hg +RA.  The IVC is dilated and fails to change with respiration, suggesting elevated  central venous pressure.     Compared to prior study dated 10/18/2017 RVSP was 53 mm hg +RA in prior study.    Suad Murrell PA-C  [facility]  Pager [phone]

## 2018-12-13 NOTE — PATIENT INSTRUCTIONS
Call the C.O.R.E. nurse for any questions or concerns:  [phone]    1.  Medication changes from today:   NONE    2. Follow up plan:  In 3 months in [place] with Suad with labs.     3.  Weigh yourself daily and write it down.    4.  Call CORE nurse if your weight is up more than 2 pounds in one day or 5 pounds in one week.    5.  Call CORE nurse if you feel more short of breath, have more abdominal bloating, or leg swelling.    6.  Continue low sodium diet (less than 2000 mg daily). If you eat less salt, you will retain less fluid.    7.  Alcohol can weaken your heart further. You should avoid alcohol or limit its use to special times, such as a holiday or birthday.     8.  Do NOT take Aleve (naproxen) or Advil (ibuprofen) without talking to your doctor first.     9.  Lab Results:     Component      Latest Ref Rng & Units 12/13/2018   Sodium      133 - 144 mmol/L 137   Potassium      3.4 - 5.3 mmol/L 3.7   Chloride      94 - 109 mmol/L 103   Carbon Dioxide      20 - 32 mmol/L 29   Anion Gap      3 - 14 mmol/L 5   Glucose      70 - 99 mg/dL 100 (H)   Urea Nitrogen      7 - 30 mg/dL 36 (H)   Creatinine      0.52 - 1.04 mg/dL 1.01   GFR Estimate      >60 mL/min/1.7m2 52 (L)   GFR Estimate If Black      >60 mL/min/1.7m2 63   Calcium      8.5 - 10.1 mg/dL 8.8   Hemoglobin      11.7 - 15.7 g/dL 10.2 (L)     [facility]: Cardiomyopathy, Optimization, Rehabilitation, Education  The [facility] Clinic is a heart failure specialty clinic within the [facility] where you will work with your cardiologist, nurse practitioners, physician assistants and registered nurses who specialize in heart failure care. They are dedicated to helping patients with heart failure to carefully adjust medications, receive education, and learn who and when to call if symptoms develop. They specialize in helping you better understand your condition, slow the progression of your disease, improve the length and quality of  your life, help you detect future heart problems before they become life threatening, and avoid hospitalizations.

## 2018-12-21 ENCOUNTER — CARE COORDINATION (OUTPATIENT)
Dept: CARDIOLOGY | Facility: CLINIC | Age: 83
End: 2018-12-21

## 2018-12-21 NOTE — PROGRESS NOTES
Notified pt of tel assurance being deactivated at end of yr. Pt aware to call CORE if wt >150# or having sx. Pt would like to continue calling in until end of yr.

## 2018-12-31 DIAGNOSIS — F33.41 MAJOR DEPRESSIVE DISORDER, RECURRENT EPISODE, IN PARTIAL REMISSION (H): ICD-10-CM

## 2018-12-31 DIAGNOSIS — Z98.890 S/P FOOT SURGERY, RIGHT: ICD-10-CM

## 2018-12-31 DIAGNOSIS — E61.1 IRON DEFICIENCY: ICD-10-CM

## 2018-12-31 NOTE — TELEPHONE ENCOUNTER
"Requested Prescriptions   Pending Prescriptions Disp Refills     venlafaxine (EFFEXOR-XR) 75 MG 24 hr capsule  Last Written Prescription Date:  10/8/18  Last Fill Quantity: 90,  # refills: 0   Last office visit: 7/10/2018 with prescribing provider:  Nasra   Future Office Visit:     90 capsule 0     Sig: Take 1 capsule (75 mg) by mouth daily    Serotonin-Norepinephrine Reuptake Inhibitors  Failed - 12/31/2018 10:06 AM       Failed - PHQ-9 score of less than 5 in past 6 months    Please review last PHQ-9 score.          Passed - Blood pressure under 140/90 in past 12 months    BP Readings from Last 3 Encounters:   12/13/18 138/58   10/19/18 122/62   10/05/18 132/57                Passed - Patient is age 18 or older       Passed - No active pregnancy on record       Passed - Normal serum creatinine on file in past 12 months    Recent Labs   Lab Test 12/13/18  0931  03/10/15  1257   CR 1.01   < >  --    CREAT  --   --  0.7    < > = values in this interval not displayed.            Passed - No positive pregnancy test in past 12 months       Passed - Recent (6 mo) or future (30 days) visit within the authorizing provider's specialty    Patient had office visit in the last 6 months or has a visit in the next 30 days with authorizing provider or within the authorizing provider's specialty.  See \"Patient Info\" tab in inbasket, or \"Choose Columns\" in Meds & Orders section of the refill encounter.            senna-docusate (SENOKOT-S/PERICOLACE) 8.6-50 MG tablet  Last Written Prescription Date:  7/6/16  Last Fill Quantity: 30,  # refills: 0   Last office visit: 7/10/2018 with prescribing provider:  Nasra   Future Office Visit:     30 tablet 0     Sig: Take 2 tablets daily as needed for constipation while taking prescription pain medications.    Laxatives Protocol Passed - 12/31/2018 10:06 AM       Passed - Patient is age 6 or older       Passed - Recent (12 mo) or future (30 days) visit within the authorizing provider's " "specialty    Patient had office visit in the last 12 months or has a visit in the next 30 days with authorizing provider or within the authorizing provider's specialty.  See \"Patient Info\" tab in inbasket, or \"Choose Columns\" in Meds & Orders section of the refill encounter.              ferrous sulfate (FEROSUL) 325 (65 Fe) MG tablet  Last Written Prescription Date:  1/11/18  Last Fill Quantity: 100,  # refills: 11   Last office visit: 7/10/2018 with prescribing provider:  Nasra   Future Office Visit:     100 tablet 11     Sig: Take 1 tablet (325 mg) by mouth 2 times daily    Iron Supplements Passed - 12/31/2018 10:06 AM       Passed - Patient is 12 years of age or older       Passed - Recent (12 mo) or future (30 days) visit within the authorizing provider's specialty    Patient had office visit in the last 12 months or has a visit in the next 30 days with authorizing provider or within the authorizing provider's specialty.  See \"Patient Info\" tab in inbasket, or \"Choose Columns\" in Meds & Orders section of the refill encounter.             Passed - Hgb OR Hct on record within the past 12 mos.    Patient need only have had a HGB or HCT on file in the past 12 mos. That result does not need to be normal.    Recent Labs   Lab Test 12/13/18  0931 07/13/18  0951 07/10/18  1519   HGB 10.2* 8.2* 8.0*     Recent Labs   Lab Test 07/10/18  1519 06/26/18  0809 06/23/18  0632   HCT 27.6* 28.8* 26.5*       Please verify a HGB or HCT has been checked SINCE THE LAST DOSE CHANGE.              "

## 2018-12-31 NOTE — TELEPHONE ENCOUNTER
Last OV 7/10/18, last PHQ-9 5/21/18 was 0.  Pt had labs done 12/13/18 at cardiology visit, several abnormal values.  Routing refill request to provider for review/approval because:  Labs out of range:  See below

## 2019-01-02 RX ORDER — FERROUS SULFATE 325(65) MG
325 TABLET ORAL 2 TIMES DAILY
Qty: 100 TABLET | Refills: 11 | Status: ON HOLD | OUTPATIENT
Start: 2019-01-02 | End: 2021-01-01

## 2019-01-02 RX ORDER — VENLAFAXINE HYDROCHLORIDE 75 MG/1
75 CAPSULE, EXTENDED RELEASE ORAL DAILY
Qty: 90 CAPSULE | Refills: 0 | Status: SHIPPED | OUTPATIENT
Start: 2019-01-02 | End: 2019-04-02

## 2019-01-02 RX ORDER — AMOXICILLIN 250 MG
CAPSULE ORAL
Qty: 30 TABLET | Refills: 0 | Status: SHIPPED | OUTPATIENT
Start: 2019-01-02 | End: 2019-01-17

## 2019-01-09 DIAGNOSIS — F33.41 MAJOR DEPRESSIVE DISORDER, RECURRENT EPISODE, IN PARTIAL REMISSION (H): ICD-10-CM

## 2019-01-09 NOTE — TELEPHONE ENCOUNTER
"Requested Prescriptions   Pending Prescriptions Disp Refills     venlafaxine (EFFEXOR-XR) 75 MG 24 hr capsule [Pharmacy Med Name: VENLAFAXINE ER 75MG CAPSULES] 90 capsule 0    Last Written Prescription Date:  01/02/2019  Last Fill Quantity: 90,  # refills: 0  Last office visit: 7/10/2018 with prescribing provider:     Future Office Visit:   Sig: TAKE ONE CAPSULE BY MOUTH DAILY    Serotonin-Norepinephrine Reuptake Inhibitors  Failed - 1/9/2019  3:46 AM       Failed - PHQ-9 score of less than 5 in past 6 months    Please review last PHQ-9 score.          Failed - Recent (6 mo) or future (30 days) visit within the authorizing provider's specialty    Patient had office visit in the last 6 months or has a visit in the next 30 days with authorizing provider or within the authorizing provider's specialty.  See \"Patient Info\" tab in inbasket, or \"Choose Columns\" in Meds & Orders section of the refill encounter.           Passed - Blood pressure under 140/90 in past 12 months    BP Readings from Last 3 Encounters:   12/13/18 138/58   10/19/18 122/62   10/05/18 132/57                Passed - Medication is active on med list       Passed - Patient is age 18 or older       Passed - No active pregnancy on record       Passed - Normal serum creatinine on file in past 12 months    Recent Labs   Lab Test 12/13/18  0931  03/10/15  1257   CR 1.01   < >  --    CREAT  --   --  0.7    < > = values in this interval not displayed.            Passed - No positive pregnancy test in past 12 months        "

## 2019-01-10 RX ORDER — VENLAFAXINE HYDROCHLORIDE 75 MG/1
CAPSULE, EXTENDED RELEASE ORAL
Qty: 90 CAPSULE | Refills: 0 | OUTPATIENT
Start: 2019-01-10

## 2019-01-17 DIAGNOSIS — Z98.890 S/P FOOT SURGERY, RIGHT: ICD-10-CM

## 2019-01-17 NOTE — TELEPHONE ENCOUNTER
"Requested Prescriptions   Pending Prescriptions Disp Refills     SM STOOL SOFTENER 8.6-50 MG tablet [Pharmacy Med Name: SM Stool Softener Oral Tablet 8.6-50 MG] 30 tablet 0    Last Written Prescription Date:  01/02/2019  Last Fill Quantity: 30,  # refills: 0   Last office visit: 7/10/2018 with prescribing provider:     Future Office Visit:   Sig: Take 2 tablets daily as needed for constipation while taking prescription pain medications.    Laxatives Protocol Passed - 1/17/2019  7:00 AM       Passed - Patient is age 6 or older       Passed - Recent (12 mo) or future (30 days) visit within the authorizing provider's specialty    Patient had office visit in the last 12 months or has a visit in the next 30 days with authorizing provider or within the authorizing provider's specialty.  See \"Patient Info\" tab in inbasket, or \"Choose Columns\" in Meds & Orders section of the refill encounter.             Passed - Medication is active on med list        "

## 2019-01-21 RX ORDER — DOCUSATE SODIUM AND SENNOSIDES 50; 8.6 MG/1; MG/1
TABLET ORAL
Qty: 30 TABLET | Refills: 0 | Status: ON HOLD | OUTPATIENT
Start: 2019-01-21 | End: 2021-01-01

## 2019-01-22 NOTE — TELEPHONE ENCOUNTER
Prescription approved per [facility] Refill Protocol.  Dora Toledo RN

## 2019-03-19 DIAGNOSIS — M54.6 MIDLINE THORACIC BACK PAIN, UNSPECIFIED CHRONICITY: ICD-10-CM

## 2019-03-19 DIAGNOSIS — I10 ESSENTIAL HYPERTENSION WITH GOAL BLOOD PRESSURE LESS THAN 140/90: ICD-10-CM

## 2019-03-19 DIAGNOSIS — I25.119 CORONARY ARTERY DISEASE INVOLVING NATIVE HEART WITH ANGINA PECTORIS, UNSPECIFIED VESSEL OR LESION TYPE (H): ICD-10-CM

## 2019-03-19 NOTE — TELEPHONE ENCOUNTER
"Requested Prescriptions   Pending Prescriptions Disp Refills     isosorbide mononitrate (IMDUR) 30 MG 24 hr tablet [Pharmacy Med Name: Isosorbide Mononitrate ER Oral Tablet Extended Release 24 Hour 30   MG]  Last Written Prescription Date:  10/17/18  Last Fill Quantity: 180,  # refills: 1   Last office visit: 7/10/2018 with prescribing provider:  Nasra   Future Office Visit:     180 tablet 0     Sig: TAKE 2 TABLETS (60MG) BY MOUTH DAILY    Nitrates Passed - 3/19/2019  4:29 PM       Passed - Blood pressure under 140/90 in past 12 months    BP Readings from Last 3 Encounters:   12/13/18 138/58   10/19/18 122/62   10/05/18 132/57                Passed - Pt is not on erectile dysfunction medications       Passed - Recent (12 mo) or future (30 days) visit within the authorizing provider's specialty    Patient had office visit in the last 12 months or has a visit in the next 30 days with authorizing provider or within the authorizing provider's specialty.  See \"Patient Info\" tab in inbasket, or \"Choose Columns\" in Meds & Orders section of the refill encounter.             Passed - Medication is active on med list       Passed - Patient is age 18 or older        gabapentin (NEURONTIN) 100 MG capsule [Pharmacy Med Name: Gabapentin Oral Capsule 100 MG]  Last Written Prescription Date:  11/28/18  Last Fill Quantity: 270,  # refills: 0   Last office visit: 7/10/2018 with prescribing provider:  Nasra   Future Office Visit:     270 capsule 0     Sig: TAKE 1 CAPSULE (100MG) BY MOUTH THREE TIMES DAILY    There is no refill protocol information for this order          "

## 2019-03-21 RX ORDER — GABAPENTIN 100 MG/1
CAPSULE ORAL
Qty: 270 CAPSULE | Refills: 0 | Status: SHIPPED | OUTPATIENT
Start: 2019-03-21 | End: 2019-04-02

## 2019-03-21 RX ORDER — ISOSORBIDE MONONITRATE 30 MG/1
TABLET, EXTENDED RELEASE ORAL
Qty: 180 TABLET | Refills: 0 | Status: SHIPPED | OUTPATIENT
Start: 2019-03-21 | End: 2019-06-11

## 2019-03-21 NOTE — TELEPHONE ENCOUNTER
Imdur prescription approved per [facility] Refill Protocol.    Gabapentin routing refill request to provider for review/approval because:  Drug not on the [facility] refill protocol   Niru Smart RN

## 2019-03-25 DIAGNOSIS — K21.9 GASTROESOPHAGEAL REFLUX DISEASE WITHOUT ESOPHAGITIS: ICD-10-CM

## 2019-03-25 NOTE — TELEPHONE ENCOUNTER
"Requested Prescriptions   Pending Prescriptions Disp Refills     omeprazole (PRILOSEC) 20 MG DR capsule  Last Written Prescription Date:  9/25/18  Last Fill Quantity: 90,  # refills: 1   Last office visit: 7/10/2018 with prescribing provider:  Nasra   Future Office Visit:     90 capsule 1     Sig: Take 1 capsule (20 mg) by mouth daily    PPI Protocol Passed - 3/25/2019  8:08 AM       Passed - Not on Clopidogrel (unless Pantoprazole ordered)       Passed - No diagnosis of osteoporosis on record       Passed - Recent (12 mo) or future (30 days) visit within the authorizing provider's specialty    Patient had office visit in the last 12 months or has a visit in the next 30 days with authorizing provider or within the authorizing provider's specialty.  See \"Patient Info\" tab in inbasket, or \"Choose Columns\" in Meds & Orders section of the refill encounter.             Passed - Medication is active on med list       Passed - Patient is age 18 or older       Passed - No active pregnacy on record       Passed - No positive pregnancy test in past 12 months          "

## 2019-03-29 ENCOUNTER — TELEPHONE (OUTPATIENT)
Dept: INTERNAL MEDICINE | Facility: CLINIC | Age: 84
End: 2019-03-29

## 2019-04-02 DIAGNOSIS — M54.6 MIDLINE THORACIC BACK PAIN, UNSPECIFIED CHRONICITY: ICD-10-CM

## 2019-04-02 DIAGNOSIS — K21.9 GASTROESOPHAGEAL REFLUX DISEASE WITHOUT ESOPHAGITIS: ICD-10-CM

## 2019-04-02 DIAGNOSIS — F33.41 MAJOR DEPRESSIVE DISORDER, RECURRENT EPISODE, IN PARTIAL REMISSION (H): ICD-10-CM

## 2019-04-02 NOTE — TELEPHONE ENCOUNTER
"Requested Prescriptions   Pending Prescriptions Disp Refills     venlafaxine (EFFEXOR-XR) 75 MG 24 hr capsule [Pharmacy Med Name: Venlafaxine HCl ER Oral Capsule Extended Release 24 Hour 75 MG]  Last Written Prescription Date:  1/2/19  Last Fill Quantity: 90,  # refills: 0   Last office visit: 7/10/2018 with prescribing provider:  Nasra   Future Office Visit:   Next 5 appointments (look out 90 days)    Apr 22, 2019  3:20 PM CDT  SHORT with Gerri Eubanks MD  [facility] ([facility] [address]  [facility] [address]  [phone]          90 capsule 0     Sig: Take 1 capsule (75 mg) by mouth daily    Serotonin-Norepinephrine Reuptake Inhibitors  Failed - 4/2/2019  7:00 AM       Failed - PHQ-9 score of less than 5 in past 6 months    Please review last PHQ-9 score.          Failed - Recent (6 mo) or future (30 days) visit within the authorizing provider's specialty    Patient had office visit in the last 6 months or has a visit in the next 30 days with authorizing provider or within the authorizing provider's specialty.  See \"Patient Info\" tab in inbasket, or \"Choose Columns\" in Meds & Orders section of the refill encounter.           Passed - Blood pressure under 140/90 in past 12 months    BP Readings from Last 3 Encounters:   12/13/18 138/58   10/19/18 122/62   10/05/18 132/57                Passed - Medication is active on med list       Passed - Patient is age 18 or older       Passed - No active pregnancy on record       Passed - Normal serum creatinine on file in past 12 months    Recent Labs   Lab Test 12/13/18  0931  03/10/15  1257   CR 1.01   < >  --    CREAT  --   --  0.7    < > = values in this interval not displayed.            Passed - No positive pregnancy test in past 12 months          "

## 2019-04-02 NOTE — TELEPHONE ENCOUNTER
"Requested Prescriptions   Pending Prescriptions Disp Refills     omeprazole (PRILOSEC) 20 MG DR capsule [Pharmacy Med Name: Omeprazole Oral Capsule Delayed Release 20 MG]  Last Written Prescription Date:  3/26/19  Last Fill Quantity: 90,  # refills: 1   Last office visit: 7/10/2018 with prescribing provider:  Nasra   Future Office Visit:   Next 5 appointments (look out 90 days)    Apr 22, 2019  3:20 PM CDT  SHORT with Gerri Eubanks MD  [facility] ([facility] [address]  [facility] [id number] [phone]          90 capsule 0     Sig: TAKE 1 CAPSULE BY MOUTH DAILY    PPI Protocol Passed - 4/2/2019  5:00 PM       Passed - Not on Clopidogrel (unless Pantoprazole ordered)       Passed - No diagnosis of osteoporosis on record       Passed - Recent (12 mo) or future (30 days) visit within the authorizing provider's specialty    Patient had office visit in the last 12 months or has a visit in the next 30 days with authorizing provider or within the authorizing provider's specialty.  See \"Patient Info\" tab in inbasket, or \"Choose Columns\" in Meds & Orders section of the refill encounter.             Passed - Medication is active on med list       Passed - Patient is age 18 or older       Passed - No active pregnacy on record       Passed - No positive pregnancy test in past 12 months        gabapentin (NEURONTIN) 100 MG capsule [Pharmacy Med Name: Gabapentin Oral Capsule 100 MG]  Last Written Prescription Date:  3/21/19  Last Fill Quantity: 270,  # refills: 0   Last office visit: 7/10/2018 with prescribing provider:  Nasra   Future Office Visit:   Next 5 appointments (look out 90 days)    Apr 22, 2019  3:20 PM CDT  SHORT with Gerri Eubanks MD  [facility] ([facility]) [address]  [facility] [id number] [phone]          270 capsule 0     Sig: TAKE 1 CAPSULE (100MG) BY MOUTH THREE TIMES DAILY    There is no " refill protocol information for this order

## 2019-04-03 RX ORDER — GABAPENTIN 100 MG/1
CAPSULE ORAL
Qty: 270 CAPSULE | Refills: 0 | Status: SHIPPED | OUTPATIENT
Start: 2019-04-03 | End: 2019-08-05

## 2019-04-03 RX ORDER — VENLAFAXINE HYDROCHLORIDE 75 MG/1
75 CAPSULE, EXTENDED RELEASE ORAL DAILY
Qty: 90 CAPSULE | Refills: 0 | Status: SHIPPED | OUTPATIENT
Start: 2019-04-03 | End: 2019-06-28

## 2019-04-03 NOTE — TELEPHONE ENCOUNTER
Omeprazole prescription approved per [facility] Refill Protocol. Will route Gabapentin request on to provider as med is not on refill protocol.  NIMO Law R.N.

## 2019-04-09 ENCOUNTER — MEDICAL CORRESPONDENCE (OUTPATIENT)
Dept: HEALTH INFORMATION MANAGEMENT | Facility: CLINIC | Age: 84
End: 2019-04-09

## 2019-04-22 ENCOUNTER — OFFICE VISIT (OUTPATIENT)
Dept: INTERNAL MEDICINE | Facility: CLINIC | Age: 84
End: 2019-04-22
Payer: COMMERCIAL

## 2019-04-22 VITALS
WEIGHT: 154.6 LBS | DIASTOLIC BLOOD PRESSURE: 46 MMHG | OXYGEN SATURATION: 96 % | HEART RATE: 52 BPM | RESPIRATION RATE: 16 BRPM | BODY MASS INDEX: 25.76 KG/M2 | HEIGHT: 65 IN | TEMPERATURE: 98.6 F | SYSTOLIC BLOOD PRESSURE: 112 MMHG

## 2019-04-22 DIAGNOSIS — F33.41 MAJOR DEPRESSIVE DISORDER, RECURRENT EPISODE, IN PARTIAL REMISSION (H): ICD-10-CM

## 2019-04-22 DIAGNOSIS — I42.8 OTHER CARDIOMYOPATHY (H): ICD-10-CM

## 2019-04-22 DIAGNOSIS — I48.20 CHRONIC ATRIAL FIBRILLATION (H): ICD-10-CM

## 2019-04-22 DIAGNOSIS — L97.521 SKIN ULCER OF LEFT FOOT, LIMITED TO BREAKDOWN OF SKIN (H): ICD-10-CM

## 2019-04-22 DIAGNOSIS — L03.115 CELLULITIS OF RIGHT FOOT: Primary | ICD-10-CM

## 2019-04-22 DIAGNOSIS — J44.9 CHRONIC OBSTRUCTIVE PULMONARY DISEASE, UNSPECIFIED COPD TYPE (H): ICD-10-CM

## 2019-04-22 LAB
ERYTHROCYTE [DISTWIDTH] IN BLOOD BY AUTOMATED COUNT: 14 % (ref 10–15)
HCT VFR BLD AUTO: 29.7 % (ref 35–47)
HGB BLD-MCNC: 9.1 G/DL (ref 11.7–15.7)
MCH RBC QN AUTO: 30.3 PG (ref 26.5–33)
MCHC RBC AUTO-ENTMCNC: 30.6 G/DL (ref 31.5–36.5)
MCV RBC AUTO: 99 FL (ref 78–100)
PLATELET # BLD AUTO: 156 10E9/L (ref 150–450)
RBC # BLD AUTO: 3 10E12/L (ref 3.8–5.2)
WBC # BLD AUTO: 6.4 10E9/L (ref 4–11)

## 2019-04-22 PROCEDURE — 99214 OFFICE O/P EST MOD 30 MIN: CPT | Performed by: INTERNAL MEDICINE

## 2019-04-22 PROCEDURE — 80048 BASIC METABOLIC PNL TOTAL CA: CPT | Performed by: INTERNAL MEDICINE

## 2019-04-22 PROCEDURE — 85027 COMPLETE CBC AUTOMATED: CPT | Performed by: INTERNAL MEDICINE

## 2019-04-22 PROCEDURE — 36415 COLL VENOUS BLD VENIPUNCTURE: CPT | Performed by: INTERNAL MEDICINE

## 2019-04-22 RX ORDER — AMOXICILLIN AND CLAVULANATE POTASSIUM 500; 125 MG/1; MG/1
1 TABLET, FILM COATED ORAL 2 TIMES DAILY
Qty: 20 TABLET | Refills: 0 | Status: SHIPPED | OUTPATIENT
Start: 2019-04-22 | End: 2019-06-24

## 2019-04-22 ASSESSMENT — PATIENT HEALTH QUESTIONNAIRE - PHQ9: SUM OF ALL RESPONSES TO PHQ QUESTIONS 1-9: 0

## 2019-04-22 ASSESSMENT — MIFFLIN-ST. JEOR: SCORE: 1137.14

## 2019-04-22 NOTE — PROGRESS NOTES
"  SUBJECTIVE:   Keisha Godinez is a 87 year old female who presents to clinic today for the following , daughter Rebecca  health issues:      HPI:   She has COPD and cardiomyopathy. She oxygenates for the most part ok during the day but was found to desaturate at night while sleeping. She has been on nocturnal Oxygen since her last hospitalization. She clearly repeatedly went below 85% saturation while sleeping.  She needs me to recertify her for O2.     Her other concern is that she was trying to get the scale off her right foot and rubbed a little too hard making a small open area on the top of her great toe about 1 week ago. Now the toe is red and weeping. Denies any fever chills or night sweats.     Her wt is up she is unsure if this is fluid or actual wt gain and she has been eating a lot the last 2 months. No increased lower extremity edema No orthopnea or PND.       Additional history: as documented    Reviewed  and updated as needed this visit by clinical staff         Reviewed and updated as needed this visit by Provider         BP Readings from Last 3 Encounters:   04/22/19 112/46   12/13/18 138/58   10/19/18 122/62    Wt Readings from Last 3 Encounters:   04/22/19 70.1 kg (154 lb 9.6 oz)   12/13/18 66.7 kg (147 lb 1.6 oz)   10/19/18 67.6 kg (149 lb)                    ROS:  Constitutional, HEENT, cardiovascular, pulmonary, GI, , musculoskeletal, neuro, skin, endocrine and psych systems are negative, except as otherwise noted.    OBJECTIVE:     /46 (BP Location: Left arm, Patient Position: Sitting, Cuff Size: Adult Regular)   Pulse 52   Temp 98.6  F (37  C) (Oral)   Resp 16   Ht 1.651 m (5' 5\")   Wt 70.1 kg (154 lb 9.6 oz)   SpO2 96%   BMI 25.73 kg/m    Body mass index is 25.73 kg/m .  GENERAL: healthy, alert and no distress  NECK: no adenopathy, no asymmetry, masses, or scars and thyroid normal to palpation  RESP: lungs clear to auscultation - no rales, rhonchi or wheezes  CV: regular rate " and rhythm, normal S1 S2, no S3 or S4, no murmur, click or rub, no peripheral edema and peripheral pulses strong  ABDOMEN: soft, nontender, no hepatosplenomegaly, no masses and bowel sounds normal  MS: trace lower extremity edema top of proximal right great toe has a 2 cm area of open weeping with toe and medial foot to just before ankle erythematous.   PSYCH: mentation appears normal, affect normal/bright      ASSESSMENT/PLAN:       1. Cellulitis of right foot  RX with Augmentin and Follow up in 1 week   - amoxicillin-clavulanate (AUGMENTIN) 500-125 MG tablet; Take 1 tablet by mouth 2 times daily  Dispense: 20 tablet; Refill: 0  - CBC with platelets    2. Other cardiomyopathy (H)  Update kidney functions   - Basic metabolic panel  (Ca, Cl, CO2, Creat, Gluc, K, Na, BUN)    3. Chronic obstructive pulmonary disease, unspecified COPD type (H)  She clearly needs nocturnal O2 will let O2 suppiler know    4. Skin ulcer of left foot, limited to breakdown of skin (H)       5. Major depressive disorder, recurrent episode, in partial remission (H)  under good control     6. Chronic atrial fibrillation (H)  stable      Follow up in 1 week     Gerri Eubanks MD  [facility]

## 2019-04-22 NOTE — LETTER
April 23, 2019      Keisha Godinez  [address] [place] [id number]        Dear ,    We are writing to inform you of your test results.    Labs are within acceptable limits.    Resulted Orders   Basic metabolic panel  (Ca, Cl, CO2, Creat, Gluc, K, Na, BUN)   Result Value Ref Range    Sodium 139 133 - 144 mmol/L    Potassium 3.8 3.4 - 5.3 mmol/L    Chloride 104 94 - 109 mmol/L    Carbon Dioxide 28 20 - 32 mmol/L    Anion Gap 7 3 - 14 mmol/L    Glucose 104 (H) 70 - 99 mg/dL      Comment:      Non Fasting    Urea Nitrogen 34 (H) 7 - 30 mg/dL    Creatinine 1.02 0.52 - 1.04 mg/dL    GFR Estimate 49 (L) >60 mL/min/[1.73_m2]      Comment:      Non  GFR Calc  Starting 12/18/2018, serum creatinine based estimated GFR (eGFR) will be   calculated using the Chronic Kidney Disease Epidemiology Collaboration   (CKD-EPI) equation.      GFR Estimate If Black 57 (L) >60 mL/min/[1.73_m2]      Comment:       GFR Calc  Starting 12/18/2018, serum creatinine based estimated GFR (eGFR) will be   calculated using the Chronic Kidney Disease Epidemiology Collaboration   (CKD-EPI) equation.      Calcium 8.5 8.5 - 10.1 mg/dL   CBC with platelets   Result Value Ref Range    WBC 6.4 4.0 - 11.0 10e9/L    RBC Count 3.00 (L) 3.8 - 5.2 10e12/L    Hemoglobin 9.1 (L) 11.7 - 15.7 g/dL    Hematocrit 29.7 (L) 35.0 - 47.0 %    MCV 99 78 - 100 fl    MCH 30.3 26.5 - 33.0 pg    MCHC 30.6 (L) 31.5 - 36.5 g/dL      Comment:      Reviewed: OK with previous    RDW 14.0 10.0 - 15.0 %    Platelet Count 156 150 - 450 10e9/L       If you have any questions or concerns, please call the clinic at the number listed above.       Sincerely,        Gerri Eubanks MD

## 2019-04-23 LAB
ANION GAP SERPL CALCULATED.3IONS-SCNC: 7 MMOL/L (ref 3–14)
BUN SERPL-MCNC: 34 MG/DL (ref 7–30)
CALCIUM SERPL-MCNC: 8.5 MG/DL (ref 8.5–10.1)
CHLORIDE SERPL-SCNC: 104 MMOL/L (ref 94–109)
CO2 SERPL-SCNC: 28 MMOL/L (ref 20–32)
CREAT SERPL-MCNC: 1.02 MG/DL (ref 0.52–1.04)
GFR SERPL CREATININE-BSD FRML MDRD: 49 ML/MIN/{1.73_M2}
GLUCOSE SERPL-MCNC: 104 MG/DL (ref 70–99)
POTASSIUM SERPL-SCNC: 3.8 MMOL/L (ref 3.4–5.3)
SODIUM SERPL-SCNC: 139 MMOL/L (ref 133–144)

## 2019-04-29 ENCOUNTER — OFFICE VISIT (OUTPATIENT)
Dept: INTERNAL MEDICINE | Facility: CLINIC | Age: 84
End: 2019-04-29
Payer: COMMERCIAL

## 2019-04-29 VITALS
OXYGEN SATURATION: 100 % | RESPIRATION RATE: 20 BRPM | DIASTOLIC BLOOD PRESSURE: 46 MMHG | HEIGHT: 65 IN | WEIGHT: 155.1 LBS | BODY MASS INDEX: 25.84 KG/M2 | HEART RATE: 50 BPM | SYSTOLIC BLOOD PRESSURE: 138 MMHG | TEMPERATURE: 98.2 F

## 2019-04-29 DIAGNOSIS — I48.20 CHRONIC ATRIAL FIBRILLATION (H): ICD-10-CM

## 2019-04-29 DIAGNOSIS — L03.119 CELLULITIS OF LOWER EXTREMITY, UNSPECIFIED LATERALITY: Primary | ICD-10-CM

## 2019-04-29 DIAGNOSIS — I42.0 DILATED CARDIOMYOPATHY (H): ICD-10-CM

## 2019-04-29 PROCEDURE — 99213 OFFICE O/P EST LOW 20 MIN: CPT | Performed by: INTERNAL MEDICINE

## 2019-04-29 ASSESSMENT — MIFFLIN-ST. JEOR: SCORE: 1139.41

## 2019-04-29 NOTE — PROGRESS NOTES
"  SUBJECTIVE:   Keisha Godinez is a 87 year old female who presents to clinic today for the following   health issues:  Patient here for follow up for sore on foot.        HPI:   Please see my note from last week. She has no GI upset from .abxs so far. The erythema and tenderness is gone and the area of open skin appears to be healing.      Additional history: as documented    Reviewed  and updated as needed this visit by clinical staff  Tobacco  Allergies  Meds  Med Hx  Surg Hx  Fam Hx  Soc Hx        Reviewed and updated as needed this visit by Provider         BP Readings from Last 3 Encounters:   04/29/19 138/46   04/22/19 112/46   12/13/18 138/58    Wt Readings from Last 3 Encounters:   04/29/19 70.4 kg (155 lb 1.6 oz)   04/22/19 70.1 kg (154 lb 9.6 oz)   12/13/18 66.7 kg (147 lb 1.6 oz)                    ROS:  Constitutional, HEENT, cardiovascular, pulmonary, GI, , musculoskeletal, neuro, skin, endocrine and psych systems are negative, except as otherwise noted.    OBJECTIVE:     /46 (BP Location: Right leg, Patient Position: Sitting, Cuff Size: Adult Small)   Pulse 50   Temp 98.2  F (36.8  C) (Oral)   Resp 20   Ht 1.651 m (5' 5\")   Wt 70.4 kg (155 lb 1.6 oz)   SpO2 100%   BMI 25.81 kg/m    Body mass index is 25.81 kg/m .  GENERAL: healthy, alert and no distress  RESP: lungs clear to auscultation - no rales, rhonchi or wheezes  CV: regular rate and rhythm, normal S1 S2, no S3 or S4, no murmur, click or rub, no peripheral edema and peripheral pulses strong  ABDOMEN: soft, nontender, no hepatosplenomegaly, no masses and bowel sounds normal  SKIN: chronic venous insufficiency changes below the knee. erythema of foot resolved.      ASSESSMENT/PLAN:       1. Cellulitis of lower extremity, unspecified laterality  Resolved, Follow up PRN.     2. Chronic atrial fibrillation (H)     - ASPIRIN NOT PRESCRIBED (INTENTIONAL); Please choose reason not prescribed, below    3. Dilated cardiomyopathy " (H)     - ASPIRIN NOT PRESCRIBED (INTENTIONAL); Please choose reason not prescribed, below    Follow up in 6 months     Gerri Eubanks MD  [facility]

## 2019-04-29 NOTE — NURSING NOTE
"/46 (BP Location: Right leg, Patient Position: Sitting, Cuff Size: Adult Small)   Pulse 50   Temp 98.2  F (36.8  C) (Oral)   Resp 20   Ht 5' 5\" (1.651 m)   Wt 155 lb 1.6 oz (70.4 kg)   SpO2 100%   BMI 25.81 kg/m     Patient here for follow up for sore on foot.  "

## 2019-06-05 DIAGNOSIS — I50.32 CHRONIC DIASTOLIC CONGESTIVE HEART FAILURE (H): ICD-10-CM

## 2019-06-05 RX ORDER — TORSEMIDE 20 MG/1
20 TABLET ORAL 2 TIMES DAILY
Qty: 180 TABLET | Refills: 0 | Status: SHIPPED | OUTPATIENT
Start: 2019-06-05 | End: 2019-09-03

## 2019-06-11 DIAGNOSIS — I10 ESSENTIAL HYPERTENSION WITH GOAL BLOOD PRESSURE LESS THAN 140/90: ICD-10-CM

## 2019-06-11 DIAGNOSIS — I25.119 CORONARY ARTERY DISEASE INVOLVING NATIVE HEART WITH ANGINA PECTORIS, UNSPECIFIED VESSEL OR LESION TYPE (H): ICD-10-CM

## 2019-06-11 RX ORDER — ISOSORBIDE MONONITRATE 30 MG/1
TABLET, EXTENDED RELEASE ORAL
Qty: 180 TABLET | Refills: 0 | Status: ON HOLD | OUTPATIENT
Start: 2019-06-11 | End: 2019-08-08

## 2019-06-11 NOTE — TELEPHONE ENCOUNTER
Prescription approved per [facility] Refill Protocol.    Per 4/22/19 office visit note, patient is due to follow up in October 2019

## 2019-06-11 NOTE — TELEPHONE ENCOUNTER
"Requested Prescriptions   Pending Prescriptions Disp Refills     isosorbide mononitrate (IMDUR) 30 MG 24 hr tablet [Pharmacy Med Name:   Isosorbide Mononitrate ER Oral Tablet Extended Release 24 Hour 30 MG]    Last Written Prescription Date:  3/21/19  Last Fill Quantity: 180,  # refills: 0   Last office visit: 4/29/2019 with prescribing provider:  Raimundo   Future Office Visit:     180 tablet 0     Sig: TAKE 2 TABLETS (60MG) BY MOUTH DAILY       Nitrates Passed - 6/11/2019 12:20 PM        Passed - Blood pressure under 140/90 in past 12 months     BP Readings from Last 3 Encounters:   04/29/19 138/46   04/22/19 112/46   12/13/18 138/58                 Passed - Pt is not on erectile dysfunction medications        Passed - Recent (12 mo) or future (30 days) visit within the authorizing provider's specialty     Patient had office visit in the last 12 months or has a visit in the next 30 days with authorizing provider or within the authorizing provider's specialty.  See \"Patient Info\" tab in inbasket, or \"Choose Columns\" in Meds & Orders section of the refill encounter.              Passed - Medication is active on med list        Passed - Patient is age 18 or older          "

## 2019-06-24 ENCOUNTER — OFFICE VISIT (OUTPATIENT)
Dept: INTERNAL MEDICINE | Facility: CLINIC | Age: 84
End: 2019-06-24
Payer: COMMERCIAL

## 2019-06-24 VITALS
HEIGHT: 63 IN | BODY MASS INDEX: 26.75 KG/M2 | SYSTOLIC BLOOD PRESSURE: 120 MMHG | TEMPERATURE: 99.2 F | DIASTOLIC BLOOD PRESSURE: 60 MMHG | RESPIRATION RATE: 12 BRPM | OXYGEN SATURATION: 91 % | WEIGHT: 151 LBS | HEART RATE: 63 BPM

## 2019-06-24 DIAGNOSIS — M10.00 IDIOPATHIC GOUT, UNSPECIFIED CHRONICITY, UNSPECIFIED SITE: Primary | ICD-10-CM

## 2019-06-24 PROCEDURE — 99213 OFFICE O/P EST LOW 20 MIN: CPT | Performed by: NURSE PRACTITIONER

## 2019-06-24 RX ORDER — INDOMETHACIN 25 MG/1
25 CAPSULE ORAL
Qty: 30 CAPSULE | Refills: 0 | Status: SHIPPED | OUTPATIENT
Start: 2019-06-24 | End: 2019-07-23

## 2019-06-24 ASSESSMENT — MIFFLIN-ST. JEOR: SCORE: 1089.06

## 2019-06-24 NOTE — NURSING NOTE
"/60   Pulse 63   Temp 99.2  F (37.3  C) (Oral)   Resp 12   Ht 1.6 m (5' 3\")   Wt 68.5 kg (151 lb)   SpO2 91%   BMI 26.75 kg/m      "

## 2019-06-24 NOTE — PROGRESS NOTES
Subjective     Keisha Godinez is a 87 year old female who presents to clinic today for the following health issues:    HPI     Musculoskeletal problem/pain      Duration: 1 month    Description  Location: L index finger DIP swelling    Intensity:  mild, moderate    Accompanying signs and symptoms: none    History  Previous similar problem: YES, arthritis  Previous evaluation:  none    Precipitating or alleviating factors:  Trauma or overuse: no   Aggravating factors include: none    Therapies tried and outcome: heat      Patient Active Problem List   Diagnosis     Iron deficiency anemia     Neuropathy of both feet     Myocardial infarction-type 2     GIB (gastrointestinal bleeding)     Wound of left leg     Cardiomyopathy (H)     Pulmonary hypertension (H)     Xerosis of skin: shins     Bilateral edema of lower extremity     Gastroesophageal reflux disease without esophagitis     Health Care Home     Chronic systolic congestive heart failure (H)     Major depressive disorder, recurrent episode, in partial remission (H)     Cellulitis of foot     Essential hypertension with goal blood pressure less than 140/90     Post-operative complication     Respiratory failure (H)     Acute respiratory failure with hypoxia (H)     C. difficile colitis     History of hiatal hernia     History of shingles     Chronic foot ulcer (H) (felt secondary to bony protuberance status post excision 7/6-16)     Acute on chronic diastolic congestive heart failure (H)     Pneumonia     Sepsis (H)     Chronic atrial fibrillation (H)     Anemia     Dysphagia     Midline thoracic back pain     Cardiomyopathy, unspecified type (H)     Fracture of humerus, proximal, right, closed     Rheumatic mitral regurgitation     Chronic kidney disease, stage 3 (moderate) (GFR 59 10/30/17, GFR 58 7/7/16)     Cellulitis of left lower extremity     Acute exacerbation of CHF (congestive heart failure) (H)     Chronic obstructive pulmonary disease, unspecified  COPD type (H)     Past Surgical History:   Procedure Laterality Date     COLONOSCOPY  2015    Diverticulosis, polyps     ENTEROSCOPY SMALL BOWEL N/A 2016    Procedure: ENTEROSCOPY SMALL BOWEL;  Surgeon: Ady Ponce MD;  Location:  GI     ESOPHAGOSCOPY, GASTROSCOPY, DUODENOSCOPY (EGD), COMBINED N/A 3/22/2015    Upper GI- Normal, nothing significant found.      EXCISE MASS FOOT Right 2016    Procedure: EXCISE MASS FOOT;  Surgeon: Lee Jang DPM;  Location:  OR       Social History     Tobacco Use     Smoking status: Former Smoker     Last attempt to quit: 1956     Years since quittin.2     Smokeless tobacco: Never Used   Substance Use Topics     Alcohol use: No     Alcohol/week: 0.0 oz     Family History   Problem Relation Age of Onset     Known Genetic Syndrome Father      Known Genetic Syndrome Mother      Other Cancer Daughter         pancreatic     Other Cancer Brother         type     Other Cancer Sister 60        lung     Diabetes No family hx of      Breast Cancer No family hx of      Cancer - colorectal No family hx of      Ovarian Cancer No family hx of      Prostate Cancer No family hx of          Current Outpatient Medications   Medication Sig Dispense Refill     acetaminophen (TYLENOL) 325 MG tablet Take 650 mg by mouth 3 times daily as needed for mild pain       albuterol (ACCUNEB) 1.25 MG/3ML nebulizer solution Take 1 vial by nebulization 3 times daily        ASPIRIN NOT PRESCRIBED (INTENTIONAL) Please choose reason not prescribed, below       clindamycin (CLEOCIN) 150 MG capsule Take 1 capsule (150 mg) by mouth 3 times daily 30 capsule 0     Cranberry Extract 250 MG TABS Take 250 mg by mouth daily       ferrous sulfate (FEROSUL) 325 (65 Fe) MG tablet Take 1 tablet (325 mg) by mouth 2 times daily 100 tablet 11     gabapentin (NEURONTIN) 100 MG capsule TAKE 1 CAPSULE (100MG) BY MOUTH THREE TIMES DAILY 270 capsule 0     hydrALAZINE (APRESOLINE) 25 MG  tablet Take 3 tablets (75 mg) by mouth 3 times daily 810 tablet 3     indomethacin (INDOCIN) 25 MG capsule Take 1 capsule (25 mg) by mouth 3 times daily (with meals) for 10 days 30 capsule 0     isosorbide mononitrate (IMDUR) 30 MG 24 hr tablet TAKE 2 TABLETS (60MG) BY MOUTH DAILY 180 tablet 0     OMEGA-3 FATTY ACIDS PO Take 1,200 mg by mouth daily        omeprazole (PRILOSEC) 20 MG DR capsule TAKE 1 CAPSULE BY MOUTH DAILY 90 capsule 0     simvastatin (ZOCOR) 10 MG tablet Take 1 tablet (10 mg) by mouth At Bedtime 90 tablet 2     SM STOOL SOFTENER 8.6-50 MG tablet Take 2 tablets daily as needed for constipation while taking prescription pain medications. 30 tablet 0     spironolactone (ALDACTONE) 50 MG tablet TAKE 1 TABLET BY MOUTH DAILY 30 tablet 7     torsemide (DEMADEX) 20 MG tablet Take 1 tablet (20 mg) by mouth 2 times daily 180 tablet 0     venlafaxine (EFFEXOR-XR) 75 MG 24 hr capsule Take 1 capsule (75 mg) by mouth daily 90 capsule 0     VITAMIN D, CHOLECALCIFEROL, PO Take 1,000 Units by mouth daily        BP Readings from Last 3 Encounters:   06/24/19 120/60   04/29/19 138/46   04/22/19 112/46    Wt Readings from Last 3 Encounters:   06/24/19 68.5 kg (151 lb)   04/29/19 70.4 kg (155 lb 1.6 oz)   04/22/19 70.1 kg (154 lb 9.6 oz)                    Reviewed and updated as needed this visit by Provider         Review of Systems   ROS COMP: CONSTITUTIONAL: NEGATIVE for fever, chills, change in weight  ENT/MOUTH: NEGATIVE for ear, mouth and throat problems  RESP: NEGATIVE for significant cough or SOB  CV: NEGATIVE for chest pain, palpitations or peripheral edema      Objective    There were no vitals taken for this visit.  There is no height or weight on file to calculate BMI.  Physical Exam   GENERAL: alert, no distress, frail and elderly  MS: L index DIP tender, red, enlarged.            Assessment & Plan       ICD-10-CM    1. Idiopathic gout, unspecified chronicity, unspecified site M10.00 indomethacin  "(INDOCIN) 25 MG capsule        BMI:   Estimated body mass index is 26.75 kg/m  as calculated from the following:    Height as of this encounter: 1.6 m (5' 3\").    Weight as of this encounter: 68.5 kg (151 lb).     Call if not improving      Mallika Hammonds NP  [facility]    "

## 2019-06-28 DIAGNOSIS — F33.41 MAJOR DEPRESSIVE DISORDER, RECURRENT EPISODE, IN PARTIAL REMISSION (H): ICD-10-CM

## 2019-07-01 RX ORDER — VENLAFAXINE HYDROCHLORIDE 75 MG/1
75 CAPSULE, EXTENDED RELEASE ORAL DAILY
Qty: 90 CAPSULE | Refills: 0 | Status: SHIPPED | OUTPATIENT
Start: 2019-07-01 | End: 2019-09-23

## 2019-07-01 NOTE — TELEPHONE ENCOUNTER
"Requested Prescriptions   Pending Prescriptions Disp Refills     venlafaxine (EFFEXOR-XR) 75 MG 24 hr capsule [Pharmacy Med Name: Venlafaxine HCl ER Oral Capsule Extended Release 24 Hour 75 MG] 90 capsule 0     Sig: Take 1 capsule (75 mg) by mouth daily   Last Written Prescription Date:  04/03/2019  Last Fill Quantity: 90,  # refills: 0   Last office visit: 6/24/2019 with prescribing provider:     Future Office Visit:      Serotonin-Norepinephrine Reuptake Inhibitors  Passed - 6/28/2019  5:13 PM        Passed - Blood pressure under 140/90 in past 12 months     BP Readings from Last 3 Encounters:   06/24/19 120/60   04/29/19 138/46   04/22/19 112/46                 Passed - PHQ-9 score of less than 5 in past 6 months     Please review last PHQ-9 score.           Passed - Medication is active on med list        Passed - Patient is age 18 or older        Passed - No active pregnancy on record        Passed - Normal serum creatinine on file in past 12 months     Recent Labs   Lab Test 04/22/19  1545  03/10/15  1257   CR 1.02   < >  --    CREAT  --   --  0.7    < > = values in this interval not displayed.             Passed - No positive pregnancy test in past 12 months        Passed - Recent (6 mo) or future (30 days) visit within the authorizing provider's specialty     Patient had office visit in the last 6 months or has a visit in the next 30 days with authorizing provider or within the authorizing provider's specialty.  See \"Patient Info\" tab in inbasket, or \"Choose Columns\" in Meds & Orders section of the refill encounter.            "

## 2019-07-01 NOTE — TELEPHONE ENCOUNTER
Prescription approved per [facility] Refill Protocol. NIMO Law R.N.

## 2019-07-11 DIAGNOSIS — E78.5 HYPERLIPIDEMIA, UNSPECIFIED HYPERLIPIDEMIA TYPE: ICD-10-CM

## 2019-07-11 DIAGNOSIS — I42.9 CARDIOMYOPATHY, UNSPECIFIED TYPE (H): ICD-10-CM

## 2019-07-11 RX ORDER — HYDRALAZINE HYDROCHLORIDE 25 MG/1
TABLET, FILM COATED ORAL
Qty: 810 TABLET | Refills: 3 | Status: ON HOLD | OUTPATIENT
Start: 2019-07-11 | End: 2020-01-01

## 2019-07-11 NOTE — TELEPHONE ENCOUNTER
"Requested Prescriptions   Pending Prescriptions Disp Refills     hydrALAZINE (APRESOLINE) 25 MG tablet [Pharmacy Med Name: hydrALAZINE HCl Oral Tablet 25 MG] 810 tablet 0     Sig: TAKE 3 TABLETS BY MOUTH THREE TIMES DAILY   Last Written Prescription Date:  05/30/2018  Last Fill Quantity: 810,  # refills: 3   Last office visit: 6/24/2019 with prescribing provider:     Future Office Visit:      Vasodilators Passed - 7/11/2019  7:00 AM        Passed - Most recent BP less than 140/90 on record     BP Readings from Last 3 Encounters:   06/24/19 120/60   04/29/19 138/46   04/22/19 112/46                 Passed - Most recent encounter is not a hospital encounter. Patient has recent (12 mos) or future (1 mos) visit with authorizing provider's specialty     Patient's most recent encounter is NOT a hospital encounter and has had an office visit in the last 12 months or has a visit in the next 30 days with authorizing provider or within the authorizing provider's specialty.      See \"Patient Info\" tab in inbasket, or \"Choose Columns\" in Meds & Orders section of the refill encounter.      If most recent encounter is a hospital encounter AND the patient does NOT have an appointment scheduled with the authorizing provider or authorizing provider's specialty within the next 30 days, forward refill to authorizing provider for medication review.          Passed - Medication is active on med list        Passed - Patient is of age 18 years or older        Passed - Patient is not pregnant        Passed - Patient has not had a positive pregnancy test within the past 12 months        "

## 2019-07-13 DIAGNOSIS — K21.9 GASTROESOPHAGEAL REFLUX DISEASE WITHOUT ESOPHAGITIS: ICD-10-CM

## 2019-07-15 NOTE — TELEPHONE ENCOUNTER
Prescription approved per [facility] Refill Protocol.    Per 4/29/19 office visit note, patient was advise to return in October

## 2019-07-15 NOTE — TELEPHONE ENCOUNTER
"Requested Prescriptions   Pending Prescriptions Disp Refills     omeprazole (PRILOSEC) 20 MG DR capsule [Pharmacy Med Name: Omeprazole Oral Capsule Delayed Release 20 MG] 90 capsule 0     Sig: TAKE 1 CAPSULE BY MOUTH DAILY   Last Written Prescription Date:  04/03/2019  Last Fill Quantity: 90,  # refills: 0   Last office visit: 6/24/2019 with prescribing provider:     Future Office Visit:      PPI Protocol Passed - 7/13/2019  3:30 PM        Passed - Not on Clopidogrel (unless Pantoprazole ordered)        Passed - No diagnosis of osteoporosis on record        Passed - Recent (12 mo) or future (30 days) visit within the authorizing provider's specialty     Patient had office visit in the last 12 months or has a visit in the next 30 days with authorizing provider or within the authorizing provider's specialty.  See \"Patient Info\" tab in inbasket, or \"Choose Columns\" in Meds & Orders section of the refill encounter.              Passed - Medication is active on med list        Passed - Patient is age 18 or older        Passed - No active pregnacy on record        Passed - No positive pregnancy test in past 12 months        "

## 2019-07-19 NOTE — PATIENT INSTRUCTIONS
Thank you for choosing [facility]!    DR. ALEXANDER'S CLINIC LOCATIONS:   MONDAY - [place] TUESDAY - [place]   [address] [facility]  [address]   [place] [address] [place] [address]   [phone]       THURSDAY AM - [place] THURSDAY PM - [place]   [address]   [place] [address] [place] [address]   [phone]       FRIDAY AM - [place] SET UP SURGERY: [phone] [address] APPOINTMENTS: [phone]   [place] [address] BILLING QUESTIONS: [phone] FAX NUMBER: [phone]     Follow Up: 2 week    Body Mass Index (BMI)  Many things can cause foot and ankle problems. Foot structure, activity level, foot mechanics and injuries are common causes of pain. One very important issue that often goes unmentioned, is body weight. Extra weight can cause increased stress on muscles, ligaments, bones and tendons. Sometimes just a few extra pounds is all it takes to put one over her/his threshold. Without reducing that stress, it can be difficult to alleviate pain. Some people are uncomfortable addressing this issue, but we feel it is important for you to think about it. As Foot &  Ankle specialists, our job is addressing the lower extremity problem and possible causes. Regarding extra body weight, we encourage patients to discuss diet and weight management plans with their primary care doctors. It is this team approach that gives you the best opportunity for pain relief and getting you back on your feet.    Patient to follow up with Primary Care provider regarding elevated blood pressure.                
Fariha Alberto(Attending)

## 2019-07-23 ENCOUNTER — OFFICE VISIT (OUTPATIENT)
Dept: CARDIOLOGY | Facility: CLINIC | Age: 84
End: 2019-07-23
Attending: INTERNAL MEDICINE
Payer: COMMERCIAL

## 2019-07-23 VITALS
BODY MASS INDEX: 26.68 KG/M2 | WEIGHT: 150.6 LBS | DIASTOLIC BLOOD PRESSURE: 48 MMHG | HEART RATE: 60 BPM | SYSTOLIC BLOOD PRESSURE: 126 MMHG | HEIGHT: 63 IN

## 2019-07-23 DIAGNOSIS — N18.30 CHRONIC KIDNEY DISEASE, STAGE 3 (MODERATE): ICD-10-CM

## 2019-07-23 DIAGNOSIS — I10 ESSENTIAL HYPERTENSION WITH GOAL BLOOD PRESSURE LESS THAN 140/90: ICD-10-CM

## 2019-07-23 DIAGNOSIS — I48.20 CHRONIC ATRIAL FIBRILLATION (H): ICD-10-CM

## 2019-07-23 DIAGNOSIS — I50.32 CHRONIC DIASTOLIC CONGESTIVE HEART FAILURE (H): ICD-10-CM

## 2019-07-23 DIAGNOSIS — I27.20 PULMONARY HYPERTENSION (H): ICD-10-CM

## 2019-07-23 DIAGNOSIS — D50.0 IRON DEFICIENCY ANEMIA DUE TO CHRONIC BLOOD LOSS: ICD-10-CM

## 2019-07-23 PROCEDURE — 99214 OFFICE O/P EST MOD 30 MIN: CPT | Performed by: INTERNAL MEDICINE

## 2019-07-23 ASSESSMENT — MIFFLIN-ST. JEOR: SCORE: 1087.25

## 2019-07-23 NOTE — PROGRESS NOTES
HPI and Plan:   See dictation    Orders Placed This Encounter   Procedures     Follow-Up with Cardiologist       No orders of the defined types were placed in this encounter.      Encounter Diagnoses   Name Primary?     Chronic diastolic congestive heart failure (H)      Pulmonary hypertension (H)      Chronic atrial fibrillation (H)      Essential hypertension with goal blood pressure less than 140/90      Chronic kidney disease, stage 3 (moderate) (GFR 59 10/30/17, GFR 58 7/7/16)      Iron deficiency anemia due to chronic blood loss        CURRENT MEDICATIONS:  Current Outpatient Medications   Medication Sig Dispense Refill     acetaminophen (TYLENOL) 325 MG tablet Take 650 mg by mouth 3 times daily as needed for mild pain       albuterol (ACCUNEB) 1.25 MG/3ML nebulizer solution Take 1 vial by nebulization 3 times daily        Cranberry Extract 250 MG TABS Take 250 mg by mouth daily       ferrous sulfate (FEROSUL) 325 (65 Fe) MG tablet Take 1 tablet (325 mg) by mouth 2 times daily 100 tablet 11     gabapentin (NEURONTIN) 100 MG capsule TAKE 1 CAPSULE (100MG) BY MOUTH THREE TIMES DAILY 270 capsule 0     hydrALAZINE (APRESOLINE) 25 MG tablet TAKE 3 TABLETS BY MOUTH THREE TIMES DAILY 810 tablet 3     isosorbide mononitrate (IMDUR) 30 MG 24 hr tablet TAKE 2 TABLETS (60MG) BY MOUTH DAILY 180 tablet 0     OMEGA-3 FATTY ACIDS PO Take 1,200 mg by mouth daily        omeprazole (PRILOSEC) 20 MG DR capsule TAKE 1 CAPSULE BY MOUTH DAILY 90 capsule 0     simvastatin (ZOCOR) 10 MG tablet Take 1 tablet (10 mg) by mouth At Bedtime 90 tablet 2     SM STOOL SOFTENER 8.6-50 MG tablet Take 2 tablets daily as needed for constipation while taking prescription pain medications. 30 tablet 0     spironolactone (ALDACTONE) 50 MG tablet TAKE 1 TABLET BY MOUTH DAILY 30 tablet 7     torsemide (DEMADEX) 20 MG tablet Take 1 tablet (20 mg) by mouth 2 times daily 180 tablet 0     venlafaxine (EFFEXOR-XR) 75 MG 24 hr capsule Take 1 capsule (75 mg)  by mouth daily 90 capsule 0     VITAMIN D, CHOLECALCIFEROL, PO Take 1,000 Units by mouth daily        ASPIRIN NOT PRESCRIBED (INTENTIONAL) Please choose reason not prescribed, below (Patient not taking: Reported on 7/23/2019)       clindamycin (CLEOCIN) 150 MG capsule Take 1 capsule (150 mg) by mouth 3 times daily (Patient not taking: Reported on 7/23/2019) 30 capsule 0       ALLERGIES     Allergies   Allergen Reactions     Blood Transfusion Related (Informational Only) Other (See Comments)     Patient has a history of a clinically significant antibody against RBC antigens.  A delay in compatible RBCs may occur.     Lisinopril Other (See Comments)     Tongue swelling     Calcium Nausea and Vomiting     Egg Yolk      Flu Virus Vaccine      Allergic to eggs.     Keflex [Cephalexin] Nausea     Pt states she had upset stomach.  NOT tongue swelling.  She had tongue swelling with a combo bp med that she thinks included lisinopril.    Tolerates IV Cefazolin     Levaquin [Levofloxacin]      Sulfa Drugs      Zinc Nausea and Vomiting       PAST MEDICAL HISTORY:  Past Medical History:   Diagnosis Date     Anemia      Atrial fibrillation (H)      B12 deficiency      Cardiomyopathy (H)      CHF (congestive heart failure) (H)      Chronic edema     Lower extremities     Chronic systolic congestive heart failure (H)      CVA (cerebral vascular accident) (H) 2010    Acute Left MCA hemispheric CVA ( on coumadin)     Depression      Gastro-oesophageal reflux disease      GI bleed 03-20-15     Hyperlipidemia      Hypertension      Iron deficiency      MSSA (methicillin susceptible Staphylococcus aureus) 03-10-15     Pulmonary hypertension (H)      Shingles        PAST SURGICAL HISTORY:  Past Surgical History:   Procedure Laterality Date     COLONOSCOPY  03/24/2015    Diverticulosis, polyps     ENTEROSCOPY SMALL BOWEL N/A 2/29/2016    Procedure: ENTEROSCOPY SMALL BOWEL;  Surgeon: Ady Ponce MD;  Location:  GI      ESOPHAGOSCOPY, GASTROSCOPY, DUODENOSCOPY (EGD), COMBINED N/A 3/22/2015    Upper GI- Normal, nothing significant found.      EXCISE MASS FOOT Right 2016    Procedure: EXCISE MASS FOOT;  Surgeon: Lee Jang DPM;  Location: [facility] OR       FAMILY HISTORY:  Family History   Problem Relation Age of Onset     Known Genetic Syndrome Father      Known Genetic Syndrome Mother      Other Cancer Daughter         pancreatic     Other Cancer Brother         type     Other Cancer Sister 60        lung     Diabetes No family hx of      Breast Cancer No family hx of      Cancer - colorectal No family hx of      Ovarian Cancer No family hx of      Prostate Cancer No family hx of        SOCIAL HISTORY:  Social History     Socioeconomic History     Marital status:      Spouse name: None     Number of children: None     Years of education: None     Highest education level: None   Occupational History     None   Social Needs     Financial resource strain: None     Food insecurity:     Worry: None     Inability: None     Transportation needs:     Medical: None     Non-medical: None   Tobacco Use     Smoking status: Former Smoker     Years: 1.00     Types: Cigarettes     Start date:      Last attempt to quit: 1956     Years since quittin.3     Smokeless tobacco: Never Used   Substance and Sexual Activity     Alcohol use: No     Alcohol/week: 0.0 oz     Drug use: No     Sexual activity: Never     Partners: Male   Lifestyle     Physical activity:     Days per week: None     Minutes per session: None     Stress: None   Relationships     Social connections:     Talks on phone: None     Gets together: None     Attends Buddhist service: None     Active member of club or organization: None     Attends meetings of clubs or organizations: None     Relationship status: None     Intimate partner violence:     Fear of current or ex partner: None     Emotionally abused: None     Physically abused: None     Forced sexual  "activity: None   Other Topics Concern      Service Not Asked     Blood Transfusions Not Asked     Caffeine Concern No     Comment: Hot tea 1 cup daily     Occupational Exposure Not Asked     Hobby Hazards Not Asked     Sleep Concern Not Asked     Stress Concern Not Asked     Weight Concern Not Asked     Special Diet Yes     Comment: low sodium     Back Care Not Asked     Exercise No     Comment: limited right now     Bike Helmet Not Asked     Seat Belt Not Asked     Self-Exams Not Asked     Parent/sibling w/ CABG, MI or angioplasty before 65F 55M? Not Asked   Social History Narrative     None       Review of Systems:  Skin:  Negative     Eyes:  Positive for glasses  ENT:  Negative    Respiratory:  Negative    Cardiovascular:  Negative    Gastroenterology: Negative    Genitourinary:  Negative    Musculoskeletal:  Positive for arthritis;gout  Neurologic:  Positive for numbness or tingling of feet  Psychiatric:  Negative    Heme/Lymph/Imm:  Negative    Endocrine:  Negative      Physical Exam:  Vitals: /48   Pulse 60   Ht 1.6 m (5' 3\")   Wt 68.3 kg (150 lb 9.6 oz)   BMI 26.68 kg/m      Constitutional:  in no acute distress frail      Skin:  warm and dry to the touch          Head:  normocephalic, no masses or lesions        Eyes:  pupils equal and round;sclera white        Lymph:No Cervical lymphadenopathy present     ENT:           Neck:  JVP normal;carotid pulses are full and equal bilaterally        Respiratory:  clear to auscultation;normal symmetry         Cardiac: apical impulse not displaced;normal S1 and S2 irregularly irregular rhythm                pulses below the femoral arteries are diminished                                      GI:  abdomen soft;no masses;non-tender        Extremities and Muscular Skeletal:    arthritic deformities of UE bilateral LE edema;1+;2+     with skin changes noted  left shin wound with dressing c&d    Neurological:  no gross motor deficits;affect appropriate   " in wheel chair    Psych:  oriented to time, person and place        Recent Lab Results:  LIPID RESULTS:  Lab Results   Component Value Date    CHOL 114 10/02/2018    HDL 61 10/02/2018    LDL 41 10/02/2018    TRIG 61 10/02/2018    CHOLHDLRATIO 2.0 09/14/2015       LIVER ENZYME RESULTS:  Lab Results   Component Value Date    AST 33 10/05/2018    ALT 27 10/05/2018       CBC RESULTS:  Lab Results   Component Value Date    WBC 6.4 04/22/2019    RBC 3.00 (L) 04/22/2019    HGB 9.1 (L) 04/22/2019    HCT 29.7 (L) 04/22/2019    MCV 99 04/22/2019    MCH 30.3 04/22/2019    MCHC 30.6 (L) 04/22/2019    RDW 14.0 04/22/2019     04/22/2019       BMP RESULTS:  Lab Results   Component Value Date     04/22/2019    POTASSIUM 3.8 04/22/2019    CHLORIDE 104 04/22/2019    CO2 28 04/22/2019    ANIONGAP 7 04/22/2019     (H) 04/22/2019    BUN 34 (H) 04/22/2019    CR 1.02 04/22/2019    GFRESTIMATED 49 (L) 04/22/2019    GFRESTBLACK 57 (L) 04/22/2019    CASPER 8.5 04/22/2019        A1C RESULTS:  No results found for: A1C    INR RESULTS:  Lab Results   Component Value Date    INR 1.04 10/18/2017    INR 1.00 05/12/2016           CC  Miguel Pradhan MD  [address]  [place] [address]

## 2019-07-23 NOTE — PROGRESS NOTES
Service Date: 07/23/2019      HISTORY OF PRESENT ILLNESS:  It is a pleasure for me to see Mrs. Godinez who is here for followup of chronic congestive heart failure.  She has cor pulmonale, diastolic heart failure.  Left ventricular systolic function is normal, though there is right ventricular enlargement and right ventricular systolic dysfunction associated with severe pulmonary hypertension.  She has a history of COPD.  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      She is not on oral anticoagulation due to a history of GI bleeding and anemia.  She also has probable underlying sick sinus syndrome.  She has asymptomatic bradycardia associated with her permanent atrial fibrillation and a pacer has not been placed.  She denies dizzy spells and syncopal episodes.  She lives with her daughter, Keisha, who is extremely caring.      She tells me that her lower limb edema has been significantly better with wrapping.  She walks around with a walker at home and her breathing has not deteriorated.  She denies PND, orthopnea or chest pains.      I think she has 1-2+ bilateral ankle edema.  However, JVP is not elevated and her chest is clear.  I do note a diastolic blood pressure of 48 today.        Review of medications show that she is on Imdur and hydralazine.  I am not sure both of them are necessary and I have asked her to stop Imdur.  She does have preserved left ventricular systolic function.  Otherwise, I have kept her medications unchanged.      ASSESSMENT AND PLAN:  With her multiple medical conditions and overall frailty, I do think she is doing reasonably well.  I have provisionally asked her to come back and see me in a year's time for further followup.         JERAMY [facility]AVARRIA MD, [facility]             D: 07/23/2019   T: 07/23/2019   MT: MALOR[place]      Name:     KEISHA GODINEZ   MRN:      [id number]        Account:       [id number]   :      1932           Service Date: 2019      Document: [id number]

## 2019-07-23 NOTE — LETTER
7/23/2019    Gerri Eubanks MD  [address]  [facility] [address]    RE: Keisha Godinez       Dear Colleague,    I had the pleasure of seeing Keisha Godinez in the [facility].    HPI and Plan:   See dictation    Orders Placed This Encounter   Procedures     Follow-Up with Cardiologist       No orders of the defined types were placed in this encounter.      Encounter Diagnoses   Name Primary?     Chronic diastolic congestive heart failure (H)      Pulmonary hypertension (H)      Chronic atrial fibrillation (H)      Essential hypertension with goal blood pressure less than 140/90      Chronic kidney disease, stage 3 (moderate) (GFR 59 10/30/17, GFR 58 7/7/16)      Iron deficiency anemia due to chronic blood loss        CURRENT MEDICATIONS:  Current Outpatient Medications   Medication Sig Dispense Refill     acetaminophen (TYLENOL) 325 MG tablet Take 650 mg by mouth 3 times daily as needed for mild pain       albuterol (ACCUNEB) 1.25 MG/3ML nebulizer solution Take 1 vial by nebulization 3 times daily        Cranberry Extract 250 MG TABS Take 250 mg by mouth daily       ferrous sulfate (FEROSUL) 325 (65 Fe) MG tablet Take 1 tablet (325 mg) by mouth 2 times daily 100 tablet 11     gabapentin (NEURONTIN) 100 MG capsule TAKE 1 CAPSULE (100MG) BY MOUTH THREE TIMES DAILY 270 capsule 0     hydrALAZINE (APRESOLINE) 25 MG tablet TAKE 3 TABLETS BY MOUTH THREE TIMES DAILY 810 tablet 3     isosorbide mononitrate (IMDUR) 30 MG 24 hr tablet TAKE 2 TABLETS (60MG) BY MOUTH DAILY 180 tablet 0     OMEGA-3 FATTY ACIDS PO Take 1,200 mg by mouth daily        omeprazole (PRILOSEC) 20 MG DR capsule TAKE 1 CAPSULE BY MOUTH DAILY 90 capsule 0     simvastatin (ZOCOR) 10 MG tablet Take 1 tablet (10 mg) by mouth At Bedtime 90 tablet 2     SM STOOL SOFTENER 8.6-50 MG tablet Take 2 tablets daily as needed for constipation while taking prescription pain medications. 30 tablet 0     spironolactone  (ALDACTONE) 50 MG tablet TAKE 1 TABLET BY MOUTH DAILY 30 tablet 7     torsemide (DEMADEX) 20 MG tablet Take 1 tablet (20 mg) by mouth 2 times daily 180 tablet 0     venlafaxine (EFFEXOR-XR) 75 MG 24 hr capsule Take 1 capsule (75 mg) by mouth daily 90 capsule 0     VITAMIN D, CHOLECALCIFEROL, PO Take 1,000 Units by mouth daily        ASPIRIN NOT PRESCRIBED (INTENTIONAL) Please choose reason not prescribed, below (Patient not taking: Reported on 7/23/2019)       clindamycin (CLEOCIN) 150 MG capsule Take 1 capsule (150 mg) by mouth 3 times daily (Patient not taking: Reported on 7/23/2019) 30 capsule 0       ALLERGIES     Allergies   Allergen Reactions     Blood Transfusion Related (Informational Only) Other (See Comments)     Patient has a history of a clinically significant antibody against RBC antigens.  A delay in compatible RBCs may occur.     Lisinopril Other (See Comments)     Tongue swelling     Calcium Nausea and Vomiting     Egg Yolk      Flu Virus Vaccine      Allergic to eggs.     Keflex [Cephalexin] Nausea     Pt states she had upset stomach.  NOT tongue swelling.  She had tongue swelling with a combo bp med that she thinks included lisinopril.    Tolerates IV Cefazolin     Levaquin [Levofloxacin]      Sulfa Drugs      Zinc Nausea and Vomiting       PAST MEDICAL HISTORY:  Past Medical History:   Diagnosis Date     Anemia      Atrial fibrillation (H)      B12 deficiency      Cardiomyopathy (H)      CHF (congestive heart failure) (H)      Chronic edema     Lower extremities     Chronic systolic congestive heart failure (H)      CVA (cerebral vascular accident) (H) 2010    Acute Left MCA hemispheric CVA ( on coumadin)     Depression      Gastro-oesophageal reflux disease      GI bleed 03-20-15     Hyperlipidemia      Hypertension      Iron deficiency      MSSA (methicillin susceptible Staphylococcus aureus) 03-10-15     Pulmonary hypertension (H)      Shingles        PAST SURGICAL HISTORY:  Past Surgical  History:   Procedure Laterality Date     COLONOSCOPY  2015    Diverticulosis, polyps     ENTEROSCOPY SMALL BOWEL N/A 2016    Procedure: ENTEROSCOPY SMALL BOWEL;  Surgeon: Ady Ponce MD;  Location:  GI     ESOPHAGOSCOPY, GASTROSCOPY, DUODENOSCOPY (EGD), COMBINED N/A 3/22/2015    Upper GI- Normal, nothing significant found.      EXCISE MASS FOOT Right 2016    Procedure: EXCISE MASS FOOT;  Surgeon: Lee Jang DPM;  Location:  OR       FAMILY HISTORY:  Family History   Problem Relation Age of Onset     Known Genetic Syndrome Father      Known Genetic Syndrome Mother      Other Cancer Daughter         pancreatic     Other Cancer Brother         type     Other Cancer Sister 60        lung     Diabetes No family hx of      Breast Cancer No family hx of      Cancer - colorectal No family hx of      Ovarian Cancer No family hx of      Prostate Cancer No family hx of        SOCIAL HISTORY:  Social History     Socioeconomic History     Marital status:      Spouse name: None     Number of children: None     Years of education: None     Highest education level: None   Occupational History     None   Social Needs     Financial resource strain: None     Food insecurity:     Worry: None     Inability: None     Transportation needs:     Medical: None     Non-medical: None   Tobacco Use     Smoking status: Former Smoker     Years: 1.00     Types: Cigarettes     Start date:      Last attempt to quit: 1956     Years since quittin.3     Smokeless tobacco: Never Used   Substance and Sexual Activity     Alcohol use: No     Alcohol/week: 0.0 oz     Drug use: No     Sexual activity: Never     Partners: Male   Lifestyle     Physical activity:     Days per week: None     Minutes per session: None     Stress: None   Relationships     Social connections:     Talks on phone: None     Gets together: None     Attends Rastafarian service: None     Active member of club or organization:  "None     Attends meetings of clubs or organizations: None     Relationship status: None     Intimate partner violence:     Fear of current or ex partner: None     Emotionally abused: None     Physically abused: None     Forced sexual activity: None   Other Topics Concern      Service Not Asked     Blood Transfusions Not Asked     Caffeine Concern No     Comment: Hot tea 1 cup daily     Occupational Exposure Not Asked     Hobby Hazards Not Asked     Sleep Concern Not Asked     Stress Concern Not Asked     Weight Concern Not Asked     Special Diet Yes     Comment: low sodium     Back Care Not Asked     Exercise No     Comment: limited right now     Bike Helmet Not Asked     Seat Belt Not Asked     Self-Exams Not Asked     Parent/sibling w/ CABG, MI or angioplasty before 65F 55M? Not Asked   Social History Narrative     None       Review of Systems:  Skin:  Negative     Eyes:  Positive for glasses  ENT:  Negative    Respiratory:  Negative    Cardiovascular:  Negative    Gastroenterology: Negative    Genitourinary:  Negative    Musculoskeletal:  Positive for arthritis;gout  Neurologic:  Positive for numbness or tingling of feet  Psychiatric:  Negative    Heme/Lymph/Imm:  Negative    Endocrine:  Negative      Physical Exam:  Vitals: /48   Pulse 60   Ht 1.6 m (5' 3\")   Wt 68.3 kg (150 lb 9.6 oz)   BMI 26.68 kg/m       Constitutional:  in no acute distress frail      Skin:  warm and dry to the touch          Head:  normocephalic, no masses or lesions        Eyes:  pupils equal and round;sclera white        Lymph:No Cervical lymphadenopathy present     ENT:           Neck:  JVP normal;carotid pulses are full and equal bilaterally        Respiratory:  clear to auscultation;normal symmetry         Cardiac: apical impulse not displaced;normal S1 and S2 irregularly irregular rhythm                pulses below the femoral arteries are diminished                                      GI:  abdomen soft;no " masses;non-tender        Extremities and Muscular Skeletal:    arthritic deformities of UE bilateral LE edema;1+;2+     with skin changes noted  left shin wound with dressing c&d    Neurological:  no gross motor deficits;affect appropriate   in wheel chair    Psych:  oriented to time, person and place        Recent Lab Results:  LIPID RESULTS:  Lab Results   Component Value Date    CHOL 114 10/02/2018    HDL 61 10/02/2018    LDL 41 10/02/2018    TRIG 61 10/02/2018    CHOLHDLRATIO 2.0 09/14/2015       LIVER ENZYME RESULTS:  Lab Results   Component Value Date    AST 33 10/05/2018    ALT 27 10/05/2018       CBC RESULTS:  Lab Results   Component Value Date    WBC 6.4 04/22/2019    RBC 3.00 (L) 04/22/2019    HGB 9.1 (L) 04/22/2019    HCT 29.7 (L) 04/22/2019    MCV 99 04/22/2019    MCH 30.3 04/22/2019    MCHC 30.6 (L) 04/22/2019    RDW 14.0 04/22/2019     04/22/2019       BMP RESULTS:  Lab Results   Component Value Date     04/22/2019    POTASSIUM 3.8 04/22/2019    CHLORIDE 104 04/22/2019    CO2 28 04/22/2019    ANIONGAP 7 04/22/2019     (H) 04/22/2019    BUN 34 (H) 04/22/2019    CR 1.02 04/22/2019    GFRESTIMATED 49 (L) 04/22/2019    GFRESTBLACK 57 (L) 04/22/2019    CASPER 8.5 04/22/2019        A1C RESULTS:  No results found for: A1C    INR RESULTS:  Lab Results   Component Value Date    INR 1.04 10/18/2017    INR 1.00 05/12/2016           CC  Miguel Pradhan MD  [address]  [place] [address]                  Thank you for allowing me to participate in the care of your patient.      Sincerely,     DR JERAMY CHAVARRIA MD     [facility]    cc:   Miguel Pradhan MD  [address]  [place] [address]

## 2019-07-23 NOTE — LETTER
7/23/2019      Gerri Eubanks MD  [address] [facility] [address]  [facility] [address]      RE: Keisha Godinez       Dear Colleague,    I had the pleasure of seeing Keisha Godinez in the [facility].    Service Date: 07/23/2019      HISTORY OF PRESENT ILLNESS:  It is a pleasure for me to see Mrs. Godinez who is here for followup of chronic congestive heart failure.  She has cor pulmonale, diastolic heart failure.  Left ventricular systolic function is normal, though there is right ventricular enlargement and right ventricular systolic dysfunction associated with severe pulmonary hypertension.  She has a history of COPD.  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      She is not on oral anticoagulation due to a history of GI bleeding and anemia.  She also has probable underlying sick sinus syndrome.  She has asymptomatic bradycardia associated with her permanent atrial fibrillation and a pacer has not been placed.  She denies dizzy spells and syncopal episodes.  She lives with her daughter, Keisha, who is extremely caring.      She tells me that her lower limb edema has been significantly better with wrapping.  She walks around with a walker at home and her breathing has not deteriorated.  She denies PND, orthopnea or chest pains.      I think she has 1-2+ bilateral ankle edema.  However, JVP is not elevated and her chest is clear.  I do note a diastolic blood pressure of 48 today.        Review of medications show that she is on Imdur and hydralazine.  I am not sure both of them are necessary and I have asked her to stop Imdur.  She does have preserved left ventricular systolic function.  Otherwise, I have kept her medications unchanged.      ASSESSMENT AND PLAN:  With her multiple medical conditions and overall frailty, I do think she is doing reasonably well.  I have provisionally  asked her to come back and see me in a year's time for further followup.         JERAMY CHAVARRIA MD, [facility]             D: 2019   T: 2019   MT: MALORIE      Name:     ELAINA MAYFIELD   MRN:      [id number]        Account:      [id number]   :      1932           Service Date: 2019      Document: [id number]         Outpatient Encounter Medications as of 2019   Medication Sig Dispense Refill     acetaminophen (TYLENOL) 325 MG tablet Take 650 mg by mouth 3 times daily as needed for mild pain       albuterol (ACCUNEB) 1.25 MG/3ML nebulizer solution Take 1 vial by nebulization 3 times daily        Cranberry Extract 250 MG TABS Take 250 mg by mouth daily       ferrous sulfate (FEROSUL) 325 (65 Fe) MG tablet Take 1 tablet (325 mg) by mouth 2 times daily 100 tablet 11     gabapentin (NEURONTIN) 100 MG capsule TAKE 1 CAPSULE (100MG) BY MOUTH THREE TIMES DAILY 270 capsule 0     hydrALAZINE (APRESOLINE) 25 MG tablet TAKE 3 TABLETS BY MOUTH THREE TIMES DAILY 810 tablet 3     isosorbide mononitrate (IMDUR) 30 MG 24 hr tablet TAKE 2 TABLETS (60MG) BY MOUTH DAILY 180 tablet 0     OMEGA-3 FATTY ACIDS PO Take 1,200 mg by mouth daily        omeprazole (PRILOSEC) 20 MG DR capsule TAKE 1 CAPSULE BY MOUTH DAILY 90 capsule 0     simvastatin (ZOCOR) 10 MG tablet Take 1 tablet (10 mg) by mouth At Bedtime 90 tablet 2     SM STOOL SOFTENER 8.6-50 MG tablet Take 2 tablets daily as needed for constipation while taking prescription pain medications. 30 tablet 0     spironolactone (ALDACTONE) 50 MG tablet TAKE 1 TABLET BY MOUTH DAILY 30 tablet 7     torsemide (DEMADEX) 20 MG tablet Take 1 tablet (20 mg) by mouth 2 times daily 180 tablet 0     venlafaxine (EFFEXOR-XR) 75 MG 24 hr capsule Take 1 capsule (75 mg) by mouth daily 90 capsule 0     VITAMIN D, CHOLECALCIFEROL, PO Take 1,000 Units by mouth daily        ASPIRIN NOT PRESCRIBED (INTENTIONAL) Please choose reason not prescribed, below (Patient not taking: Reported on  7/23/2019)       clindamycin (CLEOCIN) 150 MG capsule Take 1 capsule (150 mg) by mouth 3 times daily (Patient not taking: Reported on 7/23/2019) 30 capsule 0     [DISCONTINUED] indomethacin (INDOCIN) 25 MG capsule Take 1 capsule (25 mg) by mouth 3 times daily (with meals) for 10 days 30 capsule 0     No facility-administered encounter medications on file as of 7/23/2019.        Again, thank you for allowing me to participate in the care of your patient.      Sincerely,    DR JERAMY CHAVARRIA MD     [facility]

## 2019-08-05 DIAGNOSIS — E78.5 HYPERLIPIDEMIA, UNSPECIFIED HYPERLIPIDEMIA TYPE: ICD-10-CM

## 2019-08-05 DIAGNOSIS — M54.6 MIDLINE THORACIC BACK PAIN, UNSPECIFIED CHRONICITY: ICD-10-CM

## 2019-08-05 NOTE — TELEPHONE ENCOUNTER
"Requested Prescriptions   Pending Prescriptions Disp Refills     simvastatin (ZOCOR) 10 MG tablet [Pharmacy Med Name: Simvastatin Oral Tablet 10 MG] 90 tablet 0     Sig: TAKE 1 TABLET (10MG) BY MOUTH AT BEDTIME   Last Written Prescription Date:  11/21/2018  Last Fill Quantity: 90,  # refills: 02   Last office visit: 6/24/2019 with prescribing provider:     Future Office Visit:      Statins Protocol Passed - 8/5/2019  7:00 AM        Passed - LDL on file in past 12 months     Recent Labs   Lab Test 10/02/18  1032   LDL 41             Passed - No abnormal creatine kinase in past 12 months     Recent Labs   Lab Test 11/28/15  0613   CKT 27*                Passed - Recent (12 mo) or future (30 days) visit within the authorizing provider's specialty     Patient had office visit in the last 12 months or has a visit in the next 30 days with authorizing provider or within the authorizing provider's specialty.  See \"Patient Info\" tab in inbasket, or \"Choose Columns\" in Meds & Orders section of the refill encounter.              Passed - Medication is active on med list        Passed - Patient is age 18 or older        Passed - No active pregnancy on record        Passed - No positive pregnancy test in past 12 months        "

## 2019-08-06 ENCOUNTER — TELEPHONE (OUTPATIENT)
Dept: INTERNAL MEDICINE | Facility: CLINIC | Age: 84
End: 2019-08-06

## 2019-08-06 DIAGNOSIS — R53.83 FATIGUE: Primary | ICD-10-CM

## 2019-08-06 DIAGNOSIS — R53.83 LOW ENERGY: ICD-10-CM

## 2019-08-06 NOTE — TELEPHONE ENCOUNTER
"Reason for call:  Patient reporting a symptom    Symptom or request: fatigue, sleeping ofter, \"doesn't have any get up and go\"    Duration (how long have symptoms been present): unsure    Have you been treated for this before? Yes    Additional comments: Rebecca, daughter and caregiver, called to report the above symptoms and is wondering if she needs to come in for some bloodwork. Please call her back to advise.    Phone Number patient can be reached at:  Rebecca [phone]    Best Time:  any    Can we leave a detailed message on this number:  YES    Call taken on 8/6/2019 at 1:03 PM by Bettye Gracia  "

## 2019-08-06 NOTE — TELEPHONE ENCOUNTER
"Routing refill request to provider for review/approval because:  Patient needs to be seen because:      Per office visit note on 6/24/19,  \"Return in about 4 weeks (around 7/22/2019) for f/u PCP.\"     "

## 2019-08-06 NOTE — TELEPHONE ENCOUNTER
,  Requested Prescriptions   Pending Prescriptions Disp Refills     gabapentin (NEURONTIN) 100 MG capsule [Pharmacy Med Name: Gabapentin Oral  Last Written Prescription Date:  4/3/2019  Last Fill Quantity: 270,  # refills: 0   Last office visit: 6/24/2019 with prescribing provider:     Future Office Visit:   Capsule 100 MG] 270 capsule 0     Sig: TAKE 1 CAPSULE (100MG) BY MOUTH THREE TIMES DAILY       There is no refill protocol information for this order

## 2019-08-07 RX ORDER — GABAPENTIN 100 MG/1
CAPSULE ORAL
Qty: 270 CAPSULE | Refills: 0 | Status: SHIPPED | OUTPATIENT
Start: 2019-08-07 | End: 2019-10-27

## 2019-08-07 RX ORDER — SIMVASTATIN 10 MG
TABLET ORAL
Qty: 90 TABLET | Refills: 0 | Status: SHIPPED | OUTPATIENT
Start: 2019-08-07 | End: 2019-10-27

## 2019-08-07 NOTE — TELEPHONE ENCOUNTER
Daughter advised, she will bring patient in tomorrow afternoon for the labs.  Advised to have her drink and be ready to leave a urine.  Labs ordered and appt scheduled. NIMO Law R.N.

## 2019-08-08 ENCOUNTER — TELEPHONE (OUTPATIENT)
Dept: INTERNAL MEDICINE | Facility: CLINIC | Age: 84
End: 2019-08-08

## 2019-08-08 ENCOUNTER — HOSPITAL ENCOUNTER (OUTPATIENT)
Facility: CLINIC | Age: 84
Setting detail: OBSERVATION
Discharge: HOME | End: 2019-08-09
Attending: EMERGENCY MEDICINE | Admitting: HOSPITALIST
Payer: COMMERCIAL

## 2019-08-08 DIAGNOSIS — D64.89 ANEMIA DUE TO OTHER CAUSE, NOT CLASSIFIED: ICD-10-CM

## 2019-08-08 DIAGNOSIS — R53.83 LOW ENERGY: ICD-10-CM

## 2019-08-08 DIAGNOSIS — R53.83 FATIGUE: ICD-10-CM

## 2019-08-08 PROBLEM — D64.9 SYMPTOMATIC ANEMIA: Status: ACTIVE | Noted: 2019-08-08

## 2019-08-08 LAB
ABO + RH BLD: ABNORMAL
ABO + RH BLD: ABNORMAL
ALBUMIN SERPL-MCNC: 3.4 G/DL (ref 3.4–5)
ALBUMIN UR-MCNC: NEGATIVE MG/DL
ALP SERPL-CCNC: 63 U/L (ref 40–150)
ALT SERPL W P-5'-P-CCNC: 19 U/L (ref 0–50)
ANION GAP SERPL CALCULATED.3IONS-SCNC: 5 MMOL/L (ref 3–14)
APPEARANCE UR: ABNORMAL
AST SERPL W P-5'-P-CCNC: 19 U/L (ref 0–45)
BACTERIA #/AREA URNS HPF: ABNORMAL /HPF
BASOPHILS # BLD AUTO: 0.1 10E9/L (ref 0–0.2)
BASOPHILS NFR BLD AUTO: 0.9 %
BILIRUB SERPL-MCNC: 0.5 MG/DL (ref 0.2–1.3)
BILIRUB UR QL STRIP: NEGATIVE
BLD GP AB SCN SERPL QL: ABNORMAL
BLD PROD TYP BPU: ABNORMAL
BLD PROD TYP BPU: NORMAL
BLD PROD TYP BPU: NORMAL
BLD UNIT ID BPU: 0
BLD UNIT ID BPU: 0
BLOOD BANK CMNT PATIENT-IMP: ABNORMAL
BLOOD PRODUCT CODE: NORMAL
BLOOD PRODUCT CODE: NORMAL
BPU ID: NORMAL
BPU ID: NORMAL
BUN SERPL-MCNC: 29 MG/DL (ref 7–30)
CALCIUM SERPL-MCNC: 8.7 MG/DL (ref 8.5–10.1)
CHLORIDE SERPL-SCNC: 101 MMOL/L (ref 94–109)
CO2 SERPL-SCNC: 30 MMOL/L (ref 20–32)
COLOR UR AUTO: YELLOW
CREAT SERPL-MCNC: 0.98 MG/DL (ref 0.52–1.04)
DIFFERENTIAL METHOD BLD: ABNORMAL
EOSINOPHIL # BLD AUTO: 0.2 10E9/L (ref 0–0.7)
EOSINOPHIL NFR BLD AUTO: 3.2 %
ERYTHROCYTE [DISTWIDTH] IN BLOOD BY AUTOMATED COUNT: 14.6 % (ref 10–15)
ERYTHROCYTE [DISTWIDTH] IN BLOOD BY AUTOMATED COUNT: 14.7 % (ref 10–15)
GFR SERPL CREATININE-BSD FRML MDRD: 52 ML/MIN/{1.73_M2}
GLUCOSE SERPL-MCNC: 115 MG/DL (ref 70–99)
GLUCOSE UR STRIP-MCNC: NEGATIVE MG/DL
HCT VFR BLD AUTO: 22.4 % (ref 35–47)
HCT VFR BLD AUTO: 22.5 % (ref 35–47)
HGB BLD-MCNC: 6.5 G/DL (ref 11.7–15.7)
HGB BLD-MCNC: 6.6 G/DL (ref 11.7–15.7)
HGB UR QL STRIP: NEGATIVE
IMM GRANULOCYTES # BLD: 0 10E9/L (ref 0–0.4)
IMM GRANULOCYTES NFR BLD: 0.4 %
KETONES UR STRIP-MCNC: NEGATIVE MG/DL
LEUKOCYTE ESTERASE UR QL STRIP: ABNORMAL
LYMPHOCYTES # BLD AUTO: 0.6 10E9/L (ref 0.8–5.3)
LYMPHOCYTES NFR BLD AUTO: 8.5 %
MCH RBC QN AUTO: 26.9 PG (ref 26.5–33)
MCH RBC QN AUTO: 27.1 PG (ref 26.5–33)
MCHC RBC AUTO-ENTMCNC: 29 G/DL (ref 31.5–36.5)
MCHC RBC AUTO-ENTMCNC: 29.3 G/DL (ref 31.5–36.5)
MCV RBC AUTO: 92 FL (ref 78–100)
MCV RBC AUTO: 93 FL (ref 78–100)
MONOCYTES # BLD AUTO: 0.7 10E9/L (ref 0–1.3)
MONOCYTES NFR BLD AUTO: 9.4 %
NEUTROPHILS # BLD AUTO: 5.7 10E9/L (ref 1.6–8.3)
NEUTROPHILS NFR BLD AUTO: 77.6 %
NITRATE UR QL: NEGATIVE
NON-SQ EPI CELLS #/AREA URNS LPF: ABNORMAL /LPF
NRBC # BLD AUTO: 0 10*3/UL
NRBC BLD AUTO-RTO: 0 /100
NUM BPU REQUESTED: 2
PH UR STRIP: 6 PH (ref 5–7)
PLATELET # BLD AUTO: 239 10E9/L (ref 150–450)
PLATELET # BLD AUTO: 250 10E9/L (ref 150–450)
POTASSIUM SERPL-SCNC: 4.1 MMOL/L (ref 3.4–5.3)
PROT SERPL-MCNC: 7 G/DL (ref 6.8–8.8)
RBC # BLD AUTO: 2.4 10E12/L (ref 3.8–5.2)
RBC # BLD AUTO: 2.45 10E12/L (ref 3.8–5.2)
RBC #/AREA URNS AUTO: ABNORMAL /HPF
SODIUM SERPL-SCNC: 136 MMOL/L (ref 133–144)
SOURCE: ABNORMAL
SP GR UR STRIP: 1.01 (ref 1–1.03)
SPECIMEN EXP DATE BLD: ABNORMAL
TRANSFUSION STATUS PATIENT QL: NORMAL
UROBILINOGEN UR STRIP-ACNC: 0.2 EU/DL (ref 0.2–1)
WBC # BLD AUTO: 5.8 10E9/L (ref 4–11)
WBC # BLD AUTO: 7.4 10E9/L (ref 4–11)
WBC #/AREA URNS AUTO: ABNORMAL /HPF

## 2019-08-08 PROCEDURE — 93005 ELECTROCARDIOGRAM TRACING: CPT

## 2019-08-08 PROCEDURE — 36415 COLL VENOUS BLD VENIPUNCTURE: CPT | Performed by: INTERNAL MEDICINE

## 2019-08-08 PROCEDURE — 40000275 ZZH STATISTIC RCP TIME EA 10 MIN

## 2019-08-08 PROCEDURE — 99207 ZZC CDG-CODE CATEGORY CHANGED: CPT | Performed by: HOSPITALIST

## 2019-08-08 PROCEDURE — G0378 HOSPITAL OBSERVATION PER HR: HCPCS

## 2019-08-08 PROCEDURE — 36430 TRANSFUSION BLD/BLD COMPNT: CPT

## 2019-08-08 PROCEDURE — 86901 BLOOD TYPING SEROLOGIC RH(D): CPT | Performed by: EMERGENCY MEDICINE

## 2019-08-08 PROCEDURE — 82607 VITAMIN B-12: CPT | Performed by: EMERGENCY MEDICINE

## 2019-08-08 PROCEDURE — 25000125 ZZHC RX 250: Performed by: HOSPITALIST

## 2019-08-08 PROCEDURE — 86922 COMPATIBILITY TEST ANTIGLOB: CPT | Performed by: EMERGENCY MEDICINE

## 2019-08-08 PROCEDURE — 94640 AIRWAY INHALATION TREATMENT: CPT

## 2019-08-08 PROCEDURE — 83540 ASSAY OF IRON: CPT | Performed by: EMERGENCY MEDICINE

## 2019-08-08 PROCEDURE — 87086 URINE CULTURE/COLONY COUNT: CPT | Performed by: INTERNAL MEDICINE

## 2019-08-08 PROCEDURE — 85025 COMPLETE CBC W/AUTO DIFF WBC: CPT | Performed by: EMERGENCY MEDICINE

## 2019-08-08 PROCEDURE — 85027 COMPLETE CBC AUTOMATED: CPT | Performed by: INTERNAL MEDICINE

## 2019-08-08 PROCEDURE — 25000132 ZZH RX MED GY IP 250 OP 250 PS 637: Performed by: HOSPITALIST

## 2019-08-08 PROCEDURE — 81001 URINALYSIS AUTO W/SCOPE: CPT | Performed by: INTERNAL MEDICINE

## 2019-08-08 PROCEDURE — 86900 BLOOD TYPING SEROLOGIC ABO: CPT | Performed by: EMERGENCY MEDICINE

## 2019-08-08 PROCEDURE — 87088 URINE BACTERIA CULTURE: CPT | Performed by: INTERNAL MEDICINE

## 2019-08-08 PROCEDURE — 99220 ZZC INITIAL OBSERVATION CARE,LEVL III: CPT | Performed by: HOSPITALIST

## 2019-08-08 PROCEDURE — 80053 COMPREHEN METABOLIC PANEL: CPT | Performed by: EMERGENCY MEDICINE

## 2019-08-08 PROCEDURE — 80053 COMPREHEN METABOLIC PANEL: CPT | Performed by: INTERNAL MEDICINE

## 2019-08-08 PROCEDURE — 83550 IRON BINDING TEST: CPT | Performed by: EMERGENCY MEDICINE

## 2019-08-08 PROCEDURE — 99285 EMERGENCY DEPT VISIT HI MDM: CPT | Mod: 25

## 2019-08-08 PROCEDURE — 87186 SC STD MICRODIL/AGAR DIL: CPT | Performed by: INTERNAL MEDICINE

## 2019-08-08 PROCEDURE — 86850 RBC ANTIBODY SCREEN: CPT | Performed by: EMERGENCY MEDICINE

## 2019-08-08 PROCEDURE — P9016 RBC LEUKOCYTES REDUCED: HCPCS | Performed by: EMERGENCY MEDICINE

## 2019-08-08 RX ORDER — ONDANSETRON 2 MG/ML
4 INJECTION INTRAMUSCULAR; INTRAVENOUS EVERY 6 HOURS PRN
Status: DISCONTINUED | OUTPATIENT
Start: 2019-08-08 | End: 2019-08-09 | Stop reason: HOSPADM

## 2019-08-08 RX ORDER — VENLAFAXINE HYDROCHLORIDE 75 MG/1
75 CAPSULE, EXTENDED RELEASE ORAL DAILY
Status: DISCONTINUED | OUTPATIENT
Start: 2019-08-09 | End: 2019-08-09 | Stop reason: HOSPADM

## 2019-08-08 RX ORDER — FERROUS SULFATE 325(65) MG
325 TABLET ORAL 2 TIMES DAILY
Status: DISCONTINUED | OUTPATIENT
Start: 2019-08-08 | End: 2019-08-09 | Stop reason: HOSPADM

## 2019-08-08 RX ORDER — ALBUTEROL SULFATE 0.83 MG/ML
1.25 SOLUTION RESPIRATORY (INHALATION) 3 TIMES DAILY
Status: DISCONTINUED | OUTPATIENT
Start: 2019-08-08 | End: 2019-08-09 | Stop reason: HOSPADM

## 2019-08-08 RX ORDER — TORSEMIDE 20 MG/1
20 TABLET ORAL 2 TIMES DAILY
Status: DISCONTINUED | OUTPATIENT
Start: 2019-08-09 | End: 2019-08-09 | Stop reason: HOSPADM

## 2019-08-08 RX ORDER — SPIRONOLACTONE 25 MG/1
50 TABLET ORAL DAILY
Status: DISCONTINUED | OUTPATIENT
Start: 2019-08-09 | End: 2019-08-09 | Stop reason: HOSPADM

## 2019-08-08 RX ORDER — ACETAMINOPHEN 325 MG/1
650 TABLET ORAL 3 TIMES DAILY PRN
Status: DISCONTINUED | OUTPATIENT
Start: 2019-08-08 | End: 2019-08-08

## 2019-08-08 RX ORDER — ACETAMINOPHEN 650 MG/1
650 SUPPOSITORY RECTAL EVERY 4 HOURS PRN
Status: DISCONTINUED | OUTPATIENT
Start: 2019-08-08 | End: 2019-08-09 | Stop reason: HOSPADM

## 2019-08-08 RX ORDER — ONDANSETRON 4 MG/1
4 TABLET, ORALLY DISINTEGRATING ORAL EVERY 6 HOURS PRN
Status: DISCONTINUED | OUTPATIENT
Start: 2019-08-08 | End: 2019-08-09 | Stop reason: HOSPADM

## 2019-08-08 RX ORDER — ACETAMINOPHEN 325 MG/1
650 TABLET ORAL EVERY 4 HOURS PRN
Status: DISCONTINUED | OUTPATIENT
Start: 2019-08-08 | End: 2019-08-09 | Stop reason: HOSPADM

## 2019-08-08 RX ORDER — NALOXONE HYDROCHLORIDE 0.4 MG/ML
.1-.4 INJECTION, SOLUTION INTRAMUSCULAR; INTRAVENOUS; SUBCUTANEOUS
Status: DISCONTINUED | OUTPATIENT
Start: 2019-08-08 | End: 2019-08-09 | Stop reason: HOSPADM

## 2019-08-08 RX ADMIN — ACETAMINOPHEN 325 MG: 325 TABLET, FILM COATED ORAL at 23:06

## 2019-08-08 RX ADMIN — ALBUTEROL SULFATE 1.25 MG: 2.5 SOLUTION RESPIRATORY (INHALATION) at 22:47

## 2019-08-08 RX ADMIN — FERROUS SULFATE TAB 325 MG (65 MG ELEMENTAL FE) 325 MG: 325 (65 FE) TAB at 21:49

## 2019-08-08 ASSESSMENT — ENCOUNTER SYMPTOMS
FATIGUE: 1
ABDOMINAL PAIN: 0
BLOOD IN STOOL: 0
CONSTIPATION: 0
FEVER: 0
WEAKNESS: 1
COUGH: 1
CHILLS: 0
VOMITING: 0

## 2019-08-08 ASSESSMENT — MIFFLIN-ST. JEOR: SCORE: 1100.4

## 2019-08-08 NOTE — ED NOTES
[facility]  ED Nurse Handoff Report    Keisha Godinez is a 87 year old female   ED Chief complaint: Abnormal Labs  . ED Diagnosis:   Final diagnoses:   Anemia due to other cause, not classified     Allergies:   Allergies   Allergen Reactions     Blood Transfusion Related (Informational Only) Other (See Comments)     Patient has a history of a clinically significant antibody against RBC antigens.  A delay in compatible RBCs may occur.     Lisinopril Other (See Comments)     Tongue swelling     Calcium Nausea and Vomiting     Egg Yolk      Flu Virus Vaccine      Allergic to eggs.     Keflex [Cephalexin] Nausea     Pt states she had upset stomach.  NOT tongue swelling.  She had tongue swelling with a combo bp med that she thinks included lisinopril.    Tolerates IV Cefazolin     Levaquin [Levofloxacin]      Sulfa Drugs      Zinc Nausea and Vomiting       Code Status: Full Code  Activity level - Baseline/Home:  Stand by Assist. Activity Level - Current:   Assist X 1. Lift room needed: No. Bariatric: No   Needed: No   Isolation: No. Infection: Not Applicable.     Vital Signs:   Vitals:    08/08/19 1715 08/08/19 1730 08/08/19 1800 08/08/19 1815   BP: (!) 143/47 134/50 133/49 137/65   Pulse: 65 58 54 58   Resp:       Temp:       TempSrc:       SpO2: 98% 98% 98% 99%   Weight:           Cardiac Rhythm:  ,      Pain level: 0-10 Pain Scale: 0  Patient confused: No. Patient Falls Risk: Yes.   Elimination Status: Has voided   Patient Report - Initial Complaint: generalized weakness, low hemoglobin. Focused Assessment: generalized weakness/fatigue for the past few days, low hemoglobin at clinic today, denies rectal bleeding or vomiting.   Tests Performed: Labs. Abnormal Results: hemoglobin 6.6.   Treatments provided: Blood transfusing.  Family Comments: here with daughter.   OBS brochure/video discussed/provided to patient:  Yes  ED Medications: Medications - No data to display  Drips infusing:   Yes  For the majority of the shift, the patient's behavior Green. Interventions performed were N/A.     Severe Sepsis OR Septic Shock Diagnosis Present: No      ED Nurse Name/Phone Number: Ying Cardenas,   6:34 PM    RECEIVING UNIT ED HANDOFF REVIEW    Above ED Nurse Handoff Report was reviewed: Yes  Reviewed by: Tere Horne on August 8, 2019 at 6:45 PM

## 2019-08-08 NOTE — ED TRIAGE NOTES
Pt has been feeling weak and sluggish recently, went to clinic and had hemoglobin of 6. Denies rectal bleeding or vomiting.  ABC's intact, alert and oriented x3.

## 2019-08-08 NOTE — TELEPHONE ENCOUNTER
Patient came in for labs today as she has had no energy and feels exhausted.  Hgb today 6.5, both patient and daughter Rebecca advised patient needs to go to ER now per Dr. Castillo covering for Dr. Eubanks.  Patient denies bloody/black/burgundy stools or any other known source of bleeding.  Rebecca will take patient to ER shortly.  NIMO Law R.N.  .

## 2019-08-08 NOTE — H&P
[facility]    History and Physical  Hospitalist     Date of Admission:  8/8/2019  Date of Service (when I saw the patient): 08/08/19  Provider: Sebas Hernandez MD      Chief Complaint   Weakness and shortness of breath      History is obtained from the patient, electronic health record and emergency department physician    History of Present Illness   Keisha Godinez is a 87 year old female who has a history of chronic anemia with full work up done   in the recent past in this institution. She has several other comorbidities as noted in the list below.  More significant being cardiomyopathy with normal LVEF, severe pHTN, TR, RV reduced systolic function and atrial fibrillation.  She presents with progressive weakness and shortness of breath on exertion, which has extremely worsened in the last 2 weeks.  She consulted her primary care physician and a routine hemoglobin is of 6.6 g.  She has been compliant with her iron supplement as well as B12.  She denies any blood losses (per rectum, urine or mouth).  She denies chest pain, abdominal pain or any other type pain.  No fever.  No urinary symptoms.  She is on oxygen supplementation at home nighttime for a total of 1.5 L and this has not changed.  She has chronic lower extremity edema though they have not changed.     Laboratory results:  CBC normal white count 7.4, normal differential except for lymphocytopenia.  Hemoglobin 6.6, hematocrit 22.5, platelet 250, RBC count 2.45, MCV and MCH within normal limits, MCHC 29.3.  Chemistry is normal except for luminal filtration rate of 52 (baseline for her values) glycemia 15.  UA with moderate leukocyte esterase, leukocyturia and bacteriuria.  Urine culture is in process.  EKG shows atrial fibrillation with controlled ventricular rate.    Past Medical History    I have reviewed this patient's medical history and updated it with pertinent information if needed.   Past Medical History:   Diagnosis Date      Anemia      Atrial fibrillation (H)      B12 deficiency      Cardiomyopathy (H)      CHF (congestive heart failure) (H)      Chronic edema     Lower extremities     Chronic systolic congestive heart failure (H)      CVA (cerebral vascular accident) (H) 2010    Acute Left MCA hemispheric CVA ( on coumadin)     Depression      Gastro-oesophageal reflux disease      GI bleed 03-20-15     Hyperlipidemia      Hypertension      Iron deficiency      MSSA (methicillin susceptible Staphylococcus aureus) 03-10-15     Pulmonary hypertension (H)      Shingles         Assessment & Plan   Keisha Godinez is a 87 year old female who presents with weakness and progressive shortness of breath, her hemoglobin on arrival is 6.6.  She is well known for chronic anemia currently on supplement with ferrous sulfate and B12.  On April of this year her hemoglobin was 9.1.  According to the patient she has had progressive shortness of breath on exertion and extreme weakness in the last 2 weeks.    1.  Acute on chronic anemia, very symptomatic.  It is concerning for the possibility of heart decompensation given her history of RV systolic malfunction, severe pulmonary hypertension, TR and atrial fibrillation.  - Admit to observation.  - 2 g sodium diet.  - Supplement oxygen per nasal cannula to keep oxygen saturation 92% or higher.  - Complete 2 PRBC units requested and initiated in the emergency department.  - Reassessment in the morning.  - Anticipate discharge tomorrow if no complications arise.    2.  Severe pulmonary hypertension/TR/RV systolic dysfunction/atrial fibrillation rate controlled without anticoagulation due to history of anemia and GI bleeding  -There is no evidence of decompensation at the moment of admission.  Plan is to resume all her home medication as prior to admission.  3.  Abnormal UA suspicious for UTI.    -  Patient denies lower urinary symptoms, she is afebrile and does not look toxemic.  I will not start antibiotic  and wait for urine culture results.  If she spikes fever overnight I will order antibiotic empirically.  4.  GERD.  -Omeprazole 20 mg daily.  5.  Hyperlipidemia.  -Simvastatin 10 mg daily.  6.  Depression.  Effexor X are 75 mg daily.      Code Status   Full Code    Primary Care Physician   Gerri Eubanks      Past Surgical History   I have reviewed this patient's surgical history and updated it with pertinent information if needed.  Past Surgical History:   Procedure Laterality Date     COLONOSCOPY  03/24/2015    Diverticulosis, polyps     ENTEROSCOPY SMALL BOWEL N/A 2/29/2016    Procedure: ENTEROSCOPY SMALL BOWEL;  Surgeon: Ady Ponce MD;  Location:  GI     ESOPHAGOSCOPY, GASTROSCOPY, DUODENOSCOPY (EGD), COMBINED N/A 3/22/2015    Upper GI- Normal, nothing significant found.      EXCISE MASS FOOT Right 7/6/2016    Procedure: EXCISE MASS FOOT;  Surgeon: Lee Jang DPM;  Location:  OR       Prior to Admission Medications   Prior to Admission Medications   Prescriptions Last Dose Informant Patient Reported? Taking?   ASPIRIN NOT PRESCRIBED (INTENTIONAL)   No No   Sig: Please choose reason not prescribed, below   Patient not taking: Reported on 7/23/2019   Cranberry Extract 250 MG TABS   Yes No   Sig: Take 250 mg by mouth daily   OMEGA-3 FATTY ACIDS PO  Daughter Yes No   Sig: Take 1,200 mg by mouth daily    SM STOOL SOFTENER 8.6-50 MG tablet   No No   Sig: Take 2 tablets daily as needed for constipation while taking prescription pain medications.   VITAMIN D, CHOLECALCIFEROL, PO  Self Yes No   Sig: Take 1,000 Units by mouth daily    acetaminophen (TYLENOL) 325 MG tablet   Yes No   Sig: Take 650 mg by mouth 3 times daily as needed for mild pain   albuterol (ACCUNEB) 1.25 MG/3ML nebulizer solution   Yes No   Sig: Take 1 vial by nebulization 3 times daily    clindamycin (CLEOCIN) 150 MG capsule   No No   Sig: Take 1 capsule (150 mg) by mouth 3 times daily   Patient not taking: Reported on  2019   ferrous sulfate (FEROSUL) 325 (65 Fe) MG tablet   No No   Sig: Take 1 tablet (325 mg) by mouth 2 times daily   gabapentin (NEURONTIN) 100 MG capsule   No No   Sig: TAKE 1 CAPSULE (100MG) BY MOUTH THREE TIMES DAILY   hydrALAZINE (APRESOLINE) 25 MG tablet   No No   Sig: TAKE 3 TABLETS BY MOUTH THREE TIMES DAILY   isosorbide mononitrate (IMDUR) 30 MG 24 hr tablet   No No   Sig: TAKE 2 TABLETS (60MG) BY MOUTH DAILY   omeprazole (PRILOSEC) 20 MG DR capsule   No No   Sig: TAKE 1 CAPSULE BY MOUTH DAILY   simvastatin (ZOCOR) 10 MG tablet   No No   Sig: TAKE 1 TABLET (10MG) BY MOUTH AT BEDTIME   spironolactone (ALDACTONE) 50 MG tablet   No No   Sig: TAKE 1 TABLET BY MOUTH DAILY   torsemide (DEMADEX) 20 MG tablet   No No   Sig: Take 1 tablet (20 mg) by mouth 2 times daily   venlafaxine (EFFEXOR-XR) 75 MG 24 hr capsule   No No   Sig: Take 1 capsule (75 mg) by mouth daily      Facility-Administered Medications: None     Allergies   Allergies   Allergen Reactions     Blood Transfusion Related (Informational Only) Other (See Comments)     Patient has a history of a clinically significant antibody against RBC antigens.  A delay in compatible RBCs may occur.     Lisinopril Other (See Comments)     Tongue swelling     Calcium Nausea and Vomiting     Egg Yolk      Flu Virus Vaccine      Allergic to eggs.     Keflex [Cephalexin] Nausea     Pt states she had upset stomach.  NOT tongue swelling.  She had tongue swelling with a combo bp med that she thinks included lisinopril.    Tolerates IV Cefazolin     Levaquin [Levofloxacin]      Sulfa Drugs      Zinc Nausea and Vomiting       Social History   I have personally reviewed the social history with the patient showing.  Social History     Tobacco Use     Smoking status: Former Smoker     Years: 1.00     Types: Cigarettes     Start date:      Last attempt to quit: 1956     Years since quittin.3     Smokeless tobacco: Never Used   Substance Use Topics     Alcohol  use: No     Alcohol/week: 0.0 oz     Family History   I have reviewed this patient's family history and it is not contributory to the admission .       Review of Systems   Except as noted in the HPI, a 12-system Review of Systems was found to be negative.    Physical Exam   Vital Signs with Ranges  Temp:  [98.9  F (37.2  C)] 98.9  F (37.2  C)  Pulse:  [54-70] 58  Heart Rate:  [70] 70  Resp:  [16] 16  BP: (123-143)/(46-65) 137/65  SpO2:  [92 %-99 %] 99 %  151 lbs 0 oz    GEN:  Alert, oriented x 3, appears comfortable, NAD.  HEENT:  Normocephalic/atraumatic, no scleral icterus, no nasal discharge, mouth moist.  CV:  IRIR, no murmur or JVD.  MILTON III/VI low LSB.  LUNGS:  Clear to auscultation bilaterally without rales/rhonchi/wheezing/retractions.  Symmetric chest rise on inhalation noted.  ABD:  Active bowel sounds, soft, non-tender/non-distended.  No rebound/guarding/rigidity.  EXT:  BLE edema wrapped. No joint synovitis noted.  SKIN:  Dry to touch, no exanthems noted in the visualized areas.       Data   I personally reviewed the EKG tracing showing AF with CVR HR 62.  Results for orders placed or performed during the hospital encounter of 08/08/19 (from the past 24 hour(s))   CBC + differential   Result Value Ref Range    WBC 7.4 4.0 - 11.0 10e9/L    RBC Count 2.45 (L) 3.8 - 5.2 10e12/L    Hemoglobin 6.6 (LL) 11.7 - 15.7 g/dL    Hematocrit 22.5 (L) 35.0 - 47.0 %    MCV 92 78 - 100 fl    MCH 26.9 26.5 - 33.0 pg    MCHC 29.3 (L) 31.5 - 36.5 g/dL    RDW 14.6 10.0 - 15.0 %    Platelet Count 250 150 - 450 10e9/L    Diff Method Automated Method     % Neutrophils 77.6 %    % Lymphocytes 8.5 %    % Monocytes 9.4 %    % Eosinophils 3.2 %    % Basophils 0.9 %    % Immature Granulocytes 0.4 %    Nucleated RBCs 0 0 /100    Absolute Neutrophil 5.7 1.6 - 8.3 10e9/L    Absolute Lymphocytes 0.6 (L) 0.8 - 5.3 10e9/L    Absolute Monocytes 0.7 0.0 - 1.3 10e9/L    Absolute Eosinophils 0.2 0.0 - 0.7 10e9/L    Absolute Basophils 0.1 0.0  - 0.2 10e9/L    Abs Immature Granulocytes 0.0 0 - 0.4 10e9/L    Absolute Nucleated RBC 0.0    Comprehensive metabolic panel   Result Value Ref Range    Sodium 136 133 - 144 mmol/L    Potassium 4.1 3.4 - 5.3 mmol/L    Chloride 101 94 - 109 mmol/L    Carbon Dioxide 30 20 - 32 mmol/L    Anion Gap 5 3 - 14 mmol/L    Glucose 115 (H) 70 - 99 mg/dL    Urea Nitrogen 29 7 - 30 mg/dL    Creatinine 0.98 0.52 - 1.04 mg/dL    GFR Estimate 52 (L) >60 mL/min/[1.73_m2]    GFR Estimate If Black 60 (L) >60 mL/min/[1.73_m2]    Calcium 8.7 8.5 - 10.1 mg/dL    Bilirubin Total 0.5 0.2 - 1.3 mg/dL    Albumin 3.4 3.4 - 5.0 g/dL    Protein Total 7.0 6.8 - 8.8 g/dL    Alkaline Phosphatase 63 40 - 150 U/L    ALT 19 0 - 50 U/L    AST 19 0 - 45 U/L   ABO/Rh type and screen   Result Value Ref Range    Units Ordered 2     ABO O     RH(D) Pos     Antibody Screen Pos (A)     Test Valid Only At [facility]        Specimen Expires 08/11/2019     Crossmatch Red Blood Cells    Blood component   Result Value Ref Range    Unit Number [id number]     Blood Component Type Red Blood Cells Leukocyte Reduced     Division Number 00     Status of Unit Ready for patient 08/08/2019 1825     Blood Product Code P8083Y43     Unit Status BLANCA    Blood component   Result Value Ref Range    Unit Number [id number]     Blood Component Type Red Blood Cells Leukocyte Reduced     Division Number 00     Status of Unit Ready for patient 08/08/2019 1825     Blood Product Code G3557I43     Unit Status BLANCA    EKG 12 lead   Result Value Ref Range    Interpretation ECG Click View Image link to view waveform and result        Disclaimer: This note consists of symbols derived from keyboarding, dictation and/or voice recognition software. As a result, there may be errors in the script that have gone undetected. Please consider this when interpreting information found in this chart.

## 2019-08-08 NOTE — ED PROVIDER NOTES
History     Chief Complaint:  Abnormal Labs    HPI   Keisha Godinez is a 87 year old female with a history of anemia and blood transfusions (>1 year) who presents to the emergency department today for evaluation of abnormal labs. The patient was seen in clinic today for feeling weak and fatigued recently, and was found to have a HGB of 6. She denies any rectal bleeding, vomiting, fever, chills, abdominal pain, constipation. She has an occasional cough. She last passed stool this morning. It was dark but is on iron supplements. She has no urinary symptoms. She has chronic leg swelling. She is on oxygen here and notes that she normally is just on oxygen at night and has not had to change this recently.    Allergies:  Blood Transfusion Related  Lisinopril  Calcium  Egg Yolk  Flu Virus Vaccine  Keflex  Levaquin  Sulfa Drugs  Zinc    Medications:    acetaminophen  albuterol nebulizer solution  ferrous sulfate  gabapentin   hydralazine  isosorbide mononitrate  omeprazole   simvastatin  Stool Softener  spironolactone   torsemide   venlafaxine     Past Medical History:    Anemia  A-fib  CHF  Chronic edema  CVA  GERD  GI bleed  HLD  HTN  Iron deficiency    Past Surgical History:    Colonoscopy  Small bowel enteroscopy  EGD    Family History:    History reviewed. No pertinent family history.    Social History:  The patient was accompanied to the ED by her daughter.  Smoking Status: Former Smoker  Smokeless Tobacco: Never Used  Alcohol Use: Negative    Marital Status:      Review of Systems   Constitutional: Positive for fatigue. Negative for chills and fever.   Respiratory: Positive for cough (occasional).    Cardiovascular: Positive for leg swelling (chronic). Negative for chest pain.   Gastrointestinal: Negative for abdominal pain, blood in stool, constipation and vomiting.   Genitourinary: Negative.    Neurological: Positive for weakness.   All other systems reviewed and are negative.    Physical Exam  "    Patient Vitals for the past 24 hrs:   BP Temp Temp src Pulse Heart Rate Resp SpO2 Height Weight   08/08/19 1926 (!) 157/43 99.3  F (37.4  C) Oral -- 64 16 97 % 1.6 m (5' 3\") 69.6 kg (153 lb 8 oz)   08/08/19 1856 -- 98.6  F (37  C) Oral -- -- 18 -- -- --   08/08/19 1854 (!) 141/54 -- -- 56 -- -- 96 % -- --   08/08/19 1845 (!) 160/59 -- -- 61 -- -- 98 % -- --   08/08/19 1843 (!) 156/51 98.5  F (36.9  C) -- 58 -- 18 -- -- --   08/08/19 1815 137/65 -- -- 58 -- -- 99 % -- --   08/08/19 1800 133/49 -- -- 54 -- -- 98 % -- --   08/08/19 1730 134/50 -- -- 58 -- -- 98 % -- --   08/08/19 1715 (!) 143/47 -- -- 65 -- -- 98 % -- --   08/08/19 1700 137/46 -- -- 64 -- -- 98 % -- --   08/08/19 1646 123/52 98.9  F (37.2  C) Oral 70 70 16 92 % -- 68.5 kg (151 lb)      Physical Exam  Constitutional: Patient is well appearing. No distress.  Head: Atraumatic.  Mouth/Throat: Oropharynx is clear and moist. No oropharyngeal exudate.  Eyes: Conjunctivae and EOM are normal. No scleral icterus.  Neck: Normal range of motion. Neck supple.   Cardiovascular: Normal rate, regular rhythm, normal heart sounds and intact distal pulses.   Pulmonary/Chest: Breath sounds normal. No respiratory distress.  Abdominal: Soft. Bowel sounds are normal. No distension. No tenderness. No rebound or guarding.   Musculoskeletal: Normal range of motion. No tenderness. Chronic lower extremity lymphedema  Neurological: Alert and orientated to person, place, and time. No observable focal neuro deficit  Skin: Warm and dry. No rash noted. Not diaphoretic.      Emergency Department Course     ECG:  ECG taken at 1712, ECG read at 1717  Atrial fibrillation  Nonspecific ST abnormality  No significant change from prior ECG June, 2018  Rate 62 bpm. WI interval * ms. QRS duration 98 ms. QT/QTc 464/470 ms. P-R-T axes * 56 49.    Laboratory:  Laboratory findings were communicated with the patient who voiced understanding of the findings.    ABO/Rh: O positive, antibody " positive  CBC: WBC 7.4, HGB 6.6,   BMP: Glucose 115, GR 52, o/w WNL (Creatinine 0.98)    Interventions:  Blood unit: #[id number]  Blood unit: #[id number]     Emergency Department Course:    1700 Nursing notes and vitals reviewed.    1705 I performed an exam of the patient as documented above.     1655 IV was inserted and blood was drawn for laboratory testing, results above.     1820 I spoke with Dr. Hernandez of the Hospitalist service regarding patient's presentation, findings, and plan of care.     1830 I personally reviewed the lab results with the family and answered all related questions prior to admission.    Impression & Plan      Medical Decision Making:  Keisha Godinez is a 87 year old female who presents to the emergency department today for evaluation of weakness with anemia needs blood transfusion and obs admission for further workup and mgmt.     Diagnosis:    ICD-10-CM    1. Anemia due to other cause, not classified D64.89 ABO/Rh type and screen     Blood component     Blood component     Blood component     Blood component     Disposition:   Admission    Scribe Disclosure:  I, Ady Roberson, am serving as a scribe at 5:04 PM on 8/8/2019 to document services personally performed by Karlo Hair MD based on my observations and the provider's statements to me.    [facility] EMERGENCY DEPARTMENT       Karlo Hair MD  08/08/19 [id number]

## 2019-08-09 ENCOUNTER — TELEPHONE (OUTPATIENT)
Facility: CLINIC | Age: 84
End: 2019-08-09

## 2019-08-09 VITALS
OXYGEN SATURATION: 90 % | HEART RATE: 53 BPM | BODY MASS INDEX: 27.2 KG/M2 | TEMPERATURE: 98.4 F | WEIGHT: 153.5 LBS | DIASTOLIC BLOOD PRESSURE: 39 MMHG | HEIGHT: 63 IN | SYSTOLIC BLOOD PRESSURE: 131 MMHG | RESPIRATION RATE: 16 BRPM

## 2019-08-09 LAB
ALBUMIN SERPL-MCNC: 3.4 G/DL (ref 3.4–5)
ALP SERPL-CCNC: 59 U/L (ref 40–150)
ALT SERPL W P-5'-P-CCNC: 22 U/L (ref 0–50)
ANION GAP SERPL CALCULATED.3IONS-SCNC: 7 MMOL/L (ref 3–14)
AST SERPL W P-5'-P-CCNC: 18 U/L (ref 0–45)
BACTERIA SPEC CULT: ABNORMAL
BILIRUB SERPL-MCNC: 0.3 MG/DL (ref 0.2–1.3)
BUN SERPL-MCNC: 30 MG/DL (ref 7–30)
CALCIUM SERPL-MCNC: 8.6 MG/DL (ref 8.5–10.1)
CHLORIDE SERPL-SCNC: 101 MMOL/L (ref 94–109)
CO2 SERPL-SCNC: 28 MMOL/L (ref 20–32)
CREAT SERPL-MCNC: 1 MG/DL (ref 0.52–1.04)
ERYTHROCYTE [DISTWIDTH] IN BLOOD BY AUTOMATED COUNT: 14 % (ref 10–15)
GFR SERPL CREATININE-BSD FRML MDRD: 50 ML/MIN/{1.73_M2}
GLUCOSE SERPL-MCNC: 97 MG/DL (ref 70–99)
HCT VFR BLD AUTO: 28.5 % (ref 35–47)
HGB BLD-MCNC: 8.4 G/DL (ref 11.7–15.7)
INTERPRETATION ECG - MUSE: NORMAL
IRON SATN MFR SERPL: 3 % (ref 15–46)
IRON SERPL-MCNC: 15 UG/DL (ref 35–180)
MCH RBC QN AUTO: 27.5 PG (ref 26.5–33)
MCHC RBC AUTO-ENTMCNC: 29.5 G/DL (ref 31.5–36.5)
MCV RBC AUTO: 93 FL (ref 78–100)
PLATELET # BLD AUTO: 188 10E9/L (ref 150–450)
POTASSIUM SERPL-SCNC: 4.6 MMOL/L (ref 3.4–5.3)
PROT SERPL-MCNC: 6.7 G/DL (ref 6.8–8.8)
RBC # BLD AUTO: 3.06 10E12/L (ref 3.8–5.2)
SODIUM SERPL-SCNC: 136 MMOL/L (ref 133–144)
SPECIMEN SOURCE: ABNORMAL
TIBC SERPL-MCNC: 432 UG/DL (ref 240–430)
VIT B12 SERPL-MCNC: 469 PG/ML (ref 193–986)
WBC # BLD AUTO: 6.3 10E9/L (ref 4–11)

## 2019-08-09 PROCEDURE — 83540 ASSAY OF IRON: CPT | Performed by: PHYSICIAN ASSISTANT

## 2019-08-09 PROCEDURE — 94640 AIRWAY INHALATION TREATMENT: CPT

## 2019-08-09 PROCEDURE — 25000125 ZZHC RX 250: Performed by: HOSPITALIST

## 2019-08-09 PROCEDURE — 99217 ZZC OBSERVATION CARE DISCHARGE: CPT | Performed by: PHYSICIAN ASSISTANT

## 2019-08-09 PROCEDURE — 36415 COLL VENOUS BLD VENIPUNCTURE: CPT | Performed by: HOSPITALIST

## 2019-08-09 PROCEDURE — G0378 HOSPITAL OBSERVATION PER HR: HCPCS

## 2019-08-09 PROCEDURE — 85027 COMPLETE CBC AUTOMATED: CPT | Performed by: HOSPITALIST

## 2019-08-09 PROCEDURE — 40000275 ZZH STATISTIC RCP TIME EA 10 MIN

## 2019-08-09 PROCEDURE — 25000132 ZZH RX MED GY IP 250 OP 250 PS 637: Performed by: HOSPITALIST

## 2019-08-09 RX ADMIN — MELATONIN 1000 UNITS: at 08:16

## 2019-08-09 RX ADMIN — OMEPRAZOLE 20 MG: 20 CAPSULE, DELAYED RELEASE ORAL at 08:16

## 2019-08-09 RX ADMIN — VENLAFAXINE HYDROCHLORIDE 75 MG: 75 CAPSULE, EXTENDED RELEASE ORAL at 08:15

## 2019-08-09 RX ADMIN — FERROUS SULFATE TAB 325 MG (65 MG ELEMENTAL FE) 325 MG: 325 (65 FE) TAB at 08:16

## 2019-08-09 RX ADMIN — SPIRONOLACTONE 50 MG: 25 TABLET ORAL at 08:15

## 2019-08-09 RX ADMIN — TORSEMIDE 20 MG: 20 TABLET ORAL at 08:18

## 2019-08-09 RX ADMIN — ALBUTEROL SULFATE 1.25 MG: 2.5 SOLUTION RESPIRATORY (INHALATION) at 08:29

## 2019-08-09 NOTE — PLAN OF CARE
PRIMARY DIAGNOSIS: ANEMIA     OUTPATIENT/OBSERVATION GOALS TO BE MET BEFORE DISCHARGE  1. Orthostatic performed: N/A    2. Stable Hgb No.   Recent Labs   Lab Test 08/08/19  1654 08/08/19  1351 04/22/19  1545   HGB 6.6* 6.5* 9.1*         3. Resolved or declined bleeding episodes: No bleeding     4. Appropriate testing complete: N/A    5. Cleared for discharge by consultants (if involved): No    6. Safe discharge environment identified: Yes    Patient alert and oriented x4. Vitals are Temp: 98.2  F (36.8  C) Temp src: Oral BP: (!) 156/40 Pulse: 54 Heart Rate: 53 Resp: 16 SpO2: 97 % 1 LPM. Denies pain. Blood transfusion complete. IV saline locked. To consult with PT, OT, SW, CC. Bilateral legs wrapped for edema. Tolerating low sodium diet. Up with assist of 1 with walker. Plan to assist with symptom management and recheck Hgb.     Discharge Planner Nurse   Safe discharge environment identified: Yes  Barriers to discharge: No       Entered by: Luli Figueroa 08/09/2019      Please review provider order for any additional goals.   Nurse to notify provider when observation goals have been met and patient is ready for discharge.

## 2019-08-09 NOTE — CONSULTS
No SW needs identified for discharge planning. Pt ambulated this morning with RN. Discharge orders completed for home with dtr. Please re-consult if needed.

## 2019-08-09 NOTE — PLAN OF CARE
Patient's After Visit Summary was reviewed with patient.   Patient verbalized understanding of After Visit Summary, recommended follow up and was given an opportunity to ask questions.   Discharge medications sent home with patient/family: No   Discharged with daughter     OBSERVATION patient END time: 12:30 pm    VSS. Wheelchair ride provided.

## 2019-08-09 NOTE — PLAN OF CARE
PRIMARY DIAGNOSIS: Acute on Chronic Anemia     OUTPATIENT/OBSERVATION GOALS TO BE MET BEFORE DISCHARGE  Orthostatic performed:  No           Stable Hgb: Yes  Recent Labs   Lab Test 08/09/19  0649 08/08/19  1654 08/08/19  1351   HGB 8.4* 6.6* 6.5*           Appropriate testing complete: Yes    Cleared for discharge by consultants (if involved): No    Safe discharge environment identified: Yes    Discharge Planner Nurse   Safe discharge environment identified: Yes  Barriers to discharge: Yes       Entered by: JAJA GUEVARA 08/09/2019      Patient received 2 units of blood packs last night. Hgb this morning 8.4. Denies fatigue and SOB. O2 93% in room air. Ambulates with a walker and minimal assist of x1. Plan is to be seen by PT this morning.   Please review provider order for any additional goals.   Nurse to notify provider when observation goals have been met and patient is ready for discharge.

## 2019-08-09 NOTE — DISCHARGE SUMMARY
[facility]  Discharge Summary        Keisha Gdoinez MRN# [id number]   YOB: 1932 Age: 87 year old     Date of Admission:  8/8/2019  Date of Discharge:  8/9/2019  1:02 PM  Admitting Physician:  Sebas Hernandez MD  Discharge Physician: Yanira Frankel PA-C  Discharging Service: Hospitalist     Primary Provider: Gerri Eubanks  Primary Care Physician Phone Number: [phone]         Discharge Diagnoses/Problem Oriented Hospital Course (Providers):    Keisha Godinez was admitted on 8/8/2019 by Sebas Hernandez MD and I would refer you to their history and physical.  The following problems were addressed during her hospitalization:    1.  Symptomatic anemia-no obvious bleeding during hospitalization  2.  Status post 2 units packed red blood cell transfusion due to #1  3.  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  Known iron deficiency anemia, compliant with iron supplements per patient.  5.  Current iron studies pending, depending on results if still low, may need iron infusions.         Code Status:      Full Code        Brief Hospital Stay Summary Sent Home With Patient in AVS:        Reason for your hospital stay      You were admitted for concerns of symptomatic anemia. No obvious bleeding   was found on exam. You were transfused 2u pack Red Blood Cell with   improved symptoms. You are safe for discharge but we do have some labs   pending for you that can be followed up in clinic. You will also need to   follow up with [facility] if you have recurrent anemia ellen in the   setting of past slow bleeding from your small intestines.       Keisha Godinez is a 87 year old female who has a history of chronic anemia with full work up done   in the recent past in this institution. She has several other comorbidities as noted in the list below.  More significant being cardiomyopathy with normal LVEF, severe pHTN, TR, RV reduced systolic function and atrial  fibrillation.  She presents with progressive weakness and shortness of breath on exertion, which has extremely worsened in the last 2 weeks.  She consulted her primary care physician and a routine hemoglobin is of 6.6 g.  She has been compliant with her iron supplement as well as B12.  She denies any blood losses (per rectum, urine or mouth).  She denies chest pain, abdominal pain or any other type pain.  No fever.  No urinary symptoms.  She is on oxygen supplementation at home nighttime for a total of 1.5 L and this has not changed.  She has chronic lower extremity edema though they have not changed.     And chart review patient does have a history of symptomatic anemia.  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  Please see full enteroscopy report in epic.    Patient was admitted to the observation unit.  Her initial hemoglobin was 6.6.  She received 2 units packed red blood cell transfusion with improvement of her hemoglobin to 8.4.  The next day the patient was completely asymptomatic and was wanting to go home.  She remains without any evidence of GI bleeding, no hematemesis.    The patient was stable for discharge, however iron studies were still pending at the time of discharge along with her B12 folate studies.  She will need to follow-up with her primary care provider in 1 week.  If she continues to have low iron studies despite being compliant on iron supplement, she might need to transition to a iron infusion.  Furthermore if she has declining hemoglobin, I would asked her to follow with [facility] for repeat enteroscopy.  This was discussed with her family, the patient was discharged in stable condition.           Important Results:      No results for input(s): WBC, HGB, HCT, MCV, PLT in the last 168 hours.  No results for input(s): NA,  POTASSIUM, CHLORIDE, CO2, ANIONGAP, GLC, BUN, CR, GFRESTIMATED, GFRESTBLACK, CASPER in the last 168 hours.           Pending Results:        Unresulted Labs Ordered in the Past 30 Days of this Admission     Date and Time Order Name Status Description    8/8/2019 1353 URINE CULTURE AEROBIC BACTERIAL Preliminary             Discharge Instructions and Follow-Up:      Follow-up Appointments     Follow-up and recommended labs and tests       Follow up with primary care provider, Gerri Eubanks, within 7 days   for hospital follow- up.  The following labs/tests are recommended:   Hemoglobin.  Also follow up with [facility] for anemia follow up and   consideration for endoscopy/colonoscopy/SB enteroscopy in the setting of   previous GIB from angiectasia.               Discharge Disposition:      Discharged to home         Discharge Medications:        Current Discharge Medication List      CONTINUE these medications which have NOT CHANGED    Details   acetaminophen (TYLENOL) 325 MG tablet Take 650 mg by mouth 3 times daily as needed for mild pain      albuterol (ACCUNEB) 1.25 MG/3ML nebulizer solution Take 1 vial by nebulization 3 times daily       Cranberry Extract 250 MG TABS Take 250 mg by mouth daily      ferrous sulfate (FEROSUL) 325 (65 Fe) MG tablet Take 1 tablet (325 mg) by mouth 2 times daily  Qty: 100 tablet, Refills: 11    Associated Diagnoses: Iron deficiency      gabapentin (NEURONTIN) 100 MG capsule TAKE 1 CAPSULE (100MG) BY MOUTH THREE TIMES DAILY  Qty: 270 capsule, Refills: 0    Associated Diagnoses: Midline thoracic back pain, unspecified chronicity      hydrALAZINE (APRESOLINE) 25 MG tablet TAKE 3 TABLETS BY MOUTH THREE TIMES DAILY  Qty: 810 tablet, Refills: 3    Associated Diagnoses: Hyperlipidemia, unspecified hyperlipidemia type; Cardiomyopathy, unspecified type (H)      OMEGA-3 FATTY ACIDS PO Take 1,200 mg by mouth daily       omeprazole (PRILOSEC) 20 MG DR capsule TAKE 1 CAPSULE BY MOUTH  DAILY  Qty: 90 capsule, Refills: 0    Associated Diagnoses: Gastroesophageal reflux disease without esophagitis      simvastatin (ZOCOR) 10 MG tablet TAKE 1 TABLET (10MG) BY MOUTH AT BEDTIME  Qty: 90 tablet, Refills: 0    Associated Diagnoses: Hyperlipidemia, unspecified hyperlipidemia type      SM STOOL SOFTENER 8.6-50 MG tablet Take 2 tablets daily as needed for constipation while taking prescription pain medications.  Qty: 30 tablet, Refills: 0    Associated Diagnoses: S/P foot surgery, right      spironolactone (ALDACTONE) 50 MG tablet TAKE 1 TABLET BY MOUTH DAILY  Qty: 30 tablet, Refills: 7    Associated Diagnoses: Acute on chronic diastolic congestive heart failure (H)      torsemide (DEMADEX) 20 MG tablet Take 1 tablet (20 mg) by mouth 2 times daily  Qty: 180 tablet, Refills: 0    Associated Diagnoses: Chronic diastolic congestive heart failure (H)      venlafaxine (EFFEXOR-XR) 75 MG 24 hr capsule Take 1 capsule (75 mg) by mouth daily  Qty: 90 capsule, Refills: 0    Associated Diagnoses: Major depressive disorder, recurrent episode, in partial remission (H)      VITAMIN D, CHOLECALCIFEROL, PO Take 1,000 Units by mouth daily       ASPIRIN NOT PRESCRIBED (INTENTIONAL) Please choose reason not prescribed, below    Associated Diagnoses: Chronic atrial fibrillation (H); Dilated cardiomyopathy (H)               Allergies:         Allergies   Allergen Reactions     Blood Transfusion Related (Informational Only) Other (See Comments)     Patient has a history of a clinically significant antibody against RBC antigens.  A delay in compatible RBCs may occur.     Lisinopril Other (See Comments)     Tongue swelling     Calcium Nausea and Vomiting     Egg Yolk      Flu Virus Vaccine      Allergic to eggs.     Keflex [Cephalexin] Nausea     Pt states she had upset stomach.  NOT tongue swelling.  She had tongue swelling with a combo bp med that she thinks included lisinopril.    Tolerates IV Cefazolin     Levaquin  "[Levofloxacin]      Sulfa Drugs      Zinc Nausea and Vomiting           Consultations This Hospital Stay:      No consultations were requested during this admission         Condition and Physical on Discharge:      Discharge condition: Stable   Vitals: Blood pressure (!) 147/48, pulse 53, temperature 98.3  F (36.8  C), temperature source Oral, resp. rate 16, height 1.6 m (5' 3\"), weight 69.6 kg (153 lb 8 oz), SpO2 93 %, not currently breastfeeding.  153 lbs 8 oz      GENERAL:  Comfortable.  PSYCH: pleasant, oriented, No acute distress.  HEENT:  PERRLA. Normal conjunctiva, normal hearing, nasal mucosa and Oropharynx are normal.  NECK:  Supple, no neck vein distention, adenopathy or bruits, normal thyroid.  HEART:  Normal S1, S2 with no murmur, no pericardial rub, gallops or S3 or S4.  LUNGS:  Clear to auscultation, normal Respiratory effort. No wheezing, rales or ronchi.  ABDOMEN:  Soft, no hepatosplenomegaly, normal bowel sounds. Non-tender, non distended.   EXTREMITIES:  No pedal edema, +2 pulses bilateral and equal.  SKIN:  Dry to touch, No rash, wound or ulcerations.  NEUROLOGIC:  CN 2-12 intact, BL 5/5 symmetric upper and lower extremity strength, sensation is intact with no focal deficits.         Discharge Time:      <30 mins        Image Results From This Hospital Stay (For Non-EPIC Providers):        Results for orders placed or performed during the hospital encounter of 06/18/18   Chest  XR, 1 view PORTABLE    Narrative    XR CHEST PORT 1 VW 6/18/2018 5:26 PM    HISTORY: Anemia.    COMPARISON: June 18, 2018.      Impression    IMPRESSION: Mild diffuse interstitial prominence with blunting of the  bilateral costophrenic angles, suggestive of venous congestion with  small bilateral effusions. No pneumothorax appreciated. Stable  cardiomegaly.    MCKINLEY SUNG MD   US Lower Extremity Venous Duplex Bilateral    Narrative    US LOWER EXTREMITY VENOUS DUPLEX BILATERAL 6/18/2018 8:56 PM     HISTORY: Rule out DVT.  "     TECHNIQUE: Venous Doppler US of bilateral lower extremities.? Color  flow and spectral Doppler with waveform analysis performed.    COMPARISON: None.    FINDINGS: Ultrasound of both legs demonstrates no deep vein thrombus  from the common femoral through popliteal veins or in the visualized  segments of posterior tibial or peroneal veins in the calves.  Subcutaneous edema in both lower extremities.      Impression    IMPRESSION: No DVT identified in either leg.    ANGELI BEAUCHAMP MD   CT Chest w Contrast    Narrative    CT CHEST WITH CONTRAST 6/22/2018 3:49 PM     HISTORY: Hypoxia, dyspnea, elevated D-dimer.      COMPARISON: CT Chest 10/27/2017.    TECHNIQUE: Thin section axial images are performed from the thoracic  inlet to the lung bases utilizing 64 mL of Isovue 370 IV contrast  without adverse event. Coronal reformatted images are also generated.  Radiation dose for this scan was reduced using automated exposure  control, adjustment of the mA and/or kV according to patient size, or  iterative reconstruction technique.    FINDINGS:     Chest: A few calcified granulomas are noted in each lung. Scattered  areas of interlobular septal thickening are noted in the mid and lower  lungs along with a small right and trace left pleural effusion. Heart  is enlarged. Findings suggest early changes of congestive heart  failure. Hiatal hernia is present. Esophagus is otherwise  unremarkable. Thyroid gland is normal in size. No enlarged axillary or  intrathoracic lymph nodes. A few small mediastinal lymph nodes are  likely reactive in nature. There is no evidence of pulmonary embolism.  Thoracic aorta demonstrates scattered calcified plaque without  aneurysm. Limited images upper abdomen demonstrate a probable complex  cyst upper pole left kidney of doubtful clinical significance. This  area was not included on prior chest CTs. Degenerative spine changes  are noted. Old impacted proximal right humeral fracture is  again  noted.      Impression    IMPRESSION: No evidence of pulmonary embolism. Probable early changes  of congestive heart failure with cardiomegaly, pleural effusions and  mild interlobular septal thickening.    RJ CHAPPELL MD

## 2019-08-09 NOTE — PROGRESS NOTES
ROOM # [id number]    Living Situation (if not independent, order SW consult): Home with daughter (caregiver)  Facility name:  : Rebecca Malhotra, daughter    Activity level at baseline: Ind with walker and uses WC. Daughter helps with bed mobility   Activity level on admit: Ax1 with walker       Patient registered to observation; given Patient Bill of Rights; given the opportunity to ask questions about observation status and their plan of care.  Patient has been oriented to the observation room, bathroom and call light is in place.    Discussed discharge goals and expectations with patient/family.

## 2019-08-09 NOTE — PHARMACY-ADMISSION MEDICATION HISTORY
Admission medication history interview status for this patient is complete. See [facility] admission navigator for allergy information, prior to admission medications and immunization status.     Medication history interview source(s):Patient  Medication history resources (including written lists, pill bottles, clinic record):EPIC    Changes made to PTA medication list:  Added: none  Deleted: Imdur, Clindamycin  Changed: none    Medication reconciliation/reorder completed by provider prior to medication history? Yes    Do you take OTC medications (eg tylenol, ibuprofen, fish oil, eye/ear drops, etc)? Y(Y/N)    For patients on insulin therapy: N (Y/N)  Lantus/levemir/NPH/Mix 70/30 dose:   (Y/N) (see Med list for doses)   Sliding scale Novolog Y/N  If Yes, do you have a baseline novolog pre-meal dose:  units with meals  Patients eat three meals a day:   Y/N    How many episodes of hypoglycemia do you have per week: _______  How many missed doses do you have per week: ______  How many times do you check your blood glucose per day: _______  Do you have a Continuous glucose monitor (CGM)   Y/N (remind pt that not approved for hospital use)   Any Barriers to therapy - Be specific :  cost of medications, comfortable with giving injections (if applicable), comfortable and confident with current diabetes regimen: Y/N ______________      Prior to Admission medications    Medication Sig Last Dose Taking? Auth Provider   acetaminophen (TYLENOL) 325 MG tablet Take 650 mg by mouth 3 times daily as needed for mild pain  Yes Unknown, Entered By History   albuterol (ACCUNEB) 1.25 MG/3ML nebulizer solution Take 1 vial by nebulization 3 times daily  8/8/2019 at am Yes Unknown, Entered By History   Cranberry Extract 250 MG TABS Take 250 mg by mouth daily 8/8/2019 at Unknown time Yes Doctor, Sharonda, MD   ferrous sulfate (FEROSUL) 325 (65 Fe) MG tablet Take 1 tablet (325 mg) by mouth 2 times daily 8/8/2019 at am Yes Darryl Reed MD    gabapentin (NEURONTIN) 100 MG capsule TAKE 1 CAPSULE (100MG) BY MOUTH THREE TIMES DAILY 8/8/2019 at am Yes Gerri Eubanks MD   hydrALAZINE (APRESOLINE) 25 MG tablet TAKE 3 TABLETS BY MOUTH THREE TIMES DAILY 8/8/2019 at am Yes Darryl Reed MD   OMEGA-3 FATTY ACIDS PO Take 1,200 mg by mouth daily  8/8/2019 at am Yes Reported, Patient   omeprazole (PRILOSEC) 20 MG DR capsule TAKE 1 CAPSULE BY MOUTH DAILY 8/8/2019 at am Yes Gerri Eubanks MD   simvastatin (ZOCOR) 10 MG tablet TAKE 1 TABLET (10MG) BY MOUTH AT BEDTIME 8/7/2019 at Unknown time Yes Gerri Eubanks MD   SM STOOL SOFTENER 8.6-50 MG tablet Take 2 tablets daily as needed for constipation while taking prescription pain medications.  Yes Darryl Reed MD   spironolactone (ALDACTONE) 50 MG tablet TAKE 1 TABLET BY MOUTH DAILY 8/8/2019 at am Yes Mallika Hammonds, NP   torsemide (DEMADEX) 20 MG tablet Take 1 tablet (20 mg) by mouth 2 times daily 8/8/2019 at am Yes Suad Murrell PA   venlafaxine (EFFEXOR-XR) 75 MG 24 hr capsule Take 1 capsule (75 mg) by mouth daily 8/8/2019 at am Yes Gerri Eubanks MD   VITAMIN D, CHOLECALCIFEROL, PO Take 1,000 Units by mouth daily  8/8/2019 at am Yes Reported, Patient   ASPIRIN NOT PRESCRIBED (INTENTIONAL) Please choose reason not prescribed, below  Patient not taking: Reported on 7/23/2019   Gerri Eubanks MD

## 2019-08-09 NOTE — PLAN OF CARE
PRIMARY DIAGNOSIS: ANEMIA     OUTPATIENT/OBSERVATION GOALS TO BE MET BEFORE DISCHARGE  Orthostatic performed: N/A    Stable Hgb No.   Recent Labs   Lab Test 08/08/19  1654 08/08/19  1351 04/22/19  1545   HGB 6.6* 6.5* 9.1*         Resolved or declined bleeding episodes: No bleeding     Appropriate testing complete: N/A    Cleared for discharge by consultants (if involved): No    Safe discharge environment identified: Yes    Patient alert and oriented x4. Vitals are Temp: 97  F (36.1  C) Temp src: Oral BP: (!) 117/34 Pulse: 51 Heart Rate: 53 Resp: 16 SpO2: 98 % 1 LPM. Denies pain. Second unit of blood transfusing. To consult with PT, OT, SW, CC. Bilateral legs wrapped for edema. Plan to assist with symptom management and recheck Hgb.     Discharge Planner Nurse   Safe discharge environment identified: Yes  Barriers to discharge: No       Entered by: Luli Figueroa 08/09/2019      Please review provider order for any additional goals.   Nurse to notify provider when observation goals have been met and patient is ready for discharge.

## 2019-08-09 NOTE — PLAN OF CARE
PRIMARY DIAGNOSIS: ANEMIA     OUTPATIENT/OBSERVATION GOALS TO BE MET BEFORE DISCHARGE  Orthostatic performed: N/A    Stable Hgb No.   Recent Labs   Lab Test 08/08/19  1654 08/08/19  1351 04/22/19  1545   HGB 6.6* 6.5* 9.1*         Resolved or declined bleeding episodes: No bleeding     Appropriate testing complete: N/A    Cleared for discharge by consultants (if involved): N/A    Safe discharge environment identified: No    Pt is A&Ox4. VSS. 1st unit infusing. Pt denies any pain. SW and PT consult for tomorrow. Ax1 with walker. Currently on 1L of O2.     Discharge Planner Nurse   Safe discharge environment identified: No  Barriers to discharge: Yes       Entered by: Ashlyn cMgee 08/08/2019 10:17 PM     Please review provider order for any additional goals.   Nurse to notify provider when observation goals have been met and patient is ready for discharge.

## 2019-08-09 NOTE — TELEPHONE ENCOUNTER
Called pt regarding abnl urine cx results >100K [place]   She was just discharged earlier today.   Sensitivities pending but will chose based up past sensitivities.   Will call in Omnicef 300mg BID for 5 days.   Follow up as outpt.     Called in to [facility] [facility] on [address]

## 2019-08-20 ENCOUNTER — OFFICE VISIT (OUTPATIENT)
Dept: INTERNAL MEDICINE | Facility: CLINIC | Age: 84
End: 2019-08-20
Payer: COMMERCIAL

## 2019-08-20 VITALS
BODY MASS INDEX: 26.93 KG/M2 | RESPIRATION RATE: 16 BRPM | SYSTOLIC BLOOD PRESSURE: 144 MMHG | HEIGHT: 63 IN | DIASTOLIC BLOOD PRESSURE: 36 MMHG | WEIGHT: 152 LBS | TEMPERATURE: 98.2 F | HEART RATE: 62 BPM | OXYGEN SATURATION: 92 %

## 2019-08-20 DIAGNOSIS — Z87.19 HISTORY OF GI BLEED: Primary | ICD-10-CM

## 2019-08-20 DIAGNOSIS — D50.0 IRON DEFICIENCY ANEMIA DUE TO CHRONIC BLOOD LOSS: ICD-10-CM

## 2019-08-20 DIAGNOSIS — R79.89 ABNORMAL CBC: ICD-10-CM

## 2019-08-20 DIAGNOSIS — I42.9 CARDIOMYOPATHY, UNSPECIFIED TYPE (H): ICD-10-CM

## 2019-08-20 DIAGNOSIS — R30.0 DYSURIA: ICD-10-CM

## 2019-08-20 DIAGNOSIS — I27.20 PULMONARY HYPERTENSION (H): ICD-10-CM

## 2019-08-20 LAB
ERYTHROCYTE [DISTWIDTH] IN BLOOD BY AUTOMATED COUNT: 16.7 % (ref 10–15)
HCT VFR BLD AUTO: 28.9 % (ref 35–47)
HGB BLD-MCNC: 8.6 G/DL (ref 11.7–15.7)
MCH RBC QN AUTO: 29.1 PG (ref 26.5–33)
MCHC RBC AUTO-ENTMCNC: 29.8 G/DL (ref 31.5–36.5)
MCV RBC AUTO: 98 FL (ref 78–100)
PLATELET # BLD AUTO: 168 10E9/L (ref 150–450)
RBC # BLD AUTO: 2.96 10E12/L (ref 3.8–5.2)
WBC # BLD AUTO: 5.4 10E9/L (ref 4–11)

## 2019-08-20 PROCEDURE — 36415 COLL VENOUS BLD VENIPUNCTURE: CPT | Performed by: INTERNAL MEDICINE

## 2019-08-20 PROCEDURE — 99214 OFFICE O/P EST MOD 30 MIN: CPT | Performed by: INTERNAL MEDICINE

## 2019-08-20 PROCEDURE — 85027 COMPLETE CBC AUTOMATED: CPT | Performed by: INTERNAL MEDICINE

## 2019-08-20 ASSESSMENT — MIFFLIN-ST. JEOR: SCORE: 1093.6

## 2019-08-20 NOTE — PROGRESS NOTES
Subjective     Keisha Godinez is a 87 year old female who presents to clinic today for the following health issues:    [facility]     Hospital Follow-up Visit:    Hospital/Nursing Home/IP Rehab Facility: [facility]  Date of Admission: 8-8-2019  Date of Discharge: 8-9-2019  Reason(s) for Admission: anemia & low BP (saw cardio 7-)             Problems taking medications regularly:  None       Medication changes since discharge: None       Problems adhering to non-medication therapy:  None    Summary of hospitalization:  [facility] discharge summary reviewed  Diagnostic Tests/Treatments reviewed.  Follow up needed: upper endoscopy and colonoscopy   Other Healthcare Providers Involved in Patient s Care:         None  Update since discharge: improved. Her energy is much better. She has no problems to tolerate the Iron pills. She says she is almost 89 and does not want to have upper endoscopy or colonoscopy. Her daughter is with her and agrees. They are willing to have her check a CBC regularly.     Post Discharge Medication Reconciliation: discharge medications reconciled, continue medications without change.  Plan of care communicated with patient and daughter     Coding guidelines for this visit:  Type of Medical   Decision Making Face-to-Face Visit       within 7 Days of discharge Face-to-Face Visit        within 14 days of discharge   Moderate Complexity [id number]   High Complexity [id number]              BP Readings from Last 3 Encounters:   08/20/19 (!) 144/36   08/09/19 (!) 131/39   07/23/19 126/48    Wt Readings from Last 3 Encounters:   08/20/19 68.9 kg (152 lb)   08/08/19 69.6 kg (153 lb 8 oz)   07/23/19 68.3 kg (150 lb 9.6 oz)                 Reviewed and updated as needed this visit by Provider         Review of Systems   ROS COMP: Constitutional, HEENT, cardiovascular, pulmonary, GI, , musculoskeletal, neuro, skin, endocrine and psych systems are negative, except as otherwise  "noted.      Objective    BP (!) 144/36 (BP Location: Left arm, Patient Position: Chair, Cuff Size: Adult Large)   Pulse 62   Temp 98.2  F (36.8  C) (Oral)   Resp 16   Ht 1.6 m (5' 3\")   Wt 68.9 kg (152 lb)   SpO2 92%   BMI 26.93 kg/m    Body mass index is 26.93 kg/m .  Physical Exam   GENERAL: healthy, alert and no distress  NECK: no adenopathy, no asymmetry, masses, or scars and thyroid normal to palpation  RESP: lungs clear to auscultation - no rales, rhonchi or wheezes  CV: regular rate and rhythm, normal S1 S2, no S3 or S4, no murmur, click or rub, no peripheral edema and peripheral pulses strong  ABDOMEN: soft, nontender, no hepatosplenomegaly, no masses and bowel sounds normal  MS: no gross musculoskeletal defects noted, no edema  NEURO: Normal strength and tone, mentation intact and speech normal  PSYCH: mentation appears normal, affect normal/bright          Assessment & Plan     1. History of GI bleed  At this time she gently refuses further work up. Would be willing to consider it if her Hgb drops again. So she agreed to have monthly cbc for now.  - CBC with platelets; Standing  - CBC with platelets    2. Iron deficiency anemia due to chronic blood loss  Continue iron tablets indefinitely, see above    3. Dysuria  intermittent ordered standing UA/UC   - UA with Microscopic reflex to Culture; Standing    4. Pulmonary hypertension (H)  stable    5. Cardiomyopathy, unspecified type (H)  stable    6. Abnormal CBC          BMI:   Estimated body mass index is 26.93 kg/m  as calculated from the following:    Height as of this encounter: 1.6 m (5' 3\").    Weight as of this encounter: 68.9 kg (152 lb).         Return in about 3 months (around 11/20/2019).    Gerri Eubanks MD  [facility]      "

## 2019-09-03 DIAGNOSIS — I50.32 CHRONIC DIASTOLIC CONGESTIVE HEART FAILURE (H): ICD-10-CM

## 2019-09-03 RX ORDER — TORSEMIDE 20 MG/1
20 TABLET ORAL 2 TIMES DAILY
Qty: 180 TABLET | Refills: 0 | Status: SHIPPED | OUTPATIENT
Start: 2019-09-03 | End: 2019-09-25

## 2019-09-05 DIAGNOSIS — I10 ESSENTIAL HYPERTENSION WITH GOAL BLOOD PRESSURE LESS THAN 140/90: ICD-10-CM

## 2019-09-05 DIAGNOSIS — I25.119 CORONARY ARTERY DISEASE INVOLVING NATIVE HEART WITH ANGINA PECTORIS, UNSPECIFIED VESSEL OR LESION TYPE (H): ICD-10-CM

## 2019-09-09 DIAGNOSIS — I10 ESSENTIAL HYPERTENSION WITH GOAL BLOOD PRESSURE LESS THAN 140/90: ICD-10-CM

## 2019-09-09 DIAGNOSIS — I25.119 CORONARY ARTERY DISEASE INVOLVING NATIVE HEART WITH ANGINA PECTORIS, UNSPECIFIED VESSEL OR LESION TYPE (H): ICD-10-CM

## 2019-09-09 RX ORDER — ISOSORBIDE MONONITRATE 30 MG/1
TABLET, EXTENDED RELEASE ORAL
Qty: 180 TABLET | Refills: 0 | Status: SHIPPED | OUTPATIENT
Start: 2019-09-09 | End: 2019-10-03

## 2019-09-09 NOTE — TELEPHONE ENCOUNTER
"Requested Prescriptions   Pending Prescriptions Disp Refills     isosorbide mononitrate (IMDUR) 30 MG 24 hr tablet [Pharmacy Med Name: Isosorbide Mononitrate ER Oral Tablet Extended Release 24 Hour 30 MG] 180 tablet 0     Sig: TAKE 2 TABLETS (60MG) BY MOUTH DAILY   Last Written Prescription Date:  Not on meds list  Last Fill Quantity: n/a,  # refills: n/a   Last office visit: 8/20/2019 with prescribing provider:     Future Office Visit:      Nitrates Failed - 9/5/2019 12:42 PM        Failed - Blood pressure under 140/90 in past 12 months     BP Readings from Last 3 Encounters:   08/20/19 (!) 144/36   08/09/19 (!) 131/39   07/23/19 126/48                 Failed - Medication is active on med list        Passed - Pt is not on erectile dysfunction medications        Passed - Recent (12 mo) or future (30 days) visit within the authorizing provider's specialty     Patient had office visit in the last 12 months or has a visit in the next 30 days with authorizing provider or within the authorizing provider's specialty.  See \"Patient Info\" tab in inbasket, or \"Choose Columns\" in Meds & Orders section of the refill encounter.              Passed - Patient is age 18 or older        "
Call to patient. States she no longer takes this medication.   
Looks like the medication was discontinued on her hospital stay for blood transfusion   Can we check with patient and make sure she is still taking medication   
Routing refill request to provider for review/approval because:  Drug not active on patient's medication list  Blood pressures out of range         
chest pain

## 2019-09-10 RX ORDER — ISOSORBIDE MONONITRATE 30 MG/1
TABLET, EXTENDED RELEASE ORAL
Qty: 180 TABLET | Refills: 0 | OUTPATIENT
Start: 2019-09-10

## 2019-09-10 NOTE — TELEPHONE ENCOUNTER
"Requested Prescriptions   Pending Prescriptions Disp Refills     isosorbide mononitrate (IMDUR) 30 MG 24 hr tablet [Pharmacy Med Name: Isosorbide Mononitrate ER Oral Tablet Extended Release 24 Hour 30 MG]  Last Written Prescription Date:  9/9/2019  Last Fill Quantity: 180,  # refills: 0   Last office visit: 8/20/2019 with prescribing provider:     Future Office Visit:   180 tablet 0     Sig: TAKE 2 TABLETS (60MG) BY MOUTH DAILY       Nitrates Failed - 9/9/2019  9:57 AM        Failed - Blood pressure under 140/90 in past 12 months     BP Readings from Last 3 Encounters:   08/20/19 (!) 144/36   08/09/19 (!) 131/39   07/23/19 126/48                 Passed - Pt is not on erectile dysfunction medications        Passed - Recent (12 mo) or future (30 days) visit within the authorizing provider's specialty     Patient had office visit in the last 12 months or has a visit in the next 30 days with authorizing provider or within the authorizing provider's specialty.  See \"Patient Info\" tab in inbasket, or \"Choose Columns\" in Meds & Orders section of the refill encounter.              Passed - Medication is active on med list        Passed - Patient is age 18 or older        "

## 2019-09-23 DIAGNOSIS — F33.41 MAJOR DEPRESSIVE DISORDER, RECURRENT EPISODE, IN PARTIAL REMISSION (H): ICD-10-CM

## 2019-09-23 DIAGNOSIS — I50.33 ACUTE ON CHRONIC DIASTOLIC CONGESTIVE HEART FAILURE (H): ICD-10-CM

## 2019-09-23 NOTE — TELEPHONE ENCOUNTER
"Requested Prescriptions   Pending Prescriptions Disp Refills     spironolactone (ALDACTONE) 50 MG tablet [Pharmacy Med Name: Spironolactone Oral Tablet 50 MG] 90 tablet 1     Sig: TAKE 1 TABLET BY MOUTH DAILY   Last Written Prescription Date:  11/29/2018  Last Fill Quantity: 30,  # refills: 07   Last office visit: 8/20/2019 with prescribing provider:     Future Office Visit:      Diuretics (Including Combos) Protocol Failed - 9/23/2019  9:04 AM        Failed - Blood pressure under 140/90 in past 12 months     BP Readings from Last 3 Encounters:   08/20/19 (!) 144/36   08/09/19 (!) 131/39   07/23/19 126/48                 Passed - Recent (12 mo) or future (30 days) visit within the authorizing provider's specialty     Patient had office visit in the last 12 months or has a visit in the next 30 days with authorizing provider or within the authorizing provider's specialty.  See \"Patient Info\" tab in inbasket, or \"Choose Columns\" in Meds & Orders section of the refill encounter.              Passed - Medication is active on med list        Passed - Patient is age 18 or older        Passed - No active pregancy on record        Passed - Normal serum creatinine on file in past 12 months     Recent Labs   Lab Test 08/08/19  1654   CR 0.98              Passed - Normal serum potassium on file in past 12 months     Recent Labs   Lab Test 08/08/19  1654   POTASSIUM 4.1                    Passed - Normal serum sodium on file in past 12 months     Recent Labs   Lab Test 08/08/19  1654                 Passed - No positive pregnancy test in past 12 months        "

## 2019-09-23 NOTE — TELEPHONE ENCOUNTER
"Requested Prescriptions   Pending Prescriptions Disp Refills     venlafaxine (EFFEXOR-XR) 75 MG 24 hr capsule [Pharmacy Med Name: Venlafaxine HCl ER Oral Capsule Extended Release 24 Hour 75 MG] 90 capsule 0     Sig: Take 1 capsule (75 mg) by mouth daily   Last Written Prescription Date:  07/01/2019  Last Fill Quantity: 90,  # refills: 0   Last office visit: 8/20/2019 with prescribing provider:     Future Office Visit:      Serotonin-Norepinephrine Reuptake Inhibitors  Failed - 9/23/2019  9:04 AM        Failed - Blood pressure under 140/90 in past 12 months     BP Readings from Last 3 Encounters:   08/20/19 (!) 144/36   08/09/19 (!) 131/39   07/23/19 126/48                 Passed - PHQ-9 score of less than 5 in past 6 months     Please review last PHQ-9 score.           Passed - Medication is active on med list        Passed - Patient is age 18 or older        Passed - No active pregnancy on record        Passed - Normal serum creatinine on file in past 12 months     Recent Labs   Lab Test 08/08/19  1654  03/10/15  1257   CR 0.98   < >  --    CREAT  --   --  0.7    < > = values in this interval not displayed.             Passed - No positive pregnancy test in past 12 months        Passed - Recent (6 mo) or future (30 days) visit within the authorizing provider's specialty     Patient had office visit in the last 6 months or has a visit in the next 30 days with authorizing provider or within the authorizing provider's specialty.  See \"Patient Info\" tab in inbasket, or \"Choose Columns\" in Meds & Orders section of the refill encounter.            "

## 2019-09-25 DIAGNOSIS — I50.32 CHRONIC DIASTOLIC CONGESTIVE HEART FAILURE (H): ICD-10-CM

## 2019-09-25 RX ORDER — TORSEMIDE 20 MG/1
20 TABLET ORAL 2 TIMES DAILY
Qty: 180 TABLET | Refills: 3 | Status: SHIPPED | OUTPATIENT
Start: 2019-09-25 | End: 2019-12-18

## 2019-09-25 RX ORDER — VENLAFAXINE HYDROCHLORIDE 75 MG/1
75 CAPSULE, EXTENDED RELEASE ORAL DAILY
Qty: 90 CAPSULE | Refills: 3 | Status: SHIPPED | OUTPATIENT
Start: 2019-09-25 | End: 2020-01-01

## 2019-09-25 RX ORDER — SPIRONOLACTONE 50 MG/1
TABLET, FILM COATED ORAL
Qty: 90 TABLET | Refills: 3 | Status: SHIPPED | OUTPATIENT
Start: 2019-09-25 | End: 2020-01-01

## 2019-09-27 ENCOUNTER — TELEPHONE (OUTPATIENT)
Dept: INTERNAL MEDICINE | Facility: CLINIC | Age: 84
End: 2019-09-27

## 2019-09-27 DIAGNOSIS — R30.0 DYSURIA: ICD-10-CM

## 2019-09-27 DIAGNOSIS — Z87.19 HISTORY OF GI BLEED: ICD-10-CM

## 2019-09-27 LAB
ALBUMIN UR-MCNC: NEGATIVE MG/DL
APPEARANCE UR: CLEAR
BACTERIA #/AREA URNS HPF: ABNORMAL /HPF
BILIRUB UR QL STRIP: NEGATIVE
COLOR UR AUTO: YELLOW
ERYTHROCYTE [DISTWIDTH] IN BLOOD BY AUTOMATED COUNT: 13.9 % (ref 10–15)
GLUCOSE UR STRIP-MCNC: NEGATIVE MG/DL
HCT VFR BLD AUTO: 26.6 % (ref 35–47)
HGB BLD-MCNC: 7.8 G/DL (ref 11.7–15.7)
HGB UR QL STRIP: NEGATIVE
KETONES UR STRIP-MCNC: NEGATIVE MG/DL
LEUKOCYTE ESTERASE UR QL STRIP: NEGATIVE
MCH RBC QN AUTO: 28.1 PG (ref 26.5–33)
MCHC RBC AUTO-ENTMCNC: 29.3 G/DL (ref 31.5–36.5)
MCV RBC AUTO: 96 FL (ref 78–100)
NITRATE UR QL: NEGATIVE
NON-SQ EPI CELLS #/AREA URNS LPF: ABNORMAL /LPF
PH UR STRIP: 6 PH (ref 5–7)
PLATELET # BLD AUTO: 235 10E9/L (ref 150–450)
RBC # BLD AUTO: 2.78 10E12/L (ref 3.8–5.2)
RBC #/AREA URNS AUTO: ABNORMAL /HPF
SOURCE: ABNORMAL
SP GR UR STRIP: 1.01 (ref 1–1.03)
UROBILINOGEN UR STRIP-ACNC: 0.2 EU/DL (ref 0.2–1)
WBC # BLD AUTO: 5.3 10E9/L (ref 4–11)
WBC #/AREA URNS AUTO: ABNORMAL /HPF

## 2019-09-27 PROCEDURE — 85027 COMPLETE CBC AUTOMATED: CPT | Performed by: INTERNAL MEDICINE

## 2019-09-27 PROCEDURE — 81001 URINALYSIS AUTO W/SCOPE: CPT | Performed by: INTERNAL MEDICINE

## 2019-09-27 PROCEDURE — 36415 COLL VENOUS BLD VENIPUNCTURE: CPT | Performed by: INTERNAL MEDICINE

## 2019-09-30 RX ORDER — HEPARIN SODIUM (PORCINE) LOCK FLUSH IV SOLN 100 UNIT/ML 100 UNIT/ML
5 SOLUTION INTRAVENOUS
Status: CANCELLED | OUTPATIENT
Start: 2019-09-30

## 2019-09-30 RX ORDER — HEPARIN SODIUM,PORCINE 10 UNIT/ML
5 VIAL (ML) INTRAVENOUS
Status: CANCELLED | OUTPATIENT
Start: 2019-09-30

## 2019-10-02 ENCOUNTER — HOSPITAL ENCOUNTER (OUTPATIENT)
Dept: LAB | Facility: CLINIC | Age: 84
Discharge: HOME | DRG: 811 | End: 2019-10-02
Attending: INTERNAL MEDICINE | Admitting: INTERNAL MEDICINE
Payer: COMMERCIAL

## 2019-10-02 DIAGNOSIS — D50.0 IRON DEFICIENCY ANEMIA DUE TO CHRONIC BLOOD LOSS: ICD-10-CM

## 2019-10-02 LAB
ABO + RH BLD: NORMAL
ABO + RH BLD: NORMAL
BLD GP AB SCN SERPL QL: NORMAL
BLD PROD TYP BPU: NORMAL
BLOOD BANK CMNT PATIENT-IMP: NORMAL
NUM BPU REQUESTED: 2
SPECIMEN EXP DATE BLD: NORMAL

## 2019-10-02 PROCEDURE — 86850 RBC ANTIBODY SCREEN: CPT | Performed by: INTERNAL MEDICINE

## 2019-10-02 PROCEDURE — 86900 BLOOD TYPING SEROLOGIC ABO: CPT | Performed by: INTERNAL MEDICINE

## 2019-10-02 PROCEDURE — 86922 COMPATIBILITY TEST ANTIGLOB: CPT | Performed by: INTERNAL MEDICINE

## 2019-10-02 PROCEDURE — 86901 BLOOD TYPING SEROLOGIC RH(D): CPT | Performed by: INTERNAL MEDICINE

## 2019-10-02 PROCEDURE — 36415 COLL VENOUS BLD VENIPUNCTURE: CPT | Performed by: INTERNAL MEDICINE

## 2019-10-03 ENCOUNTER — APPOINTMENT (OUTPATIENT)
Dept: GENERAL RADIOLOGY | Facility: CLINIC | Age: 84
DRG: 811 | End: 2019-10-03
Attending: EMERGENCY MEDICINE
Payer: COMMERCIAL

## 2019-10-03 ENCOUNTER — INFUSION THERAPY VISIT (OUTPATIENT)
Dept: INFUSION THERAPY | Facility: CLINIC | Age: 84
End: 2019-10-03
Attending: INTERNAL MEDICINE
Payer: COMMERCIAL

## 2019-10-03 ENCOUNTER — HOSPITAL ENCOUNTER (INPATIENT)
Facility: CLINIC | Age: 84
LOS: 6 days | Discharge: HOME | DRG: 811 | End: 2019-10-09
Attending: EMERGENCY MEDICINE | Admitting: INTERNAL MEDICINE
Payer: COMMERCIAL

## 2019-10-03 VITALS
DIASTOLIC BLOOD PRESSURE: 70 MMHG | HEART RATE: 57 BPM | TEMPERATURE: 99.2 F | SYSTOLIC BLOOD PRESSURE: 194 MMHG | OXYGEN SATURATION: 94 % | RESPIRATION RATE: 18 BRPM

## 2019-10-03 DIAGNOSIS — T80.92XA BLOOD TRANSFUSION REACTION, INITIAL ENCOUNTER: ICD-10-CM

## 2019-10-03 DIAGNOSIS — I50.9 ACUTE ON CHRONIC CONGESTIVE HEART FAILURE, UNSPECIFIED HEART FAILURE TYPE (H): ICD-10-CM

## 2019-10-03 DIAGNOSIS — J81.0 ACUTE PULMONARY EDEMA (H): ICD-10-CM

## 2019-10-03 DIAGNOSIS — D64.9 SYMPTOMATIC ANEMIA: ICD-10-CM

## 2019-10-03 DIAGNOSIS — D50.0 IRON DEFICIENCY ANEMIA DUE TO CHRONIC BLOOD LOSS: Primary | ICD-10-CM

## 2019-10-03 PROBLEM — I50.33 ACUTE ON CHRONIC DIASTOLIC CHF (CONGESTIVE HEART FAILURE) (H): Status: ACTIVE | Noted: 2019-10-03

## 2019-10-03 LAB
ALBUMIN SERPL-MCNC: 3.4 G/DL (ref 3.4–5)
ALP SERPL-CCNC: 84 U/L (ref 40–150)
ALT SERPL W P-5'-P-CCNC: 33 U/L (ref 0–50)
ANION GAP SERPL CALCULATED.3IONS-SCNC: 4 MMOL/L (ref 3–14)
AST SERPL W P-5'-P-CCNC: 69 U/L (ref 0–45)
BASOPHILS # BLD AUTO: 0.1 10E9/L (ref 0–0.2)
BASOPHILS NFR BLD AUTO: 0.6 %
BILIRUB DIRECT SERPL-MCNC: 0.4 MG/DL (ref 0–0.2)
BILIRUB SERPL-MCNC: 1.8 MG/DL (ref 0.2–1.3)
BLD PROD TYP BPU: NORMAL
BLD PROD TYP BPU: NORMAL
BLD UNIT ID BPU: 0
BLD UNIT ID BPU: 0
BLOOD PRODUCT CODE: NORMAL
BLOOD PRODUCT CODE: NORMAL
BPU ID: NORMAL
BPU ID: NORMAL
BUN SERPL-MCNC: 27 MG/DL (ref 7–30)
CALCIUM SERPL-MCNC: 9 MG/DL (ref 8.5–10.1)
CHLORIDE SERPL-SCNC: 104 MMOL/L (ref 94–109)
CO2 SERPL-SCNC: 30 MMOL/L (ref 20–32)
CREAT SERPL-MCNC: 0.89 MG/DL (ref 0.52–1.04)
DIFFERENTIAL METHOD BLD: ABNORMAL
EOSINOPHIL # BLD AUTO: 0.1 10E9/L (ref 0–0.7)
EOSINOPHIL NFR BLD AUTO: 0.8 %
ERYTHROCYTE [DISTWIDTH] IN BLOOD BY AUTOMATED COUNT: 14.2 % (ref 10–15)
GFR SERPL CREATININE-BSD FRML MDRD: 58 ML/MIN/{1.73_M2}
GLUCOSE SERPL-MCNC: 135 MG/DL (ref 70–99)
HCT VFR BLD AUTO: 34.7 % (ref 35–47)
HGB BLD-MCNC: 10.4 G/DL (ref 11.7–15.7)
IMM GRANULOCYTES # BLD: 0 10E9/L (ref 0–0.4)
IMM GRANULOCYTES NFR BLD: 0.4 %
LYMPHOCYTES # BLD AUTO: 0.2 10E9/L (ref 0.8–5.3)
LYMPHOCYTES NFR BLD AUTO: 1.8 %
MCH RBC QN AUTO: 28.2 PG (ref 26.5–33)
MCHC RBC AUTO-ENTMCNC: 30 G/DL (ref 31.5–36.5)
MCV RBC AUTO: 94 FL (ref 78–100)
MONOCYTES # BLD AUTO: 0.7 10E9/L (ref 0–1.3)
MONOCYTES NFR BLD AUTO: 6.2 %
NEUTROPHILS # BLD AUTO: 9.7 10E9/L (ref 1.6–8.3)
NEUTROPHILS NFR BLD AUTO: 90.2 %
NRBC # BLD AUTO: 0 10*3/UL
NRBC BLD AUTO-RTO: 0 /100
NT-PROBNP SERPL-MCNC: 1607 PG/ML (ref 0–1800)
PLATELET # BLD AUTO: 184 10E9/L (ref 150–450)
POTASSIUM SERPL-SCNC: 3.9 MMOL/L (ref 3.4–5.3)
PROT SERPL-MCNC: 7.5 G/DL (ref 6.8–8.8)
RBC # BLD AUTO: 3.69 10E12/L (ref 3.8–5.2)
SODIUM SERPL-SCNC: 138 MMOL/L (ref 133–144)
TRANSFUSION STATUS PATIENT QL: NORMAL
TROPONIN I SERPL-MCNC: 0.03 UG/L (ref 0–0.04)
WBC # BLD AUTO: 10.7 10E9/L (ref 4–11)

## 2019-10-03 PROCEDURE — 86922 COMPATIBILITY TEST ANTIGLOB: CPT | Performed by: EMERGENCY MEDICINE

## 2019-10-03 PROCEDURE — 80048 BASIC METABOLIC PNL TOTAL CA: CPT | Performed by: EMERGENCY MEDICINE

## 2019-10-03 PROCEDURE — 25000132 ZZH RX MED GY IP 250 OP 250 PS 637: Performed by: EMERGENCY MEDICINE

## 2019-10-03 PROCEDURE — 36415 COLL VENOUS BLD VENIPUNCTURE: CPT | Performed by: EMERGENCY MEDICINE

## 2019-10-03 PROCEDURE — 12000000 ZZH R&B MED SURG/OB

## 2019-10-03 PROCEDURE — 96374 THER/PROPH/DIAG INJ IV PUSH: CPT

## 2019-10-03 PROCEDURE — 25000128 H RX IP 250 OP 636: Performed by: EMERGENCY MEDICINE

## 2019-10-03 PROCEDURE — 96375 TX/PRO/DX INJ NEW DRUG ADDON: CPT

## 2019-10-03 PROCEDURE — 40000275 ZZH STATISTIC RCP TIME EA 10 MIN

## 2019-10-03 PROCEDURE — P9016 RBC LEUKOCYTES REDUCED: HCPCS | Performed by: INTERNAL MEDICINE

## 2019-10-03 PROCEDURE — 99223 1ST HOSP IP/OBS HIGH 75: CPT | Mod: AI | Performed by: INTERNAL MEDICINE

## 2019-10-03 PROCEDURE — 83880 ASSAY OF NATRIURETIC PEPTIDE: CPT | Performed by: EMERGENCY MEDICINE

## 2019-10-03 PROCEDURE — 80076 HEPATIC FUNCTION PANEL: CPT | Performed by: EMERGENCY MEDICINE

## 2019-10-03 PROCEDURE — 85025 COMPLETE CBC W/AUTO DIFF WBC: CPT | Performed by: EMERGENCY MEDICINE

## 2019-10-03 PROCEDURE — 94640 AIRWAY INHALATION TREATMENT: CPT

## 2019-10-03 PROCEDURE — 84484 ASSAY OF TROPONIN QUANT: CPT | Performed by: EMERGENCY MEDICINE

## 2019-10-03 PROCEDURE — 99285 EMERGENCY DEPT VISIT HI MDM: CPT | Mod: 25

## 2019-10-03 PROCEDURE — 36415 COLL VENOUS BLD VENIPUNCTURE: CPT

## 2019-10-03 PROCEDURE — 36430 TRANSFUSION BLD/BLD COMPNT: CPT

## 2019-10-03 PROCEDURE — 93005 ELECTROCARDIOGRAM TRACING: CPT

## 2019-10-03 PROCEDURE — 86078 PHYS BLOOD BANK SERV REACTJ: CPT | Performed by: EMERGENCY MEDICINE

## 2019-10-03 PROCEDURE — 93005 ELECTROCARDIOGRAM TRACING: CPT | Mod: 76

## 2019-10-03 PROCEDURE — 40000341 ZZHCL STATISTIC BB TRANSF RXN INVEST: Performed by: EMERGENCY MEDICINE

## 2019-10-03 PROCEDURE — 71046 X-RAY EXAM CHEST 2 VIEWS: CPT

## 2019-10-03 PROCEDURE — 25000125 ZZHC RX 250: Performed by: INTERNAL MEDICINE

## 2019-10-03 PROCEDURE — 25000132 ZZH RX MED GY IP 250 OP 250 PS 637: Performed by: INTERNAL MEDICINE

## 2019-10-03 RX ORDER — POTASSIUM CHLORIDE 7.45 MG/ML
10 INJECTION INTRAVENOUS
Status: DISCONTINUED | OUTPATIENT
Start: 2019-10-03 | End: 2019-10-09 | Stop reason: HOSPADM

## 2019-10-03 RX ORDER — HYDRALAZINE HYDROCHLORIDE 50 MG/1
50 TABLET, FILM COATED ORAL 3 TIMES DAILY
Status: DISCONTINUED | OUTPATIENT
Start: 2019-10-03 | End: 2019-10-09 | Stop reason: HOSPADM

## 2019-10-03 RX ORDER — GABAPENTIN 100 MG/1
100 CAPSULE ORAL 3 TIMES DAILY
Status: DISCONTINUED | OUTPATIENT
Start: 2019-10-03 | End: 2019-10-09 | Stop reason: HOSPADM

## 2019-10-03 RX ORDER — MAGNESIUM SULFATE HEPTAHYDRATE 40 MG/ML
4 INJECTION, SOLUTION INTRAVENOUS EVERY 4 HOURS PRN
Status: DISCONTINUED | OUTPATIENT
Start: 2019-10-03 | End: 2019-10-09 | Stop reason: HOSPADM

## 2019-10-03 RX ORDER — ONDANSETRON 2 MG/ML
INJECTION INTRAMUSCULAR; INTRAVENOUS
Status: DISCONTINUED
Start: 2019-10-03 | End: 2019-10-03 | Stop reason: HOSPADM

## 2019-10-03 RX ORDER — POTASSIUM CHLORIDE 1.5 G/1.58G
20-40 POWDER, FOR SOLUTION ORAL
Status: DISCONTINUED | OUTPATIENT
Start: 2019-10-03 | End: 2019-10-09 | Stop reason: HOSPADM

## 2019-10-03 RX ORDER — FERROUS SULFATE 325(65) MG
325 TABLET ORAL 2 TIMES DAILY
Status: DISCONTINUED | OUTPATIENT
Start: 2019-10-03 | End: 2019-10-09 | Stop reason: HOSPADM

## 2019-10-03 RX ORDER — ACETAMINOPHEN 325 MG/1
650 TABLET ORAL 3 TIMES DAILY PRN
Status: DISCONTINUED | OUTPATIENT
Start: 2019-10-03 | End: 2019-10-03

## 2019-10-03 RX ORDER — AMOXICILLIN 250 MG
1 CAPSULE ORAL 2 TIMES DAILY PRN
Status: DISCONTINUED | OUTPATIENT
Start: 2019-10-03 | End: 2019-10-09 | Stop reason: HOSPADM

## 2019-10-03 RX ORDER — ONDANSETRON 4 MG/1
4 TABLET, ORALLY DISINTEGRATING ORAL EVERY 6 HOURS PRN
Status: DISCONTINUED | OUTPATIENT
Start: 2019-10-03 | End: 2019-10-09 | Stop reason: HOSPADM

## 2019-10-03 RX ORDER — ONDANSETRON 2 MG/ML
4 INJECTION INTRAMUSCULAR; INTRAVENOUS
Status: COMPLETED | OUTPATIENT
Start: 2019-10-03 | End: 2019-10-03

## 2019-10-03 RX ORDER — ACETAMINOPHEN 500 MG
1000 TABLET ORAL ONCE
Status: COMPLETED | OUTPATIENT
Start: 2019-10-03 | End: 2019-10-03

## 2019-10-03 RX ORDER — ALBUTEROL SULFATE 0.83 MG/ML
1.25 SOLUTION RESPIRATORY (INHALATION) 3 TIMES DAILY
Status: DISCONTINUED | OUTPATIENT
Start: 2019-10-03 | End: 2019-10-04

## 2019-10-03 RX ORDER — ONDANSETRON 2 MG/ML
4 INJECTION INTRAMUSCULAR; INTRAVENOUS EVERY 6 HOURS PRN
Status: DISCONTINUED | OUTPATIENT
Start: 2019-10-03 | End: 2019-10-09 | Stop reason: HOSPADM

## 2019-10-03 RX ORDER — POTASSIUM CL/LIDO/0.9 % NACL 10MEQ/0.1L
10 INTRAVENOUS SOLUTION, PIGGYBACK (ML) INTRAVENOUS
Status: DISCONTINUED | OUTPATIENT
Start: 2019-10-03 | End: 2019-10-09 | Stop reason: HOSPADM

## 2019-10-03 RX ORDER — AMOXICILLIN 250 MG
2 CAPSULE ORAL 2 TIMES DAILY PRN
Status: DISCONTINUED | OUTPATIENT
Start: 2019-10-03 | End: 2019-10-09 | Stop reason: HOSPADM

## 2019-10-03 RX ORDER — POTASSIUM CHLORIDE 29.8 MG/ML
20 INJECTION INTRAVENOUS
Status: DISCONTINUED | OUTPATIENT
Start: 2019-10-03 | End: 2019-10-09 | Stop reason: HOSPADM

## 2019-10-03 RX ORDER — FUROSEMIDE 10 MG/ML
40 INJECTION INTRAMUSCULAR; INTRAVENOUS ONCE
Status: COMPLETED | OUTPATIENT
Start: 2019-10-03 | End: 2019-10-03

## 2019-10-03 RX ORDER — POTASSIUM CHLORIDE 1500 MG/1
20-40 TABLET, EXTENDED RELEASE ORAL
Status: DISCONTINUED | OUTPATIENT
Start: 2019-10-03 | End: 2019-10-09 | Stop reason: HOSPADM

## 2019-10-03 RX ORDER — ACETAMINOPHEN 325 MG/1
650 TABLET ORAL EVERY 4 HOURS PRN
Status: DISCONTINUED | OUTPATIENT
Start: 2019-10-03 | End: 2019-10-09 | Stop reason: HOSPADM

## 2019-10-03 RX ORDER — VENLAFAXINE HYDROCHLORIDE 75 MG/1
75 CAPSULE, EXTENDED RELEASE ORAL DAILY
Status: DISCONTINUED | OUTPATIENT
Start: 2019-10-04 | End: 2019-10-09 | Stop reason: HOSPADM

## 2019-10-03 RX ORDER — LIDOCAINE 40 MG/G
CREAM TOPICAL
Status: DISCONTINUED | OUTPATIENT
Start: 2019-10-03 | End: 2019-10-09 | Stop reason: HOSPADM

## 2019-10-03 RX ORDER — SIMVASTATIN 10 MG
10 TABLET ORAL AT BEDTIME
Status: DISCONTINUED | OUTPATIENT
Start: 2019-10-03 | End: 2019-10-09 | Stop reason: HOSPADM

## 2019-10-03 RX ORDER — NALOXONE HYDROCHLORIDE 0.4 MG/ML
.1-.4 INJECTION, SOLUTION INTRAMUSCULAR; INTRAVENOUS; SUBCUTANEOUS
Status: DISCONTINUED | OUTPATIENT
Start: 2019-10-03 | End: 2019-10-09 | Stop reason: HOSPADM

## 2019-10-03 RX ORDER — FUROSEMIDE 10 MG/ML
40 INJECTION INTRAMUSCULAR; INTRAVENOUS 2 TIMES DAILY
Status: COMPLETED | OUTPATIENT
Start: 2019-10-04 | End: 2019-10-04

## 2019-10-03 RX ORDER — SPIRONOLACTONE 25 MG/1
50 TABLET ORAL DAILY
Status: DISCONTINUED | OUTPATIENT
Start: 2019-10-04 | End: 2019-10-06

## 2019-10-03 RX ADMIN — SIMVASTATIN 10 MG: 10 TABLET, FILM COATED ORAL at 21:29

## 2019-10-03 RX ADMIN — ONDANSETRON 4 MG: 2 INJECTION INTRAMUSCULAR; INTRAVENOUS at 18:35

## 2019-10-03 RX ADMIN — ACETAMINOPHEN 1000 MG: 500 TABLET, FILM COATED ORAL at 17:48

## 2019-10-03 RX ADMIN — HYDRALAZINE HYDROCHLORIDE 50 MG: 50 TABLET, FILM COATED ORAL at 21:29

## 2019-10-03 RX ADMIN — GABAPENTIN 100 MG: 100 CAPSULE ORAL at 21:30

## 2019-10-03 RX ADMIN — ALBUTEROL SULFATE 1.25 MG: 2.5 SOLUTION RESPIRATORY (INHALATION) at 20:34

## 2019-10-03 RX ADMIN — FERROUS SULFATE TAB 325 MG (65 MG ELEMENTAL FE) 325 MG: 325 (65 FE) TAB at 21:29

## 2019-10-03 RX ADMIN — FUROSEMIDE 40 MG: 10 INJECTION, SOLUTION INTRAVENOUS at 18:21

## 2019-10-03 ASSESSMENT — MIFFLIN-ST. JEOR: SCORE: 1091.33

## 2019-10-03 NOTE — LETTER
Transition Communication Hand-off for Care Transitions to Next Level of Care Provider    Name: Keisha Godinez  : 1932  MRN #: [id number]  Primary Care Provider: Gerri Eubanks  Primary Care MD Name: Dr. Eubanks  [facility] [address] [facility] [address]  [facility] [id number]  Primary Care Clinic Name: [facility]  Reason for Hospitalization:  Acute pulmonary edema (H) [J81.0]  Blood transfusion reaction, initial encounter [T80.92XA]  Acute on chronic congestive heart failure, unspecified heart failure type (H) [I50.9]  Admit Date/Time: 10/3/2019  4:03 PM    Key Recommendations:    Patient verbalized understanding of need for low sodium diet and daily weights.  Patient has home scale and oxygen.  She receives meals on wheels and use walker at home.     Martha Chacon    AVS/Discharge Summary is the source of truth; this is a helpful guide for improved communication of patient story

## 2019-10-03 NOTE — ED NOTES
[facility]  ED Nurse Handoff Report    Keisha Godinez is a 87 year old female   ED Chief complaint: other  . ED Diagnosis:   Final diagnoses:   Acute pulmonary edema (H)   Blood transfusion reaction, initial encounter   Acute on chronic congestive heart failure, unspecified heart failure type (H)     Allergies:   Allergies   Allergen Reactions     Blood Transfusion Related (Informational Only) Other (See Comments)     Patient has a history of a clinically significant antibody against RBC antigens.  A delay in compatible RBCs may occur.     Lisinopril Other (See Comments)     Tongue swelling     Calcium Nausea and Vomiting     Egg Yolk      Flu Virus Vaccine      Allergic to eggs.     Keflex [Cephalexin] Nausea     Pt states she had upset stomach.  NOT tongue swelling.  She had tongue swelling with a combo bp med that she thinks included lisinopril.    Tolerates IV Cefazolin     Levaquin [Levofloxacin]      Sulfa Drugs      Zinc Nausea and Vomiting       Code Status: PRIOR  Activity level - Baseline/Home:  Assist X 1. Activity Level - Current:   Assist X 2. Lift room needed: No. Bariatric: No   Needed: No   Isolation: No. Infection: Not Applicable.     Vital Signs:   Vitals:    10/03/19 1615 10/03/19 1700 10/03/19 1725 10/03/19 1825   BP: (!) 167/75 (!) 157/99     Pulse: 78 69     Resp:       Temp:    100.3  F (37.9  C)   TempSrc:    Oral   SpO2: 94% 91% 90%        Cardiac Rhythm:  ,      Pain level: 0-10 Pain Scale: 5  Patient confused: No. Patient Falls Risk: Yes.   Elimination Status: Has voided -Pure wick  Patient Report - Initial Complaint:Keisha Godinez is a 87 year old female with a complex past medical history including congestive heart failure on diuretics, COPD, atrial fibrillation, hypertension, hyperlipidemia, and prior CVA who presents with shortness of breath. Patient was seen in the infusion center today for which she received 2 units PRBCs for iron deficiency anemia. She  received 1 unit with no issues and then began complaining of shortness of breath and a headache. Blood pressure was also elevated to 195 systolic, subsequently sent to the ED. Patient here also complains of chills. Headache has improved and biggest concern at this time is her shortness of breath. No issues with prior blood transfusions.   . Focused Assessment: HA, SOB. Lives with daughter, uses fww and wc. Temps, tylenol given.  Blood transfusion reaction paperwork and additional labs sent to blood bank  Tests Performed: EKG, labs, cxr . Abnormal Results:   Labs Ordered and Resulted from Time of ED Arrival Up to the Time of Departure from the ED   BASIC METABOLIC PANEL - Abnormal; Notable for the following components:       Result Value    Glucose 135 (*)     GFR Estimate 58 (*)     All other components within normal limits   CBC WITH PLATELETS DIFFERENTIAL - Abnormal; Notable for the following components:    RBC Count 3.69 (*)     Hemoglobin 10.4 (*)     Hematocrit 34.7 (*)     MCHC 30.0 (*)     Absolute Neutrophil 9.7 (*)     Absolute Lymphocytes 0.2 (*)     All other components within normal limits   HEPATIC PANEL - Abnormal; Notable for the following components:    Bilirubin Direct 0.4 (*)     Bilirubin Total 1.8 (*)     AST 69 (*)     All other components within normal limits   NT PROBNP INPATIENT   TROPONIN I   ROUTINE UA WITH MICROSCOPIC   .   Treatments provided: IV lasix, tylenol  Family Comments: daughter at bedside  OBS brochure/video discussed/provided to patient:  N/A  ED Medications:   Medications   acetaminophen (TYLENOL) tablet 1,000 mg (1,000 mg Oral Given 10/3/19 1748)   furosemide (LASIX) injection 40 mg (40 mg Intravenous Given 10/3/19 1821)     Drips infusing:  No  For the majority of the shift, the patient's behavior Green. Interventions performed were meds, labs.     Severe Sepsis OR Septic Shock Diagnosis Present: No      ED Nurse Name/Phone Number: Cristal Ballesteros RN,   6:27 PM  RECEIVING  UNIT ED HANDOFF REVIEW    Above ED Nurse Handoff Report was reviewed: Yes  Reviewed by: Rebecca Beauchamp RN on October 3, 2019 at 7:14 PM

## 2019-10-03 NOTE — ED PROVIDER NOTES
History     Chief Complaint:  Shortness of Breath    HPI   Keisha Godinez is a 87 year old female with a complex past medical history including congestive heart failure on diuretics, COPD, atrial fibrillation, hypertension, hyperlipidemia, and prior CVA who presents with shortness of breath. Patient was seen in the infusion center today for which she received 2 units PRBCs for iron deficiency anemia. She received 1 unit with no issues and then began complaining of shortness of breath and a headache. Blood pressure was also elevated to 195 systolic, subsequently sent to the ED. Patient here also complains of chills. Headache has improved and biggest concern at this time is her shortness of breath. No issues with prior blood transfusions.      Allergies:  Significant antibody against RBC antigens   Lisinopril   Calcium   Egg yolk   Flu virus vaccine   Keflex  Levaquin  Sulfa drugs  Zinc     Medications:    Albuterol  Aspirin   Gabapentin  Hydralazine   Zocor  Prilosec   Aldactone   Demadex  Effexor     Past Medical History:    Anemia   Atrial fibrillation   B12 deficiency   Cardiomyopathy   Chronic edema   Chronic systolic congestive heart failure    COPD (chronic obstructive pulmonary disease)   CVA (cerebral vascular accident)    Depression   Gastro-oesophageal reflux disease   GI bleed   Hyperlipidemia   Hypertension   Iron deficiency   MSSA (methicillin susceptible Staphylococcus aureus)   Pulmonary hypertension    Shingles     Past Surgical History:    Excise mass foot, right     Family History:    Father: genetic syndrome  Mother: genetic syndrome   Daughter: pancreatic cancer  Brother: cancer  Sister: lung cancer     Social History:  The patient was accompanied to the ED by her daughter.  Smoking Status: Former 1.00 ppd cigarette smoker, quit 63.5 years ago   Smokeless Tobacco: Never Used  Alcohol Use: Negative   Drug Use: Negative    Marital Status:       Review of Systems   All other systems  "reviewed and are negative.    Physical Exam     Patient Vitals for the past 24 hrs:   BP Temp Temp src Pulse Heart Rate Resp SpO2 Height Weight   10/03/19 1943 (!) 155/69 100.5  F (38.1  C) Oral -- 81 20 96 % 1.6 m (5' 3\") 68.7 kg (151 lb 8 oz)   10/03/19 1915 (!) 161/73 -- -- 84 -- -- 96 % -- --   10/03/19 1900 (!) 155/77 -- -- 83 -- -- 97 % -- --   10/03/19 1856 -- -- -- -- -- -- 96 % -- --   10/03/19 1850 -- -- -- -- -- -- 95 % -- --   10/03/19 1845 (!) 157/71 -- -- 81 -- -- 96 % -- --   10/03/19 1838 -- -- -- -- -- -- 94 % -- --   10/03/19 1835 -- -- -- -- -- -- (!) 86 % -- --   10/03/19 1830 (!) 152/81 -- -- 79 -- -- (!) 88 % -- --   10/03/19 1825 -- 100.3  F (37.9  C) Oral -- -- -- -- -- --   10/03/19 1725 -- -- -- -- -- -- 90 % -- --   10/03/19 1700 (!) 157/99 -- -- 69 -- -- 91 % -- --   10/03/19 1615 (!) 167/75 -- -- 78 -- -- 94 % -- --   10/03/19 1614 (!) 159/67 99.2  F (37.3  C) Temporal -- 64 20 95 % -- --     Physical Exam  General: Resting on the bed.  Head: No obvious trauma to head.  Ears, Nose, Throat:  External ears normal.  Nose normal.    Eyes:  Conjunctivae clear.  Pupils are equal, round, and reactive.   Neck: Normal range of motion.  Neck supple.   CV: Regular rate and rhythm.  No murmurs.      Respiratory: Effort normal with scattered crackles and wheezing    Gastrointestinal: Soft.  No distension. There is no tenderness.   Neuro: Alert. Moving all extremities appropriately.  Normal speech.    Skin: Skin is warm and dry.  No rash noted.     Emergency Department Course     ECG:  ECG #1 taken at 1656  Atrial fibrillation   Rightward axis   Nonspecific ST abnormality   Abnormal ECG  No significant change compared to EKG dated 8/8/19.   Rate 69 bpm. [place] interval * ms. QRS duration 100 ms. QT/QTc 414/443 ms. P-R-T axes * 94 26.    ECG #2 taken at 1715   Atrial fibrillation   Rightward axis  Nonspecific ST abnormality   Abnormal ECG   No significant change compared to EKG dated 8/8/19.   Rate 76 bpm. " [place] interval *. QRS duration 98. QT/QTc 412/463. P-R-T axes * 95 18.    Imaging:  Radiology findings were communicated with the patient who voiced understanding of the findings.    XR Chest 2 Views  Mild, diffuse interstitial prominence suggestive of mild venous congestion. Enlarged, stable cardiomediastinal silhouette. No acute bony abnormality is seen. Calcification seen overlying the aortic knob.   Reading per radiology     Laboratory:  Laboratory findings were communicated with the patient who voiced understanding of the findings.    CBC: WBC 10.7, HGB 10.4(L),   BMP: Glucose 135(H), GFR estimate 58(L) o/w WNL (Creatinine 0.89)  BNP: 1,607  Troponin (Collected 1712): 0.026  Hepatic panel: Bilirubin direct 0.4(H), Bilirubin total 1.8(H), AST 69(H) o/w WNL    Transfusion reaction evaluation In process     Interventions:  1748 Tylenol 1,000 mg Oral   1821 Lasix 40 mg IV   1835 Zofran 4 mg IV     Emergency Department Course:    1628 Nursing notes and vitals reviewed.    1638 I performed an exam of the patient as documented above.     1656 EKG taken as noted above.     1712 IV was inserted and blood was drawn for laboratory testing, results above.    1715 Repeat EKG taken as noted above.     1725 The patient was sent for an XR while in the emergency department, results above.     1834 Rechecked and updated.      1839 I spoke with Dr. Parsons of the hospitalist service from [facility] regarding patient's presentation, findings, and plan of care.    1940 Findings and plan explained to the Patient and daughter who consents to admission. Discussed the patient with Dr. Parsons, who will admit the patient to a cardiac tele bed for further monitoring, evaluation, and treatment.    Impression & Plan      Medical Decision Making:  Keisha Godinez is a 87 year old female who presents to the emergency department today for evaluation of SOB.  Patient is mildly hypertensive and had a low-grade fever in the  emergency department the remainder vital signs are unrevealing.  Broad differential was pursued including but not limited to transfusion related reaction, hypervolemia, CHF exacerbation, hemolytic reaction, etc.  EKG shows atrial fib without any acute ST-T wave changes.  Troponin is detectable but within normal range.  Patient has no active chest pain.  I was concerned about possible CHF exacerbation therefore the blood tests were obtained.  BNP is within normal limits at 1600.  CBC shows no leukocytosis and appropriately increased hemoglobin at 10.4.  No evidence of anemia in the setting of recent transfusion.  BMP shows no acute electrolyte metabolic or renal dysfunction.  LFTs do show an elevated bilirubin which may suggest hemolysis but uncertain at this time.  Transfusion reaction evaluation is in process.  At this point no evidence of acute anemia in the setting of recent transfusion.  Chest x-ray does show venous congestion suggestive of fluid overload.  I am most suspicious of transfusion associated fluid overload.  Patient was given 40 of Lasix.  She is given Tylenol for fever.  She was admitted to the hospital service for further cares.  She did require supplemental oxygen but no further supportive cares.  Patient otherwise remained stable for transfer to the floor.    Diagnosis:    ICD-10-CM    1. Acute pulmonary edema (H) J81.0 Transfusion reaction evaluation     Transfusion reaction evaluation     Blood component     Blood component     Blood component     Blood component   2. Blood transfusion reaction, initial encounter T80.92XA    3. Acute on chronic congestive heart failure, unspecified heart failure type (H) I50.9      Disposition:   The patient is admitted into the care of Dr. Parsons.    Scribe Disclosure:  Shanda ROCHA, am serving as a scribe at 4:32 PM on 10/3/2019 to document services personally performed by Justa Godfrey MD based on my observations and the provider's  statements to me.      [facility] EMERGENCY DEPARTMENT       Justa Godfrey MD  10/03/19 [id number]

## 2019-10-03 NOTE — ED NOTES
Blood transfusion reaction paperwork completed and additional labs collected and sent to blood bank.

## 2019-10-03 NOTE — ED TRIAGE NOTES
Pt arrives from the infusion center after 2 units of blood (hx frequent transfusions). When about to discharge she started complaining of a HA. Runny nose, possible cold starting. HA 5/10, no other complaints

## 2019-10-03 NOTE — PROGRESS NOTES
Infusion Nursing Note:  Keisha Godinez presents today for 2 units PRBC.    Patient seen by provider today: No   present during visit today: Not Applicable.    Note: BP slightly elevated after first unit of prbc.  Started second unit at 120ml/hr.  After 10 minutes of infusing BP improving.  Increased rate to 150ml/hr for remainder of infusion.  Patient tolerated this until right after the infusion.  Patient complained of a headache.  BP elevated and patient slightly short of breath.  Advised patient she needs to go to the ER for evaluation.  Called report to ER and transported patient with two RN's.  Upon arrival to the ER patient then had chills.      Intravenous Access:  Peripheral IV placed.    Treatment Conditions:  Blood transfusion consent signed 10/3/19.  HGB 7.8.      Post Infusion Assessment:  Patient tolerated infusion without incident.  Blood return noted pre and post infusion.  Site patent and intact, free from redness, edema or discomfort.  No evidence of extravasations.  Access discontinued per protocol.       Discharge Plan:   Patient discharged in stable condition accompanied by: daughter.  Departure Mode: Wheelchair.    Miriam Orantes, RN, RN

## 2019-10-03 NOTE — H&P
[facility]  Hospitalist Admission Note  Name: Keisha Godinez    MRN: [id number]  YOB: 1932    Age: 87 year old  Date of admission: 10/3/2019  Primary care provider: Gerri Eubanks    Chief Complaint: Shortness of breath    Assessment and Plan:      Keisha Godinez is a 87 year old female with history of chronic diastolic CHF, RV systolic CHF, pulmonary hypertension, lymphedema, chronic left foot wound, A. 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  As noted above she does have chronic anemia which is managed conservatively with intermittent transfusions as needed.  She was actually at the infusion center today getting transfused.  Apparently the first unit went without issue but then during the second unit she developed shortness of breath, headache and significant hypertension up to 195 systolic.  She was sent to the ER and here was noted to be hypoxic to the low 80s on room air.  Chest x-ray showed pulmonary vascular congestion.  She was felt to be fluid overloaded and was given 40 mg IV Lasix and I am asked to admit her for further care.  The blood bank apparently was already contacted regarding work-up for potential transfusion reaction.     Problem List:   1. Acute on chronic diastolic congestive heart failure exacerbation.  Apparently exacerbated while getting 2 units of blood for chronic anemia.  Other considerations include transfusion reaction in the blood bank is already evaluating for this.  She does appear to be fluid overloaded with pulmonary congestion on chest x-ray.  -furosemide 40 mg twice a day.    --Continue spironolactone   --Continue other home medications including hydralazine.    --not on a BB, appears due to AV conduction disease.  Not on ace-I due to tongue swelling.       2. Acute hypoxic respiratory failure.  Likely due to  CHF exacerbation.  Continue supplemental oxygen as needed.  Normally just uses 1.5 L supplemental oxygen at night.     3. Hypertension.  Continue hydralazine, furosemide, and spironolactone.      4. GERD.  Continue omeprazole.     5. Hyperlipidemia.  Continue simvastatin.     6. Depression.  Continue venlafaxine.     7.    Weakness and deconditioning.  cardiac rehab consult.       8.  chronic Anemia.  Was actually being transfused 2 units red cells when shortness of breath and   hypertension came on.  Does have known colonic AVMs and is on iron deficiency.  For now we will   monitor.       9.  Atrial fibrillation.  Not on anticoagulation due to previous GI bleed.    DVT Prophylaxis: Pneumatic Compression Devices  Code Status: Full Code  Discharge Dispo: admit to inpatient status.        History of Present Illness:  Keisha oGdinez is a 87 year old female with history of chronic diastolic CHF, RV systolic CHF, pulmonary hypertension, lymphedema, chronic left foot wound, A. 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  As noted above she does have chronic anemia which is managed conservatively with intermittent transfusions as needed.  She was actually at the infusion center today getting transfused.  Apparently the first unit went without issue but then during the second unit she developed shortness of breath, headache and significant hypertension up to 195 systolic.  She was sent to the ER and here was noted to be hypoxic to the low 80s on room air.  Chest x-ray showed pulmonary vascular congestion.  She was felt to be fluid overloaded and was given 40 mg IV Lasix and I am asked to admit her for further care.  The blood bank apparently was already contacted regarding work-up for potential transfusion reaction.       Past Medical History:  Past Medical History:   Diagnosis Date     Anemia       Atrial fibrillation (H)      B12 deficiency      Cardiomyopathy (H)      CHF (congestive heart failure) (H)      Chronic edema     Lower extremities     Chronic systolic congestive heart failure (H)      CVA (cerebral vascular accident) (H)     Acute Left MCA hemispheric CVA ( on coumadin)     Depression      Gastro-oesophageal reflux disease      GI bleed 03-20-15     Hyperlipidemia      Hypertension      Iron deficiency      MSSA (methicillin susceptible Staphylococcus aureus) 03-10-15     Pulmonary hypertension (H)      Shingles      Past Surgical History:  Past Surgical History:   Procedure Laterality Date     COLONOSCOPY  2015    Diverticulosis, polyps     ENTEROSCOPY SMALL BOWEL N/A 2016    Procedure: ENTEROSCOPY SMALL BOWEL;  Surgeon: Ady Ponce MD;  Location:  GI     ESOPHAGOSCOPY, GASTROSCOPY, DUODENOSCOPY (EGD), COMBINED N/A 3/22/2015    Upper GI- Normal, nothing significant found.      EXCISE MASS FOOT Right 2016    Procedure: EXCISE MASS FOOT;  Surgeon: Lee Jang DPM;  Location: [facility] OR     Social History:  Social History     Tobacco Use     Smoking status: Former Smoker     Years: 1.00     Types: Cigarettes     Start date:      Last attempt to quit: 1956     Years since quittin.5     Smokeless tobacco: Never Used   Substance Use Topics     Alcohol use: No     Alcohol/week: 0.0 standard drinks     Social History     Patient does not qualify to have social determinant information on file (likely too young).   Social History Narrative     Not on file     Family History:  Family History   Problem Relation Age of Onset     Known Genetic Syndrome Father      Known Genetic Syndrome Mother      Other Cancer Daughter         pancreatic     Other Cancer Brother         type     Other Cancer Sister 60        lung     Diabetes No family hx of      Breast Cancer No family hx of      Cancer - colorectal No family hx of      Ovarian Cancer No family hx of       Prostate Cancer No family hx of      Allergies:  Allergies   Allergen Reactions     Blood Transfusion Related (Informational Only) Other (See Comments)     Patient has a history of a clinically significant antibody against RBC antigens.  A delay in compatible RBCs may occur.     Lisinopril Other (See Comments)     Tongue swelling     Calcium Nausea and Vomiting     Egg Yolk      Flu Virus Vaccine      Allergic to eggs.     Keflex [Cephalexin] Nausea     Pt states she had upset stomach.  NOT tongue swelling.  She had tongue swelling with a combo bp med that she thinks included lisinopril.    Tolerates IV Cefazolin     Levaquin [Levofloxacin]      Sulfa Drugs      Zinc Nausea and Vomiting     Medications:  No current facility-administered medications on file prior to encounter.   acetaminophen (TYLENOL) 325 MG tablet, Take 650 mg by mouth 3 times daily as needed for mild pain  albuterol (ACCUNEB) 1.25 MG/3ML nebulizer solution, Take 1 vial by nebulization 3 times daily   ASPIRIN NOT PRESCRIBED (INTENTIONAL), Please choose reason not prescribed, below  Cranberry Extract 250 MG TABS, Take 250 mg by mouth daily  ferrous sulfate (FEROSUL) 325 (65 Fe) MG tablet, Take 1 tablet (325 mg) by mouth 2 times daily  gabapentin (NEURONTIN) 100 MG capsule, TAKE 1 CAPSULE (100MG) BY MOUTH THREE TIMES DAILY  hydrALAZINE (APRESOLINE) 25 MG tablet, TAKE 3 TABLETS BY MOUTH THREE TIMES DAILY  OMEGA-3 FATTY ACIDS PO, Take 1,200 mg by mouth daily   omeprazole (PRILOSEC) 20 MG DR capsule, TAKE 1 CAPSULE BY MOUTH DAILY  simvastatin (ZOCOR) 10 MG tablet, TAKE 1 TABLET (10MG) BY MOUTH AT BEDTIME  SM STOOL SOFTENER 8.6-50 MG tablet, Take 2 tablets daily as needed for constipation while taking prescription pain medications.  spironolactone (ALDACTONE) 50 MG tablet, TAKE 1 TABLET BY MOUTH DAILY  torsemide (DEMADEX) 20 MG tablet, Take 1 tablet (20 mg) by mouth 2 times daily  venlafaxine (EFFEXOR-XR) 75 MG 24 hr capsule, Take 1 capsule (75 mg)  by mouth daily  VITAMIN D, CHOLECALCIFEROL, PO, Take 1,000 Units by mouth daily             Review of Systems:  A Comprehensive greater than 10 system review of systems was carried out.  Pertinent positives and negatives are noted above.  Otherwise negative for contributory information.     Physical Exam:  Blood pressure (!) 157/99, pulse 69, temperature 100.3  F (37.9  C), temperature source Oral, resp. rate 20, SpO2 90 %, not currently breastfeeding.  Wt Readings from Last 1 Encounters:   08/20/19 68.9 kg (152 lb)     Exam:  General: Alert, awake, no acute distress.  HEENT: NC/AT, eyes anicteric, external occular movements intact, face symmetric.  Dentition WNL, MM moist.  Cardiac: RRR, S1, S2.  No murmurs appreciated.  Pulmonary: Bilateral rales normal chest rise, normal work of breathing.  Lungs CTA BL  Abdomen: soft, non-tender, non-distended.  Bowel Sounds Present.  No guarding.  Extremities: 1+ edema though wraps are also present. no deformities.  Warm, well perfused.  Skin: no rashes or lesions noted.  Warm and Dry.  Neuro: No focal deficits noted.  Speech clear.  Coordination and strength grossly normal.  Psych: Appropriate affect.    Data:  EKG: Normal sinus rhythm, nonspecific ST changes.  Imaging:  Recent Results (from the past 48 hour(s))   XR Chest 2 Views    Narrative    CHEST TWO VIEWS      10/3/2019 5:35 PM     HISTORY: Shortness of breath.    COMPARISON: 6/18/2018.      Impression    IMPRESSION: Mild, diffuse interstitial prominence suggestive of mild  venous congestion. Enlarged, stable cardiomediastinal silhouette. No  acute bony abnormality is seen. Calcification seen overlying the  aortic knob.     PENNY CHAND MD     Labs:  Recent Labs   Lab 10/03/19  1712 09/27/19  1353   WBC 10.7 5.3   HGB 10.4* 7.8*   HCT 34.7* 26.6*   MCV 94 96    235          Lab Results   Component Value Date     10/03/2019     08/08/2019     08/08/2019    Lab Results   Component Value Date     CHLORIDE 104 10/03/2019    CHLORIDE 101 08/08/2019    CHLORIDE 101 08/08/2019    Lab Results   Component Value Date    BUN 27 10/03/2019    BUN 29 08/08/2019    BUN 30 08/08/2019      Lab Results   Component Value Date    POTASSIUM 3.9 10/03/2019    POTASSIUM 4.1 08/08/2019    POTASSIUM 4.6 08/08/2019    Lab Results   Component Value Date    CO2 30 10/03/2019    CO2 30 08/08/2019    CO2 28 08/08/2019    Lab Results   Component Value Date    CR 0.89 10/03/2019    CR 0.98 08/08/2019    CR 1.00 08/08/2019        Recent Labs   Lab 10/03/19  1712   TROPI 0.026         Aguilar Parsons MD  Hospitalist  [facility]

## 2019-10-03 NOTE — LETTER
Transition Communication Hand-off for Care Transitions to Next Level of Care Provider    Name: Keisha Godinez  : 1932  MRN #: [id number]  Primary Care Provider: Gerri Eubanks  Primary Care MD Name: Dr. Eubanks  Primary Clinic: [address]  [facility] [address]  Primary Care Clinic Name: [facility]  Reason for Hospitalization:  Acute pulmonary edema (H) [J81.0]  Blood transfusion reaction, initial encounter [T80.92XA]  Acute on chronic congestive heart failure, unspecified heart failure type (H) [I50.9]  Admit Date/Time: 10/3/2019  4:03 PM  Discharge Date: 10/9/2019  Payor Source: Payor: [facility] / Plan: [facility] MEDICARE ADVANTAGE / Product Type: Medicare /     Readmission Assessment Measure (DALE) Risk Score/category: Average    Reason for Communication Hand-off Referral: Admission diagnoses: CHF    Discharge Plan: Home     Concern for non-adherence with plan of care: No.     Discharge Needs Assessment:  Needs      Most Recent Value   Anticipated Changes Related to Illness  none   Equipment Currently Used at Home  walker, rolling, grab bar, toilet, grab bar, tub/shower, raised toilet, shower chair   # of Referrals Placed by CTS  External Care Coordination, Communication hand-offs to next level of Care Providers   Other Resources  Meals on Wheels, Other (see comment) [discussed CHF action plan, pt declined another copy ]        Follow-up specialty is recommended: No    Follow-up plan:  No future appointments.    Any outstanding tests or procedures:  No. Recommend CBC & BMP in 5 days.       Key Recommendations:  Patient verbalized understanding of need for low sodium diet and daily weights.  Patient has home scale and oxygen.  She receives meals on wheels and use walker at home. Please assess for compliance with low sodium diet and daily weights.     Martha Chacon    Sent by Ashely Vega, RN, BSN, CPHN, CM    AVS/Discharge Summary is the source of truth; this is a helpful guide for improved  communication of patient story

## 2019-10-04 ENCOUNTER — APPOINTMENT (OUTPATIENT)
Dept: GENERAL RADIOLOGY | Facility: CLINIC | Age: 84
DRG: 811 | End: 2019-10-04
Attending: HOSPITALIST
Payer: COMMERCIAL

## 2019-10-04 LAB
ALBUMIN UR-MCNC: 30 MG/DL
ANION GAP SERPL CALCULATED.3IONS-SCNC: 5 MMOL/L (ref 3–14)
APPEARANCE UR: CLEAR
BILIRUB UR QL STRIP: NEGATIVE
BLD PROD TYP BPU: NORMAL
BLD PROD TYP BPU: NORMAL
BLD UNIT ID BPU: 0
BLD UNIT ID BPU: 0
BLOOD PRODUCT CODE: NORMAL
BLOOD PRODUCT CODE: NORMAL
BPU ID: NORMAL
BPU ID: NORMAL
BUN SERPL-MCNC: 29 MG/DL (ref 7–30)
CALCIUM SERPL-MCNC: 8.5 MG/DL (ref 8.5–10.1)
CHLORIDE SERPL-SCNC: 103 MMOL/L (ref 94–109)
CO2 SERPL-SCNC: 31 MMOL/L (ref 20–32)
COLOR UR AUTO: YELLOW
CREAT SERPL-MCNC: 1.01 MG/DL (ref 0.52–1.04)
ERYTHROCYTE [DISTWIDTH] IN BLOOD BY AUTOMATED COUNT: 14.3 % (ref 10–15)
GFR SERPL CREATININE-BSD FRML MDRD: 50 ML/MIN/{1.73_M2}
GLUCOSE SERPL-MCNC: 93 MG/DL (ref 70–99)
GLUCOSE UR STRIP-MCNC: NEGATIVE MG/DL
HCT VFR BLD AUTO: 33 % (ref 35–47)
HGB BLD-MCNC: 9.8 G/DL (ref 11.7–15.7)
HGB UR QL STRIP: NEGATIVE
INTERPRETATION ECG - MUSE: NORMAL
INTERPRETATION ECG - MUSE: NORMAL
KETONES UR STRIP-MCNC: NEGATIVE MG/DL
LEUKOCYTE ESTERASE UR QL STRIP: NEGATIVE
MCH RBC QN AUTO: 28.5 PG (ref 26.5–33)
MCHC RBC AUTO-ENTMCNC: 29.7 G/DL (ref 31.5–36.5)
MCV RBC AUTO: 96 FL (ref 78–100)
MUCOUS THREADS #/AREA URNS LPF: PRESENT /LPF
NITRATE UR QL: NEGATIVE
PH UR STRIP: 5 PH (ref 5–7)
PLATELET # BLD AUTO: 161 10E9/L (ref 150–450)
POTASSIUM SERPL-SCNC: 3.9 MMOL/L (ref 3.4–5.3)
PROCALCITONIN SERPL-MCNC: 2.67 NG/ML
RBC # BLD AUTO: 3.44 10E12/L (ref 3.8–5.2)
RBC #/AREA URNS AUTO: 1 /HPF (ref 0–2)
SODIUM SERPL-SCNC: 139 MMOL/L (ref 133–144)
SOURCE: ABNORMAL
SP GR UR STRIP: 1.01 (ref 1–1.03)
SQUAMOUS #/AREA URNS AUTO: 1 /HPF (ref 0–1)
TRANSF REACT PLASRBC-IMP: NORMAL
TRANSFUSION STATUS PATIENT QL: NORMAL
UROBILINOGEN UR STRIP-MCNC: NORMAL MG/DL (ref 0–2)
WBC # BLD AUTO: 9.9 10E9/L (ref 4–11)
WBC #/AREA URNS AUTO: <1 /HPF (ref 0–5)

## 2019-10-04 PROCEDURE — 84145 PROCALCITONIN (PCT): CPT | Performed by: HOSPITALIST

## 2019-10-04 PROCEDURE — 25000128 H RX IP 250 OP 636: Performed by: HOSPITALIST

## 2019-10-04 PROCEDURE — 71046 X-RAY EXAM CHEST 2 VIEWS: CPT

## 2019-10-04 PROCEDURE — 25800030 ZZH RX IP 258 OP 636: Performed by: INTERNAL MEDICINE

## 2019-10-04 PROCEDURE — 99233 SBSQ HOSP IP/OBS HIGH 50: CPT | Performed by: INTERNAL MEDICINE

## 2019-10-04 PROCEDURE — 12000000 ZZH R&B MED SURG/OB

## 2019-10-04 PROCEDURE — 81001 URINALYSIS AUTO W/SCOPE: CPT | Performed by: EMERGENCY MEDICINE

## 2019-10-04 PROCEDURE — 85027 COMPLETE CBC AUTOMATED: CPT | Performed by: INTERNAL MEDICINE

## 2019-10-04 PROCEDURE — 87040 BLOOD CULTURE FOR BACTERIA: CPT | Performed by: HOSPITALIST

## 2019-10-04 PROCEDURE — 36415 COLL VENOUS BLD VENIPUNCTURE: CPT | Performed by: INTERNAL MEDICINE

## 2019-10-04 PROCEDURE — 94640 AIRWAY INHALATION TREATMENT: CPT | Mod: 76

## 2019-10-04 PROCEDURE — 40000275 ZZH STATISTIC RCP TIME EA 10 MIN

## 2019-10-04 PROCEDURE — 25000125 ZZHC RX 250: Performed by: INTERNAL MEDICINE

## 2019-10-04 PROCEDURE — 80048 BASIC METABOLIC PNL TOTAL CA: CPT | Performed by: INTERNAL MEDICINE

## 2019-10-04 PROCEDURE — 94640 AIRWAY INHALATION TREATMENT: CPT

## 2019-10-04 PROCEDURE — 25000128 H RX IP 250 OP 636: Performed by: INTERNAL MEDICINE

## 2019-10-04 PROCEDURE — 36415 COLL VENOUS BLD VENIPUNCTURE: CPT | Performed by: HOSPITALIST

## 2019-10-04 PROCEDURE — 25000132 ZZH RX MED GY IP 250 OP 250 PS 637: Performed by: INTERNAL MEDICINE

## 2019-10-04 RX ORDER — ALBUTEROL SULFATE 0.83 MG/ML
2.5 SOLUTION RESPIRATORY (INHALATION) EVERY 6 HOURS PRN
Status: DISCONTINUED | OUTPATIENT
Start: 2019-10-04 | End: 2019-10-09 | Stop reason: HOSPADM

## 2019-10-04 RX ORDER — ALBUTEROL SULFATE 0.83 MG/ML
1.25 SOLUTION RESPIRATORY (INHALATION)
Status: DISCONTINUED | OUTPATIENT
Start: 2019-10-04 | End: 2019-10-07

## 2019-10-04 RX ORDER — CEFAZOLIN SODIUM 1 G/50ML
1250 SOLUTION INTRAVENOUS EVERY 24 HOURS
Status: DISCONTINUED | OUTPATIENT
Start: 2019-10-04 | End: 2019-10-06

## 2019-10-04 RX ADMIN — ALBUTEROL SULFATE 1.25 MG: 2.5 SOLUTION RESPIRATORY (INHALATION) at 08:12

## 2019-10-04 RX ADMIN — GABAPENTIN 100 MG: 100 CAPSULE ORAL at 15:08

## 2019-10-04 RX ADMIN — ACETAMINOPHEN 650 MG: 325 TABLET, FILM COATED ORAL at 15:08

## 2019-10-04 RX ADMIN — VANCOMYCIN HYDROCHLORIDE 1250 MG: 10 INJECTION, POWDER, LYOPHILIZED, FOR SOLUTION INTRAVENOUS at 21:16

## 2019-10-04 RX ADMIN — HYDRALAZINE HYDROCHLORIDE 50 MG: 50 TABLET, FILM COATED ORAL at 09:42

## 2019-10-04 RX ADMIN — GABAPENTIN 100 MG: 100 CAPSULE ORAL at 21:19

## 2019-10-04 RX ADMIN — GABAPENTIN 100 MG: 100 CAPSULE ORAL at 09:58

## 2019-10-04 RX ADMIN — FUROSEMIDE 40 MG: 10 INJECTION, SOLUTION INTRAVENOUS at 09:43

## 2019-10-04 RX ADMIN — SPIRONOLACTONE 50 MG: 25 TABLET ORAL at 09:42

## 2019-10-04 RX ADMIN — FERROUS SULFATE TAB 325 MG (65 MG ELEMENTAL FE) 325 MG: 325 (65 FE) TAB at 21:19

## 2019-10-04 RX ADMIN — HYDRALAZINE HYDROCHLORIDE 50 MG: 50 TABLET, FILM COATED ORAL at 15:08

## 2019-10-04 RX ADMIN — OMEPRAZOLE 20 MG: 20 CAPSULE, DELAYED RELEASE ORAL at 09:42

## 2019-10-04 RX ADMIN — POTASSIUM CHLORIDE 20 MEQ: 1.5 POWDER, FOR SOLUTION ORAL at 09:59

## 2019-10-04 RX ADMIN — ALBUTEROL SULFATE 1.25 MG: 2.5 SOLUTION RESPIRATORY (INHALATION) at 12:08

## 2019-10-04 RX ADMIN — VENLAFAXINE HYDROCHLORIDE 75 MG: 75 CAPSULE, EXTENDED RELEASE ORAL at 09:42

## 2019-10-04 RX ADMIN — ALBUTEROL SULFATE 1.25 MG: 2.5 SOLUTION RESPIRATORY (INHALATION) at 20:49

## 2019-10-04 RX ADMIN — FUROSEMIDE 40 MG: 10 INJECTION, SOLUTION INTRAVENOUS at 15:08

## 2019-10-04 RX ADMIN — FERROUS SULFATE TAB 325 MG (65 MG ELEMENTAL FE) 325 MG: 325 (65 FE) TAB at 09:42

## 2019-10-04 RX ADMIN — SIMVASTATIN 10 MG: 10 TABLET, FILM COATED ORAL at 21:19

## 2019-10-04 RX ADMIN — HYDRALAZINE HYDROCHLORIDE 50 MG: 50 TABLET, FILM COATED ORAL at 21:19

## 2019-10-04 RX ADMIN — TAZOBACTAM SODIUM AND PIPERACILLIN SODIUM 3.38 G: 375; 3 INJECTION, SOLUTION INTRAVENOUS at 20:37

## 2019-10-04 ASSESSMENT — ACTIVITIES OF DAILY LIVING (ADL)
ADLS_ACUITY_SCORE: 18
ADLS_ACUITY_SCORE: 17
ADLS_ACUITY_SCORE: 16
PREVIOUS_RESPONSIBILITIES: MEAL PREP;HOUSEKEEPING;MEDICATION MANAGEMENT;FINANCES
ADLS_ACUITY_SCORE: 17
ADLS_ACUITY_SCORE: 16
ADLS_ACUITY_SCORE: 17

## 2019-10-04 ASSESSMENT — MIFFLIN-ST. JEOR: SCORE: 1085.89

## 2019-10-04 NOTE — CONSULTS
REASON FOR ASSESSMENT:  CHF Consult for 2 gm NA Diet Education    NUTRITION HISTORY:    Information obtained from:  The patient and her daughter.     Living situation:   Patient lives at home with her daughter.   Her daughter works at [employer] and they receive all their groceries from [employer]. Her daughter only buys low sodium foods, and fresh produce. She reads foods labels and keeps track of her mom's sodium intake on a daily basis.   Her mother does receive Meals on Wheels - chooses to receive the low sodium option     Previous diet instructions:  Pt reported receiving low sodium diet instruction in the past.     Wt Readings from Last 10 Encounters:   10/04/19 68.2 kg (150 lb 4.8 oz)   08/20/19 68.9 kg (152 lb)   08/08/19 69.6 kg (153 lb 8 oz)   07/23/19 68.3 kg (150 lb 9.6 oz)   06/24/19 68.5 kg (151 lb)   04/29/19 70.4 kg (155 lb 1.6 oz)   04/22/19 70.1 kg (154 lb 9.6 oz)   12/13/18 66.7 kg (147 lb 1.6 oz)   10/19/18 67.6 kg (149 lb)   10/05/18 67.6 kg (149 lb)   No significant weight loss. Pt reports losing weight in the past from cutting out sodium from her diet and choosing healthier, whole food options.     MALNUTRITION:  % Weight Loss:  None noted  % Intake:  No decreased intake noted  Subcutaneous Fat Loss:  None observed  Muscle Loss:  None observed  Fluid Retention:  None noted    Malnutrition Diagnosis: Patient does not meet two of the above criteria necessary for diagnosing malnutrition    CURRENT DIET:  2 gm Na Diet     INTERVENTIONS:    Nutrition Prescription:  2 gm Na Diet     Implementation:    Assessed learning needs, learning preferences, and willingness to learn    Nutrition Education (Content):  a) Provided handouts:  1) Tips for Low Na Diet  2) Label Reading  3) Low Na Foods/Drinks  4) Seasoning Your Food Without Salt  5) Low Na Recipe Booklet  b) Discussed rational for limiting Na for CHF and stressed importance of following 2 gm Na guidelines   c) Encouraged patient to keep a daily food  record    Nutrition Education (Application):  a) Discussed current eating habits and recommended alternative food choices    Anticipated good compliance    Diet Education - refer to Education Flowsheet    Goals:    Patient verbalizes understanding of diet by listing 3 foods high in sodium and 3 alternative food choices    All of the above goals met during the education session    Follow Up:    Provided RD contact information for future questions.    Recommend Out-Patient Nutrition Referral, if further diet instructions are needed.    Nasim Yancey RDN   Clinical Dietitian  [facility]

## 2019-10-04 NOTE — PLAN OF CARE
[id number]: Pt resting comfortably. VSS on 1L. A&O. Denies pain. Tranfers with Ax1, IVONEO- luist at times. Tele reads Afib CVR. Will continue to monitor.

## 2019-10-04 NOTE — CONSULTS
Care Transition Initial Assessment - RN     Met with: Patient.  DATA   Active Problems:    Acute on chronic diastolic CHF (congestive heart failure) (H)       Cognitive Status: awake, alert and oriented.  [facility]: [facility]  Primary Care MD Name: Dr. Eubanks  Contact information and PCP information verified: Yes  Lives With: child(emre), adult      Quality of Family Relationships: involved, supportive  Description of Support System: Supportive, Involved   Who is your support system?: Children       Insurance concerns: No Insurance issues identified  ASSESSMENT  Patient currently receives the following services:    Meals on Wheels and [facility] oxygen        Identified issues/concerns regarding health management: none  Patient verbalized understanding of need for low sodium diet and daily weights.  Patient has home scale and oxygen.  She receives meals on wheels and use walker at home.     PLAN  Financial costs for the patient were not discussed .  Patient given options and choices for discharge yes .  Patient/family is agreeable to the plan?  Yes:   Patient anticipates discharging to home with daughter .     Other Resources: Meals on Wheels, Other (see comment)(discussed CHF action plan, pt declined another copy )  Patient anticipates needs for home equipment: No  Transportation/person available to transport on day of discharge  is daughter and will be been notified at time of discharge.  Plan/Disposition: Home   Appointments: Patient will make own follow up appointment to accommodate her daughter's schedule.  CM stressed importance of f/u appointment within 7 days of discharge.       Care  (CTS) will continue to follow as needed.    Martha Chacon RN, BSN, CTS  [phone] (for today)

## 2019-10-04 NOTE — PHARMACY-ADMISSION MEDICATION HISTORY
Admission medication history interview status for this patient is complete. See [facility] admission navigator for allergy information, prior to admission medications and immunization status.     Medication history interview source(s):Patient  Medication history resources (including written lists, pill bottles, clinic record):EPIC    Changes made to PTA medication list:  Added: none  Deleted: none  Changed:added times when taken    Medication reconciliation/reorder completed by provider prior to medication history? Yes     Do you take OTC medications (eg tylenol, ibuprofen, fish oil, eye/ear drops, etc)? Yes     For patients on insulin therapy: No    Prior to Admission medications    Medication Sig Last Dose Taking? Auth Provider   acetaminophen (TYLENOL) 325 MG tablet Take 650 mg by mouth 3 times daily as needed for mild pain  Yes Unknown, Entered By History   albuterol (ACCUNEB) 1.25 MG/3ML nebulizer solution Take 1 vial by nebulization 3 times daily  10/3/2019 at am Yes Unknown, Entered By History   Cranberry Extract 250 MG TABS Take 250 mg by mouth daily 10/3/2019 at Unknown time Yes Doctor, None, MD   ferrous sulfate (FEROSUL) 325 (65 Fe) MG tablet Take 1 tablet (325 mg) by mouth 2 times daily 10/3/2019 at am Yes Darryl Reed MD   gabapentin (NEURONTIN) 100 MG capsule TAKE 1 CAPSULE (100MG) BY MOUTH THREE TIMES DAILY 10/3/2019 at am Yes Gerri Eubanks MD   hydrALAZINE (APRESOLINE) 25 MG tablet TAKE 3 TABLETS BY MOUTH THREE TIMES DAILY 10/3/2019 at am Yes Darryl Reed MD   OMEGA-3 FATTY ACIDS PO Take 1,200 mg by mouth daily  10/3/2019 at am Yes Reported, Patient   omeprazole (PRILOSEC) 20 MG DR capsule TAKE 1 CAPSULE BY MOUTH DAILY 10/3/2019 at am Yes Gerri Eubanks MD   simvastatin (ZOCOR) 10 MG tablet TAKE 1 TABLET (10MG) BY MOUTH AT BEDTIME 10/2/2019 at Unknown time Yes Gerri Eubanks MD   SM STOOL SOFTENER 8.6-50 MG tablet Take 2 tablets daily as needed for constipation while taking  prescription pain medications.  Yes Darryl Reed MD   spironolactone (ALDACTONE) 50 MG tablet TAKE 1 TABLET BY MOUTH DAILY 10/3/2019 at am Yes Gerri Eubanks MD   torsemide (DEMADEX) 20 MG tablet Take 1 tablet (20 mg) by mouth 2 times daily 10/3/2019 at am Yes Edin Chaudhari MD   venlafaxine (EFFEXOR-XR) 75 MG 24 hr capsule Take 1 capsule (75 mg) by mouth daily 10/3/2019 at am Yes Gerri Eubanks MD   VITAMIN D, CHOLECALCIFEROL, PO Take 1,000 Units by mouth daily  10/3/2019 at am Yes Reported, Patient   ASPIRIN NOT PRESCRIBED (INTENTIONAL) Please choose reason not prescribed, below   Gerri Eubanks MD

## 2019-10-04 NOTE — PLAN OF CARE
Vss, temp high 100.5 and rechecked 99.7 denies pain or shortness of breath. Dtr present and supportive. Using purewick. Legs flakey. Skin intact.

## 2019-10-04 NOTE — PROGRESS NOTES
"[facility] RCAT  Date: October 4, 2019  Admission Dx: CHF exacerbation  Pulmonary History: pulmonary HTN, CHF  Home Nebulizer/MDI Use: Albuterol TID  Home Oxygen: 1.5L NOC  Acuity Level (RCAT flow sheet): Level 3    Aerosol Therapy initiated: Albuterol QID, prn    Pulmonary Hygiene initiated: Coughing and deep breathing techniques    Volume Expansion initiated: Incentive spirometer    Current Oxygen Requirements: 3L NC  Current SpO2: 97%    Re-evaluation date: 10/7/2019    Patient Education: Education was provided on RCAT process. Will continue to do education with patient.    See \"RT Assessments\" flow sheet for patient assessment scoring and Acuity Level Details.     Cosmo Gar, RT on 10/4/2019 at 1:00 AM     "

## 2019-10-04 NOTE — PLAN OF CARE
Pt alert and oriented X 4. Apical pulse irregular, Tele A-fib CVR. Lung sounds diminished throughout this AM. Pt reports overall feeling better. Active bowel sounds, last BM 10/4/19. Pt intake 460cc, output 450cc, IV lasix 40mg BID. K+ replaced.   Plan: Possible discharge in 1-2 days.     Pt reports headache, labored breathing,  temp 101.7, tylenol administered. Recheck 101.2, headache 5/10. Pt leg care and lymphedema wraps complete. Pt up in chair for dinner, lung sounds with crackles and wheezing. MD paged. CXR, pro-calcitonin and blood cultures ordered.

## 2019-10-04 NOTE — PROGRESS NOTES
[facility]  Hospitalist Progress Note  Rahul Parsons MD 10/04/19    Reason for Stay (Diagnosis): shortness of breath     Assessment and Plan:       Keisha Godinez is a 87 year old female with history of chronic diastolic CHF, RV systolic CHF, pulmonary hypertension, lymphedema, chronic left foot wound, A. 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  As noted above she does have chronic anemia which is managed conservatively with intermittent transfusions as needed.  She was actually at the infusion center the day of admission getting transfused.  Apparently the first unit went without issue but then during the second unit she developed shortness of breath, headache and significant hypertension up to 195 systolic.  She was sent to the ER and here was noted to be hypoxic to the low 80s on room air.  Chest x-ray showed pulmonary vascular congestion.  She was felt to be fluid overloaded and was given 40 mg IV Lasix and dmitted for further care.  The blood bank apparently was already contacted regarding work-up for potential transfusion reaction.    Following admission Keisha feels much better.  She remains admitted for at least 1 more day of IV diuresis.  Hemoglobin has been stable.       Problem List:   1. Acute on chronic diastolic congestive heart failure exacerbation.  Apparently exacerbated while getting 2 units of blood for chronic anemia.  Other considerations include transfusion reaction in the blood bank is already evaluating for this.  She does appear to be fluid overloaded with pulmonary congestion on chest x-ray.  -furosemide 40 mg twice a day today, reevaluate tomorrow..    --Continue spironolactone   --Continue other home medications including hydralazine.    --not on a BB, appears due to AV conduction disease.  Not on ace-I  "due to tongue swelling.        2. Acute hypoxic respiratory failure.  Likely due to CHF exacerbation.  Continue supplemental oxygen as needed.  Normally just uses 1.5 L supplemental oxygen at night.  Currently still requiring oxygen during the daytime.     3. Hypertension.  Continue hydralazine, furosemide, and spironolactone.      4. GERD.  Continue omeprazole.     5. Hyperlipidemia.  Continue simvastatin.     6. Depression.  Continue venlafaxine.     7.    Weakness and deconditioning.  cardiac rehab consult.                         8.  chronic Anemia.  Was actually being transfused 2 units red cells when shortness of breath and                 hypertension came on.  Does have known colonic AVMs and is on iron deficiency.  For now we will                       monitor.        9.  Atrial fibrillation.  Not on anticoagulation due to previous GI bleed.     DVT Prophylaxis: Pneumatic Compression Devices  Code Status: Full Code  Discharge Dispo:  Possibly home in the next 1 to 2 days.  Lives in a 1 level home with her daughter.  Has Meals on Wheels.        Interval History (Subjective):      Feeling better overall, admitted by me last night  Weaning oxygen but still requiring supplemental O2 during the day  Otherwise no new complaints  Continuing IV Lasix today.  Have not ordered IV diuretics for tomorrow yet as this will need to be reassessed.                  Physical Exam:      Last Vital Signs:  /50 (BP Location: Left arm)   Pulse 84   Temp 99.2  F (37.3  C) (Oral)   Resp 18   Ht 1.6 m (5' 3\")   Wt 68.2 kg (150 lb 4.8 oz)   SpO2 96%   BMI 26.62 kg/m      I/O last 3 completed shifts:  In: 120 [P.O.:120]  Out: 400 [Urine:400]    General: Alert, awake, no acute distress.  HEENT: NC/AT, eyes anicteric, external occular movements intact, face symmetric.  Dentition WNL, MM moist.  Cardiac: RRR, S1, S2.  No murmurs appreciated.  Pulmonary: Rales at the bilateral lung bases and midlungs.  Upper lung fields " clear.  Normal chest rise, normal work of breathing.   Abdomen: soft, non-tender, non-distended.  Bowel Sounds Present.  No guarding.  Extremities: Lower extremity edema wraps in place.  No deformities.  Warm, well perfused.  Skin: no rashes or lesions noted.  Warm and Dry.  Neuro: No focal deficits noted.  Speech clear.  Coordination and strength grossly normal.  Psych: Appropriate affect.         Medications:      All current medications were reviewed with changes reflected in problem list.         Data:      All new lab and imaging data was reviewed.   Labs:  Recent Labs   Lab 10/04/19  0658      POTASSIUM 3.9   CHLORIDE 103   CO2 31   ANIONGAP 5   GLC 93   BUN 29   CR 1.01   GFRESTIMATED 50*   GFRESTBLACK 58*   CASPER 8.5     Recent Labs   Lab 10/04/19  0658   WBC 9.9   HGB 9.8*   HCT 33.0*   MCV 96         Imaging:   Recent Results (from the past 48 hour(s))   XR Chest 2 Views    Narrative    CHEST TWO VIEWS      10/3/2019 5:35 PM     HISTORY: Shortness of breath.    COMPARISON: 6/18/2018.      Impression    IMPRESSION: Mild, diffuse interstitial prominence suggestive of mild  venous congestion. Enlarged, stable cardiomediastinal silhouette. No  acute bony abnormality is seen. Calcification seen overlying the  aortic knob.     MD Rahul BAUGH MD , MD.

## 2019-10-04 NOTE — TRANSFUSION REACTION (BLOOD)
Patient received 2 units packed red cell transfusion as an outpatient today with blood pressure increase during transfusion and shortness of breath following with transport to the ED and hospital admission.  Findings are those of fluid overload with increased BNP in the setting of history of CHF.    Patient is chronic anemia with iron deficiency and colonic AVM's.  Review of lab studies shows  low % iron saturation of 3 on 8/8/2019. Post transfusion Hgb is 10.4.  Future treatment for anemia in this patient with iron deficiency is recommended as IV iron given outpatient as 750 mg Injectafer x 2 doses with the doses 1 week apart.  Each dose over 30 minutes rather than the customary 15 minutes and with 30 minutes observation to follow.     Transfusion indicated for symptoms and would give 1 unit per transfusion episode over the full 4 hours time period with consideration for Lasix.    The findings in this case are those of transfusion associated cardiac overload (TACO).    LUCRECIA Tovar MD    [phone]

## 2019-10-05 ENCOUNTER — APPOINTMENT (OUTPATIENT)
Dept: OCCUPATIONAL THERAPY | Facility: CLINIC | Age: 84
DRG: 811 | End: 2019-10-05
Attending: INTERNAL MEDICINE
Payer: COMMERCIAL

## 2019-10-05 LAB
ANION GAP SERPL CALCULATED.3IONS-SCNC: 4 MMOL/L (ref 3–14)
BUN SERPL-MCNC: 31 MG/DL (ref 7–30)
CALCIUM SERPL-MCNC: 8.2 MG/DL (ref 8.5–10.1)
CHLORIDE SERPL-SCNC: 103 MMOL/L (ref 94–109)
CO2 SERPL-SCNC: 29 MMOL/L (ref 20–32)
CREAT SERPL-MCNC: 1.07 MG/DL (ref 0.52–1.04)
GFR SERPL CREATININE-BSD FRML MDRD: 46 ML/MIN/{1.73_M2}
GLUCOSE SERPL-MCNC: 91 MG/DL (ref 70–99)
HGB BLD-MCNC: 8.7 G/DL (ref 11.7–15.7)
POTASSIUM SERPL-SCNC: 4.1 MMOL/L (ref 3.4–5.3)
SODIUM SERPL-SCNC: 136 MMOL/L (ref 133–144)

## 2019-10-05 PROCEDURE — 80048 BASIC METABOLIC PNL TOTAL CA: CPT | Performed by: INTERNAL MEDICINE

## 2019-10-05 PROCEDURE — 36415 COLL VENOUS BLD VENIPUNCTURE: CPT | Performed by: INTERNAL MEDICINE

## 2019-10-05 PROCEDURE — 94640 AIRWAY INHALATION TREATMENT: CPT

## 2019-10-05 PROCEDURE — 99233 SBSQ HOSP IP/OBS HIGH 50: CPT | Performed by: INTERNAL MEDICINE

## 2019-10-05 PROCEDURE — 25800030 ZZH RX IP 258 OP 636: Performed by: INTERNAL MEDICINE

## 2019-10-05 PROCEDURE — 97530 THERAPEUTIC ACTIVITIES: CPT | Mod: GO | Performed by: REHABILITATION PRACTITIONER

## 2019-10-05 PROCEDURE — 25000128 H RX IP 250 OP 636: Performed by: HOSPITALIST

## 2019-10-05 PROCEDURE — 25000132 ZZH RX MED GY IP 250 OP 250 PS 637: Performed by: INTERNAL MEDICINE

## 2019-10-05 PROCEDURE — 85018 HEMOGLOBIN: CPT | Performed by: INTERNAL MEDICINE

## 2019-10-05 PROCEDURE — 12000000 ZZH R&B MED SURG/OB

## 2019-10-05 PROCEDURE — 94640 AIRWAY INHALATION TREATMENT: CPT | Mod: 76

## 2019-10-05 PROCEDURE — 25000125 ZZHC RX 250: Performed by: INTERNAL MEDICINE

## 2019-10-05 PROCEDURE — 97165 OT EVAL LOW COMPLEX 30 MIN: CPT | Mod: GO | Performed by: REHABILITATION PRACTITIONER

## 2019-10-05 PROCEDURE — 40000275 ZZH STATISTIC RCP TIME EA 10 MIN

## 2019-10-05 PROCEDURE — 25000128 H RX IP 250 OP 636: Performed by: INTERNAL MEDICINE

## 2019-10-05 RX ORDER — TORSEMIDE 20 MG/1
20 TABLET ORAL
Status: DISCONTINUED | OUTPATIENT
Start: 2019-10-06 | End: 2019-10-06

## 2019-10-05 RX ORDER — SODIUM CHLORIDE 9 MG/ML
INJECTION, SOLUTION INTRAVENOUS CONTINUOUS
Status: DISCONTINUED | OUTPATIENT
Start: 2019-10-05 | End: 2019-10-06

## 2019-10-05 RX ORDER — TORSEMIDE 20 MG/1
20 TABLET ORAL
Status: DISCONTINUED | OUTPATIENT
Start: 2019-10-05 | End: 2019-10-05

## 2019-10-05 RX ADMIN — ALBUTEROL SULFATE 1.25 MG: 2.5 SOLUTION RESPIRATORY (INHALATION) at 19:36

## 2019-10-05 RX ADMIN — TAZOBACTAM SODIUM AND PIPERACILLIN SODIUM 3.38 G: 375; 3 INJECTION, SOLUTION INTRAVENOUS at 09:05

## 2019-10-05 RX ADMIN — OMEPRAZOLE 20 MG: 20 CAPSULE, DELAYED RELEASE ORAL at 09:13

## 2019-10-05 RX ADMIN — SPIRONOLACTONE 50 MG: 25 TABLET ORAL at 09:13

## 2019-10-05 RX ADMIN — FERROUS SULFATE TAB 325 MG (65 MG ELEMENTAL FE) 325 MG: 325 (65 FE) TAB at 21:43

## 2019-10-05 RX ADMIN — VANCOMYCIN HYDROCHLORIDE 1250 MG: 10 INJECTION, POWDER, LYOPHILIZED, FOR SOLUTION INTRAVENOUS at 21:39

## 2019-10-05 RX ADMIN — TAZOBACTAM SODIUM AND PIPERACILLIN SODIUM 3.38 G: 375; 3 INJECTION, SOLUTION INTRAVENOUS at 01:56

## 2019-10-05 RX ADMIN — ALBUTEROL SULFATE 1.25 MG: 2.5 SOLUTION RESPIRATORY (INHALATION) at 07:39

## 2019-10-05 RX ADMIN — GABAPENTIN 100 MG: 100 CAPSULE ORAL at 21:43

## 2019-10-05 RX ADMIN — GABAPENTIN 100 MG: 100 CAPSULE ORAL at 15:44

## 2019-10-05 RX ADMIN — SIMVASTATIN 10 MG: 10 TABLET, FILM COATED ORAL at 21:43

## 2019-10-05 RX ADMIN — HYDRALAZINE HYDROCHLORIDE 50 MG: 50 TABLET, FILM COATED ORAL at 15:44

## 2019-10-05 RX ADMIN — GABAPENTIN 100 MG: 100 CAPSULE ORAL at 09:12

## 2019-10-05 RX ADMIN — ALBUTEROL SULFATE 1.25 MG: 2.5 SOLUTION RESPIRATORY (INHALATION) at 11:42

## 2019-10-05 RX ADMIN — VENLAFAXINE HYDROCHLORIDE 75 MG: 75 CAPSULE, EXTENDED RELEASE ORAL at 09:13

## 2019-10-05 RX ADMIN — ALBUTEROL SULFATE 1.25 MG: 2.5 SOLUTION RESPIRATORY (INHALATION) at 15:46

## 2019-10-05 RX ADMIN — FERROUS SULFATE TAB 325 MG (65 MG ELEMENTAL FE) 325 MG: 325 (65 FE) TAB at 09:12

## 2019-10-05 RX ADMIN — SODIUM CHLORIDE: 9 INJECTION, SOLUTION INTRAVENOUS at 23:26

## 2019-10-05 RX ADMIN — TAZOBACTAM SODIUM AND PIPERACILLIN SODIUM 3.38 G: 375; 3 INJECTION, SOLUTION INTRAVENOUS at 15:20

## 2019-10-05 RX ADMIN — HYDRALAZINE HYDROCHLORIDE 50 MG: 50 TABLET, FILM COATED ORAL at 09:13

## 2019-10-05 RX ADMIN — ACETAMINOPHEN 650 MG: 325 TABLET, FILM COATED ORAL at 15:44

## 2019-10-05 RX ADMIN — TAZOBACTAM SODIUM AND PIPERACILLIN SODIUM 3.38 G: 375; 3 INJECTION, SOLUTION INTRAVENOUS at 20:02

## 2019-10-05 ASSESSMENT — ACTIVITIES OF DAILY LIVING (ADL)
ADLS_ACUITY_SCORE: 16

## 2019-10-05 ASSESSMENT — MIFFLIN-ST. JEOR: SCORE: 1097.23

## 2019-10-05 NOTE — PLAN OF CARE
Tele-Afib CVR.  Pt  is A&O x4.   A-1 with walker  and gait belt.  VSS, 1L O2. K replaced with am recheck, 3.9.  IV Zosyn, and Vanco.  Blood cultures after 3 hrs show no growth.  Pt denies pain.  Possible discharge in 1-2 days.  Continue plan of care.

## 2019-10-05 NOTE — PROGRESS NOTES
[facility]  Hospitalist Progress Note  Loco Giraldo MD   10/05/2019    Reason for Stay (Diagnosis): shortness of breath     Assessment and Plan:       Keisha Godinez is a 87 year old female with history of chronic diastolic CHF, RV systolic CHF, pulmonary hypertension, lymphedema, chronic left foot wound, A. fib, CVA, HTN, chronic anemia due to AVM and iron deficiency, gout, MDD, HLD, and GERD who presented the evening of 10/4/19 for evaluation of shortness of breath and lower extremity swelling.  As noted above she does have chronic anemia which is managed conservatively with intermittent transfusions as needed.  She was actually at the infusion center the day of admission getting transfused.  Apparently the first unit went without issue but then during the second unit she developed shortness of breath, headache and significant hypertension up to 195 systolic.  She was sent to the ER and here was noted to be hypoxic to the low 80s on room air.  Chest x-ray showed pulmonary vascular congestion.  She was felt to be fluid overloaded and was given 40 mg IV Lasix and dmitted for further care.  The blood bank apparently was already contacted regarding work-up for potential transfusion reaction.    Following admission Keisha feels much better.  She did have an episode of fever and was started on broad spectrum antibiotics.     Problem List:     # Acute on chronic hypoxic respiratory failure. Suspicion for transfusion reaction and acute on chronic diastolic congestive heart failure exacerbation.  Apparently exacerbated while getting 2 units of blood for chronic anemia.  Other considerations include transfusion reaction in the blood bank is already evaluating for this.  She does appear to be fluid overloaded with pulmonary congestion on chest x-ray.  - Received IV lasix, small increase in Cr, now start back on PO torsemide  - Continue spironolactone   - Continue other home medications including  "hydralazine  - not on a BB, appears due to AV conduction disease.  Not on ace-I due to tongue swelling    # Acute hypoxic respiratory failure.  Likely due to CHF exacerbation.  Continue supplemental oxygen as needed.  Normally just uses 1.5 L supplemental oxygen at night.  Currently still requiring oxygen during the daytime.    # Fever. Up to 101.7 the day after admission. Could be related to transfusion reaction, although unable to rule out infection. Pro-calcitonin is elevated. Early pneumonia remains a possibility   -- On Vancomycin and Piperacillin/Tazobactam. Follow blood cultures, fever curve    # Hypertension.  Continue hydralazine, furosemide, and spironolactone.     # GERD.  Continue omeprazole  # Hyperlipidemia.  Continue simvastatin  # Depression.  Continue venlafaxine  # Weakness and deconditioning.  PT   # Chronic Anemia.  Does have known AVMs and is on iron deficiency.  Received intermittent transfusions as outpatient   # Atrial fibrillation.  Not on anticoagulation due to previous GI bleed.     DVT Prophylaxis: Pneumatic Compression Devices  Code Status: Full Code  Discharge Dispo:  Possibly home in the next 1 to 2 days.  Lives in a 1 level home with her daughter.  Has Meals on Wheels.        Interval History (Subjective):      Care assumed today, patient seen for first time during this hospital stay. Chart reviewed. Case discussed with nursing staff.     Patient had high fever yesterday and then again low grade temp today, denies any chest pain , some SOB, no other complaints, mild non-productive cough, No other complaints voiced.          Physical Exam:      Last Vital Signs:  /41 (BP Location: Left arm)   Pulse 62   Temp 98.6  F (37  C) (Oral)   Resp 20   Ht 1.6 m (5' 3\")   Wt 69.3 kg (152 lb 12.8 oz)   SpO2 96%   BMI 27.07 kg/m      I/O last 3 completed shifts:  In: 360 [P.O.:360]  Out: 500 [Urine:500]    General: Alert, awake, no acute distress.  HEENT:  yes anicteric, external " occular movements intact, face symmetric.  Dentition WNL, MM moist.  Cardiac: RRR, S1, S2.  No murmurs appreciated.  Pulmonary: Rales at the bilateral lung bases and midlungs.  Upper lung fields clear.  Normal chest rise, normal work of breathing.   Abdomen: soft, non-tender, non-distended.  Bowel Sounds Present.  No guarding.  Extremities:   No deformities.  Warm, well perfused.  Skin: no rashes or lesions noted.  Warm and Dry.  Neuro: No focal deficits noted.  Speech clear.  Coordination and strength grossly normal.  Psych: Appropriate affect.         Medications:      All current medications were reviewed with changes reflected in problem list.         Data:      All new lab and imaging data was reviewed.   Labs:  Recent Labs   Lab 10/05/19  0613      POTASSIUM 4.1   CHLORIDE 103   CO2 29   ANIONGAP 4   GLC 91   BUN 31*   CR 1.07*   GFRESTIMATED 46*   GFRESTBLACK 54*   CASPER 8.2*     Recent Labs   Lab 10/05/19  0613 10/04/19  0658   WBC  --  9.9   HGB 8.7* 9.8*   HCT  --  33.0*   MCV  --  96   PLT  --  161      Imaging:   Recent Results (from the past 48 hour(s))   XR Chest 2 Views    Narrative    CHEST TWO VIEWS      10/3/2019 5:35 PM     HISTORY: Shortness of breath.    COMPARISON: 6/18/2018.      Impression    IMPRESSION: Mild, diffuse interstitial prominence suggestive of mild  venous congestion. Enlarged, stable cardiomediastinal silhouette. No  acute bony abnormality is seen. Calcification seen overlying the  aortic knob.     PENNY CHAND MD

## 2019-10-05 NOTE — PLAN OF CARE
OT- dylan completed and treatment initiated. Keisha Godinez is a 87 year old female with history of chronic diastolic CHF, RV systolic CHF, pulmonary hypertension, lymphedema, chronic left foot wound, A. 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  As noted above she does have chronic anemia which is managed conservatively with intermittent transfusions as needed.  She was actually at the infusion center day of admission for a transfusion.  Apparently the first unit went without issue but then during the second unit she developed shortness of breath, headache and significant hypertension up to 195 systolic.  She was sent to the ER and here was noted to be hypoxic to the low 80s on room air.  Chest x-ray showed pulmonary vascular congestion.  She was felt to be fluid overloaded and was given 40 mg IV Lasix and I am asked to admit her for further care.      Prior to admission, Pt living with her daughter in house and can assist as needed. Pt has a ramp, a;; needs met on main level, has walk inshower with grab bars and shower chair and RTS. Typically ambulated with 4ww. 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 Patient sleeps in lift chair, only sits in lift chair at home. Patient typically dresses in bathroom on a chair. At baseline, uses 1.5 L of O2 at night.    Discharge Planner OT   Patient plan for discharge: home  Current status: mod A with bed mobility, min A, fww for sit<>stand from low surfaces, CGA, fww for functional mobility. Mod A with donning/doffing robe. Patient tolerated ambulating 30 feet with fww, CGA. Limited by SOB. Patient requested to trial bed to chair transfer without O2, after light mobility of around bed to chair, O2 dropped to 88% from 93% when patient was on 1L. O2 replaced and rebounded to 94% with time.  After mobility in [place], O2 sats on 1-2L of O2 was 95%.  Barriers to return to prior living situation: home alone in am or early afternoon, increased O2 needs, decreased activity tolerance  Recommendations for discharge: home with continued A from dtr for IADL's, home PT or OT   Rationale for recommendations: patient may benefit from home therapies to increase strength and I with ADL's and mobility to return to baseline. OT will continue.       Entered by: Cecy Shaw 10/05/2019 9:18 AM

## 2019-10-05 NOTE — PROGRESS NOTES
10/04/19 1446   Quick Adds   Type of Visit Initial Occupational Therapy Evaluation   Living Environment   Lives With child(emre), adult   Living Arrangements house  ([place]ambler style home )   Home Accessibility no concerns   Transportation Anticipated family or friend will provide   Living Environment Comment Pt lives with her daughter and can assist as needed. Pt has a ramp. Pt has 24/7 Assist at home. Has walk inshower with grab bars and shower chair.    Self-Care   Usual Activity Tolerance good   Current Activity Tolerance moderate   Regular Exercise Yes   Activity/Exercise Type walking   Exercise Amount/Frequency 5 mins;daily   Equipment Currently Used at Home walker, rolling;grab bar, toilet;grab bar, tub/shower;raised toilet;shower chair  (1.5 L oxygen at night. lift chair)   Activity/Exercise/Self-Care Comment Pt has a walk in shower.    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 Patient sleeps in lift chair, only sits in lift chair at home. Patient typically dresses in bathroom on a chair.   General Information   Onset of Illness/Injury or Date of Surgery - Date 10/03/19   Referring Physician Rahul Parsons MD   Patient/Family Goals Statement Home    Additional Occupational Profile Info/Pertinent History of Current Problem Keisha Godinez is a 87 year old female with history of chronic diastolic CHF, RV systolic CHF, pulmonary hypertension, lymphedema, chronic left foot wound, A. fib, CVA, HTN, chronic anemia due to  colonic AVM and iron deficiency, gout, MDD, HLD, and GERD presenting for evaluation of shortness of breath and lower extremity swelling.  As noted above she does have chronic anemia which is managed conservatively with intermittent transfusions as needed.  She was actually at the infusion center today getting transfused.  Apparently the first unit went without issue but then during the second unit she developed shortness of breath, headache and significant hypertension up to 195 systolic.  She was sent to the ER and here was noted to be hypoxic to the low 80s on room air.  Chest x-ray showed pulmonary vascular congestion.  She was felt to be fluid overloaded and was given 40 mg IV Lasix and I am asked to admit her for further care.  The blood bank apparently was already contacted regarding work-up for potential transfusion reaction.   Precautions/Limitations fall precautions;oxygen therapy device and L/min   General Observations patient was in bed, sleeping but agreeable to OT session   General Info Comments Hx Stroke June 2010.   Cognitive Status Examination   Orientation orientation to person, place and time   Level of Consciousness alert   Visual Perception   Visual Perception Wears glasses   Sensory Examination   Sensory Quick Adds   (Neuropathy in BLE's )   Pain Assessment   Patient Currently in Pain No   Integumentary/Edema   Integumentary/Edema Comments patient wears CGB daily, dtr A with donning after every shower   Range of Motion (ROM)   ROM Comment WFL, patient had broken R humerus about 2 yrs ago,    Strength   Strength Comments general weakness noted   Hand Strength   Hand Strength Comments weakened B grasp   Muscle Tone Assessment   Muscle Tone Quick Adds No deficits were identified   Coordination   Upper Extremity Coordination No deficits were identified   Mobility   Bed Mobility Comments mod A with bed mobility, however patient does not sleep in a bed, only a lift chair   Transfer Skill: Sit to Stand  "  Level of [place]: Sit/Stand minimum assist (75% patients effort)  (however patient only sits in a lifts chair)   Physical Assist/Nonphysical Assist: Sit/Stand 1 person assist   Transfer Skill: Sit to Stand weight-bearing as tolerated   Assistive Device for Transfer: Sit/Stand rolling walker   Balance   Balance Comments LOB was not noted, however patient was observed to use bedrail for support instead of fww as it was more supportive   Upper Body Dressing   Level of [place]: Dress Upper Body minimum assist (75% patients effort)   Eating/Self Feeding   Level of [place]: Eating independent   Instrumental Activities of Daily Living (IADL)   Previous Responsibilities meal prep;housekeeping;medication management;finances   IADL Comments dtr completed IADL's, however patient does make her own meals when dtr is not present   Activities of Daily Living Analysis   Impairments Contributing to Impaired Activities of Daily Living balance impaired;strength decreased;ROM decreased   General Therapy Interventions   Planned Therapy Interventions ADL retraining;progressive activity/exercise;transfer training;strengthening;home program guidelines   Clinical Impression   Criteria for Skilled Therapeutic Interventions Met yes, treatment indicated   OT Diagnosis decreased ADL's   Influenced by the following impairments balance impaired;strength decreased;ROM decreased   Assessment of Occupational Performance 3-5 Performance Deficits   Identified Performance Deficits decreased ADL's and IADL's- meal prep, dg, toileting, bathing, functional/community  mobility   Clinical Decision Making (Complexity) Low complexity   Therapy Frequency Daily   Predicted Duration of Therapy Intervention (days/wks) 3-4 days   Anticipated Discharge Disposition Home with Home Therapy   Risks and Benefits of Treatment have been explained. Yes   Patient, Family & other staff in agreement with plan of care Yes   [facility] AM-PAC TM \"6 " "Clicks\"   2016, Trustees of [facility], under license to [employer].  All rights reserved.   6 Clicks Short Forms Daily Activity Inpatient Short Form   [facility] AM-PAC  \"6 Clicks\" Daily Activity Inpatient Short Form   1. Putting on and taking off regular lower body clothing? 3 - A Little   2. Bathing (including washing, rinsing, drying)? 3 - A Little   3. Toileting, which includes using toilet, bedpan or urinal? 3 - A Little   4. Putting on and taking off regular upper body clothing? 3 - A Little   5. Taking care of personal grooming such as brushing teeth? 3 - A Little   6. Eating meals? 4 - None   Daily Activity Raw Score (Score out of 24.Lower scores equate to lower levels of function) 19   Total Evaluation Time   Total Evaluation Time (Minutes) 15     "

## 2019-10-05 NOTE — PROGRESS NOTES
Brief Progress Note: I was called by nursing regarding increased shortness of breath and new onset fever.    Documentation reviewed understand this is a patient with chronic heart failure with preserved ejection fraction, pulmonary hypertension, A. fib who was admitted for fluid overload and increased work of breathing related to recent blood transfusion.  She has been getting diuresis during inpatient setting.    Vitals:    10/04/19 1444 10/04/19 1508 10/04/19 1720 10/04/19 2038   BP: (!) 151/54 (!) 148/48  (!) 156/41   BP Location:    Right arm   Pulse:    62   Resp:  20  18   Temp:  101.7  F (38.7  C) 101.2  F (38.4  C) 98.9  F (37.2  C)   TempSrc:  Oral Oral Oral   SpO2: 95% 94%  95%   Weight:       Height:           Patient febrile to close to 102.  Otherwise hemodynamically stable.  I did evaluate the patient and she is sitting in bed using nebulizer treatment.  Answers questions appropriately.  Not using accessory muscles of respiration.  Lungs show crackles in the right base.    Obtaining chest x-ray to evaluate for any new opacity.  Procalcitonin to evaluate for pneumonia.  Blood cultures collected.  Patient empirically started on Zosyn and vancomycin for possible hospital-acquired pneumonia.    Mina Mott

## 2019-10-05 NOTE — PHARMACY-VANCOMYCIN DOSING SERVICE
Pharmacy Vancomycin Initial Note  Date of Service 2019  Patient's  1932  87 year old, female    Indication: [facility] Pneumonia    Current estimated CrCl = Estimated Creatinine Clearance: 36.4 mL/min (based on SCr of 1.01 mg/dL).    Creatinine for last 3 days  10/3/2019:  5:12 PM Creatinine 0.89 mg/dL  10/4/2019:  6:58 AM Creatinine 1.01 mg/dL    Recent Vancomycin Level(s) for last 3 days  No results found for requested labs within last 72 hours.      Vancomycin IV Administrations (past 72 hours)      No vancomycin orders with administrations in past 72 hours.                Nephrotoxins and other renal medications (From now, onward)    Start     Dose/Rate Route Frequency Ordered Stop    10/04/19 2015  vancomycin (VANCOCIN) 1,250 mg in sodium chloride 0.9 % 250 mL intermittent infusion      1,250 mg  over 90 Minutes Intravenous EVERY 24 HOURS 10/04/19 2004      10/04/19 2000  piperacillin-tazobactam (ZOSYN) infusion 3.375 g     Note to Pharmacy:  Pharmacy can adjust dose based on renal function.    3.375 g  100 mL/hr over 30 Minutes Intravenous EVERY 6 HOURS 10/04/19 1951            Contrast Orders - past 72 hours (72h ago, onward)    None                Plan:  1.  Start vancomycin  1250 mg IV q24h.   Note, pt on Zosyn as well.  2.  Goal Trough Level: 15-20 mg/L   3.  Pharmacy will check trough levels as appropriate in 1-3 Days.    4. Serum creatinine levels will be ordered daily for the first week of therapy and at least twice weekly for subsequent weeks.    5. Fleming method utilized to dose vancomycin therapy: Method 1    Antonio Rodriguez [facility]

## 2019-10-05 NOTE — PLAN OF CARE
Pt alert and oriented X 4. Apical pulse irregular. Tele A-fib CVR. Lung sounds diminished with crackles throughout lobes. Hypoactive bowel sounds, pt reports passing flatus, fair appetite. Pt reports voiding without any discomfort, burning or difficulty. Lymphedema wraps in place. Pt reports dizziness while sitting in the chair, /45, HR  60, SP02 96% on 1L nasal cannula, RR 20.   Plan: Increase activity-encourage 3-4 short walks per day per OT, IV antibiotics, Wean oxygen as able.     Heart Failure Care Pathway  GOALS TO BE MET BEFORE DISCHARGE:    1. Decrease congestion and/or edema with diuretic therapy to achieve near      optimal volume status.            Dyspnea improved:  No, please explain: Pt reports breathing is about the same as prior to admission. No retractions noted with activity.            Edema improved:     Yes        Net I/O and Weights since admission:          09/05 1500 - 10/05 1459  In: 680 [P.O.:680]  Out: 1200 [Urine:1200]  Net: -520            Vitals:    10/03/19 1943 10/04/19 0653 10/05/19 0707   Weight: 68.7 kg (151 lb 8 oz) 68.2 kg (150 lb 4.8 oz) 69.3 kg (152 lb 12.8 oz)       2.  O2 sats > 92% on RA or at prior home O2 therapy level.          Current oxygenation status:       SpO2: 95 %         O2 Device: Nasal cannula,  Oxygen Delivery: 1 LPM         Able to wean O2 this shift to keep sats > 92%:  No, please explain: pt continues to fall below 90% on room air at rest, pt on 1L nasal cannula.        Does patient use Home O2? Yes-  pt uses 1.5L nasal cannula at [facility]    3.  Tolerates ambulation and mobility near baseline: No, please explain: Pt was able to ambulate approximately 30 feet before requesting to turn around due to shortness of breath.         How many times did the patient ambulate with nursing staff this shift?     Please review the Heart Failure Care Pathway for additional HF goal outcomes.    Carissa Poon, RN RN  10/5/2019

## 2019-10-06 ENCOUNTER — APPOINTMENT (OUTPATIENT)
Dept: OCCUPATIONAL THERAPY | Facility: CLINIC | Age: 84
DRG: 811 | End: 2019-10-06
Payer: COMMERCIAL

## 2019-10-06 LAB
ANION GAP SERPL CALCULATED.3IONS-SCNC: 1 MMOL/L (ref 3–14)
BASOPHILS # BLD AUTO: 0.1 10E9/L (ref 0–0.2)
BASOPHILS NFR BLD AUTO: 0.9 %
BUN SERPL-MCNC: 30 MG/DL (ref 7–30)
CALCIUM SERPL-MCNC: 8.1 MG/DL (ref 8.5–10.1)
CHLORIDE SERPL-SCNC: 106 MMOL/L (ref 94–109)
CO2 SERPL-SCNC: 31 MMOL/L (ref 20–32)
CREAT SERPL-MCNC: 1.09 MG/DL (ref 0.52–1.04)
DAT IGG-SP REAG RBC-IMP: NORMAL
DIFFERENTIAL METHOD BLD: ABNORMAL
EOSINOPHIL # BLD AUTO: 0.3 10E9/L (ref 0–0.7)
EOSINOPHIL NFR BLD AUTO: 3.7 %
ERYTHROCYTE [DISTWIDTH] IN BLOOD BY AUTOMATED COUNT: 14.6 % (ref 10–15)
GFR SERPL CREATININE-BSD FRML MDRD: 45 ML/MIN/{1.73_M2}
GLUCOSE SERPL-MCNC: 90 MG/DL (ref 70–99)
HCT VFR BLD AUTO: 28.3 % (ref 35–47)
HGB BLD-MCNC: 8.3 G/DL (ref 11.7–15.7)
IMM GRANULOCYTES # BLD: 0 10E9/L (ref 0–0.4)
IMM GRANULOCYTES NFR BLD: 0.6 %
LDH SERPL L TO P-CCNC: 373 U/L (ref 81–234)
LYMPHOCYTES # BLD AUTO: 0.4 10E9/L (ref 0.8–5.3)
LYMPHOCYTES NFR BLD AUTO: 6.5 %
MCH RBC QN AUTO: 28 PG (ref 26.5–33)
MCHC RBC AUTO-ENTMCNC: 29.3 G/DL (ref 31.5–36.5)
MCV RBC AUTO: 96 FL (ref 78–100)
MONOCYTES # BLD AUTO: 0.7 10E9/L (ref 0–1.3)
MONOCYTES NFR BLD AUTO: 10 %
NEUTROPHILS # BLD AUTO: 5.3 10E9/L (ref 1.6–8.3)
NEUTROPHILS NFR BLD AUTO: 78.3 %
NRBC # BLD AUTO: 0 10*3/UL
NRBC BLD AUTO-RTO: 0 /100
PLATELET # BLD AUTO: 86 10E9/L (ref 150–450)
POTASSIUM SERPL-SCNC: 3.9 MMOL/L (ref 3.4–5.3)
RBC # BLD AUTO: 2.96 10E12/L (ref 3.8–5.2)
RETICS # AUTO: 93.1 10E9/L (ref 25–95)
RETICS/RBC NFR AUTO: 3.2 % (ref 0.5–2)
SODIUM SERPL-SCNC: 138 MMOL/L (ref 133–144)
WBC # BLD AUTO: 6.3 10E9/L (ref 4–11)

## 2019-10-06 PROCEDURE — 12000000 ZZH R&B MED SURG/OB

## 2019-10-06 PROCEDURE — 25000125 ZZHC RX 250: Performed by: INTERNAL MEDICINE

## 2019-10-06 PROCEDURE — 25000128 H RX IP 250 OP 636: Performed by: INTERNAL MEDICINE

## 2019-10-06 PROCEDURE — 36415 COLL VENOUS BLD VENIPUNCTURE: CPT | Performed by: INTERNAL MEDICINE

## 2019-10-06 PROCEDURE — 94640 AIRWAY INHALATION TREATMENT: CPT | Mod: 76

## 2019-10-06 PROCEDURE — 83615 LACTATE (LD) (LDH) ENZYME: CPT | Performed by: INTERNAL MEDICINE

## 2019-10-06 PROCEDURE — 25000132 ZZH RX MED GY IP 250 OP 250 PS 637: Performed by: INTERNAL MEDICINE

## 2019-10-06 PROCEDURE — 94640 AIRWAY INHALATION TREATMENT: CPT

## 2019-10-06 PROCEDURE — 40000275 ZZH STATISTIC RCP TIME EA 10 MIN

## 2019-10-06 PROCEDURE — 85045 AUTOMATED RETICULOCYTE COUNT: CPT | Performed by: INTERNAL MEDICINE

## 2019-10-06 PROCEDURE — 80048 BASIC METABOLIC PNL TOTAL CA: CPT | Performed by: INTERNAL MEDICINE

## 2019-10-06 PROCEDURE — 97535 SELF CARE MNGMENT TRAINING: CPT | Mod: GO | Performed by: REHABILITATION PRACTITIONER

## 2019-10-06 PROCEDURE — 85027 COMPLETE CBC AUTOMATED: CPT | Performed by: INTERNAL MEDICINE

## 2019-10-06 PROCEDURE — 85004 AUTOMATED DIFF WBC COUNT: CPT | Performed by: INTERNAL MEDICINE

## 2019-10-06 PROCEDURE — 99233 SBSQ HOSP IP/OBS HIGH 50: CPT | Performed by: INTERNAL MEDICINE

## 2019-10-06 PROCEDURE — 83010 ASSAY OF HAPTOGLOBIN QUANT: CPT | Performed by: INTERNAL MEDICINE

## 2019-10-06 PROCEDURE — 86880 COOMBS TEST DIRECT: CPT | Performed by: INTERNAL MEDICINE

## 2019-10-06 PROCEDURE — 25000128 H RX IP 250 OP 636: Performed by: HOSPITALIST

## 2019-10-06 PROCEDURE — 85060 BLOOD SMEAR INTERPRETATION: CPT | Performed by: INTERNAL MEDICINE

## 2019-10-06 PROCEDURE — 97530 THERAPEUTIC ACTIVITIES: CPT | Mod: GO | Performed by: REHABILITATION PRACTITIONER

## 2019-10-06 PROCEDURE — 40000847 ZZHCL STATISTIC MORPHOLOGY W/INTERP HISTOLOGY TC 85060: Performed by: INTERNAL MEDICINE

## 2019-10-06 RX ORDER — CEFEPIME HYDROCHLORIDE 1 G/1
1 INJECTION, POWDER, FOR SOLUTION INTRAMUSCULAR; INTRAVENOUS EVERY 24 HOURS
Status: DISCONTINUED | OUTPATIENT
Start: 2019-10-06 | End: 2019-10-08

## 2019-10-06 RX ADMIN — FERROUS SULFATE TAB 325 MG (65 MG ELEMENTAL FE) 325 MG: 325 (65 FE) TAB at 09:15

## 2019-10-06 RX ADMIN — GABAPENTIN 100 MG: 100 CAPSULE ORAL at 21:15

## 2019-10-06 RX ADMIN — SPIRONOLACTONE 50 MG: 25 TABLET ORAL at 09:23

## 2019-10-06 RX ADMIN — GABAPENTIN 100 MG: 100 CAPSULE ORAL at 09:16

## 2019-10-06 RX ADMIN — CEFEPIME HYDROCHLORIDE 1 G: 1 INJECTION, POWDER, FOR SOLUTION INTRAMUSCULAR; INTRAVENOUS at 18:49

## 2019-10-06 RX ADMIN — ALBUTEROL SULFATE 1.25 MG: 2.5 SOLUTION RESPIRATORY (INHALATION) at 15:39

## 2019-10-06 RX ADMIN — OMEPRAZOLE 20 MG: 20 CAPSULE, DELAYED RELEASE ORAL at 09:18

## 2019-10-06 RX ADMIN — ALBUTEROL SULFATE 1.25 MG: 2.5 SOLUTION RESPIRATORY (INHALATION) at 19:42

## 2019-10-06 RX ADMIN — TAZOBACTAM SODIUM AND PIPERACILLIN SODIUM 3.38 G: 375; 3 INJECTION, SOLUTION INTRAVENOUS at 09:23

## 2019-10-06 RX ADMIN — TORSEMIDE 20 MG: 20 TABLET ORAL at 09:22

## 2019-10-06 RX ADMIN — ALBUTEROL SULFATE 1.25 MG: 2.5 SOLUTION RESPIRATORY (INHALATION) at 12:01

## 2019-10-06 RX ADMIN — GABAPENTIN 100 MG: 100 CAPSULE ORAL at 15:52

## 2019-10-06 RX ADMIN — POTASSIUM CHLORIDE 20 MEQ: 1.5 POWDER, FOR SOLUTION ORAL at 09:16

## 2019-10-06 RX ADMIN — HYDRALAZINE HYDROCHLORIDE 50 MG: 50 TABLET, FILM COATED ORAL at 09:18

## 2019-10-06 RX ADMIN — SIMVASTATIN 10 MG: 10 TABLET, FILM COATED ORAL at 21:15

## 2019-10-06 RX ADMIN — FERROUS SULFATE TAB 325 MG (65 MG ELEMENTAL FE) 325 MG: 325 (65 FE) TAB at 21:15

## 2019-10-06 RX ADMIN — VENLAFAXINE HYDROCHLORIDE 75 MG: 75 CAPSULE, EXTENDED RELEASE ORAL at 09:16

## 2019-10-06 RX ADMIN — TAZOBACTAM SODIUM AND PIPERACILLIN SODIUM 3.38 G: 375; 3 INJECTION, SOLUTION INTRAVENOUS at 15:08

## 2019-10-06 RX ADMIN — ALBUTEROL SULFATE: 2.5 SOLUTION RESPIRATORY (INHALATION) at 08:29

## 2019-10-06 RX ADMIN — TAZOBACTAM SODIUM AND PIPERACILLIN SODIUM 3.38 G: 375; 3 INJECTION, SOLUTION INTRAVENOUS at 01:10

## 2019-10-06 ASSESSMENT — MIFFLIN-ST. JEOR
SCORE: 1111.29
SCORE: 1123.99

## 2019-10-06 ASSESSMENT — ACTIVITIES OF DAILY LIVING (ADL)
ADLS_ACUITY_SCORE: 17
ADLS_ACUITY_SCORE: 16

## 2019-10-06 NOTE — PLAN OF CARE
Pt alert and oriented X 4. Apical pulse irregular. Tele A-fib CVR. Lung sounds diminished and course. Pt reports feeling constipated, prune juice given, effective. Last BM 10/6/19. Passing flatus. Pt voiding without difficulty. Platelets 86, Hgb 8.3.     MD paged due to low diastolic blood pressure and bradycardia. Hydralazine paramaters placed, diuretics discontinued.     Plan: Continue antibiotics, hold diuretics and continue to manage hypertension.     Heart Failure Care Pathway  GOALS TO BE MET BEFORE DISCHARGE:    1. Decrease congestion and/or edema with diuretic therapy to achieve near      optimal volume status.            Dyspnea improved:  Yes            Edema improved:     Yes        Net I/O and Weights since admission:          09/06 2300 - 10/06 2259  In: 1540 [P.O.:1540]  Out: 1201 [Urine:1201]  Net: 339            Vitals:    10/03/19 1943 10/04/19 0653 10/05/19 0707 10/06/19 0441   Weight: 68.7 kg (151 lb 8 oz) 68.2 kg (150 lb 4.8 oz) 69.3 kg (152 lb 12.8 oz) 70.7 kg (155 lb 14.4 oz)       2.  O2 sats > 92% on RA or at prior home O2 therapy level.          Current oxygenation status:       SpO2: 97 %         O2 Device: Nasal cannula,  Oxygen Delivery: 1 LPM         Able to wean O2 this shift to keep sats > 92%:  No, please explain:        Does patient use Home O2? Yes-  1.5L at NOC    3.  Tolerates ambulation and mobility near baseline: Yes        How many times did the patient ambulate with nursing staff this shift? 2    Please review the Heart Failure Care Pathway for additional HF goal outcomes.    Carissa Poon, RN RN  10/6/2019

## 2019-10-06 NOTE — CONSULTS
[facility]    Oncology Consultation     Date of Admission:  10/3/2019  Date of Consult (When I saw the patient): 10/06/19    Assessment & Plan   Keisha Godinez is a 87 year old female who was admitted on 10/3/2019. I was asked to see the patient for anemia and thrombocytopenia.        Anemia  -chronic anemia related to iron deficiency with colonic AVM  -8/8/19 iron 15, TIBC 432, Iron sat 3%  -8/8/19 B 12 normal 469  -7/13/19 TSH normal 363  -s/p 2 units red cells 10/3/19  -current anemia related to chronic iron deficiency with colonic AVMs, with high MCV related to recent transfusions   -check blood morphology, retic, KRISTEN, haptoglobin, LDH to exclude other problem such as hemolysis  -monitor CBC serially  -consider parenteral iron such as injectafer 750 mg weekly for 2 doses  -would not transfuse more than 1 unit red cells at a time given risk of fluid overload    Transfusion Reaction  -transfusion associated cardiac overload    Thrombocytopenia  -normal platelet count 184,000 10/3, now 86,000 10/6  -suspect consumptive due to possible infection versus medication such as Zosyn  -check blood morphology  -stop antibiotics as soon as infection excluded  -recheck CBC in AM      Devan Pedraza    Code Status    Full Code    Primary Care Physician   Gerri Eubanks    History of Present Illness   Keisha Godinez is a 87 year old female who presents with dyspnea    Past Medical History   I have reviewed this patient's medical history and updated it with pertinent information if needed.   Past Medical History:   Diagnosis Date     Anemia      Atrial fibrillation (H)      B12 deficiency      Cardiomyopathy (H)      CHF (congestive heart failure) (H)      Chronic edema     Lower extremities     Chronic systolic congestive heart failure (H)      CVA (cerebral vascular accident) (H) 2010    Acute Left MCA hemispheric CVA ( on coumadin)     Depression      Gastro-oesophageal reflux disease      GI bleed  03-20-15     Hyperlipidemia      Hypertension      Iron deficiency      MSSA (methicillin susceptible Staphylococcus aureus) 03-10-15     Pulmonary hypertension (H)      Shingles        Past Surgical History   I have reviewed this patient's surgical history and updated it with pertinent information if needed.  Past Surgical History:   Procedure Laterality Date     COLONOSCOPY  03/24/2015    Diverticulosis, polyps     ENTEROSCOPY SMALL BOWEL N/A 2/29/2016    Procedure: ENTEROSCOPY SMALL BOWEL;  Surgeon: Ady Ponce MD;  Location:  GI     ESOPHAGOSCOPY, GASTROSCOPY, DUODENOSCOPY (EGD), COMBINED N/A 3/22/2015    Upper GI- Normal, nothing significant found.      EXCISE MASS FOOT Right 7/6/2016    Procedure: EXCISE MASS FOOT;  Surgeon: Lee Jang DPM;  Location:  OR       Prior to Admission Medications   Prior to Admission Medications   Prescriptions Last Dose Informant Patient Reported? Taking?   ASPIRIN NOT PRESCRIBED (INTENTIONAL)   No No   Sig: Please choose reason not prescribed, below   Cranberry Extract 250 MG TABS 10/3/2019 at Unknown time  Yes Yes   Sig: Take 250 mg by mouth daily   OMEGA-3 FATTY ACIDS PO 10/3/2019 at am Daughter Yes Yes   Sig: Take 1,200 mg by mouth daily    SM STOOL SOFTENER 8.6-50 MG tablet   No Yes   Sig: Take 2 tablets daily as needed for constipation while taking prescription pain medications.   VITAMIN D, CHOLECALCIFEROL, PO 10/3/2019 at am Self Yes Yes   Sig: Take 1,000 Units by mouth daily    acetaminophen (TYLENOL) 325 MG tablet   Yes Yes   Sig: Take 650 mg by mouth 3 times daily as needed for mild pain   albuterol (ACCUNEB) 1.25 MG/3ML nebulizer solution 10/3/2019 at am  Yes Yes   Sig: Take 1 vial by nebulization 3 times daily    ferrous sulfate (FEROSUL) 325 (65 Fe) MG tablet 10/3/2019 at am  No Yes   Sig: Take 1 tablet (325 mg) by mouth 2 times daily   gabapentin (NEURONTIN) 100 MG capsule 10/3/2019 at am  No Yes   Sig: TAKE 1 CAPSULE (100MG) BY MOUTH THREE  TIMES DAILY   hydrALAZINE (APRESOLINE) 25 MG tablet 10/3/2019 at am  No Yes   Sig: TAKE 3 TABLETS BY MOUTH THREE TIMES DAILY   omeprazole (PRILOSEC) 20 MG DR capsule 10/3/2019 at am  No Yes   Sig: TAKE 1 CAPSULE BY MOUTH DAILY   simvastatin (ZOCOR) 10 MG tablet 10/2/2019 at Unknown time  No Yes   Sig: TAKE 1 TABLET (10MG) BY MOUTH AT BEDTIME   spironolactone (ALDACTONE) 50 MG tablet 10/3/2019 at am  No Yes   Sig: TAKE 1 TABLET BY MOUTH DAILY   torsemide (DEMADEX) 20 MG tablet 10/3/2019 at am  No Yes   Sig: Take 1 tablet (20 mg) by mouth 2 times daily   venlafaxine (EFFEXOR-XR) 75 MG 24 hr capsule 10/3/2019 at am  No Yes   Sig: Take 1 capsule (75 mg) by mouth daily      Facility-Administered Medications: None     Allergies   Allergies   Allergen Reactions     Blood Transfusion Related (Informational Only) Other (See Comments)     Patient has a history of a clinically significant antibody against RBC antigens.  A delay in compatible RBCs may occur.     Lisinopril Other (See Comments)     Tongue swelling     Calcium Nausea and Vomiting     Egg Yolk      Flu Virus Vaccine      Allergic to eggs.     Keflex [Cephalexin] Nausea     Pt states she had upset stomach.  NOT tongue swelling.  She had tongue swelling with a combo bp med that she thinks included lisinopril.    Tolerates IV Cefazolin     Levaquin [Levofloxacin]      Sulfa Drugs      Zinc Nausea and Vomiting       Social History   I have reviewed this patient's social history and updated it with pertinent information if needed. Keisha BECKY Godinez  reports that she quit smoking about 63 years ago. Her smoking use included cigarettes. She started smoking about 64 years ago. She quit after 1.00 year of use. She has never used smokeless tobacco. She reports that she does not drink alcohol or use drugs.    Family History   I have reviewed this patient's family history and updated it with pertinent information if needed.   Family History   Problem Relation Age of Onset      Known Genetic Syndrome Father      Known Genetic Syndrome Mother      Other Cancer Daughter         pancreatic     Other Cancer Brother         type     Other Cancer Sister 60        lung     Diabetes No family hx of      Breast Cancer No family hx of      Cancer - colorectal No family hx of      Ovarian Cancer No family hx of      Prostate Cancer No family hx of        Review of Systems   The 5 point Review of Systems is negative other than noted in the HPI or here.     Physical Exam   Temp: 96.4  F (35.8  C) Temp src: Oral BP: (!) 144/42 Pulse: 56 Heart Rate: 95 Resp: 20 SpO2: 97 % O2 Device: Nasal cannula Oxygen Delivery: 1 LPM  Vital Signs with Ranges  Temp:  [96.4  F (35.8  C)-98.7  F (37.1  C)] 96.4  F (35.8  C)  Pulse:  [56] 56  Heart Rate:  [60-95] 95  Resp:  [18-22] 20  BP: (101-144)/(34-47) 144/42  SpO2:  [95 %-97 %] 97 %  155 lbs 14.4 oz    Constitutional: awake, cooperative, no acute distress  Hematologic / Lymphatic: no cervical, supraclavicular, or axillary lymphadenopathy  GI:  soft, non-distended, non-tender, no masses palpated  Skin: bruising but no bleeding  Musculoskeletal: bilateral pedal edema, wrapped    Data   Results for orders placed or performed during the hospital encounter of 10/03/19 (from the past 24 hour(s))   CBC with platelets   Result Value Ref Range    WBC 6.3 4.0 - 11.0 10e9/L    RBC Count 2.96 (L) 3.8 - 5.2 10e12/L    Hemoglobin 8.3 (L) 11.7 - 15.7 g/dL    Hematocrit 28.3 (L) 35.0 - 47.0 %    MCV 96 78 - 100 fl    MCH 28.0 26.5 - 33.0 pg    MCHC 29.3 (L) 31.5 - 36.5 g/dL    RDW 14.6 10.0 - 15.0 %    Platelet Count 86 (L) 150 - 450 10e9/L   Basic metabolic panel   Result Value Ref Range    Sodium 138 133 - 144 mmol/L    Potassium 3.9 3.4 - 5.3 mmol/L    Chloride 106 94 - 109 mmol/L    Carbon Dioxide 31 20 - 32 mmol/L    Anion Gap 1 (L) 3 - 14 mmol/L    Glucose 90 70 - 99 mg/dL    Urea Nitrogen 30 7 - 30 mg/dL    Creatinine 1.09 (H) 0.52 - 1.04 mg/dL    GFR Estimate 45 (L) >60  mL/min/[1.73_m2]    GFR Estimate If Black 53 (L) >60 mL/min/[1.73_m2]    Calcium 8.1 (L) 8.5 - 10.1 mg/dL

## 2019-10-06 NOTE — PROGRESS NOTES
[facility]  Hospitalist Progress Note  Loco Giraldo MD   10/06/2019    Reason for Stay (Diagnosis): shortness of breath     Assessment and Plan:       Keisha Godinez is a 87 year old female with history of chronic diastolic CHF, RV systolic CHF, pulmonary hypertension, lymphedema, chronic left foot wound, A. fib, CVA, HTN, chronic anemia due to AVM and iron deficiency, gout, MDD, HLD, and GERD who presented the evening of 10/4/19 for evaluation of shortness of breath and lower extremity swelling.  As noted above she does have chronic anemia which is managed conservatively with intermittent transfusions as needed.  She was actually at the infusion center the day of admission getting transfused.  Apparently the first unit went without issue but then during the second unit she developed shortness of breath, headache and significant hypertension up to 195 systolic.  She was sent to the ER and here was noted to be hypoxic to the low 80s on room air.  Chest x-ray showed pulmonary vascular congestion.  She was felt to be fluid overloaded and was given 40 mg IV Lasix and dmitted for further care.  The blood bank apparently was already contacted regarding work-up for potential transfusion reaction.    Following admission Keisha feels much better.  She did have an episode of fever and was started on broad spectrum antibiotics.     Problem List:     # Acute on chronic hypoxic respiratory failure. Suspicion for transfusion reaction and acute on chronic diastolic congestive heart failure exacerbation.  Apparently exacerbated while getting 2 units of blood for chronic anemia.  Other considerations include transfusion reaction in the blood bank is already evaluating for this.  She does appear to be fluid overloaded with pulmonary congestion on chest x-ray.  - Received IV lasix, small increase in Cr. Also with soft BP, hold diuretics for now   - Continue other home medications including hydralazine with hold  parameters   - not on a BB, appears due to AV conduction disease.  Not on ACE-i due to tongue swelling    # Acute hypoxic respiratory failure.  Likely due to transfusion reaction.  Continue supplemental oxygen as needed.  Normally just uses 1.5 L supplemental oxygen at night.    -- Much improved, oxygen requirements back to baseline and pt reports that her breathing also feels close to her normal self     # Fever. Up to 101.7 the day after admission. Could be related to transfusion reaction, although unable to rule out infection. Pro-calcitonin is elevated. Early pneumonia remains a possibility   -- On Vancomycin and Piperacillin/Tazobactam. Follow blood cultures, fever curve. Given the thrombocytopenia which could be 2/2 Piperacillin/Tazobactam, will change ABx to cefepime. Stop vancomycin     # Thrombocytopenia. Hematology consult requested. May be secondary to transfusion reaction. Could also be a side effect of Piperacillin/Tazobactam. Monitor     # Hypertension.  Continue hydralazine with hold parameters, Hold furosemide, and spironolactone.     # GERD.  Continue omeprazole  # Hyperlipidemia.  Continue simvastatin  # Depression.  Continue venlafaxine  # Weakness and deconditioning.  PT   # Chronic Anemia.  Does have known AVMs and iron deficiency.  Received intermittent transfusions as outpatient. Heme consult    # Atrial fibrillation.  Not on anticoagulation due to previous GI bleed. Does have mild asymptomatic bradycardia as well with HR in 40-50's. Suspect underlying sinus node disease with her advanced age. Monitor for now. If worsening bradycardia or associated symptoms, then consider cardiology consultation      DVT Prophylaxis: Pneumatic Compression Devices  Code Status: Full Code  Discharge Dispo:  Possibly home in the next 1 to 2 days.  Lives in a 1 level home with her daughter.  Has Meals on Wheels.    Updated daughter Jennifer over phone         Interval History (Subjective):       Chart reviewed. Case  "discussed with nursing staff.     Fevers have improved, pt feels very well otherwise and close to her baseline, denies any chest pain , Breathing is also close to baseline, no SOB, no other complaints,          Physical Exam:      Last Vital Signs:  BP (!) 117/37 (BP Location: Left arm)   Pulse 52   Temp 99.1  F (37.3  C) (Oral)   Resp 22   Ht 1.6 m (5' 3\")   Wt 70.7 kg (155 lb 14.4 oz)   SpO2 97%   BMI 27.62 kg/m      I/O last 3 completed shifts:  In: 620 [P.O.:620]  Out: 1 [Urine:1]    General: Alert, awake, no acute distress.  HEENT:  yes anicteric, external occular movements intact, face symmetric.  Dentition WNL, MM moist.  Cardiac: RRR, S1, S2.  No murmurs appreciated.  Pulmonary: Rales at the bilateral lung bases, Upper lung fields clear.  Normal chest rise, normal work of breathing.   Abdomen: soft, non-tender, non-distended.  Bowel Sounds Present.  No guarding.  Extremities:   No deformities.  Warm, well perfused.  Skin: no rashes or lesions noted.  Warm and Dry.  Neuro: No focal deficits noted.  Speech clear.  Coordination and strength grossly normal.  Psych: Appropriate affect.         Medications:      All current medications were reviewed with changes reflected in problem list.         Data:      All new lab and imaging data was reviewed.   Labs:  Recent Labs   Lab 10/06/19  0631      POTASSIUM 3.9   CHLORIDE 106   CO2 31   ANIONGAP 1*   GLC 90   BUN 30   CR 1.09*   GFRESTIMATED 45*   GFRESTBLACK 53*   CASPER 8.1*     Recent Labs   Lab 10/06/19  0631   WBC 6.3   HGB 8.3*   HCT 28.3*   MCV 96   PLT 86*      Imaging:   Recent Results (from the past 48 hour(s))   XR Chest 2 Views    Narrative    CHEST TWO VIEWS      10/3/2019 5:35 PM     HISTORY: Shortness of breath.    COMPARISON: 6/18/2018.      Impression    IMPRESSION: Mild, diffuse interstitial prominence suggestive of mild  venous congestion. Enlarged, stable cardiomediastinal silhouette. No  acute bony abnormality is seen. Calcification " seen overlying the  aortic knob.     PENNY CHAND MD

## 2019-10-06 NOTE — PLAN OF CARE
Discharge Planner OT   Patient plan for discharge: home  Current status: Patient seated in chair. Mod A to stand from chair, mod A to don robe, SBA to CGA, fww to ambulate over 100 feet at slow, but steady pace. At rest, BP: 132/34 and after activity BP; 165/57, HR: 63. O2 sats on 1 L was 97 to 95% at rest and with activity. Min A with clothing management and sit<>stand from commode for toilet transfer. Patient returned to chair at end of session.      Barriers to return to prior living situation:  home alone in am or early afternoon, increased O2 needs, decreased activity tolerance  Recommendations for discharge: home with continued A from dtr for IADL's, home PT or OT   Rationale for recommendations: patient may benefit from home therapies to increase strength and I with ADL's and mobility to return to baseline. OT will continue.       Entered by: Cecy Shaw 10/06/2019 11:41 AM

## 2019-10-06 NOTE — PLAN OF CARE
Vital signs:  Temp: 97.3  F (36.3  C) Temp src: Oral BP: 117/47   Heart Rate: 95 Resp: 18 SpO2: 97 %     Patient alert and oriented x 4. Blood pressure low ans started on NS at 100 ml/hr, BP better. On 1L of oxygen baseline 1-1.5l at home. Afib CVR on tele. Assist of 1 with walker  and gait belt.   IV Zosyn, and Vanco.  Possible discharge in 1-2 days.  Continue plan of care.

## 2019-10-07 ENCOUNTER — APPOINTMENT (OUTPATIENT)
Dept: OCCUPATIONAL THERAPY | Facility: CLINIC | Age: 84
DRG: 811 | End: 2019-10-07
Payer: COMMERCIAL

## 2019-10-07 ENCOUNTER — TELEPHONE (OUTPATIENT)
Dept: INTERNAL MEDICINE | Facility: CLINIC | Age: 84
End: 2019-10-07

## 2019-10-07 LAB
ALBUMIN SERPL-MCNC: 2.8 G/DL (ref 3.4–5)
ALP SERPL-CCNC: 76 U/L (ref 40–150)
ALT SERPL W P-5'-P-CCNC: 30 U/L (ref 0–50)
ANION GAP SERPL CALCULATED.3IONS-SCNC: 2 MMOL/L (ref 3–14)
AST SERPL W P-5'-P-CCNC: 30 U/L (ref 0–45)
BASOPHILS # BLD AUTO: 0.1 10E9/L (ref 0–0.2)
BASOPHILS NFR BLD AUTO: 1 %
BILIRUB DIRECT SERPL-MCNC: 0.4 MG/DL (ref 0–0.2)
BILIRUB SERPL-MCNC: 1.3 MG/DL (ref 0.2–1.3)
BUN SERPL-MCNC: 28 MG/DL (ref 7–30)
CALCIUM SERPL-MCNC: 8.4 MG/DL (ref 8.5–10.1)
CHLORIDE SERPL-SCNC: 106 MMOL/L (ref 94–109)
CO2 SERPL-SCNC: 28 MMOL/L (ref 20–32)
COPATH REPORT: NORMAL
CREAT SERPL-MCNC: 0.97 MG/DL (ref 0.52–1.04)
DIFFERENTIAL METHOD BLD: ABNORMAL
EOSINOPHIL # BLD AUTO: 0.2 10E9/L (ref 0–0.7)
EOSINOPHIL NFR BLD AUTO: 3.9 %
ERYTHROCYTE [DISTWIDTH] IN BLOOD BY AUTOMATED COUNT: 14.8 % (ref 10–15)
GFR SERPL CREATININE-BSD FRML MDRD: 52 ML/MIN/{1.73_M2}
GLUCOSE SERPL-MCNC: 90 MG/DL (ref 70–99)
HAPTOGLOB SERPL-MCNC: <6 MG/DL (ref 35–175)
HCT VFR BLD AUTO: 29 % (ref 35–47)
HGB BLD-MCNC: 8.5 G/DL (ref 11.7–15.7)
IMM GRANULOCYTES # BLD: 0.1 10E9/L (ref 0–0.4)
IMM GRANULOCYTES NFR BLD: 0.8 %
LYMPHOCYTES # BLD AUTO: 0.6 10E9/L (ref 0.8–5.3)
LYMPHOCYTES NFR BLD AUTO: 9.3 %
MCH RBC QN AUTO: 28.4 PG (ref 26.5–33)
MCHC RBC AUTO-ENTMCNC: 29.3 G/DL (ref 31.5–36.5)
MCV RBC AUTO: 97 FL (ref 78–100)
MONOCYTES # BLD AUTO: 0.8 10E9/L (ref 0–1.3)
MONOCYTES NFR BLD AUTO: 12.2 %
NEUTROPHILS # BLD AUTO: 4.5 10E9/L (ref 1.6–8.3)
NEUTROPHILS NFR BLD AUTO: 72.8 %
NRBC # BLD AUTO: 0 10*3/UL
NRBC BLD AUTO-RTO: 0 /100
PLATELET # BLD AUTO: 98 10E9/L (ref 150–450)
POTASSIUM SERPL-SCNC: 4.3 MMOL/L (ref 3.4–5.3)
PROT SERPL-MCNC: 6.5 G/DL (ref 6.8–8.8)
RBC # BLD AUTO: 2.99 10E12/L (ref 3.8–5.2)
SODIUM SERPL-SCNC: 136 MMOL/L (ref 133–144)
WBC # BLD AUTO: 6.2 10E9/L (ref 4–11)

## 2019-10-07 PROCEDURE — 40000274 ZZH STATISTIC RCP CONSULT EA 30 MIN

## 2019-10-07 PROCEDURE — 25000125 ZZHC RX 250: Performed by: INTERNAL MEDICINE

## 2019-10-07 PROCEDURE — 80048 BASIC METABOLIC PNL TOTAL CA: CPT | Performed by: INTERNAL MEDICINE

## 2019-10-07 PROCEDURE — 97535 SELF CARE MNGMENT TRAINING: CPT | Mod: GO

## 2019-10-07 PROCEDURE — 85025 COMPLETE CBC W/AUTO DIFF WBC: CPT | Performed by: INTERNAL MEDICINE

## 2019-10-07 PROCEDURE — 25000132 ZZH RX MED GY IP 250 OP 250 PS 637: Performed by: INTERNAL MEDICINE

## 2019-10-07 PROCEDURE — 97110 THERAPEUTIC EXERCISES: CPT | Mod: GO

## 2019-10-07 PROCEDURE — G0463 HOSPITAL OUTPT CLINIC VISIT: HCPCS | Performed by: NURSE PRACTITIONER

## 2019-10-07 PROCEDURE — 25000128 H RX IP 250 OP 636: Performed by: INTERNAL MEDICINE

## 2019-10-07 PROCEDURE — 36415 COLL VENOUS BLD VENIPUNCTURE: CPT | Performed by: INTERNAL MEDICINE

## 2019-10-07 PROCEDURE — 40000275 ZZH STATISTIC RCP TIME EA 10 MIN

## 2019-10-07 PROCEDURE — 99232 SBSQ HOSP IP/OBS MODERATE 35: CPT | Performed by: INTERNAL MEDICINE

## 2019-10-07 PROCEDURE — 94640 AIRWAY INHALATION TREATMENT: CPT | Mod: 76

## 2019-10-07 PROCEDURE — 12000000 ZZH R&B MED SURG/OB

## 2019-10-07 PROCEDURE — 94640 AIRWAY INHALATION TREATMENT: CPT

## 2019-10-07 PROCEDURE — 80076 HEPATIC FUNCTION PANEL: CPT | Performed by: INTERNAL MEDICINE

## 2019-10-07 RX ORDER — ALBUTEROL SULFATE 0.83 MG/ML
2.5 SOLUTION RESPIRATORY (INHALATION)
Status: DISCONTINUED | OUTPATIENT
Start: 2019-10-07 | End: 2019-10-09 | Stop reason: HOSPADM

## 2019-10-07 RX ADMIN — ALBUTEROL SULFATE 2.5 MG: 2.5 SOLUTION RESPIRATORY (INHALATION) at 15:13

## 2019-10-07 RX ADMIN — ALBUTEROL SULFATE 2.5 MG: 2.5 SOLUTION RESPIRATORY (INHALATION) at 19:23

## 2019-10-07 RX ADMIN — FERROUS SULFATE TAB 325 MG (65 MG ELEMENTAL FE) 325 MG: 325 (65 FE) TAB at 08:24

## 2019-10-07 RX ADMIN — ALBUTEROL SULFATE 1.25 MG: 2.5 SOLUTION RESPIRATORY (INHALATION) at 07:29

## 2019-10-07 RX ADMIN — FERROUS SULFATE TAB 325 MG (65 MG ELEMENTAL FE) 325 MG: 325 (65 FE) TAB at 22:32

## 2019-10-07 RX ADMIN — GABAPENTIN 100 MG: 100 CAPSULE ORAL at 22:32

## 2019-10-07 RX ADMIN — GABAPENTIN 100 MG: 100 CAPSULE ORAL at 16:46

## 2019-10-07 RX ADMIN — SIMVASTATIN 10 MG: 10 TABLET, FILM COATED ORAL at 22:32

## 2019-10-07 RX ADMIN — VENLAFAXINE HYDROCHLORIDE 75 MG: 75 CAPSULE, EXTENDED RELEASE ORAL at 08:24

## 2019-10-07 RX ADMIN — GABAPENTIN 100 MG: 100 CAPSULE ORAL at 08:24

## 2019-10-07 RX ADMIN — ALBUTEROL SULFATE 2.5 MG: 2.5 SOLUTION RESPIRATORY (INHALATION) at 11:41

## 2019-10-07 RX ADMIN — CEFEPIME HYDROCHLORIDE 1 G: 1 INJECTION, POWDER, FOR SOLUTION INTRAMUSCULAR; INTRAVENOUS at 16:47

## 2019-10-07 RX ADMIN — OMEPRAZOLE 20 MG: 20 CAPSULE, DELAYED RELEASE ORAL at 08:24

## 2019-10-07 ASSESSMENT — ACTIVITIES OF DAILY LIVING (ADL)
ADLS_ACUITY_SCORE: 17

## 2019-10-07 NOTE — PLAN OF CARE
Discharge Planner OT   Patient plan for discharge: Home    Current status: Pt completed LE dressing with moderate assist. Pt ambulated in the hallway with CGA and walker for 40 feet. Pt demonstrated increase in fatigue and SOB.     Barriers to return to prior living situation: Decreased activity tolerance for I/ADLs; current level of A for I/ADLs.     Recommendations for discharge: Updated: Recommend TCU.   Pt motivated to return home, if pt declines TCU, then home with assist for all I/ADLs and functional mobility and Home OT/PT    Rationale for recommendations: Pt is below baseline in I/ADLs and demonstrates decreased activity tolerance and independence in I/ADLs. Skilled OT to address independence in I/ADLs.        Entered by: Blank Deal 10/07/2019 3:07 PM

## 2019-10-07 NOTE — PROGRESS NOTES
Oncology/Hematology Chart check:    Noted stable Hgb 8.5 and Plt has slightly increased to 98,000    Laboratory workup showed retic count at the high end of normal, low haptoglobin, and high LDH, consistent with some degree of hemolysis.  KRISTEN was negative, which means the hemolysis was not immune-related.  Peripheral blood smear showed some spherocytes consistent with recent transfusion, but no evidence of microangiopathic hemolytic anemia.      We know the patient has chronic iron deficiency anemia due to colonic AVMs.  I would recommend continuation of oral iron and consideration of IV iron in the outpatient setting as Dr. Pedraza previously mentioned.  In addition, I would start her on Folic acid 1 mg daily to help with erythropoiesis in the setting of non-immune hemolytic anemia.    Thrombocytopenia is likely due to a consumptive process due to possible infection or Zosyn.  No evidence of microangiopathic hemolytic anemia based on smear review as previously mentioned.  Plt now improving so will continue to watch.    Kevin Briceno M.D.  [facility]  [phone]

## 2019-10-07 NOTE — PLAN OF CARE
VSS ex low BP  Hydralazine held for soft BPs  Lymphedema wraps changed  Wheezes on expiration otherwise diminished lungsounds  Patient had BM this morning, first since 10/4  Tele AFIB CVR  Buttocks bruised not visualized this shift

## 2019-10-07 NOTE — PROGRESS NOTES
[facility] Nurse Inpatient Skin Assessment     Assessment of:   Bilateral buttocks        Data:   Patient History:       Per provider notes, Keisha Godinez is a 87 year old female with history of chronic diastolic CHF, RV systolic CHF, pulmonary hypertension, lymphedema, chronic left foot wound, A. fib, CVA, HTN, chronic anemia. She was admitted 10/4/19 for shortness of breath and lower extremity swelling.        Current Diet / Nutrition:       Orders Placed This Encounter        2 Gram Sodium Diet                Hossein Assessment and sub scores:   No data recorded       Labs:   Recent Labs   Lab Test 10/07/19  0721  06/18/18  1734  10/18/17  0646   ALBUMIN 2.8*   < >  --    < >  --    HGB 8.5*   < > 7.2*   < > 10.3*   RBC 2.99*   < > 2.82*   < > 3.33*   WBC 6.2   < > 6.7   < > 10.9   PLT 98*   < > 183   < > 134*   INR  --   --   --   --  1.04   CRP  --   --  8.7*  --   --     < > = values in this interval not displayed.          Skin Assessment (location):   Bilateral buttocks  History:  Unknown    Skin: intact, small light purple area where open area had been in the past     Also examined both legs with nurse Yancy when she removed the lymph wraps. Skin intact, but dry and flaking.        Intervention:     Patient's chart evaluated.      Cares performed:None needed    Orders  : none needed    Discussed with patient and nurse Haines        Assessment:      Skin on buttocks intact, old light purple area indicating previous open area. No treatment needed./ Bilateral legs with edema, skin intact but dry and flaking. Wears compression wraps.        Plan:   Nursing to notify the Provider(s) and re-consult the [facility] Nurse if skin deteriorate(s).    Skin care plan : no treatment needed.        [facility] Nurse will return: will sign off. Re-consult if needed.

## 2019-10-07 NOTE — PLAN OF CARE
VSS, BP on lower side yesterday, Parameters on PO Hydralazine. Tele Afib 50s-60s.  On IV ABX. SOB improving, wearing 1lo2 during the night at baseline, off during the day. Blood cx negative. On scheduled Nebs. Platelets improved. WOCN saw pt for suspected pressure injury to buttocks. Lymphedema wraps removed, BLE scaly and discolored, applied sween cream. Therapy following pt, up with A1 walker.

## 2019-10-07 NOTE — PROGRESS NOTES
[facility]  Hospitalist Progress Note  Chapin Castro MD   10/07/2019    Reason for Stay (Diagnosis): shortness of breath     Assessment and Plan:      Keisha Godinez is a 87 year old female with history of chronic diastolic CHF, RV systolic CHF, pulmonary hypertension, lymphedema, chronic left foot wound, A. fib, CVA, HTN, chronic anemia due to AVM and iron deficiency, gout, MDD, HLD, and GERD who presented the evening of 10/4/19 for evaluation of shortness of breath and lower extremity swelling.  As noted above she does have chronic anemia which is managed conservatively with intermittent transfusions as needed.  She was actually at the infusion center the day of admission getting transfused.  Apparently the first unit went without issue but then during the second unit she developed shortness of breath, headache and significant hypertension up to 195 systolic.  She was sent to the ER and here was noted to be hypoxic to the low 80s on room air.  Chest x-ray showed pulmonary vascular congestion.  She was felt to be fluid overloaded and was given 40 mg IV Lasix and dmitted for further care.  The blood bank apparently was already contacted regarding work-up for potential transfusion reaction.    Following admission Keisha feels much better.  She did have an episode of fever and was started on broad spectrum antibiotics.     Problem List:     1.  Acute on chronic hypoxic respiratory failure. Suspicion for transfusion reaction on top of acute on chronic diastolic congestive heart failure exacerbation.    -Respiratory failure was exacerbated while getting 2 units of blood for chronic anemia versus volume overload.  -Blood count is evaluating for this.    -She does appear to be fluid overloaded with pulmonary congestion on chest x-ray.  -Patient received IV lasix following respiratory failure, there was no increase in creatinine, and blood pressure was also soft and held.  -Blood pressure is  stable at this time, would consider low-dose diuretics   - Continue ydralazine with hold parameters   -She is not on a BB, appears due to AV conduction disease.    -Not on ACE-i due to tongue swelling/angioedema.  Listed as an allergy.  - Continue supplemental oxygen as needed.  Normally just uses 1.5 L supplemental oxygen at night.    -Improved oxygen requirements, close to her baseline    2. Fever. Up to 101.7 the day after admission.  This could be related to transfusion reaction, although unable to rule out infection. Pro-calcitonin is elevated. Early pneumonia remains a possibility   -Patient was on Vancomycin and Piperacillin/Tazobactam. Follow blood cultures, fever curve. Given the thrombocytopenia which could be 2/2 Piperacillin/Tazobactam, the antibiotic was changed to Cefepime.  Vanco was stopped.  -Blood culture remains negative we will stop cefepime by tomorrow    3.  Thrombocytopenia. Hematology consult requested. May be secondary to transfusion reaction. Could also be a side effect of Piperacillin/Tazobactam. Monitor   -Hematologist consulted  -Platelets count improving from 86 to 98.  -Check CBC in the morning  4.  Hypertension.  Continue hydralazine with hold parameters, Hold furosemide, and spironolactone.   5.  Hyperlipidemia.  Continue simvastatin  6. Depression.  Continue venlafaxine  7.  Weakness and deconditioning.  PT   9.  Chronic Anemia.  Does have known AVMs and iron deficiency.  Received intermittent transfusions as outpatient. Heme consult    10. Atrial fibrillation.  Not on anticoagulation due to previous GI bleed. Does have mild asymptomatic bradycardia as well with HR in 40-50's. Suspect underlying sinus node disease with her advanced age. Monitor for now. If worsening bradycardia or associated symptoms, then consider cardiology consultation      DVT Prophylaxis: Pneumatic Compression Devices  Code Status: Full Code  Discharge Dispo:  Possibly home in 1 to 2 days, if patient continues  "to improve, platelets continue to increase, respiratory status also improves baseline.  Patient lives in a1 level home with her daughter.  Has Meals on Wheels.          Interval History (Subjective):       Chart reviewed. Case discussed with nursing staff.       Patient seen and examined, assumed care today, no new overnight issues, feels better, denies any chest pain , Breathing is improving, no SOB, no other complaints.  She was on 1 L oxygen since morning, plan is to wean her off.         Physical Exam:      Last Vital Signs:  /45 (BP Location: Left arm)   Pulse 62   Temp 98.4  F (36.9  C) (Oral)   Resp 20   Ht 1.6 m (5' 3\")   Wt 72 kg (158 lb 11.2 oz)   SpO2 94%   BMI 28.11 kg/m      I/O last 3 completed shifts:  In: 630 [P.O.:630]  Out: 1 [Urine:1]    GENERAL: Alert, awake, no acute distress.  HEENT:  yes anicteric, external occular movements intact, face symmetric.  Dentition WNL, MM moist.  CVS: RRR, S1, S2.  No murmurs appreciated.  CHEST: Rales at the bilateral lung bases, Upper lung fields clear.  Normal chest rise, normal work of breathing.   ABD: soft, non-tender, non-distended.  Bowel Sounds Present.  No guarding.  EXT:   No deformities.  Warm, well perfused.  Skin: no rashes or lesions noted.  Warm and Dry.  NEUR: No focal deficits noted.  Speech clear.  Coordination and strength grossly normal.  Psych: Appropriate affect.         Medications:        Current Facility-Administered Medications   Medication     acetaminophen (TYLENOL) tablet 650 mg     albuterol (PROVENTIL) neb solution 2.5 mg     albuterol (PROVENTIL) neb solution 2.5 mg     ceFEPIme (MAXIPIME) 1g vial to attach to  ml bag for ADULTS or NS 50 ml bag for PEDS     ferrous sulfate (FEROSUL) tablet 325 mg     gabapentin (NEURONTIN) capsule 100 mg     hydrALAZINE (APRESOLINE) tablet 50 mg     lidocaine (LMX4) cream     lidocaine 1 % 0.1-1 mL     magnesium sulfate 2 g in NS intermittent infusion ([facility] or [facility] C[facility]d)     " magnesium sulfate 4 g in 100 mL sterile water (premade)     melatonin tablet 1 mg     naloxone (NARCAN) injection 0.1-0.4 mg     omeprazole (priLOSEC) CR capsule 20 mg     ondansetron (ZOFRAN-ODT) ODT tab 4 mg    Or     ondansetron (ZOFRAN) injection 4 mg     potassium chloride (KLOR-CON) Packet 20-40 mEq     potassium chloride 10 mEq in 100 mL intermittent infusion with 10 mg lidocaine     potassium chloride 10 mEq in 100 mL sterile water intermittent infusion (premix)     potassium chloride 20 mEq in 50 mL intermittent infusion     potassium chloride ER (K-DUR/KLOR-CON M) CR tablet 20-40 mEq     senna-docusate (SENOKOT-S/PERICOLACE) 8.6-50 MG per tablet 1 tablet    Or     senna-docusate (SENOKOT-S/PERICOLACE) 8.6-50 MG per tablet 2 tablet     simvastatin (ZOCOR) tablet 10 mg     sodium chloride (PF) 0.9% PF flush 3 mL     sodium chloride (PF) 0.9% PF flush 3 mL     venlafaxine (EFFEXOR-XR) 24 hr capsule 75 mg            Data:      All new lab and imaging data was reviewed.   Labs:  Recent Labs   Lab 10/07/19  0721      POTASSIUM 4.3   CHLORIDE 106   CO2 28   ANIONGAP 2*   GLC 90   BUN 28   CR 0.97   GFRESTIMATED 52*   GFRESTBLACK 61   CASPER 8.4*     Recent Labs   Lab 10/07/19  0721   WBC 6.2   HGB 8.5*   HCT 29.0*   MCV 97   PLT 98*      Imaging:   Recent Results (from the past 48 hour(s))   XR Chest 2 Views    Narrative    CHEST TWO VIEWS      10/3/2019 5:35 PM     HISTORY: Shortness of breath.    COMPARISON: 6/18/2018.      Impression    IMPRESSION: Mild, diffuse interstitial prominence suggestive of mild  venous congestion. Enlarged, stable cardiomediastinal silhouette. No  acute bony abnormality is seen. Calcification seen overlying the  aortic knob.     PENNY CHAND MD

## 2019-10-07 NOTE — PLAN OF CARE
Tele-Afib.  Pt is A&O x4.  Denies pain.  VSS.  A-1 with walker, gait belt.  Blood  cultures show  no growth.  Pt  baseline is 1-1.5 O2 NOC.  Pt has bilat le lymphedema wraps.  Possible discharge in 1-2 days.  Continue plan of care.

## 2019-10-08 ENCOUNTER — APPOINTMENT (OUTPATIENT)
Dept: OCCUPATIONAL THERAPY | Facility: CLINIC | Age: 84
DRG: 811 | End: 2019-10-08
Payer: COMMERCIAL

## 2019-10-08 LAB
ERYTHROCYTE [DISTWIDTH] IN BLOOD BY AUTOMATED COUNT: 15.2 % (ref 10–15)
HCT VFR BLD AUTO: 29.4 % (ref 35–47)
HGB BLD-MCNC: 8.5 G/DL (ref 11.7–15.7)
MAGNESIUM SERPL-MCNC: 2.1 MG/DL (ref 1.6–2.3)
MCH RBC QN AUTO: 28.4 PG (ref 26.5–33)
MCHC RBC AUTO-ENTMCNC: 28.9 G/DL (ref 31.5–36.5)
MCV RBC AUTO: 98 FL (ref 78–100)
PLATELET # BLD AUTO: 105 10E9/L (ref 150–450)
RBC # BLD AUTO: 2.99 10E12/L (ref 3.8–5.2)
WBC # BLD AUTO: 5.3 10E9/L (ref 4–11)

## 2019-10-08 PROCEDURE — 83735 ASSAY OF MAGNESIUM: CPT | Performed by: INTERNAL MEDICINE

## 2019-10-08 PROCEDURE — 99232 SBSQ HOSP IP/OBS MODERATE 35: CPT | Performed by: INTERNAL MEDICINE

## 2019-10-08 PROCEDURE — 40000275 ZZH STATISTIC RCP TIME EA 10 MIN

## 2019-10-08 PROCEDURE — 36415 COLL VENOUS BLD VENIPUNCTURE: CPT | Performed by: INTERNAL MEDICINE

## 2019-10-08 PROCEDURE — 25000132 ZZH RX MED GY IP 250 OP 250 PS 637: Performed by: INTERNAL MEDICINE

## 2019-10-08 PROCEDURE — 12000000 ZZH R&B MED SURG/OB

## 2019-10-08 PROCEDURE — 25000125 ZZHC RX 250: Performed by: INTERNAL MEDICINE

## 2019-10-08 PROCEDURE — 94640 AIRWAY INHALATION TREATMENT: CPT | Mod: 76

## 2019-10-08 PROCEDURE — 97110 THERAPEUTIC EXERCISES: CPT | Mod: GO

## 2019-10-08 PROCEDURE — 94640 AIRWAY INHALATION TREATMENT: CPT

## 2019-10-08 PROCEDURE — 85027 COMPLETE CBC AUTOMATED: CPT | Performed by: INTERNAL MEDICINE

## 2019-10-08 RX ORDER — TORSEMIDE 20 MG/1
20 TABLET ORAL DAILY
Status: DISCONTINUED | OUTPATIENT
Start: 2019-10-08 | End: 2019-10-09 | Stop reason: HOSPADM

## 2019-10-08 RX ADMIN — GABAPENTIN 100 MG: 100 CAPSULE ORAL at 10:14

## 2019-10-08 RX ADMIN — HYDRALAZINE HYDROCHLORIDE 50 MG: 50 TABLET, FILM COATED ORAL at 21:39

## 2019-10-08 RX ADMIN — ALBUTEROL SULFATE 2.5 MG: 2.5 SOLUTION RESPIRATORY (INHALATION) at 08:24

## 2019-10-08 RX ADMIN — AMOXICILLIN AND CLAVULANATE POTASSIUM 1 TABLET: 875; 125 TABLET, FILM COATED ORAL at 20:00

## 2019-10-08 RX ADMIN — TORSEMIDE 20 MG: 20 TABLET ORAL at 15:12

## 2019-10-08 RX ADMIN — HYDRALAZINE HYDROCHLORIDE 50 MG: 50 TABLET, FILM COATED ORAL at 15:12

## 2019-10-08 RX ADMIN — OMEPRAZOLE 20 MG: 20 CAPSULE, DELAYED RELEASE ORAL at 10:14

## 2019-10-08 RX ADMIN — SIMVASTATIN 10 MG: 10 TABLET, FILM COATED ORAL at 21:39

## 2019-10-08 RX ADMIN — GABAPENTIN 100 MG: 100 CAPSULE ORAL at 15:12

## 2019-10-08 RX ADMIN — ALBUTEROL SULFATE 2.5 MG: 2.5 SOLUTION RESPIRATORY (INHALATION) at 12:11

## 2019-10-08 RX ADMIN — FERROUS SULFATE TAB 325 MG (65 MG ELEMENTAL FE) 325 MG: 325 (65 FE) TAB at 20:00

## 2019-10-08 RX ADMIN — ALBUTEROL SULFATE 2.5 MG: 2.5 SOLUTION RESPIRATORY (INHALATION) at 16:18

## 2019-10-08 RX ADMIN — VENLAFAXINE HYDROCHLORIDE 75 MG: 75 CAPSULE, EXTENDED RELEASE ORAL at 10:14

## 2019-10-08 RX ADMIN — HYDRALAZINE HYDROCHLORIDE 50 MG: 50 TABLET, FILM COATED ORAL at 10:13

## 2019-10-08 RX ADMIN — FERROUS SULFATE TAB 325 MG (65 MG ELEMENTAL FE) 325 MG: 325 (65 FE) TAB at 10:14

## 2019-10-08 RX ADMIN — ALBUTEROL SULFATE 2.5 MG: 2.5 SOLUTION RESPIRATORY (INHALATION) at 21:01

## 2019-10-08 RX ADMIN — GABAPENTIN 100 MG: 100 CAPSULE ORAL at 21:41

## 2019-10-08 ASSESSMENT — MIFFLIN-ST. JEOR: SCORE: 1127.62

## 2019-10-08 ASSESSMENT — ACTIVITIES OF DAILY LIVING (ADL)
ADLS_ACUITY_SCORE: 22
ADLS_ACUITY_SCORE: 21
ADLS_ACUITY_SCORE: 17
ADLS_ACUITY_SCORE: 22

## 2019-10-08 NOTE — PLAN OF CARE
Please see flowsheets for detailed vital signs and assessments.   Neuro: A&O  Vital Signs: stable  Pain: denies  O2: mid to low 90s RA, desats 86-88% with ambulation to bathroom  Tele: a fib CVR 60s  Respiratory: MOTT, LS with crackles to BLL, wheezing to all fields intermittently--initially contacted RT for neb treatment but pt sleeping soundly on RT arrival--assessed O2 sat later in night and pt stated that unless RT was available that moment, she would likely fall asleep before RT arrival and preferred to forgo PRN neb for now.  GI: WDL  : voiding w/o difficulty  Skin: bruised, blanchable redness--pt with LLE chronic wound, BEBETO d/t pt not wanting LE wraps off at night  Activity: assist 1 with walker/gait belt  IVF: saline locked  Notable labs: K 4.3, HGB 8.5  Protocols: high K WNL, Mag, mag to be drawn this AM  Consults: WOC, OT  Plan: IV antibiotics, antihypertensives  Discharge: 1-2 days    Heart Failure Care Pathway  GOALS TO BE MET BEFORE DISCHARGE:    1. Decrease congestion and/or edema with diuretic therapy to achieve near      optimal volume status.            Dyspnea improved:  No, please explain: significant expiratory wheezing, MOTT            Edema improved:     No, please explain: chronic BLE edema, LE wraps in place        Net I/O and Weights since admission:          09/08 0700 - 10/08 0659  In: 2030 [P.O.:2030]  Out: 1201 [Urine:1201]  Net: 829            Vitals:    10/03/19 1943 10/04/19 0653 10/05/19 0707 10/06/19 0441   Weight: 68.7 kg (151 lb 8 oz) 68.2 kg (150 lb 4.8 oz) 69.3 kg (152 lb 12.8 oz) 70.7 kg (155 lb 14.4 oz)    10/06/19 2334 10/08/19 0458   Weight: 72 kg (158 lb 11.2 oz) 72.3 kg (159 lb 8 oz)       2.  O2 sats > 92% on RA or at prior home O2 therapy level.          Current oxygenation status:       SpO2: 92 %         O2 Device: None (Room air),  Oxygen Delivery: 1 LPM         Able to wean O2 this shift to keep sats > 92%:  Yes       Does patient use Home O2? Yes-  1.5 L PRN at  night    3.  Tolerates ambulation and mobility near baseline: No, please explain: generalized weakness        How many times did the patient ambulate with nursing staff this shift? 2    Please review the Heart Failure Care Pathway for additional HF goal outcomes.    Maranda Bill RN RN  10/8/2019

## 2019-10-08 NOTE — PLAN OF CARE
Discharge Planner OT   Patient plan for discharge: Home  Current status: Pt required mod A for sit > stand from bedside chair to boost to standing. Pt ambulated ~60 ft FWW level with CGA, slow pace, increased time required for turning corners, has difficulty moving FWW - reports using 4ww in home environment. O2 sats 89% on RA post activity, improved to >90% with seated rest break, SOB noted. Pt declined further ADL tasks at this time. Ongoing education provided on EC/WS.   Barriers to return to prior living situation: Decreased activity tolerance for I/ADLs; current level of A for I/ADLs.   Recommendations for discharge: TCU  Rationale for recommendations: Pt is below baseline in I/ADLs and demonstrates decreased activity tolerance and independence in I/ADLs. Skilled OT to address independence in I/ADLs. Pt motivated to return home, if pt declines TCU, then home with assist for all I/ADLs and functional mobility and Home OT/PT       Entered by: Kaylie Bryan 10/08/2019 8:51 AM

## 2019-10-08 NOTE — PROGRESS NOTES
VSS ex /50 this AM, HR in the 50s. A/O x4. Ambulating with A1 walker. Lungs with exp wheezes, getting scheduled nebs. Restarted on torsemide. Leg wraps removed for 1 hour this PM. Tolerating diet. Voiding. Tele - Afib CVR. Will continue to monitor.    Heart Failure Care Pathway  GOALS TO BE MET BEFORE DISCHARGE:    1. Decrease congestion and/or edema with diuretic therapy to achieve near      optimal volume status.            Dyspnea improved:  Yes            Edema improved:     Yes        Net I/O and Weights since admission:          09/08 2300 - 10/08 2259  In: 2253 [P.O.:2250; I.V.:3]  Out: 1701 [Urine:1701]  Net: 552            Vitals:    10/03/19 1943 10/04/19 0653 10/05/19 0707 10/06/19 0441   Weight: 68.7 kg (151 lb 8 oz) 68.2 kg (150 lb 4.8 oz) 69.3 kg (152 lb 12.8 oz) 70.7 kg (155 lb 14.4 oz)    10/06/19 2334 10/08/19 0458   Weight: 72 kg (158 lb 11.2 oz) 72.3 kg (159 lb 8 oz)       2.  O2 sats > 92% on RA or at prior home O2 therapy level.          Current oxygenation status:       SpO2: 93 %         O2 Device: None (Room air),            Able to wean O2 this shift to keep sats > 92%:  Yes       Does patient use Home O2? No    3.  Tolerates ambulation and mobility near baseline: Yes        How many times did the patient ambulate with nursing staff this shift? 2    Please review the Heart Failure Care Pathway for additional HF goal outcomes.    Sidra Landa, RN RN  10/8/2019

## 2019-10-08 NOTE — PROGRESS NOTES
Oncology/Hematology Chart check:     Noted stable Hgb 8.5 and Plt has slightly increased to 105,000     Laboratory workup showed retic count at the high end of normal, low haptoglobin, and high LDH, consistent with some degree of hemolysis.  KRISTEN was negative, which means the hemolysis was not immune-related.  Peripheral blood smear showed some spherocytes consistent with recent transfusion, but no evidence of microangiopathic hemolytic anemia.       We know the patient has chronic iron deficiency anemia due to colonic AVMs.  I would recommend continuation of oral iron and consideration of IV iron in the outpatient setting as Dr. Pedraza previously mentioned.  In addition, I would start her on Folic acid 1 mg daily to help with erythropoiesis in the setting of non-immune hemolytic anemia.     Thrombocytopenia is likely due to a consumptive process due to possible infection or Zosyn.  No evidence of microangiopathic hemolytic anemia based on smear review as previously mentioned.  Plt now improving so will continue to watch.

## 2019-10-08 NOTE — CONSULTS
Care Transition Initial Assessment - SW     Met with: Patient    Active Problems:    Acute on chronic diastolic CHF (congestive heart failure) (H)       DATA  Lives With: child(emre), adult   Living Arrangements: house([place]ambler style home )  Quality of Family Relationships: involved, supportive  Description of Support System: Supportive, Involved  Who is your support system?: Children, Neighbor- pt's daughter works full time but does not work consecutive days and has odd hours, so tomorrow she is done at 10:00.  PT is able to call her neighbor if there is a need and she has a life line.   Support Assessment: Adequate family and caregiver support, Adequate social supports. PT not currently open to any outside support. Feels she has enough support to return home. Has refused offer of home care   Identified issues/concerns regarding health management: CHF, H/o CVA> Followed by [facility] for transfusions   @      Other Resources: Meals on Wheels, Other (see comment)(discussed CHF action plan, pt declined another copy )  Quality of Family Relationships: involved, supportive  Transportation Anticipated: family or friend will provide    ASSESSMENT  Cognitive Status:  awake, alert and oriented  Concerns to be addressed: Met with pt to discuss OT recommendation for TCU.. PT has declined. This, stating that she recovers better at home. Pt has a lift chair, walker, walk in shower and has o2 for night time PRN needs.   SW encouraged home care RN and PT, Pt declined this politely stating that she has enough support from her daughter.   SW offered to have Primary care MD appt set up. Pt declined this as well stating her daughter would schedule this on her own      PLAN  At this time SW will follow should pt change her mind about home care support.   Pt has not had PT eval and SW unable to consider TCU options due to ins plan with out Prior auth and PT eval    Again, pt is clear her plan is to return home with current level of support,.  Pt  is aware of recommendations  And declined TCU and home care support at this time.

## 2019-10-08 NOTE — PROGRESS NOTES
[facility]  Hospitalist Progress Note  Chapin Castro MD   10/08/2019    Reason for Stay (Diagnosis): shortness of breath     Assessment and Plan:      Keisha Godinez is a 87 year old female with history of chronic diastolic CHF, RV systolic CHF, pulmonary hypertension, lymphedema, chronic left foot wound, A. fib, CVA, HTN, chronic anemia due to AVM and iron deficiency, gout, MDD, HLD, and GERD who presented the evening of 10/4/19 for evaluation of shortness of breath and lower extremity swelling.  As noted above she does have chronic anemia which is managed conservatively with intermittent transfusions as needed.  She was actually at the infusion center the day of admission getting transfused.  Apparently the first unit went without issue but then during the second unit she developed shortness of breath, headache and significant hypertension up to 195 systolic.  She was sent to the ER and here was noted to be hypoxic to the low 80s on room air.  Chest x-ray showed pulmonary vascular congestion.  She was felt to be fluid overloaded and was given 40 mg IV Lasix and dmitted for further care.  The blood bank apparently was already contacted regarding work-up for potential transfusion reaction.    Following admission Keisha feels much better.  She did have an episode of fever and was started on broad spectrum antibiotics.     Problem List:     1.  Acute on chronic hypoxic respiratory failure. Suspicion for transfusion reaction on top of acute on chronic diastolic congestive heart failure exacerbation.    -Respiratory failure was exacerbated while getting 2 units of blood for chronic anemia versus volume overload.  -She did appear to be fluid overloaded with pulmonary congestion on chest x-ray.  -Patient received IV lasix following respiratory failure, there was no increase in creatinine, and blood pressure was also soft and held.  -Blood pressure is stable /slightly elevated and may restart her  home dose of Demadex 20 mg p.o. daily  -Continue Hydralazine with hold parameters.  -She is not on a BB, appears due to AV conduction disease.    -Not on ACE-i due to tongue swelling/angioedema.  Listed as an allergy.  - Continue supplemental oxygen as needed.  Normally just uses 1.5 L supplemental oxygen at night.    -Improved oxygen requirements, close to her baseline    2.  Episode of fever of 101.7 the day after admission.  This could be related to transfusion reaction, although unable to rule out infection. Pro-calcitonin is elevated. Early pneumonia remains a possibility   -Patient was on Vancomycin and Piperacillin/Tazobactam. Follow blood cultures, fever curve. Given the thrombocytopenia which could be 2/2 Piperacillin/Tazobactam, the antibiotic was changed to Cefepime.  Vanco was stopped.  -Blood culture remains negative and cefepime stopped.  Start Augmentin to complete 7-day duration of treatment.    3.  Thrombocytopenia. Hematology consult requested. May be secondary to transfusion reaction. Could also be a side effect of Piperacillin/Tazobactam. Monitor   -Hematologist consulted  -Platelets count improving from 86 to 105.  -CBC in the morning.    4.  Hypertension.    -She has been on hydralazine 50 mg 3 times daily, continue the same dose for now may increase if blood pressure remains high.  -Continue to hold furosemide, and spironolactone.     5.  Hyperlipidemia.  Continue simvastatin.    6. Depression.  Continue venlafaxine.  -No new issues patient is stable    7.  Weakness and deconditioning.  PT/OT eval done and recommended TCU,  consulted.    9.  Chronic Anemia.  Does have known AVMs and iron deficiency.  Received intermittent transfusions as outpatient. Heme consult    -Continue iron supplementation  -Consider iron infusion as an outpatient if hemoglobin does not improve.    10. Atrial fibrillation.  Not on anticoagulation due to previous GI bleed. Does have mild asymptomatic  "bradycardia as well with HR in 40-50's. Suspect underlying sinus node disease with her advanced age. Monitor for now. If worsening bradycardia or associated symptoms, then consider cardiology consultation      DVT Prophylaxis: Pneumatic Compression Devices  Code Status: Full Code  Discharge Dispo:  Possibly home in 1 to 2 days, if patient continues to improve, platelets continue to increase, respiratory status also improves baseline.  Patient lives in a1 level home with her daughter.  Has Meals on Wheels.          Interval History (Subjective):       Chart reviewed. Case discussed with nursing staff.       Patient seen and examined, no new overnight issues, feels much better, no chest pain, intermittent coughing, no wheezing, still weak, but no dizziness, breathing is improving, she has been on 1 L oxygen, trying to completely wean her off oxygen.         Physical Exam:      Last Vital Signs:  BP (!) 152/50 (BP Location: Left arm)   Pulse 54   Temp 98.3  F (36.8  C) (Oral)   Resp 20   Ht 1.6 m (5' 3\")   Wt 72.3 kg (159 lb 8 oz)   SpO2 94%   BMI 28.25 kg/m      I/O last 3 completed shifts:  In: 240 [P.O.:240]  Out: -     GENERAL: Alert, awake, no acute distress.  HEENT:  yes anicteric, external occular movements intact, face symmetric.  Dentition WNL, MM moist.  CVS: RRR, S1, S2.  No murmurs appreciated.  CHEST: Rales at the bilateral lung bases, Upper lung fields clear.  Normal chest rise, normal work of breathing.   ABD: soft, non-tender, non-distended.  Bowel Sounds Present.  No guarding.  EXT:   No deformities.  Warm, well perfused.  Skin: no rashes or lesions noted.  Warm and Dry.  NEUR: No focal deficits noted.  Speech clear.  Coordination and strength grossly normal.  Psych: Appropriate affect.         Medications:        Current Facility-Administered Medications   Medication     acetaminophen (TYLENOL) tablet 650 mg     albuterol (PROVENTIL) neb solution 2.5 mg     albuterol (PROVENTIL) neb solution " 2.5 mg     amoxicillin-clavulanate (AUGMENTIN) 875-125 MG per tablet 1 tablet     ferrous sulfate (FEROSUL) tablet 325 mg     gabapentin (NEURONTIN) capsule 100 mg     hydrALAZINE (APRESOLINE) tablet 50 mg     lidocaine (LMX4) cream     lidocaine 1 % 0.1-1 mL     magnesium sulfate 2 g in NS intermittent infusion ([facility] or [facility])     magnesium sulfate 4 g in 100 mL sterile water (premade)     melatonin tablet 1 mg     naloxone (NARCAN) injection 0.1-0.4 mg     omeprazole (priLOSEC) CR capsule 20 mg     ondansetron (ZOFRAN-ODT) ODT tab 4 mg    Or     ondansetron (ZOFRAN) injection 4 mg     potassium chloride (KLOR-CON) Packet 20-40 mEq     potassium chloride 10 mEq in 100 mL intermittent infusion with 10 mg lidocaine     potassium chloride 10 mEq in 100 mL sterile water intermittent infusion (premix)     potassium chloride 20 mEq in 50 mL intermittent infusion     potassium chloride ER (K-DUR/KLOR-CON M) CR tablet 20-40 mEq     senna-docusate (SENOKOT-S/PERICOLACE) 8.6-50 MG per tablet 1 tablet    Or     senna-docusate (SENOKOT-S/PERICOLACE) 8.6-50 MG per tablet 2 tablet     simvastatin (ZOCOR) tablet 10 mg     sodium chloride (PF) 0.9% PF flush 3 mL     sodium chloride (PF) 0.9% PF flush 3 mL     venlafaxine (EFFEXOR-XR) 24 hr capsule 75 mg            Data:      All new lab and imaging data was reviewed.   Labs:  Recent Labs   Lab 10/07/19  0721      POTASSIUM 4.3   CHLORIDE 106   CO2 28   ANIONGAP 2*   GLC 90   BUN 28   CR 0.97   GFRESTIMATED 52*   GFRESTBLACK 61   CASPER 8.4*     Recent Labs   Lab 10/08/19  1007   WBC 5.3   HGB 8.5*   HCT 29.4*   MCV 98   *      Imaging:   Recent Results (from the past 48 hour(s))   XR Chest 2 Views    Narrative    CHEST TWO VIEWS      10/3/2019 5:35 PM     HISTORY: Shortness of breath.    COMPARISON: 6/18/2018.      Impression    IMPRESSION: Mild, diffuse interstitial prominence suggestive of mild  venous congestion. Enlarged, stable cardiomediastinal silhouette.  No  acute bony abnormality is seen. Calcification seen overlying the  aortic knob.     PENNY CHAND MD

## 2019-10-09 ENCOUNTER — APPOINTMENT (OUTPATIENT)
Dept: OCCUPATIONAL THERAPY | Facility: CLINIC | Age: 84
DRG: 811 | End: 2019-10-09
Payer: COMMERCIAL

## 2019-10-09 VITALS
HEIGHT: 63 IN | WEIGHT: 155.6 LBS | BODY MASS INDEX: 27.57 KG/M2 | OXYGEN SATURATION: 96 % | HEART RATE: 54 BPM | RESPIRATION RATE: 18 BRPM | DIASTOLIC BLOOD PRESSURE: 42 MMHG | TEMPERATURE: 98.4 F | SYSTOLIC BLOOD PRESSURE: 155 MMHG

## 2019-10-09 DIAGNOSIS — K21.9 GASTROESOPHAGEAL REFLUX DISEASE WITHOUT ESOPHAGITIS: ICD-10-CM

## 2019-10-09 LAB
ANION GAP SERPL CALCULATED.3IONS-SCNC: 4 MMOL/L (ref 3–14)
BUN SERPL-MCNC: 27 MG/DL (ref 7–30)
CALCIUM SERPL-MCNC: 8.7 MG/DL (ref 8.5–10.1)
CHLORIDE SERPL-SCNC: 106 MMOL/L (ref 94–109)
CO2 SERPL-SCNC: 29 MMOL/L (ref 20–32)
CREAT SERPL-MCNC: 0.84 MG/DL (ref 0.52–1.04)
ERYTHROCYTE [DISTWIDTH] IN BLOOD BY AUTOMATED COUNT: 15.7 % (ref 10–15)
GFR SERPL CREATININE-BSD FRML MDRD: 62 ML/MIN/{1.73_M2}
GLUCOSE SERPL-MCNC: 97 MG/DL (ref 70–99)
HCT VFR BLD AUTO: 27.7 % (ref 35–47)
HGB BLD-MCNC: 8.1 G/DL (ref 11.7–15.7)
MCH RBC QN AUTO: 28.5 PG (ref 26.5–33)
MCHC RBC AUTO-ENTMCNC: 29.2 G/DL (ref 31.5–36.5)
MCV RBC AUTO: 98 FL (ref 78–100)
PLATELET # BLD AUTO: 127 10E9/L (ref 150–450)
POTASSIUM SERPL-SCNC: 4.2 MMOL/L (ref 3.4–5.3)
RBC # BLD AUTO: 2.84 10E12/L (ref 3.8–5.2)
SODIUM SERPL-SCNC: 139 MMOL/L (ref 133–144)
WBC # BLD AUTO: 5.9 10E9/L (ref 4–11)

## 2019-10-09 PROCEDURE — 25000132 ZZH RX MED GY IP 250 OP 250 PS 637: Performed by: INTERNAL MEDICINE

## 2019-10-09 PROCEDURE — 36415 COLL VENOUS BLD VENIPUNCTURE: CPT | Performed by: INTERNAL MEDICINE

## 2019-10-09 PROCEDURE — 99239 HOSP IP/OBS DSCHRG MGMT >30: CPT | Performed by: INTERNAL MEDICINE

## 2019-10-09 PROCEDURE — 40000275 ZZH STATISTIC RCP TIME EA 10 MIN

## 2019-10-09 PROCEDURE — 80048 BASIC METABOLIC PNL TOTAL CA: CPT | Performed by: INTERNAL MEDICINE

## 2019-10-09 PROCEDURE — 97535 SELF CARE MNGMENT TRAINING: CPT | Mod: GO

## 2019-10-09 PROCEDURE — 85027 COMPLETE CBC AUTOMATED: CPT | Performed by: INTERNAL MEDICINE

## 2019-10-09 PROCEDURE — 25000125 ZZHC RX 250: Performed by: INTERNAL MEDICINE

## 2019-10-09 PROCEDURE — 94640 AIRWAY INHALATION TREATMENT: CPT | Mod: 76

## 2019-10-09 PROCEDURE — 94640 AIRWAY INHALATION TREATMENT: CPT

## 2019-10-09 RX ADMIN — HYDRALAZINE HYDROCHLORIDE 50 MG: 50 TABLET, FILM COATED ORAL at 08:02

## 2019-10-09 RX ADMIN — TORSEMIDE 20 MG: 20 TABLET ORAL at 08:02

## 2019-10-09 RX ADMIN — OMEPRAZOLE 20 MG: 20 CAPSULE, DELAYED RELEASE ORAL at 08:03

## 2019-10-09 RX ADMIN — AMOXICILLIN AND CLAVULANATE POTASSIUM 1 TABLET: 875; 125 TABLET, FILM COATED ORAL at 08:03

## 2019-10-09 RX ADMIN — VENLAFAXINE HYDROCHLORIDE 75 MG: 75 CAPSULE, EXTENDED RELEASE ORAL at 08:03

## 2019-10-09 RX ADMIN — GABAPENTIN 100 MG: 100 CAPSULE ORAL at 08:03

## 2019-10-09 RX ADMIN — ALBUTEROL SULFATE 2.5 MG: 2.5 SOLUTION RESPIRATORY (INHALATION) at 12:32

## 2019-10-09 RX ADMIN — FERROUS SULFATE TAB 325 MG (65 MG ELEMENTAL FE) 325 MG: 325 (65 FE) TAB at 08:03

## 2019-10-09 RX ADMIN — ALBUTEROL SULFATE 2.5 MG: 2.5 SOLUTION RESPIRATORY (INHALATION) at 07:23

## 2019-10-09 ASSESSMENT — ACTIVITIES OF DAILY LIVING (ADL)
ADLS_ACUITY_SCORE: 21
ADLS_ACUITY_SCORE: 24

## 2019-10-09 ASSESSMENT — MIFFLIN-ST. JEOR: SCORE: 1109.93

## 2019-10-09 NOTE — PLAN OF CARE
Assumed care 1900. VSS. Tele: AFIB controlled, 60 Denied pain. Currently on room air, sats in low 90's. LS w/ exp. Wheezing, MTOT and SOB. Infreq/productive cough. Up W/ 1 assist/walker. 2+ edema.Potential discharge 1-2 days.   Heart Failure Care Pathway  GOALS TO BE MET BEFORE DISCHARGE:    1. Decrease congestion and/or edema with diuretic therapy to achieve near      optimal volume status.            Dyspnea improved:  yes            Edema improved:     Yes        Net I/O and Weights since admission:          09/08 2300 - 10/08 2259  In: 2373 [P.O.:2370; I.V.:3]  Out: 1901 [Urine:1901]  Net: 472            Vitals:    10/03/19 1943 10/04/19 0653 10/05/19 0707 10/06/19 0441   Weight: 68.7 kg (151 lb 8 oz) 68.2 kg (150 lb 4.8 oz) 69.3 kg (152 lb 12.8 oz) 70.7 kg (155 lb 14.4 oz)    10/06/19 2334 10/08/19 0458   Weight: 72 kg (158 lb 11.2 oz) 72.3 kg (159 lb 8 oz)       2.  O2 sats > 92% on RA or at prior home O2 therapy level.          Current oxygenation status:       SpO2: 95 %         O2 Device: None (Room air),            Able to wean O2 this shift to keep sats > 92%:  yes       Does patient use Home O2? Yes-  1.5 LPM PRN w/ sleep    3.  Tolerates ambulation and mobility near baseline: Yes        How many times did the patient ambulate with nursing staff this shift? 1    Please review the Heart Failure Care Pathway for additional HF goal outcomes.    Luiz Doty RN RN  10/8/2019

## 2019-10-09 NOTE — PLAN OF CARE
Presentation/Diagnosis: CHF, HTN, fluid overload  History: CHF, afib, anemia, HTN, CVA  Labs/Protocols: Hgb 8.5, hematocrit 27.7, platelets 127  Vitals: last BP elevated 151/44  Cardiac: edema, irregular apical pulse  Telemetry:   Respiratory: crackles lower, diminished upper  Neuro: A&O x4  GI/: diarrhea  Skin: redness on buttocks  LDAs: LFA SL  Diet: 2g sodium  Activity: walker, assist x1  Teaching:educated on plan of care  Plan: Possible discharge today

## 2019-10-09 NOTE — PROGRESS NOTES
Oncology/Hematology Progress Note:    I saw Keisha in the hospital this morning.  She denies any new symptoms.     Noted stable Hgb 8.1 and Plt has slightly increased further to 127.     Laboratory workup showed retic count at the high end of normal, low haptoglobin, and high LDH, consistent with some degree of hemolysis.  KRISTEN was negative, which means the hemolysis was not immune-related.  Peripheral blood smear showed some spherocytes consistent with recent transfusion, but no evidence of microangiopathic hemolytic anemia.       We know the patient has chronic iron deficiency anemia due to colonic AVMs.  She is on oral iron.    PLAN:  -  I would recommend continuation of oral iron at current dosage  -  We will see her in clinic in about a month to recheck her CBC and iron indices. If iron indices are still low, would consider IV iron in the outpatient setting as Dr. Pedraza previously mentioned.    -  In addition, I would start her on Folic acid 1 mg daily to help with erythropoiesis in the setting of non-immune hemolytic anemia.  - We will also repeat hemolysis labs in 1 month when we see her.  - Thrombocytopenia is likely due to a consumptive process due to possible infection or Zosyn.  No evidence of microangiopathic hemolytic anemia based on smear review as previously mentioned.  Plt now improving so will continue to watch.     Kevin Briceno M.D.  [facility]  [phone]

## 2019-10-09 NOTE — TELEPHONE ENCOUNTER
"Requested Prescriptions   Pending Prescriptions Disp Refills     omeprazole (PRILOSEC) 20 MG DR capsule [Pharmacy Med Name: Omeprazole Oral Capsule Delayed Release 20 MG] 90 capsule 0     Sig: TAKE 1 CAPSULE BY MOUTH DAILY   Last Written Prescription Date:  07/15/2019  Last Fill Quantity: 90,  # refills: 0   Last office visit: 8/20/2019 with prescribing provider:     Future Office Visit:      PPI Protocol Passed - 10/9/2019 10:16 AM        Passed - Not on Clopidogrel (unless Pantoprazole ordered)        Passed - No diagnosis of osteoporosis on record        Passed - Recent (12 mo) or future (30 days) visit within the authorizing provider's specialty     Patient has had an office visit with the authorizing provider or a provider within the authorizing providers department within the previous 12 mos or has a future within next 30 days. See \"Patient Info\" tab in inbasket, or \"Choose Columns\" in Meds & Orders section of the refill encounter.              Passed - Medication is active on med list        Passed - Patient is age 18 or older        Passed - No active pregnacy on record        Passed - No positive pregnancy test in past 12 months        "

## 2019-10-09 NOTE — PLAN OF CARE
Please see flowsheets for detailed vital signs and assessments.   Neuro: A&O  Vital Signs: stable  Pain: denies  O2: mid to low 90s RA, desats 86-88% with ambulation to bathroom  Tele: a fib CVR 60s  Respiratory: MOTT, LS with crackles to BLL, wheezing to all fields intermittently  GI: WDL  : voiding w/o difficulty  Skin: bruised, blanchable redness--pt with LLE chronic wound, BEBETO d/t pt not wanting LE wraps off at night  Activity: assist 1 with walker/gait belt  IVF: saline locked  Protocols: high K WNL, Mag WNL  Consults: WOC, OT, SW  Plan: PO antibiotics, antihypertensives  Discharge: 1-2 days    Heart Failure Care Pathway  GOALS TO BE MET BEFORE DISCHARGE:    1. Decrease congestion and/or edema with diuretic therapy to achieve near      optimal volume status.            Dyspnea improved:  Yes            Edema improved:     No, please explain: +2 BLE edema        Net I/O and Weights since admission:          09/09 0700 - 10/09 0659  In: 2373 [P.O.:2370; I.V.:3]  Out: 2301 [Urine:2301]  Net: 72            Vitals:    10/03/19 1943 10/04/19 0653 10/05/19 0707 10/06/19 0441   Weight: 68.7 kg (151 lb 8 oz) 68.2 kg (150 lb 4.8 oz) 69.3 kg (152 lb 12.8 oz) 70.7 kg (155 lb 14.4 oz)    10/06/19 2334 10/08/19 0458 10/09/19 0252   Weight: 72 kg (158 lb 11.2 oz) 72.3 kg (159 lb 8 oz) 70.6 kg (155 lb 9.6 oz)       2.  O2 sats > 92% on RA or at prior home O2 therapy level.          Current oxygenation status:       SpO2: 94 %         O2 Device: None (Room air),            Able to wean O2 this shift to keep sats > 92%:  Yes       Does patient use Home O2? Yes-  intermittent 1.5 L NC    3.  Tolerates ambulation and mobility near baseline: Yes        How many times did the patient ambulate with nursing staff this shift? 2    Please review the Heart Failure Care Pathway for additional HF goal outcomes.    Maranda Bill RN RN  10/9/2019

## 2019-10-09 NOTE — DISCHARGE SUMMARY
AVS reviewed with pt. All questions answered. Pt denies any further questions or concerns. PIV removed, no complications. Telemetry monitor removed. All belongings returned. Pt escorted to front door by [facility] staff.

## 2019-10-09 NOTE — PROVIDER NOTIFICATION
MD notified: Pt's daughter is here and needs to leave by 1330. Can you please put discharge orders in for pt? Thank you.

## 2019-10-09 NOTE — PLAN OF CARE
Discharge Planner OT   Patient plan for discharge: Home with no other services per chart/pt     Current status: Pt ambulated with extra time to the bathroom with CGA and walker. Pt transferred to/from the toilet with minimum assist (commode overlay over the toilet). Pt required minimum assist for clothing management. Pt completed 1 hygiene task while standing at the sink with CGA-minimum assist.     Barriers to return to prior living situation: Decreased activity tolerance for I/ADLs; current level of A for I/ADLs.     Recommendations for discharge: TCU    Rationale for recommendations: Pt is below baseline in I/ADLs and demonstrates decreased activity tolerance and independence in I/ADLs. Skilled OT to address independence in I/ADLs.   Pt motivated to return home, if pt declines TCU, then home with 24/7 A/supervision for all I/ADLs and Home OT/PT.         Entered by: Blank Deal 10/09/2019 11:45 AM

## 2019-10-10 ENCOUNTER — TELEPHONE (OUTPATIENT)
Dept: INTERNAL MEDICINE | Facility: CLINIC | Age: 84
End: 2019-10-10

## 2019-10-10 ENCOUNTER — PATIENT OUTREACH (OUTPATIENT)
Dept: CARE COORDINATION | Facility: CLINIC | Age: 84
End: 2019-10-10

## 2019-10-10 DIAGNOSIS — I50.9 ACUTE ON CHRONIC CONGESTIVE HEART FAILURE, UNSPECIFIED HEART FAILURE TYPE (H): Primary | ICD-10-CM

## 2019-10-10 NOTE — PLAN OF CARE
Occupational Therapy Discharge Summary    Reason for therapy discharge:    Discharged to home.    Progress towards therapy goal(s). See goals on Care Plan in [facility] electronic health record for goal details.  Goals not met.  Barriers to achieving goals:   discharge from facility.    Therapy recommendation(s):    No further therapy is recommended.

## 2019-10-10 NOTE — PROGRESS NOTES
"Clinic Care Coordination Contact    Post hospitalization outreach.  RN LESIA spoke with patient over phone.  Patient was admitted to [facility] 10/3-10/9 for treatment of CHF exacerbation and possible blood transfusion reaction.    Therapies recommended TCU at discharge, but patient declined.  Home care services were offered, but patient declined stating her daughter can help her if needed.    Referral Information:  Referral Source: IP Handoff    Primary Diagnosis: CHF    Chief Complaint   Patient presents with     Clinic Care Coordination - Post Hospital     [facility] 10/3-10/9        Universal Utilization: No concerns.    Clinical Concerns:  Current Medical Concerns:    CHF- patient endorses still feeling shortness of breath.  Audible wheezing noted during conversation.  Patient states she feels find, was told that it will take time for shortness of breath to subside.  Patient goes on to explain that \"doctors increased her water pill\".  She is also taking nebulizer 3x per day and is wearing her O2 at night.  Patient does have a pulse oximeter that she uses PRN.  Patient states she follows low sodium diet. She has scale and monitors weight daily, and keeps log of weight.    Deconditioning-  Discussed recommendations made by therapies while in hospital.  Patient endorses feeling weak,but is managing \"ok\".  She does not feel she needs home services and did not want to go to TCU.  Patient has exercises she can do at home.  Rn LESIA introduced community paramedic program and offered to place referral.  Patient declined, stating its not needed, her daughter is home and can help her.  She also has lifeline and wears pendant daily.    Current Behavioral Concerns: none  Education Provided to patient: Clinic care coordination.  Community paramedic.     Medication Management:  No questions/concerns.  Declined MTM referral.     Functional Status:  Dependent ADLs:: Ambulation-walker    Living Situation:  Current living " arrangement:: I live in a private home with family- lives with daughter.  Type of residence:: Private home - no stairs    Diet/Exercise/Sleep:  Diet:: No added salt  Inadequate nutrition (GOAL):: No  Food Insecurity: No  Tube Feeding: No  Exercise:: Unable to exercise  Inadequate activity/exercise (GOAL):: No  Significant changes in sleep pattern (GOAL): No    Transportation:  Transportation concerns (GOAL):: No  Transportation means:: Family     Psychosocial:  Protestant or spiritual beliefs that impact treatment:: No  Mental health DX:: No  Mental health management concern (GOAL):: No  Informal Support system:: Children     Financial/Insurance: BCBS Medicare Advantage  Financial/Insurance concerns (GOAL):: No     Resources and Interventions:  Community Resources: [facility], Lifeline, Meals on Wheels  Supplies used at home:: Oxygen Tubing/Supplies  Equipment Currently Used at Home: walker, rolling, grab bar, toilet, grab bar, tub/shower, raised toilet, shower chair(lift chair)    Advance Care Plan/Directive  Advanced Care Plans/Directives on file:: Yes  Type Advanced Care Plans/Directives: Advanced Directive - On File, POLST  Advanced Care Plan/Directive Status: Not Applicable       Plan:   Patient to f/u with PCP on 10/21 at 1pm.    Patient declined further CCC outreaches at this time.  RN CC will send CCC letter/access plan in mail and will include community paramedic brochure to further describe service.    No further outreaches will be made at this time unless a new referral is made or a change in the pt's status occurs. Patient was provided with this writer's contact information and encouraged to call with any questions or concerns.      Adilene Danielson RN  Care Coordination  Phone:  [phone]  Email: [email or web address]  [facility], [place] and [facility]

## 2019-10-10 NOTE — LETTER
Health Care Home - Access Care Plan    About Me:    Patient Name:  Keisha Godinez    YOB: 1932  Age:                      87 year old   Kit MRN:     [id number] Telephone Information:   Home Phone [phone]   Mobile Not on file.       Address:  [address] [place] [id number] Email address:  No e-mail address on record      Emergency Contact(s)   Name Relationship Lgl Grd Work Phone Home Phone Mobile Phone   1. TEQUILA MARTINEZ Daughter  [phone]   2. AISSATOU CLOEMAN Daughter   [phone]   3. JIMENA KNOX Brother   [phone]             Health Maintenance: Routine Health maintenance Reviewed: Due/Overdue     My Access Plan  Medical Emergency 911   Questions or concerns during clinic hours Primary Clinic Line, I will call the clinic directly: [facility] - [phone]   24 Hour Appointment Line [phone] or  [phone]) (toll free)   24 Hour Nurse Line [phone] (toll free)   Questions or concerns outside clinic hours 24 Hour Appointment Line, I will call the after-hours on-call line:   [facility] [phone] or [phone]) (toll-free)   Preferred Urgent Care [facility], [phone]   Preferred Hospital [facility]  [phone]   Preferred Pharmacy [id number] [place], [place] - [address] [facility] [address]     Behavioral Health Crisis Line The National Suicide Prevention Lifeline at 1-287.858.9482 or 91         My Care Team Members  Patient Care Team       Relationship Specialty Notifications Start End    Gerri Eubanks MD PCP - General Internal Medicine  5/7/15     Phone: [phone] Fax: [phone]         [address] [facility] [address] [facility] [address]    Miguel Pradhan MD MD Cardiology  10/4/18     Phone: [phone] Fax: [phone] [address]  [facility] [address]    Nehemiah Burton MD MD Cardiology  10/4/18     Phone: [phone] Fax: [phone]         [address] [facility] [address] [facility] [address]    Suad Murrell PA Physician Assistant Physician Assistant  10/4/18     Phone: [phone] Pager: [phone] Fax: [phone]        [facility] [address] [facility] [address]    Charity Garcia, RN Registered Nurse Cardiology Admissions 10/4/18     Gerir Eubanks MD Assigned PCP   4/28/19     Phone: [phone] Fax: [phone]         [address] [facility] [address] [facility] [address]    Adilene Danielson, RN Clinic Care Coordinator Primary Care - CC  10/10/19     Phone: [phone] Fax: [phone]        Dillon Burgess Community Health Worker   10/10/19            My Medical and Care Information  Problem List   Patient Active Problem List   Diagnosis     Iron deficiency anemia     Neuropathy of both feet     Myocardial infarction-type 2     GIB (gastrointestinal bleeding)     Wound of left leg     Cardiomyopathy (H)     Pulmonary hypertension (H)     Xerosis of skin: shins     Bilateral edema of lower extremity     Gastroesophageal reflux disease without esophagitis     Health Care Home     Chronic systolic congestive heart failure (H)     Major depressive disorder, recurrent episode, in partial remission (H)     Cellulitis of foot     Essential hypertension with goal blood pressure less than 140/90     Post-operative complication     Respiratory failure (H)     Acute respiratory failure with hypoxia (H)     C. difficile colitis     History of hiatal hernia     History of shingles     Chronic foot ulcer (H) (felt secondary to bony protuberance status post excision 7/6-16)     Acute on chronic diastolic congestive heart failure (H)     Pneumonia     Sepsis (H)     Chronic atrial fibrillation     Anemia     Dysphagia     Midline thoracic back pain     Cardiomyopathy, unspecified type (H)     Fracture of humerus, proximal, right,  closed     Rheumatic mitral regurgitation     Chronic kidney disease, stage 3 (moderate) (GFR 59 10/30/17, GFR 58 7/7/16)     Cellulitis of left lower extremity     Acute exacerbation of CHF (congestive heart failure) (H)     Chronic obstructive pulmonary disease, unspecified COPD type (H)     Symptomatic anemia     Acute on chronic diastolic CHF (congestive heart failure) (H)

## 2019-10-10 NOTE — LETTER
[place] CARE COORDINATION  October 10, 2019    Keisha Godinez  [address] [facility] [address]      Dear Keisha,    I am a clinic care coordinator who works with Gerri Eubanks MD at [facility]. I wanted to thank you for spending the time to talk with me. Please call me with questions or concerns about your health care. Below is a description of clinic care coordination and how I can further assist you.     The clinic care coordinator is a registered nurse and/or  who understand the health care system. The goal of clinic care coordination is to help you manage your health and improve access to the [facility] system in the most efficient manner. The registered nurse can assist you in meeting your health care goals by providing education, coordinating services, and strengthening the communication among your providers. The  can assist you with financial, behavioral, psychosocial, chemical dependency, counseling, and/or psychiatric resources.    Please feel free to contact me at [phone], with any questions or concerns. We at [facility] are focused on providing you with the highest-quality healthcare experience possible and that all starts with you.     Sincerely,     Adilene Danielson RN  Care Coordination  Phone:  [phone]  Email: [email or web address], [place] and [facility]    Enclosed: I have enclosed a copy of a 24 Hour Access Plan. This has helpful phone numbers for you to call when needed. Please keep this in an easy to access place to use as needed.

## 2019-10-10 NOTE — PROGRESS NOTES
Clinic Care Coordination Contact  [facility]/Voicemail    Referral Source: IP Handoff  Clinical Data: Care Coordinator Outreach  Chart reviewed:  Patient was admitted to [facility] 10/3-10/9 for treatment of CHF exacerbation and possible blood transfusion reaction.    Patient lives at home with her daughter. She has lifeline.    Therapies recommended TCU at discharge, but patient declined.  Home care services were offered, but patient declined stating her daughter can help her if needed.    Patient to f/u with pcp within 7 days.  NO f/u has been scheduled.    Patient could benefit from Community Paramedic visit to assess home safety.    Outreach attempted x 1.  Left message on patient's voicemail with call back information and requested return call.  Plan: Care Coordinator will try to reach patient again in 1-2 business days.      Adilene Danielson RN  Care Coordination  Phone:  [phone]  Email: [email or web address]  [facility], [place] and [facility]

## 2019-10-10 NOTE — TELEPHONE ENCOUNTER
Medication is being filled for 1 time refill only due to:  Patient needs to be seen because due for f/u in November.

## 2019-10-10 NOTE — PROGRESS NOTES
Transition Communication Hand-off for Care Transitions to Next Level of Care Provider    Name: Keisha Godinez  : 1932  MRN #: [id number]  Primary Care Provider: Gerri Eubanks  Primary Care MD Name: Dr. Eubanks  Primary Clinic: [address]  [facility] [address]  Primary Care Clinic Name: [facility]  Reason for Hospitalization:  Acute pulmonary edema (H) [J81.0]  Blood transfusion reaction, initial encounter [T80.92XA]  Acute on chronic congestive heart failure, unspecified heart failure type (H) [I50.9]  Admit Date/Time: 10/3/2019  4:03 PM  Discharge Date: 10/9/2019  Payor Source: Payor: [facility] / Plan: [facility] MEDICARE ADVANTAGE / Product Type: Medicare /     Readmission Assessment Measure (DALE) Risk Score/category: Average    Reason for Communication Hand-off Referral: Admission diagnoses: CHF    Discharge Plan: Home     Concern for non-adherence with plan of care: No.     Discharge Needs Assessment:  Needs      Most Recent Value   Anticipated Changes Related to Illness  none   Equipment Currently Used at Home  walker, rolling, grab bar, toilet, grab bar, tub/shower, raised toilet, shower chair   # of Referrals Placed by CTS  External Care Coordination, Communication hand-offs to next level of Care Providers   Other Resources  Meals on Wheels, Other (see comment) [discussed CHF action plan, pt declined another copy ]        Follow-up specialty is recommended: No    Follow-up plan:  No future appointments.    Any outstanding tests or procedures:  No. Recommend CBC & BMP in 5 days.       Key Recommendations:  Patient verbalized understanding of need for low sodium diet and daily weights.  Patient has home scale and oxygen.  She receives meals on wheels and use walker at home. Please assess for compliance with low sodium diet and daily weights.     Martha Chacon    Sent by Ashely Vega, RN, BSN, CPHN, CM    AVS/Discharge Summary is the source of truth; this is a helpful guide for improved  communication of patient story

## 2019-10-10 NOTE — TELEPHONE ENCOUNTER
IP F/U    Date: 10/9/19  Diagnosis: Acute Pulmonary Edema  Is patient active in care coordination? No  Was patient in TCU? No

## 2019-10-11 NOTE — DISCHARGE SUMMARY
[facility]  Hospitalist Discharge Summary       Date of Admission:  10/3/2019  Date of Discharge:  10/9/2019  1:10 PM  Discharging Provider: Chapin Castro MD      Discharge Diagnoses   Acute on chronic hypoxic respiratory failure  Suspected transfusion related reaction  Thrombocytopenia and chronic anemia  Suspected early pneumonia  Hypertension  Arterial fibrillation    Follow-ups Needed After Discharge   Follow-up Appointments     Follow-up and recommended labs and tests       Follow up with primary care provider, Gerri Eubanks, within 7 days   for hospital follow- up.  The following labs/tests are recommended: CBC,   BMP 5 days result to PCP..             Unresulted Labs Ordered in the Past 30 Days of this Admission     No orders found from 9/3/2019 to 10/4/2019.        Discharge Disposition   Discharged to home  Condition at discharge: Stable    Hospital Course   Keisha Godinez is a 87 year old female with history of chronic diastolic CHF, RV systolic CHF, pulmonary hypertension, lymphedema, chronic left foot wound, A. fib, CVA, HTN, chronic anemia due to AVM and iron deficiency, gout, MDD, HLD, and GERD who presented the evening of 10/4/19 for evaluation of shortness of breath and lower extremity swelling.  As noted above she does have chronic anemia which is managed conservatively with intermittent transfusions as needed.  She was actually at the infusion center the day of admission getting transfused.  Apparently the first unit went without issue but then during the second unit she developed shortness of breath, headache and significant hypertension up to 195 systolic.  She was sent to the ER and was noted to be hypoxic to the low 80s on room air.  Chest x-ray showed pulmonary vascular congestion.  She was felt to be fluid overloaded and was given 40 mg IV Lasix and dmitted for further care.  The blood bank apparently was already contacted regarding work-up for potential  transfusion reaction. After admission Keisha feels much better.  She did have an episode of fever and was started on broad spectrum antibiotics.     Problem List:      1.  Acute on chronic hypoxic respiratory failure. Suspicion for transfusion reaction on top of acute on chronic diastolic congestive heart failure exacerbation. Respiratory failure was exacerbated while getting 2nd units of blood for chronic anemia versus.  There is some component of volume overload with pulmonary congestion on chest x-ray. She received IV lasix following respiratory failure, there was no increase in creatinine, and blood pressure was also soft and held, later BP improved and restarted her home dose of Demadex 20 mg p.o. daily. She was on Hydralazine with hold parameters, but not on a BB, appears due to AV conduction disease. Not on ACE-i due to tongue swelling/angioedema.  Listed as an allergy.She came of oxygen, only uses at nighty just uses 1.5 L supplemental oxygen at night.       2.  Episode of fever of 101.7 the day after admission.  This could be related to transfusion reaction, although unable to rule out infection. Pro-calcitonin is elevated. Early pneumonia remains a possibility. She was on Vancomycin and Piperacillin/Tazobactam.  blood cultures remain negative, patient remained afebrile, given the thrombocytopenia which could be 2/2 Piperacillin/Tazobactam, the antibiotic was changed to Cefepime and Vanco was stopped.  Antibiotic the escalated as patient remained stable no fever blood cultures remain negative got few days of Augmentin and stopped it.     3.  Thrombocytopenia. Hematology consult requested.  This may be secondary to to transfusion reaction suspected also a side effect of Piperacillin/Tazobactam.  Platelets improved trended upwards after Zosyn was stopped.  Patient will have her CBC checked as an outpatient, will follow up with hematologist as needed.     4.  Hypertension.  She has been on hydralazine 50 mg 3  times daily, continue the same dose for now may increase if blood pressure remains high. Continue to hold furosemide, and spironolactone.      5.  Hyperlipidemia.  Continue simvastatin.     6. Depression.  Continue venlafaxine. No new issues patient is stable     7.  Weakness and deconditioning.  PT/OT eval done and recommended TCU,  consulted.     9.  Chronic Anemia.  Does have known AVMs and iron deficiency.  Received intermittent transfusions as outpatient. Heme consult    -Continue iron supplementation  -Consider iron infusion as an outpatient if hemoglobin does not improve.     10. Atrial fibrillation.  Not on anticoagulation due to previous GI bleed. Does have mild asymptomatic bradycardia as well with HR in 40-50's. Suspect underlying sinus node disease with her advanced age. Monitor for now. If worsening bradycardia or associated symptoms, then consider cardiology consultation         Consultations This Hospital Stay   CARDIAC REHAB IP CONSULT  CARE COORDINATOR IP CONSULT  NUTRITION SERVICES ADULT IP CONSULT  PHARMACY TO DOSE [facility]  HEMATOLOGY & ONCOLOGY IP CONSULT  WOUND OSTOMY CONTINENCE NURSE  IP CONSULT  SOCIAL WORK IP CONSULT    Code Status   Full Code    Time Spent on this Encounter   I, Chapin Castro MD, personally saw the patient today and spent greater than 30 minutes discharging this patient.       Chapin Castro MD  [facility]  ______________________________________________________________________    Physical Exam   Vital Signs:                   Weight: 155 lbs 9.6 oz  GENERAL: Alert, awake, no acute distress.  HEENT:  yes anicteric, external occular movements intact, face symmetric.  Dentition WNL, MM moist.  CVS: RRR, S1, S2.  No murmurs appreciated.  CHEST: Rales at the bilateral lung bases, Upper lung fields clear.  Normal chest rise, normal work of breathing.   ABD: soft, non-tender, non-distended.  Bowel Sounds Present.  No guarding.  EXT:   No  deformities.  Warm, well perfused.  Skin: no rashes or lesions noted.  Warm and Dry.  NEUR: No focal deficits noted.  Speech clear.  Coordination and strength grossly normal.  Psych: Appropriate affect.       Primary Care Physician   Gerri Eubanks    Discharge Orders      Reason for your hospital stay    Acute on chronic hypoxic respiratory failure, low plts     Follow-up and recommended labs and tests     Follow up with primary care provider, Gerri Eubanks, within 7 days for hospital follow- up.  The following labs/tests are recommended: CBC, BMP 5 days result to PCP..     Activity    Your activity upon discharge: activity as tolerated     Full Code     Diet    Follow this diet upon discharge: Orders Placed This Encounter      2 Gram Sodium Diet       Significant Results and Procedures   Most Recent 3 CBC's:  Recent Labs   Lab Test 10/09/19  0825 10/08/19  1007 10/07/19  0721   WBC 5.9 5.3 6.2   HGB 8.1* 8.5* 8.5*   MCV 98 98 97   * 105* 98*     Most Recent 3 BMP's:  Recent Labs   Lab Test 10/09/19  0825 10/07/19  0721 10/06/19  0631    136 138   POTASSIUM 4.2 4.3 3.9   CHLORIDE 106 106 106   CO2 29 28 31   BUN 27 28 30   CR 0.84 0.97 1.09*   ANIONGAP 4 2* 1*   CASPER 8.7 8.4* 8.1*   GLC 97 90 90     Most Recent 2 LFT's:  Recent Labs   Lab Test 10/07/19  0721 10/03/19  1712   AST 30 69*   ALT 30 33   ALKPHOS 76 84   BILITOTAL 1.3 1.8*     Most Recent 3 INR's:  Recent Labs   Lab Test 10/18/17  0646 05/12/16  1750 10/06/15  1955   INR 1.04 1.00 1.07       Discharge Medications   Discharge Medication List as of 10/9/2019 12:59 PM      CONTINUE these medications which have NOT CHANGED    Details   acetaminophen (TYLENOL) 325 MG tablet Take 650 mg by mouth 3 times daily as needed for mild pain, Historical      albuterol (ACCUNEB) 1.25 MG/3ML nebulizer solution Take 1 vial by nebulization 3 times daily , Historical      ASPIRIN NOT PRESCRIBED (INTENTIONAL) Reason ASA was Not Prescribed: Hx of  gastrointestinal or intracranial bleed      Cranberry Extract 250 MG TABS Take 250 mg by mouth daily, Historical      ferrous sulfate (FEROSUL) 325 (65 Fe) MG tablet Take 1 tablet (325 mg) by mouth 2 times daily, Disp-100 tablet, R-11, E-Prescribe      gabapentin (NEURONTIN) 100 MG capsule TAKE 1 CAPSULE (100MG) BY MOUTH THREE TIMES DAILY, Disp-270 capsule, R-0, E-Prescribe      hydrALAZINE (APRESOLINE) 25 MG tablet TAKE 3 TABLETS BY MOUTH THREE TIMES DAILY, Disp-810 tablet, R-3, E-Prescribe      OMEGA-3 FATTY ACIDS PO Take 1,200 mg by mouth daily , Historical      simvastatin (ZOCOR) 10 MG tablet TAKE 1 TABLET (10MG) BY MOUTH AT BEDTIME, Disp-90 tablet, R-0, E-Prescribe      SM STOOL SOFTENER 8.6-50 MG tablet Take 2 tablets daily as needed for constipation while taking prescription pain medications., Disp-30 tablet, R-0, E-Prescribe      spironolactone (ALDACTONE) 50 MG tablet TAKE 1 TABLET BY MOUTH DAILY, Disp-90 tablet, R-3, E-Prescribe      torsemide (DEMADEX) 20 MG tablet Take 1 tablet (20 mg) by mouth 2 times daily, Disp-180 tablet, R-3, E-Prescribe      venlafaxine (EFFEXOR-XR) 75 MG 24 hr capsule Take 1 capsule (75 mg) by mouth daily, Disp-90 capsule, R-3, E-Prescribe      VITAMIN D, CHOLECALCIFEROL, PO Take 1,000 Units by mouth daily , Historical      omeprazole (PRILOSEC) 20 MG DR capsule TAKE 1 CAPSULE BY MOUTH DAILY, Disp-90 capsule, R-0, E-Prescribe           Allergies   Allergies   Allergen Reactions     Blood Transfusion Related (Informational Only) Other (See Comments)     Patient has a history of a clinically significant antibody against RBC antigens.  A delay in compatible RBCs may occur.     Lisinopril Other (See Comments)     Tongue swelling     Calcium Nausea and Vomiting     Egg Yolk      Flu Virus Vaccine      Allergic to eggs.     Keflex [Cephalexin] Nausea     Pt states she had upset stomach.  NOT tongue swelling.  She had tongue swelling with a combo bp med that she thinks included  lisinopril.    Tolerates IV Cefazolin     Levaquin [Levofloxacin]      Sulfa Drugs      Zinc Nausea and Vomiting

## 2019-10-21 ENCOUNTER — CARE COORDINATION (OUTPATIENT)
Dept: CARDIOLOGY | Facility: CLINIC | Age: 84
End: 2019-10-21

## 2019-10-21 ENCOUNTER — OFFICE VISIT (OUTPATIENT)
Dept: INTERNAL MEDICINE | Facility: CLINIC | Age: 84
End: 2019-10-21
Payer: COMMERCIAL

## 2019-10-21 VITALS
HEART RATE: 62 BPM | DIASTOLIC BLOOD PRESSURE: 60 MMHG | TEMPERATURE: 98.6 F | OXYGEN SATURATION: 94 % | WEIGHT: 151.8 LBS | HEIGHT: 63 IN | SYSTOLIC BLOOD PRESSURE: 136 MMHG | BODY MASS INDEX: 26.9 KG/M2

## 2019-10-21 DIAGNOSIS — I42.9 CARDIOMYOPATHY, UNSPECIFIED TYPE (H): ICD-10-CM

## 2019-10-21 DIAGNOSIS — I27.20 PULMONARY HYPERTENSION (H): ICD-10-CM

## 2019-10-21 DIAGNOSIS — D50.0 IRON DEFICIENCY ANEMIA DUE TO CHRONIC BLOOD LOSS: Primary | ICD-10-CM

## 2019-10-21 DIAGNOSIS — K92.2 GASTROINTESTINAL HEMORRHAGE, UNSPECIFIED GASTROINTESTINAL HEMORRHAGE TYPE: ICD-10-CM

## 2019-10-21 LAB
ERYTHROCYTE [DISTWIDTH] IN BLOOD BY AUTOMATED COUNT: 15.9 % (ref 10–15)
HCT VFR BLD AUTO: 27.9 % (ref 35–47)
HGB BLD-MCNC: 8.3 G/DL (ref 11.7–15.7)
MCH RBC QN AUTO: 29.3 PG (ref 26.5–33)
MCHC RBC AUTO-ENTMCNC: 29.7 G/DL (ref 31.5–36.5)
MCV RBC AUTO: 99 FL (ref 78–100)
PLATELET # BLD AUTO: 258 10E9/L (ref 150–450)
RBC # BLD AUTO: 2.83 10E12/L (ref 3.8–5.2)
WBC # BLD AUTO: 5.6 10E9/L (ref 4–11)

## 2019-10-21 PROCEDURE — 85027 COMPLETE CBC AUTOMATED: CPT | Performed by: INTERNAL MEDICINE

## 2019-10-21 PROCEDURE — 99495 TRANSJ CARE MGMT MOD F2F 14D: CPT | Performed by: INTERNAL MEDICINE

## 2019-10-21 PROCEDURE — 36415 COLL VENOUS BLD VENIPUNCTURE: CPT | Performed by: INTERNAL MEDICINE

## 2019-10-21 RX ORDER — HEPARIN SODIUM,PORCINE 10 UNIT/ML
5 VIAL (ML) INTRAVENOUS
Status: CANCELLED | OUTPATIENT
Start: 2019-10-21

## 2019-10-21 RX ORDER — HEPARIN SODIUM (PORCINE) LOCK FLUSH IV SOLN 100 UNIT/ML 100 UNIT/ML
5 SOLUTION INTRAVENOUS
Status: CANCELLED | OUTPATIENT
Start: 2019-10-21

## 2019-10-21 ASSESSMENT — MIFFLIN-ST. JEOR: SCORE: 1092.69

## 2019-10-21 NOTE — PROGRESS NOTES
Subjective     Keisha Godinez is a 87 year old female who presents to clinic today for the following health issues:    HPI     Hospital Follow-up Visit:    Hospital/Nursing Home/IP Rehab Facility: [facility]  Date of Admission: 10-3-2019  Date of Discharge: 10-            Problems taking medications regularly:  None       Medication changes since discharge: None       Problems adhering to non-medication therapy:  None    Summary of hospitalization:  [facility] discharge summary reviewed  Diagnostic Tests/Treatments reviewed.  Follow up needed: with [facility]  Other Healthcare Providers Involved in Patient s Care:         cardiology  Update since discharge: improved. Her appetite is back. Wt is down. Breathing is easier. She is still wheezing but is much less than when she went home.     Her mood is good. She is sleeping ok. Wants to avoid going back to the hospital.    Her iron level was quite low in the hospital. She has had problems to tolerate oral Iron due to GI upset. She has what we believe to be chronic GI loss but have not been able to find the site and she does not want more invasive testing done.     Post Discharge Medication Reconciliation: discharge medications reconciled, continue medications without change.  Plan of care communicated with patient and daughter     Coding guidelines for this visit:  Type of Medical   Decision Making Face-to-Face Visit       within 7 Days of discharge Face-to-Face Visit        within 14 days of discharge   Moderate Complexity [id number]   High Complexity [id number]            BP Readings from Last 3 Encounters:   10/21/19 136/60   10/09/19 (!) 155/42   10/03/19 (!) 194/70    Wt Readings from Last 3 Encounters:   10/21/19 68.9 kg (151 lb 12.8 oz)   10/09/19 70.6 kg (155 lb 9.6 oz)   08/20/19 68.9 kg (152 lb)                  Reviewed and updated as needed this visit by Provider         Review of Systems   ROS COMP: Constitutional, HEENT,  "cardiovascular, pulmonary, GI, , musculoskeletal, neuro, skin, endocrine and psych systems are negative, except as otherwise noted.      Objective    /60 (BP Location: Left arm, Patient Position: Chair, Cuff Size: Adult Regular)   Pulse 62   Temp 98.6  F (37  C) (Oral)   Ht 1.6 m (5' 3\")   Wt 68.9 kg (151 lb 12.8 oz)   SpO2 94%   Breastfeeding? No   BMI 26.89 kg/m    Body mass index is 26.89 kg/m .  Physical Exam   GENERAL: healthy, alert and no distress  NECK: no adenopathy, no asymmetry, masses, or scars and thyroid normal to palpation  RESP: rales 1/3 way up but appears comfortable  CV: regular rate and rhythm, normal S1 S2, no S3 or S4, no murmur, click or rub, no peripheral edema and peripheral pulses strong  ABDOMEN: soft, nontender, no hepatosplenomegaly, no masses and bowel sounds normal  MS: no gross musculoskeletal defects noted, no edema          Assessment & Plan     1. Iron deficiency anemia due to chronic blood loss  Will set up for iron infusion. Check hgb q month, check iron levels quarterly  - CBC with platelets    2. Pulmonary hypertension (H)       3. Cardiomyopathy, unspecified type (H)  Will Follow up with [facility] clinic. Diureses still needed. With next lab dry will check bmp.    4. Gastrointestinal hemorrhage, unspecified gastrointestinal hemorrhage type  as above.        BMI:   Estimated body mass index is 26.89 kg/m  as calculated from the following:    Height as of this encounter: 1.6 m (5' 3\").    Weight as of this encounter: 68.9 kg (151 lb 12.8 oz).           Return in about 3 months (around 1/21/2020).    Gerri Eubanks MD  [facility]      "

## 2019-10-21 NOTE — PROGRESS NOTES
Spoke to daughter as pt was in bathroom. Offered to make an appt in [facility] with daughter as pt was recently discharged from [facility] w/HF exacerbation. Daughter states pt has appt with her PCP, Dr Eubanks, today and would like to discuss with her to see if pt needs to Follow-up in [facility]. Daughter will call back to make appt if Dr Eubanks recommends.   Charity Garcia RN 11:59 AM 10/21/19

## 2019-10-23 NOTE — PROGRESS NOTES
"Reviewed Dr Eubanks;s note and she recommends pt to follow up with cardiology.   Call to Rebecca. She is not home. Spoke to Pt. She is doing \"ok\" she does want to make appt with Suad and Rebecca makes her appts for her. Asked pt to have Rebecca call to scheduled. Pt expressed understanding.   Charity Garcia RN 11:01 AM 10/23/19    "

## 2019-10-27 DIAGNOSIS — E78.5 HYPERLIPIDEMIA, UNSPECIFIED HYPERLIPIDEMIA TYPE: ICD-10-CM

## 2019-10-27 DIAGNOSIS — M54.6 MIDLINE THORACIC BACK PAIN, UNSPECIFIED CHRONICITY: ICD-10-CM

## 2019-10-28 NOTE — TELEPHONE ENCOUNTER
"Requested Prescriptions   Pending Prescriptions Disp Refills     gabapentin (NEURONTIN) 100 MG   Last Written Prescription Date:  8/7/2019  Last Fill Quantity: 270,  # refills: 0   Last office visit: 10/21/2019 with prescribing provider:     Future Office Visit:  apsule [Pharmacy Med Name: Gabapentin Oral Capsule 100 MG] 270 capsule 0     Sig: TAKE 1 CAPSULE (100MG) BY MOUTH THREE TIMES DAILY       There is no refill protocol information for this order        simvastatin (ZOCOR) 10 MG tablet [Pharmacy Med Name: Simvastatin Oral Tablet  Last Written Prescription Date:  8/7/2019  Last Fill Quantity: 90,  # refills: 0   Last office visit: 10/21/2019 with prescribing provider:     Future Office Visit:   10 MG] 90 tablet 0     Sig: TAKE 1 TABLET (10MG) BY MOUTH AT BEDTIME       Statins Protocol Failed - 10/27/2019 10:19 AM        Failed - LDL on file in past 12 months     Recent Labs   Lab Test 10/02/18  1032   LDL 41             Passed - No abnormal creatine kinase in past 12 months     Recent Labs   Lab Test 11/28/15  0613   CKT 27*                Passed - Recent (12 mo) or future (30 days) visit within the authorizing provider's specialty     Patient has had an office visit with the authorizing provider or a provider within the authorizing providers department within the previous 12 mos or has a future within next 30 days. See \"Patient Info\" tab in inbasket, or \"Choose Columns\" in Meds & Orders section of the refill encounter.              Passed - Medication is active on med list        Passed - Patient is age 18 or older        Passed - No active pregnancy on record        Passed - No positive pregnancy test in past 12 months        "

## 2019-10-29 RX ORDER — GABAPENTIN 100 MG/1
CAPSULE ORAL
Qty: 270 CAPSULE | Refills: 0 | Status: SHIPPED | OUTPATIENT
Start: 2019-10-29 | End: 2020-01-23

## 2019-10-29 RX ORDER — SIMVASTATIN 10 MG
TABLET ORAL
Qty: 90 TABLET | Refills: 0 | Status: SHIPPED | OUTPATIENT
Start: 2019-10-29 | End: 2020-01-23

## 2019-10-29 NOTE — TELEPHONE ENCOUNTER
Gabapentin  Routing refill request to provider for review/approval because:  Drug not on the [facility] refill protocol     Simvastatin  Routing refill request to provider for review/approval because:  Labs not current:  LDL    Last office visit 10/21/19

## 2019-10-30 NOTE — PROGRESS NOTES
Left VM for daughter, Rebecca, to see if she wants to make [facility] appt with Suad as Dr Eubanks's note recommended Follow-up with Cardiology.  Charity Garcia RN 9:24 AM 10/30/19

## 2019-10-31 NOTE — PROGRESS NOTES
Rebecca called back. She did schedule [facility] appt with Suad. Pt will have BMP prior to appt. Daughter requested appt reminder to be mailed.   Charity Garcia RN 10:36 AM 10/31/19

## 2019-11-04 ENCOUNTER — INFUSION THERAPY VISIT (OUTPATIENT)
Dept: INFUSION THERAPY | Facility: CLINIC | Age: 84
End: 2019-11-04
Attending: INTERNAL MEDICINE
Payer: COMMERCIAL

## 2019-11-04 VITALS
OXYGEN SATURATION: 96 % | HEART RATE: 45 BPM | TEMPERATURE: 97.9 F | DIASTOLIC BLOOD PRESSURE: 69 MMHG | SYSTOLIC BLOOD PRESSURE: 133 MMHG | RESPIRATION RATE: 18 BRPM

## 2019-11-04 DIAGNOSIS — D50.8 OTHER IRON DEFICIENCY ANEMIA: Primary | ICD-10-CM

## 2019-11-04 PROCEDURE — 25000128 H RX IP 250 OP 636: Performed by: INTERNAL MEDICINE

## 2019-11-04 PROCEDURE — 96365 THER/PROPH/DIAG IV INF INIT: CPT

## 2019-11-04 PROCEDURE — 25800030 ZZH RX IP 258 OP 636: Performed by: INTERNAL MEDICINE

## 2019-11-04 RX ORDER — HEPARIN SODIUM (PORCINE) LOCK FLUSH IV SOLN 100 UNIT/ML 100 UNIT/ML
5 SOLUTION INTRAVENOUS
Status: CANCELLED | OUTPATIENT
Start: 2019-11-06

## 2019-11-04 RX ORDER — HEPARIN SODIUM,PORCINE 10 UNIT/ML
5 VIAL (ML) INTRAVENOUS
Status: CANCELLED | OUTPATIENT
Start: 2019-11-06

## 2019-11-04 RX ADMIN — IRON SUCROSE 300 MG: 20 INJECTION, SOLUTION INTRAVENOUS at 12:38

## 2019-11-04 RX ADMIN — SODIUM CHLORIDE 250 ML: 9 INJECTION, SOLUTION INTRAVENOUS at 12:38

## 2019-11-04 ASSESSMENT — PAIN SCALES - GENERAL: PAINLEVEL: NO PAIN (0)

## 2019-11-04 NOTE — PROGRESS NOTES
Infusion Nursing Note:  Keisha PENA Gretchen presents today for Venofer #1 of 3.    Patient seen by provider today: No   present during visit today: Not Applicable.    Note: Reviewed potential SE of Venofer including allergic reaction.  Drug information given to patient.  Patient verbalized understanding.      Intravenous Access:  Peripheral IV placed.    Treatment Conditions:  Not Applicable.      Post Infusion Assessment:  Patient tolerated infusion without incident.  Blood return noted pre and post infusion.  Site patent and intact, free from redness, edema or discomfort.  No evidence of extravasations.  Access discontinued per protocol.       Discharge Plan:   Discharge instructions reviewed with: Patient.  Patient discharged in stable condition accompanied by: daughter.  Departure Mode: Wheelchair.  Next infusion scheduled for 11/6/19.    ABDI HENRIQUEZ RN

## 2019-11-06 ENCOUNTER — INFUSION THERAPY VISIT (OUTPATIENT)
Dept: INFUSION THERAPY | Facility: CLINIC | Age: 84
End: 2019-11-06
Attending: INTERNAL MEDICINE
Payer: COMMERCIAL

## 2019-11-06 VITALS
HEART RATE: 55 BPM | TEMPERATURE: 98.1 F | SYSTOLIC BLOOD PRESSURE: 129 MMHG | RESPIRATION RATE: 16 BRPM | DIASTOLIC BLOOD PRESSURE: 64 MMHG | OXYGEN SATURATION: 94 %

## 2019-11-06 DIAGNOSIS — D50.8 OTHER IRON DEFICIENCY ANEMIA: Primary | ICD-10-CM

## 2019-11-06 PROCEDURE — 25800030 ZZH RX IP 258 OP 636: Performed by: INTERNAL MEDICINE

## 2019-11-06 PROCEDURE — 25000128 H RX IP 250 OP 636: Performed by: INTERNAL MEDICINE

## 2019-11-06 PROCEDURE — 96365 THER/PROPH/DIAG IV INF INIT: CPT

## 2019-11-06 RX ORDER — HEPARIN SODIUM (PORCINE) LOCK FLUSH IV SOLN 100 UNIT/ML 100 UNIT/ML
5 SOLUTION INTRAVENOUS
Status: CANCELLED | OUTPATIENT
Start: 2019-11-08

## 2019-11-06 RX ORDER — HEPARIN SODIUM,PORCINE 10 UNIT/ML
5 VIAL (ML) INTRAVENOUS
Status: CANCELLED | OUTPATIENT
Start: 2019-11-08

## 2019-11-06 RX ADMIN — IRON SUCROSE 300 MG: 20 INJECTION, SOLUTION INTRAVENOUS at 14:14

## 2019-11-06 NOTE — PROGRESS NOTES
Infusion Nursing Note:  Keisha Godinez presents today for Venofer.    Patient seen by provider today: No   present during visit today: Not Applicable.    Note: N/A.    Intravenous Access:  Peripheral IV placed.    Treatment Conditions:  Not Applicable.      Post Infusion Assessment:  Patient tolerated infusion without incident.  Blood return noted pre and post infusion.  Site patent and intact, free from redness, edema or discomfort.  No evidence of extravasations.  Access discontinued per protocol.       Discharge Plan:   Discharge instructions reviewed with: Patient.  Patient and/or family verbalized understanding of discharge instructions and all questions answered.  Copy of AVS reviewed with patient and/or family.  Patient will return 11/8/19 for next appointment.  Patient discharged in stable condition accompanied by: self.  Departure Mode: Ambulatory.    Sasha Clark RN

## 2019-11-08 ENCOUNTER — INFUSION THERAPY VISIT (OUTPATIENT)
Dept: INFUSION THERAPY | Facility: CLINIC | Age: 84
End: 2019-11-08
Attending: INTERNAL MEDICINE
Payer: COMMERCIAL

## 2019-11-08 VITALS
DIASTOLIC BLOOD PRESSURE: 56 MMHG | TEMPERATURE: 98.4 F | OXYGEN SATURATION: 94 % | HEART RATE: 53 BPM | RESPIRATION RATE: 20 BRPM | SYSTOLIC BLOOD PRESSURE: 144 MMHG

## 2019-11-08 DIAGNOSIS — D50.8 OTHER IRON DEFICIENCY ANEMIA: Primary | ICD-10-CM

## 2019-11-08 PROCEDURE — 25800030 ZZH RX IP 258 OP 636: Performed by: INTERNAL MEDICINE

## 2019-11-08 PROCEDURE — 25000128 H RX IP 250 OP 636: Performed by: INTERNAL MEDICINE

## 2019-11-08 PROCEDURE — 96365 THER/PROPH/DIAG IV INF INIT: CPT

## 2019-11-08 RX ORDER — HEPARIN SODIUM,PORCINE 10 UNIT/ML
5 VIAL (ML) INTRAVENOUS
Status: CANCELLED | OUTPATIENT
Start: 2019-11-08

## 2019-11-08 RX ORDER — HEPARIN SODIUM (PORCINE) LOCK FLUSH IV SOLN 100 UNIT/ML 100 UNIT/ML
5 SOLUTION INTRAVENOUS
Status: CANCELLED | OUTPATIENT
Start: 2019-11-08

## 2019-11-08 RX ADMIN — IRON SUCROSE 300 MG: 20 INJECTION, SOLUTION INTRAVENOUS at 14:01

## 2019-11-08 ASSESSMENT — PAIN SCALES - GENERAL: PAINLEVEL: NO PAIN (0)

## 2019-11-08 NOTE — PATIENT INSTRUCTIONS
Patient Education     Iron Sucrose injection  Brand Name: Venofer  What is this medicine?  IRON SUCROSE (DORINDA lacey) is an iron complex. Iron is used to make healthy red blood cells, which carry oxygen and nutrients throughout the body. This medicine is used to treat iron deficiency anemia in people with chronic kidney disease.  How should I use this medicine?  This medicine is for infusion into a vein. It is given by a health care professional in a hospital or clinic setting.  Talk to your pediatrician regarding the use of this medicine in children. While this drug may be prescribed for children as young as 2 years for selected conditions, precautions do apply.  What side effects may I notice from receiving this medicine?  Side effects that you should report to your doctor or health care professional as soon as possible:    allergic reactions like skin rash, itching or hives, swelling of the face, lips, or tongue    breathing problems    changes in blood pressure    cough    fast, irregular heartbeat    feeling faint or lightheaded, falls    fever or chills    flushing, sweating, or hot feelings    joint or muscle aches/pains    seizures    swelling of the ankles or feet    unusually weak or tired  Side effects that usually do not require medical attention (report to your doctor or health care professional if they continue or are bothersome):    diarrhea    feeling achy    headache    irritation at site where injected    nausea, vomiting    stomach upset    tiredness  What may interact with this medicine?  Do not take this medicine with any of the following medications:    deferoxamine    dimercaprol    other iron products  This medicine may also interact with the following medications:    chloramphenicol    deferasirox  What if I miss a dose?  It is important not to miss your dose. Call your doctor or health care professional if you are unable to keep an appointment.  Where should I keep my  medicine?  This drug is given in a hospital or clinic and will not be stored at home.  What should I tell my health care provider before I take this medicine?  They need to know if you have any of these conditions:    anemia not caused by low iron levels    heart disease    high levels of iron in the blood    kidney disease    liver disease    an unusual or allergic reaction to iron, other medicines, foods, dyes, or preservatives    pregnant or trying to get pregnant    breast-feeding  What should I watch for while using this medicine?  Visit your doctor or healthcare professional regularly. Tell your doctor or healthcare professional if your symptoms do not start to get better or if they get worse. You may need blood work done while you are taking this medicine.  You may need to follow a special diet. Talk to your doctor. Foods that contain iron include: whole grains/cereals, dried fruits, beans, or peas, leafy green vegetables, and organ meats (liver, kidney).  NOTE:This sheet is a summary. It may not cover all possible information. If you have questions about this medicine, talk to your doctor, pharmacist, or health care provider. Copyright  2019 [employer]

## 2019-11-08 NOTE — PROGRESS NOTES
Infusion Nursing Note:  Keisha Godinez presents today for Venofer.    Patient seen by provider today: No   present during visit today: Not Applicable.    Note: Tolerated first two doses without difficulty. Has not noticed improvement as of yet.    Intravenous Access:  Peripheral IV placed.    Treatment Conditions:  Not Applicable.    Post Infusion Assessment:  Patient tolerated infusion without incident.  Blood return noted pre and post infusion.  Site patent and intact, free from redness, edema or discomfort.  No evidence of extravasations.  Access discontinued per protocol.     Discharge Plan:   Discharge instructions reviewed with: Patient.  Patient and/or family verbalized understanding of discharge instructions and all questions answered.  Copy of AVS reviewed with patient and/or family.    Patient discharged in stable condition accompanied by: daughter.  Departure Mode: Wheelchair.    Cecy Barclay RN

## 2019-11-13 DIAGNOSIS — D50.0 IRON DEFICIENCY ANEMIA DUE TO CHRONIC BLOOD LOSS: Primary | ICD-10-CM

## 2019-11-14 ENCOUNTER — DOCUMENTATION ONLY (OUTPATIENT)
Dept: CARDIOLOGY | Facility: CLINIC | Age: 84
End: 2019-11-14

## 2019-11-14 ENCOUNTER — OFFICE VISIT (OUTPATIENT)
Dept: CARDIOLOGY | Facility: CLINIC | Age: 84
End: 2019-11-14
Attending: PHYSICIAN ASSISTANT
Payer: COMMERCIAL

## 2019-11-14 VITALS
BODY MASS INDEX: 26.19 KG/M2 | HEIGHT: 63 IN | WEIGHT: 147.8 LBS | OXYGEN SATURATION: 93 % | SYSTOLIC BLOOD PRESSURE: 138 MMHG | DIASTOLIC BLOOD PRESSURE: 54 MMHG | HEART RATE: 50 BPM

## 2019-11-14 DIAGNOSIS — I10 ESSENTIAL HYPERTENSION WITH GOAL BLOOD PRESSURE LESS THAN 140/90: ICD-10-CM

## 2019-11-14 DIAGNOSIS — I27.20 PULMONARY HYPERTENSION (H): ICD-10-CM

## 2019-11-14 DIAGNOSIS — I50.22 CHRONIC SYSTOLIC CONGESTIVE HEART FAILURE (H): ICD-10-CM

## 2019-11-14 DIAGNOSIS — D50.0 IRON DEFICIENCY ANEMIA DUE TO CHRONIC BLOOD LOSS: ICD-10-CM

## 2019-11-14 DIAGNOSIS — I50.32 CHRONIC DIASTOLIC CONGESTIVE HEART FAILURE (H): ICD-10-CM

## 2019-11-14 DIAGNOSIS — E78.5 HYPERLIPIDEMIA LDL GOAL <100: Primary | ICD-10-CM

## 2019-11-14 DIAGNOSIS — I50.33 ACUTE ON CHRONIC DIASTOLIC CONGESTIVE HEART FAILURE (H): Primary | ICD-10-CM

## 2019-11-14 DIAGNOSIS — I50.42 CHRONIC COMBINED SYSTOLIC AND DIASTOLIC HEART FAILURE (H): ICD-10-CM

## 2019-11-14 LAB
ANION GAP SERPL CALCULATED.3IONS-SCNC: 4 MMOL/L (ref 3–14)
BUN SERPL-MCNC: 33 MG/DL (ref 7–30)
CALCIUM SERPL-MCNC: 9.1 MG/DL (ref 8.5–10.1)
CHLORIDE SERPL-SCNC: 103 MMOL/L (ref 94–109)
CO2 SERPL-SCNC: 30 MMOL/L (ref 20–32)
CREAT SERPL-MCNC: 0.95 MG/DL (ref 0.52–1.04)
GFR SERPL CREATININE-BSD FRML MDRD: 53 ML/MIN/{1.73_M2}
GLUCOSE SERPL-MCNC: 93 MG/DL (ref 70–99)
HGB BLD-MCNC: 8.6 G/DL (ref 11.7–15.7)
POTASSIUM SERPL-SCNC: 4.2 MMOL/L (ref 3.4–5.3)
SODIUM SERPL-SCNC: 137 MMOL/L (ref 133–144)

## 2019-11-14 PROCEDURE — 85018 HEMOGLOBIN: CPT | Performed by: INTERNAL MEDICINE

## 2019-11-14 PROCEDURE — 80048 BASIC METABOLIC PNL TOTAL CA: CPT | Performed by: INTERNAL MEDICINE

## 2019-11-14 PROCEDURE — 99214 OFFICE O/P EST MOD 30 MIN: CPT | Performed by: PHYSICIAN ASSISTANT

## 2019-11-14 PROCEDURE — 36415 COLL VENOUS BLD VENIPUNCTURE: CPT | Performed by: INTERNAL MEDICINE

## 2019-11-14 ASSESSMENT — MIFFLIN-ST. JEOR: SCORE: 1074.55

## 2019-11-14 NOTE — LETTER
"11/14/2019    Gerri Eubanks MD  [address] [facility] [address]  [facility] [address]    RE: Keisha Godinez       Dear Colleague,    I had the pleasure of seeing Keisha Godinez in the [facility].      Cardiology Progress Note    Date of Service: 11/14/2019      Reason for visit: CORE clinic follow up, diastolic and right heart failure.     Primary cardiologist: Dr. Edin Chaudhari      HPI:  Ms. Godinez is a pleasant 87 year old female with a PMHx including hypertension, hyperlipidemia, atrial fibrillation (not on AC due to prior GI bleed), anemia (colonic AVM, iron deficiency), CVA, chronic lower extremity edema, and diastolic heart failure with pulmonary hypertension. She was followed by Dr. Pradhan for a few years, but then lost to follow up in 2016 as she opted not to return as she was doing so well. However, in May 2018 she developed worsening leg edema and was admitted at [facility] with cellulitis complicated by heart failure, was diuresed and discharged to a TCU. She then returned to the hospital in June 2018with leg edema and weight gain once again. She required IV diuresis, and pRBC transfusion for worsening of her anemia. During that admit, she was 167 lbs at admit, and discharged at 155 lbs. She was placed on 40mg of lasix twice daily, and required supplemental oxygen at discharge.    I then saw her for CORE clinic enrollment in July 2018. She was starting to make positive changes her diet, including sodium restriction. Weights trended down and she had good result with lymphedema wraps. I transitioned her lasix to torsemide 20mg BID with good result and her weight settled out in the 145-148 lb range, and was becoming more active, ambulating with the use of a rolling walker. A follow up echo in Oct 2018 showed EF 55-60%, though her RV remained dilated with mildly decreased function and 2+TR. When I saw her last in Dec 2018 she was feeling \"great\" and weights were stable. She's was " "seen most recently in July 2019 at which time she was doing well. He discontinued her Imdur as he felt it was no longer necessary.     Ms. Godinez has unfortunately been hospitalized twice since she was here last. Once in August for symptomatic worsening anemia, requiring transfusion. No definitive bleeding source was identified. She was placed on iron as well. She returned again in early October 2019 for shortness of breath and leg edema, after getting an outpatient transfusion once again. She was felt to be volume overloaded and required IV lasix. She's back today for our planned CORE follow up after that hospital stay.    Today she tells me she is \"still really tired.\" She is receiving IV venofer, last done about a week ago. Her hemoglobin today was 8.6, up slightly from previous. She thinks she is back to her baseline in terms of breathing, and is still doing lymphedema wraps which keep her swelling under good control. She has gone back to very tight sodium restriction which has helped in the past. She denies MOTT or orthopnea, and has no palpitations, chest pain, dizziness or presyncope. Her weight today on our scale is 147 lbs, which she says is similar to at home. Her renal function is stable at her baseline with acceptable electrolytes, on her torsemide 20mg BID which is unchanged from previous.       ASSESSMENT/PLAN:    1. Acute on chronic diastolic and right heart failure.   --Most recent echo Oct 2018 with ongoing preserved EF 55-60%. Her RV has remained dilated, with mildly reduced function with 2+ TR, and was felt to be euvolemic at the time of that exam. Given her advanced age and other comorbidities, we had opted to proceed conservatively with no advanced PH workup planned.    --She had been doing well from a volume standpoint until last month when she has required multiple transfusions for her anemia. Now she appears back to baseline and swelling is under good control. Will continue her torsemide at " 20mg BID, and re-enroll her in our telemanagement program to help keep a closer eye on her volume status. Goal weight will be 145-152 lbs. Given her history of GIB/anemia, not a good cardiomems candidate   --I again encouraged her to continue tight dietary sodium restriction, which made a considerable difference in her edema and symptoms in the past.  Her renal function remains preserved. Continue lymphedema wraps as well.    --Continue spironolactone 50mg daily.   --I asked her to be sure to follow with her PCP for close monitoring of her anemia.       2. Hypertension.   --SBP has fluctuated, though overall under reasonable control, with some low diastolic pressures noted. She has a true allergy to ACE-inhibitors (tongue swelling), and is not on a beta blocker due to resting bradycardia. Will continue hydralazine and spironolactone/torsemide as above. She was on Imdur in the past, which was stopped by Dr. Chaudhari in July.     3. Atrial fibrillation, chronic.   --Rate well controlled, with intermittent bradycardia, not on BB. Some concern for underlying AV conduction issues but she denies any dizziness or presyncope. Continue to monitor.    --She is not on anticoagulation due to GIB/anemia noted above.     4. Hyperlipidemia.   --Last LDL Oct 2018 was excellent at 41. Continue simvastatin 10mg daily. Will plan routine repeat FLP next visit.       Follow up plan: With myself in CORE in ~1 month with labs, or sooner if needed.          Orders this Visit:  Orders Placed This Encounter   Procedures     Basic metabolic panel     N terminal pro BNP outpatient     Lipid panel reflex to direct LDL Fasting     Follow-Up with [facility] Clinic - [place] visit     No orders of the defined types were placed in this encounter.    There are no discontinued medications.        CURRENT MEDICATIONS:  Current Outpatient Medications   Medication Sig Dispense Refill     acetaminophen (TYLENOL) 325 MG tablet Take 650 mg by mouth 3 times daily as needed  for mild pain       albuterol (ACCUNEB) 1.25 MG/3ML nebulizer solution Take 1 vial by nebulization 3 times daily        Cranberry Extract 250 MG TABS Take 250 mg by mouth daily       ferrous sulfate (FEROSUL) 325 (65 Fe) MG tablet Take 1 tablet (325 mg) by mouth 2 times daily 100 tablet 11     gabapentin (NEURONTIN) 100 MG capsule TAKE 1 CAPSULE (100MG) BY MOUTH THREE TIMES DAILY 270 capsule 0     hydrALAZINE (APRESOLINE) 25 MG tablet TAKE 3 TABLETS BY MOUTH THREE TIMES DAILY 810 tablet 3     OMEGA-3 FATTY ACIDS PO Take 1,200 mg by mouth daily        omeprazole (PRILOSEC) 20 MG DR capsule TAKE 1 CAPSULE BY MOUTH DAILY 90 capsule 0     simvastatin (ZOCOR) 10 MG tablet TAKE 1 TABLET (10MG) BY MOUTH AT BEDTIME 90 tablet 0     spironolactone (ALDACTONE) 50 MG tablet TAKE 1 TABLET BY MOUTH DAILY 90 tablet 3     torsemide (DEMADEX) 20 MG tablet Take 1 tablet (20 mg) by mouth 2 times daily 180 tablet 3     venlafaxine (EFFEXOR-XR) 75 MG 24 hr capsule Take 1 capsule (75 mg) by mouth daily 90 capsule 3     VITAMIN D, CHOLECALCIFEROL, PO Take 1,000 Units by mouth daily        ASPIRIN NOT PRESCRIBED (INTENTIONAL) Please choose reason not prescribed, below (Patient not taking: Reported on 11/4/2019)       SM STOOL SOFTENER 8.6-50 MG tablet Take 2 tablets daily as needed for constipation while taking prescription pain medications. (Patient not taking: Reported on 11/14/2019) 30 tablet 0       ALLERGIES     Allergies   Allergen Reactions     Blood Transfusion Related (Informational Only) Other (See Comments)     Patient has a history of a clinically significant antibody against RBC antigens.  A delay in compatible RBCs may occur.     Lisinopril Other (See Comments)     Tongue swelling     Calcium Nausea and Vomiting     Egg Yolk      Flu Virus Vaccine      Allergic to eggs.     Keflex [Cephalexin] Nausea     Pt states she had upset stomach.  NOT tongue swelling.  She had tongue swelling with a combo bp med that she thinks  included lisinopril.    Tolerates IV Cefazolin     Levaquin [Levofloxacin]      Sulfa Drugs      Zinc Nausea and Vomiting       PAST MEDICAL HISTORY:  Past Medical History:   Diagnosis Date     Anemia      Atrial fibrillation (H)      B12 deficiency      Cardiomyopathy (H)      CHF (congestive heart failure) (H)      Chronic edema     Lower extremities     Chronic systolic congestive heart failure (H)      CVA (cerebral vascular accident) (H) 2010    Acute Left MCA hemispheric CVA ( on coumadin)     Depression      Gastro-oesophageal reflux disease      GI bleed 03-20-15     Hyperlipidemia      Hypertension      Iron deficiency      MSSA (methicillin susceptible Staphylococcus aureus) 03-10-15     Pulmonary hypertension (H)      Shingles        PAST SURGICAL HISTORY:  Past Surgical History:   Procedure Laterality Date     COLONOSCOPY  03/24/2015    Diverticulosis, polyps     ENTEROSCOPY SMALL BOWEL N/A 2/29/2016    Procedure: ENTEROSCOPY SMALL BOWEL;  Surgeon: Ayd Ponce MD;  Location:  GI     ESOPHAGOSCOPY, GASTROSCOPY, DUODENOSCOPY (EGD), COMBINED N/A 3/22/2015    Upper GI- Normal, nothing significant found.      EXCISE MASS FOOT Right 7/6/2016    Procedure: EXCISE MASS FOOT;  Surgeon: Lee Jang DPM;  Location: [facility] OR       FAMILY HISTORY:  Family History   Problem Relation Age of Onset     Known Genetic Syndrome Father      Known Genetic Syndrome Mother      Other Cancer Daughter         pancreatic     Other Cancer Brother         type     Other Cancer Sister 60        lung     Diabetes No family hx of      Breast Cancer No family hx of      Cancer - colorectal No family hx of      Ovarian Cancer No family hx of      Prostate Cancer No family hx of        SOCIAL HISTORY:  Social History     Socioeconomic History     Marital status:      Spouse name: None     Number of children: None     Years of education: None     Highest education level: None   Occupational History     None    Social Needs     Financial resource strain: None     Food insecurity:     Worry: None     Inability: None     Transportation needs:     Medical: None     Non-medical: None   Tobacco Use     Smoking status: Former Smoker     Years: 1.00     Types: Cigarettes     Start date:      Last attempt to quit: 1956     Years since quittin.6     Smokeless tobacco: Never Used   Substance and Sexual Activity     Alcohol use: No     Alcohol/week: 0.0 standard drinks     Drug use: No     Sexual activity: Never     Partners: Male   Lifestyle     Physical activity:     Days per week: None     Minutes per session: None     Stress: None   Relationships     Social connections:     Talks on phone: None     Gets together: None     Attends [employer] service: None     Active member of club or organization: None     Attends meetings of clubs or organizations: None     Relationship status: None     Intimate partner violence:     Fear of current or ex partner: None     Emotionally abused: None     Physically abused: None     Forced sexual activity: None   Other Topics Concern      Service Not Asked     Blood Transfusions Not Asked     Caffeine Concern No     Comment: Hot tea 1 cup daily     Occupational Exposure Not Asked     Hobby Hazards Not Asked     Sleep Concern Not Asked     Stress Concern Not Asked     Weight Concern Not Asked     Special Diet Yes     Comment: low sodium     Back Care Not Asked     Exercise No     Comment: limited right now     Bike Helmet Not Asked     Seat Belt Not Asked     Self-Exams Not Asked     Parent/sibling w/ CABG, MI or angioplasty before 65F 55M? Not Asked   Social History Narrative     None       Review of Systems:  Cardiovascular: negative for chest pain, palpitations, orthopnea, pos LE edema, at baseline.  Constitutional: negative for chills, sweats, fevers. Pos fatigue/tired.  Resp: Negative for dyspnea at rest, neg MOTT. Neg cough, known chronic lung disease  HEENT: Negative for  "new visual changes, frequent headaches  Gastrointestinal: negative for abdominal pain, diarrhea, blood in stool, nausea, vomiting  Hematologic/lymphatic: negative for current systemic anticoagulation, hx of blood clots. Pos hx GIB, anemia.  Musculoskeletal: negative for new back pain, joint pain  Neurological: negative for focal weakness, LOC, seizures, syncope. Neg dizziness/presyncope.       Physical Exam:  Vitals: /54 (BP Location: Right arm, Patient Position: Chair, Cuff Size: Adult Small)   Pulse 50   Ht 1.6 m (5' 3\")   Wt 67 kg (147 lb 12.8 oz)   SpO2 93%   BMI 26.18 kg/m      Wt Readings from Last 4 Encounters:   11/14/19 67 kg (147 lb 12.8 oz)   10/21/19 68.9 kg (151 lb 12.8 oz)   10/09/19 70.6 kg (155 lb 9.6 oz)   08/20/19 68.9 kg (152 lb)       GEN:  In general, this is a well nourished elderly female in no acute distress on room air. She arrived in a wheelchair today, accompanied by her daughter.    HEENT:  Pupils equal, round. Sclerae nonicteric.   NECK: Supple, no masses appreciated. Trachea midline. No JVD while upright.   C/V:  Irregular rhythm, bradycardic. No obvious murmur, rub or gallop.  No S3 or RV heave.   RESP: Respirations are unlabored. No use of accessory muscles. Clear to auscultation today with no wheezing, rales, or rhonchi.    GI: Abdomen soft, nontender, nondistended.   EXTREM: Unable to appreciate edema, as wraps in place. No cyanosis or clubbing.  NEURO: Alert and oriented, cooperative. Gait not assesed. No obvious focal deficits.   SKIN: Warm and dry. No rashes or petechiae appreciated.       Recent Lab Results:    BMP RESULTS:  Lab Results   Component Value Date     11/14/2019    POTASSIUM 4.2 11/14/2019    CHLORIDE 103 11/14/2019    CO2 30 11/14/2019    ANIONGAP 4 11/14/2019    GLC 93 11/14/2019    BUN 33 (H) 11/14/2019    CR 0.95 11/14/2019    GFRESTIMATED 53 (L) 11/14/2019    GFRESTBLACK 62 11/14/2019    CASPER 9.1 11/14/2019          New/Pertinent imaging results " since last visit:  Echo 10/3/18    Interpretation Summary     Left ventricular systolic function is normal.The visual ejection fraction is  estimated at 55-60%.  The right ventricle is severely dilated.Mildly decreased right ventricular  systolic function  Bi-atrial severe enlargement  There is moderate (2+) tricuspid regurgitation.  Severe pulmonary hypertension- RVSP 71 mm hg +RA.  The IVC is dilated and fails to change with respiration, suggesting elevated  central venous pressure.     Compared to prior study dated 10/18/2017 RVSP was 53 mm hg +RA in prior study.      Suad Murrell PA-C  [facility] [[email or web address]] Heart  Pager [phone]      Thank you for allowing me to participate in the care of your patient.      Sincerely,     NATHAN Valentin     [facility]    cc:   NATHAN Valentin  [facility] [[email or web address]] HEART CARE  [address] [place] [phone]

## 2019-11-14 NOTE — LETTER
"11/14/2019    Gerri Eubanks MD  [address] [facility] [address]  [facility] [address]    RE: Keisha Godinez       Dear Colleague,    I had the pleasure of seeing Keisha Godinez in the [facility].      Cardiology Progress Note    Date of Service: 11/14/2019      Reason for visit: CORE clinic follow up, diastolic and right heart failure.     Primary cardiologist: Dr. Edin Chaudhari      HPI:  Ms. Godinez is a pleasant 87 year old female with a PMHx including hypertension, hyperlipidemia, atrial fibrillation (not on AC due to prior GI bleed), anemia (colonic AVM, iron deficiency), CVA, chronic lower extremity edema, and diastolic heart failure with pulmonary hypertension. She was followed by Dr. Pradhan for a few years, but then lost to follow up in 2016 as she opted not to return as she was doing so well. However, in May 2018 she developed worsening leg edema and was admitted at [facility] with cellulitis complicated by heart failure, was diuresed and discharged to a TCU. She then returned to the hospital in June 2018with leg edema and weight gain once again. She required IV diuresis, and pRBC transfusion for worsening of her anemia. During that admit, she was 167 lbs at admit, and discharged at 155 lbs. She was placed on 40mg of lasix twice daily, and required supplemental oxygen at discharge.    I then saw her for CORE clinic enrollment in July 2018. She was starting to make positive changes her diet, including sodium restriction. Weights trended down and she had good result with lymphedema wraps. I transitioned her lasix to torsemide 20mg BID with good result and her weight settled out in the 145-148 lb range, and was becoming more active, ambulating with the use of a rolling walker. A follow up echo in Oct 2018 showed EF 55-60%, though her RV remained dilated with mildly decreased function and 2+TR. When I saw her last in Dec 2018 she was feeling \"great\" and weights were stable. She's was " "seen most recently in July 2019 at which time she was doing well. He discontinued her Imdur as he felt it was no longer necessary.     Ms. Godinez has unfortunately been hospitalized twice since she was here last. Once in August for symptomatic worsening anemia, requiring transfusion. No definitive bleeding source was identified. She was placed on iron as well. She returned again in early October 2019 for shortness of breath and leg edema, after getting an outpatient transfusion once again. She was felt to be volume overloaded and required IV lasix. She's back today for our planned CORE follow up after that hospital stay.    Today she tells me she is \"still really tired.\" She is receiving IV venofer, last done about a week ago. Her hemoglobin today was 8.6, up slightly from previous. She thinks she is back to her baseline in terms of breathing, and is still doing lymphedema wraps which keep her swelling under good control. She has gone back to very tight sodium restriction which has helped in the past. She denies MOTT or orthopnea, and has no palpitations, chest pain, dizziness or presyncope. Her weight today on our scale is 147 lbs, which she says is similar to at home. Her renal function is stable at her baseline with acceptable electrolytes, on her torsemide 20mg BID which is unchanged from previous.       ASSESSMENT/PLAN:    1. Acute on chronic diastolic and right heart failure.   --Most recent echo Oct 2018 with ongoing preserved EF 55-60%. Her RV has remained dilated, with mildly reduced function with 2+ TR, and was felt to be euvolemic at the time of that exam. Given her advanced age and other comorbidities, we had opted to proceed conservatively with no advanced PH workup planned.    --She had been doing well from a volume standpoint until last month when she has required multiple transfusions for her anemia. Now she appears back to baseline and swelling is under good control. Will continue her torsemide at " 20mg BID, and re-enroll her in our telemanagement program to help keep a closer eye on her volume status. Goal weight will be 145-152 lbs. Given her history of GIB/anemia, not a good cardiomems candidate   --I again encouraged her to continue tight dietary sodium restriction, which made a considerable difference in her edema and symptoms in the past.  Her renal function remains preserved. Continue lymphedema wraps as well.    --Continue spironolactone 50mg daily.   --I asked her to be sure to follow with her PCP for close monitoring of her anemia.       2. Hypertension.   --SBP has fluctuated, though overall under reasonable control, with some low diastolic pressures noted. She has a true allergy to ACE-inhibitors (tongue swelling), and is not on a beta blocker due to resting bradycardia. Will continue hydralazine and spironolactone/torsemide as above. She was on Imdur in the past, which was stopped by Dr. Chaudhari in July.     3. Atrial fibrillation, chronic.   --Rate well controlled, with intermittent bradycardia, not on BB. Some concern for underlying AV conduction issues but she denies any dizziness or presyncope. Continue to monitor.    --She is not on anticoagulation due to GIB/anemia noted above.     4. Hyperlipidemia.   --Last LDL Oct 2018 was excellent at 41. Continue simvastatin 10mg daily. Will plan routine repeat FLP next visit.       Follow up plan: With myself in CORE in ~1 month with labs, or sooner if needed.          Orders this Visit:  Orders Placed This Encounter   Procedures     Basic metabolic panel     N terminal pro BNP outpatient     Lipid panel reflex to direct LDL Fasting     Follow-Up with [facility] Clinic - [place] visit     No orders of the defined types were placed in this encounter.    There are no discontinued medications.        CURRENT MEDICATIONS:  Current Outpatient Medications   Medication Sig Dispense Refill     acetaminophen (TYLENOL) 325 MG tablet Take 650 mg by mouth 3 times daily as needed  for mild pain       albuterol (ACCUNEB) 1.25 MG/3ML nebulizer solution Take 1 vial by nebulization 3 times daily        Cranberry Extract 250 MG TABS Take 250 mg by mouth daily       ferrous sulfate (FEROSUL) 325 (65 Fe) MG tablet Take 1 tablet (325 mg) by mouth 2 times daily 100 tablet 11     gabapentin (NEURONTIN) 100 MG capsule TAKE 1 CAPSULE (100MG) BY MOUTH THREE TIMES DAILY 270 capsule 0     hydrALAZINE (APRESOLINE) 25 MG tablet TAKE 3 TABLETS BY MOUTH THREE TIMES DAILY 810 tablet 3     OMEGA-3 FATTY ACIDS PO Take 1,200 mg by mouth daily        omeprazole (PRILOSEC) 20 MG DR capsule TAKE 1 CAPSULE BY MOUTH DAILY 90 capsule 0     simvastatin (ZOCOR) 10 MG tablet TAKE 1 TABLET (10MG) BY MOUTH AT BEDTIME 90 tablet 0     spironolactone (ALDACTONE) 50 MG tablet TAKE 1 TABLET BY MOUTH DAILY 90 tablet 3     torsemide (DEMADEX) 20 MG tablet Take 1 tablet (20 mg) by mouth 2 times daily 180 tablet 3     venlafaxine (EFFEXOR-XR) 75 MG 24 hr capsule Take 1 capsule (75 mg) by mouth daily 90 capsule 3     VITAMIN D, CHOLECALCIFEROL, PO Take 1,000 Units by mouth daily        ASPIRIN NOT PRESCRIBED (INTENTIONAL) Please choose reason not prescribed, below (Patient not taking: Reported on 11/4/2019)       SM STOOL SOFTENER 8.6-50 MG tablet Take 2 tablets daily as needed for constipation while taking prescription pain medications. (Patient not taking: Reported on 11/14/2019) 30 tablet 0       ALLERGIES     Allergies   Allergen Reactions     Blood Transfusion Related (Informational Only) Other (See Comments)     Patient has a history of a clinically significant antibody against RBC antigens.  A delay in compatible RBCs may occur.     Lisinopril Other (See Comments)     Tongue swelling     Calcium Nausea and Vomiting     Egg Yolk      Flu Virus Vaccine      Allergic to eggs.     Keflex [Cephalexin] Nausea     Pt states she had upset stomach.  NOT tongue swelling.  She had tongue swelling with a combo bp med that she thinks  included lisinopril.    Tolerates IV Cefazolin     Levaquin [Levofloxacin]      Sulfa Drugs      Zinc Nausea and Vomiting       PAST MEDICAL HISTORY:  Past Medical History:   Diagnosis Date     Anemia      Atrial fibrillation (H)      B12 deficiency      Cardiomyopathy (H)      CHF (congestive heart failure) (H)      Chronic edema     Lower extremities     Chronic systolic congestive heart failure (H)      CVA (cerebral vascular accident) (H) 2010    Acute Left MCA hemispheric CVA ( on coumadin)     Depression      Gastro-oesophageal reflux disease      GI bleed 03-20-15     Hyperlipidemia      Hypertension      Iron deficiency      MSSA (methicillin susceptible Staphylococcus aureus) 03-10-15     Pulmonary hypertension (H)      Shingles        PAST SURGICAL HISTORY:  Past Surgical History:   Procedure Laterality Date     COLONOSCOPY  03/24/2015    Diverticulosis, polyps     ENTEROSCOPY SMALL BOWEL N/A 2/29/2016    Procedure: ENTEROSCOPY SMALL BOWEL;  Surgeon: Ady Ponce MD;  Location:  GI     ESOPHAGOSCOPY, GASTROSCOPY, DUODENOSCOPY (EGD), COMBINED N/A 3/22/2015    Upper GI- Normal, nothing significant found.      EXCISE MASS FOOT Right 7/6/2016    Procedure: EXCISE MASS FOOT;  Surgeon: Lee Jang DPM;  Location: [facility] OR       FAMILY HISTORY:  Family History   Problem Relation Age of Onset     Known Genetic Syndrome Father      Known Genetic Syndrome Mother      Other Cancer Daughter         pancreatic     Other Cancer Brother         type     Other Cancer Sister 60        lung     Diabetes No family hx of      Breast Cancer No family hx of      Cancer - colorectal No family hx of      Ovarian Cancer No family hx of      Prostate Cancer No family hx of        SOCIAL HISTORY:  Social History     Socioeconomic History     Marital status:      Spouse name: None     Number of children: None     Years of education: None     Highest education level: None   Occupational History     None    Social Needs     Financial resource strain: None     Food insecurity:     Worry: None     Inability: None     Transportation needs:     Medical: None     Non-medical: None   Tobacco Use     Smoking status: Former Smoker     Years: 1.00     Types: Cigarettes     Start date:      Last attempt to quit: 1956     Years since quittin.6     Smokeless tobacco: Never Used   Substance and Sexual Activity     Alcohol use: No     Alcohol/week: 0.0 standard drinks     Drug use: No     Sexual activity: Never     Partners: Male   Lifestyle     Physical activity:     Days per week: None     Minutes per session: None     Stress: None   Relationships     Social connections:     Talks on phone: None     Gets together: None     Attends Yarsani service: None     Active member of club or organization: None     Attends meetings of clubs or organizations: None     Relationship status: None     Intimate partner violence:     Fear of current or ex partner: None     Emotionally abused: None     Physically abused: None     Forced sexual activity: None   Other Topics Concern      Service Not Asked     Blood Transfusions Not Asked     Caffeine Concern No     Comment: Hot tea 1 cup daily     Occupational Exposure Not Asked     Hobby Hazards Not Asked     Sleep Concern Not Asked     Stress Concern Not Asked     Weight Concern Not Asked     Special Diet Yes     Comment: low sodium     Back Care Not Asked     Exercise No     Comment: limited right now     Bike Helmet Not Asked     Seat Belt Not Asked     Self-Exams Not Asked     Parent/sibling w/ CABG, MI or angioplasty before 65F 55M? Not Asked   Social History Narrative     None       Review of Systems:  Cardiovascular: negative for chest pain, palpitations, orthopnea, pos LE edema, at baseline.  Constitutional: negative for chills, sweats, fevers. Pos fatigue/tired.  Resp: Negative for dyspnea at rest, neg MOTT. Neg cough, known chronic lung disease  HEENT: Negative for  "new visual changes, frequent headaches  Gastrointestinal: negative for abdominal pain, diarrhea, blood in stool, nausea, vomiting  Hematologic/lymphatic: negative for current systemic anticoagulation, hx of blood clots. Pos hx GIB, anemia.  Musculoskeletal: negative for new back pain, joint pain  Neurological: negative for focal weakness, LOC, seizures, syncope. Neg dizziness/presyncope.       Physical Exam:  Vitals: /54 (BP Location: Right arm, Patient Position: Chair, Cuff Size: Adult Small)   Pulse 50   Ht 1.6 m (5' 3\")   Wt 67 kg (147 lb 12.8 oz)   SpO2 93%   BMI 26.18 kg/m      Wt Readings from Last 4 Encounters:   11/14/19 67 kg (147 lb 12.8 oz)   10/21/19 68.9 kg (151 lb 12.8 oz)   10/09/19 70.6 kg (155 lb 9.6 oz)   08/20/19 68.9 kg (152 lb)       GEN:  In general, this is a well nourished elderly female in no acute distress on room air. She arrived in a wheelchair today, accompanied by her daughter.    HEENT:  Pupils equal, round. Sclerae nonicteric.   NECK: Supple, no masses appreciated. Trachea midline. No JVD while upright.   C/V:  Irregular rhythm, bradycardic. No obvious murmur, rub or gallop.  No S3 or RV heave.   RESP: Respirations are unlabored. No use of accessory muscles. Clear to auscultation today with no wheezing, rales, or rhonchi.    GI: Abdomen soft, nontender, nondistended.   EXTREM: Unable to appreciate edema, as wraps in place. No cyanosis or clubbing.  NEURO: Alert and oriented, cooperative. Gait not assesed. No obvious focal deficits.   SKIN: Warm and dry. No rashes or petechiae appreciated.       Recent Lab Results:    BMP RESULTS:  Lab Results   Component Value Date     11/14/2019    POTASSIUM 4.2 11/14/2019    CHLORIDE 103 11/14/2019    CO2 30 11/14/2019    ANIONGAP 4 11/14/2019    GLC 93 11/14/2019    BUN 33 (H) 11/14/2019    CR 0.95 11/14/2019    GFRESTIMATED 53 (L) 11/14/2019    GFRESTBLACK 62 11/14/2019    CASPER 9.1 11/14/2019          New/Pertinent imaging results " since last visit:  Echo 10/3/18    Interpretation Summary     Left ventricular systolic function is normal.The visual ejection fraction is  estimated at 55-60%.  The right ventricle is severely dilated.Mildly decreased right ventricular  systolic function  Bi-atrial severe enlargement  There is moderate (2+) tricuspid regurgitation.  Severe pulmonary hypertension- RVSP 71 mm hg +RA.  The IVC is dilated and fails to change with respiration, suggesting elevated  central venous pressure.     Compared to prior study dated 10/18/2017 RVSP was 53 mm hg +RA in prior study.      Suad Murrell PA-C  [facility]  Pager [phone]          Thank you for allowing me to participate in the care of your patient.    Sincerely,     NATHAN Valentin     [facility]

## 2019-11-14 NOTE — PATIENT INSTRUCTIONS
Call [facility] nurse for any questions or concerns Mon-Fri 8am-4pm: [phone] For concerns after hours: [phone]    Medication changes:  None today     Plan from today:   We will re-enroll you in our phone management program.   Keep watching your sodium intake closely.  Follow up with your primary team to follow your anemia.     Return to see Suad in Core in ~1 month.       Lab results:   Component      Latest Ref Rng & Units 11/14/2019   Sodium      133 - 144 mmol/L 137   Potassium      3.4 - 5.3 mmol/L 4.2   Chloride      94 - 109 mmol/L 103   Carbon Dioxide      20 - 32 mmol/L 30   Anion Gap      3 - 14 mmol/L 4   Glucose      70 - 99 mg/dL 93   Urea Nitrogen      7 - 30 mg/dL 33 (H)   Creatinine      0.52 - 1.04 mg/dL 0.95   GFR Estimate      >60 mL/min/1.73:m2 53 (L)   GFR Estimate If Black      >60 mL/min/1.73:m2 62   Calcium      8.5 - 10.1 mg/dL 9.1   Hemoglobin      11.7 - 15.7 g/dL 8.6 (L)

## 2019-11-14 NOTE — PROGRESS NOTES
Enrolled pt into [facility]. Wt parameter set at 145-152 lbs. Reviewed instructions with pt and daughter.   Charity Garcia RN 11:38 AM 11/14/19

## 2019-11-14 NOTE — PROGRESS NOTES
"  Cardiology Progress Note    Date of Service: 11/14/2019      Reason for visit: CORE clinic follow up, diastolic and right heart failure.     Primary cardiologist: Dr. Edin Chaudhari      HPI:  Ms. Godinez is a pleasant 87 year old female with a PMHx including hypertension, hyperlipidemia, atrial fibrillation (not on AC due to prior GI bleed), anemia (colonic AVM, iron deficiency), CVA, chronic lower extremity edema, and diastolic heart failure with pulmonary hypertension. She was followed by Dr. Pradhan for a few years, but then lost to follow up in 2016 as she opted not to return as she was doing so well. However, in May 2018 she developed worsening leg edema and was admitted at [facility] with cellulitis complicated by heart failure, was diuresed and discharged to a TCU. She then returned to the hospital in June 2018with leg edema and weight gain once again. She required IV diuresis, and pRBC transfusion for worsening of her anemia. During that admit, she was 167 lbs at admit, and discharged at 155 lbs. She was placed on 40mg of lasix twice daily, and required supplemental oxygen at discharge.    I then saw her for CORE clinic enrollment in July 2018. She was starting to make positive changes her diet, including sodium restriction. Weights trended down and she had good result with lymphedema wraps. I transitioned her lasix to torsemide 20mg BID with good result and her weight settled out in the 145-148 lb range, and was becoming more active, ambulating with the use of a rolling walker. A follow up echo in Oct 2018 showed EF 55-60%, though her RV remained dilated with mildly decreased function and 2+TR. When I saw her last in Dec 2018 she was feeling \"great\" and weights were stable. She's was seen most recently in July 2019 at which time she was doing well. He discontinued her Imdur as he felt it was no longer necessary.     Ms. Godinez has unfortunately been hospitalized twice since she was here last. Once in August for " "symptomatic worsening anemia, requiring transfusion. No definitive bleeding source was identified. She was placed on iron as well. She returned again in early October 2019 for shortness of breath and leg edema, after getting an outpatient transfusion once again. She was felt to be volume overloaded and required IV lasix. She's back today for our planned CORE follow up after that hospital stay.    Today she tells me she is \"still really tired.\" She is receiving IV venofer, last done about a week ago. Her hemoglobin today was 8.6, up slightly from previous. She thinks she is back to her baseline in terms of breathing, and is still doing lymphedema wraps which keep her swelling under good control. She has gone back to very tight sodium restriction which has helped in the past. She denies MOTT or orthopnea, and has no palpitations, chest pain, dizziness or presyncope. Her weight today on our scale is 147 lbs, which she says is similar to at home. Her renal function is stable at her baseline with acceptable electrolytes, on her torsemide 20mg BID which is unchanged from previous.       ASSESSMENT/PLAN:    1. Acute on chronic diastolic and right heart failure.   --Most recent echo Oct 2018 with ongoing preserved EF 55-60%. Her RV has remained dilated, with mildly reduced function with 2+ TR, and was felt to be euvolemic at the time of that exam. Given her advanced age and other comorbidities, we had opted to proceed conservatively with no advanced PH workup planned.    --She had been doing well from a volume standpoint until last month when she has required multiple transfusions for her anemia. Now she appears back to baseline and swelling is under good control. Will continue her torsemide at 20mg BID, and re-enroll her in our telemanagement program to help keep a closer eye on her volume status. Goal weight will be 145-152 lbs. Given her history of GIB/anemia, not a good cardiomems candidate   --I again encouraged her to " continue tight dietary sodium restriction, which made a considerable difference in her edema and symptoms in the past.  Her renal function remains preserved. Continue lymphedema wraps as well.    --Continue spironolactone 50mg daily.   --I asked her to be sure to follow with her PCP for close monitoring of her anemia.       2. Hypertension.   --SBP has fluctuated, though overall under reasonable control, with some low diastolic pressures noted. She has a true allergy to ACE-inhibitors (tongue swelling), and is not on a beta blocker due to resting bradycardia. Will continue hydralazine and spironolactone/torsemide as above. She was on Imdur in the past, which was stopped by Dr. Chaudhari in July.     3. Atrial fibrillation, chronic.   --Rate well controlled, with intermittent bradycardia, not on BB. Some concern for underlying AV conduction issues but she denies any dizziness or presyncope. Continue to monitor.    --She is not on anticoagulation due to GIB/anemia noted above.     4. Hyperlipidemia.   --Last LDL Oct 2018 was excellent at 41. Continue simvastatin 10mg daily. Will plan routine repeat FLP next visit.       Follow up plan: With myself in CORE in ~1 month with labs, or sooner if needed.          Orders this Visit:  Orders Placed This Encounter   Procedures     Basic metabolic panel     N terminal pro BNP outpatient     Lipid panel reflex to direct LDL Fasting     Follow-Up with [facility] - [place] visit     No orders of the defined types were placed in this encounter.    There are no discontinued medications.        CURRENT MEDICATIONS:  Current Outpatient Medications   Medication Sig Dispense Refill     acetaminophen (TYLENOL) 325 MG tablet Take 650 mg by mouth 3 times daily as needed for mild pain       albuterol (ACCUNEB) 1.25 MG/3ML nebulizer solution Take 1 vial by nebulization 3 times daily        Cranberry Extract 250 MG TABS Take 250 mg by mouth daily       ferrous sulfate (FEROSUL) 325 (65 Fe) MG tablet  Take 1 tablet (325 mg) by mouth 2 times daily 100 tablet 11     gabapentin (NEURONTIN) 100 MG capsule TAKE 1 CAPSULE (100MG) BY MOUTH THREE TIMES DAILY 270 capsule 0     hydrALAZINE (APRESOLINE) 25 MG tablet TAKE 3 TABLETS BY MOUTH THREE TIMES DAILY 810 tablet 3     OMEGA-3 FATTY ACIDS PO Take 1,200 mg by mouth daily        omeprazole (PRILOSEC) 20 MG DR capsule TAKE 1 CAPSULE BY MOUTH DAILY 90 capsule 0     simvastatin (ZOCOR) 10 MG tablet TAKE 1 TABLET (10MG) BY MOUTH AT BEDTIME 90 tablet 0     spironolactone (ALDACTONE) 50 MG tablet TAKE 1 TABLET BY MOUTH DAILY 90 tablet 3     torsemide (DEMADEX) 20 MG tablet Take 1 tablet (20 mg) by mouth 2 times daily 180 tablet 3     venlafaxine (EFFEXOR-XR) 75 MG 24 hr capsule Take 1 capsule (75 mg) by mouth daily 90 capsule 3     VITAMIN D, CHOLECALCIFEROL, PO Take 1,000 Units by mouth daily        ASPIRIN NOT PRESCRIBED (INTENTIONAL) Please choose reason not prescribed, below (Patient not taking: Reported on 11/4/2019)       SM STOOL SOFTENER 8.6-50 MG tablet Take 2 tablets daily as needed for constipation while taking prescription pain medications. (Patient not taking: Reported on 11/14/2019) 30 tablet 0       ALLERGIES     Allergies   Allergen Reactions     Blood Transfusion Related (Informational Only) Other (See Comments)     Patient has a history of a clinically significant antibody against RBC antigens.  A delay in compatible RBCs may occur.     Lisinopril Other (See Comments)     Tongue swelling     Calcium Nausea and Vomiting     Egg Yolk      Flu Virus Vaccine      Allergic to eggs.     Keflex [Cephalexin] Nausea     Pt states she had upset stomach.  NOT tongue swelling.  She had tongue swelling with a combo bp med that she thinks included lisinopril.    Tolerates IV Cefazolin     Levaquin [Levofloxacin]      Sulfa Drugs      Zinc Nausea and Vomiting       PAST MEDICAL HISTORY:  Past Medical History:   Diagnosis Date     Anemia      Atrial fibrillation (H)      B12  deficiency      Cardiomyopathy (H)      CHF (congestive heart failure) (H)      Chronic edema     Lower extremities     Chronic systolic congestive heart failure (H)      CVA (cerebral vascular accident) (H) 2010    Acute Left MCA hemispheric CVA ( on coumadin)     Depression      Gastro-oesophageal reflux disease      GI bleed 03-20-15     Hyperlipidemia      Hypertension      Iron deficiency      MSSA (methicillin susceptible Staphylococcus aureus) 03-10-15     Pulmonary hypertension (H)      Shingles        PAST SURGICAL HISTORY:  Past Surgical History:   Procedure Laterality Date     COLONOSCOPY  03/24/2015    Diverticulosis, polyps     ENTEROSCOPY SMALL BOWEL N/A 2/29/2016    Procedure: ENTEROSCOPY SMALL BOWEL;  Surgeon: Ady Ponce MD;  Location:  GI     ESOPHAGOSCOPY, GASTROSCOPY, DUODENOSCOPY (EGD), COMBINED N/A 3/22/2015    Upper GI- Normal, nothing significant found.      EXCISE MASS FOOT Right 7/6/2016    Procedure: EXCISE MASS FOOT;  Surgeon: Lee Jang DPM;  Location: [facility] OR       FAMILY HISTORY:  Family History   Problem Relation Age of Onset     Known Genetic Syndrome Father      Known Genetic Syndrome Mother      Other Cancer Daughter         pancreatic     Other Cancer Brother         type     Other Cancer Sister 60        lung     Diabetes No family hx of      Breast Cancer No family hx of      Cancer - colorectal No family hx of      Ovarian Cancer No family hx of      Prostate Cancer No family hx of        SOCIAL HISTORY:  Social History     Socioeconomic History     Marital status:      Spouse name: None     Number of children: None     Years of education: None     Highest education level: None   Occupational History     None   Social Needs     Financial resource strain: None     Food insecurity:     Worry: None     Inability: None     Transportation needs:     Medical: None     Non-medical: None   Tobacco Use     Smoking status: Former Smoker     Years: 1.00      Types: Cigarettes     Start date:      Last attempt to quit: 1956     Years since quittin.6     Smokeless tobacco: Never Used   Substance and Sexual Activity     Alcohol use: No     Alcohol/week: 0.0 standard drinks     Drug use: No     Sexual activity: Never     Partners: Male   Lifestyle     Physical activity:     Days per week: None     Minutes per session: None     Stress: None   Relationships     Social connections:     Talks on phone: None     Gets together: None     Attends Latter day service: None     Active member of club or organization: None     Attends meetings of clubs or organizations: None     Relationship status: None     Intimate partner violence:     Fear of current or ex partner: None     Emotionally abused: None     Physically abused: None     Forced sexual activity: None   Other Topics Concern      Service Not Asked     Blood Transfusions Not Asked     Caffeine Concern No     Comment: Hot tea 1 cup daily     Occupational Exposure Not Asked     Hobby Hazards Not Asked     Sleep Concern Not Asked     Stress Concern Not Asked     Weight Concern Not Asked     Special Diet Yes     Comment: low sodium     Back Care Not Asked     Exercise No     Comment: limited right now     Bike Helmet Not Asked     Seat Belt Not Asked     Self-Exams Not Asked     Parent/sibling w/ CABG, MI or angioplasty before 65F 55M? Not Asked   Social History Narrative     None       Review of Systems:  Cardiovascular: negative for chest pain, palpitations, orthopnea, pos LE edema, at baseline.  Constitutional: negative for chills, sweats, fevers. Pos fatigue/tired.  Resp: Negative for dyspnea at rest, neg MOTT. Neg cough, known chronic lung disease  HEENT: Negative for new visual changes, frequent headaches  Gastrointestinal: negative for abdominal pain, diarrhea, blood in stool, nausea, vomiting  Hematologic/lymphatic: negative for current systemic anticoagulation, hx of blood clots. Pos hx GIB,  "anemia.  Musculoskeletal: negative for new back pain, joint pain  Neurological: negative for focal weakness, LOC, seizures, syncope. Neg dizziness/presyncope.       Physical Exam:  Vitals: /54 (BP Location: Right arm, Patient Position: Chair, Cuff Size: Adult Small)   Pulse 50   Ht 1.6 m (5' 3\")   Wt 67 kg (147 lb 12.8 oz)   SpO2 93%   BMI 26.18 kg/m     Wt Readings from Last 4 Encounters:   11/14/19 67 kg (147 lb 12.8 oz)   10/21/19 68.9 kg (151 lb 12.8 oz)   10/09/19 70.6 kg (155 lb 9.6 oz)   08/20/19 68.9 kg (152 lb)       GEN:  In general, this is a well nourished elderly female in no acute distress on room air. She arrived in a wheelchair today, accompanied by her daughter.    HEENT:  Pupils equal, round. Sclerae nonicteric.   NECK: Supple, no masses appreciated. Trachea midline. No JVD while upright.   C/V:  Irregular rhythm, bradycardic. No obvious murmur, rub or gallop.  No S3 or RV heave.   RESP: Respirations are unlabored. No use of accessory muscles. Clear to auscultation today with no wheezing, rales, or rhonchi.    GI: Abdomen soft, nontender, nondistended.   EXTREM: Unable to appreciate edema, as wraps in place. No cyanosis or clubbing.  NEURO: Alert and oriented, cooperative. Gait not assesed. No obvious focal deficits.   SKIN: Warm and dry. No rashes or petechiae appreciated.       Recent Lab Results:    BMP RESULTS:  Lab Results   Component Value Date     11/14/2019    POTASSIUM 4.2 11/14/2019    CHLORIDE 103 11/14/2019    CO2 30 11/14/2019    ANIONGAP 4 11/14/2019    GLC 93 11/14/2019    BUN 33 (H) 11/14/2019    CR 0.95 11/14/2019    GFRESTIMATED 53 (L) 11/14/2019    GFRESTBLACK 62 11/14/2019    CASPER 9.1 11/14/2019          New/Pertinent imaging results since last visit:  Echo 10/3/18    Interpretation Summary     Left ventricular systolic function is normal.The visual ejection fraction is  estimated at 55-60%.  The right ventricle is severely dilated.Mildly decreased right " ventricular  systolic function  Bi-atrial severe enlargement  There is moderate (2+) tricuspid regurgitation.  Severe pulmonary hypertension- RVSP 71 mm hg +RA.  The IVC is dilated and fails to change with respiration, suggesting elevated  central venous pressure.     Compared to prior study dated 10/18/2017 RVSP was 53 mm hg +RA in prior study.      Suad Murrell PA-C  [facility]  Pager [phone]

## 2019-11-26 DIAGNOSIS — D50.0 IRON DEFICIENCY ANEMIA DUE TO CHRONIC BLOOD LOSS: ICD-10-CM

## 2019-11-26 LAB
ERYTHROCYTE [DISTWIDTH] IN BLOOD BY AUTOMATED COUNT: 15.1 % (ref 10–15)
HCT VFR BLD AUTO: 29.2 % (ref 35–47)
HGB BLD-MCNC: 8.8 G/DL (ref 11.7–15.7)
MCH RBC QN AUTO: 29.9 PG (ref 26.5–33)
MCHC RBC AUTO-ENTMCNC: 30.1 G/DL (ref 31.5–36.5)
MCV RBC AUTO: 99 FL (ref 78–100)
PLATELET # BLD AUTO: 187 10E9/L (ref 150–450)
RBC # BLD AUTO: 2.94 10E12/L (ref 3.8–5.2)
WBC # BLD AUTO: 4.7 10E9/L (ref 4–11)

## 2019-11-26 PROCEDURE — 36415 COLL VENOUS BLD VENIPUNCTURE: CPT | Performed by: INTERNAL MEDICINE

## 2019-11-26 PROCEDURE — 83540 ASSAY OF IRON: CPT | Performed by: INTERNAL MEDICINE

## 2019-11-26 PROCEDURE — 83550 IRON BINDING TEST: CPT | Performed by: INTERNAL MEDICINE

## 2019-11-26 PROCEDURE — 85027 COMPLETE CBC AUTOMATED: CPT | Performed by: INTERNAL MEDICINE

## 2019-11-27 LAB
IRON SATN MFR SERPL: 31 % (ref 15–46)
IRON SERPL-MCNC: 110 UG/DL (ref 35–180)
TIBC SERPL-MCNC: 360 UG/DL (ref 240–430)

## 2019-12-11 ENCOUNTER — CARE COORDINATION (OUTPATIENT)
Dept: CARDIOLOGY | Facility: CLINIC | Age: 84
End: 2019-12-11

## 2019-12-12 ENCOUNTER — DOCUMENTATION ONLY (OUTPATIENT)
Dept: CARDIOLOGY | Facility: CLINIC | Age: 84
End: 2019-12-12

## 2019-12-12 NOTE — PROGRESS NOTES
[facility]- [facility] Clinic MyHealth Tracker    For CORE OV Review: 12/13/19    MyHealth Tracker Activitated: 11/14/2019    Diuretic: Spironolactone 50 mg daily & Torsemide 20 mg BID    KCL: None    Goal Weight: 145-152 lbs    Diuretic Plan: Monitor HGB, tight dietary sodium restrictions. Review with provider              Charity Gerdowsky, RN 2:40 PM 12/12/19

## 2019-12-13 ENCOUNTER — OFFICE VISIT (OUTPATIENT)
Dept: CARDIOLOGY | Facility: CLINIC | Age: 84
End: 2019-12-13
Attending: PHYSICIAN ASSISTANT
Payer: COMMERCIAL

## 2019-12-13 VITALS
DIASTOLIC BLOOD PRESSURE: 48 MMHG | BODY MASS INDEX: 25.76 KG/M2 | SYSTOLIC BLOOD PRESSURE: 124 MMHG | OXYGEN SATURATION: 95 % | RESPIRATION RATE: 20 BRPM | HEART RATE: 56 BPM | WEIGHT: 145.4 LBS

## 2019-12-13 DIAGNOSIS — I27.20 PULMONARY HYPERTENSION (H): ICD-10-CM

## 2019-12-13 DIAGNOSIS — I50.32 CHRONIC DIASTOLIC CONGESTIVE HEART FAILURE (H): ICD-10-CM

## 2019-12-13 DIAGNOSIS — E78.5 HYPERLIPIDEMIA LDL GOAL <100: ICD-10-CM

## 2019-12-13 LAB
ANION GAP SERPL CALCULATED.3IONS-SCNC: 5 MMOL/L (ref 3–14)
BUN SERPL-MCNC: 47 MG/DL (ref 7–30)
CALCIUM SERPL-MCNC: 8.9 MG/DL (ref 8.5–10.1)
CHLORIDE SERPL-SCNC: 103 MMOL/L (ref 94–109)
CHOLEST SERPL-MCNC: 120 MG/DL
CO2 SERPL-SCNC: 30 MMOL/L (ref 20–32)
CREAT SERPL-MCNC: 1.01 MG/DL (ref 0.52–1.04)
GFR SERPL CREATININE-BSD FRML MDRD: 50 ML/MIN/{1.73_M2}
GLUCOSE SERPL-MCNC: 92 MG/DL (ref 70–99)
HDLC SERPL-MCNC: 64 MG/DL
LDLC SERPL CALC-MCNC: 42 MG/DL
NONHDLC SERPL-MCNC: 56 MG/DL
NT-PROBNP SERPL-MCNC: 870 PG/ML (ref 0–450)
POTASSIUM SERPL-SCNC: 4.1 MMOL/L (ref 3.4–5.3)
SODIUM SERPL-SCNC: 138 MMOL/L (ref 133–144)
TRIGL SERPL-MCNC: 72 MG/DL

## 2019-12-13 PROCEDURE — 83880 ASSAY OF NATRIURETIC PEPTIDE: CPT | Performed by: INTERNAL MEDICINE

## 2019-12-13 PROCEDURE — 80048 BASIC METABOLIC PNL TOTAL CA: CPT | Performed by: INTERNAL MEDICINE

## 2019-12-13 PROCEDURE — 36415 COLL VENOUS BLD VENIPUNCTURE: CPT | Performed by: INTERNAL MEDICINE

## 2019-12-13 PROCEDURE — 99214 OFFICE O/P EST MOD 30 MIN: CPT | Performed by: PHYSICIAN ASSISTANT

## 2019-12-13 PROCEDURE — 80061 LIPID PANEL: CPT | Performed by: INTERNAL MEDICINE

## 2019-12-13 NOTE — PROGRESS NOTES
Cardiology Progress Note    Date of Service: 12/13/2019      Reason for visit: CORE clinic follow up, chronic diastolic and right heart failure.     Primary cardiologist: Dr. Edin Chaudhari      HPI:  Ms. Godinez is a pleasant 87 year old female with a PMHx including hypertension, hyperlipidemia, atrial fibrillation (not on AC due to prior GI bleed), anemia (colonic AVM, iron deficiency), CVA, chronic lower extremity edema, and diastolic heart failure with secondary pulmonary hypertension. She was followed by Dr. Pradhan for a few years, but then lost to follow up in 2016 as she opted not to return as she was doing so well. However, in May 2018 she developed worsening leg edema and was admitted at [facility] with cellulitis complicated by heart failure, was diuresed and discharged to a TCU. She then returned to the hospital in June 2018 with leg edema and weight gain once again. She required IV diuresis, and pRBC transfusion for worsening of her anemia. During that admit, she was 167 lbs at admit, and discharged at 155 lbs. She was placed on 40mg of lasix twice daily, and required supplemental oxygen at discharge.    I have been seeing her in the CORE clinic since July 2018. Since that time she has made positive changes her diet, including sodium restriction. Weights overall have trended down and swelling improved with lymphedema wraps. I transitioned her lasix to torsemide 20mg BID with good result and her weight has settled out in the 145-148 lb range. Over the last few months she has become more active, ambulating with the use of a rolling walker. A follow up echo in Oct 2018 showed EF 55-60%, though her RV remained dilated with mildly decreased function and 2+TR.     More recently, she was hospitalized in August 2019 for symptomatic worsening anemia, requiring transfusion. No definitive bleeding source was identified. She returned again in early October 2019 for shortness of breath and leg edema, after getting an  outpatient transfusions. She was felt to be volume overloaded and required IV lasix. She was then given iron as an outpatient in November which helped as well.    Today she's back for our planned CORE follow up. Since I saw her last in November, she's been doing well. Her home weights are stable once again in the 145-147 lb range. She has more energy after receiving iron. She has gone back to very tight sodium restriction, and is compliant with leg wraps. She tells me she has minimal edema, and breathing has been good. Her labs today show an NT-proBNP the lowest it's been in quite some time, at 870. Her renal function is overall stable, though a slight trend up in her BUN. SCr is stable at 1.01, and electrolytes are normal. Her cholesterol panel today was excellent as well, with an LDL of 42. Her HDL was 64, and TG were 72.       ASSESSMENT/PLAN:    1. Acute on chronic diastolic and right heart failure.   --Most recent echo Oct 2018 with ongoing preserved EF 55-60%. Her RV has remained dilated, with mildly reduced function with 2+ TR, and was felt to be euvolemic at the time of that exam. Given her advanced age and other comorbidities, we had opted to proceed conservatively with no advanced PH workup planned.    --Today on exam she appears euvolemic. Upon discussion, she has been on a higher dose of torsemide than our clinic was aware of, though I'm unclear when the change was made. As she is doing an excellent with sodium restriction, will reduce her torsemide back down to 20mg BID. If her weight trends up with more edema, low threshold to go back to 40mg/20mg dose. Will continue the spironolactone 50mg daily without change.   --Her anemia and transfusion requirements have complicated her heart failure management slightly though currently she is doing well. She should continue to follow closely with her PCP for this.       2. Hypertension.   --SBP has fluctuated, though overall under reasonable control, with lower  diastolic pressures noted. As above, we are reducing her diuretic slightly. She has a true allergy to ACE-inhibitors (tongue swelling), and is not on a beta blocker due to resting bradycardia. Will continue hydralazine at 25mg TID.     3. Atrial fibrillation, chronic.   --Rate well controlled, with intermittent bradycardia, not on BB. Some concern for underlying AV conduction issues but she continues to deny any dizziness or presyncope.  --She is not on anticoagulation due to GIB/anemia noted above.     4. Hyperlipidemia.   --FLP today is excellent. LDL of 42, HDL 64, and TG are 72. Continue simvastatin 10mg daily.       Follow up plan: With myself in [facility] in ~3 months with labs. She is aware to call sooner with any concerns of worsening edema, MOTT, or weight gain.         Orders this Visit:  Orders Placed This Encounter   Procedures     Basic metabolic panel     N terminal pro BNP outpatient     Hemoglobin     Follow-Up with [facility] visit     No orders of the defined types were placed in this encounter.    There are no discontinued medications.        CURRENT MEDICATIONS:  Current Outpatient Medications   Medication Sig Dispense Refill     acetaminophen (TYLENOL) 325 MG tablet Take 650 mg by mouth 3 times daily as needed for mild pain       albuterol (ACCUNEB) 1.25 MG/3ML nebulizer solution Take 1 vial by nebulization 3 times daily        ASPIRIN NOT PRESCRIBED (INTENTIONAL) Please choose reason not prescribed, below (Patient not taking: Reported on 11/4/2019)       Cranberry Extract 250 MG TABS Take 250 mg by mouth daily       ferrous sulfate (FEROSUL) 325 (65 Fe) MG tablet Take 1 tablet (325 mg) by mouth 2 times daily 100 tablet 11     gabapentin (NEURONTIN) 100 MG capsule TAKE 1 CAPSULE (100MG) BY MOUTH THREE TIMES DAILY 270 capsule 0     hydrALAZINE (APRESOLINE) 25 MG tablet TAKE 3 TABLETS BY MOUTH THREE TIMES DAILY 810 tablet 3     OMEGA-3 FATTY ACIDS PO Take 1,200 mg by mouth daily        omeprazole  (PRILOSEC) 20 MG DR capsule TAKE 1 CAPSULE BY MOUTH DAILY 90 capsule 0     simvastatin (ZOCOR) 10 MG tablet TAKE 1 TABLET (10MG) BY MOUTH AT BEDTIME 90 tablet 0     SM STOOL SOFTENER 8.6-50 MG tablet Take 2 tablets daily as needed for constipation while taking prescription pain medications. (Patient not taking: Reported on 11/14/2019) 30 tablet 0     spironolactone (ALDACTONE) 50 MG tablet TAKE 1 TABLET BY MOUTH DAILY 90 tablet 3     torsemide (DEMADEX) 20 MG tablet Take 1 tablet (20 mg) by mouth 2 times daily 180 tablet 3     venlafaxine (EFFEXOR-XR) 75 MG 24 hr capsule Take 1 capsule (75 mg) by mouth daily 90 capsule 3     VITAMIN D, CHOLECALCIFEROL, PO Take 1,000 Units by mouth daily          ALLERGIES     Allergies   Allergen Reactions     Blood Transfusion Related (Informational Only) Other (See Comments)     Patient has a history of a clinically significant antibody against RBC antigens.  A delay in compatible RBCs may occur.     Lisinopril Other (See Comments)     Tongue swelling     Calcium Nausea and Vomiting     Egg Yolk      Flu Virus Vaccine      Allergic to eggs.     Keflex [Cephalexin] Nausea     Pt states she had upset stomach.  NOT tongue swelling.  She had tongue swelling with a combo bp med that she thinks included lisinopril.    Tolerates IV Cefazolin     Levaquin [Levofloxacin]      Sulfa Drugs      Zinc Nausea and Vomiting       PAST MEDICAL HISTORY:  Past Medical History:   Diagnosis Date     Anemia      Atrial fibrillation (H)      B12 deficiency      Cardiomyopathy (H)      CHF (congestive heart failure) (H)      Chronic edema     Lower extremities     Chronic systolic congestive heart failure (H)      CVA (cerebral vascular accident) (H) 2010    Acute Left MCA hemispheric CVA ( on coumadin)     Depression      Gastro-oesophageal reflux disease      GI bleed 03-20-15     Hyperlipidemia      Hypertension      Iron deficiency      MSSA (methicillin susceptible Staphylococcus aureus) 03-10-15      Pulmonary hypertension (H)      Shingles        PAST SURGICAL HISTORY:  Past Surgical History:   Procedure Laterality Date     COLONOSCOPY  2015    Diverticulosis, polyps     ENTEROSCOPY SMALL BOWEL N/A 2016    Procedure: ENTEROSCOPY SMALL BOWEL;  Surgeon: Ady Ponce MD;  Location:  GI     ESOPHAGOSCOPY, GASTROSCOPY, DUODENOSCOPY (EGD), COMBINED N/A 3/22/2015    Upper GI- Normal, nothing significant found.      EXCISE MASS FOOT Right 2016    Procedure: EXCISE MASS FOOT;  Surgeon: Lee Jang DPM;  Location:  OR       FAMILY HISTORY:  Family History   Problem Relation Age of Onset     Known Genetic Syndrome Father      Known Genetic Syndrome Mother      Other Cancer Daughter         pancreatic     Other Cancer Brother         type     Other Cancer Sister 60        lung     Diabetes No family hx of      Breast Cancer No family hx of      Cancer - colorectal No family hx of      Ovarian Cancer No family hx of      Prostate Cancer No family hx of        SOCIAL HISTORY:  Social History     Socioeconomic History     Marital status:      Spouse name: None     Number of children: None     Years of education: None     Highest education level: None   Occupational History     None   Social Needs     Financial resource strain: None     Food insecurity:     Worry: None     Inability: None     Transportation needs:     Medical: None     Non-medical: None   Tobacco Use     Smoking status: Former Smoker     Years: 1.00     Types: Cigarettes     Start date:      Last attempt to quit: 1956     Years since quittin.7     Smokeless tobacco: Never Used   Substance and Sexual Activity     Alcohol use: No     Alcohol/week: 0.0 standard drinks     Drug use: No     Sexual activity: Never     Partners: Male   Lifestyle     Physical activity:     Days per week: None     Minutes per session: None     Stress: None   Relationships     Social connections:     Talks on phone: None      Gets together: None     Attends Hindu service: None     Active member of club or organization: None     Attends meetings of clubs or organizations: None     Relationship status: None     Intimate partner violence:     Fear of current or ex partner: None     Emotionally abused: None     Physically abused: None     Forced sexual activity: None   Other Topics Concern      Service Not Asked     Blood Transfusions Not Asked     Caffeine Concern No     Comment: Hot tea 1 cup daily     Occupational Exposure Not Asked     Hobby Hazards Not Asked     Sleep Concern Not Asked     Stress Concern Not Asked     Weight Concern Not Asked     Special Diet Yes     Comment: low sodium     Back Care Not Asked     Exercise No     Comment: limited right now     Bike Helmet Not Asked     Seat Belt Not Asked     Self-Exams Not Asked     Parent/sibling w/ CABG, MI or angioplasty before 65F 55M? Not Asked   Social History Narrative     None       Review of Systems:  Cardiovascular: negative for chest pain, palpitations, orthopnea, PND, LE edema.  Constitutional: negative for chills, sweats, fevers. Pos fatigue, though much improved.  Resp: Negative for dyspnea at rest, neg MOTT. Neg cough, known chronic lung disease  HEENT: Negative for new visual changes, frequent headaches  Gastrointestinal: negative for abdominal pain, diarrhea, blood in stool, nausea, vomiting  Hematologic/lymphatic: negative for current systemic anticoagulation, hx of blood clots. Pos hx GIB, anemia.  Musculoskeletal: negative for new back pain, joint pain  Neurological: negative for focal weakness, LOC, seizures, syncope. Neg dizziness/presyncope.       Physical Exam:  Vitals: /48 (BP Location: Right arm, Patient Position: Sitting)   Pulse 56   Resp 20   Wt 66 kg (145 lb 6.4 oz)   SpO2 95%   BMI 25.76 kg/m     Wt Readings from Last 4 Encounters:   12/13/19 66 kg (145 lb 6.4 oz)   11/14/19 67 kg (147 lb 12.8 oz)   10/21/19 68.9 kg (151 lb 12.8 oz)    10/09/19 70.6 kg (155 lb 9.6 oz)       GEN:  In general, this is a well nourished elderly female in no acute distress on room air. She arrived in a wheelchair today, accompanied by her daughter.    HEENT:  Pupils equal, round. Sclerae nonicteric.   NECK: Supple, no masses appreciated. Trachea midline. No JVD while upright.   C/V:  Irregular rhythm, mildly bradycardic. No obvious murmur, rub or gallop.  No S3 or RV heave.   RESP: Respirations are unlabored. No use of accessory muscles. Clear to auscultation today with no wheezing, rales, or rhonchi.    GI: Abdomen soft, nontender, nondistended.   EXTREM: Unable to appreciate edema, as wraps in place. No cyanosis or clubbing.  NEURO: Alert and oriented, cooperative. Gait not assesed. No obvious focal deficits.   SKIN: Warm and dry. No rashes or petechiae appreciated.       Recent Lab Results:    BMP RESULTS:  Lab Results   Component Value Date     12/13/2019    POTASSIUM 4.1 12/13/2019    CHLORIDE 103 12/13/2019    CO2 30 12/13/2019    ANIONGAP 5 12/13/2019    GLC 92 12/13/2019    BUN 47 (H) 12/13/2019    CR 1.01 12/13/2019    GFRESTIMATED 50 (L) 12/13/2019    GFRESTBLACK 58 (L) 12/13/2019    CASPER 8.9 12/13/2019      Results for ELAINA MAYFIELD (MRN [id number]) as of 12/13/2019 11:15   Ref. Range 7/20/2018 12:50 8/3/2018 12:02 8/31/2018 11:10 12/13/2019 10:05   N-Terminal Pro Bnp Latest Ref Range: 0 - 450 pg/mL 3,530 (H) 2,855 (H) 2,491 (H) 870 (H)       New/Pertinent imaging results since last visit:  Echo 10/3/18    Interpretation Summary     Left ventricular systolic function is normal.The visual ejection fraction is  estimated at 55-60%.  The right ventricle is severely dilated.Mildly decreased right ventricular  systolic function  Bi-atrial severe enlargement  There is moderate (2+) tricuspid regurgitation.  Severe pulmonary hypertension- RVSP 71 mm hg +RA.  The IVC is dilated and fails to change with respiration, suggesting elevated  central venous  pressure.       Suad Murrell PA-C  [facility]  Pager [phone]

## 2019-12-13 NOTE — LETTER
12/13/2019    Gerri Eubanks MD  [address] [facility] [address]  [facility] [address]    RE: Keisha Godinez       Dear Colleague,    I had the pleasure of seeing Keisha Godinez in the [facility].      Cardiology Progress Note    Date of Service: 12/13/2019      Reason for visit: [facility] clinic follow up, chronic diastolic and right heart failure.     Primary cardiologist: Dr. Edin Chaudhari      HPI:  Ms. Godinez is a pleasant 87 year old female with a PMHx including hypertension, hyperlipidemia, atrial fibrillation (not on AC due to prior GI bleed), anemia (colonic AVM, iron deficiency), CVA, chronic lower extremity edema, and diastolic heart failure with secondary pulmonary hypertension. She was followed by Dr. Pradhan for a few years, but then lost to follow up in 2016 as she opted not to return as she was doing so well. However, in May 2018 she developed worsening leg edema and was admitted at [facility] with cellulitis complicated by heart failure, was diuresed and discharged to a TCU. She then returned to the hospital in June 2018 with leg edema and weight gain once again. She required IV diuresis, and pRBC transfusion for worsening of her anemia. During that admit, she was 167 lbs at admit, and discharged at 155 lbs. She was placed on 40mg of lasix twice daily, and required supplemental oxygen at discharge.    I have been seeing her in the [facility] clinic since July 2018. Since that time she has made positive changes her diet, including sodium restriction. Weights overall have trended down and swelling improved with lymphedema wraps. I transitioned her lasix to torsemide 20mg BID with good result and her weight has settled out in the 145-148 lb range. Over the last few months she has become more active, ambulating with the use of a rolling walker. A follow up echo in Oct 2018 showed EF 55-60%, though her RV remained dilated with mildly decreased function and 2+TR.     More recently, she was  hospitalized in August 2019 for symptomatic worsening anemia, requiring transfusion. No definitive bleeding source was identified. She returned again in early October 2019 for shortness of breath and leg edema, after getting an outpatient transfusions. She was felt to be volume overloaded and required IV lasix. She was then given iron as an outpatient in November which helped as well.    Today she's back for our planned CORE follow up. Since I saw her last in November, she's been doing well. Her home weights are stable once again in the 145-147 lb range. She has more energy after receiving iron. She has gone back to very tight sodium restriction, and is compliant with leg wraps. She tells me she has minimal edema, and breathing has been good. Her labs today show an NT-proBNP the lowest it's been in quite some time, at 870. Her renal function is overall stable, though a slight trend up in her BUN. SCr is stable at 1.01, and electrolytes are normal. Her cholesterol panel today was excellent as well, with an LDL of 42. Her HDL was 64, and TG were 72.       ASSESSMENT/PLAN:    1. Acute on chronic diastolic and right heart failure.   --Most recent echo Oct 2018 with ongoing preserved EF 55-60%. Her RV has remained dilated, with mildly reduced function with 2+ TR, and was felt to be euvolemic at the time of that exam. Given her advanced age and other comorbidities, we had opted to proceed conservatively with no advanced PH workup planned.    --Today on exam she appears euvolemic. Upon discussion, she has been on a higher dose of torsemide than our clinic was aware of, though I'm unclear when the change was made. As she is doing an excellent with sodium restriction, will reduce her torsemide back down to 20mg BID. If her weight trends up with more edema, low threshold to go back to 40mg/20mg dose. Will continue the spironolactone 50mg daily without change.   --Her anemia and transfusion requirements have complicated her  heart failure management slightly though currently she is doing well. She should continue to follow closely with her PCP for this.       2. Hypertension.   --SBP has fluctuated, though overall under reasonable control, with lower diastolic pressures noted. As above, we are reducing her diuretic slightly. She has a true allergy to ACE-inhibitors (tongue swelling), and is not on a beta blocker due to resting bradycardia. Will continue hydralazine at 25mg TID.     3. Atrial fibrillation, chronic.   --Rate well controlled, with intermittent bradycardia, not on BB. Some concern for underlying AV conduction issues but she continues to deny any dizziness or presyncope.  --She is not on anticoagulation due to GIB/anemia noted above.     4. Hyperlipidemia.   --FLP today is excellent. LDL of 42, HDL 64, and TG are 72. Continue simvastatin 10mg daily.       Follow up plan: With myself in [facility] in ~3 months with labs. She is aware to call sooner with any concerns of worsening edema, MOTT, or weight gain.         Orders this Visit:  Orders Placed This Encounter   Procedures     Basic metabolic panel     N terminal pro BNP outpatient     Hemoglobin     Follow-Up with [facility] visit     No orders of the defined types were placed in this encounter.    There are no discontinued medications.        CURRENT MEDICATIONS:  Current Outpatient Medications   Medication Sig Dispense Refill     acetaminophen (TYLENOL) 325 MG tablet Take 650 mg by mouth 3 times daily as needed for mild pain       albuterol (ACCUNEB) 1.25 MG/3ML nebulizer solution Take 1 vial by nebulization 3 times daily        ASPIRIN NOT PRESCRIBED (INTENTIONAL) Please choose reason not prescribed, below (Patient not taking: Reported on 11/4/2019)       Cranberry Extract 250 MG TABS Take 250 mg by mouth daily       ferrous sulfate (FEROSUL) 325 (65 Fe) MG tablet Take 1 tablet (325 mg) by mouth 2 times daily 100 tablet 11     gabapentin (NEURONTIN) 100 MG capsule TAKE  1 CAPSULE (100MG) BY MOUTH THREE TIMES DAILY 270 capsule 0     hydrALAZINE (APRESOLINE) 25 MG tablet TAKE 3 TABLETS BY MOUTH THREE TIMES DAILY 810 tablet 3     OMEGA-3 FATTY ACIDS PO Take 1,200 mg by mouth daily        omeprazole (PRILOSEC) 20 MG DR capsule TAKE 1 CAPSULE BY MOUTH DAILY 90 capsule 0     simvastatin (ZOCOR) 10 MG tablet TAKE 1 TABLET (10MG) BY MOUTH AT BEDTIME 90 tablet 0     SM STOOL SOFTENER 8.6-50 MG tablet Take 2 tablets daily as needed for constipation while taking prescription pain medications. (Patient not taking: Reported on 11/14/2019) 30 tablet 0     spironolactone (ALDACTONE) 50 MG tablet TAKE 1 TABLET BY MOUTH DAILY 90 tablet 3     torsemide (DEMADEX) 20 MG tablet Take 1 tablet (20 mg) by mouth 2 times daily 180 tablet 3     venlafaxine (EFFEXOR-XR) 75 MG 24 hr capsule Take 1 capsule (75 mg) by mouth daily 90 capsule 3     VITAMIN D, CHOLECALCIFEROL, PO Take 1,000 Units by mouth daily          ALLERGIES     Allergies   Allergen Reactions     Blood Transfusion Related (Informational Only) Other (See Comments)     Patient has a history of a clinically significant antibody against RBC antigens.  A delay in compatible RBCs may occur.     Lisinopril Other (See Comments)     Tongue swelling     Calcium Nausea and Vomiting     Egg Yolk      Flu Virus Vaccine      Allergic to eggs.     Keflex [Cephalexin] Nausea     Pt states she had upset stomach.  NOT tongue swelling.  She had tongue swelling with a combo bp med that she thinks included lisinopril.    Tolerates IV Cefazolin     Levaquin [Levofloxacin]      Sulfa Drugs      Zinc Nausea and Vomiting       PAST MEDICAL HISTORY:  Past Medical History:   Diagnosis Date     Anemia      Atrial fibrillation (H)      B12 deficiency      Cardiomyopathy (H)      CHF (congestive heart failure) (H)      Chronic edema     Lower extremities     Chronic systolic congestive heart failure (H)      CVA (cerebral vascular accident) (H) 2010    Acute Left MCA  hemispheric CVA ( on coumadin)     Depression      Gastro-oesophageal reflux disease      GI bleed 03-20-15     Hyperlipidemia      Hypertension      Iron deficiency      MSSA (methicillin susceptible Staphylococcus aureus) 03-10-15     Pulmonary hypertension (H)      Shingles        PAST SURGICAL HISTORY:  Past Surgical History:   Procedure Laterality Date     COLONOSCOPY  2015    Diverticulosis, polyps     ENTEROSCOPY SMALL BOWEL N/A 2016    Procedure: ENTEROSCOPY SMALL BOWEL;  Surgeon: Ady Ponce MD;  Location:  GI     ESOPHAGOSCOPY, GASTROSCOPY, DUODENOSCOPY (EGD), COMBINED N/A 3/22/2015    Upper GI- Normal, nothing significant found.      EXCISE MASS FOOT Right 2016    Procedure: EXCISE MASS FOOT;  Surgeon: Lee Jang DPM;  Location:  OR       FAMILY HISTORY:  Family History   Problem Relation Age of Onset     Known Genetic Syndrome Father      Known Genetic Syndrome Mother      Other Cancer Daughter         pancreatic     Other Cancer Brother         type     Other Cancer Sister 60        lung     Diabetes No family hx of      Breast Cancer No family hx of      Cancer - colorectal No family hx of      Ovarian Cancer No family hx of      Prostate Cancer No family hx of        SOCIAL HISTORY:  Social History     Socioeconomic History     Marital status:      Spouse name: None     Number of children: None     Years of education: None     Highest education level: None   Occupational History     None   Social Needs     Financial resource strain: None     Food insecurity:     Worry: None     Inability: None     Transportation needs:     Medical: None     Non-medical: None   Tobacco Use     Smoking status: Former Smoker     Years: 1.00     Types: Cigarettes     Start date:      Last attempt to quit: 1956     Years since quittin.7     Smokeless tobacco: Never Used   Substance and Sexual Activity     Alcohol use: No     Alcohol/week: 0.0 standard drinks      Drug use: No     Sexual activity: Never     Partners: Male   Lifestyle     Physical activity:     Days per week: None     Minutes per session: None     Stress: None   Relationships     Social connections:     Talks on phone: None     Gets together: None     Attends Anglican service: None     Active member of club or organization: None     Attends meetings of clubs or organizations: None     Relationship status: None     Intimate partner violence:     Fear of current or ex partner: None     Emotionally abused: None     Physically abused: None     Forced sexual activity: None   Other Topics Concern      Service Not Asked     Blood Transfusions Not Asked     Caffeine Concern No     Comment: Hot tea 1 cup daily     Occupational Exposure Not Asked     Hobby Hazards Not Asked     Sleep Concern Not Asked     Stress Concern Not Asked     Weight Concern Not Asked     Special Diet Yes     Comment: low sodium     Back Care Not Asked     Exercise No     Comment: limited right now     Bike Helmet Not Asked     Seat Belt Not Asked     Self-Exams Not Asked     Parent/sibling w/ CABG, MI or angioplasty before 65F 55M? Not Asked   Social History Narrative     None       Review of Systems:  Cardiovascular: negative for chest pain, palpitations, orthopnea, PND, LE edema.  Constitutional: negative for chills, sweats, fevers. Pos fatigue, though much improved.  Resp: Negative for dyspnea at rest, neg MOTT. Neg cough, known chronic lung disease  HEENT: Negative for new visual changes, frequent headaches  Gastrointestinal: negative for abdominal pain, diarrhea, blood in stool, nausea, vomiting  Hematologic/lymphatic: negative for current systemic anticoagulation, hx of blood clots. Pos hx GIB, anemia.  Musculoskeletal: negative for new back pain, joint pain  Neurological: negative for focal weakness, LOC, seizures, syncope. Neg dizziness/presyncope.       Physical Exam:  Vitals: /48 (BP Location: Right arm, Patient Position:  Sitting)   Pulse 56   Resp 20   Wt 66 kg (145 lb 6.4 oz)   SpO2 95%   BMI 25.76 kg/m      Wt Readings from Last 4 Encounters:   12/13/19 66 kg (145 lb 6.4 oz)   11/14/19 67 kg (147 lb 12.8 oz)   10/21/19 68.9 kg (151 lb 12.8 oz)   10/09/19 70.6 kg (155 lb 9.6 oz)       GEN:  In general, this is a well nourished elderly female in no acute distress on room air. She arrived in a wheelchair today, accompanied by her daughter.    HEENT:  Pupils equal, round. Sclerae nonicteric.   NECK: Supple, no masses appreciated. Trachea midline. No JVD while upright.   C/V:  Irregular rhythm, mildly bradycardic. No obvious murmur, rub or gallop.  No S3 or RV heave.   RESP: Respirations are unlabored. No use of accessory muscles. Clear to auscultation today with no wheezing, rales, or rhonchi.    GI: Abdomen soft, nontender, nondistended.   EXTREM: Unable to appreciate edema, as wraps in place. No cyanosis or clubbing.  NEURO: Alert and oriented, cooperative. Gait not assesed. No obvious focal deficits.   SKIN: Warm and dry. No rashes or petechiae appreciated.       Recent Lab Results:    BMP RESULTS:  Lab Results   Component Value Date     12/13/2019    POTASSIUM 4.1 12/13/2019    CHLORIDE 103 12/13/2019    CO2 30 12/13/2019    ANIONGAP 5 12/13/2019    GLC 92 12/13/2019    BUN 47 (H) 12/13/2019    CR 1.01 12/13/2019    GFRESTIMATED 50 (L) 12/13/2019    GFRESTBLACK 58 (L) 12/13/2019    CASPER 8.9 12/13/2019      Results for ELAINA MAYFIELD (MRN [id number]) as of 12/13/2019 11:15   Ref. Range 7/20/2018 12:50 8/3/2018 12:02 8/31/2018 11:10 12/13/2019 10:05   N-Terminal Pro Bnp Latest Ref Range: 0 - 450 pg/mL 3,530 (H) 2,855 (H) 2,491 (H) 870 (H)       New/Pertinent imaging results since last visit:  Echo 10/3/18    Interpretation Summary     Left ventricular systolic function is normal.The visual ejection fraction is  estimated at 55-60%.  The right ventricle is severely dilated.Mildly decreased right ventricular  systolic  function  Bi-atrial severe enlargement  There is moderate (2+) tricuspid regurgitation.  Severe pulmonary hypertension- RVSP 71 mm hg +RA.  The IVC is dilated and fails to change with respiration, suggesting elevated  central venous pressure.       Suad Murrell PA-C  [facility]  Pager [phone]      Thank you for allowing me to participate in the care of your patient.    Sincerely,     NATHAN Valentin     [facility]

## 2019-12-13 NOTE — PATIENT INSTRUCTIONS
Call C.O.[email or web address]. nurse for any questions or concerns Monday-Friday 8am-4pm: [phone]  For concerns after hours: [phone]    Medication changes:  You can reduce your torsemide down to 20mg twice a day (one pill twice a day)  Keep weighing daily and call with any concerns. We can go back up if needed.     Plan from today:  Return to see Suad in [facility] in ~3 months with labs.     Lab results:    Component      Latest Ref Rng & Units 12/13/2019   Sodium      133 - 144 mmol/L 138   Potassium      3.4 - 5.3 mmol/L 4.1   Chloride      94 - 109 mmol/L 103   Carbon Dioxide      20 - 32 mmol/L 30   Anion Gap      3 - 14 mmol/L 5   Glucose      70 - 99 mg/dL 92   Urea Nitrogen      7 - 30 mg/dL 47 (H)   Creatinine      0.52 - 1.04 mg/dL 1.01   GFR Estimate      >60 mL/min/1.73:m2 50 (L)   GFR Estimate If Black      >60 mL/min/1.73:m2 58 (L)   Calcium      8.5 - 10.1 mg/dL 8.9   Cholesterol      <200 mg/dL 120   Triglycerides      <150 mg/dL 72   HDL Cholesterol      >49 mg/dL 64   LDL Cholesterol Calculated      <100 mg/dL 42   Non HDL Cholesterol      <130 mg/dL 56   N-Terminal Pro Bnp      0 - 450 pg/mL 870 (H)      Continue PPI Reportedly in remission  - recommend OP follow up

## 2019-12-15 ENCOUNTER — HEALTH MAINTENANCE LETTER (OUTPATIENT)
Age: 84
End: 2019-12-15

## 2019-12-18 ENCOUNTER — CARE COORDINATION (OUTPATIENT)
Dept: CARDIOLOGY | Facility: CLINIC | Age: 84
End: 2019-12-18

## 2019-12-18 DIAGNOSIS — I50.32 CHRONIC DIASTOLIC CONGESTIVE HEART FAILURE (H): ICD-10-CM

## 2019-12-18 RX ORDER — TORSEMIDE 20 MG/1
TABLET ORAL
Refills: 0 | COMMUNITY
Start: 2019-12-18 | End: 2020-01-01

## 2019-12-18 NOTE — PROGRESS NOTES
Suad Murrell, Charity Moreland RN   Caller: Unspecified (Today,  8:44 AM)             That would be fine, just update her med list etc.   Let's have her recheck a BMP in a week or two please, but she doesn't need to come back and see me earlier than planned unless she continues to gain weight.   Thanks   Suad      Spoke to pt and daughter and gave her the ok to increase torsemide back to 40 mg / 20 mg.  Scheduled BMP for 12/26.   Med list updated.  Charity Garcia RN 2:53 PM 12/18/19

## 2019-12-18 NOTE — PROGRESS NOTES
"[facility]     My [employer]-Tracker Daily Monitoring        Daily Weight Goal: 145-152 lbs      Today's Weight: 149 lbs, up 3 lbs from 12/13     Alert: None, see below     Diuretic:  Torsemide 20 mg BID & spironolactone 50 mg Daily     KCl: None    Pt was seen in [facility] on 12/13. Wt has been very stable at 145-147 lbs, very restrict Na diet and complaint w/leg wraps. She was euvolemic at OV and felt well. Follow-up in 3 months. t was 146 lbs     Pt called this am stating she \"feels like she is holding more fluid in her abd and legs and would like to go back to the previous dos of water pill on was one before.\"   Pt's was on Torsemide 40 mg in am and 20 mg in afternoon and it was decreased to 20 mg BID. Spironolactone was kept at 50 mg daily.     Will review with Suad.        Charity Garcia RN 10:52 AM 12/18/19    "

## 2019-12-26 DIAGNOSIS — I50.32 CHRONIC DIASTOLIC CONGESTIVE HEART FAILURE (H): ICD-10-CM

## 2019-12-26 LAB
ANION GAP SERPL CALCULATED.3IONS-SCNC: 6 MMOL/L (ref 3–14)
BUN SERPL-MCNC: 43 MG/DL (ref 7–30)
CALCIUM SERPL-MCNC: 8.6 MG/DL (ref 8.5–10.1)
CHLORIDE SERPL-SCNC: 103 MMOL/L (ref 94–109)
CO2 SERPL-SCNC: 30 MMOL/L (ref 20–32)
CREAT SERPL-MCNC: 0.98 MG/DL (ref 0.52–1.04)
GFR SERPL CREATININE-BSD FRML MDRD: 52 ML/MIN/{1.73_M2}
GLUCOSE SERPL-MCNC: 115 MG/DL (ref 70–99)
POTASSIUM SERPL-SCNC: 3.9 MMOL/L (ref 3.4–5.3)
SODIUM SERPL-SCNC: 138 MMOL/L (ref 133–144)

## 2019-12-26 PROCEDURE — 80048 BASIC METABOLIC PNL TOTAL CA: CPT | Performed by: INTERNAL MEDICINE

## 2019-12-26 PROCEDURE — 36415 COLL VENOUS BLD VENIPUNCTURE: CPT | Performed by: INTERNAL MEDICINE

## 2019-12-26 NOTE — RESULT ENCOUNTER NOTE
BMP done in 1 week as torsemide was increase back to 40 mg in am & 20 mg in pm on 12/13  Charity Garcia, RN 2:20 PM 12/26/19

## 2019-12-27 NOTE — PROGRESS NOTES
"Suad Murrell, Charity Moreland RN             Labs look good, if she's feeling well can stay on current dose of torsemide.   carlyle Borjas      Called pt to see how she is feeling. She reports she is feeling \"very well\". Did let her know her labs are unremarkable and she can continue with her current dose of torsemide.   Charity Garcia RN 10:18 AM 12/27/19    "

## 2020-01-01 ENCOUNTER — TRANSFERRED RECORDS (OUTPATIENT)
Dept: HEALTH INFORMATION MANAGEMENT | Facility: CLINIC | Age: 85
End: 2020-01-01

## 2020-01-01 ENCOUNTER — CARE COORDINATION (OUTPATIENT)
Dept: CARDIOLOGY | Facility: CLINIC | Age: 85
End: 2020-01-01

## 2020-01-01 ENCOUNTER — TELEPHONE (OUTPATIENT)
Dept: INTERNAL MEDICINE | Facility: CLINIC | Age: 85
End: 2020-01-01

## 2020-01-01 ENCOUNTER — APPOINTMENT (OUTPATIENT)
Dept: GENERAL RADIOLOGY | Facility: CLINIC | Age: 85
End: 2020-01-01
Attending: EMERGENCY MEDICINE
Payer: COMMERCIAL

## 2020-01-01 ENCOUNTER — HOSPITAL ENCOUNTER (OUTPATIENT)
Dept: LAB | Facility: CLINIC | Age: 85
End: 2020-12-02
Attending: INTERNAL MEDICINE
Payer: COMMERCIAL

## 2020-01-01 ENCOUNTER — INFUSION THERAPY VISIT (OUTPATIENT)
Dept: INFUSION THERAPY | Facility: CLINIC | Age: 85
End: 2020-01-01
Attending: INTERNAL MEDICINE
Payer: COMMERCIAL

## 2020-01-01 ENCOUNTER — HOSPITAL ENCOUNTER (OUTPATIENT)
Facility: CLINIC | Age: 85
Setting detail: OBSERVATION
Discharge: HOME | End: 2020-11-04
Attending: EMERGENCY MEDICINE | Admitting: EMERGENCY MEDICINE
Payer: COMMERCIAL

## 2020-01-01 ENCOUNTER — HOSPITAL ENCOUNTER (OUTPATIENT)
Dept: LAB | Facility: CLINIC | Age: 85
Discharge: HOME | End: 2020-10-21
Attending: INTERNAL MEDICINE | Admitting: INTERNAL MEDICINE
Payer: COMMERCIAL

## 2020-01-01 ENCOUNTER — HOSPITAL ENCOUNTER (OUTPATIENT)
Dept: LAB | Facility: CLINIC | Age: 85
Discharge: HOME | End: 2020-07-14
Attending: INTERNAL MEDICINE | Admitting: INTERNAL MEDICINE
Payer: COMMERCIAL

## 2020-01-01 ENCOUNTER — DOCUMENTATION ONLY (OUTPATIENT)
Dept: CARDIOLOGY | Facility: CLINIC | Age: 85
End: 2020-01-01

## 2020-01-01 ENCOUNTER — OFFICE VISIT (OUTPATIENT)
Dept: INTERNAL MEDICINE | Facility: CLINIC | Age: 85
End: 2020-01-01
Payer: COMMERCIAL

## 2020-01-01 ENCOUNTER — VIRTUAL VISIT (OUTPATIENT)
Dept: INTERNAL MEDICINE | Facility: CLINIC | Age: 85
End: 2020-01-01
Payer: COMMERCIAL

## 2020-01-01 ENCOUNTER — MEDICAL CORRESPONDENCE (OUTPATIENT)
Dept: HEALTH INFORMATION MANAGEMENT | Facility: CLINIC | Age: 85
End: 2020-01-01

## 2020-01-01 ENCOUNTER — HOSPITAL ENCOUNTER (OUTPATIENT)
Dept: LAB | Facility: CLINIC | Age: 85
Discharge: HOME | End: 2020-12-08
Attending: INTERNAL MEDICINE | Admitting: INTERNAL MEDICINE
Payer: COMMERCIAL

## 2020-01-01 ENCOUNTER — HOSPITAL ENCOUNTER (OUTPATIENT)
Dept: LAB | Facility: CLINIC | Age: 85
Discharge: HOME | End: 2020-12-16
Attending: INTERNAL MEDICINE | Admitting: INTERNAL MEDICINE
Payer: COMMERCIAL

## 2020-01-01 ENCOUNTER — HOSPITAL ENCOUNTER (OUTPATIENT)
Dept: LAB | Facility: CLINIC | Age: 85
Discharge: HOME | End: 2020-10-07
Attending: INTERNAL MEDICINE | Admitting: INTERNAL MEDICINE
Payer: COMMERCIAL

## 2020-01-01 ENCOUNTER — HOSPITAL ENCOUNTER (OUTPATIENT)
Facility: CLINIC | Age: 85
Setting detail: OBSERVATION
Discharge: HOME HEALTH | End: 2020-04-15
Attending: PHYSICIAN ASSISTANT | Admitting: HOSPITALIST
Payer: COMMERCIAL

## 2020-01-01 ENCOUNTER — HOSPITAL ENCOUNTER (OUTPATIENT)
Dept: LAB | Facility: CLINIC | Age: 85
Discharge: HOME | End: 2020-11-27
Attending: PHYSICIAN ASSISTANT | Admitting: INTERNAL MEDICINE
Payer: COMMERCIAL

## 2020-01-01 ENCOUNTER — DOCUMENTATION ONLY (OUTPATIENT)
Dept: CARE COORDINATION | Facility: CLINIC | Age: 85
End: 2020-01-01

## 2020-01-01 ENCOUNTER — HOSPITAL ENCOUNTER (INPATIENT)
Facility: CLINIC | Age: 85
LOS: 2 days | Discharge: HOME HEALTH | DRG: 811 | End: 2020-03-16
Attending: EMERGENCY MEDICINE | Admitting: HOSPITALIST
Payer: COMMERCIAL

## 2020-01-01 ENCOUNTER — TELEPHONE (OUTPATIENT)
Dept: CARE COORDINATION | Facility: CLINIC | Age: 85
End: 2020-01-01

## 2020-01-01 ENCOUNTER — INFUSION THERAPY VISIT (OUTPATIENT)
Dept: INFUSION THERAPY | Facility: CLINIC | Age: 85
End: 2020-01-01
Attending: NURSE PRACTITIONER
Payer: COMMERCIAL

## 2020-01-01 ENCOUNTER — HOSPITAL ENCOUNTER (OUTPATIENT)
Dept: LAB | Facility: CLINIC | Age: 85
Discharge: HOME | End: 2020-07-28
Attending: INTERNAL MEDICINE | Admitting: NURSE PRACTITIONER
Payer: COMMERCIAL

## 2020-01-01 ENCOUNTER — HOSPITAL ENCOUNTER (OUTPATIENT)
Dept: LAB | Facility: CLINIC | Age: 85
Discharge: HOME | End: 2020-08-10
Attending: INTERNAL MEDICINE | Admitting: INTERNAL MEDICINE
Payer: COMMERCIAL

## 2020-01-01 ENCOUNTER — HOSPITAL ENCOUNTER (OUTPATIENT)
Dept: LAB | Facility: CLINIC | Age: 85
Discharge: HOME | End: 2020-12-21
Attending: INTERNAL MEDICINE | Admitting: INTERNAL MEDICINE
Payer: COMMERCIAL

## 2020-01-01 ENCOUNTER — HOSPITAL ENCOUNTER (OUTPATIENT)
Dept: LAB | Facility: CLINIC | Age: 85
Discharge: HOME | End: 2020-06-24
Attending: INTERNAL MEDICINE | Admitting: INTERNAL MEDICINE
Payer: COMMERCIAL

## 2020-01-01 ENCOUNTER — VIRTUAL VISIT (OUTPATIENT)
Dept: CARDIOLOGY | Facility: CLINIC | Age: 85
End: 2020-01-01
Attending: PHYSICIAN ASSISTANT
Payer: COMMERCIAL

## 2020-01-01 ENCOUNTER — TELEPHONE (OUTPATIENT)
Dept: CARDIOLOGY | Facility: CLINIC | Age: 85
End: 2020-01-01

## 2020-01-01 ENCOUNTER — HOSPITAL ENCOUNTER (OUTPATIENT)
Dept: LAB | Facility: CLINIC | Age: 85
Discharge: HOME | End: 2020-08-27
Attending: INTERNAL MEDICINE | Admitting: INTERNAL MEDICINE
Payer: COMMERCIAL

## 2020-01-01 ENCOUNTER — HOSPITAL ENCOUNTER (OUTPATIENT)
Dept: LAB | Facility: CLINIC | Age: 85
Discharge: HOME | End: 2020-11-12
Attending: INTERNAL MEDICINE | Admitting: INTERNAL MEDICINE
Payer: COMMERCIAL

## 2020-01-01 ENCOUNTER — HOSPITAL ENCOUNTER (OUTPATIENT)
Dept: LAB | Facility: CLINIC | Age: 85
Discharge: HOME | End: 2020-11-19
Attending: INTERNAL MEDICINE | Admitting: INTERNAL MEDICINE
Payer: COMMERCIAL

## 2020-01-01 ENCOUNTER — APPOINTMENT (OUTPATIENT)
Dept: GENERAL RADIOLOGY | Facility: CLINIC | Age: 85
End: 2020-01-01
Attending: PHYSICIAN ASSISTANT
Payer: COMMERCIAL

## 2020-01-01 ENCOUNTER — HOSPITAL ENCOUNTER (OUTPATIENT)
Facility: CLINIC | Age: 85
Setting detail: OBSERVATION
Discharge: HOME | End: 2020-05-16
Attending: EMERGENCY MEDICINE | Admitting: INTERNAL MEDICINE
Payer: COMMERCIAL

## 2020-01-01 ENCOUNTER — HOSPITAL ENCOUNTER (OUTPATIENT)
Dept: GENERAL RADIOLOGY | Facility: CLINIC | Age: 85
End: 2020-06-08
Attending: INTERNAL MEDICINE
Payer: COMMERCIAL

## 2020-01-01 ENCOUNTER — PATIENT OUTREACH (OUTPATIENT)
Dept: CARE COORDINATION | Facility: CLINIC | Age: 85
End: 2020-01-01

## 2020-01-01 ENCOUNTER — INFUSION THERAPY VISIT (OUTPATIENT)
Dept: INFUSION THERAPY | Facility: CLINIC | Age: 85
End: 2020-01-01
Attending: FAMILY MEDICINE
Payer: COMMERCIAL

## 2020-01-01 ENCOUNTER — HOSPITAL ENCOUNTER (OUTPATIENT)
Dept: LAB | Facility: CLINIC | Age: 85
End: 2020-06-08
Attending: PHYSICIAN ASSISTANT
Payer: COMMERCIAL

## 2020-01-01 ENCOUNTER — VIRTUAL VISIT (OUTPATIENT)
Dept: CARDIOLOGY | Facility: CLINIC | Age: 85
End: 2020-01-01
Payer: COMMERCIAL

## 2020-01-01 ENCOUNTER — HOSPITAL ENCOUNTER (OUTPATIENT)
Dept: LAB | Facility: CLINIC | Age: 85
Discharge: HOME | End: 2020-02-18
Attending: INTERNAL MEDICINE | Admitting: INTERNAL MEDICINE
Payer: COMMERCIAL

## 2020-01-01 ENCOUNTER — APPOINTMENT (OUTPATIENT)
Dept: GENERAL RADIOLOGY | Facility: CLINIC | Age: 85
DRG: 811 | End: 2020-01-01
Attending: PHYSICIAN ASSISTANT
Payer: COMMERCIAL

## 2020-01-01 VITALS
TEMPERATURE: 98.3 F | RESPIRATION RATE: 18 BRPM | OXYGEN SATURATION: 97 % | SYSTOLIC BLOOD PRESSURE: 132 MMHG | DIASTOLIC BLOOD PRESSURE: 50 MMHG | HEART RATE: 55 BPM

## 2020-01-01 VITALS
SYSTOLIC BLOOD PRESSURE: 140 MMHG | RESPIRATION RATE: 16 BRPM | BODY MASS INDEX: 26.58 KG/M2 | WEIGHT: 150 LBS | DIASTOLIC BLOOD PRESSURE: 50 MMHG | OXYGEN SATURATION: 92 % | HEIGHT: 63 IN | HEART RATE: 62 BPM | TEMPERATURE: 99.1 F

## 2020-01-01 VITALS
SYSTOLIC BLOOD PRESSURE: 161 MMHG | OXYGEN SATURATION: 93 % | DIASTOLIC BLOOD PRESSURE: 65 MMHG | TEMPERATURE: 98.5 F | HEART RATE: 60 BPM | RESPIRATION RATE: 24 BRPM

## 2020-01-01 VITALS
HEART RATE: 50 BPM | SYSTOLIC BLOOD PRESSURE: 150 MMHG | RESPIRATION RATE: 18 BRPM | OXYGEN SATURATION: 99 % | DIASTOLIC BLOOD PRESSURE: 53 MMHG | TEMPERATURE: 98.2 F

## 2020-01-01 VITALS
TEMPERATURE: 97.8 F | SYSTOLIC BLOOD PRESSURE: 152 MMHG | HEART RATE: 58 BPM | OXYGEN SATURATION: 97 % | DIASTOLIC BLOOD PRESSURE: 69 MMHG

## 2020-01-01 VITALS
HEART RATE: 50 BPM | BODY MASS INDEX: 26.39 KG/M2 | DIASTOLIC BLOOD PRESSURE: 55 MMHG | TEMPERATURE: 98.8 F | OXYGEN SATURATION: 93 % | WEIGHT: 149 LBS | SYSTOLIC BLOOD PRESSURE: 162 MMHG | RESPIRATION RATE: 18 BRPM

## 2020-01-01 VITALS
RESPIRATION RATE: 16 BRPM | SYSTOLIC BLOOD PRESSURE: 168 MMHG | OXYGEN SATURATION: 95 % | DIASTOLIC BLOOD PRESSURE: 67 MMHG | HEART RATE: 50 BPM | TEMPERATURE: 97.1 F

## 2020-01-01 VITALS
HEART RATE: 54 BPM | DIASTOLIC BLOOD PRESSURE: 54 MMHG | SYSTOLIC BLOOD PRESSURE: 165 MMHG | TEMPERATURE: 98.1 F | OXYGEN SATURATION: 96 % | RESPIRATION RATE: 20 BRPM

## 2020-01-01 VITALS
TEMPERATURE: 97.1 F | HEART RATE: 58 BPM | RESPIRATION RATE: 18 BRPM | DIASTOLIC BLOOD PRESSURE: 67 MMHG | SYSTOLIC BLOOD PRESSURE: 152 MMHG | OXYGEN SATURATION: 93 %

## 2020-01-01 VITALS
DIASTOLIC BLOOD PRESSURE: 40 MMHG | SYSTOLIC BLOOD PRESSURE: 136 MMHG | WEIGHT: 144 LBS | HEART RATE: 59 BPM | BODY MASS INDEX: 25.51 KG/M2

## 2020-01-01 VITALS
DIASTOLIC BLOOD PRESSURE: 60 MMHG | SYSTOLIC BLOOD PRESSURE: 128 MMHG | RESPIRATION RATE: 12 BRPM | OXYGEN SATURATION: 96 % | HEIGHT: 63 IN | TEMPERATURE: 98.4 F | WEIGHT: 143 LBS | BODY MASS INDEX: 25.34 KG/M2 | HEART RATE: 56 BPM

## 2020-01-01 VITALS
TEMPERATURE: 98.1 F | RESPIRATION RATE: 16 BRPM | DIASTOLIC BLOOD PRESSURE: 62 MMHG | SYSTOLIC BLOOD PRESSURE: 166 MMHG | OXYGEN SATURATION: 100 % | HEART RATE: 49 BPM

## 2020-01-01 VITALS
DIASTOLIC BLOOD PRESSURE: 70 MMHG | OXYGEN SATURATION: 95 % | TEMPERATURE: 98.6 F | HEART RATE: 68 BPM | RESPIRATION RATE: 16 BRPM | SYSTOLIC BLOOD PRESSURE: 160 MMHG

## 2020-01-01 VITALS
RESPIRATION RATE: 18 BRPM | HEART RATE: 51 BPM | DIASTOLIC BLOOD PRESSURE: 71 MMHG | OXYGEN SATURATION: 99 % | SYSTOLIC BLOOD PRESSURE: 168 MMHG | TEMPERATURE: 96.1 F

## 2020-01-01 VITALS
OXYGEN SATURATION: 91 % | TEMPERATURE: 97.8 F | SYSTOLIC BLOOD PRESSURE: 167 MMHG | DIASTOLIC BLOOD PRESSURE: 66 MMHG | HEART RATE: 62 BPM | RESPIRATION RATE: 16 BRPM

## 2020-01-01 VITALS
DIASTOLIC BLOOD PRESSURE: 64 MMHG | SYSTOLIC BLOOD PRESSURE: 158 MMHG | OXYGEN SATURATION: 92 % | HEART RATE: 65 BPM | RESPIRATION RATE: 16 BRPM | TEMPERATURE: 98.6 F

## 2020-01-01 VITALS
OXYGEN SATURATION: 100 % | DIASTOLIC BLOOD PRESSURE: 54 MMHG | SYSTOLIC BLOOD PRESSURE: 158 MMHG | TEMPERATURE: 98 F | HEART RATE: 65 BPM | RESPIRATION RATE: 16 BRPM

## 2020-01-01 VITALS
HEART RATE: 56 BPM | SYSTOLIC BLOOD PRESSURE: 125 MMHG | DIASTOLIC BLOOD PRESSURE: 44 MMHG | OXYGEN SATURATION: 98 % | WEIGHT: 145 LBS | HEIGHT: 63 IN | BODY MASS INDEX: 25.69 KG/M2

## 2020-01-01 VITALS
TEMPERATURE: 98.1 F | HEIGHT: 63 IN | HEART RATE: 53 BPM | BODY MASS INDEX: 25.96 KG/M2 | RESPIRATION RATE: 20 BRPM | OXYGEN SATURATION: 98 % | DIASTOLIC BLOOD PRESSURE: 40 MMHG | SYSTOLIC BLOOD PRESSURE: 141 MMHG | WEIGHT: 146.5 LBS

## 2020-01-01 VITALS
BODY MASS INDEX: 26.1 KG/M2 | DIASTOLIC BLOOD PRESSURE: 41 MMHG | RESPIRATION RATE: 18 BRPM | OXYGEN SATURATION: 95 % | WEIGHT: 147.3 LBS | HEIGHT: 63 IN | TEMPERATURE: 98.4 F | HEART RATE: 53 BPM | SYSTOLIC BLOOD PRESSURE: 134 MMHG

## 2020-01-01 VITALS
TEMPERATURE: 98.7 F | HEART RATE: 59 BPM | DIASTOLIC BLOOD PRESSURE: 51 MMHG | RESPIRATION RATE: 16 BRPM | SYSTOLIC BLOOD PRESSURE: 174 MMHG | OXYGEN SATURATION: 93 %

## 2020-01-01 VITALS
HEART RATE: 55 BPM | DIASTOLIC BLOOD PRESSURE: 66 MMHG | SYSTOLIC BLOOD PRESSURE: 159 MMHG | RESPIRATION RATE: 16 BRPM | TEMPERATURE: 98 F | OXYGEN SATURATION: 100 %

## 2020-01-01 VITALS
OXYGEN SATURATION: 97 % | HEART RATE: 98 BPM | SYSTOLIC BLOOD PRESSURE: 132 MMHG | TEMPERATURE: 97.7 F | BODY MASS INDEX: 25.51 KG/M2 | DIASTOLIC BLOOD PRESSURE: 58 MMHG | WEIGHT: 144 LBS

## 2020-01-01 VITALS
DIASTOLIC BLOOD PRESSURE: 55 MMHG | HEART RATE: 61 BPM | RESPIRATION RATE: 18 BRPM | OXYGEN SATURATION: 97 % | TEMPERATURE: 98 F | SYSTOLIC BLOOD PRESSURE: 148 MMHG

## 2020-01-01 VITALS
TEMPERATURE: 99 F | HEIGHT: 63 IN | BODY MASS INDEX: 26.05 KG/M2 | HEART RATE: 56 BPM | OXYGEN SATURATION: 97 % | RESPIRATION RATE: 28 BRPM | WEIGHT: 147.05 LBS | SYSTOLIC BLOOD PRESSURE: 156 MMHG | DIASTOLIC BLOOD PRESSURE: 40 MMHG

## 2020-01-01 DIAGNOSIS — D50.0 IRON DEFICIENCY ANEMIA DUE TO CHRONIC BLOOD LOSS: ICD-10-CM

## 2020-01-01 DIAGNOSIS — D64.9 ANEMIA: Primary | ICD-10-CM

## 2020-01-01 DIAGNOSIS — I42.9 CARDIOMYOPATHY, UNSPECIFIED TYPE (H): ICD-10-CM

## 2020-01-01 DIAGNOSIS — F33.41 MAJOR DEPRESSIVE DISORDER, RECURRENT EPISODE, IN PARTIAL REMISSION (H): ICD-10-CM

## 2020-01-01 DIAGNOSIS — D64.9 ANEMIA: ICD-10-CM

## 2020-01-01 DIAGNOSIS — D50.8 OTHER IRON DEFICIENCY ANEMIA: Primary | ICD-10-CM

## 2020-01-01 DIAGNOSIS — D50.8 OTHER IRON DEFICIENCY ANEMIA: ICD-10-CM

## 2020-01-01 DIAGNOSIS — D64.9 SYMPTOMATIC ANEMIA: Primary | ICD-10-CM

## 2020-01-01 DIAGNOSIS — D64.9 SYMPTOMATIC ANEMIA: ICD-10-CM

## 2020-01-01 DIAGNOSIS — R60.9 EDEMA: ICD-10-CM

## 2020-01-01 DIAGNOSIS — I27.20 PULMONARY HYPERTENSION (H): ICD-10-CM

## 2020-01-01 DIAGNOSIS — I50.33 ACUTE ON CHRONIC DIASTOLIC CONGESTIVE HEART FAILURE (H): ICD-10-CM

## 2020-01-01 DIAGNOSIS — D64.9 ACUTE ON CHRONIC ANEMIA: ICD-10-CM

## 2020-01-01 DIAGNOSIS — I27.20 PULMONARY HYPERTENSION (H): Primary | ICD-10-CM

## 2020-01-01 DIAGNOSIS — D50.0 IRON DEFICIENCY ANEMIA DUE TO CHRONIC BLOOD LOSS: Primary | ICD-10-CM

## 2020-01-01 DIAGNOSIS — I10 ESSENTIAL HYPERTENSION WITH GOAL BLOOD PRESSURE LESS THAN 140/90: ICD-10-CM

## 2020-01-01 DIAGNOSIS — Z71.89 OTHER SPECIFIED COUNSELING: ICD-10-CM

## 2020-01-01 DIAGNOSIS — I50.22 CHRONIC SYSTOLIC CONGESTIVE HEART FAILURE (H): Primary | ICD-10-CM

## 2020-01-01 DIAGNOSIS — L03.116 CELLULITIS OF LEFT LOWER EXTREMITY: ICD-10-CM

## 2020-01-01 DIAGNOSIS — I50.32 CHRONIC DIASTOLIC CONGESTIVE HEART FAILURE (H): ICD-10-CM

## 2020-01-01 DIAGNOSIS — I10 ESSENTIAL HYPERTENSION WITH GOAL BLOOD PRESSURE LESS THAN 140/90: Primary | ICD-10-CM

## 2020-01-01 DIAGNOSIS — M25.512 LEFT SHOULDER PAIN, UNSPECIFIED CHRONICITY: Primary | ICD-10-CM

## 2020-01-01 DIAGNOSIS — Z53.9 DIAGNOSIS NOT YET DEFINED: Primary | ICD-10-CM

## 2020-01-01 DIAGNOSIS — D50.9 IRON DEFICIENCY ANEMIA, UNSPECIFIED IRON DEFICIENCY ANEMIA TYPE: ICD-10-CM

## 2020-01-01 DIAGNOSIS — I50.22 CHRONIC SYSTOLIC CONGESTIVE HEART FAILURE (H): ICD-10-CM

## 2020-01-01 DIAGNOSIS — K21.9 GASTROESOPHAGEAL REFLUX DISEASE WITHOUT ESOPHAGITIS: ICD-10-CM

## 2020-01-01 DIAGNOSIS — I48.20 CHRONIC ATRIAL FIBRILLATION (H): ICD-10-CM

## 2020-01-01 DIAGNOSIS — M54.6 MIDLINE THORACIC BACK PAIN, UNSPECIFIED CHRONICITY: ICD-10-CM

## 2020-01-01 DIAGNOSIS — R79.9 ABNORMAL BLOOD SMEAR: ICD-10-CM

## 2020-01-01 DIAGNOSIS — E78.5 HYPERLIPIDEMIA, UNSPECIFIED HYPERLIPIDEMIA TYPE: ICD-10-CM

## 2020-01-01 DIAGNOSIS — M25.512 LEFT SHOULDER PAIN, UNSPECIFIED CHRONICITY: ICD-10-CM

## 2020-01-01 DIAGNOSIS — R06.00 DYSPNEA, UNSPECIFIED TYPE: ICD-10-CM

## 2020-01-01 DIAGNOSIS — J44.9 CHRONIC OBSTRUCTIVE PULMONARY DISEASE, UNSPECIFIED COPD TYPE (H): ICD-10-CM

## 2020-01-01 DIAGNOSIS — K92.2 GASTROINTESTINAL HEMORRHAGE, UNSPECIFIED GASTROINTESTINAL HEMORRHAGE TYPE: ICD-10-CM

## 2020-01-01 DIAGNOSIS — I50.33 ACUTE ON CHRONIC DIASTOLIC CONGESTIVE HEART FAILURE (H): Primary | ICD-10-CM

## 2020-01-01 DIAGNOSIS — R06.02 SOB (SHORTNESS OF BREATH): ICD-10-CM

## 2020-01-01 DIAGNOSIS — E78.5 HYPERLIPIDEMIA LDL GOAL <100: ICD-10-CM

## 2020-01-01 DIAGNOSIS — B02.9 HERPES ZOSTER WITHOUT COMPLICATION: Primary | ICD-10-CM

## 2020-01-01 DIAGNOSIS — R06.00 DYSPNEA, UNSPECIFIED TYPE: Primary | ICD-10-CM

## 2020-01-01 LAB
ABO + RH BLD: ABNORMAL
ABO + RH BLD: NORMAL
ALBUMIN SERPL-MCNC: 3.3 G/DL (ref 3.4–5)
ALP SERPL-CCNC: 68 U/L (ref 40–150)
ALT SERPL W P-5'-P-CCNC: 20 U/L (ref 0–50)
ANION GAP SERPL CALCULATED.3IONS-SCNC: 1 MMOL/L (ref 3–14)
ANION GAP SERPL CALCULATED.3IONS-SCNC: 2 MMOL/L (ref 3–14)
ANION GAP SERPL CALCULATED.3IONS-SCNC: 3 MMOL/L (ref 3–14)
ANION GAP SERPL CALCULATED.3IONS-SCNC: 4 MMOL/L (ref 3–14)
ANION GAP SERPL CALCULATED.3IONS-SCNC: 4 MMOL/L (ref 3–14)
ANION GAP SERPL CALCULATED.3IONS-SCNC: 5 MMOL/L (ref 3–14)
ANION GAP SERPL CALCULATED.3IONS-SCNC: 5 MMOL/L (ref 3–14)
ANION GAP SERPL CALCULATED.3IONS-SCNC: 6 MMOL/L (ref 3–14)
ANION GAP SERPL CALCULATED.3IONS-SCNC: 7 MMOL/L (ref 3–14)
ANION GAP SERPL CALCULATED.3IONS-SCNC: 8 MMOL/L (ref 3–14)
ANION GAP SERPL CALCULATED.3IONS-SCNC: 9 MMOL/L (ref 3–14)
AST SERPL W P-5'-P-CCNC: 26 U/L (ref 0–45)
BASOPHILS # BLD AUTO: 0 10E9/L (ref 0–0.2)
BASOPHILS # BLD AUTO: 0.1 10E9/L (ref 0–0.2)
BASOPHILS NFR BLD AUTO: 0.5 %
BASOPHILS NFR BLD AUTO: 0.7 %
BASOPHILS NFR BLD AUTO: 0.8 %
BASOPHILS NFR BLD AUTO: 0.9 %
BASOPHILS NFR BLD AUTO: 0.9 %
BASOPHILS NFR BLD AUTO: 1.2 %
BASOPHILS NFR BLD AUTO: 1.3 %
BILIRUB DIRECT SERPL-MCNC: 0.1 MG/DL (ref 0–0.2)
BILIRUB SERPL-MCNC: 0.3 MG/DL (ref 0.2–1.3)
BLD GP AB INVEST PLASRBC-IMP: NORMAL
BLD GP AB INVEST PLASRBC-IMP: NORMAL
BLD GP AB SCN SERPL QL: ABNORMAL
BLD GP AB SCN SERPL QL: NORMAL
BLD PROD TYP BPU: ABNORMAL
BLD PROD TYP BPU: NORMAL
BLD UNIT ID BPU: 0
BLOOD BANK CMNT PATIENT-IMP: ABNORMAL
BLOOD BANK CMNT PATIENT-IMP: NORMAL
BLOOD PRODUCT CODE: NORMAL
BPU ID: NORMAL
BUN SERPL-MCNC: 32 MG/DL (ref 7–30)
BUN SERPL-MCNC: 33 MG/DL (ref 7–30)
BUN SERPL-MCNC: 33 MG/DL (ref 7–30)
BUN SERPL-MCNC: 35 MG/DL (ref 7–30)
BUN SERPL-MCNC: 36 MG/DL (ref 7–30)
BUN SERPL-MCNC: 38 MG/DL (ref 7–30)
BUN SERPL-MCNC: 38 MG/DL (ref 7–30)
BUN SERPL-MCNC: 40 MG/DL (ref 7–30)
BUN SERPL-MCNC: 41 MG/DL (ref 7–30)
BUN SERPL-MCNC: 41 MG/DL (ref 7–30)
BUN SERPL-MCNC: 42 MG/DL (ref 7–30)
BUN SERPL-MCNC: 46 MG/DL (ref 7–30)
BUN SERPL-MCNC: 48 MG/DL (ref 7–30)
BUN SERPL-MCNC: 48 MG/DL (ref 7–30)
BUN SERPL-MCNC: 59 MG/DL (ref 7–30)
BUN SERPL-MCNC: 61 MG/DL (ref 7–30)
CALCIUM SERPL-MCNC: 8.2 MG/DL (ref 8.5–10.1)
CALCIUM SERPL-MCNC: 8.2 MG/DL (ref 8.5–10.1)
CALCIUM SERPL-MCNC: 8.3 MG/DL (ref 8.5–10.1)
CALCIUM SERPL-MCNC: 8.4 MG/DL (ref 8.5–10.1)
CALCIUM SERPL-MCNC: 8.5 MG/DL (ref 8.5–10.1)
CALCIUM SERPL-MCNC: 8.6 MG/DL (ref 8.5–10.1)
CALCIUM SERPL-MCNC: 8.6 MG/DL (ref 8.5–10.1)
CALCIUM SERPL-MCNC: 8.7 MG/DL (ref 8.5–10.1)
CALCIUM SERPL-MCNC: 8.7 MG/DL (ref 8.5–10.1)
CALCIUM SERPL-MCNC: 8.8 MG/DL (ref 8.5–10.1)
CALCIUM SERPL-MCNC: 8.8 MG/DL (ref 8.5–10.1)
CHLORIDE SERPL-SCNC: 100 MMOL/L (ref 94–109)
CHLORIDE SERPL-SCNC: 100 MMOL/L (ref 94–109)
CHLORIDE SERPL-SCNC: 101 MMOL/L (ref 94–109)
CHLORIDE SERPL-SCNC: 102 MMOL/L (ref 94–109)
CHLORIDE SERPL-SCNC: 103 MMOL/L (ref 94–109)
CHLORIDE SERPL-SCNC: 105 MMOL/L (ref 94–109)
CO2 SERPL-SCNC: 24 MMOL/L (ref 20–32)
CO2 SERPL-SCNC: 25 MMOL/L (ref 20–32)
CO2 SERPL-SCNC: 26 MMOL/L (ref 20–32)
CO2 SERPL-SCNC: 27 MMOL/L (ref 20–32)
CO2 SERPL-SCNC: 28 MMOL/L (ref 20–32)
CO2 SERPL-SCNC: 28 MMOL/L (ref 20–32)
CO2 SERPL-SCNC: 30 MMOL/L (ref 20–32)
CO2 SERPL-SCNC: 31 MMOL/L (ref 20–32)
CO2 SERPL-SCNC: 31 MMOL/L (ref 20–32)
CO2 SERPL-SCNC: 32 MMOL/L (ref 20–32)
CO2 SERPL-SCNC: 32 MMOL/L (ref 20–32)
COPATH REPORT: NORMAL
COPATH REPORT: NORMAL
CREAT SERPL-MCNC: 0.89 MG/DL (ref 0.52–1.04)
CREAT SERPL-MCNC: 0.93 MG/DL (ref 0.52–1.04)
CREAT SERPL-MCNC: 0.97 MG/DL (ref 0.52–1.04)
CREAT SERPL-MCNC: 0.98 MG/DL (ref 0.52–1.04)
CREAT SERPL-MCNC: 1.01 MG/DL (ref 0.52–1.04)
CREAT SERPL-MCNC: 1.04 MG/DL (ref 0.52–1.04)
CREAT SERPL-MCNC: 1.05 MG/DL (ref 0.52–1.04)
CREAT SERPL-MCNC: 1.09 MG/DL (ref 0.52–1.04)
CREAT SERPL-MCNC: 1.12 MG/DL (ref 0.52–1.04)
CREAT SERPL-MCNC: 1.14 MG/DL (ref 0.52–1.04)
CREAT SERPL-MCNC: 1.17 MG/DL (ref 0.52–1.04)
CREAT SERPL-MCNC: 1.19 MG/DL (ref 0.52–1.04)
CREAT SERPL-MCNC: 1.23 MG/DL (ref 0.52–1.04)
CREAT SERPL-MCNC: 1.24 MG/DL (ref 0.52–1.04)
DAT IGG-SP REAG RBC-IMP: NORMAL
DIFFERENTIAL METHOD BLD: ABNORMAL
EOSINOPHIL # BLD AUTO: 0.1 10E9/L (ref 0–0.7)
EOSINOPHIL # BLD AUTO: 0.2 10E9/L (ref 0–0.7)
EOSINOPHIL # BLD AUTO: 0.3 10E9/L (ref 0–0.7)
EOSINOPHIL # BLD AUTO: 0.4 10E9/L (ref 0–0.7)
EOSINOPHIL NFR BLD AUTO: 2.4 %
EOSINOPHIL NFR BLD AUTO: 2.9 %
EOSINOPHIL NFR BLD AUTO: 3.3 %
EOSINOPHIL NFR BLD AUTO: 3.5 %
EOSINOPHIL NFR BLD AUTO: 3.6 %
EOSINOPHIL NFR BLD AUTO: 3.8 %
EOSINOPHIL NFR BLD AUTO: 4.1 %
EOSINOPHIL NFR BLD AUTO: 4.7 %
EOSINOPHIL NFR BLD AUTO: 5.5 %
EOSINOPHIL NFR BLD AUTO: 6 %
EOSINOPHIL NFR BLD AUTO: 6.8 %
ERYTHROCYTE [DISTWIDTH] IN BLOOD BY AUTOMATED COUNT: 14.5 % (ref 10–15)
ERYTHROCYTE [DISTWIDTH] IN BLOOD BY AUTOMATED COUNT: 14.6 % (ref 10–15)
ERYTHROCYTE [DISTWIDTH] IN BLOOD BY AUTOMATED COUNT: 14.6 % (ref 10–15)
ERYTHROCYTE [DISTWIDTH] IN BLOOD BY AUTOMATED COUNT: 14.8 % (ref 10–15)
ERYTHROCYTE [DISTWIDTH] IN BLOOD BY AUTOMATED COUNT: 14.8 % (ref 10–15)
ERYTHROCYTE [DISTWIDTH] IN BLOOD BY AUTOMATED COUNT: 15 % (ref 10–15)
ERYTHROCYTE [DISTWIDTH] IN BLOOD BY AUTOMATED COUNT: 15.2 % (ref 10–15)
ERYTHROCYTE [DISTWIDTH] IN BLOOD BY AUTOMATED COUNT: 15.3 % (ref 10–15)
ERYTHROCYTE [DISTWIDTH] IN BLOOD BY AUTOMATED COUNT: 15.3 % (ref 10–15)
ERYTHROCYTE [DISTWIDTH] IN BLOOD BY AUTOMATED COUNT: 15.5 % (ref 10–15)
ERYTHROCYTE [DISTWIDTH] IN BLOOD BY AUTOMATED COUNT: 15.6 % (ref 10–15)
ERYTHROCYTE [DISTWIDTH] IN BLOOD BY AUTOMATED COUNT: 15.7 % (ref 10–15)
ERYTHROCYTE [DISTWIDTH] IN BLOOD BY AUTOMATED COUNT: 15.7 % (ref 10–15)
ERYTHROCYTE [DISTWIDTH] IN BLOOD BY AUTOMATED COUNT: 16.1 % (ref 10–15)
ERYTHROCYTE [DISTWIDTH] IN BLOOD BY AUTOMATED COUNT: 16.2 % (ref 10–15)
ERYTHROCYTE [DISTWIDTH] IN BLOOD BY AUTOMATED COUNT: 16.9 % (ref 10–15)
ERYTHROCYTE [DISTWIDTH] IN BLOOD BY AUTOMATED COUNT: 16.9 % (ref 10–15)
ERYTHROCYTE [DISTWIDTH] IN BLOOD BY AUTOMATED COUNT: 20.2 % (ref 10–15)
ERYTHROCYTE [DISTWIDTH] IN BLOOD BY AUTOMATED COUNT: 20.8 % (ref 10–15)
FERRITIN SERPL-MCNC: 13 NG/ML (ref 8–252)
FERRITIN SERPL-MCNC: 17 NG/ML (ref 8–252)
GFR SERPL CREATININE-BSD FRML MDRD: 39 ML/MIN/{1.73_M2}
GFR SERPL CREATININE-BSD FRML MDRD: 39 ML/MIN/{1.73_M2}
GFR SERPL CREATININE-BSD FRML MDRD: 41 ML/MIN/{1.73_M2}
GFR SERPL CREATININE-BSD FRML MDRD: 41 ML/MIN/{1.73_M2}
GFR SERPL CREATININE-BSD FRML MDRD: 43 ML/MIN/{1.73_M2}
GFR SERPL CREATININE-BSD FRML MDRD: 44 ML/MIN/{1.73_M2}
GFR SERPL CREATININE-BSD FRML MDRD: 45 ML/MIN/{1.73_M2}
GFR SERPL CREATININE-BSD FRML MDRD: 47 ML/MIN/{1.73_M2}
GFR SERPL CREATININE-BSD FRML MDRD: 48 ML/MIN/{1.73_M2}
GFR SERPL CREATININE-BSD FRML MDRD: 50 ML/MIN/{1.73_M2}
GFR SERPL CREATININE-BSD FRML MDRD: 51 ML/MIN/{1.73_M2}
GFR SERPL CREATININE-BSD FRML MDRD: 52 ML/MIN/{1.73_M2}
GFR SERPL CREATININE-BSD FRML MDRD: 55 ML/MIN/{1.73_M2}
GFR SERPL CREATININE-BSD FRML MDRD: 58 ML/MIN/{1.73_M2}
GLUCOSE SERPL-MCNC: 101 MG/DL (ref 70–99)
GLUCOSE SERPL-MCNC: 102 MG/DL (ref 70–99)
GLUCOSE SERPL-MCNC: 103 MG/DL (ref 70–99)
GLUCOSE SERPL-MCNC: 106 MG/DL (ref 70–99)
GLUCOSE SERPL-MCNC: 108 MG/DL (ref 70–99)
GLUCOSE SERPL-MCNC: 120 MG/DL (ref 70–99)
GLUCOSE SERPL-MCNC: 125 MG/DL (ref 70–99)
GLUCOSE SERPL-MCNC: 130 MG/DL (ref 70–99)
GLUCOSE SERPL-MCNC: 130 MG/DL (ref 70–99)
GLUCOSE SERPL-MCNC: 81 MG/DL (ref 70–99)
GLUCOSE SERPL-MCNC: 82 MG/DL (ref 70–99)
GLUCOSE SERPL-MCNC: 87 MG/DL (ref 70–99)
GLUCOSE SERPL-MCNC: 90 MG/DL (ref 70–99)
GLUCOSE SERPL-MCNC: 97 MG/DL (ref 70–99)
GLUCOSE SERPL-MCNC: 98 MG/DL (ref 70–99)
GLUCOSE SERPL-MCNC: 99 MG/DL (ref 70–99)
HAPTOGLOB SERPL-MCNC: 147 MG/DL (ref 32–197)
HAPTOGLOB SERPL-MCNC: 99 MG/DL (ref 32–197)
HCT VFR BLD AUTO: 18.9 % (ref 35–47)
HCT VFR BLD AUTO: 19.9 % (ref 35–47)
HCT VFR BLD AUTO: 21.2 % (ref 35–47)
HCT VFR BLD AUTO: 21.5 % (ref 35–47)
HCT VFR BLD AUTO: 22.9 % (ref 35–47)
HCT VFR BLD AUTO: 22.9 % (ref 35–47)
HCT VFR BLD AUTO: 23 % (ref 35–47)
HCT VFR BLD AUTO: 23.4 % (ref 35–47)
HCT VFR BLD AUTO: 23.5 % (ref 35–47)
HCT VFR BLD AUTO: 23.9 % (ref 35–47)
HCT VFR BLD AUTO: 24.1 % (ref 35–47)
HCT VFR BLD AUTO: 24.9 % (ref 35–47)
HCT VFR BLD AUTO: 25.6 % (ref 35–47)
HCT VFR BLD AUTO: 27.1 % (ref 35–47)
HCT VFR BLD AUTO: 27.3 % (ref 35–47)
HCT VFR BLD AUTO: 27.4 % (ref 35–47)
HCT VFR BLD AUTO: 28 % (ref 35–47)
HGB BLD-MCNC: 5.4 G/DL (ref 11.7–15.7)
HGB BLD-MCNC: 6 G/DL (ref 11.7–15.7)
HGB BLD-MCNC: 6 G/DL (ref 11.7–15.7)
HGB BLD-MCNC: 6.1 G/DL (ref 11.7–15.7)
HGB BLD-MCNC: 6.2 G/DL (ref 11.7–15.7)
HGB BLD-MCNC: 6.5 G/DL (ref 11.7–15.7)
HGB BLD-MCNC: 6.6 G/DL (ref 11.7–15.7)
HGB BLD-MCNC: 6.6 G/DL (ref 11.7–15.7)
HGB BLD-MCNC: 6.7 G/DL (ref 11.7–15.7)
HGB BLD-MCNC: 6.9 G/DL (ref 11.7–15.7)
HGB BLD-MCNC: 7 G/DL (ref 11.7–15.7)
HGB BLD-MCNC: 7.1 G/DL (ref 11.7–15.7)
HGB BLD-MCNC: 7.3 G/DL (ref 11.7–15.7)
HGB BLD-MCNC: 7.3 G/DL (ref 11.7–15.7)
HGB BLD-MCNC: 7.4 G/DL (ref 11.7–15.7)
HGB BLD-MCNC: 7.9 G/DL (ref 11.7–15.7)
HGB BLD-MCNC: 8.1 G/DL (ref 11.7–15.7)
HGB BLD-MCNC: 8.2 G/DL (ref 11.7–15.7)
HGB BLD-MCNC: 8.6 G/DL (ref 11.7–15.7)
IMM GRANULOCYTES # BLD: 0 10E9/L (ref 0–0.4)
IMM GRANULOCYTES # BLD: 0.1 10E9/L (ref 0–0.4)
IMM GRANULOCYTES NFR BLD: 0.2 %
IMM GRANULOCYTES NFR BLD: 0.2 %
IMM GRANULOCYTES NFR BLD: 0.3 %
IMM GRANULOCYTES NFR BLD: 0.4 %
IMM GRANULOCYTES NFR BLD: 0.5 %
IMM GRANULOCYTES NFR BLD: 0.5 %
IMM GRANULOCYTES NFR BLD: 0.6 %
IMM GRANULOCYTES NFR BLD: 0.7 %
IMM GRANULOCYTES NFR BLD: 1.2 %
INR PPP: 0.93 (ref 0.86–1.14)
INTERPRETATION ECG - MUSE: NORMAL
IRON SATN MFR SERPL: 13 % (ref 15–46)
IRON SATN MFR SERPL: 25 % (ref 15–46)
IRON SATN MFR SERPL: 4 % (ref 15–46)
IRON SERPL-MCNC: 110 UG/DL (ref 35–180)
IRON SERPL-MCNC: 17 UG/DL (ref 35–180)
IRON SERPL-MCNC: 51 UG/DL (ref 35–180)
LABORATORY COMMENT REPORT: NORMAL
LDH SERPL L TO P-CCNC: 218 U/L (ref 81–234)
LYMPHOCYTES # BLD AUTO: 0.2 10E9/L (ref 0.8–5.3)
LYMPHOCYTES # BLD AUTO: 0.4 10E9/L (ref 0.8–5.3)
LYMPHOCYTES # BLD AUTO: 0.4 10E9/L (ref 0.8–5.3)
LYMPHOCYTES # BLD AUTO: 0.5 10E9/L (ref 0.8–5.3)
LYMPHOCYTES # BLD AUTO: 0.6 10E9/L (ref 0.8–5.3)
LYMPHOCYTES # BLD AUTO: 0.7 10E9/L (ref 0.8–5.3)
LYMPHOCYTES NFR BLD AUTO: 10 %
LYMPHOCYTES NFR BLD AUTO: 11 %
LYMPHOCYTES NFR BLD AUTO: 2.4 %
LYMPHOCYTES NFR BLD AUTO: 5.7 %
LYMPHOCYTES NFR BLD AUTO: 6.7 %
LYMPHOCYTES NFR BLD AUTO: 6.8 %
LYMPHOCYTES NFR BLD AUTO: 7.6 %
LYMPHOCYTES NFR BLD AUTO: 8.1 %
LYMPHOCYTES NFR BLD AUTO: 8.4 %
LYMPHOCYTES NFR BLD AUTO: 8.7 %
LYMPHOCYTES NFR BLD AUTO: 9.2 %
MCH RBC QN AUTO: 25.8 PG (ref 26.5–33)
MCH RBC QN AUTO: 25.8 PG (ref 26.5–33)
MCH RBC QN AUTO: 26.7 PG (ref 26.5–33)
MCH RBC QN AUTO: 26.8 PG (ref 26.5–33)
MCH RBC QN AUTO: 27 PG (ref 26.5–33)
MCH RBC QN AUTO: 27.1 PG (ref 26.5–33)
MCH RBC QN AUTO: 27.3 PG (ref 26.5–33)
MCH RBC QN AUTO: 27.6 PG (ref 26.5–33)
MCH RBC QN AUTO: 28.1 PG (ref 26.5–33)
MCH RBC QN AUTO: 28.1 PG (ref 26.5–33)
MCH RBC QN AUTO: 28.5 PG (ref 26.5–33)
MCH RBC QN AUTO: 28.8 PG (ref 26.5–33)
MCH RBC QN AUTO: 29.3 PG (ref 26.5–33)
MCH RBC QN AUTO: 29.8 PG (ref 26.5–33)
MCH RBC QN AUTO: 30.8 PG (ref 26.5–33)
MCH RBC QN AUTO: 31 PG (ref 26.5–33)
MCH RBC QN AUTO: 31 PG (ref 26.5–33)
MCHC RBC AUTO-ENTMCNC: 27.9 G/DL (ref 31.5–36.5)
MCHC RBC AUTO-ENTMCNC: 28 G/DL (ref 31.5–36.5)
MCHC RBC AUTO-ENTMCNC: 28.4 G/DL (ref 31.5–36.5)
MCHC RBC AUTO-ENTMCNC: 28.4 G/DL (ref 31.5–36.5)
MCHC RBC AUTO-ENTMCNC: 28.6 G/DL (ref 31.5–36.5)
MCHC RBC AUTO-ENTMCNC: 28.7 G/DL (ref 31.5–36.5)
MCHC RBC AUTO-ENTMCNC: 28.8 G/DL (ref 31.5–36.5)
MCHC RBC AUTO-ENTMCNC: 28.8 G/DL (ref 31.5–36.5)
MCHC RBC AUTO-ENTMCNC: 28.9 G/DL (ref 31.5–36.5)
MCHC RBC AUTO-ENTMCNC: 28.9 G/DL (ref 31.5–36.5)
MCHC RBC AUTO-ENTMCNC: 29.3 G/DL (ref 31.5–36.5)
MCHC RBC AUTO-ENTMCNC: 29.8 G/DL (ref 31.5–36.5)
MCHC RBC AUTO-ENTMCNC: 30 G/DL (ref 31.5–36.5)
MCHC RBC AUTO-ENTMCNC: 30.2 G/DL (ref 31.5–36.5)
MCHC RBC AUTO-ENTMCNC: 30.3 G/DL (ref 31.5–36.5)
MCHC RBC AUTO-ENTMCNC: 31 G/DL (ref 31.5–36.5)
MCHC RBC AUTO-ENTMCNC: 31.1 G/DL (ref 31.5–36.5)
MCV RBC AUTO: 100 FL (ref 78–100)
MCV RBC AUTO: 104 FL (ref 78–100)
MCV RBC AUTO: 104 FL (ref 78–100)
MCV RBC AUTO: 90 FL (ref 78–100)
MCV RBC AUTO: 92 FL (ref 78–100)
MCV RBC AUTO: 92 FL (ref 78–100)
MCV RBC AUTO: 93 FL (ref 78–100)
MCV RBC AUTO: 94 FL (ref 78–100)
MCV RBC AUTO: 95 FL (ref 78–100)
MCV RBC AUTO: 96 FL (ref 78–100)
MCV RBC AUTO: 96 FL (ref 78–100)
MCV RBC AUTO: 97 FL (ref 78–100)
MCV RBC AUTO: 98 FL (ref 78–100)
MCV RBC AUTO: 98 FL (ref 78–100)
MCV RBC AUTO: 99 FL (ref 78–100)
MCV RBC AUTO: 99 FL (ref 78–100)
MONOCYTES # BLD AUTO: 0.5 10E9/L (ref 0–1.3)
MONOCYTES # BLD AUTO: 0.5 10E9/L (ref 0–1.3)
MONOCYTES # BLD AUTO: 0.6 10E9/L (ref 0–1.3)
MONOCYTES # BLD AUTO: 0.8 10E9/L (ref 0–1.3)
MONOCYTES # BLD AUTO: 0.9 10E9/L (ref 0–1.3)
MONOCYTES # BLD AUTO: 1.2 10E9/L (ref 0–1.3)
MONOCYTES NFR BLD AUTO: 10.1 %
MONOCYTES NFR BLD AUTO: 10.7 %
MONOCYTES NFR BLD AUTO: 10.8 %
MONOCYTES NFR BLD AUTO: 11.1 %
MONOCYTES NFR BLD AUTO: 12.2 %
MONOCYTES NFR BLD AUTO: 12.3 %
MONOCYTES NFR BLD AUTO: 12.5 %
MONOCYTES NFR BLD AUTO: 13.6 %
MONOCYTES NFR BLD AUTO: 8.3 %
MONOCYTES NFR BLD AUTO: 9.1 %
MONOCYTES NFR BLD AUTO: 9.3 %
NEUTROPHILS # BLD AUTO: 3.6 10E9/L (ref 1.6–8.3)
NEUTROPHILS # BLD AUTO: 3.6 10E9/L (ref 1.6–8.3)
NEUTROPHILS # BLD AUTO: 4.3 10E9/L (ref 1.6–8.3)
NEUTROPHILS # BLD AUTO: 4.4 10E9/L (ref 1.6–8.3)
NEUTROPHILS # BLD AUTO: 4.5 10E9/L (ref 1.6–8.3)
NEUTROPHILS # BLD AUTO: 4.6 10E9/L (ref 1.6–8.3)
NEUTROPHILS # BLD AUTO: 4.9 10E9/L (ref 1.6–8.3)
NEUTROPHILS # BLD AUTO: 5.4 10E9/L (ref 1.6–8.3)
NEUTROPHILS # BLD AUTO: 6.9 10E9/L (ref 1.6–8.3)
NEUTROPHILS NFR BLD AUTO: 68.8 %
NEUTROPHILS NFR BLD AUTO: 71.7 %
NEUTROPHILS NFR BLD AUTO: 71.8 %
NEUTROPHILS NFR BLD AUTO: 74.7 %
NEUTROPHILS NFR BLD AUTO: 75.9 %
NEUTROPHILS NFR BLD AUTO: 75.9 %
NEUTROPHILS NFR BLD AUTO: 77.4 %
NEUTROPHILS NFR BLD AUTO: 78.3 %
NEUTROPHILS NFR BLD AUTO: 78.3 %
NEUTROPHILS NFR BLD AUTO: 80 %
NEUTROPHILS NFR BLD AUTO: 81.3 %
NRBC # BLD AUTO: 0 10*3/UL
NRBC BLD AUTO-RTO: 0 /100
NT-PROBNP SERPL-MCNC: 1982 PG/ML (ref 0–1800)
NT-PROBNP SERPL-MCNC: 3238 PG/ML (ref 0–450)
NT-PROBNP SERPL-MCNC: 4466 PG/ML (ref 0–450)
NT-PROBNP SERPL-MCNC: 4871 PG/ML (ref 0–1800)
NT-PROBNP SERPL-MCNC: 7365 PG/ML (ref 0–450)
NUM BPU REQUESTED: 1
NUM BPU REQUESTED: 2
NUM BPU REQUESTED: 3
PLATELET # BLD AUTO: 125 10E9/L (ref 150–450)
PLATELET # BLD AUTO: 161 10E9/L (ref 150–450)
PLATELET # BLD AUTO: 168 10E9/L (ref 150–450)
PLATELET # BLD AUTO: 174 10E9/L (ref 150–450)
PLATELET # BLD AUTO: 179 10E9/L (ref 150–450)
PLATELET # BLD AUTO: 183 10E9/L (ref 150–450)
PLATELET # BLD AUTO: 183 10E9/L (ref 150–450)
PLATELET # BLD AUTO: 202 10E9/L (ref 150–450)
PLATELET # BLD AUTO: 207 10E9/L (ref 150–450)
PLATELET # BLD AUTO: 214 10E9/L (ref 150–450)
PLATELET # BLD AUTO: 216 10E9/L (ref 150–450)
PLATELET # BLD AUTO: 216 10E9/L (ref 150–450)
PLATELET # BLD AUTO: 218 10E9/L (ref 150–450)
PLATELET # BLD AUTO: 220 10E9/L (ref 150–450)
PLATELET # BLD AUTO: 229 10E9/L (ref 150–450)
PLATELET # BLD AUTO: 232 10E9/L (ref 150–450)
PLATELET # BLD AUTO: 234 10E9/L (ref 150–450)
PLATELET # BLD AUTO: 249 10E9/L (ref 150–450)
PLATELET # BLD AUTO: 253 10E9/L (ref 150–450)
PLATELET # BLD AUTO: 259 10E9/L (ref 150–450)
POTASSIUM SERPL-SCNC: 3.5 MMOL/L (ref 3.4–5.3)
POTASSIUM SERPL-SCNC: 3.5 MMOL/L (ref 3.4–5.3)
POTASSIUM SERPL-SCNC: 3.6 MMOL/L (ref 3.4–5.3)
POTASSIUM SERPL-SCNC: 3.6 MMOL/L (ref 3.4–5.3)
POTASSIUM SERPL-SCNC: 3.7 MMOL/L (ref 3.4–5.3)
POTASSIUM SERPL-SCNC: 3.8 MMOL/L (ref 3.4–5.3)
POTASSIUM SERPL-SCNC: 3.9 MMOL/L (ref 3.4–5.3)
POTASSIUM SERPL-SCNC: 4 MMOL/L (ref 3.4–5.3)
POTASSIUM SERPL-SCNC: 4 MMOL/L (ref 3.4–5.3)
POTASSIUM SERPL-SCNC: 4.2 MMOL/L (ref 3.4–5.3)
POTASSIUM SERPL-SCNC: 4.2 MMOL/L (ref 3.4–5.3)
PROCALCITONIN SERPL-MCNC: 0.2 NG/ML
PROCALCITONIN SERPL-MCNC: 0.35 NG/ML
PROT SERPL-MCNC: 7.5 G/DL (ref 6.8–8.8)
RBC # BLD AUTO: 2.08 10E12/L (ref 3.8–5.2)
RBC # BLD AUTO: 2.09 10E12/L (ref 3.8–5.2)
RBC # BLD AUTO: 2.14 10E12/L (ref 3.8–5.2)
RBC # BLD AUTO: 2.25 10E12/L (ref 3.8–5.2)
RBC # BLD AUTO: 2.26 10E12/L (ref 3.8–5.2)
RBC # BLD AUTO: 2.28 10E12/L (ref 3.8–5.2)
RBC # BLD AUTO: 2.29 10E12/L (ref 3.8–5.2)
RBC # BLD AUTO: 2.29 10E12/L (ref 3.8–5.2)
RBC # BLD AUTO: 2.33 10E12/L (ref 3.8–5.2)
RBC # BLD AUTO: 2.35 10E12/L (ref 3.8–5.2)
RBC # BLD AUTO: 2.4 10E12/L (ref 3.8–5.2)
RBC # BLD AUTO: 2.45 10E12/L (ref 3.8–5.2)
RBC # BLD AUTO: 2.58 10E12/L (ref 3.8–5.2)
RBC # BLD AUTO: 2.7 10E12/L (ref 3.8–5.2)
RBC # BLD AUTO: 2.73 10E12/L (ref 3.8–5.2)
RBC # BLD AUTO: 2.8 10E12/L (ref 3.8–5.2)
RBC # BLD AUTO: 2.84 10E12/L (ref 3.8–5.2)
RBC # BLD AUTO: 2.86 10E12/L (ref 3.8–5.2)
RBC # BLD AUTO: 2.92 10E12/L (ref 3.8–5.2)
RETICS # AUTO: 118.1 10E9/L (ref 25–95)
RETICS # AUTO: 71.9 10E9/L (ref 25–95)
RETICS/RBC NFR AUTO: 3.3 % (ref 0.5–2)
RETICS/RBC NFR AUTO: 4.9 % (ref 0.5–2)
SARS-COV-2 PCR COMMENT: NORMAL
SARS-COV-2 RNA SPEC QL NAA+PROBE: NEGATIVE
SARS-COV-2 RNA SPEC QL NAA+PROBE: NEGATIVE
SARS-COV-2 RNA SPEC QL NAA+PROBE: NORMAL
SARS-COV-2 RNA SPEC QL NAA+PROBE: NORMAL
SODIUM SERPL-SCNC: 133 MMOL/L (ref 133–144)
SODIUM SERPL-SCNC: 134 MMOL/L (ref 133–144)
SODIUM SERPL-SCNC: 135 MMOL/L (ref 133–144)
SODIUM SERPL-SCNC: 136 MMOL/L (ref 133–144)
SODIUM SERPL-SCNC: 137 MMOL/L (ref 133–144)
SODIUM SERPL-SCNC: 138 MMOL/L (ref 133–144)
SPECIMEN EXP DATE BLD: ABNORMAL
SPECIMEN EXP DATE BLD: NORMAL
SPECIMEN SOURCE: NORMAL
TIBC SERPL-MCNC: 380 UG/DL (ref 240–430)
TIBC SERPL-MCNC: 391 UG/DL (ref 240–430)
TIBC SERPL-MCNC: 439 UG/DL (ref 240–430)
TRANSFERRIN SERPL-MCNC: 296 MG/DL (ref 210–360)
TRANSFUSION STATUS PATIENT QL: NORMAL
TROPONIN I SERPL-MCNC: <0.015 UG/L (ref 0–0.04)
TROPONIN I SERPL-MCNC: <0.015 UG/L (ref 0–0.04)
WBC # BLD AUTO: 10.6 10E9/L (ref 4–11)
WBC # BLD AUTO: 4.8 10E9/L (ref 4–11)
WBC # BLD AUTO: 5.1 10E9/L (ref 4–11)
WBC # BLD AUTO: 5.2 10E9/L (ref 4–11)
WBC # BLD AUTO: 5.4 10E9/L (ref 4–11)
WBC # BLD AUTO: 5.4 10E9/L (ref 4–11)
WBC # BLD AUTO: 5.5 10E9/L (ref 4–11)
WBC # BLD AUTO: 5.7 10E9/L (ref 4–11)
WBC # BLD AUTO: 5.8 10E9/L (ref 4–11)
WBC # BLD AUTO: 5.9 10E9/L (ref 4–11)
WBC # BLD AUTO: 5.9 10E9/L (ref 4–11)
WBC # BLD AUTO: 6 10E9/L (ref 4–11)
WBC # BLD AUTO: 6 10E9/L (ref 4–11)
WBC # BLD AUTO: 6.2 10E9/L (ref 4–11)
WBC # BLD AUTO: 6.3 10E9/L (ref 4–11)
WBC # BLD AUTO: 6.6 10E9/L (ref 4–11)
WBC # BLD AUTO: 6.7 10E9/L (ref 4–11)
WBC # BLD AUTO: 6.9 10E9/L (ref 4–11)
WBC # BLD AUTO: 7.5 10E9/L (ref 4–11)
WBC # BLD AUTO: 8.6 10E9/L (ref 4–11)

## 2020-01-01 PROCEDURE — P9016 RBC LEUKOCYTES REDUCED: HCPCS | Performed by: EMERGENCY MEDICINE

## 2020-01-01 PROCEDURE — 86900 BLOOD TYPING SEROLOGIC ABO: CPT | Performed by: EMERGENCY MEDICINE

## 2020-01-01 PROCEDURE — 25000132 ZZH RX MED GY IP 250 OP 250 PS 637: Performed by: PHYSICIAN ASSISTANT

## 2020-01-01 PROCEDURE — G0378 HOSPITAL OBSERVATION PER HR: HCPCS

## 2020-01-01 PROCEDURE — 83550 IRON BINDING TEST: CPT | Performed by: INTERNAL MEDICINE

## 2020-01-01 PROCEDURE — 83010 ASSAY OF HAPTOGLOBIN QUANT: CPT | Performed by: EMERGENCY MEDICINE

## 2020-01-01 PROCEDURE — 25000125 ZZHC RX 250: Performed by: INTERNAL MEDICINE

## 2020-01-01 PROCEDURE — 25000128 H RX IP 250 OP 636: Performed by: PHYSICIAN ASSISTANT

## 2020-01-01 PROCEDURE — 80048 BASIC METABOLIC PNL TOTAL CA: CPT | Performed by: INTERNAL MEDICINE

## 2020-01-01 PROCEDURE — 96374 THER/PROPH/DIAG INJ IV PUSH: CPT

## 2020-01-01 PROCEDURE — 86880 COOMBS TEST DIRECT: CPT | Performed by: INTERNAL MEDICINE

## 2020-01-01 PROCEDURE — 36430 TRANSFUSION BLD/BLD COMPNT: CPT

## 2020-01-01 PROCEDURE — 86900 BLOOD TYPING SEROLOGIC ABO: CPT | Performed by: INTERNAL MEDICINE

## 2020-01-01 PROCEDURE — 86922 COMPATIBILITY TEST ANTIGLOB: CPT | Performed by: EMERGENCY MEDICINE

## 2020-01-01 PROCEDURE — 96376 TX/PRO/DX INJ SAME DRUG ADON: CPT

## 2020-01-01 PROCEDURE — 85060 BLOOD SMEAR INTERPRETATION: CPT | Performed by: PHYSICIAN ASSISTANT

## 2020-01-01 PROCEDURE — 85048 AUTOMATED LEUKOCYTE COUNT: CPT | Performed by: PHYSICIAN ASSISTANT

## 2020-01-01 PROCEDURE — 36415 COLL VENOUS BLD VENIPUNCTURE: CPT | Performed by: HOSPITALIST

## 2020-01-01 PROCEDURE — 85610 PROTHROMBIN TIME: CPT | Performed by: EMERGENCY MEDICINE

## 2020-01-01 PROCEDURE — 12000000 ZZH R&B MED SURG/OB

## 2020-01-01 PROCEDURE — P9016 RBC LEUKOCYTES REDUCED: HCPCS | Performed by: INTERNAL MEDICINE

## 2020-01-01 PROCEDURE — 85025 COMPLETE CBC W/AUTO DIFF WBC: CPT | Performed by: PHYSICIAN ASSISTANT

## 2020-01-01 PROCEDURE — 40000342 ZZHCL STATISTIC ABO: Performed by: INTERNAL MEDICINE

## 2020-01-01 PROCEDURE — 86850 RBC ANTIBODY SCREEN: CPT | Performed by: INTERNAL MEDICINE

## 2020-01-01 PROCEDURE — 94640 AIRWAY INHALATION TREATMENT: CPT

## 2020-01-01 PROCEDURE — 99217 ZZC OBSERVATION CARE DISCHARGE: CPT | Performed by: PHYSICIAN ASSISTANT

## 2020-01-01 PROCEDURE — 86870 RBC ANTIBODY IDENTIFICATION: CPT | Performed by: EMERGENCY MEDICINE

## 2020-01-01 PROCEDURE — 80048 BASIC METABOLIC PNL TOTAL CA: CPT | Performed by: EMERGENCY MEDICINE

## 2020-01-01 PROCEDURE — 25000128 H RX IP 250 OP 636: Performed by: INTERNAL MEDICINE

## 2020-01-01 PROCEDURE — 86901 BLOOD TYPING SEROLOGIC RH(D): CPT | Performed by: PHYSICIAN ASSISTANT

## 2020-01-01 PROCEDURE — 85060 BLOOD SMEAR INTERPRETATION: CPT | Performed by: INTERNAL MEDICINE

## 2020-01-01 PROCEDURE — 86900 BLOOD TYPING SEROLOGIC ABO: CPT | Performed by: PHYSICIAN ASSISTANT

## 2020-01-01 PROCEDURE — 85045 AUTOMATED RETICULOCYTE COUNT: CPT | Performed by: PHYSICIAN ASSISTANT

## 2020-01-01 PROCEDURE — 86901 BLOOD TYPING SEROLOGIC RH(D): CPT | Performed by: INTERNAL MEDICINE

## 2020-01-01 PROCEDURE — 83880 ASSAY OF NATRIURETIC PEPTIDE: CPT | Performed by: PHYSICIAN ASSISTANT

## 2020-01-01 PROCEDURE — 85025 COMPLETE CBC W/AUTO DIFF WBC: CPT | Performed by: INTERNAL MEDICINE

## 2020-01-01 PROCEDURE — 250N000009 HC RX 250: Performed by: HOSPITALIST

## 2020-01-01 PROCEDURE — 93005 ELECTROCARDIOGRAM TRACING: CPT

## 2020-01-01 PROCEDURE — 99442 ZZC PHYSICIAN TELEPHONE EVALUATION 11-20 MIN: CPT | Performed by: INTERNAL MEDICINE

## 2020-01-01 PROCEDURE — 40000274 ZZH STATISTIC RCP CONSULT EA 30 MIN

## 2020-01-01 PROCEDURE — 85025 COMPLETE CBC W/AUTO DIFF WBC: CPT | Performed by: EMERGENCY MEDICINE

## 2020-01-01 PROCEDURE — 99214 OFFICE O/P EST MOD 30 MIN: CPT | Mod: 95 | Performed by: PHYSICIAN ASSISTANT

## 2020-01-01 PROCEDURE — 84145 PROCALCITONIN (PCT): CPT | Performed by: PHYSICIAN ASSISTANT

## 2020-01-01 PROCEDURE — 250N000011 HC RX IP 250 OP 636: Performed by: INTERNAL MEDICINE

## 2020-01-01 PROCEDURE — P9016 RBC LEUKOCYTES REDUCED: HCPCS | Performed by: PHYSICIAN ASSISTANT

## 2020-01-01 PROCEDURE — 999N000157 HC STATISTIC RCP TIME EA 10 MIN

## 2020-01-01 PROCEDURE — 86922 COMPATIBILITY TEST ANTIGLOB: CPT | Performed by: NURSE PRACTITIONER

## 2020-01-01 PROCEDURE — 40000275 ZZH STATISTIC RCP TIME EA 10 MIN

## 2020-01-01 PROCEDURE — 80048 BASIC METABOLIC PNL TOTAL CA: CPT | Performed by: PHYSICIAN ASSISTANT

## 2020-01-01 PROCEDURE — 99285 EMERGENCY DEPT VISIT HI MDM: CPT | Mod: 25

## 2020-01-01 PROCEDURE — 36415 COLL VENOUS BLD VENIPUNCTURE: CPT | Performed by: PHYSICIAN ASSISTANT

## 2020-01-01 PROCEDURE — 86901 BLOOD TYPING SEROLOGIC RH(D): CPT | Performed by: EMERGENCY MEDICINE

## 2020-01-01 PROCEDURE — 250N000013 HC RX MED GY IP 250 OP 250 PS 637: Performed by: HOSPITALIST

## 2020-01-01 PROCEDURE — 83540 ASSAY OF IRON: CPT | Performed by: INTERNAL MEDICINE

## 2020-01-01 PROCEDURE — 85018 HEMOGLOBIN: CPT | Mod: 91 | Performed by: HOSPITALIST

## 2020-01-01 PROCEDURE — 25000131 ZZH RX MED GY IP 250 OP 636 PS 637: Performed by: PHYSICIAN ASSISTANT

## 2020-01-01 PROCEDURE — 86922 COMPATIBILITY TEST ANTIGLOB: CPT | Performed by: INTERNAL MEDICINE

## 2020-01-01 PROCEDURE — 99207 ZZC CDG-MDM COMPONENT: MEETS MODERATE - UP CODED: CPT | Performed by: PHYSICIAN ASSISTANT

## 2020-01-01 PROCEDURE — 99239 HOSP IP/OBS DSCHRG MGMT >30: CPT | Performed by: HOSPITALIST

## 2020-01-01 PROCEDURE — 99207 ZZC NO CHARGE NURSE ONLY: CPT

## 2020-01-01 PROCEDURE — 36415 COLL VENOUS BLD VENIPUNCTURE: CPT | Performed by: INTERNAL MEDICINE

## 2020-01-01 PROCEDURE — 99220 ZZC INITIAL OBSERVATION CARE,LEVL III: CPT | Performed by: PHYSICIAN ASSISTANT

## 2020-01-01 PROCEDURE — 73030 X-RAY EXAM OF SHOULDER: CPT | Mod: LT

## 2020-01-01 PROCEDURE — 99217 ZZC OBSERVATION CARE DISCHARGE: CPT | Performed by: HOSPITALIST

## 2020-01-01 PROCEDURE — 71046 X-RAY EXAM CHEST 2 VIEWS: CPT

## 2020-01-01 PROCEDURE — P9016 RBC LEUKOCYTES REDUCED: HCPCS | Performed by: FAMILY MEDICINE

## 2020-01-01 PROCEDURE — U0003 INFECTIOUS AGENT DETECTION BY NUCLEIC ACID (DNA OR RNA); SEVERE ACUTE RESPIRATORY SYNDROME CORONAVIRUS 2 (SARS-COV-2) (CORONAVIRUS DISEASE [COVID-19]), AMPLIFIED PROBE TECHNIQUE, MAKING USE OF HIGH THROUGHPUT TECHNOLOGIES AS DESCRIBED BY CMS-2020-01-R: HCPCS | Performed by: EMERGENCY MEDICINE

## 2020-01-01 PROCEDURE — 86902 BLOOD TYPE ANTIGEN DONOR EA: CPT | Performed by: EMERGENCY MEDICINE

## 2020-01-01 PROCEDURE — 86900 BLOOD TYPING SEROLOGIC ABO: CPT | Performed by: NURSE PRACTITIONER

## 2020-01-01 PROCEDURE — C9803 HOPD COVID-19 SPEC COLLECT: HCPCS

## 2020-01-01 PROCEDURE — 85027 COMPLETE CBC AUTOMATED: CPT | Performed by: INTERNAL MEDICINE

## 2020-01-01 PROCEDURE — 36415 COLL VENOUS BLD VENIPUNCTURE: CPT | Performed by: NURSE PRACTITIONER

## 2020-01-01 PROCEDURE — 80048 BASIC METABOLIC PNL TOTAL CA: CPT | Performed by: HOSPITALIST

## 2020-01-01 PROCEDURE — 40000893 ZZH STATISTIC PT IP EVAL DEFER: Performed by: PHYSICAL THERAPIST

## 2020-01-01 PROCEDURE — 86902 BLOOD TYPE ANTIGEN DONOR EA: CPT | Performed by: INTERNAL MEDICINE

## 2020-01-01 PROCEDURE — 80076 HEPATIC FUNCTION PANEL: CPT | Performed by: INTERNAL MEDICINE

## 2020-01-01 PROCEDURE — 94640 AIRWAY INHALATION TREATMENT: CPT | Mod: 76

## 2020-01-01 PROCEDURE — 12000011 ZZH R&B MS OVERFLOW

## 2020-01-01 PROCEDURE — 25000125 ZZHC RX 250: Performed by: PHYSICIAN ASSISTANT

## 2020-01-01 PROCEDURE — 250N000011 HC RX IP 250 OP 636: Performed by: NURSE PRACTITIONER

## 2020-01-01 PROCEDURE — 71045 X-RAY EXAM CHEST 1 VIEW: CPT

## 2020-01-01 PROCEDURE — 99220 PR INITIAL OBSERVATION CARE,LEVEL III: CPT | Performed by: HOSPITALIST

## 2020-01-01 PROCEDURE — 85045 AUTOMATED RETICULOCYTE COUNT: CPT | Performed by: EMERGENCY MEDICINE

## 2020-01-01 PROCEDURE — 82728 ASSAY OF FERRITIN: CPT | Performed by: INTERNAL MEDICINE

## 2020-01-01 PROCEDURE — 40000343 ZZHCL STATISTIC RH: Performed by: INTERNAL MEDICINE

## 2020-01-01 PROCEDURE — 99214 OFFICE O/P EST MOD 30 MIN: CPT | Performed by: INTERNAL MEDICINE

## 2020-01-01 PROCEDURE — G0180 MD CERTIFICATION HHA PATIENT: HCPCS | Performed by: INTERNAL MEDICINE

## 2020-01-01 PROCEDURE — 86850 RBC ANTIBODY SCREEN: CPT | Performed by: EMERGENCY MEDICINE

## 2020-01-01 PROCEDURE — 80076 HEPATIC FUNCTION PANEL: CPT | Performed by: EMERGENCY MEDICINE

## 2020-01-01 PROCEDURE — 85018 HEMOGLOBIN: CPT | Performed by: PHYSICIAN ASSISTANT

## 2020-01-01 PROCEDURE — 86922 COMPATIBILITY TEST ANTIGLOB: CPT | Performed by: FAMILY MEDICINE

## 2020-01-01 PROCEDURE — 40000847 ZZHCL STATISTIC MORPHOLOGY W/INTERP HISTOLOGY TC 85060: Performed by: PHYSICIAN ASSISTANT

## 2020-01-01 PROCEDURE — 96375 TX/PRO/DX INJ NEW DRUG ADDON: CPT

## 2020-01-01 PROCEDURE — 86850 RBC ANTIBODY SCREEN: CPT | Performed by: FAMILY MEDICINE

## 2020-01-01 PROCEDURE — 86880 COOMBS TEST DIRECT: CPT | Performed by: PHYSICIAN ASSISTANT

## 2020-01-01 PROCEDURE — 99213 OFFICE O/P EST LOW 20 MIN: CPT | Mod: 95 | Performed by: PHYSICIAN ASSISTANT

## 2020-01-01 PROCEDURE — 99207 ZZC CDG-CODE CATEGORY CHANGED: CPT | Performed by: INTERNAL MEDICINE

## 2020-01-01 PROCEDURE — 85004 AUTOMATED DIFF WBC COUNT: CPT | Performed by: PHYSICIAN ASSISTANT

## 2020-01-01 PROCEDURE — 999N000156 HC STATISTIC RCP CONSULT EA 30 MIN

## 2020-01-01 PROCEDURE — 85018 HEMOGLOBIN: CPT | Performed by: NURSE PRACTITIONER

## 2020-01-01 PROCEDURE — 86870 RBC ANTIBODY IDENTIFICATION: CPT | Performed by: PHYSICIAN ASSISTANT

## 2020-01-01 PROCEDURE — 86902 BLOOD TYPE ANTIGEN DONOR EA: CPT | Performed by: NURSE PRACTITIONER

## 2020-01-01 PROCEDURE — 85027 COMPLETE CBC AUTOMATED: CPT | Performed by: HOSPITALIST

## 2020-01-01 PROCEDURE — 36415 COLL VENOUS BLD VENIPUNCTURE: CPT

## 2020-01-01 PROCEDURE — 86850 RBC ANTIBODY SCREEN: CPT | Performed by: PHYSICIAN ASSISTANT

## 2020-01-01 PROCEDURE — 99214 OFFICE O/P EST MOD 30 MIN: CPT | Mod: 95 | Performed by: INTERNAL MEDICINE

## 2020-01-01 PROCEDURE — P9016 RBC LEUKOCYTES REDUCED: HCPCS | Performed by: NURSE PRACTITIONER

## 2020-01-01 PROCEDURE — 99207 ZZC CDG-CODE CATEGORY CHANGED: CPT | Performed by: HOSPITALIST

## 2020-01-01 PROCEDURE — 250N000011 HC RX IP 250 OP 636: Performed by: EMERGENCY MEDICINE

## 2020-01-01 PROCEDURE — 86901 BLOOD TYPING SEROLOGIC RH(D): CPT | Performed by: NURSE PRACTITIONER

## 2020-01-01 PROCEDURE — 25000128 H RX IP 250 OP 636: Performed by: HOSPITALIST

## 2020-01-01 PROCEDURE — 40000847 ZZHCL STATISTIC MORPHOLOGY W/INTERP HISTOLOGY TC 85060: Performed by: INTERNAL MEDICINE

## 2020-01-01 PROCEDURE — 85027 COMPLETE CBC AUTOMATED: CPT | Performed by: PHYSICIAN ASSISTANT

## 2020-01-01 PROCEDURE — 36415 COLL VENOUS BLD VENIPUNCTURE: CPT | Performed by: FAMILY MEDICINE

## 2020-01-01 PROCEDURE — 83880 ASSAY OF NATRIURETIC PEPTIDE: CPT | Performed by: EMERGENCY MEDICINE

## 2020-01-01 PROCEDURE — 25000128 H RX IP 250 OP 636: Performed by: NURSE PRACTITIONER

## 2020-01-01 PROCEDURE — 86901 BLOOD TYPING SEROLOGIC RH(D): CPT | Performed by: FAMILY MEDICINE

## 2020-01-01 PROCEDURE — 86870 RBC ANTIBODY IDENTIFICATION: CPT | Performed by: INTERNAL MEDICINE

## 2020-01-01 PROCEDURE — 25000132 ZZH RX MED GY IP 250 OP 250 PS 637: Performed by: INTERNAL MEDICINE

## 2020-01-01 PROCEDURE — 83615 LACTATE (LD) (LDH) ENZYME: CPT | Performed by: EMERGENCY MEDICINE

## 2020-01-01 PROCEDURE — 85049 AUTOMATED PLATELET COUNT: CPT | Performed by: PHYSICIAN ASSISTANT

## 2020-01-01 PROCEDURE — 99495 TRANSJ CARE MGMT MOD F2F 14D: CPT | Performed by: INTERNAL MEDICINE

## 2020-01-01 PROCEDURE — 84484 ASSAY OF TROPONIN QUANT: CPT | Performed by: PHYSICIAN ASSISTANT

## 2020-01-01 PROCEDURE — 40000901 ZZH STATISTIC WOC PT EDUCATION, 0-15 MIN

## 2020-01-01 PROCEDURE — 99217 PR OBSERVATION CARE DISCHARGE: CPT | Performed by: HOSPITALIST

## 2020-01-01 PROCEDURE — 99220 ZZC INITIAL OBSERVATION CARE,LEVL III: CPT | Performed by: INTERNAL MEDICINE

## 2020-01-01 PROCEDURE — 85025 COMPLETE CBC W/AUTO DIFF WBC: CPT | Performed by: HOSPITALIST

## 2020-01-01 PROCEDURE — 99233 SBSQ HOSP IP/OBS HIGH 50: CPT | Performed by: PHYSICIAN ASSISTANT

## 2020-01-01 PROCEDURE — 99232 SBSQ HOSP IP/OBS MODERATE 35: CPT | Performed by: PHYSICIAN ASSISTANT

## 2020-01-01 PROCEDURE — 99207 ZZC CDG-CODE CATEGORY CHANGED: CPT | Performed by: PHYSICIAN ASSISTANT

## 2020-01-01 PROCEDURE — G0179 MD RECERTIFICATION HHA PT: HCPCS | Performed by: INTERNAL MEDICINE

## 2020-01-01 PROCEDURE — 82728 ASSAY OF FERRITIN: CPT | Performed by: EMERGENCY MEDICINE

## 2020-01-01 PROCEDURE — 84466 ASSAY OF TRANSFERRIN: CPT | Performed by: EMERGENCY MEDICINE

## 2020-01-01 PROCEDURE — 86850 RBC ANTIBODY SCREEN: CPT | Performed by: NURSE PRACTITIONER

## 2020-01-01 PROCEDURE — 86902 BLOOD TYPE ANTIGEN DONOR EA: CPT | Performed by: PHYSICIAN ASSISTANT

## 2020-01-01 PROCEDURE — 86900 BLOOD TYPING SEROLOGIC ABO: CPT | Performed by: FAMILY MEDICINE

## 2020-01-01 PROCEDURE — 84484 ASSAY OF TROPONIN QUANT: CPT | Performed by: EMERGENCY MEDICINE

## 2020-01-01 PROCEDURE — 250N000011 HC RX IP 250 OP 636: Performed by: PHYSICIAN ASSISTANT

## 2020-01-01 PROCEDURE — 87635 SARS-COV-2 COVID-19 AMP PRB: CPT | Performed by: PHYSICIAN ASSISTANT

## 2020-01-01 PROCEDURE — 86922 COMPATIBILITY TEST ANTIGLOB: CPT | Performed by: PHYSICIAN ASSISTANT

## 2020-01-01 PROCEDURE — 99225 ZZC SUBSEQUENT OBSERVATION CARE,LEVEL II: CPT | Performed by: HOSPITALIST

## 2020-01-01 RX ORDER — HEPARIN SODIUM (PORCINE) LOCK FLUSH IV SOLN 100 UNIT/ML 100 UNIT/ML
5 SOLUTION INTRAVENOUS
Status: CANCELLED | OUTPATIENT
Start: 2020-01-01

## 2020-01-01 RX ORDER — ALBUTEROL SULFATE 0.83 MG/ML
2.5 SOLUTION RESPIRATORY (INHALATION)
Status: DISCONTINUED | OUTPATIENT
Start: 2020-01-01 | End: 2020-01-01 | Stop reason: HOSPADM

## 2020-01-01 RX ORDER — HEPARIN SODIUM,PORCINE 10 UNIT/ML
5 VIAL (ML) INTRAVENOUS
Status: CANCELLED | OUTPATIENT
Start: 2020-01-01

## 2020-01-01 RX ORDER — ALBUTEROL SULFATE 0.83 MG/ML
2.5 SOLUTION RESPIRATORY (INHALATION) 4 TIMES DAILY PRN
Status: DISCONTINUED | OUTPATIENT
Start: 2020-01-01 | End: 2020-01-01 | Stop reason: HOSPADM

## 2020-01-01 RX ORDER — AMOXICILLIN 250 MG
2 CAPSULE ORAL 2 TIMES DAILY PRN
Status: DISCONTINUED | OUTPATIENT
Start: 2020-01-01 | End: 2020-01-01 | Stop reason: HOSPADM

## 2020-01-01 RX ORDER — ACETAMINOPHEN 325 MG/1
650 TABLET ORAL EVERY 4 HOURS PRN
Status: DISCONTINUED | OUTPATIENT
Start: 2020-01-01 | End: 2020-01-01 | Stop reason: HOSPADM

## 2020-01-01 RX ORDER — SPIRONOLACTONE 25 MG/1
50 TABLET ORAL DAILY
Status: DISCONTINUED | OUTPATIENT
Start: 2020-01-01 | End: 2020-01-01 | Stop reason: HOSPADM

## 2020-01-01 RX ORDER — FUROSEMIDE 10 MG/ML
40 INJECTION INTRAMUSCULAR; INTRAVENOUS ONCE
Status: COMPLETED | OUTPATIENT
Start: 2020-01-01 | End: 2020-01-01

## 2020-01-01 RX ORDER — TORSEMIDE 20 MG/1
20 TABLET ORAL
COMMUNITY
End: 2020-01-01

## 2020-01-01 RX ORDER — BISACODYL 10 MG
10 SUPPOSITORY, RECTAL RECTAL DAILY PRN
Status: DISCONTINUED | OUTPATIENT
Start: 2020-01-01 | End: 2020-01-01 | Stop reason: HOSPADM

## 2020-01-01 RX ORDER — FUROSEMIDE 10 MG/ML
20 INJECTION INTRAMUSCULAR; INTRAVENOUS ONCE
Status: CANCELLED
Start: 2020-01-01

## 2020-01-01 RX ORDER — ALBUTEROL SULFATE 0.83 MG/ML
2.5 SOLUTION RESPIRATORY (INHALATION) 4 TIMES DAILY
Qty: 120 VIAL | Refills: 11 | Status: SHIPPED | OUTPATIENT
Start: 2020-01-01 | End: 2020-01-01

## 2020-01-01 RX ORDER — FUROSEMIDE 10 MG/ML
20 INJECTION INTRAMUSCULAR; INTRAVENOUS ONCE
Status: COMPLETED | OUTPATIENT
Start: 2020-01-01 | End: 2020-01-01

## 2020-01-01 RX ORDER — VENLAFAXINE HYDROCHLORIDE 75 MG/1
75 CAPSULE, EXTENDED RELEASE ORAL DAILY
Status: DISCONTINUED | OUTPATIENT
Start: 2020-01-01 | End: 2020-01-01 | Stop reason: HOSPADM

## 2020-01-01 RX ORDER — ONDANSETRON 2 MG/ML
4 INJECTION INTRAMUSCULAR; INTRAVENOUS EVERY 6 HOURS PRN
Status: DISCONTINUED | OUTPATIENT
Start: 2020-01-01 | End: 2020-01-01 | Stop reason: HOSPADM

## 2020-01-01 RX ORDER — VENLAFAXINE HYDROCHLORIDE 75 MG/1
CAPSULE, EXTENDED RELEASE ORAL
Qty: 90 CAPSULE | Refills: 1 | Status: SHIPPED | OUTPATIENT
Start: 2020-01-01

## 2020-01-01 RX ORDER — ACETAMINOPHEN 325 MG/1
650 TABLET ORAL 3 TIMES DAILY PRN
Status: DISCONTINUED | OUTPATIENT
Start: 2020-01-01 | End: 2020-01-01

## 2020-01-01 RX ORDER — HYDRALAZINE HYDROCHLORIDE 25 MG/1
25 TABLET, FILM COATED ORAL EVERY 8 HOURS SCHEDULED
Status: DISCONTINUED | OUTPATIENT
Start: 2020-01-01 | End: 2020-01-01 | Stop reason: HOSPADM

## 2020-01-01 RX ORDER — GABAPENTIN 100 MG/1
CAPSULE ORAL
Qty: 270 CAPSULE | Refills: 3 | Status: SHIPPED | OUTPATIENT
Start: 2020-01-01 | End: 2020-01-01

## 2020-01-01 RX ORDER — NALOXONE HYDROCHLORIDE 0.4 MG/ML
.1-.4 INJECTION, SOLUTION INTRAMUSCULAR; INTRAVENOUS; SUBCUTANEOUS
Status: DISCONTINUED | OUTPATIENT
Start: 2020-01-01 | End: 2020-01-01 | Stop reason: HOSPADM

## 2020-01-01 RX ORDER — TORSEMIDE 20 MG/1
60 TABLET ORAL DAILY
Status: ON HOLD | COMMUNITY
End: 2020-01-01

## 2020-01-01 RX ORDER — DIPHENHYDRAMINE HYDROCHLORIDE 50 MG/ML
25-50 INJECTION INTRAMUSCULAR; INTRAVENOUS
Status: DISCONTINUED | OUTPATIENT
Start: 2020-01-01 | End: 2020-01-01 | Stop reason: HOSPADM

## 2020-01-01 RX ORDER — PROCHLORPERAZINE 25 MG
12.5 SUPPOSITORY, RECTAL RECTAL EVERY 12 HOURS PRN
Status: CANCELLED | OUTPATIENT
Start: 2020-01-01

## 2020-01-01 RX ORDER — SIMVASTATIN 10 MG
TABLET ORAL
Qty: 90 TABLET | Refills: 0 | Status: SHIPPED | OUTPATIENT
Start: 2020-01-01 | End: 2020-01-01

## 2020-01-01 RX ORDER — TORSEMIDE 20 MG/1
40 TABLET ORAL DAILY
COMMUNITY
End: 2020-01-01

## 2020-01-01 RX ORDER — VALACYCLOVIR HYDROCHLORIDE 500 MG/1
500 TABLET, FILM COATED ORAL 3 TIMES DAILY
Qty: 21 TABLET | Refills: 0 | Status: SHIPPED | OUTPATIENT
Start: 2020-01-01 | End: 2020-01-01

## 2020-01-01 RX ORDER — TORSEMIDE 20 MG/1
40 TABLET ORAL
COMMUNITY
Start: 2021-01-01

## 2020-01-01 RX ORDER — VENLAFAXINE HYDROCHLORIDE 37.5 MG/1
75 CAPSULE, EXTENDED RELEASE ORAL DAILY
Status: DISCONTINUED | OUTPATIENT
Start: 2020-01-01 | End: 2020-01-01 | Stop reason: HOSPADM

## 2020-01-01 RX ORDER — IPRATROPIUM BROMIDE AND ALBUTEROL SULFATE 2.5; .5 MG/3ML; MG/3ML
3 SOLUTION RESPIRATORY (INHALATION) EVERY 4 HOURS PRN
Status: DISCONTINUED | OUTPATIENT
Start: 2020-01-01 | End: 2020-01-01 | Stop reason: HOSPADM

## 2020-01-01 RX ORDER — FUROSEMIDE 10 MG/ML
40 INJECTION INTRAMUSCULAR; INTRAVENOUS SEE ADMIN INSTRUCTIONS
Status: CANCELLED | OUTPATIENT
Start: 2020-01-01

## 2020-01-01 RX ORDER — GABAPENTIN 100 MG/1
100 CAPSULE ORAL 3 TIMES DAILY
Status: DISCONTINUED | OUTPATIENT
Start: 2020-01-01 | End: 2020-01-01 | Stop reason: HOSPADM

## 2020-01-01 RX ORDER — FERROUS SULFATE 325(65) MG
325 TABLET ORAL 2 TIMES DAILY
Status: DISCONTINUED | OUTPATIENT
Start: 2020-01-01 | End: 2020-01-01 | Stop reason: HOSPADM

## 2020-01-01 RX ORDER — GABAPENTIN 100 MG/1
100 CAPSULE ORAL 2 TIMES DAILY
Status: DISCONTINUED | OUTPATIENT
Start: 2020-01-01 | End: 2020-01-01 | Stop reason: HOSPADM

## 2020-01-01 RX ORDER — HYDRALAZINE HYDROCHLORIDE 25 MG/1
75 TABLET, FILM COATED ORAL 3 TIMES DAILY
COMMUNITY
End: 2020-01-01

## 2020-01-01 RX ORDER — AMOXICILLIN 250 MG
1 CAPSULE ORAL 2 TIMES DAILY
Status: DISCONTINUED | OUTPATIENT
Start: 2020-01-01 | End: 2020-01-01 | Stop reason: HOSPADM

## 2020-01-01 RX ORDER — METHYLPREDNISOLONE SODIUM SUCCINATE 125 MG/2ML
125 INJECTION, POWDER, LYOPHILIZED, FOR SOLUTION INTRAMUSCULAR; INTRAVENOUS
Status: DISCONTINUED | OUTPATIENT
Start: 2020-01-01 | End: 2020-01-01

## 2020-01-01 RX ORDER — FUROSEMIDE 10 MG/ML
20 INJECTION INTRAMUSCULAR; INTRAVENOUS ONCE
Status: CANCELLED
Start: 2020-01-01 | End: 2020-01-01

## 2020-01-01 RX ORDER — NALOXONE HYDROCHLORIDE 0.4 MG/ML
.1-.4 INJECTION, SOLUTION INTRAMUSCULAR; INTRAVENOUS; SUBCUTANEOUS
Status: CANCELLED | OUTPATIENT
Start: 2020-01-01

## 2020-01-01 RX ORDER — TORSEMIDE 20 MG/1
TABLET ORAL
Qty: 270 TABLET | Refills: 0 | Status: SHIPPED | OUTPATIENT
Start: 2020-01-01 | End: 2020-01-01

## 2020-01-01 RX ORDER — ACETAMINOPHEN 500 MG
1000 TABLET ORAL
Status: CANCELLED | OUTPATIENT
Start: 2020-01-01

## 2020-01-01 RX ORDER — SPIRONOLACTONE 50 MG/1
50 TABLET, FILM COATED ORAL DAILY
Qty: 90 TABLET | Refills: 3 | Status: ON HOLD | OUTPATIENT
Start: 2020-01-01 | End: 2021-01-01

## 2020-01-01 RX ORDER — TORSEMIDE 20 MG/1
40 TABLET ORAL EVERY MORNING
COMMUNITY
End: 2020-01-01

## 2020-01-01 RX ORDER — TORSEMIDE 20 MG/1
20 TABLET ORAL DAILY
Status: DISCONTINUED | OUTPATIENT
Start: 2020-01-01 | End: 2020-01-01 | Stop reason: HOSPADM

## 2020-01-01 RX ORDER — LIDOCAINE 40 MG/G
CREAM TOPICAL
Status: DISCONTINUED | OUTPATIENT
Start: 2020-01-01 | End: 2020-01-01 | Stop reason: HOSPADM

## 2020-01-01 RX ORDER — TORSEMIDE 20 MG/1
20 TABLET ORAL
Status: DISCONTINUED | OUTPATIENT
Start: 2020-01-01 | End: 2020-01-01 | Stop reason: HOSPADM

## 2020-01-01 RX ORDER — AMOXICILLIN 250 MG
2 CAPSULE ORAL 2 TIMES DAILY
Status: DISCONTINUED | OUTPATIENT
Start: 2020-01-01 | End: 2020-01-01 | Stop reason: HOSPADM

## 2020-01-01 RX ORDER — AMOXICILLIN 250 MG
2 CAPSULE ORAL 2 TIMES DAILY PRN
Status: CANCELLED | OUTPATIENT
Start: 2020-01-01

## 2020-01-01 RX ORDER — HEPARIN SODIUM (PORCINE) LOCK FLUSH IV SOLN 100 UNIT/ML 100 UNIT/ML
5 SOLUTION INTRAVENOUS
Status: CANCELLED
Start: 2020-01-01

## 2020-01-01 RX ORDER — ACETAMINOPHEN 650 MG/1
650 SUPPOSITORY RECTAL EVERY 4 HOURS PRN
Status: DISCONTINUED | OUTPATIENT
Start: 2020-01-01 | End: 2020-01-01 | Stop reason: HOSPADM

## 2020-01-01 RX ORDER — AMOXICILLIN 250 MG
1 CAPSULE ORAL 2 TIMES DAILY PRN
Status: DISCONTINUED | OUTPATIENT
Start: 2020-01-01 | End: 2020-01-01 | Stop reason: HOSPADM

## 2020-01-01 RX ORDER — ONDANSETRON 4 MG/1
4 TABLET, ORALLY DISINTEGRATING ORAL EVERY 6 HOURS PRN
Status: DISCONTINUED | OUTPATIENT
Start: 2020-01-01 | End: 2020-01-01 | Stop reason: HOSPADM

## 2020-01-01 RX ORDER — POLYETHYLENE GLYCOL 3350 17 G/17G
17 POWDER, FOR SOLUTION ORAL DAILY
Status: DISCONTINUED | OUTPATIENT
Start: 2020-01-01 | End: 2020-01-01 | Stop reason: HOSPADM

## 2020-01-01 RX ORDER — METHYLPREDNISOLONE SODIUM SUCCINATE 125 MG/2ML
125 INJECTION, POWDER, LYOPHILIZED, FOR SOLUTION INTRAMUSCULAR; INTRAVENOUS
Status: CANCELLED | OUTPATIENT
Start: 2020-01-01

## 2020-01-01 RX ORDER — FUROSEMIDE 10 MG/ML
40 INJECTION INTRAMUSCULAR; INTRAVENOUS 2 TIMES DAILY
Status: COMPLETED | OUTPATIENT
Start: 2020-01-01 | End: 2020-01-01

## 2020-01-01 RX ORDER — ALBUTEROL SULFATE 0.83 MG/ML
1.25 SOLUTION RESPIRATORY (INHALATION) 3 TIMES DAILY
Status: DISCONTINUED | OUTPATIENT
Start: 2020-01-01 | End: 2020-01-01

## 2020-01-01 RX ORDER — IPRATROPIUM BROMIDE AND ALBUTEROL SULFATE 2.5; .5 MG/3ML; MG/3ML
3 SOLUTION RESPIRATORY (INHALATION) EVERY 4 HOURS PRN
Status: DISCONTINUED | OUTPATIENT
Start: 2020-01-01 | End: 2020-01-01

## 2020-01-01 RX ORDER — PROCHLORPERAZINE MALEATE 5 MG
5-10 TABLET ORAL EVERY 6 HOURS PRN
Status: CANCELLED | OUTPATIENT
Start: 2020-01-01

## 2020-01-01 RX ORDER — SIMVASTATIN 10 MG
10 TABLET ORAL AT BEDTIME
Status: DISCONTINUED | OUTPATIENT
Start: 2020-01-01 | End: 2020-01-01 | Stop reason: HOSPADM

## 2020-01-01 RX ORDER — ALBUTEROL SULFATE 90 UG/1
2 AEROSOL, METERED RESPIRATORY (INHALATION) EVERY 6 HOURS PRN
Status: DISCONTINUED | OUTPATIENT
Start: 2020-01-01 | End: 2020-01-01 | Stop reason: HOSPADM

## 2020-01-01 RX ORDER — HYDRALAZINE HYDROCHLORIDE 25 MG/1
75 TABLET, FILM COATED ORAL 3 TIMES DAILY
Status: DISCONTINUED | OUTPATIENT
Start: 2020-01-01 | End: 2020-01-01 | Stop reason: HOSPADM

## 2020-01-01 RX ORDER — FUROSEMIDE 10 MG/ML
40 INJECTION INTRAMUSCULAR; INTRAVENOUS ONCE
Status: CANCELLED
Start: 2020-01-01 | End: 2020-01-01

## 2020-01-01 RX ORDER — HYDRALAZINE HYDROCHLORIDE 25 MG/1
25 TABLET, FILM COATED ORAL EVERY 8 HOURS SCHEDULED
Status: DISCONTINUED | OUTPATIENT
Start: 2020-01-01 | End: 2020-01-01

## 2020-01-01 RX ORDER — ALBUTEROL SULFATE 90 UG/1
2 AEROSOL, METERED RESPIRATORY (INHALATION) EVERY 6 HOURS
Qty: 1 INHALER | Refills: 4 | Status: ON HOLD | OUTPATIENT
Start: 2020-01-01 | End: 2021-01-01

## 2020-01-01 RX ORDER — ALBUTEROL SULFATE 0.83 MG/ML
2.5 SOLUTION RESPIRATORY (INHALATION)
Status: DISCONTINUED | OUTPATIENT
Start: 2020-01-01 | End: 2020-01-01

## 2020-01-01 RX ORDER — ALBUTEROL SULFATE 0.83 MG/ML
2.5 SOLUTION RESPIRATORY (INHALATION) 4 TIMES DAILY
Qty: 120 VIAL | Refills: 11 | Status: SHIPPED | OUTPATIENT
Start: 2020-01-01 | End: 2021-01-01

## 2020-01-01 RX ORDER — POLYETHYLENE GLYCOL 3350 17 G/17G
17 POWDER, FOR SOLUTION ORAL DAILY
Status: DISCONTINUED | OUTPATIENT
Start: 2020-01-01 | End: 2020-01-01

## 2020-01-01 RX ORDER — ALBUTEROL SULFATE 0.83 MG/ML
2.5 SOLUTION RESPIRATORY (INHALATION) 4 TIMES DAILY
Status: DISCONTINUED | OUTPATIENT
Start: 2020-01-01 | End: 2020-01-01 | Stop reason: HOSPADM

## 2020-01-01 RX ORDER — GABAPENTIN 100 MG/1
100 CAPSULE ORAL 2 TIMES DAILY
Status: ON HOLD | COMMUNITY
End: 2021-01-01

## 2020-01-01 RX ORDER — ALBUTEROL SULFATE 90 UG/1
2 AEROSOL, METERED RESPIRATORY (INHALATION) EVERY 6 HOURS
Status: DISCONTINUED | OUTPATIENT
Start: 2020-01-01 | End: 2020-01-01 | Stop reason: ALTCHOICE

## 2020-01-01 RX ORDER — IPRATROPIUM BROMIDE AND ALBUTEROL SULFATE 2.5; .5 MG/3ML; MG/3ML
3 SOLUTION RESPIRATORY (INHALATION) 4 TIMES DAILY
Status: DISCONTINUED | OUTPATIENT
Start: 2020-01-01 | End: 2020-01-01 | Stop reason: HOSPADM

## 2020-01-01 RX ORDER — HYDRALAZINE HYDROCHLORIDE 25 MG/1
25 TABLET, FILM COATED ORAL 3 TIMES DAILY
Status: DISCONTINUED | OUTPATIENT
Start: 2020-01-01 | End: 2020-01-01

## 2020-01-01 RX ORDER — TORSEMIDE 20 MG/1
40 TABLET ORAL DAILY
Status: DISCONTINUED | OUTPATIENT
Start: 2020-01-01 | End: 2020-01-01 | Stop reason: HOSPADM

## 2020-01-01 RX ORDER — PROCHLORPERAZINE 25 MG
12.5 SUPPOSITORY, RECTAL RECTAL EVERY 12 HOURS PRN
Status: DISCONTINUED | OUTPATIENT
Start: 2020-01-01 | End: 2020-01-01 | Stop reason: HOSPADM

## 2020-01-01 RX ORDER — DIPHENHYDRAMINE HCL 25 MG
50 CAPSULE ORAL
Status: CANCELLED | OUTPATIENT
Start: 2020-01-01

## 2020-01-01 RX ORDER — TORSEMIDE 20 MG/1
40 TABLET ORAL EVERY MORNING
Status: DISCONTINUED | OUTPATIENT
Start: 2020-01-01 | End: 2020-01-01 | Stop reason: HOSPADM

## 2020-01-01 RX ORDER — ACETAMINOPHEN 325 MG/1
650 TABLET ORAL EVERY 4 HOURS PRN
Status: CANCELLED | OUTPATIENT
Start: 2020-01-01

## 2020-01-01 RX ORDER — TORSEMIDE 20 MG/1
40 TABLET ORAL EVERY MORNING
Status: CANCELLED | OUTPATIENT
Start: 2020-01-01

## 2020-01-01 RX ORDER — FUROSEMIDE 10 MG/ML
20 INJECTION INTRAMUSCULAR; INTRAVENOUS SEE ADMIN INSTRUCTIONS
Status: DISCONTINUED | OUTPATIENT
Start: 2020-01-01 | End: 2020-01-01

## 2020-01-01 RX ORDER — SIMVASTATIN 10 MG
TABLET ORAL
Qty: 90 TABLET | Refills: 0 | OUTPATIENT
Start: 2020-01-01

## 2020-01-01 RX ORDER — FUROSEMIDE 10 MG/ML
40 INJECTION INTRAMUSCULAR; INTRAVENOUS SEE ADMIN INSTRUCTIONS
Status: DISCONTINUED | OUTPATIENT
Start: 2020-01-01 | End: 2020-01-01

## 2020-01-01 RX ORDER — FUROSEMIDE 10 MG/ML
20 INJECTION INTRAMUSCULAR; INTRAVENOUS
Status: COMPLETED | OUTPATIENT
Start: 2020-01-01 | End: 2020-01-01

## 2020-01-01 RX ORDER — AMOXICILLIN 250 MG
1 CAPSULE ORAL 2 TIMES DAILY
Status: DISCONTINUED | OUTPATIENT
Start: 2020-01-01 | End: 2020-01-01

## 2020-01-01 RX ORDER — ALBUTEROL SULFATE 0.83 MG/ML
2.5 SOLUTION RESPIRATORY (INHALATION) EVERY 4 HOURS PRN
Status: DISCONTINUED | OUTPATIENT
Start: 2020-01-01 | End: 2020-01-01 | Stop reason: HOSPADM

## 2020-01-01 RX ORDER — HEPARIN SODIUM,PORCINE 10 UNIT/ML
5 VIAL (ML) INTRAVENOUS
Status: CANCELLED
Start: 2020-01-01

## 2020-01-01 RX ORDER — HYDRALAZINE HYDROCHLORIDE 25 MG/1
TABLET, FILM COATED ORAL
Qty: 810 TABLET | Refills: 0 | Status: SHIPPED | OUTPATIENT
Start: 2020-01-01 | End: 2021-01-01

## 2020-01-01 RX ORDER — ONDANSETRON 4 MG/1
4 TABLET, ORALLY DISINTEGRATING ORAL EVERY 6 HOURS PRN
Status: CANCELLED | OUTPATIENT
Start: 2020-01-01

## 2020-01-01 RX ORDER — LANOLIN ALCOHOL/MO/W.PET/CERES
3 CREAM (GRAM) TOPICAL
Status: CANCELLED | OUTPATIENT
Start: 2020-01-01

## 2020-01-01 RX ORDER — ALBUTEROL SULFATE 0.83 MG/ML
SOLUTION RESPIRATORY (INHALATION)
Qty: 90 VIAL | Refills: 11 | Status: SHIPPED | OUTPATIENT
Start: 2020-01-01 | End: 2020-01-01

## 2020-01-01 RX ORDER — ONDANSETRON 2 MG/ML
4 INJECTION INTRAMUSCULAR; INTRAVENOUS EVERY 6 HOURS PRN
Status: CANCELLED | OUTPATIENT
Start: 2020-01-01

## 2020-01-01 RX ORDER — DIPHENHYDRAMINE HYDROCHLORIDE 50 MG/ML
50 INJECTION INTRAMUSCULAR; INTRAVENOUS
Status: CANCELLED | OUTPATIENT
Start: 2020-01-01

## 2020-01-01 RX ORDER — HYDRALAZINE HYDROCHLORIDE 25 MG/1
TABLET, FILM COATED ORAL
Qty: 810 TABLET | Refills: 0 | Status: SHIPPED | OUTPATIENT
Start: 2020-01-01 | End: 2020-01-01

## 2020-01-01 RX ORDER — HYDRALAZINE HYDROCHLORIDE 20 MG/ML
5 INJECTION INTRAMUSCULAR; INTRAVENOUS EVERY 4 HOURS PRN
Status: CANCELLED | OUTPATIENT
Start: 2020-01-01

## 2020-01-01 RX ORDER — LACTULOSE 10 G/15ML
10 SOLUTION ORAL ONCE
Status: COMPLETED | OUTPATIENT
Start: 2020-01-01 | End: 2020-01-01

## 2020-01-01 RX ORDER — AMOXICILLIN 250 MG
2 CAPSULE ORAL
Status: DISCONTINUED | OUTPATIENT
Start: 2020-01-01 | End: 2020-01-01 | Stop reason: HOSPADM

## 2020-01-01 RX ORDER — ACETAMINOPHEN 325 MG/1
650 TABLET ORAL 3 TIMES DAILY PRN
Status: DISCONTINUED | OUTPATIENT
Start: 2020-01-01 | End: 2020-01-01 | Stop reason: HOSPADM

## 2020-01-01 RX ORDER — AMOXICILLIN 250 MG
1 CAPSULE ORAL 2 TIMES DAILY PRN
Status: CANCELLED | OUTPATIENT
Start: 2020-01-01

## 2020-01-01 RX ORDER — LANOLIN ALCOHOL/MO/W.PET/CERES
3 CREAM (GRAM) TOPICAL
Status: DISCONTINUED | OUTPATIENT
Start: 2020-01-01 | End: 2020-01-01 | Stop reason: HOSPADM

## 2020-01-01 RX ORDER — TORSEMIDE 20 MG/1
20 TABLET ORAL EVERY EVENING
Status: DISCONTINUED | OUTPATIENT
Start: 2020-01-01 | End: 2020-01-01

## 2020-01-01 RX ORDER — FUROSEMIDE 10 MG/ML
40 INJECTION INTRAMUSCULAR; INTRAVENOUS 2 TIMES DAILY
Status: DISCONTINUED | OUTPATIENT
Start: 2020-01-01 | End: 2020-01-01

## 2020-01-01 RX ORDER — PROCHLORPERAZINE MALEATE 5 MG
5 TABLET ORAL EVERY 6 HOURS PRN
Status: DISCONTINUED | OUTPATIENT
Start: 2020-01-01 | End: 2020-01-01 | Stop reason: HOSPADM

## 2020-01-01 RX ORDER — TORSEMIDE 20 MG/1
40 TABLET ORAL EVERY MORNING
Status: DISCONTINUED | OUTPATIENT
Start: 2020-01-01 | End: 2020-01-01

## 2020-01-01 RX ORDER — ACETAMINOPHEN 500 MG
1000 TABLET ORAL
Status: DISCONTINUED | OUTPATIENT
Start: 2020-01-01 | End: 2020-01-01 | Stop reason: HOSPADM

## 2020-01-01 RX ORDER — BISACODYL 10 MG
10 SUPPOSITORY, RECTAL RECTAL DAILY PRN
Status: CANCELLED | OUTPATIENT
Start: 2020-01-01

## 2020-01-01 RX ORDER — MAGNESIUM CARB/ALUMINUM HYDROX 105-160MG
296 TABLET,CHEWABLE ORAL
Status: DISCONTINUED | OUTPATIENT
Start: 2020-01-01 | End: 2020-01-01 | Stop reason: HOSPADM

## 2020-01-01 RX ORDER — TORSEMIDE 20 MG/1
TABLET ORAL
Qty: 270 TABLET | Refills: 3 | Status: SHIPPED | OUTPATIENT
Start: 2020-01-01 | End: 2020-01-01

## 2020-01-01 RX ORDER — PREDNISONE 20 MG/1
40 TABLET ORAL DAILY
Status: DISCONTINUED | OUTPATIENT
Start: 2020-01-01 | End: 2020-01-01 | Stop reason: HOSPADM

## 2020-01-01 RX ORDER — CEPHALEXIN 500 MG/1
500 CAPSULE ORAL 3 TIMES DAILY
Qty: 30 CAPSULE | Refills: 0 | Status: SHIPPED | OUTPATIENT
Start: 2020-01-01 | End: 2020-01-01

## 2020-01-01 RX ADMIN — FUROSEMIDE 20 MG: 10 INJECTION, SOLUTION INTRAVENOUS at 13:45

## 2020-01-01 RX ADMIN — GABAPENTIN 100 MG: 100 CAPSULE ORAL at 19:43

## 2020-01-01 RX ADMIN — SIMVASTATIN 10 MG: 10 TABLET, FILM COATED ORAL at 22:25

## 2020-01-01 RX ADMIN — TORSEMIDE 20 MG: 20 TABLET ORAL at 14:53

## 2020-01-01 RX ADMIN — FUROSEMIDE 20 MG: 10 INJECTION, SOLUTION INTRAVENOUS at 16:12

## 2020-01-01 RX ADMIN — SPIRONOLACTONE 50 MG: 25 TABLET ORAL at 08:52

## 2020-01-01 RX ADMIN — OMEPRAZOLE 20 MG: 20 CAPSULE, DELAYED RELEASE ORAL at 07:51

## 2020-01-01 RX ADMIN — FUROSEMIDE 40 MG: 10 INJECTION, SOLUTION INTRAMUSCULAR; INTRAVENOUS at 10:38

## 2020-01-01 RX ADMIN — SPIRONOLACTONE 50 MG: 25 TABLET ORAL at 08:59

## 2020-01-01 RX ADMIN — OMEPRAZOLE 20 MG: 20 CAPSULE, DELAYED RELEASE ORAL at 06:36

## 2020-01-01 RX ADMIN — HYDRALAZINE HYDROCHLORIDE 75 MG: 25 TABLET, FILM COATED ORAL at 22:25

## 2020-01-01 RX ADMIN — OMEPRAZOLE 20 MG: 20 CAPSULE, DELAYED RELEASE ORAL at 09:00

## 2020-01-01 RX ADMIN — AMOXICILLIN AND CLAVULANATE POTASSIUM 1 TABLET: 875; 125 TABLET, FILM COATED ORAL at 07:51

## 2020-01-01 RX ADMIN — TORSEMIDE 40 MG: 20 TABLET ORAL at 08:36

## 2020-01-01 RX ADMIN — GABAPENTIN 100 MG: 100 CAPSULE ORAL at 19:54

## 2020-01-01 RX ADMIN — LACTULOSE 10 G: 20 SOLUTION ORAL at 18:20

## 2020-01-01 RX ADMIN — VENLAFAXINE HYDROCHLORIDE 75 MG: 75 CAPSULE, EXTENDED RELEASE ORAL at 08:15

## 2020-01-01 RX ADMIN — ACETAMINOPHEN 650 MG: 325 TABLET, FILM COATED ORAL at 13:10

## 2020-01-01 RX ADMIN — FERROUS SULFATE TAB 325 MG (65 MG ELEMENTAL FE) 325 MG: 325 (65 FE) TAB at 09:12

## 2020-01-01 RX ADMIN — FUROSEMIDE 20 MG: 10 INJECTION, SOLUTION INTRAVENOUS at 14:31

## 2020-01-01 RX ADMIN — FUROSEMIDE 20 MG: 10 INJECTION, SOLUTION INTRAVENOUS at 14:21

## 2020-01-01 RX ADMIN — GABAPENTIN 100 MG: 100 CAPSULE ORAL at 20:31

## 2020-01-01 RX ADMIN — FUROSEMIDE 20 MG: 10 INJECTION, SOLUTION INTRAVENOUS at 11:39

## 2020-01-01 RX ADMIN — HYDRALAZINE HYDROCHLORIDE 75 MG: 25 TABLET, FILM COATED ORAL at 06:49

## 2020-01-01 RX ADMIN — FUROSEMIDE 20 MG: 10 INJECTION, SOLUTION INTRAVENOUS at 12:28

## 2020-01-01 RX ADMIN — HYDRALAZINE HYDROCHLORIDE 25 MG: 25 TABLET, FILM COATED ORAL at 13:10

## 2020-01-01 RX ADMIN — ALBUTEROL SULFATE 2.5 MG: 2.5 SOLUTION RESPIRATORY (INHALATION) at 11:35

## 2020-01-01 RX ADMIN — VENLAFAXINE HYDROCHLORIDE 75 MG: 75 CAPSULE, EXTENDED RELEASE ORAL at 18:20

## 2020-01-01 RX ADMIN — OMEPRAZOLE 20 MG: 20 CAPSULE, DELAYED RELEASE ORAL at 08:15

## 2020-01-01 RX ADMIN — DOCUSATE SODIUM 50 MG AND SENNOSIDES 8.6 MG 2 TABLET: 8.6; 5 TABLET, FILM COATED ORAL at 08:59

## 2020-01-01 RX ADMIN — FUROSEMIDE 40 MG: 10 INJECTION, SOLUTION INTRAMUSCULAR; INTRAVENOUS at 22:53

## 2020-01-01 RX ADMIN — SIMVASTATIN 10 MG: 10 TABLET, FILM COATED ORAL at 22:45

## 2020-01-01 RX ADMIN — VENLAFAXINE HYDROCHLORIDE 75 MG: 75 CAPSULE, EXTENDED RELEASE ORAL at 08:38

## 2020-01-01 RX ADMIN — GABAPENTIN 100 MG: 100 CAPSULE ORAL at 09:12

## 2020-01-01 RX ADMIN — HYDRALAZINE HYDROCHLORIDE 25 MG: 25 TABLET, FILM COATED ORAL at 21:40

## 2020-01-01 RX ADMIN — SPIRONOLACTONE 50 MG: 25 TABLET ORAL at 08:15

## 2020-01-01 RX ADMIN — HYDRALAZINE HYDROCHLORIDE 25 MG: 25 TABLET, FILM COATED ORAL at 22:46

## 2020-01-01 RX ADMIN — OMEPRAZOLE 20 MG: 20 CAPSULE, DELAYED RELEASE ORAL at 08:37

## 2020-01-01 RX ADMIN — ALBUTEROL SULFATE 2.5 MG: 2.5 SOLUTION RESPIRATORY (INHALATION) at 20:34

## 2020-01-01 RX ADMIN — TORSEMIDE 40 MG: 20 TABLET ORAL at 18:20

## 2020-01-01 RX ADMIN — SIMVASTATIN 10 MG: 10 TABLET, FILM COATED ORAL at 21:43

## 2020-01-01 RX ADMIN — GABAPENTIN 100 MG: 100 CAPSULE ORAL at 18:20

## 2020-01-01 RX ADMIN — OMEPRAZOLE 20 MG: 20 CAPSULE, DELAYED RELEASE ORAL at 18:20

## 2020-01-01 RX ADMIN — FUROSEMIDE 40 MG: 10 INJECTION, SOLUTION INTRAMUSCULAR; INTRAVENOUS at 10:11

## 2020-01-01 RX ADMIN — GUAIFENESIN 10 ML: 100 SOLUTION ORAL at 15:50

## 2020-01-01 RX ADMIN — HYDRALAZINE HYDROCHLORIDE 25 MG: 25 TABLET, FILM COATED ORAL at 21:29

## 2020-01-01 RX ADMIN — TORSEMIDE 40 MG: 20 TABLET ORAL at 08:37

## 2020-01-01 RX ADMIN — FERROUS SULFATE TAB 325 MG (65 MG ELEMENTAL FE) 325 MG: 325 (65 FE) TAB at 09:00

## 2020-01-01 RX ADMIN — ALBUTEROL SULFATE 1.25 MG: 2.5 SOLUTION RESPIRATORY (INHALATION) at 08:32

## 2020-01-01 RX ADMIN — AMOXICILLIN AND CLAVULANATE POTASSIUM 1 TABLET: 875; 125 TABLET, FILM COATED ORAL at 08:15

## 2020-01-01 RX ADMIN — FERROUS SULFATE TAB 325 MG (65 MG ELEMENTAL FE) 325 MG: 325 (65 FE) TAB at 20:31

## 2020-01-01 RX ADMIN — ALBUTEROL SULFATE 2.5 MG: 2.5 SOLUTION RESPIRATORY (INHALATION) at 07:32

## 2020-01-01 RX ADMIN — HYDRALAZINE HYDROCHLORIDE 75 MG: 25 TABLET, FILM COATED ORAL at 08:59

## 2020-01-01 RX ADMIN — DOCUSATE SODIUM 50 MG AND SENNOSIDES 8.6 MG 1 TABLET: 8.6; 5 TABLET, FILM COATED ORAL at 20:31

## 2020-01-01 RX ADMIN — GABAPENTIN 100 MG: 100 CAPSULE ORAL at 08:59

## 2020-01-01 RX ADMIN — SPIRONOLACTONE 50 MG: 25 TABLET ORAL at 08:37

## 2020-01-01 RX ADMIN — OMEPRAZOLE 20 MG: 20 CAPSULE, DELAYED RELEASE ORAL at 06:44

## 2020-01-01 RX ADMIN — ALBUTEROL SULFATE 2.5 MG: 2.5 SOLUTION RESPIRATORY (INHALATION) at 07:22

## 2020-01-01 RX ADMIN — FUROSEMIDE 40 MG: 10 INJECTION, SOLUTION INTRAMUSCULAR; INTRAVENOUS at 07:50

## 2020-01-01 RX ADMIN — FUROSEMIDE 40 MG: 10 INJECTION, SOLUTION INTRAMUSCULAR; INTRAVENOUS at 15:53

## 2020-01-01 RX ADMIN — HYDRALAZINE HYDROCHLORIDE 25 MG: 25 TABLET, FILM COATED ORAL at 13:41

## 2020-01-01 RX ADMIN — SIMVASTATIN 10 MG: 10 TABLET, FILM COATED ORAL at 21:40

## 2020-01-01 RX ADMIN — HYDRALAZINE HYDROCHLORIDE 75 MG: 25 TABLET, FILM COATED ORAL at 17:08

## 2020-01-01 RX ADMIN — HYDRALAZINE HYDROCHLORIDE 25 MG: 25 TABLET, FILM COATED ORAL at 05:31

## 2020-01-01 RX ADMIN — FERROUS SULFATE TAB 325 MG (65 MG ELEMENTAL FE) 325 MG: 325 (65 FE) TAB at 07:51

## 2020-01-01 RX ADMIN — VENLAFAXINE HYDROCHLORIDE 75 MG: 75 CAPSULE, EXTENDED RELEASE ORAL at 07:51

## 2020-01-01 RX ADMIN — FUROSEMIDE 20 MG: 10 INJECTION, SOLUTION INTRAVENOUS at 15:29

## 2020-01-01 RX ADMIN — HYDRALAZINE HYDROCHLORIDE 25 MG: 25 TABLET, FILM COATED ORAL at 06:18

## 2020-01-01 RX ADMIN — GABAPENTIN 100 MG: 100 CAPSULE ORAL at 20:54

## 2020-01-01 RX ADMIN — HYDRALAZINE HYDROCHLORIDE 75 MG: 25 TABLET, FILM COATED ORAL at 08:52

## 2020-01-01 RX ADMIN — TORSEMIDE 40 MG: 20 TABLET ORAL at 09:05

## 2020-01-01 RX ADMIN — HYDRALAZINE HYDROCHLORIDE 75 MG: 25 TABLET, FILM COATED ORAL at 21:49

## 2020-01-01 RX ADMIN — GABAPENTIN 100 MG: 100 CAPSULE ORAL at 13:10

## 2020-01-01 RX ADMIN — SIMVASTATIN 10 MG: 10 TABLET, FILM COATED ORAL at 21:28

## 2020-01-01 RX ADMIN — TORSEMIDE 20 MG: 20 TABLET ORAL at 20:54

## 2020-01-01 RX ADMIN — ALBUTEROL SULFATE 2.5 MG: 2.5 SOLUTION RESPIRATORY (INHALATION) at 08:17

## 2020-01-01 RX ADMIN — FUROSEMIDE 20 MG: 10 INJECTION, SOLUTION INTRAVENOUS at 16:28

## 2020-01-01 RX ADMIN — FERROUS SULFATE TAB 325 MG (65 MG ELEMENTAL FE) 325 MG: 325 (65 FE) TAB at 08:36

## 2020-01-01 RX ADMIN — SPIRONOLACTONE 50 MG: 25 TABLET ORAL at 09:05

## 2020-01-01 RX ADMIN — GABAPENTIN 100 MG: 100 CAPSULE ORAL at 22:25

## 2020-01-01 RX ADMIN — VENLAFAXINE HYDROCHLORIDE 75 MG: 37.5 CAPSULE, EXTENDED RELEASE ORAL at 08:36

## 2020-01-01 RX ADMIN — HYDRALAZINE HYDROCHLORIDE 25 MG: 25 TABLET, FILM COATED ORAL at 05:55

## 2020-01-01 RX ADMIN — FERROUS SULFATE TAB 325 MG (65 MG ELEMENTAL FE) 325 MG: 325 (65 FE) TAB at 08:52

## 2020-01-01 RX ADMIN — GABAPENTIN 100 MG: 100 CAPSULE ORAL at 08:36

## 2020-01-01 RX ADMIN — GABAPENTIN 100 MG: 100 CAPSULE ORAL at 08:15

## 2020-01-01 RX ADMIN — ALBUTEROL SULFATE 1.25 MG: 2.5 SOLUTION RESPIRATORY (INHALATION) at 11:09

## 2020-01-01 RX ADMIN — FUROSEMIDE 40 MG: 10 INJECTION, SOLUTION INTRAMUSCULAR; INTRAVENOUS at 13:50

## 2020-01-01 RX ADMIN — PREDNISONE 40 MG: 20 TABLET ORAL at 08:15

## 2020-01-01 RX ADMIN — ALBUTEROL SULFATE 1.25 MG: 2.5 SOLUTION RESPIRATORY (INHALATION) at 19:31

## 2020-01-01 RX ADMIN — GABAPENTIN 100 MG: 100 CAPSULE ORAL at 13:41

## 2020-01-01 RX ADMIN — VENLAFAXINE HYDROCHLORIDE 75 MG: 75 CAPSULE, EXTENDED RELEASE ORAL at 09:06

## 2020-01-01 RX ADMIN — GABAPENTIN 100 MG: 100 CAPSULE ORAL at 08:38

## 2020-01-01 RX ADMIN — IPRATROPIUM BROMIDE AND ALBUTEROL SULFATE 3 ML: .5; 3 SOLUTION RESPIRATORY (INHALATION) at 19:11

## 2020-01-01 RX ADMIN — FERROUS SULFATE TAB 325 MG (65 MG ELEMENTAL FE) 325 MG: 325 (65 FE) TAB at 21:49

## 2020-01-01 RX ADMIN — IPRATROPIUM BROMIDE AND ALBUTEROL SULFATE 3 ML: .5; 3 SOLUTION RESPIRATORY (INHALATION) at 11:48

## 2020-01-01 RX ADMIN — TORSEMIDE 20 MG: 20 TABLET ORAL at 14:43

## 2020-01-01 RX ADMIN — FUROSEMIDE 20 MG: 10 INJECTION, SOLUTION INTRAMUSCULAR; INTRAVENOUS at 13:45

## 2020-01-01 RX ADMIN — FUROSEMIDE 40 MG: 10 INJECTION, SOLUTION INTRAMUSCULAR; INTRAVENOUS at 19:54

## 2020-01-01 RX ADMIN — POLYETHYLENE GLYCOL 3350 17 G: 17 POWDER, FOR SOLUTION ORAL at 09:00

## 2020-01-01 RX ADMIN — GUAIFENESIN 10 ML: 100 SOLUTION ORAL at 20:24

## 2020-01-01 RX ADMIN — FERROUS SULFATE TAB 325 MG (65 MG ELEMENTAL FE) 325 MG: 325 (65 FE) TAB at 22:25

## 2020-01-01 RX ADMIN — AMOXICILLIN AND CLAVULANATE POTASSIUM 1 TABLET: 875; 125 TABLET, FILM COATED ORAL at 19:43

## 2020-01-01 RX ADMIN — AMOXICILLIN AND CLAVULANATE POTASSIUM 1 TABLET: 875; 125 TABLET, FILM COATED ORAL at 19:54

## 2020-01-01 RX ADMIN — ALBUTEROL SULFATE 2.5 MG: 2.5 SOLUTION RESPIRATORY (INHALATION) at 11:20

## 2020-01-01 RX ADMIN — VENLAFAXINE HYDROCHLORIDE 75 MG: 37.5 CAPSULE, EXTENDED RELEASE ORAL at 08:53

## 2020-01-01 RX ADMIN — TORSEMIDE 40 MG: 20 TABLET ORAL at 08:59

## 2020-01-01 RX ADMIN — FUROSEMIDE 20 MG: 10 INJECTION, SOLUTION INTRAVENOUS at 11:34

## 2020-01-01 RX ADMIN — FUROSEMIDE 40 MG: 10 INJECTION, SOLUTION INTRAVENOUS at 14:45

## 2020-01-01 RX ADMIN — TORSEMIDE 20 MG: 20 TABLET ORAL at 18:28

## 2020-01-01 RX ADMIN — FERROUS SULFATE TAB 325 MG (65 MG ELEMENTAL FE) 325 MG: 325 (65 FE) TAB at 19:43

## 2020-01-01 RX ADMIN — GABAPENTIN 100 MG: 100 CAPSULE ORAL at 21:49

## 2020-01-01 RX ADMIN — FUROSEMIDE 20 MG: 10 INJECTION, SOLUTION INTRAMUSCULAR; INTRAVENOUS at 11:00

## 2020-01-01 RX ADMIN — IPRATROPIUM BROMIDE AND ALBUTEROL SULFATE 3 ML: .5; 3 SOLUTION RESPIRATORY (INHALATION) at 15:09

## 2020-01-01 RX ADMIN — FERROUS SULFATE TAB 325 MG (65 MG ELEMENTAL FE) 325 MG: 325 (65 FE) TAB at 20:54

## 2020-01-01 RX ADMIN — FUROSEMIDE 20 MG: 10 INJECTION, SOLUTION INTRAMUSCULAR; INTRAVENOUS at 14:01

## 2020-01-01 RX ADMIN — ALBUTEROL SULFATE 2.5 MG: 2.5 SOLUTION RESPIRATORY (INHALATION) at 11:23

## 2020-01-01 RX ADMIN — ALBUTEROL SULFATE 2.5 MG: 2.5 SOLUTION RESPIRATORY (INHALATION) at 19:36

## 2020-01-01 RX ADMIN — ALBUTEROL SULFATE 1.25 MG: 2.5 SOLUTION RESPIRATORY (INHALATION) at 14:24

## 2020-01-01 RX ADMIN — HYDRALAZINE HYDROCHLORIDE 75 MG: 25 TABLET, FILM COATED ORAL at 08:36

## 2020-01-01 RX ADMIN — OMEPRAZOLE 20 MG: 20 CAPSULE, DELAYED RELEASE ORAL at 11:42

## 2020-01-01 RX ADMIN — ENOXAPARIN SODIUM 40 MG: 40 INJECTION SUBCUTANEOUS at 12:07

## 2020-01-01 RX ADMIN — FUROSEMIDE 20 MG: 10 INJECTION, SOLUTION INTRAVENOUS at 02:52

## 2020-01-01 RX ADMIN — GABAPENTIN 100 MG: 100 CAPSULE ORAL at 07:51

## 2020-01-01 RX ADMIN — SPIRONOLACTONE 50 MG: 25 TABLET ORAL at 08:36

## 2020-01-01 RX ADMIN — FUROSEMIDE 40 MG: 10 INJECTION, SOLUTION INTRAVENOUS at 15:35

## 2020-01-01 RX ADMIN — FUROSEMIDE 40 MG: 10 INJECTION, SOLUTION INTRAMUSCULAR; INTRAVENOUS at 18:20

## 2020-01-01 RX ADMIN — TORSEMIDE 40 MG: 20 TABLET ORAL at 08:53

## 2020-01-01 RX ADMIN — PREDNISONE 40 MG: 20 TABLET ORAL at 07:51

## 2020-01-01 RX ADMIN — ALBUTEROL SULFATE 1.25 MG: 2.5 SOLUTION RESPIRATORY (INHALATION) at 19:10

## 2020-01-01 RX ADMIN — HYDRALAZINE HYDROCHLORIDE 75 MG: 25 TABLET, FILM COATED ORAL at 14:53

## 2020-01-01 RX ADMIN — FUROSEMIDE 40 MG: 10 INJECTION, SOLUTION INTRAMUSCULAR; INTRAVENOUS at 15:46

## 2020-01-01 RX ADMIN — FUROSEMIDE 20 MG: 10 INJECTION, SOLUTION INTRAMUSCULAR; INTRAVENOUS at 11:57

## 2020-01-01 RX ADMIN — SPIRONOLACTONE 50 MG: 25 TABLET ORAL at 07:51

## 2020-01-01 RX ADMIN — IPRATROPIUM BROMIDE AND ALBUTEROL SULFATE 3 ML: .5; 3 SOLUTION RESPIRATORY (INHALATION) at 08:12

## 2020-01-01 RX ADMIN — FUROSEMIDE 20 MG: 10 INJECTION, SOLUTION INTRAVENOUS at 14:09

## 2020-01-01 RX ADMIN — ALBUTEROL SULFATE 2.5 MG: 2.5 SOLUTION RESPIRATORY (INHALATION) at 15:44

## 2020-01-01 RX ADMIN — ALBUTEROL SULFATE 2.5 MG: 2.5 SOLUTION RESPIRATORY (INHALATION) at 16:24

## 2020-01-01 RX ADMIN — HYDRALAZINE HYDROCHLORIDE 75 MG: 25 TABLET, FILM COATED ORAL at 18:19

## 2020-01-01 RX ADMIN — FERROUS SULFATE TAB 325 MG (65 MG ELEMENTAL FE) 325 MG: 325 (65 FE) TAB at 19:54

## 2020-01-01 RX ADMIN — FERROUS SULFATE TAB 325 MG (65 MG ELEMENTAL FE) 325 MG: 325 (65 FE) TAB at 08:38

## 2020-01-01 RX ADMIN — GABAPENTIN 100 MG: 100 CAPSULE ORAL at 21:43

## 2020-01-01 RX ADMIN — FUROSEMIDE 20 MG: 10 INJECTION, SOLUTION INTRAMUSCULAR; INTRAVENOUS at 16:08

## 2020-01-01 RX ADMIN — HYDRALAZINE HYDROCHLORIDE 75 MG: 25 TABLET, FILM COATED ORAL at 21:43

## 2020-01-01 RX ADMIN — HYDRALAZINE HYDROCHLORIDE 25 MG: 25 TABLET, FILM COATED ORAL at 13:05

## 2020-01-01 RX ADMIN — ENOXAPARIN SODIUM 40 MG: 40 INJECTION SUBCUTANEOUS at 11:42

## 2020-01-01 RX ADMIN — FERROUS SULFATE TAB 325 MG (65 MG ELEMENTAL FE) 325 MG: 325 (65 FE) TAB at 08:15

## 2020-01-01 RX ADMIN — HYDRALAZINE HYDROCHLORIDE 75 MG: 25 TABLET, FILM COATED ORAL at 20:31

## 2020-01-01 RX ADMIN — SIMVASTATIN 10 MG: 10 TABLET, FILM COATED ORAL at 21:49

## 2020-01-01 RX ADMIN — GABAPENTIN 100 MG: 100 CAPSULE ORAL at 13:05

## 2020-01-01 RX ADMIN — VENLAFAXINE HYDROCHLORIDE 75 MG: 75 CAPSULE, EXTENDED RELEASE ORAL at 09:00

## 2020-01-01 RX ADMIN — FUROSEMIDE 40 MG: 10 INJECTION, SOLUTION INTRAVENOUS at 14:14

## 2020-01-01 RX ADMIN — FERROUS SULFATE TAB 325 MG (65 MG ELEMENTAL FE) 325 MG: 325 (65 FE) TAB at 21:43

## 2020-01-01 RX ADMIN — GABAPENTIN 100 MG: 100 CAPSULE ORAL at 08:52

## 2020-01-01 RX ADMIN — SPIRONOLACTONE 50 MG: 25 TABLET ORAL at 18:19

## 2020-01-01 RX ADMIN — PREDNISONE 40 MG: 20 TABLET ORAL at 13:45

## 2020-01-01 ASSESSMENT — MIFFLIN-ST. JEOR
SCORE: 1063.65
SCORE: 1066.13
SCORE: 1067.28
SCORE: 1063.13
SCORE: 1056.85
SCORE: 1070.45
SCORE: 1047.77
SCORE: 1066.37
SCORE: 1079.53
SCORE: 1059.57

## 2020-01-01 ASSESSMENT — PAIN SCALES - GENERAL: PAINLEVEL: NO PAIN (0)

## 2020-01-01 ASSESSMENT — ACTIVITIES OF DAILY LIVING (ADL)
WHICH_OF_THE_ABOVE_FUNCTIONAL_RISKS_HAD_A_RECENT_ONSET_OR_CHANGE?: AMBULATION;TRANSFERRING;TOILETING;BATHING;DRESSING
TRANSFERRING: 3-->ASSISTIVE EQUIPMENT AND PERSON
RETIRED_COMMUNICATION: 0-->UNDERSTANDS/COMMUNICATES WITHOUT DIFFICULTY
TOILETING: 3-->ASSISTIVE EQUIPMENT AND PERSON
SWALLOWING: 0-->SWALLOWS FOODS/LIQUIDS WITHOUT DIFFICULTY
ADLS_ACUITY_SCORE: 19
FALL_HISTORY_WITHIN_LAST_SIX_MONTHS: NO
DRESS: 2-->ASSISTIVE PERSON
ADLS_ACUITY_SCORE: 19
AMBULATION: 3-->ASSISTIVE EQUIPMENT AND PERSON
ADLS_ACUITY_SCORE: 19
COGNITION: 0 - NO COGNITION ISSUES REPORTED
BATHING: 3-->ASSISTIVE EQUIPMENT AND PERSON
ADLS_ACUITY_SCORE: 19
RETIRED_EATING: 0-->INDEPENDENT
DEPENDENT_IADLS:: CLEANING;COOKING;LAUNDRY;SHOPPING;TRANSPORTATION

## 2020-01-01 ASSESSMENT — ANXIETY QUESTIONNAIRES
3. WORRYING TOO MUCH ABOUT DIFFERENT THINGS: NOT AT ALL
IF YOU CHECKED OFF ANY PROBLEMS ON THIS QUESTIONNAIRE, HOW DIFFICULT HAVE THESE PROBLEMS MADE IT FOR YOU TO DO YOUR WORK, TAKE CARE OF THINGS AT HOME, OR GET ALONG WITH OTHER PEOPLE: NOT DIFFICULT AT ALL
5. BEING SO RESTLESS THAT IT IS HARD TO SIT STILL: NOT AT ALL
GAD7 TOTAL SCORE: 0
1. FEELING NERVOUS, ANXIOUS, OR ON EDGE: NOT AT ALL
6. BECOMING EASILY ANNOYED OR IRRITABLE: NOT AT ALL
7. FEELING AFRAID AS IF SOMETHING AWFUL MIGHT HAPPEN: NOT AT ALL
GAD7 TOTAL SCORE: 0
2. NOT BEING ABLE TO STOP OR CONTROL WORRYING: NOT AT ALL

## 2020-01-01 ASSESSMENT — ENCOUNTER SYMPTOMS
MYALGIAS: 0
WEAKNESS: 1
SHORTNESS OF BREATH: 1
VOMITING: 0
FEVER: 0
NAUSEA: 0
FATIGUE: 1
ABDOMINAL PAIN: 0
COUGH: 0
BLOOD IN STOOL: 1
FEVER: 0
DIARRHEA: 0
CHILLS: 0
COUGH: 0
BLOOD IN STOOL: 0
SHORTNESS OF BREATH: 1
DIARRHEA: 0

## 2020-01-01 ASSESSMENT — PATIENT HEALTH QUESTIONNAIRE - PHQ9
SUM OF ALL RESPONSES TO PHQ QUESTIONS 1-9: 0
5. POOR APPETITE OR OVEREATING: NOT AT ALL

## 2020-01-16 DIAGNOSIS — K21.9 GASTROESOPHAGEAL REFLUX DISEASE WITHOUT ESOPHAGITIS: ICD-10-CM

## 2020-01-16 NOTE — LETTER
[facility]  [address], [address]  [place], [place]  [address]                                            TEL:[phone]  FAX:[phone]      Keisha Godinez  [address] [place] [id number]      January 20, 2020    Dear Keisha       We have received a refill request from your pharmacy for your medication - Omeprazole. Many medications require routine follow-up with your provider and a review of your chart indicates that you are due for a follow-up appointment with Dr. Eubanks. We have sent a one time refill to your pharmacy to allow you time to schedule an appointment.     Please call [phone] to schedule an appointment.  We look forward to seeing you in the near future.      Thank you,     [facility]

## 2020-01-16 NOTE — TELEPHONE ENCOUNTER
"Requested Prescriptions   Pending Prescriptions Disp Refills     omeprazole (PRILOSEC) 20 MG DR capsule 90 capsule 0     Sig: Take 1 capsule (20 mg) by mouth daily       PPI Protocol Passed - 1/16/2020  2:31 PM        Passed - Not on Clopidogrel (unless Pantoprazole ordered)        Passed - No diagnosis of osteoporosis on record        Passed - Recent (12 mo) or future (30 days) visit within the authorizing provider's specialty     Patient has had an office visit with the authorizing provider or a provider within the authorizing providers department within the previous 12 mos or has a future within next 30 days. See \"Patient Info\" tab in inbasket, or \"Choose Columns\" in Meds & Orders section of the refill encounter.              Passed - Medication is active on med list        Passed - Patient is age 18 or older        Passed - No active pregnacy on record        Passed - No positive pregnancy test in past 12 months        Last Written Prescription Date:  10/10/19  Last Fill Quantity: 90,  # refills: 0   Last office visit: 10/21/2019 with prescribing provider:  10/21/19   Future Office Visit:      "

## 2020-01-20 NOTE — TELEPHONE ENCOUNTER
Medication is being filled for 1 time refill only due to:  Patient needs to be seen because due for follow up appointment.     Letter mailed to patient reminding her to schedule an appointment.

## 2020-01-23 DIAGNOSIS — E78.5 HYPERLIPIDEMIA, UNSPECIFIED HYPERLIPIDEMIA TYPE: ICD-10-CM

## 2020-01-23 DIAGNOSIS — M54.6 MIDLINE THORACIC BACK PAIN, UNSPECIFIED CHRONICITY: ICD-10-CM

## 2020-01-23 NOTE — TELEPHONE ENCOUNTER
"Requested Prescriptions   Pending Prescriptions Disp Refills     simvastatin (ZOCOR) 10 MG tablet 90 tablet 0     Sig: TAKE 1 TABLET (10MG) BY MOUTH AT BEDTIME   Last Written Prescription Date:  10/29/19  Last Fill Quantity: 90,  # refills: 0   Last office visit: 10/21/2019 with prescribing provider:  yes   Future Office Visit:        Statins Protocol Passed - 1/23/2020 11:44 AM        Passed - LDL on file in past 12 months     Recent Labs   Lab Test 12/13/19  1005   LDL 42             Passed - No abnormal creatine kinase in past 12 months     Recent Labs   Lab Test 11/28/15  0613   CKT 27*                Passed - Recent (12 mo) or future (30 days) visit within the authorizing provider's specialty     Patient has had an office visit with the authorizing provider or a provider within the authorizing providers department within the previous 12 mos or has a future within next 30 days. See \"Patient Info\" tab in inbasket, or \"Choose Columns\" in Meds & Orders section of the refill encounter.              Passed - Medication is active on med list        Passed - Patient is age 18 or older        Passed - No active pregnancy on record        Passed - No positive pregnancy test in past 12 months          "

## 2020-01-23 NOTE — TELEPHONE ENCOUNTER
Requested Prescriptions   Pending Prescriptions Disp Refills     gabapentin (NEURONTIN) 100 MG capsule 270 capsule 0       There is no refill protocol information for this order        Last Written Prescription Date:  10/29/2019  Last Fill Quantity: 270,  # refills: 0   Last office visit: 10/21/2019 with prescribing provider:  Dr Eubanks   Future Office Visit:

## 2020-01-26 RX ORDER — SIMVASTATIN 10 MG
TABLET ORAL
Qty: 90 TABLET | Refills: 1 | Status: SHIPPED | OUTPATIENT
Start: 2020-01-26 | End: 2020-01-01

## 2020-01-26 NOTE — TELEPHONE ENCOUNTER
Prescription approved per [facility] Refill Protocol.  Dora Toledo RN

## 2020-01-27 RX ORDER — GABAPENTIN 100 MG/1
CAPSULE ORAL
Qty: 270 CAPSULE | Refills: 0 | Status: SHIPPED | OUTPATIENT
Start: 2020-01-27 | End: 2020-01-01

## 2020-01-27 NOTE — PLAN OF CARE
Problem: Cardiac: Heart Failure (Adult)  Goal: Signs and Symptoms of Listed Potential Problems Will be Absent, Minimized or Managed (Cardiac: Heart Failure)  Signs and symptoms of listed potential problems will be absent, minimized or managed by discharge/transition of care (reference Cardiac: Heart Failure (Adult) CPG).   Outcome: Improving  Heart Failure Care Pathway  GOALS TO BE MET BEFORE DISCHARGE:    1. Decrease congestion and/or edema with diuretic therapy to achieve near      optimal volume status.            Dyspnea improved:  Yes            Edema improved:     Yes states less than admit . , please explain: Her bilat L/E edema remains - states used wraps in past .             Up in recliner with              Legs elevated         Net I/O and Weights since admission:          05/27 1500 - 06/26 1459  In: 3740 [P.O.:3740]  Out: 41261 [Urine:89869]  Net: -31354            Vitals:    06/18/18 1947 06/19/18 0426 06/20/18 0504 06/21/18 0558   Weight: 75.7 kg (166 lb 14.4 oz) 74.7 kg (164 lb 11.2 oz) 74.5 kg (164 lb 3.2 oz) 74.9 kg (165 lb 2 oz)    06/22/18 1118 06/23/18 0332 06/24/18 0616 06/25/18 0620   Weight: 73.4 kg (161 lb 12.8 oz) 72.4 kg (159 lb 9.6 oz) 70.6 kg (155 lb 9.6 oz) 70.2 kg (154 lb 12.8 oz)    06/26/18 0245   Weight: 70.2 kg (154 lb 12.8 oz)     Wt down 5 kg since admit but unchanged in 24 hrs .     2.  O2 sats > 92% on RA or at prior home O2 therapy level.          Current oxygenation status:       SpO2: (!) 85 %         O2 Device: None (Room air),  Oxygen Delivery: 1 LPM         Able to wean O2 this shift to keep sats > 92%:  No, please explain: drops to 85 on RA with activity        Does patient use Home O2? No    3.  Tolerates ambulation and mobility near baseline: No, please explain: walked about 100 feet with SBA & RW- sats drop to 85 % on RA with         Activity & 88 % on RA - reapplied 1L 02 & sats 92-93         How many times did the patient ambulate with nursing staff this shift?  2    Please review the Heart Failure Care Pathway for additional HF goal outcomes.    At baseline is a  & lives with daughter Rebecca in [facility]. Cont POC of weaning 02, gentle diuresis ,   Does follow with [facility] & has [facility] at baseline .     Cristin Cheek RN  6/26/2018              [email or web address]

## 2020-02-17 NOTE — PROGRESS NOTES
Subjective     Keisha Godinez is a 88 year old female who presents to clinic today for the following health issues:    HPI   Hypertension Follow-up      Do you check your blood pressure regularly outside of the clinic? Yes     Are you following a low salt diet? Yes    Are your blood pressures ever more than 140 on the top number (systolic) OR more   than 90 on the bottom number (diastolic), for example 140/90? Yes      How many servings of fruits and vegetables do you eat daily?  2-3    On average, how many sweetened beverages do you drink each day (Examples: soda, juice, sweet tea, etc.  Do NOT count diet or artificially sweetened beverages)?   0    How many days per week do you exercise enough to make your heart beat faster? 3 or less    How many minutes a day do you exercise enough to make your heart beat faster? 9 or less    How many days per week do you miss taking your medication? 0    HPI:   Keisha Godinez a 88 year old female here for above. She also complains of erythema of right foot. Started over a week ago. Her daughter quit wrapping her foot at that point as she thought wrapping it was making it worse. Does not hurt but she has little feeling in her feet. Denies any fever chills or night sweats. No drainage.    She also has developed a red raised rash on her right side. Itches at times. Does not hurt or burn.     Her energy is at baseline. She does not feel overly tired or short of breath.     BP Readings from Last 3 Encounters:   02/17/20 (!) 140/50   12/13/19 124/48   11/14/19 138/54    Wt Readings from Last 3 Encounters:   02/17/20 68 kg (150 lb)   12/13/19 66 kg (145 lb 6.4 oz)   11/14/19 67 kg (147 lb 12.8 oz)                 Reviewed and updated as needed this visit by Provider         Review of Systems   ROS COMP: Constitutional, HEENT, cardiovascular, pulmonary, GI, , musculoskeletal, neuro, skin, endocrine and psych systems are negative, except as otherwise noted.      Objective    BP (!)  "156/52 (BP Location: Left arm, Patient Position: Chair, Cuff Size: Adult Large)   Pulse 62   Temp 99.1  F (37.3  C) (Oral)   Resp 16   Ht 1.6 m (5' 3\")   Wt 68 kg (150 lb)   SpO2 92%   Breastfeeding No   BMI 26.57 kg/m    Body mass index is 26.57 kg/m .  Physical Exam   GENERAL: healthy, alert and no distress  NECK: no adenopathy, no asymmetry, masses, or scars and thyroid normal to palpation  RESP: lungs clear to auscultation - no rales, rhonchi or wheezes  CV: regular rate and rhythm, normal S1 S2, no S3 or S4, no murmur, click or rub, no peripheral edema and peripheral pulses strong  ABDOMEN: soft, nontender, no hepatosplenomegaly, no masses and bowel sounds normal  MS: right foot is mildly swollen the top of the foot from base little toe to 2/3 way toward ankle is mildly erythematous nontender  SKIN: on right thorax is a rash in a dermatomal distribution red raised plagues, no vesicles, non tender, roughly 3x8 inches  PSYCH: mentation appears normal, affect normal/bright          Assessment & Plan     1. Herpes zoster without complication  Because of her age cut usual dose by 1/2  - valACYclovir (VALTREX) 500 MG tablet; Take 1 tablet (500 mg) by mouth 3 times daily for 7 days  Dispense: 21 tablet; Refill: 0    2. Cellulitis of left lower extremity  Rx with  - cephALEXin (KEFLEX) 500 MG capsule; Take 1 capsule (500 mg) by mouth 3 times daily  Dispense: 30 capsule; Refill: 0  - CBC with platelets    3. Essential hypertension with goal blood pressure less than 140/90  under good control Continue current medications.     4. Chronic diastolic congestive heart failure (H)  Due for [facility]  - torsemide (DEMADEX) 20 MG tablet; Take 40 mg (2 tablets) in the morning and 20 mg (1 tablet) in the afternoon  Dispense: 270 tablet; Refill: 3    5. Midline thoracic back pain, unspecified chronicity     - gabapentin (NEURONTIN) 100 MG capsule; TAKE 1 CAPSULE (100MG) BY MOUTH THREE TIMES DAILY  Dispense: 270 capsule; " Refill: 3    6. Gastroesophageal reflux disease without esophagitis     - omeprazole (PRILOSEC) 20 MG DR capsule; Take 1 capsule (20 mg) by mouth daily  Dispense: 90 capsule; Refill: 3    7. Iron deficiency anemia due to chronic blood loss  Due for cbc             No follow-ups on file.    Gerri Eubanks MD  [facility]

## 2020-02-19 NOTE — PROGRESS NOTES
Infusion Nursing Note:  Keisha Godinez presents today for 1 Unit PRBC for HGB of 6.7 on 2/17.     Patient seen by provider today: No   present during visit today: Not Applicable.    Note:  Patient did have audible wheezing with transfer/walking upon arrival to infusion room.  No audible wheezing when sitting. Lasix 20mg IVP at 1157 per order from Dr. Eubanks (see note from Miriam Orantes,RN regarding Lasix order).  1 Unit PRBC's started at 1210 @ 75mls/hr. Will keep rate at 75mls/hr for entire transfusion. BP prior to starting PRBC = 134/61.  Patient up to bathroom with assist of 2 immediately after starting PRBC's. 1220: BP = 169/65. R = 24. Patient feeling well. 1345: BP = 179/62. R = 26. Patient is feeling fine and offers no complaints.  Blood stopped and NS infusing.  I placed call to Dr. Eubanks's office to review issue with increased BP and more labored breathing compared to baseline when arrived at clinic and presence of wheezing. I spoke with Lucinda SINGER RN, who spoke with Dr. Eubanks. PLAN per TO/RB Lucinda SINGER RN/Dr. Eubanks:  Give Lasix 20mg IVP and then restart PRBC's.  1402: Lasix 20mg IVP. PRBC's restarted at 1405 at 75mls/hr. BP readings after second dose of Lasix: 162/53, 161/62, 158/69 and 161/65.  Respirations continue at 24 but less labored and baseline upon arrival to infusion room.  O2 sats = 93%-95%.  Patient continues with occasional audible wheezing when up to the bathroom with assist of two.  Patient reports is feeling well and offers no complaints. Daughter reports this also occurs at home.  Patient will take her Albuterol nebulizer when she gets home.    Daughter is asking if her mom should take her Torsemide afternoon dose when gets home.  I reviewed with Randi Rasheed, Pharm D. Patient should take her afternoon Torsemide dose when she gets home.  I reviewed plan with patient and daughter who verbalized understanding.    Patient voided a total of 5 times while in our infusion room.    Blood Bank  contacted regarding if total amount will not be infused by 1610 (4 hours from start time) do we need to stop transfusion or can we complete remaining amount.  Per blood bank: Transfusion needs to be stopped at 1610 even if entire volume has not been infused.  1610:  Blood stopped.  Total of 35mls remaining in bag.      Intravenous Access:  Peripheral IV placed. 22 Gauge left forearm.    Treatment Conditions:  Blood transfusion consent signed 10/2019.    Post Infusion Assessment:  Patient tolerated infusion: Please see above note.  Blood return noted pre and post infusion.  Site patent and intact, free from redness, edema or discomfort.  No evidence of extravasations.  Access discontinued per protocol.     Discharge Plan:   Discharge instructions reviewed with: Patient and daughter.  I reviewed with patient and daughter to contact Dr. Eubanks immediately if experiencing any signs of reaction or fluid overload.    Patient and/or family verbalized understanding of discharge instructions and all questions answered.  Patient discharged in stable condition accompanied by: daughter.    Josephine Almaguer RN

## 2020-02-19 NOTE — PROGRESS NOTES
Patient due to have blood transfusion today.  She had previous hospitalization for fluid overload post blood transfusion.  Dr. Tovar from blood bank has recommended transfusing one unit over four hours and to give lasix.  I notified Dr. Eubanks's office of this recommendation.  Received telephone order from Lucinda CYR/Dr. Eubanks to give lasix 20mg IV prior to blood transfusion today.  I notified them we will transfuse over four hours.

## 2020-02-19 NOTE — TELEPHONE ENCOUNTER
Call to Miriam and advised per Dr Eubanks.   Lasix 20 mg IV prior to her transfusion. And to extend Over 4 hours.

## 2020-02-19 NOTE — TELEPHONE ENCOUNTER
Miriam from the infusion center at [facility] called to ask if Dr. Eubanks would like to have the patient take Lasix prior to her infusion scheduled for 11:30 today. When patient had an infusion in October, she was hospitalized due to fluid overload. Miriam said they could also extend the infusion to 4 hours. Please call her back to advise at [phone]

## 2020-02-19 NOTE — TELEPHONE ENCOUNTER
Josephine Burrows from Infusion calls regarding pt. Gave Lasix 20 mg IV prior.     At start of transfusion, /61   After 20 minutes, pt got up and used the restroom, BP was 169/65.     Recheck an hour later, /62. Pulse is OK.     RR from 20-26. No fevers.   No wheezes. Had a few wheezes when she came in.   O2 Sat, from 95% down to 93%.     She has been to the restroom twice. They stopped the blood now. Only saline. Blood was running at 75 ml/hr.     Reviewed with Dr Eubanks. She states to give another Lasix 20 mg IV now and restart the blood.     Gave Josephine BURROWS the orders. She agrees with the plan.

## 2020-02-20 NOTE — TELEPHONE ENCOUNTER
Patient's daughter is calling to ask if her mom can get some medication for her cough that she has had for about two weeks now. Keisha forgot to ask for this at her most recent OFFICE VISIT. daughter says it seems to be worse when laying down and since her blood transfusion yesterday.  Keisha prefers not to have codeine because she says she is tired enough.

## 2020-02-20 NOTE — TELEPHONE ENCOUNTER
Spoke with patient.  C/o clear, productive cough intermittently x 2 wks.  Has gotten worse since blood transfusion yesterday.  Daughter thinks its because patient is weaker (hgb was 6.7 on 2-17-19).    Patient states cough is worse when she is laying in her lift recliner chair.    Last office visit 2-17-19    Asking for a cough medication.  She does not want codeine.  Asking if OTC cough medication would be ok.    (Patient was started on Cephalexin 2-17-19 for cellulitis L lower leg.  States she thinks this is getting better.)    Please advise regarding the cough medication, thanks.

## 2020-02-26 NOTE — TELEPHONE ENCOUNTER
Patient's daughter called to report patient's rash located on right side is back and it is itching and red. She has scratched open some of the bumps. Is there a medication she can use to treat the rash? She has finished the Valtrex. Please call daughter Rebecca at [phone]

## 2020-02-27 NOTE — TELEPHONE ENCOUNTER
Spoke with patient.  States the rash cleared up with the medication.  Once she was done with medication, rash came back.    Next step?    Please advise, thanks.

## 2020-02-28 NOTE — TELEPHONE ENCOUNTER
RN: please triage further.     Valtrex will only be of benefit if there are new blistering lesions appearing. Will not be helpful for skin itching/discoloration.     Not sure how to assess or treat current rash. She may need an office appointment if we can't get a better picture over the phone.

## 2020-02-28 NOTE — TELEPHONE ENCOUNTER
Contacted patient. She states that Dr. Eubanks thought the rash was from shingles when she was seen.     Describes the rash as red raised bumps, but no blisters or itching present. Rash is not painful, but occasionally feels like it is burning. Patient states the rash has not been spreading since the call on 2/26. She will monitor the rash over the weekend and call if not improving by Monday. This RN advised patient to go to Urgent Care if rash worsens over the weekend and patient agrees. Will leave encounter open until Monday in case rash does not improve.

## 2020-03-13 NOTE — ED PROVIDER NOTES
History     Chief Complaint:  Abnormal Labs       The history is provided by the patient and a relative (daughter).      Keisha Godinez is a 88 year old female with a history of CHF, atrial fibrillation, GERD, hypertension and anemia who presents with her daughter for evaluation of a low hemoglobin of 6 this morning, drawn at her primary care clinic. The patient has a history of anemia and has blood transfusions frequently, with the last one occurring three weeks ago. The patient's daughter states that the patient generally receives a single pint, as she had two pints recently which elevated her blood pressure to 210/103 and was then admitted for seven days. The patient's daughter states that three weeks ago, the patient was given one pint slowly with two stops for diuretics and saline, resulting in the patient only receiving 9/10 of the pint. Recently, the patient states that she has felt weak, consistent with how she has felt when she was anemic in the past and presented to her clinic today concerned for this. Her daughter notes that the patient's anemia is of unknown cause, however that once she had a colonoscopy completed and found a leak that was cauterized which seemed to improve her anemia for a short period. They present today requesting a blood transfusion. To note, the patient is on 1.5L of oxygen at night.     Allergies:  Blood Transfusion Related (Informational Only)  Lisinopril  Calcium  Egg Yolk  Flu Virus Vaccine  Keflex [Cephalexin]  Levaquin [Levofloxacin]  Sulfa Drugs  Zinc     Medications:    Albuterol nebules  Ferosul  Gabapentin  Hydralazine  Omeprazole  Simvastatin  Spironolactone  Torsemide  Valacyclovir  Venlafaxine    Past Medical History:    Anemia   Atrial fibrillation  B12 deficiency   Cardiomyopathy  CHF (congestive heart failure)  Chronic edema   Chronic systolic congestive heart failure  CVA (cerebral vascular accident)  Depression   Gastro-oesophageal reflux disease   GI  bleed   Hyperlipidemia   Hypertension   Iron deficiency   MSSA (methicillin susceptible Staphylococcus aureus)   Pulmonary hypertension  Shingles     Past Surgical History:    Colonoscopy  Enteroscopy small bowel  EGD combined  Excise mass foot    Family History:    Lung cancer    Social History:  The patient presents to the ED with her daughter.  Smoking Status: Former Smoker, quit 63.9 years ago  Smokeless Tobacco: Never Used  Alcohol Use: No  Drug Use: No  PCP: Gerri Eubanks     Review of Systems   Neurological: Positive for weakness.   All other systems reviewed and are negative.      Physical Exam     Patient Vitals for the past 24 hrs:   BP Temp Pulse Heart Rate Resp SpO2 Weight   03/13/20 1641 -- -- -- -- -- 99 % --   03/13/20 1640 -- -- -- -- -- 98 % --   03/13/20 1639 -- -- -- -- -- 98 % --   03/13/20 1638 -- -- -- -- -- 99 % --   03/13/20 1635 (!) 154/61 -- 54 -- -- (!) 88 % --   03/13/20 1634 -- -- -- -- -- 91 % --   03/13/20 1633 -- -- -- -- -- 91 % --   03/13/20 1632 -- -- -- -- -- (!) 88 % --   03/13/20 1631 -- -- -- -- -- (!) 88 % --   03/13/20 1630 -- -- -- -- -- (!) 88 % --   03/13/20 1629 -- -- -- -- -- (!) 87 % --   03/13/20 1628 -- -- -- -- -- (!) 82 % --   03/13/20 1627 -- -- -- -- -- (!) 83 % --   03/13/20 1626 -- -- -- -- -- (!) 84 % --   03/13/20 1625 -- -- -- -- -- (!) 84 % --   03/13/20 1624 -- -- -- -- -- (!) 84 % --   03/13/20 1623 -- -- -- -- -- (!) 84 % --   03/13/20 1622 -- -- -- -- -- (!) 84 % --   03/13/20 1621 -- -- -- -- -- (!) 84 % --   03/13/20 1620 -- -- -- -- -- (!) 89 % --   03/13/20 1619 -- -- -- -- -- (!) 85 % --   03/13/20 1618 -- -- -- -- -- (!) 84 % --   03/13/20 1617 -- -- -- -- -- (!) 84 % --   03/13/20 1616 -- -- -- -- -- (!) 84 % --   03/13/20 1615 -- -- -- -- -- (!) 86 % --   03/13/20 1614 -- -- -- -- -- (!) 87 % --   03/13/20 1613 -- -- -- -- -- (!) 87 % --   03/13/20 1612 -- -- -- -- -- (!) 87 % --   03/13/20 1611 -- -- -- -- -- (!) 86 % --   03/13/20 1610 --  -- -- -- -- (!) 85 % --   03/13/20 1609 -- -- -- -- -- (!) 84 % --   03/13/20 1608 -- -- -- -- -- (!) 86 % --   03/13/20 1607 -- -- -- -- -- (!) 86 % --   03/13/20 1606 -- -- -- -- -- (!) 85 % --   03/13/20 1605 -- -- -- -- -- (!) 85 % --   03/13/20 1604 -- -- -- -- -- (!) 85 % --   03/13/20 1603 -- -- -- -- -- (!) 86 % --   03/13/20 1602 -- -- -- -- -- (!) 86 % --   03/13/20 1601 -- -- -- -- -- (!) 86 % --   03/13/20 1600 -- -- -- -- -- (!) 85 % --   03/13/20 1559 -- -- -- -- -- (!) 87 % --   03/13/20 1558 -- -- -- -- -- (!) 87 % --   03/13/20 1557 -- -- -- -- -- (!) 87 % --   03/13/20 1556 -- -- -- -- -- (!) 87 % --   03/13/20 1555 -- -- -- -- -- (!) 85 % --   03/13/20 1554 -- -- -- -- -- (!) 86 % --   03/13/20 1553 -- -- -- -- -- (!) 83 % --   03/13/20 1552 -- -- -- -- -- (!) 88 % --   03/13/20 1551 -- -- -- -- -- (!) 88 % --   03/13/20 1550 -- -- -- -- -- (!) 89 % --   03/13/20 1549 -- -- -- -- -- (!) 88 % --   03/13/20 1548 -- -- -- -- -- (!) 89 % --   03/13/20 1547 -- -- -- -- -- 90 % --   03/13/20 1546 -- -- -- -- -- (!) 89 % --   03/13/20 1545 -- -- -- -- -- 91 % --   03/13/20 1500 136/43 98.3  F (36.8  C) 63 63 16 98 % 68 kg (149 lb 14.6 oz)       Physical Exam  Constitutional:       Appearance: She is well-developed.   HENT:      Right Ear: External ear normal.      Left Ear: External ear normal.      Mouth/Throat:      Mouth: Mucous membranes are moist.      Pharynx: Oropharynx is clear. No oropharyngeal exudate.   Eyes:      General: No scleral icterus.     Conjunctiva/sclera: Conjunctivae normal.      Pupils: Pupils are equal, round, and reactive to light.   Neck:      Musculoskeletal: Normal range of motion and neck supple.      Vascular: No JVD.   Cardiovascular:      Rate and Rhythm: Normal rate and regular rhythm.      Heart sounds: Normal heart sounds. No murmur. No friction rub. No gallop.    Pulmonary:      Effort: Pulmonary effort is normal. No respiratory distress.      Breath sounds: Normal  breath sounds. No wheezing or rales.   Abdominal:      General: Bowel sounds are normal. There is no distension.      Palpations: Abdomen is soft. There is no mass.      Tenderness: There is no abdominal tenderness.   Musculoskeletal: Normal range of motion.   Skin:     General: Skin is warm and dry.      Capillary Refill: Capillary refill takes less than 2 seconds.      Coloration: Skin is pale.      Findings: No rash.   Neurological:      Mental Status: She is alert. Mental status is at baseline.           Emergency Department Course     Laboratory:  Laboratory findings were communicated with the patient and admitting PA who voiced understanding of the findings.    BMP: Glucose 130 (high), Urea 32 (high), GFR 50 (low) o/w WNL (Creatinine: 1.01)    ABO/Rh Type and Screen: O Positive, Antibody Screen Positive (Abnormal)     CBC: Pending    Emergency Department Course:  Past medical records, nursing notes, and vitals reviewed.    1519 I performed an exam of the patient as documented above.     IV was inserted and blood was drawn for laboratory testing, results above.    1640 I consulted with NATHAN Roberson, regarding the patient's history and presentation here in the emergency department who will admit the patient.     Findings and plan explained to the patient and her daughter who consents to admission. Discussed the patient with NATHAN Evans, who will admit the patient to an observation bed for further monitoring, evaluation, and treatment.    I personally reviewed the laboratory results with the patient and her daughter and answered all related questions prior to admission.     Impression & Plan     Medical Decision Making:  Keisha Godinez is a 88 year old female who presents for need for transfusion due to low hemoglobin. Her hemoglobin this morning was in the six range. She has had increased fatigue due to that. Her antibody screen was positive therefore it was going to take quite a while for blood to  get to us due to the long delay as well as her previous complication with needing IV Lasix, it was the best option to admit her. She is admitted to observation under NATHAN Roberson of the hospitalist service. She voiced understanding.       Diagnosis:    ICD-10-CM    1. Iron deficiency anemia, unspecified iron deficiency anemia type  D50.9 ABO/Rh type and screen     Antibody ID [facility] BB     Antibody ID [facility] BB     Reticulocyte count     Direct antiglobulin test     WBC and differential     Reticulocyte count     Ferritin     Ferritin     Haptoglobin     Haptoglobin     Transferrin     Transferrin     CBC with platelets differential     CANCELED: Ferritin     CANCELED: Transferrin     CANCELED: Haptoglobin     CANCELED: Lactate Dehydrogenase     CANCELED: Reticulocyte count     CANCELED: CBC with platelets differential       Disposition:  Admitted to NATHAN Evans.    Scribe Disclosure:  I, Niranjan Cunningham, am serving as a scribe at 3:19 PM on 3/13/2020 to document services personally performed by Molly Cruz MD based on my observations and the provider's statements to me.      Molly Cruz MD  03/13/20 0026

## 2020-03-13 NOTE — H&P
[facility]  History and Physical  Hospitalist       Date of Admission:  3/13/2020    Assessment & Plan   Keisha Godinez is a 88 year old female with COPD, PAF, pulmonary HTN, history of CVA 2010, HTN, DLD, GI bleed 2015, small bowel AVMs, and cor pulmonale who presented to [facility] ED on 3/13/2020 from clinic with weakness and hemoglobin 6.2.  Has multiple antibodies so unable to transfuse in the ED.  Admitted to Observation.      Acute on chronic normocytic, hypochromic anemia.  Baseline Hgb ~8.  Known small bowel AVMs and iron deficiency.  Also had a large polyp seen on colonoscopy 2015 that was felt to be the source of her bleed at that time.  Hospitalized 10/2019 with anemia.  Work-up notable for elevated LDH, undetectable haptoglobin, and conversely a negative KRISTEN.  Peripheral smear relatively inconclusive but recommended repeat smear if counts worsening.  Hematology consulted ([facility]) and patient received transfusion and IV iron.  She was discharged on oral iron.  Has not followed up with Heme since.  Hgb low over last two months.   Labs notable for iron 51 (nml), TIBC 380 (nml), iron sat 13 (L), ferritin 13 (low end of normal), transferrin 296 (nml), KRISTEN negative, retic 118.1 with abs retic 4.9%.    --Transfuse to keep Hgb > 7.     --PRN orders available for transfusion reaction including volume overload   --While there is likely a component of anemia of chronic disease, suspect a mixed picture.  Differential includes AVMs (unable to differentiate if black stool from PO iron vs bleed) versus polyp vs hemolysis.  Iron levels actually look OK and KRISTEN negative, however retic is elevated.  Await hapto, LDH, and peripheral smear.  Has not had a colonoscopy since 2015.  Consider repeat colonoscopy as outpatient.  Should see Heme as an outpatient.      Cor Pulmonale, followed by [facility], appears fairly compensated   Cardiomyopathy  Continue PTA meds including torsemide and spironolactone.  Daily  standing weights.  Appears a bit volume down (BUN elevated) so will give Lasix after transfusion to prevent volume overload.      Pulmonary hypertension   COPD  Continue O2 at night, ~1.5 L.  Quite wheezy on exam but doesn't appear tight or toxic.  Denies dyspnea or fever.  Continue scheduled nebs.      PAF   On no rate controlling meds.  Despite elevated CHADS-Vasc score, is not on anticoagulation due to GI bleed and CVA.      DVT Prophylaxis: Low Risk/Ambulatory with no VTE prophylaxis indicated  Code Status:  Full    Disposition:  Home tomorrow with outpatient follow-up    Denise BONILLA    PRIMARY CARE PROVIDER: Gerri Eubanks    CHIEF COMPLAINT  Weakness, anemia    History is obtained from the patient, Epic, and daughter    HISTORY OF PRESENT ILLNESS  Keisha Godinez is a 88 year old female with COPD, PAF, pulmonary HTN, history of CVA 2010, HTN, DLD, GI bleed 2015, small bowel AVMs, and cor pulmonale who presented to [facility] ED on 3/13/2020 from clinic with weakness and hemoglobin 6.2.  Patient was seen at her PMD's office today and noted to have a hemoglobin of 6.  She has a history of requiring transfusions and had a transfusion in Feb 2020 for a low hemoglobin as well.  During that transfusion, she was noted to become markedly hypertensive requiring additional Lasix mid-transfusion.  Because the transfusion took > 4 hours to complete, she was unable to receive the entire bag.  She has been feeling lousy since this time because of weakness.  No increased shortness of breath, chest pain, abdominal pain, fever, chills, sweats, joint pain, or increased leg swelling.  She felt really great after her hospital stay in Oct 2019 when she received several units of blood in addition to IV iron.  She saw Heme during her stay but has not follow-up.  Last colonoscopy and EGD were 2015 and revealed a very large polyp.  She notes black stools but also takes oral iron BID.  She was referred to the ER by her PMD due to  low hemoglobin and ongoing symptoms.     In the ED, hemoglobin 6.2 with MCV normal and MCHC low.  She was satting in the high 80s on room air with HR in the 50s.  Temp 98.3.  A unit of blood was ordered but due to patient's multiple antibodies, Blood Bank was unlikely to have a unit available for many hours.  Request for Observation was made.    PAST MEDICAL HISTORY  1. Anemia, multifactorial, normocytic, hypochromic.  Baseline Hgb around 8.  Has required intermittent transfusion support.  Last colonoscopy and EGD in 2015.  Is on oral iron.   2. Atrial fibrillation. CHADS-Vasc of at least 7.  3. AVMs of the small bowel  4. BLE edema, chronic  5. Cardiomyopathy. Suspect nonischemic.   6. COPD  7. Cor Pulmonale.  8. CVA.  Acute left MCA CVA 2010.  On ASA and Plavix.   9. Dyslipidemia  10. GERD  11. GI bleed.  Seen as recently as 2019 for this issue.  There were concerns for hemolysis due to undetectable haptoglobin and elevated LDH however KRISTEN was negative.  Peripheral smear was relatively inconclusive.  EGD 2015 negative.  Colonoscopy 2015 revealed a 2 cm polyp felt to possibly be the source of a GI bleed at that time.  Also has known AVMs, as above.  12. Hypertension  13. Pulmonary hypertension on 1.5L NC at night.      PAST SURGICAL HISTORY  I have reviewed this patient's surgical history and updated it with pertinent information if needed.  Past Surgical History:   Procedure Laterality Date     COLONOSCOPY  03/24/2015    Diverticulosis, polyps     ENTEROSCOPY SMALL BOWEL N/A 2/29/2016    Procedure: ENTEROSCOPY SMALL BOWEL;  Surgeon: Ady Ponce MD;  Location:  GI     ESOPHAGOSCOPY, GASTROSCOPY, DUODENOSCOPY (EGD), COMBINED N/A 3/22/2015    Upper GI- Normal, nothing significant found.      EXCISE MASS FOOT Right 7/6/2016    Procedure: EXCISE MASS FOOT;  Surgeon: Lee Jang DPM;  Location:  OR       PRIOR TO ADMISSION MEDICATIONS  Prior to Admission Medications   Prescriptions Last Dose  Informant Patient Reported? Taking?   ASPIRIN NOT PRESCRIBED (INTENTIONAL)   No No   Sig: Please choose reason not prescribed, below   Cranberry Extract 250 MG TABS 3/13/2020 at Unknown time  Yes Yes   Sig: Take 250 mg by mouth daily   OMEGA-3 FATTY ACIDS PO 3/13/2020 at Unknown time Daughter Yes Yes   Sig: Take 1,200 mg by mouth daily    SM STOOL SOFTENER 8.6-50 MG tablet   No Yes   Sig: Take 2 tablets daily as needed for constipation while taking prescription pain medications.   VITAMIN D, CHOLECALCIFEROL, PO 3/13/2020 at Unknown time Self Yes Yes   Sig: Take 1,000 Units by mouth daily    acetaminophen (TYLENOL) 325 MG tablet   Yes Yes   Sig: Take 650 mg by mouth 3 times daily as needed for mild pain   albuterol (ACCUNEB) 1.25 MG/3ML nebulizer solution 3/13/2020 at x1  Yes Yes   Sig: Take 1 vial by nebulization 3 times daily    ferrous sulfate (FEROSUL) 325 (65 Fe) MG tablet 3/13/2020 at x1  No Yes   Sig: Take 1 tablet (325 mg) by mouth 2 times daily   gabapentin (NEURONTIN) 100 MG capsule 3/13/2020 at x2  No Yes   Sig: TAKE 1 CAPSULE (100MG) BY MOUTH THREE TIMES DAILY   hydrALAZINE (APRESOLINE) 25 MG tablet 3/13/2020 at x2  No Yes   Sig: TAKE 3 TABLETS BY MOUTH THREE TIMES DAILY   omeprazole (PRILOSEC) 20 MG DR capsule 3/13/2020 at Unknown time  No Yes   Sig: Take 1 capsule (20 mg) by mouth daily   simvastatin (ZOCOR) 10 MG tablet 3/12/2020 at Unknown time  No Yes   Sig: TAKE 1 TABLET (10MG) BY MOUTH AT BEDTIME   spironolactone (ALDACTONE) 50 MG tablet 3/13/2020 at Unknown time  No Yes   Sig: TAKE 1 TABLET BY MOUTH DAILY   torsemide (DEMADEX) 20 MG tablet 3/13/2020 at Unknown time  No Yes   Sig: Take 40 mg (2 tablets) in the morning and 20 mg (1 tablet) in the afternoon   venlafaxine (EFFEXOR-XR) 75 MG 24 hr capsule 3/13/2020 at Unknown time  No Yes   Sig: Take 1 capsule (75 mg) by mouth daily      Facility-Administered Medications: None       ALLERGIES  Allergies   Allergen Reactions     Blood Transfusion  Related (Informational Only) Other (See Comments)     Patient has a history of a clinically significant antibody against RBC antigens.  A delay in compatible RBCs may occur.     Lisinopril Other (See Comments)     Tongue swelling     Calcium Nausea and Vomiting     Egg Yolk      Flu Virus Vaccine      Allergic to eggs.     Keflex [Cephalexin] Nausea     Pt states she had upset stomach.  NOT tongue swelling.  She had tongue swelling with a combo bp med that she thinks included lisinopril.    Tolerates IV Cefazolin     Levaquin [Levofloxacin]      Sulfa Drugs      Zinc Nausea and Vomiting       SOCIAL HISTORY  Former smoker.  Doesn't drink much.  Lives with daughter who continue to work outside the home.  Requires some assistance with ADLs.  Ambulates with a walker.     FAMILY HISTORY  I have reviewed this patient's family history and updated it with pertinent information if needed.   Family History   Problem Relation Age of Onset     Known Genetic Syndrome Father      Known Genetic Syndrome Mother      Other Cancer Daughter         pancreatic     Other Cancer Brother         type     Other Cancer Sister 60        lung     Diabetes No family hx of      Breast Cancer No family hx of      Cancer - colorectal No family hx of      Ovarian Cancer No family hx of      Prostate Cancer No family hx of        REVIEW OF SYSTEMS:  14 point comprehensive ROS undertaken with pertinent positives and negatives as above and otherwise unremarkable.     PHYSICAL EXAM  Temp: 98.7  F (37.1  C) Temp src: Oral BP: (!) 187/58 Pulse: 90 Heart Rate: 63 Resp: 18 SpO2: 95 % O2 Device: Nasal cannula Oxygen Delivery: 1.5 LPM  Vital Signs with Ranges  Temp:  [98.3  F (36.8  C)-98.7  F (37.1  C)] 98.7  F (37.1  C)  Pulse:  [51-90] 90  Heart Rate:  [63] 63  Resp:  [16-18] 18  BP: (112-187)/(43-84) 187/58  SpO2:  [82 %-100 %] 95 %  149 lbs 14.6 oz    GENERAL:  Pleasant, cooperative, alert.  Appears frail and thin.  Nontoxic.  Resps unlabored.    HEENT: Normocephalic, atraumatic.  Extra occular mm intact.  Sclera clear. PERRL.  Mucous membranes a bit dry.   PULMONARY:  Fairly good movement but with scattered diffuse wheeze.   CARDIAC:  Regular rate and rhythm.  No appreciated murmur.  Somewhat barrel chested.   ABDOMEN: Soft, nontender non distended.    MUSCULOSKELETAL:  Moving x 4 spontaneously with CMS intact x4.  Normal bulk and tone.  Trace LE edema.   NEURO: Alert and oriented x3.  CN II-XII grossly intact and symmetric.  No ataxia or tremor.   SKIN:  Warm, pink, dry.    DATA  Data reviewed today:  I personally reviewed the chest x-ray image(s) showing no acute process.    Recent Labs   Lab 03/13/20  1535 03/13/20  1048   WBC 6.9 6.0   HGB  --  6.2*   MCV  --  94   PLT  --  259     --    POTASSIUM 3.7  --    CHLORIDE 103  --    CO2 31  --    BUN 32*  --    CR 1.01  --    ANIONGAP 3  --    CASPER 8.7  --    *  --        No results found for this or any previous visit (from the past 24 hour(s)).

## 2020-03-13 NOTE — PHARMACY-ADMISSION MEDICATION HISTORY
Admission medication history interview status for this patient is complete. See [facility] admission navigator for allergy information, prior to admission medications and immunization status.     Medication history interview source(s):Patient and Family  Medication history resources (including written lists, pill bottles, clinic record):None  Primary pharmacy:[facility]    Changes made to PTA medication list:  Added: -  Deleted: keflex, valtrex  Changed: -    Actions taken by pharmacist (provider contacted, etc):None     Additional medication history information:None    Medication reconciliation/reorder completed by provider prior to medication history?  no   (Y/N)     For patients on insulin therapy: no  (Y/N)  Sliding scale Novolog: -  Do you have a baseline novolog pre-meal dose:   __  units with meals or __ units/carb unit with meals  Do you eat three meals a day:  -  How many times do you check your blood glucose per day:  -  How many episodes of hypoglycemia do you have per week: -  Do you have a Continuous glucose monitor (CGM) : - (remind pt that not approved for hospital use)  Any specific barriers to therapy? -  (cost, comfortable with injections, confident with current diabetes regimen?)      Prior to Admission medications    Medication Sig Last Dose Taking? Auth Provider   acetaminophen (TYLENOL) 325 MG tablet Take 650 mg by mouth 3 times daily as needed for mild pain  Yes Unknown, Entered By History   albuterol (ACCUNEB) 1.25 MG/3ML nebulizer solution Take 1 vial by nebulization 3 times daily  3/13/2020 at x1 Yes Unknown, Entered By History   Cranberry Extract 250 MG TABS Take 250 mg by mouth daily 3/13/2020 at Unknown time Yes Doctor, Sharonda, MD   ferrous sulfate (FEROSUL) 325 (65 Fe) MG tablet Take 1 tablet (325 mg) by mouth 2 times daily 3/13/2020 at x1 Yes Darryl Reed MD   gabapentin (NEURONTIN) 100 MG capsule TAKE 1 CAPSULE (100MG) BY MOUTH THREE TIMES DAILY 3/13/2020 at x2 Yes Gerri Eubanks MD    hydrALAZINE (APRESOLINE) 25 MG tablet TAKE 3 TABLETS BY MOUTH THREE TIMES DAILY 3/13/2020 at x2 Yes Darryl Reed MD   OMEGA-3 FATTY ACIDS PO Take 1,200 mg by mouth daily  3/13/2020 at Unknown time Yes Reported, Patient   omeprazole (PRILOSEC) 20 MG DR capsule Take 1 capsule (20 mg) by mouth daily 3/13/2020 at Unknown time Yes Gerri Eubanks MD   simvastatin (ZOCOR) 10 MG tablet TAKE 1 TABLET (10MG) BY MOUTH AT BEDTIME 3/12/2020 at Unknown time Yes Gerri Eubanks MD   SM STOOL SOFTENER 8.6-50 MG tablet Take 2 tablets daily as needed for constipation while taking prescription pain medications.  Yes Darryl Reed MD   spironolactone (ALDACTONE) 50 MG tablet TAKE 1 TABLET BY MOUTH DAILY 3/13/2020 at Unknown time Yes Gerri Eubanks MD   torsemide (DEMADEX) 20 MG tablet Take 40 mg (2 tablets) in the morning and 20 mg (1 tablet) in the afternoon 3/13/2020 at Unknown time Yes Gerri Eubanks MD   venlafaxine (EFFEXOR-XR) 75 MG 24 hr capsule Take 1 capsule (75 mg) by mouth daily 3/13/2020 at Unknown time Yes Gerri Eubanks MD   VITAMIN D, CHOLECALCIFEROL, PO Take 1,000 Units by mouth daily  3/13/2020 at Unknown time Yes Reported, Patient   ASPIRIN NOT PRESCRIBED (INTENTIONAL) Please choose reason not prescribed, below   Gerri Eubanks MD

## 2020-03-13 NOTE — PLAN OF CARE
ROOM # 225    Living Situation (if not independent, order SW consult):  Facility name:  : Rebecca (daughter) [phone]    Activity level at baseline: Independent with walker   Activity level on admit: Ax1 with walker and gait belt       Patient registered to observation; given Patient Bill of Rights; given the opportunity to ask questions about observation status and their plan of care.  Patient has been oriented to the observation room, bathroom and call light is in place.    Discussed discharge goals and expectations with patient/family.

## 2020-03-13 NOTE — ED NOTES
RECEIVING UNIT ED HANDOFF REVIEW    Above ED Nurse Handoff Report was reviewed: Yes  Reviewed by: Liseth Lopez RN on March 13, 2020 at 5:37 PM           [facility]  ED Nurse Handoff Report    Keisha Godinez is a 88 year old female   ED Chief complaint: Abnormal Labs  . ED Diagnosis:   Final diagnoses:   Iron deficiency anemia, unspecified iron deficiency anemia type     Allergies:   Allergies   Allergen Reactions     Blood Transfusion Related (Informational Only) Other (See Comments)     Patient has a history of a clinically significant antibody against RBC antigens.  A delay in compatible RBCs may occur.     Lisinopril Other (See Comments)     Tongue swelling     Calcium Nausea and Vomiting     Egg Yolk      Flu Virus Vaccine      Allergic to eggs.     Keflex [Cephalexin] Nausea     Pt states she had upset stomach.  NOT tongue swelling.  She had tongue swelling with a combo bp med that she thinks included lisinopril.    Tolerates IV Cefazolin     Levaquin [Levofloxacin]      Sulfa Drugs      Zinc Nausea and Vomiting       Code Status: Full Code  Activity level - Baseline/Home:  Stand by Assist. Activity Level - Current:   Assist X 1. Lift room needed: No. Bariatric: No   Needed: No   Isolation: No. Infection: Not Applicable.     Vital Signs:   Vitals:    03/13/20 1638 03/13/20 1639 03/13/20 1640 03/13/20 1641   BP:       Pulse:       Resp:       Temp:       SpO2: 99% 98% 98% 99%   Weight:           Cardiac Rhythm:  ,      Pain level: 0-10 Pain Scale: 0  Patient confused: No. Patient Falls Risk: Yes.   Elimination Status: Has voided   Patient Report - Initial Complaint: Pt with chronic anemia, hgb found to be 6 today.  Sent to ed for eval.. Focused Assessment: sleeping, o2 applied for low sats   Tests Performed: none. Abnormal Results:   Labs Ordered and Resulted from Time of ED Arrival Up to the Time of Departure from the ED   BASIC METABOLIC PANEL - Abnormal; Notable for the  following components:       Result Value    Glucose 130 (*)     Urea Nitrogen 32 (*)     GFR Estimate 50 (*)     GFR Estimate If Black 58 (*)     All other components within normal limits   ABO/RH TYPE AND SCREEN - Abnormal; Notable for the following components:    Antibody Screen Pos (*)     All other components within normal limits   BLOOD MORPHOLOGY PATHOLOGIST REVIEW   RETICULOCYTE COUNT   FERRITIN   TRANSFERRIN   RETICULOCYTE COUNT   HAPTOGLOBIN   LACTATE DEHYDROGENASE   WBC AND DIFFERENTIAL AMB   PERIPHERAL IV CATHETER   RED BLOOD CELL PREPARE ORDER UNIT   ANTIBODY ID [facility] BB   DIRECT ANTIGLOBULIN TEST     .   Treatments provided: waiting on blood to be cross matched at the   Family Comments: present  OBS brochure/video discussed/provided to patient:  Yes  ED Medications: Medications - No data to display  Drips infusing:  No  For the majority of the shift, the patient's behavior Green. Interventions performed were waiting on blood.    Sepsis treatment initiated: No       ED Nurse Name/Phone Number: Da Johns RN,   4:56 PM

## 2020-03-14 PROBLEM — I50.9 HEART FAILURE (H): Status: ACTIVE | Noted: 2020-01-01

## 2020-03-14 NOTE — PLAN OF CARE
PRIMARY DIAGNOSIS: anemia    OUTPATIENT/OBSERVATION GOALS TO BE MET BEFORE DISCHARGE  Orthostatic performed: No    Stable Hgb Yes.   Recent Labs   Lab Test 03/14/20  0625 03/13/20  1048 02/17/20  1038   HGB 7.4* 6.2* 6.7*     Resolved or declined bleeding episodes: NA    Appropriate testing complete: Yes    Cleared for discharge by consultants (if involved): N/A      Discharge Planner Nurse   Safe discharge environment identified: Yes  Barriers to discharge: No       Entered by: Judy Lora 03/14/2020 9:18 AM     Please review provider order for any additional goals.   Nurse to notify provider when observation goals have been met and patient is ready for discharge.    BP (!) 158/44 (BP Location: Left arm)   Pulse 67   Temp 98.1  F (36.7  C) (Oral)   Resp 20   Wt 69.1 kg (152 lb 6.4 oz)   SpO2 93%   BMI 27.00 kg/m      A&Ox4. Patient sitting upright in bed eating breakfast, tolerating PO meds. Plan to walk patient after breakfast, monitor O2sats and LS.

## 2020-03-14 NOTE — PLAN OF CARE
Patient has been assessed for Home Oxygen needs.  Oxygen readings:   *   RA - at rest  Pulse oximetry SPO2 84-86* % Patient reported feeling SOB on RA at rest.    *   RA - during activity/with exercise SPO2 86-88 %  *   O2 at 2 liters/minute (at rest) ...SPO2 95 %  *   O2 at 2 liters/minute (during activity/with exercise) ...SPO2 90-92%

## 2020-03-14 NOTE — PROGRESS NOTES
Continues to be dyspneic, no change in hypoxia.  Remains with wheezing and bilateral crackles.  No urine output since furosemide given at 1:45 PM    Trial additional duoneb  Lasix 40 mg IV now.  Hold PTA torsemide while diuresing    Rubia Mckay PA-C

## 2020-03-14 NOTE — PROGRESS NOTES
[facility]    Medicine Progress Note - Hospitalist Service       Date of Admission:  3/13/2020  Assessment & Plan   Acute on chronic normocytic anemia, likely chronic GI losses  Small bowel AVMs  Iron deficiency anemia  --appropriate improvement in hemoglobin from 6.2 to 7.4 after one unit of blood.  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 dry weight ~145-148, 152 pounds today. Requiring 1.5-2L O2 today, typically on 1.5 L at HS only.  PTA on torsemide 40 mg daily/20 mg qPM    PLAN:  --CXR, additional diuresis pending CXR  --continue torsemide 40 mg daily/20 mg qpm, spironolactone 50 mg/day  --hydralazine 25 mg TID, may titrate up pending BP  --daily weights, I/O    ADDENDUM: CXR with probable pulmonary congestion, possible RLL infiltrate.  Add augmentin for possible pneumonia, give additional 20 mg IV furosemide.  Start prednisone 40 mg/day x5 days for COPD exac.    COPD: continue albuterol nebs TID    Chronic atrial fibrillation  --not on anticoagulation d/t chronic GIB.  Rate controlled without BB.     Debility: requiring additional assistance today.  Pending diuresis/treatment today, consider PT eval 3/15    CKD stage 3: creat stable at 1.0  Dyslipidemia: resume statin discharge  Anxiety: venlafaxine     Diet: Regular Diet Adult    DVT Prophylaxis: enoxaparin subcutaneous   Maguire Catheter: not present  Code Status: Full Code      Disposition Plan   Expected discharge: Tomorrow, recommended to prior living arrangement once O2 use less than 1.5 liters/minute.  Lives home with family with intermittent assistance.  Entered: Rubia Mckay PA-C 03/14/2020, 10:26 AM         Rubia Mckay PA-C  Hospitalist Service  [facility]  Hospital    ______________________________________________________________________    Interval History   Reports mild dyspnea.  Typically feels better after getting up and moving.  Wants to go home but willing to stay and understands our concerns about her respiratory status.      Per nursing, requiring assist of 1-2.     Data reviewed today: I reviewed all medications, new labs and imaging results over the last 24 hours. I personally reviewed no images or EKG's today.    Physical Exam   Vital Signs: Temp: 98.1  F (36.7  C) Temp src: Oral BP: (!) 158/44 Pulse: 67 Heart Rate: 60 Resp: 20 SpO2: 93 % O2 Device: Nasal cannula Oxygen Delivery: 1.5 LPM  Weight: 152 lbs 6.4 oz    Constitutional: chronically ill appearing woman sitting up in bed  Eyes: no icterus  HEENT: mucous membranes moist  Respiratory: diffuse wheezing bilaterally, no current crackles.    Cardiovascular: irregular without murmurs  GI: normoactive bowel sounds, soft, nontender  Lymph/Hematologic: no bruising  Lymphedema wraps in place, 1-2+ edema bilateral LE  Genitourinary: purewick in place with charlotte urine  Skin: warm and dry  Musculoskeletal: decreased muscle bulk  Neurologic: nonfocal  Psychiatric: alert, oriented, appropriate.         Data   Recent Labs   Lab 03/14/20  0625 03/13/20  1535 03/13/20  1048   WBC 8.6 6.9 6.0   HGB 7.4*  --  6.2*   MCV 94  --  94     --  259   NA  --  137  --    POTASSIUM  --  3.7  --    CHLORIDE  --  103  --    CO2  --  31  --    BUN  --  32*  --    CR  --  1.01  --    ANIONGAP  --  3  --    CASPER  --  8.7  --    GLC  --  130*  --      No results found for this or any previous visit (from the past 24 hour(s)).

## 2020-03-14 NOTE — PLAN OF CARE
PRIMARY DIAGNOSIS: Anemia  OUTPATIENT/OBSERVATION GOALS TO BE MET BEFORE DISCHARGE:  ADLs back to baseline: No    Activity and level of assistance: Up with standby assistance.    Pain status: Pain free.    Return to near baseline physical activity: A1 w/walker, GB for safety with weakness     Discharge Planner Nurse   Safe discharge environment identified: Yes  Barriers to discharge: Yes, continues with transfusion, hgb recheck       Entered by: Ying Diaz 03/14/2020 2:20 AM     Please review provider order for any additional goals.   Nurse to notify provider when observation goals have been met and patient is ready for discharge.    VSS on 1.5 LPM NC. Denies pain, CP, SOB/MOTT. Resting in bed between cares, ind positioning. Continues to receive 1 unit(s) PRBC, CBC ordered for AM. Received torsemide on prior shift, purewick in place, continue to monitor. Tele afib, CVR. Bed alarms in use, pt not attempting to exit bed. Alert and oriented, able to make needs known. Continue to monitor.

## 2020-03-14 NOTE — PLAN OF CARE
PRIMARY DIAGNOSIS: Anemia    OUTPATIENT/OBSERVATION GOALS TO BE MET BEFORE DISCHARGE  1. Orthostatic performed: No    2. Stable Hgb Yes.   Recent Labs   Lab Test 03/14/20  0625 03/13/20  1048 02/17/20  1038   HGB 7.4* 6.2* 6.7*       3. Appropriate testing complete: No    4. Cleared for discharge by consultants (if involved): No    5. Safe discharge environment identified: Yes    Discharge Planner Nurse   Safe discharge environment identified: Yes  Barriers to discharge: Yes       Entered by: Emilia Garcia 03/14/2020 6:20 PM     Please review provider order for any additional goals.   Nurse to notify provider when observation goals have been met and patient is ready for discharge.    BP slightly elevated, on 2L of O2, wears 1.5L of O2 only at bedtime at home, low grade max temperature 99.8. Up Ax1 with walker and gait belt. A&O. Regular diet, good appetite. Sob at rest and with activity. Ambulated to the bathroom, tolerated fairly well. Denies pain. Compression stockings are off, legs elevated with her bilateral edema. Intermittent productive cough, prn robitussin given x2 with relief. Crackles and expiratory wheezes, one time dose of IV lasix given, holding scheduled torsemide. Weaker today, purewick in place d/t lasix- still encouraging to ambulate to the bathroom. Bottom red, turning and repositioned in bed. Started on prednisone for copd exacerbation. Duonebs added to plan of care. Oral Augmentin started for possible pneumonia. Daily weight. Possible PT evaluation tomorrow.

## 2020-03-14 NOTE — PLAN OF CARE
PRIMARY DIAGNOSIS: anemia    OUTPATIENT/OBSERVATION GOALS TO BE MET BEFORE DISCHARGE  1. Orthostatic performed: No    2. Stable Hgb Yes.   Recent Labs   Lab Test 03/14/20  0625 03/13/20  1048 02/17/20  1038   HGB 7.4* 6.2* 6.7*     3. Resolved or declined bleeding episodes: NA    4. Appropriate testing complete: Yes    5. Cleared for discharge by consultants (if involved): N/A      Discharge Planner Nurse   Safe discharge environment identified: Yes  Barriers to discharge: No       Entered by: Judy Lora 03/14/2020 12:13 PM     Please review provider order for any additional goals.   Nurse to notify provider when observation goals have been met and patient is ready for discharge.    BP (!) 160/45 (BP Location: Left arm)   Pulse 67   Temp 99.9  F (37.7  C) (Oral)   Resp 20   Wt 69.1 kg (152 lb 6.4 oz)   SpO2 98%   BMI 27.00 kg/m      LS auscultated crackles and wheezes, patient reported feeling SOB during ambulating. Currently on 2L O2, sitting up in bed with O2sats 98%. Denies SOB at rest. CXR done, awaiting results.

## 2020-03-14 NOTE — PLAN OF CARE
PRIMARY DIAGNOSIS: Iron Deficient Anemia     OUTPATIENT/OBSERVATION GOALS TO BE MET BEFORE DISCHARGE  1. Orthostatic performed: No    2. Stable Hgb No.   Recent Labs   Lab Test 03/13/20  1048 02/17/20  1038 11/26/19  1056   HGB 6.2* 6.7* 8.8*       3. Appropriate testing complete: No    4. Cleared for discharge by consultants (if involved): No    5. Safe discharge environment identified: Yes, daughter lives her    Discharge Planner Nurse   Safe discharge environment identified: Yes  Barriers to discharge: Yes       Entered by: Emilia Garcia 03/13/2020 11:10 PM     Please review provider order for any additional goals.   Nurse to notify provider when observation goals have been met and patient is ready for discharge.    BP elevated, on 1.5L of O2, afebrile. Up Ax1 with walker and gait belt. A&O. Regular diet. Sob when up out of bed, expiratory wheezes. Per tele tech pt Afib controlled with HR in the 50s. Scheduled torsemide given, pt very weak, purewick in place. Bilateral edema at baseline, compression stockings on, pt refused to take them off overnight. Denies pain. 1 unit of blood transfusing.

## 2020-03-14 NOTE — PLAN OF CARE
PRIMARY DIAGNOSIS: Anemia/Transfusion  OUTPATIENT/OBSERVATION GOALS TO BE MET BEFORE DISCHARGE:  ADLs back to baseline: No    Activity and level of assistance: A1, walker and GB    Pain status: Pain free.    Return to near baseline physical activity: No     Discharge Planner Nurse   Safe discharge environment identified: Yes  Barriers to discharge: Yes, pending AM hgb and resolution of weakness       Entered by: Ying Diaz 03/14/2020 4:22 AM     Please review provider order for any additional goals.   Nurse to notify provider when observation goals have been met and patient is ready for discharge.    VSS on 1.5 LPM NC. Resting in bed between cares, able to turn/reposition self. 1 unit PRBC completed, tolerated well. Purewick in place with good UO thus far. PIV SL, tolerating PO. Remote tele in place. Bed alarms in use, pt not attempting to exit bed. Alert and oriented, able to make needs known. Continue to monitor.

## 2020-03-15 NOTE — PLAN OF CARE
Temp: 98.6  F (37  C) Temp src: Oral BP: (!) 147/46 Pulse: 57 Heart Rate: 64 Resp: 18 SpO2: 96 %    Orientation: Alert and Oriented x4  Neuro: WNL  Pain status: denies  Activity: Ax1 gait belt and walker for short distances, Ax2 gait belt and walker in hallway for longer distances  Alarms: bed alarm, however has been calling appropriately  Peripheral edema: BLE trace edema  Resp: still dyspneic on exertion, however patient reports feeling much better than yesterday. Currently on oxygen via NC 1L, was weaned to RA, however desatted to 88%. O2sats consistently >94% on 1L at rest. While ambulating, oxygen increases to 2-3L.  Lungs: crackles and expiratory wheezes  Cardiac: tele, afib CVR  GI: +BS  : continent, has only voided a small amount of urine throughout the day, PA notified. Urine is clear and yellow.  Skin: carey, bruised, flaky, in tact  LDA: Peripheral  Infusions: is Saline locked.  Meds: IV lasix BID  Labs/Imaging: CXR done yesterday  Diet: regular - tolerating well.  Consults: PT  Isolation: droplet for possible PNA

## 2020-03-15 NOTE — PROGRESS NOTES
1 unit PRBCs transfusing at 75mL/hr - plan for slow transfusion over 4 hours. VSS prior to transfusion, patient currently denying SOB. VS rechecked 10 minutes after transfusion started and WNL, patient currently denying transfusion rxn sx. Patient educated and understands to report any sx to nurse immediately. Plan to continue to monitor over next 4 hours.

## 2020-03-15 NOTE — PLAN OF CARE
PRIMARY DIAGNOSIS: Anemia  OUTPATIENT/OBSERVATION GOALS TO BE MET BEFORE DISCHARGE:  1. Vital signs stable: Yes    2. Improvement of peak flow to greater than 70% sustained off nebulizer for 4 hours: Yes    3. Dyspnea improved and O2 sats >88% at RA or at prior home O2 therapy level: No      SpO2: 94 %, O2 Device: Nasal cannula 2L    4. Short term supplemental O2 needed for use with activity at home: Pt has O2 available at home dt night use    5. Tolerating adequate PO diet and medications: Yes    6. Return to near baseline physical activity: No- generalized weakness    Discharge Planner Nurse   Safe discharge environment identified: Yes  Barriers to discharge: Yes       Entered by: Edita Erwin 03/15/2020 2:37 AM     Please review provider order for any additional goals.   Nurse to notify provider when observation goals have been met and patient is ready for discharge.    Patient is Alert and Oriented x4. Vitals are Temp: 98.4  F (36.9  C) Temp src: Oral BP: (!) 142/48 Pulse: 57 Heart Rate: 53 Resp: 20 SpO2: 94 % O2 Device: Nasal cannula Oxygen Delivery: 2 LPM Pt is denying pain.  They are 1 Assist with Gait Belt.   Patient is on Droplet precuations for Pneumonia.   Pt is on a Regular diet. Patient is Saline locked. Purewick in place. Plan: Possible PT assessment in AM. Encourage ambulation. Wean off O2 for days.

## 2020-03-15 NOTE — PROGRESS NOTES
Patient hemoglobin dropped to 6.7, after she got 1 unit packed red blood cells yesterday.  Transfuse 1 packed red blood cell today

## 2020-03-15 NOTE — PROGRESS NOTES
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 ED on 3/13/2020 from clinic with weakness and hemoglobin 6.2.  Has multiple antibodies so unable to transfuse in the ED.  She was transfused 1u   Admitted to Observation.     Acute on chronic normocytic anemia, likely chronic GI losses due to Small bowel AVMs and Iron deficiency anemia  --Hgb dropped this am to 6.7 from 7.4 despite 1u PRBC yesterday, no obvious chai bleeding  --Baseline hgb around 8-9. Getting 1u PRBC now, repeat Hgb this afternoon  --Keep Hgb around 8 for now  --Note, she had been followed by [facility] in the past and there were concerns that she exhibited possible hemolysis given past work up for anemia but was not 100% conclusive. Labs from admission shows elevated retic count and BUN that could be c/w hemolysis but no LDH was taken. Blood smear pending. Would recommend follow up with [facility] as outpt.      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 1000 cc total net -427 yesterday  --typically uses torsemide 40 mg am and 20 mg pm (120mg oral lasix equilivant)   --Typically on torsemide 40mg qam and 20mg qpm  --appears to be sl wet today, (dry weight ~145-148), today 150 from 152 yesterday  --needs additional diuresis, give 40 mg IV lasix now, will likely need additional 20 mg IV lasix on top of her usual evening lasix equilivant (40mg PO).   --Will give 40 mg IV lasix tomorrow morning (her usual  torsemide equalivant) and rounder to reassess tomorrow if additional dosing needed  --continue spironolactone 50 mg/day, daily weights, I/O    Questionable RLL PNA  --CXR shows atelectasis vs infiltrate, did have mild temps of 100 and increased cough on 3/14 but has been AF now and normal WBC  --Augmentin started on 3/14, not convinced that she has true bacterial PNA  --Check pro calcitonin, if negative then likely can discontinue Augmentin     Hypertension  --hydralazine 25 mg TID, may titrate up pending BP     COPD: remains slightly wheezy, requiring O2 during daytime(nomrallly night only), suspects she's always a bit wheezy  --will change to QID scheduled nebs and PRN albuterol.    --Started on oral prednisone 3/14 x 5 days cont for now     Chronic atrial fibrillation  --not on anticoagulation d/t chronic GIB.  Rate controlled without BB.      Debility: requiring additional assistance today.    --Request PT  --SW to eval for possible home needs vs TCU     CKD stage 3: creat stable at 1.0, watch closely given increased diuresis    Dyslipidemia: resume statin discharge  Anxiety: venlafaxine     Diet: Regular Diet Adult    DVT Prophylaxis: scd and hold lovenox for now  Maguire Catheter: not present  Code Status: Full Code              Interval History (Subjective):      States still feeling SOB today, getting 1u PRBC now. Has not gotten out of bed.                   Physical Exam:      Last Vital Signs:  BP (!) 156/50   Pulse 57   Temp 98  F (36.7  C) (Oral)   Resp 20   Wt 69.1 kg (152 lb 6.4 oz)   SpO2 94%   BMI 27.00 kg/m        Constitutional: Awake, alert, cooperative, no apparent distress   Respiratory: Scattered wheeze, dec bs at bases and +crackles    Cardiovascular: Regular rate and rhythm, normal S1 and S2, and no murmur noted   Abdomen: Normal bowel sounds, soft, non-distended, non-tender   Skin: No rashes, no cyanosis, dry to touch   Neuro: Alert and oriented x3, no weakness, numbness, memory loss    Extremities: No edema, normal range of motion   Other(s):        All other systems: Negative          Medications:      All current medications were reviewed with changes reflected in problem list.         Data:      All new lab and imaging data was reviewed.   Labs:       Lab Results   Component Value Date     03/15/2020     03/13/2020     12/26/2019    Lab Results   Component Value Date    CHLORIDE 105 03/15/2020    CHLORIDE 103 03/13/2020    CHLORIDE 103 12/26/2019    Lab Results   Component Value Date    BUN 33 03/15/2020    BUN 32 03/13/2020    BUN 43 12/26/2019      Lab Results   Component Value Date    POTASSIUM 3.7 03/15/2020    POTASSIUM 3.7 03/13/2020    POTASSIUM 3.9 12/26/2019    Lab Results   Component Value Date    CO2 30 03/15/2020    CO2 31 03/13/2020    CO2 30 12/26/2019    Lab Results   Component Value Date    CR 0.93 03/15/2020    CR 1.01 03/13/2020    CR 0.98 12/26/2019        Recent Labs   Lab 03/15/20  0534 03/14/20  0625 03/13/20  1535 03/13/20  1048   WBC  --  8.6 6.9 6.0   HGB 6.7* 7.4*  --  6.2*   HCT  --  25.6*  --  21.5*   MCV  --  94  --  94    207  --  259      Imaging:   Results for orders placed or performed during the hospital encounter of 03/13/20   XR Chest 2 Views    Narrative    CHEST TWO VIEWS 3/14/2020 11:27 AM     HISTORY: Hypoxia    COMPARISON: October 4, 2019       Impression    IMPRESSION: Trace pleural fluid bilaterally. Question some increased  atelectasis and/or infiltrate at the right base compared to previous.  The cardiac silhouette is prominent but stable. Pulmonary vasculature  may be congested.    MAGGIE ADAMS MD

## 2020-03-15 NOTE — PLAN OF CARE
PRIMARY DIAGNOSIS: PNEUMONIA  OUTPATIENT/OBSERVATION GOALS TO BE MET BEFORE DISCHARGE:  Dyspnea improved and O2 sats >88% on RA or back to baseline O2 levels: No, patient on continuous 2L O2, baseline she is on 1.5L only at night. Yesterday patient desatted while ambulating, will reassess again today.  SpO2: 97 %, O2 Device: 2L Nasal cannula    Tolerating oral abx or appropriate plans made outpatient infusion: Yes    Vitals signs normal or return to baseline: Yes, except O2sats    Short term supplemental O2 needed with activity at home: Yes    Tolerate oral intake to maintain hydration: Yes    Return to near baseline physical activity: No, requiring Ax1-2 gait belt and walker, baseline patient is independent at home, lives with daughter, however states she does not need help ambulating while at home.    7. Stable Hgb: No. Plan to transfuse 1 unit PRBCs  Recent Labs   Lab Test 03/15/20  0534 03/14/20  0625 03/13/20  1048   HGB 6.7* 7.4* 6.2*       Discharge Planner Nurse   Safe discharge environment identified: unknown, patient not currently at baseline  Barriers to discharge: No       Entered by: Judy Lora 03/15/2020 8:18 AM     Please review provider order for any additional goals.   Nurse to notify provider when observation goals have been met and patient is ready for discharge.

## 2020-03-16 NOTE — PLAN OF CARE
Patient's After Visit Summary was reviewed with patient and daughter.  Patient verbalized understanding of After Visit Summary, recommended follow up and was given an opportunity to ask questions.   Discharge medications sent home with patient/family: jordan  Discharged with daughter.

## 2020-03-16 NOTE — DISCHARGE INSTRUCTIONS
Your home care referral was sent to [facility]  If you haven't heard from them within the next 24-48 hours,  Please call them at [phone]

## 2020-03-16 NOTE — PLAN OF CARE
Weakness, SOB  Vitals:   Temp: 96.5  F (35.8  C) Temp src: Oral BP: (!) 150/45 Pulse: 64 Heart Rate: 50 Resp: 16 SpO2: 95 % O2 Device: Nasal cannula Oxygen Delivery: 1 LPM   Neuro: A&Ox4  Lungs: Expiratory wheezes, minimal to no cough  Cardiac: Tele: A-fib CVR  GI: Distended stomach, BS+, Last BM the 3/14  : over 650ml of OP overnight   Skin: scattered bruising, dry flaky legs, +2 edema to BLE  IV: PIV SL  Labs: Hgb:6.7/8.6, Creat: 1.0  Diet: Reg   Pain: Denies  Activity: 1 ast with walker and GB to bathroom, 2 ast in halls.   Plan: Monitor OP, PT to see, SW following, Labs in AM, PO abx

## 2020-03-16 NOTE — PLAN OF CARE
Discharge Planner PT   Patient plan for discharge: Home with daughter assist  Current status: Per discussion with interdisciplinary team, the pt is mobilizing better this date. Pt's daughter reportedly plans on taking pt home and providing care as needed at discharge. No skilled IP PT needs identified at this time as safe discharge plan in place. Will complete orders.   Barriers to return to prior living situation: None anticipated  Recommendations for discharge: Home with HHPT  Rationale for recommendations: Per MD       Entered by: Zakiya Reilly 03/16/2020 10:48 AM

## 2020-03-16 NOTE — CONSULTS
Care Transition Initial Assessment - RN        Met with: Patient.  DATA   Active Problems:    Anemia    Heart failure (H)       Cognitive Status: awake and alert.  Primary Care Clinic Name: [facility]  Primary Care MD Name: Dr. Eubanks  Contact information and PCP information verified: Yes  Lives With: child(emre), adult                     Insurance concerns: No Insurance issues identified  ASSESSMENT  Patient currently receives the following services:  None        Identified issues/concerns regarding health management: RN CTS met with patient to arrange for home care at discharge RN/PT/OT.   Pt/family was offered the Medicare Compare list for Home Care, with associated star ratings to assist with choice for referrals/discharge planning Yes    Education was given to pt/family that star ratings are updated/maintained by Medicare and can be reviewed by visiting www.medicare.gov Yes  Patient did not want any additional information at this time. Patient has chosen [facility]. Referral sent and contact information updated to [facility].   PLAN  Financial costs for the patient include medicare coverage with homebound status for home care .  Patient given options and choices for discharge Yes .  Patient/family is agreeable to the plan?  Yes.   Patient anticipates discharging to Home .        Patient anticipates needs for home equipment: No  Transportation/person available to transport on day of discharge  is sean Fernandez.   Plan/Disposition: Home   Appointments: None made at this time. Patient will schedule own appointment to coordinate with daughter's schedule as she provides transportation.       Care  (CTS) will continue to follow as needed.    Denise Oliver MA/RN Case Manager  Inpatient Care Coordination  [facility]   [phone]

## 2020-03-16 NOTE — CONSULTS
CTS identifies pt as high risk due to Elevated DALE. Patient was admitted with weakness and an acute on chronic anemia requiring a blood transfusion. Patient has a history of HTN, CVA, CHF, Pulm HTN, chronic wounds, Afib, COPD requiring O2, CKD, and anxiety.  Patient has had one other hospitalization within the past 6 months in October 2019 for respiratory failure possibly d/t pneumonia and a transfusion reaction. Patient declined TCU and home care at that time.    Patient has a CORE appointment on 4/22/2020.   A hospital follow up appointment was not scheduled for the patient at this time.   No handoff will be given to PCP clinic care coordinator at discharge as no clinic care coordinator needs identified at this time.     CM will continue to follow patient for any additional discharge needs.     Denise Oliver MA/RN Case Manager  Inpatient Care Coordination  [facility]   [phone]

## 2020-03-16 NOTE — PROGRESS NOTES
Temp: 98.8  F (37.1  C) Temp src: Oral BP: (!) 148/48 Pulse: 50 Heart Rate: 54 Resp: 18 SpO2: 93 %    Orientation: Alert and Oriented x4  Neuro: WNL  Pain status: denies  Activity: Ax1 gait belt and walker  Peripheral edema: mild BLE edema  Resp: dyspnea on exertion, O2 at 1L with adequate sats  Lungs: crackles and expiratory wheezes  Cardiac: afib CVR  GI: +BS  : WNL  Skin: flaky, bruised, in tact  LDA: Peripheral  Infusions: is Saline locked.  Meds: IV lasix BID  Diet: Orders Placed This Encounter      Regular Diet Adult      Diet    Discharge: home today with daughter at 1pm.

## 2020-03-16 NOTE — DISCHARGE SUMMARY
[facility]  Hospitalist Discharge Summary       Date of Admission:  3/13/2020  Date of Discharge:  3/16/2020  Discharging Provider: Efraín Yancey MD      Discharge Diagnoses   Acute on chronic anemia. 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    Follow-ups Needed After Discharge   Follow-up Appointments     Follow-up and recommended labs and tests       Follow up with primary care provider, Gerri Eubanks, within 5 days   for hospital follow- up.  The following labs/tests are recommended: repeat   hemoglobin in 3-5 days.             Unresulted Labs Ordered in the Past 30 Days of this Admission     Date and Time Order Name Status Description    3/13/2020 1650 Blood Morphology Pathologist Review In process       These results will be followed up by PCP    Discharge Disposition   Discharged to home  Condition at discharge: Stable    Hospital Course   Keisha Godinez is a 88 year old female with COPD, PAF, pulmonary HTN, history of CVA 2010, HTN, DLD, GI bleed 2015, small bowel AVMs, and cor pulmonale who presented to [facility] ED on 3/13/2020 from clinic with weakness and hemoglobin 6.2.  Patient was seen at her PMD's office today and noted to have a hemoglobin of 6.  She has a history of requiring transfusions and had a transfusion in Feb 2020 for a low hemoglobin as well.  During that transfusion, she was noted to become markedly hypertensive requiring additional Lasix mid-transfusion.  Because the transfusion took > 4 hours to complete, she was unable to receive the entire bag.  She has been feeling lousy since this time because of weakness.  No increased shortness of breath, chest pain, abdominal pain, fever, chills, sweats, joint pain, or increased leg swelling.  She felt really great after her hospital stay in Oct 2019 when she received several units of blood in addition to IV iron.  She saw Heme during her stay but has  not follow-up.  Last colonoscopy and EGD were 2015 and revealed a very large polyp.  She notes black stools but also takes oral iron BID.  She was referred to the ER by her PMD due to low hemoglobin and ongoing symptoms.      In the ED, hemoglobin 6.2 with MCV normal and MCHC low.  She was satting in the high 80s on room air with HR in the 50s.  Temp 98.3.  A unit of blood was ordered but due to patient's multiple antibodies, Blood Bank was unlikely to have a unit available for many hours.  Request for Observation was made.    Patient was admitted to the hospital. During her stay she was transfused two units of blood. She had sob with increased hypoxia likely due to decompensated heart failure after transfusion. She was diuresed. There was a question of an exacerbatio of her COPD nad possible PNA. She was started on Prednisone and augmentin. These have been stopped. Today she is doing well. She feels at her baseline. Had formed dark stool yesterday (on iron). She has ambulated with staff. She will discharge home and resume her home diuretics. Prednisone and augmentin were stopped. I spoke with her daughter who is happy with the plan. Home services will be ordered.    Consultations This Hospital Stay   SOCIAL WORK IP CONSULT  PHYSICAL THERAPY ADULT IP CONSULT  CARE COORDINATOR IP CONSULT    Code Status   Full Code    Time Spent on this Encounter   I, Efraín Yancey MD, personally saw the patient today and spent greater than 30 minutes discharging this patient.       Efraín Yancey MD  [facility]  ______________________________________________________________________    Physical Exam   Vital Signs: Temp: 97.2  F (36.2  C) Temp src: Oral BP: (!) 144/45 Pulse: 53 Heart Rate: 54 Resp: 18 SpO2: 95 % O2 Device: Nasal cannula Oxygen Delivery: 1 LPM  Weight: 149 lbs 0 oz  Constitutional: awake, alert, cooperative, no apparent distress, and appears stated age  Eyes: Lids and lashes normal, pupils equal,  round and reactive to light, extra ocular muscles intact, sclera clear, conjunctiva normal  ENT: Normocephalic, without obvious abnormality, atraumatic, sinuses nontender on palpation, external ears without lesions, oral pharynx with moist mucous membranes, tonsils without erythema or exudates, gums normal and good dentition.  Respiratory: good air movement. Occasional crackle. No wheeze.  Cardiovascular: Normal apical impulse, regular rate and rhythm, normal S1 and S2, no S3 or S4, and no murmur noted  GI: No scars, normal bowel sounds, soft, non-distended, non-tender, no masses palpated, no hepatosplenomegally  Skin: no bruising or bleeding  Musculoskeletal: no lower extremity pitting edema present       Primary Care Physician   Gerri Eubanks    Discharge Orders      Home care nursing referral      Home Care PT Referral for Hospital Discharge      Home Care OT Referral for Hospital Discharge      Reason for your hospital stay    Acute on chronic anemia. S/p transfusion. Fluid overload.     Follow-up and recommended labs and tests     Follow up with primary care provider, Gerri Eubanks, within 5 days for hospital follow- up.  The following labs/tests are recommended: repeat hemoglobin in 3-5 days.     Activity    Your activity upon discharge: activity as tolerated     MD face to face encounter    Documentation of Face to Face and Certification for Home Health Services    I certify that patient: Keisha Godinez is under my care and that I, or a nurse practitioner or physician's assistant working with me, had a face-to-face encounter that meets the physician face-to-face encounter requirements with this patient on: 3/16/2020.    This encounter with the patient was in whole, or in part, for the following medical condition, which is the primary reason for home health care: acute on chronic anemia likely due to GI losses, fluid overload.    I certify that, based on my findings, the following services are  medically necessary home health services: Nursing, Occupational Therapy and Physical Therapy.    My clinical findings support the need for the above services because: Nurse is needed: To assess symptoms after changes in medications or other medical regimen.., Occupational Therapy Services are needed to assess and treat cognitive ability and address ADL safety due to impairment in mobility. and Physical Therapy Services are needed to assess and treat the following functional impairments: weakness. Acute on chronic anemia. Fluid overload.    Further, I certify that my clinical findings support that this patient is homebound (i.e. absences from home require considerable and taxing effort and are for medical reasons or [facility] services or infrequently or of short duration when for other reasons) because: Requires assistance of another person or specialized equipment to access medical services because patient: Requires supervision of another for safe transfer...    Based on the above findings. I certify that this patient is confined to the home and needs intermittent skilled nursing care, physical therapy and/or speech therapy.  The patient is under my care, and I have initiated the establishment of the plan of care.  This patient will be followed by a physician who will periodically review the plan of care.  Physician/Provider to provide follow up care: Gerri Eubanks    Attending hospital physician (the Medicare certified [place] provider): Efraín Yancey MD  Physician Signature: See electronic signature associated with these discharge orders.  Date: 3/16/2020     Diet    Follow this diet upon discharge: Orders Placed This Encounter      Regular Diet Adult       Significant Results and Procedures   Most Recent 3 CBC's:  Recent Labs   Lab Test 03/16/20  0559 03/15/20  1425 03/15/20  0534 03/14/20  0625 03/13/20  1535 03/13/20  1048   WBC 10.6  --   --  8.6 6.9 6.0   HGB 7.9* 8.6* 6.7* 7.4*  --  6.2*   MCV 96  --    --  94  --  94     --  161 207  --  259     Most Recent 3 BMP's:  Recent Labs   Lab Test 03/16/20  0559 03/15/20  0534 03/13/20  1535    138 137   POTASSIUM 3.9 3.7 3.7   CHLORIDE 105 105 103   CO2 30 30 31   BUN 40* 33* 32*   CR 0.98 0.93 1.01   ANIONGAP 2* 3 3   CASPER 8.4* 8.4* 8.7   GLC 87 125* 130*     Most Recent 2 LFT's:  Recent Labs   Lab Test 10/07/19  0721 10/03/19  1712   AST 30 69*   ALT 30 33   ALKPHOS 76 84   BILITOTAL 1.3 1.8*     Most Recent Urinalysis:  Recent Labs   Lab Test 10/04/19  0335 09/27/19  1353   COLOR Yellow Yellow   APPEARANCE Clear Clear   URINEGLC Negative Negative   URINEBILI Negative Negative   URINEKETONE Negative Negative   SG 1.015 1.015   UBLD Negative Negative   URINEPH 5.0 6.0   PROTEIN 30* Negative   UROBILINOGEN  --  0.2   NITRITE Negative Negative   LEUKEST Negative Negative   RBCU 1 O - 2   WBCU <1 0 - 5   ,   Results for orders placed or performed during the hospital encounter of 03/13/20   XR Chest 2 Views    Narrative    CHEST TWO VIEWS 3/14/2020 11:27 AM     HISTORY: Hypoxia    COMPARISON: October 4, 2019       Impression    IMPRESSION: Trace pleural fluid bilaterally. Question some increased  atelectasis and/or infiltrate at the right base compared to previous.  The cardiac silhouette is prominent but stable. Pulmonary vasculature  may be congested.    MAGGIE ADAMS MD       Discharge Medications   Current Discharge Medication List      CONTINUE these medications which have NOT CHANGED    Details   acetaminophen (TYLENOL) 325 MG tablet Take 650 mg by mouth 3 times daily as needed for mild pain      albuterol (ACCUNEB) 1.25 MG/3ML nebulizer solution Take 1 vial by nebulization 3 times daily       Cranberry Extract 250 MG TABS Take 250 mg by mouth daily      ferrous sulfate (FEROSUL) 325 (65 Fe) MG tablet Take 1 tablet (325 mg) by mouth 2 times daily  Qty: 100 tablet, Refills: 11    Associated Diagnoses: Iron deficiency      gabapentin (NEURONTIN) 100 MG  capsule TAKE 1 CAPSULE (100MG) BY MOUTH THREE TIMES DAILY  Qty: 270 capsule, Refills: 3    Associated Diagnoses: Midline thoracic back pain, unspecified chronicity      hydrALAZINE (APRESOLINE) 25 MG tablet TAKE 3 TABLETS BY MOUTH THREE TIMES DAILY  Qty: 810 tablet, Refills: 3    Associated Diagnoses: Hyperlipidemia, unspecified hyperlipidemia type; Cardiomyopathy, unspecified type (H)      OMEGA-3 FATTY ACIDS PO Take 1,200 mg by mouth daily       omeprazole (PRILOSEC) 20 MG DR capsule Take 1 capsule (20 mg) by mouth daily  Qty: 90 capsule, Refills: 3    Associated Diagnoses: Gastroesophageal reflux disease without esophagitis      simvastatin (ZOCOR) 10 MG tablet TAKE 1 TABLET (10MG) BY MOUTH AT BEDTIME  Qty: 90 tablet, Refills: 1    Associated Diagnoses: Hyperlipidemia, unspecified hyperlipidemia type      SM STOOL SOFTENER 8.6-50 MG tablet Take 2 tablets daily as needed for constipation while taking prescription pain medications.  Qty: 30 tablet, Refills: 0    Associated Diagnoses: S/P foot surgery, right      spironolactone (ALDACTONE) 50 MG tablet TAKE 1 TABLET BY MOUTH DAILY  Qty: 90 tablet, Refills: 3    Associated Diagnoses: Acute on chronic diastolic congestive heart failure (H)      torsemide (DEMADEX) 20 MG tablet Take 40 mg (2 tablets) in the morning and 20 mg (1 tablet) in the afternoon  Qty: 270 tablet, Refills: 3    Associated Diagnoses: Chronic diastolic congestive heart failure (H)      venlafaxine (EFFEXOR-XR) 75 MG 24 hr capsule Take 1 capsule (75 mg) by mouth daily  Qty: 90 capsule, Refills: 3    Associated Diagnoses: Major depressive disorder, recurrent episode, in partial remission (H)      VITAMIN D, CHOLECALCIFEROL, PO Take 1,000 Units by mouth daily       ASPIRIN NOT PRESCRIBED (INTENTIONAL) Please choose reason not prescribed, below    Associated Diagnoses: Chronic atrial fibrillation; Dilated cardiomyopathy (H)           Allergies   Allergies   Allergen Reactions     Blood Transfusion  Related (Informational Only) Other (See Comments)     Patient has a history of a clinically significant antibody against RBC antigens.  A delay in compatible RBCs may occur.     Lisinopril Other (See Comments)     Tongue swelling     Calcium Nausea and Vomiting     Egg Yolk      Flu Virus Vaccine      Allergic to eggs.     Keflex [Cephalexin] Nausea     Pt states she had upset stomach.  NOT tongue swelling.  She had tongue swelling with a combo bp med that she thinks included lisinopril.    Tolerates IV Cefazolin     Levaquin [Levofloxacin]      Sulfa Drugs      Zinc Nausea and Vomiting

## 2020-03-17 NOTE — TELEPHONE ENCOUNTER
IP F/U    Date: 3/16/20  Diagnosis: Iron Deficiency Anemia, Unspecified Iron Deficiency Anemia Type, Other Iron Deficiency Anemia  Is patient active in care coordination? No  Was patient in TCU? No

## 2020-03-17 NOTE — TELEPHONE ENCOUNTER
"Hospital/TCU/ED for chronic condition Discharge Protocol    \"Hi, my name is Soheila Camejo RN, a registered nurse, and I am calling from [facility].  I am calling to follow up and see how things are going for you after your recent emergency visit/hospital/TCU stay.\"    Tell me how you are doing now that you are home?\" doing fine       Discharge Instructions    \"Let's review your discharge instructions.  What is/are the follow-up recommendations?  Pt. Response: Follow up with primary care provider, Gerri Eubanks, within 5 days for hospital follow- up.  The following labs/tests are recommended: repeat hemoglobin in 3-5 days.      \"Has an appointment with your primary care provider been scheduled?\"   No, pt will have daughter call back to schedule appt    \"When you see the provider, I would recommend that you bring your medications with you.\"    Medications    \"Tell me what changed about your medicines when you discharged?\"    Changes to chronic meds?    0-1    \"What questions do you have about your medications?\"    None     New diagnoses of heart failure, COPD, diabetes, or MI?    No              Post Discharge Medication Reconciliation Status: discharge medications reconciled, continue medications without change.    Was MTM referral placed (*Make sure to put transitions as reason for referral)?   No    Call Summary    \"What questions or concerns do you have about your recent visit and your follow-up care?\"     none    \"If you have questions or things don't continue to improve, we encourage you contact us through the main clinic number (give number).  Even if the clinic is not open, triage nurses are available 24/7 to help you.     We would like you to know that our clinic has extended hours (provide information).  We also have urgent care (provide details on closest location and hours/contact info)\"      \"Thank you for your time and take care!\"             "

## 2020-03-18 NOTE — PROGRESS NOTES
Subjective     Keisha Godinez is a 88 year old female who presents to clinic today for the following health issues:    HPI     Home care nurse & daughter also on call. Per home care nurse: Respiratory 28. Pulse 60. O2 97% on 1.5 L/M. Hgb pending. Needs home care orders.    Hospital Follow-up Visit:    Hospital/Nursing Home/IP Rehab Facility: [facility]  Date of Admission: 3-  Date of Discharge: 3-  Reason(s) for Admission: anemia            Problems taking medications regularly:  None       Medication changes since discharge: None       Problems adhering to non-medication therapy:  None    Summary of hospitalization:  [facility] discharge summary reviewed  Diagnostic Tests/Treatments reviewed.  Follow up needed: HGB checks  Other Healthcare Providers Involved in Patient s Care:         None  Update since discharge: improved. She feels stronger, Appetite is good. No nausea or vomiting. Breathing is good. Bowels are moving    Post Discharge Medication Reconciliation: discharge medications reconciled, continue medications without change.  Plan of care communicated with patient and daughter     Coding guidelines for this visit:  Type of Medical   Decision Making Face-to-Face Visit       within 7 Days of discharge Face-to-Face Visit        within 14 days of discharge   Moderate Complexity [id number]   High Complexity [id number]              BP Readings from Last 3 Encounters:   03/16/20 (!) 162/55   02/19/20 (!) 161/65   02/17/20 (!) 140/50    Wt Readings from Last 3 Encounters:   03/16/20 67.6 kg (149 lb)   02/17/20 68 kg (150 lb)   12/13/19 66 kg (145 lb 6.4 oz)                  Reviewed and updated as needed this visit by Provider         Review of Systems   ROS COMP: Constitutional, HEENT, cardiovascular, pulmonary, GI, , musculoskeletal, neuro, skin, endocrine and psych systems are negative, except as otherwise noted.      Objective    There were no vitals taken for this  visit.  There is no height or weight on file to calculate BMI.  Physical Exam           Assessment & Plan     1. Iron deficiency anemia due to chronic blood loss  Continue to check HGB q month.     2. Pulmonary hypertension (H)  stable    3. Chronic systolic congestive heart failure (H)  stable     A total of 12 minutes was spent with the patient in  above discussion.     No follow-ups on file.    Gerri Eubanks MD  [facility]

## 2020-03-18 NOTE — TELEPHONE ENCOUNTER
Per discharge instructions, patient was advised to follow up in 3-5 days with primary care provider. Assisted in scheduling for a telephone visit for this afternoon.    Primary care provider as an FYI for today's telephone visit:  - daughter wondering if patient should have hemoglobin redrawn, it was advised that she do this 3-5 days after discharge  -daughter also has patient on 1.5 L of O2 during the day, patient normally wears O2 at night, but is feeling more comfortable wearing it during the day. O2 sats today are 95% on 1.5 L  -daughter states she also wants to discuss lasix dosing with primary care provider.    Will route to primary care provider as an FYI.     Next 5 appointments (look out 90 days)    Mar 18, 2020  2:00 PM CDT  Telephone Visit [facility] [address]  [facility] [id number] [phone]

## 2020-03-18 NOTE — TELEPHONE ENCOUNTER
daughter is calling to ask if her mom should come into the office to be seen for her hospital follow up.

## 2020-03-18 NOTE — PROGRESS NOTES
"She did say Yes to SOB on MHT.   Pt is feeling well except \"weak but I'm trying to be more active.\" SOB not worse than usual.  Her daughter is going to call pt's PCP to make a hospital Follow-up appt w/labs.   Pt's wt has been stable at 149 lbs since being discharged from hospital on 3/16.     Next appt is 4/22.   Will send Suad an update.   Charity Garcia RN 10:52 AM 03/18/20    "

## 2020-03-19 NOTE — TELEPHONE ENCOUNTER
[facility] now requests orders and shares plan of care/discharge summaries for some patients through [employer].  Please REPLY TO THIS MESSAGE OR ROUTE BACK TO THE AUTHOR in order to give authorization for orders when needed.  This is considered a verbal order, you will still receive a faxed copy of orders for signature.  Thank you for your assistance in improving collaboration for our patients.    ORDER  Pt to tx 2w2, 1w1 to focus on ble rom, strength, balance, tranfsers, gait, establish HEP.      José Oliver PT  [email or web address]  [phone]    MD SUMMARY/PLAN OF CARE    DISCHARGE SUMMARY

## 2020-03-19 NOTE — PROGRESS NOTES
Suad Murrell PA Gerdowsky, Christina, RN    Caller: Unspecified (Yesterday, 10:38 AM)               It looks like she saw her PCP yesterday via phone visit, but her note isn't done.   I'd like to wait for her note and not completed by tomorrow I will reach out to her to see if she thinks we need to see her.     Shannon Borjas

## 2020-03-20 NOTE — PROGRESS NOTES
"[facility]  CMECHELLE    [employer] Tracker Heart Failure Alert      Daily Weight Goal: 145-152 lbs    Today's Weight: 146 lbs    Weight Alert: Within wt window    Symptom Alert: None, pt answered yes to all questions. I called her and she said she is fine and made a mistake as she is having no symptoms.  I called back in for her and answered questions correctly        Current Diuretic & Potassium Plan: Torsemide 40 mg in morning and 20 mg in afternoon. Spironolactone 50 mg daily.        Last Extra Diuretic: None      Future Appointments   Date Time Provider Department Center   4/22/2020 12:00 PM [facility] LAB LAB P [facility]   4/22/2020  1:10 PM Suad Murrell PA [facility]       Notes: Pt reports she is has no appetite, \"Diod you see I'm losing wt.\" Pt is still within her wt window.     Will send update TO Suad. PCP note from 3/18 not completed yet.         [employer] Tracker Weight Trends:           [employer] Tracker Weight Graph:       Charity Garcia RN 12:37 PM 03/20/20    "

## 2020-03-27 NOTE — TELEPHONE ENCOUNTER
"Requested Prescriptions   Pending Prescriptions Disp Refills     albuterol (PROVENTIL) (2.5 MG/3ML) 0.083% neb solution [Pharmacy Med Name: ALBUTEROL 0.083% (ANY) 3 ml] 90 vial 11     Sig: USE 1 VIAL IN NEBULIZER 3 TIMES DAILY   Last Written Prescription Date:  Not on meds list  Last Fill Quantity: n/a,  # refills: n/a   Last office visit: 02/17/2020 with prescribing provider:     Future Office Visit:      Asthma Maintenance Inhalers - Anticholinergics Passed - 3/27/2020  7:05 AM        Passed - Patient is age 12 years or older        Passed - Recent (12 mo) or future (30 days) visit within the authorizing provider's specialty     Patient has had an office visit with the authorizing provider or a provider within the authorizing providers department within the previous 12 mos or has a future within next 30 days. See \"Patient Info\" tab in inbasket, or \"Choose Columns\" in Meds & Orders section of the refill encounter.              Passed - Medication is active on med list       Short-Acting Beta Agonist Inhalers Protocol  Passed - 3/27/2020  7:05 AM        Passed - Patient is age 12 or older        Passed - Recent (12 mo) or future (30 days) visit within the authorizing provider's specialty     Patient has had an office visit with the authorizing provider or a provider within the authorizing providers department within the previous 12 mos or has a future within next 30 days. See \"Patient Info\" tab in inbasket, or \"Choose Columns\" in Meds & Orders section of the refill encounter.              Passed - Medication is active on med list           "

## 2020-03-27 NOTE — TELEPHONE ENCOUNTER
Albuterol nebs have not been ordered by Dr. Eubanks before. Routing refill request to provider for review/approval because:  Drug not active on patient's medication list

## 2020-03-30 NOTE — TELEPHONE ENCOUNTER
Daughter, Rebecca, calling states this neb treatment is 4 times daily since recent hospital visit. Please send new prescription for 4 times daily.

## 2020-03-30 NOTE — TELEPHONE ENCOUNTER
Daughter calls back and states that Aisha had the wrong fax number.  They are re faxing form to [phone].  Please watch for.

## 2020-04-07 NOTE — TELEPHONE ENCOUNTER
[facility] now requests orders and shares plan of care/discharge summaries for some patients through [employer].  Please REPLY TO THIS MESSAGE OR ROUTE BACK TO THE AUTHOR in order to give authorization for orders when needed.  This is considered a verbal order, you will still receive a faxed copy of orders for signature.  Thank you for your assistance in improving collaboration for our patients.    ORDER    clt last hgb was checked 3/18/2020.     Wondering if we can DO next week a BMP and CBC lab draw?    VS   /50  Pulse 57  Lung clear, wt 142.   CLt feels tired. But also doing more activity and home exercises.

## 2020-04-08 NOTE — LETTER
[facility]  [address] [address]  [facility] [id number] [phone]        April 9, 2020    Keisha Godinez  [address] [facility] [facility] [address]    Dear Keisha Godinez    This is to remind you that you are due for a CORE appointment and labs. Due to COVID-19, we have been transitioning visits to phone visits and having patients go to their primary clinic for lab draws or come to [facility] lab.     Please call [phone] to schedule your phone visit.         Sincerely,    Charity Garcia RN  CORE Clinic Care Coordinator  [facility]  [phone] Phone  [phone] Fax

## 2020-04-08 NOTE — PROGRESS NOTES
Called and spoke with Keisha updating her that the upcoming appt w/ Suad Murrell on 4/22 will need to be rescheduled and converted to tele visit as we are not seeing pt's in clinic d/t COVID-19 crisis.  She's requesting I wait until daughter Rebecca cedillo's home to reschedule as she would like her to be conferenced in on call.  She has  RN services and will call with lab orders (CBC, BNP, BMP) when appt date is known.     Will call back after 3 pm to speak with dtr.       Vannessa Rios, RN BSANNE  [facility] [place], [place]  [email or web address]. Clinic Care Coordinator  04/08/20, 2:37 PM

## 2020-04-09 NOTE — PROGRESS NOTES
Called pt and asked to speak to daughter as she schedules appts for pt. Pt handed phone to daughter and I was hung up on.   I called back and they picked up the phone and hung up. 4/22 appt cancelled as Suad is not here that day.   Mailed letter to pt to call and schedule phone visit with Suad.  Charity Garcia RN 3:24 PM 04/09/20

## 2020-04-13 PROBLEM — Z20.822 SUSPECTED COVID-19 VIRUS INFECTION: Status: ACTIVE | Noted: 2020-01-01

## 2020-04-13 NOTE — PHARMACY-ADMISSION MEDICATION HISTORY
Admission medication history interview status for this patient is complete. See [facility] admission navigator for allergy information, prior to admission medications and immunization status.     Medication history interview source(s):Rebecca (Daughter) @  [phone]  Medication history resources (including written lists, pill bottles, clinic record):None    Changes made to PTA medication list:  Added: none  Deleted: accuneb  Changed: gabapentin    Actions taken by pharmacist (provider contacted, etc):called daughter rebecca on phone     Additional medication history information:None    Medication reconciliation/reorder completed by provider prior to medication history? No    For patients on insulin therapy: no (Yes/No)   Lantus/levemir/NPH/Mix 70/30 dose: ___ in AM/PM or twice daily   Sliding scale Novolog Y/N   If Yes, do you have a baseline novolog pre-meal dose: ______units with meals   Patients eat three meals a day: Y/N ---  How many episodes of hypoglycemia (low blood glucose) do you have weekly: ---   How many missed doses do you have a week: ---  How many times do you check your blood glucose per day: ---  Any Barriers to therapy: cost of medications/comfortable with giving injections (if applicable)/ comfortable and confident with current diabetes regimen ---      Prior to Admission medications    Medication Sig Last Dose Taking? Auth Provider   acetaminophen (TYLENOL) 325 MG tablet Take 650 mg by mouth 3 times daily as needed for mild pain prn Yes Unknown, Entered By History   albuterol (PROVENTIL) (2.5 MG/3ML) 0.083% neb solution Take 1 vial (2.5 mg) by nebulization 4 times daily 4/13/2020 at x2 Yes Gerri Eubanks MD   Cranberry Extract 250 MG TABS Take 250 mg by mouth daily 4/13/2020 at Unknown time Yes Sharonda Cano MD   ferrous sulfate (FEROSUL) 325 (65 Fe) MG tablet Take 1 tablet (325 mg) by mouth 2 times daily 4/13/2020 at am Yes Darryl Reed MD   gabapentin (NEURONTIN) 100 MG capsule Take 100  mg by mouth 2 times daily AM and PM 4/13/2020 at am Yes Unknown, Entered By History   hydrALAZINE (APRESOLINE) 25 MG tablet TAKE 3 TABLETS BY MOUTH THREE TIMES DAILY 4/13/2020 at x2 Yes Darryl Reed MD   OMEGA-3 FATTY ACIDS PO Take 1,200 mg by mouth daily  4/13/2020 at Unknown time Yes Reported, Patient   omeprazole (PRILOSEC) 20 MG DR capsule Take 1 capsule (20 mg) by mouth daily 4/13/2020 at Unknown time Yes Gerri Eubanks MD   simvastatin (ZOCOR) 10 MG tablet TAKE 1 TABLET (10MG) BY MOUTH AT BEDTIME 4/12/2020 at Unknown time Yes Gerri Eubanks MD   SM STOOL SOFTENER 8.6-50 MG tablet Take 2 tablets daily as needed for constipation while taking prescription pain medications.  Yes Darryl Reed MD   spironolactone (ALDACTONE) 50 MG tablet TAKE 1 TABLET BY MOUTH DAILY 4/13/2020 at Unknown time Yes Gerri Eubanks MD   torsemide (DEMADEX) 20 MG tablet Take 40 mg by mouth every morning 4/13/2020 at Unknown time Yes Unknown, Entered By History   torsemide (DEMADEX) 20 MG tablet Take 20 mg by mouth daily at 2 pm 4/13/2020 at Unknown time Yes Unknown, Entered By History   venlafaxine (EFFEXOR-XR) 75 MG 24 hr capsule Take 1 capsule (75 mg) by mouth daily 4/13/2020 at Unknown time Yes Gerri Eubanks MD   VITAMIN D, CHOLECALCIFEROL, PO Take 1,000 Units by mouth daily  4/13/2020 at Unknown time Yes Reported, Patient   ASPIRIN NOT PRESCRIBED (INTENTIONAL) Please choose reason not prescribed, below   Gerri Eubanks MD

## 2020-04-13 NOTE — ED PROVIDER NOTES
History     Chief Complaint:  Abnormal Labs    HPI  Keisha Godinez is a 88 year old female with a history of COPD and blood transfusion on 3/13/20 who presents to the emergency department for evaluation of shortness of breath and abnormal lab results. She went in for a routine lab draw today which revealed a hemoglobin of 6.0 prompting referral to the ED. The patient has been feeling more short of breath of breath but this is intermittent. There is no clear cause of the low hemoglobin, she has a history of a past GI bleed but no current black or bloody stool. She is on 1.5L of O2 at home and has been measuring her weight which has not changed. Her legs have not been swollen or painful.  No prior history of pulmonary embolism or DVT.  She has had numerous blood transfusions in the past.  She denies a change in cough sputum or frequency.  No chest pain.      Allergies:  Blood Transfusion Related (Informational Only)  Lisinopril  Calcium  Egg Yolk  Flu Virus Vaccine  Keflex [Cephalexin]  Levaquin [Levofloxacin]  Sulfa Drugs  Zinc    Medications:    Albuterol  Ferosul  Gabapentin  Hydralazine  Prilosec  Zocor  Aldactone  demadex  Effexor    Past Medical History:    Iron deficiency anemia  Neuropathy of both feet  Myocardial infarction-type 2  gastrointestinal bleeding  Cardiomyopathy  Pulmonary hypertension  Xerosis of skin  GERD  Chronic systolic congestive heart failure  Major depressive disorder  Cellulitis of foot  Essential hypertension   Respiratory failure   C. difficile colitis  hiatal hernia  Shingles  Chronic foot ulcer  CHF  Pneumonia  Sepsis  Chronic atrial fibrillation  Anemia  Dysphagia  Cardiomyopathy  Fracture of humerus, proximal, right  Rheumatic mitral regurgitation  CKD  Cellulitis of left lower extremity  COPD    Past Surgical History:    Excise mass foot right    Family History:    Known Genetic Syndrome in her father and mother  Cancer in her brother and daughter    Social History:  The patient  arrives alone  Smoking: former quit 1956  Alcohol use: no  PCP: Gerri Eubanks  Marital Status:  [5]      Review of Systems   Constitutional: Negative for fever.   Respiratory: Positive for shortness of breath. Negative for cough.    Cardiovascular: Negative for chest pain and leg swelling.   Gastrointestinal: Negative for blood in stool and diarrhea.   Genitourinary: Menstrual problem:    Musculoskeletal: Negative for myalgias.   All other systems reviewed and are negative.      Physical Exam     Patient Vitals for the past 24 hrs:   BP Temp Pulse Resp SpO2   04/13/20 1543 (!) 137/39 -- 83 -- 91 %   04/13/20 1541 -- 98.9  F (37.2  C) -- 21 --     Physical Exam  General: Alert and cooperative with exam. Resting comfortably on gurney  Head:  Scalp is NC/AT  Eyes:  Conjunctiva pale.  No scleral icterus, PERRL  ENT:  The external nose and ears are normal.   Neck:  Normal range of motion without rigidity.  CV:  Irregular rate with regular rhythm.    No pathologic murmur, rubs, or gallops.  Resp:  Diffuse end expiratory wheezes.  Slight bibasilar crackles.  No rhonchi.    Non-labored, no retractions or accessory muscle use  GI:  Abdomen is soft, no distension, no tenderness, no masses. No peritoneal signs.  Bowel sounds present in all quadrants  :  No suprapubic or flank tenderness  MS:  No lower extremity edema or asymmetric calf swelling.  Skin:  Warm and dry, No rash or lesions noted.  Neuro: Oriented. No gross motor deficits.    GCS: 15.   Psych: Awake. Alert. Normal affect. Appropriate interactions.    Emergency Department Course     ECG:  ECG taken at 1631, ECG read at 1632  Atrial fibrillation  ST & T wave abnormality, consider inferior ischemia  ST & T wave abnormality, consider anterior ischemia  Abnormal ECG  Rate 65 bpm. GA interval * ms. QRS duration 96 ms. QT/QTc 434/451 ms. P-R-T axes * 23 172.    Imaging:  Radiology findings were communicated with the patient who voiced understanding of the  findings.    XR Chest Port 1 view   IMPRESSION: Cardiomegaly. Pulmonary vascularity normal. Interstitial prominence both lungs may represent mild edema. The right hilum now appears more prominent with air bronchograms and may represent infiltrate or atelectasis involving the right   perihilar lung. This should be reviewed on follow-up imaging. Slight elevation right hemidiaphragm and probable trace bilateral pleural effusions  Reading per radiology    Laboratory:  Laboratory findings were communicated with the patient who voiced understanding of the findings.    CBC: HGB 6.1 (L) o/w WNL. (WBC 6.3, )   BMP: Glucose 130 (H), anion gap 1 (L), BUN 41 (H), GFR 41 (L), calcium 8.2 (L), o/w WNL (Creatinine: 1.19 (H))    Troponin (Collected 1625): <0.015  BNP: 1,982 (H)    ABO/Rh type and screen ABO: O, Antibody positive    COVID-19 PCR Nasopharyngeal swab in progress    Emergency Department Course:  Past medical records, nursing notes, and vitals reviewed.  1546: I performed an exam of the patient and obtained history, as documented above.     IV was inserted and blood was drawn for laboratory testing, results above.  The patient was sent for an XR while in the emergency department, findings above    1912: I rechecked the patient. Explained findings to patient.    I personally reviewed the laboratory and imaging results with the Patient and answered all related questions prior to admission.     Findings and plan explained to the Patient who consents to admission. Discussed the patient with Dr. Gomez, who will admit the patient to a medical bed for further monitoring, evaluation, and treatment.  Impression & Plan      Medical Decision Making:  Keisha Godinez is a 88 year old female who presents to the emergency department today for evaluation of abnormal labs.  History and records reviewed.  She has a long history of anemia requiring blood transfusions of unclear etiology.  Today her hemoglobin at routine check  was noted to be 6.1.  No change on recheck several hours later here.  She denies any GI bleeding symptoms, and BUN at baseline and doubt acute GI bleed given no drop over interval time.      She has a complex past medical history including pulmonary hypertension, CHF, COPD, prior GI bleed.  Notes she is slightly more short of breath than baseline, though denies chest pain, leg swelling, fever, change in cough or sputum, or other symptoms.  Could be related to acute on chronic anemia and that is most likely.  EKG shows rate controlled atrial fibrillation.  Troponin is negative and doubt ACS.  She is at her baseline O2 requirement, has no prior history of PE or DVT, and is not tachycardic and doubt pulmonary embolism.  Does have some cardiomegaly and slight bibasilar pulmonary edema, though BNP actually lower than baseline.  Will require slow transfusion.  She is not in respiratory distress at this time.  Will test for CO VID 19 the low suspicion at this time.  Blood transfusion ordered and pending.  Discussed with hospitalist who agrees to accept the patient.  She remained stable during her time in the ED.    Diagnosis:    ICD-10-CM    1. Acute on chronic anemia  D64.9 ABO/Rh type and screen     Antibody ID [facility] BB     Antibody ID [facility] BB     Troponin I     Troponin I     CANCELED: Troponin I (now)       Disposition:   Admitted to medical bed      Scribe Disclosure:  Luli ROCHA, am serving as a scribe at 3:46 PM on 4/13/2020 to document services personally performed by Kadeem Fam PA based on my observations and the provider's statements to me.    [facility] EMERGENCY DEPARTMENT       Kadeem Fam PA-C  04/13/20 [id number]

## 2020-04-13 NOTE — H&P
[facility]  Hospitalist Admission Note  Name: Keisha Godinez    MRN: [id number]  YOB: 1932    Age: 88 year old  Date of admission: 4/13/2020  Primary care provider: Gerri Eubanks    Chief Complaint: Fatigue    Assessment and Plan:   Acute on chronic normocytic anemia: Hemoglobin 6.1 down from baseline of 7-8.  Did require transfusion last month here.  Has been worked up extensively for this for possible blood loss in the past.  Has had previous small bowel AVMs.  Also worked up for hemolysis in the past.  Seen by hematology before without any specific recommendations.  Has darker stools, but takes iron twice daily.  Denies any bloody stools.  -Transfuse 1 unit RBCs, has antibodies so this will take some time, transfuse slowly over 4 hours given CHF history  -Give 20 mg IV Lasix during transfusion given she has a few crackles already to prevent pulmonary edema  -CBC tomorrow    Chronic hypoxemic respiratory failure, COPD: Chronically on 1.5 L continuous oxygen.  Secondary to COPD and diastolic CHF.  Does have bilateral wheeze which she uses albuterol nebs 4 times per day.  Overall seems less likely to be acute exacerbation of COPD and more likely short of breath due to anemia.  -I have held off on ordering an albuterol inhaler to see if supplies in case we get results for her COVID19 back later this evening.  If tests negative will add her albuterol nebulizer.  If needs albuterol prior to this we will order an inhaler  -Hold off on steroids for now  -Continue her home oxygen    Shortness of breath, fatigue: Progressive shortness of breath with exertion along with fatigue over the past couple of weeks.  She was here last month for similar and received 1 unit RBCs with improvement in symptoms.  Denies any fevers, chills, myalgias, change in her chronic cough, or sick contacts.  Is in contact with her daughter who works at a grocery store, daughter does not have any symptoms to  suggest COVID19.  Chest x-ray does show some bilateral opacities which may be more pulmonary edema given her history as she is afebrile here without leukocytosis.  Lymphocytes are low, but this is a chronic issue for her.  Has slight bilateral wheeze which is chronic for her and doubt acute COPD exacerbation at this time and suspect shortness of breath/fatigue is more related to anemia.  -Tested for COVID19, contact and droplet isolation until back.  If negative okay to remove  -Transfuse as above  Addendum: COVID19 negative.  Add albuterol nebs as needed.    Cor pulmonale, chronic diastolic CHF, HTN: History of severe pulmonary hypertension/cor pulmonale along with diastolic CHF.  Has chronic lymphedema and does Ace wraps every other day.  Is on torsemide 40 mg a morning and 20 mg afternoon along with bilateral 50 mg daily.  Takes hydralazine 75 mg 3 times daily for blood pressure.  She does have edema on exam likely 1-2+ and legs are Ace wrap currently.  Denies any change in her weight or leg swelling.  Few crackles on the left and possibly some pulmonary edema that is mild on chest x-ray.  BNP similar to previous levels.  Does not appear to be in acute CHF exacerbation.  -Give 1 dose of IV Lasix 20 mg while transfusion is going this evening  -Continue PTA torsemide and spironolactone  -Daily weights and strict intake and output  -Resume PTA hydralazine  -Continue Ace wraps    Paroxysmal A. fib: Not on anticoagulation due to anemia issues.  Not on AV shannan blockade.  Rate controlled.  EKG shows A. fib.  -No need for telemetry for now    History of CVA: CVA in 2010.    MDD: Continue PTA venlafaxine.    GERD, history of GI bleed: Continue omeprazole daily.    HLD: Continue PTA simvastatin 10 mg at bedtime.    CKD stage III: Creatinine baseline likely 0.9-1.  Similar to baseline, currently 1.1.  -BMP tomorrow      DVT Prophylaxis: Pneumatic Compression Devices  Code Status: Full Code  FEN: regular diet  Discharge  Dispo: home  Estimated Disch Date / # of Days until Disch: admit inpatient for anemia, shortness of breath, fatigue, and evaluation for COVID19.  Anticipate 2 night hospitalization given her complex comorbidities including COPD requiring oxygen and CHF      History of Present Illness:  Keisha Godinez is a 88 year old female with PMH including COPD on 1.5 L oxygen continuous, diastolic CHF, cor pulmonale, HTN, HLD, GERD, paroxysmal A. fib not on anticoagulation due to anemia, CKD stage III, and persistent anemia requiring intermittent transfusion of unclear etiology but perhaps related to previous small bowel AVMs who presents with fatigue and shortness of breath after initially presenting to clinic and found to have hemoglobin of 6.0.  She was hospitalized here last month overnight and required 1 unit RBCs for hemoglobin less than 7 and symptomatic.  She has been taking iron twice daily since then.  She denies any red bloody stools, however stools are darker from the iron.  She has not had any hematemesis or abdominal pain.  She has felt more fatigued and short of breath although cannot say exactly how long the sense, likely more weeks as opposed to days.  Denies any fevers, chills, myalgias.  She is a chronic cough intermittently productive of clear/white sputum that is unchanged from baseline.  Denies any sick contacts.  Her daughter is ill and she is been in contact with 4 weeks who comes in and does her leg wraps every other day for her chronic edema.  Her daughter does work at a grocery store, although does not have any respiratory symptoms or fevers.  Patient has not missed any doses of her diuretics and her edema and weight has been stable.    History obtained from patient, medical record, and from NATHAN Guan in the emergency department.  Blood pressure 137/39, pulse 83, temperature 98.9  F.  Oxygen 96% on 2 L.  Hemoglobin is 6.1, white blood cell count 6.3, platelet count 253.  Troponin undetectable.   BNP elevated at 192.  Creatinine is 1.19, BUN 41, bicarb 32, sodium 134, potassium 3.5, chloride 101.  Chest x-ray shows some subtle bilateral opacities which may be pulmonary edema versus possible infectious etiology.  Trace bilateral pleural effusions as well.  EKG shows A. fib with nonspecific ST changes.  1 unit RBCs ordered, she does have antibodies so this will take some time to get from the [facility].  She will need slow transfusion given CHF and Lasix at that time.  Initially admit inpatient as she is also being tested for possible COVID19.  Contact and droplet precautions for now.     Past Medical History reviewed:  Past Medical History:   Diagnosis Date     Anemia      Atrial fibrillation (H)      B12 deficiency      Cardiomyopathy (H)      CHF (congestive heart failure) (H)      Chronic edema     Lower extremities     Chronic systolic congestive heart failure (H)      CVA (cerebral vascular accident) (H) 2010    Acute Left MCA hemispheric CVA ( on coumadin)     Depression      Gastro-oesophageal reflux disease      GI bleed 03-20-15     Hyperlipidemia      Hypertension      Iron deficiency      MSSA (methicillin susceptible Staphylococcus aureus) 03-10-15     Pulmonary hypertension (H)      Shingles      Past Surgical History reviewed:  Past Surgical History:   Procedure Laterality Date     COLONOSCOPY  03/24/2015    Diverticulosis, polyps     ENTEROSCOPY SMALL BOWEL N/A 2/29/2016    Procedure: ENTEROSCOPY SMALL BOWEL;  Surgeon: Ady Ponce MD;  Location:  GI     ESOPHAGOSCOPY, GASTROSCOPY, DUODENOSCOPY (EGD), COMBINED N/A 3/22/2015    Upper GI- Normal, nothing significant found.      EXCISE MASS FOOT Right 7/6/2016    Procedure: EXCISE MASS FOOT;  Surgeon: Lee Jang DPM;  Location:  OR     Social History reviewed:  Social History     Tobacco Use     Smoking status: Former Smoker     Years: 1.00     Types: Cigarettes     Start date: 1955     Last attempt to quit: 4/14/1956      Years since quittin.0     Smokeless tobacco: Never Used   Substance Use Topics     Alcohol use: No     Alcohol/week: 0.0 standard drinks     Social History     Social History Narrative     Not on file     Family History reviewed:  Family History   Problem Relation Age of Onset     Known Genetic Syndrome Father      Known Genetic Syndrome Mother      Other Cancer Daughter         pancreatic     Other Cancer Brother         type     Other Cancer Sister 60        lung     Diabetes No family hx of      Breast Cancer No family hx of      Cancer - colorectal No family hx of      Ovarian Cancer No family hx of      Prostate Cancer No family hx of      Allergies:  Allergies   Allergen Reactions     Blood Transfusion Related (Informational Only) Other (See Comments)     Patient has a history of a clinically significant antibody against RBC antigens.  A delay in compatible RBCs may occur.     Lisinopril Other (See Comments)     Tongue swelling     Calcium Nausea and Vomiting     Egg Yolk      Flu Virus Vaccine      Allergic to eggs.     Keflex [Cephalexin] Nausea     Pt states she had upset stomach.  NOT tongue swelling.  She had tongue swelling with a combo bp med that she thinks included lisinopril.    Tolerates IV Cefazolin     Levaquin [Levofloxacin]      Sulfa Drugs      Zinc Nausea and Vomiting     Medications:  Prior to Admission medications    Medication Sig Last Dose Taking? Auth Provider   acetaminophen (TYLENOL) 325 MG tablet Take 650 mg by mouth 3 times daily as needed for mild pain prn Yes Unknown, Entered By History   albuterol (PROVENTIL) (2.5 MG/3ML) 0.083% neb solution Take 1 vial (2.5 mg) by nebulization 4 times daily 2020 at x2 Yes Gerri Eubanks MD   Cranberry Extract 250 MG TABS Take 250 mg by mouth daily 2020 at Unknown time Yes Doctor, Sharonda, MD   ferrous sulfate (FEROSUL) 325 (65 Fe) MG tablet Take 1 tablet (325 mg) by mouth 2 times daily 2020 at am Yes Darryl Reed MD    gabapentin (NEURONTIN) 100 MG capsule Take 100 mg by mouth 2 times daily AM and PM 4/13/2020 at am Yes Unknown, Entered By History   hydrALAZINE (APRESOLINE) 25 MG tablet TAKE 3 TABLETS BY MOUTH THREE TIMES DAILY 4/13/2020 at x2 Yes Darryl Reed MD   OMEGA-3 FATTY ACIDS PO Take 1,200 mg by mouth daily  4/13/2020 at Unknown time Yes Reported, Patient   omeprazole (PRILOSEC) 20 MG DR capsule Take 1 capsule (20 mg) by mouth daily 4/13/2020 at Unknown time Yes Gerri Eubanks MD   simvastatin (ZOCOR) 10 MG tablet TAKE 1 TABLET (10MG) BY MOUTH AT BEDTIME 4/12/2020 at Unknown time Yes Gerri Eubanks MD   SM STOOL SOFTENER 8.6-50 MG tablet Take 2 tablets daily as needed for constipation while taking prescription pain medications.  Yes Darryl Reed MD   spironolactone (ALDACTONE) 50 MG tablet TAKE 1 TABLET BY MOUTH DAILY 4/13/2020 at Unknown time Yes Gerri Eubanks MD   torsemide (DEMADEX) 20 MG tablet Take 40 mg by mouth every morning 4/13/2020 at Unknown time Yes Unknown, Entered By History   torsemide (DEMADEX) 20 MG tablet Take 20 mg by mouth daily at 2 pm 4/13/2020 at Unknown time Yes Unknown, Entered By History   venlafaxine (EFFEXOR-XR) 75 MG 24 hr capsule Take 1 capsule (75 mg) by mouth daily 4/13/2020 at Unknown time Yes Gerri Eubanks MD   VITAMIN D, CHOLECALCIFEROL, PO Take 1,000 Units by mouth daily  4/13/2020 at Unknown time Yes Reported, Patient   ASPIRIN NOT PRESCRIBED (INTENTIONAL) Please choose reason not prescribed, below   Gerri Eubanks MD     Review of Systems:  A Comprehensive greater than 10 system review of systems was carried out.  Pertinent positives and negatives are noted above.  Otherwise negative.     Physical Exam:  Blood pressure (!) 137/39, pulse 83, temperature 98.9  F (37.2  C), resp. rate 21, SpO2 91 %, not currently breastfeeding.  Wt Readings from Last 1 Encounters:   03/16/20 67.6 kg (149 lb)     Exam:  Constitutional: Awake, NAD, frail  appearing  Eyes: sclera white   HEENT: atraumatic, MMM  Respiratory: Few crackles on the left, none on the right.  Mild bilateral expiratory wheeze  Cardiovascular: Irregular, regular rhythm, regular rate, no murmur.     GI: non-tender, not distended, bowel sounds present  Skin: no rash or lesions, acyanotic  Musculoskeletal/extremities: Ace wrap on legs, at least 1+ tense edema underneath, 2+ bilateral pedal edema  Neurologic: A&O, speech clear, moves extremities equally  Psychiatric: calm, cooperative, normal affect    Lab and imaging data personally reviewed:  Labs:  Recent Labs   Lab 04/13/20  1625 04/13/20  1225   WBC 6.3 5.2   HGB 6.1* 6.0*   HCT 21.5* 19.9*   MCV 94 96    249     Recent Labs   Lab 04/13/20  1625 04/13/20  1225    133   POTASSIUM 3.5 3.6   CHLORIDE 101 102   CO2 32 28   ANIONGAP 1* 3   * 106*   BUN 41* 38*   CR 1.19* 1.04   GFRESTIMATED 41* 48*   GFRESTBLACK 47* 55*   CASPER 8.2* 8.3*     Recent Labs   Lab 04/13/20  1625   NTBNPI 1,982*     Recent Labs   Lab 04/13/20  1625   TROPI <0.015       EKG: A. fib, nonspecific ST changes    Imaging:  Recent Results (from the past 24 hour(s))   XR Chest Port 1 View    Narrative    EXAM: XR CHEST PORT 1 VW  LOCATION: [facility]  DATE/TIME: 4/13/2020 4:45 PM    INDICATION: Shortness of breath and COPD.  COMPARISON: 03/14/2020      Impression    IMPRESSION: Cardiomegaly. Pulmonary vascularity normal. Interstitial prominence both lungs may represent mild edema. The right hilum now appears more prominent with air bronchograms and may represent infiltrate or atelectasis involving the right   perihilar lung. This should be reviewed on follow-up imaging. Slight elevation right hemidiaphragm and probable trace bilateral pleural effusions.       Isaac Gomez MD  Hospitalist  [facility]

## 2020-04-13 NOTE — ED TRIAGE NOTES
Blood drawn today at [facility]. Hgb 6.0. Needed a transfusion 1 month ago. No black and bloody stools. Unknown why hgb is dropping. SOB but no cough or fever.

## 2020-04-13 NOTE — ED NOTES
[facility]  ED Nurse Handoff Report    Keisha Godinez is a 88 year old female   ED Chief complaint: Abnormal Labs  . ED Diagnosis:   Final diagnoses:   Acute on chronic anemia     Allergies:   Allergies   Allergen Reactions     Blood Transfusion Related (Informational Only) Other (See Comments)     Patient has a history of a clinically significant antibody against RBC antigens.  A delay in compatible RBCs may occur.     Lisinopril Other (See Comments)     Tongue swelling     Calcium Nausea and Vomiting     Egg Yolk      Flu Virus Vaccine      Allergic to eggs.     Keflex [Cephalexin] Nausea     Pt states she had upset stomach.  NOT tongue swelling.  She had tongue swelling with a combo bp med that she thinks included lisinopril.    Tolerates IV Cefazolin     Levaquin [Levofloxacin]      Sulfa Drugs      Zinc Nausea and Vomiting       Code Status: Full Code  Activity level - Baseline/Home:  Stand by Assist. Activity Level - Current:   Assist X 1. Lift room needed: No. Bariatric: No   Needed: No   Isolation: Yes. Infection: Not Applicable  Other .     Vital Signs:   Vitals:    04/13/20 1541 04/13/20 1543   BP:  (!) 137/39   Pulse:  83   Resp: 21    Temp: 98.9  F (37.2  C)    SpO2:  91%       Cardiac Rhythm:  ,      Pain level:    Patient confused: No. Patient Falls Risk: Yes.   Elimination Status: Has voided   Patient Report - Initial Complaint:   ED Triage Notes Filed Blood drawn today at [facility]. Hgb 6.0. Needed a transfusion 1 month ago. No black and bloody stools. Unknown why hgb is dropping. SOB but no cough or fever.     . Focused Assessment:   Neurological Divya Coma Scale - Best Eye Response: 4-->(E4) spontaneous  Best Motor Response: 6-->(M6) obeys commands  Best Verbal Response: 5-->(V5) oriented  Wolcott Coma Scale Score: 15   Neuro - Additional Documentation:  (Pt has felt generalized weakness for past 3 days. She was found to have low Hgb and sent to ED. Pt denies noting  any blood in stool. )        Tests Performed: labs, imaging. Abnormal Results:   Labs Ordered and Resulted from Time of ED Arrival Up to the Time of Departure from the ED   CBC WITH PLATELETS DIFFERENTIAL - Abnormal; Notable for the following components:       Result Value    RBC Count 2.28 (*)     Hemoglobin 6.1 (*)     Hematocrit 21.5 (*)     MCHC 28.4 (*)     RDW 15.7 (*)     Absolute Lymphocytes 0.4 (*)     All other components within normal limits   BASIC METABOLIC PANEL - Abnormal; Notable for the following components:    Anion Gap 1 (*)     Glucose 130 (*)     Urea Nitrogen 41 (*)     Creatinine 1.19 (*)     GFR Estimate 41 (*)     GFR Estimate If Black 47 (*)     Calcium 8.2 (*)     All other components within normal limits   NT PROBNP INPATIENT - Abnormal; Notable for the following components:    N-Terminal Pro BNP Inpatient 1,982 (*)     All other components within normal limits   ABO/RH TYPE AND SCREEN - Abnormal; Notable for the following components:    Antibody Screen Pos (*)     All other components within normal limits   COVID-19 VIRUS (CORONAVIRUS) BY PCR   TROPONIN I   RED BLOOD CELL PREPARE ORDER UNIT   ANTIBODY ID [facility] BB     XR Chest Port 1 View    (Results Pending)     .   Treatments provided: see MAR  Family Comments: none at bedside per restrictions  OBS brochure/video discussed/provided to patient:  N/A  ED Medications: Medications - No data to display  Drips infusing:  No  For the majority of the shift, the patient's behavior Green. Interventions performed were N/A.    Sepsis treatment initiated: No     Patient has antibodies, blood is being sent to the [facility] for a work up and blood bank notified this RN that blood will be delayed. Provider updated and okay with plan of care.     ED Nurse Name/Phone Number: Alva Kunz RN,   5:27 PM    RECEIVING UNIT ED HANDOFF REVIEW    Above ED Nurse Handoff Report was reviewed: Yes  Reviewed by: Sidra Landa, GER on April 13,  2020 at 6:26 PM

## 2020-04-14 NOTE — UTILIZATION REVIEW
"  Admission Status; Secondary Review Determination         Under the authority of the Utilization Management Committee, the utilization review process indicated a secondary review on the above patient.  The review outcome is based on review of the medical records, discussions with staff, and applying clinical experience noted on the date of the review.          (x) Observation Status Appropriate - This patient does not meet hospital inpatient criteria and is placed in observation status. If this patient's primary payer is Medicare and was admitted as an inpatient, Condition Code 44 should be used and patient status changed to \"observation\".     RATIONALE FOR DETERMINATION   88 year old female with PMH including COPD on 1.5 L oxygen continuous, diastolic CHF, cor pulmonale, HTN, HLD, GERD, paroxysmal A. fib not on anticoagulation due to anemia, CKD stage III, and persistent anemia requiring intermittent transfusion of unclear etiology but perhaps related to previous small bowel AVMs who presents with fatigue and shortness of breath after initially presenting to clinic and found to have hemoglobin of 6.0.  She was hospitalized here last month overnight and required 1 unit RBCs for hemoglobin less than 7 and symptomatic.  She has been taking iron twice daily since then.  She denies any red bloody stools, however stools are darker from the iron.  She has not had any hematemesis or abdominal pain.   Patient is expected to receive another unit of blood today and discharge tomorrow. The severity of illness, intensity of service provided, expected LOS and risk for adverse outcome make the care appropriate for further observation; however, doesn't meet criteria for hospital inpatient admission. Dr. Daniels  notified of this determination.    This document was produced using voice recognition software.      The information on this document is developed by the utilization review team in order for the business office to ensure " compliance.  This only denotes the appropriateness of proper admission status and does not reflect the quality of care rendered.         The definitions of Inpatient Status and Observation Status used in making the determination above are those provided in the CMS Coverage Manual, Chapter 1 and Chapter 6, section 70.4.      Sincerely,     HOWARD RICH MD    System Medical Director  Utilization Management  [facility].

## 2020-04-14 NOTE — PROGRESS NOTES
Date: 4/13/2020  Admission Dx: SOB, COVID-19 rule out  Pulmonary hx: COPD  Home nebulizer/MDI: Albuterol QID  Home oxygen: 1.5 LPM NC  Acuity level (RCAT flow sheet): 3  Aerosol therapy initiated: Albuterol QID  Pulmonary hygiene initiated: None  Volume expansion initiated: IS TID  Current oxygen requirements: 2 LPM NC  Current spo2: 96%  Re-evaluation date: 4/16/2020  Patient education: Encourage IS use    Jenna Burgess, RRT

## 2020-04-14 NOTE — PROVIDER NOTIFICATION
MD notification:  patients heart rate keeps dipping into upper 40's.  Not sure if you would like us to just continue to monitor or would like her on Tele.  Her transfusion was finally able to be started around 0330.  Tolerating well.

## 2020-04-14 NOTE — CONSULTS
Care Transition Initial Assessment - RN        Met with: Patient.  DATA   Active Problems:    Anemia    Suspected Covid-19 Virus Infection       Cognitive Status: awake, alert and oriented.  [facility]  Primary Care MD Name: brittny  Contact information and PCP information verified: Yes  Lives With: child(emre), adult             Who is your support system?: Children, Other (specify)([facility] RN )       Insurance concerns: No Insurance issues identified  ASSESSMENT  Patient currently receives the following services:  [facility] RN. [facility] home 02.         Identified issues/concerns regarding health management: Pt is admitted with anemia.  Pt said her Dtg lives with her.  She feels well supported at home and declines any need for additional resources.  She has a walker, lift chair, life line, walk in shower and MOW.  [facility] also [facility] . She calls her weight in daily.  Pt said she is afraid of tripping on her oxygen during the night so she does take it off and walks to the bathroom without it.  She said her bathroom is very close and she does fine without it.  She is aware she can contact  [employer] to trouble shoot ways to keep it on if she agrees.  Otherwise she wears it all the time.      PLAN  Patient given options and choices for discharge yes .  Patient/family is agreeable to the plan?  Yes:   Patient anticipates discharging to home with dtg .        Patient anticipates needs for home equipment: No  Transportation/person available to transport on day of discharge  is carlyle Fernandez.  Plan/Disposition: Home   Appointments: TBD      Care  (CTS) will continue to follow as needed.    Yesenia Sal RN BSN   Inpatient Care Coordination  [facility]  [phone]

## 2020-04-14 NOTE — PROGRESS NOTES
[facility]    Hospitalist Progress Note  Name: Keisha Godinez    MRN: [id number]  Provider:  Isaac Daniels DO, MPH  Date of Service: 04/14/2020    Summary of Stay: Keisha Godinez is a 88 year old female with PMH including COPD on 1.5 L oxygen continuous, diastolic CHF, cor pulmonale, HTN, HLD, GERD, paroxysmal A. fib not on anticoagulation due to anemia, CKD stage III, and persistent anemia requiring intermittent transfusion of unclear etiology but perhaps related to previous small bowel AVMs who presents with fatigue and shortness of breath after initially presenting to clinic and found to have hemoglobin of 6.0.    Problem List:   Acute on chronic normocytic anemia: Hemoglobin 6.1 down from baseline of 7-8.  Did require transfusion last month here.  Has been worked up extensively for this for possible blood loss in the past.  Has had previous small bowel AVMs.  Also worked up for hemolysis in the past.  Seen by hematology before without any specific recommendations.  Has darker stools, but takes iron twice daily.  Denies any bloody stools.  Transfused 1 unit(s) PRBC yesterday with hemoglobin rise to 6.9.  -Transfuse 1 unit RBCs again today, transfuse slowly over 4 hours given CHF history  -Give 20 mg IV Lasix during transfusion given she has a few crackles already to prevent pulmonary edema  -CBC tomorrow     Chronic hypoxemic respiratory failure, COPD: Chronically on 1.5 L continuous oxygen.  Secondary to COPD and diastolic CHF.  Does have bilateral wheeze which she uses albuterol nebs 4 times per day.  Overall seems less likely to be acute exacerbation of COPD and more likely short of breath due to anemia.  -albuterol nebs as needed  -Hold off on steroids for now  -Continue her home oxygen     Shortness of breath, fatigue: Progressive shortness of breath with exertion along with fatigue over the past couple of weeks.  She was here last month for similar and received 1 unit RBCs with improvement in  symptoms.  Denies any fevers, chills, myalgias, change in her chronic cough, or sick contacts.  Is in contact with her daughter who works at a grocery store, daughter does not have any symptoms to suggest COVID19.  Chest x-ray does show some bilateral opacities which may be more pulmonary edema given her history as she is afebrile here without leukocytosis.  Lymphocytes are low, but this is a chronic issue for her.  Has slight bilateral wheeze which is chronic for her and doubt acute COPD exacerbation at this time and suspect shortness of breath/fatigue is more related to anemia.  -Tested for COVID19, negative  -Transfuse as above     Cor pulmonale, chronic diastolic CHF, HTN: History of severe pulmonary hypertension/cor pulmonale along with diastolic CHF.  Has chronic lymphedema and does Ace wraps every other day.  Is on torsemide 40 mg a morning and 20 mg afternoon along with bilateral 50 mg daily.  Takes hydralazine 75 mg 3 times daily for blood pressure.  She does have edema on exam likely 1-2+ and legs are Ace wrap currently.  Denies any change in her weight or leg swelling.  Few crackles on the left and possibly some pulmonary edema that is mild on chest x-ray.  BNP similar to previous levels.  Does not appear to be in acute CHF exacerbation.  -Give 1 dose of IV Lasix 20 mg while transfusion is going   -Continue PTA torsemide and spironolactone  -Daily weights and strict intake and output  -Resume PTA hydralazine  -Continue Ace wraps     Paroxysmal A. fib: Not on anticoagulation due to anemia issues.  Not on AV shannan blockade.  Rate controlled.  EKG shows A. fib.  -No need for telemetry for now     History of CVA: CVA in 2010.     MDD: Continue PTA venlafaxine.     GERD, history of GI bleed: Continue omeprazole daily.     HLD: Continue PTA simvastatin 10 mg at bedtime.     CKD stage III: Creatinine baseline likely 0.9-1.  Similar to baseline, currently 1.1.  -BMP tomorrow    DVT Prophylaxis: Pneumatic  Compression Devices  Code Status: Full Code  Diet: Combination Diet Regular Diet Adult    Maguire Catheter: not present  Disposition: Expected discharge in 1 days to home. Goals prior to discharge include transfusion.   Incidental Findings: None.  Family updated today: No.       Interval History   Assumed care from previous hospitalist. The history was fully reviewed.  The patient reports doing well. No chest pain or shortness of breath. No nausea, vomiting, diarrhea, constipation. No fevers. No other specific complaints identified.     -Data reviewed today: I personally reviewed all new labs and imaging results over the last 24 hours.     Physical Exam   Temp: (P) 98.3  F (36.8  C) Temp src: (P) Axillary BP: (P) 139/40 Pulse: 83 Heart Rate: (P) 56 Resp: (P) 20 SpO2: (P) 99 % O2 Device: (P) Nasal cannula Oxygen Delivery: (P) 2 LPM  Vitals:    04/13/20 1859 04/14/20 0536   Weight: 66 kg (145 lb 9.6 oz) 66.7 kg (147 lb 1.6 oz)     Vital Signs with Ranges  Temp:  [97.3  F (36.3  C)-98.9  F (37.2  C)] (P) 98.3  F (36.8  C)  Pulse:  [83] 83  Heart Rate:  [47-77] (P) 56  Resp:  [20-28] (P) 20  BP: (137-172)/(30-58) (P) 139/40  SpO2:  [91 %-99 %] (P) 99 %  I/O last 3 completed shifts:  In: -   Out: 900 [Urine:900]    GENERAL: No apparent distress. Awake, alert, and fully oriented.  HEENT: Normocephalic, atraumatic. Extraocular movements intact.  CARDIOVASCULAR: Regular rate and rhythm without murmurs or rubs. No S3.  PULMONARY: Clear bilaterally.  GASTROINTESTINAL: Soft, non-tender, non-distended. Bowel sounds normoactive.   EXTREMITIES: No cyanosis or clubbing. No edema.  NEUROLOGICAL: CN 2-12 grossly intact, no focal neurological deficits.  DERMATOLOGICAL: No rash, ulcer, bruising, nor jaundice.     Medications       albuterol  2.5 mg Nebulization 4x daily     ferrous sulfate  325 mg Oral BID     gabapentin  100 mg Oral BID     hydrALAZINE  75 mg Oral TID     omeprazole  20 mg Oral QAM AC     simvastatin  10 mg Oral At  Bedtime     sodium chloride (PF)  3 mL Intracatheter Q8H     spironolactone  50 mg Oral Daily     torsemide  20 mg Oral Daily at 2 pm     torsemide  40 mg Oral QAM     venlafaxine  75 mg Oral Daily     Data     Laboratory:  Recent Labs   Lab 04/14/20  0756 04/13/20  1625 04/13/20  1225   WBC 5.1 6.3 5.2   HGB 6.9* 6.1* 6.0*   HCT 24.1* 21.5* 19.9*   MCV 93 94 96    253 249     Recent Labs   Lab 04/14/20  0756 04/13/20  1625 04/13/20  1225    134 133   POTASSIUM 3.5 3.5 3.6   CHLORIDE 103 101 102   CO2 28 32 28   ANIONGAP 5 1* 3   GLC 90 130* 106*   BUN 35* 41* 38*   CR 0.98 1.19* 1.04   GFRESTIMATED 52* 41* 48*   GFRESTBLACK 60* 47* 55*   CASPER 8.4* 8.2* 8.3*     No results for input(s): CULT in the last 168 hours.    Imaging:  Recent Results (from the past 24 hour(s))   XR Chest Port 1 View    Narrative    EXAM: XR CHEST PORT 1 VW  LOCATION: [facility]  DATE/TIME: 4/13/2020 4:45 PM    INDICATION: Shortness of breath and COPD.  COMPARISON: 03/14/2020      Impression    IMPRESSION: Cardiomegaly. Pulmonary vascularity normal. Interstitial prominence both lungs may represent mild edema. The right hilum now appears more prominent with air bronchograms and may represent infiltrate or atelectasis involving the right   perihilar lung. This should be reviewed on follow-up imaging. Slight elevation right hemidiaphragm and probable trace bilateral pleural effusions.         Isaac Daniels DO MPH  [facility] Hospitalist  [address] [place] [address]  Pager: [phone]  04/14/2020

## 2020-04-14 NOTE — PROGRESS NOTES
[facility]  Patient is currently open to home care services with [place].  The patient is currently receiving RN services.  [facility]  and team have been notified of patient admission.  [facility] liaison will continue to follow patient during stay.  If appropriate provide orders to resume home care at time of discharge.

## 2020-04-14 NOTE — PLAN OF CARE
End of Shift Summary  For vital signs and complete assessments, please see documentation flowsheets.     Pertinent assessments: Dyspnea on exertion on 2L of O2.  Ismael heart rate.  Denies pain and nausea.  Compression stockings in place.  Patient states that she takes them off every couple days and leaves them off for two hours and then replaces them.    Major Shift Events covid - 19 negative.  Blood transfusion started.  Recheck in the AM  Treatment Plan: blood transfusion    Discharge Readiness: Medically active  Expected Discharge Date: To be determined  Discharge Disposition: Home with Self care  Barriers/Criteria for discharge :  low hemoglobin

## 2020-04-14 NOTE — PLAN OF CARE
End of Shift Summary  For vital signs and complete assessments, please see documentation flowsheets.     Pertinent assessments: Dyspnea on exertion on 2L of O2 (baseline).  Ismael heart rate.  Denies pain and nausea.  Compression stockings in place.  Patient states that she takes them off every couple days and leaves them off for two hours and then replaces them.    Major Shift Events: Blood transfusion #2  started.  Recheck in the AM  Treatment Plan: blood transfusion

## 2020-04-14 NOTE — PROGRESS NOTES
04/14/20 3139   Significant Event   Significant Event Critical result/value  (Hgb 6.9; paged MD Daniels)

## 2020-04-14 NOTE — PLAN OF CARE
Received call from lab with results that COVID-19 test came back negative.     Bedside nurse Tamera Cervantes RN made aware of results. Dr. Patricia PAUL paged. Per Dr. Gomez's note written 4/13/20 at 1943 will remove from isolation precautions and transfer off [facility] area.

## 2020-04-15 NOTE — PLAN OF CARE
End of Shift Summary  For vital signs and complete assessments, please see documentation flowsheets.     Pertinent assessments: Compression socks on. Dyspnea on exertion. Denies pain and nausea.  Feeling much better with more energy post blood transfusions.      Major Shift Events: patient rested well  Treatment Plan:  Hemoglobin recheck    Discharge Readiness: Medically active  Expected Discharge Date: To be determined  Discharge Disposition: Home with Self care  Barriers/Criteria for discharge :  Hgb level

## 2020-04-15 NOTE — DISCHARGE SUMMARY
Hospitalist Discharge Summary  [facility]    Keisha Godinez MRN# [id number]   YOB: 1932 Age: 88 year old     Date of Admission:  4/13/2020  Date of Discharge:  4/15/2020  Admitting Physician:  Isaac Daniels DO  Discharge Physician:  Isaac Daniels DO  Discharging Service:  Hospitalist     Primary Provider: Gerri Eubanks          Discharge Diagnosis:   Acute on chronic normocytic anemia  Chronic hypoxemic respiratory failure  Advanced COPD  Cor pulmonale  Chronic diastolic CHF  Hypertension  Paroxysmal atrial fibrillation  History of CVA  Depression  GERD  Hyperlipidemia  History of GI bleeding  Chronic kidney disease, stage III             Discharge Disposition:   Discharged to home           Allergies:   Allergies   Allergen Reactions     Blood Transfusion Related (Informational Only) Other (See Comments)     Patient has a history of a clinically significant antibody against RBC antigens.  A delay in compatible RBCs may occur.     Lisinopril Other (See Comments)     Tongue swelling     Calcium Nausea and Vomiting     Egg Yolk      Flu Virus Vaccine      Allergic to eggs.     Keflex [Cephalexin] Nausea     Pt states she had upset stomach.  NOT tongue swelling.  She had tongue swelling with a combo bp med that she thinks included lisinopril.    Tolerates IV Cefazolin     Levaquin [Levofloxacin]      Sulfa Drugs      Zinc Nausea and Vomiting              Discharge Medications:   Current Discharge Medication List      CONTINUE these medications which have NOT CHANGED    Details   acetaminophen (TYLENOL) 325 MG tablet Take 650 mg by mouth 3 times daily as needed for mild pain      albuterol (PROVENTIL) (2.5 MG/3ML) 0.083% neb solution Take 1 vial (2.5 mg) by nebulization 4 times daily  Qty: 120 vial, Refills: 11    Associated Diagnoses: Dyspnea, unspecified type      Cranberry Extract 250 MG TABS Take 250 mg by mouth daily      ferrous sulfate (FEROSUL) 325 (65 Fe) MG tablet  Take 1 tablet (325 mg) by mouth 2 times daily  Qty: 100 tablet, Refills: 11    Associated Diagnoses: Iron deficiency      gabapentin (NEURONTIN) 100 MG capsule Take 100 mg by mouth 2 times daily AM and PM      hydrALAZINE (APRESOLINE) 25 MG tablet TAKE 3 TABLETS BY MOUTH THREE TIMES DAILY  Qty: 810 tablet, Refills: 3    Associated Diagnoses: Hyperlipidemia, unspecified hyperlipidemia type; Cardiomyopathy, unspecified type (H)      OMEGA-3 FATTY ACIDS PO Take 1,200 mg by mouth daily       omeprazole (PRILOSEC) 20 MG DR capsule Take 1 capsule (20 mg) by mouth daily  Qty: 90 capsule, Refills: 3    Associated Diagnoses: Gastroesophageal reflux disease without esophagitis      simvastatin (ZOCOR) 10 MG tablet TAKE 1 TABLET (10MG) BY MOUTH AT BEDTIME  Qty: 90 tablet, Refills: 1    Associated Diagnoses: Hyperlipidemia, unspecified hyperlipidemia type      SM STOOL SOFTENER 8.6-50 MG tablet Take 2 tablets daily as needed for constipation while taking prescription pain medications.  Qty: 30 tablet, Refills: 0    Associated Diagnoses: S/P foot surgery, right      spironolactone (ALDACTONE) 50 MG tablet TAKE 1 TABLET BY MOUTH DAILY  Qty: 90 tablet, Refills: 3    Associated Diagnoses: Acute on chronic diastolic congestive heart failure (H)      !! torsemide (DEMADEX) 20 MG tablet Take 40 mg by mouth every morning      !! torsemide (DEMADEX) 20 MG tablet Take 20 mg by mouth daily at 2 pm      venlafaxine (EFFEXOR-XR) 75 MG 24 hr capsule Take 1 capsule (75 mg) by mouth daily  Qty: 90 capsule, Refills: 3    Associated Diagnoses: Major depressive disorder, recurrent episode, in partial remission (H)      VITAMIN D, CHOLECALCIFEROL, PO Take 1,000 Units by mouth daily       ASPIRIN NOT PRESCRIBED (INTENTIONAL) Please choose reason not prescribed, below    Associated Diagnoses: Chronic atrial fibrillation; Dilated cardiomyopathy (H)       !! - Potential duplicate medications found. Please discuss with provider.                  "Condition on Discharge:   Discharge condition: Stable   Discharge vitals: Blood pressure (!) 141/40, pulse 53, temperature 98.1  F (36.7  C), temperature source Oral, resp. rate 20, height 1.6 m (5' 3\"), weight 66.5 kg (146 lb 8 oz), SpO2 96 %, not currently breastfeeding.   Code status on discharge: Full Code      BASIC PHYSICAL EXAMINATION:  GENERAL: No apparent distress.  CARDIOVASCULAR: Regular rate and rhythm without murmurs.  PULMONARY: Clear to auscultation bilaterally.   GASTROINTESTINAL: Abdomen soft, non-tender.  EXTREMITIES: No edema, pulses intact.  NEUROLOGIC: No focal deficits.            History of Illness:   See detailed admission note for full details.               Procedures excluding imaging which is summarized below:   Please see details in the electronic medical record.           Consultations:   CARE COORDINATOR IP CONSULT          Significant Results:   Results for orders placed or performed during the hospital encounter of 04/13/20   XR Chest Port 1 View    Narrative    EXAM: XR CHEST PORT 1 VW  LOCATION: [facility]  DATE/TIME: 4/13/2020 4:45 PM    INDICATION: Shortness of breath and COPD.  COMPARISON: 03/14/2020      Impression    IMPRESSION: Cardiomegaly. Pulmonary vascularity normal. Interstitial prominence both lungs may represent mild edema. The right hilum now appears more prominent with air bronchograms and may represent infiltrate or atelectasis involving the right   perihilar lung. This should be reviewed on follow-up imaging. Slight elevation right hemidiaphragm and probable trace bilateral pleural effusions.       Transthoracic Echocardiogram Results:  No results found for this or any previous visit (from the past 4320 hour(s)).             Pending Results:   Unresulted Labs Ordered in the Past 30 Days of this Admission     No orders found from 3/14/2020 to 4/14/2020.                      Discharge Instructions and Follow-Up:   Discharge instructions and follow-up: "   Discharge Procedure Orders   Follow-up and recommended labs and tests    Order Comments: Follow up with primary care provider, Gerri Eubanks, within 7 days for hospital follow- up.  The following labs/tests are recommended: BMP, CBC.     Activity   Order Comments: Your activity upon discharge: activity as tolerated     Order Specific Question Answer Comments   Is discharge order? Yes      Full Code     Order Specific Question Answer Comments   Code status determined by: Discussion with patient/legal decision maker      Diet   Order Comments: Follow this diet upon discharge: Orders Placed This Encounter      Combination Diet Regular Diet Adult     Order Specific Question Answer Comments   Is discharge order? Yes              Hospital Course:   Keisha Godinez is a 88 year old female with PMH including COPD on 1.5 L oxygen continuous, diastolic CHF, cor pulmonale, HTN, HLD, GERD, paroxysmal A. fib not on anticoagulation due to anemia, CKD stage III, and persistent anemia requiring intermittent transfusion of unclear etiology but perhaps related to previous small bowel AVMs who presents with fatigue and shortness of breath after initially presenting to clinic and found to have hemoglobin of 6.0.  She was given 2 units of packed red blood cells over consecutive days followed by IV Lasix.  Her hemoglobin has improved to 8.2.  She feels well and the remainder of her complex chronic medical issues appear stable and at baseline.  She appears suitable for discharge home with outpatient follow-up.     Problem List:   Acute on chronic normocytic anemia: Hemoglobin 6.1 down from baseline of 7-8.  Did require transfusion last month.  Has been worked up extensively for this for possible blood loss in the past.  Has had previous small bowel AVMs.  Also worked up for hemolysis in the past.  Seen by hematology before without any specific recommendations.  Has darker stools, but takes iron twice daily.  Denies any bloody  stools.  Transfused 1 unit(s) PRBC on admission and another unit today with rise of hemoglobin to 8.2.      Chronic hypoxemic respiratory failure, COPD: Chronically on 1.5 L continuous oxygen.  Secondary to COPD and diastolic CHF.  Does have bilateral wheeze which she uses albuterol nebs 4 times per day.  Overall seems less likely to be acute exacerbation of COPD and more likely short of breath due to anemia.  She reports breathing is at baseline, would continue outpatient medications and supplemental oxygen.     Shortness of breath, fatigue: Progressive shortness of breath with exertion along with fatigue over the past couple of weeks.  She was here last month for similar and received 1 unit RBCs with improvement in symptoms.  Denies any fevers, chills, myalgias, change in her chronic cough, or sick contacts.  Is in contact with her daughter who works at a grocery store, daughter does not have any symptoms to suggest COVID19.  Chest x-ray does show some bilateral opacities which may be more pulmonary edema given her history as she is afebrile here without leukocytosis.  Lymphocytes are low, but this is a chronic issue for her.  Has slight bilateral wheeze which is chronic for her and doubt acute COPD exacerbation at this time and suspect shortness of breath/fatigue is more related to anemia.  She has tested negative for COVID-19.  Her respiratory symptoms and fatigue have improved following transfusion.     Cor pulmonale, chronic diastolic CHF, HTN: History of severe pulmonary hypertension/cor pulmonale along with diastolic CHF.  Has chronic lymphedema and does Ace wraps every other day.  Is on torsemide 40 mg a morning and 20 mg afternoon along with bilateral 50 mg daily.  Takes hydralazine 75 mg 3 times daily for blood pressure.  She does have edema on exam likely 1-2+ and legs are Ace wrap currently.  Denies any change in her weight or leg swelling.  Few crackles on the left and possibly some pulmonary edema  that is mild on chest x-ray.  BNP similar to previous levels.  Does not appear to be in acute CHF exacerbation.  She was given IV Lasix following her blood transfusion.  Currently appears euvolemic.     Paroxysmal A. fib: Not on anticoagulation due to anemia issues.  Not on AV shannan blockade.  Rate controlled.  EKG shows A. fib.     History of CVA: CVA in 2010.     MDD: Continued PTA venlafaxine.     GERD, history of GI bleed: Continued omeprazole daily.     HLD: Continued PTA simvastatin 10 mg at bedtime.     CKD stage III: Creatinine baseline likely 0.9-1.  Similar to baseline.    The patient was seen, examined, and counseled on this day. The hospitalization and plan of care was reviewed with the patient extensively. All questions were addressed and the patient agreed to follow-up as noted above.      Total time spent in face to face contact with the patient and coordinating discharge was:  35 Minutes    Isaac Daniels DO MPH  [facility] Hospitalist  [address] [place] [address]  Pager: [phone]  04/15/2020

## 2020-04-15 NOTE — PLAN OF CARE
Pertinent assessments: Compression socks on. Dyspnea on exertion. VSS on 1.5L NC, Afebrile. Blanchable red to coccyx.   Major Shift Events: None  Treatment Plan: Compression wraps, AM check of Hgb, spironolactone & torsemide.    Discharge Readiness: Medically active  Expected Discharge Date: To be determined  Discharge Disposition: Home with Self care  Barriers/Criteria for discharge :  Hgb level

## 2020-04-15 NOTE — PROGRESS NOTES
Focus: Pressure injury  D: per MD note: 88 year old female with PMH including COPD on 1.5 L oxygen continuous, diastolic CHF, cor pulmonale, HTN, HLD, GERD, paroxysmal A. fib not on anticoagulation due to anemia, CKD stage III, and persistent anemia requiring intermittent transfusion of unclear etiology but perhaps related to previous small bowel AVMs who presents with fatigue and shortness of breath after initially presenting to clinic and found to have hemoglobin of 6.0.  Pt has history of PI to bilateral buttocks Deep tissue Pressure Injury which has since resolved and healed.   Pt also had a history of foot wound followed by Podiatry and has healed.   I:discussion and assessment with pt    A/P: no skin issues at this time  Pt is aware to follow up with Podiatry for any new wounds to feet.

## 2020-04-15 NOTE — PLAN OF CARE
End of Shift Summary  For vital signs and complete assessments, please see documentation flowsheets.     Pertinent assessments: Compression socks on. Dyspnea on exertion. Denies pain and nausea.      Major Shift Events: discharging today  Treatment Plan:  Hemoglobin recheck came back at 8.2 this morning.    Pt to D/C to home.  Pt provided with d/c instructions, including new medications, when medications were last given, and when to take them again.  Pt also informed to f/u with primary MD in 7 days.  Pt verbalized understanding of all d/c and f/u instructions.  All questions were answered at this time.  Copy of paperwork sent with pt.   family to provide transport.  All personal belongings sent with pt.

## 2020-04-16 NOTE — TELEPHONE ENCOUNTER
IP F/U    Date: 4/15/20  Diagnosis: Acute On Chronic Anemia  Is patient active in care coordination? No  Was patient in TCU? No

## 2020-04-16 NOTE — TELEPHONE ENCOUNTER
"Hospital/TCU/ED for chronic condition Discharge Protocol    \"Hi, my name is Soheila Camejo RN, a registered nurse, and I am calling from [facility].  I am calling to follow up and see how things are going for you after your recent emergency visit/hospital/TCU stay.\"    Tell me how you are doing now that you are home?\" per daughter patient is doing well, as expected.   Home care nurse is present at patient's home. [facility] care requesting orders to draw BMP and CBC as instructed in hospital follow-up, Kerri will complete this on Monday, 4/20 prior to scheduled hospital follow-up telephone visit. Primary care provider not in clinic will route pending orders to covering provider. Please advise,     Thank you       Discharge Instructions    \"Let's review your discharge instructions.  What is/are the follow-up recommendations?  Pt. Response: Follow up with primary care provider, Gerri Eubanks, within 7 days for hospital follow- up. The following labs/tests are recommended: BMP, CBC.    \"Has an appointment with your primary care provider been scheduled?\"   No (schedule appointment)  Next 5 appointments (look out 90 days)    Apr 20, 2020  1:20 PM CDT  [facility] [address] [facility] [address] [phone]          \"When you see the provider, I would recommend that you bring your medications with you.\"    Medications    \"Tell me what changed about your medicines when you discharged?\"    Changes to chronic meds?    0-1    \"What questions do you have about your medications?\"    None     New diagnoses of heart failure, COPD, diabetes, or MI?    No              Post Discharge Medication Reconciliation Status: discharge medications reconciled, continue medications without change.    Was MTM referral placed (*Make sure to put transitions as reason for referral)?   No    Call Summary    \"What " "questions or concerns do you have about your recent visit and your follow-up care?\"     none    \"If you have questions or things don't continue to improve, we encourage you contact us through the main clinic number (give number).  Even if the clinic is not open, triage nurses are available 24/7 to help you.     We would like you to know that our clinic has extended hours (provide information).  We also have urgent care (provide details on closest location and hours/contact info)\"      \"Thank you for your time and take care!\"             "

## 2020-04-20 NOTE — PROGRESS NOTES
"Keisha Godinez is a 88 year old female who is being evaluated via a billable telephone visit.      The patient has been notified of following:     \"This telephone visit will be conducted via a call between you and your physician/provider. We have found that certain health care needs can be provided without the need for a physical exam.  This service lets us provide the care you need with a short phone conversation.  If a prescription is necessary we can send it directly to your pharmacy.  If lab work is needed we can place an order for that and you can then stop by our lab to have the test done at a later time.    Telephone visits are billed at different rates depending on your insurance coverage. During this emergency period, for some insurers they may be billed the same as an in-person visit.  Please reach out to your insurance provider with any questions.    If during the course of the call the physician/provider feels a telephone visit is not appropriate, you will not be charged for this service.\"    Patient has given verbal consent for Telephone visit?  Yes    How would you like to obtain your AVS? Tiannat    Subjective     Keisha Godinez is a 88 year old female who presents to clinic today for the following health issues:    Wants albuterol inhaler.      Hospital Follow-up Visit:    Hospital/Nursing Home/IP Rehab Facility: [facility]  Date of Admission: 4-  Date of Discharge: 4-  Reason(s) for Admission: anemia/lab work today            Problems taking medications regularly:  None       Medication changes since discharge: None       Problems adhering to non-medication therapy:  None    Summary of hospitalization:  [facility] discharge summary reviewed  Diagnostic Tests/Treatments reviewed.  Follow up needed: CBC  Other Healthcare Providers Involved in Patient s Care:         Homecare  Update since discharge: improved. She is doing well on home O2 24 hrs a day at 1.5 " liters. Is is expected she will need to continue on this permanently. 6 months ago she got 3 iron infusions. Her iron levels have not been checked since then. She does take 2 iron pills a day without side effects or GI problems.    Her appetite has been good and energy is at baseline. Her mood is also good. No lower extremity edema. No orthopnea or PND. Wt is stable.     Post Discharge Medication Reconciliation: discharge medications reconciled, continue medications without change.  Plan of care communicated with patient and daughter     Coding guidelines for this visit:  Type of Medical   Decision Making Face-to-Face Visit       within 7 Days of discharge Face-to-Face Visit        within 14 days of discharge   Moderate Complexity [id number]   High Complexity 47713 [id number]                 BP Readings from Last 3 Encounters:   04/20/20 132/58   04/15/20 (!) 141/40   03/16/20 (!) 162/55    Wt Readings from Last 3 Encounters:   04/20/20 65.3 kg (144 lb)   04/15/20 66.5 kg (146 lb 8 oz)   03/16/20 67.6 kg (149 lb)                    Reviewed and updated as needed this visit by Provider         Review of Systems   ROS COMP: Constitutional, HEENT, cardiovascular, pulmonary, GI, , musculoskeletal, neuro, skin, endocrine and psych systems are negative, except as otherwise noted.       Objective   Reported vitals:  There were no vitals taken for this visit.   healthy, alert and no distress  PSYCH: Alert and oriented times 3; coherent speech, normal   rate and volume, able to articulate logical thoughts, able   to abstract reason, no tangential thoughts, no hallucinations   or delusions  Her affect is normal and pleasant  RESP: No cough, no audible wheezing, able to talk in full sentences  Remainder of exam unable to be completed due to telephone visits    Hgb 8.1 today        Assessment/Plan:  1. Pulmonary hypertension (H)  She is doing better. She will continue to need home O2 at 1-2 liters nasal canula permanently. Her  nebulizer treatments were increased to qid but apperently there is a problem with [employer] and we need to fax them an order. Will get my nurse to do that. She also needs just an albuteral inhaler inhaler for home. That was sent in.   - albuterol (PROAIR HFA/PROVENTIL HFA/VENTOLIN HFA) 108 (90 Base) MCG/ACT inhaler; Inhale 2 puffs into the lungs every 6 hours  Dispense: 1 Inhaler; Refill: 4    2. Chronic systolic congestive heart failure (H)  Stable for now    3. Other iron deficiency anemia  She is due for iron studies and ferritin will let home health know so they can be drawn at home with next visit.    4. Gastrointestinal hemorrhage, unspecified gastrointestinal hemorrhage type  as above.     5. Chronic obstructive pulmonary disease, unspecified COPD type (H)  as above.       No follow-ups on file.      Phone call duration:  15 minutes    Gerri Eubanks MD

## 2020-04-29 NOTE — TELEPHONE ENCOUNTER
Patients daughter Rebecca on the C2C calling  [facility] claims they never rec'd the refill for   albuterol (PROVENTIL) (2.5 MG/3ML) 0.083% neb solution   Please call [facility] [phone] fax: [phone]  Please call Margie and  [phone]

## 2020-04-30 NOTE — TELEPHONE ENCOUNTER
Pharmacy e-scribe fax number confirmed yet they did not receive prescription. Advised patient I will hand fax now to number [phone] that pharmacy provided for hand faxes.

## 2020-05-04 NOTE — PROGRESS NOTES
"[facility]  [facility]    MyHealth Tracker Heart Failure Alert      Daily Weight Goal: 145-152 lbs    Today's Weight: 142 lbs    Weight Alert: 3 lbs under weight parameters    Symptom Alert: None     Current Diuretic & Potassium Plan: Spironolactone 50 mg daily & Torsemide 40 mg in morning & 20 mg in afternoon.        No future appointments.    Notes:   Recently discharged from [facility] (4/13-4/15/20) for Anemia (HGB 6.0), resp failure, SOB, fatigue & D HF. She received 2 uPRBCs w/IV lasix, HGB treaded up to 8.2.   Pt states she is feeling well. She will watch for incr fatigue \"I'm to old for them to do all that testing on me.\" Pt has has suspected AVMs that bleed intermittent bleeding.    She will have daughter call to make CORE phone appt as she is due   Will send Suad an update as pt's wt has been below window.       MyHealth Tracker Weight Trends:             MyHealth Tracker Weight Graph:           Charity Garcia RN 12:09 PM 05/04/20      "

## 2020-05-04 NOTE — PROGRESS NOTES
Suad Murrell PA Gerdowsky, Christina, RN    Caller: Unspecified (Today, 11:53 AM)               Sounds good, have them make a CORE virtual visit with me whenever we can.   No labs needed on my end, if her PCP or someone else is monitoring her hemoglobin which should be the case.     Actually, we could prob disenroll her from [facility] as she has been doing really well overall.   Just tell her to call if weight starts to trend up or she notes more swelling.   Thanks   Suad

## 2020-05-14 NOTE — TELEPHONE ENCOUNTER
Spoke with Elle--[facility] nurse.  Informed of message below.  Reluctant to send patient to the ER.  She states she doesn't think patient needs to be transfused today or tomorrow.  Would like patient to be seen at the infusion clinic.    Spoke with [facility] infusion clinic.  Next available appointment for a blood transfusion is 5-21-20.    Routed to Kylie to see if patient can wait until 5-21-20 or should patient go thru the ER?    Please advise, thanks.

## 2020-05-14 NOTE — TELEPHONE ENCOUNTER
Elle from [facility] calling with critical lab value--done today.    Hgb = 6.6.    Please advise, thanks.

## 2020-05-14 NOTE — PROGRESS NOTES
CARDIOLOGY TELEPHONE VISIT    Keisha Godinez is a 88 year old female who is being evaluated via a billable telephone visit.      The patient has been notified of following:     This telephone visit will be conducted via a call between you and your physician/provider. Given concern for spread of COVID 19 we are minimizing in person clinic visits when possible. We have found that certain health care needs can be provided without the need for a physical exam.  This service lets us provide the care you need with a short phone conversation.  If a prescription is necessary we can send it directly to your pharmacy.  If lab work is needed we can place an order for that and you can then stop by our lab to have the test done at a later time. If during the course of the call the physician/provider feels a telephone visit is not appropriate, you will not be charged for this service.    Telephone visits are billed at different rates depending on your insurance coverage. During this emergency period, for some insurers they may be billed the same as an in-person visit.  Please reach out to your insurance provider with any questions.    Patient has given verbal consent for Telephone visit?  Yes      I have reviewed and updated the patient's Past Medical History, Social History, Family History and Medication List.      MEDICATIONS:  Current Outpatient Medications   Medication Sig Dispense Refill     acetaminophen (TYLENOL) 325 MG tablet Take 650 mg by mouth 3 times daily as needed for mild pain       albuterol (PROAIR HFA/PROVENTIL HFA/VENTOLIN HFA) 108 (90 Base) MCG/ACT inhaler Inhale 2 puffs into the lungs every 6 hours 1 Inhaler 4     albuterol (PROVENTIL) (2.5 MG/3ML) 0.083% neb solution Take 1 vial (2.5 mg) by nebulization 4 times daily 120 vial 11     ASPIRIN NOT PRESCRIBED (INTENTIONAL) Please choose reason not prescribed, below       Cranberry Extract 250 MG TABS Take 250 mg by mouth daily       ferrous sulfate (FEROSUL)  325 (65 Fe) MG tablet Take 1 tablet (325 mg) by mouth 2 times daily 100 tablet 11     gabapentin (NEURONTIN) 100 MG capsule Take 100 mg by mouth 2 times daily AM and PM       hydrALAZINE (APRESOLINE) 25 MG tablet TAKE 3 TABLETS BY MOUTH THREE TIMES DAILY 810 tablet 3     OMEGA-3 FATTY ACIDS PO Take 1,200 mg by mouth daily        omeprazole (PRILOSEC) 20 MG DR capsule Take 1 capsule (20 mg) by mouth daily 90 capsule 3     simvastatin (ZOCOR) 10 MG tablet TAKE 1 TABLET (10MG) BY MOUTH AT BEDTIME 90 tablet 1     SM STOOL SOFTENER 8.6-50 MG tablet Take 2 tablets daily as needed for constipation while taking prescription pain medications. 30 tablet 0     spironolactone (ALDACTONE) 50 MG tablet TAKE 1 TABLET BY MOUTH DAILY 90 tablet 3     torsemide (DEMADEX) 20 MG tablet Take 60 mg by mouth daily Takes 40 mg in morning and 20 mg in afternoon       venlafaxine (EFFEXOR-XR) 75 MG 24 hr capsule Take 1 capsule (75 mg) by mouth daily 90 capsule 3     VITAMIN D, CHOLECALCIFEROL, PO Take 1,000 Units by mouth daily          ALLERGIES  Blood transfusion related (informational only); Lisinopril; Calcium; Egg yolk; Flu virus vaccine; Keflex [cephalexin]; Levaquin [levofloxacin]; Sulfa drugs; and Zinc      Review Of Systems:    Skin: negative  Eyes: negative  Ears/Nose/Throat: negative  Respiratory: No shortness of breath, dyspnea on exertion, cough, or hemoptysis  Cardiovascular: negative  Gastrointestinal: negative  Genitourinary: negative  Musculoskeletal: negative  Neurologic: numbness or tingling of feet  Psychiatric: negative  Hematologic/Lymphatic/Immunologic: negative  Endocrine: negative  (ROS TAKEN BY Annette VILCHIS CMA PRIOR TO VISIT)    Self reported vitals:  Weight: 144lb  /40  HR 59    Most recent labs:   Results for ELAINA MAYFIELD (MRN [id number]) as of 5/15/2020 13:17   Ref. Range 5/14/2020 11:45   WBC Latest Ref Range: 4.0 - 11.0 10e9/L 5.9   Hemoglobin Latest Ref Range: 11.7 - 15.7 g/dL 6.6 (LL)   Hematocrit  Latest Ref Range: 35.0 - 47.0 % 21.2 (L)   Platelet Count Latest Ref Range: 150 - 450 10e9/L 214   RBC Count Latest Ref Range: 3.8 - 5.2 10e12/L 2.14 (L)   MCV Latest Ref Range: 78 - 100 fl 99   MCH Latest Ref Range: 26.5 - 33.0 pg 30.8   MCHC Latest Ref Range: 31.5 - 36.5 g/dL 31.1 (L)   RDW Latest Ref Range: 10.0 - 15.0 % 15.0   Diff Method Unknown Automated Method   % Neutrophils Latest Units: % 77.4   % Lymphocytes Latest Units: % 8.1     Results for ELAINA MAYFIELD (MRN [id number]) as of 5/15/2020 13:17   Ref. Range 4/28/2020 10:50   Sodium Latest Ref Range: 133 - 144 mmol/L 136   Potassium Latest Ref Range: 3.4 - 5.3 mmol/L 4.0   Chloride Latest Ref Range: 94 - 109 mmol/L 102   Carbon Dioxide Latest Ref Range: 20 - 32 mmol/L 30   Urea Nitrogen Latest Ref Range: 7 - 30 mg/dL 46 (H)   Creatinine Latest Ref Range: 0.52 - 1.04 mg/dL 1.24 (H)   GFR Estimate Latest Ref Range: >60 mL/min/1.73_m2 39 (L)   GFR Estimate If Black Latest Ref Range: >60 mL/min/1.73_m2 45 (L)   Calcium Latest Ref Range: 8.5 - 10.1 mg/dL 8.6   Anion Gap Latest Ref Range: 3 - 14 mmol/L 4   Ferritin Latest Ref Range: 8 - 252 ng/mL 17   Iron Latest Ref Range: 35 - 180 ug/dL 110   Iron Binding Cap Latest Ref Range: 240 - 430 ug/dL 439 (H)   Iron Saturation Index Latest Ref Range: 15 - 46 % 25         Primary cardiologist: Dr. Edin Chaudhari    HPI:  Ms. Mayfield is a pleasant 88 year old female with a PMHx including hypertension, hyperlipidemia, atrial fibrillation (not on AC due to prior GI bleed), recurrent anemia (colonic AVM, iron deficiency), CVA, chronic lower extremity edema, and diastolic heart failure with secondary pulmonary hypertension. She was followed by Dr. Pradhan for a few years, but then lost to follow up in 2016 as she opted not to return as she was doing so well. However, in May 2018 she developed worsening leg edema and was admitted at [facility] with cellulitis complicated by heart failure, was diuresed and discharged to a TCU. She  then returned to the hospital in June 2018 with leg edema and weight gain once again. She required IV diuresis, and pRBC transfusion for worsening of her anemia. During that admit, she was 167 lbs at admit, and discharged at 155 lbs. She was placed on 40mg of lasix twice daily, and required supplemental oxygen at discharge.     I have been seeing her in the CORE clinic since July 2018. Since that time she has made positive changes her diet, including sodium restriction. Weights overall have trended down and swelling improved with consistent use of lymphedema wraps. I transitioned her lasix to torsemide with good result and her weight has settled out in the 142-145 lb range. A follow up echo in Oct 2018 showed EF 55-60%, though her RV remained dilated with mildly decreased function and 2+TR.      More recently, she was hospitalized in August 2019 for symptomatic worsening anemia, requiring transfusion. No definitive bleeding source was identified. She returned again in early October 2019 for shortness of breath and leg edema, after getting an outpatient transfusions. She was felt to be volume overloaded and required IV lasix. When I saw her last in CORE in Dec 2019 she was doing well from a heart failure standpoint and starting to become more active. No changes were made.     Today, she was to return for a CORE clinic visit, but in efforts to limit possible exposures to COVID-19, we opted for a phone visit today which she was agreeable to. Keisha was hospitalized last month once again for anemia issues, requiring transfusions followed by IV Lasix. On labs done a few days ago by her PCP, her hemoglobin has dropped back down to 6.6. It was recommended she go to the ED for transfusion, which she declined. They are now working on scheduling her for outpatient pRBC infusions. She admits she is tired but otherwise not feeling acutely worse. She denies worsening MOTT, orthopnea, or worsening LE edema from her baseline. She  denies dizziness or presyncope, and BP is not low at home. She remains very strict with her sodium restriction per her daughter, and continues with leg wraps as directed.          ASSESSMENT/PLAN:     1. Chronic diastolic and right heart failure.              --Most recent echo Oct 2018 with ongoing preserved EF 55-60%. Her RV has remained dilated, with mildly reduced function with 2+ TR, and was felt to be euvolemic at the time of that exam. Given her advanced age and other comorbidities, we had opted to proceed conservatively with no advanced PH workup planned.               --Today she self reports no worsening edema. Weights are stable at 142-145 lbs at home. Will continue torsemide at 60mg and spironolactone 50mg daily for now. She continues to do an excellent job with sodium restriction and home leg wraps.               --Her anemia and recurrent transfusion requirements have complicated her heart failure management. Her hemoglobin is back down to 6.6. and she declines the ER. She has outpatient transfusions planned for next week. I encouraged her to seek medical attention sooner with any acute worsening. We will also have a low threshold to temporarily reduce her cardiac medications if needed.                          2. Hypertension.              --SBP has fluctuated in the past, though appears under good control currently. She has had chronically lower diastolic BPs after adequate diuresis. She has a true allergy to ACE-inhibitors (tongue swelling), and is not on a beta blocker due to resting bradycardia. Will continue hydralazine at 25mg TID, but again, low threshold to reduce this if needed in the setting of her anemia issues.      3. Atrial fibrillation, chronic.              --Rate well controlled, with intermittent bradycardia, not on BB. Some concern for underlying AV conduction issues but she continues to deny any dizziness or presyncope.    --She is not on anticoagulation due to GIB/anemia noted  above.      Follow up plan: With Dr. Chaudhari in July/Aug for annual visit with labs. I will then plan to see her back in CORE in ~6 months, or sooner if needed.       I have reviewed the note as documented above.  This accurately captures the substance of my conversation with the patient.      Phone call contact time  Call Started at [phone]  Call Ended at 1331    Suad Murrell PA-C  [facility]  Pager [phone]

## 2020-05-15 NOTE — TELEPHONE ENCOUNTER
Called Infusion Center for assistance with placing orders for PRBC since patient is coming in for 2 separate appointments for each unit. Spoke to Miriam, Entered orders for Type and cross and 1 unit PRBC transfusion to be done on 5/16 and 5/21. Orders signed per Kylie's written approval below. Miriam will have Zhanna check orders when she is back from lunch to see if any changes are needed.     Kylie - since patient is getting 2 units, you need to contact the blood bank to get approval for this due to COVID-19 restrictions. Phone: [phone].

## 2020-05-15 NOTE — LETTER
Transition Communication Hand-off for Care Transitions to Next Level of Care Provider    Name: Keisha Godinez  : 1932  MRN #: [id number]    Key Recommendations:  Patient admitted for anemia. Required blood transfusion X 2. Slight exacerbation of CHF. She was admitted to [facility] form 20 to 4/15/20 for anemia. She then required 2 units of PRBCs followed by IV Lasix. She lives with her daughter, Rebecca. She has good support at home and [facility] RN sees her once weekly. She also receives MOW. She follows with the [facility] and calls them with her daily weight. She has home oxygen through  DME.     Ashely Vega, RN, BSN, CPHN, CM    AVS/Discharge Summary is the source of truth; this is a helpful guide for improved communication of patient story

## 2020-05-15 NOTE — TELEPHONE ENCOUNTER
"Infusion Center request orders for blood transfusion of 2 units PRBC and Type and cross. Patient is scheduled for appointments at Outpatient Infusion Center on 5/18 and 5/22 - per visit note:   \"1 unit PRBC; Kylie Dawson 1 unit today and 1 unit on Friday. *will T&S at [facility] lab 5/16\"    Per VASILIY Manning, Elle, RN told her that patient may refuse ER as she received bill for 3 day observation stay the last time she went to the ER for blood transfusion. This RN left voice message for Elle asking if patient refused ER and scheduled appointment in Infusion center instead.     Routed to Kylie to review if okay to place orders for 2 Units PRBC and Type and cross for Infusion Center.   "

## 2020-05-15 NOTE — ED NOTES
[facility]  ED Nurse Handoff Report    Keisha Godinez is a 88 year old female   ED Chief complaint: Shortness of Breath and Fatigue  . ED Diagnosis:   Final diagnoses:   Acute on chronic anemia     Allergies:   Allergies   Allergen Reactions     Blood Transfusion Related (Informational Only) Other (See Comments)     Patient has a history of a clinically significant antibody against RBC antigens.  A delay in compatible RBCs may occur.     Lisinopril Other (See Comments)     Tongue swelling     Calcium Nausea and Vomiting     Egg Yolk      Flu Virus Vaccine      Allergic to eggs.     Keflex [Cephalexin] Nausea     Pt states she had upset stomach.  NOT tongue swelling.  She had tongue swelling with a combo bp med that she thinks included lisinopril.    Tolerates IV Cefazolin     Levaquin [Levofloxacin]      Sulfa Drugs      Zinc Nausea and Vomiting       Code Status: Full Code  Activity level - Baseline/Home:  Independent. Activity Level - Current:   Stand by Assist. Lift room needed: No. Bariatric: No   Needed: No   Isolation: No. Infection: Not Applicable.     Vital Signs:   Vitals:    05/15/20 1715 05/15/20 1725 05/15/20 1728 05/15/20 1730   BP: (!) 170/61   (!) 155/57   Pulse: 68   62   Resp:       Temp:       TempSrc:       SpO2: 92% 93% 93% 92%   Weight:       Height:           Cardiac Rhythm:  ,      Pain level: 0-10 Pain Scale: 0  Patient confused: No. Patient Falls Risk: Yes.   Elimination Status: able to void  Patient Report - Initial Complaint: shortness of breath, fatigue. Focused Assessment:     Brought in due to weakness and SOB, Home care capo desiree blood yesterday and HGB is 6.4.       Tests Performed: labs. Abnormal Results:   Labs Ordered and Resulted from Time of ED Arrival Up to the Time of Departure from the ED   CBC WITH PLATELETS DIFFERENTIAL - Abnormal; Notable for the following components:       Result Value    RBC Count 2.40 (*)     Hemoglobin 6.5 (*)     Hematocrit  22.9 (*)     MCHC 28.4 (*)     Absolute Lymphocytes 0.5 (*)     All other components within normal limits   BASIC METABOLIC PANEL - Abnormal; Notable for the following components:    Glucose 120 (*)     Urea Nitrogen 38 (*)     Creatinine 1.09 (*)     GFR Estimate 45 (*)     GFR Estimate If Black 52 (*)     Calcium 8.4 (*)     All other components within normal limits   ABO/RH TYPE AND SCREEN - Abnormal; Notable for the following components:    Antibody Screen Pos (*)     All other components within normal limits   RED BLOOD CELL PREPARE ORDER UNIT       Treatments provided: monitoring  Family Comments: n/a  OBS brochure/video discussed/provided to patient:  N/A  ED Medications: Medications - No data to display  Drips infusing:  No  For the majority of the shift, the patient's behavior Green. Interventions performed were n/a.    Sepsis treatment initiated: No       ED Nurse Name/Phone Number: Brielle Howard RN,   6:24 PM    RECEIVING UNIT ED HANDOFF REVIEW    Above ED Nurse Handoff Report was reviewed: Yes  Reviewed by: Colin Busby RN on May 15, 2020 at 7:59 PM

## 2020-05-15 NOTE — TELEPHONE ENCOUNTER
Called blood bank and they are not allowed to give 2 units unless hgb allow it - so will get transfusion Monday and when she gets typed and crossed again for Friday infusion will have to also get a hgb done

## 2020-05-15 NOTE — TELEPHONE ENCOUNTER
Called patient and informed her that when she comes for Type and Cross next Thursday, she also will need to have her hemoglobin checked. This will determine if she needs to keep appointment for transfusion on Friday. Patient verbalizes understand and asks that we also inform her daughter Rebecca of this.     Rebecca advised as well and verbalizes understanding. Rebecca advised to take patient to ER if she has any concerns for worsening symptoms. Rebecca states she has done this in the past and knows what symptoms to watch for.     This nurse also spoke to Zhanna in [facility] and she confirmed that orders should work as entered.

## 2020-05-15 NOTE — TELEPHONE ENCOUNTER
[facility] calling stating they need blood orders today.     1 unit on Monday and 1 unit on Friday.     Also Clinic needs to call blood bank to get approval to give 2 units. #[phone]    Needs to get type and cross on Saturday.     Then again on next Thursday.     Zhanna from Infusion calling. #[phone]

## 2020-05-15 NOTE — LETTER
Transition Communication Hand-off for Care Transitions to Next Level of Care Provider    Name: Keisha Godinez  : 1932  MRN #: [id number]  Primary Care Provider: Kylie Dawson  Primary Care MD Name: Dr. Gerri Eubanks  [facility] [address]  Primary Care Clinic Name: [facility]  Reason for Hospitalization:  Acute on chronic anemia [D64.9]  Admit Date/Time: 5/15/2020  5:04 PM  Discharge Date: 2020  Payor Source: Payor: [facility] / Plan: "[employer] MEDICARE ADVANTAGE / Product Type: Medicare /     Readmission Assessment Measure (DALE) Risk Score/category: Average        Reason for Communication Hand-off Referral: Admission diagnoses: CHF  Other Anemia    Discharge Plan: Home  Concern for non-adherence with plan of care: No   Discharge Needs Assessment:  Needs      Most Recent Value   # of Referrals Placed by CTS  External Care Coordination, Scheduled Follow-up appointments, Communication hand-offs to next level of Care Providers   Other Resources  Other (see comment) [Declined copy of CHF Action Plan. Admitted for anemia]      Follow-up specialty is recommended: No    Follow-up plan:    Future Appointments   Date Time Provider Department Center   2020  9:15 AM [facility] LAB [facility]   2020  9:40 AM Luli Blackman MD RII[facility]   2020  1:00 PM [facility]   Any outstanding tests or procedures:  No. Recommend repeat BMP & CBC at follow up appointment.        Key Recommendations:  Patient admitted for anemia. Required blood transfusion X 2. Slight exacerbation of CHF. She was admitted to [facility] form 20 to 4/15/20 for anemia. She then required 2 units of PRBCs followed by IV Lasix. She lives with her daughter, Rebecca. She has good support at home and [facility] RN sees her once weekly. She also receives MOW. She follows with the [facility] and calls them with her daily weight. She has home oxygen through  DME.     Ashely Vega, RN, BSN,  [facility]    AVS/Discharge Summary is the source of truth; this is a helpful guide for improved communication of patient story

## 2020-05-15 NOTE — PATIENT INSTRUCTIONS
Visit Summary:    Today we discussed:   Please continue to follow closely with your primary team for management of your anemia.  If you develop dizziness or low BP you need to be seen. If this occurs we may need to temporarily hold some of your heart medications.     For now we will continue to have you call in for MyHealth Tracker for your daily weights.     Follow up:   With Dr. Chaudhari in 2-3 months for your annual visit.  I will see you back in [facility] approximately 3 months after that visit, but am happy to see you sooner if necessary.      Please call my nurse Charity at [phone] with any questions or concerns.

## 2020-05-15 NOTE — LETTER
5/15/2020       RE: Keisha Godinez  [address] [facility] [address]       Dear Colleague,    Thank you for the opportunity to participate in the care of your patient, Keisha Godinez, at the [facility]. Please see a copy of my visit note below.    Ms. Godinez is a pleasant 88 year old female with a PMHx including hypertension, hyperlipidemia, atrial fibrillation (not on AC due to prior GI bleed), recurrent anemia (colonic AVM, iron deficiency), CVA, chronic lower extremity edema, and diastolic heart failure with secondary pulmonary hypertension. She was followed by Dr. Pradhan for a few years, but then lost to follow up in 2016 as she opted not to return as she was doing so well. However, in May 2018 she developed worsening leg edema and was admitted at [facility] with cellulitis complicated by heart failure, was diuresed and discharged to a TCU. She then returned to the hospital in June 2018 with leg edema and weight gain once again. She required IV diuresis, and pRBC transfusion for worsening of her anemia. During that admit, she was 167 lbs at admit, and discharged at 155 lbs. She was placed on 40mg of lasix twice daily, and required supplemental oxygen at discharge.     I have been seeing her in the [facility] since July 2018. Since that time she has made positive changes her diet, including sodium restriction. Weights overall have trended down and swelling improved with consistent use of lymphedema wraps. I transitioned her lasix to torsemide with good result and her weight has settled out in the 142-145 lb range. A follow up echo in Oct 2018 showed EF 55-60%, though her RV remained dilated with mildly decreased function and 2+TR.      More recently, she was hospitalized in August 2019 for symptomatic worsening anemia, requiring transfusion. No definitive bleeding source was identified. She returned again  in early October 2019 for shortness of breath and leg edema, after getting an outpatient transfusions. She was felt to be volume overloaded and required IV lasix. When I saw her last in [facility] in Dec 2019 she was doing well from a heart failure standpoint and starting to become more active. No changes were made.     Today, she was to return for a [facility] clinic visit, but in efforts to limit possible exposures to COVID-19, we opted for a phone visit today which she was agreeable to. Keisha was hospitalized last month once again for anemia issues, requiring transfusions followed by IV Lasix. On labs done a few days ago by her PCP, her hemoglobin has dropped back down to 6.6. It was recommended she go to the ED for transfusion, which she declined. They are now working on scheduling her for outpatient pRBC infusions. She admits she is tired but otherwise not feeling acutely worse. She denies worsening MOTT, orthopnea, or worsening LE edema from her baseline. She denies dizziness or presyncope, and BP is not low at home. She remains very strict with her sodium restriction per her daughter, and continues with leg wraps as directed.          ASSESSMENT/PLAN:     1. Chronic diastolic and right heart failure.              --Most recent echo Oct 2018 with ongoing preserved EF 55-60%. Her RV has remained dilated, with mildly reduced function with 2+ TR, and was felt to be euvolemic at the time of that exam. Given her advanced age and other comorbidities, we had opted to proceed conservatively with no advanced PH workup planned.               --Today she self reports no worsening edema. Weights are stable at 142-145 lbs at home. Will continue torsemide at 60mg and spironolactone 50mg daily for now. She continues to do an excellent job with sodium restriction and home leg wraps.               --Her anemia and recurrent transfusion requirements have complicated her heart failure management. Her hemoglobin is back down to 6.6. and she  declines the ER. She has outpatient transfusions planned for next week. I encouraged her to seek medical attention sooner with any acute worsening. We will also have a low threshold to temporarily reduce her cardiac medications if needed.                          2. Hypertension.              --SBP has fluctuated in the past, though appears under good control currently. She has had chronically lower diastolic BPs after adequate diuresis. She has a true allergy to ACE-inhibitors (tongue swelling), and is not on a beta blocker due to resting bradycardia. Will continue hydralazine at 25mg TID, but again, low threshold to reduce this if needed in the setting of her anemia issues.      3. Atrial fibrillation, chronic.              --Rate well controlled, with intermittent bradycardia, not on BB. Some concern for underlying AV conduction issues but she continues to deny any dizziness or presyncope.    --She is not on anticoagulation due to GIB/anemia noted above.      Follow up plan: With Dr. Chaudhari in July/Aug for annual visit with labs. I will then plan to see her back in [facility] in ~6 months, or sooner if needed.     I have reviewed the note as documented above.  This accurately captures the substance of my conversation with the patient.    Suad Murrell PA-C  [facility]  Pager [phone]    Please do not hesitate to contact me if you have any questions/concerns.     Sincerely,     NATHAN Valentin

## 2020-05-15 NOTE — TELEPHONE ENCOUNTER
Ok for orders listed   They have to make a choice about going to hospital or not but her hgb is dangerous level

## 2020-05-15 NOTE — PROGRESS NOTES
[facility]  [facility]    MyHealth Tracker Heart Failure Alert    **Pt wants to continue with MHT    Daily Weight Goal: 145-152 lbs    Today's Weight: 144 lbs    Weight Alert: 1 lbs under weight parameters    Symptom Alert: None       Current Diuretic & Potassium Plan: Spironolactone 50 mg daily & Torsemide 40 mg in am and 20 mg in afternoon.     No KCL        Future Appointments   Date Time Provider Department Center   5/15/2020  1:10 PM [facility]   5/18/2020  8:00 AM [facility] INFUSION CHAIR 4 [facility]   5/22/2020  1:00 PM RH INFUSION CHAIR 6 [facility]         MyHealth Tracker Weight Trends:                 MyHealth Tracker Weight Graph:         Charity Garcia RN 10:22 AM 05/15/20

## 2020-05-15 NOTE — TELEPHONE ENCOUNTER
Elle calling back stated that patient refused ER and scheduled appointment in Infusion center instead.     Elle is also wondering if Prilosec dose should be increased or if alternative should be prescribed.     Please advise and return call to Elle,     Thank you

## 2020-05-15 NOTE — ED PROVIDER NOTES
History     Chief Complaint:    Shortness of Breath and Fatigue    The history is provided by the patient.      Keisha Godinez is a 88 year old female with a history of CFH, COPD and chronic anemia.  She presents for progressive increasing weakness.  Hgb in clinic 6.4 yesterday.  No bleeding.  Prior work ups suggest occult GI bloodloss.  No CP.  Breathing is at baseline when on home O2 (1.5L).  Denies falls.  No orthopnea.  LE edema is chronic and at baseline.  No leg pain.  No fever or cough.  She has been eating and drinking ok.  No vomiting or diarrhea.        Allergies:  Blood Transfusion Related - clinically significant antibody against RBC antigens  Lisinopril  Calcium  Egg Yolk  Flu Virus Vaccine  Keflex [Cephalexin]  Levaquin [Levofloxacin]  Sulfa Drugs  Zinc     Medications:    Albuterol inhaler  Albuterol neb solution  Aspirin, not prescribed, intentional  Cranberry extract  Ferrous sulfate  Gabapentin  Apresoline  Omeprazole  Zocor  Stool softener  Aldactone  Demadex  Effexor-XR  Vitamin D    Past Medical History:    Suspected Covid-19 Virus Infection - 4/13/2020  MI  Neuropathy of both feet  Anemia  Atrial fibrillation  B12 deficiency  Cardiomyopathy  Chronic systolic CHF  Chronic edema  Chronic foot ulcer  CKD, stage 3  CVA  COPD  Depression  Fracture of humerus, proximal, right, closed  GERD  GI bleed  Hypertension  Hyperlipidemia  Hx of hiatal hernia  MSSA  Pulmonary hypertension  Pneumonia   Respiratory failure    Rheumatic mitral regurgitation  Sepsis  Shingles  Xerosis of skin  Cellulitis of foot    Past Surgical History:    Colonoscopy  Enteroscopy small bowel  EGD, combined  Excise mass foot    Family History:    Know genetic syndrome - father  Know genetic syndrome - mother  Pancreatic cancer - daughter  Cancer - brother  Lung cancer - sister    Social History:  Negative for tobacco use - former smoker, quit 1956.  Negative for alcohol use.  Negative for drug use.  Marital Status:    "[5]     Review of Systems  Neg fever  Neg cough  Pos SOB- chronic and unchanged  Neg CP  Pos LE edema- chronic and  Unchanged  Neg bleeding.  No urinary symptoms  A full 10 point ROS was completed.  Pertinent positives are noted in the HPI.  All other systems reviewed and negative.      Physical Exam     Patient Vitals for the past 24 hrs:   BP Temp Temp src Pulse Resp SpO2 Height Weight   05/15/20 2045 -- -- -- -- -- -- -- 66.7 kg (147 lb 0.8 oz)   05/15/20 2031 (!) 179/48 98.4  F (36.9  C) -- 67 -- 98 % 1.6 m (5' 3\") --   05/15/20 2005 (!) 154/56 98.3  F (36.8  C) -- 60 (!) 92 -- -- --   05/15/20 1800 (!) 150/54 -- -- 56 -- (!) 89 % -- --   05/15/20 1730 (!) 155/57 -- -- 62 -- 92 % -- --   05/15/20 1728 -- -- -- -- -- 93 % -- --   05/15/20 1725 -- -- -- -- -- 93 % -- --   05/15/20 1715 (!) 170/61 -- -- 68 -- 92 % -- --   05/15/20 1542 -- -- -- -- -- -- -- 66.4 kg (146 lb 6.2 oz)   05/15/20 1537 (!) 158/56 98.4  F (36.9  C) Oral 67 18 93 % 1.6 m (5' 3\") --     Physical Exam  *  Gen: alert  HEENT: PERRL, oropharynx clear  Neck: normal ROM  CV: RRR, no murmurs  Pulm: breath sounds equal, lungs clear  Abd: Soft, nontender  Back: no evidence of injury, no cva tenderness  MSK: BLE edema, wraps in place  Skin: no rash  Neuro: alert, appropriate conversation and interaction    Emergency Department Course     ECG (19:10:44):  Rate 63 bpm. CA interval *. QRS duration 94. QT/QTc 456/466. P-R-T axes * 51 61. Atrial fibrillation. Nonspecific ST and T wave abnormality. Abnormal ECG. When compared with ECG of 13-APR-2020 16:31, T wave inversion no longer evident in Inferior leads. Interpreted at 1915 by Olivia Chao MD.    Imaging:  Radiology findings were communicated with the patient who voiced understanding of the findings.    XR  Chest Port 1 View:   Cardiomegaly with mild pulmonary vascular congestion. No definite pulmonary edema. No focal infiltrate, as per radiology.     Laboratory:  Laboratory findings " were communicated with the patient who voiced understanding of the findings.    ABO/Rh type and screen: ABO O, RH(D) Pos, Antibody Screen Pos  Blood component: Unit Number [id number], Red Blood Cells LeukoReduced (Part 2)   CBC: HGB 6.5 L o/w WNL (WBC 5.9, )  BMP: Glucose 120 H, BUN 38 H, Creatinine 1.09, GFR Estimate 45 L, Calcium 8.4 L o/w WNL    Emergency Department Course:  Past medical records, nursing notes, and vitals reviewed.    1715: I performed an exam of the patient as documented above.     EKG obtained in the ED, see results above.     IV was inserted and blood was drawn for laboratory testing, results above.    The patient received a portable chest x-ray while in the emergency department, results above.     I rechecked the patient and discussed the results of her workup thus far.     I personally reviewed the laboratory, EKG, and imaging results with the Patient and answered all related questions prior to admission.     Findings and plan explained to the Patient who consents to admission.     1851: Discussed the patient Juana EVANS for Dr. Lennon, who will admit the patient to an observation bed for further monitoring, evaluation, and treatment.     Impression & Plan     Medical Decision Making:  Keisha Godinez is a 88 year old female who presents for generalized weakness.  SHe states that this is how she feels when her blood levels are low.  Acute on chronic anemia today.  No overt bleeding symptoms.  1 Unit RBCs ordered.  DIscussed with hospitalist and admitted for transfusion.  Risks and benefits of transfusion discussed and consent signed.      Diagnosis:    ICD-10-CM    1. Acute on chronic anemia  D64.9 ABO/Rh type and screen     Blood component     Blood component     Disposition:  Admitted to obs.    Scribe Disclosure:  I, Luz Gomez, am serving as a scribe at 5:09 PM on 5/15/2020 to document services personally performed by Olivia Chao MD based on my  observations and the provider's statements to me.     Luz Gomez  5/15/2020   [facility] EMERGENCY DEPARTMENT       Olivia Chao MD  05/15/20 1899

## 2020-05-16 NOTE — PLAN OF CARE
"PRIMARY DIAGNOSIS: Anemia, weakness     OUTPATIENT/OBSERVATION GOALS TO BE MET BEFORE DISCHARGE  Orthostatic performed: No     Stable Hgb Yes.         Recent Labs   Lab Test 05/16/20  0532 05/15/20  1631 05/14/20  1145   HGB 7.3* 6.5* 6.6*            Appropriate testing complete: Yes     Cleared for discharge by consultants (if involved): No     Safe discharge environment identified: Yes     Discharge Planner Nurse   Safe discharge environment identified: Yes  Barriers to discharge: Yes       Entered by: Marco Florez 05/16/2020    Pt alert and oriented x4. She denies pain. Up assist x1 w/ walker and GB. Received 1 unit RBC today. VSS. States she is feeling at baseline. On 1.5 L O2. SL. Low fat, low na diet. Will discharge today.     BP (!) 145/40 (BP Location: Right arm)   Pulse 58   Temp 99.1  F (37.3  C) (Oral)   Resp 24   Ht 1.6 m (5' 3\")   Wt 66.7 kg (147 lb 0.8 oz)   SpO2 98%   BMI 26.05 kg/m         Please review provider order for any additional goals.   Nurse to notify provider when observation goals have been met and patient is ready for discharge.  "

## 2020-05-16 NOTE — PLAN OF CARE
ROOM # 229    Living Situation (if not independent, order SW consult): With Daughter  Facility name:  : Daughter    Activity level at baseline: Ind with WW  Activity level on admit: SBA with WW       Patient registered to observation; given Patient Bill of Rights; given the opportunity to ask questions about observation status and their plan of care.  Patient has been oriented to the observation room, bathroom and call light is in place.    Discussed discharge goals and expectations with patient/family.

## 2020-05-16 NOTE — H&P
[facility]    History and Physical - Hospitalist Service       Date of Admission:  5/15/2020     Assessment & Plan   Keisha Godinez is a 88 year old female with a history of COPD on 1.5 L oxygen continuous, diastolic CHF, cor pulmonale, HTN, HLD, GERD, paroxysmal A. fib not on anticoagulation due to anemia, CKD stage III, and persistent anemia requiring intermittent transfusion of unclear etiology but perhaps related to previous small bowel AVMs who is admitted to the hospitalist service for symptomatic anemia.    Details as outlined below.  Plan to give patient 1 unit of blood with Lasix during the transfusion to minimize volume overload.  Recheck hemoglobin in the morning, if stable and patient doing okay from breathing standpoint, could discharge to home with outpatient anemia follow up.     Fatigue secondary to acute on chronic normocytic anemia  - Hb trend from 8.1 on 4/20, 7.1 on 4/28, 6.6 on 5/14 and 6.5 on admission.  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  - Iron studies done on 4/28 indicate that her stores are replete. She remains on ferrous sulfate twice daily.  - No recent bleeding issues. Patient has been noted have non-immune hemolysis in the past, check retics, LDH, haptoglobin, smear. Has seen hematology/oncology in the past as well.   - Discussed with patient's daughter. Endoscopy in the outpatient setting has been discussed with her PCP. Note that she had small bowel enteroscopy in 2016 which showed the AVMs. Daughter also mentioned that they were thinking about doubling her prilosec dose to see if that might help.   - Patient usually gets two units of blood. Will just give one tonight as she appears a little short of breath. Recheck CBC in the morning and then can consider giving another unit.     Acute on chronic HFpEF  - [facility]. At home she is on torsemide 40 AM and 20 at 2:00 PM daily.  - She has evidence  of mild acute decompensation with weight above dry weight (she is 147 pounds on admission and dry weight is 142-145) and pulmonary edema  - Give 40 mg IV lasix during transfusion  - Continue other cardiac medications and resume home torsemide dose tomorrow.    Chronic hypoxic respiratory failure   - PTA on 1.5 lpm continuously. Respiratory failure attributed to COPD  - Continue home O2 settings and inhalers    Other medical conditions:  Neuropathy: Continue gabapentin  HTN: Continue hydralazine 75 mg TID and spironolactone  CKD III: Creatinine is at baseline.  HLD: continue simvastatin.  GERD: Continue prilosec.   Depression: continue effexor.  Permanent atrial fibrillation: Appears to be auto rate controlled. Not on AC due to hx of GIB.    Diet: Cardiac  DVT Prophylaxis: Low Risk/Ambulatory with no VTE prophylaxis indicated  Maguire Catheter: No  Code Status: Full code    Disposition Plan   Expected discharge:   Tomorrow to home if Hb and breathing are OK.     Kenneth Lennon MD  [facility]    ______________________________________________________________________    Chief Complaint   Fatigue, SOB    History of Present Illness   Keisha Godinez is a 88 year old female with a history of COPD on 1.5 L oxygen continuous, diastolic CHF, cor pulmonale, HTN, HLD, GERD, paroxysmal A. fib not on anticoagulation due to anemia, CKD stage III, and persistent anemia requiring intermittent transfusion of unclear etiology but perhaps related to previous small bowel AVMs who presented to the ED today complaining of fatigue.    As noted in multiple previous hospital stays, patient has chronic anemia of uncertain etiology which frequently requires transfusion. She was most recently admitted here from 4/13 - 15 for the same, Hb was 6.1 on admission. She received a total of 2 units PRBCs and discharged to home. Hb was 8.2 on discharge. Patient reports she has been getting progressively more fatigued lately. She has  "also been having more shortness of breath. Home care RN desiree labs and found that her hemoglobin was found to be 6.6.  She did not want to go to the ED and they tried to arrange an outpatient blood transfusion but that did not work out and she ended up coming to the ED anyway.  In the ED, her vital signs were stable.  Hemoglobin was 6.5.  Other labs unremarkable.  1 unit of blood was ordered.  Chest x-ray showed mild pulmonary vascular congestion.    On interview, the patient states she is feeling a little bit better.  She is seen as a blood transfusion is in the midst of infusing.  She says she has not had any bleeding issues that she knows of lately.  Her stool is always black because she takes iron pills.  Otherwise no significant change in her health status.  She lives at home with her daughter.  She watches her weight closely and is followed by [facility].  Denies missing any doses of her medications.  No fever/chills/chest pain.    Review of Systems    The 10 point Review of Systems is negative other than noted in the HPI or here.     Physical Exam   BP (!) 150/54   Pulse 56   Temp 98.4  F (36.9  C) (Oral)   Resp 18   Ht 1.6 m (5' 3\")   Wt 66.4 kg (146 lb 6.2 oz)   SpO2 (!) 89%   BMI 25.93 kg/m         General: Not in acute distress.    HEENT: No scleral icterus. Oropharynx moist.     Neck: Supple.  JVD to the right ear.    Pulmonary: Normal work of breathing.  Mild bibasilar crackles.    Cardiovascular: Regular rate and rhythm without murmur or extra heart sounds.    Abdomen: Soft and non-tender.    Extremities: 2+ bilateral lower extremity edema to the knee, chronic per patient.  Legs are wrapped.    Neurologic: Awake, alert, appropriate.    Skin: Warm and dry.    Psychiatric: Normal affect and mood.     Data   Data reviewed today: I have reviewed all labs and imaging results.    Recent Labs   Lab 05/15/20  1631 05/14/20  1145   WBC 5.9 5.9   HGB 6.5* 6.6*   MCV 95 99    214     --  "   POTASSIUM 4.2  --    CHLORIDE 101  --    CO2 30  --    BUN 38*  --    CR 1.09*  --    ANIONGAP 5  --    CASPER 8.4*  --    *  --      Recent Results (from the past 24 hour(s))   XR Chest Port 1 View    Narrative    EXAM: XR CHEST PORT 1 VW  LOCATION: [facility]  DATE/TIME: 5/15/2020 6:56 PM    INDICATION: Shortness of breath and fatigue  COMPARISON: 04/13/2020      Impression    IMPRESSION: Cardiomegaly with mild pulmonary vascular congestion. No definite pulmonary edema. No focal infiltrate.       Past Medical History    I have reviewed this patient's medical history and updated it with pertinent information if needed.   Past Medical History:   Diagnosis Date     Anemia      Atrial fibrillation (H)      B12 deficiency      Cardiomyopathy (H)      CHF (congestive heart failure) (H)      Chronic edema     Lower extremities     Chronic systolic congestive heart failure (H)      CVA (cerebral vascular accident) (H) 2010    Acute Left MCA hemispheric CVA ( on coumadin)     Depression      Gastro-oesophageal reflux disease      GI bleed 03-20-15     Hyperlipidemia      Hypertension      Iron deficiency      MSSA (methicillin susceptible Staphylococcus aureus) 03-10-15     Pulmonary hypertension (H)      Shingles         Past Surgical History    I have reviewed this patient's surgical history and updated it with pertinent information if needed.  Past Surgical History:   Procedure Laterality Date     COLONOSCOPY  03/24/2015    Diverticulosis, polyps     ENTEROSCOPY SMALL BOWEL N/A 2/29/2016    Procedure: ENTEROSCOPY SMALL BOWEL;  Surgeon: Ady Ponce MD;  Location:  GI     ESOPHAGOSCOPY, GASTROSCOPY, DUODENOSCOPY (EGD), COMBINED N/A 3/22/2015    Upper GI- Normal, nothing significant found.      EXCISE MASS FOOT Right 7/6/2016    Procedure: EXCISE MASS FOOT;  Surgeon: Lee Jang DPM;  Location:  OR        Social History    I have reviewed this patient's social history and  updated it with pertinent information if needed.  Social History     Tobacco Use     Smoking status: Former Smoker     Years: 1.00     Types: Cigarettes     Start date:      Last attempt to quit: 1956     Years since quittin.1     Smokeless tobacco: Never Used   Substance Use Topics     Alcohol use: No     Alcohol/week: 0.0 standard drinks     Drug use: No          Family History    I have reviewed this patient's family history and updated it with pertinent information if needed.   Family History   Problem Relation Age of Onset     Known Genetic Syndrome Father      Known Genetic Syndrome Mother      Other Cancer Daughter         pancreatic     Other Cancer Brother         type     Other Cancer Sister 60        lung     Diabetes No family hx of      Breast Cancer No family hx of      Cancer - colorectal No family hx of      Ovarian Cancer No family hx of      Prostate Cancer No family hx of           Prior to Admission Medications    Prior to Admission Medications   Prescriptions Last Dose Informant Patient Reported? Taking?   ASPIRIN NOT PRESCRIBED (INTENTIONAL)   No No   Sig: Please choose reason not prescribed, below   Cranberry Extract 250 MG TABS   Yes No   Sig: Take 250 mg by mouth daily   OMEGA-3 FATTY ACIDS PO  Daughter Yes No   Sig: Take 1,200 mg by mouth daily    SM STOOL SOFTENER 8.6-50 MG tablet   No No   Sig: Take 2 tablets daily as needed for constipation while taking prescription pain medications.   VITAMIN D, CHOLECALCIFEROL, PO  Self Yes No   Sig: Take 1,000 Units by mouth daily    acetaminophen (TYLENOL) 325 MG tablet   Yes No   Sig: Take 650 mg by mouth 3 times daily as needed for mild pain   albuterol (PROAIR HFA/PROVENTIL HFA/VENTOLIN HFA) 108 (90 Base) MCG/ACT inhaler   No No   Sig: Inhale 2 puffs into the lungs every 6 hours   albuterol (PROVENTIL) (2.5 MG/3ML) 0.083% neb solution   No No   Sig: Take 1 vial (2.5 mg) by nebulization 4 times daily   ferrous sulfate (FEROSUL) 325  (65 Fe) MG tablet   No No   Sig: Take 1 tablet (325 mg) by mouth 2 times daily   gabapentin (NEURONTIN) 100 MG capsule   Yes No   Sig: Take 100 mg by mouth 2 times daily AM and PM   hydrALAZINE (APRESOLINE) 25 MG tablet   No No   Sig: TAKE 3 TABLETS BY MOUTH THREE TIMES DAILY   omeprazole (PRILOSEC) 20 MG DR capsule   No No   Sig: Take 1 capsule (20 mg) by mouth daily   simvastatin (ZOCOR) 10 MG tablet   No No   Sig: TAKE 1 TABLET (10MG) BY MOUTH AT BEDTIME   spironolactone (ALDACTONE) 50 MG tablet   No No   Sig: TAKE 1 TABLET BY MOUTH DAILY   torsemide (DEMADEX) 20 MG tablet   Yes No   Sig: Take 60 mg by mouth daily Takes 40 mg in morning and 20 mg in afternoon   venlafaxine (EFFEXOR-XR) 75 MG 24 hr capsule   No No   Sig: Take 1 capsule (75 mg) by mouth daily      Facility-Administered Medications: None        Allergies    Allergies   Allergen Reactions     Blood Transfusion Related (Informational Only) Other (See Comments)     Patient has a history of a clinically significant antibody against RBC antigens.  A delay in compatible RBCs may occur.     Lisinopril Other (See Comments)     Tongue swelling     Calcium Nausea and Vomiting     Egg Yolk      Flu Virus Vaccine      Allergic to eggs.     Keflex [Cephalexin] Nausea     Pt states she had upset stomach.  NOT tongue swelling.  She had tongue swelling with a combo bp med that she thinks included lisinopril.    Tolerates IV Cefazolin     Levaquin [Levofloxacin]      Sulfa Drugs      Zinc Nausea and Vomiting

## 2020-05-16 NOTE — CONSULTS
Care Transition Initial Assessment - RN    Met with: Patient.  DATA   Active Problems:    Anemia     Declined CHF Action Plan as was admitted primarily for anemia.   Cognitive Status: awake, alert and oriented.  [facility] Care Clinic [facility]: [facility]  Primary Care MD Name: Dr. Gerri Eubanks  Contact information and PCP information verified: Yes  Lives With: child(emre), adult      Description of Support System: Supportive, Involved   Who is your support system?: Children       Insurance concerns: No Insurance issues identified  ASSESSMENT  Patient currently receives the following services:  RN services once weekly through [facility].  Home oxygen through  DME.        Identified issues/concerns regarding health management: Patient admitted for anemia. Required blood transfusion X 2. Slight exacerbation of CHF. She was admitted to [facility] form 4/13/20 to 4/15/20 for anemia. She then required 2 units of PRBCs followed by IV Lasix. She lives with her daughter, Rebecca. She has good support at home and [facility] RN sees her once weekly. She also receives MOW. She follows with [facility] and calls them with her daily weight. She has home oxygen through  DME.     PLAN  Financial costs for the patient were not discussed.  Patient given options and choices for discharge.  Patient/family is agreeable to the plan?  Yes  Patient anticipates discharging to home with services.     Other Resources: Other (see comment)(Declined copy of CHF Action Plan. Admitted for anemia)  Patient anticipates needs for home equipment: No, she has a 4WW, life chair, life line, walk-in shower.   Transportation/person available to transport on day of discharge is her daughter, Rebecca. She will be notified when patient is ready for discharge.   Plan/Disposition: Home   Appointments: Telephonic appointment with Dr. Gerri Eubanks on Monday, 6/1/20 at 1:20pm.     CM will continue to follow patient until discharge for any additional needs.     Ashely Vega RN,  BSN, BENOIT, CM  Inpatient Care Coordination  [facility]  [phone]

## 2020-05-16 NOTE — DISCHARGE INSTRUCTIONS
A hospital follow up appointment has been scheduled for you with Dr. Luli Blackman at [facility] on Wednesday, 5/20/20 at 9:40am. You have a lab appointment prior to this at 9:15am on Wednesday, 5/20/20.  Please call the clinic at [phone] if you have questions or need to reschedule.

## 2020-05-16 NOTE — PLAN OF CARE
"PRIMARY DIAGNOSIS: Anemia, Weakness   OUTPATIENT/OBSERVATION GOALS TO BE MET BEFORE DISCHARGE:  Dyspnea improved and O2 sats >88% at RA or at prior home O2 therapy level: Yes        SpO2: 98 %, O2 Device: Nasal cannula  Vitals:    05/15/20 1542 05/15/20 2045   Weight: 66.4 kg (146 lb 6.2 oz) 66.7 kg (147 lb 0.8 oz)        ECHO and other diagnostic testing complete (if applicable): NA    Return to near baseline physical activity: Yes    Discharge Planner Nurse   Safe discharge environment identified: Yes  Barriers to discharge: Yes       Entered by: Colin Busby 05/15/2020 10:28 PM     Please review provider order for any additional goals.   Nurse to notify provider when observation goals have been met and patient is ready for discharge.    Arrived to floor at 2031 with 1 unit of PRBC's running at 100 ml/hr. Tolerating well. Alert and oriented x4. LS diminished with inspiratory and expiratory wheezing, on 2L NC, on 1.5 L NC at baseline. Leg wraps on, 2+ edema in BLE. Denies pain. Productive cough with clear secretions. [facility]ed by [facility].     BP (!) 158/38 (BP Location: Right arm)   Pulse 67   Temp 98.4  F (36.9  C)   Resp (!) 92   Ht 1.6 m (5' 3\")   Wt 66.7 kg (147 lb 0.8 oz)   SpO2 98%   BMI 26.05 kg/m      "

## 2020-05-16 NOTE — PLAN OF CARE
PRIMARY DIAGNOSIS: Anemia, weakness    OUTPATIENT/OBSERVATION GOALS TO BE MET BEFORE DISCHARGE  Orthostatic performed: No    Stable Hgb Yes.   Recent Labs   Lab Test 05/16/20  0532 05/15/20  1631 05/14/20  1145   HGB 7.3* 6.5* 6.6*         Appropriate testing complete: Yes    Cleared for discharge by consultants (if involved): No    Safe discharge environment identified: Yes    Discharge Planner Nurse   Safe discharge environment identified: Yes  Barriers to discharge: Yes       Entered by: Marco Florez 05/16/2020        Please review provider order for any additional goals.   Nurse to notify provider when observation goals have been met and patient is ready for discharge.

## 2020-05-16 NOTE — PHARMACY-ADMISSION MEDICATION HISTORY
Admission medication history interview status for this patient is complete. See [facility] admission navigator for allergy information, prior to admission medications and immunization status.     Medication history interview done via telephone during Covid-19 pandemic, indicate source(s): Patient and Rebecca Abernathy  Medication history resources (including written lists, pill bottles, clinic record):DC summary from [facility], 4/15/20  Primary pharmacy:[address]    Changes made to PTA medication list:  Added: none  Deleted: none  Changed: torsemide (split into 2 Rx entries), hydralazine [updated to dosing per pill bottle which is current dosing per daughter; 75 mg (three tablets of 25 mg) TID]    Actions taken by pharmacist (provider contacted, etc):None   Additional medication history information:None  Medication reconciliation/reorder completed by provider prior to medication history?  no      Prior to Admission medications    Medication Sig Last Dose Taking? Auth Provider   acetaminophen (TYLENOL) 325 MG tablet Take 650 mg by mouth 3 times daily as needed for mild pain Past Month at Unknown time Yes Unknown, Entered By History   albuterol (PROAIR HFA/PROVENTIL HFA/VENTOLIN HFA) 108 (90 Base) MCG/ACT inhaler Inhale 2 puffs into the lungs every 6 hours Past Month at Unknown time Yes Gerri Eubanks MD   albuterol (PROVENTIL) (2.5 MG/3ML) 0.083% neb solution Take 1 vial (2.5 mg) by nebulization 4 times daily Past Month at Unknown time Yes Gerri Eubanks MD   Cranberry Extract 250 MG TABS Take 250 mg by mouth daily 5/15/2020 at am Yes Doctor, Sharonda, MD   ferrous sulfate (FEROSUL) 325 (65 Fe) MG tablet Take 1 tablet (325 mg) by mouth 2 times daily 5/15/2020 at x 1 Yes Darryl Reed MD   gabapentin (NEURONTIN) 100 MG capsule Take 100 mg by mouth 2 times daily AM and PM 5/15/2020 at x 1 Yes Unknown, Entered By History   hydrALAZINE (APRESOLINE) 25 MG  tablet Take 75 mg by mouth 3 times daily 5/15/2020 at x 2 Yes Unknown, Entered By History   OMEGA-3 FATTY ACIDS PO Take 1,200 mg by mouth daily  5/15/2020 at am Yes Reported, Patient   omeprazole (PRILOSEC) 20 MG DR capsule Take 1 capsule (20 mg) by mouth daily 5/15/2020 at am Yes Gerri Eubanks MD   simvastatin (ZOCOR) 10 MG tablet TAKE 1 TABLET (10MG) BY MOUTH AT BEDTIME 5/14/2020 at hs Yes Gerri Eubanks MD   SM STOOL SOFTENER 8.6-50 MG tablet Take 2 tablets daily as needed for constipation while taking prescription pain medications. Past Month at Unknown time Yes Darryl Reed MD   spironolactone (ALDACTONE) 50 MG tablet TAKE 1 TABLET BY MOUTH DAILY 5/15/2020 at am Yes Gerri Eubanks MD   torsemide (DEMADEX) 20 MG tablet Take 40 mg by mouth every morning 5/15/2020 at am Yes Unknown, Entered By History   torsemide (DEMADEX) 20 MG tablet Take 20 mg by mouth daily at 2 pm 5/15/2020 at 1400 Yes Unknown, Entered By History   venlafaxine (EFFEXOR-XR) 75 MG 24 hr capsule Take 1 capsule (75 mg) by mouth daily 5/15/2020 at am Yes Gerri Eubanks MD   VITAMIN D, CHOLECALCIFEROL, PO Take 1,000 Units by mouth daily  5/15/2020 at am Yes Reported, Patient   ASPIRIN NOT PRESCRIBED (INTENTIONAL) Please choose reason not prescribed, below   Gerri Eubanks MD

## 2020-05-16 NOTE — PLAN OF CARE
Patient's After Visit Summary was reviewed with patient.  Patient verbalized understanding of After Visit Summary, recommended follow up and was given an opportunity to ask questions.   Discharge medications sent home with patient/family: Not applicable   Discharged with daughter.    AVS reviewed w/ pt. Leaving via private transport.

## 2020-05-16 NOTE — PLAN OF CARE
"PRIMARY DIAGNOSIS: Anemia, Weakness   OUTPATIENT/OBSERVATION GOALS TO BE MET BEFORE DISCHARGE:  1. Dyspnea improved and O2 sats >88% at RA or at prior home O2 therapy level: Yes        SpO2: 98 %, O2 Device: Nasal cannula  Vitals:    05/15/20 1542 05/15/20 2045   Weight: 66.4 kg (146 lb 6.2 oz) 66.7 kg (147 lb 0.8 oz)        2. ECHO and other diagnostic testing complete (if applicable): NA    3. Return to near baseline physical activity: Yes    Discharge Planner Nurse   Safe discharge environment identified: Yes  Barriers to discharge: Yes       Entered by: Colin Busby 05/16/2020      Please review provider order for any additional goals.   Nurse to notify provider when observation goals have been met and patient is ready for discharge.      BP (!) 140/41 (BP Location: Right arm)   Pulse (!) 49   Temp 97.2  F (36.2  C) (Axillary)   Resp 24   Ht 1.6 m (5' 3\")   Wt 66.7 kg (147 lb 0.8 oz)   SpO2 100%   BMI 26.05 kg/m        Denies pain. On 1.5 L NC at baseline. Alert and oriented. Purewick in place, good UOP, incontinent at baseline and now on diuretics.         "

## 2020-05-16 NOTE — DISCHARGE SUMMARY
[facility]  Discharge Summary  Hospitalist    Date of Admission:  5/15/2020  Date of Discharge:  5/16/2020  Discharging Provider: Denise Roberson    Discharge Diagnoses   1. 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 COPD on O2 at night  3. Pulmonary hypertension  4. CVA 2010  5. Hypertension  6. Paroxysmal atrial fibrillation with CHADS2-Vasc of 7.  Is not on anticoagulation due to blood loss anemia.   7. Cor Pulmonale  8. Cardiomyopathy    History of Present Illness   eKisha Godinez is a 88 year old female with now transfusion dependent anemia, COPD, PAF, pulmonary HTN, history of CVA 2010, DLD, GI bleed 2015, small bowel AVMs, and cor pulmonale who presented to [facility] ED on 5/15/2020 with symptomatic weakness and found to have a hemoglobin 6.5.  Had been noted to have a low hemoglobin the day prior (6.6) and unable to secure outpatient transfusion.  Has multiple antibodies so unable to transfuse in the ED.  Admitted to Observation.  Received 1u packed cells in ED with Hgb up to 7.3.  Peripheral smear ordered, pending.  Breathing stable.  Received another dose of IV Lasix and a 2nd unit packed cells.  Daughter Rebecca is patient's care giver and states they already have a good plan in place so she does not necessarily need a referral to [facility] for ongoing hematologic cares; she will talk to Dr Eubanks about this.  Spoke to patient and daughter about transfusion-dependent anemia and how should the patient become tired of frequent appointments, she would be a candidate for hospice - where we would manage patient's symptoms with medications and oxygen rather than blood transfusions.  Daughter voices understanding but states the patient is not at this point yet.  Patient will need repeat BMP and CBC next week and outpatient PMD follow-up.  I also asked daughter to monitor weights closely as patient may need to see [facility] if her weights are  climbing or her heart failure appears to be worsening.  Daughter voiced understanding.      NATHAN Cline    Significant Results and Procedures   Hgb 6.5 >> 7.3, a 2nd unit PRBC infused but will not require patient to stay for follow-up hemoglobin.     Pending Results      Unresulted Labs Ordered in the Past 30 Days of this Admission     Date and Time Order Name Status Description    5/15/2020 2108 Blood Morphology Pathologist Review In process     5/15/2020 1631 Haptoglobin In process           Code Status   Full Code       Primary Care Physician   Kylie Dawson    Physical Exam   Temp: 98.5  F (36.9  C) Temp src: Oral BP: (!) 153/34 Pulse: 54   Resp: 20 SpO2: 98 % O2 Device: Nasal cannula Oxygen Delivery: 1.5 LPM  Vitals:    05/15/20 1542 05/15/20 2045   Weight: 66.4 kg (146 lb 6.2 oz) 66.7 kg (147 lb 0.8 oz)     Vital Signs with Ranges  Temp:  [97.1  F (36.2  C)-99  F (37.2  C)] 98.5  F (36.9  C)  Pulse:  [49-68] 54  Resp:  [18-92] 20  BP: (135-179)/(27-61) 153/34  SpO2:  [89 %-100 %] 98 %  I/O last 3 completed shifts:  In: 0   Out: 1050 [Urine:1050]    GENERAL:  Pleasant, cooperative, alert.  Frail, cachectic, chronically ill appearing female  HEENT: Normocephalic, atraumatic.  Extra occular mm intact.  Sclera clear. PERRL.    PULMONOLOGY:  Somewhat coarse with minimal exchange at bases; paradoxical belly breathing.  Appears mildly labored but not in danger of tiring out.  CARDIAC: Regular rate and rhythm.    MUSCULOSKELETAL:  Moving x 4 spontaneously with CMS intact x4.  2+ pedal edema, 1+ BLE edema.   NEURO: Alert and oriented x3.  CN II-XII grossly intact and symmetric.  No obvious focal deficits.        Discharge Disposition   Discharged to home with daughter  Condition at discharge: Stable    Consultations This Hospital Stay   SOCIAL WORK IP CONSULT    Time Spent on this Encounter   I, NATHAN Cline, personally saw the patient today and spent greater than 30 minutes discharging this  patient.    Discharge Orders      Reason for your hospital stay    Brought in for symptomatic anemia.  You received 2 units of blood and your breathing was stable.  You will go home in the support of Rebecca.  Please follow-up with Dr Eubanks.  Consider following up with Hematology (the blood doctor) at [facility], [phone].     Follow-up and recommended labs and tests     Follow-up with Dr Eubanks next week with repeat labs (BMP, CBC).     Activity    Resume your normal activity as tolerated.     Monitor and record    weight every day. Please contact the [facility] if you have gained 2 pounds in a day or 5 pounds in a week.     When to contact your care team    Call your primary doctor if you have any of the following: temperature greater than 101, worsening shortness of breath, increased swelling, worsening pain, new or unrelenting diarrhea, sweats, chills, loss of taste, loss of smell, or any other new or concerning symptoms.  Call 911 or go to the Emergency Room if you need immediate assistance.     Diet    Resume your normal diet as tolerated.     Discharge Medications   Current Discharge Medication List      CONTINUE these medications which have NOT CHANGED    Details   acetaminophen (TYLENOL) 325 MG tablet Take 650 mg by mouth 3 times daily as needed for mild pain      albuterol (PROAIR HFA/PROVENTIL HFA/VENTOLIN HFA) 108 (90 Base) MCG/ACT inhaler Inhale 2 puffs into the lungs every 6 hours  Qty: 1 Inhaler, Refills: 4    Comments: Pharmacy may dispense brand covered by insurance (Proair, or proventil or ventolin or generic albuterol inhaler)  Associated Diagnoses: Pulmonary hypertension (H)      albuterol (PROVENTIL) (2.5 MG/3ML) 0.083% neb solution Take 1 vial (2.5 mg) by nebulization 4 times daily  Qty: 120 vial, Refills: 11    Associated Diagnoses: Dyspnea, unspecified type      Cranberry Extract 250 MG TABS Take 250 mg by mouth daily      ferrous sulfate (FEROSUL) 325 (65 Fe) MG tablet Take 1 tablet  (325 mg) by mouth 2 times daily  Qty: 100 tablet, Refills: 11    Associated Diagnoses: Iron deficiency      gabapentin (NEURONTIN) 100 MG capsule Take 100 mg by mouth 2 times daily AM and PM      hydrALAZINE (APRESOLINE) 25 MG tablet Take 75 mg by mouth 3 times daily      OMEGA-3 FATTY ACIDS PO Take 1,200 mg by mouth daily       omeprazole (PRILOSEC) 20 MG DR capsule Take 1 capsule (20 mg) by mouth daily  Qty: 90 capsule, Refills: 3    Associated Diagnoses: Gastroesophageal reflux disease without esophagitis      simvastatin (ZOCOR) 10 MG tablet TAKE 1 TABLET (10MG) BY MOUTH AT BEDTIME  Qty: 90 tablet, Refills: 1    Associated Diagnoses: Hyperlipidemia, unspecified hyperlipidemia type      SM STOOL SOFTENER 8.6-50 MG tablet Take 2 tablets daily as needed for constipation while taking prescription pain medications.  Qty: 30 tablet, Refills: 0    Associated Diagnoses: S/P foot surgery, right      spironolactone (ALDACTONE) 50 MG tablet TAKE 1 TABLET BY MOUTH DAILY  Qty: 90 tablet, Refills: 3    Associated Diagnoses: Acute on chronic diastolic congestive heart failure (H)      !! torsemide (DEMADEX) 20 MG tablet Take 40 mg by mouth every morning      !! torsemide (DEMADEX) 20 MG tablet Take 20 mg by mouth daily at 2 pm      venlafaxine (EFFEXOR-XR) 75 MG 24 hr capsule Take 1 capsule (75 mg) by mouth daily  Qty: 90 capsule, Refills: 3    Associated Diagnoses: Major depressive disorder, recurrent episode, in partial remission (H)      VITAMIN D, CHOLECALCIFEROL, PO Take 1,000 Units by mouth daily       ASPIRIN NOT PRESCRIBED (INTENTIONAL) Please choose reason not prescribed, below    Associated Diagnoses: Chronic atrial fibrillation; Dilated cardiomyopathy (H)       !! - Potential duplicate medications found. Please discuss with provider.        Allergies   Allergies   Allergen Reactions     Blood Transfusion Related (Informational Only) Other (See Comments)     Patient has a history of a clinically significant antibody  against RBC antigens.  A delay in compatible RBCs may occur.     Lisinopril Other (See Comments)     Tongue swelling     Calcium Nausea and Vomiting     Egg Yolk      Flu Virus Vaccine      Allergic to eggs.     Keflex [Cephalexin] Nausea     Pt states she had upset stomach.  NOT tongue swelling.  She had tongue swelling with a combo bp med that she thinks included lisinopril.    Tolerates IV Cefazolin     Levaquin [Levofloxacin]      Sulfa Drugs      Zinc Nausea and Vomiting     Data   Most Recent 3 CBC's:  Recent Labs   Lab Test 05/16/20  0532 05/15/20  1631 05/14/20  1145   WBC 5.4 5.9 5.9   HGB 7.3* 6.5* 6.6*   MCV 92 95 99    216 214      Most Recent 3 BMP's:  Recent Labs   Lab Test 05/16/20  0532 05/15/20  1631 04/28/20  1050    136 136   POTASSIUM 3.8 4.2 4.0   CHLORIDE 102 101 102   CO2 31 30 30   BUN 36* 38* 46*   CR 0.97 1.09* 1.24*   ANIONGAP 4 5 4   CASPER 8.2* 8.4* 8.6   GLC 81 120* 99     Most Recent 2 LFT's:  Recent Labs   Lab Test 05/15/20  1631 10/07/19  0721   AST 26 30   ALT 20 30   ALKPHOS 68 76   BILITOTAL 0.3 1.3           Results for orders placed or performed during the hospital encounter of 05/15/20   XR Chest Port 1 View    Narrative    EXAM: XR CHEST PORT 1 VW  LOCATION: [facility]  DATE/TIME: 5/15/2020 6:56 PM    INDICATION: Shortness of breath and fatigue  COMPARISON: 04/13/2020      Impression    IMPRESSION: Cardiomegaly with mild pulmonary vascular congestion. No definite pulmonary edema. No focal infiltrate.

## 2020-05-16 NOTE — PLAN OF CARE
"PRIMARY DIAGNOSIS: Anemia, Weakness   OUTPATIENT/OBSERVATION GOALS TO BE MET BEFORE DISCHARGE:  1. Dyspnea improved and O2 sats >88% at RA or at prior home O2 therapy level: Yes        SpO2: 98 %, O2 Device: Nasal cannula  Vitals:    05/15/20 1542 05/15/20 2045   Weight: 66.4 kg (146 lb 6.2 oz) 66.7 kg (147 lb 0.8 oz)        2. ECHO and other diagnostic testing complete (if applicable): NA    3. Return to near baseline physical activity: Yes    Discharge Planner Nurse   Safe discharge environment identified: Yes  Barriers to discharge: Yes       Entered by: Colin Busby 05/16/2020      Please review provider order for any additional goals.   Nurse to notify provider when observation goals have been met and patient is ready for discharge.      BP (!) 150/46 (BP Location: Right arm)   Pulse 55   Temp 98  F (36.7  C) (Axillary)   Resp 24   Ht 1.6 m (5' 3\")   Wt 66.7 kg (147 lb 0.8 oz)   SpO2 98%   BMI 26.05 kg/m        Finished 1 unit of PRBC's. Vitals stable. 40 mg of IVP lasix given. Denies pain. SL.         "

## 2020-05-18 NOTE — TELEPHONE ENCOUNTER
"Spoke to pt. She is feeling \"much better\". More energy and not as fatigued.     Pt was at [facility] from 5/15-5/16 for anemia, Hgb of 6.5. She received 2 uPRBCs w/IV lasix in between. HGB 7.3 at discharge.   Per hospital discharge \"  Daughter Rebecca is patient's care giver and states they already have a good plan in place so she does not necessarily need a referral to [facility] for ongoing hematologic cares; she will talk to Dr Eubanks about this.  Spoke to patient and daughter about transfusion-dependent anemia and how should the patient become tired of frequent appointments, she would be a candidate for hospice - where we would manage patient's symptoms with medications and oxygen rather than blood transfusions.  Daughter voices understanding but states the patient is not at this point yet.\"    Will update Suad.   Pt has appt with Dr Blackman with HGB check 5/20.   Charity Garcia, RN 4:40 PM 05/18/20      "

## 2020-05-18 NOTE — PROGRESS NOTES
CTS Handoff received. Patient identified as high risk for readmission.  Patient meets criteria for care coordination outreach.    Laura Vega, [facility]  Clinic Care Coordinator  Ph. [phone]  [email or web address]

## 2020-05-18 NOTE — LETTER
May 18, 2020    Keisha Godinez  [address] [facility] [address]      Dear Keisha,    I am a clinic care coordinator who works with JODIE Schwarz CNP at [facility]. I wanted to thank you for spending the time to talk with me.  Below is a description of clinic care coordination and how I can further assist you.      The clinic care coordination team is made up of a registered nurse,  and community health worker who understand the health care system. The goal of clinic care coordination is to help you manage your health and improve access to the health care system in the most efficient manner. The team can assist you in meeting your health care goals by providing education, coordinating services, strengthening the communication among your providers and supporting you with any resource needs.    Please feel free to contact me at [phone] with any questions or concerns. We are focused on providing you with the highest-quality healthcare experience possible and that all starts with you.     Sincerely,     Adilene Danielson RN-BSN  Care Coordination  Phone:  [phone]  Email: [email or web address]  [facility], [place] [facility]

## 2020-05-18 NOTE — PROGRESS NOTES
Clinic Care Coordination Contact  Care Coordination Communication    Clinical Data: Patient was hospitalized at [facility] from 5/15 to 5/16 with diagnosis of Anemia. S/P 2 bags of RBC's then discharged home.     Patient lives with daughter, Rebecca who is also primary care giver.  ED provider explained to Rebecca that patient could be candidate for hospice - manage patient's symptoms with medications and oxygen rather than blood transfusions.  Daughter voices understanding but states the patient is not at this point yet.    COVID-19 GetWell Loop:  Testing Results: negative  Email on file OR Smartphone: Yes  Does the patient speak English: Yes  Is the patient pregnant: No  Candidate for GetWell Loop: Yes      Home Care Contact:              Home Care Agency: [facility] RN              Contact name () and phone number: GER Almeida  [phone]              Care Coordination contacted home care: Yes    Patient has good support at home and [facility] RN sees her once weekly. She also receives MOW. She follows with [facility] and calls them with her daily weight. She has home oxygen through [facility].                 Patient Contact:               Introduced self and role of care coordination.               Discharge instructions were reviewed with patient/caregiver.               Do you have any questions about your medications? no              Follow up appointment is scheduled for 5/20/20.              Home care has contacted patient: Yes              Patient questions/concerns: None.  Patient states she feels much better.  Denied any needs at home, daughter is very helpful and supportive.  Denies shortness of breath.    Plan: RN/SW Care Coordinator will await notification from home care staff informing RN/SW Care Coordinator of patients discharge plans/needs.    Adilene Danielson RN-BSN  Care Coordination  Phone:  [phone]  Email: [email or web address],  [place] [facility]

## 2020-05-20 NOTE — PROGRESS NOTES
Subjective     Keisha Godinez is a 88 year old female who presents to clinic today for the following health issues:    HPI     Hospital Follow-up Visit:    Hospital/Nursing Home/IP Rehab Facility: [facility]  Date of Admission: 5/15/20  Date of Discharge: 5/16/20  Reason(s) for Admission: anemia      She received 2 units of packed blood.  She had lasix in between.   She feels overall better.  Denies any fevers or chills.  No abdominal pain.  No visualized blood.   Darker than usual for her stool.    She is eating okay and going to the bathroom okay.    She will be contacting [facility].      CHF. Followed by the [facility].     The patient  Has  A fib but is not anticoagulated.     Was your hospitalization related to COVID-19? No   Problems taking medications regularly:  None  Medication changes since discharge: None  Problems adhering to non-medication therapy:  None    Summary of hospitalization:  [facility] discharge summary reviewed  Diagnostic Tests/Treatments reviewed.  Follow up needed: none  Other Healthcare Providers Involved in Patient s Care:         None  Update since discharge: improved.   Post Discharge Medication Reconciliation: discharge medications reconciled, continue medications without change.  Plan of care communicated with patient and family       PROBLEMS TO ADD ON...    Patient Active Problem List   Diagnosis     Iron deficiency anemia     Neuropathy of both feet     Myocardial infarction-type 2     GIB (gastrointestinal bleeding)     Wound of left leg     Cardiomyopathy (H)     Pulmonary hypertension (H)     Xerosis of skin: shins     Bilateral edema of lower extremity     Gastroesophageal reflux disease without esophagitis     Health Care Home     Chronic systolic congestive heart failure (H)     Major depressive disorder, recurrent episode, in partial remission (H)     Cellulitis of foot     Essential hypertension with goal blood pressure less than 140/90      Post-operative complication     Respiratory failure (H)     Acute respiratory failure with hypoxia (H)     C. difficile colitis     History of hiatal hernia     History of shingles     Chronic foot ulcer (H) (felt secondary to bony protuberance status post excision -)     Acute on chronic diastolic congestive heart failure (H)     Pneumonia     Sepsis (H)     Chronic atrial fibrillation     Anemia     Dysphagia     Midline thoracic back pain     Cardiomyopathy, unspecified type (H)     Fracture of humerus, proximal, right, closed     Rheumatic mitral regurgitation     Chronic kidney disease, stage 3 (moderate) (GFR 59 10/30/17, GFR 58 16)     Cellulitis of left lower extremity     Acute exacerbation of CHF (congestive heart failure) (H)     Chronic obstructive pulmonary disease, unspecified COPD type (H)     Symptomatic anemia     Acute on chronic diastolic CHF (congestive heart failure) (H)     Heart failure (H)     Suspected Covid-19 Virus Infection     Past Surgical History:   Procedure Laterality Date     COLONOSCOPY  2015    Diverticulosis, polyps     ENTEROSCOPY SMALL BOWEL N/A 2016    Procedure: ENTEROSCOPY SMALL BOWEL;  Surgeon: Ady Ponce MD;  Location:  GI     ESOPHAGOSCOPY, GASTROSCOPY, DUODENOSCOPY (EGD), COMBINED N/A 3/22/2015    Upper GI- Normal, nothing significant found.      EXCISE MASS FOOT Right 2016    Procedure: EXCISE MASS FOOT;  Surgeon: Lee Jang DPM;  Location:  OR       Social History     Tobacco Use     Smoking status: Former Smoker     Years: 1.00     Types: Cigarettes     Start date:      Last attempt to quit: 1956     Years since quittin.1     Smokeless tobacco: Never Used   Substance Use Topics     Alcohol use: No     Alcohol/week: 0.0 standard drinks     Family History   Problem Relation Age of Onset     Known Genetic Syndrome Father      Known Genetic Syndrome Mother      Other Cancer Daughter         pancreatic      "Other Cancer Brother         type     Other Cancer Sister 60        lung     Diabetes No family hx of      Breast Cancer No family hx of      Cancer - colorectal No family hx of      Ovarian Cancer No family hx of      Prostate Cancer No family hx of            PROBLEMS TO ADD ON...  Reviewed and updated as needed this visit by Provider         Review of Systems   CONSTITUTIONAL: NEGATIVE for fever, chills, change in weight  RESP: NEGATIVE for significant cough or SOB  CV: NEGATIVE for chest pain, palpitations or peripheral edema      Objective    There were no vitals taken for this visit.  There is no height or weight on file to calculate BMI.  Physical Exam   GENERAL: healthy, alert and no distress  NECK: no adenopathy, no asymmetry, masses, or scars and thyroid normal to palpation  RESP: lungs clear to auscultation - no rales, rhonchi or wheezes  CV: regular rate and rhythm, normal S1 S2, no S3 or S4, no murmur, click or rub, no peripheral edema and peripheral pulses strong  ABDOMEN: soft, nontender, no hepatosplenomegaly, no masses and bowel sounds normal  MS: no gross musculoskeletal defects noted, no edema    Diagnostic Test Results:  Labs reviewed in Epic        Assessment & Plan     (D64.9) Symptomatic anemia  (primary encounter diagnosis)  Comment:   Plan: await hemoglobin  Transfuse if under 7.0    (I42.9) Cardiomyopathy, unspecified type (H)  Comment: stable  Plan: continue to monitor weights    She had already had blood drawn with hemoglobin today.  Will hold on bmp since last labs were stable.     BMI:   Estimated body mass index is 25.33 kg/m  as calculated from the following:    Height as of this encounter: 1.6 m (5' 3\").    Weight as of this encounter: 64.9 kg (143 lb).           See Patient Instructions    No follow-ups on file.    Luil Blackman MD  [facility]        "

## 2020-05-20 NOTE — PATIENT INSTRUCTIONS
Call with appointment for hematology    Hemoglobin today, will consider next draw possibly next week

## 2020-05-20 NOTE — NURSING NOTE
/60   Pulse 56   Temp 98.4  F (36.9  C) (Oral)   Resp 12   Wt 64.9 kg (143 lb)   SpO2 96%   Breastfeeding No   BMI 25.33 kg/m

## 2020-05-26 NOTE — PROGRESS NOTES
Suad Murrell, Charity Moreland RN    Caller: Unspecified (Today, 11:16 AM)               Can change window to 138-147.   Thanks   Suad      Updated wt window.   Charity Garcia RN 1:07 PM 05/26/20

## 2020-05-26 NOTE — PROGRESS NOTES
[facility]  AU[place]INE    [employer] Tracker Heart Failure Alert      Daily Weight Goal: 145-152 lbs    Today's Weight: 141 lbs    Weight Alert: 4 lbs under weight parameters. Wt has been 141-144 lbs since 3/30    Symptom Alert: None      Current Diuretic: Spironolactone 50 mg daily, Torsemide 40 mg in morning & 20 mg in afternoon.      Current Potassium: None      No future appointments.    Notes:   Pt feeling well. Energy is good, no SOB, vomiting or diarrhea. Eating well.   Hgb was last checked 5/21 and was 8.2. Per Dr Blackman's note; pt is to be transfused if <7.0   Pt and daughter are to call for hematology appt. Pt states she does not know it daughter has made appt yet. She will also ask daughter about making cariology appt.     Update to Suad.      MyHealth Tracker Weight Trends:           MyHealth Tracker Weight Graph:          Charity Garcia RN 11:25 AM 05/26/20

## 2020-06-03 NOTE — LETTER
[address] [address] [address]  Phone: [phone]  Fax: [phone]       Keisha Godinez  [address] [facility] [address]      Cate 3, 2020       Dear Kin Rivera!!  Just a friendly reminder to please call into your daily My Health Tracker survey to report your daily weight and if you are experiencing any symptoms between 6:00 am and 12:00 pm (Noon) every day.      Calling your survey in prior to 12:00 pm will give the [facility] adequate time to contact a provider to review your symptoms and/or weight gain and have a diuretic plan.     - For general questions about your survey, please call the [facility] at [phone].  - Vacations: If you will be out of town and will miss your surveys, please notify the RN from your care team by calling [phone], and a hold will be placed until your return.      Thank you so much!        [facility]

## 2020-06-03 NOTE — PROGRESS NOTES
Mailed letter to pt to remind her to call into MHT before noon   Charity Garcia RN 11:27 AM 06/03/20

## 2020-06-09 NOTE — TELEPHONE ENCOUNTER
This is Dr Eubanks patient   I am not sure how I became her primary   Can we ask if this was something they wanted ?     for shoulder we could have her see ortho to see if an injection may help with pain   Or we can ask Raimundo for her opinion as she was her patient

## 2020-06-09 NOTE — TELEPHONE ENCOUNTER
Patient's daughter Rebecca called to report left shoulder pain for the past 2 weeks. She had an xray at the hospital and it shows arthritis in that shoulder. They have tried using Aspercream with Lidocaine and ice but she is wondering if there is something else that can be done for the pain. Call her back at [phone] (home)

## 2020-06-10 NOTE — PROGRESS NOTES
Infusion Nursing Note:  Keisha Godinez presents today for 1 Unit PRBC.    Patient seen by provider today: No   present during visit today: Not Applicable.    Note: N/A.    Intravenous Access:  Peripheral IV placed.    Treatment Conditions:  Blood transfusion consent signed 6/10/2020.      Post Infusion Assessment:  Patient tolerated infusion without incident.  Blood return noted pre and post infusion.  Site patent and intact, free from redness, edema or discomfort.  No evidence of extravasations.  Access discontinued per protocol.       Discharge Plan:   Discharge instructions reviewed with: Patient.  Patient and/or family verbalized understanding of discharge instructions and all questions answered.  Copy of AVS reviewed with patient and/or family.    Patient discharged in stable condition accompanied by: self.  Departure Mode: Wheelchair.    Sasha Clark RN

## 2020-06-10 NOTE — TELEPHONE ENCOUNTER
Called and spoke with daughter Rebecca per consent to communicate. Advised daughter of message below, daughter verbalized understanding, will call back with further concerns.

## 2020-06-10 NOTE — TELEPHONE ENCOUNTER
Rebecca calling back and she wants Dr Eubanks's advise. Her mother Keisha said she wants to try what worked for her in the past  Diclofenac sodium topical jell 1% if Dr Eubanks approves

## 2020-06-12 NOTE — PROGRESS NOTES
Keisha Gretchen [birth year]. EBONY Isaac RN called at 1118.  Yesterday on the phone Keisha sounded raspy to her then today her LS were dim on RLE, breathing was felt to be slightly better today, but wt is 145# today.  She got iron infusion on mon and blood infusion yesterday and has plans for another iron infusion on mon.  With all the extra fluid in, do you want her to take extra torsemide ?  Her call back number: [phone]

## 2020-06-15 NOTE — PROGRESS NOTES
Suad Murrell PA Gerdowsky, Christina, RN    Caller: Unspecified (3 days ago,  1:47 PM)               Agree she should be taking 40mg am, 20mg pm routinely.   The day after she gets iron or blood infusions she can increase to 40mg BID and see if that helps.   If over the weekend the breathing gets worse or she is more swollen , she can take 40mg BID over the weekend as well.   Have her report back Monday for an update.

## 2020-06-15 NOTE — PROGRESS NOTES
Called daughter Yumiko Kovacs message for daughter to call back to review diuretics.   Called to pt. Left VM reminding her about her torsemide doses and to call back to discuss what to do prior to iron &/or blood infusions.   Charity Garcia RN 11:02 AM 06/15/20

## 2020-06-15 NOTE — PROGRESS NOTES
"Spoke to daughter and reviewed diuretic plan with her on infusions days.   Daughter asked if they can combine torsemide doses \"so she is not up all night going to the bathroom. On the days she has infusions I do not give her her diuretics until we get home. Like today when we got home I gave her the 40 mg torsemide and spironolactone. So I won't give her anymore torsemide so she can sleep. Tomorrow we do not have anything going on so she will get her 40 mg of torsemide in the morning and then in the afternoon I will give her her 20 mg of torsemide and spironolactone.\"    Will review with Suad about infusion days to see if pt can take Torsemide 60-80 mg with spironolactone 50 mg.   Charity Garcia RN 4:11 PM 06/15/20    "

## 2020-06-16 NOTE — PROGRESS NOTES
"Suad Murrell PA Gerdowsky, Christina, RN    Caller: Unspecified (4 days ago,  1:47 PM)               Is she taking just the 40mg daily right now? Then take 60mg on infusion days.   If she is already taking 60mg total, then take 80mg on infusion days.   I understand she doesn't want to be up urinating all night, but the intent is that she will have more \"volume' if she gets a blood transfusion so this helps prevent volume overload. If it is just an iron infusion (which is less volume) she can probably stick with her regular dose and just take an extra 20mg on the days she thinks she has more swelling.     Thx        "

## 2020-06-16 NOTE — PROGRESS NOTES
She is prescribed Torsemide 40 mg in morning and 20 mg in afternoon. And Spironolactone 50 mg daily.      She does take meds as prescribed on non infusion days and when she does not have morning appts.     So 60 mg on days she has infusion or morning appts when she misses her morning dose of 40 mg would be ok? That would include her 20 mg afternoon dose.     Charity Garcia RN 2:59 PM 06/16/20

## 2020-06-16 NOTE — PROGRESS NOTES
Spoke to pt. Gave her instructions to take 60 mg of torsemide when she gets back from her infusions or morning appts. This will cover her regular dose of 40 mg in the morning and 20 mg in the afternoon. Pt repeated back correctly and will let daughter know.   Charity Garcia RN 3:33 PM 06/16/20

## 2020-06-16 NOTE — PROGRESS NOTES
Suad Murrell PA  You 26 minutes ago (3:02 PM)       Yes that would be fine.         Subjective   Kiana Ferris is a 59 y.o. female.     History of Present Illness She is doing well post lap mayda and is working hard on loosing weight as she had a significant fatty liver.    The following portions of the patient's history were reviewed and updated as appropriate: current medications, past family history, past medical history, past social history, past surgical history and problem list.    Review of Systems    Objective   Physical Exam    Assessment/Plan   Kiana was seen today for post-op follow-up.    Diagnoses and all orders for this visit:    Status post laparoscopic cholecystectomy    return prn

## 2020-06-24 NOTE — TELEPHONE ENCOUNTER
Writer advised by Taryn Branham on 6/24/20 at 3:30 pm that patient's hemoglobin from lab results today was 6.7.    Taryn request that writer call patient to triage. Taryn states patient may need to go to the ER, or get Friday's infusion appointment moved up.    Call to patient. She reports she does feel fatigued, and is experiencing some shortness of breath with exertion. Patient denies respiratory distress. Patient declines going to the ER. Also spoke with daughter per consent to communicate, she states she takes patient to her infusion appointments. Daughter states she can take patient to infusion tomorrow if it is the afternoon, and is not interested being seen in [place].     Spoke with Marianne at Dr. Shai Robert's office at 4:00 pm (Per chart Dr. Robert has ordered the PRBC). Informed her of information above, and patient's hemoglobin results from today. Informed Marianne patient is symptomatic. Inquired if patient should have infusion moved to tomorrow, or next steps since patient is refusing the ER. Marianne advised Dr. Robert is aware of the hemoglobin results and that patient is symptomatic and states it is OK to wait for the infusion for Friday.    Called daughter and advised her of Marianne statements. Rebecca agrees to plan. Rebecca will call back with further concerns, and agrees to take patient to the ER if symptoms worsen.    Will route to Taryn Branham as an FYI.

## 2020-06-26 NOTE — PROGRESS NOTES
Infusion Nursing Note:  Keisha Godinez presents today for 1 uniti prbc.    Patient seen by provider today: No   present during visit today: Not Applicable.    Note: 1 unit prbc given over 3 hours.  20 mg Lasix IVP given after transfusion conpleted. Pt tolerated it all well.  BP up at the end, but she had just returned from using bathroom.  Denies SOB,    Intravenous Access:  Peripheral IV placed.    Treatment Conditions:  Blood transfusion consent signed 6/22/20.      Post Infusion Assessment:  Patient tolerated infusion without incident.  Blood return noted pre and post infusion.  Site patent and intact, free from redness, edema or discomfort.  No evidence of extravasations.  Access discontinued per protocol.       Discharge Plan:   Discharge instructions reviewed with: Patient.  Patient and/or family verbalized understanding of discharge instructions and all questions answered.  AVS to patient via [employer].  Patient will return prn for next appointment.   Patient discharged in stable condition accompanied by: self and daughter to drive  Departure Mode: Wheelchair.    Anne-Marie Tineo RN

## 2020-07-15 NOTE — PROGRESS NOTES
Infusion Nursing Note:  Keisha Godinez presents today for 1 unit PRBC.    Patient seen by provider today: No   present during visit today: Not Applicable.    Note: 1 unit PRBC given over 3 hours.  BP increased during transfusion to a max of 168/67.  No signs of fluid overload.  IV lasix given post transfusion.    Intravenous Access:  Peripheral IV placed.    Treatment Conditions:  Blood transfusion consent signed 6/10/20.      Post Infusion Assessment:  Patient tolerated infusion without incident.  Site patent and intact, free from redness, edema or discomfort.  No evidence of extravasations.  Access discontinued per protocol.       Discharge Plan:   AVS to patient via MYCHART.    Patient discharged in stable condition accompanied by: self.  Departure Mode: Wheelchair.    Miriam Branch RN

## 2020-07-29 NOTE — PROGRESS NOTES
Infusion Nursing Note:  Keisha Godinez presents today for 1 unit prbc.    Patient seen by provider today: No   present during visit today: Not Applicable.    Note: says she didn't really feel any different after her transfusion 2 weeks ago. Remains in her wheelchair for duration of 3 hour transfusion. Will receive 20 mg Lasix after blood completed    Intravenous Access:  Peripheral IV placed.    Treatment Conditions:  Blood transfusion consent signed 5/14/20.  HGB 6.0  7/27/20    Post Infusion Assessment:  Patient tolerated infusion without incident.  Site patent and intact, free from redness, edema or discomfort.  No evidence of extravasations.  Access discontinued per protocol.       Discharge Plan:   Discharge instructions reviewed with: Patient. Daughter contacted by phone with update  Patient and/or family verbalized understanding of discharge instructions and all questions answered.  AVS to patient via [employer].  Patient will return prn for next appointment.   Patient discharged in stable condition accompanied by: self.  Departure Mode: Wheelchair.    Anne-Marie Tineo RN

## 2020-08-07 NOTE — TELEPHONE ENCOUNTER
Pending Prescriptions:                       Disp   Refills    hydrALAZINE (APRESOLINE) 25 MG tablet [Pha*810 ta*0        Sig: TAKE 3 TABLETS BY MOUTH 3 TIMES A DAY     Routing refill request to provider for review/approval because:  BP elevated    Covering provider, Leonila Branham is out of the office. Routed to [facility] to review.

## 2020-08-12 NOTE — PROGRESS NOTES
Infusion Nursing Note:  Keisha Godinez presents today for 1 Unit PRBC + Lasix.    Patient seen by provider today: No   present during visit today: Not Applicable.    Note:  PRBC to infuse over 3 hours.  PRBC rate started at 100mls/hr per routine.  Rate decreased to 50mls/hr at 1405 due to increase in blood pressure.  Rate remained at 50mls/hr until completion of PRBC's at 1545.  Lasix 20mg IVP given at 1412 over 3 minutes.    Patient reports is feeling well and offers no complaints.    Intravenous Access:  Peripheral IV placed.  PIV site C/D/I.  PIV flushes well + excellent blood return.    Treatment Conditions:  Blood transfusion consent signed on 6/10/20.      Post Infusion Assessment:  Patient tolerated infusion without incident.  Blood return noted pre and post infusion.  Site patent and intact, free from redness, edema or discomfort.  No evidence of extravasations.  Access discontinued per protocol.       Discharge Plan:   Discharge instructions reviewed with: Patient.  Patient and/or family verbalized understanding of discharge instructions and all questions answered.  Patient discharged in stable condition accompanied by: daughter.  Departure Mode: Wheelchair.    Josephine Almaguer RN, BSN ,OCN on 8/12/2020 at 5:00 PM

## 2020-08-28 NOTE — PROGRESS NOTES
Infusion Nursing Note:  Keisha Godinez presents today for 1 unit PRBC.    Patient seen by provider today: No   present during visit today: Not Applicable.    Note: Ran blood slowly over 4 hours.  Tolerated well.  BP remained steady with no significant increases.  IV lasix given post transfusion. No signs of fluid overload..    Intravenous Access:  Peripheral IV placed.    Treatment Conditions:  Hgb 6.1.  Blood consent signed 6/10/2020    Post Infusion Assessment:  Patient tolerated transnfusion without incident.  Site patent and intact, free from redness, edema or discomfort.  No evidence of extravasations.  Access discontinued per protocol.     Discharge Plan:   AVS to patient via MYCHART.    Patient discharged in stable condition accompanied by: self.  Departure Mode: Ambulatory.    Miriam Branch RN                        
Statement Selected

## 2020-10-08 NOTE — PROGRESS NOTES
Infusion Nursing Note:  Keisha Godinez presents today for 1 unit PRBC's and Lasix.    Patient seen by provider today: No   present during visit today: Not Applicable.    Note: Has c/o fatigue and SOB.  Denies previous blood transfusion reaction.  Blood started at 75cc/hr and remained at 75cc/hr for the remainder of the infusion.  Lasix given IV push at the end.      Intravenous Access:  Peripheral IV placed.    Treatment Conditions:  Blood transfusion consent signed 6/10/2020.      Post Infusion Assessment:  Patient tolerated infusion without incident.  Blood return noted pre and post infusion.  Site patent and intact, free from redness, edema or discomfort.  No evidence of extravasations.  Access discontinued per protocol.       Discharge Plan:   Discharge instructions reviewed with: Patient.  Patient discharged in stable condition accompanied by: self.  Departure Mode: Wheelchair.    ABDI HENRIQUEZ RN

## 2020-10-21 NOTE — TELEPHONE ENCOUNTER
Routing refill request to provider for review/approval because:  Labs out of range:  Creat, potassium  BP elevated > [facility] protocol for RN refill  Medication listed as historical.    Medication has 2 doses listed on medication list

## 2020-10-22 NOTE — PROGRESS NOTES
Infusion Nursing Note:  Keisha Godinez presents today for 1 unit prbc.    Patient seen by provider today: No   present during visit today: Not Applicable.    Note: prbcs run at 50ml/hr.  SBP in 160's  Lasix IVP given afterwards    Intravenous Access:  Peripheral IV placed.    Treatment Conditions:  HGB 5.7    Post Infusion Assessment:  Patient tolerated infusion without incident.  Blood return noted pre and post infusion.  Site patent and intact, free from redness, edema or discomfort.  No evidence of extravasations.  Access discontinued per protocol.       Discharge Plan:   Discharge instructions reviewed with: Patient.  Patient and/or family verbalized understanding of discharge instructions and all questions answered.  Patient discharged in stable condition accompanied by: self and daughter.  Departure Mode: Wheelchair.    Anne-Marie Tineo RN

## 2020-10-22 NOTE — TELEPHONE ENCOUNTER
RN please call patient to try to establish current torsemide regimen--not clear with my chart review.

## 2020-10-23 NOTE — TELEPHONE ENCOUNTER
Called patient, she takes Torsemide 40 mg every morning and 20 mg every afternoon. Routed to covering provider to review.

## 2020-11-02 NOTE — TELEPHONE ENCOUNTER
Daughter is calling to say that Keisha is constipated. She is feeling pretty uncomfortable going on about three days. They have tried colace and prune juice and slo she uses metamucil at night.  They are asking if there is a rx she can get.

## 2020-11-02 NOTE — TELEPHONE ENCOUNTER
Rebecca advised of Dr. Eubanks's recommendations. She will have patient try this and call if no change tomorrow morning.

## 2020-11-03 NOTE — PROGRESS NOTES
"[facility] RCAT     Date: 11/3/2020  Admission Dx: Anemia  Pulmonary History: COPD  Home Nebulizer/MDI Use: Albuterol HFA 2 puffs Q6 hours, Albuterol neb QID.  Home Oxygen: 1.5 Lpm NC  Acuity Level (RCAT flow sheet): 3  Aerosol Therapy initiated: Continue Albuerol QID, Duoneb Q4prn      Pulmonary Hygiene initiated: Deep breath and cough TID.      Volume Expansion initiated: IS TID      Current Oxygen Requirements: 2 Lpm NC  Current SpO2: 99%    Re-evaluation date: 11/6/2020    Patient Education: Discussed RCAT and the treatment plan for her inhaled medication; she is in agreement with the plan.      See \"RT Assessments\" flow sheet for patient assessment scoring and Acuity Level Details.             "

## 2020-11-03 NOTE — ED NOTES
DATE:  11/3/2020   TIME OF RECEIPT FROM LAB:  1054  LAB TEST:  hgb  LAB VALUE:  5.4  RESULTS GIVEN WITH READ-BACK TO (PROVIDER):  silvia  TIME LAB VALUE REPORTED TO PROVIDER:   1058

## 2020-11-03 NOTE — ED NOTES
"[facility]  ED Nurse Handoff Report    Keisha Godinez is a 88 year old female   ED Chief complaint: Shortness of Breath and Generalized Weakness  . ED Diagnosis:   Final diagnoses:   Acute on chronic anemia     Allergies:   Allergies   Allergen Reactions     Blood Transfusion Related (Informational Only) Other (See Comments)     Patient has a history of a clinically significant antibody against RBC antigens.  A delay in compatible RBCs may occur.     Lisinopril Other (See Comments)     Tongue swelling     Calcium Nausea and Vomiting     Egg Yolk      Flu Virus Vaccine      Allergic to eggs.     Keflex [Cephalexin] Nausea     Pt states she had upset stomach.  NOT tongue swelling.  She had tongue swelling with a combo bp med that she thinks included lisinopril.    Tolerates IV Cefazolin     Levaquin [Levofloxacin]      Sulfa Drugs      Zinc Nausea and Vomiting       Code Status: Full Code  Activity level - Baseline/Home:  Assist X 2. Activity Level - Current:   Assist X 2. Lift room needed: No. Bariatric: No   Needed: No   Isolation: No. Infection: Not Applicable.     Vital Signs:   Vitals:    11/03/20 1245 11/03/20 1300 11/03/20 1315 11/03/20 1330   BP: 126/57 131/51 134/54 (!) 141/55   Pulse: 52 52 58 56   Resp:       Temp:       TempSrc:       SpO2: 100% 100% 100% 100%   Weight:       Height:           Cardiac Rhythm:  ,      Pain level: 0-10 Pain Scale: 0  Patient confused: No. Patient Falls Risk: Yes.   Elimination Status: Has voided- has purewick  Patient Report - Initial Complaint: History of anemia and followed by a hematologist.  Feeling increased weakness and shortness of breath today.  Daughter here with patient and states she had her last transfusion 2 weeks ago. . Focused Assessment:    09:59 Respiratory Respiratory - Respiratory WDL: .WDL except (chronic 1 1/2 L per nc at home. pt more \"winded\" today)       10:00 Musculoskeletal Musculoskeletal - Musculoskeletal WDL: .WDL " "except; mobility  General Mobility: generalized weakness (pt reports to be increasingly weak, able to get self up today, but \"more winded and extremely weak\") Musculoskeletal Comment: denies all pain.      Tests Performed: Labs and Imaging. Abnormal Results:   Labs Ordered and Resulted from Time of ED Arrival Up to the Time of Departure from the ED   CBC WITH PLATELETS DIFFERENTIAL - Abnormal; Notable for the following components:       Result Value    RBC Count 2.09 (*)     Hemoglobin 5.4 (*)     Hematocrit 18.9 (*)     MCH 25.8 (*)     MCHC 28.6 (*)     Absolute Lymphocytes 0.4 (*)     All other components within normal limits   BASIC METABOLIC PANEL - Abnormal; Notable for the following components:    Glucose 103 (*)     Urea Nitrogen 48 (*)     Creatinine 1.12 (*)     GFR Estimate 44 (*)     GFR Estimate If Black 51 (*)     All other components within normal limits   NT PROBNP INPATIENT - Abnormal; Notable for the following components:    N-Terminal Pro BNP Inpatient 4,871 (*)     All other components within normal limits   ABO/RH TYPE AND SCREEN - Abnormal; Notable for the following components:    Antibody Screen Pos (*)     All other components within normal limits   INR   TROPONIN I   COVID-19 VIRUS (CORONAVIRUS) BY PCR   RED BLOOD CELL PREPARE ORDER UNIT     XR Chest Port 1 View   Final Result      .   Treatments provided: See MAR.  Family Comments: N/A  OBS brochure/video discussed/provided to patient:  Yes  ED Medications:   Medications   furosemide (LASIX) injection 40 mg (40 mg Intravenous Given 11/3/20 1038)     Drips infusing:  No  For the majority of the shift, the patient's behavior Green. Interventions performed were N/A.    Sepsis treatment initiated: No     Patient tested for COVID 19 prior to admission: YES    ED Nurse Name/Phone Number: Elana Powers RN,   1:42 PM    RECEIVING UNIT ED HANDOFF REVIEW    Above ED Nurse Handoff Report was reviewed: Yes  Reviewed by: Niru Maier RN on November " 3, 2020 at 2:44 PM

## 2020-11-03 NOTE — TELEPHONE ENCOUNTER
Routing refill request to provider for review/approval because:  BP out of range, due for appt    Pt is in ED today

## 2020-11-03 NOTE — H&P
[facility]    History and Physical - Hospitalist Service       Date of Admission:  11/3/2020    Assessment & Plan   Keisha Godinez is a 88 year old female with history COPD (on chronic o2 at 1.5L), depression, diastolic CHF, pulmonary HTN, HTN, HLP, GERD, paroxysmal a fib (not on anticoagulation due to anemia), CKD (stage III), chronic anemia requiring every 2 week transfusions at the transfusion center admitted on 11/3/2020 with symptoms of generalized weakness and fatigue, found to have a Hb of 5.4. Was unable to be transfused at the transfusion center due to no chairs being available until Monday    Weakness/fatigue    - likely due to acute on chronic anemia    - will transfuse and then re-assess mobility    Acute on chronic anemia    - has had full work-up and has seen Dr. 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    - gets bi-weekly transfusions at the transfusion center    - last was on 10/22    - perhaps she needs weekly transfusions/Hb checks going forward    - cont pta iron    History of diastolic CHF with mild volume overload    - was given lasix in the ED    - does not appear to be in overload    - BNP likely about her baseline    - chest xray appears may have some mild increased interstitial edema    - will give lasix x 1 after transfusion    - will cont her pta torsemide regimen    COPD    - on chronic O2    - stable, has not had increased symptoms    - cont pta inhaler and nebs    HTN    - cont pta hydralazine, spironolactone    HLP    - hold pts statin and resume on discharge    Depression    - cont pta venlafaxine    Parox a fib    - currently sinus    - not on anticoagulation due to anemia/chronic GI losses    DNR/DNI: confirmed with patient     Diet:   regular  DVT Prophylaxis: likely will discharge tomorrow  Maguire Catheter: not present  Code Status:  DNR/DNI         Disposition Plan   Expected discharge: Tomorrow, recommended to prior living  arrangement once after transfusion.  Entered: Efraín Yancey MD 11/03/2020, 2:49 PM     The patient's care was discussed with the Bedside Nurse, Patient, Patient's Family and ED provider.    Efraín Yancey MD  [facility]  Contact information available via [facility] Paging/Directory      ______________________________________________________________________    Chief Complaint   Weakness/fatigue    History is obtained from the patient    History of Present Illness   Keisha Godinez is a 88 year old female with history COPD (on chronic o2 at 1.5L), depression, diastolic CHF, pulmonary HTN, HTN, HLP, GERD, paroxysmal a fib (not on anticoagulation due to anemia), CKD (stage III), chronic anemia requiring every 2 week transfusions at the transfusion center admitted on 11/3/2020 with symptoms of generalized weakness and fatigue, found to have a Hb of 5.4. Was unable to be transfused at the transfusion center due to no chairs being available until Monday. Patient states she started feeling generalized weakness and fatigue over the past couple days. She denies chest pain, increased sob, abdo pain, n/v/d. No fever or chills. No URI or urinary symptoms. Her last transfusion was on 10/22/2020. She typically has her Hb checked very 2 weeks and will get transfusions at the transfusion center.      Review of Systems    The 10 point Review of Systems is negative other than noted in the HPI or here.     Past Medical History    I have reviewed this patient's medical history and updated it with pertinent information if needed.   Past Medical History:   Diagnosis Date     Anemia      Atrial fibrillation (H)      B12 deficiency      Cardiomyopathy (H)      CHF (congestive heart failure) (H)      Chronic edema     Lower extremities     Chronic systolic congestive heart failure (H)      CVA (cerebral vascular accident) (H) 2010    Acute Left MCA hemispheric CVA ( on coumadin)     Depression      Gastro-oesophageal  reflux disease      GI bleed 03-20-15     Hyperlipidemia      Hypertension      Iron deficiency      MSSA (methicillin susceptible Staphylococcus aureus) 03-10-15     Pulmonary hypertension (H)      Shingles        Past Surgical History   I have reviewed this patient's surgical history and updated it with pertinent information if needed.  Past Surgical History:   Procedure Laterality Date     COLONOSCOPY  2015    Diverticulosis, polyps     ENTEROSCOPY SMALL BOWEL N/A 2016    Procedure: ENTEROSCOPY SMALL BOWEL;  Surgeon: Ady Ponce MD;  Location:  GI     ESOPHAGOSCOPY, GASTROSCOPY, DUODENOSCOPY (EGD), COMBINED N/A 3/22/2015    Upper GI- Normal, nothing significant found.      EXCISE MASS FOOT Right 2016    Procedure: EXCISE MASS FOOT;  Surgeon: Lee Jang DPM;  Location:  OR       Social History   I have reviewed this patient's social history and updated it with pertinent information if needed.  Social History     Tobacco Use     Smoking status: Former Smoker     Years: 1.00     Types: Cigarettes     Start date:      Quit date: 1956     Years since quittin.6     Smokeless tobacco: Never Used   Substance Use Topics     Alcohol use: No     Alcohol/week: 0.0 standard drinks     Drug use: No       Family History   I have reviewed this patient's family history and updated it with pertinent information if needed.  Family History   Problem Relation Age of Onset     Known Genetic Syndrome Father      Known Genetic Syndrome Mother      Other Cancer Daughter         pancreatic     Other Cancer Brother         type     Other Cancer Sister 60        lung     Diabetes No family hx of      Breast Cancer No family hx of      Cancer - colorectal No family hx of      Ovarian Cancer No family hx of      Prostate Cancer No family hx of        Prior to Admission Medications   Prior to Admission Medications   Prescriptions Last Dose Informant Patient Reported? Taking?   ASPIRIN NOT  PRESCRIBED (INTENTIONAL)   No No   Sig: Please choose reason not prescribed, below   Patient not taking: Reported on 8/12/2020   Cranberry Extract 250 MG TABS 11/3/2020 at am  Yes Yes   Sig: Take 250 mg by mouth daily   Diclofenac Sodium 1 % CREA 11/2/2020 at Unknown time  Yes Yes   Sig: Place 1 g onto the skin 2 times daily To L shoulder   OMEGA-3 FATTY ACIDS PO 11/3/2020 at am Daughter Yes Yes   Sig: Take 1,200 mg by mouth daily    SM STOOL SOFTENER 8.6-50 MG tablet 11/2/2020 at Unknown time  No Yes   Sig: Take 2 tablets daily as needed for constipation while taking prescription pain medications.   VITAMIN D, CHOLECALCIFEROL, PO 11/3/2020 at am Self Yes Yes   Sig: Take 1,000 Units by mouth daily    acetaminophen (TYLENOL) 325 MG tablet no recent usage  Yes Yes   Sig: Take 650 mg by mouth 3 times daily as needed for mild pain   albuterol (PROAIR HFA/PROVENTIL HFA/VENTOLIN HFA) 108 (90 Base) MCG/ACT inhaler no recent usage  No Yes   Sig: Inhale 2 puffs into the lungs every 6 hours   albuterol (PROVENTIL) (2.5 MG/3ML) 0.083% neb solution 11/3/2020 at am  No Yes   Sig: Take 1 vial (2.5 mg) by nebulization 4 times daily   ferrous sulfate (FEROSUL) 325 (65 Fe) MG tablet 11/3/2020 at am  No Yes   Sig: Take 1 tablet (325 mg) by mouth 2 times daily   gabapentin (NEURONTIN) 100 MG capsule 11/3/2020 at am  Yes Yes   Sig: Take 100 mg by mouth 3 times daily    hydrALAZINE (APRESOLINE) 25 MG tablet 11/3/2020 at am  No Yes   Sig: TAKE 3 TABLETS BY MOUTH 3 TIMES DAILY   omeprazole (PRILOSEC) 20 MG DR capsule 11/3/2020 at am  No Yes   Sig: Take 1 capsule (20 mg) by mouth daily   psyllium (METAMUCIL/KONSYL) Packet 11/2/2020 at hs  Yes Yes   Sig: Take 1 packet by mouth At Bedtime   simvastatin (ZOCOR) 10 MG tablet 11/2/2020 at pm  No Yes   Sig: TAKE 1 TABLET (10MG) BY MOUTH AT BEDTIME   spironolactone (ALDACTONE) 50 MG tablet 11/3/2020 at am  No Yes   Sig: TAKE 1 TABLET BY MOUTH DAILY   torsemide (DEMADEX) 20 MG tablet 11/3/2020 at  am  Yes Yes   Sig: Take 40 mg by mouth daily   torsemide (DEMADEX) 20 MG tablet 11/2/2020 at Unknown time  Yes Yes   Sig: Take 20 mg by mouth daily at 2 pm   venlafaxine (EFFEXOR-XR) 75 MG 24 hr capsule 11/3/2020 at am  No Yes   Sig: Take 1 capsule (75 mg) by mouth daily      Facility-Administered Medications: None     Allergies   Allergies   Allergen Reactions     Blood Transfusion Related (Informational Only) Other (See Comments)     Patient has a history of a clinically significant antibody against RBC antigens.  A delay in compatible RBCs may occur.     Lisinopril Other (See Comments)     Tongue swelling     Calcium Nausea and Vomiting     Egg Yolk      Flu Virus Vaccine      Allergic to eggs.     Keflex [Cephalexin] Nausea     Pt states she had upset stomach.  NOT tongue swelling.  She had tongue swelling with a combo bp med that she thinks included lisinopril.    Tolerates IV Cefazolin     Levaquin [Levofloxacin]      Sulfa Drugs      Zinc Nausea and Vomiting       Physical Exam   Vital Signs: Temp: 98.6  F (37  C) Temp src: Oral BP: (!) 141/55 Pulse: 56   Resp: 23 SpO2: 100 % O2 Device: None (Room air)    Weight: 148 lbs 0 oz    Constitutional: awake, alert, cooperative, no apparent distress, and appears stated age  Eyes: Lids and lashes normal, pupils equal, round and reactive to light, extra ocular muscles intact, sclera clear, conjunctiva normal  ENT: Normocephalic, without obvious abnormality, atraumatic, sinuses nontender on palpation, external ears without lesions, oral pharynx with moist mucous membranes, tonsils without erythema or exudates, gums normal and good dentition.  Respiratory: No increased work of breathing, good air exchange, clear to auscultation bilaterally, no crackles occ wheze  Cardiovascular: Normal apical impulse, regular rate and rhythm, normal S1 and S2, no S3 or S4, and no murmur noted  GI: No scars, normal bowel sounds, soft, non-distended, non-tender, no masses palpated, no  hepatosplenomegally  Skin: no bruising or bleeding  Musculoskeletal: no lower extremity pitting edema present    Data   Data reviewed today: I reviewed all medications, new labs and imaging results over the last 24 hours. I personally reviewed the chest x-ray image(s) showing some mild increased interstitial edema.    Most Recent 3 CBC's:  Recent Labs   Lab Test 11/03/20  1024 07/28/20  1139 06/24/20  1419   WBC 6.6 5.5 5.8   HGB 5.4* 6.6* 6.7*   MCV 90 98 104*    125* 179     Most Recent 3 BMP's:  Recent Labs   Lab Test 11/03/20  1024 06/24/20  1419 05/16/20  0532    135 137   POTASSIUM 3.9 4.2 3.8   CHLORIDE 101 102 102   CO2 24 26 31   BUN 48* 41* 36*   CR 1.12* 1.05* 0.97   ANIONGAP 8 7 4   CASPER 8.8 8.3* 8.2*   * 102* 81     Most Recent 2 LFT's:  Recent Labs   Lab Test 05/15/20  1631 10/07/19  0721   AST 26 30   ALT 20 30   ALKPHOS 68 76   BILITOTAL 0.3 1.3     Recent Results (from the past 24 hour(s))   XR Chest Port 1 View    Narrative    XR CHEST PORT 1 VW 11/3/2020 10:50 AM    HISTORY: dyspnea    COMPARISON: Chest x-ray 5/15/2020    FINDINGS: The patient is rotated towards the right. Low lung volumes.  Cardiomegaly with mild central vascular congestion and mild  interstitial edema. Right mid lung opacities are slightly increased  since the prior exam and may reflect mild developing infiltrate  superimposed on parenchymal scarring. Recommend attention on  follow-up. Trace bilateral pleural fluid. Old right proximal humerus  fracture.    EM SWAN MD

## 2020-11-03 NOTE — ED PROVIDER NOTES
History     Chief Complaint:   Shortness of Breath and Generalized Weakness      HPI   Keisha Godinez is a 88 year old female who presents with shortness of breath, generalized weakness and dark stools.  Patient has well-known chronic anemia due to an AVM for which she receives blood transfusions every 2 weeks.  However daughter reports she has recently been more short of breath and weak.  Last transfusion was 1 week ago.  Patient denies any chest pain, belly pain, fever, chills, nausea or vomiting.  Denies any weakness in the specific arm or leg.  She describes it as a generalized malaise.  She has been taking her iron supplements as prescribed.  Patient also notes she has been having worsening lower extremity edema despite taking her Torsemide. Stable on 1.5 L of home oxygen.     Allergies:  Lisinopril   Calcium   Flu virus vaccine   Keflex  Levaquin  Sulfa drugs   Zinc      Medications:    Tylenol  Albuterol inhaler  Albuterol nebulizer  Ferrous sulfate  Gabapentin  Hydralazine   Omeprazole   Zocor   Spironolactone   Torsemide   Effexor XR       Past Medical History:    Anemia  Atrial fibrillation  B12 deficiency   Cardiomyopathy  Congestive heart failure  COPD   Chronic edema  CVA   Depression   GERD   GI bleed  Hypertension  Hyperlipidemia  MSSA   Pulmonary hypertension  Iron deficiency   Shingles      Past Surgical History:    Colonoscopy   Enteroscopy small bowel  EGD, combined  Excise mass right foot     Family History:    Pancreatic cancer - Daughter   Lung cancer - Sister  Cancer - Brother     Social History:  Tobacco use:    Former smoker, quit 1956   Alcohol use:    Negative   Drug use:    Negative   Marital status:           Review of Systems   Constitutional: Positive for fatigue. Negative for chills and fever.   Respiratory: Positive for shortness of breath. Negative for cough.    Cardiovascular: Negative for chest pain.   Gastrointestinal: Positive for blood in stool. Negative for  "abdominal pain, diarrhea, nausea and vomiting.   All other systems reviewed and are negative.    Physical Exam   First Vitals:  BP: (!) 148/52  Pulse: 70  Temp: 98.6  F (37  C)  Resp: (!) 32  Height: 160 cm (5' 3\")  Weight: 67.1 kg (148 lb)  SpO2: 93 %     Physical Exam  General: Patient is awake, alert and interactive when I enter the room. Pale, chronically ill appearing   Head: The scalp, face, and head appear normal  Eyes: The pupils are equal, round, and reactive to light.   CV: Regular rate.  Resp: Lungs are clear without wheezes or rales. No respiratory distress.   GI: Abdomen is soft, no rigidity, guarding, or rebound. No distension. No tenderness to palpation in any quadrant.     MS: Normal tone. Joints grossly normal without effusions. No asymmetric leg swelling, calf or thigh tenderness.    Skin: No rash or lesions noted. Normal capillary refill noted  Neuro: Speech is normal and fluent. Face is symmetric. Moving all extremities.   Psych:  Normal affect.  Appropriate interactions.    Emergency Department Course   ECG (10:03:18):  Indication: Screening for cardiovascular disease.   Rate 70 bpm. NJ interval * ms. QRS duration 98 ms. QT/QTc 408/440 ms. P-R-T axes * 80 50.   Interpretation: Atrial fibrillation with premature ventricular or aberrantly conducted complexes, ST & T wave abnormality consider lateral ischemia, Abnormal ECG   Agree with computer interpretation. Yes   No significant change compared to EKG dated 5/15/2020.   Interpreted at 1022 by Dr. Parikh.       Imaging:  Radiographic findings were communicated with the patient who voiced understanding of the findings.    XR Chest Port:  FINDINGS: The patient is rotated towards the right. Low lung volumes. Cardiomegaly with mild central vascular congestion and mild interstitial edema. Right mid lung opacities are slightly increased since the prior exam and may reflect mild developing infiltrate superimposed on parenchymal scarring. Recommend " attention on follow-up. Trace bilateral pleural fluid. Old right proximal humerus Fracture.   Per radiology.      Laboratory:  CBC: HGB 5.4 low, o/w WNL (WBC 6.6, )   ABO/Rh type and screen: O positive, Antibody screen positive   BMP: Glucose 103 high, BUN 48 high, Creatinine 1.12 high, GFR estimate 44 low, o/w WNL   Troponin I 1024: <0.015   Nt probnp inpatient: 4,871   INR: 0.93   Asymptomatic COVID-19 Virus by PCR: Pending      Interventions:  1038 Lasix 40 mg IV   Blood transfusion pending      Emergency Department Course:  Nursing notes and vitals reviewed.  0955: I performed an exam of the patient as documented above.     1125: I updated and reassessed the patient.     1218: I spoke with Edita Arias PA-C of the hospitalist service regarding patient's presentation, findings, and plan of care.     Findings and plan explained to the Patient who consents to admission. Discussed the patient with Edita Arias PA-C for Dr. Yancey, who will admit the patient to an obs bed for further monitoring, evaluation, and treatment.     Impression & Plan       Medical Decision Making:  Patient is an 88-year-old woman with well-known history of acute on chronic anemia due to AVM who presents to the emergency department today with shortness of breath and generalized malaise.  Patient was found to be anemic with a hemoglobin of 5.4 today.  I believe her symptoms are likely secondary to her anemia.  Patient does have some evidence of interstitial edema on her chest x-ray.  She was given a dose of Lasix in the emergency department.  I attempted to send the patient to the infusion center to complete her transfusion unfortunately there was no availability.  Therefore we will admit the patient for observation for ongoing diuresis and blood transfusion.      Covid-19  Keisha Godinez was evaluated during a global COVID-19 pandemic, which necessitated consideration that the patient might be at risk for infection with the  SARS-CoV-2 virus that causes COVID-19.   Applicable protocols for evaluation were followed during the patient's care.   COVID-19 was considered as part of the patient's evaluation. The plan for testing is:  a test was obtained during this visit.       Diagnosis:    ICD-10-CM   1. Acute on chronic anemia  D64.9      Disposition:  Admit.         Miguel ROCHA, am serving as a scribe at 9:55 AM on 11/3/2020 to document services personally performed by Sam Parikh MD, based on my observations and the provider's statements to me.     [facility] EMERGENCY DEPT       Sam Parikh MD  11/03/20 [id number]

## 2020-11-03 NOTE — ED TRIAGE NOTES
History of anemia and followed by a hematologist.  Feeling increased weakness and shortness of breath today.  Daughter here with patient and states she had her last transfusion 2 weeks ago.

## 2020-11-03 NOTE — PHARMACY-ADMISSION MEDICATION HISTORY
Admission medication history interview status for this patient is complete. See [facility] admission navigator for allergy information, prior to admission medications and immunization status.     Medication history interview source(s):pt Rebecca shah, via phone  Medication history resources (including written lists, pill bottles, clinic record):EPIC list, Sure Scripts  Primary pharmacy:[facility]    Changes made to PTA medication list:  Added: metamucil  Deleted: -----  Changed: diclofenac from 1 gm q6h prn to bid to should per daughter, gabapentin from 100mg bid to tid per daughter/sure scripts, split torsemide entry into two entries for different am and afternoon dose    Actions taken by pharmacist (provider contacted, etc):None     Additional medication history information:None    Medication reconciliation/reorder completed by provider prior to medication history? No     Daughter requested phone call when pt reached inpatient room or if pt needs to be admitted to another facility.  Sticky note left for treatment team to call Rebecca as requested      For patients on insulin therapy:N    Prior to Admission medications    Medication Sig Last Dose Taking? Auth Provider   acetaminophen (TYLENOL) 325 MG tablet Take 650 mg by mouth 3 times daily as needed for mild pain no recent usage Yes Unknown, Entered By History   albuterol (PROAIR HFA/PROVENTIL HFA/VENTOLIN HFA) 108 (90 Base) MCG/ACT inhaler Inhale 2 puffs into the lungs every 6 hours no recent usage Yes Gerri Eubanks MD   albuterol (PROVENTIL) (2.5 MG/3ML) 0.083% neb solution Take 1 vial (2.5 mg) by nebulization 4 times daily 11/3/2020 at am Yes Gerri Eubanks MD   Cranberry Extract 250 MG TABS Take 250 mg by mouth daily 11/3/2020 at am Yes Doctor, None, MD   Diclofenac Sodium 1 % CREA Place 1 g onto the skin 2 times daily To L shoulder 11/2/2020 at Unknown time Yes Unknown, Entered By History   ferrous sulfate (FEROSUL) 325 (65 Fe) MG tablet  Take 1 tablet (325 mg) by mouth 2 times daily 11/3/2020 at am Yes Darryl Reed MD   gabapentin (NEURONTIN) 100 MG capsule Take 100 mg by mouth 3 times daily  11/3/2020 at am Yes Unknown, Entered By History   hydrALAZINE (APRESOLINE) 25 MG tablet TAKE 3 TABLETS BY MOUTH 3 TIMES DAILY 11/3/2020 at am Yes Gerri Eubanks MD   OMEGA-3 FATTY ACIDS PO Take 1,200 mg by mouth daily  11/3/2020 at am Yes Reported, Patient   omeprazole (PRILOSEC) 20 MG DR capsule Take 1 capsule (20 mg) by mouth daily 11/3/2020 at am Yes Gerri Eubanks MD   psyllium (METAMUCIL/KONSYL) Packet Take 1 packet by mouth At Bedtime 11/2/2020 at hs Yes Unknown, Entered By History   simvastatin (ZOCOR) 10 MG tablet TAKE 1 TABLET (10MG) BY MOUTH AT BEDTIME 11/2/2020 at pm Yes Gerri Eubanks MD   SM STOOL SOFTENER 8.6-50 MG tablet Take 2 tablets daily as needed for constipation while taking prescription pain medications. 11/2/2020 at Unknown time Yes Darryl Reed MD   spironolactone (ALDACTONE) 50 MG tablet TAKE 1 TABLET BY MOUTH DAILY 11/3/2020 at am Yes Gerri Eubanks MD   torsemide (DEMADEX) 20 MG tablet Take 40 mg by mouth daily 11/3/2020 at am Yes Unknown, Entered By History   torsemide (DEMADEX) 20 MG tablet Take 20 mg by mouth daily at 2 pm 11/2/2020 at Unknown time Yes Unknown, Entered By History   venlafaxine (EFFEXOR-XR) 75 MG 24 hr capsule Take 1 capsule (75 mg) by mouth daily 11/3/2020 at am Yes Gerri Eubanks MD   VITAMIN D, CHOLECALCIFEROL, PO Take 1,000 Units by mouth daily  11/3/2020 at am Yes Reported, Patient

## 2020-11-03 NOTE — ED NOTES
1040 purewick placed on pt, line to suction. Pt tolerate well. 250 ml clear yellow urine present now

## 2020-11-03 NOTE — PLAN OF CARE
ROOM # 212-2    Living Situation (if not independent, order SW consult): Home with daughter  Facility name:  : [phone]    Activity level at baseline: Independent with walker  Activity level on admit: Assist of two      Patient registered to observation; given Patient Bill of Rights; given the opportunity to ask questions about observation status and their plan of care.  Patient has been oriented to the observation room, bathroom and call light is in place.    Discussed discharge goals and expectations with patient/family.

## 2020-11-03 NOTE — PROGRESS NOTES
[facility]  AU[place]    [employer] [facility] Heart Failure   For office visit review      Daily Weight Goal: 138-147 lbs    [facility] Enrollment date: 11/14/2019    Current Diuretic: Spironolactone 50 mg daily and torsemide 40 mg in am and 20 mg at 1400    Potassium Plan: None    Diuretic Plan: Review with provider    Last Extra Diuretic: Torsemide extra 20 mg  on 6/16/2020        [employer] [facility] Weight Trends:              [employer] [facility] Weight Graph:         Charity Gerdowsky, RN 4:40 PM 11/03/20

## 2020-11-04 NOTE — DISCHARGE SUMMARY
[facility]  Hospitalist Discharge Summary      Date of Admission:  11/3/2020  Date of Discharge:  11/4/2020  Discharging Provider: Efraín Yancey MD      Discharge Diagnoses   Acute on chronic anemia due to chronic GI losses    Follow-ups Needed After Discharge   Follow-up Appointments     Follow-up and recommended labs and tests       Follow up with Dr. Gibbons and your PCP. You will need a repeat hemoglobin   next week. I did call Dr. Gibbons's office. They are aware you were here and   that you should have weekly hemoglobin checks.             Unresulted Labs Ordered in the Past 30 Days of this Admission     Date and Time Order Name Status Description    11/3/2020 1336 SARS-CoV-2 COVID-19 Virus (Coronavirus) RT-PCR In process       These results will be followed up by Hospitalist    Discharge Disposition   Discharged to home  Condition at discharge: Stable      Hospital Course   Keisha Godinez is a 88 year old female with history COPD (on chronic o2 at 1.5L), depression, diastolic CHF, pulmonary HTN, HTN, HLP, GERD, paroxysmal a fib (not on anticoagulation due to anemia), CKD (stage III), chronic anemia requiring every 2 week transfusions at the transfusion center admitted on 11/3/2020 with symptoms of generalized weakness and fatigue, found to have a Hb of 5.4. Was unable to be transfused at the transfusion center due to no chairs being available until Monday. Patient states she started feeling generalized weakness and fatigue over the past couple days. She denies chest pain, increased sob, abdo pain, n/v/d. No fever or chills. No URI or urinary symptoms. Her last transfusion was on 10/22/2020. She typically has her Hb checked very 2 weeks and will get transfusions at the transfusion center.     Patient was transfused 2 units. SHe had an uneventful hospital stay. Her Hb is 7.3. She did receive two doses of lasix. SHe has eaten and ambulated in the halls. I called and updated her daughter who  will come pick her up. I called Dr. Gibbons's office and informed them she guy needs weekly Hb checks at this point.     Consultations This Hospital Stay   None    Code Status   No CPR- Do NOT Intubate    Time Spent on this Encounter   I, Efraín Yancey MD, personally saw the patient today and spent greater than 30 minutes discharging this patient.       Efraín Yancey MD  [facility] OBSERVATION DEPT  [address]  [facility] [address]  Phone: [phone]  ______________________________________________________________________    Physical Exam   Vital Signs: Temp: 98.4  F (36.9  C) Temp src: Oral BP: (!) 134/38 Pulse: 69   Resp: 18 SpO2: 95 % O2 Device: Nasal cannula Oxygen Delivery: 2 LPM  Weight: 147 lbs 4.8 oz  Constitutional: awake, alert, cooperative, no apparent distress, and appears stated age  Eyes: Lids and lashes normal, pupils equal, round and reactive to light, extra ocular muscles intact, sclera clear, conjunctiva normal  ENT: Normocephalic, without obvious abnormality, atraumatic, sinuses nontender on palpation, external ears without lesions, oral pharynx with moist mucous membranes, tonsils without erythema or exudates, gums normal and good dentition.  Respiratory: No increased work of breathing, good air exchange, clear to auscultation bilaterally, no crackles or wheezing  Cardiovascular: Normal apical impulse, regular rate and rhythm, normal S1 and S2, no S3 or S4, and no murmur noted  GI: No scars, normal bowel sounds, soft, non-distended, non-tender, no masses palpated, no hepatosplenomegally       Primary Care Physician   Gerri Eubanks    Discharge Orders      Reason for your hospital stay    Acute on chronic anemia needing transfusion. S/p 2 units of packed red blood cells     Follow-up and recommended labs and tests     Follow up with Dr. Gibbons and your PCP. You will need a repeat hemoglobin next week. I did call Dr. Gibbons's office. They are aware you were here  and that you should have weekly hemoglobin checks.     Activity    Your activity upon discharge: activity as tolerated     Diet    Follow this diet upon discharge: Orders Placed This Encounter      Regular Diet Adult       Significant Results and Procedures   Most Recent 3 CBC's:  Recent Labs   Lab Test 11/04/20  1027 11/04/20  0512 11/03/20  1024 07/28/20  1139   WBC  --  6.0 6.6 5.5   HGB 7.3* 6.0* 5.4* 6.6*   MCV  --  92 90 98   PLT  --  202 234 125*     Most Recent 3 BMP's:  Recent Labs   Lab Test 11/04/20  0512 11/03/20  1024 06/24/20  1419    133 135   POTASSIUM 3.9 3.9 4.2   CHLORIDE 102 101 102   CO2 25 24 26   BUN 48* 48* 41*   CR 1.14* 1.12* 1.05*   ANIONGAP 9 8 7   CASPER 8.7 8.8 8.3*   GLC 82 103* 102*     Most Recent 2 LFT's:  Recent Labs   Lab Test 05/15/20  1631 10/07/19  0721   AST 26 30   ALT 20 30   ALKPHOS 68 76   BILITOTAL 0.3 1.3   ,   Results for orders placed or performed during the hospital encounter of 11/03/20   XR Chest Port 1 View    Narrative    XR CHEST PORT 1 VW 11/3/2020 10:50 AM    HISTORY: dyspnea    COMPARISON: Chest x-ray 5/15/2020    FINDINGS: The patient is rotated towards the right. Low lung volumes.  Cardiomegaly with mild central vascular congestion and mild  interstitial edema. Right mid lung opacities are slightly increased  since the prior exam and may reflect mild developing infiltrate  superimposed on parenchymal scarring. Recommend attention on  follow-up. Trace bilateral pleural fluid. Old right proximal humerus  fracture.    EM SWAN MD       Discharge Medications   Current Discharge Medication List      CONTINUE these medications which have NOT CHANGED    Details   acetaminophen (TYLENOL) 325 MG tablet Take 650 mg by mouth 3 times daily as needed for mild pain      albuterol (PROAIR HFA/PROVENTIL HFA/VENTOLIN HFA) 108 (90 Base) MCG/ACT inhaler Inhale 2 puffs into the lungs every 6 hours  Qty: 1 Inhaler, Refills: 4    Comments: Pharmacy may dispense brand  covered by insurance (Proair, or proventil or ventolin or generic albuterol inhaler)  Associated Diagnoses: Pulmonary hypertension (H)      albuterol (PROVENTIL) (2.5 MG/3ML) 0.083% neb solution Take 1 vial (2.5 mg) by nebulization 4 times daily  Qty: 120 vial, Refills: 11    Associated Diagnoses: Dyspnea, unspecified type      Cranberry Extract 250 MG TABS Take 250 mg by mouth daily      Diclofenac Sodium 1 % CREA Place 1 g onto the skin 2 times daily To L shoulder      ferrous sulfate (FEROSUL) 325 (65 Fe) MG tablet Take 1 tablet (325 mg) by mouth 2 times daily  Qty: 100 tablet, Refills: 11    Associated Diagnoses: Iron deficiency      gabapentin (NEURONTIN) 100 MG capsule Take 100 mg by mouth 3 times daily       hydrALAZINE (APRESOLINE) 25 MG tablet TAKE 3 TABLETS BY MOUTH 3 TIMES DAILY  Qty: 810 tablet, Refills: 0    Associated Diagnoses: Essential hypertension with goal blood pressure less than 140/90      OMEGA-3 FATTY ACIDS PO Take 1,200 mg by mouth daily       omeprazole (PRILOSEC) 20 MG DR capsule Take 1 capsule (20 mg) by mouth daily  Qty: 90 capsule, Refills: 3    Associated Diagnoses: Gastroesophageal reflux disease without esophagitis      psyllium (METAMUCIL/KONSYL) Packet Take 1 packet by mouth At Bedtime      simvastatin (ZOCOR) 10 MG tablet TAKE 1 TABLET (10MG) BY MOUTH AT BEDTIME  Qty: 90 tablet, Refills: 0    Associated Diagnoses: Hyperlipidemia, unspecified hyperlipidemia type      SM STOOL SOFTENER 8.6-50 MG tablet Take 2 tablets daily as needed for constipation while taking prescription pain medications.  Qty: 30 tablet, Refills: 0    Associated Diagnoses: S/P foot surgery, right      spironolactone (ALDACTONE) 50 MG tablet TAKE 1 TABLET BY MOUTH DAILY  Qty: 90 tablet, Refills: 3    Associated Diagnoses: Acute on chronic diastolic congestive heart failure (H)      !! torsemide (DEMADEX) 20 MG tablet Take 40 mg by mouth daily      !! torsemide (DEMADEX) 20 MG tablet Take 20 mg by mouth daily at  2 pm      venlafaxine (EFFEXOR-XR) 75 MG 24 hr capsule Take 1 capsule (75 mg) by mouth daily  Qty: 90 capsule, Refills: 3    Associated Diagnoses: Major depressive disorder, recurrent episode, in partial remission (H)      VITAMIN D, CHOLECALCIFEROL, PO Take 1,000 Units by mouth daily       ASPIRIN NOT PRESCRIBED (INTENTIONAL) Please choose reason not prescribed, below    Associated Diagnoses: Chronic atrial fibrillation (H); Dilated cardiomyopathy (H)       !! - Potential duplicate medications found. Please discuss with provider.        Allergies   Allergies   Allergen Reactions     Blood Transfusion Related (Informational Only) Other (See Comments)     Patient has a history of a clinically significant antibody against RBC antigens.  A delay in compatible RBCs may occur.     Lisinopril Other (See Comments)     Tongue swelling     Calcium Nausea and Vomiting     Egg Yolk      Flu Virus Vaccine      Allergic to eggs.     Keflex [Cephalexin] Nausea     Pt states she had upset stomach.  NOT tongue swelling.  She had tongue swelling with a combo bp med that she thinks included lisinopril.    Tolerates IV Cefazolin     Levaquin [Levofloxacin]      Sulfa Drugs      Zinc Nausea and Vomiting

## 2020-11-04 NOTE — PROGRESS NOTES
Care Management Follow Up    Length of Stay (days): 0    Expected Discharge Date: 11/04/20     Concerns to be Addressed:       Patient plan of care discussed at interdisciplinary rounds: Yes    Anticipated Discharge Disposition:  Home with daughter.      Anticipated Discharge Services:  Home O2    Additional Information:  RN CTS following d/t CMS HF List. Patient admitted with anemia. No acute HF.  No further care coordinator needs identified at this time.      GER Botello MA/RN Case Manager  Inpatient Care Coordination  [facility]   [phone]

## 2020-11-04 NOTE — PLAN OF CARE
PRIMARY DIAGNOSIS: Anemia  OUTPATIENT/OBSERVATION GOALS TO BE MET BEFORE DISCHARGE:  ADLs back to baseline: Yes    Activity and level of assistance: assist x2    Pain status: Pain free.    Return to near baseline physical activity: Yes     Discharge Planner Nurse   Safe discharge environment identified: Yes  Barriers to discharge: Yes- hgb recheck       Entered by: Chris Multani 11/03/2020 9:43 PM     Please review provider order for any additional goals.   Nurse to notify provider when observation goals have been met and patient is ready for discharge.

## 2020-11-04 NOTE — PLAN OF CARE
PRIMARY DIAGNOSIS: ANEMIA   OUTPATIENT/OBSERVATION GOALS TO BE MET BEFORE DISCHARGE:  1. ADLs back to baseline: Yes    2. Activity and level of assistance: Up with standby assistance.    3. Pain status: Pain free.    4. Return to near baseline physical activity: Yes     Discharge Planner Nurse   Safe discharge environment identified: Yes  Barriers to discharge: Yes- hgb in AM.       Entered by: Chris Multani 11/04/2020 3:49 AM        Pt received 1 uni of blood during the evening. No acute events.   Please review provider order for any additional goals.   Nurse to notify provider when observation goals have been met and patient is ready for discharge.

## 2020-11-04 NOTE — PROGRESS NOTES
Cross Cx:     Paged for Hemoglobin of 6.0. Previously 5.4 and transfused 1 unit pRBCs 11/3. Asymptomatic. 1 unit pRBCs ordered 11/4. Monitor volume status. Assess in Am if additional diuresis indicated with blood.

## 2020-11-04 NOTE — PLAN OF CARE
PRIMARY DIAGNOSIS: ANEMIA   OUTPATIENT/OBSERVATION GOALS TO BE MET BEFORE DISCHARGE:  1. ADLs back to baseline: Yes    2. Activity and level of assistance: Up with standby assistance.    3. Pain status: Pain free.    4. Return to near baseline physical activity: Yes     Discharge Planner Nurse   Safe discharge environment identified: Yes  Barriers to discharge: Yes       Entered by: Chris Multani 11/04/2020 12:12 AM     Please review provider order for any additional goals.   Nurse to notify provider when observation goals have been met and patient is ready for discharge.

## 2020-11-05 NOTE — TELEPHONE ENCOUNTER
"Daughter aware patient needs to follow up with primary care provider. Is wondering if provider could work patient in next week in the early to mid afternoon( mornings are not a good time.)  Could come in the late am, but would prefer the afternoon.  No appointments available at preferred times.   Please advise, thanks.        IP F/U    Date: 11/4/20  Diagnosis: Acute on Chronic Anemia  Is patient active in care coordination? No  Was patient in TCU? No    Hospital/TCU/ED for chronic condition Discharge Protocol    \"Hi, my name is Lucinda Acevedo RN, a registered nurse, and I am calling from [facility]  I am calling to follow up and see how things are going for you after your recent emergency visit/hospital/TCU stay.\"    Tell me how you are doing now that you are home?\" she was doing ok.  She is still very weak.       Discharge Instructions    \"Let's review your discharge instructions.  What is/are the follow-up recommendations?  Pt. Response: follow up with Dr. Gibbons and Primary care provider.      \"Has an appointment with your primary care provider been scheduled?\"   No (schedule appointment) patient needing an appointment next week.  No appointment available next week.  See above note to provider      \"When you see the provider, I would recommend that you bring your medications with you.\"    Medications    \"Tell me what changed about your medicines when you discharged?\"    Changes to chronic meds?    no    \"What questions do you have about your medications?\"    None     New diagnoses of heart failure, COPD, diabetes, or MI?    No              Post Discharge Medication Reconciliation Status: discharge medications reconciled, continue medications without change.    Was MTM referral placed (*Make sure to put transitions as reason for referral)?   No    Call Summary    \"What questions or concerns do you have about your recent visit and your follow-up care?\"     Patient has concerns of constipation and stated her " "last good bm was a week ago.  Patient was advised on 11/2/20 by primary care provider to take senekot. Patient has not had any relief yet.  Advised patient to continue taking the senekot and could try a dulcolax tablet(has in home)  Advised to try miralax too.  Patient asked if she could try a suppository or a fleet.  Advised that is the stool is in the rectum that either one of those could be used.  Patient will try these things and call us back if no change.      \"If you have questions or things don't continue to improve, we encourage you contact us through the main clinic number (give number).  Even if the clinic is not open, triage nurses are available 24/7 to help you.     We would like you to know that our clinic has extended hours (provide information).  We also have urgent care (provide details on closest location and hours/contact info)\"      \"Thank you for your time and take care!\"                   "

## 2020-11-13 NOTE — PROGRESS NOTES
Left voice mail reminding Keisha to call in. Last call in was 11/8   Charity Garcia RN 12:14 PM 11/13/20

## 2020-11-13 NOTE — PROGRESS NOTES
Infusion Nursing Note:  Keisha Godinez presents today for 1 unit prbc.    Patient seen by provider today: No   present during visit today: Not Applicable.    Note: pt has had oxygen on while here for other transfusions.  RA sats 89%.  States she wears 1.5 L @ home.  2 L applied while here.  Lasix given post transfusion    Intravenous Access:  Peripheral IV placed.    Treatment Conditions:  Blood transfusion consent signed yes.  hgb 6.6    11/10/2020.      Post Infusion Assessment:  Patient tolerated transfusion without incident.  Blood return noted pre and post infusion.  Site patent and intact, free from redness, edema or discomfort.  No evidence of extravasations.  Access discontinued per protocol.       Discharge Plan:   Discharge instructions reviewed with: Patient.  Patient and/or family verbalized understanding of discharge instructions and all questions answered.  AVS to patient via Cirq[employer].  Patient will return prn for next appointment.   Patient discharged in stable condition accompanied by: self and daughter.  Departure Mode: Wheelchair.    Anne-Marie Tineo RN

## 2020-11-16 NOTE — PROGRESS NOTES
"[facility]  [address]RE[address].    MyHealth Tracker Heart Failure Alert      Daily Weight Goal: 138-147 lbs    Today's Weight: 147 lbs      Symptom Alert: None per MHT      Current Diuretic & Potassium Plan: Spironlaconte 50 mg daily & Torsemide 40 mg in morning and 20 mg at 1400     Last Extra Diuretic: Torsemide 40 mg (extra 20 mg per dtr on own) on 11/16/2020      No future appointments.    Notes:   Spoke to Rebecca. Keisha's legs are swelling more. She is wearing her compression stockings and \"seems to have fluid building up above her knees. Can we get lymphedema to come again? And how much extra torsemide can I give her after she has her infusions? She is getting them about every 1-2 wks and they do give her lasix before but I think she needs more when we get home.\" Rebecca reports Keisha's wt s have been treading up to 147 lbs x 2 days. I offered Rebecca an appt. She is hesitate due to COVID and asked if they could do a phone visit.     Recent discharge from [facility] (11/4-11/16) for A/C anemia due to chronic GI loss. Wt 147 lbs. HGB was 5.4. She received 2 uPRBCs for hgb of 7.3. Keisha is to Follow-up with Dr Gibbons with wkly HGB checks per R discharge summary. No appts made      MyHealth Tracker Weight Trends:               MyHealth Tracker Weight Graph:             Charity Garcia RN 1:58 PM 11/16/20          "

## 2020-11-17 NOTE — PROGRESS NOTES
Left detailed voice mail for daughter, Rebecca, with instructions for Keisha to take an extra 20 mg torsemide x 3 days.   She is currently taking 40 mg in the morning and 20 mg in afternoon.   Gave Rebecca instructions to give Keisha 40 mg in morning and 40 mg in after noon for 3 days.   I did let her know that I did place an order for lymphedema and that scheduling will be calling them to set up virtual visit with Suad.   I did ask that she call [facility] back to confirm message.   Message sent to scheduling.   Charity Garcia RN 11:11 AM 11/17/20

## 2020-11-17 NOTE — PROGRESS NOTES
Rebecca called back confirming she received message, Keisha will start the extra 20 mg today and they will wait for lymphedema and scheduling to call them.   Charity Garcia RN 12:40 PM 11/17/20

## 2020-11-17 NOTE — PROGRESS NOTES
Suad Murrell PA Gerdowsky, Christina, RN   Caller: Unspecified (Yesterday,  1:16 PM)             Have her take an extra 20mg of torsemide x 3 days and get an update the end of the week.   Yes, lymphedema clinic should be fine to resume.     If she does not want an in person we can do a virtual visit with me.     Thanks   Suad

## 2020-11-19 NOTE — PROGRESS NOTES
"Spoke to Rebecca. Keisha's legs remain swollen. Rebecca thinks they may be a little smaller but Keisha does not think so. Legs are painful with walking. Rebecca has been trying to massage her legs \"like they did when lymphedema was here But I don't think I am not it correctly.\" Keisha is taking torsemide 40 mg BID. She is urinating in good amounts and is up 3-4 times a night to void. She is elevating her legs as much as she can. She sleeps in a recliner at night and Rebecca tried her best to get Keisha to lay back far enough to get her legs elevated. Keisha has been having lots of phlegm and laying back too far has been difficult. Rebecca is very careful with Keisha's diet: no added salt, uses low sodium foods, no cookies or other baked treats or chips. Her fluid intake is ~ 64 oz/day. She does use honey in her tea. She also increased her supplemental oxygen from 1.5 lpm to 2 as Keisha feels better.   Wt remains at 147 lbs.   Lymphedema did called and requested that Rebecca bring Keisha in to their clinic. Rebecca is \"terrifed to bring mom anywhere except her transfusions. She is so frail\".   I will call Lymphedema and see if they can go to their home.     Keisha has a blood transfusion set for tomorrow, 11/20    Update to Suad Garcia, RN 3:43 PM 11/19/20      "

## 2020-11-20 NOTE — PROGRESS NOTES
Spoke to Rebecca. Keisha is receiving her 1 uPRBC with lasix after currently. Instructed Rebecca to increase torsemide to 60 mg BID. Rebecca repots that Keisha will not like taking that much as she has incontinent episodes with extra diuretics as she is embarrassed. Keisha would be willing to do 60/40.   Charity Garcia RN 1: 53 PM 11/20/20

## 2020-11-20 NOTE — PROGRESS NOTES
Infusion Nursing Note:  Keisha Godinez presents today for PRBC.    Patient seen by provider today: No   present during visit today: Not Applicable.    Note: Due to CHF, PRBC infused at rate of 80 ml/hr.    Intravenous Access:  Peripheral IV placed.    Treatment Conditions:  Blood transfusion consent signed .      Post Infusion Assessment:  Patient tolerated infusion without incident.  Blood return noted pre and post infusion.  Site patent and intact, free from redness, edema or discomfort.  No evidence of extravasations.  Access discontinued per protocol.       Discharge Plan:   Patient declined prescription refills.  Discharge instructions reviewed with: Patient.  Patient and/or family verbalized understanding of discharge instructions and all questions answered.  Patient discharged in stable condition accompanied by: self.  Departure Mode: Ambulatory.    Alva Lebron RN

## 2020-11-20 NOTE — PROGRESS NOTES
Please have her take an extra 40 torsemide daily over the weekend (since extra 20 didn't help) and check back in with her Monday. Not sure if ultimately she will need infusion clinic or something.     Thanks    Suad

## 2020-11-23 NOTE — PROGRESS NOTES
"[facility]  C[facility]    MyHealth Tracker Heart Failure Alert      Daily Weight Goal: 138-147 lbs    Today's Weight: 144 lbs    Symptom Alert: None     Current Diuretic & Potassium Plan: Torsemide 40 mg daily & spironolactone 50 mg daily.         Last Extra Diuretic: Torsemide 60 mg BID x 3 days 11/20- 11/23. Keisha willing to do 60/40 mg daily for 3 days. -   - Torsemide extra 20 mg daily (40 BID)  x 3 days 11/17 - 11/19 -  Lost 1 lb and legs remain swollen       Future Appointments   Date Time Provider Department Center   11/24/2020 12:30 PM Niranjan Bay NP [facility]   11/30/2020  2:30 PM Suad Murrell PA [facility]       Notes:   Spoke to both Rebecca and Keisha on speaker phone. Keisha is feeling :much better, I can walk and my legs don't hurt so much\". Her weight is down to 144 lbs. Legs slightly swollen yet. Message left with lymphedema to see if they can evaluate her at home due to COVID.   Today will be Keisha's last day of extra and she will return to her normal doing of 40 mg in am and 20 mg at 1400. Keisha is having HGB drawn tomorrow at [facility] hematology. I did request and fax order for BMP & BNP draw as well. Both Rebecca and Keisha are aware and okay with plan.   Will touch base with them Wednesday.   Appt with Mario for tomorrow cancelled as it is not needed. Will keep her 11/30 appt with Suad per request.       MyHealth Tracker Weight Trends:              MyHealth Tracker Weight Graph:          Charity Garcia, RN 3:18 PM 11/23/20                    "

## 2020-11-24 NOTE — PROGRESS NOTES
"Received call from Marge from [facility] stating they are not able to draw BMP & BNP as they cannot justice drawing those labs for her anemia.     I called Marge back and did let her know that when Keisha's HGB is low this puts her into a heart failure exerbaction. Marge reports [facility] will be able to draw BMP but not BNP.     Called daughter to let her know as she left a voice mail at [phone] this morning slightly upset about the lab draw as she prefers to not bring Keisha to places that if the can avoid it. I did let her know [facility] will draw BMP as well as her CBC for today.     Wt today is 144 lbs, legs \"little better I believe\". Today she will go back to her regular dosing of torsemide 40 mg in morning and 20 mg at 1400 daily.   Next appt with Suad is 11/30    Will follow up with them tomorrow.   Charity Garcia RN 9:26 AM 11/24/20      "

## 2020-11-25 NOTE — PROGRESS NOTES
Spoke to Yumiko Keisha is up 1 lbs, 145 lbs today. She is currently on torsemide 40 mg BID  Charity Garcia RN 12:24 PM 11/25/20

## 2020-11-28 NOTE — PROGRESS NOTES
Infusion Nursing Note:  Keisha Godinez presents today for 1unit PRBCs.    Patient seen by provider today: No   present during visit today: Not Applicable.    Note: Patient is accompanied by her daughter as patient is not able to go to the bathroom alone. Patient has CHF and requires her blood to be run over 4 hours and receives Lasix post transfusion. Patient wears home oxygen and was placed on 2LNC during todays infusion.    Intravenous Access:  Peripheral IV placed.    Treatment Conditions:  Lab Results   Component Value Date    HGB 7.3 11/04/2020     Lab Results   Component Value Date    WBC 6.0 11/04/2020      Lab Results   Component Value Date    ANEU 4.3 11/04/2020     Lab Results   Component Value Date     11/04/2020      Blood transfusion consent signed 6/10/20.      Post Infusion Assessment:  Patient tolerated infusion without incident.  Blood return noted pre and post infusion.  Site patent and intact, free from redness, edema or discomfort.  No evidence of extravasations.  Access discontinued per protocol.       Discharge Plan:   Patient declined prescription refills.  Discharge instructions reviewed with: Patient and Family.  Patient and family verbalized understanding of discharge instructions and all questions answered.  AVS to patient via [employer].  Patient will return as needed for next appointment.   Patient discharged in stable condition accompanied by: self and mother.  Departure Mode: Wheelchair.    Justa Hill RN

## 2020-11-30 NOTE — PROGRESS NOTES
[facility]  [place]    My[facility] Tracker Heart Failure   For office visit review      Daily Weight Goal: 138-147 lbs    T Enrollment date: 11/14/2019    Current Diuretic: Torsemide 40 mg in am and 20 mg at 1400. Spironolactone 50 mg daily    Potassium Plan: None       Diuretic Plan: Review with provider     Recent changes:       - 11/14-11/16 - Discharge from [facility] for hgb 6.6. Received 2 uPRBCs. HGB 7.3.   - 11/16 Extra 40 over wkend fot wt 147 lbs x 2 days & AFIA (extra 20 on own w/no help). Suad: extra 40 since (40 bid) x 3 days. Lymph referral   - 11/19 - Wt 147, legs seems little better. Decline appt.   11/20- 1 uPRBC w/20 Lasix. Instructed to incr torsemide from 40/20 to 60 bid for wt 147 x5 days w/painful AFIA. Willing to do 60/40 d/t incr urination   - 11/23 - Wt 144, down 3 lbs. Legs less swollen, able to walk w/n pain. Msg left w/lymph to go to her home. Mario appt no longer needed per request   - 11/24 - Wt 144. Legs smaller. Went back to 40/20 daily   - 11/25 - Wt 145. Legs good. Stay on 40/40.       Future Appointments   Date Time Provider Department Center   11/30/2020  2:30 PM [facility]       [facility] Tr[facility] Weight Trends:               [facility] Tracker Weight Graph:             Charity Garcia RN 9:47 AM 11/30/20

## 2020-11-30 NOTE — LETTER
11/30/2020    Gerri Eubanks MD  [address] [facility] [address]  [facility] [address]    RE: Keisha Godinez       Dear Colleague,    I had the pleasure of seeing Keisha Godinez in the [facility].    CARDIOLOGY CORE CLINIC TELEPHONE VISIT    Keisha Godinez is a 88 year old female who is being evaluated via a billable telephone visit.      The patient has been notified of following:     This telephone visit will be conducted via a call between you and your physician/provider. Given concern for spread of COVID 19 we are minimizing in person clinic visits when possible. We have found that certain health care needs can be provided without the need for a physical exam.  This service lets us provide the care you need with a short phone conversation.  If a prescription is necessary we can send it directly to your pharmacy.  If lab work is needed we can place an order for that and you can then stop by our lab to have the test done at a later time. If during the course of the call the physician/provider feels a telephone visit is not appropriate, you will not be charged for this service.    Telephone visits are billed at different rates depending on your insurance coverage. During this emergency period, for some insurers they may be billed the same as an in-person visit.  Please reach out to your insurance provider with any questions.    Patient has given verbal consent for Telephone visit?  Yes    Send AVS to My Chart.      I have reviewed and updated the patient's Past Medical History, Social History, Family History and Medication List.      MEDICATIONS:  Current Outpatient Medications   Medication Sig Dispense Refill     acetaminophen (TYLENOL) 325 MG tablet Take 650 mg by mouth 3 times daily as needed for mild pain       albuterol (PROAIR HFA/PROVENTIL HFA/VENTOLIN HFA) 108 (90 Base) MCG/ACT inhaler Inhale 2 puffs into the lungs every 6 hours 1 Inhaler 4     albuterol (PROVENTIL) (2.5  MG/3ML) 0.083% neb solution Take 1 vial (2.5 mg) by nebulization 4 times daily 120 vial 11     ASPIRIN NOT PRESCRIBED (INTENTIONAL) Please choose reason not prescribed, below (Patient not taking: Reported on 8/12/2020)       Cranberry Extract 250 MG TABS Take 250 mg by mouth daily       Diclofenac Sodium 1 % CREA Place 1 g onto the skin 2 times daily To L shoulder       ferrous sulfate (FEROSUL) 325 (65 Fe) MG tablet Take 1 tablet (325 mg) by mouth 2 times daily 100 tablet 11     gabapentin (NEURONTIN) 100 MG capsule Take 100 mg by mouth 3 times daily        hydrALAZINE (APRESOLINE) 25 MG tablet TAKE 3 TABLETS BY MOUTH 3 TIMES DAILY 810 tablet 0     OMEGA-3 FATTY ACIDS PO Take 1,200 mg by mouth daily        omeprazole (PRILOSEC) 20 MG DR capsule Take 1 capsule (20 mg) by mouth daily 90 capsule 3     psyllium (METAMUCIL/KONSYL) Packet Take 1 packet by mouth At Bedtime       simvastatin (ZOCOR) 10 MG tablet TAKE 1 TABLET BY MOUTH ONCE DAILY AT BEDTIME 90 tablet 0     SM STOOL SOFTENER 8.6-50 MG tablet Take 2 tablets daily as needed for constipation while taking prescription pain medications. 30 tablet 0     spironolactone (ALDACTONE) 50 MG tablet TAKE 1 TABLET BY MOUTH DAILY 90 tablet 3     torsemide (DEMADEX) 20 MG tablet Take 40 mg by mouth daily       torsemide (DEMADEX) 20 MG tablet Take 20 mg by mouth daily at 2 pm       venlafaxine (EFFEXOR-XR) 75 MG 24 hr capsule Take 1 capsule (75 mg) by mouth daily 90 capsule 3     VITAMIN D, CHOLECALCIFEROL, PO Take 1,000 Units by mouth daily          ALLERGIES  Blood transfusion related (informational only), Lisinopril, Calcium, Egg yolk, Flu virus vaccine, Keflex [cephalexin], Levaquin [levofloxacin], Sulfa drugs, and Zinc      Review Of Systems  Skin: NEGATIVE  Eyes:Ears/Nose/Throat: wears glasses  Respiratory: occ. SOB with activity  Cardiovascular:NEGATIVE,  Lower leg edema  Gastrointestinal: NEGATIVE  Genitourinary:NEGATIVE  Musculoskeletal: arthritis  Neurologic:  neuropathy in feet  Psychiatric: treated depression  Hematologic/Lymphatic/Immunologic: allergic to eggs  Endocrine:  NEGATIVE    Calli VogtMANUEL    Telephone number of patient: [phone]    Vitals - Patient Reported  Systolic (Patient Reported): 125  Diastolic (Patient Reported): 44  Weight (Patient Reported): 65.8 kg (145 lb)  SpO2 (Patient Reported): 98  Pulse (Patient Reported): 56      Most recent labs:   Results for ELAINA MAYFIELD (MRN [id number]) as of 11/30/2020 14:40   Ref. Range 11/19/2020 13:44 11/27/2020 10:54   Sodium Latest Ref Range: 133 - 144 mmol/L 138 133   Potassium Latest Ref Range: 3.4 - 5.3 mmol/L 4.0 3.6   Chloride Latest Ref Range: 94 - 109 mmol/L 105 100   Carbon Dioxide Latest Ref Range: 20 - 32 mmol/L 30 27   Urea Nitrogen Latest Ref Range: 7 - 30 mg/dL 59 (H) 61 (H)   Creatinine Latest Ref Range: 0.52 - 1.04 mg/dL 1.17 (H) 1.23 (H)   GFR Estimate Latest Ref Range: >60 mL/min/1.73_m2 41 (L) 39 (L)   GFR Estimate If Black Latest Ref Range: >60 mL/min/1.73_m2 48 (L) 45 (L)   Calcium Latest Ref Range: 8.5 - 10.1 mg/dL 8.3 (L) 8.6   Anion Gap Latest Ref Range: 3 - 14 mmol/L 3 6   N-Terminal Pro Bnp Latest Ref Range: 0 - 450 pg/mL 4,466 (H) 7,365 (H)         Primary cardiologist: Dr. Edin Chaudhari      HPI:  Ms. Mayfield is a very pleasant 88 year old female with a PMHx including hypertension, hyperlipidemia, atrial fibrillation (not on AC due to prior GI bleed), recurrent anemia (colonic AVM, iron deficiency), CVA, chronic lower extremity edema, and diastolic heart failure with secondary pulmonary hypertension. She was followed by Dr. Pradhan for a few years, but then lost to follow up in 2016 as she opted not to return as she was doing so well. However, in May 2018 she developed worsening leg edema and was admitted at [facility] with cellulitis complicated by heart failure, was diuresed and discharged to a TCU. She then returned to the hospital in June 2018 with leg edema and weight gain  once again.      I have been seeing her in the [facility] since July 2018. Since that time she has made positive changes her diet, including sodium restriction. Weights overall have trended down and swelling improved with consistent use of lymphedema wraps. I transitioned her lasix to torsemide with good result and her weight has since settled out in the 140-143 lb range. A follow up echo in Oct 2018 showed EF 55-60%, though her RV remained dilated with mildly decreased function and 2+TR.     In August 2019 she was hospitalized for symptomatic anemia, requiring transfusion. No definitive bleeding source was identified. She returned again in early October 2019 for shortness of breath and leg edema, after getting an outpatient transfusion. Once again, she was felt to be volume overloaded and required IV lasix. Anemia worsened again in April of 2020, and has remained an issue for her, with another brief admission earlier this month.    She is now following closely with heme/onc and getting weekly blood draws and transfusions. She is requiring more frequent (her daughter states weekly) infusions. She receives IV Lasix 20mg post transfusion, but is still having some issues with volume overload. She is having more leg edema and MOTT. Her weight went as high as 148 lbs, and we transiently increased her torsemide. She is now back down to 145 lbs at home, at 40mg BID of torsemide. Labs done end of last week showed a slight decline in renal function (BUN/SCr 61/1.23), K 3.6. her NT-proBNP was up at 7,365. Today, we are doing a phone visit at the daughter's request, to limit possible COVID exposures. She is feeling a bit better but still having some swelling. Her daughter is trying to do some leg massages for her until we can get the lymphedema team back out to her home.          ASSESSMENT/PLAN:     1. Acute on chronic diastolic and right heart failure.              --Most recent echo Oct 2018 with ongoing preserved EF  55-60%. Her RV has remained dilated, with mildly reduced function with 2+ TR, and was felt to be euvolemic at the time of that exam. Given her advanced age and other comorbidities, we had opted to proceed conservatively with no advanced PH workup planned.    --Recently, she is having issues with worsening edema due to recurrent anemia and frequent blood transfusions. We have escalated her PO torsemide to 40mg BID with reasonable result, but she is not yet back to baseline (140-143 lbs, currently 145 lbs). She is also skipping her morning diuretic dose the day of her transfusions due to frequent urination. Will continue the spironolactone at 50mg daily.    --Will reach out to heme/onc (Dr. Gibbons) to see if we could increase her IV Lasix to 40mg on transfusion days and obtain BMP every other week to help guide her heart failure management.   --We will contact the lymphedema clinic to see if they can visit her home for formal teaching for wrapping, etc. Her daughter is willing to do this for her at home. I encouraged her to elevate her feet when she can.    --A repeat echocardiogram may be helpful, though again, her daughter is trying to minimize in person visits due to COVID concerns.                             2. Hypertension.              --SBP has fluctuated in the past, and she has had chronically lower diastolic BPs after adequate diuresis. She has a true allergy to ACE-inhibitors (tongue swelling), and is not on a beta blocker due to resting bradycardia. Will continue hydralazine, but will have a low threshold to reduce this if needed in the setting of her anemia, if hypotension becomes an issue.     3. Atrial fibrillation, chronic.              --Rate historically has been well controlled, with intermittent bradycardia, not on BB. Some concern for underlying AV conduction issues but she has not had issues with significant dizziness or presyncope.               --She is not on anticoagulation due to GIB/anemia  noted above.        Follow up plan: Return for telephone visit with myself in ~2 weeks.       I have reviewed the note as documented above.  This accurately captures the substance of my conversation with the patient.      Phone call contact time  Call Started at 1442  Call Ended at 1503    Suad Murrell PA-C  [facility]  Pager [phone]      Thank you for allowing me to participate in the care of your patient.    Sincerely,     NATHAN Valentin     [facility] [place]

## 2020-11-30 NOTE — PROGRESS NOTES
CARDIOLOGY [facility] CLINIC TELEPHONE VISIT    Keisha Godinez is a 88 year old female who is being evaluated via a billable telephone visit.      The patient has been notified of following:     This telephone visit will be conducted via a call between you and your physician/provider. Given concern for spread of COVID 19 we are minimizing in person clinic visits when possible. We have found that certain health care needs can be provided without the need for a physical exam.  This service lets us provide the care you need with a short phone conversation.  If a prescription is necessary we can send it directly to your pharmacy.  If lab work is needed we can place an order for that and you can then stop by our lab to have the test done at a later time. If during the course of the call the physician/provider feels a telephone visit is not appropriate, you will not be charged for this service.    Telephone visits are billed at different rates depending on your insurance coverage. During this emergency period, for some insurers they may be billed the same as an in-person visit.  Please reach out to your insurance provider with any questions.    Patient has given verbal consent for Telephone visit?  Yes    Send AVS to My Chart.      I have reviewed and updated the patient's Past Medical History, Social History, Family History and Medication List.      MEDICATIONS:  Current Outpatient Medications   Medication Sig Dispense Refill     acetaminophen (TYLENOL) 325 MG tablet Take 650 mg by mouth 3 times daily as needed for mild pain       albuterol (PROAIR HFA/PROVENTIL HFA/VENTOLIN HFA) 108 (90 Base) MCG/ACT inhaler Inhale 2 puffs into the lungs every 6 hours 1 Inhaler 4     albuterol (PROVENTIL) (2.5 MG/3ML) 0.083% neb solution Take 1 vial (2.5 mg) by nebulization 4 times daily 120 vial 11     ASPIRIN NOT PRESCRIBED (INTENTIONAL) Please choose reason not prescribed, below (Patient not taking: Reported on 8/12/2020)       Cranberry  Extract 250 MG TABS Take 250 mg by mouth daily       Diclofenac Sodium 1 % CREA Place 1 g onto the skin 2 times daily To L shoulder       ferrous sulfate (FEROSUL) 325 (65 Fe) MG tablet Take 1 tablet (325 mg) by mouth 2 times daily 100 tablet 11     gabapentin (NEURONTIN) 100 MG capsule Take 100 mg by mouth 3 times daily        hydrALAZINE (APRESOLINE) 25 MG tablet TAKE 3 TABLETS BY MOUTH 3 TIMES DAILY 810 tablet 0     OMEGA-3 FATTY ACIDS PO Take 1,200 mg by mouth daily        omeprazole (PRILOSEC) 20 MG DR capsule Take 1 capsule (20 mg) by mouth daily 90 capsule 3     psyllium (METAMUCIL/KONSYL) Packet Take 1 packet by mouth At Bedtime       simvastatin (ZOCOR) 10 MG tablet TAKE 1 TABLET BY MOUTH ONCE DAILY AT BEDTIME 90 tablet 0     SM STOOL SOFTENER 8.6-50 MG tablet Take 2 tablets daily as needed for constipation while taking prescription pain medications. 30 tablet 0     spironolactone (ALDACTONE) 50 MG tablet TAKE 1 TABLET BY MOUTH DAILY 90 tablet 3     torsemide (DEMADEX) 20 MG tablet Take 40 mg by mouth daily       torsemide (DEMADEX) 20 MG tablet Take 20 mg by mouth daily at 2 pm       venlafaxine (EFFEXOR-XR) 75 MG 24 hr capsule Take 1 capsule (75 mg) by mouth daily 90 capsule 3     VITAMIN D, CHOLECALCIFEROL, PO Take 1,000 Units by mouth daily          ALLERGIES  Blood transfusion related (informational only), Lisinopril, Calcium, Egg yolk, Flu virus vaccine, Keflex [cephalexin], Levaquin [levofloxacin], Sulfa drugs, and Zinc      Review Of Systems  Skin: NEGATIVE  Eyes:Ears/Nose/Throat: wears glasses  Respiratory: occ. SOB with activity  Cardiovascular:NEGATIVE,  Lower leg edema  Gastrointestinal: NEGATIVE  Genitourinary:NEGATIVE  Musculoskeletal: arthritis  Neurologic: neuropathy in feet  Psychiatric: treated depression  Hematologic/Lymphatic/Immunologic: allergic to eggs  Endocrine:  NEGATIVE    MANUEL Souza    Telephone number of patient: [phone]    Vitals - Patient Reported  Systolic  (Patient Reported): 125  Diastolic (Patient Reported): 44  Weight (Patient Reported): 65.8 kg (145 lb)  SpO2 (Patient Reported): 98  Pulse (Patient Reported): 56      Most recent labs:   Results for ELAINA MAYFIELD (MRN [id number]) as of 11/30/2020 14:40   Ref. Range 11/19/2020 13:44 11/27/2020 10:54   Sodium Latest Ref Range: 133 - 144 mmol/L 138 133   Potassium Latest Ref Range: 3.4 - 5.3 mmol/L 4.0 3.6   Chloride Latest Ref Range: 94 - 109 mmol/L 105 100   Carbon Dioxide Latest Ref Range: 20 - 32 mmol/L 30 27   Urea Nitrogen Latest Ref Range: 7 - 30 mg/dL 59 (H) 61 (H)   Creatinine Latest Ref Range: 0.52 - 1.04 mg/dL 1.17 (H) 1.23 (H)   GFR Estimate Latest Ref Range: >60 mL/min/1.73_m2 41 (L) 39 (L)   GFR Estimate If Black Latest Ref Range: >60 mL/min/1.73_m2 48 (L) 45 (L)   Calcium Latest Ref Range: 8.5 - 10.1 mg/dL 8.3 (L) 8.6   Anion Gap Latest Ref Range: 3 - 14 mmol/L 3 6   N-Terminal Pro Bnp Latest Ref Range: 0 - 450 pg/mL 4,466 (H) 7,365 (H)         Primary cardiologist: Dr. Edin Chaudhari      HPI:  Ms. Mayfield is a very pleasant 88 year old female with a PMHx including hypertension, hyperlipidemia, atrial fibrillation (not on AC due to prior GI bleed), recurrent anemia (colonic AVM, iron deficiency), CVA, chronic lower extremity edema, and diastolic heart failure with secondary pulmonary hypertension. She was followed by Dr. Pradhan for a few years, but then lost to follow up in 2016 as she opted not to return as she was doing so well. However, in May 2018 she developed worsening leg edema and was admitted at [facility] with cellulitis complicated by heart failure, was diuresed and discharged to a TCU. She then returned to the hospital in June 2018 with leg edema and weight gain once again.      I have been seeing her in the [facility] clinic since July 2018. Since that time she has made positive changes her diet, including sodium restriction. Weights overall have trended down and swelling improved with consistent use of  lymphedema wraps. I transitioned her lasix to torsemide with good result and her weight has since settled out in the 140-143 lb range. A follow up echo in Oct 2018 showed EF 55-60%, though her RV remained dilated with mildly decreased function and 2+TR.     In August 2019 she was hospitalized for symptomatic anemia, requiring transfusion. No definitive bleeding source was identified. She returned again in early October 2019 for shortness of breath and leg edema, after getting an outpatient transfusion. Once again, she was felt to be volume overloaded and required IV lasix. Anemia worsened again in April of 2020, and has remained an issue for her, with another brief admission earlier this month.    She is now following closely with heme/onc and getting weekly blood draws and transfusions. She is requiring more frequent (her daughter states weekly) infusions. She receives IV Lasix 20mg post transfusion, but is still having some issues with volume overload. She is having more leg edema and MOTT. Her weight went as high as 148 lbs, and we transiently increased her torsemide. She is now back down to 145 lbs at home, at 40mg BID of torsemide. Labs done end of last week showed a slight decline in renal function (BUN/SCr 61/1.23), K 3.6. her NT-proBNP was up at 7,365. Today, we are doing a phone visit at the daughter's request, to limit possible COVID exposures. She is feeling a bit better but still having some swelling. Her daughter is trying to do some leg massages for her until we can get the lymphedema team back out to her home.          ASSESSMENT/PLAN:     1. Acute on chronic diastolic and right heart failure.              --Most recent echo Oct 2018 with ongoing preserved EF 55-60%. Her RV has remained dilated, with mildly reduced function with 2+ TR, and was felt to be euvolemic at the time of that exam. Given her advanced age and other comorbidities, we had opted to proceed conservatively with no advanced PH workup  planned.    --Recently, she is having issues with worsening edema due to recurrent anemia and frequent blood transfusions. We have escalated her PO torsemide to 40mg BID with reasonable result, but she is not yet back to baseline (140-143 lbs, currently 145 lbs). She is also skipping her morning diuretic dose the day of her transfusions due to frequent urination. Will continue the spironolactone at 50mg daily.    --Will reach out to heme/onc (Dr. Gibbons) to see if we could increase her IV Lasix to 40mg on transfusion days and obtain BMP every other week to help guide her heart failure management.   --We will contact the lymphedema clinic to see if they can visit her home for formal teaching for wrapping, etc. Her daughter is willing to do this for her at home. I encouraged her to elevate her feet when she can.    --A repeat echocardiogram may be helpful, though again, her daughter is trying to minimize in person visits due to COVID concerns.                             2. Hypertension.              --SBP has fluctuated in the past, and she has had chronically lower diastolic BPs after adequate diuresis. She has a true allergy to ACE-inhibitors (tongue swelling), and is not on a beta blocker due to resting bradycardia. Will continue hydralazine, but will have a low threshold to reduce this if needed in the setting of her anemia, if hypotension becomes an issue.     3. Atrial fibrillation, chronic.              --Rate historically has been well controlled, with intermittent bradycardia, not on BB. Some concern for underlying AV conduction issues but she has not had issues with significant dizziness or presyncope.               --She is not on anticoagulation due to GIB/anemia noted above.        Follow up plan: Return for telephone visit with myself in ~2 weeks.       I have reviewed the note as documented above.  This accurately captures the substance of my conversation with the patient.      Phone call contact  time  Call Started at [phone]  Call Ended at [phone]    Suad Murrell PA-C  [facility]  Pager [phone]

## 2020-11-30 NOTE — PROGRESS NOTES
Called to Dr Gibbons's office to see if he would be willing to increase Keisha's IV Lasix from 20 mg to 40 mg when she receives her blood transfusions per Suad. Keisha does not  take her morning dose of 40 mg prior to going to infusion and has been having issues with fluid overload.   Marge, is with a patient and will call back.    Spoke to Lymphedema and placed order for lymphedema to eval and treat patient at home.   Charity Garcia RN 4:24 PM 11/30/20

## 2020-11-30 NOTE — PATIENT INSTRUCTIONS
Call C.O.R.E. nurse for any questions or concerns Monday-Friday 8am-4pm: [phone]  For concerns after hours: [phone]    Medication changes: Stay on the torsemide 40mg twice daily (two tabs twice daily). Keep weighing at home daily. We will stay on this until we are back down close to 140-142 lbs.     Plan from today:   We will reach out to Dr. Gibbons's office to see if we can increase the IV lasix on transfusion days.  We will also see if they are willing to draw kidney labs every other week.     Will plan for another formal telephone visit in ~2 weeks. Our office will call you to arrange.     Lab results:    Component      Latest Ref Rng & Units 11/27/2020   Sodium      133 - 144 mmol/L 133   Potassium      3.4 - 5.3 mmol/L 3.6   Chloride      94 - 109 mmol/L 100   Carbon Dioxide      20 - 32 mmol/L 27   Anion Gap      3 - 14 mmol/L 6   Glucose      70 - 99 mg/dL 108 (H)   Urea Nitrogen      7 - 30 mg/dL 61 (H)   Creatinine      0.52 - 1.04 mg/dL 1.23 (H)   GFR Estimate      >60 mL/min/1.73:m2 39 (L)   GFR Estimate If Black      >60 mL/min/1.73:m2 45 (L)   Calcium      8.5 - 10.1 mg/dL 8.6   N-Terminal Pro Bnp      0 - 450 pg/mL 7,365 (H)

## 2020-12-01 NOTE — PROGRESS NOTES
"Jenny from lymphedema called back regarding home lymphedema at home order. Keisha will need a video visit or an in clinic viist as the provider needs to visually assess Keisha's legs.     Suad received response from Dr Gibbons regarding IV lasix. He will increase her dose from 20 mg to 40 mg as Keisha does not take her torsemide prior to having her blood transfusions. Per Dr Gibbons \"I agree with your recommendation. Please increase lasix to 40 mg IV  post transfusion. \"    Called Marge, from Dr Gibbons's office back as she called yesterday. She will call back as she is on phone.     Charity Garcia RN 11:55 AM 12/01/20          "

## 2020-12-01 NOTE — TELEPHONE ENCOUNTER
Dr Eubanks,  We received referral from the [facility] clinic for management of lymphedema, which will require PT and OT lymphedema therapist.     Per guidelines video encounter is needed to qualify as Face to Face encounter.     Requesting SW order to manage video encounter per [facility] process. The SW can assist the patient schedule and manage the video technology.     PLEASE REPY TO THIS MESSAGE as this is considered verbal order.     Thank you  Kerri Song RN [facility]  [phone]

## 2020-12-02 NOTE — PROGRESS NOTES
"Spoke to Marge this morning about increasing Keisha's Lasix from 20 to 40 mg when she has transfusions. Marge took care of calling and setting up the increased dose. Keisha is needing transfusions more frequently and they are not benefiting her as much as they used to her. Her Hgb at last check was 5.9. She has been in the low 6's prior. The soonest they can get Keisha in for blood is this Friday, 12/4. Keisha has antibodies so it takes little longer to find a match for her.   Dr Gibbons will be having an appointment soon with them to discuss.     Called and spoke to Keisha to let her know about me changing the lymphedema order to home care eval and treat and they should be hearing from them soon. I did also tell her about the increased dose of lasix after transfusions.    Her weight today is 143 lbs. Her legs are still swollen. She sounds discouraged. Emotional support given. Her breathing is \"ok\". She will have Rebecca call me when she gets back from work.       Future Appointments   Date Time Provider Department Center   12/4/2020 12:00 PM RH INFUSION CHAIR 2 [facility]   12/14/2020  2:00 PM Suad Murrell PA [facility] PSA CLIN     Update to Suad Garcia, GER 9:12 AM 12/02/20    "

## 2020-12-04 NOTE — PROGRESS NOTES
Infusion Nursing Note:  Keisha Godinez presents today for 1U PRBC plus Lasix.    Patient seen by provider today: No   present during visit today: Not Applicable.    Note: NA    Intravenous Access:  Peripheral IV placed.    Treatment Conditions:  Blood transfusion consent signed 5/15/20.  Hgb 5.9 on 12/1/20.      Post Infusion Assessment:  Patient tolerated infusion without incident.  Blood return noted pre and post infusion.  Site patent and intact, free from redness, edema or discomfort.  No evidence of extravasations.  Access discontinued per protocol.       Discharge Plan:   Discharge instructions reviewed with: Patient.  Patient and/or family verbalized understanding of discharge instructions and all questions answered.  AVS to patient via [employer].  Patient will return PRN for next appointment.   Patient discharged in stable condition accompanied by: daughter.  Departure Mode: Wheelchair.    Maeve Lennon RN

## 2020-12-04 NOTE — TELEPHONE ENCOUNTER
Dear ,  [facility] HC process is that all ordered disciplines will be involved in the development of the plan of care.  The following disciplines were unable to see Keisha Godinez; MRN [id number] within the 5 day evaluation window due to medical appointments.  There will be a delay in the PT SOC per patient request.  Requesting updated orders to begin Monday 12/7/20.      PLEASE REPY TO THIS MESSAGE  This will be considered a verbal order.    Thank you  Kerri Song RN [facility]  [phone]

## 2020-12-07 NOTE — TELEPHONE ENCOUNTER
FYI for medication duplication    Albuterol inhaler and albuterol sulfate - nebulizer are both beta adrenergic agents short-acting (inhaled)    Please advise if any changes to meds or if keep the same

## 2020-12-07 NOTE — TELEPHONE ENCOUNTER
[facility] now requests orders and shares plan of care/discharge summaries for some patients through [employer].  Please REPLY TO THIS MESSAGE OR ROUTE BACK TO THE AUTHOR in order to give authorization for orders when needed.  This is considered a verbal order, you will still receive a faxed copy of orders for signature.  Thank you for your assistance in improving collaboration for our patients.    ORDER  PT 1x/wk for 1 wk, 2x/wk for 2 wks, 1x/wk for 1 wk for strength, balance, transfers, ambulation, pain control    OT lymphedema - eval and treat lymphedema B LEs    SW eval to assist with F2F visit, assess for community resources

## 2020-12-09 NOTE — PROGRESS NOTES
[facility]  C.[facility]ARDO    MyHealth Tracker Heart Failure Alert      Daily Weight Goal: 138-147 lbs    Today's Weight: 143 lbs      Symptom Alert: None      Current Diuretic & Potassium Plan: Torsemide 40 mg BID & spironolactone 50 mg daily    Recent changes:   - 11/14-11/16 - Discharge from [facility] for hgb 6.6. Received 2 uPRBCs. HGB 7.3.   - 11/16 Extra 40 over wkend fot wt 147 lbs x 2 days & AFIA (extra 20 on own w/no help). Suad: extra 40 since (40 bid) x 3 days. Lymph referral   - 11/19 - Wt 147, legs seems little better. Decline appt.   11/20- 1 uPRBC w/20 Lasix. Instructed to incr torsemide from 40/20 to 60 bid for wt 147 x5 days w/painful AFIA. Willing to do 60/40 d/t incr urination   - 11/23 - Wt 144, down 3 lbs. Legs less swollen, able to walk w/n pain. Msg left w/lymph to go to her home. Mario appt no longer needed per request   - 11/24 - Wt 144. Legs smaller. Went back to 40/20 daily   - 11/25 - Wt 145. Legs good. Stay on 40 BID   - 11/30 [facility] appt- Wt 145 lbs, stay on 40 BID   - 12/2 - Dr Gibbons increased IVP Lasix from 20 mg to 40 mg when Keisha gets transfusion as she does not take lan morning torsemide prior to transfusions.   - 12/4 - 1 uPRBC for Hgb 5.9 ob 12/1      Future Appointments   Date Time Provider Department Center   12/9/2020 11:30 AM [facility] INFUSION CHAIR 10 [facility]   12/14/2020  2:00 PM Suad Murrell PA [facility] CLIN   1/5/2021  1:00 [facility]       Notes: Left voice mail for Keisha and Rebecca        MyHealth Tracker Weight Trends:             MyHealth Tracker Weight Graph:           Charity Garcia, RN 11:40 AM 12/09/20

## 2020-12-09 NOTE — PROGRESS NOTES
Infusion Nursing Note:  Keisha Godinez presents today for 1 unit PRBC.    Patient seen by provider today: No   present during visit today: Not Applicable.    Note: Blood run at 80 mL/hr over 4 hrs. IV lasix given post transfusion.  Wears 2.5 L home O2.      Intravenous Access:  Peripheral IV placed.    Treatment Conditions:  Blood transfusion consent signed 5/15/2020.      Post Infusion Assessment:  Patient tolerated infusion without incident.  Site patent and intact, free from redness, edema or discomfort.  No evidence of extravasations.  Access discontinued per protocol.       Discharge Plan:   AVS to patient via MYCHART.   Patient discharged in stable condition accompanied by: self.  Departure Mode: wheelchair    Miriam Branch RN

## 2020-12-11 NOTE — TELEPHONE ENCOUNTER
[facility] now requests orders and shares plan of care/discharge summaries for some patients through [employer].  Please REPLY TO THIS MESSAGE OR ROUTE BACK TO THE AUTHOR in order to give authorization for orders when needed.  This is considered a verbal order, you will still receive a faxed copy of orders for signature.  Thank you for your assistance in improving collaboration for our patients.    ORDER  Requesting ok to see pt for OT Lymphedema treatment 3w4, 2w2 for BLE edema reduction includes gradient compression bandaging, manual lymph drainage as appropriate, pt/caregiver education, fit for compression garments and establish long term edema mgt program.

## 2020-12-11 NOTE — TELEPHONE ENCOUNTER
Routing refill request to provider for review/approval because:  Labs out of range:  Bp, cr  Meka Willson RN, BSN

## 2020-12-14 NOTE — PROGRESS NOTES
[facility]  [place]    MyHealth Tracker Heart Failure   For office visit review      Daily Weight Goal: 138-147 lbs    T Enrollment date:  11/24/20    Current Diuretic & Potassium Plan: Torsemide 40 mg BID & spironolactone 50 mg daily     Recent changes:   - 11/14-11/16 - Discharge from [facility] for hgb 6.6. Received 2 uPRBCs. HGB 7.3.   - 11/16 Extra 40 over wkend fot wt 147 lbs x 2 days & AFIA (extra 20 on own w/no help). Suad: extra 40 since (40 bid) x 3 days. Lymph referral   - 11/19 - Wt 147, legs seems little better. Decline appt.   11/20- 1 uPRBC w/20 Lasix. Instructed to incr torsemide from 40/20 to 60 bid for wt 147 x5 days w/painful AFIA. Willing to do 60/40 d/t incr urination   - 11/23 - Wt 144, down 3 lbs. Legs less swollen, able to walk w/n pain. Msg left w/lymph to go to her home. Mario appt no longer needed per request   - 11/24 - Wt 144. Legs smaller. Went back to 40/20 daily   - 11/25 - Wt 145. Legs good. Stay on 40 BID   - 11/30 [facility] appt- Wt 145 lbs, stay on 40 BID   - 12/2 - Dr Gibbons increased IVP Lasix from 20 mg to 40 mg when Keisha gets transfusion as she does not take lan morning torsemide prior to transfusions.   - 12/4 - 1 uPRBC for Hgb 5.9 on 12/1  - 12/9 - 1 uPRBCs w/ 40 IVP lasix    MyHealth Tracker Weight Trends:               MyHealth Tracker Weight Graph:           Charity Garcia, RN 10:59 AM 12/14/20

## 2020-12-14 NOTE — PATIENT INSTRUCTIONS
Thank you for visiting the C.O.R.E. clinic today.     Call the C.O.R.E. nurse directly with any questions or concerns:  [phone]      Today we discussed:    No changes to our current plan from last visit.  Continue all medications unchanged.   Will request your lab work from the hematology team. We will call with any changes.     Follow up:  In 2 weeks with another phone visit with Suad. Our office will reach out to you to schedule.     Additional instructions:    1. Please weigh yourself daily after you void in the morning. Write it down on a log. If your weight is up more than 2 pounds in a day, or 5 pounds in a week, please call us right away for possible medication adjustments.  2.  Try to maintain a heart healthy, low sodium diet (less than 2,000mg per day). The less salt you eat, the less fluid you will retain.   3.   Call the [facility] nurse you have more shortness of breath, abdominal bloating, or leg swelling than usual.

## 2020-12-14 NOTE — LETTER
12/14/2020    Gerri Eubanks MD  [address] [facility] [address]  [facility] [address]    RE: Keisha Godinez       Dear Colleague,    I had the pleasure of seeing Keisha Godinez in the [facility].    CARDIOLOGY C[facility] CLINIC TELEPHONE VISIT    Keisha Godinez is a 88 year old female who is being evaluated via a billable telephone visit.      The patient has been notified of following:     This telephone visit will be conducted via a call between you and your physician/provider. Given concern for spread of COVID 19 we are minimizing in person clinic visits when possible. We have found that certain health care needs can be provided without the need for a physical exam.  This service lets us provide the care you need with a short phone conversation.  If a prescription is necessary we can send it directly to your pharmacy.  If lab work is needed we can place an order for that and you can then stop by our lab to have the test done at a later time. If during the course of the call the physician/provider feels a telephone visit is not appropriate, you will not be charged for this service.    Telephone visits are billed at different rates depending on your insurance coverage. During this emergency period, for some insurers they may be billed the same as an in-person visit.  Please reach out to your insurance provider with any questions.    Patient has given verbal consent for Telephone visit?  Yes      I have reviewed and updated the patient's Past Medical History, Social History, Family History and Medication List.      MEDICATIONS:  Current Outpatient Medications   Medication Sig Dispense Refill     acetaminophen (TYLENOL) 325 MG tablet Take 650 mg by mouth 3 times daily as needed for mild pain       albuterol (PROAIR HFA/PROVENTIL HFA/VENTOLIN HFA) 108 (90 Base) MCG/ACT inhaler Inhale 2 puffs into the lungs every 6 hours 1 Inhaler 4     albuterol (PROVENTIL) (2.5 MG/3ML) 0.083% neb  solution Take 1 vial (2.5 mg) by nebulization 4 times daily 120 vial 11     Cranberry Extract 250 MG TABS Take 250 mg by mouth daily       Diclofenac Sodium 1 % CREA Place 1 g onto the skin 2 times daily To L shoulder       ferrous sulfate (FEROSUL) 325 (65 Fe) MG tablet Take 1 tablet (325 mg) by mouth 2 times daily 100 tablet 11     gabapentin (NEURONTIN) 100 MG capsule Take 100 mg by mouth 2 times daily        hydrALAZINE (APRESOLINE) 25 MG tablet TAKE 3 TABLETS BY MOUTH 3 TIMES DAILY 810 tablet 0     OMEGA-3 FATTY ACIDS PO Take 1,200 mg by mouth daily        omeprazole (PRILOSEC) 20 MG DR capsule Take 1 capsule (20 mg) by mouth daily 90 capsule 3     psyllium (METAMUCIL/KONSYL) Packet Take 1 packet by mouth At Bedtime       simvastatin (ZOCOR) 10 MG tablet TAKE 1 TABLET BY MOUTH ONCE DAILY AT BEDTIME 90 tablet 0     SM STOOL SOFTENER 8.6-50 MG tablet Take 2 tablets daily as needed for constipation while taking prescription pain medications. 30 tablet 0     spironolactone (ALDACTONE) 50 MG tablet TAKE 1 TABLET BY MOUTH DAILY 90 tablet 3     torsemide (DEMADEX) 20 MG tablet Take 2 tablets (40 mg) by mouth 2 times daily       venlafaxine (EFFEXOR-XR) 75 MG 24 hr capsule TAKE 1 CAPSULE BY MOUTH EVERY DAY 90 capsule 1     VITAMIN D, CHOLECALCIFEROL, PO Take 1,000 Units by mouth daily        ASPIRIN NOT PRESCRIBED (INTENTIONAL) Please choose reason not prescribed, below (Patient not taking: Reported on 8/12/2020)         ALLERGIES  Blood transfusion related (informational only), Lisinopril, Calcium, Egg yolk, Flu virus vaccine, Keflex [cephalexin], Levaquin [levofloxacin], Sulfa drugs, and Zinc      Self reported vitals:  Weight: 144  /43  HR 59    Review Of Systems  Skin: negative  Eyes: negative  Ears/Nose/Throat: negative  Respiratory: No shortness of breath, dyspnea on exertion, cough, or hemoptysis  Cardiovascular: lower extremity edema  Gastrointestinal: negative  Genitourinary: negative  Musculoskeletal:  "negative   Neurologic: numbness or tingling of feet  Psychiatric: negative  Hematologic/Lymphatic/Immunologic: allergies  Endocrine: negative     SHowley CMA    Vitals - Patient Reported  Systolic (Patient Reported): (!) 151  Diastolic (Patient Reported): 43  Weight (Patient Reported): 65.3 kg (144 lb)  Height (Patient Reported): 160 cm (5' 3\")  BMI (Based on Pt Reported Ht/Wt): 25.51  Pulse (Patient Reported): 59    Most recent labs:   Drawn with her oncology team today, not available at time of visit.      Primary cardiologist: Dr. Edin Chaudhari      HPI:  Ms. Godinez is a very pleasant 88 year old female with a PMHx including hypertension, hyperlipidemia, atrial fibrillation (not on AC due to prior GI bleed), recurrent anemia (colonic AVM, iron deficiency), CVA, chronic lower extremity edema, and diastolic heart failure with secondary pulmonary hypertension. She was followed by Dr. Pradhan for a few years, but then lost to follow up in 2016 as she opted not to return as she was doing so well. However, in May 2018 she developed worsening leg edema and was admitted at [facility] with cellulitis complicated by heart failure, was diuresed and discharged to a TCU. She then returned to the hospital in June 2018 with leg edema and weight gain once again.      I have been seeing her in the [facility] clinic since July 2018. Since that time she has made positive changes her diet, including sodium restriction. Weights overall have trended down and swelling improved with consistent use of lymphedema wraps. I transitioned her lasix to torsemide with good result and her weight has since settled out in the 140-143 lb range. A follow up echo in Oct 2018 showed EF 55-60%, though her RV remained dilated with mildly decreased function and 2+TR.      In August 2019 she was hospitalized for symptomatic anemia, requiring transfusion. No definitive bleeding source was identified. She returned again in early October 2019 for shortness of breath and leg " "edema, after getting an outpatient transfusion. Once again, she was felt to be volume overloaded and required IV lasix. Anemia worsened again in April of 2020, and has remained an issue for her, with another brief admission last month. She is now following closely with heme/onc and getting weekly blood draws and transfusions. She has declined another GI workup, and instead is receiving weekly infusions.     We did a phone visit last week as her daughter was concerned about volume overload with more edema and some MOTT. Weight had gone up to 148 lbs, and we had transiently increased her torsemide which brought it back down to 145 lbs. At that visit, I reached out to her heme/onc team to see if we could increase her IV Lasix from 20mg to 40mg post transfusion days, as she was also skipping her PO diuretics those mornings (due to frequent urination with travel). In addition, a referral was sent to lymphedema clinic to see if they could assist wrapping her legs at home.    Today, in effort to limit possible exposures to COVID-19 we are doing another telephone follow up. Since last visit, weight is stable at 144-145 lbs at home, but the daughter tells me that they have not yet made the IV lasix adjustment (now scheduled for her next infusion). However, her leg edema is improved after starting leg wraps. She had some issues tolerating the tigher wraps toward the top, but they will adjust this when they return later this week. Keisha tells me that her breathing has been \"pretty good\" and her daughter only notes issues with MOTT if/when she ambulates to the bathroom without her oxygen (as she is worried she will trip and fall on the tubing). She has not had any chest pain, palpitations, or dizziness/presyncope.     I do not have any new labs for our visit today, but her daughter relays that her H&H and slowly coming up.          ASSESSMENT/PLAN:     1. Acute on chronic diastolic and right heart failure.              --Most " recent echo Oct 2018 with ongoing preserved EF 55-60%. Her RV has remained dilated, with mildly reduced function with 2+ TR, and was felt to be euvolemic at the time of that exam. Given her advanced age and other comorbidities, we had opted to proceed conservatively with no advanced PH workup planned.               --Recently, she is having issues with worsening edema due to recurrent anemia and frequent blood transfusions. We had escalated the PO torsemide at 40mg BID with reasonable result, but she is not yet back to baseline (140-143 lbs). She is skipping her morning diuretic dose the day of her transfusions due to frequent urination. Will proceed with plan to increase IV lasix to 40mg the day of her transfusions. I reached out to heme/onc (Dr. Gibbons) who was in agreement with this plan.    --Will continue the spironolactone at 50mg daily.               --Continue lymphedema visits; daughter relays improvement in swelling with wraps.  I encouraged her to elevate her feet when she can.               --A repeat echocardiogram may be helpful, though her daughter is trying to minimize in person visits due to COVID concerns.                           2. Hypertension.              --SBP has fluctuated in the past, and she has had chronically lower diastolic BPs after adequate diuresis. She has a true allergy to ACE-inhibitors (tongue swelling), and is not on a beta blocker due to resting bradycardia. Will continue hydralazine, and spironolactone, but will have a low threshold to adjust if needed in the setting of her anemia, if hypotension becomes an issue.     3. Atrial fibrillation, chronic.              --Rate historically has been well controlled, with intermittent bradycardia, not on BB. Some concern for underlying AV conduction issues but she has not had issues with significant dizziness or presyncope.               --She is not on anticoagulation due to GIB/anemia noted above.        Follow up plan: Return for  another telephone visit in ~2 weeks. Will try to obtain outside labs for review.     I have reviewed the note as documented above.  This accurately captures the substance of my conversation with the patient.      Phone call contact time  Call Started at 1414  Call Ended at 1425    Suad Murrell PA-C  [facility]  Pager [phone]      Thank you for allowing me to participate in the care of your patient.    Sincerely,     NATHAN Valentin     [facility]

## 2020-12-14 NOTE — PROGRESS NOTES
Received [facility]

## 2020-12-14 NOTE — PROGRESS NOTES
CARDIOLOGY CORE CLINIC TELEPHONE VISIT    Keisha Godinez is a 88 year old female who is being evaluated via a billable telephone visit.      The patient has been notified of following:     This telephone visit will be conducted via a call between you and your physician/provider. Given concern for spread of COVID 19 we are minimizing in person clinic visits when possible. We have found that certain health care needs can be provided without the need for a physical exam.  This service lets us provide the care you need with a short phone conversation.  If a prescription is necessary we can send it directly to your pharmacy.  If lab work is needed we can place an order for that and you can then stop by our lab to have the test done at a later time. If during the course of the call the physician/provider feels a telephone visit is not appropriate, you will not be charged for this service.    Telephone visits are billed at different rates depending on your insurance coverage. During this emergency period, for some insurers they may be billed the same as an in-person visit.  Please reach out to your insurance provider with any questions.    Patient has given verbal consent for Telephone visit?  Yes      I have reviewed and updated the patient's Past Medical History, Social History, Family History and Medication List.      MEDICATIONS:  Current Outpatient Medications   Medication Sig Dispense Refill     acetaminophen (TYLENOL) 325 MG tablet Take 650 mg by mouth 3 times daily as needed for mild pain       albuterol (PROAIR HFA/PROVENTIL HFA/VENTOLIN HFA) 108 (90 Base) MCG/ACT inhaler Inhale 2 puffs into the lungs every 6 hours 1 Inhaler 4     albuterol (PROVENTIL) (2.5 MG/3ML) 0.083% neb solution Take 1 vial (2.5 mg) by nebulization 4 times daily 120 vial 11     Cranberry Extract 250 MG TABS Take 250 mg by mouth daily       Diclofenac Sodium 1 % CREA Place 1 g onto the skin 2 times daily To L shoulder       ferrous sulfate  (FEROSUL) 325 (65 Fe) MG tablet Take 1 tablet (325 mg) by mouth 2 times daily 100 tablet 11     gabapentin (NEURONTIN) 100 MG capsule Take 100 mg by mouth 2 times daily        hydrALAZINE (APRESOLINE) 25 MG tablet TAKE 3 TABLETS BY MOUTH 3 TIMES DAILY 810 tablet 0     OMEGA-3 FATTY ACIDS PO Take 1,200 mg by mouth daily        omeprazole (PRILOSEC) 20 MG DR capsule Take 1 capsule (20 mg) by mouth daily 90 capsule 3     psyllium (METAMUCIL/KONSYL) Packet Take 1 packet by mouth At Bedtime       simvastatin (ZOCOR) 10 MG tablet TAKE 1 TABLET BY MOUTH ONCE DAILY AT BEDTIME 90 tablet 0     SM STOOL SOFTENER 8.6-50 MG tablet Take 2 tablets daily as needed for constipation while taking prescription pain medications. 30 tablet 0     spironolactone (ALDACTONE) 50 MG tablet TAKE 1 TABLET BY MOUTH DAILY 90 tablet 3     torsemide (DEMADEX) 20 MG tablet Take 2 tablets (40 mg) by mouth 2 times daily       venlafaxine (EFFEXOR-XR) 75 MG 24 hr capsule TAKE 1 CAPSULE BY MOUTH EVERY DAY 90 capsule 1     VITAMIN D, CHOLECALCIFEROL, PO Take 1,000 Units by mouth daily        ASPIRIN NOT PRESCRIBED (INTENTIONAL) Please choose reason not prescribed, below (Patient not taking: Reported on 8/12/2020)         ALLERGIES  Blood transfusion related (informational only), Lisinopril, Calcium, Egg yolk, Flu virus vaccine, Keflex [cephalexin], Levaquin [levofloxacin], Sulfa drugs, and Zinc      Self reported vitals:  Weight: 144  /43  HR 59    Review Of Systems  Skin: negative  Eyes: negative  Ears/Nose/Throat: negative  Respiratory: No shortness of breath, dyspnea on exertion, cough, or hemoptysis  Cardiovascular: lower extremity edema  Gastrointestinal: negative  Genitourinary: negative  Musculoskeletal: negative   Neurologic: numbness or tingling of feet  Psychiatric: negative  Hematologic/Lymphatic/Immunologic: allergies  Endocrine: negative     SHowley [facility]    Vitals - Patient Reported  Systolic (Patient Reported): (!) 151  Diastolic  "(Patient Reported): 43  Weight (Patient Reported): 65.3 kg (144 lb)  Height (Patient Reported): 160 cm (5' 3\")  BMI (Based on Pt Reported Ht/Wt): 25.51  Pulse (Patient Reported): 59    Most recent labs:   Drawn with her oncology team today, not available at time of visit.      Primary cardiologist: Dr. Edin Chaudhari      HPI:  Ms. Godinez is a very pleasant 88 year old female with a PMHx including hypertension, hyperlipidemia, atrial fibrillation (not on AC due to prior GI bleed), recurrent anemia (colonic AVM, iron deficiency), CVA, chronic lower extremity edema, and diastolic heart failure with secondary pulmonary hypertension. She was followed by Dr. Pradhan for a few years, but then lost to follow up in 2016 as she opted not to return as she was doing so well. However, in May 2018 she developed worsening leg edema and was admitted at [facility] with cellulitis complicated by heart failure, was diuresed and discharged to a TCU. She then returned to the hospital in June 2018 with leg edema and weight gain once again.      I have been seeing her in the [facility] clinic since July 2018. Since that time she has made positive changes her diet, including sodium restriction. Weights overall have trended down and swelling improved with consistent use of lymphedema wraps. I transitioned her lasix to torsemide with good result and her weight has since settled out in the 140-143 lb range. A follow up echo in Oct 2018 showed EF 55-60%, though her RV remained dilated with mildly decreased function and 2+TR.      In August 2019 she was hospitalized for symptomatic anemia, requiring transfusion. No definitive bleeding source was identified. She returned again in early October 2019 for shortness of breath and leg edema, after getting an outpatient transfusion. Once again, she was felt to be volume overloaded and required IV lasix. Anemia worsened again in April of 2020, and has remained an issue for her, with another brief admission last month. " "She is now following closely with heme/onc and getting weekly blood draws and transfusions. She has declined another GI workup, and instead is receiving weekly infusions.     We did a phone visit last week as her daughter was concerned about volume overload with more edema and some MOTT. Weight had gone up to 148 lbs, and we had transiently increased her torsemide which brought it back down to 145 lbs. At that visit, I reached out to her heme/onc team to see if we could increase her IV Lasix from 20mg to 40mg post transfusion days, as she was also skipping her PO diuretics those mornings (due to frequent urination with travel). In addition, a referral was sent to lymphedema clinic to see if they could assist wrapping her legs at home.    Today, in effort to limit possible exposures to COVID-19 we are doing another telephone follow up. Since last visit, weight is stable at 144-145 lbs at home, but the daughter tells me that they have not yet made the IV lasix adjustment (now scheduled for her next infusion). However, her leg edema is improved after starting leg wraps. She had some issues tolerating the tigher wraps toward the top, but they will adjust this when they return later this week. Keisha tells me that her breathing has been \"pretty good\" and her daughter only notes issues with MOTT if/when she ambulates to the bathroom without her oxygen (as she is worried she will trip and fall on the tubing). She has not had any chest pain, palpitations, or dizziness/presyncope.     I do not have any new labs for our visit today, but her daughter relays that her H&H and slowly coming up.          ASSESSMENT/PLAN:     1. Acute on chronic diastolic and right heart failure.              --Most recent echo Oct 2018 with ongoing preserved EF 55-60%. Her RV has remained dilated, with mildly reduced function with 2+ TR, and was felt to be euvolemic at the time of that exam. Given her advanced age and other comorbidities, we had " opted to proceed conservatively with no advanced PH workup planned.               --Recently, she is having issues with worsening edema due to recurrent anemia and frequent blood transfusions. We had escalated the PO torsemide at 40mg BID with reasonable result, but she is not yet back to baseline (140-143 lbs). She is skipping her morning diuretic dose the day of her transfusions due to frequent urination. Will proceed with plan to increase IV lasix to 40mg the day of her transfusions. I reached out to heme/onc (Dr. Gibbons) who was in agreement with this plan.    --Will continue the spironolactone at 50mg daily.               --Continue lymphedema visits; daughter relays improvement in swelling with wraps.  I encouraged her to elevate her feet when she can.               --A repeat echocardiogram may be helpful, though her daughter is trying to minimize in person visits due to COVID concerns.                           2. Hypertension.              --SBP has fluctuated in the past, and she has had chronically lower diastolic BPs after adequate diuresis. She has a true allergy to ACE-inhibitors (tongue swelling), and is not on a beta blocker due to resting bradycardia. Will continue hydralazine, and spironolactone, but will have a low threshold to adjust if needed in the setting of her anemia, if hypotension becomes an issue.     3. Atrial fibrillation, chronic.              --Rate historically has been well controlled, with intermittent bradycardia, not on BB. Some concern for underlying AV conduction issues but she has not had issues with significant dizziness or presyncope.               --She is not on anticoagulation due to GIB/anemia noted above.        Follow up plan: Return for another telephone visit in ~2 weeks. Will try to obtain outside labs for review.     I have reviewed the note as documented above.  This accurately captures the substance of my conversation with the patient.      Phone call contact  time  Call Started at 1414  Call Ended at 1425    Suad Murrell PA-C  [facility]  Pager [phone]

## 2020-12-15 NOTE — TELEPHONE ENCOUNTER
Pending Prescriptions:                       Disp   Refills    spironolactone (ALDACTONE) 50 MG tablet    90 tab*3        Sig: Take 1 tablet (50 mg) by mouth daily      Routing refill request to provider for review/approval because:  Protocol failed.    Please advise, thanks.

## 2020-12-16 NOTE — PROGRESS NOTES
Justa from [facility] OT Lymphedema called back. She asked if she would be able to do lymphatic massage. I did let her know that is what daughter, Rebecca, was requesting as that was effective for Keisha dn Rebecca could not remember how to do it. Justa is going to teach Rebecca. She is also going to see if Keisha would be a good candidate for pneumatic pressure pumps but will call [facility] prior.     Update to Suad Garcia RN 2:48 PM 12/16/20

## 2020-12-16 NOTE — PROGRESS NOTES
[facility]  [facility]    [employer] Tr[employer] Heart Failure Alert      Daily Weight Goal: 138-147 lbs    Today's Weight: 145 lbs    Symptom Alert: None     Current Diuretic & Potassium Plan: Spironolactone 50 mg daily & Torsemide 40 mg BID.         Future Appointments   Date Time Provider Department Center   12/17/2020 10:00 AM [facility] INFUSION CHAIR 7 [facility] [place] [facility]   12/28/2020  2:30 [facility]   1/5/2021  1:00 PM Gerri Eubanks MD hospitals       Notes: Left voice mail for Reid.             [employer] Tracker Weight Trends:               [employer] Tracker Weight Graph:           Charity Garcia, RN 2:05 PM 12/16/20

## 2020-12-17 NOTE — PROGRESS NOTES
Infusion Nursing Note:  Keisha Godinez presents today for 1 unit PRBC.    Patient seen by provider today: No   present during visit today: Not Applicable.    Note: Blood ran at 80 ml/hr over 4 hours.     Intravenous Access:  Peripheral IV placed.    Treatment Conditions:  Blood transfusion consent signed 6/10/2020.      Post Infusion Assessment:  Patient tolerated infusion without incident.  Blood return noted pre and post infusion.  Site patent and intact, free from redness, edema or discomfort.  No evidence of extravasations.  Access discontinued per protocol.       Discharge Plan:   Discharge instructions reviewed with: Patient.  Patient and/or family verbalized understanding of discharge instructions and all questions answered.  Copy of AVS reviewed with patient and/or family.    Patient discharged in stable condition accompanied by: self.  Departure Mode: Wheelchair.    Sasha Clark RN

## 2020-12-22 NOTE — PROGRESS NOTES
Infusion Nursing Note:  Keisha Godinez presents today for 1U PRBC.    Patient seen by provider today: No   present during visit today: Not Applicable.    Note: Blood infused at 80cc/hr for total of 4 hours.  Received Lasix at completion    Intravenous Access:  Peripheral IV placed.    Treatment Conditions:  Blood transfusion consent signed 6/10/20.  Hgb 6.8 on 12/21/20 at [facility].      Post Infusion Assessment:  Patient tolerated infusion without incident.  Blood return noted pre and post infusion.  Site patent and intact, free from redness, edema or discomfort.  No evidence of extravasations.  Access discontinued per protocol.       Discharge Plan:   Discharge instructions reviewed with: Patient.  Patient and/or family verbalized understanding of discharge instructions and all questions answered.  AVS to patient via [employer].  Patient will return PRN for next appointment.   Patient discharged in stable condition accompanied by: daughter.  Departure Mode: Wheelchair.    Maeve Lennon RN

## 2020-12-28 NOTE — LETTER
12/28/2020    Gerri Eubanks MD  [address] [facility] [address]  [facility] [address]    RE: Keisha Godinez       Dear Colleague,    I had the pleasure of seeing Keisha Godinez in the [facility].    CARDIOLOGY C[facility] CLINIC TELEPHONE VISIT    Keisha Godinez is a 88 year old female who is being evaluated via a billable telephone visit.      The patient has been notified of following:     This telephone visit will be conducted via a call between you and your physician/provider. Given concern for spread of COVID 19 we are minimizing in person clinic visits when possible. We have found that certain health care needs can be provided without the need for a physical exam.  This service lets us provide the care you need with a short phone conversation.  If a prescription is necessary we can send it directly to your pharmacy.  If lab work is needed we can place an order for that and you can then stop by our lab to have the test done at a later time. If during the course of the call the physician/provider feels a telephone visit is not appropriate, you will not be charged for this service.    Telephone visits are billed at different rates depending on your insurance coverage. During this emergency period, for some insurers they may be billed the same as an in-person visit.  Please reach out to your insurance provider with any questions.    Patient has given verbal consent for Telephone visit?  Yes    Review Of Systems  Skin: NEGATIVE  Eyes:Ears/Nose/Throat: NEGATIVE  Respiratory: on supp. O2 2.5L  Cardiovascular:left leg edema down slightly  Gastrointestinal: reflux  Genitourinary:NEGATIVE   Musculoskeletal: NEGATIVE  Neurologic: neuropathy in feet  Psychiatric: NEGATIVE  Hematologic/Lymphatic/Immunologic: NEGATIVE  Endocrine:  NEGATIVE    Telephone number of patient: [phone] daughter Rebecca is with patient      I have reviewed and updated the patient's Past Medical History, Social  History, Family History and Medication List.      MEDICATIONS:  Current Outpatient Medications   Medication Sig Dispense Refill     acetaminophen (TYLENOL) 325 MG tablet Take 650 mg by mouth 3 times daily as needed for mild pain       albuterol (PROAIR HFA/PROVENTIL HFA/VENTOLIN HFA) 108 (90 Base) MCG/ACT inhaler Inhale 2 puffs into the lungs every 6 hours 1 Inhaler 4     albuterol (PROVENTIL) (2.5 MG/3ML) 0.083% neb solution Take 1 vial (2.5 mg) by nebulization 4 times daily 120 vial 11     ASPIRIN NOT PRESCRIBED (INTENTIONAL) Please choose reason not prescribed, below (Patient not taking: Reported on 8/12/2020)       Cranberry Extract 250 MG TABS Take 250 mg by mouth daily       Diclofenac Sodium 1 % CREA Place 1 g onto the skin 2 times daily To L shoulder       ferrous sulfate (FEROSUL) 325 (65 Fe) MG tablet Take 1 tablet (325 mg) by mouth 2 times daily 100 tablet 11     gabapentin (NEURONTIN) 100 MG capsule Take 100 mg by mouth 2 times daily        hydrALAZINE (APRESOLINE) 25 MG tablet TAKE 3 TABLETS BY MOUTH 3 TIMES DAILY 810 tablet 0     OMEGA-3 FATTY ACIDS PO Take 1,200 mg by mouth daily        omeprazole (PRILOSEC) 20 MG DR capsule Take 1 capsule (20 mg) by mouth daily 90 capsule 3     psyllium (METAMUCIL/KONSYL) Packet Take 1 packet by mouth At Bedtime       simvastatin (ZOCOR) 10 MG tablet TAKE 1 TABLET BY MOUTH ONCE DAILY AT BEDTIME 90 tablet 0     SM STOOL SOFTENER 8.6-50 MG tablet Take 2 tablets daily as needed for constipation while taking prescription pain medications. 30 tablet 0     spironolactone (ALDACTONE) 50 MG tablet Take 1 tablet (50 mg) by mouth daily 90 tablet 3     torsemide (DEMADEX) 20 MG tablet Take 2 tablets (40 mg) by mouth 2 times daily       venlafaxine (EFFEXOR-XR) 75 MG 24 hr capsule TAKE 1 CAPSULE BY MOUTH EVERY DAY 90 capsule 1     VITAMIN D, CHOLECALCIFEROL, PO Take 1,000 Units by mouth daily          ALLERGIES  Blood transfusion related (informational only), Lisinopril,  Calcium, Egg yolk, Flu virus vaccine, Keflex [cephalexin], Levaquin [levofloxacin], Sulfa drugs, and Zinc      Self reported vitals:    Vitals - Patient Reported  Systolic (Patient Reported): (!) 153  Diastolic (Patient Reported): 61  Weight (Patient Reported): 66.7 kg (147 lb)(weight has been steady)  SpO2 (Patient Reported): 98  Pulse (Patient Reported): 51      Most recent labs:   Oncology labs (daughter reported results to me, drawn today)  Hemoglobin 7.3  WBC 8.1  Plt 199    Primary cardiologist: Dr. Edin Chaudhari      HPI:  Ms. Godinez is a very pleasant 88 year old female with a PMHx including hypertension, hyperlipidemia, atrial fibrillation (not on AC due to prior GI bleed), recurrent anemia (colonic AVM, iron deficiency), CVA, chronic lower extremity edema, and diastolic heart failure with secondary pulmonary hypertension.    I have been following her in [facility] clinic since July of 2018 after a hospital stay for cellulitis complicated by heart failure. She made positive changes in her diet, including sodium restriction, and weights stabilized with consistent use of lymphedema wraps. I transitioned her lasix to torsemide with good result and her weight then settled out in the 140-143 lb range. Her last echo in Oct 2018 showed EF 55-60%, though her RV remained dilated with mildly decreased function and 2+TR.      In August 2019 she was hospitalized for symptomatic anemia, requiring transfusion. No definitive bleeding source was identified. She returned again in early October 2019 for shortness of breath and leg edema, after getting an outpatient transfusion. Once again, she was felt to be volume overloaded and required IV lasix. Anemia worsened again in April of 2020, and has remained an issue for her, with another brief admission last month. She is now following closely with heme/onc and getting weekly blood draws and transfusions as needed. She has declined another formal GI workup, and instead is receiving weekly  "infusions.     The end of November, her daughter called due to concern about volume overload with more edema and MOTT. Weight had gone up to 148 lbs, and we had transiently increased her torsemide which brought it back down to 145 lbs. I reached out to her heme/onc team to see if we could increase her IV Lasix from 20mg to 40mg post transfusion days, as she was also skipping her PO diuretics those mornings (due to frequent urination with travel). In addition, a referral was sent to lymphedema clinic to see if they could assist wrapping her legs at home. These changes have resulted in overall improvement in her edema.     Today, in effort to limit possible exposures to COVID-19 we are doing another telephone follow up. Since last visit, weight is up again at 147 lbs; however, she reports that her leg swelling is much improved with the lymphedema wraps and thinks that her breathing is stable. Keisha says she is feeling \"pretty good\" and her daughter only notes issues with MOTT if/when she ambulates to the bathroom without her oxygen (as she is worried she will trip and fall on the tubing). She has not had any recent chest pain, palpitations, or dizziness/presyncope.      She had a CBC this morning as above, but states she did not have to have a transfusion today as her hemoglobin was >7. We have requested a BMP but unfortunately this was not yet drawn. Request sent to be added to next week's labs.          ASSESSMENT/PLAN:     1. Acute on chronic diastolic and right heart failure.              --Most recent echo Oct 2018 with ongoing preserved EF 55-60%. Her RV has remained dilated, with mildly reduced function with 2+ TR, and was felt to be euvolemic at the time of that exam. Given her advanced age and other comorbidities, we had opted to proceed conservatively with no advanced PH workup planned.               --Recently, she has had some issues with worsening edema due to recurrent anemia and frequent blood " transfusions. We have escalated the PO torsemide to 40mg BID with reasonable result, but she is not yet back to baseline (dry weight suspected 140-143 lbs). Will continue with increased IV Lasix at 40mg on days she receives blood transfusions as directed by heme/onc. Check BMP with next lab draw.               --Continue spironolactone at 50mg daily unchanged.              --Continue lymphedema treatments which she believes hs been helpful. I encouraged her to elevate her feet when she can.               --A repeat echocardiogram may be helpful, though her daughter is trying to minimize in person visits due to COVID concerns.                           2. Hypertension.              --SBP has fluctuated in the past, and she has had chronically lower diastolic BPs after adequate diuresis. She has a true allergy to ACE-inhibitors (tongue swelling), and is not on a beta blocker due to resting bradycardia. Will continue hydralazine, and spironolactone as is.      3. Atrial fibrillation, chronic.              --Rate historically has been well controlled, with intermittent bradycardia, not on BB. Some concern for underlying AV conduction issues but she has not had issues with significant dizziness or presyncope.               --She is not on anticoagulation due to GIB/anemia noted above.        Follow up plan: Return in ~8 weeks, or sooner if needed.       I have reviewed the note as documented above.  This accurately captures the substance of my conversation with the patient.      Phone call contact time  Call Started at 1405  Call Ended at 1413  Call then terminated due to technical issue.  Second call made start time 1533  End time 1540    MARCELLUS ValentinC  [facility]  Pager [phone]        Thank you for allowing me to participate in the care of your patient.    Sincerely,     NATHAN Valentin     [facility]

## 2020-12-28 NOTE — PROGRESS NOTES
CARDIOLOGY CORE CLINIC TELEPHONE VISIT    Keisha Godinez is a 88 year old female who is being evaluated via a billable telephone visit.      The patient has been notified of following:     This telephone visit will be conducted via a call between you and your physician/provider. Given concern for spread of COVID 19 we are minimizing in person clinic visits when possible. We have found that certain health care needs can be provided without the need for a physical exam.  This service lets us provide the care you need with a short phone conversation.  If a prescription is necessary we can send it directly to your pharmacy.  If lab work is needed we can place an order for that and you can then stop by our lab to have the test done at a later time. If during the course of the call the physician/provider feels a telephone visit is not appropriate, you will not be charged for this service.    Telephone visits are billed at different rates depending on your insurance coverage. During this emergency period, for some insurers they may be billed the same as an in-person visit.  Please reach out to your insurance provider with any questions.    Patient has given verbal consent for Telephone visit?  Yes    Review Of Systems  Skin: NEGATIVE  Eyes:Ears/Nose/Throat: NEGATIVE  Respiratory: on supp. O2 2.5L  Cardiovascular:left leg edema down slightly  Gastrointestinal: reflux  Genitourinary:NEGATIVE   Musculoskeletal: NEGATIVE  Neurologic: neuropathy in feet  Psychiatric: NEGATIVE  Hematologic/Lymphatic/Immunologic: NEGATIVE  Endocrine:  NEGATIVE    Telephone number of patient: [phone] daughter Rebecca is with patient      I have reviewed and updated the patient's Past Medical History, Social History, Family History and Medication List.      MEDICATIONS:  Current Outpatient Medications   Medication Sig Dispense Refill     acetaminophen (TYLENOL) 325 MG tablet Take 650 mg by mouth 3 times daily as needed for mild pain        albuterol (PROAIR HFA/PROVENTIL HFA/VENTOLIN HFA) 108 (90 Base) MCG/ACT inhaler Inhale 2 puffs into the lungs every 6 hours 1 Inhaler 4     albuterol (PROVENTIL) (2.5 MG/3ML) 0.083% neb solution Take 1 vial (2.5 mg) by nebulization 4 times daily 120 vial 11     ASPIRIN NOT PRESCRIBED (INTENTIONAL) Please choose reason not prescribed, below (Patient not taking: Reported on 8/12/2020)       Cranberry Extract 250 MG TABS Take 250 mg by mouth daily       Diclofenac Sodium 1 % CREA Place 1 g onto the skin 2 times daily To L shoulder       ferrous sulfate (FEROSUL) 325 (65 Fe) MG tablet Take 1 tablet (325 mg) by mouth 2 times daily 100 tablet 11     gabapentin (NEURONTIN) 100 MG capsule Take 100 mg by mouth 2 times daily        hydrALAZINE (APRESOLINE) 25 MG tablet TAKE 3 TABLETS BY MOUTH 3 TIMES DAILY 810 tablet 0     OMEGA-3 FATTY ACIDS PO Take 1,200 mg by mouth daily        omeprazole (PRILOSEC) 20 MG DR capsule Take 1 capsule (20 mg) by mouth daily 90 capsule 3     psyllium (METAMUCIL/KONSYL) Packet Take 1 packet by mouth At Bedtime       simvastatin (ZOCOR) 10 MG tablet TAKE 1 TABLET BY MOUTH ONCE DAILY AT BEDTIME 90 tablet 0     SM STOOL SOFTENER 8.6-50 MG tablet Take 2 tablets daily as needed for constipation while taking prescription pain medications. 30 tablet 0     spironolactone (ALDACTONE) 50 MG tablet Take 1 tablet (50 mg) by mouth daily 90 tablet 3     torsemide (DEMADEX) 20 MG tablet Take 2 tablets (40 mg) by mouth 2 times daily       venlafaxine (EFFEXOR-XR) 75 MG 24 hr capsule TAKE 1 CAPSULE BY MOUTH EVERY DAY 90 capsule 1     VITAMIN D, CHOLECALCIFEROL, PO Take 1,000 Units by mouth daily          ALLERGIES  Blood transfusion related (informational only), Lisinopril, Calcium, Egg yolk, Flu virus vaccine, Keflex [cephalexin], Levaquin [levofloxacin], Sulfa drugs, and Zinc      Self reported vitals:    Vitals - Patient Reported  Systolic (Patient Reported): (!) 153  Diastolic (Patient Reported): 61  Weight  (Patient Reported): 66.7 kg (147 lb)(weight has been steady)  SpO2 (Patient Reported): 98  Pulse (Patient Reported): 51      Most recent labs:   Oncology labs (daughter reported results to me, drawn today)  Hemoglobin 7.3  WBC 8.1  Plt 199    Primary cardiologist: Dr. Edin Chaudhari      HPI:  Ms. Godinez is a very pleasant 88 year old female with a PMHx including hypertension, hyperlipidemia, atrial fibrillation (not on AC due to prior GI bleed), recurrent anemia (colonic AVM, iron deficiency), CVA, chronic lower extremity edema, and diastolic heart failure with secondary pulmonary hypertension.    I have been following her in [facility] clinic since July of 2018 after a hospital stay for cellulitis complicated by heart failure. She made positive changes in her diet, including sodium restriction, and weights stabilized with consistent use of lymphedema wraps. I transitioned her lasix to torsemide with good result and her weight then settled out in the 140-143 lb range. Her last echo in Oct 2018 showed EF 55-60%, though her RV remained dilated with mildly decreased function and 2+TR.      In August 2019 she was hospitalized for symptomatic anemia, requiring transfusion. No definitive bleeding source was identified. She returned again in early October 2019 for shortness of breath and leg edema, after getting an outpatient transfusion. Once again, she was felt to be volume overloaded and required IV lasix. Anemia worsened again in April of 2020, and has remained an issue for her, with another brief admission last month. She is now following closely with heme/onc and getting weekly blood draws and transfusions as needed. She has declined another formal GI workup, and instead is receiving weekly infusions.     The end of November, her daughter called due to concern about volume overload with more edema and MOTT. Weight had gone up to 148 lbs, and we had transiently increased her torsemide which brought it back down to 145 lbs. I  "reached out to her heme/onc team to see if we could increase her IV Lasix from 20mg to 40mg post transfusion days, as she was also skipping her PO diuretics those mornings (due to frequent urination with travel). In addition, a referral was sent to lymphedema clinic to see if they could assist wrapping her legs at home. These changes have resulted in overall improvement in her edema.     Today, in effort to limit possible exposures to COVID-19 we are doing another telephone follow up. Since last visit, weight is up again at 147 lbs; however, she reports that her leg swelling is much improved with the lymphedema wraps and thinks that her breathing is stable. Keisha says she is feeling \"pretty good\" and her daughter only notes issues with MOTT if/when she ambulates to the bathroom without her oxygen (as she is worried she will trip and fall on the tubing). She has not had any recent chest pain, palpitations, or dizziness/presyncope.      She had a CBC this morning as above, but states she did not have to have a transfusion today as her hemoglobin was >7. We have requested a BMP but unfortunately this was not yet drawn. Request sent to be added to next week's labs.          ASSESSMENT/PLAN:     1. Acute on chronic diastolic and right heart failure.              --Most recent echo Oct 2018 with ongoing preserved EF 55-60%. Her RV has remained dilated, with mildly reduced function with 2+ TR, and was felt to be euvolemic at the time of that exam. Given her advanced age and other comorbidities, we had opted to proceed conservatively with no advanced PH workup planned.               --Recently, she has had some issues with worsening edema due to recurrent anemia and frequent blood transfusions. We have escalated the PO torsemide to 40mg BID with reasonable result, but she is not yet back to baseline (dry weight suspected 140-143 lbs). Will continue with increased IV Lasix at 40mg on days she receives blood transfusions as " directed by heme/onc. Check BMP with next lab draw.               --Continue spironolactone at 50mg daily unchanged.              --Continue lymphedema treatments which she believes hs been helpful. I encouraged her to elevate her feet when she can.               --A repeat echocardiogram may be helpful, though her daughter is trying to minimize in person visits due to COVID concerns.                           2. Hypertension.              --SBP has fluctuated in the past, and she has had chronically lower diastolic BPs after adequate diuresis. She has a true allergy to ACE-inhibitors (tongue swelling), and is not on a beta blocker due to resting bradycardia. Will continue hydralazine, and spironolactone as is.      3. Atrial fibrillation, chronic.              --Rate historically has been well controlled, with intermittent bradycardia, not on BB. Some concern for underlying AV conduction issues but she has not had issues with significant dizziness or presyncope.               --She is not on anticoagulation due to GIB/anemia noted above.        Follow up plan: Return in ~8 weeks, or sooner if needed.       I have reviewed the note as documented above.  This accurately captures the substance of my conversation with the patient.      Phone call contact time  Call Started at 1405  Call Ended at 1413  Call then terminated due to technical issue.  Second call made start time 1533  End time 1540    Suad Murrell PA-C  [facility]  Pager [phone]

## 2020-12-28 NOTE — PROGRESS NOTES
Called to [facility] to see if BMP was drawn. Lab was missed. Keisha's next lab appt is 1/4. Faxed BMP order to [phone].    Charity Garcia RN 2:41 PM 12/28/20

## 2020-12-28 NOTE — PATIENT INSTRUCTIONS
Thank you for visiting the [facility] clinic today.     Call the [facility].O.R[facility] nurse directly with any questions or concerns:  [phone]      Today we discussed:    No changes today.  Call with any new concerns, otherwise we will continue our current plan and have you return in ~8 weeks with repeat labs.     Additional instructions:    1. Please weigh yourself daily after you void in the morning. Write it down on a log. If your weight is up more than 2 pounds in a day, or 5 pounds in a week, please call us right away for possible medication adjustments.  2.  Try to maintain a heart healthy, low sodium diet (less than 2,000mg per day). The less salt you eat, the less fluid you will retain.   3.   Call the [facility] nurse you have more shortness of breath, abdominal bloating, or leg swelling than usual.

## 2020-12-31 NOTE — TELEPHONE ENCOUNTER
"Received a fax from [employer] (Rosie Ferguson [phone], fax [phone]) asking for medical records for a lymphedema pump.  States \"we received a request for a sequential compression pump for your patient.  Before we can begin the process on this patient, we must have the most recent clinical notes.  Please make sure the following was included in the notes on that visit\".    Insurance requirements for a Face to Face must include:  1.  Diagnosis of lymphedema  2.  Documentation of failed conservative treatment   A. Regular elevation   B.  Exercise   C.  Regular compliant use of compression wraps  or stockings  3.  Office notes must be signed by provider      This writer does not see where Dr. Eubanks requested a lymphedema pump for patient.  Spoke with Vannessa @ [facility] clinic as patient had a recent virtual visit there.  She states NATHAN Valentin did not order pump either (last virtual visit 12-28-20 with Suad).  She will double check with the [facility] clinic.    Will wait for response.    (Form given to ROGELIO BELLA.)      "

## 2021-01-01 ENCOUNTER — MEDICAL CORRESPONDENCE (OUTPATIENT)
Dept: HEALTH INFORMATION MANAGEMENT | Facility: CLINIC | Age: 86
End: 2021-01-01

## 2021-01-01 ENCOUNTER — APPOINTMENT (OUTPATIENT)
Dept: OCCUPATIONAL THERAPY | Facility: CLINIC | Age: 86
DRG: 291 | End: 2021-01-01
Payer: COMMERCIAL

## 2021-01-01 ENCOUNTER — DOCUMENTATION ONLY (OUTPATIENT)
Dept: CARDIOLOGY | Facility: CLINIC | Age: 86
End: 2021-01-01

## 2021-01-01 ENCOUNTER — TELEPHONE (OUTPATIENT)
Dept: INTERNAL MEDICINE | Facility: CLINIC | Age: 86
End: 2021-01-01

## 2021-01-01 ENCOUNTER — HEALTH MAINTENANCE LETTER (OUTPATIENT)
Age: 86
End: 2021-01-01

## 2021-01-01 ENCOUNTER — APPOINTMENT (OUTPATIENT)
Dept: CT IMAGING | Facility: CLINIC | Age: 86
DRG: 291 | End: 2021-01-01
Attending: EMERGENCY MEDICINE
Payer: COMMERCIAL

## 2021-01-01 ENCOUNTER — APPOINTMENT (OUTPATIENT)
Dept: GENERAL RADIOLOGY | Facility: CLINIC | Age: 86
DRG: 291 | End: 2021-01-01
Attending: EMERGENCY MEDICINE
Payer: COMMERCIAL

## 2021-01-01 ENCOUNTER — APPOINTMENT (OUTPATIENT)
Dept: OCCUPATIONAL THERAPY | Facility: CLINIC | Age: 86
DRG: 291 | End: 2021-01-01
Attending: INTERNAL MEDICINE
Payer: COMMERCIAL

## 2021-01-01 ENCOUNTER — DOCUMENTATION ONLY (OUTPATIENT)
Dept: OTHER | Facility: CLINIC | Age: 86
End: 2021-01-01

## 2021-01-01 ENCOUNTER — APPOINTMENT (OUTPATIENT)
Dept: GENERAL RADIOLOGY | Facility: CLINIC | Age: 86
End: 2021-01-01
Attending: EMERGENCY MEDICINE
Payer: COMMERCIAL

## 2021-01-01 ENCOUNTER — HOSPITAL LABORATORY (OUTPATIENT)
Dept: OTHER | Facility: CLINIC | Age: 86
End: 2021-01-01

## 2021-01-01 ENCOUNTER — TRANSFERRED RECORDS (OUTPATIENT)
Dept: HEALTH INFORMATION MANAGEMENT | Facility: CLINIC | Age: 86
End: 2021-01-01

## 2021-01-01 ENCOUNTER — INFUSION THERAPY VISIT (OUTPATIENT)
Dept: INFUSION THERAPY | Facility: CLINIC | Age: 86
End: 2021-01-01
Attending: INTERNAL MEDICINE
Payer: COMMERCIAL

## 2021-01-01 ENCOUNTER — APPOINTMENT (OUTPATIENT)
Dept: GENERAL RADIOLOGY | Facility: CLINIC | Age: 86
End: 2021-01-01
Attending: PHYSICIAN ASSISTANT
Payer: COMMERCIAL

## 2021-01-01 ENCOUNTER — CARE COORDINATION (OUTPATIENT)
Dept: CARDIOLOGY | Facility: CLINIC | Age: 86
End: 2021-01-01

## 2021-01-01 ENCOUNTER — INFUSION THERAPY VISIT (OUTPATIENT)
Dept: INFUSION THERAPY | Facility: CLINIC | Age: 86
End: 2021-01-01
Attending: NURSE PRACTITIONER
Payer: COMMERCIAL

## 2021-01-01 ENCOUNTER — APPOINTMENT (OUTPATIENT)
Dept: GENERAL RADIOLOGY | Facility: CLINIC | Age: 86
DRG: 291 | End: 2021-01-01
Attending: INTERNAL MEDICINE
Payer: COMMERCIAL

## 2021-01-01 ENCOUNTER — APPOINTMENT (OUTPATIENT)
Dept: PHYSICAL THERAPY | Facility: CLINIC | Age: 86
DRG: 291 | End: 2021-01-01
Attending: INTERNAL MEDICINE
Payer: COMMERCIAL

## 2021-01-01 ENCOUNTER — APPOINTMENT (OUTPATIENT)
Dept: PHYSICAL THERAPY | Facility: CLINIC | Age: 86
DRG: 291 | End: 2021-01-01
Payer: COMMERCIAL

## 2021-01-01 ENCOUNTER — NURSING HOME VISIT (OUTPATIENT)
Dept: GERIATRICS | Facility: CLINIC | Age: 86
End: 2021-01-01
Payer: COMMERCIAL

## 2021-01-01 ENCOUNTER — HOSPITAL ENCOUNTER (OUTPATIENT)
Dept: LAB | Facility: CLINIC | Age: 86
Discharge: HOME | End: 2021-01-04
Attending: INTERNAL MEDICINE | Admitting: NURSE PRACTITIONER
Payer: COMMERCIAL

## 2021-01-01 ENCOUNTER — HOSPITAL ENCOUNTER (OUTPATIENT)
Dept: LAB | Facility: CLINIC | Age: 86
Discharge: HOME | End: 2021-02-01
Attending: INTERNAL MEDICINE | Admitting: INTERNAL MEDICINE
Payer: COMMERCIAL

## 2021-01-01 ENCOUNTER — VIRTUAL VISIT (OUTPATIENT)
Dept: INTERNAL MEDICINE | Facility: CLINIC | Age: 86
End: 2021-01-01
Payer: COMMERCIAL

## 2021-01-01 ENCOUNTER — PATIENT OUTREACH (OUTPATIENT)
Dept: CARE COORDINATION | Facility: CLINIC | Age: 86
End: 2021-01-01

## 2021-01-01 ENCOUNTER — VIRTUAL VISIT (OUTPATIENT)
Dept: CARDIOLOGY | Facility: CLINIC | Age: 86
End: 2021-01-01
Payer: COMMERCIAL

## 2021-01-01 ENCOUNTER — HOSPITAL ENCOUNTER (INPATIENT)
Facility: CLINIC | Age: 86
LOS: 7 days | Discharge: NURSING, REHAB OR LONG-TERM CARE | DRG: 291 | End: 2021-01-23
Attending: EMERGENCY MEDICINE | Admitting: INTERNAL MEDICINE
Payer: COMMERCIAL

## 2021-01-01 ENCOUNTER — HOSPITAL ENCOUNTER (OUTPATIENT)
Dept: LAB | Facility: CLINIC | Age: 86
Discharge: HOME | End: 2021-01-11
Attending: INTERNAL MEDICINE | Admitting: INTERNAL MEDICINE
Payer: COMMERCIAL

## 2021-01-01 ENCOUNTER — HOSPITAL ENCOUNTER (OUTPATIENT)
Facility: CLINIC | Age: 86
Setting detail: SPECIMEN
Discharge: HOME | End: 2021-01-12
Attending: INTERNAL MEDICINE | Admitting: INTERNAL MEDICINE
Payer: COMMERCIAL

## 2021-01-01 ENCOUNTER — HOSPITAL ENCOUNTER (OUTPATIENT)
Facility: CLINIC | Age: 86
Setting detail: OBSERVATION
Discharge: HOME | End: 2021-02-04
Attending: EMERGENCY MEDICINE | Admitting: INTERNAL MEDICINE
Payer: COMMERCIAL

## 2021-01-01 ENCOUNTER — HOSPITAL ENCOUNTER (OUTPATIENT)
Dept: LAB | Facility: CLINIC | Age: 86
End: 2021-01-28
Attending: INTERNAL MEDICINE
Payer: COMMERCIAL

## 2021-01-01 ENCOUNTER — HOSPITAL ENCOUNTER (OUTPATIENT)
Facility: CLINIC | Age: 86
Setting detail: OBSERVATION
Discharge: NURSING, REHAB OR LONG-TERM CARE | End: 2021-01-27
Attending: PHYSICIAN ASSISTANT | Admitting: INTERNAL MEDICINE
Payer: COMMERCIAL

## 2021-01-01 VITALS
SYSTOLIC BLOOD PRESSURE: 129 MMHG | RESPIRATION RATE: 18 BRPM | BODY MASS INDEX: 22.64 KG/M2 | DIASTOLIC BLOOD PRESSURE: 56 MMHG | OXYGEN SATURATION: 98 % | HEIGHT: 63 IN | TEMPERATURE: 98.6 F | HEART RATE: 63 BPM | WEIGHT: 127.8 LBS

## 2021-01-01 VITALS
BODY MASS INDEX: 23.8 KG/M2 | RESPIRATION RATE: 22 BRPM | OXYGEN SATURATION: 94 % | SYSTOLIC BLOOD PRESSURE: 162 MMHG | DIASTOLIC BLOOD PRESSURE: 45 MMHG | TEMPERATURE: 98.7 F | HEART RATE: 60 BPM | HEIGHT: 63 IN | WEIGHT: 134.3 LBS

## 2021-01-01 VITALS
DIASTOLIC BLOOD PRESSURE: 32 MMHG | RESPIRATION RATE: 20 BRPM | HEART RATE: 51 BPM | HEIGHT: 63 IN | OXYGEN SATURATION: 97 % | SYSTOLIC BLOOD PRESSURE: 120 MMHG | WEIGHT: 135.2 LBS | BODY MASS INDEX: 23.96 KG/M2 | TEMPERATURE: 97.9 F

## 2021-01-01 VITALS
OXYGEN SATURATION: 98 % | HEART RATE: 60 BPM | DIASTOLIC BLOOD PRESSURE: 46 MMHG | RESPIRATION RATE: 20 BRPM | SYSTOLIC BLOOD PRESSURE: 182 MMHG | TEMPERATURE: 97.8 F

## 2021-01-01 VITALS
RESPIRATION RATE: 16 BRPM | HEART RATE: 55 BPM | SYSTOLIC BLOOD PRESSURE: 134 MMHG | DIASTOLIC BLOOD PRESSURE: 69 MMHG | TEMPERATURE: 98.2 F | OXYGEN SATURATION: 98 %

## 2021-01-01 VITALS
WEIGHT: 137.6 LBS | HEIGHT: 63 IN | HEART RATE: 54 BPM | TEMPERATURE: 98.6 F | SYSTOLIC BLOOD PRESSURE: 122 MMHG | RESPIRATION RATE: 16 BRPM | OXYGEN SATURATION: 96 % | DIASTOLIC BLOOD PRESSURE: 50 MMHG | BODY MASS INDEX: 24.38 KG/M2

## 2021-01-01 VITALS
OXYGEN SATURATION: 100 % | DIASTOLIC BLOOD PRESSURE: 66 MMHG | TEMPERATURE: 98.3 F | SYSTOLIC BLOOD PRESSURE: 137 MMHG | RESPIRATION RATE: 16 BRPM | HEART RATE: 71 BPM

## 2021-01-01 VITALS
HEIGHT: 63 IN | HEART RATE: 86 BPM | BODY MASS INDEX: 24.27 KG/M2 | WEIGHT: 137 LBS | DIASTOLIC BLOOD PRESSURE: 53 MMHG | TEMPERATURE: 98.8 F | RESPIRATION RATE: 20 BRPM | SYSTOLIC BLOOD PRESSURE: 162 MMHG | OXYGEN SATURATION: 96 %

## 2021-01-01 VITALS
DIASTOLIC BLOOD PRESSURE: 73 MMHG | TEMPERATURE: 97.4 F | RESPIRATION RATE: 18 BRPM | HEART RATE: 60 BPM | OXYGEN SATURATION: 100 % | SYSTOLIC BLOOD PRESSURE: 159 MMHG

## 2021-01-01 DIAGNOSIS — I50.43 ACUTE ON CHRONIC COMBINED SYSTOLIC AND DIASTOLIC CONGESTIVE HEART FAILURE (H): ICD-10-CM

## 2021-01-01 DIAGNOSIS — D50.8 OTHER IRON DEFICIENCY ANEMIA: ICD-10-CM

## 2021-01-01 DIAGNOSIS — D64.9 SYMPTOMATIC ANEMIA: Primary | ICD-10-CM

## 2021-01-01 DIAGNOSIS — D64.9 CHRONIC ANEMIA: ICD-10-CM

## 2021-01-01 DIAGNOSIS — N18.31 STAGE 3A CHRONIC KIDNEY DISEASE (H): ICD-10-CM

## 2021-01-01 DIAGNOSIS — R06.02 SOB (SHORTNESS OF BREATH): Primary | ICD-10-CM

## 2021-01-01 DIAGNOSIS — I50.22 CHRONIC SYSTOLIC CONGESTIVE HEART FAILURE (H): ICD-10-CM

## 2021-01-01 DIAGNOSIS — I10 ESSENTIAL HYPERTENSION: ICD-10-CM

## 2021-01-01 DIAGNOSIS — J96.21 ACUTE AND CHRONIC RESPIRATORY FAILURE WITH HYPOXIA (H): ICD-10-CM

## 2021-01-01 DIAGNOSIS — I50.33 ACUTE ON CHRONIC DIASTOLIC CONGESTIVE HEART FAILURE (H): ICD-10-CM

## 2021-01-01 DIAGNOSIS — K21.00 GASTROESOPHAGEAL REFLUX DISEASE WITH ESOPHAGITIS, UNSPECIFIED WHETHER HEMORRHAGE: ICD-10-CM

## 2021-01-01 DIAGNOSIS — D64.9 CHRONIC ANEMIA: Primary | ICD-10-CM

## 2021-01-01 DIAGNOSIS — D50.8 OTHER IRON DEFICIENCY ANEMIA: Primary | ICD-10-CM

## 2021-01-01 DIAGNOSIS — I27.20 PULMONARY HYPERTENSION (H): ICD-10-CM

## 2021-01-01 DIAGNOSIS — D64.9 TRANSFUSION-DEPENDENT ANEMIA: ICD-10-CM

## 2021-01-01 DIAGNOSIS — D64.9 ANEMIA, UNSPECIFIED TYPE: Primary | ICD-10-CM

## 2021-01-01 DIAGNOSIS — R53.81 PHYSICAL DECONDITIONING: ICD-10-CM

## 2021-01-01 DIAGNOSIS — R06.02 SHORTNESS OF BREATH: ICD-10-CM

## 2021-01-01 DIAGNOSIS — D64.9 ACUTE ON CHRONIC ANEMIA: ICD-10-CM

## 2021-01-01 DIAGNOSIS — J44.9 CHRONIC OBSTRUCTIVE PULMONARY DISEASE, UNSPECIFIED COPD TYPE (H): ICD-10-CM

## 2021-01-01 DIAGNOSIS — D50.0 IRON DEFICIENCY ANEMIA DUE TO CHRONIC BLOOD LOSS: ICD-10-CM

## 2021-01-01 DIAGNOSIS — F32.A DEPRESSION, UNSPECIFIED DEPRESSION TYPE: ICD-10-CM

## 2021-01-01 DIAGNOSIS — Z53.9 DIAGNOSIS NOT YET DEFINED: Primary | ICD-10-CM

## 2021-01-01 DIAGNOSIS — R10.84 ABDOMINAL PAIN, GENERALIZED: ICD-10-CM

## 2021-01-01 DIAGNOSIS — R06.00 DYSPNEA, UNSPECIFIED TYPE: ICD-10-CM

## 2021-01-01 DIAGNOSIS — I42.9 CARDIOMYOPATHY, UNSPECIFIED TYPE (H): Primary | ICD-10-CM

## 2021-01-01 DIAGNOSIS — E78.5 HYPERLIPIDEMIA, UNSPECIFIED HYPERLIPIDEMIA TYPE: ICD-10-CM

## 2021-01-01 DIAGNOSIS — F33.41 MAJOR DEPRESSIVE DISORDER, RECURRENT EPISODE, IN PARTIAL REMISSION (H): ICD-10-CM

## 2021-01-01 DIAGNOSIS — D64.9 SYMPTOMATIC ANEMIA: ICD-10-CM

## 2021-01-01 DIAGNOSIS — Z51.5 HOSPICE CARE PATIENT: Primary | ICD-10-CM

## 2021-01-01 DIAGNOSIS — I48.20 CHRONIC ATRIAL FIBRILLATION (H): ICD-10-CM

## 2021-01-01 DIAGNOSIS — M62.81 GENERALIZED MUSCLE WEAKNESS: ICD-10-CM

## 2021-01-01 DIAGNOSIS — I50.33 ACUTE ON CHRONIC DIASTOLIC CONGESTIVE HEART FAILURE (H): Primary | ICD-10-CM

## 2021-01-01 DIAGNOSIS — Z71.89 OTHER SPECIFIED COUNSELING: ICD-10-CM

## 2021-01-01 DIAGNOSIS — I10 ESSENTIAL HYPERTENSION WITH GOAL BLOOD PRESSURE LESS THAN 140/90: ICD-10-CM

## 2021-01-01 DIAGNOSIS — I50.9 CHRONIC CONGESTIVE HEART FAILURE, UNSPECIFIED HEART FAILURE TYPE (H): ICD-10-CM

## 2021-01-01 LAB
ABO + RH BLD: ABNORMAL
ABO + RH BLD: NORMAL
ALBUMIN SERPL-MCNC: 2.8 G/DL (ref 3.4–5)
ALBUMIN UR-MCNC: NEGATIVE MG/DL
ALP SERPL-CCNC: 71 U/L (ref 40–150)
ALT SERPL W P-5'-P-CCNC: 14 U/L (ref 0–50)
ANION GAP SERPL CALCULATED.3IONS-SCNC: 2 MMOL/L (ref 3–14)
ANION GAP SERPL CALCULATED.3IONS-SCNC: 3 MMOL/L (ref 3–14)
ANION GAP SERPL CALCULATED.3IONS-SCNC: 3 MMOL/L (ref 3–14)
ANION GAP SERPL CALCULATED.3IONS-SCNC: <1 MMOL/L (ref 3–14)
ANION GAP SERPL CALCULATED.3IONS-SCNC: ABNORMAL MMOL/L (ref 3–14)
APPEARANCE UR: CLEAR
AST SERPL W P-5'-P-CCNC: 19 U/L (ref 0–45)
BACTERIA #/AREA URNS HPF: ABNORMAL /HPF
BACTERIA SPEC CULT: ABNORMAL
BACTERIA SPEC CULT: ABNORMAL
BASE EXCESS BLDV CALC-SCNC: 23 MMOL/L
BASE EXCESS BLDV CALC-SCNC: 23.7 MMOL/L
BASE EXCESS BLDV CALC-SCNC: 6.4 MMOL/L
BASOPHILS # BLD AUTO: 0 10E9/L (ref 0–0.2)
BASOPHILS # BLD AUTO: 0.1 10E9/L (ref 0–0.2)
BASOPHILS NFR BLD AUTO: 0.3 %
BASOPHILS NFR BLD AUTO: 0.4 %
BASOPHILS NFR BLD AUTO: 0.9 %
BILIRUB SERPL-MCNC: 0.3 MG/DL (ref 0.2–1.3)
BILIRUB UR QL STRIP: NEGATIVE
BLD GP AB SCN SERPL QL: ABNORMAL
BLD GP AB SCN SERPL QL: NORMAL
BLD PROD TYP BPU: ABNORMAL
BLD PROD TYP BPU: NORMAL
BLD UNIT ID BPU: 0
BLOOD BANK CMNT PATIENT-IMP: ABNORMAL
BLOOD BANK CMNT PATIENT-IMP: NORMAL
BLOOD PRODUCT CODE: NORMAL
BPU ID: NORMAL
BUN SERPL-MCNC: 53 MG/DL (ref 7–30)
BUN SERPL-MCNC: 53 MG/DL (ref 7–30)
BUN SERPL-MCNC: 55 MG/DL (ref 7–30)
BUN SERPL-MCNC: 56 MG/DL (ref 7–30)
BUN SERPL-MCNC: 57 MG/DL (ref 7–30)
BUN SERPL-MCNC: 59 MG/DL (ref 7–30)
BUN SERPL-MCNC: 60 MG/DL (ref 7–30)
BUN SERPL-MCNC: 61 MG/DL (ref 7–30)
BUN SERPL-MCNC: 66 MG/DL (ref 7–30)
BUN SERPL-MCNC: 68 MG/DL (ref 7–30)
BUN SERPL-MCNC: 81 MG/DL (ref 7–30)
BUN SERPL-MCNC: 83 MG/DL (ref 7–30)
BUN SERPL-MCNC: 85 MG/DL (ref 7–30)
BUN SERPL-MCNC: 86 MG/DL (ref 7–30)
CALCIUM SERPL-MCNC: 8.4 MG/DL (ref 8.5–10.1)
CALCIUM SERPL-MCNC: 8.5 MG/DL (ref 8.5–10.1)
CALCIUM SERPL-MCNC: 8.6 MG/DL (ref 8.5–10.1)
CALCIUM SERPL-MCNC: 8.7 MG/DL (ref 8.5–10.1)
CALCIUM SERPL-MCNC: 8.8 MG/DL (ref 8.5–10.1)
CALCIUM SERPL-MCNC: 9 MG/DL (ref 8.5–10.1)
CALCIUM SERPL-MCNC: 9 MG/DL (ref 8.5–10.1)
CALCIUM SERPL-MCNC: 9.1 MG/DL (ref 8.5–10.1)
CALCIUM SERPL-MCNC: 9.2 MG/DL (ref 8.5–10.1)
CALCIUM SERPL-MCNC: 9.2 MG/DL (ref 8.5–10.1)
CALCIUM SERPL-MCNC: 9.6 MG/DL (ref 8.5–10.1)
CALCIUM SERPL-MCNC: 9.7 MG/DL (ref 8.5–10.1)
CALCIUM SERPL-MCNC: 9.8 MG/DL (ref 8.5–10.1)
CHLORIDE SERPL-SCNC: 100 MMOL/L (ref 94–109)
CHLORIDE SERPL-SCNC: 100 MMOL/L (ref 94–109)
CHLORIDE SERPL-SCNC: 101 MMOL/L (ref 94–109)
CHLORIDE SERPL-SCNC: 103 MMOL/L (ref 94–109)
CHLORIDE SERPL-SCNC: 104 MMOL/L (ref 94–109)
CHLORIDE SERPL-SCNC: 95 MMOL/L (ref 94–109)
CHLORIDE SERPL-SCNC: 96 MMOL/L (ref 94–109)
CHLORIDE SERPL-SCNC: 96 MMOL/L (ref 94–109)
CHLORIDE SERPL-SCNC: 97 MMOL/L (ref 94–109)
CHLORIDE SERPL-SCNC: 97 MMOL/L (ref 94–109)
CHLORIDE SERPL-SCNC: 98 MMOL/L (ref 94–109)
CO2 SERPL-SCNC: 32 MMOL/L (ref 20–32)
CO2 SERPL-SCNC: 33 MMOL/L (ref 20–32)
CO2 SERPL-SCNC: 36 MMOL/L (ref 20–32)
CO2 SERPL-SCNC: 37 MMOL/L (ref 20–32)
CO2 SERPL-SCNC: 39 MMOL/L (ref 20–32)
CO2 SERPL-SCNC: 42 MMOL/L (ref 20–32)
CO2 SERPL-SCNC: 43 MMOL/L (ref 20–32)
CO2 SERPL-SCNC: 44 MMOL/L (ref 20–32)
CO2 SERPL-SCNC: 45 MMOL/L (ref 20–32)
CO2 SERPL-SCNC: >45 MMOL/L (ref 20–32)
COLOR UR AUTO: ABNORMAL
CREAT SERPL-MCNC: 0.9 MG/DL (ref 0.52–1.04)
CREAT SERPL-MCNC: 0.92 MG/DL (ref 0.52–1.04)
CREAT SERPL-MCNC: 0.93 MG/DL (ref 0.52–1.04)
CREAT SERPL-MCNC: 0.95 MG/DL (ref 0.52–1.04)
CREAT SERPL-MCNC: 0.99 MG/DL (ref 0.52–1.04)
CREAT SERPL-MCNC: 1 MG/DL (ref 0.52–1.04)
CREAT SERPL-MCNC: 1.01 MG/DL (ref 0.52–1.04)
CREAT SERPL-MCNC: 1.03 MG/DL (ref 0.52–1.04)
CREAT SERPL-MCNC: 1.04 MG/DL (ref 0.52–1.04)
CREAT SERPL-MCNC: 1.07 MG/DL (ref 0.52–1.04)
CREAT SERPL-MCNC: 1.08 MG/DL (ref 0.52–1.04)
CREAT SERPL-MCNC: 1.1 MG/DL (ref 0.52–1.04)
CREAT SERPL-MCNC: 1.12 MG/DL (ref 0.52–1.04)
DIFFERENTIAL METHOD BLD: ABNORMAL
EOSINOPHIL # BLD AUTO: 0 10E9/L (ref 0–0.7)
EOSINOPHIL # BLD AUTO: 0.1 10E9/L (ref 0–0.7)
EOSINOPHIL # BLD AUTO: 0.2 10E9/L (ref 0–0.7)
EOSINOPHIL # BLD AUTO: 0.2 10E9/L (ref 0–0.7)
EOSINOPHIL NFR BLD AUTO: 0.3 %
EOSINOPHIL NFR BLD AUTO: 0.8 %
EOSINOPHIL NFR BLD AUTO: 1 %
EOSINOPHIL NFR BLD AUTO: 1.9 %
EOSINOPHIL NFR BLD AUTO: 2 %
EOSINOPHIL NFR BLD AUTO: 2.5 %
ERYTHROCYTE [DISTWIDTH] IN BLOOD BY AUTOMATED COUNT: 15.6 % (ref 10–15)
ERYTHROCYTE [DISTWIDTH] IN BLOOD BY AUTOMATED COUNT: 15.6 % (ref 10–15)
ERYTHROCYTE [DISTWIDTH] IN BLOOD BY AUTOMATED COUNT: 15.9 % (ref 10–15)
ERYTHROCYTE [DISTWIDTH] IN BLOOD BY AUTOMATED COUNT: 16 % (ref 10–15)
ERYTHROCYTE [DISTWIDTH] IN BLOOD BY AUTOMATED COUNT: 16.1 % (ref 10–15)
ERYTHROCYTE [DISTWIDTH] IN BLOOD BY AUTOMATED COUNT: 17.1 % (ref 10–15)
ERYTHROCYTE [DISTWIDTH] IN BLOOD BY AUTOMATED COUNT: 17.4 % (ref 10–15)
ERYTHROCYTE [DISTWIDTH] IN BLOOD BY AUTOMATED COUNT: 18.1 % (ref 10–15)
ERYTHROCYTE [DISTWIDTH] IN BLOOD BY AUTOMATED COUNT: 19.3 % (ref 10–15)
ERYTHROCYTE [DISTWIDTH] IN BLOOD BY AUTOMATED COUNT: 19.3 % (ref 10–15)
FERRITIN SERPL-MCNC: 27 NG/ML (ref 8–252)
GFR SERPL CREATININE-BSD FRML MDRD: 44 ML/MIN/{1.73_M2}
GFR SERPL CREATININE-BSD FRML MDRD: 45 ML/MIN/{1.73_M2}
GFR SERPL CREATININE-BSD FRML MDRD: 46 ML/MIN/{1.73_M2}
GFR SERPL CREATININE-BSD FRML MDRD: 48 ML/MIN/{1.73_M2}
GFR SERPL CREATININE-BSD FRML MDRD: 48 ML/MIN/{1.73_M2}
GFR SERPL CREATININE-BSD FRML MDRD: 49 ML/MIN/{1.73_M2}
GFR SERPL CREATININE-BSD FRML MDRD: 50 ML/MIN/{1.73_M2}
GFR SERPL CREATININE-BSD FRML MDRD: 51 ML/MIN/{1.73_M2}
GFR SERPL CREATININE-BSD FRML MDRD: 53 ML/MIN/{1.73_M2}
GFR SERPL CREATININE-BSD FRML MDRD: 55 ML/MIN/{1.73_M2}
GFR SERPL CREATININE-BSD FRML MDRD: 55 ML/MIN/{1.73_M2}
GFR SERPL CREATININE-BSD FRML MDRD: 57 ML/MIN/{1.73_M2}
GLUCOSE SERPL-MCNC: 100 MG/DL (ref 70–99)
GLUCOSE SERPL-MCNC: 101 MG/DL (ref 70–99)
GLUCOSE SERPL-MCNC: 103 MG/DL (ref 70–99)
GLUCOSE SERPL-MCNC: 103 MG/DL (ref 70–99)
GLUCOSE SERPL-MCNC: 106 MG/DL (ref 70–99)
GLUCOSE SERPL-MCNC: 106 MG/DL (ref 70–99)
GLUCOSE SERPL-MCNC: 116 MG/DL (ref 70–99)
GLUCOSE SERPL-MCNC: 118 MG/DL (ref 70–99)
GLUCOSE SERPL-MCNC: 123 MG/DL (ref 70–99)
GLUCOSE SERPL-MCNC: 80 MG/DL (ref 70–99)
GLUCOSE SERPL-MCNC: 82 MG/DL (ref 70–99)
GLUCOSE SERPL-MCNC: 84 MG/DL (ref 70–99)
GLUCOSE SERPL-MCNC: 86 MG/DL (ref 70–99)
GLUCOSE SERPL-MCNC: 96 MG/DL (ref 70–99)
GLUCOSE SERPL-MCNC: 97 MG/DL (ref 70–99)
GLUCOSE SERPL-MCNC: 98 MG/DL (ref 70–99)
GLUCOSE UR STRIP-MCNC: NEGATIVE MG/DL
HCO3 BLDV-SCNC: 32 MMOL/L (ref 21–28)
HCO3 BLDV-SCNC: 49 MMOL/L (ref 21–28)
HCO3 BLDV-SCNC: 50 MMOL/L (ref 21–28)
HCT VFR BLD AUTO: 17.1 % (ref 35–47)
HCT VFR BLD AUTO: 18.6 % (ref 35–47)
HCT VFR BLD AUTO: 20 % (ref 35–47)
HCT VFR BLD AUTO: 22.6 % (ref 35–47)
HCT VFR BLD AUTO: 23.3 % (ref 35–47)
HCT VFR BLD AUTO: 25 % (ref 35–47)
HCT VFR BLD AUTO: 25 % (ref 35–47)
HCT VFR BLD AUTO: 25.2 % (ref 35–47)
HCT VFR BLD AUTO: 27.9 % (ref 35–47)
HCT VFR BLD AUTO: 28.5 % (ref 35–47)
HGB BLD-MCNC: 5.2 G/DL (ref 11.7–15.7)
HGB BLD-MCNC: 5.5 G/DL (ref 11.7–15.7)
HGB BLD-MCNC: 5.8 G/DL (ref 11.7–15.7)
HGB BLD-MCNC: 6.2 G/DL (ref 11.7–15.7)
HGB BLD-MCNC: 6.2 G/DL (ref 11.7–15.7)
HGB BLD-MCNC: 6.5 G/DL (ref 11.7–15.7)
HGB BLD-MCNC: 7 G/DL (ref 11.7–15.7)
HGB BLD-MCNC: 7.3 G/DL (ref 11.7–15.7)
HGB BLD-MCNC: 7.3 G/DL (ref 11.7–15.7)
HGB BLD-MCNC: 7.5 G/DL (ref 11.7–15.7)
HGB BLD-MCNC: 7.6 G/DL (ref 11.7–15.7)
HGB BLD-MCNC: 8.4 G/DL (ref 11.7–15.7)
HGB BLD-MCNC: 8.4 G/DL (ref 11.7–15.7)
HGB UR QL STRIP: NEGATIVE
HYALINE CASTS #/AREA URNS LPF: 11 /LPF (ref 0–2)
IMM GRANULOCYTES # BLD: 0 10E9/L (ref 0–0.4)
IMM GRANULOCYTES # BLD: 0.1 10E9/L (ref 0–0.4)
IMM GRANULOCYTES # BLD: 0.1 10E9/L (ref 0–0.4)
IMM GRANULOCYTES NFR BLD: 0.2 %
IMM GRANULOCYTES NFR BLD: 0.2 %
IMM GRANULOCYTES NFR BLD: 0.4 %
IMM GRANULOCYTES NFR BLD: 0.5 %
IMM GRANULOCYTES NFR BLD: 0.9 %
INR PPP: 1 (ref 0.86–1.14)
INTERPRETATION ECG - MUSE: NORMAL
IRON SATN MFR SERPL: 6 % (ref 15–46)
IRON SERPL-MCNC: 25 UG/DL (ref 35–180)
KETONES UR STRIP-MCNC: NEGATIVE MG/DL
LABORATORY COMMENT REPORT: NORMAL
LACTATE BLD-SCNC: 0.4 MMOL/L (ref 0.7–2)
LACTATE BLD-SCNC: 0.4 MMOL/L (ref 0.7–2)
LACTATE BLD-SCNC: 0.6 MMOL/L (ref 0.7–2)
LACTATE BLD-SCNC: 0.6 MMOL/L (ref 0.7–2)
LEUKOCYTE ESTERASE UR QL STRIP: ABNORMAL
LIPASE SERPL-CCNC: 182 U/L (ref 73–393)
LYMPHOCYTES # BLD AUTO: 0.4 10E9/L (ref 0.8–5.3)
LYMPHOCYTES # BLD AUTO: 0.5 10E9/L (ref 0.8–5.3)
LYMPHOCYTES # BLD AUTO: 0.6 10E9/L (ref 0.8–5.3)
LYMPHOCYTES # BLD AUTO: 0.7 10E9/L (ref 0.8–5.3)
LYMPHOCYTES NFR BLD AUTO: 4.5 %
LYMPHOCYTES NFR BLD AUTO: 6.6 %
LYMPHOCYTES NFR BLD AUTO: 7 %
LYMPHOCYTES NFR BLD AUTO: 7.3 %
LYMPHOCYTES NFR BLD AUTO: 7.9 %
LYMPHOCYTES NFR BLD AUTO: 8 %
Lab: ABNORMAL
MCH RBC QN AUTO: 26 PG (ref 26.5–33)
MCH RBC QN AUTO: 26.7 PG (ref 26.5–33)
MCH RBC QN AUTO: 26.8 PG (ref 26.5–33)
MCH RBC QN AUTO: 28.1 PG (ref 26.5–33)
MCH RBC QN AUTO: 28.2 PG (ref 26.5–33)
MCH RBC QN AUTO: 28.2 PG (ref 26.5–33)
MCH RBC QN AUTO: 28.5 PG (ref 26.5–33)
MCH RBC QN AUTO: 28.9 PG (ref 26.5–33)
MCH RBC QN AUTO: 31.5 PG (ref 26.5–33)
MCH RBC QN AUTO: 31.7 PG (ref 26.5–33)
MCHC RBC AUTO-ENTMCNC: 27.4 G/DL (ref 31.5–36.5)
MCHC RBC AUTO-ENTMCNC: 27.5 G/DL (ref 31.5–36.5)
MCHC RBC AUTO-ENTMCNC: 27.9 G/DL (ref 31.5–36.5)
MCHC RBC AUTO-ENTMCNC: 28 G/DL (ref 31.5–36.5)
MCHC RBC AUTO-ENTMCNC: 29 G/DL (ref 31.5–36.5)
MCHC RBC AUTO-ENTMCNC: 29.5 G/DL (ref 31.5–36.5)
MCHC RBC AUTO-ENTMCNC: 30 G/DL (ref 31.5–36.5)
MCHC RBC AUTO-ENTMCNC: 30.1 G/DL (ref 31.5–36.5)
MCHC RBC AUTO-ENTMCNC: 30.4 G/DL (ref 31.5–36.5)
MCHC RBC AUTO-ENTMCNC: 31.2 G/DL (ref 31.5–36.5)
MCV RBC AUTO: 100 FL (ref 78–100)
MCV RBC AUTO: 101 FL (ref 78–100)
MCV RBC AUTO: 104 FL (ref 78–100)
MCV RBC AUTO: 104 FL (ref 78–100)
MCV RBC AUTO: 93 FL (ref 78–100)
MCV RBC AUTO: 93 FL (ref 78–100)
MCV RBC AUTO: 94 FL (ref 78–100)
MCV RBC AUTO: 95 FL (ref 78–100)
MCV RBC AUTO: 96 FL (ref 78–100)
MCV RBC AUTO: 98 FL (ref 78–100)
MONOCYTES # BLD AUTO: 0.5 10E9/L (ref 0–1.3)
MONOCYTES # BLD AUTO: 0.6 10E9/L (ref 0–1.3)
MONOCYTES # BLD AUTO: 0.6 10E9/L (ref 0–1.3)
MONOCYTES # BLD AUTO: 0.7 10E9/L (ref 0–1.3)
MONOCYTES # BLD AUTO: 0.8 10E9/L (ref 0–1.3)
MONOCYTES # BLD AUTO: 0.8 10E9/L (ref 0–1.3)
MONOCYTES NFR BLD AUTO: 10 %
MONOCYTES NFR BLD AUTO: 5.6 %
MONOCYTES NFR BLD AUTO: 7.1 %
MONOCYTES NFR BLD AUTO: 8.5 %
MONOCYTES NFR BLD AUTO: 8.8 %
MONOCYTES NFR BLD AUTO: 9 %
MUCOUS THREADS #/AREA URNS LPF: PRESENT /LPF
NEUTROPHILS # BLD AUTO: 4.5 10E9/L (ref 1.6–8.3)
NEUTROPHILS # BLD AUTO: 5.9 10E9/L (ref 1.6–8.3)
NEUTROPHILS # BLD AUTO: 6.5 10E9/L (ref 1.6–8.3)
NEUTROPHILS # BLD AUTO: 7.2 10E9/L (ref 1.6–8.3)
NEUTROPHILS # BLD AUTO: 7.3 10E9/L (ref 1.6–8.3)
NEUTROPHILS # BLD AUTO: 9 10E9/L (ref 1.6–8.3)
NEUTROPHILS NFR BLD AUTO: 80 %
NEUTROPHILS NFR BLD AUTO: 80.4 %
NEUTROPHILS NFR BLD AUTO: 80.9 %
NEUTROPHILS NFR BLD AUTO: 81.6 %
NEUTROPHILS NFR BLD AUTO: 84.9 %
NEUTROPHILS NFR BLD AUTO: 88.8 %
NITRATE UR QL: NEGATIVE
NRBC # BLD AUTO: 0 10*3/UL
NRBC BLD AUTO-RTO: 0 /100
NT-PROBNP SERPL-MCNC: 2889 PG/ML (ref 0–1800)
NT-PROBNP SERPL-MCNC: 5733 PG/ML (ref 0–1800)
NT-PROBNP SERPL-MCNC: 7055 PG/ML (ref 0–1800)
NUM BPU REQUESTED: 1
NUM BPU REQUESTED: 2
NUM BPU REQUESTED: 4
O2/TOTAL GAS SETTING VFR VENT: 4 %
O2/TOTAL GAS SETTING VFR VENT: ABNORMAL %
O2/TOTAL GAS SETTING VFR VENT: ABNORMAL %
OXYHGB MFR BLDV: 98 %
PCO2 BLDV: 58 MM HG (ref 40–50)
PCO2 BLDV: 68 MM HG (ref 40–50)
PCO2 BLDV: 71 MM HG (ref 40–50)
PH BLDV: 7.36 PH (ref 7.32–7.43)
PH BLDV: 7.45 PH (ref 7.32–7.43)
PH BLDV: 7.48 PH (ref 7.32–7.43)
PH UR STRIP: 5 PH (ref 5–7)
PLATELET # BLD AUTO: 101 10E9/L (ref 150–450)
PLATELET # BLD AUTO: 103 10E9/L (ref 150–450)
PLATELET # BLD AUTO: 110 10E9/L (ref 150–450)
PLATELET # BLD AUTO: 112 10E9/L (ref 150–450)
PLATELET # BLD AUTO: 114 10E9/L (ref 150–450)
PLATELET # BLD AUTO: 118 10E9/L (ref 150–450)
PLATELET # BLD AUTO: 143 10E9/L (ref 150–450)
PLATELET # BLD AUTO: 149 10E9/L (ref 150–450)
PLATELET # BLD AUTO: 206 10E9/L (ref 150–450)
PLATELET # BLD AUTO: 96 10E9/L (ref 150–450)
PLATELET # BLD AUTO: 98 10E9/L (ref 150–450)
PLATELET # BLD AUTO: ABNORMAL 10E9/L (ref 150–450)
PO2 BLDV: 124 MM HG (ref 25–47)
PO2 BLDV: 26 MM HG (ref 25–47)
PO2 BLDV: 90 MM HG (ref 25–47)
POTASSIUM SERPL-SCNC: 3.6 MMOL/L (ref 3.4–5.3)
POTASSIUM SERPL-SCNC: 3.6 MMOL/L (ref 3.4–5.3)
POTASSIUM SERPL-SCNC: 3.9 MMOL/L (ref 3.4–5.3)
POTASSIUM SERPL-SCNC: 4 MMOL/L (ref 3.4–5.3)
POTASSIUM SERPL-SCNC: 4.1 MMOL/L (ref 3.4–5.3)
POTASSIUM SERPL-SCNC: 4.1 MMOL/L (ref 3.4–5.3)
POTASSIUM SERPL-SCNC: 4.2 MMOL/L (ref 3.4–5.3)
POTASSIUM SERPL-SCNC: 4.3 MMOL/L (ref 3.4–5.3)
POTASSIUM SERPL-SCNC: 4.4 MMOL/L (ref 3.4–5.3)
POTASSIUM SERPL-SCNC: 4.5 MMOL/L (ref 3.4–5.3)
POTASSIUM SERPL-SCNC: 4.8 MMOL/L (ref 3.4–5.3)
POTASSIUM SERPL-SCNC: 4.9 MMOL/L (ref 3.4–5.3)
POTASSIUM SERPL-SCNC: 5 MMOL/L (ref 3.4–5.3)
PROT SERPL-MCNC: 7.3 G/DL (ref 6.8–8.8)
RBC # BLD AUTO: 1.64 10E12/L (ref 3.8–5.2)
RBC # BLD AUTO: 1.84 10E12/L (ref 3.8–5.2)
RBC # BLD AUTO: 1.93 10E12/L (ref 3.8–5.2)
RBC # BLD AUTO: 2.31 10E12/L (ref 3.8–5.2)
RBC # BLD AUTO: 2.5 10E12/L (ref 3.8–5.2)
RBC # BLD AUTO: 2.53 10E12/L (ref 3.8–5.2)
RBC # BLD AUTO: 2.62 10E12/L (ref 3.8–5.2)
RBC # BLD AUTO: 2.66 10E12/L (ref 3.8–5.2)
RBC # BLD AUTO: 2.98 10E12/L (ref 3.8–5.2)
RBC # BLD AUTO: 2.99 10E12/L (ref 3.8–5.2)
RBC #/AREA URNS AUTO: 2 /HPF (ref 0–2)
SARS-COV-2 RNA RESP QL NAA+PROBE: NEGATIVE
SARS-COV-2 RNA RESP QL NAA+PROBE: NORMAL
SODIUM SERPL-SCNC: 136 MMOL/L (ref 133–144)
SODIUM SERPL-SCNC: 136 MMOL/L (ref 133–144)
SODIUM SERPL-SCNC: 137 MMOL/L (ref 133–144)
SODIUM SERPL-SCNC: 138 MMOL/L (ref 133–144)
SODIUM SERPL-SCNC: 139 MMOL/L (ref 133–144)
SODIUM SERPL-SCNC: 140 MMOL/L (ref 133–144)
SODIUM SERPL-SCNC: 140 MMOL/L (ref 133–144)
SODIUM SERPL-SCNC: 141 MMOL/L (ref 133–144)
SODIUM SERPL-SCNC: 144 MMOL/L (ref 133–144)
SODIUM SERPL-SCNC: 147 MMOL/L (ref 133–144)
SOURCE: ABNORMAL
SP GR UR STRIP: 1.01 (ref 1–1.03)
SPECIMEN EXP DATE BLD: ABNORMAL
SPECIMEN EXP DATE BLD: NORMAL
SPECIMEN SOURCE: ABNORMAL
SPECIMEN SOURCE: NORMAL
SQUAMOUS #/AREA URNS AUTO: <1 /HPF (ref 0–1)
TIBC SERPL-MCNC: 390 UG/DL (ref 240–430)
TRANSFUSION STATUS PATIENT QL: NORMAL
TROPONIN I SERPL-MCNC: 0.02 UG/L (ref 0–0.04)
TROPONIN I SERPL-MCNC: 0.02 UG/L (ref 0–0.04)
TROPONIN I SERPL-MCNC: 0.03 UG/L (ref 0–0.04)
TROPONIN I SERPL-MCNC: 0.03 UG/L (ref 0–0.04)
UROBILINOGEN UR STRIP-MCNC: NORMAL MG/DL (ref 0–2)
WBC # BLD AUTO: 10.2 10E9/L (ref 4–11)
WBC # BLD AUTO: 5.5 10E9/L (ref 4–11)
WBC # BLD AUTO: 6 10E9/L (ref 4–11)
WBC # BLD AUTO: 6.4 10E9/L (ref 4–11)
WBC # BLD AUTO: 6.8 10E9/L (ref 4–11)
WBC # BLD AUTO: 7.5 10E9/L (ref 4–11)
WBC # BLD AUTO: 7.9 10E9/L (ref 4–11)
WBC # BLD AUTO: 7.9 10E9/L (ref 4–11)
WBC # BLD AUTO: 8.4 10E9/L (ref 4–11)
WBC # BLD AUTO: 9 10E9/L (ref 4–11)
WBC #/AREA URNS AUTO: 8 /HPF (ref 0–5)

## 2021-01-01 PROCEDURE — 96374 THER/PROPH/DIAG INJ IV PUSH: CPT | Mod: 59

## 2021-01-01 PROCEDURE — 36415 COLL VENOUS BLD VENIPUNCTURE: CPT | Performed by: INTERNAL MEDICINE

## 2021-01-01 PROCEDURE — 36415 COLL VENOUS BLD VENIPUNCTURE: CPT | Performed by: EMERGENCY MEDICINE

## 2021-01-01 PROCEDURE — 250N000009 HC RX 250: Performed by: INTERNAL MEDICINE

## 2021-01-01 PROCEDURE — 85025 COMPLETE CBC W/AUTO DIFF WBC: CPT | Performed by: INTERNAL MEDICINE

## 2021-01-01 PROCEDURE — 250N000011 HC RX IP 250 OP 636: Performed by: INTERNAL MEDICINE

## 2021-01-01 PROCEDURE — 85018 HEMOGLOBIN: CPT | Performed by: INTERNAL MEDICINE

## 2021-01-01 PROCEDURE — 120N000001 HC R&B MED SURG/OB

## 2021-01-01 PROCEDURE — 36430 TRANSFUSION BLD/BLD COMPNT: CPT

## 2021-01-01 PROCEDURE — 96376 TX/PRO/DX INJ SAME DRUG ADON: CPT

## 2021-01-01 PROCEDURE — 86902 BLOOD TYPE ANTIGEN DONOR EA: CPT | Performed by: EMERGENCY MEDICINE

## 2021-01-01 PROCEDURE — U0005 INFEC AGEN DETEC AMPLI PROBE: HCPCS | Performed by: INTERNAL MEDICINE

## 2021-01-01 PROCEDURE — 96374 THER/PROPH/DIAG INJ IV PUSH: CPT

## 2021-01-01 PROCEDURE — 80048 BASIC METABOLIC PNL TOTAL CA: CPT | Performed by: EMERGENCY MEDICINE

## 2021-01-01 PROCEDURE — 97116 GAIT TRAINING THERAPY: CPT | Mod: GP

## 2021-01-01 PROCEDURE — 99233 SBSQ HOSP IP/OBS HIGH 50: CPT | Performed by: INTERNAL MEDICINE

## 2021-01-01 PROCEDURE — 83605 ASSAY OF LACTIC ACID: CPT | Performed by: INTERNAL MEDICINE

## 2021-01-01 PROCEDURE — 99239 HOSP IP/OBS DSCHRG MGMT >30: CPT | Performed by: INTERNAL MEDICINE

## 2021-01-01 PROCEDURE — 99217 PR OBSERVATION CARE DISCHARGE: CPT | Mod: GW | Performed by: PHYSICIAN ASSISTANT

## 2021-01-01 PROCEDURE — 99207 PR CDG-MDM COMPONENT: MEETS LOW - DOWN CODED: CPT | Performed by: INTERNAL MEDICINE

## 2021-01-01 PROCEDURE — 250N000013 HC RX MED GY IP 250 OP 250 PS 637: Performed by: INTERNAL MEDICINE

## 2021-01-01 PROCEDURE — G0378 HOSPITAL OBSERVATION PER HR: HCPCS

## 2021-01-01 PROCEDURE — 71046 X-RAY EXAM CHEST 2 VIEWS: CPT

## 2021-01-01 PROCEDURE — 82728 ASSAY OF FERRITIN: CPT | Performed by: PHYSICIAN ASSISTANT

## 2021-01-01 PROCEDURE — 36415 COLL VENOUS BLD VENIPUNCTURE: CPT

## 2021-01-01 PROCEDURE — 80048 BASIC METABOLIC PNL TOTAL CA: CPT | Performed by: INTERNAL MEDICINE

## 2021-01-01 PROCEDURE — 99285 EMERGENCY DEPT VISIT HI MDM: CPT | Mod: 25

## 2021-01-01 PROCEDURE — 83540 ASSAY OF IRON: CPT | Performed by: PHYSICIAN ASSISTANT

## 2021-01-01 PROCEDURE — 94640 AIRWAY INHALATION TREATMENT: CPT

## 2021-01-01 PROCEDURE — 86900 BLOOD TYPING SEROLOGIC ABO: CPT | Performed by: PHYSICIAN ASSISTANT

## 2021-01-01 PROCEDURE — 94640 AIRWAY INHALATION TREATMENT: CPT | Mod: 76

## 2021-01-01 PROCEDURE — 99223 1ST HOSP IP/OBS HIGH 75: CPT | Mod: AI | Performed by: INTERNAL MEDICINE

## 2021-01-01 PROCEDURE — C9803 HOPD COVID-19 SPEC COLLECT: HCPCS

## 2021-01-01 PROCEDURE — 99215 OFFICE O/P EST HI 40 MIN: CPT | Performed by: CLINICAL NURSE SPECIALIST

## 2021-01-01 PROCEDURE — 93005 ELECTROCARDIOGRAM TRACING: CPT

## 2021-01-01 PROCEDURE — 97530 THERAPEUTIC ACTIVITIES: CPT | Mod: GP

## 2021-01-01 PROCEDURE — U0003 INFECTIOUS AGENT DETECTION BY NUCLEIC ACID (DNA OR RNA); SEVERE ACUTE RESPIRATORY SYNDROME CORONAVIRUS 2 (SARS-COV-2) (CORONAVIRUS DISEASE [COVID-19]), AMPLIFIED PROBE TECHNIQUE, MAKING USE OF HIGH THROUGHPUT TECHNOLOGIES AS DESCRIBED BY CMS-2020-01-R: HCPCS | Performed by: INTERNAL MEDICINE

## 2021-01-01 PROCEDURE — 82805 BLOOD GASES W/O2 SATURATION: CPT | Performed by: PHYSICIAN ASSISTANT

## 2021-01-01 PROCEDURE — 86922 COMPATIBILITY TEST ANTIGLOB: CPT | Performed by: EMERGENCY MEDICINE

## 2021-01-01 PROCEDURE — 86900 BLOOD TYPING SEROLOGIC ABO: CPT | Performed by: EMERGENCY MEDICINE

## 2021-01-01 PROCEDURE — 83605 ASSAY OF LACTIC ACID: CPT | Performed by: EMERGENCY MEDICINE

## 2021-01-01 PROCEDURE — 250N000011 HC RX IP 250 OP 636: Performed by: CLINICAL NURSE SPECIALIST

## 2021-01-01 PROCEDURE — 999N000157 HC STATISTIC RCP TIME EA 10 MIN

## 2021-01-01 PROCEDURE — 86902 BLOOD TYPE ANTIGEN DONOR EA: CPT | Performed by: INTERNAL MEDICINE

## 2021-01-01 PROCEDURE — 81001 URINALYSIS AUTO W/SCOPE: CPT | Performed by: EMERGENCY MEDICINE

## 2021-01-01 PROCEDURE — P9016 RBC LEUKOCYTES REDUCED: HCPCS | Performed by: PHYSICIAN ASSISTANT

## 2021-01-01 PROCEDURE — 80053 COMPREHEN METABOLIC PANEL: CPT | Performed by: EMERGENCY MEDICINE

## 2021-01-01 PROCEDURE — 99214 OFFICE O/P EST MOD 30 MIN: CPT | Mod: 95 | Performed by: INTERNAL MEDICINE

## 2021-01-01 PROCEDURE — 250N000011 HC RX IP 250 OP 636: Performed by: PHYSICIAN ASSISTANT

## 2021-01-01 PROCEDURE — 250N000009 HC RX 250: Performed by: PHYSICIAN ASSISTANT

## 2021-01-01 PROCEDURE — 36415 COLL VENOUS BLD VENIPUNCTURE: CPT | Performed by: NURSE PRACTITIONER

## 2021-01-01 PROCEDURE — 99232 SBSQ HOSP IP/OBS MODERATE 35: CPT | Performed by: INTERNAL MEDICINE

## 2021-01-01 PROCEDURE — 83690 ASSAY OF LIPASE: CPT | Performed by: EMERGENCY MEDICINE

## 2021-01-01 PROCEDURE — 74177 CT ABD & PELVIS W/CONTRAST: CPT

## 2021-01-01 PROCEDURE — 250N000011 HC RX IP 250 OP 636: Performed by: NURSE PRACTITIONER

## 2021-01-01 PROCEDURE — 86922 COMPATIBILITY TEST ANTIGLOB: CPT | Performed by: INTERNAL MEDICINE

## 2021-01-01 PROCEDURE — 97165 OT EVAL LOW COMPLEX 30 MIN: CPT | Mod: GO

## 2021-01-01 PROCEDURE — 86901 BLOOD TYPING SEROLOGIC RH(D): CPT | Performed by: INTERNAL MEDICINE

## 2021-01-01 PROCEDURE — P9016 RBC LEUKOCYTES REDUCED: HCPCS | Performed by: INTERNAL MEDICINE

## 2021-01-01 PROCEDURE — 86850 RBC ANTIBODY SCREEN: CPT | Performed by: EMERGENCY MEDICINE

## 2021-01-01 PROCEDURE — 87086 URINE CULTURE/COLONY COUNT: CPT | Performed by: INTERNAL MEDICINE

## 2021-01-01 PROCEDURE — 86901 BLOOD TYPING SEROLOGIC RH(D): CPT | Performed by: EMERGENCY MEDICINE

## 2021-01-01 PROCEDURE — P9016 RBC LEUKOCYTES REDUCED: HCPCS | Performed by: EMERGENCY MEDICINE

## 2021-01-01 PROCEDURE — 250N000011 HC RX IP 250 OP 636: Performed by: EMERGENCY MEDICINE

## 2021-01-01 PROCEDURE — 86902 BLOOD TYPE ANTIGEN DONOR EA: CPT | Performed by: PHYSICIAN ASSISTANT

## 2021-01-01 PROCEDURE — 250N000009 HC RX 250: Performed by: EMERGENCY MEDICINE

## 2021-01-01 PROCEDURE — 71045 X-RAY EXAM CHEST 1 VIEW: CPT

## 2021-01-01 PROCEDURE — 83880 ASSAY OF NATRIURETIC PEPTIDE: CPT | Performed by: INTERNAL MEDICINE

## 2021-01-01 PROCEDURE — 97530 THERAPEUTIC ACTIVITIES: CPT | Mod: GO

## 2021-01-01 PROCEDURE — 85610 PROTHROMBIN TIME: CPT | Performed by: EMERGENCY MEDICINE

## 2021-01-01 PROCEDURE — 82803 BLOOD GASES ANY COMBINATION: CPT | Performed by: EMERGENCY MEDICINE

## 2021-01-01 PROCEDURE — 86850 RBC ANTIBODY SCREEN: CPT | Performed by: NURSE PRACTITIONER

## 2021-01-01 PROCEDURE — 84484 ASSAY OF TROPONIN QUANT: CPT | Performed by: EMERGENCY MEDICINE

## 2021-01-01 PROCEDURE — 36415 COLL VENOUS BLD VENIPUNCTURE: CPT | Performed by: HOSPITALIST

## 2021-01-01 PROCEDURE — 84484 ASSAY OF TROPONIN QUANT: CPT | Performed by: PHYSICIAN ASSISTANT

## 2021-01-01 PROCEDURE — 86901 BLOOD TYPING SEROLOGIC RH(D): CPT | Performed by: PHYSICIAN ASSISTANT

## 2021-01-01 PROCEDURE — 87635 SARS-COV-2 COVID-19 AMP PRB: CPT | Performed by: EMERGENCY MEDICINE

## 2021-01-01 PROCEDURE — 51702 INSERT TEMP BLADDER CATH: CPT

## 2021-01-01 PROCEDURE — 99309 SBSQ NF CARE MODERATE MDM 30: CPT | Performed by: NURSE PRACTITIONER

## 2021-01-01 PROCEDURE — 80048 BASIC METABOLIC PNL TOTAL CA: CPT | Performed by: PHYSICIAN ASSISTANT

## 2021-01-01 PROCEDURE — 84484 ASSAY OF TROPONIN QUANT: CPT | Performed by: INTERNAL MEDICINE

## 2021-01-01 PROCEDURE — 85027 COMPLETE CBC AUTOMATED: CPT | Performed by: INTERNAL MEDICINE

## 2021-01-01 PROCEDURE — 85049 AUTOMATED PLATELET COUNT: CPT | Performed by: INTERNAL MEDICINE

## 2021-01-01 PROCEDURE — 99214 OFFICE O/P EST MOD 30 MIN: CPT | Mod: 95 | Performed by: PHYSICIAN ASSISTANT

## 2021-01-01 PROCEDURE — 86922 COMPATIBILITY TEST ANTIGLOB: CPT | Performed by: NURSE PRACTITIONER

## 2021-01-01 PROCEDURE — 85018 HEMOGLOBIN: CPT | Performed by: HOSPITALIST

## 2021-01-01 PROCEDURE — 86850 RBC ANTIBODY SCREEN: CPT | Performed by: INTERNAL MEDICINE

## 2021-01-01 PROCEDURE — 86922 COMPATIBILITY TEST ANTIGLOB: CPT | Performed by: PHYSICIAN ASSISTANT

## 2021-01-01 PROCEDURE — 250N000013 HC RX MED GY IP 250 OP 250 PS 637: Performed by: EMERGENCY MEDICINE

## 2021-01-01 PROCEDURE — 86900 BLOOD TYPING SEROLOGIC ABO: CPT | Performed by: INTERNAL MEDICINE

## 2021-01-01 PROCEDURE — 86900 BLOOD TYPING SEROLOGIC ABO: CPT | Performed by: NURSE PRACTITIONER

## 2021-01-01 PROCEDURE — 96375 TX/PRO/DX INJ NEW DRUG ADDON: CPT

## 2021-01-01 PROCEDURE — 83880 ASSAY OF NATRIURETIC PEPTIDE: CPT | Performed by: EMERGENCY MEDICINE

## 2021-01-01 PROCEDURE — 87088 URINE BACTERIA CULTURE: CPT | Performed by: INTERNAL MEDICINE

## 2021-01-01 PROCEDURE — 71045 X-RAY EXAM CHEST 1 VIEW: CPT | Mod: 76

## 2021-01-01 PROCEDURE — 97535 SELF CARE MNGMENT TRAINING: CPT | Mod: GO

## 2021-01-01 PROCEDURE — 99217 PR OBSERVATION CARE DISCHARGE: CPT | Performed by: HOSPITALIST

## 2021-01-01 PROCEDURE — 99220 PR INITIAL OBSERVATION CARE,LEVEL III: CPT | Performed by: INTERNAL MEDICINE

## 2021-01-01 PROCEDURE — 86850 RBC ANTIBODY SCREEN: CPT | Performed by: PHYSICIAN ASSISTANT

## 2021-01-01 PROCEDURE — 82803 BLOOD GASES ANY COMBINATION: CPT | Performed by: PHYSICIAN ASSISTANT

## 2021-01-01 PROCEDURE — 83550 IRON BINDING TEST: CPT | Performed by: PHYSICIAN ASSISTANT

## 2021-01-01 PROCEDURE — 87635 SARS-COV-2 COVID-19 AMP PRB: CPT | Performed by: PHYSICIAN ASSISTANT

## 2021-01-01 PROCEDURE — 97161 PT EVAL LOW COMPLEX 20 MIN: CPT | Mod: GP

## 2021-01-01 PROCEDURE — G0180 MD CERTIFICATION HHA PATIENT: HCPCS | Performed by: INTERNAL MEDICINE

## 2021-01-01 PROCEDURE — 85025 COMPLETE CBC W/AUTO DIFF WBC: CPT | Performed by: PHYSICIAN ASSISTANT

## 2021-01-01 PROCEDURE — 87186 SC STD MICRODIL/AGAR DIL: CPT | Performed by: INTERNAL MEDICINE

## 2021-01-01 PROCEDURE — 86901 BLOOD TYPING SEROLOGIC RH(D): CPT | Performed by: NURSE PRACTITIONER

## 2021-01-01 PROCEDURE — 85025 COMPLETE CBC W/AUTO DIFF WBC: CPT | Performed by: EMERGENCY MEDICINE

## 2021-01-01 PROCEDURE — 86902 BLOOD TYPE ANTIGEN DONOR EA: CPT | Performed by: NURSE PRACTITIONER

## 2021-01-01 PROCEDURE — P9016 RBC LEUKOCYTES REDUCED: HCPCS | Performed by: NURSE PRACTITIONER

## 2021-01-01 PROCEDURE — 250N000011 HC RX IP 250 OP 636: Performed by: HOSPITALIST

## 2021-01-01 PROCEDURE — 999N000156 HC STATISTIC RCP CONSULT EA 30 MIN

## 2021-01-01 PROCEDURE — 36415 COLL VENOUS BLD VENIPUNCTURE: CPT | Performed by: PHYSICIAN ASSISTANT

## 2021-01-01 RX ORDER — HYDROMORPHONE HYDROCHLORIDE 1 MG/ML
0.5 INJECTION, SOLUTION INTRAMUSCULAR; INTRAVENOUS; SUBCUTANEOUS
Status: DISCONTINUED | OUTPATIENT
Start: 2021-01-01 | End: 2021-01-01 | Stop reason: HOSPADM

## 2021-01-01 RX ORDER — ACETAMINOPHEN 325 MG/1
650 TABLET ORAL 3 TIMES DAILY PRN
Status: DISCONTINUED | OUTPATIENT
Start: 2021-01-01 | End: 2021-01-01

## 2021-01-01 RX ORDER — ALBUTEROL SULFATE 0.83 MG/ML
2.5 SOLUTION RESPIRATORY (INHALATION) 4 TIMES DAILY
Status: DISCONTINUED | OUTPATIENT
Start: 2021-01-01 | End: 2021-01-01 | Stop reason: HOSPADM

## 2021-01-01 RX ORDER — ONDANSETRON 2 MG/ML
4 INJECTION INTRAMUSCULAR; INTRAVENOUS EVERY 6 HOURS PRN
Status: DISCONTINUED | OUTPATIENT
Start: 2021-01-01 | End: 2021-01-01 | Stop reason: HOSPADM

## 2021-01-01 RX ORDER — POLYETHYLENE GLYCOL 3350 17 G/17G
17 POWDER, FOR SOLUTION ORAL 2 TIMES DAILY
Status: DISCONTINUED | OUTPATIENT
Start: 2021-01-01 | End: 2021-01-01 | Stop reason: HOSPADM

## 2021-01-01 RX ORDER — DOCUSATE SODIUM 100 MG/1
100 CAPSULE, LIQUID FILLED ORAL DAILY
Status: DISCONTINUED | OUTPATIENT
Start: 2021-01-01 | End: 2021-01-01

## 2021-01-01 RX ORDER — SENNOSIDES 8.6 MG
1 TABLET ORAL DAILY
Qty: 3 TABLET | Refills: 0 | Status: SHIPPED | OUTPATIENT
Start: 2021-01-01

## 2021-01-01 RX ORDER — FUROSEMIDE 10 MG/ML
40 INJECTION INTRAMUSCULAR; INTRAVENOUS ONCE
Status: CANCELLED
Start: 2021-01-01 | End: 2021-01-01

## 2021-01-01 RX ORDER — VENLAFAXINE HYDROCHLORIDE 75 MG/1
75 CAPSULE, EXTENDED RELEASE ORAL
Status: DISCONTINUED | OUTPATIENT
Start: 2021-01-01 | End: 2021-01-01 | Stop reason: HOSPADM

## 2021-01-01 RX ORDER — ALBUTEROL SULFATE 90 UG/1
2 AEROSOL, METERED RESPIRATORY (INHALATION) EVERY 6 HOURS PRN
Status: DISCONTINUED | OUTPATIENT
Start: 2021-01-01 | End: 2021-01-01 | Stop reason: HOSPADM

## 2021-01-01 RX ORDER — FUROSEMIDE 10 MG/ML
40 INJECTION INTRAMUSCULAR; INTRAVENOUS ONCE
Status: COMPLETED | OUTPATIENT
Start: 2021-01-01 | End: 2021-01-01

## 2021-01-01 RX ORDER — MAGNESIUM CARB/ALUMINUM HYDROX 105-160MG
148 TABLET,CHEWABLE ORAL
Status: DISCONTINUED | OUTPATIENT
Start: 2021-01-01 | End: 2021-01-01 | Stop reason: HOSPADM

## 2021-01-01 RX ORDER — ALBUTEROL SULFATE 0.83 MG/ML
2.5 SOLUTION RESPIRATORY (INHALATION) EVERY 6 HOURS PRN
Status: DISCONTINUED | OUTPATIENT
Start: 2021-01-01 | End: 2021-01-01 | Stop reason: HOSPADM

## 2021-01-01 RX ORDER — HYDRALAZINE HYDROCHLORIDE 25 MG/1
75 TABLET, FILM COATED ORAL 3 TIMES DAILY
Status: ON HOLD | COMMUNITY
End: 2021-01-01

## 2021-01-01 RX ORDER — ONDANSETRON 4 MG/1
4 TABLET, ORALLY DISINTEGRATING ORAL EVERY 6 HOURS PRN
Status: DISCONTINUED | OUTPATIENT
Start: 2021-01-01 | End: 2021-01-01 | Stop reason: HOSPADM

## 2021-01-01 RX ORDER — AMOXICILLIN 250 MG
2 CAPSULE ORAL DAILY PRN
Status: ON HOLD | COMMUNITY
End: 2021-01-01

## 2021-01-01 RX ORDER — SENNOSIDES 8.6 MG
2 TABLET ORAL 2 TIMES DAILY
Status: DISCONTINUED | OUTPATIENT
Start: 2021-01-01 | End: 2021-01-01 | Stop reason: HOSPADM

## 2021-01-01 RX ORDER — IPRATROPIUM BROMIDE AND ALBUTEROL SULFATE 2.5; .5 MG/3ML; MG/3ML
3 SOLUTION RESPIRATORY (INHALATION)
Status: DISCONTINUED | OUTPATIENT
Start: 2021-01-01 | End: 2021-01-01

## 2021-01-01 RX ORDER — MINERAL OIL/HYDROPHIL PETROLAT
OINTMENT (GRAM) TOPICAL EVERY 8 HOURS PRN
Status: DISCONTINUED | OUTPATIENT
Start: 2021-01-01 | End: 2021-01-01 | Stop reason: HOSPADM

## 2021-01-01 RX ORDER — FUROSEMIDE 10 MG/ML
40 INJECTION INTRAMUSCULAR; INTRAVENOUS ONCE
Status: DISCONTINUED | OUTPATIENT
Start: 2021-01-01 | End: 2021-01-01

## 2021-01-01 RX ORDER — SPIRONOLACTONE 25 MG/1
50 TABLET ORAL DAILY
Status: DISCONTINUED | OUTPATIENT
Start: 2021-01-01 | End: 2021-01-01 | Stop reason: HOSPADM

## 2021-01-01 RX ORDER — HALOPERIDOL 2 MG/ML
2 SOLUTION ORAL EVERY 8 HOURS PRN
Qty: 6 ML | Refills: 0 | Status: SHIPPED | OUTPATIENT
Start: 2021-01-01

## 2021-01-01 RX ORDER — ACETAMINOPHEN 650 MG/1
650 SUPPOSITORY RECTAL EVERY 4 HOURS PRN
Status: DISCONTINUED | OUTPATIENT
Start: 2021-01-01 | End: 2021-01-01 | Stop reason: HOSPADM

## 2021-01-01 RX ORDER — ACETAMINOPHEN 325 MG/1
650 TABLET ORAL EVERY 6 HOURS PRN
Status: DISCONTINUED | OUTPATIENT
Start: 2021-01-01 | End: 2021-01-01 | Stop reason: HOSPADM

## 2021-01-01 RX ORDER — GLYCOPYRROLATE 0.2 MG/ML
.2-.4 INJECTION, SOLUTION INTRAMUSCULAR; INTRAVENOUS EVERY 4 HOURS PRN
Status: DISCONTINUED | OUTPATIENT
Start: 2021-01-01 | End: 2021-01-01 | Stop reason: HOSPADM

## 2021-01-01 RX ORDER — ONDANSETRON 4 MG/1
4 TABLET, ORALLY DISINTEGRATING ORAL EVERY 6 HOURS PRN
Qty: 4 TABLET | Refills: 0 | Status: SHIPPED | OUTPATIENT
Start: 2021-01-01

## 2021-01-01 RX ORDER — LORAZEPAM 0.5 MG/1
0.25 TABLET ORAL
Status: DISCONTINUED | OUTPATIENT
Start: 2021-01-01 | End: 2021-01-01 | Stop reason: HOSPADM

## 2021-01-01 RX ORDER — FERROUS SULFATE 325(65) MG
325 TABLET ORAL 2 TIMES DAILY
Status: DISCONTINUED | OUTPATIENT
Start: 2021-01-01 | End: 2021-01-01 | Stop reason: HOSPADM

## 2021-01-01 RX ORDER — HEPARIN SODIUM (PORCINE) LOCK FLUSH IV SOLN 100 UNIT/ML 100 UNIT/ML
5 SOLUTION INTRAVENOUS
Status: CANCELLED | OUTPATIENT
Start: 2021-01-01

## 2021-01-01 RX ORDER — HYDROMORPHONE HYDROCHLORIDE 1 MG/ML
1-2 SOLUTION ORAL
Qty: 32 ML | Refills: 0 | Status: SHIPPED | OUTPATIENT
Start: 2021-01-01 | End: 2021-01-01

## 2021-01-01 RX ORDER — SALIVA STIMULANT COMB. NO.3
2 SPRAY, NON-AEROSOL (ML) MUCOUS MEMBRANE
Qty: 44.3 ML | Refills: 0 | Status: SHIPPED | OUTPATIENT
Start: 2021-01-01

## 2021-01-01 RX ORDER — LORAZEPAM 2 MG/ML
0.5 INJECTION INTRAMUSCULAR
Status: DISCONTINUED | OUTPATIENT
Start: 2021-01-01 | End: 2021-01-01 | Stop reason: HOSPADM

## 2021-01-01 RX ORDER — BISACODYL 5 MG
10 TABLET, DELAYED RELEASE (ENTERIC COATED) ORAL DAILY PRN
Status: DISCONTINUED | OUTPATIENT
Start: 2021-01-01 | End: 2021-01-01 | Stop reason: HOSPADM

## 2021-01-01 RX ORDER — HEPARIN SODIUM,PORCINE 10 UNIT/ML
5 VIAL (ML) INTRAVENOUS
Status: CANCELLED | OUTPATIENT
Start: 2021-01-01

## 2021-01-01 RX ORDER — MINERAL OIL/HYDROPHIL PETROLAT
OINTMENT (GRAM) TOPICAL EVERY 8 HOURS PRN
Qty: 99 G | Refills: 0 | Status: SHIPPED | OUTPATIENT
Start: 2021-01-01

## 2021-01-01 RX ORDER — ALBUTEROL SULFATE 90 UG/1
2 AEROSOL, METERED RESPIRATORY (INHALATION) EVERY 6 HOURS PRN
Status: ON HOLD | COMMUNITY
End: 2021-01-01

## 2021-01-01 RX ORDER — POLYETHYLENE GLYCOL 3350 17 G/17G
17 POWDER, FOR SOLUTION ORAL DAILY PRN
Status: DISCONTINUED | OUTPATIENT
Start: 2021-01-01 | End: 2021-01-01 | Stop reason: HOSPADM

## 2021-01-01 RX ORDER — FUROSEMIDE 10 MG/ML
80 INJECTION INTRAMUSCULAR; INTRAVENOUS ONCE
Status: COMPLETED | OUTPATIENT
Start: 2021-01-01 | End: 2021-01-01

## 2021-01-01 RX ORDER — AMOXICILLIN 250 MG
2 CAPSULE ORAL DAILY PRN
Status: DISCONTINUED | OUTPATIENT
Start: 2021-01-01 | End: 2021-01-01 | Stop reason: HOSPADM

## 2021-01-01 RX ORDER — GABAPENTIN 100 MG/1
100 CAPSULE ORAL 2 TIMES DAILY
Status: DISCONTINUED | OUTPATIENT
Start: 2021-01-01 | End: 2021-01-01 | Stop reason: HOSPADM

## 2021-01-01 RX ORDER — ATROPINE SULFATE 10 MG/ML
1-2 SOLUTION/ DROPS OPHTHALMIC
Status: DISCONTINUED | OUTPATIENT
Start: 2021-01-01 | End: 2021-01-01 | Stop reason: HOSPADM

## 2021-01-01 RX ORDER — BISACODYL 10 MG
10 SUPPOSITORY, RECTAL RECTAL ONCE
Status: COMPLETED | OUTPATIENT
Start: 2021-01-01 | End: 2021-01-01

## 2021-01-01 RX ORDER — ACETAMINOPHEN 325 MG/1
650 TABLET ORAL EVERY 4 HOURS PRN
Status: DISCONTINUED | OUTPATIENT
Start: 2021-01-01 | End: 2021-01-01 | Stop reason: HOSPADM

## 2021-01-01 RX ORDER — SIMVASTATIN 10 MG
TABLET ORAL
Qty: 90 TABLET | Refills: 0 | Status: ON HOLD | OUTPATIENT
Start: 2021-01-01 | End: 2021-01-01

## 2021-01-01 RX ORDER — HYDRALAZINE HYDROCHLORIDE 25 MG/1
25 TABLET, FILM COATED ORAL EVERY 8 HOURS SCHEDULED
Status: DISCONTINUED | OUTPATIENT
Start: 2021-01-01 | End: 2021-01-01 | Stop reason: HOSPADM

## 2021-01-01 RX ORDER — ALBUTEROL SULFATE 90 UG/1
2 AEROSOL, METERED RESPIRATORY (INHALATION) EVERY 6 HOURS
Status: DISCONTINUED | OUTPATIENT
Start: 2021-01-01 | End: 2021-01-01

## 2021-01-01 RX ORDER — BISACODYL 10 MG
10 SUPPOSITORY, RECTAL RECTAL DAILY PRN
Qty: 4 SUPPOSITORY | Refills: 0 | Status: SHIPPED | OUTPATIENT
Start: 2021-01-01

## 2021-01-01 RX ORDER — ACETAMINOPHEN 650 MG/1
650 SUPPOSITORY RECTAL EVERY 6 HOURS PRN
Qty: 4 SUPPOSITORY | Refills: 0 | Status: SHIPPED | OUTPATIENT
Start: 2021-01-01

## 2021-01-01 RX ORDER — GLIPIZIDE 10 MG/1
1-2 TABLET ORAL EVERY 8 HOURS PRN
Qty: 15 ML | Refills: 0 | Status: SHIPPED | OUTPATIENT
Start: 2021-01-01

## 2021-01-01 RX ORDER — ALBUTEROL SULFATE 0.83 MG/ML
2.5 SOLUTION RESPIRATORY (INHALATION) 4 TIMES DAILY
Status: ON HOLD
Start: 2021-01-01 | End: 2021-01-01

## 2021-01-01 RX ORDER — HALOPERIDOL 5 MG/ML
.5-1 INJECTION INTRAMUSCULAR
Status: DISCONTINUED | OUTPATIENT
Start: 2021-01-01 | End: 2021-01-01 | Stop reason: HOSPADM

## 2021-01-01 RX ORDER — HYDRALAZINE HYDROCHLORIDE 50 MG/1
50 TABLET, FILM COATED ORAL EVERY 8 HOURS SCHEDULED
Status: DISCONTINUED | OUTPATIENT
Start: 2021-01-01 | End: 2021-01-01

## 2021-01-01 RX ORDER — ALBUTEROL SULFATE 90 UG/1
6 AEROSOL, METERED RESPIRATORY (INHALATION) ONCE
Status: COMPLETED | OUTPATIENT
Start: 2021-01-01 | End: 2021-01-01

## 2021-01-01 RX ORDER — GLIPIZIDE 10 MG/1
1-2 TABLET ORAL EVERY 8 HOURS PRN
Status: DISCONTINUED | OUTPATIENT
Start: 2021-01-01 | End: 2021-01-01 | Stop reason: HOSPADM

## 2021-01-01 RX ORDER — TORSEMIDE 20 MG/1
40 TABLET ORAL
Status: DISCONTINUED | OUTPATIENT
Start: 2021-01-01 | End: 2021-01-01 | Stop reason: HOSPADM

## 2021-01-01 RX ORDER — SIMVASTATIN 20 MG
20 TABLET ORAL AT BEDTIME
Status: DISCONTINUED | OUTPATIENT
Start: 2021-01-01 | End: 2021-01-01 | Stop reason: HOSPADM

## 2021-01-01 RX ORDER — CEFTRIAXONE 1 G/1
1 INJECTION, POWDER, FOR SOLUTION INTRAMUSCULAR; INTRAVENOUS EVERY 24 HOURS
Status: DISCONTINUED | OUTPATIENT
Start: 2021-01-01 | End: 2021-01-01

## 2021-01-01 RX ORDER — BISACODYL 10 MG
10 SUPPOSITORY, RECTAL RECTAL DAILY PRN
Status: DISCONTINUED | OUTPATIENT
Start: 2021-01-01 | End: 2021-01-01 | Stop reason: HOSPADM

## 2021-01-01 RX ORDER — ACETAMINOPHEN 325 MG/1
650 TABLET ORAL EVERY 4 HOURS PRN
Status: DISCONTINUED | OUTPATIENT
Start: 2021-01-01 | End: 2021-01-01

## 2021-01-01 RX ORDER — NALOXONE HYDROCHLORIDE 0.4 MG/ML
.1-.4 INJECTION, SOLUTION INTRAMUSCULAR; INTRAVENOUS; SUBCUTANEOUS
Status: DISCONTINUED | OUTPATIENT
Start: 2021-01-01 | End: 2021-01-01 | Stop reason: HOSPADM

## 2021-01-01 RX ORDER — ACETAMINOPHEN 650 MG/1
650 SUPPOSITORY RECTAL EVERY 6 HOURS PRN
Status: DISCONTINUED | OUTPATIENT
Start: 2021-01-01 | End: 2021-01-01 | Stop reason: HOSPADM

## 2021-01-01 RX ORDER — HYDRALAZINE HYDROCHLORIDE 25 MG/1
TABLET, FILM COATED ORAL
Qty: 810 TABLET | Refills: 0 | Status: ON HOLD | OUTPATIENT
Start: 2021-01-01 | End: 2021-01-01

## 2021-01-01 RX ORDER — IOPAMIDOL 755 MG/ML
500 INJECTION, SOLUTION INTRAVASCULAR ONCE
Status: COMPLETED | OUTPATIENT
Start: 2021-01-01 | End: 2021-01-01

## 2021-01-01 RX ORDER — FUROSEMIDE 10 MG/ML
40 INJECTION INTRAMUSCULAR; INTRAVENOUS EVERY 8 HOURS
Status: DISCONTINUED | OUTPATIENT
Start: 2021-01-01 | End: 2021-01-01

## 2021-01-01 RX ORDER — BISACODYL 5 MG
5 TABLET, DELAYED RELEASE (ENTERIC COATED) ORAL DAILY PRN
Status: DISCONTINUED | OUTPATIENT
Start: 2021-01-01 | End: 2021-01-01 | Stop reason: HOSPADM

## 2021-01-01 RX ORDER — LORAZEPAM 2 MG/ML
0.5 CONCENTRATE ORAL
Qty: 3 ML | Refills: 0 | Status: SHIPPED | OUTPATIENT
Start: 2021-01-01

## 2021-01-01 RX ORDER — HYDROMORPHONE HYDROCHLORIDE 1 MG/ML
1-2 SOLUTION ORAL
Status: DISCONTINUED | OUTPATIENT
Start: 2021-01-01 | End: 2021-01-01 | Stop reason: HOSPADM

## 2021-01-01 RX ORDER — HYDRALAZINE HYDROCHLORIDE 25 MG/1
25 TABLET, FILM COATED ORAL EVERY 8 HOURS SCHEDULED
Status: DISCONTINUED | OUTPATIENT
Start: 2021-01-01 | End: 2021-01-01

## 2021-01-01 RX ORDER — FUROSEMIDE 40 MG
40 TABLET ORAL
Status: DISCONTINUED | OUTPATIENT
Start: 2021-01-01 | End: 2021-01-01

## 2021-01-01 RX ORDER — ATROPINE SULFATE 10 MG/ML
1-2 SOLUTION/ DROPS OPHTHALMIC
Qty: 5 ML | Refills: 0 | Status: SHIPPED | OUTPATIENT
Start: 2021-01-01

## 2021-01-01 RX ORDER — LIDOCAINE 40 MG/G
CREAM TOPICAL
Status: DISCONTINUED | OUTPATIENT
Start: 2021-01-01 | End: 2021-01-01 | Stop reason: HOSPADM

## 2021-01-01 RX ORDER — BISACODYL 5 MG
15 TABLET, DELAYED RELEASE (ENTERIC COATED) ORAL DAILY PRN
Status: DISCONTINUED | OUTPATIENT
Start: 2021-01-01 | End: 2021-01-01 | Stop reason: HOSPADM

## 2021-01-01 RX ORDER — SALIVA STIMULANT COMB. NO.3
2 SPRAY, NON-AEROSOL (ML) MUCOUS MEMBRANE
Status: DISCONTINUED | OUTPATIENT
Start: 2021-01-01 | End: 2021-01-01 | Stop reason: HOSPADM

## 2021-01-01 RX ORDER — ATROPINE SULFATE 10 MG/ML
2 SOLUTION/ DROPS OPHTHALMIC
Status: DISCONTINUED | OUTPATIENT
Start: 2021-01-01 | End: 2021-01-01 | Stop reason: HOSPADM

## 2021-01-01 RX ORDER — SPIRONOLACTONE 25 MG/1
25 TABLET ORAL ONCE
Status: DISCONTINUED | OUTPATIENT
Start: 2021-01-01 | End: 2021-01-01 | Stop reason: HOSPADM

## 2021-01-01 RX ADMIN — GABAPENTIN 100 MG: 100 CAPSULE ORAL at 21:44

## 2021-01-01 RX ADMIN — HYDRALAZINE HYDROCHLORIDE 25 MG: 25 TABLET, FILM COATED ORAL at 22:11

## 2021-01-01 RX ADMIN — BISACODYL 10 MG: 10 SUPPOSITORY RECTAL at 11:01

## 2021-01-01 RX ADMIN — FUROSEMIDE 40 MG: 40 TABLET ORAL at 10:19

## 2021-01-01 RX ADMIN — VENLAFAXINE HYDROCHLORIDE 75 MG: 75 CAPSULE, EXTENDED RELEASE ORAL at 10:19

## 2021-01-01 RX ADMIN — POLYETHYLENE GLYCOL 3350 17 G: 17 POWDER, FOR SOLUTION ORAL at 10:51

## 2021-01-01 RX ADMIN — HYDRALAZINE HYDROCHLORIDE 25 MG: 25 TABLET, FILM COATED ORAL at 21:08

## 2021-01-01 RX ADMIN — HYDRALAZINE HYDROCHLORIDE 25 MG: 25 TABLET, FILM COATED ORAL at 13:20

## 2021-01-01 RX ADMIN — ACETAMINOPHEN 650 MG: 325 TABLET, FILM COATED ORAL at 09:24

## 2021-01-01 RX ADMIN — HYDRALAZINE HYDROCHLORIDE 50 MG: 50 TABLET, FILM COATED ORAL at 06:36

## 2021-01-01 RX ADMIN — ALBUTEROL SULFATE 2.5 MG: 2.5 SOLUTION RESPIRATORY (INHALATION) at 11:47

## 2021-01-01 RX ADMIN — HYDROMORPHONE HYDROCHLORIDE 0.5 MG: 1 INJECTION, SOLUTION INTRAMUSCULAR; INTRAVENOUS; SUBCUTANEOUS at 18:38

## 2021-01-01 RX ADMIN — HYDROMORPHONE HYDROCHLORIDE 0.5 MG: 1 INJECTION, SOLUTION INTRAMUSCULAR; INTRAVENOUS; SUBCUTANEOUS at 14:14

## 2021-01-01 RX ADMIN — FUROSEMIDE 40 MG: 10 INJECTION, SOLUTION INTRAMUSCULAR; INTRAVENOUS at 14:15

## 2021-01-01 RX ADMIN — FERROUS SULFATE TAB 325 MG (65 MG ELEMENTAL FE) 325 MG: 325 (65 FE) TAB at 10:44

## 2021-01-01 RX ADMIN — FUROSEMIDE 40 MG: 10 INJECTION, SOLUTION INTRAVENOUS at 16:17

## 2021-01-01 RX ADMIN — FUROSEMIDE 40 MG: 40 TABLET ORAL at 09:04

## 2021-01-01 RX ADMIN — ACETAMINOPHEN 650 MG: 325 TABLET, FILM COATED ORAL at 13:31

## 2021-01-01 RX ADMIN — OMEPRAZOLE 20 MG: 20 CAPSULE, DELAYED RELEASE ORAL at 10:35

## 2021-01-01 RX ADMIN — HYDRALAZINE HYDROCHLORIDE 75 MG: 25 TABLET, FILM COATED ORAL at 14:49

## 2021-01-01 RX ADMIN — CEFTRIAXONE 1 G: 1 INJECTION, POWDER, FOR SOLUTION INTRAMUSCULAR; INTRAVENOUS at 06:17

## 2021-01-01 RX ADMIN — VENLAFAXINE HYDROCHLORIDE 75 MG: 75 CAPSULE, EXTENDED RELEASE ORAL at 09:30

## 2021-01-01 RX ADMIN — POLYETHYLENE GLYCOL 3350 17 G: 17 POWDER, FOR SOLUTION ORAL at 09:24

## 2021-01-01 RX ADMIN — ALBUTEROL SULFATE 2.5 MG: 2.5 SOLUTION RESPIRATORY (INHALATION) at 11:41

## 2021-01-01 RX ADMIN — FUROSEMIDE 40 MG: 40 TABLET ORAL at 17:34

## 2021-01-01 RX ADMIN — SENNOSIDES 2 TABLET: 8.6 TABLET, FILM COATED ORAL at 09:24

## 2021-01-01 RX ADMIN — HYDRALAZINE HYDROCHLORIDE 75 MG: 25 TABLET, FILM COATED ORAL at 06:59

## 2021-01-01 RX ADMIN — FUROSEMIDE 40 MG: 10 INJECTION, SOLUTION INTRAMUSCULAR; INTRAVENOUS at 14:53

## 2021-01-01 RX ADMIN — FUROSEMIDE 40 MG: 10 INJECTION, SOLUTION INTRAMUSCULAR; INTRAVENOUS at 19:41

## 2021-01-01 RX ADMIN — FUROSEMIDE 40 MG: 10 INJECTION, SOLUTION INTRAMUSCULAR; INTRAVENOUS at 12:13

## 2021-01-01 RX ADMIN — ALBUTEROL SULFATE 2.5 MG: 2.5 SOLUTION RESPIRATORY (INHALATION) at 08:40

## 2021-01-01 RX ADMIN — ALBUTEROL SULFATE 2.5 MG: 2.5 SOLUTION RESPIRATORY (INHALATION) at 03:23

## 2021-01-01 RX ADMIN — OMEPRAZOLE 20 MG: 20 CAPSULE, DELAYED RELEASE ORAL at 08:28

## 2021-01-01 RX ADMIN — ONDANSETRON 4 MG: 2 INJECTION INTRAMUSCULAR; INTRAVENOUS at 23:02

## 2021-01-01 RX ADMIN — CEFTRIAXONE 1 G: 1 INJECTION, POWDER, FOR SOLUTION INTRAMUSCULAR; INTRAVENOUS at 06:35

## 2021-01-01 RX ADMIN — IPRATROPIUM BROMIDE AND ALBUTEROL SULFATE 3 ML: .5; 3 SOLUTION RESPIRATORY (INHALATION) at 11:45

## 2021-01-01 RX ADMIN — TORSEMIDE 40 MG: 20 TABLET ORAL at 10:11

## 2021-01-01 RX ADMIN — SENNOSIDES 2 TABLET: 8.6 TABLET, FILM COATED ORAL at 20:44

## 2021-01-01 RX ADMIN — FUROSEMIDE 40 MG: 40 TABLET ORAL at 10:35

## 2021-01-01 RX ADMIN — VENLAFAXINE HYDROCHLORIDE 75 MG: 75 CAPSULE, EXTENDED RELEASE ORAL at 08:28

## 2021-01-01 RX ADMIN — HYDRALAZINE HYDROCHLORIDE 75 MG: 25 TABLET, FILM COATED ORAL at 21:59

## 2021-01-01 RX ADMIN — SENNOSIDES 2 TABLET: 8.6 TABLET, FILM COATED ORAL at 09:31

## 2021-01-01 RX ADMIN — SPIRONOLACTONE 50 MG: 25 TABLET ORAL at 09:04

## 2021-01-01 RX ADMIN — FUROSEMIDE 40 MG: 10 INJECTION, SOLUTION INTRAMUSCULAR; INTRAVENOUS at 18:37

## 2021-01-01 RX ADMIN — HYDRALAZINE HYDROCHLORIDE 50 MG: 50 TABLET, FILM COATED ORAL at 15:00

## 2021-01-01 RX ADMIN — SPIRONOLACTONE 50 MG: 25 TABLET ORAL at 10:19

## 2021-01-01 RX ADMIN — GABAPENTIN 100 MG: 100 CAPSULE ORAL at 13:47

## 2021-01-01 RX ADMIN — ALBUTEROL SULFATE 2.5 MG: 2.5 SOLUTION RESPIRATORY (INHALATION) at 16:01

## 2021-01-01 RX ADMIN — VENLAFAXINE HYDROCHLORIDE 75 MG: 75 CAPSULE, EXTENDED RELEASE ORAL at 10:35

## 2021-01-01 RX ADMIN — HYDRALAZINE HYDROCHLORIDE 25 MG: 25 TABLET, FILM COATED ORAL at 13:46

## 2021-01-01 RX ADMIN — GABAPENTIN 100 MG: 100 CAPSULE ORAL at 22:05

## 2021-01-01 RX ADMIN — SPIRONOLACTONE 50 MG: 25 TABLET ORAL at 10:34

## 2021-01-01 RX ADMIN — ALBUTEROL SULFATE 2.5 MG: 2.5 SOLUTION RESPIRATORY (INHALATION) at 15:24

## 2021-01-01 RX ADMIN — DOCUSATE SODIUM 100 MG: 100 CAPSULE, LIQUID FILLED ORAL at 09:04

## 2021-01-01 RX ADMIN — HYDRALAZINE HYDROCHLORIDE 25 MG: 25 TABLET, FILM COATED ORAL at 06:27

## 2021-01-01 RX ADMIN — IOPAMIDOL 73 ML: 755 INJECTION, SOLUTION INTRAVENOUS at 02:42

## 2021-01-01 RX ADMIN — IPRATROPIUM BROMIDE AND ALBUTEROL SULFATE 3 ML: .5; 3 SOLUTION RESPIRATORY (INHALATION) at 08:15

## 2021-01-01 RX ADMIN — FUROSEMIDE 40 MG: 10 INJECTION, SOLUTION INTRAMUSCULAR; INTRAVENOUS at 10:06

## 2021-01-01 RX ADMIN — ALBUTEROL SULFATE 2.5 MG: 2.5 SOLUTION RESPIRATORY (INHALATION) at 15:58

## 2021-01-01 RX ADMIN — CEFTRIAXONE 1 G: 1 INJECTION, POWDER, FOR SOLUTION INTRAMUSCULAR; INTRAVENOUS at 06:34

## 2021-01-01 RX ADMIN — PSYLLIUM HUSK 1 PACKET: 3.4 POWDER ORAL at 22:00

## 2021-01-01 RX ADMIN — OMEPRAZOLE 20 MG: 20 CAPSULE, DELAYED RELEASE ORAL at 10:44

## 2021-01-01 RX ADMIN — GABAPENTIN 100 MG: 100 CAPSULE ORAL at 08:28

## 2021-01-01 RX ADMIN — VENLAFAXINE HYDROCHLORIDE 75 MG: 75 CAPSULE, EXTENDED RELEASE ORAL at 09:05

## 2021-01-01 RX ADMIN — SPIRONOLACTONE 50 MG: 25 TABLET ORAL at 09:56

## 2021-01-01 RX ADMIN — TORSEMIDE 40 MG: 20 TABLET ORAL at 16:27

## 2021-01-01 RX ADMIN — FUROSEMIDE 40 MG: 10 INJECTION, SOLUTION INTRAMUSCULAR; INTRAVENOUS at 14:43

## 2021-01-01 RX ADMIN — POLYETHYLENE GLYCOL 3350 17 G: 17 POWDER, FOR SOLUTION ORAL at 22:11

## 2021-01-01 RX ADMIN — GABAPENTIN 100 MG: 100 CAPSULE ORAL at 22:11

## 2021-01-01 RX ADMIN — HYDRALAZINE HYDROCHLORIDE 25 MG: 25 TABLET, FILM COATED ORAL at 06:18

## 2021-01-01 RX ADMIN — FUROSEMIDE 40 MG: 10 INJECTION, SOLUTION INTRAMUSCULAR; INTRAVENOUS at 02:39

## 2021-01-01 RX ADMIN — ALBUTEROL SULFATE 2.5 MG: 2.5 SOLUTION RESPIRATORY (INHALATION) at 08:08

## 2021-01-01 RX ADMIN — FUROSEMIDE 40 MG: 10 INJECTION, SOLUTION INTRAMUSCULAR; INTRAVENOUS at 02:51

## 2021-01-01 RX ADMIN — OMEPRAZOLE 20 MG: 20 CAPSULE, DELAYED RELEASE ORAL at 10:20

## 2021-01-01 RX ADMIN — SODIUM CHLORIDE 59 ML: 9 INJECTION, SOLUTION INTRAVENOUS at 02:44

## 2021-01-01 RX ADMIN — ALBUTEROL SULFATE 2.5 MG: 2.5 SOLUTION RESPIRATORY (INHALATION) at 16:15

## 2021-01-01 RX ADMIN — FUROSEMIDE 40 MG: 10 INJECTION, SOLUTION INTRAMUSCULAR; INTRAVENOUS at 10:49

## 2021-01-01 RX ADMIN — GABAPENTIN 100 MG: 100 CAPSULE ORAL at 21:08

## 2021-01-01 RX ADMIN — POLYETHYLENE GLYCOL 3350 17 G: 17 POWDER, FOR SOLUTION ORAL at 09:57

## 2021-01-01 RX ADMIN — HYDROMORPHONE HYDROCHLORIDE 0.5 MG: 1 INJECTION, SOLUTION INTRAMUSCULAR; INTRAVENOUS; SUBCUTANEOUS at 13:20

## 2021-01-01 RX ADMIN — PSYLLIUM HUSK 1 PACKET: 3.4 POWDER ORAL at 22:22

## 2021-01-01 RX ADMIN — GABAPENTIN 100 MG: 100 CAPSULE ORAL at 22:00

## 2021-01-01 RX ADMIN — ACETAMINOPHEN 650 MG: 325 TABLET, FILM COATED ORAL at 00:08

## 2021-01-01 RX ADMIN — HYDRALAZINE HYDROCHLORIDE 75 MG: 25 TABLET, FILM COATED ORAL at 05:46

## 2021-01-01 RX ADMIN — POLYETHYLENE GLYCOL 3350 17 G: 17 POWDER, FOR SOLUTION ORAL at 10:36

## 2021-01-01 RX ADMIN — FUROSEMIDE 40 MG: 10 INJECTION, SOLUTION INTRAMUSCULAR; INTRAVENOUS at 12:49

## 2021-01-01 RX ADMIN — VENLAFAXINE HYDROCHLORIDE 75 MG: 75 CAPSULE, EXTENDED RELEASE ORAL at 09:57

## 2021-01-01 RX ADMIN — ALBUTEROL SULFATE 2.5 MG: 2.5 SOLUTION RESPIRATORY (INHALATION) at 12:18

## 2021-01-01 RX ADMIN — SPIRONOLACTONE 50 MG: 25 TABLET ORAL at 09:30

## 2021-01-01 RX ADMIN — ACETAMINOPHEN 650 MG: 325 TABLET, FILM COATED ORAL at 09:31

## 2021-01-01 RX ADMIN — HYDRALAZINE HYDROCHLORIDE 25 MG: 25 TABLET, FILM COATED ORAL at 06:14

## 2021-01-01 RX ADMIN — ALBUTEROL SULFATE 2.5 MG: 2.5 SOLUTION RESPIRATORY (INHALATION) at 19:30

## 2021-01-01 RX ADMIN — GABAPENTIN 100 MG: 100 CAPSULE ORAL at 09:56

## 2021-01-01 RX ADMIN — HYDRALAZINE HYDROCHLORIDE 50 MG: 50 TABLET, FILM COATED ORAL at 22:04

## 2021-01-01 RX ADMIN — SENNOSIDES 1 TABLET: 8.6 TABLET, FILM COATED ORAL at 10:35

## 2021-01-01 RX ADMIN — ALBUTEROL SULFATE 2.5 MG: 2.5 SOLUTION RESPIRATORY (INHALATION) at 07:50

## 2021-01-01 RX ADMIN — OMEPRAZOLE 20 MG: 20 CAPSULE, DELAYED RELEASE ORAL at 09:56

## 2021-01-01 RX ADMIN — GABAPENTIN 100 MG: 100 CAPSULE ORAL at 09:05

## 2021-01-01 RX ADMIN — SENNOSIDES 1 TABLET: 8.6 TABLET, FILM COATED ORAL at 22:04

## 2021-01-01 RX ADMIN — CEFTRIAXONE 1 G: 1 INJECTION, POWDER, FOR SOLUTION INTRAMUSCULAR; INTRAVENOUS at 06:14

## 2021-01-01 RX ADMIN — SPIRONOLACTONE 50 MG: 25 TABLET ORAL at 10:11

## 2021-01-01 RX ADMIN — FUROSEMIDE 80 MG: 10 INJECTION, SOLUTION INTRAMUSCULAR; INTRAVENOUS at 02:27

## 2021-01-01 RX ADMIN — ALBUTEROL SULFATE 2.5 MG: 2.5 SOLUTION RESPIRATORY (INHALATION) at 11:34

## 2021-01-01 RX ADMIN — ALBUTEROL SULFATE 2.5 MG: 2.5 SOLUTION RESPIRATORY (INHALATION) at 15:18

## 2021-01-01 RX ADMIN — VENLAFAXINE HYDROCHLORIDE 75 MG: 75 CAPSULE, EXTENDED RELEASE ORAL at 13:46

## 2021-01-01 RX ADMIN — GABAPENTIN 100 MG: 100 CAPSULE ORAL at 10:35

## 2021-01-01 RX ADMIN — ALBUTEROL SULFATE 2.5 MG: 2.5 SOLUTION RESPIRATORY (INHALATION) at 11:28

## 2021-01-01 RX ADMIN — ALBUTEROL SULFATE 2.5 MG: 2.5 SOLUTION RESPIRATORY (INHALATION) at 19:55

## 2021-01-01 RX ADMIN — FUROSEMIDE 40 MG: 40 TABLET ORAL at 16:17

## 2021-01-01 RX ADMIN — SENNOSIDES 2 TABLET: 8.6 TABLET, FILM COATED ORAL at 10:51

## 2021-01-01 RX ADMIN — HYDRALAZINE HYDROCHLORIDE 75 MG: 25 TABLET, FILM COATED ORAL at 13:58

## 2021-01-01 RX ADMIN — ALBUTEROL SULFATE 2.5 MG: 2.5 SOLUTION RESPIRATORY (INHALATION) at 20:14

## 2021-01-01 RX ADMIN — HYDRALAZINE HYDROCHLORIDE 75 MG: 25 TABLET, FILM COATED ORAL at 22:30

## 2021-01-01 RX ADMIN — ALBUTEROL SULFATE 6 PUFF: 108 INHALANT RESPIRATORY (INHALATION) at 03:34

## 2021-01-01 RX ADMIN — ALBUTEROL SULFATE 2.5 MG: 2.5 SOLUTION RESPIRATORY (INHALATION) at 23:41

## 2021-01-01 RX ADMIN — ALBUTEROL SULFATE 2.5 MG: 2.5 SOLUTION RESPIRATORY (INHALATION) at 19:48

## 2021-01-01 RX ADMIN — ACETAMINOPHEN 650 MG: 325 TABLET, FILM COATED ORAL at 14:12

## 2021-01-01 RX ADMIN — SENNOSIDES 2 TABLET: 8.6 TABLET, FILM COATED ORAL at 20:23

## 2021-01-01 RX ADMIN — ACETAMINOPHEN 650 MG: 325 TABLET, FILM COATED ORAL at 14:29

## 2021-01-01 RX ADMIN — GABAPENTIN 100 MG: 100 CAPSULE ORAL at 10:20

## 2021-01-01 RX ADMIN — ACETAMINOPHEN 650 MG: 325 TABLET, FILM COATED ORAL at 09:56

## 2021-01-01 RX ADMIN — FUROSEMIDE 40 MG: 40 TABLET ORAL at 16:39

## 2021-01-01 RX ADMIN — ALBUTEROL SULFATE 2.5 MG: 2.5 SOLUTION RESPIRATORY (INHALATION) at 08:18

## 2021-01-01 RX ADMIN — SENNOSIDES 2 TABLET: 8.6 TABLET, FILM COATED ORAL at 22:11

## 2021-01-01 RX ADMIN — OMEPRAZOLE 20 MG: 20 CAPSULE, DELAYED RELEASE ORAL at 09:30

## 2021-01-01 RX ADMIN — FUROSEMIDE 40 MG: 10 INJECTION, SOLUTION INTRAMUSCULAR; INTRAVENOUS at 11:06

## 2021-01-01 RX ADMIN — FUROSEMIDE 40 MG: 10 INJECTION, SOLUTION INTRAMUSCULAR; INTRAVENOUS at 19:37

## 2021-01-01 RX ADMIN — ACETAMINOPHEN 650 MG: 325 TABLET, FILM COATED ORAL at 19:05

## 2021-01-01 RX ADMIN — FUROSEMIDE 40 MG: 10 INJECTION, SOLUTION INTRAMUSCULAR; INTRAVENOUS at 23:33

## 2021-01-01 RX ADMIN — SENNOSIDES 2 TABLET: 8.6 TABLET, FILM COATED ORAL at 09:57

## 2021-01-01 RX ADMIN — HYDRALAZINE HYDROCHLORIDE 25 MG: 25 TABLET, FILM COATED ORAL at 13:53

## 2021-01-01 RX ADMIN — OMEPRAZOLE 20 MG: 20 CAPSULE, DELAYED RELEASE ORAL at 09:03

## 2021-01-01 RX ADMIN — ALBUTEROL SULFATE 2.5 MG: 2.5 SOLUTION RESPIRATORY (INHALATION) at 02:47

## 2021-01-01 RX ADMIN — ALBUTEROL SULFATE 2.5 MG: 2.5 SOLUTION RESPIRATORY (INHALATION) at 07:45

## 2021-01-01 RX ADMIN — FUROSEMIDE 40 MG: 10 INJECTION, SOLUTION INTRAMUSCULAR; INTRAVENOUS at 02:44

## 2021-01-01 RX ADMIN — GABAPENTIN 100 MG: 100 CAPSULE ORAL at 20:44

## 2021-01-01 RX ADMIN — VENLAFAXINE HYDROCHLORIDE 75 MG: 75 CAPSULE, EXTENDED RELEASE ORAL at 10:44

## 2021-01-01 RX ADMIN — POLYETHYLENE GLYCOL 3350 17 G: 17 POWDER, FOR SOLUTION ORAL at 09:30

## 2021-01-01 RX ADMIN — SIMVASTATIN 20 MG: 20 TABLET, FILM COATED ORAL at 01:32

## 2021-01-01 RX ADMIN — SPIRONOLACTONE 50 MG: 25 TABLET ORAL at 13:46

## 2021-01-01 RX ADMIN — GABAPENTIN 100 MG: 100 CAPSULE ORAL at 10:44

## 2021-01-01 RX ADMIN — HYDRALAZINE HYDROCHLORIDE 75 MG: 25 TABLET, FILM COATED ORAL at 13:26

## 2021-01-01 RX ADMIN — POLYETHYLENE GLYCOL 3350 17 G: 17 POWDER, FOR SOLUTION ORAL at 20:23

## 2021-01-01 RX ADMIN — PSYLLIUM HUSK 1 PACKET: 3.4 POWDER ORAL at 22:03

## 2021-01-01 RX ADMIN — HYDRALAZINE HYDROCHLORIDE 25 MG: 25 TABLET, FILM COATED ORAL at 15:04

## 2021-01-01 RX ADMIN — FUROSEMIDE 40 MG: 10 INJECTION, SOLUTION INTRAVENOUS at 15:30

## 2021-01-01 RX ADMIN — ALBUTEROL SULFATE 2.5 MG: 2.5 SOLUTION RESPIRATORY (INHALATION) at 11:33

## 2021-01-01 RX ADMIN — ACETAMINOPHEN 650 MG: 325 TABLET, FILM COATED ORAL at 10:50

## 2021-01-01 RX ADMIN — HYDRALAZINE HYDROCHLORIDE 25 MG: 25 TABLET, FILM COATED ORAL at 21:41

## 2021-01-01 RX ADMIN — OMEPRAZOLE 20 MG: 20 CAPSULE, DELAYED RELEASE ORAL at 13:46

## 2021-01-01 RX ADMIN — GABAPENTIN 100 MG: 100 CAPSULE ORAL at 09:30

## 2021-01-01 ASSESSMENT — ACTIVITIES OF DAILY LIVING (ADL)
ADLS_ACUITY_SCORE: 20
ADLS_ACUITY_SCORE: 20
ADLS_ACUITY_SCORE: 22
ADLS_ACUITY_SCORE: 20
PREVIOUS_RESPONSIBILITIES: MEDICATION MANAGEMENT
ADLS_ACUITY_SCORE: 18
VISION_MANAGEMENT: GLASSES
TOILETING_ISSUES: NO
ADLS_ACUITY_SCORE: 20
ADLS_ACUITY_SCORE: 18
ADLS_ACUITY_SCORE: 20
DIFFICULTY_COMMUNICATING: NO
ADLS_ACUITY_SCORE: 18
ADLS_ACUITY_SCORE: 20
DOING_ERRANDS_INDEPENDENTLY_DIFFICULTY: YES
ADLS_ACUITY_SCORE: 22
ADLS_ACUITY_SCORE: 20
ADLS_ACUITY_SCORE: 22
ADLS_ACUITY_SCORE: 20
ADLS_ACUITY_SCORE: 18
FALL_HISTORY_WITHIN_LAST_SIX_MONTHS: NO
ADLS_ACUITY_SCORE: 22
ADLS_ACUITY_SCORE: 26
ADLS_ACUITY_SCORE: 22
ADLS_ACUITY_SCORE: 18
ADLS_ACUITY_SCORE: 22
ADLS_ACUITY_SCORE: 18
ADLS_ACUITY_SCORE: 18
DRESSING/BATHING_DIFFICULTY: NO
ADLS_ACUITY_SCORE: 18
ADLS_ACUITY_SCORE: 22
ADLS_ACUITY_SCORE: 18
ADLS_ACUITY_SCORE: 20
ADLS_ACUITY_SCORE: 22
ADLS_ACUITY_SCORE: 18
ADLS_ACUITY_SCORE: 21
WEAR_GLASSES_OR_BLIND: YES
WALKING_OR_CLIMBING_STAIRS: OTHER (SEE COMMENTS)
ADLS_ACUITY_SCORE: 18
CONCENTRATING,_REMEMBERING_OR_MAKING_DECISIONS_DIFFICULTY: NO
EQUIPMENT_CURRENTLY_USED_AT_HOME: WHEELCHAIR, MANUAL
ADLS_ACUITY_SCORE: 18
ADLS_ACUITY_SCORE: 18
ADLS_ACUITY_SCORE: 21
ADLS_ACUITY_SCORE: 20
ADLS_ACUITY_SCORE: 20
WALKING_OR_CLIMBING_STAIRS_DIFFICULTY: YES
ADLS_ACUITY_SCORE: 18
ADLS_ACUITY_SCORE: 26
ADLS_ACUITY_SCORE: 22
ADLS_ACUITY_SCORE: 20
ADLS_ACUITY_SCORE: 22
ADLS_ACUITY_SCORE: 20
ADLS_ACUITY_SCORE: 22

## 2021-01-01 ASSESSMENT — ENCOUNTER SYMPTOMS
DIARRHEA: 0
DYSURIA: 0
HEMATURIA: 0
BLOOD IN STOOL: 0
VOMITING: 0
WEAKNESS: 1
NAUSEA: 0
FREQUENCY: 0
FEVER: 0
DIFFICULTY URINATING: 0
CHILLS: 0
FATIGUE: 1
SHORTNESS OF BREATH: 0
ABDOMINAL PAIN: 0

## 2021-01-01 ASSESSMENT — MIFFLIN-ST. JEOR
SCORE: 973.83
SCORE: 1018.28
SCORE: 1082.13
SCORE: 1034.13
SCORE: 1015.56
SCORE: 1064.13
SCORE: 1008.31
SCORE: 1007.39
SCORE: 1050.13
SCORE: 1095.13

## 2021-01-01 ASSESSMENT — PATIENT HEALTH QUESTIONNAIRE - PHQ9: SUM OF ALL RESPONSES TO PHQ QUESTIONS 1-9: 2

## 2021-01-05 NOTE — PROGRESS NOTES
"Keisha Godinez is a 88 year old female who is being evaluated via a billable video visit.      How would you like to obtain your AVS? MyChart  If the video visit is dropped, the invitation should be resent by: Text to cell phone: [phone]  ultimately used phone number [phone]  Will anyone else be joining your video visit? Yes: Yes, Daughter Rebecca He . How would they like to receive their invitation? Text to cell phone: [phone]    Video Start Time: 1:41 PM  Assessment & Plan     Other iron deficiency anemia  ACTUALLY multifactorial Anemia. She clearly needs to be transfused when her hgb gets below 7.5 and I would like to see 8 used as the bar. They will talk to their hematologist.     Chronic systolic congestive heart failure (H)  under reasonable control     Essential hypertension with goal blood pressure less than 140/90  under good control   - hydrALAZINE (APRESOLINE) 25 MG tablet; TAKE 3 TABLETS BY MOUTH 3 TIMES DAILY    Hyperlipidemia, unspecified hyperlipidemia type  Due for fasting lipid panel   - simvastatin (ZOCOR) 10 MG tablet; TAKE 1 TABLET BY MOUTH ONCE DAILY AT BEDTIME    Major depressive disorder, recurrent episode, in partial remission (H)  If we can keep her hgb up usually does ok.       27 minutes spent on the date of the encounter doing chart review, history and exam, documentation and further activities as noted above         BMI:   Estimated body mass index is 25.69 kg/m  as calculated from the following:    Height as of 11/30/20: 1.6 m (5' 3\").    Weight as of 11/30/20: 65.8 kg (145 lb).           No follow-ups on file.    Gerri Eubanks MD  [facility]    Subjective     Keisha Godinez is a 88 year old female who presents to clinic today for the following health issues     HPI     Follow up lymphedema.    Hyperlipidemia Follow-Up      Are you regularly taking any medication or supplement to lower your cholesterol?   " Yes- simvastatin    Are you having muscle aches or other side effects that you think could be caused by your cholesterol lowering medication?  No      How many servings of fruits and vegetables do you eat daily?  2-3     On average, how many sweetened beverages do you drink each day (Examples: soda, juice, sweet tea, etc.  Do NOT count diet or artificially sweetened beverages)?   0    How many days per week do you exercise enough to make your heart beat faster? 7    How many minutes a day do you exercise enough to make your heart beat faster? 10 - 19    How many days per week do you miss taking your medication? 0    HPI:   She is very weak and tired today. Her hgb came back at 5.8. she has chronic anemia and is followed by Hematology. They have her set up for transfusions every week but her last hgb came back at 7.1 so they did not transfuse her. This week came back at 5.8 and she feels awful. Her daughter is worried about her. She is set up for transfusion in 2 days.     When she feels this weak she gets very depressed.     Her breathing is ok. Wt is stable. She does not feel short of breath just exhausted. No orthopnea or PND. No lower extremity edema.          Review of Systems   Constitutional, HEENT, cardiovascular, pulmonary, GI, , musculoskeletal, neuro, skin, endocrine and psych systems are negative, except as otherwise noted.      Objective           Vitals:  No vitals were obtained today due to virtual visit.    Physical Exam   GENERAL: Healthy, alert and no distress  EYES: Eyes grossly normal to inspection.  No discharge or erythema, or obvious scleral/conjunctival abnormalities.  RESP: No audible wheeze, cough, or visible cyanosis.  No visible retractions or increased work of breathing.    SKIN: Visible skin clear. No significant rash, abnormal pigmentation or lesions.  NEURO: Cranial nerves grossly intact.  Mentation and speech appropriate for age.  PSYCH: Mentation appears normal, affect  normal/bright, judgement and insight intact, normal speech and appearance well-groomed.    Hospital Outpatient Visit on 01/04/2021   Component Date Value Ref Range Status     Units Ordered 01/04/2021 1   Final     ABO 01/04/2021 O   Final     RH(D) 01/04/2021 Pos   Final     Antibody Screen 01/04/2021 Pos*  Final     Test Valid Only At 01/04/2021 [facility]      Final     Specimen Expires 01/04/2021 01/07/2021   Final     Crossmatch 01/04/2021 Red Blood Cells   Final     Unit Number 01/04/2021 [id number]   Final     Blood Component Type 01/04/2021 Red Blood Cells Leukocyte Reduced   Final     Division Number 01/04/2021 00   Final     Status of Unit 01/04/2021 Ready for patient 01/04/2021 2325   Preliminary     Blood Product Code 01/04/2021 [id number]   Final     Unit Status 01/04/2021 BLANCA   Preliminary             Video-Visit Details    Type of service:  Video Visit    Video End Time:1;52 pm    Originating Location (pt. Location): Home    Distant Location (provider location):  [facility]     Platform used for Video Visit: [facility]

## 2021-01-06 NOTE — PROGRESS NOTES
Infusion Nursing Note:  Keisha Godinez presents today for 1 Unit PRBC + IV Lasix.  HGB = 5.8 on 1/4/21.  Patient seen by provider today: No   present during visit today: Not Applicable.    Note:  Patient reports is feeling well today.  Patient denies fevers, chills or signs of infection.    PRBC Rate:  80mls/hr until completion.  Lasix 40mg IVP post completion of 1 Unit PRBC.    Intravenous Access:  Peripheral IV placed.  PIV site C/D/I.  PIV flushes well + excellent blood return.    Treatment Conditions:  Blood transfusion consent signed on 6/10/2020.      Post Infusion Assessment:  Patient tolerated infusion without incident.  Blood return noted pre and post infusion.  Site patent and intact, free from redness, edema or discomfort.  No evidence of extravasations.  Access discontinued per protocol.       Discharge Plan:   Discharge instructions reviewed with: Patient.  Patient and/or family verbalized understanding of discharge instructions and all questions answered.  Patient discharged in stable condition accompanied by: daughter.  Departure Mode: Wheelchair.  Our office will be notified if needs to schedule infusion appointment for transfusion.    Josephine Almaguer, RN, BSN, OCN on 1/6/2021 at 3:14 PM

## 2021-01-07 NOTE — PROGRESS NOTES
Received BMP results drawn 1/5 Copy to scan):     Na             141  K               4.0   Ca             9.3  Co2           29  Chloride    104  BUN           58  (H)   Creatinine  1.33 (H)   GFR           35.4 (L)      Followed up on pumps for Keisha per lymphedema. Called to Dr Eubanks's team and was on hod for over 45 min.   Called to [facility] Lymphedema, to follow up on pumps.     Called to Rebecca. Keisha has been forgetting to call in her wts. Wt is stable at 147 lbs. She is doing well except fatigued today as she had an infusion. [facility], OT Lymphedema,  will be out on Friday with leg pumps for Keisha. Rebecca reports her legs are improving. Breathing is at baseline.      No future appointments.  Messaged scheduling to set up appt for 2/2021    Update to Suad Garcia, RN 3:41 PM 01/07/21

## 2021-01-08 NOTE — PROGRESS NOTES
Message  Received: Yesterday  Message Contents   Suad Murrell PA Gerdowsky, Christina, RN   Caller: Unspecified (1 week ago)             Her renal function has worsened slightly. If she's doing ok otherwise, see if she is willing to come back down to 40mg once daily from BID on her diuretic.   If weight starts to trend up, she should go back to BID.   Then get another BMP in 2 weeks when they draw her other labs again please.     Thanks   Suad

## 2021-01-08 NOTE — PROGRESS NOTES
Spoke to Rebecca. She will let Keisha know to decrease torsemide from 40 mg BID to 40 mg daily as her creatinine did increase from 1.23 to 1.33. Did let Rebecca know if Keisha's weight starts to go up she should go back to twice a day. Suad is requesting BMP in 2 wks. Rebecca expressed understanding. Rebecca also reporting that Keisha is not able to weigh safely as her arthritic knee is very painful and she is unable to stand.   Rebecca said she is expecting her sister to come and they can help Keisha onto scale then. She did ask if I can notify Dr Gibbons's office.     Called Dr Gibbons;s office and spoke to nurse, Zahra. Updated her regarding dose change, Dr Gibbons to continue giving IV lasix with transfusions and BMP in 2 wks (around 1/222). Faxed order to     [facility] list updated    No future appointments.    Charity Garcia RN 10:05 AM 01/08/21

## 2021-01-12 NOTE — PROGRESS NOTES
Infusion Nursing Note:  Keisha Godinez presents today for 1 unit PRBC's and 40 mg IV lasix post transfusion.   Patient seen by provider today: No   present during visit today: Not Applicable.    Note: PRBC's infused at 80 ml/hr over 4 hours..    Intravenous Access:  Peripheral IV placed.    Treatment Conditions:  Hgb 5.9 on 1/11/2021      Blood transfusion consent signed 6/10/2020.      Post Infusion Assessment:  Patient tolerated infusion without incident.  Blood return noted pre and post infusion.  Site patent and intact, free from redness, edema or discomfort.  No evidence of extravasations.  Access discontinued per protocol.       Discharge Plan:   Discharge instructions reviewed with: Patient.  Patient and/or family verbalized understanding of discharge instructions and all questions answered.  AVS to patient via [facility].  Patient will return as needed and instructed by MD for next appointment.   Patient discharged in stable condition accompanied by: daughter.  Departure Mode: Wheelchair.    Zhanna Leal RN                      g

## 2021-01-12 NOTE — PROGRESS NOTES
Received fax from [facility] regarding Keisha's lymphedema. They are requestin. diagnosis of lymphedema  2. Documentation of failed conservative treatment: regular elevation, exercise & regular compliant use of compression stockings or wraps.   3. Office notes    Faxed the above to [phone]    Charity Garcia RN 3:36 PM 21

## 2021-01-12 NOTE — TELEPHONE ENCOUNTER
Per Care Coordination note from 5/15/2020 Hospitalization, patient receives oxygen through [facility].   Contacted Rebecca and she confirms that patient is receiving oxygen through [facility]. She state that she was discussing different oxygen delivery options for patient and they noticed that her oxygen order still lists her as only using oxygen at night. [facility] recommended they get an updated order sent for patient. Rebecca states patient was previously just on oxygen at night, but her need has increased over the past 3 years. She has been needing oxygen 2.5 L all the time since she has been isolated at home due to COVID pandemic.      Oxygen order pended for 2.5 L continuous use, routed to Dr. Eubanks to review and complete if appropriate.

## 2021-01-13 NOTE — TELEPHONE ENCOUNTER
Patient and Rebecca advised orders faxed.  [facility] orders get sent to [facility] to start with.  [place] [phone] fax [phone]  [place] [phone] fax [phone]    [place] [phone] fax [phone]

## 2021-01-15 NOTE — TELEPHONE ENCOUNTER
She should continue on the Murelax and increase the senna to 2 day. If no result she can got to 2 senna twice a day.

## 2021-01-15 NOTE — TELEPHONE ENCOUNTER
Daughter is calling for advice on patient's complaints of constipation.    Daughter reports patient is reporting continued complaints of constipation, which began around 6 days ago with some complaints of abdominal  discomfortable    Per daughter, patient has been taking one tablet at night time of senna for past 6 days. Did have larger BM 3 days ago. But now continues with feeling uncomfortable, but continues to pass stool. Patient received first dose of Miralax yesterday.     Daughter wondering if she can continue with Miralax and/or increase Senna dosage.    She reports patient continues to drink at baseline, denies nausea, vomiting. Continues to have small bowel movements, but continues with complaints of being uncomfortable.     Daughter, who is the care giver, requests call back with PCP advice [phone].    Jim Huang RN, BSN    SHER, L OO POOL

## 2021-01-15 NOTE — PROGRESS NOTES
Rebecca left voice mail stating they had to restart Keisha's afternoon dose of torsemide.     Spoke to Rebecca. She did restart Keisha's 40 mg after noon dose yesterday as her hands and legs were becoming puffy. Rebecca also noticed she was more short of breath.   Keisha doing better today.   Gave instructions to Rebecca to continue torsemide 40 mg BID and we will get BMP in 1-2 wks.   Rebecca asked if Keisha is feeling better if they could try 40/20 daily.     Update to Suad     Med list updated    Charity Garcia, RN 2:18 PM 01/15/21

## 2021-01-16 PROBLEM — R10.84 ABDOMINAL PAIN, GENERALIZED: Status: ACTIVE | Noted: 2021-01-01

## 2021-01-16 PROBLEM — D64.9 ACUTE ON CHRONIC ANEMIA: Status: ACTIVE | Noted: 2020-01-01

## 2021-01-16 NOTE — ED NOTES
DATE:  1/16/2021   TIME OF RECEIPT FROM LAB:  0148  LAB TEST:  hgb  LAB VALUE:  6.5  RESULTS GIVEN WITH READ-BACK TO (PROVIDER):  Damir SHAH LAB VALUE REPORTED TO PROVIDER:   0418

## 2021-01-16 NOTE — PROVIDER NOTIFICATION
"MD paged: \"Update: Pt had smear of stool, brown/black in color. Will continue to monitor stool colors.\"  "

## 2021-01-16 NOTE — ED TRIAGE NOTES
Pt arrives via EMS from home for SOB and increased edema. Pt was diagnosed with SBO a week ago. Since then abdominal distention has gotten worse, increased peripheral edema, and SOB. Pt on 2L NC at home. Pt found at home satting at 90% with respirations at 6/min and pursed lip breathing. Pt has hx of CHF and Afib. Pt oriented to self and situation, confused about time and recents events. Airway and breathing intact. Circulation poor, lasting edema with compression.

## 2021-01-16 NOTE — PLAN OF CARE
Presentation/ dx: Admit this shift with low hgb, edema  Hx: anemia, afib, CHF, COPD  Vitals: Temp: 98.4  F (36.9  C) Temp src: Oral BP: (!) 131/37 Pulse: 58   Resp: 20 SpO2: 97 % O2 Device: Nasal cannula Oxygen Delivery: 2 LPM  Neuro: Ao4, appears forgetful.  Respiratory: LS dim, denied SOB  Cardiac/tele: Tele afib CVR with frequent PACs and PVCs  GI/: Constipated, davey in place  Skin: Blanchable redness, dry and flakey, +2 generalized edema thorughotu   LDAs: PIV in left and right, davey   Labs: Covid negative  Diet: Cardiac  Activity: A2, turn and reposition q 2hrs  Plan: Started on IV abx, will cont POC.

## 2021-01-16 NOTE — PLAN OF CARE
"Ox4, lethargic. VSS on 2L O2 per NC except low DBP. C/O generalized aches and \"I feel like I got hit by a truck;\" PRN PO tylenol 650 mg given per pt request w/ some relief. Tele a-fib CVR w/ BBB / freq PACs and PVCs, denied CP. LS coarse crackles, SOB/MOTT. PIV to right and left intact, SL . Maguire patent w/ good UOP. Turn q2h, pulsate mattress ordered. Up to bedside commode Ax2 GB W. Poor PO intake, pt states \"I just don't have an appetite.\" Hgb 7.3. Triggered sepsis, awaiting lactic results. On IV rocephin. Will continue POC.   "

## 2021-01-16 NOTE — H&P
Admitted:     01/16/2021      CHIEF COMPLAINT:  Abdominal cramping for the past 10 days, lack of bowel movement.      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.  Her baseline weight is around 140-143 pounds.  The patient states that she has been compliant with her medications; however, for the past 1-1/2 weeks.  She has had cramping in her abdomen, it is mainly in her infraumbilical region.  She states that she has been constant.  She has not had a bowel movement for roughly 10 days now.  She has been intermittently passing gas.  She has tried Metamucil, Senokot and MiraLax to no effect.  She denies any nausea or vomiting.  She denies any fevers or chills.  She denies any lightheadedness or dizziness.  She gets periodic blood transfusions.  She states that the last transfusion she had was on 1/12/2021 at the [facility].  She has not been able to take her weight regularly; however, the most recent weight that she felt that she was 147 pounds.  She came into the ER, primarily for the cramping in her abdomen and lack of bowel movement; however, she endorses that her shortness of breath may be slightly worse than normal.  At baseline, she is on 2.5 liters of oxygen for a year now.  In the ER over here, she was seen by Dr. Ebenezer Reich.  I discussed care with him.  I am asked to admit her for further evaluation.      PAST MEDICAL HISTORY:  Hypertension, atrial fibrillation, diastolic heart failure, prior history of CVA, hyperlipidemia, iron deficiency anemia, pulmonary hypertension, history of GI bleed.      PAST SURGICAL HISTORY:  Significant for cholecystectomy, bowel resection in the past.       ALLERGIES:  LISINOPRIL, LEVAQUIN, KEFLEX.      MEDICATIONS:  List includes:   1.  Torsemide.   2.  Aldactone.   3.  Effexor.   4.  Omeprazole   5.  Hydralazine.   6.  Albuterol inhaler.      SOCIAL HISTORY:  She does not smoke or drink alcohol.      REVIEW OF SYSTEMS:  As mentioned in the HPI.  All other systems extensively reviewed and deemed unremarkable and negative.      FAMILY HISTORY:  Reviewed and noncontributory.      PHYSICAL EXAMINATION:   VITAL SIGNS:  Temperature is 98.4, pulse 62, blood pressure is 155/59, respiratory rate 22, O2 sat is 99% on 3 liters.   GENERAL:  The patient is alert, awake, oriented x 3.  She appears frail and chronically ill, in no acute distress.   HEENT:  Pupils equal, round, react to light.   LUNGS:  She has diffuse crackles bilaterally.   HEART:  Irregularly irregular S1, S2 normal.  No murmurs or gallops.   ABDOMEN:  Soft, tender in all quadrants; however, no guarding or rigidity.  She has hypoactive bowel sounds.   EXTREMITIES:  She has 2+ edema.   SKIN:  She has mild scattered ecchymosis.   NEUROLOGIC:  She moves all extremities.  Cranial nerves  II-XII are grossly within normal limits.      LABORATORY:  Lab work obtained over here included a CMP which is grossly unremarkable other than a BUN of 60, creatinine of 1.12 with a GFR of 44, albumin 2.8.  Lactic acid 0.6.  Lipase is 182.  Her BNP.  Troponin is 0.024.  A venous blood gas showed a pH of 7.36 with a pCO2 of 58, pO2 of 90, bicarbonate of 32.  On a CBC, her white cell count is 9.0, hemoglobin 6.5, hematocrit 23.3, platelet count of 206.  Her differential is grossly within normal limits other than lymphopenia at 0.7.  Her test SARS COVID test PCR was read as negative.       IMAGING:  An x-ray of her chest showed stable cardiomegaly, increased interstitial markings with vascular congestion and edema, small bilateral effusions and bilateral atelectasis, chronic fracture deformity of the proximal right humerus.      A CT  of the abdomen and pelvis with contrast showed moderate right and small left pleural effusion with associated atelectasis at the base of the lower lobes, cardiomegaly and advanced coronary artery disease, small to moderate size hiatal hernia, hepatic cirrhosis, colonic diverticulosis.        An EKG shows atrial fibrillation with occasional PVC with nonspecific ST-T wave changes.      ASSESSMENT AND PLAN:   1.  Acute on chronic diastolic heart failure.  In the Emergency Room over here, she was given 80 mg of Lasix IV x1.  At baseline, she takes Demadex 40 mg twice a day.  We will place her on IV Lasix 40 mg IV three times a day.   2.  Her last echocardiogram in our system was in 2018.  Hence, I will get an echocardiogram on her.  At that point, she was noted to have severe pulmonary hypertension.   3.  Chronic anemia.  Her hemoglobin is 6.5.  She is getting transfused with a unit of blood.  I will recheck the hemoglobin later today.   4.  Chronic kidney disease, stage III.  Avoid nephrotoxins.     5. ? UTI. Started on IV rocephin.     Overall, this is an elderly 88-year-old white female with severe pulmonary hypertension, on chronic oxygen use who gets intermittent blood transfusions due to anemia of undetermined etiology, attributed to gastrointestinal loss, was unable to tolerate anticoagulation for her atrial fibrillation.   Her functional status is very poor.  She lives with her daughter and uses a wheelchair to move around.  I think it would be worthwhile getting Palliative Care involved on Monday. Her CODE STATUS is  DNR/DNI.   For her constipation, we will try some suppositories.         EVY CASTRO MD             D: 2021   T: 2021   MT: PASHA      Name:     ELAINA MAYFIELD   MRN:      [id number]        Account:      [id number]   :      1932        Admitted:     2021                   Document: [id number]

## 2021-01-16 NOTE — PROVIDER NOTIFICATION
"MD paged: \"Update: Pt's DBP has been in 30's. Pt triggered sepsis, waiting for lab to draw lactic now. Pt generally feeling \"like I got hit by a truck.\" No callback needed, thx!\"  "

## 2021-01-16 NOTE — PROGRESS NOTES
Progress note update    Patient was admitted earlier this morning by Janneth Salas MD.  Please see H&P from earlier today.  When I evaluated the patient she reported that she was very tired but her breathing was already feeling better than admission.    She presented with shortness of breath and is being treated for a CHF exacerbation.  She is on IV Lasix, her creatinine is slightly improved to 1.08 and at her baseline this morning.  We will continue with IV Lasix.  Continue to wean supplemental oxygen.    She also has acute on chronic anemia.  She has had prior GI bleeds.  These are felt to be due to colonic AVMs.  She is transfusion dependent and did receive 1 unit of PRBCs on 1/12 through the infusion center.  When she presented her hemoglobin was 6.5 and she received 1 unit.  Hemoglobin this morning is 7.6.  We will repeat hemoglobin tomorrow but no further transfusion indicated today.    She also is being treated for possible UTI with ceftriaxone.  We will continue this and follow-up cultures.    I did speak with her daughter, Rebecca, by phone this afternoon.  I updated her on her mother status.  All of her questions were answered.    Rosa Jolley MD

## 2021-01-16 NOTE — ED PROVIDER NOTES
History   Chief Complaint:  Shortness of Breath     HPI   88-year-old with a complex past medical history presenting via EMS for the evaluation of shortness of breath.  She has had gradually worsening shortness of breath for the last 1 week.  Denies fever but has an occasional mild nonproductive cough.  Has chronic lower extremity lymphedema with lymphedema wraps last placed today by a home health nurse.  She states that her lower extremity edema is improving.  Last weight was 147 which is above her goal of 1 40-1 43 established by the [facility].  She is also developed generalized abdominal pain and distention.  States her last bowel movement was 7 to 10 days ago.  Denies any dysuria frequency or urgency.  Has nausea and decreased appetite but no vomiting.  Pain is crampy in nature and nonradiating.    Chronically on 2.5 L of nasal cannula oxygen 24 hours a day    Known history of chronic A. fib not on anticoagulation due to history of GI bleed    Chronic anemia due to colonic AVM and iron deficiency with recurrent transfusions of red blood cells last had transfusion of 1 unit January 12.    Increase her torsemide to 40 mg twice daily starting yesterday due to the increase in her weight and shortness of breath.  This was done by the [facility] as she has a known history of diastolic dysfunction.      Functional activity is daughter helps with transfers from bed or chair to wheelchair.  She does not independently ambulate any longer.    Review of Systems  ROS: 10 point ROS neg other than the symptoms noted above in the HPI.    Allergies:  Lisinopril  Keflex  Levaquin  Sulfa Drugs    Medications:  Neurontin  Apresoline  Omeprazole  Zocor  Aldactone  Demadex  Effexor    Past Medical History:    Anemia  A. Fib  Cardiomyopathy  CHF  Chronic edema  Cerebrovascular accident  Depression  GERD  Hyperlipidemia  Hypertension  Shingles  COPD  Chronic kidney disease stage III  Dysphagia  Myocardial infarction  GI  bleed    Past Surgical History:    Right foot mass excision     Social History:  Presents to ED via EMS  PCP: Gerri Eubanks    Physical Exam     Patient Vitals for the past 24 hrs:   BP Temp Temp src Pulse Resp SpO2   01/16/21 0230 -- -- -- 63 24 99 %   01/16/21 0200 -- -- -- 59 25 100 %   01/16/21 0101 (!) 178/66 98.5  F (36.9  C) Oral 64 20 96 %       Physical Exam  Gen: Sitting in bed, mild tachypnea with accessory muscle use, speaking short sentences  HENT:  mmm, no rhinorrhea  Eyes: periorbital tissues and sclera normal   Neck: supple, no abnormal swelling  Lungs: Mild tachypnea, speaking short sentences, mild accessory muscle use, decreased breath sounds bases  CV: rrr, 3/6 systolic murmur, ppi  Abd: soft, mild generalized distention, tender to palpation, no masses  Ext: Bilateral lower extremity lymphedema wraps are present up to the thigh.  Once wraps are removed she has 1+ pitting edema bilaterally with her go deformity bilaterally.  No open wounds or sores or weeping.  Skin: warm, dry, well perfused, no rashes/bruising/lesions on exposed skin  Neuro: alert, MAEE, no gross motor or sensory deficits,  Psych: Normal mood, normal affect      Emergency Department Course   ECG:  ECG taken at 0105, ECG read at 0113  Atrial fibrillation with premature ventricular or aberrantly conducted complexes  Nonspecific ST and T wave abnormality  Abnormal ECG  No significant change when compared to EKG dated 11/3/2020  Rate 71 bpm. DC interval * ms. QRS duration 112 ms. QT/QTc 420/456 ms. P-R-T axes * 59 52.    Imaging:  Abd/pelvis CT,  IV  contrast only TRAUMA / AAA   Final Result   IMPRESSION:    1.  Moderate right and small left pleural effusions. Associated atelectasis of the bases of the lower lobes.   2.  Cardiomegaly and advanced coronary artery disease.   3.  Small to moderate-sized hiatal hernia.   4.  Hepatic cirrhosis.   5.  Colonic diverticulosis.      XR Chest Port 1 View   Final Result   IMPRESSION: Stable  cardiomegaly. Increased interstitial markings consistent with vascular congestion and edema. Small bilateral pleural effusions and bibasilar atelectasis. No pneumothorax. Demineralized skeleton. Chronic fracture deformity of the    proximal right humerus.          Laboratory:  CBC: WBC 9.0, HGB 6.5 (L),   CMP: Glucose 116 (H), BUN 60 (H), Creatinine 1.12 (H), GFR 44 (L), Albumin 2.8 (L) o/w WNL    Lactic acid (Resultant 0132): 0.6 (L)    Blood gas venous: pH Venous 7.36, PCO2 Venous 58 (H), PO2 Venous 90 (H), Bicarbonate 32 (H), Base Excess 6.4     Lipase: 182    INR: 1.00        Troponin (Collected 0109): 0.024    BNP: 7,055 (H)     Procedures      Emergency Department Course:    Reviewed:   I reviewed the patient's nursing notes, vitals, past medical records, Care Everywhere.     Assessments:   I performed an exam of the patient as documented above.         Interventions:  0227 Lasix 80 mg IV    Disposition:  Admitted to the hospital service    Impression & Plan       Medical Decision Making:  Complex 88-year-old female here with shortness of breath, weight gain and abdominal pain.  Concerns here for CHF exacerbation, symptomatic anemia, Covid, pneumonia, ACS.  Would also to consider concomitant intra-abdominal process and will do CT scan in addition to abdominal blood work and urinalysis to further evaluate this.    On 2 to 3 L nasal cannula she is a mid to high 90s mildly tachypneic at this point I do not think she needs positive pressure ventilation.  Chest x-ray showing changes of CHF.  Recently had a Covid test on the 12th which was negative.  Blood gas shows primarily a compensated chronic respiratory acidosis.  Troponin detectable but not elevated.  Hemoglobin 6.5 we will plan on crossing and transfusing for 1 unit.  BNP is elevated consistent with history of pulmonary hypertension and heart failure.  Followed by the [facility] clinic states her weight is 147 (this is a stated weight she estimates to be 1  week ago recently has been too weak to stand up and get her weight) which is above her goal weight of 1 40-1 43 with the chest x-ray BNP clinical suspicion for CHF was given IV Lasix here in the ED.  Plan will be for admission for continued diuresis.  Last echo 2018.  Get an actual weight on a scale bed here in the ED    Abdominal CT shows no emergent surgical, vascular, infectious etiology.  We will run the blood over 3 to 4 hours here in the ED and plan admit to the hospitalist service for further evaluation management.          Diagnosis:    ICD-10-CM    1. Acute on chronic diastolic congestive heart failure (H)  I50.33 Asymptomatic SARS-CoV-2 COVID-19 Virus (Coronavirus) by PCR     Blood component     Blood component   2. Acute on chronic anemia  D64.9        Scribe Disclosure:  Alessio ROCHA, am serving as a scribe at 1:10 AM on 1/16/2021 to document services personally performed by Ebenezer Reich MD* based on my observations and the provider's statements to me.      Ebenezer Reich MD  01/16/21 0422

## 2021-01-16 NOTE — ED NOTES
[facility]  ED Nurse Handoff Report    Keisha Godinez is a 88 year old female   ED Chief complaint: Shortness of Breath  . ED Diagnosis:   Final diagnoses:   Acute on chronic diastolic congestive heart failure (H)   Acute on chronic anemia   Abdominal pain, generalized     Allergies:   Allergies   Allergen Reactions     Blood Transfusion Related (Informational Only) Other (See Comments)     Patient has a history of a clinically significant antibody against RBC antigens.  A delay in compatible RBCs may occur.     Lisinopril Other (See Comments)     Tongue swelling     Calcium Nausea and Vomiting     Egg Yolk      Flu Virus Vaccine      Allergic to eggs.     Keflex [Cephalexin] Nausea     Pt states she had upset stomach.  NOT tongue swelling.  She had tongue swelling with a combo bp med that she thinks included lisinopril.    Tolerates IV Cefazolin     Levaquin [Levofloxacin]      Sulfa Drugs      Zinc Nausea and Vomiting       Code Status: No CPR or intubation  Activity level - Baseline/Home:  Assist X 2. Activity Level - Current:   Total Care. Lift room needed: Yes. Bariatric: No   Needed: No   Isolation: No. Infection: Not Applicable.     Vital Signs:   Vitals:    01/16/21 0200 01/16/21 0230 01/16/21 0330 01/16/21 0344   BP:   (!) 165/68 (!) 165/68   Pulse: 59 63 64 62   Resp: 25 24 24 20   Temp:    98.4  F (36.9  C)   TempSrc:       SpO2: 100% 99% 100%        Cardiac Rhythm:  ,      Pain level:    Patient confused: Yes. Patient Falls Risk: Yes.   Elimination Status: Has voided   Patient Report - Initial Complaint: SOB. Focused Assessment:   Skin Color/Condition AV        Details:   Skin - Skin WDL: all  Skin Moisture: dry; flaky  Skin Elasticity: slow return to original state  Skin Turgor: slow return to original state  Skin Comment: Pt has wraps on both lower legs. Pt has 3+ pitting and skin takes over 5 min to return to original state. Hx of CHF        03:51 Respiratory AV       Details:   Respiratory - Respiratory WDL: all  Rhythm/Pattern, Respiratory: shortness of breath; pursed lip breathing; tachypnea  Breath Sounds: All Fields  Cough Frequency: no cough   Head To Toe Assessment - All Lung Fields Breath Sounds: Anterior:; Lateral:; crackles, coarse            Tests Performed: labs, chest xray, CT. Abnormal Results:   Labs Ordered and Resulted from Time of ED Arrival Up to the Time of Departure from the ED   CBC WITH PLATELETS DIFFERENTIAL - Abnormal; Notable for the following components:       Result Value    RBC Count 2.50 (*)     Hemoglobin 6.5 (*)     Hematocrit 23.3 (*)     MCH 26.0 (*)     MCHC 27.9 (*)     RDW 15.9 (*)     Absolute Lymphocytes 0.7 (*)     All other components within normal limits   COMPREHENSIVE METABOLIC PANEL - Abnormal; Notable for the following components:    Glucose 116 (*)     Urea Nitrogen 60 (*)     Creatinine 1.12 (*)     GFR Estimate 44 (*)     GFR Estimate If Black 50 (*)     Albumin 2.8 (*)     All other components within normal limits   LACTIC ACID WHOLE BLOOD - Abnormal; Notable for the following components:    Lactic Acid 0.6 (*)     All other components within normal limits   NT PROBNP INPATIENT - Abnormal; Notable for the following components:    N-Terminal Pro BNP Inpatient 7,055 (*)     All other components within normal limits   BLOOD GAS VENOUS - Abnormal; Notable for the following components:    PCO2 Venous 58 (*)     PO2 Venous 90 (*)     Bicarbonate Venous 32 (*)     All other components within normal limits   ABO/RH TYPE AND SCREEN - Abnormal; Notable for the following components:    Antibody Screen Pos (*)     All other components within normal limits   INR   LIPASE   TROPONIN I   SARS-COV-2 (COVID-19) VIRUS RT-PCR   ROUTINE UA WITH MICROSCOPIC   CARDIAC CONTINUOUS MONITORING   RED BLOOD CELL PREPARE ORDER UNIT   BLOOD COMPONENT     Abd/pelvis CT,  IV  contrast only TRAUMA / AAA   Final Result   IMPRESSION:    1.  Moderate right and small  left pleural effusions. Associated atelectasis of the bases of the lower lobes.   2.  Cardiomegaly and advanced coronary artery disease.   3.  Small to moderate-sized hiatal hernia.   4.  Hepatic cirrhosis.   5.  Colonic diverticulosis.      XR Chest Port 1 View   Final Result   IMPRESSION: Stable cardiomegaly. Increased interstitial markings consistent with vascular congestion and edema. Small bilateral pleural effusions and bibasilar atelectasis. No pneumothorax. Demineralized skeleton. Chronic fracture deformity of the    proximal right humerus.        .   Treatments provided: see MAR  Family Comments: Lives with daughter. Daughter is RENETTA Malhotra [phone]  OBS brochure/video discussed/provided to patient:  No  ED Medications:   Medications   0.9% sodium chloride BOLUS (has no administration in time range)   furosemide (LASIX) injection 80 mg (80 mg Intravenous Given 1/16/21 0227)   iopamidol (ISOVUE-370) solution 500 mL (73 mLs Intravenous Given 1/16/21 0242)   Sodium Chloride 0.9 % Bag 500mL for CT Scan Flush Use (59 mLs Intravenous Given 1/16/21 0244)   albuterol (PROAIR HFA/PROVENTIL HFA/VENTOLIN HFA) 108 (90 Base) MCG/ACT inhaler 6 puff (6 puffs Inhalation Given 1/16/21 0334)     Drips infusing:  Yes - blood products  For the majority of the shift, the patient's behavior Green. Interventions performed were N/A.    Sepsis treatment initiated: No     Patient tested for COVID 19 prior to admission: YES    ED Nurse Name/Phone Number: Emilia Coello RN,   3:53 AM    RECEIVING UNIT ED HANDOFF REVIEW    Above ED Nurse Handoff Report was reviewed: Yes  Reviewed by: Elana Myers RN on January 16, 2021 at 4:47 AM

## 2021-01-16 NOTE — CONSULTS
Care Management Initial Consult    General Information  Assessment completed with: Rebecca Bach  Type of CM/SW Visit: Initial Assessment    Primary Care Provider verified and updated as needed: Yes   Readmission within the last 30 days: no previous admission in last 30 days      Reason for Consult: care coordination/care conference, discharge planning  Advance Care Planning:            Communication Assessment  Patient's communication style: spoken language (English or Bilingual)    Hearing Difficulty or Deaf: no   Wear Glasses or Blind: yes    Cognitive  Cognitive/Neuro/Behavioral: .WDL except  Level of Consciousness: lethargic  Arousal Level: arouses to voice  Orientation: oriented x 4  Mood/Behavior: calm, cooperative  Best Language: 0 - No aphasia  Speech: slow, clear, logical    Living Environment:   People in home: child(emre), adult  Rebecca  Current living Arrangements: house      Able to return to prior arrangements: yes       Family/Social Support:  Care provided by: child(emre)  Provides care for: no one, unable/limited ability to care for self     Children          Description of Support System: Supportive, Involved    Support Assessment: Adequate family and caregiver support    Current Resources:   Skilled Home Care Services: Skilled Nursing, Occupational Therapy(Lymphedema)  Community Resources: [facility], Home Care  Equipment currently used at home: wheelchair, manual  Supplies currently used at home:      Employment/Financial:  Employment Status:          Financial Concerns:             Lifestyle & Psychosocial Needs:        Socioeconomic History     Marital status:      Spouse name: Not on file     Number of children: Not on file     Years of education: Not on file     Highest education level: Not on file     Tobacco Use     Smoking status: Former Smoker     Years: 1.00     Types: Cigarettes     Start date:      Quit date: 1956     Years since quittin.8     Smokeless tobacco:  Never Used   Substance and Sexual Activity     Alcohol use: No     Alcohol/week: 0.0 standard drinks     Drug use: No     Sexual activity: Never     Partners: Male       Functional Status:  Prior to admission patient needed assistance:              Mental Health Status:          Chemical Dependency Status:                Values/Beliefs:  Spiritual, Cultural Beliefs, Yarsanism Practices, Values that affect care:               Care Management Follow Up    Length of Stay (days): 0    Expected Discharge Date: 01/18/21     Concerns to be Addressed: care coordination/care conferences, discharge planning     Patient plan of care discussed at interdisciplinary rounds: Yes    Anticipated Discharge Disposition: Home Care, Home     Anticipated Discharge Services: None  Anticipated Discharge DME: None    Education Provided on the Discharge Plan:  Yes  Patient/Family in Agreement with the Plan: yes    Referrals Placed by CM/SW:  None  Private pay costs discussed: Not applicable    Additional Information:  Spoke with daughter Rebecca over the phone. Daughter Rebecca lives with her mother and is her caretaker. Rebecca young has been caring for her mother for 14+ years. Patient's other daughter also lives close by to assist as needed.   Patient currently has home care RN/OT for lymphedema [facility].   Patient follows with cardiology and the [facility]. Rebecca states that they call and check in to monitor weight and symptoms. Rebecca declined any additional review or information regarding heart failure at this time. Rebecca young has support from staff at [facility] and cardiology clinics. Education resource for heart failure added to AVS for review at discharge.   Rebecca states that has necessary equipment to care for her mother at home including but not limited to: wheelchair, grab bars in bathroom, lift chair, and gait belt.    Rebecca stated no concern or no additional resources needed.   Rebecca anticipates providing transport  for the patient at discharge.     CM will continue to follow for discharge planning and needs.       Denise Mandujano, RN        Denise Mandujano RN Case Manager  Inpatient Care Coordination  [facility]   [phone]

## 2021-01-17 NOTE — PROGRESS NOTES
[facility]  Hospitalist Progress Note  Rahul Parsons MD 01/17/21    Reason for Stay (Diagnosis): CHF         Assessment and Plan:      Summary of Stay: Keisha Godinez is a 88 year old female with history of chronic diastolic CHF, RV systolic CHF, pulmonary hypertension, lymphedema, chronic left foot wound, A. 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  She was admitted for IV diuretics and blood transfusion.    Problem List/Assessment and Plan:     1. Acute on chronic diastolic congestive heart failure exacerbation with acute hypoxemic respiratory failure.  --furosemide 40 mg 3 times a day for now.  --Check BMP in the morning  --Supplemental oxygen  --Continue spironolactone, hydralazine with hold parameters.  --Continue other home medications including hydralazine.    --not on a BB, appears due to AV conduction disease.  Not on ace-I due to tongue swelling.    2.   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    3.   Weakness and deconditioning: PT consult.    4.   CKD stage III: Monitor creatinine with diuresis.  Currently doing well with creatinine of 1.01.    5.   GERD: On omeprazole.    6.  Depression: Continue home venlafaxine.    7.    History of atrial fibrillation.  Not on anticoagulation due to chronic bleeding issues  8.  Possible UTI: mildly positive.  Started on ceftriaxone.  Anticipate 3 days treatment.     DVT Prophylaxis: Pneumatic Compression Devices  Code Status: DNR / DNI  Discharge Dispo/Date: At least 2 more days        Interval History (Subjective):      I assumed care today, patient known to me from prior hospital stays  Transfusing 1 unit red cells again for hemoglobin of 7  Continue IV diuretics today  Restarting multiple home meds  Patient feels weak and  "lethargic.  Updated her daughter, Rebecca.                    Physical Exam:      Last Vital Signs:  BP (!) 162/52   Pulse 61   Temp 96.5  F (35.8  C) (Axillary)   Resp 18   Ht 1.6 m (5' 3\")   Wt 65.1 kg (143 lb 8.3 oz)   SpO2 99%   BMI 25.42 kg/m      I/O last 3 completed shifts:  In: 430 [P.O.:130]  Out: 2175 [Urine:2175]    General: Alert, awake but tired and chronically ill-appearing.  HEENT: NC/AT, eyes anicteric, external occular movements intact, face symmetric.    Cardiac: RRR, S1, S2.   Pulmonary: Bilateral wet lung sounds.  Normal chest rise, normal work of breathing.  No wheezing noted.  Abdomen: soft, non-tender, non-distended.  Bowel Sounds Present.  No guarding.  Extremities: Scattered bruises.  Trace to 1+ bilateral lower extremity edema.  No deformities.  Warm, well perfused.  Skin:  Warm and Dry.  Neuro: No focal deficits noted but she appears tired and lethargic.  Speech clear.  She is alert and oriented to recent events, person and place.  Psych: Appropriate affect.         Medications:      All current medications were reviewed with changes reflected in problem list.         Data:      All new lab and imaging data was reviewed.   Labs:  Recent Labs   Lab 01/17/21  0601      POTASSIUM 3.9   CHLORIDE 104   CO2 37*   ANIONGAP <1*   GLC 82   BUN 56*   CR 1.01   GFRESTIMATED 49*   GFRESTBLACK 57*   CASPER 8.4*     Recent Labs   Lab 01/17/21  0601   WBC 5.5   HGB 7.0*   HCT 25.0*   MCV 95   *      Imaging:   Recent Results (from the past 48 hour(s))   XR Chest Port 1 View    Narrative    EXAM: XR CHEST PORT 1 VW  LOCATION: [facility]  DATE/TIME: 1/16/2021 1:36 AM    INDICATION: Difficulty breathing.  COMPARISON: 11/03/2020.      Impression    IMPRESSION: Stable cardiomegaly. Increased interstitial markings consistent with vascular congestion and edema. Small bilateral pleural effusions and bibasilar atelectasis. No pneumothorax. Demineralized skeleton. Chronic fracture " deformity of the   proximal right humerus.   Abd/pelvis CT,  IV  contrast only TRAUMA / AAA    Narrative    EXAM: CT ABDOMEN PELVIS W CONTRAST  LOCATION: [facility]  DATE/TIME: 1/16/2021 2:39 AM    INDICATION: Abdominal pain and distention.  COMPARISON: CT abdomen and pelvis 11/25/2015.  TECHNIQUE: CT scan of the abdomen and pelvis was performed following injection of IV contrast. Multiplanar reformats were obtained. Dose reduction techniques were used.  CONTRAST: 73mL Isovue-370    FINDINGS:   LOWER CHEST: Moderate right and small left pleural effusions and associated atelectasis of the dependent and basilar portions of the lower lobes. Mild streaky atelectasis at the base of the right middle lobe and lingula. Enlarged heart. Small to   moderate-sized hiatal hernia.    HEPATOBILIARY: Slightly irregular hepatic contour consistent with cirrhosis. Absent gallbladder. No biliary dilatation.    PANCREAS: Normal.    SPLEEN: Normal.    ADRENAL GLANDS: Normal.    KIDNEYS/BLADDER: Bilateral renal cysts. The largest on the left measuring 3.7 cm contains some dependent calcific material.    BOWEL: Colonic diverticulosis. No specific evidence for diverticulitis. No bowel obstruction or inflammation. Appendix not visualized. No free fluid or gas.    LYMPH NODES: Normal.    VASCULATURE: Advanced multivessel coronary artery calcification. Moderate aortoiliac atherosclerosis.    PELVIC ORGANS: Post hysterectomy. Degenerative changes of the spine and femoral acetabular joints. Slight grade 1 anterolisthesis of L4 over L5.      Impression    IMPRESSION:   1.  Moderate right and small left pleural effusions. Associated atelectasis of the bases of the lower lobes.  2.  Cardiomegaly and advanced coronary artery disease.  3.  Small to moderate-sized hiatal hernia.  4.  Hepatic cirrhosis.  5.  Colonic diverticulosis.         Rahul Parsons MD , MD.

## 2021-01-17 NOTE — PLAN OF CARE
"Ox4, lethargic. VSS on 2L O2 per NC except for elevated BP's . C/O generalized aches, PRN PO tylenol 650 mg given x 2 per pt request w/ some relief. Tele a-fib CVR w/ freq PVCs and PACs, denied CP . LS coarse crackles, MOTT. +2 edema BUEs and BLEs. PIV to right and left intact, SL . Pulsate mattress utilized, turning q2h. Up to chair Ax2 GB W, back to bed w/ lift due to pt c/o feeling \"too weak to walk.\" Maguire patent w/ good UOP. On IV rocephin. 1 unit PRBC infusing now for AM Hgb 7.0, no complications. Will continue POC.     Heart Failure Care Map  GOALS TO BE MET BEFORE DISCHARGE:    1. Decrease congestion and/or edema with diuretic therapy to achieve near optimal volume status.     Dyspnea improved: No, further care required to meet this goal. Please explain MOTT   Edema improved: No, further care required to meet this goal. Please explain +2 BUEs and BLEs        Net I/O and Weights since admission:   12/18 1500 - 01/17 1459  In: 720 [P.O.:420]  Out: 3275 [Urine:3275]  Net: -2555     Vitals:    01/16/21 0439 01/16/21 0533 01/17/21 0630   Weight: 68.8 kg (151 lb 10.8 oz) 68.3 kg (150 lb 9.2 oz) 65.1 kg (143 lb 8.3 oz)       2.  O2 sats > 90% on room air, or at prior home O2 therapy level.      Able to wean O2 this shift to keep sats above 90%?: No, further care required to meet this goal. Please explain 2L O2 per NC   Does patient use Home O2? Yes-  2L          Current oxygenation status:   SpO2: 98 %     O2 Device: Nasal cannula, Oxygen Delivery: 2 LPM    3.  Tolerates ambulation and mobility near baseline.     Ambulation: No, further care required to meet this goal. Please explain Ax2 GB W or lift   Times patient ambulated with staff this shift: 1    Please review the Heart Failure Care Map for additional HF goal outcomes.    Pat Torres RN  1/17/2021     "

## 2021-01-17 NOTE — PLAN OF CARE
"Pt lethargic, oriented x4. Generalized edema. Maguire catheter. Ax2 with lift. Pt refused to turn, she said she \"feels comfy\". Author gave verbal education on shifting weight in bed to prevent pressure injuries, pt verbalized understanding and still refused. Continue to monitor and with plan of care.   "

## 2021-01-17 NOTE — PROVIDER NOTIFICATION
MD notified pt in 354 DN her left arm is more edematous than earlier this shift, +2-3, Red/pink.   Right arm is +2 edema

## 2021-01-17 NOTE — PROVIDER NOTIFICATION
"MD notified pt in 354 is nauses, says she \" feels off\". Not dizzy, light headed and no pain.   /45, RR 20, 95% on 2L temp 97.0  She has no Antiemetic's in epic.       MD put order in for Zofran  "

## 2021-01-17 NOTE — PLAN OF CARE
"Presentation/Diagnosis:Anemia and constipation   History:HTN, Afib, GIB, Anemia   Labs/Protocols:K 4.1, Creat 1.08, Phos 2.4, Hgb 7.3  Vitals:BP (!) 172/48   Pulse 54   Temp 97.8  F (36.6  C) (Oral)   Resp 18   Ht 1.6 m (5' 3\")   Wt 68.3 kg (150 lb 9.2 oz)   SpO2 99%   BMI 26.67 kg/m      Telemetry:A Fib CVR PAC   Respiratory:2L NC sating 99%, pt is on 2.5L NC baseline   Neuro:A&O lethargic this shift, slept most of the shift   GI/:No BM this shift, Schedule stool softeners given. Maguire in place with good out put.   Skin:Intact, bruising, blanchable redness on bottom, and on both heels.   LDAs:PIV SL, one in Right and Left hand.   Diet:Cardiac diet no caffeine   Activity:A2 GB walker to pivot. Pt did not get out of bed this shift, weight shifted with assistance of staff.   Teaching:Pt educated on plan of care   Plan:pt came up from the ED early this AM, 1 unit(s) of blood was given.   Pt is getting scheduled stool softeners to help with the constipation  Monitoring Hgb.   Pt is getting IV Rocephin for possible UTI  IV lasix Q8 hours.   General +2 edema in extremities.   Pt comes from home with daughter.   Will cont with POC.    "

## 2021-01-17 NOTE — PHARMACY-ADMISSION MEDICATION HISTORY
Admission medication history interview status for this patient is complete. See [facility] admission navigator for allergy information, prior to admission medications and immunization status.     Medication history interview done via telephone during Covid-19 pandemic, indicate source(s): Family - Daughter (Rebecca)  Medication history resources (including written lists, pill bottles, clinic record):Epic list and family list  Pharmacy: [facility]    Changes made to PTA medication list:  Added: none  Deleted: none  Changed: none    Actions taken by pharmacist (provider contacted, etc):called family     Additional medication history information:None    Medication reconciliation/reorder completed by provider prior to medication history?  N    Prior to Admission medications    Medication Sig Last Dose Taking? Auth Provider   acetaminophen (TYLENOL) 325 MG tablet Take 650 mg by mouth 3 times daily as needed for mild pain 1/15/2021 at pm Yes Unknown, Entered By History   albuterol (PROVENTIL) (2.5 MG/3ML) 0.083% neb solution Take 1 vial (2.5 mg) by nebulization 4 times daily 1/15/2021 at pm Yes Gerri Eubanks MD   Cranberry Extract 250 MG TABS Take 500 mg by mouth daily  1/15/2021 at am Yes Doctor, MD Sharonda   Diclofenac Sodium 1 % CREA Place 1 g onto the skin 2 times daily To L shoulder 1/15/2021 at pm Yes Unknown, Entered By History   ferrous sulfate (FEROSUL) 325 (65 Fe) MG tablet Take 1 tablet (325 mg) by mouth 2 times daily 1/15/2021 at pm Yes Darryl Reed MD   gabapentin (NEURONTIN) 100 MG capsule Take 100 mg by mouth 2 times daily  1/15/2021 at pm Yes Unknown, Entered By History   hydrALAZINE (APRESOLINE) 25 MG tablet TAKE 3 TABLETS BY MOUTH 3 TIMES DAILY 1/15/2021 at pm Yes Gerri Eubanks MD   OMEGA-3 FATTY ACIDS PO Take 1,200 mg by mouth daily  1/15/2021 at am Yes Reported, Patient   omeprazole (PRILOSEC) 20 MG DR capsule Take 1 capsule (20 mg) by mouth daily 1/15/2021 at am Yes Gerri Eubanks  MD   psyllium (METAMUCIL/KONSYL) Packet Take 1 packet by mouth At Bedtime 1/15/2021 at pm Yes Unknown, Entered By History   simvastatin (ZOCOR) 10 MG tablet TAKE 1 TABLET BY MOUTH ONCE DAILY AT BEDTIME 1/15/2021 at pm Yes Gerri Eubanks MD   SM STOOL SOFTENER 8.6-50 MG tablet Take 2 tablets daily as needed for constipation while taking prescription pain medications. 1/15/2021 at pm Yes Darryl Reed MD   spironolactone (ALDACTONE) 50 MG tablet Take 1 tablet (50 mg) by mouth daily 1/15/2021 at am Yes Gerri Eubanks MD   torsemide (DEMADEX) 20 MG tablet Take 40 mg by mouth 2 times daily 1/15/2021 at pm Yes Suad Murrell PA   venlafaxine (EFFEXOR-XR) 75 MG 24 hr capsule TAKE 1 CAPSULE BY MOUTH EVERY DAY 1/15/2021 at am Yes Gerri Eubanks MD   VITAMIN D, CHOLECALCIFEROL, PO Take 1,000 Units by mouth daily  1/15/2021 at am Yes Reported, Patient   albuterol (PROAIR HFA/PROVENTIL HFA/VENTOLIN HFA) 108 (90 Base) MCG/ACT inhaler Inhale 2 puffs into the lungs every 6 hours More than a month at Unknown time  Gerri Eubanks MD   ASPIRIN NOT PRESCRIBED (INTENTIONAL) Please choose reason not prescribed, below   Gerri Eubanks MD

## 2021-01-18 NOTE — PROGRESS NOTES
[facility]  Hospitalist Progress Note  Rahul Parsons MD 01/18/2021    Reason for Stay (Diagnosis): CHF    Summary of Stay: Keisha Godinez is a 88 year old female with history of chronic diastolic CHF, chronic hypoxemic respiratory failure maintained on 2 L/min, RV systolic CHF, pulmonary hypertension, lymphedema, chronic left foot wound, A. 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  She was admitted for IV diuretics and blood transfusion.     S/p 2 units red cells, gradually improving though quite weak and deconditioned.  I suspect she may need TCU upon discharge and might be better served with more of a long-term care setting eventually.     Problem List/Assessment and Plan:      1. Acute on chronic diastolic congestive heart failure exacerbation with acute hypoxemic respiratory failure.  --furosemide 40 mg 3 times a day for now.  --Check BMP in the morning  --Supplemental oxygen  --Continue spironolactone, hydralazine with hold parameters.  --Continue other home medications including hydralazine.    --not on a BB, appears due to AV conduction disease.  Not on ace-I due to tongue swelling.     2.   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     3.   Weakness and deconditioning: PT consult.     4.   CKD stage III: Monitor creatinine with diuresis.  Currently doing well with creatinine of 1.01.     5.   GERD: On omeprazole.     6.  Depression: Continue home venlafaxine.     7.    History of atrial fibrillation.  Not on anticoagulation due to chronic bleeding issues  8.  Possible UTI: mildly positive.  Started on ceftriaxone.  Anticipate 3 days treatment.  Culture growing pan sensitive E. coli.     DVT Prophylaxis: Pneumatic Compression Devices  Code Status: DNR / DNI  Discharge  "Dispo/Date: At least 1-2 more days, suspect she will need TCU.  The patient is agreeable to this.         Interval History (Subjective):      Remains on 2L but sats in high 90s.  Weaning.  Tolerated red cells yesterday   hgb 8.6 today  Renal function grossly stable.  Weight down 7 pounds total but no weight yet today.  Extremely weak, discussed discharge planning.                          Physical Exam:      Last Vital Signs:  BP (!) 158/52 (BP Location: Left arm)   Pulse 54   Temp 97.4  F (36.3  C) (Oral)   Resp 20   Ht 1.6 m (5' 3\")   Wt 65.1 kg (143 lb 8.3 oz)   SpO2 96%   BMI 25.42 kg/m      I/O last 3 completed shifts:  In: 838 [P.O.:530; I.V.:3]  Out: 1300 [Urine:1300]    General: Alert, awake but tired and chronically ill-appearing.  HEENT: NC/AT, eyes anicteric, external occular movements intact, face symmetric.    Cardiac: RRR, S1, S2.   Pulmonary: Bilateral wet lung sounds.  Normal chest rise, normal work of breathing.  No wheezing noted.  Abdomen: soft, non-tender, non-distended.  Bowel Sounds Present.  No guarding.  Extremities: Scattered bruises.  Trace to 1+ bilateral lower extremity edema.  No deformities.  Warm, well perfused.  Skin:  Warm and Dry.  Neuro: No focal deficits noted but she appears tired and lethargic.  Speech clear.  She is alert and oriented to recent events, person and place.  Psych: Appropriate affect.         Medications:      All current medications were reviewed with changes reflected in problem list.         Data:      All new lab and imaging data was reviewed.   Labs:  Recent Labs   Lab 01/17/21  0601      POTASSIUM 3.9   CHLORIDE 104   CO2 37*   ANIONGAP <1*   GLC 82   BUN 56*   CR 1.01   GFRESTIMATED 49*   GFRESTBLACK 57*   CASPER 8.4*     Recent Labs   Lab 01/18/21  0715   WBC 7.9   HGB 8.4*   HCT 28.5*   MCV 96   *      Imaging:   Recent Results (from the past 48 hour(s))   XR Chest Port 1 View    Narrative    EXAM: XR CHEST PORT 1 VW  LOCATION: [facility]  DATE/TIME: 1/16/2021 1:36 AM    INDICATION: Difficulty breathing.  COMPARISON: 11/03/2020.      Impression    IMPRESSION: Stable cardiomegaly. Increased interstitial markings consistent with vascular congestion and edema. Small bilateral pleural effusions and bibasilar atelectasis. No pneumothorax. Demineralized skeleton. Chronic fracture deformity of the   proximal right humerus.   Abd/pelvis CT,  IV  contrast only TRAUMA / AAA    Narrative    EXAM: CT ABDOMEN PELVIS W CONTRAST  LOCATION: [facility]  DATE/TIME: 1/16/2021 2:39 AM    INDICATION: Abdominal pain and distention.  COMPARISON: CT abdomen and pelvis 11/25/2015.  TECHNIQUE: CT scan of the abdomen and pelvis was performed following injection of IV contrast. Multiplanar reformats were obtained. Dose reduction techniques were used.  CONTRAST: 73mL Isovue-370    FINDINGS:   LOWER CHEST: Moderate right and small left pleural effusions and associated atelectasis of the dependent and basilar portions of the lower lobes. Mild streaky atelectasis at the base of the right middle lobe and lingula. Enlarged heart. Small to   moderate-sized hiatal hernia.    HEPATOBILIARY: Slightly irregular hepatic contour consistent with cirrhosis. Absent gallbladder. No biliary dilatation.    PANCREAS: Normal.    SPLEEN: Normal.    ADRENAL GLANDS: Normal.    KIDNEYS/BLADDER: Bilateral renal cysts. The largest on the left measuring 3.7 cm contains some dependent calcific material.    BOWEL: Colonic diverticulosis. No specific evidence for diverticulitis. No bowel obstruction or inflammation. Appendix not visualized. No free fluid or gas.    LYMPH NODES: Normal.    VASCULATURE: Advanced multivessel coronary artery calcification. Moderate aortoiliac atherosclerosis.    PELVIC ORGANS: Post hysterectomy. Degenerative changes of the spine and femoral acetabular joints. Slight grade 1 anterolisthesis of L4 over L5.      Impression    IMPRESSION:   1.  Moderate  right and small left pleural effusions. Associated atelectasis of the bases of the lower lobes.  2.  Cardiomegaly and advanced coronary artery disease.  3.  Small to moderate-sized hiatal hernia.  4.  Hepatic cirrhosis.  5.  Colonic diverticulosis.         Rahul Parsons MD , MD.

## 2021-01-18 NOTE — PLAN OF CARE
VSS on 3L O2 NC.  Lethargic but orientated x4.  Denies pain.  LS coarse w/ crackles, MOTT.  Tele: A-fib CVR w/ PACs and PVCs, denies CP.  BUE and BLE +2 edema.  Incontinent of stool x1.  Maguire patent, good UO.  Received 1 unit of PRBCs, no complications.  R PIV and L hand PIV SL.  On IV rocephin for UTI. Repo Q2h.  Discharge tbd.  Continue POC.      Heart Failure Care Map  GOALS TO BE MET BEFORE DISCHARGE:    1. Decrease congestion and/or edema with diuretic therapy to achieve near optimal volume status.     Dyspnea improved: No, further care required to meet this goal. Please explain MOTT   Edema improved: No, further care required to meet this goal. Please explain +2 edema BUE and BLE        Net I/O and Weights since admission:   12/18 2300 - 01/17 2259  In: 1268 [P.O.:660; I.V.:3]  Out: 3525 [Urine:3525]  Net: -2257     Vitals:    01/16/21 0439 01/16/21 0533 01/17/21 0630   Weight: 68.8 kg (151 lb 10.8 oz) 68.3 kg (150 lb 9.2 oz) 65.1 kg (143 lb 8.3 oz)       2.  O2 sats > 90% on room air, or at prior home O2 therapy level.      Able to wean O2 this shift to keep sats above 90%?: No, further care required to meet this goal. Please explain 3L O2 NC   Does patient use Home O2? Yes-  2L          Current oxygenation status:   SpO2: 98 %     O2 Device: Nasal cannula, Oxygen Delivery: 3 LPM    3.  Tolerates ambulation and mobility near baseline.     Ambulation: No, further care required to meet this goal. Please explain Up A2 w/ GB and walker   Times patient ambulated with staff this shift: 0    Please review the Heart Failure Care Map for additional HF goal outcomes.    Anil Connor RN  1/17/2021

## 2021-01-18 NOTE — PROGRESS NOTES
[facility]  Patient is currently open to home care services with [facility]. The patient is currently receiving lymphedema tx.  [facility]  and team have been notified of patient admission.  [facility] liaison will continue to follow patient during stay.  If appropriate provide orders to resume home care at time of discharge.

## 2021-01-18 NOTE — PROGRESS NOTES
Keisha is currently admitted for poss UTI, D HF and constipation. Will make follow-up appt for her.    Charity Garcia RN 10:30 AM 01/18/21

## 2021-01-18 NOTE — PLAN OF CARE
A&O x4. VSS except HR loraine at 52-59 and /45 and 119\36. Pt reports pain as 8/10 and generalized aches. PRN tylenol given twice per patient request with little relief. Assessment revealed MOTT with 2L O2 via NC. LS: coarse crackles. Irregular apical pulse. Tele: A fib, SVR with frequent PACs and PVCs. +2 edema noted in bilateral arms and hands and +1 and +2 edema noted in bilateral lower extremities. Assist of 2 with walker and gait belt. Pt walked a lap around the room. Medium loose BM, but c/o constipation. Maguire patent with good output. Fair PO intake. PIV to right and left intact, SL. Discharge plan in progress. Will continue POC.       Heart Failure Care Map  GOALS TO BE MET BEFORE DISCHARGE:    1. Decrease congestion and/or edema with diuretic therapy to achieve near optimal volume status.     Dyspnea improved: No, further care required to meet this goal. Please explain MOTT   Edema improved: No, further care required to meet this goal. Please explain +2 BUE and BLE        Net I/O and Weights since admission:   12/19 2300 - 01/18 2259  In: 1274 [P.O.:660; I.V.:9]  Out: 4250 [Urine:4250]  Net: -2976     Vitals:    01/16/21 0439 01/16/21 0533 01/17/21 0630   Weight: 68.8 kg (151 lb 10.8 oz) 68.3 kg (150 lb 9.2 oz) 65.1 kg (143 lb 8.3 oz)       2.  O2 sats > 90% on room air, or at prior home O2 therapy level.      Able to wean O2 this shift to keep sats above 90%?: Yes, satisfactory for discharge.   Does patient use Home O2? Yes-  2.5 L per Pt.          Current oxygenation status:   SpO2: 99 %     O2 Device: Nasal cannula, Oxygen Delivery: 2 LPM    3.  Tolerates ambulation and mobility near baseline.     Ambulation: No, further care required to meet this goal. Please explain Assist of 2 with gait belt and walker   Times patient ambulated with staff this shift: 1    Please review the Heart Failure Care Map for additional HF goal outcomes.    Catalina Mccallum  1/18/2021

## 2021-01-18 NOTE — PLAN OF CARE
Vitals: Temp: 97.4  F (36.3  C) Temp src: Oral BP: (!) 158/52 Pulse: 61   Resp: 22 SpO2: 98 % O2 Device: Nasal cannula Oxygen Delivery: 2 LPM  Neuro: AO4, lethargic  Respiratory: LS fine crack, sampson  Cardiac/tele: Tele afib CVR w/ PACs  GI/: Foely in place  Skin: See flowsheets, generalized edema  LDAs: PIV x2  Diet: Cardiac  Activity: A2  Plan: Discharge TBD. Will continue POC

## 2021-01-18 NOTE — PLAN OF CARE
OT order received and chart reviewed.  Pt just returned to bed from walking a loop around the room when therapy attempted, and firmly declining further activity.  Per nursing this was the most she had done in a few days. Pt declining further OT attempt today, asking therapy to return tomorrow.

## 2021-01-19 NOTE — PROGRESS NOTES
[facility]  Hospitalist Progress Note  Rahul Parsons MD 01/19/2021    Reason for Stay (Diagnosis): CHF         Assessment and Plan:      Summary of Stay: Keisha Godinez is a 88 year old female with history of chronic diastolic CHF, chronic hypoxemic respiratory failure maintained on 2 L/min, RV systolic CHF, pulmonary hypertension, lymphedema, chronic left foot wound, A. 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  She was admitted for IV diuretics and blood transfusion.    S/p 2 units red cells, gradually improving though quite weak and deconditioned.  I suspect she may need TCU upon discharge and might be better served with more of a long-term care setting eventually.      She has concerns re: this and family is helping to sort this out.    She has completed IV diuresis and is now on oral lasix.    Problem List/Assessment and Plan:     1. Acute on chronic diastolic congestive heart failure exacerbation with acute hypoxemic respiratory failure.  --Check BMP in the morning  --Supplemental oxygen  --Continue spironolactone, hydralazine with hold parameters.  --change to PO lasix 40 mg BID.  --Continue other home medications including hydralazine.    --not on a BB, appears due to AV conduction disease.  Not on ace-I due to tongue swelling.    2.   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    3.   Weakness and deconditioning: PT consult.  ?need for tcu if insurance coverage is available.    4.   CKD stage III: Monitor creatinine with diuresis.  Currently doing well with creatinine of 1.01.    5.   GERD: On omeprazole.    6.  Depression: Continue home venlafaxine.    7.    History of atrial fibrillation.  Not on anticoagulation due to chronic bleeding issues  8.  E. Coli  UTI:  Started on  "ceftriaxone, completed 4 days.  No further treatment.      DVT Prophylaxis: Pneumatic Compression Devices  Code Status: DNR / DNI  Discharge Dispo/Date: TCU in one day if placement/insurance coverage available.  The patient is agreeable to this.        Interval History (Subjective):      Remains on 2L but sats in high 90s.    Possible mildly bloody stool (known colonic avms)  hgb stable  Stopping IV diuretics, changing to oral  Feels better, stronger but still below baseline  Discussed discharge planning with the patient and updated ramiro, daughter.  If tcu coverage is available they would be open to placement.  Completed antibiotics                    Physical Exam:      Last Vital Signs:  BP (!) 141/42 (BP Location: Left arm)   Pulse 61   Temp 96.5  F (35.8  C) (Oral)   Resp 22   Ht 1.6 m (5' 3\")   Wt 69.6 kg (153 lb 7 oz)   SpO2 98%   BMI 27.18 kg/m      I/O last 3 completed shifts:  In: 606 [P.O.:600; I.V.:6]  Out: 925 [Urine:925]    General: Alert, awake but tired and chronically ill-appearing.  HEENT: NC/AT, eyes anicteric, external occular movements intact, face symmetric.    Cardiac: RRR, S1, S2.   Pulmonary: Bilateral wet lung sounds.  Normal chest rise, normal work of breathing.  No wheezing noted.  Abdomen: soft, non-tender, non-distended.  Bowel Sounds Present.  No guarding.  Extremities: Scattered bruises.  Trace to 1+ bilateral lower extremity edema.  No deformities.  Warm, well perfused.  Skin:  Warm and Dry.  Neuro: No focal deficits noted but she appears tired and lethargic.  Speech clear.  She is alert and oriented to recent events, person and place.  Psych: Appropriate affect.         Medications:      All current medications were reviewed with changes reflected in problem list.         Data:      All new lab and imaging data was reviewed.   Labs:  Recent Labs   Lab 01/19/21  0632 01/18/21  0715    139   POTASSIUM 4.4 4.5   CHLORIDE 101 103   CO2 37* 36*   ANIONGAP  --  <1*   GLC 98 " 123*   BUN 56* 56*   CR 1.10* 1.04   GFRESTIMATED 44* 48*   GFRESTBLACK 52* 55*   CASPER 9.2 8.7     Recent Labs   Lab 01/18/21  0715   WBC 7.9   HGB 8.4*   HCT 28.5*   MCV 96   *      Imaging:   Recent Results (from the past 48 hour(s))   XR Chest Port 1 View    Narrative    EXAM: XR CHEST PORT 1 VW  LOCATION: [facility]  DATE/TIME: 1/16/2021 1:36 AM    INDICATION: Difficulty breathing.  COMPARISON: 11/03/2020.      Impression    IMPRESSION: Stable cardiomegaly. Increased interstitial markings consistent with vascular congestion and edema. Small bilateral pleural effusions and bibasilar atelectasis. No pneumothorax. Demineralized skeleton. Chronic fracture deformity of the   proximal right humerus.   Abd/pelvis CT,  IV  contrast only TRAUMA / AAA    Narrative    EXAM: CT ABDOMEN PELVIS W CONTRAST  LOCATION: [facility]  DATE/TIME: 1/16/2021 2:39 AM    INDICATION: Abdominal pain and distention.  COMPARISON: CT abdomen and pelvis 11/25/2015.  TECHNIQUE: CT scan of the abdomen and pelvis was performed following injection of IV contrast. Multiplanar reformats were obtained. Dose reduction techniques were used.  CONTRAST: 73mL Isovue-370    FINDINGS:   LOWER CHEST: Moderate right and small left pleural effusions and associated atelectasis of the dependent and basilar portions of the lower lobes. Mild streaky atelectasis at the base of the right middle lobe and lingula. Enlarged heart. Small to   moderate-sized hiatal hernia.    HEPATOBILIARY: Slightly irregular hepatic contour consistent with cirrhosis. Absent gallbladder. No biliary dilatation.    PANCREAS: Normal.    SPLEEN: Normal.    ADRENAL GLANDS: Normal.    KIDNEYS/BLADDER: Bilateral renal cysts. The largest on the left measuring 3.7 cm contains some dependent calcific material.    BOWEL: Colonic diverticulosis. No specific evidence for diverticulitis. No bowel obstruction or inflammation. Appendix not visualized. No free fluid or  gas.    LYMPH NODES: Normal.    VASCULATURE: Advanced multivessel coronary artery calcification. Moderate aortoiliac atherosclerosis.    PELVIC ORGANS: Post hysterectomy. Degenerative changes of the spine and femoral acetabular joints. Slight grade 1 anterolisthesis of L4 over L5.      Impression    IMPRESSION:   1.  Moderate right and small left pleural effusions. Associated atelectasis of the bases of the lower lobes.  2.  Cardiomegaly and advanced coronary artery disease.  3.  Small to moderate-sized hiatal hernia.  4.  Hepatic cirrhosis.  5.  Colonic diverticulosis.         Rahul Parsons MD , MD.

## 2021-01-19 NOTE — PLAN OF CARE
A/O x 4, some lethargy this AM. VSS on 2L O2 per NC. C/O generalized aches, PRN PO tylenol 650 mg given per pt request w/ some relief. Tele a-fib CVR w/ freq PACs and PVCs, denied CP . LS dim anterior, coarse crackles posterior, ellen right lobe. MOTT/SOB, +2 edema BUEs and BLEs. PIV to right and left intact, SL. Up to chair Ax1-2 GB W, ambulated in room, and up to bedside commode; had one loose, tarry black/brown stool. Maguire patent w/ good UOP. Completed IV rocephin today. SW following for discharge plans to possible TCU. Will continue POC.     Heart Failure Care Map  GOALS TO BE MET BEFORE DISCHARGE:    1. Decrease congestion and/or edema with diuretic therapy to achieve near optimal volume status.     Dyspnea improved: No, further care required to meet this goal. Please explain MOTT   Edema improved: No, further care required to meet this goal. Please explain +2 BUE and BLEs        Net I/O and Weights since admission:   12/20 1500 - 01/19 1459  In: 2144 [P.O.:1530; I.V.:9]  Out: 5200 [Urine:5200]  Net: -3056     Vitals:    01/16/21 0439 01/16/21 0533 01/17/21 0630 01/19/21 0700   Weight: 68.8 kg (151 lb 10.8 oz) 68.3 kg (150 lb 9.2 oz) 65.1 kg (143 lb 8.3 oz) 69.6 kg (153 lb 7 oz)       2.  O2 sats > 90% on room air, or at prior home O2 therapy level.      Able to wean O2 this shift to keep sats above 90%?: Yes, satisfactory for discharge.   Does patient use Home O2? Yes-  2L          Current oxygenation status:   SpO2: 98 %     O2 Device: Nasal cannula, Oxygen Delivery: 2 LPM    3.  Tolerates ambulation and mobility near baseline.     Ambulation: No, further care required to meet this goal. Please explain Ax1-2 GB W   Times patient ambulated with staff this shift: 1    Please review the Heart Failure Care Map for additional HF goal outcomes.    Pat Torres RN  1/19/2021

## 2021-01-19 NOTE — PROVIDER NOTIFICATION
"MD paged: \"Update: Pt had loose, brown/black, tarry stool. C/O abd pain only when actively having BM. No callback needed unless necessary. Will continue to monitor.\"  "

## 2021-01-19 NOTE — PLAN OF CARE
Heart Failure Care Map  GOALS TO BE MET BEFORE DISCHARGE:    1. Decrease congestion and/or edema with diuretic therapy to achieve near optimal volume status.     Dyspnea improved: No, further care required to meet this goal. Please explain dyspneic on exertion   Edema improved: No, further care required to meet this goal. Please explain no changes         Net I/O and Weights since admission:   12/19 2300 - 01/18 2259  In: 1874 [P.O.:1260; I.V.:9]  Out: 4500 [Urine:4500]  Net: -2626     Vitals:    01/16/21 0439 01/16/21 0533 01/17/21 0630   Weight: 68.8 kg (151 lb 10.8 oz) 68.3 kg (150 lb 9.2 oz) 65.1 kg (143 lb 8.3 oz)       2.  O2 sats > 90% on room air, or at prior home O2 therapy level.      Able to wean O2 this shift to keep sats above 90%?: Yes, satisfactory for discharge.   Does patient use Home O2? Yes-  2.5LPM baseline          Current oxygenation status:   SpO2: 98 %     O2 Device: Nasal cannula, Oxygen Delivery: 2 LPM    3.  Tolerates ambulation and mobility near baseline.     Ambulation: No, further care required to meet this goal. Please explain up with A-2   Times patient ambulated with staff this shift: 0    Please review the Heart Failure Care Map for additional HF goal outcomes.          A&Ox4, VSS, denies pain, Maguire patent, Lasix IV q8h, generalized edema, repositioned q2h, Rocephin IV, Tele Afib SVR, up with A-2 GB, daughter updated through telephone, will continue with POC.  KM FENG, RN  1/18/2021

## 2021-01-19 NOTE — PROGRESS NOTES
"   01/19/21 1509   Quick Adds   Type of Visit Initial Occupational Therapy Evaluation   Living Environment   People in home child(emre), adult   Current Living Arrangements house   Transportation Anticipated family or friend will provide   Living Environment Comments Pt lives with daughter in single story house with ramp to enter, walk in shower with shower chair, RTS w/ rails   Self-Care   Usual Activity Tolerance moderate   Current Activity Tolerance poor   Equipment Currently Used at Home shower chair;walker, rolling;wheelchair, manual   Disability/Function   Hearing Difficulty or Deaf no   Wear Glasses or Blind yes   Vision Management glasses   Concentrating, Remembering or Making Decisions Difficulty no   Difficulty Communicating no   Difficulty Eating/Swallowing no   Walking or Climbing Stairs Difficulty yes   Walking or Climbing Stairs ambulation difficulty, requires equipment   Mobility Management walker inside the house, w/c outside for longer distance   Dressing/Bathing Difficulty no   Toileting issues no   Doing Errands Independently Difficulty (such as shopping) yes   Fall history within last six months no   Change in Functional Status Since Onset of Current Illness/Injury yes   General Information   Onset of Illness/Injury or Date of Surgery 01/16/21   Referring Physician Rahul Parsons MD   Patient/Family Therapy Goal Statement (OT) Pt's goal is to \"get stronger\"   Additional Occupational Profile Info/Pertinent History of Current Problem Per chart: Pt is an 88 year old female admitted with acute on chronic congestive heart failure as well as anemia.  She was admitted for IV diuretics and blood transfusion.   Performance Patterns (Routines, Roles, Habits) Pt reports mod I in all ADLs and mobility tasks with use of FWW at baseline. Utilizes w/c for increased distances outside of the house. Pt indep in med mgmt. Daughter assists with other IADLs - cleaning. laundry, cooking. Daughter works " nights at [employer]   Existing Precautions/Restrictions fall;oxygen therapy device and L/min   Visual Perception   Visual Impairment/Limitations corrective lenses full-time   Sensory   Sensory Comments Reports baseline neuropathy   Pain Assessment   Patient Currently in Pain No   Strength Comprehensive (MMT)   Comment, General Manual Muscle Testing (MMT) Assessment Generalized weakness BUEs   Sit-Stand Transfer   Sit-Stand Lyman (Transfers) maximum assist (25% patient effort)   Assistive Device (Sit-Stand Transfers) walker, front-wheeled   Toilet Transfer   Lyman Level (Toilet Transfer) maximum assist (25% patient effort)   Assistive Device (Toilet Transfer) walker, front-wheeled;commode chair   Balance   Balance Comments Impaired balance noted while in stance FWW level   Activities of Daily Living   BADL Assessment/Intervention lower body dressing;toileting   Lower Body Dressing Assessment/Training   Lyman Level (Lower Body Dressing) maximum assist (25% patient effort)   Toileting   Lyman Level (Toileting) maximum assist (25% patient effort);assist of 2   Instrumental Activities of Daily Living (IADL)   Previous Responsibilities medication management   IADL Comments Daughter assists with majority of IADLs   Clinical Impression   Criteria for Skilled Therapeutic Interventions Met (OT) yes;meets criteria;skilled treatment is necessary   OT Diagnosis Impaired ADLs, mobility tasks   OT Problem List-Impairments impacting ADL problems related to;activity tolerance impaired;balance;strength   Assessment of Occupational Performance 5 or more Performance Deficits   Identified Performance Deficits Bathing, dressing, grooming, toileting, transfers   Planned Therapy Interventions (OT) ADL retraining;IADL retraining;strengthening;transfer training   Clinical Decision Making Complexity (OT) low complexity   Therapy Frequency (OT) 5x/week   Predicted Duration of Therapy 5 days   Risks and Benefits of  Treatment have been explained. Yes   Patient, Family & other staff in agreement with plan of care Yes   OT Discharge Planning    OT Discharge Recommendation (DC Rec) Transitional Care Facility   OT Rationale for DC Rec Recommend ongoing skilled OT while IP and in TCU setting to improve strength, functional activity tolerance, balance and safety needed for daily tasks.    OT Brief overview of current status  Currently requiring strong max A for sit > stand, max Ax2 for toileting   Total Evaluation Time (Minutes)   Total Evaluation Time (Minutes) 8

## 2021-01-19 NOTE — PROGRESS NOTES
01/19/21 0844   Quick Adds   Type of Visit Initial PT Evaluation   Living Environment   People in home child(emre), adult   Current Living Arrangements house   Transportation Anticipated family or friend will provide   Living Environment Comments Patient lives with daughter in a single story home with ramp to enter.  Patient reports that daughter works nights at [employer].    Self-Care   Usual Activity Tolerance moderate   Current Activity Tolerance poor   Equipment Currently Used at Home shower chair;walker, rolling;wheelchair, manual   Disability/Function   Hearing Difficulty or Deaf no   Wear Glasses or Blind yes   Vision Management glasses   Concentrating, Remembering or Making Decisions Difficulty no   Difficulty Communicating no   Walking or Climbing Stairs Difficulty yes   Walking or Climbing Stairs   (uses wheelchair for long distances, has a ramp to enter home)   Mobility Management walker/wheelchair   Dressing/Bathing Difficulty no   Toileting issues no   Errands Management daughter runs errands   General Information   Onset of Illness/Injury or Date of Surgery 01/16/21   Referring Physician Rahul Parsons MD   Patient/Family Therapy Goals Statement (PT) Patient firmly declines TCU at discharge, plans to return to home with previous level of assist from daughter   Pertinent History of Current Problem (include personal factors and/or comorbidities that impact the POC) Keisha Godinez is a 88 year old female with history of chronic diastolic CHF, chronic hypoxemic respiratory failure maintained on 2 L/min, RV systolic CHF, pulmonary hypertension, lymphedema, chronic left foot wound, A. 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  She was admitted for IV diuretics and blood transfusion.   Existing Precautions/Restrictions fall   Cognition   Orientation  Status (Cognition) oriented x 4   Safety Deficit (Cognition) moderate deficit;insight into deficits/self-awareness   Pain Assessment   Patient Currently in Pain Yes, see Vital Sign flowsheet   Integumentary/Edema   Integumentary/Edema Comments per daughter, has chronic lymphedema, wears lymphedema wraps at home.  Currently, patient with +2 edema at bilateral LEs   Posture    Posture Forward head position;Protracted shoulders   Range of Motion (ROM)   ROM Comment ankle DF only to neutral bialterally   Strength   Strength Comments decreased global strength   Bed Mobility   Comment (Bed Mobility) did not assess during session   Transfers   Transfer Safety Comments Patient required mod A x2 with 2WW in order to transfer to standing; bilateral feet sliding forward; requiring min A at bilateral feet to maintain foot position during transfer.  In standing, patient demonstrated weight shifting, able to bring feet underneath her.    Gait/Stairs (Locomotion)   Distance in Feet (Required for LE Total Joints) 10   Comment (Gait/Stairs)  Patient amb 10 feet in room with CGA and 2WW.  Patient with very slow gait speed, shuffling gait pattern, increased reliance on UE support at walker.    Balance   Balance Comments High fall risk, especialy during transfers   Clinical Impression   Criteria for Skilled Therapeutic Intervention yes, treatment indicated   PT Diagnosis (PT) decreased independence with mobility   Influenced by the following impairments decreased activity tolerance, decreased balance, decreased strength   Functional limitations due to impairments difficulties with transfers, difficulties with gait   Clinical Presentation Stable/Uncomplicated   Clinical Presentation Rationale complex pmh, stable presentation, good social support   Clinical Decision Making (Complexity) low complexity   Therapy Frequency (PT) Daily   Predicted Duration of Therapy Intervention (days/wks) 3 days   Planned Therapy Interventions (PT) balance  training;bed mobility training;gait training;home exercise program;patient/family education;transfer training   Risk & Benefits of therapy have been explained evaluation/treatment results reviewed;care plan/treatment goals reviewed;risks/benefits reviewed;current/potential barriers reviewed;participants included;patient;daughter   PT Discharge Planning    PT Discharge Recommendation (DC Rec) Transitional Care Facility   PT Rationale for DC Rec Patient currently presenting below baseline for functional mobility.  Currently not able to demonstrate functional household mobility, would recommend ongoing PT at TCU in order to increase strength, activity tolerance and independence with mobility   PT Brief overview of current status  Ax2 for sit to stand transfers, CGA with 2WW to amb 10 feet in room.   Total Evaluation Time   Total Evaluation Time (Minutes) 5

## 2021-01-19 NOTE — PROGRESS NOTES
"[facility] RCAT  Date: January 19, 2021  Admission Dx: Anemia, CHF  Pulmonary History: Pulmonary HTN  Home Nebulizer/MDI Use: Albuterol MDI QID  Home Oxygen: 2.5L NC  Acuity Level (RCAT flow sheet): Level 3    Aerosol Therapy initiated: Albuterol QID, prn    Pulmonary Hygiene initiated: Coughing and deep breathing techniques    Volume Expansion initiated: Incentive spirometer    Current Oxygen Requirements: 2L NC  Current SpO2: 98%    Re-evaluation date: 1/22/2021    Patient Education: Education was provided on RCAT process. Will continue to do education with patient.    See \"RT Assessments\" flow sheet for patient assessment scoring and Acuity Level Details.     Cosmo Gar, RT on 1/19/2021 at 2:30 PM      "

## 2021-01-20 NOTE — PROGRESS NOTES
[facility]  Hospitalist Progress Note  Rahul Parsons MD 01/20/2021    Reason for Stay (Diagnosis): CHF         Assessment and Plan:      Summary of Stay: Keisha Godinez is a 88 year old female with history of chronic diastolic CHF, chronic hypoxemic respiratory failure maintained on 2 L/min, RV systolic CHF, pulmonary hypertension, lymphedema, chronic left foot wound, A. 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  She was admitted for IV diuretics and blood transfusion.    S/p 2 units red cells, gradually improving though quite weak and deconditioned.  I suspect she may need TCU upon discharge and might be better served with more of a long-term care setting eventually.      She has concerns re: this and family is helping to sort this out.    She has completed IV diuresis and is now on oral lasix.    Problem List/Assessment and Plan:     1. Acute on chronic diastolic congestive heart failure exacerbation with acute hypoxemic respiratory failure.  --Check BMP in the morning  --Supplemental oxygen  --Continue spironolactone, hydralazine with hold parameters.  --change to PO lasix 40 mg BID.  Give IV lasix x1 prior to blood.  --Continue other home medications including hydralazine.    --not on a BB, appears due to AV conduction disease.  Not on ace-I due to tongue swelling.    2.   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    3.   Weakness and deconditioning: PT consult.  ?need for tcu if insurance coverage is available.    4.   CKD stage III: Monitor creatinine with diuresis.  Currently doing well with creatinine of ~1    5.   GERD: On omeprazole.    6.  Depression: Continue home venlafaxine.    7.    History of atrial fibrillation.  Not on anticoagulation due to chronic bleeding  "issues  8.  E. Coli  UTI:  Started on ceftriaxone, completed 4 days.  No further treatment.      DVT Prophylaxis: Pneumatic Compression Devices  Code Status: DNR / DNI  Discharge Dispo/Date: TCU in 1-2 days if placement/insurance coverage available.  The patient is agreeable to this.        Interval History (Subjective):      Remains on 2L but sats in high 90s.    hgb down to 7.5  Transfusing given chronic blood loss  Increasing hydralazine to 50 mg tid from 25 tid  Remains on oral lasix, cr stable  Feels fatigued                    Physical Exam:      Last Vital Signs:  BP (!) 141/49 (BP Location: Left arm)   Pulse 62   Temp 97.4  F (36.3  C) (Oral)   Resp 24   Ht 1.6 m (5' 3\")   Wt 69.6 kg (153 lb 7 oz)   SpO2 98%   BMI 27.18 kg/m      I/O last 3 completed shifts:  In: 470 [P.O.:470]  Out: 1150 [Urine:1150]    General: Alert, awake but tired and chronically ill-appearing.  HEENT: NC/AT, eyes anicteric, external occular movements intact, face symmetric.    Cardiac: RRR, S1, S2.   Pulmonary: Bilateral wet lung sounds.  Normal chest rise, normal work of breathing.  No wheezing noted.  Abdomen: soft, non-tender, non-distended.  Bowel Sounds Present.  No guarding.  Extremities: Scattered bruises.  Trace to 1+ bilateral lower extremity edema.  No deformities.  Warm, well perfused.  Skin:  Warm and Dry.  Neuro: No focal deficits noted but she appears tired and lethargic.  Speech clear.  She is alert and oriented to recent events, person and place.  Psych: Appropriate affect.         Medications:      All current medications were reviewed with changes reflected in problem list.         Data:      All new lab and imaging data was reviewed.   Labs:  Recent Labs   Lab 01/20/21  0602      POTASSIUM 4.9   CHLORIDE 101   CO2 37*   ANIONGAP <1*   GLC 86   BUN 53*   CR 1.03   GFRESTIMATED 48*   GFRESTBLACK 56*   CASPER 9.1     Recent Labs   Lab 01/20/21  0602   WBC 7.5   HGB 7.5*   HCT 25.0*   MCV 94   PLT PENDING    "   Imaging:   Recent Results (from the past 48 hour(s))   XR Chest Port 1 View    Narrative    EXAM: XR CHEST PORT 1 VW  LOCATION: [facility]  DATE/TIME: 1/16/2021 1:36 AM    INDICATION: Difficulty breathing.  COMPARISON: 11/03/2020.      Impression    IMPRESSION: Stable cardiomegaly. Increased interstitial markings consistent with vascular congestion and edema. Small bilateral pleural effusions and bibasilar atelectasis. No pneumothorax. Demineralized skeleton. Chronic fracture deformity of the   proximal right humerus.   Abd/pelvis CT,  IV  contrast only TRAUMA / AAA    Narrative    EXAM: CT ABDOMEN PELVIS W CONTRAST  LOCATION: [facility]  DATE/TIME: 1/16/2021 2:39 AM    INDICATION: Abdominal pain and distention.  COMPARISON: CT abdomen and pelvis 11/25/2015.  TECHNIQUE: CT scan of the abdomen and pelvis was performed following injection of IV contrast. Multiplanar reformats were obtained. Dose reduction techniques were used.  CONTRAST: 73mL Isovue-370    FINDINGS:   LOWER CHEST: Moderate right and small left pleural effusions and associated atelectasis of the dependent and basilar portions of the lower lobes. Mild streaky atelectasis at the base of the right middle lobe and lingula. Enlarged heart. Small to   moderate-sized hiatal hernia.    HEPATOBILIARY: Slightly irregular hepatic contour consistent with cirrhosis. Absent gallbladder. No biliary dilatation.    PANCREAS: Normal.    SPLEEN: Normal.    ADRENAL GLANDS: Normal.    KIDNEYS/BLADDER: Bilateral renal cysts. The largest on the left measuring 3.7 cm contains some dependent calcific material.    BOWEL: Colonic diverticulosis. No specific evidence for diverticulitis. No bowel obstruction or inflammation. Appendix not visualized. No free fluid or gas.    LYMPH NODES: Normal.    VASCULATURE: Advanced multivessel coronary artery calcification. Moderate aortoiliac atherosclerosis.    PELVIC ORGANS: Post hysterectomy. Degenerative  changes of the spine and femoral acetabular joints. Slight grade 1 anterolisthesis of L4 over L5.      Impression    IMPRESSION:   1.  Moderate right and small left pleural effusions. Associated atelectasis of the bases of the lower lobes.  2.  Cardiomegaly and advanced coronary artery disease.  3.  Small to moderate-sized hiatal hernia.  4.  Hepatic cirrhosis.  5.  Colonic diverticulosis.         Rahul Parsons MD , MD.

## 2021-01-20 NOTE — PLAN OF CARE
Heart Failure Care Map  GOALS TO BE MET BEFORE DISCHARGE:    1. Decrease congestion and/or edema with diuretic therapy to achieve near optimal volume status.     Dyspnea improved: No, further care required to meet this goal. Please explain still MOTT   Edema improved: No, further care required to meet this goal. Please explain no changes        Net I/O and Weights since admission:   12/20 2300 - 01/19 2259  In: 2344 [P.O.:1730; I.V.:9]  Out: 5200 [Urine:5200]  Net: -2856     Vitals:    01/16/21 0439 01/16/21 0533 01/17/21 0630 01/19/21 0700   Weight: 68.8 kg (151 lb 10.8 oz) 68.3 kg (150 lb 9.2 oz) 65.1 kg (143 lb 8.3 oz) 69.6 kg (153 lb 7 oz)       2.  O2 sats > 90% on room air, or at prior home O2 therapy level.      Able to wean O2 this shift to keep sats above 90%?: Yes, satisfactory for discharge.   Does patient use Home O2? Yes-  2LPM            Current oxygenation status:   SpO2: 97 %     O2 Device: Nasal cannula, Oxygen Delivery: 2 LPM    3.  Tolerates ambulation and mobility near baseline.     Ambulation: No, further care required to meet this goal. Please explain PT following,    Times patient ambulated with staff this shift: 1    Please review the Heart Failure Care Map for additional HF goal outcomes.    A&Ox4, VSS, Tele Afib SVR, HR at upper 40's to 50's, MD paged, denies pain, Maugire patent, Lasix po, repositioned q2h, will continue with POC.      KM FENG, RN  1/19/2021

## 2021-01-20 NOTE — CONSULTS
Please see RN CC note from 1/16 for initial assessment/consult note.    Brief sw note:    Per bedside nurse, pt is agreeable to TCU and they requested that a referral be sent to [facility].  Sw sent the referral to [facility].    MARSHA Russell, [facility]  Inpatient Care Coordination  [facility]  [phone]

## 2021-01-20 NOTE — PLAN OF CARE
"Presentation/Diagnosis:Anemia, constipation   History:HTN, Afib, Anemia, GIB, CHF, CVA  Labs/Protocols:Hgb 7.2, K 4.9, Crat 1.03  Vitals:BP (!) 119/28 (BP Location: Left arm)   Pulse 57   Temp 98.6  F (37  C) (Oral)   Resp 22   Ht 1.6 m (5' 3\")   Wt 69.6 kg (153 lb 7 oz)   SpO2 97%   BMI 27.18 kg/m      Telemetry:Afib CVR PACs  Respiratory:2L NC sating 97%  Neuro:A&O Forgetful at times   GI/:Davey in place, no BM this shift   Skin:carey, bruising, edema,   LDAs:PIV SL  Diet:Cardiac diet, 2000 FR--- pt has had 340 this shift   Activity:A2 walker GB  Teaching:Pt educated on plan of care and blood transfusion reactions  Plan:Cont. To monitor Hgb, pt is to get 1 unit of blood today. Plan is for pt to discharge to TCU. Daughter is hoping for [facility]. SW is following for discharge and plan of care.   Pt up in the chair for most of the shift. Repositioned Q 2 hours.    Chair cushion ordered for pt   Meplex placed on coccyx for protection and comfort.   Pt was given an extra IV dose of lasix for the blood transfusion.   Adequate output from davey.      "

## 2021-01-20 NOTE — PLAN OF CARE
Vitals: Temp: 97.4  F (36.3  C) Temp src: Oral BP: 128/41 Pulse: 58   Resp: 22 SpO2: 97 % O2 Device: None (Room air) Oxygen Delivery: 2 LPM  Neuro: Ao4, lethargic. Able to make needs known  Respiratory: LS coarse crackles. Denies SOB or difficulty breathing  Cardiac/tele: Tele afib CVR w/ PACs. Denies CP  GI/: Maguire in place. No BM this shift  Skin: See flow sheets. Turn and repositioned  LDAs: 2 PIVs SL  Diet: Cardiac diet, 2000 fluid restriction  Activity: A1/ with gaitbelt and walker  Plan: Discharge TBD. Will continue POC.

## 2021-01-21 NOTE — PLAN OF CARE
Vss A&OX4. Alarms on. Up with lift. Attempted to pivot to commode, pt was a heavy assist of 2. Up to chair with meals. On seat cushion. Bottom red. Q2 turn in bed on air mattress. Sleeps in between cares. BLE elevated. Generalized mild to mod edema. LS dim. Sats stable on 2LNC . Maguire patent good uo.

## 2021-01-21 NOTE — PROGRESS NOTES
Care Management Follow Up    Length of Stay (days): 5    Expected Discharge Date: 01/23/21     Concerns to be Addressed: all concerns addressed in this encounter     Patient plan of care discussed at interdisciplinary rounds: Yes    Anticipated Discharge Disposition: Skilled Nursing Facilty, Transitional Care     Anticipated Discharge Services: None  Anticipated Discharge DME: None    Patient/family educated on Medicare website which has current facility and service quality ratings:  No, pt has been to this facility before.  Education Provided on the Discharge Plan: Yes    Patient/Family in Agreement with the Plan: yes    Referrals Placed by CM/SW:  TCU  Private pay costs discussed: Pawel has not discussed private costs at this time.    Additional Information:  Pawel received a call from Eneida at [facility], [phone], who said that they will be able to accept the pt on Saturday after 1500.    The RN CC will continue to follow for discharge planning.      MARSHA Russell, [facility]  Inpatient Care Coordination  [facility]  [phone]    ADDENDUM 1623:  Insurance prior authorization was received for the pt to discharge to [facility] TCU.  Pawel emailed the authorization information to Eneida.

## 2021-01-21 NOTE — PROGRESS NOTES
[facility]  Hospitalist Progress Note  Rahul Parsons MD 01/21/2021    Reason for Stay (Diagnosis): CHF         Assessment and Plan:      Summary of Stay: Keisha Godinez is a 88 year old female with history of chronic diastolic CHF, chronic hypoxemic respiratory failure maintained on 2 L/min, RV systolic CHF, pulmonary hypertension, lymphedema, chronic left foot wound, A. 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  She was admitted for IV diuretics and blood transfusion.    S/p 3 units red cells throughout her hospital stay, gradually improving though quite weak and deconditioned.  Have transitioned away from IV diuretics back to oral. Placement found for [facility] Saturday, 1/23 if stable.    Problem List/Assessment and Plan:     1. Acute on chronic diastolic congestive heart failure exacerbation with acute hypoxemic respiratory failure.  --Supplemental oxygen at baseline 2L/minute  --Continue spironolactone, hydralazine with hold parameters.  --change to PO lasix 40 mg BID.    --Continue other home medications including hydralazine 75 mg TID, spironolactone 50 mg  --not on a BB, appears due to AV conduction disease.  Not on ace-I due to tongue swelling.  --repeat CXR AM of 12/22 to follow up on effusions.    2.   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    3.   Weakness and deconditioning: PT consult.  ?need for tcu if insurance coverage is available.    4.   CKD stage III: Monitor creatinine with diuresis.  Currently doing well with creatinine of ~1    5.   GERD: On omeprazole.    6.  Depression: Continue home venlafaxine.    7.    History of atrial fibrillation.  Not on anticoagulation due to chronic bleeding issues  8.  E. Coli  UTI:  Started on  "ceftriaxone, completed 4 days.  No further treatment.      DVT Prophylaxis: Pneumatic Compression Devices  Code Status: DNR / DNI.  Might require LTC in the coming weeks/months.  Discharge Dispo/Date: [facility] Saturday.        Interval History (Subjective):      Remains on 2L but sats in high 90s.    hgb stable at 8.4  Placement found for [facility] Saturday  Updated her daughter, ramiro.  Remains quite weak and tired, breathing feels ok  Repeating cxr in the AM                  Physical Exam:      Last Vital Signs:  /42 (BP Location: Left arm)   Pulse 61   Temp 97.6  F (36.4  C) (Oral)   Resp 24   Ht 1.6 m (5' 3\")   Wt 66.5 kg (146 lb 9.7 oz)   SpO2 97%   BMI 25.97 kg/m      I/O last 3 completed shifts:  In: 425 [P.O.:120]  Out: 1350 [Urine:1350]    General: Alert, awake but tired and chronically ill-appearing.  HEENT: NC/AT, eyes anicteric, external occular movements intact, face symmetric.    Cardiac: RRR, S1, S2.   Pulmonary: Bilateral wet lung sounds.  Normal chest rise, normal work of breathing.  No wheezing noted.  Abdomen: soft, non-tender, non-distended.  Bowel Sounds Present.  No guarding.  Extremities: Scattered bruises.  Trace to 1+ bilateral lower extremity edema.  No deformities.  Warm, well perfused.  Skin:  Warm and Dry.  Neuro: No focal deficits noted but she appears tired and lethargic.  Speech clear.  She is alert and oriented to recent events, person and place.  Psych: Appropriate affect.         Medications:      All current medications were reviewed with changes reflected in problem list.         Data:      All new lab and imaging data was reviewed.   Labs:  Recent Labs   Lab 01/21/21  0700      POTASSIUM 4.8   CHLORIDE 100   CO2 39*   ANIONGAP <1*   GLC 96   BUN 53*   CR 1.00   GFRESTIMATED 50*   GFRESTBLACK 58*   CASPER 9.2     Recent Labs   Lab 01/21/21  0700   WBC 6.0   HGB 8.4*   HCT 27.9*   MCV 93   *      Imaging:   Recent Results (from the past 48 hour(s))   XR " Chest Port 1 View    Narrative    EXAM: XR CHEST PORT 1 VW  LOCATION: [facility]  DATE/TIME: 1/16/2021 1:36 AM    INDICATION: Difficulty breathing.  COMPARISON: 11/03/2020.      Impression    IMPRESSION: Stable cardiomegaly. Increased interstitial markings consistent with vascular congestion and edema. Small bilateral pleural effusions and bibasilar atelectasis. No pneumothorax. Demineralized skeleton. Chronic fracture deformity of the   proximal right humerus.   Abd/pelvis CT,  IV  contrast only TRAUMA / AAA    Narrative    EXAM: CT ABDOMEN PELVIS W CONTRAST  LOCATION: [facility]  DATE/TIME: 1/16/2021 2:39 AM    INDICATION: Abdominal pain and distention.  COMPARISON: CT abdomen and pelvis 11/25/2015.  TECHNIQUE: CT scan of the abdomen and pelvis was performed following injection of IV contrast. Multiplanar reformats were obtained. Dose reduction techniques were used.  CONTRAST: 73mL Isovue-370    FINDINGS:   LOWER CHEST: Moderate right and small left pleural effusions and associated atelectasis of the dependent and basilar portions of the lower lobes. Mild streaky atelectasis at the base of the right middle lobe and lingula. Enlarged heart. Small to   moderate-sized hiatal hernia.    HEPATOBILIARY: Slightly irregular hepatic contour consistent with cirrhosis. Absent gallbladder. No biliary dilatation.    PANCREAS: Normal.    SPLEEN: Normal.    ADRENAL GLANDS: Normal.    KIDNEYS/BLADDER: Bilateral renal cysts. The largest on the left measuring 3.7 cm contains some dependent calcific material.    BOWEL: Colonic diverticulosis. No specific evidence for diverticulitis. No bowel obstruction or inflammation. Appendix not visualized. No free fluid or gas.    LYMPH NODES: Normal.    VASCULATURE: Advanced multivessel coronary artery calcification. Moderate aortoiliac atherosclerosis.    PELVIC ORGANS: Post hysterectomy. Degenerative changes of the spine and femoral acetabular joints. Slight grade  1 anterolisthesis of L4 over L5.      Impression    IMPRESSION:   1.  Moderate right and small left pleural effusions. Associated atelectasis of the bases of the lower lobes.  2.  Cardiomegaly and advanced coronary artery disease.  3.  Small to moderate-sized hiatal hernia.  4.  Hepatic cirrhosis.  5.  Colonic diverticulosis.         Rahul Parsons MD , MD.

## 2021-01-21 NOTE — PROGRESS NOTES
Your information has been submitted on January 21st, 2021 at 01:18:03 PM CST. The confirmation number is [id number]    Arabella Wilkinson  Care Management Coordinator  [facility]  [phone]

## 2021-01-21 NOTE — PLAN OF CARE
Denies pain, CP, SOB. LS dim. Turned Q2. Herbie legs, Edema generalized. Continue to monitor and with plan of care.    [phone]

## 2021-01-22 NOTE — PLAN OF CARE
"BP (!) 149/40   Pulse 54   Temp 98.3  F (36.8  C) (Oral)   Resp 22   Ht 1.6 m (5' 3\")   Wt 66.5 kg (146 lb 9.7 oz)   SpO2 98%   BMI 25.97 kg/m      A&O. A2 w/ lift. Tele is Afib CVR. On 2L O2 via NC, scheduled nebs. 2000 FR, 300 drank this shift. Frequent repos. Maguire in place with good output. Skin edematous and red. Plan is TCU Saturday.   "

## 2021-01-22 NOTE — PROGRESS NOTES
Care Management Follow Up    Length of Stay (days): 6    Expected Discharge Date: 01/23/21     Concerns to be Addressed: all concerns addressed in this encounter     Patient plan of care discussed at interdisciplinary rounds: Yes    Anticipated Discharge Disposition: Skilled Nursing Facilty, Transitional Care     Anticipated Discharge Services: None  Anticipated Discharge DME: None      Education Provided on the Discharge Plan:  yes  Patient/Family in Agreement with the Plan: yes    Referrals Placed by CM/SW:  TCU      Additional Information:  Patient discharging to [facility] tomorrow. Spoke with Ene[facility] admissions coordinator, who verifies that [facility] has a bed and will be able to receive patient tomorrow at 3 pm. No one will be in main admissions office. Discharge instructions will need to be faxed to the Transitional Care Unit, # [phone], fax# [phone].     Spoke with patient daughter, Rebecca, and updated her on discharge plan for tomorrow. Rebecca will provide transportation to [facility]. Daughter will be available at 2:45 pm. [facility] phone contact information updated to [facility] for family to call facility.    Will continue to follow for discharge to TCU.     Coral Ash RN BSN PHN CCM   Inpatient Care Coordination   [facility]   [phone]    Update: Spoke with patient and daughter, Rebecca, at bedside. Pt/family would now like [facility] transportation service to provide transport to [facility]. Reviewed out of pocket cost with family. They are agreeable to [facility] transportation cost.     [facility] transportation scheduled for tomorrow, 1/23 at 2:30 pm.    Coral Ash RN

## 2021-01-22 NOTE — CONSULTS
"CLINICAL NUTRITION SERVICES  -  ASSESSMENT NOTE      MALNUTRITION:  % Weight Loss:  Weight loss does not meet criteria for malnutrition --> diuresed during admit  % Intake:  </= 50% for >/= 5 days (severe malnutrition)  Subcutaneous Fat Loss:  Orbital region mild depletion and Upper arm region moderate depletion   Muscle Loss:  Temporal region moderate depletion, Clavicle bone region moderate depletion and Acromion bone region moderate depletion  Fluid Retention: Trace to mild, generalized --> potential to mask true wt trending    Malnutrition Diagnosis: Severe malnutrition  In Context of:  Acute illness or injury with underlying chronic illness or disease        REASON FOR ASSESSMENT  Keisha Godinez is a 88 year old female seen by Registered Dietitian for LOS.    PMH of: Chronic LE edema, CHF, CVA.    Admit 2/2: CHF exacerbation.    NUTRITION HISTORY  - Information obtained from patient, daughter in room, and chart.  - Diet at home: Low sodium.  Resides with daughter who makes all of her meals.  - Usual intakes: Meals BID-TID.  - Barriers to PO intakes: Daughter denies a decrease in PO intakes PTA including skipping of meals or a change in portions.  - Use of oral supplements: 1 Ensure daily.  - Chewing/swallowing issues: Denied.  - Allergies: Egg (interfaced).      CURRENT NUTRITION ORDERS  Diet Order:     Cardiac, 2 L fluid restriction    Current Intake/Tolerance:  % intakes since admission though suspect closer to 25-50% as appears to be documentation of single food items at times per flowsheet review.  Patient reports she is \"feeling crappy\" since admission.  Seeming SOB while in room.  Daughter feeding patient and requesting Ensure.      NUTRITION FOCUSED PHYSICAL ASSESSMENT FOR DIAGNOSING MALNUTRITION)  Yes         Observed:    Muscle wasting (refer to documentation in Malnutrition section) and Subcutaneous fat loss (refer to documentation in Malnutrition section)    Obtained from " "Chart/Interdisciplinary Team:  - No documentation of PI  - Stooling patterns reviewed    ANTHROPOMETRICS  Height: 5' 3\"  Weight: 139 lbs 15.87 oz  Body mass index is 24.8 kg/m .  Weight Status:  Normal BMI  Weight History:  Wt Readings from Last 10 Encounters:   01/22/21 63.5 kg (139 lb 15.9 oz)   11/30/20 65.8 kg (145 lb)   11/03/20 66.8 kg (147 lb 4.8 oz)   05/20/20 64.9 kg (143 lb)   05/15/20 66.7 kg (147 lb 0.8 oz)   05/15/20 65.3 kg (144 lb)   04/20/20 65.3 kg (144 lb)   04/15/20 66.5 kg (146 lb 8 oz)   03/16/20 67.6 kg (149 lb)   02/17/20 68 kg (150 lb)     - Trace to mild, generalized edema.  Has been diuresed this admit and wt was ~150# upon admit.      LABS  Labs reviewed:  Electrolytes  Potassium (mmol/L)   Date Value   01/21/2021 4.8   01/20/2021 4.9   01/19/2021 4.4     Phosphorus (mg/dL)   Date Value   07/07/2016 3.8    Blood Glucose  Glucose (mg/dL)   Date Value   01/21/2021 96   01/20/2021 86   01/19/2021 98   01/18/2021 123 (H)   01/17/2021 82    Inflammatory Markers  C-Reactive Protein (mg/dL)   Date Value   03/19/2015 1.7 (A)     CRP Inflammation (mg/L)   Date Value   06/18/2018 8.7 (H)   06/24/2016 41.5 (H)   06/22/2016 33.3 (H)     WBC (10e9/L)   Date Value   01/21/2021 6.0   01/20/2021 7.5   01/18/2021 7.9     Albumin (g/dL)   Date Value   01/16/2021 2.8 (L)   05/15/2020 3.3 (L)   10/07/2019 2.8 (L)      Magnesium (mg/dL)   Date Value   10/08/2019 2.1   06/27/2018 2.3   06/19/2018 2.1     Sodium (mmol/L)   Date Value   01/21/2021 138   01/20/2021 137   01/19/2021 139    Renal  Urea Nitrogen (mg/dL)   Date Value   01/21/2021 53 (H)   01/20/2021 53 (H)   01/19/2021 56 (H)     Creatinine (mg/dL)   Date Value   01/21/2021 1.00   01/20/2021 1.03   01/19/2021 1.10 (H)     Additional  Triglycerides (mg/dL)   Date Value   12/13/2019 72   10/02/2018 61   08/02/2017 65     Ketones Urine (mg/dL)   Date Value   01/16/2021 Negative        B/P: 181/32, T: 97.3, P: 58, R: 18      MEDICATIONS  Medications " reviewed:    albuterol  2.5 mg Nebulization 4x Daily     furosemide  40 mg Oral BID     gabapentin  100 mg Oral BID     hydrALAZINE  75 mg Oral Q8H DANIELLE     omeprazole  20 mg Oral Daily     polyethylene glycol  17 g Oral BID     psyllium  1 packet Oral At Bedtime     sennosides  2 tablet Oral BID     sodium chloride (PF)  3 mL Intracatheter Q8H     spironolactone  25 mg Oral Once     spironolactone  50 mg Oral Daily     venlafaxine  75 mg Oral Daily with breakfast             ASSESSED NUTRITION NEEDS PER APPROVED PRACTICE GUIDELINES:    Dosing Weight 64 kg   Estimated Energy Needs: 25-30 Kcal/Kg  Justification: maintenance  Estimated Protein Needs: 1.2-1.5 g pro/Kg  Justification: preservation of lean body mass and advanced age  Estimated Fluid Needs: per MD      NUTRITION DIAGNOSIS:  Inadequate oral intake related to acutely decreased appetite/intake, fatigue, SOB as evidenced by meeting <50% needs during admit, fat/muscle loss and malnutrition coding.    NUTRITION INTERVENTIONS  Recommendations / Nutrition Prescription  Liberalize diet to 3 gram Na.    Boost between meals BID.        Implementation  Nutrition education: Provided education on Boost, protein, encouraged consistency.    Medical Food Supplement: As above.     Collaboration and Referral of Nutrition care: Discussed POC with team during rounds.      Nutrition Goals  Patient to consume at least 50% of meals or supplements TID to show improvement in PO intake trending.      MONITORING AND EVALUATION:  Progress towards goals will be monitored and evaluated per protocol and Practice Guidelines          Cyn Salazar RDN, LD  Clinical Dietitian  3rd floor/ICU: [phone]  All other floors: [phone]  Weekend/holiday: [phone]

## 2021-01-22 NOTE — DISCHARGE INSTRUCTIONS
[facility]- Transitional Care Unit   [address] [place] [address] [place] [address]  # [phone]    Please call the TCU when you arrive at [facility]#[phone]. They will meet you at the door.

## 2021-01-22 NOTE — PROGRESS NOTES
"SPIRITUAL HEALTH SERVICES Progress Note  [facility]    Visited patient based on length of stay. Author was accompanied by staff , Arya Victor. Patients daughter, Rebecca who was also in the room.  Her daughter told us that pt has CHF and that she could be moving to a skilled nursing facility as early as tomorrow.     When the author asked how the patient was doing, she responded; \"not good\"    Rebecca shared that pt is [employer]ism and that her present health status is preventing her from going to [employer]ism. This author offered Keisha prayer and she accepted.     After prayer Rebecca reported they she has been pt's care provider for 15 years and acknowledged that, \"It is difficult.\" Rebecca added that her SO and sister are core to her support.     During the visit Keisha received a call from her other daughter. The visit was ended to allow her to take the call from her daughter.    Pt anticipates transferring over to TCU over the weekend.     Duane Bauer   Intern   "

## 2021-01-22 NOTE — PLAN OF CARE
Vss. Sob with exertion. Sats stable on 2LNC. Ls dim. Crackles in bases. Q2h turn and repo. Vannessa steady when up oob. Up to chair with meals. Chair cushion on. Buttocks red sore. Tylenol for generalized aches and pain.

## 2021-01-22 NOTE — PROGRESS NOTES
[facility]    Hospitalist Progress Note      Assessment & Plan   Keisha Godinez is a 88 year old female who was admitted on 1/16/2021.    Summary of Stay:   Keisha Godinez is a 88 year old female with history of chronic diastolic CHF, chronic hypoxemic respiratory failure maintained on 2 L/min, RV systolic CHF, pulmonary hypertension, lymphedema, chronic left foot wound, A. 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  She was admitted for IV diuretics and blood transfusion.     S/p 3 units red cells throughout her hospital stay, gradually improving though quite weak and deconditioned.  Have transitioned away from IV diuretics back to oral. Placement found for [facility] Saturday, 1/23 if stable.    Plan:    1. Acute on chronic diastolic congestive heart failure exacerbation with acute hypoxemic respiratory failure.  --Supplemental oxygen at baseline 2L/minute  --Continue spironolactone, hydralazine with hold parameters.  --Initially treated with IV Lasix then changed to oral Lasix.  At home she is on torsemide 40 mg twice daily which can be resumed now.  --Continue other home medications including hydralazine 75 mg TID, spironolactone 50 mg  --not on a BB, appears due to AV conduction disease.  Not on ace-I due to tongue swelling.  --repeat CXR done today shows decrease in pleural effusion.  -The weight is down from 68 kg to 63 kg.     2.   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     3.   Weakness and deconditioning: PT consult.  need for tcu     4.   CKD stage III: Monitor creatinine with diuresis.  Currently doing well with creatinine of ~1     5.   GERD: On omeprazole.     6.  Depression: Continue home venlafaxine.     7.    History of atrial fibrillation.   Not on anticoagulation due to chronic bleeding issues    8.  E. Coli  UTI:  Started on ceftriaxone, completed 4 days.  No further treatment.      DVT Prophylaxis: Pneumatic Compression Devices  Code Status: No CPR- Do NOT Intubate  Expected discharge: 1 days    Juice Bahena MD  Text Page (7am - 6pm, M-F)    Interval History   Patient was evaluated with nursing staff. Overnight issues discussed.    Review of systems:  No nausea or vomiting.  No abdominal pain.  No diarrhea.  No chest pain/palpitations.  No new cough/shortness of breath.  No headache/visual disturbance/new weakness.    -Data reviewed today: Labs and medications.    Physical Exam   Temp: 97.3  F (36.3  C) Temp src: Axillary BP: (!) 155/34 Pulse: 64   Resp: 18 SpO2: 95 % O2 Device: Nasal cannula Oxygen Delivery: 2 LPM  Vitals:    01/19/21 0700 01/21/21 0645 01/22/21 0706   Weight: 69.6 kg (153 lb 7 oz) 66.5 kg (146 lb 9.7 oz) 63.5 kg (139 lb 15.9 oz)     Vital Signs with Ranges  Temp:  [97.3  F (36.3  C)-98.4  F (36.9  C)] 97.3  F (36.3  C)  Pulse:  [54-72] 64  Resp:  [18-22] 18  BP: (130-181)/(20-55) 155/34  SpO2:  [95 %-98 %] 95 %  I/O last 3 completed shifts:  In: 790 [P.O.:790]  Out: 700 [Urine:700]    Constitutional: Awake, alert, cooperative, no apparent distress  HEENT: Trachea midline, sclera is clear   Respiratory: No crackles. No wheezing. Equal breath sounds bilaterally.  Cardiovascular: Regular rate and rhythm, normal S1 and S2, and no murmur noted  GI: Normal bowel sounds, soft, non-distended, non-tender  Extremities: Trace edema, well perfused extremities.    Medications       albuterol  2.5 mg Nebulization 4x Daily     furosemide  40 mg Oral BID     gabapentin  100 mg Oral BID     hydrALAZINE  75 mg Oral Q8H DANIELLE     omeprazole  20 mg Oral Daily     polyethylene glycol  17 g Oral BID     psyllium  1 packet Oral At Bedtime     sennosides  2 tablet Oral BID     sodium chloride (PF)  3 mL Intracatheter Q8H     spironolactone  25 mg Oral Once      spironolactone  50 mg Oral Daily     venlafaxine  75 mg Oral Daily with breakfast       Data   Recent Labs   Lab 01/21/21  0700 01/20/21  0853 01/20/21  0852 01/20/21  0602 01/19/21  0632 01/18/21  0715 01/16/21  0737 01/16/21  0737 01/16/21  0109   WBC 6.0  --   --  7.5  --  7.9   < >  --  9.0   HGB 8.4*  --   --  7.5*  --  8.4*   < > 7.6* 6.5*   MCV 93  --   --  94  --  96   < >  --  93   * 110* 118* Canceled, Test credited  --  149*   < >  --  206   INR  --   --   --   --   --   --   --   --  1.00     --   --  137 139 139   < > 136 136   POTASSIUM 4.8  --   --  4.9 4.4 4.5   < > 4.1 4.2   CHLORIDE 100  --   --  101 101 103   < > 101 101   CO2 39*  --   --  37* 37* 36*   < > 33* 32   BUN 53*  --   --  53* 56* 56*   < > 59* 60*   CR 1.00  --   --  1.03 1.10* 1.04   < > 1.08* 1.12*   ANIONGAP <1*  --   --  <1*  --  <1*   < > 2* 3   CASPER 9.2  --   --  9.1 9.2 8.7   < > 8.8 8.6   GLC 96  --   --  86 98 123*   < > 101* 116*   ALBUMIN  --   --   --   --   --   --   --   --  2.8*   PROTTOTAL  --   --   --   --   --   --   --   --  7.3   BILITOTAL  --   --   --   --   --   --   --   --  0.3   ALKPHOS  --   --   --   --   --   --   --   --  71   ALT  --   --   --   --   --   --   --   --  14   AST  --   --   --   --   --   --   --   --  19   LIPASE  --   --   --   --   --   --   --   --  182   TROPI  --   --   --   --   --   --   --  0.028 0.024    < > = values in this interval not displayed.       Recent Results (from the past 24 hour(s))   XR Chest 2 Views    Narrative    XR CHEST 2 VW 1/22/2021 8:48 AM    HISTORY: follow up on effusions, admitted w/ CHF    COMPARISON: 1/16/2021    FINDINGS: Small bilateral pleural effusions persist although have  decreased when compared with 1/16/2021. Increased interstitial  markings are again noted consistent with vascular congestion. There is  no pneumothorax. The heart size remains enlarged.      Impression    IMPRESSION: Interval decrease in this overall size of the  small  bilateral pleural effusions. Persistent findings of mild pulmonary  edema.    KRISTEN LAI MD

## 2021-01-22 NOTE — PLAN OF CARE
"Denied CP, pain. Had a piece of phlegm that was bothering her, unable to cough up. O2 maintained 96% during this. Pt said this happens to her \"sometimes at home\". Lungs crackles. Assist of 2 with lift. Turned, pt refused to refused to be lower than 45 degrees bed angle despite verbal education from author of preventing pressure on coccyx. Pt verbalized understanding but still refused. Continue to monitor and with plan of care.   "

## 2021-01-23 NOTE — PLAN OF CARE
A/O x 4. Diastolic BP's low 136/25 all other VSS on 2L NC. Pt A-2 to bedside commode using Vannessa Stedy. Medium, soft BM. Transferred to chair. BP after activity 194/61; administered scheduled hydralazine 75 mg. Recheck BP: 183/68 then 177/45. Tele: A-fib CVR. Pt refused reposition offers; repositioned when requested by pt. Indwelling catheter in place and patent. Daughter JORGE A. called requesting a hemoglobin check in the a.m. and for physician to call her in the a.m.; will pass on to a.m. nurse. Physician paged for lab orders; hemoglobin draw ordered. Plan for discharge today to TCU. [facility] transfer scheduled for 1430.

## 2021-01-23 NOTE — PROVIDER NOTIFICATION
Pt admitted for anemia and received last blood transfusion on 1/20. Last Hgb check on the 21st. Daughter called requesting Hgb checked prior to discharge today. May we have lab orders? Thank you.

## 2021-01-23 NOTE — PLAN OF CARE
"Occupational Therapy Discharge Summary    Reason for therapy discharge:    Discharged to transitional care facility.    Progress towards therapy goal(s). See goals on Care Plan in [facility] electronic health record for goal details.  Goals not met.  Barriers to achieving goals:   discharge from facility.    Therapy recommendation(s):    Continued therapy is recommended.  Rationale/Recommendations:  per treating OT \"Recommend ongoing skilled OT in TCU setting to improve strength, functional activity tolerance, balance and safety needed for daily tasks\".    **This patient was not seen by writing OT, information for discharge summary taken from treating OT's notes.**    "

## 2021-01-23 NOTE — PLAN OF CARE
Admitting Diagnosis: Acute on Chronic diastolic CHF/ Acute on chronic Anemia  Pertinent History:Chronic diastolic CHF, Chronic hypoxic RF, manage with 2 L o2, Pulmonary HTN, Afib, CVA.  For vitals and assessment please see flow sheet.   Living Situation: with family  Pain plan: denies pain  Mobility: assistance, 2 people  Baseline activity: with assistive equipment.   Alarms/Safety: BA.   LDA's: PIV   Pertinent test: CR 1:00, K+ 4.8, PLT: 103, hgb: 8.4,   Consults: PT/OT following.  Abnormals/Pending: none   Other Cares/Comments: A & O x4, VSS, tele Afib, LS clear/diminished, 02 96% 2L. Maguire catheters in place.   Discharge Disposition: possible tomorrow to TCU .

## 2021-01-23 NOTE — DISCHARGE SUMMARY
[facility]    Discharge Summary  Hospitalist    Date of Admission:  1/16/2021  Date of Discharge:  1/23/2021  2:46 PM  Discharging Provider: Juice Bahena MD  Date of Service (when I saw the patient): 01/23/21    Discharge Diagnoses   Acute on chronic CHF with CHF exacerbation.  Heart failure with preserved ejection fraction.  Acute hypoxic respiratory failure.  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  Transfusion dependent.  Generalized weakness and deconditioning.  Stage III CKD.  GERD.  Atrial fibrillation but not on anticoagulation due to chronic bleeding.  Severe malnutrition.    History of Present Illness   Keisha Godinez is a 88 year old female with history of chronic diastolic CHF, chronic hypoxemic respiratory failure maintained on 2 L/min, RV systolic CHF, pulmonary hypertension, lymphedema, chronic left foot wound, A. 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  She was admitted for IV diuretics and blood transfusion.     S/p 3 units red cells throughout her hospital stay, gradually improving though quite weak and deconditioned.      Hospital Course     CHF exacerbation.  Acute on chronic diastolic CHF.  Acute hypoxic respiratory failure.  She is on supplemental oxygen of 2 L by nasal cannula at baseline.  She was initially treated with IV Lasix after which she diuresed fairly well.  Now her usual dose of home torsemide, 40 mg twice daily has been resumed.  Her admission weight was around 68 kg which is now down to about 61 kg.  She has been diuresing fairly well.  Urine catheter was in place for accurate output monitoring but it was removed at the time of admission.    Her main issue is her significant weakness.  She continues to be very weak and requires a lot of help with any kind of activity.  Despite diuresis she continues to be very  weak.    For that reason she will benefit from ongoing aggressive rehab at a TCU.  [facility] is being [facility].    She will continue to be on torsemide at home dose of 40 mg twice daily.  Recommended repeat BMP on Monday.  Her spironolactone was stopped due to potassium slowly going up.    Chronic transfusion dependent anemia, due to colonic AVMs.  During the admission, patient received a total of 3 units of packed RBCs for recurrent anemia.  She has a history of chronic anemia and is transfusion dependent.  Her hemoglobin today 7.5  Recommended a repeat CBC on Monday.  Transfusion for hemoglobin less than 7.    Deconditioning.  Significant weakness.  Severe malnutrition.  Patient was seen by therapy teams.  Continues to require a lot of help with ADLs.  Significant weakness present.  Patient is being discharged to TCU for ongoing rehab which will be very important for her.  I discussed this with the patient and patient's family at length.  They are in agreement.    Her home medications are being continued as before, except for spironolactone as above.    I personally evaluated and examined the patient on the day of discharge.    Juice Bahena MD      Pending Results   These results will be followed up by PCP  Unresulted Labs Ordered in the Past 30 Days of this Admission     Date and Time Order Name Status Description    1/19/2021 0632 Creatinine serum In process                Primary Care Physician   Gerri Eubanks        Discharge Disposition   Discharged to TCU  Condition at discharge: Stable    Consultations This Hospital Stay   CARE MANAGEMENT / SOCIAL WORK IP CONSULT  PHYSICAL THERAPY ADULT IP CONSULT  OCCUPATIONAL THERAPY ADULT IP CONSULT  SOCIAL WORK IP CONSULT  PHYSICAL THERAPY ADULT IP CONSULT  OCCUPATIONAL THERAPY ADULT IP CONSULT    Time Spent on this Encounter   Discharge time: greater than 30 minutes.    Discharge Orders      Basic metabolic panel     CBC with platelets differential     Last Lab Result: Hemoglobin (g/dL)       Date                     Value                 01/23/2021               7.5 (L)          ----------     General info for SNF    Length of Stay Estimate: Short Term Care: Estimated # of Days <30  Condition at Discharge: Stable  Level of care:skilled   Rehabilitation Potential: Good  Admission H&P remains valid and up-to-date: Yes  Recent Chemotherapy: N/A  Use Nursing Home Standing Orders: Yes     Mantoux instructions    Give two-step Mantoux (PPD) Per Facility Policy Yes     Intake and output    Every shift     Daily weights    Call Provider for weight gain of more than 2 pounds per day or 5 pounds per week.     Activity - Up ad tracy     Follow Up and recommended labs and tests    Follow up with skilled nursing physician.  The following labs/tests are recommended: BMP in one week.     Reason for your hospital stay    CHF exacerbation. Anemia- acute on chronic, transfusion dependent.     Physical Therapy Adult Consult    Evaluate and treat as clinically indicated.    Reason:  Generalized weakness     Occupational Therapy Adult Consult    Evaluate and treat as clinically indicated.    Reason:  Generalized weakness     Fall precautions     Advance Diet as Tolerated    Follow this diet upon discharge:      Snacks/Supplements Adult: Boost Plus; Between Meals      2 Gram Sodium Diet     Discharge Medications   Discharge Medication List as of 1/23/2021  1:57 PM      CONTINUE these medications which have NOT CHANGED    Details   acetaminophen (TYLENOL) 325 MG tablet Take 650 mg by mouth 3 times daily as needed for mild pain, Historical      albuterol (PROAIR HFA/PROVENTIL HFA/VENTOLIN HFA) 108 (90 Base) MCG/ACT inhaler Inhale 2 puffs into the lungs every 6 hours, Disp-1 Inhaler,R-4, E-PrescribePharmacy may dispense brand covered by insurance (Proair, or proventil or ventolin or generic albuterol inhaler)      albuterol (PROVENTIL) (2.5 MG/3ML) 0.083% neb solution Take 1 vial (2.5 mg) by  nebulization 4 times daily, Disp-120 vial,R-11, E-Prescribe      ASPIRIN NOT PRESCRIBED (INTENTIONAL) Reason ASA was Not Prescribed: Hx of gastrointestinal or intracranial bleed      Cranberry Extract 250 MG TABS Take 500 mg by mouth daily , Historical      Diclofenac Sodium 1 % CREA Place 1 g onto the skin 2 times daily To L shoulder, Historical      ferrous sulfate (FEROSUL) 325 (65 Fe) MG tablet Take 1 tablet (325 mg) by mouth 2 times daily, Disp-100 tablet, R-11, E-Prescribe      gabapentin (NEURONTIN) 100 MG capsule Take 100 mg by mouth 2 times daily , Historical      hydrALAZINE (APRESOLINE) 25 MG tablet TAKE 3 TABLETS BY MOUTH 3 TIMES DAILY, Disp-810 tablet, R-0, E-Prescribe      OMEGA-3 FATTY ACIDS PO Take 1,200 mg by mouth daily , Historical      omeprazole (PRILOSEC) 20 MG DR capsule Take 1 capsule (20 mg) by mouth daily, Disp-90 capsule, R-3, E-Prescribe      psyllium (METAMUCIL/KONSYL) Packet Take 1 packet by mouth At Bedtime, Historical      simvastatin (ZOCOR) 10 MG tablet TAKE 1 TABLET BY MOUTH ONCE DAILY AT BEDTIME, Disp-90 tablet, R-0, E-Prescribe      SM STOOL SOFTENER 8.6-50 MG tablet Take 2 tablets daily as needed for constipation while taking prescription pain medications., Disp-30 tablet, R-0, E-Prescribe      torsemide (DEMADEX) 20 MG tablet Take 40 mg by mouth 2 times daily, Historical      venlafaxine (EFFEXOR-XR) 75 MG 24 hr capsule TAKE 1 CAPSULE BY MOUTH EVERY DAY, Disp-90 capsule, R-1, E-Prescribe      VITAMIN D, CHOLECALCIFEROL, PO Take 1,000 Units by mouth daily , Historical         STOP taking these medications       spironolactone (ALDACTONE) 50 MG tablet Comments:   Reason for Stopping:             Allergies   Allergies   Allergen Reactions     Blood Transfusion Related (Informational Only) Other (See Comments)     Patient has a history of a clinically significant antibody against RBC antigens.  A delay in compatible RBCs may occur.     Lisinopril Other (See Comments)     Tongue  swelling     Calcium Nausea and Vomiting     Egg Yolk      Flu Virus Vaccine      Allergic to eggs.     Keflex [Cephalexin] Nausea     Pt states she had upset stomach.  NOT tongue swelling.  She had tongue swelling with a combo bp med that she thinks included lisinopril.    Tolerates IV Cefazolin     Levaquin [Levofloxacin]      Sulfa Drugs      Zinc Nausea and Vomiting     Data   Most Recent 3 CBC's:  Recent Labs   Lab Test 01/23/21  0706 01/21/21  0700 01/20/21  0853 01/20/21  0852 01/20/21  0602 01/18/21  0715   WBC  --  6.0  --   --  7.5 7.9   HGB 7.5* 8.4*  --   --  7.5* 8.4*   MCV  --  93  --   --  94 96   PLT  --  103* 110* 118* Canceled, Test credited 149*      Most Recent 3 BMP's:  Recent Labs   Lab Test 01/23/21  0706 01/21/21  0700 01/20/21  0602    138 137   POTASSIUM 5.0 4.8 4.9   CHLORIDE 100 100 101   CO2 42* 39* 37*   BUN 57* 53* 53*   CR 0.99 1.00 1.03   ANIONGAP <1* <1* <1*   CASPER 9.7 9.2 9.1   * 96 86     Most Recent 2 LFT's:  Recent Labs   Lab Test 01/16/21  0109 05/15/20  1631   AST 19 26   ALT 14 20   ALKPHOS 71 68   BILITOTAL 0.3 0.3     Most Recent INR's and Anticoagulation Dosing History:  Anticoagulation Dose History     Recent Dosing and Labs Latest Ref Rng & Units 4/5/2015 8/12/2015 10/6/2015 5/12/2016 10/18/2017 11/3/2020 1/16/2021    INR 0.86 - 1.14 1.49(H) 1.11 1.07 1.00 1.04 0.93 1.00        Most Recent 3 Troponin's:  Recent Labs   Lab Test 01/16/21  0737 01/16/21  0109 11/03/20  1024 05/12/16  1800 05/12/16  1800 03/10/15  1300 03/10/15  1300   TROPI 0.028 0.024 <0.015   < >  --    < >  --    TROPONIN  --   --   --   --  0.02  --  0.06    < > = values in this interval not displayed.     Most Recent Cholesterol Panel:  Recent Labs   Lab Test 12/13/19  1005   CHOL 120   LDL 42   HDL 64   TRIG 72     Most Recent 6 Bacteria Isolates From Any Culture (See EPIC Reports for Culture Details):  Recent Labs   Lab Test 01/16/21  0453 10/04/19  1840 10/04/19  1837 08/08/19  1351  05/21/18  1833 05/21/18  1828   CULT >100,000 colonies/mL  Escherichia coli  *  <10,000 colonies/mL  Aerococcus urinae  Susceptibility testing not routinely done  * No growth No growth >100,000 colonies/mL  Klebsiella pneumoniae  * No growth No growth     Most Recent TSH, T4 and A1c Labs:  Recent Labs   Lab Test 07/13/18  0951   TSH 3.63

## 2021-01-23 NOTE — PLAN OF CARE
Vss A&OX4 alarms on bed and chair. Does call appropriately. Sore bottome. Redness and bruised. Foam on coccyx. Shift in chair frequently.. up with Vannessa steady and A2. Assist with feeding. Generalized edema. BLE and BUE elevated. Poor appetite. Does better with daughter feeding her. Hgb 7.5 LS dim crackles. Sob with exertion appears to be her baseline. Sats stable on home o2 of 2LNC. Discharge to tcu at 1400. Daughter at bedside. Maguire removed at 1338. Pt incont at baseline. Edu given to pt to inform tcu nurse/staff if has not voided after a few hours.denies hx of retention. [place] area red . Cleansed powder applied. discharge inst reviewed with pt and daughter.

## 2021-01-23 NOTE — PROVIDER NOTIFICATION
Dr. lerner paged: pt has torsemide and spiraldoctone las bmp 2 days ago. do you want one today or ok to given meds. Bmp ordered and meds were given late

## 2021-01-23 NOTE — PROGRESS NOTES
Care Management Discharge Note    Discharge Date: 01/23/21       Discharge Disposition: Skilled Nursing Facilty, Transitional Care    Discharge Services: None    Discharge DME: None    Discharge Transportation: family or friend will provide    Private pay costs discussed: transportation costs    PAS Confirmation Code: [id number]  Patient/family educated on Medicare website which has current facility and service quality ratings:  yes    Education Provided on the Discharge Plan:  yes  Persons Notified of Discharge Plans: daughter, patient  Patient/Family in Agreement with the Plan: yes    Handoff Referral Completed: No    Additional Information:  Patient discharging to [facility] TCU. Discharge instructions faxed to TCU fax# [phone]. Spoke with Cyn at TCU ph# [phone], verified fax and informed discharge instructions faxed. Facility expecting patient.     HE transportation scheduled for 2:30pm.     Coral Ash RN BSN PHN [facility]   Inpatient Care Coordination   [facility]   [phone]    Coral Ash RN

## 2021-01-24 NOTE — PLAN OF CARE
Physical Therapy Discharge Summary    Reason for therapy discharge:    Discharged to transitional care facility.    Progress towards therapy goal(s). See goals on Care Plan in [facility] electronic health record for goal details.  Goals not met.  Barriers to achieving goals:   discharge from facility.    Therapy recommendation(s):    Continued therapy is recommended.  Rationale/Recommendations:  PT as indicated at TCU.    Note: Pt not seen by documenting PT on this date. Information obtained from chart review.

## 2021-01-25 NOTE — LETTER
1/25/2021        RE: Keisha Godinez  [address] [facility] [address]        [facility]  PRIMARY CARE PROVIDER AND CLINIC:  Gerri Eubanks MD, [address]  / [facility] [address]  Chief Complaint   Patient presents with     [facility] F/U     [place] Medical Record Number:  [id number]  Place of Service where encounter took place:  [facility]  ([facility]) [[id number]]    Keisha Godinez  is a 89 year old  (1/25/1932), admitted to the above facility from  [facility]. Hospital stay 1/16/21 through 1/23/21..  Admitted to this facility for  rehab, medical management and nursing care.    HPI:    HPI information obtained from: facility chart records, facility staff, patient report and [facility] chart review.   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  Work-up revealed CHF exacerbation and anemia.  Patient was treated with IV diuretics and received 3 units of packed red blood cells.  Hemoglobin low on 1/16 of 6.5.  Last hemoglobin on 1/23 was 7.5 her discharge summary 7 kg of weight was diuresed. Due to the level of physical deconditioning acute rehab was recommended.  Patient is admitted to TCU for acute rehab and medical management.    Updates on Status Since Skilled nursing Admission:     Today patient is found sitting up in a chair.  She is alert, and in no acute distress.  Eye contact is limited and patient bows head with eyes closed intermittently.  Reports some shortness of breath and fatigue.  Reports weakness.  Denies chest pain or dizziness. Patient denies pain.VS  reviewed    CODE STATUS/ADVANCE DIRECTIVES DISCUSSION:   DNR / DNI  Patient's living condition: lives with family, child(emre), adult - Ramp  ALLERGIES: Blood transfusion related (informational only), Lisinopril, Calcium, Egg yolk, Flu virus vaccine, Keflex [cephalexin], Levaquin [levofloxacin], Sulfa drugs, and Zinc  PAST MEDICAL HISTORY:  has a past medical history of Anemia, Atrial fibrillation (H), B12 deficiency, Cardiomyopathy (H), CHF (congestive heart failure) (H), Chronic edema, Chronic systolic congestive heart failure (H), CVA (cerebral vascular accident) (H) (2010), Depression, Gastro-oesophageal reflux disease, GI bleed (03-20-15), Hyperlipidemia, Hypertension, Iron deficiency, MSSA (methicillin susceptible Staphylococcus aureus) (03-10-15), Pulmonary hypertension (H), and Shingles. She also has no past medical history of Malignant hyperthermia or PONV (postoperative nausea and vomiting).  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  FAMILY HISTORY: family history includes Known Genetic Syndrome in her father and mother; Other Cancer in her brother and daughter; Other Cancer (age of onset: 60) in her sister.  SOCIAL HISTORY:   reports that she quit smoking about 64 years ago. Her smoking use included cigarettes. She started smoking about 66 years ago. She quit after 1.00 year of use. She has never used smokeless tobacco. She reports that she does not drink alcohol or use drugs.    Post Discharge Medication Reconciliation Status: discharge medications reconciled and changed, per note/orders    Current Outpatient Medications   Medication Sig Dispense Refill     acetaminophen (TYLENOL) 325 MG tablet Take 650 mg by mouth 3 times daily as needed for mild pain       albuterol (PROAIR HFA/PROVENTIL  "HFA/VENTOLIN HFA) 108 (90 Base) MCG/ACT inhaler Inhale 2 puffs into the lungs every 6 hours 1 Inhaler 4     albuterol (PROVENTIL) (2.5 MG/3ML) 0.083% neb solution Take 1 vial (2.5 mg) by nebulization 4 times daily 120 vial 11     ASPIRIN NOT PRESCRIBED (INTENTIONAL) Please choose reason not prescribed, below       Cranberry Extract 250 MG TABS Take 500 mg by mouth daily        Diclofenac Sodium 1 % CREA Place 1 g onto the skin 2 times daily To L shoulder       ferrous sulfate (FEROSUL) 325 (65 Fe) MG tablet Take 1 tablet (325 mg) by mouth 2 times daily 100 tablet 11     gabapentin (NEURONTIN) 100 MG capsule Take 100 mg by mouth 2 times daily        hydrALAZINE (APRESOLINE) 25 MG tablet TAKE 3 TABLETS BY MOUTH 3 TIMES DAILY 810 tablet 0     OMEGA-3 FATTY ACIDS PO Take 1,200 mg by mouth daily        omeprazole (PRILOSEC) 20 MG DR capsule Take 1 capsule (20 mg) by mouth daily 90 capsule 3     psyllium (METAMUCIL/KONSYL) Packet Take 1 packet by mouth At Bedtime       simvastatin (ZOCOR) 10 MG tablet TAKE 1 TABLET BY MOUTH ONCE DAILY AT BEDTIME 90 tablet 0     SM STOOL SOFTENER 8.6-50 MG tablet Take 2 tablets daily as needed for constipation while taking prescription pain medications. 30 tablet 0     torsemide (DEMADEX) 20 MG tablet Take 40 mg by mouth 2 times daily       venlafaxine (EFFEXOR-XR) 75 MG 24 hr capsule TAKE 1 CAPSULE BY MOUTH EVERY DAY 90 capsule 1     VITAMIN D, CHOLECALCIFEROL, PO Take 1,000 Units by mouth daily              ROS:  10 point ROS of systems including Constitutional, Eyes, Respiratory, Cardiovascular, Gastroenterology, Genitourinary, Integumentary, Musculoskeletal, Psychiatric were all negative except for pertinent positives noted in my HPI.    Vitals:  BP (!) 162/53   Pulse 86   Temp 98.8  F (37.1  C)   Resp 20   Ht 1.6 m (5' 3\")   Wt 62.1 kg (137 lb)   SpO2 96%   BMI 24.27 kg/m    Exam:  GENERAL APPEARANCE: Alert, in no distress, weak, tired   ENT: Mouth and posterior oropharynx " normal, moist mucous membranes   EYES: EOM, conjunctivae, lids, pupils and irises normal   NECK: No adenopathy,masses or thyromegaly   RESP: respiratory effort and palpation of chest normal, Lungs sounds decreased throughout  CV: VS as above, peripheral edema 2+ bilateral LE, woody, 1+ bilateral UE  ABDOMEN: normal bowel sounds, soft, nontender, no hepatosplenomegaly or other masses   : palpation of bladder WNL   M/S: Gait and station normal   Digits and nails normal - generalized weakness/atrophy, decondition  SKIN: Inspection of skin and subcutaneous tissue baseline, Palpation of skin and subcutaneous tissue baseline- multiple bruises ars  NEURO: Cranial nerves 2-12 are normal tested and grossly at patient's baseline, Examination of sensation by touch normal   PSYCH: oriented X 3, affect and mood flat/somber            Lab/Diagnostic data:  Recent labs in [facility] reviewed by me today.     ASSESSMENT/PLAN:  Acute on chronic diastolic congestive heart failure (H)  CHF exacerbation (H)  Acute and chronic respiratory failure with hypoxia (H)  O2 dependent 2LNC  - 7 KG diuresed inpatient. Discharge weight 61 Kg  -PTA torsemide resumed upon discharge dose is 40 mg twice daily but spironolactone was stopped secondary to some mild hyperkalemia  Also continues on hydralazine  - continues with albuterol inhaler  Continue 2 g sodium diet  Daily weights with notification parameters  Vital signs per unit protocol    Chronic anemia  Transfusion-dependent anemia  Chronic in setting of colonic AVMs.  Is transfusion dependent and received 3 units of packed red blood cells while inpatient.  Last hemoglobin on 123 was 7.5 Will recheck 1/27-orders in place to schedule transfusion for hemoglobin less than 7  Continue on iron supplement  Observe for s/s bleeding  Vital signs per protocol    Chronic atrial fibrillation (H)  Noted.  Is not on pharmacotherapy and rate is controlled  Is not on anticoagulation second to chronic  anemia    Generalized muscle weakness  Physical deconditioning  Acute on chronic  -Very deconditioned  PT OT to optimize endurance, function, strength and independence    Essential hypertension  - chronic, limited data  - continue on hydralazine  - VS per unit protocol, adjust meds prn    Chronic kidney disease, stage III (moderate)  - baseline Cr approximately 1, GFR 40-50s  -Avoid nephrotoxins  Periodic BMP      Gastroesophageal reflux disease with esophagitis  Chronic, no active signs or symptoms  Continue omeprazole    Depression  Chronic, mood somber today.  Situational stressors and play  Continue on prior to arrival Effexor  Monitor mood and behavior closely  ACP as needed       Orders written by provider at facility      Electronically signed by:  JODIE De La Paz CNP                             Sincerely,        JODIE De La Paz CNP

## 2021-01-25 NOTE — PROGRESS NOTES
[facility]  PRIMARY CARE PROVIDER AND CLINIC:  Gerri Eubanks MD, [address] [facility] [address] / [facility] [address]  Chief Complaint   Patient presents with     [facility] F/U     [place] Medical Record Number:  [id number]  Place of Service where encounter took place:  [facility]  ([facility]) [[id number]]    Keisha Godinez  is a 89 year old  (1/25/1932), admitted to the above facility from  [facility]. Hospital stay 1/16/21 through 1/23/21..  Admitted to this facility for  rehab, medical management and nursing care.    HPI:    HPI information obtained from: facility chart records, facility staff, patient report and [facility] chart review.   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  Work-up revealed CHF exacerbation and anemia.  Patient was treated with IV diuretics and received 3 units of packed red blood cells.  Hemoglobin low on 1/16 of 6.5.  Last hemoglobin on 1/23 was 7.5 her discharge summary 7 kg of weight was diuresed. Due to the level of physical deconditioning acute rehab was recommended.  Patient is admitted to TCU for acute rehab and medical management.    Updates on Status Since Skilled nursing Admission:     Today patient is found sitting up in a chair.  She is alert, and in no acute distress.  Eye contact is limited and patient bows head with eyes closed intermittently.  Reports some shortness of breath and fatigue.  Reports weakness.  Denies chest pain or dizziness. Patient denies pain.VS reviewed    CODE STATUS/ADVANCE DIRECTIVES DISCUSSION:   DNR / DNI  Patient's living condition:  lives with family, child(emre), adult - Ramp  ALLERGIES: Blood transfusion related (informational only), Lisinopril, Calcium, Egg yolk, Flu virus vaccine, Keflex [cephalexin], Levaquin [levofloxacin], Sulfa drugs, and Zinc  PAST MEDICAL HISTORY:  has a past medical history of Anemia, Atrial fibrillation (H), B12 deficiency, Cardiomyopathy (H), CHF (congestive heart failure) (H), Chronic edema, Chronic systolic congestive heart failure (H), CVA (cerebral vascular accident) (H) (2010), Depression, Gastro-oesophageal reflux disease, GI bleed (03-20-15), Hyperlipidemia, Hypertension, Iron deficiency, MSSA (methicillin susceptible Staphylococcus aureus) (03-10-15), Pulmonary hypertension (H), and Shingles. She also has no past medical history of Malignant hyperthermia or PONV (postoperative nausea and vomiting).  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  FAMILY HISTORY: family history includes Known Genetic Syndrome in her father and mother; Other Cancer in her brother and daughter; Other Cancer (age of onset: 60) in her sister.  SOCIAL HISTORY:   reports that she quit smoking about 64 years ago. Her smoking use included cigarettes. She started smoking about 66 years ago. She quit after 1.00 year of use. She has never used smokeless tobacco. She reports that she does not drink alcohol or use drugs.    Post Discharge Medication Reconciliation Status: discharge medications reconciled and changed, per note/orders    Current Outpatient Medications   Medication Sig Dispense Refill     acetaminophen (TYLENOL) 325 MG tablet Take 650 mg by mouth 3 times daily as needed for mild pain       albuterol (PROAIR HFA/PROVENTIL HFA/VENTOLIN HFA) 108 (90 Base) MCG/ACT inhaler Inhale 2 puffs into the lungs every 6 hours 1 Inhaler 4  "    albuterol (PROVENTIL) (2.5 MG/3ML) 0.083% neb solution Take 1 vial (2.5 mg) by nebulization 4 times daily 120 vial 11     ASPIRIN NOT PRESCRIBED (INTENTIONAL) Please choose reason not prescribed, below       Cranberry Extract 250 MG TABS Take 500 mg by mouth daily        Diclofenac Sodium 1 % CREA Place 1 g onto the skin 2 times daily To L shoulder       ferrous sulfate (FEROSUL) 325 (65 Fe) MG tablet Take 1 tablet (325 mg) by mouth 2 times daily 100 tablet 11     gabapentin (NEURONTIN) 100 MG capsule Take 100 mg by mouth 2 times daily        hydrALAZINE (APRESOLINE) 25 MG tablet TAKE 3 TABLETS BY MOUTH 3 TIMES DAILY 810 tablet 0     OMEGA-3 FATTY ACIDS PO Take 1,200 mg by mouth daily        omeprazole (PRILOSEC) 20 MG DR capsule Take 1 capsule (20 mg) by mouth daily 90 capsule 3     psyllium (METAMUCIL/KONSYL) Packet Take 1 packet by mouth At Bedtime       simvastatin (ZOCOR) 10 MG tablet TAKE 1 TABLET BY MOUTH ONCE DAILY AT BEDTIME 90 tablet 0     SM STOOL SOFTENER 8.6-50 MG tablet Take 2 tablets daily as needed for constipation while taking prescription pain medications. 30 tablet 0     torsemide (DEMADEX) 20 MG tablet Take 40 mg by mouth 2 times daily       venlafaxine (EFFEXOR-XR) 75 MG 24 hr capsule TAKE 1 CAPSULE BY MOUTH EVERY DAY 90 capsule 1     VITAMIN D, CHOLECALCIFEROL, PO Take 1,000 Units by mouth daily              ROS:  10 point ROS of systems including Constitutional, Eyes, Respiratory, Cardiovascular, Gastroenterology, Genitourinary, Integumentary, Musculoskeletal, Psychiatric were all negative except for pertinent positives noted in my HPI.    Vitals:  BP (!) 162/53   Pulse 86   Temp 98.8  F (37.1  C)   Resp 20   Ht 1.6 m (5' 3\")   Wt 62.1 kg (137 lb)   SpO2 96%   BMI 24.27 kg/m    Exam:  GENERAL APPEARANCE: Alert, in no distress, weak, tired   ENT: Mouth and posterior oropharynx normal, moist mucous membranes   EYES: EOM, conjunctivae, lids, pupils and irises normal   NECK: No " adenopathy,masses or thyromegaly   RESP: respiratory effort and palpation of chest normal, Lungs sounds decreased throughout  CV: VS as above, peripheral edema 2+ bilateral LE, woody, 1+ bilateral UE  ABDOMEN: normal bowel sounds, soft, nontender, no hepatosplenomegaly or other masses   : palpation of bladder WNL   M/S: Gait and station normal   Digits and nails normal - generalized weakness/atrophy, decondition  SKIN: Inspection of skin and subcutaneous tissue baseline, Palpation of skin and subcutaneous tissue baseline- multiple bruises ars  NEURO: Cranial nerves 2-12 are normal tested and grossly at patient's baseline, Examination of sensation by touch normal   PSYCH: oriented X 3, affect and mood flat/somber            Lab/Diagnostic data:  Recent labs in [facility] reviewed by me today.     ASSESSMENT/PLAN:  Acute on chronic diastolic congestive heart failure (H)  CHF exacerbation (H)  Acute and chronic respiratory failure with hypoxia (H)  O2 dependent 2LNC  - 7 KG diuresed inpatient. Discharge weight 61 Kg  -PTA torsemide resumed upon discharge dose is 40 mg twice daily but spironolactone was stopped secondary to some mild hyperkalemia  Also continues on hydralazine  - continues with albuterol inhaler  Continue 2 g sodium diet  Daily weights with notification parameters  Vital signs per unit protocol    Chronic anemia  Transfusion-dependent anemia  Chronic in setting of colonic AVMs.  Is transfusion dependent and received 3 units of packed red blood cells while inpatient.  Last hemoglobin on 123 was 7.5 Will recheck 1/27-orders in place to schedule transfusion for hemoglobin less than 7  Continue on iron supplement  Observe for s/s bleeding  Vital signs per protocol    Chronic atrial fibrillation (H)  Noted.  Is not on pharmacotherapy and rate is controlled  Is not on anticoagulation second to chronic anemia    Generalized muscle weakness  Physical deconditioning  Acute on chronic  -Very deconditioned  PT OT to  optimize endurance, function, strength and independence    Essential hypertension  - chronic, limited data  - continue on hydralazine  - VS per unit protocol, adjust meds prn    Chronic kidney disease, stage III (moderate)  - baseline Cr approximately 1, GFR 40-50s  -Avoid nephrotoxins  Periodic BMP      Gastroesophageal reflux disease with esophagitis  Chronic, no active signs or symptoms  Continue omeprazole    Depression  Chronic, mood somber today.  Situational stressors and play  Continue on prior to arrival Effexor  Monitor mood and behavior closely  ACP as needed       Orders written by provider at facility      Electronically signed by:  JODIE De La Paz CNP

## 2021-01-26 PROBLEM — R06.02 SHORTNESS OF BREATH: Status: ACTIVE | Noted: 2021-01-01

## 2021-01-26 NOTE — ED PROVIDER NOTES
History   Chief Complaint:  Generalized Weakness and Low HGB     The history is provided by the patient and medical records.      Keisha Godinez is a 89 year old female with history of recurrent anemia, with blood transfusions every week, atrial fibrillation, cardiomyopathy, CHF, CKD, COPD, CVA, GI bleed, hyperlipidemia, hypertension, and an MI, who presents, via EMS from [facility], with generalized weakness, fatigue, and a low hemoglobin. Per chart review, the patient was just discharged from the hospital about 3 days ago, after being admitted for one week for CHF and chronic anemia, see hospital summary below. While here, she received 3 units of blood. She was then discharged to a TCU. Today, the patient had a routine check up and was found to have a hemoglobin of 5.2. She notes that she usually goes to a speciality clinic to receive her transfusions so she is not sure why EMS was called. She notes she is tired and fatigued but this is not new upon discharge. She denies any visible blood in her stool though notes she takes iron pills.    Patient denies any fever, chills, chest pain, shortness of breath, abdominal pain, nausea, vomiting, diarrhea, or urinary symptoms.    Hospital Course from 01/16/2021-01/23/2021:   Keisha Godinez is a 88 year old female with history of chronic diastolic CHF, chronic hypoxemic respiratory failure maintained on 2 L/min, RV systolic CHF, pulmonary hypertension, lymphedema, chronic left foot wound, A. 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  She was admitted for IV diuretics and blood transfusion.     S/p 3 units red cells throughout her hospital stay, gradually improving though quite weak and deconditioned.       CHF exacerbation.  Acute on chronic diastolic CHF.  Acute hypoxic respiratory failure.  She is on supplemental  oxygen of 2 L by nasal cannula at baseline.  She was initially treated with IV Lasix after which she diuresed fairly well.  Now her usual dose of home torsemide, 40 mg twice daily has been resumed.  Her admission weight was around 68 kg which is now down to about 61 kg.  She has been diuresing fairly well.  Urine catheter was in place for accurate output monitoring but it was removed at the time of admission.     Her main issue is her significant weakness.  She continues to be very weak and requires a lot of help with any kind of activity.  Despite diuresis she continues to be very weak.     For that reason she will benefit from ongoing aggressive rehab at a TCU.  [facility]     She will continue to be on torsemide at home dose of 40 mg twice daily.  Recommended repeat BMP on Monday.  Her spironolactone was stopped due to potassium slowly going up.     Chronic transfusion dependent anemia, due to colonic AVMs.  During the admission, patient received a total of 3 units of packed RBCs for recurrent anemia.  She has a history of chronic anemia and is transfusion dependent.  Her hemoglobin today 7.5  Recommended a repeat CBC on Monday.  Transfusion for hemoglobin less than 7.     Deconditioning.  Significant weakness.  Patient was seen by therapy teams.  Continues to require a lot of help with ADLs.  Significant weakness present.  Patient is being discharged to TCU for ongoing rehab which will be very important for her.  I discussed this with the patient and patient's family at length.  They are in agreement.    Review of Systems   Constitutional: Positive for fatigue. Negative for chills and fever.   Respiratory: Negative for shortness of breath.    Cardiovascular: Negative for chest pain.   Gastrointestinal: Negative for abdominal pain, blood in stool, diarrhea, nausea and vomiting.   Genitourinary: Negative for decreased urine volume, difficulty urinating, dysuria, frequency and  hematuria.   Neurological: Positive for weakness.   All other systems reviewed and are negative.      Allergies:  Lisinopril  Calcium  Egg Yolk  Flu Virus Vaccine  Keflex   Levaquin   Sulfa Drugs  Zinc    Medications:  Albuterol inhaler  Ferrous sulfate  Gabapentin  Hydralazine  Prilosec  Zocor  Demadex  Effexor    Past Medical History:    Anemia  Atrial fibrillation  Cardiomyopathy  C. Diff   CHF  CKD  COPD  CVA  Depression  GERD  GI bleed  Hyperlipidemia  Hypertension  MI  MSSA  Pneumonia  Sepsis  Shingles     Past Surgical History:    Colonoscopy  Small bowel enteroscopy  EGD, combined  Foot mass excision     Family History:    Known genetic syndrome  Pancreatic cancer  Lung cancer     Social History:  Smoking status: Former - quit date: 1956  Alcohol use: Negative  Drug use: Negative   Marital Status:   Presents to the ED via EMS from [facility]   PCP: Gerri Eubanks     Physical Exam     Patient Vitals for the past 24 hrs:   BP Temp Temp src Pulse Resp SpO2   01/26/21 1700 138/43 -- -- 63 15 --   01/26/21 1615 (!) 142/70 -- -- 56 17 100 %   01/26/21 1535 -- -- -- 56 18 100 %   01/26/21 1530 (!) 144/48 -- -- 55 -- 90 %   01/26/21 1525 (!) 147/62 98.2  F (36.8  C) Oral 55 20 95 %       Physical Exam  Constitutional: Pleasant. Cooperative. Appears fatigued.  Eyes: Pupils equally round and reactive  HENT: Head is normal in appearance. Oropharynx is normal with moist mucus membranes.  Cardiovascular: Regular rate and rhythm and without murmurs.  Respiratory: Normal respiratory effort, lungs are clear bilaterally.  GI: Abdomen is soft, non-tender, non-distended. No guarding, rebound, or rigidity.  Musculoskeletal: No asymmetry of the lower extremities, no tenderness to palpation.   Skin: Normal, without rash.  Neurologic: Cranial nerves grossly intact, normal cognition, no focal deficits. Alert and oriented x 3.   Psychiatric: Normal affect.  Nursing notes and vital signs  reviewed.      Emergency Department Course     ECG:  Indication: Generalized Weakness  Time: 1541  Vent. Rate 61 bpm. NJ interval *. QRS duration 96. QT/QTc 420/422. P-R-T axis * 51 -2. Atrial fibrillation with premature ventricular or aberrantly conduced complexes. ST & T wave abnormality, consider ischemia. Abnormal ECG. Read time: 1547    Laboratory:  Labs Ordered and Resulted from Time of ED Arrival Up to the Time of Departure from the ED   CBC WITH PLATELETS DIFFERENTIAL - Abnormal; Notable for the following components:       Result Value    RBC Count 2.31 (*)     Hemoglobin 6.2 (*)     Hematocrit 22.6 (*)     MCHC 27.4 (*)     RDW 17.1 (*)     Platelet Count 114 (*)     Absolute Lymphocytes 0.6 (*)     All other components within normal limits   BASIC METABOLIC PANEL - Abnormal; Notable for the following components:    Carbon Dioxide >45 (*)     Glucose 106 (*)     Urea Nitrogen 66 (*)     Creatinine 1.08 (*)     GFR Estimate 44 (*)     GFR Estimate If Black 51 (*)     All other components within normal limits   BLOOD GAS VENOUS - Abnormal; Notable for the following components:    Ph Venous 7.45 (*)     PCO2 Venous 71 (*)     Bicarbonate Venous 49 (*)     All other components within normal limits   TROPONIN I   ABO/RH TYPE AND SCREEN   RED BLOOD CELL PREPARE ORDER UNIT     Emergency Department Course:    Reviewed:  1550 I reviewed the patient's nursing notes, vitals, past medical records, and Care Everywhere.     Assessments:  1600 I performed an exam of the patient as documented above.     1636 I rechecked the patient and discussed the results of her workup thus far.     Disposition:  Care of the patient was transferred to my colleague Mamta Madrid PA-C, pending blood transfusion.     Impression & Plan   Medical Decision Making:  Keisha Godinez is a 89 year old female with complex medical history who presents to the ED for evaluation of anemia.  Patient was recently hospitalized for CHF exacerbation and  chronic anemia, discharged to TCU 3 days ago.  Patient was evaluated earlier today, noted to be markedly anemic with a hemoglobin of 5.2.  Patient states that she has a history of recurrent anemia thought to be secondary to colonic AVM and iron deficiency, and actually receives 1 unit of blood about once per week.  She was transfused 3 times during her stay in the hospital.  Patient has no specific complaints at this time, other than feeling fatigued.  Broad review of systems is negative. Vitals and physical exam as above. Discussed that patients with markedly low hemoglobin are normally admitted for serial hemoglobin checks and transfusion, however patient refuses admission, noting that this recurrent anemia and need for transfusion is normal for her and she needs one unit of blood and would like discharge to home. She states she was sent here as she is at a new TCU with new provider. Discussed risks associated with discharge to home, patient understands these risks. Of note, patient's bicarb elevated, however this appears to be at her baseline per chart review. PH WNL. Troponin detectable, however unchanged from previous, no chest pain or dyspnea. Patient signed out to colleague Mamta Madrid for discharge to home following transfusion. Patient stable at time of sign out.    Diagnosis:    ICD-10-CM    1. Acute on chronic anemia  D64.9        Disposition:  Signed out to Mamta Madrid PA-C, pending blood transfusion.    Scribe Disclosure:  I, Qiana Rayo, am serving as a scribe on 1/26/2021 at 3:51 PM to personally document services performed by Taco Crowley PA-C based on my observations and the provider's statements to me.     This record was created at least in part using electronic voice recognition software, so please excuse any typographical errors.       aTco Crowley PA-C  01/26/21 [id number]       Taco Crowley PA-C  01/26/21 [id number]

## 2021-01-27 NOTE — PLAN OF CARE
"PRIMARY DIAGNOSIS: ACUTE ON CHRONIC ANEMIA    OUTPATIENT/OBSERVATION GOALS TO BE MET BEFORE DISCHARGE  1. Orthostatic performed: No    2. Tolerating PO medications: Yes    3. Return to near baseline physical activity: No    4. Cleared for discharge by consultants (if involved): N/A    BP (!) 118/30 (BP Location: Left arm)   Pulse 57   Temp 99.3  F (37.4  C) (Oral)   Resp 16   Ht 1.6 m (5' 3\")   Wt 61.3 kg (135 lb 3.2 oz)   SpO2 97%   BMI 23.95 kg/m      Discharge Planner Nurse   Safe discharge environment identified: Yes  Barriers to discharge: Yes       Entered by: Torrie Perez 01/27/2021 5:02 AM     Please review provider order for any additional goals.   Nurse to notify provider when observation goals have been met and patient is ready for discharge.    Patient alert and oriented x4. Denies pain. Up with heavy assist of 2 with gait belt and walker, pivot transfers. Patient very weak, skin deconditioned - edematous, generalized bruising and abrasions, dry flaky lower extremities, pressure wound to sacrum - sacral mepilex applied. Low fat low sodium diet, no caffeine; patient also states she is not allowed to have straws. IV SL. Oxygen saturation % on 2.5 L O2 via nasal cannula per patient's home dose. Hgb 6.2 - 1 unit PRBCs administered in ED, will recheck level this AM and if stable will be able to return to [facility] per MD note. Will continue with plan of care.   "

## 2021-01-27 NOTE — PROGRESS NOTES
"[facility] RCAT  Date: January 27, 2021  Admission Dx: Acute on chronic anemia  Pulmonary History: Pulmonary HTN  Home Nebulizer/MDI Use: Albuterol MDI prn, Albuterol QID   Home Oxygen: 2L NC  Acuity Level (RCAT flow sheet): Level 3    Aerosol Therapy initiated: Albuterol QID, prn    Pulmonary Hygiene initiated: Coughing and deep breathing techniques    Volume Expansion initiated: Incentive spirometer    Current Oxygen Requirements: 1L NC  Current SpO2: 99%    Re-evaluation date: 1/30/2021    Patient Education: Education was provided on RCAT process. Will continue to do education with patient.    See \"RT Assessments\" flow sheet for patient assessment scoring and Acuity Level Details.     Cosmo Gar, RT on 1/27/2021 at 12:32 PM      "

## 2021-01-27 NOTE — ED NOTES
[facility]  ED Nurse Handoff Report    Keisha Godinez is a 89 year old female   ED Chief complaint: Generalized Weakness and Low HGB  . ED Diagnosis:   Final diagnoses:   Acute on chronic anemia     Allergies:   Allergies   Allergen Reactions     Blood Transfusion Related (Informational Only) Other (See Comments)     Patient has a history of a clinically significant antibody against RBC antigens.  A delay in compatible RBCs may occur.     Lisinopril Other (See Comments)     Tongue swelling     Calcium Nausea and Vomiting     Egg Yolk      Flu Virus Vaccine      Allergic to eggs.     Keflex [Cephalexin] Nausea     Pt states she had upset stomach.  NOT tongue swelling.  She had tongue swelling with a combo bp med that she thinks included lisinopril.    Tolerates IV Cefazolin     Levaquin [Levofloxacin]      Sulfa Drugs      Zinc Nausea and Vomiting       Code Status: DNR / DNI  Activity level - Baseline/Home:  Assist X 2. Activity Level - Current:   Assist X 2. Lift room needed: No. Bariatric: No   Needed: No   Isolation: No. Infection: Not Applicable.     Vital Signs:   Vitals:    01/26/21 2145 01/26/21 2200 01/26/21 2230 01/26/21 2245   BP: 133/47 130/50 121/44 135/48   Pulse: 53  56 57   Resp: 20      Temp:       TempSrc:       SpO2: 100%  99% 100%       Cardiac Rhythm:  ,      Pain level:    Patient confused: No. Patient Falls Risk: Yes.   Elimination Status: Has voided   Patient Report - Initial Complaint: Generalized Weakness. Focused Assessment: Respiratory - Respiratory WDL: effort/expansion  Expansion/Accessory Muscles/Retractions: no retractions; expansion symmetric; no use of accessory muscles   Tests Performed:   Labs Ordered and Resulted from Time of ED Arrival Up to the Time of Departure from the ED   CBC WITH PLATELETS DIFFERENTIAL - Abnormal; Notable for the following components:       Result Value    RBC Count 2.31 (*)     Hemoglobin 6.2 (*)     Hematocrit 22.6 (*)     MCHC  27.4 (*)     RDW 17.1 (*)     Platelet Count 114 (*)     Absolute Lymphocytes 0.6 (*)     All other components within normal limits   BASIC METABOLIC PANEL - Abnormal; Notable for the following components:    Carbon Dioxide >45 (*)     Glucose 106 (*)     Urea Nitrogen 66 (*)     Creatinine 1.08 (*)     GFR Estimate 44 (*)     GFR Estimate If Black 51 (*)     All other components within normal limits   BLOOD GAS VENOUS - Abnormal; Notable for the following components:    Ph Venous 7.45 (*)     PCO2 Venous 71 (*)     Bicarbonate Venous 49 (*)     All other components within normal limits   TROPONIN I   SARS-COV-2 (COVID-19) VIRUS RT-PCR   ABO/RH TYPE AND SCREEN   RED BLOOD CELL PREPARE ORDER UNIT   BLOOD COMPONENT   BLOOD COMPONENT     Treatments provided:   Medications   0.9% sodium chloride BOLUS (has no administration in time range)       Family Comments: Byhenrikf  OBS brochure/video discussed/provided to patient:  Yes  ED Medications:   Medications   0.9% sodium chloride BOLUS (has no administration in time range)     Drips infusing:  No  For the majority of the shift, the patient's behavior Green. Interventions performed were N/A.    Sepsis treatment initiated: No     Patient tested for COVID 19 prior to admission: YES    ED Nurse Name/Phone Number: Dayron Burton RN,   11:06 PM    RECEIVING UNIT ED HANDOFF REVIEW    Above ED Nurse Handoff Report was reviewed: Yes  Reviewed by: Torrie Perez RN on January 27, 2021 at 12:22 AM

## 2021-01-27 NOTE — PROGRESS NOTES
CTS    Prior to recent inpatient, patient followed with [facility] and [facility] for Anemia and prn transfusion. Per Dr. Yancey request, [facility] and spoke with Cristal, Charge nurse and lvm for Zahra CSAE. Cristal confirms she discussed information with their providers for [facility] to continue to follow patient for Anemia management.     Coral Ash RN BSN PHN CCM   Inpatient Care Coordination   [facility]   [phone]

## 2021-01-27 NOTE — PROGRESS NOTES
DATE:  1/27/2021   TIME OF RECEIPT FROM LAB:  7:38 AM  LAB TEST:  Bicarb   LAB VALUE:  50  RESULTS GIVEN WITH READ-BACK TO (PROVIDER):  Reba EVANS   TIME LAB VALUE REPORTED TO PROVIDER:   7:40 AM

## 2021-01-27 NOTE — ED NOTES
Patient up to commode, patient heavy 2 person assist, has difficulty with ambulation and pivoting.

## 2021-01-27 NOTE — PLAN OF CARE
"PRIMARY DIAGNOSIS: ACUTE ON CHRONIC ANEMIA    OUTPATIENT/OBSERVATION GOALS TO BE MET BEFORE DISCHARGE  1. Orthostatic performed: No    2. Tolerating PO medications: Yes    3. Return to near baseline physical activity: No    4. Cleared for discharge by consultants (if involved): N/A    /40 (BP Location: Right arm)   Pulse 58   Temp 97.5  F (36.4  C) (Axillary)   Resp 20   Ht 1.6 m (5' 3\")   Wt 61.3 kg (135 lb 3.2 oz)   SpO2 97%   BMI 23.95 kg/m      Discharge Planner Nurse   Safe discharge environment identified: Yes  Barriers to discharge: Yes       Entered by: Niru Maier 01/27/2021     Please review provider order for any additional goals.   Nurse to notify provider when observation goals have been met and patient is ready for discharge.    Patient alert and oriented x4. Denies pain. Up with heavy assist of 2 with gait belt and walker, pivot transfers. Patient very weak, skin deconditioned - edematous, generalized bruising and abrasions, dry flaky lower extremities, pressure wound to sacrum - sacral mepilex applied. After 1 unit of RBC's Hgb   Hemoglobin   Date Value Ref Range Status   01/27/2021 7.5 (L) 11.7 - 15.7 g/dL Final   Patient will discharge this afternoon back to [facility] at 1645. Will continue to monitor and provide cares.   "

## 2021-01-27 NOTE — DISCHARGE SUMMARY
[facility]  Hospitalist Discharge Summary      Date of Admission:  1/26/2021  Date of Discharge:  1/27/2021  Discharging Provider: Efraín Yancey MD      Discharge Diagnoses   Acute on chronic anemia due to known ongoing GI losses    Follow-ups Needed After Discharge   Follow-up Appointments     Follow Up and recommended labs and tests      Follow up with alf physician.  The following labs/tests are   recommended: Hemoglobin every week. PRN blood transfusions at the Infusion   Center.             Unresulted Labs Ordered in the Past 30 Days of this Admission     Date and Time Order Name Status Description    1/19/2021 0632 Creatinine serum In process       These results will be followed up by hospitalist group    Discharge Disposition   Discharged to short-term care facility  Condition at discharge: Stable      Hospital Course   Keisha Godinez is a 89 year old female with a history of chronic diastolic heart failure, right-sided heart failure with pulmonary hypertension, chronic hypoxic respiratory failure on O2, chronic anemia due to AVMs, stage III CKD, GERD, atrial fibrillation, who presented to the ER for evaluation of abnormal labs.     Patient was just admitted here from 1/16 through 1/23.  She was treated for accommodation of heart failure and anemia requiring blood transfusions.  She is very sensitive to volume and needs tight adjustment of diuretics after blood transfusions.  She was discharged to TCU.  The patient tells me that she had abdominal pain and that is why she was brought to the ER today.  However, it sounds like routine labs were checked at her TCU and she was found to have a hemoglobin of 5.8.  She was then sent to the ED, where her hemoglobin was found to be 6.2.  Initial plan was for 1 unit PRBC transfusion and then discharged back to facility, but patient appeared too short of breath to tolerate that.  Therefore admission to observation  requested.     Patient says she had some abdominal pain before she came in that has completely abated right now.  She says her breathing is short but a little bit better.  Denies chest pain.  Patient has been transfused a total of 2 units of blood.  This morning her hemoglobin was 5.8.  After another unit of blood it is now 7.5.  Patient was seen with her daughter in the room.  She denies any chest pain, shortness of breath, abdominal pain, nausea, vomiting, diarrhea.  She has been eating.  Part of the issue was she was at TCU and they did not have a plan for her weekly transfusions at the transfusion center.  I have spoken to social work they have been in contact with the TCU staff, who will arrange this.  At this point she can discharge back to TCU.      Consultations This Hospital Stay   CARE MANAGEMENT / SOCIAL WORK IP CONSULT  PHYSICAL THERAPY ADULT IP CONSULT  OCCUPATIONAL THERAPY ADULT IP CONSULT    Code Status   No CPR- Do NOT Intubate    Time Spent on this Encounter   I, Efraín Yancey MD, personally saw the patient today and spent greater than 30 minutes discharging this patient.       Efraín Yancey MD  [facility] OBSERVATION DEPT  [address] [place] [address]  Phone: [phone]  ______________________________________________________________________    Physical Exam   Vital Signs: Temp: 96.9  F (36.1  C) Temp src: Axillary BP: (!) 130/34 Pulse: 64   Resp: 20 SpO2: 96 % O2 Device: Nasal cannula Oxygen Delivery: 1 LPM  Weight: 135 lbs 3.2 oz  Constitutional: awake, alert, cooperative, no apparent distress, and appears stated age  Eyes: Lids and lashes normal, pupils equal, round and reactive to light, extra ocular muscles intact, sclera clear, conjunctiva normal  ENT: Normocephalic, without obvious abnormality, atraumatic, sinuses nontender on palpation, external ears without lesions, oral pharynx with moist mucous membranes, tonsils without erythema or exudates, gums  normal and good dentition.  Respiratory: upper airway congestion  Cardiovascular: Normal apical impulse, regular rate and rhythm, normal S1 and S2, no S3 or S4, and no murmur noted  GI: No scars, normal bowel sounds, soft, non-distended, non-tender, no masses palpated, no hepatosplenomegally  Skin: no bruising or bleeding       Primary Care Physician   Gerri Eubanks    Discharge Orders      General info for SNF    Length of Stay Estimate: Short Term Care: Estimated # of Days 31-90  Condition at Discharge: Stable  Level of care:skilled   Rehabilitation Potential: Fair  Admission H&P remains valid and up-to-date: Yes  Recent Chemotherapy: N/A  Use Nursing Home Standing Orders: Yes     Mantoux instructions    Give two-step Mantoux (PPD) Per Facility Policy Yes     Reason for your hospital stay    Acute on chronic anemia due to known, ongoing GI losses.     Follow Up and recommended labs and tests    Follow up with snf physician.  The following labs/tests are recommended: Hemoglobin every week. PRN blood transfusions at the Infusion Center.     Activity - Up with nursing assistance     No CPR- Do NOT Intubate     Physical Therapy Adult Consult    Evaluate and treat as clinically indicated.    Reason:  weakness     Occupational Therapy Adult Consult    Evaluate and treat as clinically indicated.    Reason:  Weakness, anemia     Fall precautions     Advance Diet as Tolerated    Follow this diet upon discharge: Orders Placed This Encounter      Combination Diet Low Saturated Fat Na <2400mg Diet, No Caffeine Diet       Significant Results and Procedures   Most Recent 3 CBC's:  Recent Labs   Lab Test 01/27/21  1300 01/27/21  0612 01/26/21  1532 01/26/21  0900 01/21/21  0700 01/21/21  0700   WBC  --   --  8.4 6.8  --  6.0   HGB 7.5* 6.2* 6.2* 5.8*   < > 8.4*   MCV  --   --  98 101*  --  93   PLT  --   --  114* 96*  --  103*    < > = values in this interval not displayed.     Most Recent 3 BMP's:  Recent Labs    Lab Test 01/27/21  0612 01/26/21  1532 01/26/21  0900    138 139   POTASSIUM 4.2 4.3 4.2   CHLORIDE 98 95 97   CO2 >45* >45* 44*   BUN 61* 66* 68*   CR 1.08* 1.08* 1.08*   ANIONGAP Not Calculated Not Calculated <1*   CASPER 9.0 9.8 9.6   GLC 84 106* 103*     Most Recent 2 LFT's:  Recent Labs   Lab Test 01/16/21  0109 05/15/20  1631   AST 19 26   ALT 14 20   ALKPHOS 71 68   BILITOTAL 0.3 0.3   ,   Results for orders placed or performed during the hospital encounter of 01/26/21   XR Chest Port 1 View    Narrative    EXAM: CHEST SINGLE VIEW PORTABLE  LOCATION: [facility]  DATE/TIME: 1/26/2021 11:26 PM    INDICATION: Shortness of breath.    COMPARISON: 1/22/2021.      Impression    IMPRESSION:  1. No convincing interval change since 1/22/2021.  2. Small bilateral pleural effusions again noted.  3. Mildly increased pulmonary vascularity that could relate to pulmonary vascular congestion.  4. Patchy opacity in the medial aspects of bilateral lung bases could represent atelectasis or pneumonia.  5. Cardiomegaly.        Discharge Medications   Current Discharge Medication List      CONTINUE these medications which have NOT CHANGED    Details   acetaminophen (TYLENOL) 325 MG tablet Take 650 mg by mouth 3 times daily as needed for mild pain      albuterol (PROAIR HFA/PROVENTIL HFA/VENTOLIN HFA) 108 (90 Base) MCG/ACT inhaler Inhale 2 puffs into the lungs every 6 hours as needed    Comments: Pharmacy may dispense brand covered by insurance (Proair, or proventil or ventolin or generic albuterol inhaler)      albuterol (PROVENTIL) (2.5 MG/3ML) 0.083% neb solution Take 1 vial (2.5 mg) by nebulization 4 times daily  Qty: 120 vial, Refills: 11    Associated Diagnoses: Dyspnea, unspecified type      Cranberry Extract 250 MG TABS Take 500 mg by mouth daily       Diclofenac Sodium 1 % CREA Place 1 g onto the skin 2 times daily To L shoulder      ferrous sulfate (FEROSUL) 325 (65 Fe) MG tablet Take 1 tablet (325 mg)  by mouth 2 times daily  Qty: 100 tablet, Refills: 11    Associated Diagnoses: Iron deficiency      gabapentin (NEURONTIN) 100 MG capsule Take 100 mg by mouth 2 times daily       hydrALAZINE (APRESOLINE) 25 MG tablet Take 75 mg by mouth 3 times daily Hold for SBP < 110 and update provider      OMEGA-3 FATTY ACIDS PO Take 1,200 mg by mouth daily       omeprazole (PRILOSEC) 20 MG DR capsule Take 1 capsule (20 mg) by mouth daily  Qty: 90 capsule, Refills: 3    Associated Diagnoses: Gastroesophageal reflux disease without esophagitis      senna-docusate (SENOKOT-S/PERICOLACE) 8.6-50 MG tablet Take 2 tablets by mouth daily as needed for constipation      torsemide (DEMADEX) 20 MG tablet Take 40 mg by mouth 2 times daily      venlafaxine (EFFEXOR-XR) 75 MG 24 hr capsule TAKE 1 CAPSULE BY MOUTH EVERY DAY  Qty: 90 capsule, Refills: 1    Associated Diagnoses: Major depressive disorder, recurrent episode, in partial remission (H)      VITAMIN D, CHOLECALCIFEROL, PO Take 1,000 Units by mouth daily       ASPIRIN NOT PRESCRIBED (INTENTIONAL) Please choose reason not prescribed, below    Associated Diagnoses: Chronic atrial fibrillation (H); Dilated cardiomyopathy (H)      psyllium (METAMUCIL/KONSYL) Packet Take 1 packet by mouth At Bedtime      simvastatin (ZOCOR) 10 MG tablet TAKE 1 TABLET BY MOUTH ONCE DAILY AT BEDTIME  Qty: 90 tablet, Refills: 0    Associated Diagnoses: Hyperlipidemia, unspecified hyperlipidemia type           Allergies   Allergies   Allergen Reactions     Blood Transfusion Related (Informational Only) Other (See Comments)     Patient has a history of a clinically significant antibody against RBC antigens.  A delay in compatible RBCs may occur.     Lisinopril Other (See Comments)     Tongue swelling     Calcium Nausea and Vomiting     Egg Yolk      Flu Virus Vaccine      Allergic to eggs.     Keflex [Cephalexin] Nausea     Pt states she had upset stomach.  NOT tongue swelling.  She had tongue swelling with a  combo bp med that she thinks included lisinopril.    Tolerates IV Cefazolin     Levaquin [Levofloxacin]      Sulfa Drugs      Zinc Nausea and Vomiting

## 2021-01-27 NOTE — PROVIDER NOTIFICATION
DATE:  1/27/2021   TIME OF RECEIPT FROM LAB:  Called to Elana MINAYA RN at 0641  LAB TEST:  CO2; hemoglobin   LAB VALUE:  >45; 6.2  RESULTS GIVEN WITH READ-BACK TO (PROVIDER): Cailin EVANS  TIME LAB VALUE REPORTED TO PROVIDER:   0703

## 2021-01-27 NOTE — PLAN OF CARE
DATE:  1/27/2021   TIME OF RECEIPT FROM LAB:  0641  LAB TEST:  CO2, Hgb  LAB VALUE:  >45, 6.2  RESULTS GIVEN WITH READ-BACK TO: Primary RN  TIME LAB VALUE REPORTED TO PROVIDER:   0645

## 2021-01-27 NOTE — PLAN OF CARE
"PRIMARY DIAGNOSIS: ACUTE ON CHRONIC ANEMIA    OUTPATIENT/OBSERVATION GOALS TO BE MET BEFORE DISCHARGE  1. Orthostatic performed: No    2. Tolerating PO medications: Yes    3. Return to near baseline physical activity: No    4. Cleared for discharge by consultants (if involved): N/A    /40 (BP Location: Right arm)   Pulse 58   Temp 97.5  F (36.4  C) (Axillary)   Resp 20   Ht 1.6 m (5' 3\")   Wt 61.3 kg (135 lb 3.2 oz)   SpO2 97%   BMI 23.95 kg/m      Discharge Planner Nurse   Safe discharge environment identified: Yes  Barriers to discharge: Yes       Entered by: Niru Maier 01/27/2021     Please review provider order for any additional goals.   Nurse to notify provider when observation goals have been met and patient is ready for discharge.    Patient alert and oriented x4. Denies pain. Up with heavy assist of 2 with gait belt and walker, pivot transfers. Patient very weak, skin deconditioned - edematous, generalized bruising and abrasions, dry flaky lower extremities, pressure wound to sacrum - sacral mepilex applied. Patient will receive another unit of blood HGB is still 6.2. Will continue to monitor and provide cares.   "

## 2021-01-27 NOTE — ED PROVIDER NOTES
Emergency Department Attending Supervision Note  1/26/2021  11:38 PM      I evaluated this patient in conjunction with Mamta Madrid PA-C.       Briefly, the patient presented with generalized weakness, fatigue, and low hemoglobin. She has a significant past medical history including atrial fibrillation, cardiomyopathy, CHF, CKD, COPD, CVA, GI bleed, hyperlipidemia, hypertension, and MI. She gets weekly blood transfusions for chronic anemia. Today, the patient was sent in for a hemoglobin level of 5.2.       On my exam,   General: Able answer questions    Cardiovascular: Good cap refill.    Respiratory: Decreased breath sounds    Musculoskeletal: No tenderness. No bony deformity.     Skin: No rashes or petechiae     Neurologic: non focal      Psychiatric: Appropriate             Results:    CBC: WBC 8.4, HGB 6.2 (LL),  (L)    BMP: Carbon Dioxide >45 (HH), Glucose 106 (H), BUN 66 (H), Creatinine 1.08 (H), GFR 44 (L), o/w WNL   Troponin (Collected 1532): 0.026   Blood gas venous: pH Venous 7.45 (H), pCO2 Venous 71 (H), pO2 Venous 26, Bicarbonate Venous 49 (HH), Base Excess venous 23.0 (A), on 4L NC   ABO/Rh Type and Screen: ABO: O, RH: Positive, Antibody Screen: Negative      Asymptomatic COVID-19 (Coronavirus) PCR by Nasopharyngeal Swab: Negative      XR Chest Port 1 View:  IMPRESSION:   1. No convincing interval change since 1/22/2021.   2. Small bilateral pleural effusions again noted.   3. Mildly increased pulmonary vascularity that could relate to pulmonary vascular congestion.   4. Patchy opacity in the medial aspects of bilateral lung bases could represent atelectasis or pneumonia.   5. Cardiomegaly.   Reading per radiology.     ED course:    7760 I performed an evaluation and exam of the patient as documented above.     My impression is   The patient is planing of increased shortness of breath and increased oxygen need after being transfused for anemia.  I think this is top because of CHF which she  has chronically she also has a history of A. fib.  The patient will be admitted for diuresis to trend her hemoglobin and was admitted in good condition.        Diagnosis    ICD-10-CM    1. Acute on chronic anemia  D64.9 ABO/Rh type and screen     Blood component     Blood component     Blood component     Blood component     Asymptomatic SARS-CoV-2 COVID-19 Virus (Coronavirus) by PCR   2. Shortness of breath  R06.02          Scribe Disclosure:  I, Charlene Bryant, am serving as a scribe at 11:39 PM on 1/26/2021 to document services personally performed by Sam Babb MD based on my observations and the provider's statements to me.        Sam Babb MD  01/26/21 9658

## 2021-01-27 NOTE — H&P
[facility]    History and Physical - Hospitalist Service       Date of Admission:  1/26/2021     Assessment & Plan   Keisha Godinez is a 89 year old female with a history of chronic diastolic heart failure, right-sided heart failure with pulmonary hypertension, chronic hypoxic respiratory failure on O2, chronic anemia due to AVMs, stage III CKD, GERD, atrial fibrillation, who was admitted for acute on chronic anemia.  Patient was just admitted to this hospital for the same problem and discharged to TCU 3 days ago.    Acute on chronic anemia due to AVMs  - Recently admitted for same problem and required 3 units of blood during her hospitalization from 1/16 through 1/23.  She was sent here due to the lab finding of hemoglobin of 5.8.  There was no evident overt bleeding.  - Hemoglobin in the ED was 6.2 and she was given 1 unit.  - Recheck hemoglobin in the morning.  - Check her iron and ferritin levels.  Could consider a dose of IV iron to help keep her stores up instead of just transfusing her which leads to volume status issues.    Acute on chronic diastolic heart failure  Severe pulmonary hypertension  Acute on chronic respiratory failure  - Patient is chronically maintained on 2L.  She has known severe pulmonary hypertension and an EF which is normal.  She was recently hospitalized for the same issue.  Her outpatient diuretic regimen is torsemide 40 twice daily.  - She appeared slightly more short of breath in the ED after the blood transfusion.  A chest x-ray showed increased pulmonary vascular congestion.  - She was given 40 mg of IV Lasix in the ED.  - Did not order her home torsemide, will resume that if she is doing well tomorrow.    Other chronic conditions  GERD: Continue Prilosec.  CKD stage III: Her creatinine is at baseline.  Depression: Continue venlafaxine  Neuropathy continue gabapentin  Atrial fibrillation: Not on anticoagulation due to chronic bleeding issues.  Auto-rate  controlled    Diet: Cardiac  DVT Prophylaxis: Pneumatic Compression Devices  Maguire Catheter: No  Code Status: DNR/DNI    Disposition Plan   Admitted to observation status.  If her hemoglobin is appropriately improved and her respiratory status is stable, she can return to TCU tomorrow.    Kenneth Lennon MD  [facility]    ______________________________________________________________________    Chief Complaint   Low Hb    History of Present Illness   Keisha Godinez is a 89 year old female with a history of chronic diastolic heart failure, right-sided heart failure with pulmonary hypertension, chronic hypoxic respiratory failure on O2, chronic anemia due to AVMs, stage III CKD, GERD, atrial fibrillation, who presented to the ER for evaluation of abnormal labs.    Patient was just admitted here from 1/16 through 1/23.  She was treated for accommodation of heart failure and anemia requiring blood transfusions.  She is very sensitive to volume and needs tight adjustment of diuretics after blood transfusions.  She was discharged to TCU.  The patient tells me that she had abdominal pain and that is why she was brought to the ER today.  However, it sounds like routine labs were checked at her TCU and she was found to have a hemoglobin of 5.8.  She was then sent to the ED, where her hemoglobin was found to be 6.2.  Initial plan was for 1 unit PRBC transfusion and then discharged back to facility, but patient appeared too short of breath to tolerate that.  Therefore admission to observation requested.    Patient says she had some abdominal pain before she came in that has completely abated right now.  She says her breathing is short but a little bit better.  Denies chest pain.    Review of Systems    The 10 point Review of Systems is negative other than noted in the HPI or here.     Physical Exam   /51   Pulse 56   Temp 98.1  F (36.7  C)   Resp 25   SpO2 99%        General: Chronically ill in  appearance    HEENT: No scleral icterus. Oropharynx moist.     Neck: Supple.    Pulmonary: Normal work of breathing. Clear to auscultation anteriorly    Cardiovascular: Irregular rhythm.    Abdomen: Soft and non-tender.    Extremities: Lower extremities wrapped with Ace bandage and have edema to the midshin.  Diffuse edema of the upper extremities as well.    Neurologic: Awake, alert, appropriate.    Skin: Warm and dry.    Psychiatric: Normal affect and mood.     Data   Data reviewed today: I have reviewed all labs and imaging results.    Recent Labs   Lab 01/26/21  1532 01/26/21  0900 01/23/21  0706 01/21/21  0700   WBC 8.4 6.8  --  6.0   HGB 6.2* 5.8* 7.5* 8.4*   MCV 98 101*  --  93   * 96*  --  103*    139 138 138   POTASSIUM 4.3 4.2 5.0 4.8   CHLORIDE 95 97 100 100   CO2 >45* 44* 42* 39*   BUN 66* 68* 57* 53*   CR 1.08* 1.08* 0.99 1.00   ANIONGAP Not Calculated <1* <1* <1*   CASPER 9.8 9.6 9.7 9.2   * 103* 106* 96   TROPI 0.026  --   --   --      Recent Results (from the past 24 hour(s))   XR Chest Port 1 View    Narrative    EXAM: CHEST SINGLE VIEW PORTABLE  LOCATION: [facility]  DATE/TIME: 1/26/2021 11:26 PM    INDICATION: Shortness of breath.    COMPARISON: 1/22/2021.      Impression    IMPRESSION:  1. No convincing interval change since 1/22/2021.  2. Small bilateral pleural effusions again noted.  3. Mildly increased pulmonary vascularity that could relate to pulmonary vascular congestion.  4. Patchy opacity in the medial aspects of bilateral lung bases could represent atelectasis or pneumonia.  5. Cardiomegaly.        Past Medical History    I have reviewed this patient's medical history and updated it with pertinent information if needed.   Past Medical History:   Diagnosis Date     Anemia      Atrial fibrillation (H)      B12 deficiency      Cardiomyopathy (H)      CHF (congestive heart failure) (H)      Chronic edema     Lower extremities     Chronic systolic congestive  heart failure (H)      CVA (cerebral vascular accident) (H)     Acute Left MCA hemispheric CVA ( on coumadin)     Depression      Gastro-oesophageal reflux disease      GI bleed 03-20-15     Hyperlipidemia      Hypertension      Iron deficiency      MSSA (methicillin susceptible Staphylococcus aureus) 03-10-15     Pulmonary hypertension (H)      Shingles         Past Surgical History    I have reviewed this patient's surgical history and updated it with pertinent information if needed.  Past Surgical History:   Procedure Laterality Date     COLONOSCOPY  2015    Diverticulosis, polyps     ENTEROSCOPY SMALL BOWEL N/A 2016    Procedure: ENTEROSCOPY SMALL BOWEL;  Surgeon: Ady Ponce MD;  Location:  GI     ESOPHAGOSCOPY, GASTROSCOPY, DUODENOSCOPY (EGD), COMBINED N/A 3/22/2015    Upper GI- Normal, nothing significant found.      EXCISE MASS FOOT Right 2016    Procedure: EXCISE MASS FOOT;  Surgeon: Lee Jang DPM;  Location:  OR        Social History    I have reviewed this patient's social history and updated it with pertinent information if needed.  Social History     Tobacco Use     Smoking status: Former Smoker     Years: 1.00     Types: Cigarettes     Start date:      Quit date: 1956     Years since quittin.8     Smokeless tobacco: Never Used   Substance Use Topics     Alcohol use: No     Alcohol/week: 0.0 standard drinks     Drug use: No          Family History    I have reviewed this patient's family history and updated it with pertinent information if needed.   Family History   Problem Relation Age of Onset     Known Genetic Syndrome Father      Known Genetic Syndrome Mother      Other Cancer Daughter         pancreatic     Other Cancer Brother         type     Other Cancer Sister 60        lung     Diabetes No family hx of      Breast Cancer No family hx of      Cancer - colorectal No family hx of      Ovarian Cancer No family hx of      Prostate Cancer  No family hx of           Prior to Admission Medications    Prior to Admission Medications   Prescriptions Last Dose Informant Patient Reported? Taking?   ASPIRIN NOT PRESCRIBED (INTENTIONAL)   No No   Sig: Please choose reason not prescribed, below   Cranberry Extract 250 MG TABS   Yes No   Sig: Take 500 mg by mouth daily    Diclofenac Sodium 1 % CREA   Yes No   Sig: Place 1 g onto the skin 2 times daily To L shoulder   OMEGA-3 FATTY ACIDS PO  Daughter Yes No   Sig: Take 1,200 mg by mouth daily    SM STOOL SOFTENER 8.6-50 MG tablet   No No   Sig: Take 2 tablets daily as needed for constipation while taking prescription pain medications.   VITAMIN D, CHOLECALCIFEROL, PO  Self Yes No   Sig: Take 1,000 Units by mouth daily    acetaminophen (TYLENOL) 325 MG tablet   Yes No   Sig: Take 650 mg by mouth 3 times daily as needed for mild pain   albuterol (PROAIR HFA/PROVENTIL HFA/VENTOLIN HFA) 108 (90 Base) MCG/ACT inhaler   No No   Sig: Inhale 2 puffs into the lungs every 6 hours   albuterol (PROVENTIL) (2.5 MG/3ML) 0.083% neb solution   No No   Sig: Take 1 vial (2.5 mg) by nebulization 4 times daily   ferrous sulfate (FEROSUL) 325 (65 Fe) MG tablet   No No   Sig: Take 1 tablet (325 mg) by mouth 2 times daily   gabapentin (NEURONTIN) 100 MG capsule   Yes No   Sig: Take 100 mg by mouth 2 times daily    hydrALAZINE (APRESOLINE) 25 MG tablet   No No   Sig: TAKE 3 TABLETS BY MOUTH 3 TIMES DAILY   omeprazole (PRILOSEC) 20 MG DR capsule   No No   Sig: Take 1 capsule (20 mg) by mouth daily   psyllium (METAMUCIL/KONSYL) Packet   Yes No   Sig: Take 1 packet by mouth At Bedtime   simvastatin (ZOCOR) 10 MG tablet   No No   Sig: TAKE 1 TABLET BY MOUTH ONCE DAILY AT BEDTIME   torsemide (DEMADEX) 20 MG tablet   Yes No   Sig: Take 40 mg by mouth 2 times daily   venlafaxine (EFFEXOR-XR) 75 MG 24 hr capsule   No No   Sig: TAKE 1 CAPSULE BY MOUTH EVERY DAY      Facility-Administered Medications: None        Allergies    Allergies   Allergen  Reactions     Blood Transfusion Related (Informational Only) Other (See Comments)     Patient has a history of a clinically significant antibody against RBC antigens.  A delay in compatible RBCs may occur.     Lisinopril Other (See Comments)     Tongue swelling     Calcium Nausea and Vomiting     Egg Yolk      Flu Virus Vaccine      Allergic to eggs.     Keflex [Cephalexin] Nausea     Pt states she had upset stomach.  NOT tongue swelling.  She had tongue swelling with a combo bp med that she thinks included lisinopril.    Tolerates IV Cefazolin     Levaquin [Levofloxacin]      Sulfa Drugs      Zinc Nausea and Vomiting

## 2021-01-27 NOTE — ED PROVIDER NOTES
The patient was signed out to me by Taco Crowley.  Please refer to his note for further full details.  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 She was recently discharged to a TCU after hospitalization.  Reportedly, a new provider saw her today and called the ambulance once she saw her hemoglobin was low.  The patient notes that she normally requires transfusions at baseline. Work-up here is baseline per patient per Taco Crowley's note.  The patient was signed out to me with the plan for discharging to a care facility after she receives her blood transfusion.  This was completed during her ED stay without complication. However, after the blood transfusion was performed, the patient stated that she would need diuresis following the blood transfusion.  Given her history of CHF, this is reasonable. Upon my evaluation, the patient does appear to be short of breath.  She is requiring 4 L of oxygen and normally her baseline is requirement of 2 L.  Therefore, a chest x-ray was obtained with results pending. Patient does appear to be fluid overloaded and in the context of us giving her more volume, I believe observation admission would be warranted. The patient is agreeable to admission.  She is not refusing admission.  I did touch base with Dr. Lennon of the hospital service who will admit this patient observation. Dr. Lennon will follow up on CXR results. Please refer to his note for further details. All questions were answered prior to admission.  The patient agrees to admission.      Mamta Madrid PA-C  01/26/21 0595

## 2021-01-27 NOTE — PHARMACY-ADMISSION MEDICATION HISTORY
Admission medication history interview status for this patient is complete. See EPIC admission navigator for allergy information, prior to admission medications and immunization status.     Medication history interview done via telephone during Covid-19 pandemic, indicate source(s): [facility] MAR  Medication history resources (including written lists, pill bottles, clinic record): Discharge from 1/23 and [facility]  Pharmacy: [facility] ([facility]    Changes made to PTA medication list:  Added:   Deleted:   Changed: Edited some directions to match MAR, no actual changes    Actions taken by pharmacist (provider contacted, etc):None     Additional medication history information:None    Medication reconciliation/reorder completed by provider prior to medication history?  Yes    Prior to Admission medications    Medication Sig Last Dose Taking? Auth Provider   acetaminophen (TYLENOL) 325 MG tablet Take 650 mg by mouth 3 times daily as needed for mild pain Past Week at Unknown time Yes Unknown, Entered By History   albuterol (PROAIR HFA/PROVENTIL HFA/VENTOLIN HFA) 108 (90 Base) MCG/ACT inhaler Inhale 2 puffs into the lungs every 6 hours as needed Past Week at Unknown time Yes Unknown, Entered By History   albuterol (PROVENTIL) (2.5 MG/3ML) 0.083% neb solution Take 1 vial (2.5 mg) by nebulization 4 times daily 1/26/2021 at Unknown time Yes Gerri Eubanks MD   Cranberry Extract 250 MG TABS Take 500 mg by mouth daily  1/26/2021 at Unknown time Yes Doctor, MD Sharonda   Diclofenac Sodium 1 % CREA Place 1 g onto the skin 2 times daily To L shoulder 1/26/2021 at Unknown time Yes Unknown, Entered By History   ferrous sulfate (FEROSUL) 325 (65 Fe) MG tablet Take 1 tablet (325 mg) by mouth 2 times daily 1/26/2021 at Unknown time Yes Darryl Reed MD   gabapentin (NEURONTIN) 100 MG capsule Take 100 mg by mouth 2 times daily  1/26/2021 at Unknown time Yes Unknown, Entered By History   hydrALAZINE (APRESOLINE) 25 MG tablet  Take 75 mg by mouth 3 times daily Hold for SBP < 110 and update provider 1/26/2021 at Unknown time Yes Unknown, Entered By History   OMEGA-3 FATTY ACIDS PO Take 1,200 mg by mouth daily  1/26/2021 at Unknown time Yes Reported, Patient   omeprazole (PRILOSEC) 20 MG DR capsule Take 1 capsule (20 mg) by mouth daily 1/26/2021 at Unknown time Yes Gerri Eubanks MD   senna-docusate (SENOKOT-S/PERICOLACE) 8.6-50 MG tablet Take 2 tablets by mouth daily as needed for constipation  Yes Unknown, Entered By History   torsemide (DEMADEX) 20 MG tablet Take 40 mg by mouth 2 times daily 1/26/2021 at Unknown time Yes Suad Murrell PA   venlafaxine (EFFEXOR-XR) 75 MG 24 hr capsule TAKE 1 CAPSULE BY MOUTH EVERY DAY 1/26/2021 at Unknown time Yes Gerri Eubanks MD   VITAMIN D, CHOLECALCIFEROL, PO Take 1,000 Units by mouth daily  1/26/2021 at Unknown time Yes Reported, Patient   ASPIRIN NOT PRESCRIBED (INTENTIONAL) Please choose reason not prescribed, below   Gerri Eubanks MD   psyllium (METAMUCIL/KONSYL) Packet Take 1 packet by mouth At Bedtime 1/25/2021  Unknown, Entered By History   simvastatin (ZOCOR) 10 MG tablet TAKE 1 TABLET BY MOUTH ONCE DAILY AT BEDTIME 1/25/2021  Gerri Eubanks MD

## 2021-01-27 NOTE — PLAN OF CARE
ROOM # 213    Living Situation (if not independent, order SW consult): TCU  Facility name: [facility]  : Rebecca estrada [phone]    Activity level at baseline: assist of 2  Activity level on admit: heavy assist of 2 with GB and walker      Patient registered to observation; given Patient Bill of Rights; given the opportunity to ask questions about observation status and their plan of care.  Patient has been oriented to the observation room, bathroom and call light is in place.    Discussed discharge goals and expectations with patient/family.

## 2021-01-27 NOTE — PLAN OF CARE
Patient's After Visit Summary was reviewed with patient.   Patient verbalized understanding of After Visit Summary, recommended follow up and was given an opportunity to ask questions.   Discharge medications sent home with patient/family: Not applicable   Discharged with transport tech.    OBSERVATION patient END time: 4:18 PM

## 2021-01-28 NOTE — PATIENT INSTRUCTIONS
Keisha R Gretchen  1/25/1932    1) Hgb to be checked on day prior to transfusion appointment- she needs to be have these done at [facility]. Diagnosis: anemia  2) Update provider if increased SOB, fatigue, bleeding, increased oxygen needs as may need recheck of hgb sooner.  3) Hold albuterol neb at TCU  4) Discontinue ace wraps. Continue TG shapes as ordered  5) BMP 2/1. Diagnosis; CKD  JODIE Jones CNP on 1/28/2021 at 2:32 PM

## 2021-01-28 NOTE — LETTER
1/28/2021        RE: Keisha Godinez  [address] [facility] [address]        [place] [facility]  PRIMARY CARE PROVIDER AND CLINIC:  Gerri Eubanks MD, [address]  / [facility] [address]  Chief Complaint   Patient presents with     Hospital F/U     [place] [facility] Record Number:  [id number]  Place of Service where encounter took place:  [facility]  (S) [[id number]]    Keisha Godinez  is a 89 year old  (1/25/1932), re-admitted to the above facility from  [facility]. Hospital stay 1/26/21 - 1/27/21. .  Admitted to this facility for  rehab, medical management and nursing care.    HPI:    HPI information obtained from: facility chart records, facility staff, patient report and [facility] chart review.   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 Was hospitalized 1/16-1/23 for SOB and weakness and found to have CHF exacerbation and anemia. Received IV diuresis and 2 units PRBC. Discharged to TCU for physical rehabilitation and medical management. While at TCU she was found to have hemoglobin of 5.8 and was sent to ED. Repeat Hgb in ED was 6.2. She was admitted and received 2 units PRBC. She will need to have transfusions set up at transfusion center outpatient with weekly Hgb monitoring.       Updates on Status Since Skilled nursing Admission: Keisha tells me today she is feeling really good. Is on 2.5L of oxygen which she tells me is what she uses at home. Denies any SOB, cough, CP, dizziness/lighteadedness. Reports chronic BLE and tells me she wears compression stockings at home, but does not them with her at TCU. She does have orders for both  compression stockings and ACE wraps after discussion with nursing staff today. She denies abdominal pain/ cramping/ nausea. Reports bowels moving well- they are black in color which is normal. No dysuria or trouble voiding. She continues to work with therapy. She tells me she lives with her daughter and plans to discharge back home there after TCU stay.     CODE STATUS/ADVANCE DIRECTIVES DISCUSSION:   DNR / DNI  Patient's living condition: lives with family, child(emre), adult   ALLERGIES: Blood transfusion related (informational only), Lisinopril, Calcium, Egg yolk, Flu virus vaccine, Keflex [cephalexin], Levaquin [levofloxacin], Sulfa drugs, and Zinc  PAST MEDICAL HISTORY:  has a past medical history of Anemia, Atrial fibrillation (H), B12 deficiency, Cardiomyopathy (H), CHF (congestive heart failure) (H), Chronic edema, Chronic systolic congestive heart failure (H), CVA (cerebral vascular accident) (H) (2010), Depression, Gastro-oesophageal reflux disease, GI bleed (03-20-15), Hyperlipidemia, Hypertension, Iron deficiency, MSSA (methicillin susceptible Staphylococcus aureus) (03-10-15), Pulmonary hypertension (H), and Shingles. She also has no past medical history of Malignant hyperthermia or PONV (postoperative nausea and vomiting).  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  FAMILY HISTORY: family history includes Known Genetic Syndrome in her father and mother; Other Cancer in her brother and daughter; Other Cancer (age of onset: 60) in her sister.  SOCIAL HISTORY:   reports that she quit smoking about 64 years ago. Her smoking use included cigarettes. She started smoking about 66 years ago. She quit after 1.00 year of use. She has never used smokeless tobacco. She reports that she does not drink alcohol or  use drugs.    Post Discharge Medication Reconciliation Status: discharge medications reconciled and changed, per note/orders    Current Outpatient Medications   Medication Sig Dispense Refill     acetaminophen (TYLENOL) 325 MG tablet Take 650 mg by mouth 3 times daily as needed for mild pain       albuterol (PROAIR HFA/PROVENTIL HFA/VENTOLIN HFA) 108 (90 Base) MCG/ACT inhaler Inhale 2 puffs into the lungs every 6 hours as needed       albuterol (PROVENTIL) (2.5 MG/3ML) 0.083% neb solution Take 1 vial (2.5 mg) by nebulization 4 times daily       ASPIRIN NOT PRESCRIBED (INTENTIONAL) Please choose reason not prescribed, below       Cranberry Extract 250 MG TABS Take 500 mg by mouth daily        Diclofenac Sodium 1 % CREA Place 1 g onto the skin 2 times daily To L shoulder       ferrous sulfate (FEROSUL) 325 (65 Fe) MG tablet Take 1 tablet (325 mg) by mouth 2 times daily 100 tablet 11     gabapentin (NEURONTIN) 100 MG capsule Take 100 mg by mouth 2 times daily        hydrALAZINE (APRESOLINE) 25 MG tablet Take 75 mg by mouth 3 times daily Hold for SBP < 110 and update provider       OMEGA-3 FATTY ACIDS PO Take 1,200 mg by mouth daily        omeprazole (PRILOSEC) 20 MG DR capsule Take 1 capsule (20 mg) by mouth daily 90 capsule 3     psyllium (METAMUCIL/KONSYL) Packet Take 1 packet by mouth At Bedtime       senna-docusate (SENOKOT-S/PERICOLACE) 8.6-50 MG tablet Take 2 tablets by mouth daily as needed for constipation       simvastatin (ZOCOR) 10 MG tablet TAKE 1 TABLET BY MOUTH ONCE DAILY AT BEDTIME 90 tablet 0     torsemide (DEMADEX) 20 MG tablet Take 40 mg by mouth 2 times daily       venlafaxine (EFFEXOR-XR) 75 MG 24 hr capsule TAKE 1 CAPSULE BY MOUTH EVERY DAY 90 capsule 1     VITAMIN D, CHOLECALCIFEROL, PO Take 1,000 Units by mouth daily          ROS:  4 point ROS including Respiratory, CV, GI and , other than that noted in the HPI,  is negative    Vitals:  /50   Pulse 54   Temp 98.6  F (37  C)   Resp 16   " Ht 1.6 m (5' 3\")   Wt 62.4 kg (137 lb 9.6 oz)   SpO2 96%   BMI 24.37 kg/m    Exam:  GENERAL APPEARANCE:  Alert, pleasant and cooperative, elderly female resting in WHEELCHAIR in no acute distress  HEENT: normocephalic, moist mucous membranes, nose without drainage or crusting  RESP:  respiratory effort normal, no respiratory distress, Lung sounds clear, patient is on 2.5L via NC  CV: auscultation of heart done, rate and rhythm regular. no murmur, no rub or gallop.   ABDOMEN: + bowel sounds, soft, nontender, no grimacing or guarding with palpation.  M/S: +1 edema to BLE  NEURO: no facial asymmetry, no speech deficits, cranial nerves 2-12 grossly intact  PSYCH: affect and mood normal      Lab/Diagnostic data:  [facility]abs done in SNF [facility]. Please refer to them using [employer]Care Everywhere. and Recent labs in [facility] reviewed by me today.     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 Weekly transfusions for hgb <7 to be arranged by  at U. Continue iron supplement. Monitor for s/sx of bleeding.     (I50.43) Acute on chronic combined systolic and diastolic congestive heart failure (H)  (J96.21) Acute and chronic respiratory failure with hypoxia (H)  (I10) Essential hypertension  Comment: acute on chronic,   -during recent admission 7 kg diuresed  --PTA torsemide resumed upon discharge dose is 40 mg twice daily but spironolactone was stopped secondary to some mild hyperkalemia  -weight today stable at 137 lb, appears euvolemic today  -blood pressure controlled  Plan: Continue oxygen 2-3L, albuterol inhaler (will hold neb at TCU). " Continue torsemide 40 mg BID, hydralazine 75 mg TID with hold parameters. Daily weights. Notify provider if weight gain >2 lb in 1 day, >5 lb in 1 week. Continue TG shapes. Heart healthy diet    (I48.20) Chronic atrial fibrillation (H)  Comment: HR 50-60s  Plan: Not on AC secondary to chronic anemia    (N18.31) Stage 3a chronic kidney disease  Comment: chronic, creatinine stable at baseline at last check  Creatinine   Date Value Ref Range Status   01/27/2021 1.08 (H) 0.52 - 1.04 mg/dL Final     Plan: BMP 2/1. Avoid nephrotoxins    (F32.9) Depression, unspecified depression type  Comment: chronic, mood ok today  Plan: Continue PTA effexor. ACP PRN. Monitor mood for worsening depressive symptoms     (K21.00) Gastroesophageal reflux disease with esophagitis, unspecified whether hemorrhage  Comment: chronic  Plan: BMP with next hgb. Avoid nephrotoxins    (R53.81) Physical deconditioning  Comment: Acute, secondary to recent hospitalization, medical conditions as above  Plan: Encourage participation in physical therapy/occupational therapy for strengthening and deconditioning. Discharge planning per their recommendation. Social work to assist with d/c planning.    Electronically signed by:  JODIE Jones CNP                  Sincerely,        JODIE Jones CNP

## 2021-01-28 NOTE — TELEPHONE ENCOUNTER
Zhanna from fv infusion calling needing blood orders for tomorrow's infusion. She needs these orders today. Please place in Epic or fax the blood order sheet [phone] a form is being faxed

## 2021-01-28 NOTE — TELEPHONE ENCOUNTER
Spoke with [facility] transfusion center.  States [facility] will be giving orders for blood transfusion.

## 2021-01-29 NOTE — PROGRESS NOTES
Marge from Dr Gibbons's office called back. Dr Gibbons does not want to transfuse Keisha unless her hgb is >7.    Called and spoke to Cyn at TCU. They are having Keisha picked up at 11 to get transfused 1 unit as the weekend is coming.     Called Rebecca to let her know Keisha is set up for her phone visit with Rebecca.   Charity Garcia RN 9:57 AM 01/29/21

## 2021-01-29 NOTE — PROGRESS NOTES
Infusion Nursing Note:  Keisha Godinez presents today for 1 unit PRBC.    Patient seen by provider today: No   present during visit today: Not Applicable.    Note: N/A.  Patient did not meet criteria for an asymptomatic covid-19 PCR test in infusion today.     Intravenous Access:  Peripheral IV placed.    Treatment Conditions:  Blood transfusion consent signed 6/10/2020.      Post Infusion Assessment:  Patient tolerated infusion without incident.  Blood return noted pre and post infusion.  Site patent and intact, free from redness, edema or discomfort.  No evidence of extravasations.  Access discontinued per protocol.       Discharge Plan:   Discharge instructions reviewed with: Patient.  Patient and/or family verbalized understanding of discharge instructions and all questions answered.  Copy of AVS reviewed with patient and/or family.    Patient discharged in stable condition accompanied by: self.  Departure Mode: Wheelchair.    Sasha Clark RN

## 2021-01-29 NOTE — PROGRESS NOTES
Spoke to Charity, nurse at [facility]'s TCU. I did let her know that Keisha has a CORE appt on Monday with Suad and daughter would like to be conferenced in. Charity gave me Keisha's room number and they will have a nurse with her to help her with the phone.   Keisha was to get a blood transfusion this wk and [facility] cancelled it. Charity asked me to see why.   Faxed order for BNP to BMP that is ordered for 2/1    Left message for Dr Gibbons's nurse.     Charity Garcia, RN 8:28 AM 01/29/21

## 2021-02-01 NOTE — PROGRESS NOTES
Alva from [facility]'s TCU called with VS.     /68 before meds.   HR 61  O2 93% 1 lpm NC  T 98.4 F  RR 20  Wt   133.4 lbs  Nurse noted 1+ bilateral lower leg edema. Lungs have bibasilar crackles. She is reporting MOTT, not worse then normal     Update to Suad Garcia, RN 1:51 PM 02/01/21

## 2021-02-01 NOTE — PATIENT INSTRUCTIONS
Thank you for visiting the C.O.R.E. clinic today.     Call the C.O.R.E. nurse directly with any questions or concerns:  [phone]      Today we discussed:    I will see if we can try to arrange for you to have an echocardiogram (heart ultrasound) done the next time you have to come for a transfusion. If not, we will make an appointment in the next few weeks.    No medication changes today.       Lab results:  Results for ELAINA MAYFIELD (MRN [id number]) as of 2/1/2021 13:12   Ref. Range 2/1/2021 09:32   Sodium Latest Ref Range: 133 - 144 mmol/L 141   Potassium Latest Ref Range: 3.4 - 5.3 mmol/L 4.1   Chloride Latest Ref Range: 94 - 109 mmol/L 96   Carbon Dioxide Latest Ref Range: 20 - 32 mmol/L 45 (H)   Urea Nitrogen Latest Ref Range: 7 - 30 mg/dL 81 (H)   Creatinine Latest Ref Range: 0.52 - 1.04 mg/dL 0.92   GFR Estimate Latest Ref Range: >60 mL/min/1.73_m2 55 (L)   GFR Estimate If Black Latest Ref Range: >60 mL/min/1.73_m2 64   Calcium Latest Ref Range: 8.5 - 10.1 mg/dL 8.8   Anion Gap Latest Ref Range: 3 - 14 mmol/L <1 (L)   N-Terminal Pro BNP Inpatient Latest Ref Range: 0 - 1,800 pg/mL 5,733 (H)   Glucose Latest Ref Range: 70 - 99 mg/dL 100 (H)   WBC Latest Ref Range: 4.0 - 11.0 10e9/L 10.2   Hemoglobin Latest Ref Range: 11.7 - 15.7 g/dL 7.3 (L)   Hematocrit Latest Ref Range: 35.0 - 47.0 % 25.2 (L)   Platelet Count Latest Ref Range: 150 - 450 10e9/L 101 (L)       Follow up:  With Suad in CORE clinic in 1 month.     Additional instructions:    1. Please weigh yourself daily after you void in the morning. Write it down on a log. If your weight is up more than 2 pounds in a day, or 5 pounds in a week, please call us right away for possible medication adjustments.  2.  Try to maintain a heart healthy, low sodium diet (less than 2,000mg per day). The less salt you eat, the less fluid you will retain.   3.   Call the CORE nurse you have more shortness of breath, abdominal bloating, or leg swelling than usual.

## 2021-02-01 NOTE — LETTER
2/1/2021    Gerri Eubanks MD  [address] [facility] [address]  [facility] [address]    RE: Keisha Godinez       Dear Colleague,    I had the pleasure of seeing Keisha Godinez in the [facility].      CARDIOLOGY CLINIC TELEPHONE VISIT    Keisha Godinez is a 89 year old female who is being evaluated via a billable telephone visit.      I have reviewed and updated the patient's Past Medical History, Social History, Family History and Medication List.      MEDICATIONS:  Current Outpatient Medications   Medication Sig Dispense Refill     acetaminophen (TYLENOL) 325 MG tablet Take 650 mg by mouth 3 times daily as needed for mild pain       albuterol (PROAIR HFA/PROVENTIL HFA/VENTOLIN HFA) 108 (90 Base) MCG/ACT inhaler Inhale 2 puffs into the lungs every 6 hours as needed       albuterol (PROVENTIL) (2.5 MG/3ML) 0.083% neb solution Take 1 vial (2.5 mg) by nebulization 4 times daily       ASPIRIN NOT PRESCRIBED (INTENTIONAL) Please choose reason not prescribed, below       Cranberry Extract 250 MG TABS Take 500 mg by mouth daily        Diclofenac Sodium 1 % CREA Place 1 g onto the skin 2 times daily To L shoulder       ferrous sulfate (FEROSUL) 325 (65 Fe) MG tablet Take 1 tablet (325 mg) by mouth 2 times daily 100 tablet 11     gabapentin (NEURONTIN) 100 MG capsule Take 100 mg by mouth 2 times daily        hydrALAZINE (APRESOLINE) 25 MG tablet Take 75 mg by mouth 3 times daily Hold for SBP < 110 and update provider       OMEGA-3 FATTY ACIDS PO Take 1,200 mg by mouth daily        omeprazole (PRILOSEC) 20 MG DR capsule Take 1 capsule (20 mg) by mouth daily 90 capsule 3     psyllium (METAMUCIL/KONSYL) Packet Take 1 packet by mouth At Bedtime       senna-docusate (SENOKOT-S/PERICOLACE) 8.6-50 MG tablet Take 2 tablets by mouth daily as needed for constipation       simvastatin (ZOCOR) 10 MG tablet TAKE 1 TABLET BY MOUTH ONCE DAILY AT BEDTIME 90 tablet 0     torsemide (DEMADEX) 20 MG tablet  Take 40 mg by mouth 2 times daily       venlafaxine (EFFEXOR-XR) 75 MG 24 hr capsule TAKE 1 CAPSULE BY MOUTH EVERY DAY 90 capsule 1     VITAMIN D, CHOLECALCIFEROL, PO Take 1,000 Units by mouth daily          ALLERGIES  Blood transfusion related (informational only), Lisinopril, Calcium, Egg yolk, Flu virus vaccine, Keflex [cephalexin], Levaquin [levofloxacin], Sulfa drugs, and Zinc      Review Of Systems  Skin: Positive for bruising   Eyes: Positive for glasses   Ears/Nose/Throat: Negative  Respiratory: Positive for dyspnea with exertion; O2 24/7 2.5L  Cardiovascular: Positive for edema of the right leg; fatigue  Gastrointestinal: Negative  Genitourinary: Negative  Musculoskeletal: Positive for joint pain - right knee  Neurologic: Positive for neuropathy - tingling/numbness of the feet   Psychiatric: Negative  Hematologic/Lymphatic/Immunologic: Negative  Endocrine: Negative      Self reported vitals:  (Ebeneezer reported)  /68 before meds.   HR 61  O2 93% 1 lpm NC  T 98.4 F  RR 20  Wt   133.4 lbs  Nurse noted 1+ bilateral lower leg edema. Lungs have bibasilar crackles. She is reporting MOTT, not worse then normal        Most recent labs:   Results for ELAINA MAYFIELD (MRN [id number]) as of 2/1/2021 10:24   Ref. Range 2/1/2021 09:32   Sodium Latest Ref Range: 133 - 144 mmol/L 141   Potassium Latest Ref Range: 3.4 - 5.3 mmol/L 4.1   Chloride Latest Ref Range: 94 - 109 mmol/L 96   Carbon Dioxide Latest Ref Range: 20 - 32 mmol/L 45 (H)   Urea Nitrogen Latest Ref Range: 7 - 30 mg/dL 81 (H)   Creatinine Latest Ref Range: 0.52 - 1.04 mg/dL 0.92   GFR Estimate Latest Ref Range: >60 mL/min/1.73_m2 55 (L)   GFR Estimate If Black Latest Ref Range: >60 mL/min/1.73_m2 64   Calcium Latest Ref Range: 8.5 - 10.1 mg/dL 8.8   Anion Gap Latest Ref Range: 3 - 14 mmol/L <1 (L)   Glucose Latest Ref Range: 70 - 99 mg/dL 100 (H)   WBC Latest Ref Range: 4.0 - 11.0 10e9/L 10.2   Hemoglobin Latest Ref Range: 11.7 - 15.7 g/dL 7.3 (L)    Hematocrit Latest Ref Range: 35.0 - 47.0 % 25.2 (L)   Platelet Count Latest Ref Range: 150 - 450 10e9/L 101 (L)         Primary cardiologist: Dr. Edin Chaudhari      HPI:  Ms. Godinez is a very pleasant 88 year old female with a PMHx including hypertension, hyperlipidemia, atrial fibrillation (not on AC due to GI bleed), recurrent anemia (colonic AVM, iron deficiency), CVA, chronic lower extremity edema, and diastolic heart failure with secondary pulmonary hypertension.     I have been following her in CORE clinic since July of 2018 after a hospital stay for cellulitis complicated by heart failure. She made positive changes in her diet, including sodium restriction, and weights stabilized with consistent use of lymphedema wraps. I transitioned her lasix to torsemide with good result and her weight then settled out in the 140-143 lb range. Her last echo in Oct 2018 showed EF 55-60%, though her RV remained dilated with mildly decreased function and 2+TR. Given her age and comorbidities, we have remained somewhat conservative in management.      In 2019 and 2020, she has had recurrent hospitalizations for symptomatic anemia. This is complicated by heart failure exacerbations requiring intermittent diuresis. She was found to have AVMs, but has declined repeated GI workups, and instead is receiving frequent monitoring and outpatient transfusions. We have made intermittent adjustments to her diuretics as well, and requested she receive IV diuretics on the day of her transfusions which seemed to help a bit. We referred her back for lymphedema treatments to help with her lower extremity edema.     Since we spoke last, she has unfortunately been hospitalized twice. From 1/16-1/23 she was admitted for abdominal pain and shortness of breath. That admission, she was 68kg on admit, and diuresed down to 61kg (135 lb) at discharge. Post BM, her abdominal pain improved. However, a few days later she returned for weakness and lab showing  a hemoglobin of 5.2. She was admitted for observation (1/26-1/27) and again required transfusions. Discharge weight was again reported at 135 lb. At that time, she was discharged to a TCU.    Today, we are doing another telephone visit for CORE follow up. We have conferenced in her daughter at her request. Her daughter was her primary caregiver until recently and says it has been difficult since her mother was moved to a TCU as she isn't nearly as involved as she was prior. Keisha tells me that overall, she's doing ok in terms of swelling and MOTT, and is at her baseline. Weights appear to be stable (133# per TCU). Her main complaint today is lack of appetite and is forcing herself to drink boost. She denies new dizziness/presyncope. She is able to walk with assistance and is doing some light PT with staff. She says her last transfusion was last Friday, 1/29.     She had some labs done this morning prior to our phone visit. Renal function remains acceptable BUN 81, SCr 0.92. Na 141, K 4.1. Hemoglobin was 7.3.           ASSESSMENT/PLAN:     1. Chronic diastolic and right heart failure.              --Most recent echo Oct 2018 with ongoing preserved EF 55-60%. Her RV has remained dilated, with mildly reduced function with 2+ TR, and was felt to be euvolemic at the time of that exam. Given her advanced age and other comorbidities, we had opted to proceed conservatively with no advanced PH workup planned. However, given she has had some recurrent issues with edema/heart failure symptoms, I recommended a repeat echocardiogram to ensure no new drop in EF or worsening PH/TR. We will see if we can coordinate this with her next transfusion if possible.               --Currently, it sounds as if weights and edema are under control. Weight is actually down a bit, possibly due to poor appetite. Will continue her PO torsemide at 40mg BID, and IV Lasix at 40mg on days she receives blood transfusions as directed by heme/onc. Renal  function is overall stable.               --She was taken off spironolactone with her recent hospital stay. Continue to monitor for now.                                      2. Hypertension.              --SBP has fluctuated in the past, but more recently appears to be under good control. She has a true allergy to ACE-inhibitors (tongue swelling), and is not on a beta blocker due to resting bradycardia. Will continue hydralazine as is.      3. Atrial fibrillation, chronic.              --Rate historically has been well controlled, with intermittent bradycardia, not on BB. Some concern for underlying AV conduction issues but she has not had issues with significant dizziness or presyncope. Continue to monitor.                --She is not on anticoagulation due to GIB/anemia noted above.       Follow up plan: Will try to arrange for a repeat echo in the next few weeks, then follow up with me in CORE clinic in ~1 month.    I have reviewed the note as documented above.  This accurately captures the substance of my conversation with the patient.      Phone call contact time  Call Started at 1306  Call Ended at 1328    An additional 15 minutes was spent performing chart and history review, documentation, and care coordination.      Suad Murrell PA-C  [facility]  Pager [phone]      Thank you for allowing me to participate in the care of your patient.    Sincerely,     NATHAN Valentin     [facility]

## 2021-02-02 NOTE — PROGRESS NOTES
Keisha Godinez  1/25/1932  Orders  1) BMP, CBC 2/3. Diagnosis: anemia, elevated CO2  Adrianne Ferrara APRN CNP on 2/2/2021 at 9:59 AM

## 2021-02-03 PROBLEM — I50.9 CHRONIC CONGESTIVE HEART FAILURE, UNSPECIFIED HEART FAILURE TYPE (H): Status: ACTIVE | Noted: 2021-01-01

## 2021-02-03 PROBLEM — D64.9 CHRONIC ANEMIA: Status: ACTIVE | Noted: 2021-01-01

## 2021-02-03 NOTE — ED NOTES
DATE:  2/3/2021   TIME OF RECEIPT FROM LAB: 11:52 AM  LAB TEST:  Hgb  LAB VALUE:  5.5  RESULTS GIVEN WITH READ-BACK TO (PROVIDER): Dr. Prather  TIME LAB VALUE REPORTED TO PROVIDER:   11:53 AM

## 2021-02-03 NOTE — ED TRIAGE NOTES
Pt presents from TCU facility for critical lab result of hgb 5.2 today. Scheduled for blood transfusion on Friday but staff believes she needs one today. Pt denies any sx at this time. ABCs intact. A&Ox4.

## 2021-02-03 NOTE — ED NOTES
RR 36 and somewhat labored with a prolonged expiratory phase, MD notified and assessed patient at this time.

## 2021-02-03 NOTE — LETTER
"    2/3/2021        RE: Keisha Godinez  [address] [facility] [address]        [facility]  [facility] Record Number:  [id number]  Place of Service where encounter took place:  [facility]  (S) [[id number]]  Chief Complaint   Patient presents with     RECHECK       HPI:    Keisha Godinez  is a 89 year old (1/25/1932), who is being seen today for an episodic care visit.  HPI information obtained from: facility chart records, facility staff, patient report and [facility] chart review. 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 Recent medical history is complex and patient has had back to back hospitalizations. Is very deconditioned.     Today is found in room in w/c, head down, minimal eye contact. Reports some mild SOB and fatigue. Denies chest pain or dizziness. Denies pain at this time.     Patient states has been transfusion dependent for \" a long time\". Discussed patient goals of care and patient reports \" to go home\". Discussed whether patient continues to want hospitalization if sick and wether patient has considered palliative care or hospice. Also asked patient if she is able to have these discussion with her family or if she would like someone to help facilitate these discussions. Patient reports she has not yet considered hospice but feels able to have these discussion with her family and does not think she needs she a facilitator. Reports \"just want to get stronger and go home.\"    After this visit nurse reports lab called with critical hemoglobin of 5.2. Last transfusion was Friday 1/29. VS stable currently. Blood transfusion scheduled for Friday again.       Past Medical and Surgical History reviewed in Epic today.    MEDICATIONS:      Current Outpatient Medications   Medication Sig Dispense Refill     acetaminophen (TYLENOL) 325 MG tablet Take " "650 mg by mouth 3 times daily as needed for mild pain       albuterol (PROAIR HFA/PROVENTIL HFA/VENTOLIN HFA) 108 (90 Base) MCG/ACT inhaler Inhale 2 puffs into the lungs every 6 hours as needed       albuterol (PROVENTIL) (2.5 MG/3ML) 0.083% neb solution Take 1 vial (2.5 mg) by nebulization 4 times daily       ASPIRIN NOT PRESCRIBED (INTENTIONAL) Please choose reason not prescribed, below       Cranberry Extract 250 MG TABS Take 500 mg by mouth daily        Diclofenac Sodium 1 % CREA Place 1 g onto the skin 2 times daily To L shoulder       ferrous sulfate (FEROSUL) 325 (65 Fe) MG tablet Take 1 tablet (325 mg) by mouth 2 times daily 100 tablet 11     gabapentin (NEURONTIN) 100 MG capsule Take 100 mg by mouth 2 times daily        hydrALAZINE (APRESOLINE) 25 MG tablet Take 75 mg by mouth 3 times daily Hold for SBP < 110 and update provider       OMEGA-3 FATTY ACIDS PO Take 1,200 mg by mouth daily        omeprazole (PRILOSEC) 20 MG DR capsule Take 1 capsule (20 mg) by mouth daily 90 capsule 3     psyllium (METAMUCIL/KONSYL) Packet Take 1 packet by mouth At Bedtime       senna-docusate (SENOKOT-S/PERICOLACE) 8.6-50 MG tablet Take 2 tablets by mouth daily as needed for constipation       simvastatin (ZOCOR) 10 MG tablet TAKE 1 TABLET BY MOUTH ONCE DAILY AT BEDTIME 90 tablet 0     torsemide (DEMADEX) 20 MG tablet Take 40 mg by mouth 2 times daily       venlafaxine (EFFEXOR-XR) 75 MG 24 hr capsule TAKE 1 CAPSULE BY MOUTH EVERY DAY 90 capsule 1     VITAMIN D, CHOLECALCIFEROL, PO Take 1,000 Units by mouth daily            REVIEW OF SYSTEMS:  4 point ROS including Respiratory, CV, GI and , other than that noted in the HPI,  is negative    Objective:  /56   Pulse 63   Temp 98.6  F (37  C)   Resp 18   Ht 1.6 m (5' 3\")   Wt 58 kg (127 lb 12.8 oz)   SpO2 98%   BMI 22.64 kg/m    Exam:    Resp: Effort WNL, LS decreased with squeaking/rubbing sounds bilaterally- on O2 NC  CV: VS as above 1-2+ bilateral LE edema  Abd- " soft, nontender, BS +  [facility]- [facility]- w/c  Psych- alert, flat affect, mood somber      Labs:   Recent labs in EPIC reviewed by me today.     ASSESSMENT/PLAN:  Chronic anemia  Transfusion-dependent anemia  Colonic AVM  - just had transfusion last Friday- 2 units for HGB 7.5 and now hgb is 5.2. - VS currently stable  - will send to ED for eval and transfusion    Acute on chronic combined systolic and diastolic congestive heart failure (H)  Acute and chronic respiratory failure with hypoxia (H)  - chronic, stable, O2 dependent  - continues on current regimen, daily weights, VS, edema managment    Chronic atrial fibrillation (H)  - rate controlled, no ACG 2/2 above    Essential hypertension  - chronic, currently as above  - continue on current regimen and follow clinically    Stage 3a chronic kidney disease  - cr baseline, avoid nephrotoxins    Depression, unspecified depression type  - chronic, situational stressors in play, chronic illness, recurrent hospitalization  - monitor mood and behavior closely, supportive approach  - continue on Effexor, ACP prn    Gastroesophageal reflux disease with esophagitis, unspecified whether hemorrhage  - chronic, continue on PPI    Physical deconditioning  - 2/2 above, back to back hospitalizations and acute on chronic illness  - acute rehab  - ongoing discharge planning, SW follow and care conferences per unit protocol    Discussed goals of care with patient as above. Currently no change in plan.        Orders written by provider at facility        Electronically signed by:  JODIE De La Paz CNP                   Sincerely,        JODIE De La Paz CNP

## 2021-02-03 NOTE — CONSULTS
[facility]    Palliative Care Consultation   Text Page    Date of Admission:  2/3/2021    Assessment & Plan   Keisha Godinez is a 89 year old female who was admitted on 2/3/2021. I was asked by Hospitalist Adelina Alanis MD to see the patient for goals seen in ED.    Recommendations:    Goals of Care: No CPR-Do NOT Intubate. Comfort focused care. Patient request to go home with support of her daughter Rebecca Malhotra and [facility]. POLST complete indicating the patient's preference for Comfort Focused Care. Hospice medications prescribed for dismissal.     1.  Decisional Capacity -  Intact. Patient has an advance directive dated 1/15/2008.  If patient becomes unable to demonstrate decisional capacity, her daughter Rebecca Malhotra is the primary Health Care Agent.  Alternate is her daughter Jennifer Grajeda.     2.  Dyspnea - Chronic transfusion dependent anemia due to colonic AVMs. Patient and daughter request no further transfusion and a comfort focused care. Reasonable to offer a small dose of Dilaudid for pain, dyspnea or respiratory rate greater than 20. Offer Ativan for agitation or complaint of air hunger.     3.  Severe malnutrition - Patient request no intervention and confirmed request for NO TUBE FEEDINGS. She is aware of risk of aspiration as she becomes weaker and request comfort focused eating plan.     4.  Generalized weakness - Patient admitted from [facility] TCU and request a comfort plan.     5.  Spiritual Care - Spiritual Health services consulted. The patient is Anabaptism.     6. Care Planning - Social Work consulted. Discussed case with [facility]  Duyen Schwab RN and Roro Roberson.    Decision-Making & Goals of Care:  Discussed on February 3, 2021 with Sara FELICIANO, CNS:     Appreciate the phone call from Hospitalist Adelina Alanis MD regarding this patient who request no further intervention. I met with the  "patient in the ED room 31. She is a fragile looking elderly woman who upon Dr. Alanis's introduction to me announced \"I'm done. I want to go home.\" She confirmed that she did not want to remain in the hospital as she would like to go to her daughter, Rebecca Malhotra's home. In asking about pain, dyspnea or other symptoms Keisha confided \"I'm okay, I just want to go now.\" In asking for clarification Keisha confirmed \"I've had enough. I don't want any more treatment or transfusions or whatever they want to do I just want to go home and die peacefully.\" I offered to call her daughter, Rebecca to have her assist in planning which she agreed.    I phoned the patient's daughter/healthcare agent Rebecca Malhotra at [phone]. I explained my role in palliative care and asked if her mother had recently spoken with her about her wishes. Rebecca acknowledged \"I know she wants to give up. She doesn't want to do anything.\" We discussed the hospice benefit. Rebecca agreed that if her mother wanted to be comfortable and pass at home, she would do whatever she could. I offered choice of hospice providers, and as Keisha has a relationship with PCP Gerri Eubanks MD she request [facility]. I contacted Hospice  Duyen Schwab RN to assist in dismissal planning.        Disease Process/es & Symptoms:  Keisha Godinez is a 89 year old patient admitted 2/3/2021 [facility] with symptoms of shortness of breath and fatigue. The patient has history of chronic anemia due to colonic AVMs and is transfusion dependent, congestive heart failure with preserved EF, atrial fibrillation but is not on anticoagulation due to her chronic bleeding.     This is the patient's seventh hospitalization for similar issues. She was hospitalized overnight January 26 to 27 for acute on chronic anemia for ongoing GI losses. She was hospitalized January 16 through 23 with acute on chronic CHF with exacerbation (preserved EF)   "     Findings & plan of care discussed with: Hospitalist Adelina Alanis MD; bedside nurse Jero Mosley RN and [facility]  Duyen Schwab RN.   Follow-up plan from palliative team: Will sign off as [facility] is assisting in dismissal planning.   Thank you for involving us in the patient's care.     Sara Zamudio MS, RN, CNS, APRN, ACHPN, FAACVPR  Pain and Palliative Care  Pager [phone]  Office [phone]       Time Spent on this Encounter   Total unit/floor time 4:30 PM until 6:35 PM, time consisted of the following, examination of the patient, reviewing the record and completing documentation. >50% of time spent in counseling and coordination of care.  Time spend counseling with patient and family consisted of the following topics, goals of care and symptom management.  Time spent in coordination of care with Hospitalist Adelina Alanis MD; bedside nurse Jero Mosley RN and [facility]  Duyen Schwab RN..       Primary Care Physician   Gerri Eubanks    Chief Complaint   Low hemoglobin      History is obtained from the patient, electronic health record and patient's daughter    Past Medical History   I have reviewed this patient's medical history and updated it with pertinent information if needed.   Past Medical History:   Diagnosis Date     Anemia      Atrial fibrillation (H)      B12 deficiency      Cardiomyopathy (H)      CHF (congestive heart failure) (H)      Chronic edema     Lower extremities     Chronic systolic congestive heart failure (H)      CVA (cerebral vascular accident) (H) 2010    Acute Left MCA hemispheric CVA ( on coumadin)     Depression      Gastro-oesophageal reflux disease      GI bleed 03-20-15     Hyperlipidemia      Hypertension      Iron deficiency      MSSA (methicillin susceptible Staphylococcus aureus) 03-10-15     Pulmonary hypertension (H)      Shingles        Past Surgical History   I  have reviewed this patient's surgical history and updated it with pertinent information if needed.  Past Surgical History:   Procedure Laterality Date     COLONOSCOPY  03/24/2015    Diverticulosis, polyps     ENTEROSCOPY SMALL BOWEL N/A 2/29/2016    Procedure: ENTEROSCOPY SMALL BOWEL;  Surgeon: Ady Ponce MD;  Location:  GI     ESOPHAGOSCOPY, GASTROSCOPY, DUODENOSCOPY (EGD), COMBINED N/A 3/22/2015    Upper GI- Normal, nothing significant found.      EXCISE MASS FOOT Right 7/6/2016    Procedure: EXCISE MASS FOOT;  Surgeon: Lee Jang DPM;  Location:  OR       Prior to Admission Medications   Prior to Admission Medications   Prescriptions Last Dose Informant Patient Reported? Taking?   ASPIRIN NOT PRESCRIBED (INTENTIONAL)   No No   Sig: Please choose reason not prescribed, below   Cranberry Extract 250 MG TABS 2/3/2021 at am  Yes Yes   Sig: Take 500 mg by mouth daily    Diclofenac Sodium 1 % CREA 2/3/2021 at x 1  Yes Yes   Sig: Place 1 g onto the skin 2 times daily To L shoulder   OMEGA-3 FATTY ACIDS PO 2/3/2021 at am Daughter Yes Yes   Sig: Take 1,200 mg by mouth daily    VITAMIN D, CHOLECALCIFEROL, PO 2/3/2021 at am Self Yes Yes   Sig: Take 1,000 Units by mouth daily    acetaminophen (TYLENOL) 325 MG tablet Past Week at Unknown time  Yes Yes   Sig: Take 650 mg by mouth 3 times daily as needed for mild pain   albuterol (PROAIR HFA/PROVENTIL HFA/VENTOLIN HFA) 108 (90 Base) MCG/ACT inhaler Past Month at Unknown time  Yes Yes   Sig: Inhale 2 puffs into the lungs every 6 hours as needed   albuterol (PROVENTIL) (2.5 MG/3ML) 0.083% neb solution Past Month at Unknown time  No Yes   Sig: Take 1 vial (2.5 mg) by nebulization 4 times daily   ferrous sulfate (FEROSUL) 325 (65 Fe) MG tablet 2/3/2021 at x 1  No Yes   Sig: Take 1 tablet (325 mg) by mouth 2 times daily   gabapentin (NEURONTIN) 100 MG capsule 2/3/2021 at x 1  Yes Yes   Sig: Take 100 mg by mouth 2 times daily    hydrALAZINE (APRESOLINE) 25  MG tablet 2/3/2021 at x 1  Yes Yes   Sig: Take 75 mg by mouth 3 times daily Hold for SBP < 110 and update provider   omeprazole (PRILOSEC) 20 MG DR capsule 2/3/2021 at am  No Yes   Sig: Take 1 capsule (20 mg) by mouth daily   psyllium (METAMUCIL/KONSYL) Packet 2/2/2021 at hs  Yes Yes   Sig: Take 1 packet by mouth At Bedtime   senna-docusate (SENOKOT-S/PERICOLACE) 8.6-50 MG tablet Past Month at Unknown time  Yes Yes   Sig: Take 2 tablets by mouth daily as needed for constipation   simvastatin (ZOCOR) 10 MG tablet 2/2/2021 at hs  No Yes   Sig: TAKE 1 TABLET BY MOUTH ONCE DAILY AT BEDTIME   torsemide (DEMADEX) 20 MG tablet 2/3/2021 at x 1  Yes Yes   Sig: Take 40 mg by mouth 2 times daily   venlafaxine (EFFEXOR-XR) 75 MG 24 hr capsule 2/3/2021 at am  No Yes   Sig: TAKE 1 CAPSULE BY MOUTH EVERY DAY      Facility-Administered Medications Last Administration Doses Remaining   0.9% sodium chloride BOLUS None recorded         Allergies   Allergies   Allergen Reactions     Blood Transfusion Related (Informational Only) Other (See Comments)     Patient has a history of a clinically significant antibody against RBC antigens.  A delay in compatible RBCs may occur.     Lisinopril Other (See Comments)     Tongue swelling     Calcium Nausea and Vomiting     Egg Yolk      Flu Virus Vaccine      Allergic to eggs.     Keflex [Cephalexin] Nausea     Pt states she had upset stomach.  NOT tongue swelling.  She had tongue swelling with a combo bp med that she thinks included lisinopril.    Tolerates IV Cefazolin     Levaquin [Levofloxacin]      Sulfa Drugs      Zinc Nausea and Vomiting       Social History   Living situation: Admission from [facility] TCU - Lives with her daughter Rebecca Malhotra in [place]  Family system: Daughters Rebecca Malhotra and Jennifer Grajeda are supportive  Functional status: Needs help with ADLs   History of substance use/abuse:  reports that she quit smoking about 64 years ago. Her smoking use included cigarettes. She  started smoking about 66 years ago. She quit after 1.00 year of use. She has never used smokeless tobacco.  reports no history of alcohol use.  Taoism affiliation: Rastafari      Family History   I have reviewed this patient's family history and updated it with pertinent information if needed.   Family History   Problem Relation Age of Onset     Known Genetic Syndrome Father      Known Genetic Syndrome Mother      Other Cancer Daughter         pancreatic     Other Cancer Brother         type     Other Cancer Sister 60        lung     Diabetes No family hx of      Breast Cancer No family hx of      Cancer - colorectal No family hx of      Ovarian Cancer No family hx of      Prostate Cancer No family hx of        Review of Systems   The 10 point Review of Systems is negative other than noted in the HPI or here.     Palliative Symptom Review (0=no symptom/no concern, 1=mild, 2=moderate, 3=severe):      Pain: 0-none      Fatigue: 3-severe      Nausea: 0-none      Constipation: 0-none      Diarrhea: 0-none      Depressive Symptoms: 0-none      Anxiety: 0-none      Drowsiness: 2-moderate      Poor Appetite: 3-severe      Shortness of Breath: 0-none      Insomnia: 0-none          Physical Exam   Temp:  [97.5  F (36.4  C)-98.6  F (37  C)] 97.8  F (36.6  C)  Pulse:  [51-80] 51  Resp:  [18-36] 20  BP: (118-178)/(40-69) 178/59  FiO2 (%):  [100 %] 100 %  SpO2:  [98 %-100 %] 100 %  0 lbs 0 oz  GEN: Chronically ill appearing cachetic elderly  female. Alert, oriented x 3.  HEENT:  Normocephalic/atraumatic, no scleral icterus, no nasal discharge, mouth moist.  CV:  Irregular with pansystolic murmer. +2 DP/PT pulses bilaterally; no edema BLE.  RESP:  Poor inspiratory effort with bilateral fine crackles. Symmetric chest rise on inhalation noted.    ABD:  Rounded, soft, non-tender/non-distended.  +BS  EXT:  CMS intact x 4.     M/S:   Denies pain with palpation of extremities and spine.    SKIN:  Pale, warm and dry to  touch,fragile skin with small bruises.    NEURO: Deferred.   Psych:  Fatigued with flat affect.  Calm, cooperative, conversant appropriately.     Data   Results for orders placed or performed during the hospital encounter of 02/03/21 (from the past 24 hour(s))   CBC + differential   Result Value Ref Range    WBC 7.9 4.0 - 11.0 10e9/L    RBC Count 1.93 (L) 3.8 - 5.2 10e12/L    Hemoglobin 5.5 (LL) 11.7 - 15.7 g/dL    Hematocrit 20.0 (L) 35.0 - 47.0 %     (H) 78 - 100 fl    MCH 28.5 26.5 - 33.0 pg    MCHC 27.5 (L) 31.5 - 36.5 g/dL    RDW 19.3 (H) 10.0 - 15.0 %    Platelet Count 112 (L) 150 - 450 10e9/L    Diff Method Automated Method     % Neutrophils 81.6 %    % Lymphocytes 7.3 %    % Monocytes 8.8 %    % Eosinophils 1.0 %    % Basophils 0.4 %    % Immature Granulocytes 0.9 %    Nucleated RBCs 0 0 /100    Absolute Neutrophil 6.5 1.6 - 8.3 10e9/L    Absolute Lymphocytes 0.6 (L) 0.8 - 5.3 10e9/L    Absolute Monocytes 0.7 0.0 - 1.3 10e9/L    Absolute Eosinophils 0.1 0.0 - 0.7 10e9/L    Absolute Basophils 0.0 0.0 - 0.2 10e9/L    Abs Immature Granulocytes 0.1 0 - 0.4 10e9/L    Absolute Nucleated RBC 0.0    Basic metabolic panel (BMP)   Result Value Ref Range    Sodium 144 133 - 144 mmol/L    Potassium 3.6 3.4 - 5.3 mmol/L    Chloride 101 94 - 109 mmol/L    Carbon Dioxide 44 (H) 20 - 32 mmol/L    Anion Gap <1 (L) 3 - 14 mmol/L    Glucose 103 (H) 70 - 99 mg/dL    Urea Nitrogen 86 (H) 7 - 30 mg/dL    Creatinine 0.90 0.52 - 1.04 mg/dL    GFR Estimate 57 (L) >60 mL/min/[1.73_m2]    GFR Estimate If Black 66 >60 mL/min/[1.73_m2]    Calcium 8.8 8.5 - 10.1 mg/dL   ABO/Rh type and screen   Result Value Ref Range    Units Ordered 1     ABO O     RH(D) Pos     Antibody Screen Neg     Test Valid Only At [facility]        Specimen Expires 02/06/2021     Crossmatch Red Blood Cells    Blood component   Result Value Ref Range    Unit Number [id number]     Blood Component Type Red Blood Cells Leukocyte Reduced      Division Number 00     Status of Unit Released to care unit 02/03/2021 1247     Blood Product Code [id number]     Unit Status ISS    Blood component   Result Value Ref Range    Unit Number [id number]     Blood Component Type Red Blood Cells Leukocyte Reduced     Division Number 00     Status of Unit Ready for patient 02/03/2021 1230     Blood Product Code [id number]     Unit Status BLANCA    XR Chest Port 1 View    Narrative    CHEST PORTABLE ONE VIEW February 3, 2021 11:54 AM     HISTORY: Congestive heart failure, chronic dyspnea.    COMPARISON: Chest x-ray on 1/26/2021.      Impression    IMPRESSION: Single portable AP view of the chest was obtained.  Enlarged cardiac silhouette and mild pulmonary vascular congestion.  Small bilateral pleural effusions and associated basilar  atelectasis/consolidation. No significant pneumothorax.    NANCY AGRAWAL MD   XR Chest Port 1 View    Narrative    CHEST PORTABLE ONE VIEW February 3, 2021 2:51 PM     HISTORY: Dyspnea.    COMPARISON: Chest x-ray on same day earlier.      Impression    IMPRESSION: Single portable AP view of the chest was obtained.  Enlarged cardiac silhouette and mild pulmonary vascular congestion.  Small bilateral pleural effusions and associated basilar  atelectasis/consolidation. No significant pneumothorax. Deformity of  the right humeral head/neck area, not significantly changed as  compared to 3/14/2020 exam.    NANCY AGRAWAL MD   BNP   Result Value Ref Range    N-Terminal Pro BNP Inpatient 2,889 (H) 0 - 1,800 pg/mL   Troponin I   Result Value Ref Range    Troponin I ES 0.022 0.000 - 0.045 ug/L   Asymptomatic SARS-CoV-2 COVID-19 Virus (Coronavirus) by PCR    Specimen: Nasopharyngeal   Result Value Ref Range    SARS-CoV-2 Virus Specimen Source Nasopharyngeal     SARS-CoV-2 PCR Result NEGATIVE     SARS-CoV-2 PCR Comment (Note)    EKG 12 lead   Result Value Ref Range    Interpretation ECG Click View Image link to view waveform and result

## 2021-02-03 NOTE — H&P
[facility]    Hospitalist History and Physical    Name: Keisha Godinez    MRN: [id number]  YOB: 1932    Age: 89 year old  Date of Admission:  2/3/2021  Date of Service (when I saw the patient): 02/03/21    Assessment & Plan   Keisha Godinez is a 89 year old female with a history of chronic diastolic heart failure, right-sided heart failure with pulmonary hypertension, chronic hypoxic respiratory failure on O2, chronic anemia due to AVMs, stage III CKD, GERD, atrial fibrillation, who was admitted for acute on chronic anemia and heart failure. She has had several recent hospitalizations for transfusion and associated diuresis.     In ED she received 1 unit of PRBC over 4 hrs and became dyspneic requiring additional lasixs.  In ED patient expressed that she does not want further interventions and would like to go home or heaven. I requested palliative team to help clarify goals of care and as per her wishes start comfort care measures and planning if appropriate    Admitted for acute on chronic Anemia, CHF and goals of care.    Acute on chronic anemia due to AVMs  - Admitted from 1/26 to 1/27 after similar presentation, transfusion and diuresis  - Recently admitted for same problem and required 3 units of blood during her hospitalization from 1/16 through 1/23.  She was sent here due to the lab finding of hemoglobin of 5.8.  There was no evident overt bleeding.  - Hemoglobin in the ED was 5.5 and she was given 1 unit.  - Refused further interventions, blood draws, transfusions and treatments  -palliative team consulted       Acute on chronic diastolic heart failure  Severe pulmonary hypertension  Acute on chronic respiratory failure  - Patient is chronically maintained on 2L.  She has known severe pulmonary hypertension and an EF which is normal.  She was recently hospitalized for the same issue.  Her outpatient diuretic regimen is torsemide 40 twice daily.  - She was  more  short of breath in the ED after the blood transfusion.  received a total of 80 mg of lasixs with improvement in ED-  - lasixs PRN for comfort, SOB       GERD: Continue Prilosec.    CKD stage III: Her creatinine is at baseline.    Depression: Continue venlafaxine    Neuropathy continue gabapentin    Atrial fibrillation: Not on anticoagulation due to chronic bleeding issues.  Auto-rate controlled    Goals of Care : Patient had expressed her desire to be on comfort care measures and on hospice care.  Palliative team is helping to coordinate care.     Diet: Cardiac  DVT Prophylaxis: Pneumatic Compression Devices  Maguire Catheter: No  Code Status: DNR/DNI     Disposition Plan  pending goals of care will be discharged home with hospice  Admitted to observation status.            DVT Prophylaxis: Pneumatic Compression Devices  Code Status: DNR / DNI patient discussing comfort cares    Disposition: Expected discharge pending goals of care decisions    Primary Care Physician   Gerri Eubanks    Chief Complaint   Low hemoglobin noted at TCU    History is obtained from the patient and ED provider    History of Present Illness   Keisha Godinez is a 89 year old female who presented from TCU with a hemoglobin of 5.2.  She was asymptomatic and wanted transfusion.  She had transfusion scheduled for on Friday in 2 days.  She was treated with IV Lasix prior to transfusion received 1 unit of PRBCs over 4 hours.  During transfusion patient did develop shortness of breath and required additional diuretics.  Patient improved after second dose of Lasix.  Initial plan was to admit patient for observation and an additional unit of transfusion.    However on follow-up evaluation with patient she said she does not want any interventions she is tired of repeated hospitalization and transfusions interventions and trouble breathing.  She made it clear that she would like to go home and to go to [facility].  Had detailed conversation with patient  and contacted palliative team to help with goals of care.  She is feeling weak tired short of breath is fatigued and is depressed with her current state of health.    Past Medical History    Past Medical History:   Diagnosis Date     Anemia      Atrial fibrillation (H)      B12 deficiency      Cardiomyopathy (H)      CHF (congestive heart failure) (H)      Chronic edema     Lower extremities     Chronic systolic congestive heart failure (H)      CVA (cerebral vascular accident) (H) 2010    Acute Left MCA hemispheric CVA ( on coumadin)     Depression      Gastro-oesophageal reflux disease      GI bleed 03-20-15     Hyperlipidemia      Hypertension      Iron deficiency      MSSA (methicillin susceptible Staphylococcus aureus) 03-10-15     Pulmonary hypertension (H)      Shingles          Past Surgical History   Past Surgical History:   Procedure Laterality Date     COLONOSCOPY  03/24/2015    Diverticulosis, polyps     ENTEROSCOPY SMALL BOWEL N/A 2/29/2016    Procedure: ENTEROSCOPY SMALL BOWEL;  Surgeon: Ady Ponce MD;  Location:  GI     ESOPHAGOSCOPY, GASTROSCOPY, DUODENOSCOPY (EGD), COMBINED N/A 3/22/2015    Upper GI- Normal, nothing significant found.      EXCISE MASS FOOT Right 7/6/2016    Procedure: EXCISE MASS FOOT;  Surgeon: Lee Jang DPM;  Location:  OR       Prior to Admission Medications   Prior to Admission Medications   Prescriptions Last Dose Informant Patient Reported? Taking?   ASPIRIN NOT PRESCRIBED (INTENTIONAL)   No No   Sig: Please choose reason not prescribed, below   Cranberry Extract 250 MG TABS   Yes No   Sig: Take 500 mg by mouth daily    Diclofenac Sodium 1 % CREA   Yes No   Sig: Place 1 g onto the skin 2 times daily To L shoulder   OMEGA-3 FATTY ACIDS PO  Daughter Yes No   Sig: Take 1,200 mg by mouth daily    VITAMIN D, CHOLECALCIFEROL, PO  Self Yes No   Sig: Take 1,000 Units by mouth daily    acetaminophen (TYLENOL) 325 MG tablet   Yes No   Sig: Take 650 mg by  mouth 3 times daily as needed for mild pain   albuterol (PROAIR HFA/PROVENTIL HFA/VENTOLIN HFA) 108 (90 Base) MCG/ACT inhaler   Yes No   Sig: Inhale 2 puffs into the lungs every 6 hours as needed   albuterol (PROVENTIL) (2.5 MG/3ML) 0.083% neb solution   No No   Sig: Take 1 vial (2.5 mg) by nebulization 4 times daily   ferrous sulfate (FEROSUL) 325 (65 Fe) MG tablet   No No   Sig: Take 1 tablet (325 mg) by mouth 2 times daily   gabapentin (NEURONTIN) 100 MG capsule   Yes No   Sig: Take 100 mg by mouth 2 times daily    hydrALAZINE (APRESOLINE) 25 MG tablet   Yes No   Sig: Take 75 mg by mouth 3 times daily Hold for SBP < 110 and update provider   omeprazole (PRILOSEC) 20 MG DR capsule   No No   Sig: Take 1 capsule (20 mg) by mouth daily   psyllium (METAMUCIL/KONSYL) Packet   Yes No   Sig: Take 1 packet by mouth At Bedtime   senna-docusate (SENOKOT-S/PERICOLACE) 8.6-50 MG tablet   Yes No   Sig: Take 2 tablets by mouth daily as needed for constipation   simvastatin (ZOCOR) 10 MG tablet   No No   Sig: TAKE 1 TABLET BY MOUTH ONCE DAILY AT BEDTIME   torsemide (DEMADEX) 20 MG tablet   Yes No   Sig: Take 40 mg by mouth 2 times daily   venlafaxine (EFFEXOR-XR) 75 MG 24 hr capsule   No No   Sig: TAKE 1 CAPSULE BY MOUTH EVERY DAY      Facility-Administered Medications Last Administration Doses Remaining   0.9% sodium chloride BOLUS None recorded         Allergies   Allergies   Allergen Reactions     Blood Transfusion Related (Informational Only) Other (See Comments)     Patient has a history of a clinically significant antibody against RBC antigens.  A delay in compatible RBCs may occur.     Lisinopril Other (See Comments)     Tongue swelling     Calcium Nausea and Vomiting     Egg Yolk      Flu Virus Vaccine      Allergic to eggs.     Keflex [Cephalexin] Nausea     Pt states she had upset stomach.  NOT tongue swelling.  She had tongue swelling with a combo bp med that she thinks included lisinopril.    Tolerates IV Cefazolin      Levaquin [Levofloxacin]      Sulfa Drugs      Zinc Nausea and Vomiting       Social History   Social History     Tobacco Use     Smoking status: Former Smoker     Years: 1.00     Types: Cigarettes     Start date:      Quit date: 1956     Years since quittin.8     Smokeless tobacco: Never Used   Substance Use Topics     Alcohol use: No     Alcohol/week: 0.0 standard drinks     Social History     Social History Narrative     Not on file     Lives with her daughter.  Has been in and out of hospital in TCU slightly.  Denies smoking or alcohol use    Family History   I have reviewed this patient's family history and updated it with pertinent information if needed.   Family History   Problem Relation Age of Onset     Known Genetic Syndrome Father      Known Genetic Syndrome Mother      Other Cancer Daughter         pancreatic     Other Cancer Brother         type     Other Cancer Sister 60        lung     Diabetes No family hx of      Breast Cancer No family hx of      Cancer - colorectal No family hx of      Ovarian Cancer No family hx of      Prostate Cancer No family hx of          Review of Systems   A Comprehensive greater than 10 system review of systems was carried out.  Pertinent positives and negatives are noted above.  Otherwise negative for contributory information.    Physical Exam   Temp: 97.6  F (36.4  C) Temp src: Temporal BP: (!) 164/53 Pulse: 67   Resp: (!) 36 SpO2: 100 % O2 Device: Nasal cannula Oxygen Delivery: 3 LPM  Vital Signs with Ranges  Temp:  [97.5  F (36.4  C)-98.6  F (37  C)] 97.6  F (36.4  C)  Pulse:  [51-80] 67  Resp:  [18-36] 36  BP: (118-165)/(40-69) 164/53  FiO2 (%):  [100 %] 100 %  SpO2:  [98 %-100 %] 100 %  0 lbs 0 oz    GEN:  Alert, oriented x 3, thin cachectic appears frail and depressed  HEENT:  Normocephalic/atraumatic, no scleral icterus, no nasal discharge, mouth dry  CV: Irregularly irregular soft systolic murmur or JVD.  S1 + S2 noted, no S3 or S4.  LUNGS:  Bibasilar crackles clear to auscultation bilaterally without rales/rhonchi/wheezing/retractions.  Symmetric chest rise on inhalation noted.  ABD:  Active bowel sounds, soft, non-tender/non-distended.  No rebound/guarding/rigidity.  EXT: + edema.  No cyanosis.   SKIN:  Dry to touch, thin skin with areas of bruising  NEURO:  Symmetric muscle strength,   No new focal deficits appreciated.    Data   Data reviewed today:  I personally reviewed the EKG tracing showing Atrial fibrillation with slow ventricular rate and ST-T changes.  Flat T in inferior lateral leads.    Recent Labs   Lab 02/03/21  1121 02/03/21  0830 02/01/21  0932   WBC 7.9 6.4 10.2   HGB 5.5* 5.2* 7.3*   HCT 20.0* 17.1* 25.2*   * 104* 100   * 98* 101*     Recent Labs   Lab 02/03/21  1121 02/03/21  0830 02/01/21  1225    147* 141   POTASSIUM 3.6 3.6 4.0   CHLORIDE 101 101 97   CO2 44* 43* >45*   ANIONGAP <1* 3 Not Calculated   * 118* 97   BUN 86* 85* 83*   CR 0.90 0.95 0.93   GFRESTIMATED 57* 53* 55*   GFRESTBLACK 66 62 63   CASPER 8.8 8.5 9.0     No results for input(s): CULT in the last 168 hours.  Recent Labs   Lab 02/03/21  1121 02/03/21  0830 02/01/21  1225    147* 141   POTASSIUM 3.6 3.6 4.0   CHLORIDE 101 101 97   CO2 44* 43* >45*   ANIONGAP <1* 3 Not Calculated   * 118* 97   BUN 86* 85* 83*   CR 0.90 0.95 0.93   GFRESTIMATED 57* 53* 55*   GFRESTBLACK 66 62 63   CASPER 8.8 8.5 9.0     Recent Labs   Lab 02/03/21  1519 02/01/21  0932   NTBNPI 2,889* 5,733*     GFR Estimate   Date Value Ref Range Status   02/03/2021 57 (L) >60 mL/min/[1.73_m2] Final     Comment:     Non  GFR Calc  Starting 12/18/2018, serum creatinine based estimated GFR (eGFR) will be   calculated using the Chronic Kidney Disease Epidemiology Collaboration   (CKD-EPI) equation.     02/03/2021 53 (L) >60 mL/min/[1.73_m2] Final     Comment:     Non  GFR Calc  Starting 12/18/2018, serum creatinine based estimated GFR  (eGFR) will be   calculated using the Chronic Kidney Disease Epidemiology Collaboration   (CKD-EPI) equation.     02/01/2021 55 (L) >60 mL/min/[1.73_m2] Final     Comment:     Non  GFR Calc  Starting 12/18/2018, serum creatinine based estimated GFR (eGFR) will be   calculated using the Chronic Kidney Disease Epidemiology Collaboration   (CKD-EPI) equation.       GFR Estimate If Black   Date Value Ref Range Status   02/03/2021 66 >60 mL/min/[1.73_m2] Final     Comment:      GFR Calc  Starting 12/18/2018, serum creatinine based estimated GFR (eGFR) will be   calculated using the Chronic Kidney Disease Epidemiology Collaboration   (CKD-EPI) equation.     02/03/2021 62 >60 mL/min/[1.73_m2] Final     Comment:      GFR Calc  Starting 12/18/2018, serum creatinine based estimated GFR (eGFR) will be   calculated using the Chronic Kidney Disease Epidemiology Collaboration   (CKD-EPI) equation.     02/01/2021 63 >60 mL/min/[1.73_m2] Final     Comment:      GFR Calc  Starting 12/18/2018, serum creatinine based estimated GFR (eGFR) will be   calculated using the Chronic Kidney Disease Epidemiology Collaboration   (CKD-EPI) equation.       Recent Labs   Lab 02/03/21  1121 02/03/21  0830 02/01/21  1225 02/01/21  0932 01/29/21  0600   * 118* 97 100* 80     Recent Labs   Lab 02/03/21  1121 02/03/21  0830 02/01/21  0932   HGB 5.5* 5.2* 7.3*     No results for input(s): AST, ALT, GGT, ALKPHOS, BILITOTAL, BILICONJ, BILIDIRECT, POP in the last 168 hours.    Invalid input(s): BILIRUBININDIRECT  No results for input(s): INR in the last 168 hours.  No results for input(s): LACT in the last 168 hours.  No results for input(s): LIPASE in the last 168 hours.  No results for input(s): CHOL, HDL, LDL, TRIG, CHOLHDLRATIO in the last 168 hours.  No results for input(s): TSH in the last 168 hours.  Recent Labs   Lab 02/03/21  1519   TROPI 0.022     No results for input(s):  COLOR, APPEARANCE, URINEGLC, URINEBILI, URINEKETONE, SG, UBLD, URINEPH, PROTEIN, UROBILINOGEN, NITRITE, LEUKEST, RBCU, WBCU in the last 168 hours.    Recent Results (from the past 24 hour(s))   XR Chest Port 1 View    Narrative    CHEST PORTABLE ONE VIEW February 3, 2021 11:54 AM     HISTORY: Congestive heart failure, chronic dyspnea.    COMPARISON: Chest x-ray on 1/26/2021.      Impression    IMPRESSION: Single portable AP view of the chest was obtained.  Enlarged cardiac silhouette and mild pulmonary vascular congestion.  Small bilateral pleural effusions and associated basilar  atelectasis/consolidation. No significant pneumothorax.    NANCY AGRAWAL MD   XR Chest Port 1 View    Narrative    CHEST PORTABLE ONE VIEW February 3, 2021 2:51 PM     HISTORY: Dyspnea.    COMPARISON: Chest x-ray on same day earlier.      Impression    IMPRESSION: Single portable AP view of the chest was obtained.  Enlarged cardiac silhouette and mild pulmonary vascular congestion.  Small bilateral pleural effusions and associated basilar  atelectasis/consolidation. No significant pneumothorax. Deformity of  the right humeral head/neck area, not significantly changed as  compared to 3/14/2020 exam.    NANCY AGRAWAL MD

## 2021-02-03 NOTE — ED PROVIDER NOTES
History   Chief Complaint:  Low Hemoglobin     HPI limited due to age of patient.    Keisha Godinez is a 89 year old female with history of  chronic diastolic heart failure, right-sided heart failure with pulmonary hypertension, chronic hypoxic respiratory failure on O2, chronic anemia due to colonic AVMs, stage III CKD, GERD, atrial fibrillation, who presents with low hemoglobin. Patient was just admitted here from 1/16 through 1/23, as well as 1/26 through 1/27 for acute on chronic anemia.  She was treated for accommodation of heart failure and anemia requiring blood transfusions.  She is very sensitive to volume and needs tight adjustment of diuretics after blood transfusions.  Today, the patient presents from TCU facility for critical labs of hemoglobin at 5.2.  She is currently scheduled for a blood transfusion on Friday (in 2 days) but staff believes that she needs one today.  She denies any pertinent symptoms at this time.  No pain or shortness of breath.  She does mention she has a persistent nosebleed that has been present for the past few days, however this bleed has been slow and mild.    Review of Systems   HENT: Positive for nosebleeds.    All other systems reviewed and are negative.    Allergies:  Lisinopril  Keflex  Levaquin  Sulfa Drugs    Medications:  Neurontin  Apresoline  Senokot  Zocor  Demadex  Effexor    Past Medical History:    Anemia  Atrial fibrillation  Cardiomyopathy  CHF  CVA  Depression  GERD  Chronic GI bleed  Hyperlipidemia  Hypertension   CKD stage III    Past Surgical History:    Enteroscopy small bowel  Right foot mass excision     Family History:    Lung cancer (Sister)    Social History:  Presents to ED via EMS  PCP: Gerri Eubanks    Physical Exam     Patient Vitals for the past 24 hrs:   BP Temp Temp src Pulse Resp SpO2   02/03/21 1119 (!) 149/61 97.8  F (36.6  C) Oral 71 18 100 %       Physical Exam    HEENT:    Dried blood at the nares.  Eyes:    Conjunctiva  normal  Neck:    Supple, no meningismus.     CV:     Regular rate, irregular rhythm.      Grade 2/6 systolic murmur     No rubs or gallops.       No unilateral leg swelling.       2+ radial pulses bilateral.       No lower extremity edema.  PULM:    Clear to auscultation bilateral.       No respiratory distress.      Good air exchange.     No rales or wheezing.     No stridor.  ABD:    Soft, non-tender, non-distended.       No pulsatile masses.       No rebound, guarding or rigidity.  MSK:     No gross deformity to all four extremities.   LYMPH:   No cervical lymphadenopathy.  NEURO:   Alert. Good muscle tone, no atrophy.  Skin:    Warm, dry and intact.    Psych:    Mood is good and affect is appropriate.        Emergency Department Course     EKG:       EKG Interpretation:      Interpreted by Oneal Prather MD  Time reviewed:1555   Symptoms at time of EKG: None   Rhythm: Normal sinus  and Atrial fibrillation - controlled  Rate: Normal  Axis: Normal  Ectopy: None and Premature ventricular contractions (infrequent)  Conduction: Normal and Nonspecific interventricular conduction block  ST Segments/ T Waves: T wave inversion V1, V2, V3 and V4  Q Waves: None    Clinical Impression: atrial fibrillation (chronic)      Imaging:  XR Chest Port 1 View  Single portable AP view of the chest was obtained.  Enlarged cardiac silhouette and mild pulmonary vascular congestion.  Small bilateral pleural effusions and associated basilar  atelectasis/consolidation. No significant pneumothorax.  Reading per radiology     Laboratory:  CBC: WBC 7.9, HGB 5.5 (L),  (L)  BMP: Carbon dioxide 44 (H), Anion gap <1 (L), Glucose 103 (H), BUN 86 (H), GFR 57 (L) o/w WNL (Creatinine 0.90)    ABO/Rh: O, positive    Asymptomatic COVID-19 virus (Coronavirus) by PCR In process     Emergency Department Course:    Reviewed:  1114 I reviewed the patient's nursing notes, vitals, past medical records, Care Everywhere.     Assessments:  1125 I  "performed an exam of the patient as documented above.     Consults:   1610 I spoke with Dr. Alanis of the hospitalist service at [facility] regarding the patient's symptoms and treatment options.     Interventions:  1249 Lasix 40 mg IV  1453 Lasix 40 mg IV    Disposition:  The patient was admitted to the hospital under the care of Dr. Alanis.     Impression & Plan   Covid-19  Keisha Godinez was evaluated during a global COVID-19 pandemic, which necessitated consideration that the patient might be at risk for infection with the SARS-CoV-2 virus that causes COVID-19.   Applicable protocols for evaluation were followed during the patient's care.   COVID-19 was considered as part of the patient's evaluation. The plan for testing is:  a test was obtained during this visit.    Medical Decision Makin-year-old female with a history of chronic anemia secondary to colonic AVMs with slow chronic GI bleed presents to the ED for recurrent anemia with a hemoglobin of 5.2.  She is asymptomatic at this time.  Unfortunately patient has underlying congestive heart failure and is very sensitive to any volume changes.  Patient was transfused with 1 unit of packed red blood cells over 4 hours and was pretreated with 40 mg of Lasix.  Despite this, patient developed tachypnea in the mid 30s with inspiratory rales and mild respiratory distress.  An additional 40 mg of IV Lasix provided and patient has responded. She is now much improved with no residual tachypnea or distress.  Unfortunately patient will likely require an additional unit of packed red blood cells and will close monitoring and titration of CHF with diuresis.  During this hospitalization, hospice may need to be considered as patient had made a few remarks that \"she does not want to do this anymore.\"  I will defer further discussion of of prolonged care to the hospital medicine service.  Patient safe to transfer the floor.      Addendum: Prior to " transfer the floor, patient made it clear that she does not want any more medical interventions including blood transfusions or medications to treat her medical processes other than medications to make her comfortable.  I agree with her that ongoing medical care may only prolong suffering.   Patient is oriented, lucid and has the capacity to make her own medical decisions.  The hospital medicine service was updated and patient agrees to admission to arrange hospice level care at her care facility.    Diagnosis:    ICD-10-CM    1. Chronic anemia  D64.9    2. Chronic congestive heart failure, unspecified heart failure type (H)  I50.9          Scribe Disclosure:  Alessio ROCHA, am serving as a scribe at 11:25 AM on 2/3/2021 to document services personally performed by Oneal Prather MD based on my observations and the provider's statements to me.      Oneal Prather MD  02/03/21 1615       Oneal Prahter MD  02/03/21 [id number]

## 2021-02-03 NOTE — PHARMACY-ADMISSION MEDICATION HISTORY
NO CHANGES to admission medication history since patient was discharged from [facility] one week ago (1/27/2021).  See [facility] admission navigator for allergy information, prior to admission medications and immunization status.     Prior to Admission medications    Medication Sig Last Dose Taking? Auth Provider   acetaminophen (TYLENOL) 325 MG tablet Take 650 mg by mouth 3 times daily as needed for mild pain Past Week at Unknown time Yes Unknown, Entered By History   albuterol (PROAIR HFA/PROVENTIL HFA/VENTOLIN HFA) 108 (90 Base) MCG/ACT inhaler Inhale 2 puffs into the lungs every 6 hours as needed Past Month at Unknown time Yes Unknown, Entered By History   albuterol (PROVENTIL) (2.5 MG/3ML) 0.083% neb solution Take 1 vial (2.5 mg) by nebulization 4 times daily Past Month at Unknown time Yes Adrianne Ferrara APRN CNP   Cranberry Extract 250 MG TABS Take 500 mg by mouth daily  2/3/2021 at am Yes Doctor, Sharonda, MD   Diclofenac Sodium 1 % CREA Place 1 g onto the skin 2 times daily To L shoulder 2/3/2021 at x 1 Yes Unknown, Entered By History   ferrous sulfate (FEROSUL) 325 (65 Fe) MG tablet Take 1 tablet (325 mg) by mouth 2 times daily 2/3/2021 at x 1 Yes Darryl Reed MD   gabapentin (NEURONTIN) 100 MG capsule Take 100 mg by mouth 2 times daily  2/3/2021 at x 1 Yes Unknown, Entered By History   hydrALAZINE (APRESOLINE) 25 MG tablet Take 75 mg by mouth 3 times daily Hold for SBP < 110 and update provider 2/3/2021 at x 1 Yes Unknown, Entered By History   OMEGA-3 FATTY ACIDS PO Take 1,200 mg by mouth daily  2/3/2021 at am Yes Reported, Patient   omeprazole (PRILOSEC) 20 MG DR capsule Take 1 capsule (20 mg) by mouth daily 2/3/2021 at am Yes Gerri Eubanks MD   psyllium (METAMUCIL/KONSYL) Packet Take 1 packet by mouth At Bedtime 2/2/2021 at hs Yes Unknown, Entered By History   senna-docusate (SENOKOT-S/PERICOLACE) 8.6-50 MG tablet Take 2 tablets by mouth daily as needed for constipation Past Month at Unknown time Yes  Unknown, Entered By History   simvastatin (ZOCOR) 10 MG tablet TAKE 1 TABLET BY MOUTH ONCE DAILY AT BEDTIME 2/2/2021 at hs Yes Gerri Eubanks MD   torsemide (DEMADEX) 20 MG tablet Take 40 mg by mouth 2 times daily 2/3/2021 at x 1 Yes Suad Murrell PA   venlafaxine (EFFEXOR-XR) 75 MG 24 hr capsule TAKE 1 CAPSULE BY MOUTH EVERY DAY 2/3/2021 at am Yes Gerri Eubanks MD   VITAMIN D, CHOLECALCIFEROL, PO Take 1,000 Units by mouth daily  2/3/2021 at am Yes Reported, Patient   ASPIRIN NOT PRESCRIBED (INTENTIONAL) Please choose reason not prescribed, below   Gerri Eubanks MD

## 2021-02-03 NOTE — ED NOTES
Patient currently stating that she does not want to be admitted. She states she wants a paper to sign that allows her to go home and die so she can go to heaven. Explained the purpose of admission and helping to get hospice set up for patient. Patient still adamant about her feelings. MD notified.

## 2021-02-03 NOTE — ED NOTES
[facility]  ED Nurse Handoff Report    Keisha Godinez is a 89 year old female   ED Chief complaint: Low hemoglobin  . ED Diagnosis:   Final diagnoses:   None     Allergies:   Allergies   Allergen Reactions     Blood Transfusion Related (Informational Only) Other (See Comments)     Patient has a history of a clinically significant antibody against RBC antigens.  A delay in compatible RBCs may occur.     Lisinopril Other (See Comments)     Tongue swelling     Calcium Nausea and Vomiting     Egg Yolk      Flu Virus Vaccine      Allergic to eggs.     Keflex [Cephalexin] Nausea     Pt states she had upset stomach.  NOT tongue swelling.  She had tongue swelling with a combo bp med that she thinks included lisinopril.    Tolerates IV Cefazolin     Levaquin [Levofloxacin]      Sulfa Drugs      Zinc Nausea and Vomiting       Code Status: DNR / DNI  Activity level - Baseline/Home:  Assist X 1. Activity Level - Current:   Assist X 2. Lift room needed: No. Bariatric: No   Needed: No   Isolation: No. Infection: Not Applicable.     Vital Signs:   Vitals:    02/03/21 1421 02/03/21 1430 02/03/21 1445 02/03/21 1500   BP:  (!) 141/66 138/40 (!) 164/53   Pulse:  65 73 67   Resp: (!) 36      Temp:       TempSrc:       SpO2: 100% 100% 100% 100%       Cardiac Rhythm:  ,      Pain level:    Patient confused: Yes. Patient Falls Risk: Yes.   Elimination Status: Has voided   Patient Report - Initial Complaint: Weakness, needs transfusion per facility. Focused Assessment: Generalized weakness, hypoxia   Tests Performed:   Labs Ordered and Resulted from Time of ED Arrival Up to the Time of Departure from the ED   CBC WITH PLATELETS DIFFERENTIAL - Abnormal; Notable for the following components:       Result Value    RBC Count 1.93 (*)     Hemoglobin 5.5 (*)     Hematocrit 20.0 (*)      (*)     MCHC 27.5 (*)     RDW 19.3 (*)     Platelet Count 112 (*)     Absolute Lymphocytes 0.6 (*)     All other components  within normal limits   BASIC METABOLIC PANEL - Abnormal; Notable for the following components:    Carbon Dioxide 44 (*)     Anion Gap <1 (*)     Glucose 103 (*)     Urea Nitrogen 86 (*)     GFR Estimate 57 (*)     All other components within normal limits   NT PROBNP INPATIENT   TROPONIN I   PERIPHERAL IV CATHETER   CARDIAC CONTINUOUS MONITORING   PULSE OXIMETRY NURSING   ABO/RH TYPE AND SCREEN   RED BLOOD CELL PREPARE ORDER UNIT   BLOOD COMPONENT   BLOOD COMPONENT     XR Chest Port 1 View   Preliminary Result   IMPRESSION: Single portable AP view of the chest was obtained.   Enlarged cardiac silhouette and mild pulmonary vascular congestion.   Small bilateral pleural effusions and associated basilar   atelectasis/consolidation. No significant pneumothorax. Deformity of   the right humeral head/neck area, not significantly changed as   compared to 3/14/2020 exam.      XR Chest Port 1 View   Final Result   IMPRESSION: Single portable AP view of the chest was obtained.   Enlarged cardiac silhouette and mild pulmonary vascular congestion.   Small bilateral pleural effusions and associated basilar   atelectasis/consolidation. No significant pneumothorax.      NANCY AGRAWAL MD        . Abnormal Results: See above.   Treatments provided: Transfusion, 4L oxygen  Family Comments: N/A  OBS brochure/video discussed/provided to patient:  N/A  ED Medications:   Medications   furosemide (LASIX) injection 40 mg (40 mg Intravenous Given 2/3/21 1249)   furosemide (LASIX) injection 40 mg (40 mg Intravenous Given 2/3/21 1453)     Drips infusing:  Yes, blood  For the majority of the shift, the patient's behavior Green. Interventions performed were n/a.    Sepsis treatment initiated: No     Patient tested for COVID 19 prior to admission: YES    ED Nurse Name/Phone Number: Jero Mosley RN,   3:31 PM    RECEIVING UNIT ED HANDOFF REVIEW    Above ED Nurse Handoff Report was reviewed: Yes  Reviewed by: Tony Castrejon RN on February  3, 2021 at 5:30 PM

## 2021-02-03 NOTE — PROGRESS NOTES
"[facility]  [facility] Record Number:  [id number]  Place of Service where encounter took place:  [facility]  ([facility]) [[id number]]  Chief Complaint   Patient presents with     RECHECK       HPI:    Keisha Godinez  is a 89 year old (1/25/1932), who is being seen today for an episodic care visit.  HPI information obtained from: facility chart records, facility staff, patient report and [facility] chart review. 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 Recent medical history is complex and patient has had back to back hospitalizations. Is very deconditioned.     Today is found in room in w/c, head down, minimal eye contact. Reports some mild SOB and fatigue. Denies chest pain or dizziness. Denies pain at this time.     Patient states has been transfusion dependent for \" a long time\". Discussed patient goals of care and patient reports \" to go home\". Discussed whether patient continues to want hospitalization if sick and wether patient has considered palliative care or hospice. Also asked patient if she is able to have these discussion with her family or if she would like someone to help facilitate these discussions. Patient reports she has not yet considered hospice but feels able to have these discussion with her family and does not think she needs she a facilitator. Reports \"just want to get stronger and go home.\"    After this visit nurse reports lab called with critical hemoglobin of 5.2. Last transfusion was Friday 1/29. VS stable currently. Blood transfusion scheduled for Friday again.       Past Medical and Surgical History reviewed in Epic today.    MEDICATIONS:      Current Outpatient Medications   Medication Sig Dispense Refill     acetaminophen (TYLENOL) 325 MG tablet Take 650 mg by mouth 3 times daily as needed for mild pain       albuterol (PROAIR HFA/PROVENTIL " "HFA/VENTOLIN HFA) 108 (90 Base) MCG/ACT inhaler Inhale 2 puffs into the lungs every 6 hours as needed       albuterol (PROVENTIL) (2.5 MG/3ML) 0.083% neb solution Take 1 vial (2.5 mg) by nebulization 4 times daily       ASPIRIN NOT PRESCRIBED (INTENTIONAL) Please choose reason not prescribed, below       Cranberry Extract 250 MG TABS Take 500 mg by mouth daily        Diclofenac Sodium 1 % CREA Place 1 g onto the skin 2 times daily To L shoulder       ferrous sulfate (FEROSUL) 325 (65 Fe) MG tablet Take 1 tablet (325 mg) by mouth 2 times daily 100 tablet 11     gabapentin (NEURONTIN) 100 MG capsule Take 100 mg by mouth 2 times daily        hydrALAZINE (APRESOLINE) 25 MG tablet Take 75 mg by mouth 3 times daily Hold for SBP < 110 and update provider       OMEGA-3 FATTY ACIDS PO Take 1,200 mg by mouth daily        omeprazole (PRILOSEC) 20 MG DR capsule Take 1 capsule (20 mg) by mouth daily 90 capsule 3     psyllium (METAMUCIL/KONSYL) Packet Take 1 packet by mouth At Bedtime       senna-docusate (SENOKOT-S/PERICOLACE) 8.6-50 MG tablet Take 2 tablets by mouth daily as needed for constipation       simvastatin (ZOCOR) 10 MG tablet TAKE 1 TABLET BY MOUTH ONCE DAILY AT BEDTIME 90 tablet 0     torsemide (DEMADEX) 20 MG tablet Take 40 mg by mouth 2 times daily       venlafaxine (EFFEXOR-XR) 75 MG 24 hr capsule TAKE 1 CAPSULE BY MOUTH EVERY DAY 90 capsule 1     VITAMIN D, CHOLECALCIFEROL, PO Take 1,000 Units by mouth daily            REVIEW OF SYSTEMS:  4 point ROS including Respiratory, CV, GI and , other than that noted in the HPI,  is negative    Objective:  /56   Pulse 63   Temp 98.6  F (37  C)   Resp 18   Ht 1.6 m (5' 3\")   Wt 58 kg (127 lb 12.8 oz)   SpO2 98%   BMI 22.64 kg/m    Exam:    Resp: Effort WNL, LS decreased with squeaking/rubbing sounds bilaterally- on O2 NC  CV: VS as above 1-2+ bilateral LE edema  Abd- soft, nontender, BS +  Musc- CASEY- w/c  Psych- alert, flat affect, mood somber      Labs: "   Recent labs in [facility] reviewed by me today.     ASSESSMENT/PLAN:  Chronic anemia  Transfusion-dependent anemia  Colonic AVM  - just had transfusion last Friday- 2 units for HGB 7.5 and now hgb is 5.2. - VS currently stable  - will send to ED for eval and transfusion    Acute on chronic combined systolic and diastolic congestive heart failure (H)  Acute and chronic respiratory failure with hypoxia (H)  - chronic, stable, O2 dependent  - continues on current regimen, daily weights, VS, edema managment    Chronic atrial fibrillation (H)  - rate controlled, no ACG 2/2 above    Essential hypertension  - chronic, currently as above  - continue on current regimen and follow clinically    Stage 3a chronic kidney disease  - cr baseline, avoid nephrotoxins    Depression, unspecified depression type  - chronic, situational stressors in play, chronic illness, recurrent hospitalization  - monitor mood and behavior closely, supportive approach  - continue on Effexor, ACP prn    Gastroesophageal reflux disease with esophagitis, unspecified whether hemorrhage  - chronic, continue on PPI    Physical deconditioning  - 2/2 above, back to back hospitalizations and acute on chronic illness  - acute rehab  - ongoing discharge planning, SW follow and care conferences per unit protocol    Discussed goals of care with patient as above. Currently no change in plan.        Orders written by provider at facility        Electronically signed by:  JODIE De La Paz CNP

## 2021-02-03 NOTE — ED NOTES
Additional dose of lasix given, RR remains 36-40 bpm and somewhat labored, patient denies pain or other complaints at this time, will reassess.

## 2021-02-04 NOTE — PROGRESS NOTES
Routine vitals D/C'd by palliative care, pt came to unit on 4L NC from ED, pt's breathing non labored, denies shortness of breath, pt reports breathing feels comfortable

## 2021-02-04 NOTE — PROGRESS NOTES
"Writer contacted for hospice referral, planning for patient to discharge home today.  Per pall care note 2/3, pt does not wish for any more transfusions, and wants to return home and die peacefully. Writer called daughter Rebecca to review hospice benefit and philosophy.  Rebecca asked writer to confirm with pt that these are her wishes.  Writer met with pt, she was lethargic and did not open her eyes.  Pt did say \"yes\" when asked if her wish was to die at home and stop getting blood transfusions.      Writer ordered a hospital bed, 1/2 rails, commode, OBT and O2 concentrator for delivery by 1pm.  Hospice RN will meet pt/family in the home between 3-4pm this afternoon to sign consents, as pt unable and sister declines to drive in the snow to come to the hospital.  Stretcher ride set up for 245pm.  Please fill the comfort meds here and send them with the pt when she discharges. HOSPICE WILL ORDER AND DELIVER HYDROMORPHONE 10MG/ML, 1-2 MG PO/SL EVERY 2 HOURS PRN PAIN/DYSPNEA From the [facility] pharmacy by 3pm today.  POLST is on pt chart, please make sure to send this with her also.    If stretcher is delayed please call  Hospice.  Consents are NOT signed and need to be done once pt gets home.      Appreciate Kendra Bhatt NP for ordering comfort kit medications.  Reviewed plan of care with JOSÉ MIGUEL Gardner, YULISA Gonzalez and NATHAN Monroy.  Rebecca expressed desire to have pt return home today.    Thank you for this consult,  Tony Pacheco RN  [facility] Referral Specialist  [phone]    "

## 2021-02-04 NOTE — DISCHARGE SUMMARY
[facility]    Discharge Summary  Hospitalist    Date of Admission:  2/3/2021  Date of Discharge:  2/4/2021  Provider:  Eliana Salamanca PA-C  Date of Service (when I last saw the patient): 02/04/21    Discharge Diagnoses   Acute on chronic anemia due to AVMs  Acute on chronic diastolic heart failure  Severe pulmonary HTN  Acute on chronic respiratory failure   Hospice enrollment     Other medical issues:  Past Medical History:   Diagnosis Date     Anemia      Atrial fibrillation (H)      B12 deficiency      Cardiomyopathy (H)      CHF (congestive heart failure) (H)      Chronic edema     Lower extremities     Chronic systolic congestive heart failure (H)      CVA (cerebral vascular accident) (H) 2010    Acute Left MCA hemispheric CVA ( on coumadin)     Depression      Gastro-oesophageal reflux disease      GI bleed 03-20-15     Hyperlipidemia      Hypertension      Iron deficiency      MSSA (methicillin susceptible Staphylococcus aureus) 03-10-15     Pulmonary hypertension (H)      Shingles      History of Present Illness   Keisha Godinez is a 89 year old female with a history of chronic diastolic heart failure, right-sided heart failure with pulmonary hypertension, chronic hypoxic respiratory failure on O2, 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 Please see the admission history and physical for full details.    Hospital Course   Keisha Godinez was admitted on 2/3/2021.  The following problems were addressed during her hospitalization:    #Acute on chronic anemia due to AVMs  #Hospice enrollment: multiple prior hospitalizations requiring blood transfusions with her most recent being from 1/26-1/27. No evidence of overt bleeding. Presented from TCU due to Hgb of 5.2. S/p 1 unit of PRBCs in the ED with 40 mg of Lasix prior to admission. Upon  discussion with admitting provider the patient preferred no further interventions, blood draws, transfusions, or treatments and requesting hospice. Palliative care was consulted and assisted with initiating hospice enrollment, evaluated by  hospice RN prior to discharge. Patient confirmed prior to discharge her wishes to pursue hospice at this time. Patient will discharge to her daughter Rebecca's home with plans to meet with hospice RN later today. Contacted and discussed plan with daughter prior to discharge. Palliative care assisted with ordering hospice medications for discharge.     #Acute on chronic diastolic heart failure  #Severe pulmonary HTN  #Acute on chronic respiratory failure: chronically maintained on 2 liters. Received 80 mg of IV Lasix in the ED due to increased dyspnea.   - continue PTA Torsemide at discharge to prevent overt fluid overload     Pending Results   None    Code Status   DNR / DNI       Primary Care Physician   Gerri Eubanks    Exam:    BP (!) 182/46   Pulse 60   Temp 97.8  F (36.6  C) (Temporal)   Resp 20   SpO2 98%   GEN:  Alert, oriented x 3, cachectic, frail  HEENT:  Normocephalic/atraumatic, no scleral icterus, no nasal discharge, mouth dry  CV: Irregularly irregular, systolic murmur, no JVD.  S1 + S2 noted, no S3 or S4.  LUNGS: Bibasilar crackles in lower lobes without rales/rhonchi/wheezing/retractions.  Symmetric chest rise on inhalation noted.  ABD:  Active bowel sounds, soft, non-tender/non-distended.  No rebound/guarding/rigidity.  EXT: bilateral LE edema present.  No cyanosis.   SKIN:  Dry to touch, thin skin with areas of bruising  NEURO:  Symmetric muscle strength, no focal deficits    Discharge Disposition   Discharged to home on hospice     Consultations This Hospital Stay   CARE MANAGEMENT / SOCIAL WORK IP CONSULT  SPIRITUAL HEALTH SERVICES IP CONSULT  PALLIATIVE CARE ADULT IP CONSULT    Time Spent on this Encounter   IMara PA-C, personally saw  the patient today and spent greater than 30 minutes discharging this patient.    Discharge Orders      Reason for your hospital stay    You are admitted due to concerns for low hemoglobin related to your known ongoing GI losses.  You have decided that you no longer wish to undergo blood transfusions for treatment of your chronic anemia and would prefer to discharge on hospice.  Palliative care was consulted on admission and assisted with ordering medications for hospice.  You were evaluated by the hospice team prior to discharge and will have a hospice nurse to meet you at your home shortly after returning home this afternoon.     Activity    Your activity upon discharge: activity as tolerated, regular repositioning as patient requests     Continue indwelling urinary catheter ([place])     No CPR- Do NOT Intubate     Diet    Follow this diet upon discharge: Orders Placed This Encounter      Regular Diet Adult     Discharge Medications   Current Discharge Medication List      START taking these medications    Details   acetaminophen (TYLENOL) 650 MG suppository Place 1 suppository (650 mg) rectally every 6 hours as needed for mild pain or fever (temperature over 100  F )  Qty: 4 suppository, Refills: 0    Associated Diagnoses: Chronic congestive heart failure, unspecified heart failure type (H); Hospice care patient      artificial saliva (BIOTENE MT) SOLN solution Take 2 mLs (2 sprays) by mouth every hour as needed for dry mouth  Qty: 44.3 mL, Refills: 0    Associated Diagnoses: Chronic congestive heart failure, unspecified heart failure type (H); Hospice care patient      artificial tears (GENTEAL) 0.1-0.2-0.3 % ophthalmic solution Place 1-2 drops into both eyes every 8 hours as needed for dry eyes  Qty: 15 mL, Refills: 0    Associated Diagnoses: Chronic congestive heart failure, unspecified heart failure type (H); Hospice care patient      atropine 1 % ophthalmic solution Place 1-2 drops under the tongue every hour  as needed for other (secretions)  Qty: 5 mL, Refills: 0    Associated Diagnoses: Chronic congestive heart failure, unspecified heart failure type (H); Hospice care patient      bisacodyl (DULCOLAX) 10 MG suppository Place 1 suppository (10 mg) rectally daily as needed for constipation  Qty: 4 suppository, Refills: 0    Associated Diagnoses: Chronic congestive heart failure, unspecified heart failure type (H); Hospice care patient      haloperidol (HALDOL) 2 MG/ML (HIGH CONC) solution Take 1 mL (2 mg) by mouth every 8 hours as needed for agitation  Qty: 6 mL, Refills: 0    Associated Diagnoses: Chronic congestive heart failure, unspecified heart failure type (H); Hospice care patient      LORazepam (ATIVAN) 2 MG/ML (HIGH CONC) solution Take 0.25 mLs (0.5 mg) by mouth every 3 hours as needed for agitation, anxiety or nausea  Qty: 3 mL, Refills: 0    Associated Diagnoses: Chronic congestive heart failure, unspecified heart failure type (H); Hospice care patient      mineral oil-hydrophilic petrolatum (AQUAPHOR) external ointment Apply topically every 8 hours as needed for dry skin  Qty: 99 g, Refills: 0    Associated Diagnoses: Chronic congestive heart failure, unspecified heart failure type (H); Hospice care patient      ondansetron (ZOFRAN-ODT) 4 MG ODT tab Take 1 tablet (4 mg) by mouth every 6 hours as needed for nausea or vomiting  Qty: 4 tablet, Refills: 0    Associated Diagnoses: Chronic congestive heart failure, unspecified heart failure type (H); Hospice care patient      sennosides (SENOKOT) 8.6 MG tablet Take 1 tablet by mouth daily  Qty: 3 tablet, Refills: 0    Associated Diagnoses: Chronic congestive heart failure, unspecified heart failure type (H); Hospice care patient         CONTINUE these medications which have NOT CHANGED    Details   Cranberry Extract 250 MG TABS Take 500 mg by mouth daily       OMEGA-3 FATTY ACIDS PO Take 1,200 mg by mouth daily       torsemide (DEMADEX) 20 MG tablet Take 40 mg by  mouth 2 times daily      venlafaxine (EFFEXOR-XR) 75 MG 24 hr capsule TAKE 1 CAPSULE BY MOUTH EVERY DAY  Qty: 90 capsule, Refills: 1    Associated Diagnoses: Major depressive disorder, recurrent episode, in partial remission (H)         STOP taking these medications       acetaminophen (TYLENOL) 325 MG tablet Comments:   Reason for Stopping:         albuterol (PROAIR HFA/PROVENTIL HFA/VENTOLIN HFA) 108 (90 Base) MCG/ACT inhaler Comments:   Reason for Stopping:         albuterol (PROVENTIL) (2.5 MG/3ML) 0.083% neb solution Comments:   Reason for Stopping:         ASPIRIN NOT PRESCRIBED (INTENTIONAL) Comments:   Reason for Stopping:         Diclofenac Sodium 1 % CREA Comments:   Reason for Stopping:         ferrous sulfate (FEROSUL) 325 (65 Fe) MG tablet Comments:   Reason for Stopping:         gabapentin (NEURONTIN) 100 MG capsule Comments:   Reason for Stopping:         hydrALAZINE (APRESOLINE) 25 MG tablet Comments:   Reason for Stopping:         omeprazole (PRILOSEC) 20 MG DR capsule Comments:   Reason for Stopping:         psyllium (METAMUCIL/KONSYL) Packet Comments:   Reason for Stopping:         senna-docusate (SENOKOT-S/PERICOLACE) 8.6-50 MG tablet Comments:   Reason for Stopping:         simvastatin (ZOCOR) 10 MG tablet Comments:   Reason for Stopping:         VITAMIN D, CHOLECALCIFEROL, PO Comments:   Reason for Stopping:             Allergies   Allergies   Allergen Reactions     Blood Transfusion Related (Informational Only) Other (See Comments)     Patient has a history of a clinically significant antibody against RBC antigens.  A delay in compatible RBCs may occur.     Lisinopril Other (See Comments)     Tongue swelling     Calcium Nausea and Vomiting     Egg Yolk      Flu Virus Vaccine      Allergic to eggs.     Keflex [Cephalexin] Nausea     Pt states she had upset stomach.  NOT tongue swelling.  She had tongue swelling with a combo bp med that she thinks included lisinopril.    Tolerates IV Cefazolin      Levaquin [Levofloxacin]      Sulfa Drugs      Zinc Nausea and Vomiting     Data   Results for orders placed or performed during the hospital encounter of 02/03/21   XR Chest Port 1 View     Status: None    Narrative    CHEST PORTABLE ONE VIEW February 3, 2021 11:54 AM     HISTORY: Congestive heart failure, chronic dyspnea.    COMPARISON: Chest x-ray on 1/26/2021.      Impression    IMPRESSION: Single portable AP view of the chest was obtained.  Enlarged cardiac silhouette and mild pulmonary vascular congestion.  Small bilateral pleural effusions and associated basilar  atelectasis/consolidation. No significant pneumothorax.    NANCY AGRAWAL MD   XR Chest Port 1 View     Status: None    Narrative    CHEST PORTABLE ONE VIEW February 3, 2021 2:51 PM     HISTORY: Dyspnea.    COMPARISON: Chest x-ray on same day earlier.      Impression    IMPRESSION: Single portable AP view of the chest was obtained.  Enlarged cardiac silhouette and mild pulmonary vascular congestion.  Small bilateral pleural effusions and associated basilar  atelectasis/consolidation. No significant pneumothorax. Deformity of  the right humeral head/neck area, not significantly changed as  compared to 3/14/2020 exam.    NANCY AGRAWAL MD   CBC + differential     Status: Abnormal   Result Value Ref Range    WBC 7.9 4.0 - 11.0 10e9/L    RBC Count 1.93 (L) 3.8 - 5.2 10e12/L    Hemoglobin 5.5 (LL) 11.7 - 15.7 g/dL    Hematocrit 20.0 (L) 35.0 - 47.0 %     (H) 78 - 100 fl    MCH 28.5 26.5 - 33.0 pg    MCHC 27.5 (L) 31.5 - 36.5 g/dL    RDW 19.3 (H) 10.0 - 15.0 %    Platelet Count 112 (L) 150 - 450 10e9/L    Diff Method Automated Method     % Neutrophils 81.6 %    % Lymphocytes 7.3 %    % Monocytes 8.8 %    % Eosinophils 1.0 %    % Basophils 0.4 %    % Immature Granulocytes 0.9 %    Nucleated RBCs 0 0 /100    Absolute Neutrophil 6.5 1.6 - 8.3 10e9/L    Absolute Lymphocytes 0.6 (L) 0.8 - 5.3 10e9/L    Absolute Monocytes 0.7 0.0 - 1.3 10e9/L     Absolute Eosinophils 0.1 0.0 - 0.7 10e9/L    Absolute Basophils 0.0 0.0 - 0.2 10e9/L    Abs Immature Granulocytes 0.1 0 - 0.4 10e9/L    Absolute Nucleated RBC 0.0    Basic metabolic panel (BMP)     Status: Abnormal   Result Value Ref Range    Sodium 144 133 - 144 mmol/L    Potassium 3.6 3.4 - 5.3 mmol/L    Chloride 101 94 - 109 mmol/L    Carbon Dioxide 44 (H) 20 - 32 mmol/L    Anion Gap <1 (L) 3 - 14 mmol/L    Glucose 103 (H) 70 - 99 mg/dL    Urea Nitrogen 86 (H) 7 - 30 mg/dL    Creatinine 0.90 0.52 - 1.04 mg/dL    GFR Estimate 57 (L) >60 mL/min/[1.73_m2]    GFR Estimate If Black 66 >60 mL/min/[1.73_m2]    Calcium 8.8 8.5 - 10.1 mg/dL   BNP     Status: Abnormal   Result Value Ref Range    N-Terminal Pro BNP Inpatient 2,889 (H) 0 - 1,800 pg/mL   Troponin I     Status: None   Result Value Ref Range    Troponin I ES 0.022 0.000 - 0.045 ug/L   Asymptomatic SARS-CoV-2 COVID-19 Virus (Coronavirus) by PCR     Status: None    Specimen: Nasopharyngeal   Result Value Ref Range    SARS-CoV-2 Virus Specimen Source Nasopharyngeal     SARS-CoV-2 PCR Result NEGATIVE     SARS-CoV-2 PCR Comment (Note)    EKG 12 lead     Status: None   Result Value Ref Range    Interpretation ECG Click View Image link to view waveform and result    Care Management / Social Work IP Consult     Status: None ()    Narrative    Lisa Redmond MSW     2/4/2021 10:32 AM  Care Management Initial Consult    General Information  Assessment completed with: Care Team Member, Children, Dtr Rebecca,   Hospice Liaison Tony  Type of CM/SW Visit: Offer D/C Planning    Primary Care Provider verified and updated as needed: Yes   Readmission within the last 30 days:     Reason for Consult: discharge planning  Advance Care Planning: Advance Care Planning Reviewed: no   concerns identified        Communication Assessment  Patient's communication style: spoken language (English or   Bilingual)    Hearing Difficulty or Deaf: no   Wear Glasses or Blind:  yes    Cognitive  Cognitive/Neuro/Behavioral: WDL                      Living Environment:   People in home: child(emre), adult  Rebecca  Current living Arrangements: house      Able to return to prior arrangements: yes     Family/Social Support:  Care provided by: child(emre)  Provides care for: no one, unable/limited ability to care for   self  Marital Status:   Children          Description of Support System: Involved, Supportive    Support Assessment: Adequate family and caregiver support,   Adequate social supports    Current Resources:   Patient receiving home care services: No     Community Resources: None  Equipment currently used at home: walker, standard  Supplies currently used at home: Oxygen Tubing/Supplies    Employment/Financial:  Employment Status: retired        Financial Concerns: insurance, none   Referral to Financial Counselor: No     Lifestyle & Psychosocial Needs:        Socioeconomic History     Marital status:      Spouse name: Not on file     Number of children: Not on file     Years of education: Not on file     Highest education level: Not on file     Tobacco Use     Smoking status: Former Smoker     Years: 1.00     Types: Cigarettes     Start date:      Quit date: 1956     Years since quittin.8     Smokeless tobacco: Never Used   Substance and Sexual Activity     Alcohol use: No     Alcohol/week: 0.0 standard drinks     Drug use: No     Sexual activity: Never     Partners: Male       Functional Status:  Prior to admission patient needed assistance: Pt lives with   sean Fernandez in a home in [place]. Rebecca is the maine   caregiver for pt and assists with all ADL's and IADL's as needed.   Pt is on O2 at baseline.     Pt was admitted from [facility] TCU for anemia.  Palliative care met   with pt and spoke with sean Fernandez. They have elected to   return home with enrollment in [facility]    Mental Health Status: A&O x4        Chemical Dependency Status:  N/A        Values/Beliefs:  Spiritual, Cultural Beliefs, Christian Practices, Values that   affect care: yes             Additional Information:  Attempted to meet with pt, she was asleep. Spoke with dtr Rebecca,   she has already been in contact with [facility] Liaison Tony to   order DME (oxygen, hospital bed, commode) which will be delivered   today, ordered at 9am. Hospice liaison will meet with pt in   hospital today around 11am to sign consents with dtr Rebecca on the   phone. Hospice RN to meet with pt at home at 1600 for enrollment.       Reviewed out of pocket cost for [facility] stretcher transport,   $1042.75 for base rate and $25 per mile to the destination.   Pt/family expressed understanding and are agreeable to this.      Patient requires stretcher transportation due to comfort cares,   hospice.    Stretcher transportation has been arranged for 1447. Patient and   bedside nurse notified of transportation time.    Ambulance PCS form completed and placed in patient chart.   Ambulance PCS form should be given to transportation team.    MARSHA Keyes, [facility]   Inpatient Care Coordination  [facility]   [phone]         ABO/Rh type and screen     Status: None   Result Value Ref Range    Units Ordered 1     ABO O     RH(D) Pos     Antibody Screen Neg     Test Valid Only At [facility]        Specimen Expires 02/06/2021     Crossmatch Red Blood Cells    Blood component     Status: None   Result Value Ref Range    Unit Number [id number]     Blood Component Type Red Blood Cells Leukocyte Reduced     Division Number 00     Status of Unit Released to care unit     Blood Product Code [id number]     Unit Status ISS    Blood component     Status: None   Result Value Ref Range    Unit Number [id number]     Blood Component Type Red Blood Cells Leukocyte Reduced     Division Number 00     Status of Unit Ready for patient 02/03/2021 1230     Blood Product Code [id number]     Unit  Status BLANCA        Mara Salamanca PA-C

## 2021-02-04 NOTE — PLAN OF CARE
PRIMARY DIAGNOSIS: COMFORT CARES  OUTPATIENT/OBSERVATION GOALS TO BE MET BEFORE DISCHARGE:  1. ADLs back to baseline: No    2. Activity and level of assistance: comfort cares    3. Pain status: declining intervention and denying if not moving    4. Return to near baseline physical activity: No     Discharge Planner Nurse   Safe discharge environment identified: Yes  Barriers to discharge: No       Entered by: Liseth Lopez 02/04/2021 8:00AM    Please review provider order for any additional goals.   Nurse to notify provider when observation goals have been met and patient is ready for discharge.    Declining repositioning and pain meds, verbalizing that she is comfortable, purewick in place, palliative and SW following, will continue to provide supportive cares.

## 2021-02-04 NOTE — PLAN OF CARE
PRIMARY DIAGNOSIS: Chronic Anemia & Palliative Care Consult  OUTPATIENT/OBSERVATION GOALS TO BE MET BEFORE DISCHARGE:  1. ADLs back to baseline: No, monitoring VS discontinued, on palliative cares, Pt. Requested no VS at this time.     2. Activity and level of assistance: Pt. Has not gotten OOB at this time.     3. Pain status: Pain free. Pain denied at this time, resting comfortably in bed.     4. Return to near baseline physical activity: No     Discharge Planner Nurse   Safe discharge environment identified: Yes  Barriers to discharge: Yes       Entered by: Jazmín Mcgee 02/03/2021 10:29 PM  Pt. A&Ox4, appears to be resting comfortably in bed, on palliative cares. Pain denied at this time, VS discontinued. Pt  refused evening medications at this. Denies SOB on 3L NC, LS dim at bases upon ascultation. Per-wick in place, bowel sounds present x4 and hypoactive. Bruising noted scattered BLE. Peripheral IV SL to R arm. HR regular upon ascultation, reposition as tolerated. HOB 45 degrees.  Possible discharge home with daughter on [facility] tomorrow. Continuing to monitor Pt.    Please review provider order for any additional goals.   Nurse to notify provider when observation goals have been met and patient is ready for discharge.

## 2021-02-04 NOTE — PROGRESS NOTES
[facility]    Palliative Care Chart Check or Chart Review    This patient's H+P, most recent hospitalist note and most recent consultant notes have been reviewed for consult follow up and hospice coordination.   Patient is set to discharge with [facility]/ hospice.  Met with hospice RN to discuss plan and logistically discharge tomorrow in the morning will provide smooth transition home.  Daughter updated by hospice team and aware of medication pack at discharge.  Patient lethargic and gave 1 word answers during assessment.  Denied questions.  Recommendation/Plan:    1.  Medications filled for Hospice include:  Dilaudid 1 mg/ml solution - NOT ordered, to be filled by hospice team.  Lorazepam concentrated solution  Haldol 2mg/ml solution  Senna 8.6 mg tabs   Bisacodyl 10 mg suppository  Tylenol 650 mg suppository  Atropine 1% drops SL  Refresh eye drops  Biotene mouth spray   2.  It has been determined that no change is necessary to the current plan of care at this time.   The chart will be reviewed regularly and the patient will be seen if necessary.   If you would like the patient to be seen, please contact the service at [phone] and ask to have the patient seen.  Time Spent 15 minutes, minimal direct contact with patient.    Thank you!    Kendra Bhatt   Pain Management and Palliative Care  [facility]  Pgr: [phone]

## 2021-02-04 NOTE — PROGRESS NOTES
Received call from Rebecca to let us know Keisha has decided to go on hospice. Rebecca is expecting her around [id number]. She was very tearful. Emotional support given.   Orders cancelled per request.     I did let Rebecca know she can always call [facility]. She was appreciative of the offer.     Charity Garcia, RN 2:26 PM 02/04/21

## 2021-02-04 NOTE — PROGRESS NOTES
ROOM # 230    Living Situation (if not independent, order SW consult): Currently TCU  Facility name:[facility]   : Rebecca (In chart)    Activity level at baseline: A1  Activity level on admit: A2 - per ED note, slid patient for transfer      Patient registered to observation; given Patient Bill of Rights; given the opportunity to ask questions about observation status and their plan of care.  Patient has been oriented to the observation room, bathroom and call light is in place.    Discussed discharge goals and expectations with patient/family.

## 2021-02-04 NOTE — PLAN OF CARE
PRIMARY DIAGNOSIS: Chronic Anemia & Palliative Care Consult  OUTPATIENT/OBSERVATION GOALS TO BE MET BEFORE DISCHARGE:  1. ADLs back to baseline: No, monitoring VS discontinued, on palliative cares, Pt. Requested no VS at this time.     2. Activity and level of assistance: Pt. Has not gotten OOB at this time.     3. Pain status: Pain free. Pain denied at this time, resting comfortably in bed.     4. Return to near baseline physical activity: No     Discharge Planner Nurse   Safe discharge environment identified: Yes  Barriers to discharge: Yes       Entered by: Jazmín Mcgee 02/04/2021 4:36 AM  Pt. A&Ox4, appears to be resting comfortably in bed, on palliative cares. Pain denied at this time, VS discontinued. Denies SOB on 3L NC, LS dim at bases upon ascultation. Use of accessory muscles, SOB declined. Per-wick in place, bowel sounds present x4 and hypoactive. Bruising noted scattered BLE. Peripheral IV SL to R arm. Pt. Accepted repositioning to L side. HOB 30-45 degrees.  Possible discharge home with daughter on [facility] tomorrow. Continuing to monitor Pt.    Please review provider order for any additional goals.   Nurse to notify provider when observation goals have been met and patient is ready for discharge.

## 2021-02-04 NOTE — PLAN OF CARE
CERTIFICATE OF RETURN TO WORK    August 20, 2019      Re:   Rosa Christopher  [id number]  [phone]   [place] [address]                      This is to certify that Rosa Christopher was seen on 8/12/2019 and is unable to return to work until reevaluation on 8/27/2019. RESTRICTIONS: Unable to lift more than 5 pounds. SIGNATURE:___________________________________________,   8/20/2019      Dr. Laurel Chavarria MD        [address]    [place], [address] [facility]  T [phone]  F [phone] PRIMARY DIAGNOSIS: Chronic Anemia & Palliative Care Consult  OUTPATIENT/OBSERVATION GOALS TO BE MET BEFORE DISCHARGE:  1. ADLs back to baseline: No, monitoring VS discontinued, on palliative cares, Pt. Requested no VS at this time.     2. Activity and level of assistance: Pt. Has not gotten OOB at this time.     3. Pain status: Pain free. Pain denied at this time, resting comfortably in bed.     4. Return to near baseline physical activity: No     Discharge Planner Nurse   Safe discharge environment identified: Yes  Barriers to discharge: Yes       Entered by: Jazmín Mcgee 02/04/2021 3:45 AM  Pt. A&Ox4, appears to be resting comfortably in bed, on palliative cares. Pain denied at this time, VS discontinued. Denies SOB on 3L NC, LS dim at bases upon ascultation. Per-wick in place, bowel sounds present x4 and hypoactive. Bruising noted scattered BLE. Peripheral IV SL to R arm. Pt. Offered repositioning declined x2 and reported being comfortable. HOB 45 degrees.  Possible discharge home with daughter on [facility] tomorrow. Continuing to monitor Pt.    Please review provider order for any additional goals.   Nurse to notify provider when observation goals have been met and patient is ready for discharge.

## 2021-02-04 NOTE — PROGRESS NOTES
SPIRITUAL HEALTH SERVICES Progress Note  [facility] visited 2nd floor     Visited pt, Keisha, kizzy garcia for spiritual consult. After arriving to Keisha's room I introduced myself as a nigel intern and asked how she was doing.  I could notice she was breathing shallow and had tubes to assist her breathing.  She said she wasn't doing so well this evening.  I asked if there was anything I could do that would be helpful for her this evening.  She asked like what?  I asked if she was Mu-ism and she said that she was Buddhist.  I asked if she would like a prayer and she said that she would.    I prayed for healing and peace in the name of Sebas Darnell.  I prayed that Tony family, her support system, might be well and find trust in the Lord as well.      Keisha asked if I would get some water and I helped with the water next to her bed.  I also helped her adjust the level of her head position on the bed via her request.  I spent some time in the room to be present and also did some breath work with her.  We asked to breath in live and breath out anxiety and worry with a cleansing breath of God.    I let Keisha know that spiritual health would remain available should she need it before exiting her room.       Urban Fong   Intern

## 2021-02-04 NOTE — PLAN OF CARE
PRIMARY DIAGNOSIS: COMFORT CARES  OUTPATIENT/OBSERVATION GOALS TO BE MET BEFORE DISCHARGE:  1. ADLs back to baseline: No     2. Activity and level of assistance: comfort cares     3. Pain status: declining intervention and denying if not moving     4. Return to near baseline physical activity: No          Discharge Planner Nurse   Safe discharge environment identified: Yes  Barriers to discharge: No       Entered by: Liseth Lopez 02/04/2021 11:58AM     Please review provider order for any additional goals.   Nurse to notify provider when observation goals have been met and patient is ready for discharge.     Declining repositioning and pain meds, verbalizing that she is comfortable, purewick in place, palliative and SW following, discharge set for 1445 via HE, will continue to provide supportive cares.

## 2021-02-04 NOTE — PLAN OF CARE
Patient's After Visit Summary was reviewed with patient. Pt unable to sign.  Patient verbalized understanding of After Visit Summary, recommended follow up and was given an opportunity to ask questions.   Discharge medications sent home with patient/family: hospice meds given to HE  Discharged with: HE via stretcher

## 2021-02-05 NOTE — PROGRESS NOTES
Keisha decided to go on Hospice. Orders, Follow-up appts cancelled. Dis enrolled from T    Charity Garcia RN 12:12 PM 02/05/21

## 2021-02-05 NOTE — PROGRESS NOTES
Clinic Care Coordination Contact  Community Health Worker Initial Outreach       Per chart review the patient will be attending Hospice treatments. This is a higher level of care than CCC. At this time the CHW will remove the order.

## 2021-02-11 NOTE — TELEPHONE ENCOUNTER
Hospice certification of terminal illness and plan of care received via fax. Form in your mailbox to be signed.

## 2021-02-15 ENCOUNTER — MEDICAL CORRESPONDENCE (OUTPATIENT)
Dept: HEALTH INFORMATION MANAGEMENT | Facility: CLINIC | Age: 86
End: 2021-02-15

## 2021-02-25 NOTE — PROGRESS NOTES
Discussed result with pt during virtual visit   [facility]  PRIMARY CARE PROVIDER AND CLINIC:  Gerri Eubanks MD, [address] [facility] [address] / [facility] [address]  Chief Complaint   Patient presents with     Hospital F/U     [place] [facility] Record Number:  [id number]  Place of Service where encounter took place:  [facility]  (S) [[id number]]    Keisha Godinez  is a 89 year old  (1/25/1932), re-admitted to the above facility from  [facility]. Hospital stay 1/26/21 - 1/27/21. .  Admitted to this facility for  rehab, medical management and nursing care.    HPI:    HPI information obtained from: facility chart records, facility staff, patient report and [facility] chart review.   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 Was hospitalized 1/16-1/23 for SOB and weakness and found to have CHF exacerbation and anemia. Received IV diuresis and 2 units PRBC. Discharged to TCU for physical rehabilitation and medical management. While at TCU she was found to have hemoglobin of 5.8 and was sent to ED. Repeat Hgb in ED was 6.2. She was admitted and received 2 units PRBC. She will need to have transfusions set up at transfusion center outpatient with weekly Hgb monitoring.       Updates on Status Since Skilled nursing Admission: Keisha tells me today she is feeling really good. Is on 2.5L of oxygen which she tells me is what she uses at home. Denies any SOB, cough, CP, dizziness/lighteadedness. Reports chronic BLE and tells me she wears compression stockings at home, but does not them with her at TCU. She does have orders for both compression stockings and ACE wraps after discussion with nursing staff today. She denies abdominal pain/  cramping/ nausea. Reports bowels moving well- they are black in color which is normal. No dysuria or trouble voiding. She continues to work with therapy. She tells me she lives with her daughter and plans to discharge back home there after TCU stay.     CODE STATUS/ADVANCE DIRECTIVES DISCUSSION:   DNR / DNI  Patient's living condition: lives with family, child(emre), adult   ALLERGIES: Blood transfusion related (informational only), Lisinopril, Calcium, Egg yolk, Flu virus vaccine, Keflex [cephalexin], Levaquin [levofloxacin], Sulfa drugs, and Zinc  PAST MEDICAL HISTORY:  has a past medical history of Anemia, Atrial fibrillation (H), B12 deficiency, Cardiomyopathy (H), CHF (congestive heart failure) (H), Chronic edema, Chronic systolic congestive heart failure (H), CVA (cerebral vascular accident) (H) (2010), Depression, Gastro-oesophageal reflux disease, GI bleed (03-20-15), Hyperlipidemia, Hypertension, Iron deficiency, MSSA (methicillin susceptible Staphylococcus aureus) (03-10-15), Pulmonary hypertension (H), and Shingles. She also has no past medical history of Malignant hyperthermia or PONV (postoperative nausea and vomiting).  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  FAMILY HISTORY: family history includes Known Genetic Syndrome in her father and mother; Other Cancer in her brother and daughter; Other Cancer (age of onset: 60) in her sister.  SOCIAL HISTORY:   reports that she quit smoking about 64 years ago. Her smoking use included cigarettes. She started smoking about 66 years ago. She quit after 1.00 year of use. She has never used smokeless tobacco. She reports that she does not drink alcohol or use drugs.    Post Discharge Medication Reconciliation Status: discharge medications reconciled and  "changed, per note/orders    Current Outpatient Medications   Medication Sig Dispense Refill     acetaminophen (TYLENOL) 325 MG tablet Take 650 mg by mouth 3 times daily as needed for mild pain       albuterol (PROAIR HFA/PROVENTIL HFA/VENTOLIN HFA) 108 (90 Base) MCG/ACT inhaler Inhale 2 puffs into the lungs every 6 hours as needed       albuterol (PROVENTIL) (2.5 MG/3ML) 0.083% neb solution Take 1 vial (2.5 mg) by nebulization 4 times daily       ASPIRIN NOT PRESCRIBED (INTENTIONAL) Please choose reason not prescribed, below       Cranberry Extract 250 MG TABS Take 500 mg by mouth daily        Diclofenac Sodium 1 % CREA Place 1 g onto the skin 2 times daily To L shoulder       ferrous sulfate (FEROSUL) 325 (65 Fe) MG tablet Take 1 tablet (325 mg) by mouth 2 times daily 100 tablet 11     gabapentin (NEURONTIN) 100 MG capsule Take 100 mg by mouth 2 times daily        hydrALAZINE (APRESOLINE) 25 MG tablet Take 75 mg by mouth 3 times daily Hold for SBP < 110 and update provider       OMEGA-3 FATTY ACIDS PO Take 1,200 mg by mouth daily        omeprazole (PRILOSEC) 20 MG DR capsule Take 1 capsule (20 mg) by mouth daily 90 capsule 3     psyllium (METAMUCIL/KONSYL) Packet Take 1 packet by mouth At Bedtime       senna-docusate (SENOKOT-S/PERICOLACE) 8.6-50 MG tablet Take 2 tablets by mouth daily as needed for constipation       simvastatin (ZOCOR) 10 MG tablet TAKE 1 TABLET BY MOUTH ONCE DAILY AT BEDTIME 90 tablet 0     torsemide (DEMADEX) 20 MG tablet Take 40 mg by mouth 2 times daily       venlafaxine (EFFEXOR-XR) 75 MG 24 hr capsule TAKE 1 CAPSULE BY MOUTH EVERY DAY 90 capsule 1     VITAMIN D, CHOLECALCIFEROL, PO Take 1,000 Units by mouth daily          ROS:  4 point ROS including Respiratory, CV, GI and , other than that noted in the HPI,  is negative    Vitals:  /50   Pulse 54   Temp 98.6  F (37  C)   Resp 16   Ht 1.6 m (5' 3\")   Wt 62.4 kg (137 lb 9.6 oz)   SpO2 96%   BMI 24.37 kg/m    Exam:  GENERAL " APPEARANCE:  Alert, pleasant and cooperative, elderly female resting in WHEELCHAIR in no acute distress  HEENT: normocephalic, moist mucous membranes, nose without drainage or crusting  RESP:  respiratory effort normal, no respiratory distress, Lung sounds clear, patient is on 2.5L via NC  CV: auscultation of heart done, rate and rhythm regular. no murmur, no rub or gallop.   ABDOMEN: + bowel sounds, soft, nontender, no grimacing or guarding with palpation.  M/S: +1 edema to BLE  NEURO: no facial asymmetry, no speech deficits, cranial nerves 2-12 grossly intact  PSYCH: affect and mood normal      Lab/Diagnostic data:  Labs done in SNF are in [facility]. Please refer to them using [employer]/IA[employer] Everywhere. and Recent labs in [facility] reviewed by me today.     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 Weekly transfusions for hgb <7 to be arranged by  at TCU. Continue iron supplement. Monitor for s/sx of bleeding.     (I50.43) Acute on chronic combined systolic and diastolic congestive heart failure (H)  (J96.21) Acute and chronic respiratory failure with hypoxia (H)  (I10) Essential hypertension  Comment: acute on chronic,   -during recent admission 7 kg diuresed  --PTA torsemide resumed upon discharge dose is 40 mg twice daily but spironolactone was stopped secondary to some mild hyperkalemia  -weight today stable at 137 lb, appears euvolemic today  -blood pressure controlled  Plan: Continue oxygen 2-3L, albuterol inhaler (will hold neb at TCU). Continue torsemide 40 mg BID, hydralazine 75 mg TID with hold parameters. Daily weights. Notify  provider if weight gain >2 lb in 1 day, >5 lb in 1 week. Continue TG shapes. Heart healthy diet    (I48.20) Chronic atrial fibrillation (H)  Comment: HR 50-60s  Plan: Not on AC secondary to chronic anemia    (N18.31) Stage 3a chronic kidney disease  Comment: chronic, creatinine stable at baseline at last check  Creatinine   Date Value Ref Range Status   01/27/2021 1.08 (H) 0.52 - 1.04 mg/dL Final     Plan: BMP 2/1. Avoid nephrotoxins    (F32.9) Depression, unspecified depression type  Comment: chronic, mood ok today  Plan: Continue PTA effexor. ACP PRN. Monitor mood for worsening depressive symptoms     (K21.00) Gastroesophageal reflux disease with esophagitis, unspecified whether hemorrhage  Comment: chronic  Plan: BMP with next hgb. Avoid nephrotoxins    (R53.81) Physical deconditioning  Comment: Acute, secondary to recent hospitalization, medical conditions as above  Plan: Encourage participation in physical therapy/occupational therapy for strengthening and deconditioning. Discharge planning per their recommendation. Social work to assist with d/c planning.    Electronically signed by:  JODIE Jones CNP

## 2021-03-25 DIAGNOSIS — R06.00 DYSPNEA, UNSPECIFIED TYPE: ICD-10-CM

## 2021-03-25 RX ORDER — ALBUTEROL SULFATE 0.83 MG/ML
SOLUTION RESPIRATORY (INHALATION)
Refills: 11 | OUTPATIENT
Start: 2021-03-25

## 2021-04-05 DIAGNOSIS — E78.5 HYPERLIPIDEMIA, UNSPECIFIED HYPERLIPIDEMIA TYPE: ICD-10-CM

## 2021-04-05 DIAGNOSIS — I10 ESSENTIAL HYPERTENSION WITH GOAL BLOOD PRESSURE LESS THAN 140/90: ICD-10-CM

## 2021-04-05 RX ORDER — SIMVASTATIN 10 MG
TABLET ORAL
Qty: 90 TABLET | Refills: 0 | OUTPATIENT
Start: 2021-04-05

## 2021-04-05 RX ORDER — HYDRALAZINE HYDROCHLORIDE 25 MG/1
TABLET, FILM COATED ORAL
Qty: 810 TABLET | Refills: 0 | OUTPATIENT
Start: 2021-04-05

## 2022-02-17 PROBLEM — N18.30 STAGE 3 CHRONIC KIDNEY DISEASE (H): Status: ACTIVE | Noted: 2017-11-08

## 2022-05-31 NOTE — TELEPHONE ENCOUNTER
Prescription approved per [facility] Refill Protocol.     [FreeTextEntry1] : annual exam\par  [de-identified] : annual exam\par \par new patient\par PMHx of HTN, DM, HLD,CVA \par

## 2022-06-30 NOTE — PROGRESS NOTES
"  Cardiology Progress Note    Date of Service: 07/13/2018      Reason for visit: [facility] enrollment, acute on chronic diastolic heart failure, pulmonary hypertension.    Primary cardiologist: Dr. Miguel Pradhan      HPI:  Ms. Godinez is a pleasant 86 year old female with a PMHx including hypertension, hyperlipidemia, atrial fibrillation (not on AC due to prior GI bleed), chronic anemia (colonic AVM, iron deficiency), CVA, chronic lower extremity edema, and diastolic heart failure with pulmonary hypertension. She has been followed by Dr. Pradhan in the past, but hasn't been seen since 2016. She tells me she did not return because she had been doing so well. Unfortunately, more recently she has had issues with increased leg swelling. She was admitted at [facility] [place] from 5/21/18 to 5/24/18 with cellulitis complicated by heart failure, was diuresed and discharged to a TCU. She then returned from 6/18/18 to 6/28/18 with leg edema and weight gain once again, along with dyspnea. She again required IV diuresis, and pRBC transfusion for worsening of her anemia. She was reportedly 167 lbs at admit, and discharged at 155 lbs. She was placed on 40mg of lasix twice daily, and still required supplemental oxygen at 1.5L at discharged. She is here today for [facility] clinic enrollment.    Ms. Godinez tells me that she's been doing quite good since discharge. Her daughter accompanies her today (her main caregiver) and says they have made significant lifestyle changes, as this hospital stay \"scared them both\" and they realize they needed to cut down on salt. They have now been quite strict with this, and her weight is down further, to 150 lbs. She still has significant edema, and is followed at the lymphedema clinic with leg wraps. She thinks her breathing is better overall, she did not wear her oxygen today, and her sats were 95% on room air. She is weighing herself daily and keeping a log. BP is up slightly at 132/44, and HR is 50. She sleeps " in a recliner, partially from what sounds like orthopnea, but also comfort. She denies any exertional chest, arm, or jaw discomfort and has not had any significant dizziness or presyncope. Labs today show preserved renal function and electrolytes are acceptable. He NT-proBNP is elevated, but has trended down nicely from hospital admission. Her hemoglobin is stable from discharge, though chronically appears to run in the 7-8 range. TSH is normal.     ASSESSMENT/PLAN:    1. Acute on chronic diastolic heart failure.   --Last echo was October 2017 at which time her EF was preserved at 60-65%. Her RV at that time was moderately dilated, with borderline reduced function and moderate TR.     --Making significant improvements with strict sodium restriction in her diet which I encouraged her to continue. However, we still have some room to diurese further to approach euvolemia. Will change her lasix to torsemide 20mg BID. Recheck labs in 1 week. Enroll in tele-assurance. For now, will make her goal weight range 147-155. Given her history of GIB and chronic anemia, not a cardiomems candidate.   --Continue spironolactone 50mg daily for now; she was taken off her KDur at discharge, continue to monitor her electrolytes closely with diuretic adjustments.    --Continue lymphedema wraps.    --Continue to monitor her anemia.    --After we near euvolemia will repeat echocardiogram. If she continues to have RV dysfunction, may consider a RHC.    --Once more mobile as her lymphedema improves, will consider cardiac rehab. She remains deconditioned.   --She can likely be taken off home oxygen, but will defer to primary provider.       2. Hypertension.   --BP relatively well controlled currently, will hopefully normalize with further diuresis. She has a true allergy to ACE-inhibitors (tongue swelling), and is not on a beta blocker as she has had issues with bradycardia in the past.   --She has chronically been on hydralazine and  isosorbide which we will continue.     3. Atrial fibrillation, chronic.   --Rate controlled. No BB due to bradycardia.    --She is not on anticoagulation due to GIB/anemia.     4. Hyperlipidemia.   --Last LDL Aug 2017 was excellent at 34. Continue simvastatin 10mg daily.       Follow up plan: Labs in 1 week, and return to see Suad Murrell PA-C in 2 weeks.   Will arrange for return visit with Dr. Pradhan to re-establish care in 2 months, and check an echo prior.       Orders this Visit:  Orders Placed This Encounter   Procedures     Basic metabolic panel     N terminal pro BNP outpatient     Follow-Up with [facility] visit     Enrollment in Tel-Assurance     Follow-Up with Cardiologist     Echocardiogram     Orders Placed This Encounter   Medications     torsemide (DEMADEX) 20 MG tablet     Sig: Take 1 tablet (20 mg) by mouth 2 times daily     Dispense:  180 tablet     Refill:  3     Medications Discontinued During This Encounter   Medication Reason     furosemide (LASIX) 40 MG tablet Stop at Discharge           CURRENT MEDICATIONS:  Current Outpatient Prescriptions   Medication Sig Dispense Refill     acetaminophen (TYLENOL) 325 MG tablet Take 650 mg by mouth 3 times daily as needed for mild pain       albuterol (ACCUNEB) 1.25 MG/3ML nebulizer solution Take 1 vial by nebulization 3 times daily        Cranberry Extract 250 MG TABS Take 250 mg by mouth daily       ferrous sulfate (IRON) 325 (65 FE) MG tablet Take 1 tablet (325 mg) by mouth 2 times daily 100 tablet 11     gabapentin (NEURONTIN) 100 MG capsule TAKE 1 CAPSULE(100 MG) BY MOUTH THREE TIMES DAILY 270 capsule 0     hydrALAZINE (APRESOLINE) 25 MG tablet Take 3 tablets (75 mg) by mouth 3 times daily 810 tablet 3     isosorbide mononitrate (IMDUR) 30 MG 24 hr tablet Take 2 tablets (60 mg) by mouth daily 90 tablet 2     OMEGA-3 FATTY ACIDS PO Take 1,200 mg by mouth daily        omeprazole (PRILOSEC) 20 MG CR capsule Take 1 capsule (20 mg) by mouth daily  90 capsule 2     senna-docusate (SENOKOT-S;PERICOLACE) 8.6-50 MG per tablet Take 2 tablets daily as needed for constipation while taking prescription pain medications. 30 tablet 0     simvastatin (ZOCOR) 10 MG tablet Take 1 tablet (10 mg) by mouth At Bedtime 90 tablet 1     spironolactone (ALDACTONE) 50 MG tablet Take 1 tablet (50 mg) by mouth daily 30 tablet 3     torsemide (DEMADEX) 20 MG tablet Take 1 tablet (20 mg) by mouth 2 times daily 180 tablet 3     venlafaxine (EFFEXOR-XR) 75 MG 24 hr capsule TAKE ONE CAPSULE BY MOUTH DAILY 90 capsule 0     VITAMIN D, CHOLECALCIFEROL, PO Take 1,000 Units by mouth daily          ALLERGIES     Allergies   Allergen Reactions     Blood Transfusion Related (Informational Only) Other (See Comments)     Patient has a history of a clinically significant antibody against RBC antigens.  A delay in compatible RBCs may occur.     Lisinopril Other (See Comments)     Tongue swelling     Calcium Nausea and Vomiting     Egg Yolk      Flu Virus Vaccine      Allergic to eggs.     Keflex [Cephalexin] Nausea     Pt states she had upset stomach.  NOT tongue swelling.  She had tongue swelling with a combo bp med that she thinks included lisinopril.    Tolerates IV Cefazolin     Levaquin [Levofloxacin]      Sulfa Drugs      Zinc Nausea and Vomiting       PAST MEDICAL HISTORY:  Past Medical History:   Diagnosis Date     Anemia      Atrial fibrillation (H)      B12 deficiency      Cardiomyopathy (H)      CHF (congestive heart failure) (H)      Chronic edema     Lower extremities     Chronic systolic congestive heart failure (H)      CVA (cerebral vascular accident) (H) 2010    Acute Left MCA hemispheric CVA ( on coumadin)     Depression      Gastro-oesophageal reflux disease      GI bleed 03-20-15     Hyperlipidemia      Hypertension      Iron deficiency      MSSA (methicillin susceptible Staphylococcus aureus) 03-10-15     Pulmonary hypertension      Shingles        PAST SURGICAL HISTORY:  Past  Surgical History:   Procedure Laterality Date     COLONOSCOPY  03/24/2015    Diverticulosis, polyps     ENTEROSCOPY SMALL BOWEL N/A 2/29/2016    Procedure: ENTEROSCOPY SMALL BOWEL;  Surgeon: Ady Ponce MD;  Location:  GI     ESOPHAGOSCOPY, GASTROSCOPY, DUODENOSCOPY (EGD), COMBINED N/A 3/22/2015    Upper GI- Normal, nothing significant found.      EXCISE MASS FOOT Right 7/6/2016    Procedure: EXCISE MASS FOOT;  Surgeon: Lee Jang DPM;  Location: [facility] OR       FAMILY HISTORY:  Family History   Problem Relation Age of Onset     Known Genetic Syndrome Father      Known Genetic Syndrome Mother      Other Cancer Daughter      pancreatic     Other Cancer Brother      type     Other Cancer Sister 60     lung     Diabetes No family hx of      Breast Cancer No family hx of      Cancer - colorectal No family hx of      Ovarian Cancer No family hx of      Prostate Cancer No family hx of        SOCIAL HISTORY:  Social History     Social History     Marital status:      Spouse name: N/A     Number of children: N/A     Years of education: N/A     Social History Main Topics     Smoking status: Former Smoker     Quit date: 4/14/1956     Smokeless tobacco: Never Used     Alcohol use No     Drug use: No     Sexual activity: No     Other Topics Concern     Caffeine Concern No     Hot tea 1 cup daily     Special Diet Yes     low sodium     Exercise No     limited right now     Social History Narrative       Review of Systems:  Cardiovascular: negative for chest pain, palpitations, orthopnea, pos LE edema  Constitutional: negative for chills, sweats, fevers   Resp: Negative for dyspnea at rest, pos dyspnea on exertion, neg cough, known chronic lung disease  HEENT: Negative for new visual changes, frequent headaches  Gastrointestinal: negative for abdominal pain, diarrhea, blood in stool, nausea, vomiting  Hematologic/lymphatic: negative for current systemic anticoagulation, hx of blood clots. Pos hx  "GIB.  Musculoskeletal: negative for new back pain, joint pain  Neurological: negative for focal weakness, LOC, seizures, syncope. Neg dizziness.      Physical Exam:  Vitals: /44 (BP Location: Right arm, Patient Position: Chair, Cuff Size: Adult Regular)  Pulse 50  Ht 1.6 m (5' 3\")  Wt 68.2 kg (150 lb 4.8 oz)  SpO2 95%  BMI 26.62 kg/m2   Wt Readings from Last 4 Encounters:   07/13/18 68.2 kg (150 lb 4.8 oz)   07/10/18 68 kg (150 lb)   06/28/18 71.2 kg (157 lb)   06/18/18 72.6 kg (160 lb)       GEN:  In general, this is a well nourished elderly female in no acute distress on room air. She arrived in a wheelchair today, as her legs are wrapped heavily and finds it difficult to ambulate. Accompanied by her daughter.   HEENT:  Pupils equal, round. Sclerae nonicteric.   NECK: Supple, no masses appreciated. Trachea midline. Mild JVD.   C/V:  Irregular rhythm, no murmur, rub or gallop. Mildly accentuated P2. No S3 or RV heave.   RESP: Respirations are unlabored. No use of accessory muscles. Noted to have a few crackles in bases posteriorly. No wheezing.   GI: Abdomen soft, nontender, nondistended.   EXTREM: + edema, though difficult to assess as legs wrapped. No cyanosis or clubbing.  NEURO: Alert and oriented, cooperative. Gait not assesed. No obvious focal deficits.   PSYCH: Normal affect.  SKIN: Warm and dry. No rashes or petechiae appreciated.       Recent Lab Results:  LIPID RESULTS:  Lab Results   Component Value Date    CHOL 108 08/02/2017    HDL 61 08/02/2017    LDL 34 08/02/2017    TRIG 65 08/02/2017             CBC RESULTS:  Lab Results   Component Value Date    WBC 5.5 07/10/2018    RBC 3.03 (L) 07/10/2018    HGB 8.2 (L) 07/13/2018    HCT 27.6 (L) 07/10/2018    MCV 91 07/10/2018    MCH 26.4 (L) 07/10/2018    MCHC 29.0 (L) 07/10/2018    RDW 18.2 (H) 07/10/2018    PLT 59 (L) 07/10/2018       BMP RESULTS:  Lab Results   Component Value Date     07/13/2018    POTASSIUM 3.9 07/13/2018    CHLORIDE 105 " 07/13/2018    CO2 28 07/13/2018    ANIONGAP 6 07/13/2018    GLC 91 07/13/2018    BUN 17 07/13/2018    CR 0.94 07/13/2018    GFRESTIMATED 57 (L) 07/13/2018    GFRESTBLACK 68 07/13/2018    CASPER 8.7 07/13/2018          New/Pertinent imaging results since last visit:  Echo 10/18/17  Interpretation Summary     The visual ejection fraction is estimated at 60-65%.  The right ventricle is moderately dilated.  The right ventricular systolic function is borderline reduced.  There is mod-severe biatrial enlargement.  There is mild (1+) mitral regurgitation.  There is moderate (2+) tricuspid regurgitation.  Dilated inferior vena cava  Compared to prior study, there is no significant change.    Greater than 40 minutes was spent with this patient, >50% of which was spent in counseling and coordination of care.       Suad Murrell PA-C  [facility]  Pager [phone]     No

## 2022-10-06 NOTE — PROGRESS NOTES
CARDIOLOGY CLINIC TELEPHONE VISIT    Keisha Godinez is a 89 year old female who is being evaluated via a billable telephone visit.      I have reviewed and updated the patient's Past Medical History, Social History, Family History and Medication List.      MEDICATIONS:  Current Outpatient Medications   Medication Sig Dispense Refill     acetaminophen (TYLENOL) 325 MG tablet Take 650 mg by mouth 3 times daily as needed for mild pain       albuterol (PROAIR HFA/PROVENTIL HFA/VENTOLIN HFA) 108 (90 Base) MCG/ACT inhaler Inhale 2 puffs into the lungs every 6 hours as needed       albuterol (PROVENTIL) (2.5 MG/3ML) 0.083% neb solution Take 1 vial (2.5 mg) by nebulization 4 times daily       ASPIRIN NOT PRESCRIBED (INTENTIONAL) Please choose reason not prescribed, below       Cranberry Extract 250 MG TABS Take 500 mg by mouth daily        Diclofenac Sodium 1 % CREA Place 1 g onto the skin 2 times daily To L shoulder       ferrous sulfate (FEROSUL) 325 (65 Fe) MG tablet Take 1 tablet (325 mg) by mouth 2 times daily 100 tablet 11     gabapentin (NEURONTIN) 100 MG capsule Take 100 mg by mouth 2 times daily        hydrALAZINE (APRESOLINE) 25 MG tablet Take 75 mg by mouth 3 times daily Hold for SBP < 110 and update provider       OMEGA-3 FATTY ACIDS PO Take 1,200 mg by mouth daily        omeprazole (PRILOSEC) 20 MG DR capsule Take 1 capsule (20 mg) by mouth daily 90 capsule 3     psyllium (METAMUCIL/KONSYL) Packet Take 1 packet by mouth At Bedtime       senna-docusate (SENOKOT-S/PERICOLACE) 8.6-50 MG tablet Take 2 tablets by mouth daily as needed for constipation       simvastatin (ZOCOR) 10 MG tablet TAKE 1 TABLET BY MOUTH ONCE DAILY AT BEDTIME 90 tablet 0     torsemide (DEMADEX) 20 MG tablet Take 40 mg by mouth 2 times daily       venlafaxine (EFFEXOR-XR) 75 MG 24 hr capsule TAKE 1 CAPSULE BY MOUTH EVERY DAY 90 capsule 1     VITAMIN D, CHOLECALCIFEROL, PO Take 1,000 Units by mouth daily          ALLERGIES  Blood  "Subjective:       Patient ID: Manny Lui is a 17 m.o. male.    Vitals:  height is 2' 8" (0.813 m) and weight is 14.1 kg (31 lb). His tympanic temperature is 97.5 °F (36.4 °C). His blood pressure is 92/61 and his pulse is 112. His respiration is 24 and oxygen saturation is 99%.     Chief Complaint: Nasal Congestion    Pt mom states he have been congested.  Symptoms started 2 weeks ago and treated by pediatrician given medicine for allergies.  Symptoms have not improved and started to worsen with associated cough 2 days ago.    Cough  This is a new problem. The problem has been gradually worsening. The problem occurs hourly. The cough is Wet sounding. Associated symptoms include nasal congestion and rhinorrhea. Pertinent negatives include no fever, rash, shortness of breath or wheezing. There is no history of asthma.     Constitution: Positive for appetite change. Negative for fever.   HENT:  Positive for congestion. Negative for ear discharge.    Respiratory:  Positive for cough. Negative for shortness of breath and wheezing.    Gastrointestinal:  Negative for vomiting and diarrhea.   Genitourinary:  Negative for urine decreased.   Musculoskeletal:  Negative for trauma.   Skin:  Negative for rash.   Neurological:  Negative for altered mental status.   Psychiatric/Behavioral:  Negative for altered mental status and confusion.      Objective:      Physical Exam   Constitutional: He appears well-developed.  Non-toxic appearance. He appears ill. No distress.   HENT:   Head: Atraumatic. No hematoma. No signs of injury. There is normal jaw occlusion.   Ears:   Right Ear: External ear and ear canal normal. Tympanic membrane is erythematous.   Left Ear: External ear and ear canal normal. Tympanic membrane is erythematous and bulging.   Nose: Mucosal edema, rhinorrhea and congestion present.   Mouth/Throat: Mucous membranes are moist. Oropharynx is clear.   Eyes: Conjunctivae and lids are normal. " Visual tracking is normal. Right eye exhibits no exudate. Left eye exhibits no exudate. No scleral icterus.   Neck: Neck supple. No neck rigidity present.   Cardiovascular: Normal rate, regular rhythm and S1 normal. Pulses are strong.   Pulmonary/Chest: Effort normal and breath sounds normal. No nasal flaring or stridor. No respiratory distress. He has no wheezes. He exhibits no retraction.   Abdominal: Bowel sounds are normal. He exhibits no distension and no mass. Soft. There is no abdominal tenderness. There is no rigidity.   Musculoskeletal: Normal range of motion.         General: No tenderness or deformity. Normal range of motion.   Neurological: He is alert. He sits and stands.   Skin: Skin is warm, moist, not diaphoretic, not pale, no rash and not purpuric. Capillary refill takes less than 2 seconds. No petechiae jaundice  Nursing note and vitals reviewed.      Assessment:       1. Recurrent acute suppurative otitis media without spontaneous rupture of tympanic membrane of both sides    2. Upper respiratory tract infection, unspecified type          Plan:         Recurrent acute suppurative otitis media without spontaneous rupture of tympanic membrane of both sides  -     amoxicillin-clavulanate (AUGMENTIN) 600-42.9 mg/5 mL SusR; Take 5.3 mLs (636 mg total) by mouth 2 (two) times daily. for 10 days  Dispense: 106 mL; Refill: 0    Upper respiratory tract infection, unspecified type         Medical Decision Making:   History:   I obtained history from: someone other than patient.  Old Medical Records: I decided to obtain old medical records.  Old Records Summarized: records from clinic visits.       <> Summary of Records: Otitis media in August          transfusion related (informational only), Lisinopril, Calcium, Egg yolk, Flu virus vaccine, Keflex [cephalexin], Levaquin [levofloxacin], Sulfa drugs, and Zinc      Review Of Systems  Skin: Positive for bruising   Eyes: Positive for glasses   Ears/Nose/Throat: Negative  Respiratory: Positive for dyspnea with exertion; O2 24/7 2.5L  Cardiovascular: Positive for edema of the right leg; fatigue  Gastrointestinal: Negative  Genitourinary: Negative  Musculoskeletal: Positive for joint pain - right knee  Neurologic: Positive for neuropathy - tingling/numbness of the feet   Psychiatric: Negative  Hematologic/Lymphatic/Immunologic: Negative  Endocrine: Negative      Self reported vitals:  (Ebeneezer reported)  /68 before meds.   HR 61  O2 93% 1 lpm NC  T 98.4 F  RR 20  Wt   133.4 lbs  Nurse noted 1+ bilateral lower leg edema. Lungs have bibasilar crackles. She is reporting MOTT, not worse then normal        Most recent labs:   Results for ELAINA MAYFIELD (MRN [id number]) as of 2/1/2021 10:24   Ref. Range 2/1/2021 09:32   Sodium Latest Ref Range: 133 - 144 mmol/L 141   Potassium Latest Ref Range: 3.4 - 5.3 mmol/L 4.1   Chloride Latest Ref Range: 94 - 109 mmol/L 96   Carbon Dioxide Latest Ref Range: 20 - 32 mmol/L 45 (H)   Urea Nitrogen Latest Ref Range: 7 - 30 mg/dL 81 (H)   Creatinine Latest Ref Range: 0.52 - 1.04 mg/dL 0.92   GFR Estimate Latest Ref Range: >60 mL/min/1.73_m2 55 (L)   GFR Estimate If Black Latest Ref Range: >60 mL/min/1.73_m2 64   Calcium Latest Ref Range: 8.5 - 10.1 mg/dL 8.8   Anion Gap Latest Ref Range: 3 - 14 mmol/L <1 (L)   Glucose Latest Ref Range: 70 - 99 mg/dL 100 (H)   WBC Latest Ref Range: 4.0 - 11.0 10e9/L 10.2   Hemoglobin Latest Ref Range: 11.7 - 15.7 g/dL 7.3 (L)   Hematocrit Latest Ref Range: 35.0 - 47.0 % 25.2 (L)   Platelet Count Latest Ref Range: 150 - 450 10e9/L 101 (L)         Primary cardiologist: Dr. Edin Chaudhari      HPI:  Ms. Mayfield is a very pleasant 88 year old female with a  PMHx including hypertension, hyperlipidemia, atrial fibrillation (not on AC due to GI bleed), recurrent anemia (colonic AVM, iron deficiency), CVA, chronic lower extremity edema, and diastolic heart failure with secondary pulmonary hypertension.     I have been following her in [facility] clinic since July of 2018 after a hospital stay for cellulitis complicated by heart failure. She made positive changes in her diet, including sodium restriction, and weights stabilized with consistent use of lymphedema wraps. I transitioned her lasix to torsemide with good result and her weight then settled out in the 140-143 lb range. Her last echo in Oct 2018 showed EF 55-60%, though her RV remained dilated with mildly decreased function and 2+TR. Given her age and comorbidities, we have remained somewhat conservative in management.      In 2019 and 2020, she has had recurrent hospitalizations for symptomatic anemia. This is complicated by heart failure exacerbations requiring intermittent diuresis. She was found to have AVMs, but has declined repeated GI workups, and instead is receiving frequent monitoring and outpatient transfusions. We have made intermittent adjustments to her diuretics as well, and requested she receive IV diuretics on the day of her transfusions which seemed to help a bit. We referred her back for lymphedema treatments to help with her lower extremity edema.     Since we spoke last, she has unfortunately been hospitalized twice. From 1/16-1/23 she was admitted for abdominal pain and shortness of breath. That admission, she was 68kg on admit, and diuresed down to 61kg (135 lb) at discharge. Post BM, her abdominal pain improved. However, a few days later she returned for weakness and lab showing a hemoglobin of 5.2. She was admitted for observation (1/26-1/27) and again required transfusions. Discharge weight was again reported at 135 lb. At that time, she was discharged to a TCU.    Today, we are doing another  telephone visit for [facility] follow up. We have conferenced in her daughter at her request. Her daughter was her primary caregiver until recently and says it has been difficult since her mother was moved to a TCU as she isn't nearly as involved as she was prior. Keisha tells me that overall, she's doing ok in terms of swelling and MOTT, and is at her baseline. Weights appear to be stable (133# per TCU). Her main complaint today is lack of appetite and is forcing herself to drink boost. She denies new dizziness/presyncope. She is able to walk with assistance and is doing some light PT with staff. She says her last transfusion was last Friday, 1/29.     She had some labs done this morning prior to our phone visit. Renal function remains acceptable BUN 81, SCr 0.92. Na 141, K 4.1. Hemoglobin was 7.3.           ASSESSMENT/PLAN:     1. Chronic diastolic and right heart failure.              --Most recent echo Oct 2018 with ongoing preserved EF 55-60%. Her RV has remained dilated, with mildly reduced function with 2+ TR, and was felt to be euvolemic at the time of that exam. Given her advanced age and other comorbidities, we had opted to proceed conservatively with no advanced PH workup planned. However, given she has had some recurrent issues with edema/heart failure symptoms, I recommended a repeat echocardiogram to ensure no new drop in EF or worsening PH/TR. We will see if we can coordinate this with her next transfusion if possible.               --Currently, it sounds as if weights and edema are under control. Weight is actually down a bit, possibly due to poor appetite. Will continue her PO torsemide at 40mg BID, and IV Lasix at 40mg on days she receives blood transfusions as directed by heme/onc. Renal function is overall stable.               --She was taken off spironolactone with her recent hospital stay. Continue to monitor for now.                                      2. Hypertension.              --SBP has  fluctuated in the past, but more recently appears to be under good control. She has a true allergy to ACE-inhibitors (tongue swelling), and is not on a beta blocker due to resting bradycardia. Will continue hydralazine as is.      3. Atrial fibrillation, chronic.              --Rate historically has been well controlled, with intermittent bradycardia, not on BB. Some concern for underlying AV conduction issues but she has not had issues with significant dizziness or presyncope. Continue to monitor.                --She is not on anticoagulation due to GIB/anemia noted above.       Follow up plan: Will try to arrange for a repeat echo in the next few weeks, then follow up with me in [facility] clinic in ~1 month.    I have reviewed the note as documented above.  This accurately captures the substance of my conversation with the patient.      Phone call contact time  Call Started at 1306  Call Ended at 1328    An additional 15 minutes was spent performing chart and history review, documentation, and care coordination.      Suad Murrell PA-C  [facility]  Pager [phone]

## 2023-02-01 NOTE — PLAN OF CARE
Potassium slight elevated. Drink more water. Cut back on dietary sources of potassium. Borderline elevated sugar. Limit sugar and starches. Problem: Fracture Orthopaedic (Adult)  Goal: Signs and Symptoms of Listed Potential Problems Will be Absent, Minimized or Managed (Fracture Orthopaedic)  Signs and symptoms of listed potential problems will be absent, minimized or managed by discharge/transition of care (reference Fracture Orthopaedic (Adult) CPG).   A&Ox4. VSS except bradycardic. On Tele monitoring. Has some mild pain/discomfort which she is taking Oxy 5mg for and is effective. Ambulating with A2 pivot and used Vannessa Steady once. Purewick in place and to suction with clear yellow urine. No BM today but passing gas. Awaiting plans for discharge from hospitalist.

## 2023-03-07 NOTE — CONSULTS
Care Management Discharge Note    Discharge Date: (from [facility] TCU)    Discharge Disposition: Return to [facility] TCU when medically stable.     Discharge Transportation:  Reviewed out of pocket cost for [facility] transport, $75 for base rate and $5 per mile to the destination. Pt/family expressed understanding and are agreeable to this.     Private pay costs discussed: transportation costs    PAS Confirmation Code:  N/A  Patient/family educated on Medicare website which has current facility and service quality ratings:  No, pt admitted from [facility] and has a bed hold.    Education Provided on the Discharge Plan:  Yes  Persons Notified of Discharge Plans: Patient, daughter Rebecca.  Patient/Family in Agreement with the Plan:  Yes    Handoff Referral Completed: No    Additional Information:  Pt admitted from [facility] TCU for anemia. Prior to TCU stay, pt was living at home with daughter Rebecca and HC support through [facility].     SW spoke with daughter Rebecca, who confirmed that her and pt would like pt to return to [facility] TCU at discharge. Requesting WC transport be arranged. PAS not needed as pt came from TCU. YULISA will continue to follow.     1350: Pt is ready for discharge today. HE WC transport with O2 arranged for 1645 today. Orders to be faxed. YULISA will continue to follow.     MARSHA Keyes, [facility]   Inpatient Care Coordination  [facility]   [phone]        
unable to assess

## 2023-05-31 NOTE — PROGRESS NOTES
.Patient has been assessed for Home Oxygen needs. Oxygen readings:    *Pulse oximetry (SpO2) = 85% on room air at rest while awake.    *SpO2 improved to 91% on 1liters/minute at rest.    *SpO2 = 80% on room air during activity/with exercise.    *SpO2 improved to 92% on 1liters/minute during activity/with exercise.         atrial fibrillation/coronary disease/kidney disease/stroke

## 2023-08-15 NOTE — PLAN OF CARE
Problem: Patient Care Overview  Goal: Plan of Care/Patient Progress Review  Outcome: No Change  Vss. Low grade temp 99.1 at shift end. Lungs coarse, diminished with expiratory wheezes posteriorly-O2 2L mid 90s. Is done with encouragement. Frequent, fair-np cough. Sounds congested-not clearing well. RT called and evaluated pt. RT did not think pt would benefit from nebs. Hypoactive bs/+gas, no nausea. Tolerating diet. Last bm 10/17/17. Voiding using Pure Wick with good success. [place] area cleansed. Skin has bruising and is dry/flaky. Bles are carey in color. Cms wnl except for +1 edema to rt shoulder and +2 edema to bles. No sling is in place, but arm is positioned on pillow & rolled towel. Pain managed with iv Dilaudid/Oxycodone. Tolerating ice and some repositioning. No other significant issues noted this evening.     2000- Hospitalist paged for cough medication. Orders received.    General: AAOx3, NAD  HEENT: NCAT  Cardiac: Normal rate and rhythym, no murmurs, normal peripheral perfusion  Respiratory: Normal rate and effort. CTAB  GI: Soft, nondistended, nontender  Neuro: No focal deficits. SANTANA equally x4, sensation to light touch intact throughout  MSK: FROMx4, no focal bony tenderness, no peripheral edema. R groin cath site with minimal underlying hematoma, palpable femoral pulse. DP, PT pulses 2+ in R foot. Sensation to light touch intact  Skin: No rash

## 2024-01-03 NOTE — PLAN OF CARE
Phone pt to confirmed the appt. Pt confirmed  vs   Vitals: Temp: 96.1  F (35.6  C) Temp src: Oral BP: 137/43 Pulse: 51   Resp: 22 SpO2: 99 % O2 Device: Nasal cannula Oxygen Delivery: 2 LPM  Neuro: AO4, lethargic  Respiratory: LS fine crackles, breathing unlabored  Cardiac/tele: Tele afib SVR w/PACs, denies CP  GI/: davey in place, incontinent of bm x1 this shift  Skin: See flow sheet, turn and repositioned   LDAs: PIV x2  Labs: Triggered sepsis protocol, lac 0.4  Diet: Cardiac diet  Activity: A2  Plan: Discharge TBD. Will continue POC.

## 2024-08-30 NOTE — PROGRESS NOTES
[facility]    Hospitalist Progress Note  Name: Keisha Godinez    MRN: [id number]  Provider: Adelina Alanis MD  Date of Service: 10/19/2017    Assessment & Plan   Summary of Stay: Keisha Godinez is a 85 year old female who was admitted on 10/17/2017 with acute proximal humerus fracture with angulation s/p mechanical fall. PMH sig for CAD, CVA, Afib, Pulm HTN, ? Diastolic CHF, HTN, dyslipidemia      S/p proximal Rt humerus fracture  -- Per ortho supportive care  -- Ortho consult appreciated  -- shoulder sling  -- prn pain meds   --PT/ OT/ SW     h/o A fib  -- stable rate controlled without any meds. She was bradycardic in 2016  -- on tele to watch for arrhythmia  -- not on anticoagulation ? Sec to bleeding per family discontinue by Dr. Pradhan    H/o CAD  ? EKG changes not significantly different from 2016  -- initial trop negative but with minimal leak in am  --recheck trop stable  -- cardiac echo no new change  --continue on nitro and statin.      Hypokalemia and Hypocalcemia  -- replaced per protocol    H/o Anemia  --  On Iron replacement  -- will follow hgb      DVT Prophylaxis: Pneumatic Compression Devices  Code Status: Full Code    Disposition: Expected discharge 1-2 days to TCU once pain is control. SW consulted    Interval History   C/o pain in left shoulder, is constipated, minimally short of breath. Review of all other systems negative    -Data reviewed today: I reviewed all new labs and imaging reports over the last 24 hours. I personally reviewed the EKG tracing showing afib with HR of 68 t inverted avl, v2-v3 and nonspecific STT changes..    Physical Exam   Temp: 99.8  F (37.7  C) (IS encouraged) Temp src: Oral BP: 150/44 Pulse: 67 Heart Rate: 65 Resp: 16 SpO2: 93 % O2 Device: Nasal cannula Oxygen Delivery: 2 LPM  Vitals:    10/17/17 2113 10/18/17 0721 10/19/17 0937   Weight: 70.3 kg (155 lb) 74.2 kg (163 lb 8 oz) 75.8 kg (167 lb)     Vital Signs with Ranges  Temp:  [97.5  F (36.4   C)-99.8  F (37.7  C)] 99.8  F (37.7  C)  Pulse:  [62-67] 67  Heart Rate:  [62-75] 65  Resp:  [14-16] 16  BP: (139-184)/(44-69) 150/44  SpO2:  [88 %-98 %] 93 %  I/O last 3 completed shifts:  In: 770 [P.O.:300; I.V.:470]  Out: 1150 [Urine:1150]      GEN:  Alert, oriented x 3, appears comfortable, NAD.  HEENT:  Normocephalic/atraumatic, no scleral icterus, no nasal discharge, mouth moist.  CV:  Irregularly irregular, no murmur or JVD.  S1 + S2 noted, no S3 or S4.  LUNGS:  Few bibasilar crackles and occasional wheezing.  Symmetric chest rise on inhalation noted.  ABD:  Active bowel sounds, soft, non-tender/non-distended.  No rebound/guarding/rigidity.  EXT:  No edema.  No cyanosis.  No joint synovitis noted.  SKIN:  Dry to touch, no exanthems noted in the visualized areas.    Medications        acetaminophen (TYLENOL) tablet 650 mg  650 mg Oral At Bedtime     furosemide  40 mg Oral BID     gabapentin  100 mg Oral TID     hydrALAZINE  75 mg Oral TID     isosorbide mononitrate  30 mg Oral Daily     omeprazole  20 mg Oral Daily     senna-docusate  2 tablet Oral At Bedtime     simvastatin  10 mg Oral At Bedtime     venlafaxine  75 mg Oral Daily     cholecalciferol  1,000 Units Oral Daily     sodium chloride (PF)  3 mL Intracatheter Q8H     ferrous sulfate  325 mg Oral BID     Data       Recent Labs  Lab 10/19/17  0544 10/18/17  0646 10/17/17  2222   WBC 11.3* 10.9 11.3*   HGB 9.4* 10.3* 10.8*   HCT 30.5* 32.9* 34.4*   MCV 98 99 97   * 134* 135*       Recent Labs  Lab 10/19/17  0544 10/18/17  0646 10/17/17  2222    141 142   POTASSIUM 3.7 3.8 3.2*   CHLORIDE 106 108 107   CO2 29 27 27   ANIONGAP 5 6 8   GLC 98 94 137*   BUN 27 14 14   CR 0.90 0.80 0.77   GFRESTIMATED 59* 68 71   GFRESTBLACK 72 83 86   CASPER 7.8* 7.7* 7.7*     No results for input(s): CULT in the last 168 hours.    Recent Labs  Lab 10/18/17  0646   NTBNPI 1594       Recent Labs  Lab 10/18/17  0646   INR 1.04     No results for input(s): LIPASE in  the last 168 hours.  No results for input(s): CHOL, HDL, LDL, TRIG, CHOLHDLRATIO in the last 168 hours.  No results for input(s): TSH in the last 168 hours.  No results for input(s): COLOR, APPEARANCE, URINEGLC, URINEBILI, URINEKETONE, SG, UBLD, URINEPH, PROTEIN, UROBILINOGEN, NITRITE, LEUKEST, RBCU, WBCU in the last 168 hours.    No results found for this or any previous visit (from the past 24 hour(s)).     stated